# Patient Record
Sex: MALE | Race: WHITE | Employment: OTHER | URBAN - METROPOLITAN AREA
[De-identification: names, ages, dates, MRNs, and addresses within clinical notes are randomized per-mention and may not be internally consistent; named-entity substitution may affect disease eponyms.]

---

## 2017-01-17 ENCOUNTER — HOSPITAL ENCOUNTER (OUTPATIENT)
Dept: GENERAL RADIOLOGY | Age: 82
Discharge: HOME | End: 2017-01-17
Attending: PHYSICIAN ASSISTANT
Payer: MEDICARE

## 2017-01-17 DIAGNOSIS — K22.4 MOTILITY DISORDER, ESOPHAGEAL: ICD-10-CM

## 2017-01-17 DIAGNOSIS — Z79.02 ENCOUNTER FOR LONG-TERM (CURRENT) USE OF ANTIPLATELETS/ANTITHROMBOTICS: ICD-10-CM

## 2017-01-17 DIAGNOSIS — R63.30 FEEDING DIFFICULTIES: ICD-10-CM

## 2017-01-17 DIAGNOSIS — R13.10 DYSPHAGIA: ICD-10-CM

## 2017-01-17 PROCEDURE — 74241 XR UPPER GI SERIES W KUB: CPT

## 2017-02-01 ENCOUNTER — HOSPITAL ENCOUNTER (INPATIENT)
Age: 82
LOS: 3 days | Discharge: HOME | DRG: 069 | End: 2017-02-04
Attending: EMERGENCY MEDICINE | Admitting: HOSPITALIST
Payer: MEDICARE

## 2017-02-01 ENCOUNTER — APPOINTMENT (OUTPATIENT)
Dept: CT IMAGING | Age: 82
DRG: 069 | End: 2017-02-01
Attending: EMERGENCY MEDICINE
Payer: MEDICARE

## 2017-02-01 ENCOUNTER — APPOINTMENT (OUTPATIENT)
Dept: GENERAL RADIOLOGY | Age: 82
DRG: 069 | End: 2017-02-01
Attending: HOSPITALIST
Payer: MEDICARE

## 2017-02-01 DIAGNOSIS — I63.312 THROMBOTIC STROKE INVOLVING LEFT MIDDLE CEREBRAL ARTERY (HCC): ICD-10-CM

## 2017-02-01 DIAGNOSIS — I65.23 STENOSIS OF BOTH INTERNAL CAROTID ARTERIES: ICD-10-CM

## 2017-02-01 DIAGNOSIS — I69.351 HEMIPLEGIA AND HEMIPARESIS FOLLOWING CEREBRAL INFARCTION AFFECTING RIGHT DOMINANT SIDE (HCC): ICD-10-CM

## 2017-02-01 DIAGNOSIS — I63.9 CEREBROVASCULAR ACCIDENT (CVA), UNSPECIFIED MECHANISM (HCC): Primary | ICD-10-CM

## 2017-02-01 DIAGNOSIS — Z95.810 STATUS POST IMPLANTATION OF AUTOMATIC CARDIOVERTER/DEFIBRILLATOR (AICD): ICD-10-CM

## 2017-02-01 DIAGNOSIS — R53.1 WEAKNESS: ICD-10-CM

## 2017-02-01 LAB
ALBUMIN SERPL BCP-MCNC: 3.6 G/DL (ref 3.5–5)
ALBUMIN/GLOB SERPL: 1 {RATIO} (ref 1.1–2.2)
ALP SERPL-CCNC: 72 U/L (ref 45–117)
ALT SERPL-CCNC: 62 U/L (ref 12–78)
ANION GAP BLD CALC-SCNC: 6 MMOL/L (ref 5–15)
APPEARANCE UR: CLEAR
APTT PPP: 26 SEC (ref 22.1–32.5)
AST SERPL W P-5'-P-CCNC: 49 U/L (ref 15–37)
BACTERIA URNS QL MICRO: NEGATIVE /HPF
BASOPHILS # BLD AUTO: 0 K/UL (ref 0–0.1)
BASOPHILS # BLD: 0 % (ref 0–1)
BILIRUB SERPL-MCNC: 0.5 MG/DL (ref 0.2–1)
BILIRUB UR QL: NEGATIVE
BUN SERPL-MCNC: 32 MG/DL (ref 6–20)
BUN/CREAT SERPL: 28 (ref 12–20)
CALCIUM SERPL-MCNC: 9.3 MG/DL (ref 8.5–10.1)
CHLORIDE SERPL-SCNC: 104 MMOL/L (ref 97–108)
CO2 SERPL-SCNC: 29 MMOL/L (ref 21–32)
COLOR UR: ABNORMAL
CREAT SERPL-MCNC: 1.15 MG/DL (ref 0.7–1.3)
EOSINOPHIL # BLD: 0.1 K/UL (ref 0–0.4)
EOSINOPHIL NFR BLD: 0 % (ref 0–7)
EPITH CASTS URNS QL MICRO: ABNORMAL /LPF
ERYTHROCYTE [DISTWIDTH] IN BLOOD BY AUTOMATED COUNT: 14.7 % (ref 11.5–14.5)
GLOBULIN SER CALC-MCNC: 3.6 G/DL (ref 2–4)
GLUCOSE BLD STRIP.AUTO-MCNC: 110 MG/DL (ref 65–100)
GLUCOSE BLD STRIP.AUTO-MCNC: 138 MG/DL (ref 65–100)
GLUCOSE BLD STRIP.AUTO-MCNC: 249 MG/DL (ref 65–100)
GLUCOSE BLD STRIP.AUTO-MCNC: 264 MG/DL (ref 65–100)
GLUCOSE SERPL-MCNC: 140 MG/DL (ref 65–100)
GLUCOSE UR STRIP.AUTO-MCNC: NEGATIVE MG/DL
HCT VFR BLD AUTO: 47 % (ref 36.6–50.3)
HGB BLD-MCNC: 14.9 G/DL (ref 12.1–17)
HGB UR QL STRIP: NEGATIVE
HYALINE CASTS URNS QL MICRO: ABNORMAL /LPF (ref 0–5)
INR PPP: 1.1 (ref 0.9–1.1)
KETONES UR QL STRIP.AUTO: NEGATIVE MG/DL
LEUKOCYTE ESTERASE UR QL STRIP.AUTO: ABNORMAL
LYMPHOCYTES # BLD AUTO: 14 % (ref 12–49)
LYMPHOCYTES # BLD: 1.6 K/UL (ref 0.8–3.5)
MCH RBC QN AUTO: 30.2 PG (ref 26–34)
MCHC RBC AUTO-ENTMCNC: 31.7 G/DL (ref 30–36.5)
MCV RBC AUTO: 95.3 FL (ref 80–99)
MONOCYTES # BLD: 0.8 K/UL (ref 0–1)
MONOCYTES NFR BLD AUTO: 7 % (ref 5–13)
NEUTS SEG # BLD: 9 K/UL (ref 1.8–8)
NEUTS SEG NFR BLD AUTO: 79 % (ref 32–75)
NITRITE UR QL STRIP.AUTO: NEGATIVE
PH UR STRIP: 7 [PH] (ref 5–8)
PLATELET # BLD AUTO: 96 K/UL (ref 150–400)
POTASSIUM SERPL-SCNC: 4.7 MMOL/L (ref 3.5–5.1)
PROT SERPL-MCNC: 7.2 G/DL (ref 6.4–8.2)
PROT UR STRIP-MCNC: ABNORMAL MG/DL
PROTHROMBIN TIME: 10.7 SEC (ref 9–11.1)
RBC # BLD AUTO: 4.93 M/UL (ref 4.1–5.7)
RBC #/AREA URNS HPF: ABNORMAL /HPF (ref 0–5)
SERVICE CMNT-IMP: ABNORMAL
SODIUM SERPL-SCNC: 139 MMOL/L (ref 136–145)
SP GR UR REFRACTOMETRY: 1.02 (ref 1–1.03)
THERAPEUTIC RANGE,PTTT: NORMAL SECS (ref 58–77)
UA: UC IF INDICATED,UAUC: ABNORMAL
UROBILINOGEN UR QL STRIP.AUTO: 0.2 EU/DL (ref 0.2–1)
WBC # BLD AUTO: 11.4 K/UL (ref 4.1–11.1)
WBC URNS QL MICRO: ABNORMAL /HPF (ref 0–4)

## 2017-02-01 PROCEDURE — 74011250637 HC RX REV CODE- 250/637: Performed by: HOSPITALIST

## 2017-02-01 PROCEDURE — 99285 EMERGENCY DEPT VISIT HI MDM: CPT

## 2017-02-01 PROCEDURE — 85025 COMPLETE CBC W/AUTO DIFF WBC: CPT | Performed by: EMERGENCY MEDICINE

## 2017-02-01 PROCEDURE — 36415 COLL VENOUS BLD VENIPUNCTURE: CPT | Performed by: EMERGENCY MEDICINE

## 2017-02-01 PROCEDURE — 85610 PROTHROMBIN TIME: CPT | Performed by: EMERGENCY MEDICINE

## 2017-02-01 PROCEDURE — 82962 GLUCOSE BLOOD TEST: CPT

## 2017-02-01 PROCEDURE — 74011636637 HC RX REV CODE- 636/637: Performed by: HOSPITALIST

## 2017-02-01 PROCEDURE — 80053 COMPREHEN METABOLIC PANEL: CPT | Performed by: EMERGENCY MEDICINE

## 2017-02-01 PROCEDURE — 87086 URINE CULTURE/COLONY COUNT: CPT | Performed by: EMERGENCY MEDICINE

## 2017-02-01 PROCEDURE — 81001 URINALYSIS AUTO W/SCOPE: CPT | Performed by: EMERGENCY MEDICINE

## 2017-02-01 PROCEDURE — 70450 CT HEAD/BRAIN W/O DYE: CPT

## 2017-02-01 PROCEDURE — 65660000000 HC RM CCU STEPDOWN

## 2017-02-01 PROCEDURE — 74011250636 HC RX REV CODE- 250/636: Performed by: HOSPITALIST

## 2017-02-01 PROCEDURE — 85730 THROMBOPLASTIN TIME PARTIAL: CPT | Performed by: EMERGENCY MEDICINE

## 2017-02-01 RX ORDER — ASPIRIN 325 MG
325 TABLET ORAL DAILY
Status: DISCONTINUED | OUTPATIENT
Start: 2017-02-02 | End: 2017-02-03

## 2017-02-01 RX ORDER — SODIUM CHLORIDE 0.9 % (FLUSH) 0.9 %
5-10 SYRINGE (ML) INJECTION AS NEEDED
Status: DISCONTINUED | OUTPATIENT
Start: 2017-02-01 | End: 2017-02-04 | Stop reason: HOSPADM

## 2017-02-01 RX ORDER — FUROSEMIDE 20 MG/1
20 TABLET ORAL DAILY
Status: DISCONTINUED | OUTPATIENT
Start: 2017-02-02 | End: 2017-02-04 | Stop reason: HOSPADM

## 2017-02-01 RX ORDER — GABAPENTIN 250 MG/5ML
500 SOLUTION ORAL 2 TIMES DAILY
Status: DISCONTINUED | OUTPATIENT
Start: 2017-02-01 | End: 2017-02-04 | Stop reason: HOSPADM

## 2017-02-01 RX ORDER — FOLIC ACID 1 MG/1
1 TABLET ORAL DAILY
Status: DISCONTINUED | OUTPATIENT
Start: 2017-02-02 | End: 2017-02-04 | Stop reason: HOSPADM

## 2017-02-01 RX ORDER — ATORVASTATIN CALCIUM 40 MG/1
40 TABLET, FILM COATED ORAL DAILY
Status: DISCONTINUED | OUTPATIENT
Start: 2017-02-02 | End: 2017-02-04 | Stop reason: HOSPADM

## 2017-02-01 RX ORDER — IPRATROPIUM BROMIDE AND ALBUTEROL SULFATE 2.5; .5 MG/3ML; MG/3ML
3 SOLUTION RESPIRATORY (INHALATION)
Status: DISCONTINUED | OUTPATIENT
Start: 2017-02-01 | End: 2017-02-04 | Stop reason: HOSPADM

## 2017-02-01 RX ORDER — METOPROLOL TARTRATE 25 MG/1
12.5 TABLET, FILM COATED ORAL 2 TIMES DAILY
COMMUNITY
End: 2018-01-01 | Stop reason: ALTCHOICE

## 2017-02-01 RX ORDER — NITROGLYCERIN 0.4 MG/1
0.4 TABLET SUBLINGUAL
COMMUNITY
End: 2019-01-01

## 2017-02-01 RX ORDER — INSULIN LISPRO 100 [IU]/ML
INJECTION, SOLUTION INTRAVENOUS; SUBCUTANEOUS EVERY 6 HOURS
Status: DISCONTINUED | OUTPATIENT
Start: 2017-02-02 | End: 2017-02-04 | Stop reason: HOSPADM

## 2017-02-01 RX ORDER — CLOPIDOGREL BISULFATE 75 MG/1
75 TABLET ORAL DAILY
Status: DISCONTINUED | OUTPATIENT
Start: 2017-02-02 | End: 2017-02-03

## 2017-02-01 RX ORDER — METOPROLOL TARTRATE 25 MG/1
12.5 TABLET, FILM COATED ORAL 2 TIMES DAILY
Status: DISCONTINUED | OUTPATIENT
Start: 2017-02-01 | End: 2017-02-04 | Stop reason: HOSPADM

## 2017-02-01 RX ORDER — MIDODRINE HYDROCHLORIDE 5 MG/1
10 TABLET ORAL
Status: DISCONTINUED | OUTPATIENT
Start: 2017-02-02 | End: 2017-02-04 | Stop reason: HOSPADM

## 2017-02-01 RX ORDER — HYDROCODONE BITARTRATE AND ACETAMINOPHEN 5; 325 MG/1; MG/1
1 TABLET ORAL
Status: DISCONTINUED | OUTPATIENT
Start: 2017-02-01 | End: 2017-02-04 | Stop reason: HOSPADM

## 2017-02-01 RX ORDER — SODIUM CHLORIDE 0.9 % (FLUSH) 0.9 %
5-10 SYRINGE (ML) INJECTION EVERY 8 HOURS
Status: DISCONTINUED | OUTPATIENT
Start: 2017-02-01 | End: 2017-02-04 | Stop reason: HOSPADM

## 2017-02-01 RX ORDER — THERA TABS 400 MCG
1 TAB ORAL DAILY
Status: DISCONTINUED | OUTPATIENT
Start: 2017-02-02 | End: 2017-02-04 | Stop reason: HOSPADM

## 2017-02-01 RX ORDER — ENOXAPARIN SODIUM 100 MG/ML
40 INJECTION SUBCUTANEOUS EVERY 24 HOURS
Status: DISCONTINUED | OUTPATIENT
Start: 2017-02-01 | End: 2017-02-04 | Stop reason: HOSPADM

## 2017-02-01 RX ORDER — MAGNESIUM SULFATE 100 %
4 CRYSTALS MISCELLANEOUS AS NEEDED
Status: DISCONTINUED | OUTPATIENT
Start: 2017-02-01 | End: 2017-02-04 | Stop reason: HOSPADM

## 2017-02-01 RX ORDER — INSULIN GLARGINE 100 [IU]/ML
14 INJECTION, SOLUTION SUBCUTANEOUS DAILY
Status: DISCONTINUED | OUTPATIENT
Start: 2017-02-02 | End: 2017-02-04 | Stop reason: HOSPADM

## 2017-02-01 RX ORDER — ADHESIVE BANDAGE
30 BANDAGE TOPICAL DAILY PRN
COMMUNITY
End: 2018-01-01

## 2017-02-01 RX ORDER — DEXTROSE 50 % IN WATER (D50W) INTRAVENOUS SYRINGE
12.5-25 AS NEEDED
Status: DISCONTINUED | OUTPATIENT
Start: 2017-02-01 | End: 2017-02-04 | Stop reason: HOSPADM

## 2017-02-01 RX ORDER — ASPIRIN 325 MG
325 TABLET ORAL DAILY
Status: ON HOLD | COMMUNITY
End: 2018-01-01 | Stop reason: DRUGHIGH

## 2017-02-01 RX ADMIN — HYDROCODONE BITARTRATE AND ACETAMINOPHEN 1 TABLET: 5; 325 TABLET ORAL at 23:06

## 2017-02-01 RX ADMIN — Medication 10 ML: at 23:06

## 2017-02-01 RX ADMIN — GABAPENTIN 500 MG: 250 SOLUTION ORAL at 23:06

## 2017-02-01 RX ADMIN — METOPROLOL TARTRATE 12.5 MG: 25 TABLET ORAL at 23:06

## 2017-02-01 RX ADMIN — ENOXAPARIN SODIUM 40 MG: 40 INJECTION SUBCUTANEOUS at 23:06

## 2017-02-01 RX ADMIN — INSULIN LISPRO 3 UNITS: 100 INJECTION, SOLUTION INTRAVENOUS; SUBCUTANEOUS at 23:05

## 2017-02-01 NOTE — IP AVS SNAPSHOT
[address] [place] [facility]. 
[phone] Patient: Lenard Corey MRN: [id number] You are allergic to the following Allergen Reactions Demerol (Meperidine) Shortness of Breath Paxil (Paroxetine Hcl) Unknown (comments) Pt gets shaky and loses control of legs Amoxicillin Rash Pcn (Penicillins) Rash Recent Documentation Height Weight BMI Smoking Status 1.702 m 73 kg 25.21 kg/m2 Never Smoker Emergency Contacts Name Discharge Info Relation Home Work Mobile [facility] DISCHARGE CAREGIVER [3] Spouse [3] [phone] Steven Ghosh DISCHARGE CAREGIVER [3] Son [22] [phone] About your hospitalization You were admitted on:  February 1, 2017 You last received care in the:  [facility] 3 NEUROSCIENCE TELEMETRY You were discharged on:  February 4, 2017 Unit phone number:  [phone] Why you were hospitalized Your primary diagnosis was:  Not on File Your diagnoses also included:  Weakness, Stroke (Hcc), 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), Status Post Implantation Of Automatic Cardioverter/Defibrillator (Aicd), 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), 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), Stenosis Of Both Internal Carotid Arteries, 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 
[phone] Veronica Bridges MD In 1 week  [address] [facility] [address] [facility] 
[phone] [address] [place] [address] [address] [facility] [address] 
[phone] Your Appointments Friday February 10, 2017  9:30 AM EST Follow Up with Kayleigh Ruiz MD  
[facility] [address] [facility]) One [facility] [address] [id number] [phone] Current Discharge Medication List  
  
START taking these medications Dose & Instructions Dispensing Information Comments Morning Noon Evening Bedtime  
 aspirin-dipyridamole  mg per SR capsule Commonly known as:  AGGRENOX Your next dose is: Today, Tomorrow Other:  _________ Dose:  1 Cap Take 1 Cap by mouth two (2) times a day. Quantity:  60 Cap Refills:  0  
     
   
   
   
  
 levoFLOXacin 750 mg tablet Commonly known as:  Brett Roberts Your next dose is: Today, Tomorrow Other:  _________ Dose:  750 mg Take 1 Tab by mouth daily. Quantity:  7 Tab Refills:  0 CONTINUE these medications which have NOT CHANGED Dose & Instructions Dispensing Information Comments Morning Noon Evening Bedtime  
 albuterol-ipratropium 2.5 mg-0.5 mg/3 ml Nebu Commonly known as:  [place] Dove Your next dose is: Today, Tomorrow Other:  _________ Dose:  3 mL  
3 mL by Nebulization route every six (6) hours as needed. Quantity:  100 Nebule Refills:  1  
     
   
   
   
  
 aspirin 325 mg tablet Commonly known as:  ASPIRIN Your next dose is: Today, Tomorrow Other:  _________ Dose:  325 mg Take 325 mg by mouth daily. Refills:  0 atorvastatin 40 mg tablet Commonly known as:  LIPITOR Your next dose is: Today, Tomorrow Other:  _________ Dose:  40 mg Take 40 mg by mouth daily. Refills:  0  
     
   
   
   
  
 folic acid 1 mg tablet Commonly known as:  Google Your next dose is: Today, Tomorrow Other:  _________ Dose:  1 mg Take 1 Tab by mouth daily. Quantity:  30 Tab Refills:  1  
     
   
   
   
  
 furosemide 20 mg tablet Commonly known as:  LASIX Your next dose is: Today, Tomorrow Other:  _________ Dose:  20 mg Take 1 Tab by mouth daily. Indications: HYPERCALCEMIA Quantity:  30 Tab Refills:  2  
     
   
   
   
  
 gabapentin 250 mg/5 mL solution Commonly known as:  NEURONTIN Your next dose is: Today, Tomorrow Other:  _________ Dose:  500 mg Take 500 mg by mouth two (2) times a day. Refills:  0 HYDROcodone-acetaminophen 5-325 mg per tablet Commonly known as:  Lianettmartin Boswell Your next dose is: Today, Tomorrow Other:  _________ Dose:  1 Tab 1 Tab by Per G Tube route every four (4) hours as needed. Max Daily Amount: 6 Tabs. Quantity:  30 Tab Refills:  0  
     
   
   
   
  
 insulin glargine 100 unit/mL (3 mL) pen Commonly known as:  LANTUS SOLOSTAR Your next dose is: Today, Tomorrow Other:  _________ Dose:  10 Units 10 Units by SubCUTAneous route daily. Inject 14 units in AM  subcutenous Quantity:  15 mL Refills:  1 Insulin Needles (Disposable) 32 gauge x 5/32\" Ndle Commonly known as:  Elise Pen Needle Your next dose is: Today, Tomorrow Other:  _________  
   
   
 5 times a day Quantity:  150 Each Refills:  99  
     
   
   
   
  
 insulin syringe-needle U-100 1 mL 31 gauge x 15/64\" Syrg Your next dose is: Today, Tomorrow Other:  _________ Dose:  1 Syringe 1 Syringe by SubCUTAneous route four (4) times daily. Quantity:  200 Each Refills:  11  
     
   
   
   
  
 metoprolol tartrate 25 mg tablet Commonly known as:  LOPRESSOR Your next dose is: Today, Tomorrow Other:  _________ Dose:  12.5 mg Take 12.5 mg by mouth two (2) times a day. Refills:  0  
     
   
   
   
  
 midodrine 10 mg tablet Commonly known as:  Elyn Pata Your next dose is: Today, Tomorrow Other:  _________ Dose:  10 mg Take 10 mg by mouth three (3) times daily (with meals). Refills:  0  
     
   
   
   
  
 multivitamin tablet Commonly known as:  ONE A DAY Your next dose is: Today, Tomorrow Other:  _________ Dose:  1 Tab Take 1 Tab by mouth daily. Refills:  0 Nebulizer & Compressor machine Your next dose is: Today, Tomorrow Other:  _________ Dose:  1 Each  
1 Each by Does Not Apply route daily. Quantity:  1 Each Refills:  0  
     
   
   
   
  
 nitroglycerin 0.4 mg SL tablet Commonly known as:  NITROSTAT Your next dose is: Today, Tomorrow Other:  _________ Dose:  0.4 mg  
0.4 mg by SubLINGual route every five (5) minutes as needed for Chest Pain. Refills:  0  
     
   
   
   
  
 ondansetron hcl 8 mg tablet Commonly known as:  ZOFRAN (AS HYDROCHLORIDE) Your next dose is: Today, Tomorrow Other:  _________ Dose:  8 mg Take 1 Tab by mouth every eight (8) hours as needed for Nausea. Quantity:  20 Tab Refills:  0 TOBIAS MILK OF MAGNESIA 400 mg/5 mL suspension Generic drug:  magnesium hydroxide Your next dose is: Today, Tomorrow Other:  _________ Dose:  30 mL Take 30 mL by mouth daily as needed for Constipation. Refills:  0 PLAVIX 75 mg Tab Generic drug:  clopidogrel Your next dose is: Today, Tomorrow Other:  _________ Dose:  75 mg Take 75 mg by mouth daily. Refills:  0  
     
   
   
   
  
 vit B Cmplx 3-FA-Vit C-Biotin 1- mg-mg-mcg tablet Commonly known as:  NEPHRO GRAYSON RX Your next dose is: Today, Tomorrow Other:  _________ Dose:  1 Tab Take 1 Tab by mouth daily. Refills:  0 Where to Get Your Medications Information on where to get these meds will be given to you by the nurse or doctor. ! Ask your nurse or doctor about these medications  
  aspirin-dipyridamole  mg per SR capsule  
 levoFLOXacin 750 mg tablet Discharge Instructions DISCHARGE DIAGNOSIS: 
TIA MEDICATIONS: 
· It is important that you take the medication exactly as they are prescribed. · Keep your medication in the bottles provided by the pharmacist and keep a list of the medication names, dosages, and times to be taken in your wallet. · Do not take other medications without consulting your doctor. Pain Management: per above medications What to do at [facility] Recommended diet:  Cardiac Diet and Diabetic Diet Recommended activity: Activity as tolerated If you have questions regarding the hospital related prescriptions or hospital related issues please call [address] [place] [address] at . You can always direct your questions to your primary care doctor if you are unable to reach your hospital physician; your PCP works as an extension of your hospital doctor just like your hospital doctor is an extension of your PCP for your time at the hospital [facility]). If you experience any of the following symptoms then please call your primary care physician or return to the emergency room if you cannot get hold of your doctor: 
Fever, chills, nausea, vomiting, diarrhea, change in mentation, falling, bleeding, shortness of breath,  
 
Discharge Orders None Teads Announcement We are excited to announce that we are making your provider's discharge notes available to you in Teads. You will see these notes when they are completed and signed by the physician that discharged you from your recent hospital stay. If you have any questions or concerns about any information you see in Teads, please call the Health Information Department where you were seen or reach out to your Primary Care Provider for more information about your plan of care. Introducing [facility]! Wilma Gomez introduces Teads patient portal. Now you can access parts of your medical record, email your doctor's office, and request medication refills online. 1. In your internet browser, go to [email or web address] 2. Click on the First Time User? Click Here link in the Sign In box. You will see the New Member Sign Up page. 3. Enter your Teads Access Code exactly as it appears below. You will not need to use this code after youve completed the sign-up process. If you do not sign up before the expiration date, you must request a new code. · Teads Access Code: [email or web address] Expires: 2/8/2017  9:46 AM 
 
4. Enter the last four digits of your Social Security Number (xxxx) and Date of Birth (mm/dd/yyyy) as indicated and click Submit. You will be taken to the next sign-up page. 5. Create a Teads ID. This will be your Teads login ID and cannot be changed, so think of one that is secure and easy to remember. 6. Create a Teads password. You can change your password at any time. 7. Enter your Password Reset Question and Answer. This can be used at a later time if you forget your password. 8. Enter your e-mail address. You will receive e-mail notification when new information is available in [address] 9. Click Sign Up. You can now view and download portions of your medical record. 10. Click the Download Summary menu link to download a portable copy of your medical information. If you have questions, please visit the Frequently Asked Questions section of the FAD ? IOt website. Remember, MyChart is NOT to be used for urgent needs. For medical emergencies, dial 911. Now available from your iPhone and Android! General Information Please provide this summary of care documentation to your next provider. Patient Signature:  ____________________________________________________________ Date:  ____________________________________________________________  
  
Denice Gila Provider Signature:  ____________________________________________________________ Date:  ____________________________________________________________

## 2017-02-01 NOTE — ED PROVIDER NOTES
HPI Comments: Trini Dennis is a 80 y.o. male with hx significant for DM, CVA, TIA, and MI who presents ambulatory to ED [facility] ED with cc of gradually worsening RUE weakness since yesterday afternoon. Pt reports he has been unable to raise his RUE and now feels like \"dead weight\" to him. Per wife, pt went to his orthopedist (Dr. Lily Strong) today because she thought pt's shoulder may be causing his symptoms since he had a cortisone injection completed ~2 weeks ago. Wife notes Dr. Lily Strong completed x-rays of the shoulder/neck but then referred pt to the ED to r/o CVA. Pt reports he is R hand dominant. Per wife, pt had h/o TIA in 2008 and had no deficits. Per wife, pt then had h/o CVA with R upper/lower extremity deficits in 7/2016. He was admitted to the hospital for 1 week and d/c to [place] [address] home for 3 weeks where he regained his strength. Pt denies any recent injuries, falls, trauma, heavy lifting, or overhead work. Per wife, pt has seen neurology in the past for evaluation of his persistent dizziness and was told that he had 95% cerebral artery stenosis. Pt states neurology did not recommend surgical intervention given pt's medical history. Pt reports he takes Plavix, aspirin 325mg, and Midodrin daily. Pt denies other weakness elsewhere, gait problems, extremity numbness, facial droop, slurred speech, headache, blurred vision, diplopia, nausea, vomiting, neck pain, nausea, vomiting, fever, chills, CP, or SOB. PCP: Phyllis Mcmahon MD    Social Hx: -tobacco, -EtOH, -illicit drug usage    There are no other complaints, changes or physical findings at this time. The history is provided by the patient.         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 I suspect the latter. Achalasia  is not present.         GERD (gastroesophageal reflux disease)     Heart failure ([address] 10/2014      Cardiomyopathy:Pacemaker upgrade:Biv and AICD  Dr. Kwon Howe heart DrAlvaro last visit 5/11/2015    Hepatitis C Dx 1996     [facility] in past; as of 4/15/15 wife states pt currently not under any treatment    Hyperlipidemia     Hypertension     Myocardial infarct [facility]) 2013     Heart Cath: 40% LV EF, Stented distal LAD, patent Graft to circumflex    On tube feeding diet approx 2009     still has as of 9/28/15  (no po food/liquid/meds at all); Dr Willie Rachel Other ill-defined conditions(799.89) 1996     1 dose of chemotherapy/radiation for tongue cancer    Other ill-defined conditions(799.89)      BPH    Other ill-defined conditions(799.89)      orthostatic hypotension    Pneumonia ~ April -May 2010    Stroke [facility]) approx 2003     left side-left finger tips numb; no imbalance or memory loss; as of 2015 not seeing neuro MD    Suicidal thoughts        Past Surgical History:   Procedure Laterality Date    Hx orthopaedic       back surgery times two    Hx mohs procedure       bilateral    Hx cataract removal       bilateral    Pr egd insert guide wire dilator passage esophagus  9/13/2010          Pr egd transoral biopsy single/multiple  9/13/2010          Hx other surgical       Radical Left Neck    Hx other surgical       NASAL POLYPS REMOVAL    Hx other surgical  2010     TURP    Hx cholecystectomy      Pr abdomen surgery proc unlisted  1996     peg tube    Pr neurological procedure unlisted       cevical surgery    Pr change gastrostomy tube  2011          Pr change gastrostomy tube  2011          Pr stomach surgery procedure unlisted  2011          Vascular surgery procedure unlist       bilateral carotid stents    Hx pacemaker      Hx pacemaker  10/28/14      Defibrillator: [facility] # [id number], serial # [id number]; Dr. Myrtle Canales [phone]; Dr Fraire Session Hx urological       cystoscopy    Pr cabg, artery-vein, three      Hx heart catheterization       Stented distal LAD         Family History:   Problem Relation Age of Onset    Heart Disease Father       at age 52 from CAD    Colon Cancer Mother     Cancer Mother      colon ca    Heart Disease Brother        Social History     Social History    Marital status:      Spouse name: N/A    Number of children: N/A    Years of education: N/A     Occupational History    Retired       He was a stained      Social History Main Topics    Smoking status: Never Smoker    Smokeless tobacco: Never Used    Alcohol use No    Drug use: No    Sexual activity: Yes     Partners: Female     Other Topics Concern    Not on file     Social History Narrative         ALLERGIES: Demerol [meperidine]; Paxil [paroxetine hcl]; Amoxicillin; and Pcn [penicillins]    Review of Systems   Constitutional: Negative for chills, diaphoresis, fever and unexpected weight change. HENT: Negative for rhinorrhea and sore throat. Eyes: Negative for pain and visual disturbance. Respiratory: Negative for shortness of breath, wheezing and stridor. Cardiovascular: Negative for chest pain and leg swelling. Gastrointestinal: Negative for abdominal pain, blood in stool, diarrhea, nausea and vomiting. Genitourinary: Negative for difficulty urinating, dysuria and flank pain. Musculoskeletal: Negative for back pain, gait problem, neck pain and neck stiffness. Skin: Negative for rash. Neurological: Positive for weakness (RUE). Negative for seizures, syncope, facial asymmetry, speech difficulty, light-headedness, numbness and headaches. Psychiatric/Behavioral: Negative for confusion. Patient Vitals for the past 12 hrs:   Temp Pulse Resp BP SpO2   02/01/17 2131 - - - 166/74 95 %   02/01/17 2115 - - - 111/53 94 %   02/01/17 2100 - - - 141/71 95 %   02/01/17 2045 - - - 130/67 95 %   02/01/17 2030 - - - 128/62 95 %   02/01/17 2015 - - - 111/57 96 %   02/01/17 2000 - - - 103/58 96 %   02/01/17 1956 - - - 110/57 -   02/01/17 1930 - - - 171/90 95 %   02/01/17 1926 - - - 157/71 96 %   02/01/17 1513 - 60 16 113/79 98 %   02/01/17 1351 - 61 16 106/64 96 %   02/01/17 1247 97.7 °F (36.5 °C) 68 18 182/86 99 %         Physical Exam   Constitutional: He is oriented to person, place, and time. He appears well-developed and well-nourished. No distress. HENT:   Nose: Nose normal.   Mouth/Throat: Oropharynx is clear and moist. No oropharyngeal exudate. Eyes: Conjunctivae and EOM are normal. Pupils are equal, round, and reactive to light. No scleral icterus. Neck: Normal range of motion. Neck supple. No JVD present. No midline c-spine tenderness   Cardiovascular: Normal rate, regular rhythm and intact distal pulses. High pitched systolic murmur   Pulmonary/Chest: Effort normal and breath sounds normal. No stridor. No respiratory distress. He has no wheezes. He has no rales. Abdominal: Soft. Bowel sounds are normal. He exhibits no distension. There is no tenderness. There is no rebound and no guarding. Musculoskeletal: He exhibits no edema or tenderness. No tenderness or deformity of the R clavicle, shoulder, or scapula   Neurological: He is alert and oriented to person, place, and time. He displays no atrophy and no tremor. No cranial nerve deficit or sensory deficit. He exhibits normal muscle tone. He displays a negative Romberg sign.  Coordination and gait normal.   5/5 strength LUE, BL lower extremities  5/5 symmetric  strength  5/5 but diminished bicep/tricep strength in RUE compared to LUE  3/5 strength of deltoid in RUE  Sensations intact throughout BL upper/lower extremities   Skin: Skin is warm and dry. He is not diaphoretic. Psychiatric: He has a normal mood and affect. Nursing note and vitals reviewed. MDM  Number of Diagnoses or Management Options  Cerebrovascular accident (CVA), unspecified mechanism ([address]):      Amount and/or Complexity of Data Reviewed  Clinical lab tests: ordered and reviewed  Tests in the radiology section of CPT®: ordered and reviewed  Review and summarize past medical records: yes  Discuss the patient with other providers: yes ([facility], Hospitalist)    Patient Progress  Patient progress: stable    ED Course       Procedures    CONSULT NOTE:   4:26 PM  Vladimir Saint, MD spoke with Lynnette Ardon MD,   Specialty: [facility]  Discussed pt's hx, disposition, and available diagnostic and imaging results. Reviewed care plans. Consultant agrees with plans as outlined. Dr. Idalia Abrams will see/evaluate pt via telemonitor. Written by LUANNE Fisher, as dictated by Vladimir Saint, MD.    PROGRESS NOTE:  4:41 PM  Dr. Idalia Abrams has evaluated the pt. He believes pt may have had CVA yesterday and recommends hospitalist admission for further workup. Written by LUANNE Patel, as dictated by Vladimir Saint, MD.    CONSULT NOTE:   4:42 PM  Vladimir Saint, MD spoke with Isaac Sheppard MD,   Specialty: Hospitalist  Discussed pt's hx, disposition, and available diagnostic and imaging results. Reviewed care plans. Consultant will evaluate pt for admission.   Written by LUANNE Fisher, as dictated by Vladimir Saint, MD.      LABORATORY TESTS:  Recent Results (from the past 12 hour(s))   CBC WITH AUTOMATED DIFF    Collection Time: 02/01/17  1:35 PM   Result Value Ref Range    WBC 11.4 (H) 4.1 - 11.1 K/uL    RBC 4.93 4.10 - 5.70 M/uL    HGB 14.9 12.1 - 17.0 g/dL    HCT 47.0 36.6 - 50.3 %    MCV 95.3 80.0 - 99.0 FL    MCH 30.2 26.0 - 34.0 PG    MCHC 31.7 30.0 - 36.5 g/dL    RDW 14.7 (H) 11.5 - 14.5 %    PLATELET 96 (L) 015 - 400 K/uL    NEUTROPHILS 79 (H) 32 - 75 %    LYMPHOCYTES 14 12 - 49 %    MONOCYTES 7 5 - 13 %    EOSINOPHILS 0 0 - 7 %    BASOPHILS 0 0 - 1 %    ABS. NEUTROPHILS 9.0 (H) 1.8 - 8.0 K/UL    ABS. LYMPHOCYTES 1.6 0.8 - 3.5 K/UL    ABS. MONOCYTES 0.8 0.0 - 1.0 K/UL    ABS. EOSINOPHILS 0.1 0.0 - 0.4 K/UL    ABS. BASOPHILS 0.0 0.0 - 0.1 K/UL   METABOLIC PANEL, COMPREHENSIVE    Collection Time: 02/01/17  1:35 PM   Result Value Ref Range    Sodium 139 136 - 145 mmol/L    Potassium 4.7 3.5 - 5.1 mmol/L    Chloride 104 97 - 108 mmol/L    CO2 29 21 - 32 mmol/L    Anion gap 6 5 - 15 mmol/L    Glucose 140 (H) 65 - 100 mg/dL    BUN 32 (H) 6 - 20 MG/DL    Creatinine 1.15 0.70 - 1.30 MG/DL    BUN/Creatinine ratio 28 (H) 12 - 20      GFR est AA >60 >60 ml/min/1.73m2    GFR est non-AA >60 >60 ml/min/1.73m2    Calcium 9.3 8.5 - 10.1 MG/DL    Bilirubin, total 0.5 0.2 - 1.0 MG/DL    ALT (SGPT) 62 12 - 78 U/L    AST (SGOT) 49 (H) 15 - 37 U/L    Alk.  phosphatase 72 45 - 117 U/L    Protein, total 7.2 6.4 - 8.2 g/dL    Albumin 3.6 3.5 - 5.0 g/dL    Globulin 3.6 2.0 - 4.0 g/dL    A-G Ratio 1.0 (L) 1.1 - 2.2     PROTHROMBIN TIME + INR    Collection Time: 02/01/17  1:35 PM   Result Value Ref Range    INR 1.1 0.9 - 1.1      Prothrombin time 10.7 9.0 - 11.1 sec   PTT    Collection Time: 02/01/17  1:35 PM   Result Value Ref Range    aPTT 26.0 22.1 - 32.5 sec    aPTT, therapeutic range     58.0 - 77.0 SECS   GLUCOSE, POC    Collection Time: 02/01/17  2:05 PM   Result Value Ref Range    Glucose (POC) 138 (H) 65 - 100 mg/dL    Performed by Vince Goldberg     URINALYSIS W/ REFLEX CULTURE    Collection Time: 02/01/17  5:09 PM   Result Value Ref Range    Color YELLOW/STRAW      Appearance CLEAR CLEAR      Specific gravity 1.017 1.003 - 1.030      pH (UA) 7.0 5.0 - 8.0      Protein TRACE (A) NEG mg/dL    Glucose NEGATIVE  NEG mg/dL    Ketone NEGATIVE  NEG mg/dL    Bilirubin NEGATIVE  NEG      Blood NEGATIVE  NEG      Urobilinogen 0.2 0.2 - 1.0 EU/dL    Nitrites NEGATIVE  NEG      Leukocyte Esterase MODERATE (A) NEG      WBC 10-20 0 - 4 /hpf    RBC 0-5 0 - 5 /hpf    Epithelial cells FEW FEW /lpf    Bacteria NEGATIVE  NEG /hpf    UA:UC IF INDICATED URINE CULTURE ORDERED (A) CNI      Hyaline cast 0-2 0 - 5 /lpf   GLUCOSE, POC    Collection Time: 02/01/17  7:22 PM   Result Value Ref Range    Glucose (POC) 110 (H) 65 - 100 mg/dL    Performed by Willow Carranza        IMAGING RESULTS:  CT Results  (Last 48 hours)               02/01/17 1328  CT HEAD WO CONT Final result    Impression:  IMPRESSION:        Moderate to severe chronic microvascular ischemic change with remote   infarctions. There is no acute infarction identified. Narrative:  EXAM:  CT HEAD WO CONT           HISTORY: TIA, altered mental status, carotid artery stenosis, coronary disease,   diabetes, CHF   INDICATION:   TIA       COMPARISON: None. TECHNIQUE: Unenhanced CT of the head was performed using 5 mm images. Brain and   bone windows were generated. CT dose reduction was achieved through use of a   standardized protocol tailored for this examination and automatic exposure   control for dose modulation. FINDINGS:   There is sulcal and ventricular prominence. Confluent periventricular and   scattered hypodensities in the cerebral white matter. Chronic right parietal   infarction. Chronic left cerebellar infarct. Chronic infarction in the   periventricular white matter on the right as well. . . There is no intracranial   hemorrhage, extra-axial collection, mass, mass effect or midline shift. The   basilar cisterns are open. No acute infarct is identified. The bone windows   demonstrate no abnormalities.  The visualized portions of the paranasal sinuses   and mastoid air cells are clear.                 IMPRESSION:  1. Cerebrovascular accident (CVA), unspecified mechanism ([address]        PLAN:  1. Admit to hospital    ADMISSION NOTE:  4:42 PM  Pt is being admitted to the hospital by Dr. Itzel Gonzalez. All available results and reasons for admission have been discussed with the pt and/or available family. Pt and/or available family convey agreement and understanding of need for admission and admission diagnosis. ATTESTATION:  This note is prepared by Prosper Archer, acting as Scribe for Jyoti Solares MD.    Jyoti Solares MD: The scribe's documentation has been prepared under my direction and personally reviewed by me in its entirety. I confirm that the note above accurately reflects all work, treatment, procedures, and medical decision making performed by me.

## 2017-02-01 NOTE — ED NOTES
Patients wife has concern about the wait. Patient moved into triage 2. Dr Livier Regalado is assessing patient in triage 2.

## 2017-02-01 NOTE — ED NOTES
Pt resting in stretcher reporting has been having difficulties with mobility in his right arm since yesterday. Denies any current pain. Denies weakness in the rest of his body. Denies any recent injury or fall.

## 2017-02-01 NOTE — ED NOTES
Pt ambulated to restroom with steady gait x1 assist. Urine obtained and sent to lab for testing.  Awaiting to be seen by hospitalist.

## 2017-02-01 NOTE — IP AVS SNAPSHOT
Current Discharge Medication List  
  
Take these medications at their scheduled times Dose & Instructions Dispensing Information Comments Morning Noon Evening Bedtime  
 aspirin 325 mg tablet Commonly known as:  ASPIRIN Your next dose is: Today, Tomorrow Other:  ____________ Dose:  325 mg Take 325 mg by mouth daily. Refills:  0  
     
   
   
   
  
 aspirin-dipyridamole  mg per SR capsule Commonly known as:  AGGRENOX Your next dose is: Today, Tomorrow Other:  ____________ Dose:  1 Cap Take 1 Cap by mouth two (2) times a day. Quantity:  60 Cap Refills:  0  
     
   
   
   
  
 atorvastatin 40 mg tablet Commonly known as:  LIPITOR Your next dose is: Today, Tomorrow Other:  ____________ Dose:  40 mg Take 40 mg by mouth daily. Refills:  0  
     
   
   
   
  
 folic acid 1 mg tablet Commonly known as:  Sharren Goodson Your next dose is: Today, Tomorrow Other:  ____________ Dose:  1 mg Take 1 Tab by mouth daily. Quantity:  30 Tab Refills:  1  
     
   
   
   
  
 furosemide 20 mg tablet Commonly known as:  LASIX Your next dose is: Today, Tomorrow Other:  ____________ Dose:  20 mg Take 1 Tab by mouth daily. Indications: HYPERCALCEMIA Quantity:  30 Tab Refills:  2  
     
   
   
   
  
 gabapentin 250 mg/5 mL solution Commonly known as:  NEURONTIN Your next dose is: Today, Tomorrow Other:  ____________ Dose:  500 mg Take 500 mg by mouth two (2) times a day. Refills:  0  
     
   
   
   
  
 insulin glargine 100 unit/mL (3 mL) pen Commonly known as:  LANTUS SOLOSTAR Your next dose is: Today, Tomorrow Other:  ____________ Dose:  10 Units 10 Units by SubCUTAneous route daily. Inject 14 units in AM  subcutenous Quantity:  15 mL Refills:  1 insulin syringe-needle U-100 1 mL 31 gauge x 15/64\" Syrg Your next dose is: Today, Tomorrow Other:  ____________ Dose:  1 Syringe 1 Syringe by SubCUTAneous route four (4) times daily. Quantity:  200 Each Refills:  11  
     
   
   
   
  
 levoFLOXacin 750 mg tablet Commonly known as:  Rue Speedy Your next dose is: Today, Tomorrow Other:  ____________ Dose:  750 mg Take 1 Tab by mouth daily. Quantity:  7 Tab Refills:  0  
     
   
   
   
  
 metoprolol tartrate 25 mg tablet Commonly known as:  LOPRESSOR Your next dose is: Today, Tomorrow Other:  ____________ Dose:  12.5 mg Take 12.5 mg by mouth two (2) times a day. Refills:  0  
     
   
   
   
  
 midodrine 10 mg tablet Commonly known as:  Sandra Nailer Your next dose is: Today, Tomorrow Other:  ____________ Dose:  10 mg Take 10 mg by mouth three (3) times daily (with meals). Refills:  0  
     
   
   
   
  
 multivitamin tablet Commonly known as:  ONE A DAY Your next dose is: Today, Tomorrow Other:  ____________ Dose:  1 Tab Take 1 Tab by mouth daily. Refills:  0 Nebulizer & Compressor machine Your next dose is: Today, Tomorrow Other:  ____________ Dose:  1 Each  
1 Each by Does Not Apply route daily. Quantity:  1 Each Refills:  0 PLAVIX 75 mg Tab Generic drug:  clopidogrel Your next dose is: Today, Tomorrow Other:  ____________ Dose:  75 mg Take 75 mg by mouth daily. Refills:  0  
     
   
   
   
  
 vit B Cmplx 3-FA-Vit C-Biotin 1- mg-mg-mcg tablet Commonly known as:  NEPHRO GRAYSON RX Your next dose is: Today, Tomorrow Other:  ____________ Dose:  1 Tab Take 1 Tab by mouth daily. Refills:  0 Take these medications as needed Dose & Instructions Dispensing Information Comments Morning Noon Evening Bedtime  
 albuterol-ipratropium 2.5 mg-0.5 mg/3 ml Nebu Commonly known as:  Wyn Layer Your next dose is: Today, Tomorrow Other:  ____________ Dose:  3 mL  
3 mL by Nebulization route every six (6) hours as needed. Quantity:  100 Nebule Refills:  1 HYDROcodone-acetaminophen 5-325 mg per tablet Commonly known as:  Mateo Robledo Your next dose is: Today, Tomorrow Other:  ____________ Dose:  1 Tab 1 Tab by Per G Tube route every four (4) hours as needed. Max Daily Amount: 6 Tabs. Quantity:  30 Tab Refills:  0  
     
   
   
   
  
 nitroglycerin 0.4 mg SL tablet Commonly known as:  NITROSTAT Your next dose is: Today, Tomorrow Other:  ____________ Dose:  0.4 mg  
0.4 mg by SubLINGual route every five (5) minutes as needed for Chest Pain. Refills:  0  
     
   
   
   
  
 ondansetron hcl 8 mg tablet Commonly known as:  ZOFRAN (AS HYDROCHLORIDE) Your next dose is: Today, Tomorrow Other:  ____________ Dose:  8 mg Take 1 Tab by mouth every eight (8) hours as needed for Nausea. Quantity:  20 Tab Refills:  0 TOBIAS MILK OF MAGNESIA 400 mg/5 mL suspension Generic drug:  magnesium hydroxide Your next dose is: Today, Tomorrow Other:  ____________ Dose:  30 mL Take 30 mL by mouth daily as needed for Constipation. Refills:  0 Take these medications as directed Dose & Instructions Dispensing Information Comments Morning Noon Evening Bedtime Insulin Needles (Disposable) 32 gauge x 5/32\" Ndle Commonly known as:  Elise Pen Needle Your next dose is: Today, Tomorrow Other:  ____________  
   
   
 5 times a day Quantity:  150 Each Refills:  99 Where to Get Your Medications Information about where to get these medications is not yet available ! Ask your nurse or doctor about these medications  
  aspirin-dipyridamole  mg per SR capsule  
 levoFLOXacin 750 mg tablet

## 2017-02-02 ENCOUNTER — APPOINTMENT (OUTPATIENT)
Dept: CT IMAGING | Age: 82
DRG: 069 | End: 2017-02-02
Attending: PSYCHIATRY & NEUROLOGY
Payer: MEDICARE

## 2017-02-02 ENCOUNTER — APPOINTMENT (OUTPATIENT)
Dept: CT IMAGING | Age: 82
DRG: 069 | End: 2017-02-02
Attending: HOSPITALIST
Payer: MEDICARE

## 2017-02-02 ENCOUNTER — APPOINTMENT (OUTPATIENT)
Dept: GENERAL RADIOLOGY | Age: 82
DRG: 069 | End: 2017-02-02
Attending: HOSPITALIST
Payer: MEDICARE

## 2017-02-02 PROBLEM — I63.312 THROMBOTIC STROKE INVOLVING LEFT MIDDLE CEREBRAL ARTERY (HCC): Status: ACTIVE | Noted: 2017-02-02

## 2017-02-02 PROBLEM — I65.23 STENOSIS OF BOTH INTERNAL CAROTID ARTERIES: Status: ACTIVE | Noted: 2017-02-02

## 2017-02-02 PROBLEM — I69.351 HEMIPLEGIA AND HEMIPARESIS FOLLOWING CEREBRAL INFARCTION AFFECTING RIGHT DOMINANT SIDE (HCC): Status: ACTIVE | Noted: 2017-02-02

## 2017-02-02 LAB
ANION GAP BLD CALC-SCNC: 7 MMOL/L (ref 5–15)
BACTERIA SPEC CULT: NORMAL
BUN SERPL-MCNC: 35 MG/DL (ref 6–20)
BUN/CREAT SERPL: 31 (ref 12–20)
CALCIUM SERPL-MCNC: 8.9 MG/DL (ref 8.5–10.1)
CC UR VC: NORMAL
CHLORIDE SERPL-SCNC: 105 MMOL/L (ref 97–108)
CHOLEST SERPL-MCNC: 129 MG/DL
CO2 SERPL-SCNC: 27 MMOL/L (ref 21–32)
CREAT SERPL-MCNC: 1.12 MG/DL (ref 0.7–1.3)
ERYTHROCYTE [DISTWIDTH] IN BLOOD BY AUTOMATED COUNT: 14.8 % (ref 11.5–14.5)
EST. AVERAGE GLUCOSE BLD GHB EST-MCNC: 140 MG/DL
GLUCOSE BLD STRIP.AUTO-MCNC: 131 MG/DL (ref 65–100)
GLUCOSE BLD STRIP.AUTO-MCNC: 174 MG/DL (ref 65–100)
GLUCOSE BLD STRIP.AUTO-MCNC: 183 MG/DL (ref 65–100)
GLUCOSE BLD STRIP.AUTO-MCNC: 245 MG/DL (ref 65–100)
GLUCOSE BLD STRIP.AUTO-MCNC: 272 MG/DL (ref 65–100)
GLUCOSE SERPL-MCNC: 227 MG/DL (ref 65–100)
HBA1C MFR BLD: 6.5 % (ref 4.2–6.3)
HCT VFR BLD AUTO: 45.8 % (ref 36.6–50.3)
HDLC SERPL-MCNC: 36 MG/DL
HDLC SERPL: 3.6 {RATIO} (ref 0–5)
HGB BLD-MCNC: 14.9 G/DL (ref 12.1–17)
LDLC SERPL CALC-MCNC: 67.6 MG/DL (ref 0–100)
LIPID PROFILE,FLP: NORMAL
MCH RBC QN AUTO: 31 PG (ref 26–34)
MCHC RBC AUTO-ENTMCNC: 32.5 G/DL (ref 30–36.5)
MCV RBC AUTO: 95.4 FL (ref 80–99)
PLATELET # BLD AUTO: 85 K/UL (ref 150–400)
POTASSIUM SERPL-SCNC: 4.4 MMOL/L (ref 3.5–5.1)
RBC # BLD AUTO: 4.8 M/UL (ref 4.1–5.7)
SERVICE CMNT-IMP: ABNORMAL
SERVICE CMNT-IMP: NORMAL
SODIUM SERPL-SCNC: 139 MMOL/L (ref 136–145)
TRIGL SERPL-MCNC: 127 MG/DL (ref ?–150)
VLDLC SERPL CALC-MCNC: 25.4 MG/DL
WBC # BLD AUTO: 11.2 K/UL (ref 4.1–11.1)

## 2017-02-02 PROCEDURE — 36415 COLL VENOUS BLD VENIPUNCTURE: CPT | Performed by: HOSPITALIST

## 2017-02-02 PROCEDURE — 74011636320 HC RX REV CODE- 636/320: Performed by: INTERNAL MEDICINE

## 2017-02-02 PROCEDURE — 73030 X-RAY EXAM OF SHOULDER: CPT

## 2017-02-02 PROCEDURE — 70450 CT HEAD/BRAIN W/O DYE: CPT

## 2017-02-02 PROCEDURE — 93880 EXTRACRANIAL BILAT STUDY: CPT

## 2017-02-02 PROCEDURE — 80061 LIPID PANEL: CPT | Performed by: HOSPITALIST

## 2017-02-02 PROCEDURE — 74011250636 HC RX REV CODE- 250/636: Performed by: HOSPITALIST

## 2017-02-02 PROCEDURE — 97161 PT EVAL LOW COMPLEX 20 MIN: CPT

## 2017-02-02 PROCEDURE — 74011250636 HC RX REV CODE- 250/636: Performed by: INTERNAL MEDICINE

## 2017-02-02 PROCEDURE — 80048 BASIC METABOLIC PNL TOTAL CA: CPT | Performed by: HOSPITALIST

## 2017-02-02 PROCEDURE — 85027 COMPLETE CBC AUTOMATED: CPT | Performed by: HOSPITALIST

## 2017-02-02 PROCEDURE — 70496 CT ANGIOGRAPHY HEAD: CPT

## 2017-02-02 PROCEDURE — 83036 HEMOGLOBIN GLYCOSYLATED A1C: CPT | Performed by: HOSPITALIST

## 2017-02-02 PROCEDURE — 74011250637 HC RX REV CODE- 250/637: Performed by: EMERGENCY MEDICINE

## 2017-02-02 PROCEDURE — 97116 GAIT TRAINING THERAPY: CPT

## 2017-02-02 PROCEDURE — 65660000000 HC RM CCU STEPDOWN

## 2017-02-02 PROCEDURE — 74011250637 HC RX REV CODE- 250/637: Performed by: HOSPITALIST

## 2017-02-02 PROCEDURE — 74011636637 HC RX REV CODE- 636/637: Performed by: HOSPITALIST

## 2017-02-02 PROCEDURE — 93306 TTE W/DOPPLER COMPLETE: CPT

## 2017-02-02 PROCEDURE — 82962 GLUCOSE BLOOD TEST: CPT

## 2017-02-02 PROCEDURE — 74011250636 HC RX REV CODE- 250/636: Performed by: SPECIALIST

## 2017-02-02 RX ORDER — ADHESIVE BANDAGE
30 BANDAGE TOPICAL DAILY PRN
Status: DISCONTINUED | OUTPATIENT
Start: 2017-02-02 | End: 2017-02-04 | Stop reason: HOSPADM

## 2017-02-02 RX ORDER — LEVOFLOXACIN 5 MG/ML
750 INJECTION, SOLUTION INTRAVENOUS EVERY 24 HOURS
Status: DISCONTINUED | OUTPATIENT
Start: 2017-02-02 | End: 2017-02-02

## 2017-02-02 RX ORDER — SODIUM CHLORIDE 0.9 % (FLUSH) 0.9 %
10 SYRINGE (ML) INJECTION
Status: COMPLETED | OUTPATIENT
Start: 2017-02-02 | End: 2017-02-02

## 2017-02-02 RX ORDER — LEVOFLOXACIN 5 MG/ML
750 INJECTION, SOLUTION INTRAVENOUS
Status: DISCONTINUED | OUTPATIENT
Start: 2017-02-02 | End: 2017-02-03

## 2017-02-02 RX ORDER — SODIUM CHLORIDE 9 MG/ML
50 INJECTION, SOLUTION INTRAVENOUS
Status: COMPLETED | OUTPATIENT
Start: 2017-02-02 | End: 2017-02-02

## 2017-02-02 RX ADMIN — METOPROLOL TARTRATE 12.5 MG: 25 TABLET ORAL at 09:55

## 2017-02-02 RX ADMIN — GABAPENTIN 500 MG: 250 SOLUTION ORAL at 18:47

## 2017-02-02 RX ADMIN — MIDODRINE HYDROCHLORIDE 10 MG: 5 TABLET ORAL at 14:26

## 2017-02-02 RX ADMIN — ASPIRIN 325 MG ORAL TABLET 325 MG: 325 PILL ORAL at 09:55

## 2017-02-02 RX ADMIN — INSULIN LISPRO 2 UNITS: 100 INJECTION, SOLUTION INTRAVENOUS; SUBCUTANEOUS at 18:47

## 2017-02-02 RX ADMIN — HYDROCODONE BITARTRATE AND ACETAMINOPHEN 1 TABLET: 5; 325 TABLET ORAL at 03:24

## 2017-02-02 RX ADMIN — THERA TABS 1 TABLET: TAB at 09:56

## 2017-02-02 RX ADMIN — MIDODRINE HYDROCHLORIDE 10 MG: 5 TABLET ORAL at 09:55

## 2017-02-02 RX ADMIN — LEVOFLOXACIN 750 MG: 5 INJECTION, SOLUTION INTRAVENOUS at 09:55

## 2017-02-02 RX ADMIN — MAGNESIUM HYDROXIDE 30 ML: 400 SUSPENSION ORAL at 23:19

## 2017-02-02 RX ADMIN — METOPROLOL TARTRATE 12.5 MG: 25 TABLET ORAL at 18:48

## 2017-02-02 RX ADMIN — SODIUM CHLORIDE 50 ML/HR: 900 INJECTION, SOLUTION INTRAVENOUS at 12:39

## 2017-02-02 RX ADMIN — Medication 10 ML: at 03:23

## 2017-02-02 RX ADMIN — INSULIN LISPRO 3 UNITS: 100 INJECTION, SOLUTION INTRAVENOUS; SUBCUTANEOUS at 14:28

## 2017-02-02 RX ADMIN — Medication 10 ML: at 16:52

## 2017-02-02 RX ADMIN — MIDODRINE HYDROCHLORIDE 10 MG: 5 TABLET ORAL at 16:52

## 2017-02-02 RX ADMIN — GABAPENTIN 500 MG: 250 SOLUTION ORAL at 11:05

## 2017-02-02 RX ADMIN — HYDROCODONE BITARTRATE AND ACETAMINOPHEN 1 TABLET: 5; 325 TABLET ORAL at 16:52

## 2017-02-02 RX ADMIN — Medication 10 ML: at 23:19

## 2017-02-02 RX ADMIN — RENO CAPS 1 CAPSULE: 100; 1.5; 1.7; 20; 10; 1; 150; 5; 6 CAPSULE ORAL at 09:55

## 2017-02-02 RX ADMIN — ATORVASTATIN CALCIUM 40 MG: 40 TABLET, FILM COATED ORAL at 09:55

## 2017-02-02 RX ADMIN — ENOXAPARIN SODIUM 40 MG: 40 INJECTION SUBCUTANEOUS at 23:19

## 2017-02-02 RX ADMIN — IOPAMIDOL 100 ML: 755 INJECTION, SOLUTION INTRAVENOUS at 12:39

## 2017-02-02 RX ADMIN — CLOPIDOGREL BISULFATE 75 MG: 75 TABLET ORAL at 09:55

## 2017-02-02 RX ADMIN — INSULIN GLARGINE 14 UNITS: 100 INJECTION, SOLUTION SUBCUTANEOUS at 09:56

## 2017-02-02 RX ADMIN — FOLIC ACID 1 MG: 1 TABLET ORAL at 09:55

## 2017-02-02 RX ADMIN — FUROSEMIDE 20 MG: 20 TABLET ORAL at 09:55

## 2017-02-02 RX ADMIN — Medication 10 ML: at 12:39

## 2017-02-02 NOTE — PROGRESS NOTES
Problem: Mobility Impaired (Adult and Pediatric)  Goal: *Acute Goals and Plan of Care (Insert Text)  Physical Therapy Goals  Initiated 2/2/2017  1. Patient will move from supine to sit and sit to supine in bed with independence within 7 day(s). 2. Patient will transfer from bed to chair and chair to bed with modified independence using the least restrictive device within 7 day(s). 3. Patient will perform sit to stand with modified independence within 7 day(s). 4. Patient will ambulate with modified independence for 150 feet with the least restrictive device within 7 day(s). 5. Patient will ascend/descend 4 stairs with bilateral handrail(s) with supervision/set-up within 7 day(s). PHYSICAL THERAPY EVALUATION  Patient: Lenard Corey (9 y.o. male)  Date: 2/2/2017  Primary Diagnosis: Weakness  Stroke [facility])        Precautions:   Fall      ASSESSMENT :  Based on the objective data described below, the patient presents with reports of sudden onset of R shoulder weakness and inability to elevate R shoulder. Patient tolerated evaluation well. Patient able to perform supine to sit with min assist, sit<>stand with CGA-standby assist. Patient ambulated in the hallway without assistive device with CGA-standby assist. Patient reported \"I'm always dizzy\" and noted mild gait instability but no LOB. Patient encouraged to use RW for safety, but patient uninterested at this time. R and L  strength: good and equal; R shoulder flexion severely limited, but PROM full. Patient able to actively perform internal and external rotation, but with strength testing patient reported some pain with IR and ER. With passive flexion and overpressure (Neer's Test) patient reported pain, and during Sudhir-Izquierdo patient reported pain. Patient and spouse confirmed patient needs a shoulder replacement as it is \"bone on bone\", but his cardiac MD said he may not survive the surgery.  Patient denies any numbness or sensory changes in ALVIN. Patient will continue to benefit from PT to progress mobility and improve balance and gait during hospital stay. Patient may benefit from HHPT versus outpatient PT to address R shoulder impairments. Patient will benefit from skilled intervention to address the above impairments. Patients rehabilitation potential is considered to be Good  Factors which may influence rehabilitation potential include:   [X]         None noted  [ ]         Mental ability/status  [ ]         Medical condition  [ ]         Home/family situation and support systems  [ ]         Safety awareness  [ ]         Pain tolerance/management  [ ]         Other:        PLAN :  Recommendations and Planned Interventions:  [X]           Bed Mobility Training             [ ]    Neuromuscular Re-Education  [X]           Transfer Training                   [ ]    Orthotic/Prosthetic Training  [X]           Gait Training                         [ ]    Modalities  [X]           Therapeutic Exercises           [ ]    Edema Management/Control  [X]           Therapeutic Activities            [X]    Patient and Family Training/Education  [ ]           Other (comment):     Frequency/Duration: Patient will be followed by physical therapy  4 times a week to address goals. Discharge Recommendations: Home Health versus outpatient PT  Further Equipment Recommendations for Discharge: none       SUBJECTIVE:   Patient stated I've been through all of this before, but I would gladly go for a walk.       OBJECTIVE DATA SUMMARY:   HISTORY:    Past Medical History   Diagnosis Date    Antiplatelet or antithrombotic long-term use 12/4/2014    Anxiety disorder      Arrhythmia 2009       bradycardia    Arthritis      CAD (coronary artery disease)         s/p [facility]G [facility]; [facility]       tongue/throat cancer s/p surgery / radiation and 1 dose of chemo    Carotid artery stenosis         s/p bilateral stents    Chronic pain         left leg, lower back,     Depression      Diabetes ([facility]         Type II    Esophageal dysmotility         s/p dilitation    Esophageal motility disorder 7/8/2013       Frequent simultaneous or failed contractions, low amplitude contractions  suggests severe myopathy or diffuse spasm. I suspect the latter. Achalasia  is not present.         GERD (gastroesophageal reflux disease)      Heart failure ([facility] 10/2014        Cardiomyopathy:Pacemaker upgrade:Biv and AICD  Dr. Holly Holland heart DrAlvaro last visit 5/11/2015    Hepatitis C Dx 1996       treated [facility] in past; as of 4/15/15 wife states pt currently not under any treatment    Hyperlipidemia      Hypertension      Myocardial infarct [facility]) 2013       Heart Cath: 40% LV EF, Stented distal LAD, patent Graft to circumflex    On tube feeding diet approx 2009       still has as of 9/28/15  (no po food/liquid/meds at all); Dr Kellen Carcamo Other ill-defined conditions(799.89) 1996       1 dose of chemotherapy/radiation for tongue cancer    Other ill-defined conditions(799.89)         BPH    Other ill-defined conditions(799.89)         orthostatic hypotension    Pneumonia ~ April -May 2010    Stroke [facility]) approx 2003       left side-left finger tips numb; no imbalance or memory loss; as of 2015 not seeing neuro MD    Suicidal thoughts       Past Surgical History   Procedure Laterality Date    Hx orthopaedic           back surgery times two    Hx mohs procedure           bilateral    Hx cataract removal           bilateral    Pr egd insert guide wire dilator passage esophagus   9/13/2010            Pr egd transoral biopsy single/multiple   9/13/2010            Hx other surgical           Radical Left Neck    Hx other surgical           NASAL POLYPS REMOVAL    Hx other surgical   2010       TURP    Hx cholecystectomy        Pr abdomen surgery proc unlisted   1996       peg tube    Pr neurological procedure unlisted   1996       cevical surgery    Pr change gastrostomy tube   5/2/2011            Pr change gastrostomy tube   6/29/2011            Pr stomach surgery procedure unlisted   6/29/2011            Vascular surgery procedure unlist           bilateral carotid stents    Hx pacemaker   11/08    Hx pacemaker   10/28/14        Defibrillator: [facility] # [id number], serial # [id number]; Dr. Eric Salazar [phone]; Dr Jaylyn Dale Hx urological           cystoscopy    Pr cabg, artery-vein, three   2000    Hx heart catheterization   2013       Stented distal LAD     Prior Level of Function/Home Situation: patient lives with spouse in a 2 story home. Patient ambulates independently and has a rolling walker and will use as needed. Personal factors and/or comorbidities impacting plan of care:      Home Situation  Home Environment: Private residence  # Steps to Enter: 4  Rails to Enter: Yes  Hand Rails : Bilateral  One/Two Story Residence: Two story  Living Alone: No  Support Systems: Spouse/Significant Other/Partner  Patient Expects to be Discharged to[de-identified] Private residence  Current DME Used/Available at Home: Walker, rolling     EXAMINATION/PRESENTATION/DECISION MAKING:   Critical Behavior:  Neurologic State: Alert  Orientation Level: Oriented X4  Cognition: Follows commands     Skin:  Intact to exposed skin  Strength:    Strength:  Within functional limits (except R shoulder)      Tone & Sensation:       Sensation: Intact     Range Of Motion:  AROM: Within functional limits (except R shoulder flexion severely limited)  PROM: Within functional limits (pain with end range shoulder flexion with overpressure)        Functional Mobility:  Bed Mobility:     Supine to Sit: Minimum assistance        Transfers:  Sit to Stand: Contact guard assistance;Stand-by asssistance  Stand to Sit: Contact guard assistance;Stand-by asssistance        Bed to Chair: Contact guard assistance;Stand-by asssistance              Balance:   Sitting: Intact  Standing: Impaired  Standing - Static: Good  Standing - Dynamic : Fair  Ambulation/Gait Training:  Distance (ft): 80 Feet (ft)  Assistive Device: Gait belt  Ambulation - Level of Assistance: Stand-by asssistance;Contact guard assistance        Gait Abnormalities: Decreased step clearance;Trunk sway increased        Base of Support: Widened     Speed/Gertrudis: Pace decreased (<100 feet/min)  Step Length: Right shortened;Left shortened        Functional Measure:  Tinetti test:      Sitting Balance: 1  Arises: 1  Attempts to Rise: 2  Immediate Standing Balance: 1  Standing Balance: 1  Nudged: 1  Eyes Closed: 1  Turn 360 Degrees - Continuous/Discontinuous: 1  Turn 360 Degrees - Steady/Unsteady: 1  Sitting Down: 1  Balance Score: 11  Indication of Gait: 1  R Step Length/Height: 1  L Step Length/Height: 1  R Foot Clearance: 1  L Foot Clearance: 1  Step Symmetry: 1  Step Continuity: 1  Path: 1  Trunk: 2  Walking Time: 1  Gait Score: 11  Total Score: 22         Tinetti Test and G-code impairment scale:  Percentage of Impairment CH     0%    CI     1-19% CJ     20-39% CK     40-59% CL     60-79% CM     80-99% CN      100%   Tinetti  Score 0-28 28 23-27 17-22 12-16 6-11 1-5 0          Tinetti Tool Score Risk of Falls  <19 = High Fall Risk  19-24 = Moderate Fall Risk  25-28 = Low Fall Risk  Tinetti ME. Performance-Oriented Assessment of Mobility Problems in Elderly Patients. [address]ason 66; [id number]. (Scoring Description: PT Bulletin Feb. 10, 1993)     Older adults: George Costello et al, 2009; n = 1000 [facility] elderly evaluated with ABC, MARTIN, ADL, and IADL)  · Mean MARTIN score for males aged 69-68 years = 26.21(3.40)  · Mean MARTIN score for females age 69-68 years = 25.16(4.30)  · Mean MARTIN score for males over 80 years = 23.29(6.02)  · Mean MARTIN score for females over 80 years = 17.20(8.32)            G codes:   In compliance with CMSs Claims Based Outcome Reporting, the following G-code set was chosen for this patient based on their primary functional limitation being treated: The outcome measure chosen to determine the severity of the functional limitation was the Tinetti with a score of 22/28 which was correlated with the impairment scale. · Mobility - Walking and Moving Around:               - CURRENT STATUS:    CJ - 20%-39% impaired, limited or restricted               - GOAL STATUS:           CI - 1%-19% impaired, limited or restricted               - D/C STATUS:                       ---------------To be determined---------------      Physical Therapy Evaluation Charge Determination   History Examination Presentation Decision-Making   HIGH Complexity :3+ comorbidities / personal factors will impact the outcome/ POC  HIGH Complexity : 4+ Standardized tests and measures addressing body structure, function, activity limitation and / or participation in recreation  LOW Complexity : Stable, uncomplicated  Other outcome measures tinetti  LOW       Based on the above components, the patient evaluation is determined to be of the following complexity level: LOW      Pain:  Pain Scale 1: Visual  Pain Intensity 1: 0  Pain Location 1: Generalized     Pain Description 1: Aching  Pain Intervention(s) 1: Medication (see MAR)  Activity Tolerance:   Good  Please refer to the flowsheet for vital signs taken during this treatment. After treatment:   [X]         Patient left in no apparent distress sitting up in chair  [ ]         Patient left in no apparent distress in bed  [X]         Call bell left within reach  [X]         Nursing notified  [X]         Caregiver present  [X]         Bed alarm activated      COMMUNICATION/EDUCATION:   The patients plan of care was discussed with: Registered Nurse.  [X]         Fall prevention education was provided and the patient/caregiver indicated understanding. [X]         Patient/family have participated as able in goal setting and plan of care.   [X]         Patient/family agree to work toward stated goals and plan of care.  [ ]         Patient understands intent and goals of therapy, but is neutral about his/her participation. [ ]         Patient is unable to participate in goal setting and plan of care.      Thank you for this referral.  Ivelisse Dick, PT,DPT   Time Calculation: 17 mins

## 2017-02-02 NOTE — PROGRESS NOTES
Speech pathology  Orders received. Patient in with increased R extremity weakness. No reported changes in speech. Patient has PEG and does not take anything by mouth. ST evaluation not appropriate at this time. Will complete orders.  Thanks, Sloan Diaz M.S. CCC-SLP

## 2017-02-02 NOTE — ED NOTES
Bedside shift change report given to Swathi Downs RN (oncoming nurse) by Delta Gomez RN (offgoing nurse). Report included the following information ED Summary.

## 2017-02-02 NOTE — PROGRESS NOTES
Pt is an 81 y/o White man who was admitted to the hospital because he has weakness and cannot move his right arm. Pt lives with his wife in a one story home. Pt uses a feeding tube and has all DME at home for that. [facility] will [facility] him at discharge. Pt had a stroke previously and was sent to [facility] for rehab. He was there for 3 weeks and was not pleased with his care. He chose to discharge and finish therapy out patient at [address] [place] [address]. Pt has also used [facility] care when he had pneumonia. Pt would prefer not to return to [facility], but he would be happy to us [facility] again. MSW intern will continue to follow and assist with discharge planning needs. Care Management Interventions  PCP Verified by CM: Yes  Mode of Transport at Discharge:  Other (see comment) (Pt's wife will transport at discharge)  Transition of Care Consult (CM Consult): Discharge Planning  Physical Therapy Consult: Yes  Occupational Therapy Consult: Yes  Speech Therapy Consult: Yes  Current Support Network: Lives with Spouse (Pt lives with his wife in a one story house; pt has a walker, cane, wheelchair, adapted bathroom, and breathing machine; pt's preferred pharmacy is [facility] in [place] )  Confirm Follow Up Transport: Family (Pt's wife transports to appts. )  Plan discussed with Pt/Family/Caregiver: Yes    Cailin Romero MSW Intern    CM Estrella  [employer]  Ext [phone]

## 2017-02-02 NOTE — PROGRESS NOTES
TRANSFER - IN REPORT:    Verbal report received from Avery Avalos RN(name) on Cristina Knight  being received from ED(unit) for routine progression of care      Report consisted of patients Situation, Background, Assessment and   Recommendations(SBAR). Information from the following report(s) SBAR, Kardex, ED Summary, Intake/Output, MAR and Recent Results was reviewed with the receiving nurse. Opportunity for questions and clarification was provided. Assessment completed upon patients arrival to unit and care assumed.

## 2017-02-02 NOTE — ED NOTES
Patient given new gown after self tube feed.  Patient resting comfortably on stretcher no needs at this time

## 2017-02-02 NOTE — PROGRESS NOTES
Initial Nutrition Assessment:    INTERVENTIONS/RECOMMENDATIONS:   · Enteral/Parenteral Nutrition: Initiate enteral nutrition: Jevity 1.5 320mL bolus TID + 240 mL bolus daily for a total of 4 bolus feedings/day. 100 mL water flushes before and after each bolus (Provides 1800 kcal, 77 g protein, and 1712 mL water)    ASSESSMENT:   Patient medically noted for right upper extremity weakness. PMH for stroke, DM, dysphagia, PEG, CAD, and CABG. Patient typically uses Isosource 1.5 @ home. He takes 6 cans/day; 1 1/3 cans TID plus 2 cans daily for a total of 4 bolus feedings/day. Recommendations provided above with Isosource equivalent on [address]. Recommendations adequate to meet 100% of estimated kcal/protein needs. Monitor tolerance and provide further recommendations as needed. Diet Order: NPO  % Eaten:  No data found. Pertinent Medications: [x]Reviewed []Other: atorvastatin, nephrocap, plavix, folic acid, lasix, lantus, humalog, lopressor, MVI  Pertinent Labs: [x]Reviewed []Other: -[phone]  Food Allergies: [x]None []Other   Last BM: 1/31   [x]Active     []Hyperactive  []Hypoactive       [] Absent BS  Skin:    [x] Intact   [] Incision  [] Breakdown  [] Other:    Anthropometrics:   Height: 5' 7\" (170.2 cm) Weight: 73 kg (160 lb 15 oz)   IBW (%IBW):   ( ) UBW (%UBW):   (  %)   Last Weight Metrics:  Weight Loss Metrics 2/1/2017 12/17/2016 12/6/2016 12/6/2016 11/29/2016 11/10/2016 8/23/2016   Today's Wt 160 lb 15 oz 164 lb 7.4 oz 162 lb 160 lb 164 lb 3.2 oz 165 lb 4.8 oz 164 lb 7.4 oz   BMI 25.21 kg/m2 25.76 kg/m2 25.37 kg/m2 25.06 kg/m2 25.72 kg/m2 25.89 kg/m2 25.76 kg/m2       BMI: Body mass index is 25.21 kg/(m^2). This BMI is indicative of:   []Underweight    [x]Normal    []Overweight    [] Obesity   [] Extreme Obesity (BMI>40)     Estimated Nutrition Needs (Based on):   1812 Kcals/day (BMR (1394) x 1. 3AF) , 73 g (-88g (1.0-1.2 g/kg bw)) Protein  Carbohydrate:  At Least 130 g/day  Fluids: 1800 mL/day (1ml/kcal)    Pt expected to meet estimated nutrient needs: [x]Yes []No    NUTRITION DIAGNOSES:   Problem:  Swallowing difficulty      Etiology: related to dysphagia     Signs/Symptoms: as evidenced by PEG/TF       NUTRITION INTERVENTIONS:    Enteral/Parenteral Nutrition: Initiate enteral nutrition                GOAL:   Patient will tolerate bolus TF with residual <250 mL next 2-4 days     LEARNING NEEDS (Diet, Food/Nutrient-Drug Interaction):    [x] None Identified   [] Identified and Education Provided/Documented   [] Identified and Pt declined/was not appropriate     Cultureal, Protestant, OR Ethnic Dietary Needs:    [x] None Identified   [] Identified and Addressed     [x] Interdisciplinary Care Plan Reviewed/Documented    [x] Discharge Planning: Resume home TF regimen with Isosource 1.5        MONITORING /EVALUATION:   Food/Nutrient Intake Outcomes: Enteral/parenteral nutrition intake  Physical Signs/Symptoms Outcomes: Weight/weight change, Electrolyte and renal profile, Glucose profile    NUTRITION RISK:    [x] High              [] Moderate           []  Low  []  Minimal/Uncompromised    PT SEEN FOR:    [x]  MD Consult: []Calorie Count      []Diabetic Diet Education        []Diet Education     []Electrolyte Management     []General Nutrition Management and Supplements     [x]Management of Tube Feeding     []TPN Recommendations    [x]  RN Referral:  []MST score >=2     [x]Enteral/Parenteral Nutrition PTA     []Pregnant: Gestational DM or Multigestation     []Pressure Ulcer/Wound Care needs        []  Low BMI  []  DTR Referral       Mika [place]  Pager [phone]                 Weekend Pager [phone]

## 2017-02-02 NOTE — PROGRESS NOTES
* No surgery found *  Bedside and Verbal shift change report given to Abdoul Regalado RN (oncoming nurse) by Kandis Rivera RN (offgoing nurse). Report included the following information SBAR, Kardex, ED Summary, Intake/Output, MAR and Recent Results.     Zone Phone:   [phone]      Significant changes during shift:  admitted        Patient Information    Philly Edmond  80 y.o.  2/1/2017  3:16 PM by Josesito Saldana MD. 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 ([address] 01/08/2016    Percutaneous endoscopic gastrostomy status ([address] 12/04/2014    Antiplatelet or antithrombotic long-term use 12/04/2014    Heart failure ([address] 09/11/2014    Esophageal motility disorder 07/02/2013    CAD (coronary artery disease) 02/12/2013    Status post implantation of automatic cardioverter/defibrillator (AICD) 02/12/2013     Class: Present on Admission    Aortic stenosis, mild 02/12/2013    S/P PTCA (percutaneous transluminal coronary angioplasty) 01/18/2013    Non-cardiac chest pain 01/04/2013    Feeding difficulties 05/02/2011    Abnormal cardiovascular stress test 06/24/2010    S/P CABG x 3 06/24/2010    Atherosclerotic cerebrovascular disease 06/24/2010    Orthostatic hypotension 06/24/2010    Dysphagia 05/03/2010     Past Medical History   Diagnosis Date    Antiplatelet or antithrombotic long-term use 12/4/2014    Anxiety disorder     Arrhythmia 2009     bradycardia    Arthritis     CAD (coronary artery disease)      s/p CABG 2002; Dr Zainab Russell [facility]) 1996     tongue/throat cancer s/p surgery / radiation and 1 dose of chemo    Carotid artery stenosis      s/p bilateral stents    Chronic pain      left leg, lower back,     Depression     Diabetes (HCC)      Type II    Esophageal dysmotility      s/p dilitation    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 I suspect the latter. Achalasia  is not present.         GERD (gastroesophageal reflux disease)     Heart failure ([address] 10/2014      Cardiomyopathy:Pacemaker upgrade:Biv and AICD  Dr. Halle Thomas heart  last visit 5/11/2015    Hepatitis C Dx 1996     treated at [facility] in past; as of 4/15/15 wife states pt currently not under any treatment    Hyperlipidemia     Hypertension     Myocardial infarct [facility]) 2013     Heart Cath: 40% LV EF, Stented distal LAD, patent Graft to circumflex    On tube feeding diet approx 2009     still has as of 9/28/15  (no po food/liquid/meds at all); Dr Jolene Hopkins Other ill-defined conditions(799.89) 1996     1 dose of chemotherapy/radiation for tongue cancer    Other ill-defined conditions(799.89)      BPH    Other ill-defined conditions(799.89)      orthostatic hypotension    Pneumonia ~ April -May 2010    Stroke [facility]) approx 2003     left side-left finger tips numb; no imbalance or memory loss; as of 2015 not seeing neuro MD    Suicidal thoughts          Core Measures:    CVA: YES YES  CHF:YES YES  PNA:NO NO    Post Op Surgical (If Applicable):     Number times ambulated in hallway past shift:  0   Number of times OOB to chair past shift:   0  NG Tube: NO  Incentive Spirometer: NO  Drains: NO   Volume    Dressing Present:  NO  Flatus:  YES    Activity Status:    OOB to Chair YES  Ambulated this shift YES   Bed Rest NO    DVT prophylaxis:    DVT prophylaxis Med- YES  DVT prophylaxis SCD or CORTES- NO     Wounds: (If Applicable)    Wounds- NO    Location     Patient Safety:    Falls Score Total Score: 2  Safety Level_______  Bed Alarm On? YES  Sitter?  NO    Plan for upcoming shift: neuro consult and repeat CT, Shoulder XR, 2D echo        Discharge Plan: NO     Active Consults:  IP CONSULT TO TELE-NEUROLOGY  IP CONSULT TO NEUROLOGY

## 2017-02-02 NOTE — DIABETES MGMT
[facility]    Elevated Blood Glucose Note (POCT Glucose >180 mg/dL)    Recommendations/ Comments: Chart reviewed for elevated blood glucose. Dante Del Valle is a 80 y.o. male with a past medical history significant for DM per Dr. Sadia Hutson H&P dated 2/1/2017. Fasting glucose today: 227 mg/dL (per am lab). Required 6 units of correction in the last 24 hours. Noted Lantus 14 units ordered. First dose given this morning. Recent Glucose Results:   Lab Results   Component Value Date/Time     (H) 02/02/2017 02:55 AM    GLUCPOC 245 (H) 02/02/2017 02:17 PM    GLUCPOC 272 (H) 02/02/2017 12:13 PM    GLUCPOC 131 (H) 02/02/2017 06:08 AM        Hospital (inpatient) medications:  1. Correction Scale: Lispro (Humalog) Normal Sensitivity scale to cover for glucose > 139 mg/dL Every 6 hours     2. Lantus 14 units     Prior to admission medications: per past medical records  Lantus - unclear if pt takes 10 or 14 units per med rec    A1C:   Lab Results   Component Value Date/Time    Hemoglobin A1c 6.5 02/02/2017 02:55 AM    Hemoglobin A1c 6.4 11/30/2016 02:11 AM     Lab Results   Component Value Date/Time    Creatinine 1.12 02/02/2017 02:55 AM     Active Orders   There are no active orders of the following type(s): Diet. Thank you. Tamanna Oliveira. Joi Fierro, BSN, MPH  Diabetes [address] [place] [address]  [phone]    -For most hospitalized persons with hyperglycemia in the intensive care unit (ICU), a glucose range of 140 to 180 mg/dL is recommended, provided this target can be safely achieved. *  - For general medicine and surgery patients in non-ICU settings, a premeal glucose target <140 mg/dL and a random blood glucose <180 mg/dL are recommended. *    KEDAR Mccormick, Jacinto Miranda., Raegan Gooden., ... & Mayur Velazco ([id number]).  [facility] [place] [facility] AND AMERICAN COLLEGE OF ENDOCRINOLOGY-CLINICAL PRACTICE GUIDELINES FOR DEVELOPING A DIABETES MELLITUS COMPREHENSIVE CARE PLAN-2015-EXECUTIVE SUMMARY: Complete guidelines are available at https://www. aace. com/publications/guidelines. Endocrine Practice, 21(4), M0534224.

## 2017-02-02 NOTE — PROGRESS NOTES
Physical Therapy:  Order received and acknowledged. Patient is off the floor for testing. Will attempt later as available and appropriate.   Thank you  Flora Vo, PT, DPT

## 2017-02-02 NOTE — PROGRESS NOTES
Hospitalist Progress Note    NAME: Deena Taylor   :  1935   MRN:  [id number]       Interim Hospital Summary: 80 y.o. male whom presented on 2017 with      Assessment / Plan:  1. TIA:  Hemodynamically stable. Continue with ASA/Plavix, PT/OT, follow MRI, and monitor neuro status. 2. CAD, s/p CABG/AICD:  Continue with BB/ASA/statin. 3. DM:  HG A1C pending. Continue with basal/prandial Insulins, and monitor FSs.    4. HTN;  Fairly under control. Continue with BB.    5. Dyslipidemia:  Continue with statin. 6. UTI:  Continue with Levaquin, and follow urine culture. 7. H/o CVA:  Plan as above. Code status: Full code  Prophylaxis: SC Lovenox  Recommended Disposition: Home     Subjective:     Chief Complaint / Reason for Physician Visit  Patient seen and examined. Feels better today. .  Discussed with RN events overnight. Review of Systems:  Symptom Y/N Comments  Symptom Y/N Comments   Fever/Chills N   Chest Pain N    Poor Appetite    Edema     Cough    Abdominal Pain N    Sputum    Joint Pain     SOB/KRAMER N   Pruritis/Rash     Nausea/vomit N   Tolerating PT/OT Y    Diarrhea N   Tolerating Diet Y    Constipation    Other       Could NOT obtain due to:      Objective:     VITALS:   Last 24hrs VS reviewed since prior progress note.  Most recent are:  Patient Vitals for the past 24 hrs:   Temp Pulse Resp BP SpO2   17 0732 97.8 °F (36.6 °C) 61 20 155/73 98 %   17 0324 97.4 °F (36.3 °C) 63 18 110/61 96 %   17 2214 97.9 °F (36.6 °C) 77 20 121/78 98 %   17 - - - 166/74 95 %   17 - - - 111/53 94 %   17 - - - 141/71 95 %   17 - - - 130/67 95 %   17 - - - 128/62 95 %   17 - - - 111/57 96 %   17 - - - 103/58 96 %   17 - - - 110/57 -   17 - - - 171/90 95 %   17 192 - - - 157/71 96 %   17 1513 - 60 16 113/79 98 %   17 1351 - 61 16 106/64 96 %   17 1247 97.7 °F (36.5 °C) 68 18 182/86 99 %       Intake/Output Summary (Last 24 hours) at 02/02/17 1005  Last data filed at 02/02/17 0324   Gross per 24 hour   Intake               70 ml   Output                0 ml   Net               70 ml        PHYSICAL EXAM:  General: WD, WN. Alert, cooperative, no acute distress    EENT:  EOMI. Anicteric sclerae. MMM  Resp:  CTA bilaterally, no wheezing or rales. No accessory muscle use  CV:  Regular  rhythm,  No edema  GI:  Soft, Non distended, Non tender.  +Bowel sounds  Neurologic:  Alert and oriented X 3, normal speech,   Psych:   Good insight. Not anxious nor agitated  Skin:  No rashes. No jaundice    Reviewed most current lab test results and cultures  YES  Reviewed most current radiology test results   YES  Review and summation of old records today    NO  Reviewed patient's current orders and MAR    YES  PMH/SH reviewed - no change compared to H&P  ________________________________________________________________________  Care Plan discussed with:    Comments   Patient Y    Family      RN Y    Care Manager     Consultant                        Multidiciplinary team rounds were held today with , nursing, pharmacist and clinical coordinator. Patient's plan of care was discussed; medications were reviewed and discharge planning was addressed. ________________________________________________________________________  Total NON critical care TIME:  30  Minutes    Total CRITICAL CARE TIME Spent:   Minutes non procedure based      Comments   >50% of visit spent in counseling and coordination of care Y    ________________________________________________________________________  Arleth Mcknight MD     Procedures: see electronic medical records for all procedures/Xrays and details which were not copied into this note but were reviewed prior to creation of Plan. LABS:  I reviewed today's most current labs and imaging studies.   Pertinent labs include:  Recent Labs 02/02/17   0255  02/01/17   1335   WBC  11.2*  11.4*   HGB  14.9  14.9   HCT  45.8  47.0   PLT  85*  96*     Recent Labs      02/02/17   0255  02/01/17   1335   NA  139  139   K  4.4  4.7   CL  105  104   CO2  27  29   GLU  227*  140*   BUN  35*  32*   CREA  1.12  1.15   CA  8.9  9.3   ALB   --   3.6   TBILI   --   0.5   SGOT   --   49*   ALT   --   62   INR   --   1.1       Signed: Regino Potter MD

## 2017-02-02 NOTE — PROCEDURES
[facility]  *** FINAL REPORT ***    Name: Tj Dominique  MRN: [id number]    Inpatient  : 31 Mar 1935  HIS Order #: [id number]  [id number] [facility] Visit #: [id number]  Date: 2017    TYPE OF TEST: Cerebrovascular Duplex    REASON FOR TEST  Transient ischemic attacks    Right Carotid:-             Proximal               Mid                 Distal  cm/s  Systolic  Diastolic  Systolic  Diastolic  Systolic  Diastolic  CCA:    338.5      29.0                           111.0      31.0  Bulb:  ECA:     91.0  ICA:     59.0                 71.0                 87.0  ICA/CCA:  0.5    ICA Stenosis: <50%    Right Vertebral:-  Finding: Antegrade  Sys:       13.0  Diana:    Right Subclavian: Normal    Left Carotid:-            Proximal                Mid                 Distal  cm/s  Systolic  Diastolic  Systolic  Diastolic  Systolic  Diastolic  CCA:    686.1      24.0                           138.0      27.0  Bulb:  ECA:    170.0  ICA:     92.0                 70.0                 57.0  ICA/CCA:  0.7    ICA Stenosis: <50%    Left Vertebral:-  Finding: Antegrade  Sys:        9.0  Diana:    Left Subclavian: Normal    INTERPRETATION/FINDINGS  PROCEDURE:  Carotid Duplex Examination using B-mode, color and  spectral Doppler of the extracranial cerebrovascular arteries. 1. Bilateral <50% stenosis of the internal carotid arteries. 2. No significant stenosis in the external carotid arteries  bilaterally. 3. Antegrade flow in both vertebral arteries. 4. Normal flow in both subclavian arteries. Plaque Morphology:  1. Heterogeneous plaque in the bulb and right ICA. 2. Heterogeneous plaque in the bulb and left ICA. ADDITIONAL COMMENTS    Bilateral stents patent. I have personally reviewed the data relevant to the interpretation of  this  study. TECHNOLOGIST: Adelita Villalta RVT, RDMS  Signed: 2017 02:25 PM    PHYSICIAN: Sharyn Oliver MD  Signed: 2017 09:30 PM

## 2017-02-02 NOTE — PROGRESS NOTES
Spoke with ED nurse about nursing miscellaneous order stating OK for patient to resume home tube feed and how nursing supervisor India Tamez stated the Dr. Tammy Fuentes need to enter a specific order with feed name, rate, frequency, and flush amount. ED nurse stated she would try to talk to MD. ED nurse called back and stated that Dr. Tammy Fuentes stated that patient already had last tube feed for the night and that nutrition would address in the morning.

## 2017-02-02 NOTE — ED NOTES
TRANSFER - OUT REPORT:    Verbal report given to Tahira Burroughs (name) on Ashwin Arora  being transferred to [facility](unit) for routine progression of care       Report consisted of patients Situation, Background, Assessment and   Recommendations(SBAR). Information from the following report(s) SBAR and ED Summary was reviewed with the receiving nurse. Lines:       Opportunity for questions and clarification was provided.

## 2017-02-02 NOTE — CONSULTS
[address]   [address] [place] [address]       Name:  Zak Sorenson   MR#:  [id number]   :  1935   Account #:  [de-identified]    Date of Consultation:  2017   Date of Adm:  2017       CHIEF COMPLAINT: Right arm weakness. HISTORY OF PRESENT ILLNESS: We are requested to evaluate this   68-year-old right-handed  male for right arm weakness at   the request of Dr. Adan Green, the hospitalist, and was seen as a new the   patient for this new problem. The patient apparently a day or 2   evenings ago noticed that his right arm was weak. He could not get it   over his head as well as he used to. He had a previous stroke in July   of this year involved with a right hemiparesis. 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 He has had this ever since   2015. He already had bilateral carotid stents. No residual   stenosis. He has multiple areas of vertebral artery stenosis again. There are also multiple areas of stenosis in the basilar artery. There is,   however, no other intracranial stenosis past the vertebrobasilar   system. The patient states that there is only weakness and he does not   have much numbness. He does not have much neck pain. There is not   much pain going down. He has no visual disturbances. He does not   seem to have any new right leg symptoms or facial symptoms. He had   a previous stroke in  in addition to more of a mini stroke and that   one with the right hemiparesis in July from which he made a significant   improvement. He denied any chest pain, palpitations or cardiac   symptoms. He does have a pacemaker in place. PAST MEDICAL HISTORY: Pertinent for anxiety. He has had   bradycardia. He has had degenerative arthritis. He has had a CABG. He has had bilateral carotid stents. He has had chronic pain in his low   back and left leg. He a type 2 diabetic.  He has esophageal motility   disorder. He has achalasia. He has had tongue and colon cancer,   status post surgery, radiation and one dose of chemotherapy. He   cannot swallow. He has had chronic dizziness. He has had depression. He   has had heart failure in the past. He has had orthostatic hypotension. He has had benign prostatic hypertrophy. He was given 1 dose of   chemotherapy for his cancer. He has had hyperlipidemia. Hepatitis C.   MCV. He has had cardiomyopathy with a pacemaker, upgraded to a   biventricular assist device with AICD by Dr. Marilee Simon. PAST SURGICAL HISTORY: He had back surgery in 2000. He has   had bilateral Mohs procedures. He has had bilateral cataract surgery. He has had multiple esophageal procedures. He had a radical left   neck. He has had nasal polyps removed. He has had TURP. He has   had gallbladder surgery. Pacemaker insertion. He has had cervical   laminectomy for his back problem. He has had a gastroscopy. He has   had stomach surgery. He has had bilateral carotid stents. Cystoscopy. ALLERGIES   1. DEMEROL. 2. PAXIL. 3. AMOXICILLIN. 4. PENICILLIN. SOCIAL HISTORY: The patient does not smoke, does not drink. , lives with his wife. MEDICATIONS   1. Metoprolol at home 12.5 mg twice a day. 2. Aspirin 325 mg a day. 3. Plavix 75 mg a day. 4. Midodrine 10 mg 3 times a day. 5. Folvite 1 tablet a day. 6. Insulin Lantus 14 units in the morning. 7. Milk of magnesia suspension each day. 8. Tylenol prn    9. He takes a vitamin every day. 10. Insulin sliding scale p.r.n.   11. Neurontin 500 mg twice a day. 12. Nitroglycerin p.r.n. 13. DuoNeb inhalers. 14. Norco.   15. Zofran. 16. Nebulizers. FAMILY HISTORY: His children have a history of heart disease only   Siblings have history of heart disease. His mother had colon   cancer. Father had heart disease. Patient's CT of the head on this admission was negative.  He is being   rescheduled now by myself for a CTA of the head and neck again as   repeat to check his vertebrobasilar system. He will also get a carotid   Doppler. REVIEW OF SYSTEMS   NEUROLOGICAL: He has right arm weakness. No other new   symptoms at this time. HEENT: He has no headache, no new visual symptoms. No change in   swallowing or speech. NECK: He has no real significant neck pain. Just has chronic neck pain   from a previous cervical laminectomy and post-laminectomy syndrome. No bruits. CHEST: He has no cough or shortness of breath. CARDIAC: He has no chest pain or palpitations now. ABDOMEN: He has a PEG tube in place. No new abdominal   tenderness or pain. BOWEL AND BLADDER: Normal bowel and bladder control. MUSCULOSKELETAL: He has degenerative arthritis of his neck and   back and chronic pain mainly in the lower back. No radicular symptoms   now. No new swollen or tender joints. SKIN: No rashes. No neurocutaneous lesions seen. VASCULAR: He has no claudication symptoms or edema. LYMPHATIC: No lymphadenopathy or tender nodes. SYSTEMIC: No fever, no meningismus. NEUROPSYCHIATRIC: Fairly alert, cooperative but somewhat   depressed and anxious. NEUROLOGIC EXAMINATION   VITAL SIGNS: Shows that he has a temperature of 98 degrees. His   pulse was 61. Blood pressure 155/73. Respirations were 16. NEUROLOGIC: The patient is awake, alert, oriented x4, cooperative   and fluent. A little on the depressed and anxious side. HEENT: The patient's pupils do react to light quickly and equally. Fundi   are poorly seen. Extraocular motility is intact. Visual fields are full to   confrontation. He has mild right central facial type weakness noted of   the face. Sensory examination is all intact. The rest of his cranial   nerves 2-12 look intact. Hearing is slightly decreased only.    EXTREMITIES: His arm strength shows that he has a mild weakness   in the proximal right upper extremity that looks more vascular or central   in type. He has arm drift present. Slight decreased coordination on the   toe tapping, knee tapping. His finger-nose-finger examination shows   no significant dysmetria other than accountable for by the right   hemiparesis. His right leg is a little on the stiff side and he tends to limp   a little bit on the leg, but otherwise it was pretty good. He can walk with   a mild limp on the right side. Romberg is negative. Tandem he had   some difficulty doing that. Toe tapping is decreased in the right leg as   compared to left leg. Deep tendon reflexes are slightly increased on   the right side compared to the left side. No Babinski or clonus is   present. He is neat in appearance. Muscle tone normal.   HEENT: Shows no significant lesions. He has sore throat where   he had the tracheostomy placed. No carotid bruits noted. LUNGS: He has decreased lung sounds bilaterally, no rales. CARDIAC: Exam showed distant heart sounds with regular rhythm. A   defibrillator in place in the left anterior chest wall. ABDOMEN: Shows some slight abdominal tenderness but no other   acute problem. PEG tube was in place. BOWEL AND BLADDER: Normal bowel and bladder control. MUSCULOSKELETAL: Mild degenerative changes noted, but no acute   swollen joints. SKIN: No rashes. No neurocutaneous lesions. VASCULAR: He has decreased pulses in his feet. No significant   edema. LYMPHATICS: He has no lymphadenopathy. No tender nodes. SYSTEMIC: Afebrile, no meningismus. NEUROPSYCHIATRIC: He seems to have normal cognitive function. He seems to be a little bit anxious and depressed. IMPRESSION: The patient with recurring symptoms without clear   carotid disease. He most likely has had a small lacunar-type   infarct.  One would think this would probably be more cortical but we   will go ahead and check a CTA of the head and neck to evaluate the   posterior fossa for any brainstem lesion that he might have in view of   his major basilar artery and vertebrobasilar disease. Further treatment   and evaluation pending results of clinical course. He may be a   candidate for Coumadin. If he fails Coumadin, he might be a candidate   for Interventional Neuroradiology. Difficult case. He is only on aspirin   and Plavix. I do not think another antiplatelet agent is going to be a   help that much. We will follow closely with you. The patient complains   of chronic dizziness anyway and he may need help with that.          MD ANNA MARIE Costa / Pari Cervantes   D:  02/02/2017   11:50   T:  02/02/2017   14:06   Job #:  [id number]

## 2017-02-02 NOTE — PROGRESS NOTES
Stroke Education provided to patient and the following topics were discussed    1. Patients personal risk factors for stroke are hypertension, carotid stenosis, hyperlipidemia, diabetes mellitus and prior stroke    2. Warning signs of Stroke:        * Sudden numbness or weakness of the face, arm or leg, especially on one side of          The body            * Sudden confusion, trouble speaking or understanding        * Sudden trouble seeing in one or both eyes        * Sudden trouble walking, dizziness, loss of balance or coordination        * Sudden severe headache with no known cause      3. Importance of activation Emergency Medical Services ( 9-1-1 ) immediately if experience any warning signs of stroke. 4. Be sure and schedule a follow-up appointment with your primary care doctor or any specialists as instructed. 5. You must take medicine every day to treat your risk factors for stroke. Be sure to take your medicines exactly as your doctor tells you: no more, no less. Know what your medicines are for , what they do. Anti-thrombotics /anticoagulants can help prevent strokes. You are taking the following medicine(s)  asa, and lovenox     6. Smoking and second-hand smoke greatly increase your risk of stroke, cardiovascular disease and death. Smoking history never    7. Information provided was [facility] Stroke Education Binder    8. Documentation of teaching completed in Patient Education Activity and on Care Plan with teaching response noted?   yes

## 2017-02-02 NOTE — H&P
Hospitalist Admission Note    NAME: Matias Paulino   :  1935   MRN:  [id number]     Date/Time:  2017 9:02 PM    Patient PCP: Jessica Worrell MD  ________________________________________________________________________    My assessment of this patient's clinical condition and my plan of care is as follows. Assessment / Plan:  Right upper extremity weakness  In a patient with history of stroke  And dysphagia ( has PEG)    CT head: Moderate to severe chronic microvascular ischemic change with remote  Infarctions. Given AICD, cannot do mri  Neurology eval  Ordered ct head tomorrow, if CTA needed per neurology, would change CT to CTA  Recent hba1c 6.4  Check ldl  History of bilateral carotid stentin      History of CAD, prior CABG  AICD  Hypertension  Cont home meds        Diabetes hba1c 6.4  poc and achs      Code Status: full  Surrogate Decision Maker:    DVT Prophylaxis: lovenox  GI Prophylaxis: not indicated    Baseline: fair functional status        Subjective:   CHIEF COMPLAINT: right  Arm weakness  HISTORY OF PRESENT ILLNESS:     Matias Paulino is a 80 y.o. male who with history of stroke,and dysphagia  Baseline independent, and has PEG with feeds,  Does not take any po, CKD  Started with right upper extremity weakness, started yesterday  Went to orthopedist today, he was referred here  To ED. Denies any chest pain or abdominal pain  He takes asa plavix and midodrine  Does have history of  95% cerebral artery stenosis with persistent dizziness ( neurology did not recommend surgical intervention  Given pt medical history)    Denies any chest pain or abdominal pain            We were asked to admit for work up and evaluation of the above problems. 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 I suspect the latter. Achalasia  is not present.         GERD (gastroesophageal reflux disease)     Heart failure ([address] 10/2014      Cardiomyopathy:Pacemaker upgrade:Biv and AICD  Dr. Marilee Simon heart  last visit 5/11/2015    Hepatitis C Dx 1996     treated at [facility] in past; as of 4/15/15 wife states pt currently not under any treatment    Hyperlipidemia     Hypertension     Myocardial infarct [facility]) 2013     Heart Cath: 40% LV EF, Stented distal LAD, patent Graft to circumflex    On tube feeding diet approx 2009     still has as of 9/28/15  (no po food/liquid/meds at all); Dr Lakshmi Benitez Other ill-defined conditions(799.89) 1996     1 dose of chemotherapy/radiation for tongue cancer    Other ill-defined conditions(799.89)      BPH    Other ill-defined conditions(799.89)      orthostatic hypotension    Pneumonia ~ April -May 2010    Stroke [facility]) approx 2003     left side-left finger tips numb; no imbalance or memory loss; as of 2015 not seeing neuro MD    Suicidal thoughts         Past Surgical History   Procedure Laterality Date    Hx orthopaedic       back surgery times two    Hx mohs procedure       bilateral    Hx cataract removal       bilateral    Pr egd insert guide wire dilator passage esophagus  9/13/2010          Pr egd transoral biopsy single/multiple  9/13/2010          Hx other surgical       Radical Left Neck    Hx other surgical       NASAL POLYPS REMOVAL    Hx other surgical  2010     TURP    Hx cholecystectomy      Pr abdomen surgery proc unlisted       peg tube    Pr neurological procedure unlisted       cevical surgery    Pr change gastrostomy tube  2011          Pr change gastrostomy tube  2011          Pr stomach surgery procedure unlisted  2011          Vascular surgery procedure unlist       bilateral carotid stents    Hx pacemaker      Hx pacemaker  10/28/14      Defibrillator: [facility] # [id number], serial # [id number]; Dr. Hi Bridegroom [phone]; Dr Hailee Bravo Hx urological       cystoscopy    Pr cabg, artery-vein, three      Hx heart catheterization       Stented distal LAD       Social History   Substance Use Topics    Smoking status: Never Smoker    Smokeless tobacco: Never Used    Alcohol use No        Family History   Problem Relation Age of Onset    Heart Disease Father       at age 52 from CAD    Colon Cancer Mother     Cancer Mother      colon ca    Heart Disease Brother      Allergies   Allergen Reactions    Demerol [Meperidine] Shortness of Breath    Paxil [Paroxetine Hcl] Unknown (comments)     Pt gets shaky and loses control of legs    Amoxicillin Rash    Pcn [Penicillins] Rash        Prior to Admission medications    Medication Sig Start Date End Date Taking? Authorizing Provider   metoprolol tartrate (LOPRESSOR) 25 mg tablet Take 12.5 mg by mouth two (2) times a day. Yes Booker Eric MD   aspirin (ASPIRIN) 325 mg tablet Take 325 mg by mouth daily. Yes Booker Eric MD   vit B Cmplx 3-FA-Vit C-Biotin (NEPHRO GRAYSON RX) 1- mg-mg-mcg tablet Take 1 Tab by mouth daily. Yes Booker Eric MD   magnesium hydroxide (TOBIAS MILK OF MAGNESIA) 400 mg/5 mL suspension Take 30 mL by mouth daily as needed for Constipation. Yes Booker Eric MD   insulin glargine (LANTUS SOLOSTAR) 100 unit/mL (3 mL) pen 10 Units by SubCUTAneous route daily.  Inject 14 units in AM  subcutenous 16  Yes Chris Nicholas MD   folic acid (FOLVITE) 1 mg tablet Take 1 Tab by mouth daily. 12/5/16  Yes Kyra Cruz MD   midodrine (PROAMITINE) 10 mg tablet Take 10 mg by mouth three (3) times daily (with meals). 11/1/16  Yes Historical Provider   insulin syringe-needle U-100 1 mL 31 x 15/64\" syrg 1 Syringe by SubCUTAneous route four (4) times daily. 1/8/16  Yes Aurelio Cruz MD   Insulin Needles, Disposable, (ESPERANZA PEN NEEDLE) 32 gauge x 5/32\" ndle 5 times a day 1/8/16  Yes Aurelio Cruz MD   atorvastatin (LIPITOR) 40 mg tablet Take 40 mg by mouth daily. Yes Booker Eric MD   furosemide (LASIX) 20 mg tablet Take 1 Tab by mouth daily. Indications: HYPERCALCEMIA 10/7/15  Yes Ed Levin MD   gabapentin (NEURONTIN) 250 mg/5 mL solution Take 500 mg by mouth two (2) times a day. Yes Booker Eric MD   multivitamin (ONE A DAY) tablet Take 1 Tab by mouth daily. Yes Booker Eric MD   clopidogrel (PLAVIX) 75 mg tablet Take 75 mg by mouth daily. Yes Booker Eric MD   nitroglycerin (NITROSTAT) 0.4 mg SL tablet 0.4 mg by SubLINGual route every five (5) minutes as needed for Chest Pain. Booker Eric MD   albuterol-ipratropium (DUO-NEB) 2.5 mg-0.5 mg/3 ml nebu 3 mL by Nebulization route every six (6) hours as needed. 12/5/16   Kyra Cruz MD   HYDROcodone-acetaminophen (NORCO) 5-325 mg per tablet 1 Tab by Per G Tube route every four (4) hours as needed. Max Daily Amount: 6 Tabs. 12/5/16   Kyra Cruz MD   ondansetron hcl (ZOFRAN, AS HYDROCHLORIDE,) 8 mg tablet Take 1 Tab by mouth every eight (8) hours as needed for Nausea. 12/5/16   Kyra Cruz MD   Nebulizer & Compressor machine 1 Each by Does Not Apply route daily.  12/5/16   Kyra Cruz MD       REVIEW OF SYSTEMS:           Total of 12 systems reviewed as follows:       POSITIVE= underlined text  Negative = text not underlined  General:  fever, chills, sweats, generalized weakness, weight loss/gain,      loss of appetite   Eyes:    blurred vision, eye pain, loss of vision, double vision  ENT:    rhinorrhea, pharyngitis   Respiratory:   cough, sputum production, SOB, KRAMER, wheezing, pleuritic pain   Cardiology:   chest pain, palpitations, orthopnea, PND, edema, syncope   Gastrointestinal:  abdominal pain , N/V, diarrhea, dysphagia, constipation, bleeding   Genitourinary:  frequency, urgency, dysuria, hematuria, incontinence   Muskuloskeletal :  arthralgia, myalgia, back pain  Hematology:  easy bruising, nose or gum bleeding, lymphadenopathy   Dermatological: rash, ulceration, pruritis, color change / jaundice  Endocrine:   hot flashes or polydipsia   Neurological:  headache, dizziness, confusion, right upper extremity weakness,  paresthesia,     Speech difficulties, memory loss, gait difficulty  Psychological: Feelings of anxiety, depression, agitation    Objective:   VITALS:    Visit Vitals    /90    Pulse 60    Temp 97.7 °F (36.5 °C)    Resp 16    Ht 5' 7\" (1.702 m)    Wt 73 kg (160 lb 15 oz)    SpO2 95%    BMI 25.21 kg/m2       PHYSICAL EXAM:    General:    Alert, cooperative, no distress, appears stated age. HEENT: Atraumatic, anicteric sclerae, pink conjunctivae     No oral ulcers, mucosa moist, throat clear, dentition fair  Neck:  Supple, symmetrical,  thyroid: non tender  Lungs:   Clear to auscultation bilaterally. No Wheezing or Rhonchi. No rales. Chest wall:  No tenderness  No Accessory muscle use. Heart:   Regular  rhythm,  No  murmur   No edema  Abdomen:   Soft, non-tender. Not distended. Bowel sounds normal  Extremities: No cyanosis. No clubbing    Skin:     Not pale. Not Jaundiced  No rashes   Psych:  Good insight. Not depressed. Not anxious or agitated. Neurologic: EOMs intact. No facial asymmetry. slurred speech.  Right upper extremity 2/5 and other extremities 5/5  _______________________________________________________________________  Care Plan discussed with:    Comments   Patient y    Family      RN y    Care Manager                    Consultant: _______________________________________________________________________  Expected  Disposition:   Home with Family y   HH/PT/OT/RN    SNF/LTC    BHUPINDER    ________________________________________________________________________  TOTAL TIME:  72 Minutes    Critical Care Provided     Minutes non procedure based      Comments    y Reviewed previous records   >50% of visit spent in counseling and coordination of care y Discussion with patient and/or family and questions answered       ________________________________________________________________________  Signed: Maisha Velarde MD    Procedures: see electronic medical records for all procedures/Xrays and details which were not copied into this note but were reviewed prior to creation of Plan. LAB DATA REVIEWED:    Recent Results (from the past 24 hour(s))   CBC WITH AUTOMATED DIFF    Collection Time: 02/01/17  1:35 PM   Result Value Ref Range    WBC 11.4 (H) 4.1 - 11.1 K/uL    RBC 4.93 4.10 - 5.70 M/uL    HGB 14.9 12.1 - 17.0 g/dL    HCT 47.0 36.6 - 50.3 %    MCV 95.3 80.0 - 99.0 FL    MCH 30.2 26.0 - 34.0 PG    MCHC 31.7 30.0 - 36.5 g/dL    RDW 14.7 (H) 11.5 - 14.5 %    PLATELET 96 (L) 492 - 400 K/uL    NEUTROPHILS 79 (H) 32 - 75 %    LYMPHOCYTES 14 12 - 49 %    MONOCYTES 7 5 - 13 %    EOSINOPHILS 0 0 - 7 %    BASOPHILS 0 0 - 1 %    ABS. NEUTROPHILS 9.0 (H) 1.8 - 8.0 K/UL    ABS. LYMPHOCYTES 1.6 0.8 - 3.5 K/UL    ABS. MONOCYTES 0.8 0.0 - 1.0 K/UL    ABS. EOSINOPHILS 0.1 0.0 - 0.4 K/UL    ABS.  BASOPHILS 0.0 0.0 - 0.1 K/UL   METABOLIC PANEL, COMPREHENSIVE    Collection Time: 02/01/17  1:35 PM   Result Value Ref Range    Sodium 139 136 - 145 mmol/L    Potassium 4.7 3.5 - 5.1 mmol/L    Chloride 104 97 - 108 mmol/L    CO2 29 21 - 32 mmol/L    Anion gap 6 5 - 15 mmol/L    Glucose 140 (H) 65 - 100 mg/dL    BUN 32 (H) 6 - 20 MG/DL    Creatinine 1.15 0.70 - 1.30 MG/DL    BUN/Creatinine ratio 28 (H) 12 - 20      GFR est AA >60 >60 ml/min/1.73m2    GFR est non-AA >60 >60 ml/min/1.73m2    Calcium 9.3 8.5 - 10.1 MG/DL    Bilirubin, total 0.5 0.2 - 1.0 MG/DL    ALT (SGPT) 62 12 - 78 U/L    AST (SGOT) 49 (H) 15 - 37 U/L    Alk.  phosphatase 72 45 - 117 U/L    Protein, total 7.2 6.4 - 8.2 g/dL    Albumin 3.6 3.5 - 5.0 g/dL    Globulin 3.6 2.0 - 4.0 g/dL    A-G Ratio 1.0 (L) 1.1 - 2.2     PROTHROMBIN TIME + INR    Collection Time: 02/01/17  1:35 PM   Result Value Ref Range    INR 1.1 0.9 - 1.1      Prothrombin time 10.7 9.0 - 11.1 sec   PTT    Collection Time: 02/01/17  1:35 PM   Result Value Ref Range    aPTT 26.0 22.1 - 32.5 sec    aPTT, therapeutic range     58.0 - 77.0 SECS   GLUCOSE, POC    Collection Time: 02/01/17  2:05 PM   Result Value Ref Range    Glucose (POC) 138 (H) 65 - 100 mg/dL    Performed by Chilo Jones     URINALYSIS W/ REFLEX CULTURE    Collection Time: 02/01/17  5:09 PM   Result Value Ref Range    Color YELLOW/STRAW      Appearance CLEAR CLEAR      Specific gravity 1.017 1.003 - 1.030      pH (UA) 7.0 5.0 - 8.0      Protein TRACE (A) NEG mg/dL    Glucose NEGATIVE  NEG mg/dL    Ketone NEGATIVE  NEG mg/dL    Bilirubin NEGATIVE  NEG      Blood NEGATIVE  NEG      Urobilinogen 0.2 0.2 - 1.0 EU/dL    Nitrites NEGATIVE  NEG      Leukocyte Esterase MODERATE (A) NEG      WBC 10-20 0 - 4 /hpf    RBC 0-5 0 - 5 /hpf    Epithelial cells FEW FEW /lpf    Bacteria NEGATIVE  NEG /hpf    UA:UC IF INDICATED URINE CULTURE ORDERED (A) CNI      Hyaline cast 0-2 0 - 5 /lpf   GLUCOSE, POC    Collection Time: 02/01/17  7:22 PM   Result Value Ref Range    Glucose (POC) 110 (H) 65 - 100 mg/dL    Performed by Neal Rodriguez

## 2017-02-02 NOTE — PROGRESS NOTES
Occupational Therapy:  Order received and acknowledged. Patient is off the floor for testing. Will attempt later as available and appropriate.   Thank you  Jenni Lobo MS, OTR/L

## 2017-02-02 NOTE — ED NOTES
Bedside shift change report given to Stanton Hodges  (oncoming nurse) by Tommy Velez (offgoing nurse). Report included the following information SBAR and ED Summary. Rukhsana

## 2017-02-02 NOTE — PROGRESS NOTES
Occupational Therapy:  Checked in for patient evaluation at 13:05 and he is off the floor for testing. Will attempt later as available and appropriate.   Thank you  Poonam Hess MS, OTR/L

## 2017-02-02 NOTE — PROGRESS NOTES
* No surgery found *  Bedside shift change report given to [address] [place] [address],  [phone] (oncoming nurse) by Home Lou (offgoing nurse). Report included the following information SBAR, Kardex, Intake/Output, MAR and Recent Results.     Zone Phone:   [phone]      Significant changes during shift:    Pt completed multiple tests including CTA, reported pain in neck after study, relieved with meds  TF started per nutrition        Patient Information    Krystina Higgins  80 y.o.  2/1/2017  3:16 PM by Maisha Velarde MD. Krystina Higgins was admitted from Home    Problem List    Patient Active Problem List    Diagnosis Date Noted    Weakness 02/01/2017    Stroke ([facility] 02/01/2017    Aspiration pneumonia ([address] 11/28/2016    History of stroke 07/30/2016     Class: Present on Admission    Polyneuropathy associated with underlying disease ([address] 02/11/2016    Peripheral neuropathy ([address] 01/09/2016    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 ([address] 01/08/2016    Percutaneous endoscopic gastrostomy status ([address] 12/04/2014    Antiplatelet or antithrombotic long-term use 12/04/2014    Heart failure ([address] 09/11/2014    Esophageal motility disorder 07/02/2013    CAD (coronary artery disease) 02/12/2013    Status post implantation of automatic cardioverter/defibrillator (AICD) 02/12/2013     Class: Present on Admission    Aortic stenosis, mild 02/12/2013    S/P PTCA (percutaneous transluminal coronary angioplasty) 01/18/2013    Non-cardiac chest pain 01/04/2013    Feeding difficulties 05/02/2011    Abnormal cardiovascular stress test 06/24/2010    S/P CABG x 3 06/24/2010    Atherosclerotic cerebrovascular disease 06/24/2010    Orthostatic hypotension 06/24/2010    Dysphagia 05/03/2010     Past Medical History   Diagnosis Date    Antiplatelet or antithrombotic long-term use 12/4/2014    Anxiety disorder     Arrhythmia 2009     bradycardia    Arthritis     CAD (coronary artery disease)      s/p CABG 2002; Dr Meraz Bread [facility]) 1996     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 I suspect the latter. Achalasia  is not present.  GERD (gastroesophageal reflux disease)     Heart failure ([facility] 10/2014      Cardiomyopathy:Pacemaker upgrade:Biv and AICD  Dr. Deb Coronel heart DrAlvaro last visit 5/11/2015    Hepatitis C Dx 1996     treated at [facility] in past; as of 4/15/15 wife states pt currently not under any treatment    Hyperlipidemia     Hypertension     Myocardial infarct [facility]) 2013     Heart Cath: 40% LV EF, Stented distal LAD, patent Graft to circumflex    On tube feeding diet approx 2009     still has as of 9/28/15  (no po food/liquid/meds at all); Dr Arnaldo Brown Other ill-defined conditions(799.89) 1996     1 dose of chemotherapy/radiation for tongue cancer    Other ill-defined conditions(799.89)      BPH    Other ill-defined conditions(799.89)      orthostatic hypotension    Pneumonia ~ April -May 2010    Stroke [facility]) approx 2003     left side-left finger tips numb; no imbalance or memory loss; as of 2015 not seeing neuro MD    Suicidal thoughts          Core Measures:    CVA: YES YES  CHF:NO NO  PNA:NO NO    Post Op Surgical (If Applicable): Activity Status:    OOB to Chair NO  Ambulated this shift NO   Bed Rest YES    Supplemental O2: (If Applicable)    NC NO  NRB NO  Venti-mask NO  On - Liters/min      LINES AND DRAINS:    Central Line? NO     PICC LINE? NO     Urinary Catheter?  NO     DVT prophylaxis:    DVT prophylaxis Med- YES  DVT prophylaxis SCD or CORTES- NO     Wounds: (If Applicable)    Wounds- NO    Location -    Patient Safety:    Falls Score Total Score: 2  Safety Level_______  Bed Alarm On? YES  Sitter?  NO    Plan for upcoming shift: TF, ABX, possibly DC tomorrow        Discharge Plan: YES DC planner following    Active Consults:  IP CONSULT TO TELE-NEUROLOGY  IP CONSULT TO NEUROLOGY

## 2017-02-02 NOTE — CARDIO/PULMONARY
C/P REHAB:  Chart reviewed. Admitted with RUE weakness. History significant for CAD, heart failure, AICD, DM, HTN, hyperlipidemia, DM, GERD, tongue/throat cancer, depression and stroke. LVEF 50-55% on echo 8/1/16. Nonsmoker. Pt was seen for cardiac teaching on admission in December of 2016. Met with patient, his wife and his son. Gave/reviewed CHF teaching materials including CHF \"zones,\" avoiding triggers, and low sodium diet. Pt is tube fed by wife and meds are given through tube. He does take a diuretic. He does weigh daily. Wife states if he gains weight, she cuts back on tube feeding (which does contain sodium). She has been told by cardiologist to keep his weight between 160-165 lbs. Wife states she has been dealing with this for years. Pt and wife had no questions and indicate understanding.

## 2017-02-02 NOTE — PROGRESS NOTES
[facility]  Preliminary Report:  Carotid Duplex Scan    Right:  Mild plaque noted in the right carotid system. Right ICA velocities suggest less than 50% diameter reduction. Right vertebral artery flow is antegrade. Left:  Mild plaque noted in the left carotid system. Left ICA velocities suggest less than 50% diameter reduction. Left vertebral artery flow is antegrade. Patent right ICA stent  Patent left CCA and ICA stents    Final report to follow.

## 2017-02-03 ENCOUNTER — APPOINTMENT (OUTPATIENT)
Dept: CT IMAGING | Age: 82
DRG: 069 | End: 2017-02-03
Attending: PSYCHIATRY & NEUROLOGY
Payer: MEDICARE

## 2017-02-03 LAB
ANION GAP BLD CALC-SCNC: 8 MMOL/L (ref 5–15)
BASOPHILS # BLD AUTO: 0 K/UL (ref 0–0.1)
BASOPHILS # BLD: 0 % (ref 0–1)
BUN SERPL-MCNC: 27 MG/DL (ref 6–20)
BUN/CREAT SERPL: 29 (ref 12–20)
CALCIUM SERPL-MCNC: 8.2 MG/DL (ref 8.5–10.1)
CHLORIDE SERPL-SCNC: 102 MMOL/L (ref 97–108)
CO2 SERPL-SCNC: 29 MMOL/L (ref 21–32)
CREAT SERPL-MCNC: 0.94 MG/DL (ref 0.7–1.3)
EOSINOPHIL # BLD: 0 K/UL (ref 0–0.4)
EOSINOPHIL NFR BLD: 1 % (ref 0–7)
ERYTHROCYTE [DISTWIDTH] IN BLOOD BY AUTOMATED COUNT: 14.8 % (ref 11.5–14.5)
GLUCOSE BLD STRIP.AUTO-MCNC: 164 MG/DL (ref 65–100)
GLUCOSE BLD STRIP.AUTO-MCNC: 182 MG/DL (ref 65–100)
GLUCOSE BLD STRIP.AUTO-MCNC: 183 MG/DL (ref 65–100)
GLUCOSE BLD STRIP.AUTO-MCNC: 193 MG/DL (ref 65–100)
GLUCOSE SERPL-MCNC: 209 MG/DL (ref 65–100)
HCT VFR BLD AUTO: 40.4 % (ref 36.6–50.3)
HGB BLD-MCNC: 13.2 G/DL (ref 12.1–17)
LYMPHOCYTES # BLD AUTO: 17 % (ref 12–49)
LYMPHOCYTES # BLD: 1.4 K/UL (ref 0.8–3.5)
MAGNESIUM SERPL-MCNC: 2.5 MG/DL (ref 1.6–2.4)
MCH RBC QN AUTO: 30.3 PG (ref 26–34)
MCHC RBC AUTO-ENTMCNC: 32.7 G/DL (ref 30–36.5)
MCV RBC AUTO: 92.9 FL (ref 80–99)
MONOCYTES # BLD: 0.5 K/UL (ref 0–1)
MONOCYTES NFR BLD AUTO: 6 % (ref 5–13)
NEUTS SEG # BLD: 6 K/UL (ref 1.8–8)
NEUTS SEG NFR BLD AUTO: 76 % (ref 32–75)
PLATELET # BLD AUTO: 76 K/UL (ref 150–400)
POTASSIUM SERPL-SCNC: 4.2 MMOL/L (ref 3.5–5.1)
RBC # BLD AUTO: 4.35 M/UL (ref 4.1–5.7)
SERVICE CMNT-IMP: ABNORMAL
SODIUM SERPL-SCNC: 139 MMOL/L (ref 136–145)
TSH SERPL DL<=0.05 MIU/L-ACNC: 1.45 UIU/ML (ref 0.36–3.74)
WBC # BLD AUTO: 7.9 K/UL (ref 4.1–11.1)

## 2017-02-03 PROCEDURE — 74011250637 HC RX REV CODE- 250/637: Performed by: EMERGENCY MEDICINE

## 2017-02-03 PROCEDURE — 74011250637 HC RX REV CODE- 250/637: Performed by: HOSPITALIST

## 2017-02-03 PROCEDURE — 83735 ASSAY OF MAGNESIUM: CPT | Performed by: INTERNAL MEDICINE

## 2017-02-03 PROCEDURE — 84443 ASSAY THYROID STIM HORMONE: CPT | Performed by: INTERNAL MEDICINE

## 2017-02-03 PROCEDURE — 85025 COMPLETE CBC W/AUTO DIFF WBC: CPT | Performed by: INTERNAL MEDICINE

## 2017-02-03 PROCEDURE — 74011636637 HC RX REV CODE- 636/637: Performed by: HOSPITALIST

## 2017-02-03 PROCEDURE — 97165 OT EVAL LOW COMPLEX 30 MIN: CPT

## 2017-02-03 PROCEDURE — 74011250636 HC RX REV CODE- 250/636: Performed by: HOSPITALIST

## 2017-02-03 PROCEDURE — 72125 CT NECK SPINE W/O DYE: CPT

## 2017-02-03 PROCEDURE — 97116 GAIT TRAINING THERAPY: CPT

## 2017-02-03 PROCEDURE — 97112 NEUROMUSCULAR REEDUCATION: CPT

## 2017-02-03 PROCEDURE — 65660000000 HC RM CCU STEPDOWN

## 2017-02-03 PROCEDURE — 80048 BASIC METABOLIC PNL TOTAL CA: CPT | Performed by: INTERNAL MEDICINE

## 2017-02-03 PROCEDURE — 82962 GLUCOSE BLOOD TEST: CPT

## 2017-02-03 PROCEDURE — 74011250637 HC RX REV CODE- 250/637: Performed by: PSYCHIATRY & NEUROLOGY

## 2017-02-03 PROCEDURE — 36415 COLL VENOUS BLD VENIPUNCTURE: CPT | Performed by: INTERNAL MEDICINE

## 2017-02-03 RX ORDER — ASPIRIN AND DIPYRIDAMOLE 25; 200 MG/1; MG/1
1 CAPSULE, EXTENDED RELEASE ORAL
Status: DISCONTINUED | OUTPATIENT
Start: 2017-02-03 | End: 2017-02-04 | Stop reason: HOSPADM

## 2017-02-03 RX ORDER — LEVOFLOXACIN 750 MG/1
750 TABLET ORAL
Status: DISCONTINUED | OUTPATIENT
Start: 2017-02-04 | End: 2017-02-04 | Stop reason: HOSPADM

## 2017-02-03 RX ORDER — GUAIFENESIN 100 MG/5ML
81 LIQUID (ML) ORAL DAILY
Status: DISCONTINUED | OUTPATIENT
Start: 2017-02-04 | End: 2017-02-04 | Stop reason: HOSPADM

## 2017-02-03 RX ADMIN — MIDODRINE HYDROCHLORIDE 10 MG: 5 TABLET ORAL at 08:30

## 2017-02-03 RX ADMIN — THERA TABS 1 TABLET: TAB at 08:30

## 2017-02-03 RX ADMIN — Medication 10 ML: at 17:16

## 2017-02-03 RX ADMIN — MIDODRINE HYDROCHLORIDE 10 MG: 5 TABLET ORAL at 12:35

## 2017-02-03 RX ADMIN — MIDODRINE HYDROCHLORIDE 10 MG: 5 TABLET ORAL at 17:15

## 2017-02-03 RX ADMIN — FOLIC ACID 1 MG: 1 TABLET ORAL at 08:30

## 2017-02-03 RX ADMIN — GABAPENTIN 500 MG: 250 SOLUTION ORAL at 08:30

## 2017-02-03 RX ADMIN — ATORVASTATIN CALCIUM 40 MG: 40 TABLET, FILM COATED ORAL at 08:31

## 2017-02-03 RX ADMIN — METOPROLOL TARTRATE 12.5 MG: 25 TABLET ORAL at 08:30

## 2017-02-03 RX ADMIN — GABAPENTIN 500 MG: 250 SOLUTION ORAL at 17:15

## 2017-02-03 RX ADMIN — ASPIRIN 325 MG ORAL TABLET 325 MG: 325 PILL ORAL at 08:30

## 2017-02-03 RX ADMIN — INSULIN LISPRO 2 UNITS: 100 INJECTION, SOLUTION INTRAVENOUS; SUBCUTANEOUS at 06:44

## 2017-02-03 RX ADMIN — MAGNESIUM HYDROXIDE 30 ML: 400 SUSPENSION ORAL at 00:09

## 2017-02-03 RX ADMIN — CLOPIDOGREL BISULFATE 75 MG: 75 TABLET ORAL at 08:30

## 2017-02-03 RX ADMIN — ENOXAPARIN SODIUM 40 MG: 40 INJECTION SUBCUTANEOUS at 21:49

## 2017-02-03 RX ADMIN — INSULIN GLARGINE 14 UNITS: 100 INJECTION, SOLUTION SUBCUTANEOUS at 08:29

## 2017-02-03 RX ADMIN — Medication 10 ML: at 05:20

## 2017-02-03 RX ADMIN — RENO CAPS 1 CAPSULE: 100; 1.5; 1.7; 20; 10; 1; 150; 5; 6 CAPSULE ORAL at 08:30

## 2017-02-03 RX ADMIN — ASPIRIN AND DIPYRIDAMOLE 1 CAPSULE: 25; 200 CAPSULE, EXTENDED RELEASE ORAL at 17:15

## 2017-02-03 RX ADMIN — METOPROLOL TARTRATE 12.5 MG: 25 TABLET ORAL at 17:15

## 2017-02-03 RX ADMIN — Medication 10 ML: at 21:48

## 2017-02-03 RX ADMIN — FUROSEMIDE 20 MG: 20 TABLET ORAL at 08:30

## 2017-02-03 RX ADMIN — INSULIN LISPRO 2 UNITS: 100 INJECTION, SOLUTION INTRAVENOUS; SUBCUTANEOUS at 12:34

## 2017-02-03 RX ADMIN — INSULIN LISPRO 2 UNITS: 100 INJECTION, SOLUTION INTRAVENOUS; SUBCUTANEOUS at 17:39

## 2017-02-03 NOTE — PROGRESS NOTES
Hospitalist Progress Note    NAME: Aubrey Hooker   :  1935   MRN:  [id number]       Interim Hospital Summary: 80 y.o. male whom presented on 2017 with      Assessment / Plan:  1. TIA:  Hemodynamically stable. 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 Continue with ASA/Plavix, PT/OT, and monitor neuro status.     2. CAD, s/p CABG/AICD:  Continue with BB/ASA/statin.     3. DM:  HG A1C pending. Continue with basal/prandial Insulins, and monitor FSs.     4. HTN;  Fairly under control. Continue with BB.     5. Dyslipidemia:  Continue with statin.     6. UTI:  Continue with Levaquin, and follow urine culture.     7. H/o CVA:  Plan as above.                              Code status: Full code  Prophylaxis: SC Lovenox  Recommended Disposition: Home     Subjective:     Chief Complaint / Reason for Physician Visit  Patient seen and examined. Feels better today. Discussed with RN events overnight. Review of Systems:  Symptom Y/N Comments  Symptom Y/N Comments   Fever/Chills N   Chest Pain N    Poor Appetite    Edema     Cough    Abdominal Pain N    Sputum    Joint Pain     SOB/KRAMER N   Pruritis/Rash     Nausea/vomit N   Tolerating PT/OT Y    Diarrhea N   Tolerating Diet Y    Constipation    Other       Could NOT obtain due to:      Objective:     VITALS:   Last 24hrs VS reviewed since prior progress note.  Most recent are:  Patient Vitals for the past 24 hrs:   Temp Pulse Resp BP SpO2   17 1142 - (!) 59 - 101/58 -   17 1115 97.6 °F (36.4 °C) 62 18 98/63 95 %   17 0741 97.6 °F (36.4 °C) 62 18 103/55 99 %   17 0300 97.1 °F (36.2 °C) 60 18 144/72 100 %   17 0054 98.8 °F (37.1 °C) (!) 59 20 115/66 100 %   17 1930 97.8 °F (36.6 °C) 64 18 149/85 94 %   17 1515 97.5 °F (36.4 °C) 71 18 134/74 99 %       Intake/Output Summary (Last 24 hours) at 17 1436  Last data filed at 17 1234   Gross per 24 hour   Intake              700 ml   Output                0 ml   Net              700 ml        PHYSICAL EXAM:  General: WD, WN. Alert, cooperative, no acute distress    EENT:  EOMI. Anicteric sclerae. MMM  Resp:  CTA bilaterally, no wheezing or rales. No accessory muscle use  CV:  Regular  rhythm,  No edema  GI:  Soft, Non distended, Non tender.  +Bowel sounds  Neurologic:  Alert and oriented X 3, normal speech,   Psych:   Good insight. Not anxious nor agitated  Skin:  No rashes. No jaundice    Reviewed most current lab test results and cultures  YES  Reviewed most current radiology test results   YES  Review and summation of old records today    NO  Reviewed patient's current orders and MAR    YES  PMH/SH reviewed - no change compared to H&P  ________________________________________________________________________  Care Plan discussed with:    Comments   Patient Y    Family      RN Y    Care Manager     Consultant                        Multidiciplinary team rounds were held today with , nursing, pharmacist and clinical coordinator. Patient's plan of care was discussed; medications were reviewed and discharge planning was addressed. ________________________________________________________________________  Total NON critical care TIME:  30   Minutes    Total CRITICAL CARE TIME Spent:   Minutes non procedure based      Comments   >50% of visit spent in counseling and coordination of care Y    ________________________________________________________________________  Julio Barrera MD     Procedures: see electronic medical records for all procedures/Xrays and details which were not copied into this note but were reviewed prior to creation of Plan. LABS:  I reviewed today's most current labs and imaging studies.   Pertinent labs include:  Recent Labs      02/03/17   0527  02/02/17   0255  02/01/17   1335   WBC  7.9  11.2*  11.4*   HGB  13.2  14.9  14.9   HCT  40.4  45.8  47.0   PLT  76*  85*  96* Recent Labs      02/03/17   0527  02/02/17   0255  02/01/17   1335   NA  139  139  139   K  4.2  4.4  4.7   CL  102  105  104   CO2  29  27  29   GLU  209*  227*  140*   BUN  27*  35*  32*   CREA  0.94  1.12  1.15   CA  8.2*  8.9  9.3   MG  2.5*   --    --    ALB   --    --   3.6   TBILI   --    --   0.5   SGOT   --    --   49*   ALT   --    --   62   INR   --    --   1.1       Signed: Andres Garcia MD

## 2017-02-03 NOTE — PROGRESS NOTES
Pharmacy IV to PO Conversion Program    IV to PO conversion of Levofloxacin for this 80 y.o. male being treated for UTI? Recent Labs      17   0527  17   0255  17   1335   CREA  0.94  1.12  1.15   BUN  27*  35*  32*   WBC  7.9  11.2*  11.4*     Temp (24hrs), Av.7 °F (36.5 °C), Min:97.1 °F (36.2 °C), Max:98.8 °F (37.1 °C)      Criteria for IV to PO switch - Antibiotics  Received IV therapy for at least 48 hours and   YES   1. Functioning GI tract  a. Taking scheduled oral medications  b. Tolerating tube feeds at goal rate or a full liquid, soft, or regular diet  c. Patient has no documented malabsorption syndrome  d. Patient has not experienced emesis or severe diarrhea within the past 24 hours     YES   2. Clinically Stable  a. Afebrile (temperature < 100.4°F or 38°C) for at least 24 hours  b. White blood count is trending down towards normal range     YES            3.   Does not have an exclusion criteria (See list below) YES     Thanks,    Esau Naranjo

## 2017-02-03 NOTE — PROGRESS NOTES
Bedside shift change report given to Farheen Carrasco (oncoming nurse) by Josselin Leavitt (offgoing nurse).  Report included the following information SBAR, Kardex, Intake/Output, MAR and Recent Results.      Zone Phone: [phone]          Significant changes during shift:   Seen by vascular surgery, no new orders              Patient Information  Clare Howell [id number]  80 y.o.  2/1/2017 3:16 PM by Stevie Toro MD. Ori Medina was admitted from Home      Problem List                Patient Active Problem List      Diagnosis Date Noted    Weakness 02/01/2017    Stroke ([address] 02/01/2017    Aspiration pneumonia ([address] 11/28/2016    History of stroke 07/30/2016         Class: Present on Admission    Polyneuropathy associated with underlying disease ([address] 02/11/2016    Peripheral neuropathy ([address] 01/09/2016    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 ([address] 01/08/2016    Percutaneous endoscopic gastrostomy status ([address] 12/04/2014    Antiplatelet or antithrombotic long-term use 12/04/2014    Heart failure ([address] 09/11/2014    Esophageal motility disorder 07/02/2013    CAD (coronary artery disease) 02/12/2013    Status post implantation of automatic cardioverter/defibrillator (AICD) 02/12/2013         Class: Present on Admission    Aortic stenosis, mild 02/12/2013    S/P PTCA (percutaneous transluminal coronary angioplasty) 01/18/2013    Non-cardiac chest pain 01/04/2013    Feeding difficulties 05/02/2011    Abnormal cardiovascular stress test 06/24/2010    S/P CABG x 3 06/24/2010    Atherosclerotic cerebrovascular disease 06/24/2010    Orthostatic hypotension 06/24/2010    Dysphagia 05/03/2010                 Past Medical History   Diagnosis Date    Antiplatelet or antithrombotic long-term use 12/4/2014    Anxiety disorder       Arrhythmia 2009         bradycardia    Arthritis       CAD (coronary artery disease)            s/p CABG 2002; Dr Gurpreet Brady [facility]) [address] tongue/throat cancer s/p surgery / radiation and 1 dose of chemo    Carotid artery stenosis            s/p bilateral stents    Chronic pain            left leg, lower back,     Depression       Diabetes ([facility]            Type II    Esophageal dysmotility            s/p dilitation    Esophageal motility disorder 7/8/2013         Frequent simultaneous or failed contractions, low amplitude contractions suggests severe myopathy or diffuse spasm. I suspect the latter. Achalasia is not present.  GERD (gastroesophageal reflux disease)       Heart failure ([facility] 10/2014         Cardiomyopathy:Pacemaker upgrade:Biv and AICD Dr. Mayfield Minus heart DrAlvaro last visit 5/11/2015    Hepatitis C Dx 1996         treated at [facility] in past; as of 4/15/15 wife states pt currently not under any treatment    Hyperlipidemia       Hypertension       Myocardial infarct [facility]) 2013         Heart Cath: 40% LV EF, Stented distal LAD, patent Graft to circumflex    On tube feeding diet approx 2009         still has as of 9/28/15 (no po food/liquid/meds at all); Dr Candelaria Peters Other ill-defined conditions(799.89) 1996         1 dose of chemotherapy/radiation for tongue cancer    Other ill-defined conditions(799.89)            BPH    Other ill-defined conditions(799.89)            orthostatic hypotension    Pneumonia ~ April -May 2010    Stroke [facility]) approx 2003         left side-left finger tips numb; no imbalance or memory loss; as of 2015 not seeing neuro MD    Suicidal thoughts                  Core Measures:      CVA: YES YES  CHF:NO NO  PNA:NO NO      Post Op Surgical (If Applicable):       Activity Status:      OOB to Chair YES  Ambulated this shift In room and with PT   Bed Rest YES      Supplemental O2: (If Applicable)      NC NO  NRB NO  Venti-mask NO  On - Liters/min          LINES AND DRAINS:  [employer]? NO       PICC LINE? NO       Urinary Catheter?  NO       DVT prophylaxis:      DVT prophylaxis Med- YES  DVT prophylaxis SCD or CORTES- NO       Wounds: (If Applicable)      Wounds- NO      Location -      Patient Safety:      Falls Score Total Score: 2  Safety Level_______  Bed Alarm On? YES  Sitter?  NO      Plan for upcoming shift:Bolus tube feedings, ABX, possibly DC           Discharge Plan: YES DC planner following      Active Consults:  IP CONSULT TO TELE-NEUROLOGY  IP CONSULT TO NEUROLOGY

## 2017-02-03 NOTE — PROGRESS NOTES
Dr. Grisel Torres does not believe that pt will discharge today. MSW intern consulted with therapy and they stated that pt can choose between completing OT and PT with [facility] or outpatient. MSW intern spoke with pt and he stated that he prefers to have outpatient therapy. He will call [facility] and set up appts after discharge. Care Management Interventions  PCP Verified by CM: Yes  Mode of Transport at Discharge: Other (see comment) (Pt's wife will transport at discharge)  Transition of Care Consult (CM Consult):  Other (outpatient therapy at [facility] )  Physical Therapy Consult: Yes  Occupational Therapy Consult: Yes  Speech Therapy Consult: Yes  Current Support Network: Lives with Spouse (Pt lives with his wife in a one story house; pt has a walker, cane, wheelchair, adapted bathroom, and breathing machine; pt's preferred pharmacy is [facility] in [place] )  Confirm Follow Up Transport: Family (Pt's wife transports to appts. )  Plan discussed with Pt/Family/Caregiver: Yes  Discharge Location  Discharge Placement: Home with family assistance (pt will go to [facility] for outpatient therapy)    Cailin Hoang MSW Intern    [facility] MSW  [facility]  Ext [phone]

## 2017-02-03 NOTE — CONSULTS
Vascular Surgery Consult    Subjective:      Kassandra Hall is a 80 y.o. WHITE OR  male who was referred for bilateral carotid stenosis evaluation by Dr. Bridger Marroquin. PMH significant for prior bilat carotid stent by Dr. Comfort Mackey, CVA, CAD s/p CABG, tongue/throat CA, DM, CHF, AICD, Hep C and GERD. Pt was admitted for R arm weakness, not able to raise his R arm above shoulder level, beginning Wed. He first went to his orthopedist, Dr. Hendricks, to have shoulder evaluated, recent cortisone injections about 2 weeks ago, but xrays were normal and pt sent to ED to r/o new CVA. He denies any headache, vision changes, RLE weakness or left extremity weakness. Reports chronic history of dizziness off and on, r/t what he describes as \"disease in vessels in back of my neck\". Workup revealed no new evidence of stroke on CTA, unable to have MRI due to AICD. CTA revealed patents stents, about 20% stenosis bilaterally. Carotid duplex revealed bilaterally less than 50% stenosis. Past Medical History   Diagnosis Date    Antiplatelet or antithrombotic long-term use 12/4/2014    Anxiety disorder     Arrhythmia 2009     bradycardia    Arthritis     CAD (coronary artery disease)      s/p CABG 2002; Dr Regulo Porras [facility]) 1996     tongue/throat cancer s/p surgery / radiation and 1 dose of chemo    Carotid artery stenosis      s/p bilateral stents    Chronic pain      left leg, lower back,     Depression     Diabetes ([address]      Type II    Esophageal dysmotility      s/p dilitation    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 I suspect the latter. Achalasia  is not present.         GERD (gastroesophageal reflux disease)     Heart failure ([address] 10/2014      Cardiomyopathy:Pacemaker upgrade:Biv and AICD  Dr. Alessandra Oviedo heart  last visit 5/11/2015    Hepatitis C Dx 1996     treated at [facility] in past; as of 4/15/15 wife states pt currently not under any treatment    Hyperlipidemia     Hypertension     Myocardial infarct [facility]) 2013     Heart Cath: 40% LV EF, Stented distal LAD, patent Graft to circumflex    On tube feeding diet approx 2009     still has as of 9/28/15  (no po food/liquid/meds at all); Dr Monika Montes Other ill-defined conditions(799.89) 1996     1 dose of chemotherapy/radiation for tongue cancer    Other ill-defined conditions(799.89)      BPH    Other ill-defined conditions(799.89)      orthostatic hypotension    Pneumonia ~ April -May 2010    Stroke [facility]) approx 2003     left side-left finger tips numb; no imbalance or memory loss; as of 2015 not seeing neuro MD    Suicidal thoughts      Past Surgical History   Procedure Laterality Date    Hx orthopaedic       back surgery times two    Hx mohs procedure       bilateral    Hx cataract removal       bilateral    Pr egd insert guide wire dilator passage esophagus  9/13/2010          Pr egd transoral biopsy single/multiple  9/13/2010          Hx other surgical       Radical Left Neck    Hx other surgical       NASAL POLYPS REMOVAL    Hx other surgical  2010     TURP    Hx cholecystectomy      Pr abdomen surgery proc unlisted  1996     peg tube    Pr neurological procedure unlisted  1996     cevical surgery    Pr change gastrostomy tube  5/2/2011          Pr change gastrostomy tube  6/29/2011          Pr stomach surgery procedure unlisted  6/29/2011          Vascular surgery procedure unlist       bilateral carotid stents    Hx pacemaker  11/08    Hx pacemaker  10/28/14      Defibrillator: [facility] # [id number], serial # [id number]; Dr. Son Sebastian [phone]; Dr Silvia Zavala Hx urological       cystoscopy    Pr cabg, artery-vein, three  2000    Hx heart catheterization  2013     Stented distal LAD      Social History   Substance Use Topics    Smoking status: Never Smoker    Smokeless tobacco: Never Used    Alcohol use No      Family History   Problem Relation Age of Onset    Heart Disease Father       at age 52 from CAD    Colon Cancer Mother     Cancer Mother      colon ca    Heart Disease Brother      Prior to Admission medications    Medication Sig Start Date End Date Taking? Authorizing Provider   metoprolol tartrate (LOPRESSOR) 25 mg tablet Take 12.5 mg by mouth two (2) times a day. Yes Booker Eric MD   aspirin (ASPIRIN) 325 mg tablet Take 325 mg by mouth daily. Yes Booker Eric MD   vit B Cmplx 3-FA-Vit C-Biotin (NEPHRO GRAYSON RX) 1- mg-mg-mcg tablet Take 1 Tab by mouth daily. Yes Booker Eric MD   magnesium hydroxide ([employer] MILK OF MAGNESIA) 400 mg/5 mL suspension Take 30 mL by mouth daily as needed for Constipation. Yes Booker Eric MD   insulin glargine (LANTUS SOLOSTAR) 100 unit/mL (3 mL) pen 10 Units by SubCUTAneous route daily. Inject 14 units in AM  subcutenous 16  Yes Salina Chaudhari MD   folic acid (FOLVITE) 1 mg tablet Take 1 Tab by mouth daily. 16  Yes Salina Chaudhari MD   midodrine (PROAMITINE) 10 mg tablet Take 10 mg by mouth three (3) times daily (with meals). 16  Yes Historical Provider   insulin syringe-needle U-100 1 mL 31 x 15/64\" syrg 1 Syringe by SubCUTAneous route four (4) times daily. 16  Yes Kenneth Frederick MD   Insulin Needles, Disposable, (ESPERANZA PEN NEEDLE) 32 gauge x 5/32\" ndle 5 times a day 16  Yes Kenneth Frederick MD   atorvastatin (LIPITOR) 40 mg tablet Take 40 mg by mouth daily. Yes Booker Eric MD   furosemide (LASIX) 20 mg tablet Take 1 Tab by mouth daily. Indications: HYPERCALCEMIA 10/7/15  Yes James Jacob MD   gabapentin (NEURONTIN) 250 mg/5 mL solution Take 500 mg by mouth two (2) times a day. Yes Booker Eric MD   multivitamin (ONE A DAY) tablet Take 1 Tab by mouth daily. Yes Booker Eric MD   clopidogrel (PLAVIX) 75 mg tablet Take 75 mg by mouth daily.    Yes Booker Eric MD   nitroglycerin (NITROSTAT) 0.4 mg SL tablet 0.4 mg by SubLINGual route every five (5) minutes as needed for Chest Pain. Booker Eric MD   albuterol-ipratropium (DUO-NEB) 2.5 mg-0.5 mg/3 ml nebu 3 mL by Nebulization route every six (6) hours as needed. 12/5/16   Franco Milian MD   HYDROcodone-acetaminophen (NORCO) 5-325 mg per tablet 1 Tab by Per G Tube route every four (4) hours as needed. Max Daily Amount: 6 Tabs. 12/5/16   Franco Milian MD   ondansetron hcl (ZOFRAN, AS HYDROCHLORIDE,) 8 mg tablet Take 1 Tab by mouth every eight (8) hours as needed for Nausea. 12/5/16   Franco Milian MD   Nebulizer & Compressor machine 1 Each by Does Not Apply route daily.  12/5/16   Franco Milian MD       Allergies   Allergen Reactions    Demerol [Meperidine] Shortness of Breath    Paxil [Paroxetine Hcl] Unknown (comments)     Pt gets shaky and loses control of legs    Amoxicillin Rash    Pcn [Penicillins] Rash       Review of Systems:    [] Unable to obtain  ROS due to  []mental status change  []sedated   []intubated   [x]Total of 13 systems reviewed as follows:  Constitutional: negative fever, negative chills  Eyes:   negative for amauroses fugax  ENT:   negative sore throat,oral absecess  Endocrine Negative for thyroid replacement Rx; goiter  Respiratory:  negative chronic cough,sputum production  Cards:  negative for  palpitations, lower extremity edema, varicosities, Claudication  GI:   negative for dysphagia, bleeding, nausea, vomiting, diarrhea, and     abdominal pain  Genitourinary: negative for frequency, dysuria  Integument:  negative for rash and pruritus  Hematologic:  negative for easy bruising; bleeding dyscarsia  Musculoskel: negative for muscle weakness inhibiting ambulation  Neurological:  negative for syncope  Behavl/Psych: negative for feelings of anxiety, depression     Objective:     Patient Vitals for the past 8 hrs:   BP Temp Pulse Resp SpO2   02/03/17 1142 101/58 - (!) 59 - -   02/03/17 1115 98/63 97.6 °F (36.4 °C) 62 18 95 %   02/03/17 0741 103/55 97.6 °F (36.4 °C) 62 18 99 %       EXAM:  General:  Alert, cooperative, no distress, appears stated age. Eyes:  Sclera anicteric, PERRL   Neck: Supple, symmetrical, trachea midline, no adenopathy, no JVD. R carotid bruit    Lungs:   Clear to auscultation bilaterally. Heart:  Regular rate and rhythm, S1, S2 normal, 2/6 murmur, no click, rub or gallop. Abdomen:   Soft, non-tender. Bowel sounds normal. No masses,  No organomegaly. Extremities: No edema, palpable pulses bilat. Neurologic:  Normal strength and sensation throughout except not able to lift R arm above shoulder level      LABS: Recent Labs      02/03/17   1228   02/03/17   0527   02/01/17   1335   WBC   --    --   7.9   < >  11.4*   HGB   --    --   13.2   < >  14.9   HCT   --    --   40.4   < >  47.0   PLT   --    --   76*   < >  96*   NA   --    --   139   < >  139   K   --    --   4.2   < >  4.7   BUN   --    --   27*   < >  32*   CREA   --    --   0.94   < >  1.15   GLU   --    --   209*   < >  140*   GLUCPOC  183*   < >   --    < >   --    INR   --    --    --    --   1.1    < > = values in this interval not displayed.        Assessment:     Patient Active Problem List   Diagnosis Code    Dysphagia R13.10    Abnormal cardiovascular stress test R94.39    S/P CABG x 3 Z95.1    Atherosclerotic cerebrovascular disease I67.2    Orthostatic hypotension I95.1    Feeding difficulties R63.3    Non-cardiac chest pain R07.89    S/P PTCA (percutaneous transluminal coronary angioplasty) Z98.61    CAD (coronary artery disease) I25.10    Status post implantation of automatic cardioverter/defibrillator (AICD) Z95.810    Aortic stenosis, mild I35.0    Esophageal motility disorder K22.4    Heart failure (HCC) I50.9    Percutaneous endoscopic gastrostomy status ([facility] Z93.1    Antiplatelet or antithrombotic long-term use Z79.02    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 ([address] E11.42    Peripheral neuropathy ([facility]) G62.9    Polyneuropathy associated with underlying disease ([facility] G63    History of stroke Z86.73    Aspiration pneumonia ([facility] J69.0    Weakness R53.1    Stroke ([facility] I63.9    Thrombotic stroke involving left middle cerebral artery ([facility]) I63.312    Basilar artery stenosis with cerebral infarction within last 8 weeks ([facility]) I63.9    Stenosis of both internal carotid arteries I65.23    Hemiplegia and hemiparesis following cerebral infarction affecting right dominant side ([facility]) I69.351       Plan:   1. Bilat carotid stenosis: patent stents. Pt reports he sees Dr. Sony Walls for regular follow up. Offered to have him follow up with our office as an outpt but he declined, wants to stay with Sony Walls. Stenosis is not nearly enough to warrant surgery, will sign off. Will review with Dr. Jennie Morin.      Signed By: Tim Archer NP     February 3, 2017       anil

## 2017-02-03 NOTE — PROGRESS NOTES
Spiritual Care Partner Volunteer visited patient in Neuro on 2/3/17.   Documented by:  JIAN Hernandez, [facility], [address]     Paging Service  [phone])

## 2017-02-03 NOTE — PROGRESS NOTES
Occupational Therapy    Completed chart review and attempted OT evaluation. Another practitioner present with patient. Will follow up.       Thank you,    Brenda Holly OTR/L

## 2017-02-03 NOTE — PROGRESS NOTES
No surgery found *  Bedside shift change report given to Supa (oncoming nurse) by Ty Webb (offgoing nurse).  Report included the following information SBAR, Kardex, Intake/Output, MAR and Recent Results.     Zone Phone: [phone]        Significant changes during shift: No complaints of neck pain this shift.           Patient Information     Kassandra Hall  80 y.o.  2/1/2017 3:16 PM by Pravin Arango MD. Kassandra Hall was admitted from Home     Problem List           Patient Active Problem List     Diagnosis Date Noted    Weakness 02/01/2017    Stroke ([address] 02/01/2017    Aspiration pneumonia ([address] 11/28/2016    History of stroke 07/30/2016       Class: Present on Admission    Polyneuropathy associated with underlying disease ([address] 02/11/2016    Peripheral neuropathy ([address] 01/09/2016    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 ([address] 01/08/2016    Percutaneous endoscopic gastrostomy status ([address] 12/04/2014    Antiplatelet or antithrombotic long-term use 12/04/2014    Heart failure ([address] 09/11/2014    Esophageal motility disorder 07/02/2013    CAD (coronary artery disease) 02/12/2013    Status post implantation of automatic cardioverter/defibrillator (AICD) 02/12/2013       Class: Present on Admission    Aortic stenosis, mild 02/12/2013    S/P PTCA (percutaneous transluminal coronary angioplasty) 01/18/2013    Non-cardiac chest pain 01/04/2013    Feeding difficulties 05/02/2011    Abnormal cardiovascular stress test 06/24/2010    S/P CABG x 3 06/24/2010    Atherosclerotic cerebrovascular disease 06/24/2010    Orthostatic hypotension 06/24/2010    Dysphagia 05/03/2010            Past Medical History   Diagnosis Date    Antiplatelet or antithrombotic long-term use 12/4/2014    Anxiety disorder      Arrhythmia 2009       bradycardia    Arthritis      CAD (coronary artery disease)         s/p CABG 2002; Dr Regulo Porras [facility]) 1996       tongue/throat cancer s/p surgery / radiation and 1 dose of chemo    Carotid artery stenosis         s/p bilateral stents    Chronic pain         left leg, lower back,     Depression      Diabetes (HCC)         Type II    Esophageal dysmotility         s/p dilitation    Esophageal motility disorder 7/8/2013       Frequent simultaneous or failed contractions, low amplitude contractions suggests severe myopathy or diffuse spasm. I suspect the latter. Achalasia is not present.  GERD (gastroesophageal reflux disease)      Heart failure ([facility] 10/2014       Cardiomyopathy:Pacemaker upgrade:Biv and AICD Dr. Elmer Diallo heart Dr. last visit 5/11/2015    Hepatitis C Dx 1996       treated at [facility] in past; as of 4/15/15 wife states pt currently not under any treatment    Hyperlipidemia      Hypertension      Myocardial infarct ([facility] 2013       Heart Cath: 40% LV EF, Stented distal LAD, patent Graft to circumflex    On tube feeding diet approx 2009       still has as of 9/28/15 (no po food/liquid/meds at all); Dr Marilu Mai Other ill-defined conditions(799.89) 1996       1 dose of chemotherapy/radiation for tongue cancer    Other ill-defined conditions(799.89)         BPH    Other ill-defined conditions(799.89)         orthostatic hypotension    Pneumonia ~ April -May 2010    Stroke [facility]) approx 2003       left side-left finger tips numb; no imbalance or memory loss; as of 2015 not seeing neuro MD    Suicidal thoughts              Core Measures:     CVA: YES YES  CHF:NO NO  PNA:NO NO     Post Op Surgical (If Applicable):      Activity Status:     OOB to Chair NO  Ambulated this shift NO   Bed Rest YES     Supplemental O2: (If Applicable)     NC NO  NRB NO  Venti-mask NO  On - Liters/min        LINES AND DRAINS:     Central Line? NO      PICC LINE? NO      Urinary Catheter?  NO      DVT prophylaxis:     DVT prophylaxis Med- YES  DVT prophylaxis SCD or CORTES- NO      Wounds: (If Applicable)     Wounds- NO     Location -     Patient Safety:     Falls Score Total Score: 2  Safety Level_______  Bed Alarm On? YES  Sitter?  NO     Plan for upcoming shift:Bolus tube feedings, ABX, possibly DC         Discharge Plan: YES DC planner following     Active Consults:  IP CONSULT TO TELE-NEUROLOGY  IP CONSULT TO NEUROLOGY

## 2017-02-03 NOTE — PROGRESS NOTES
Problem: Mobility Impaired (Adult and Pediatric)  Goal: *Acute Goals and Plan of Care (Insert Text)  Physical Therapy Goals  Initiated 2/2/2017  1. Patient will move from supine to sit and sit to supine in bed with independence within 7 day(s). 2. Patient will transfer from bed to chair and chair to bed with modified independence using the least restrictive device within 7 day(s). 3. Patient will perform sit to stand with modified independence within 7 day(s). 4. Patient will ambulate with modified independence for 150 feet with the least restrictive device within 7 day(s). 5. Patient will ascend/descend 4 stairs with bilateral handrail(s) with supervision/set-up within 7 day(s). PHYSICAL THERAPY TREATMENT  Patient: Salome Costa (8 y.o. male)  Date: 2/3/2017  Diagnosis: Weakness  Stroke [facility]) <principal problem not specified>       Precautions: Fall  Chart, physical therapy assessment, plan of care and goals were reviewed. ASSESSMENT:  Pt progressing well with mobility. transfers with SBA and ambulated 150 ft without AD. Pt demonstrates an ataxic gait pattern but no LOB. Pt also performed 12 steps with 1 hand rail and a step over step gait pattern. Pt defers use of RW with gait. Recommend HHPT upon discharge. Progression toward goals:  [X]      Improving appropriately and progressing toward goals  [ ]      Improving slowly and progressing toward goals  [ ]      Not making progress toward goals and plan of care will be adjusted       PLAN:  Patient continues to benefit from skilled intervention to address the above impairments. Continue treatment per established plan of care. Discharge Recommendations:  Home Health  Further Equipment Recommendations for Discharge:  none       SUBJECTIVE:   Patient stated I feel pretty good.       OBJECTIVE DATA SUMMARY:   Critical Behavior:  Neurologic State: Alert  Orientation Level: Oriented X4   Cognition: Follows commands     Functional Mobility Training:  Bed Mobility:                         Transfers:  Sit to Stand: Stand-by asssistance  Stand to Sit: Stand-by asssistance                 Balance:  Sitting: Intact  Standing: Impaired  Standing - Static: Good  Standing - Dynamic : Fair  Ambulation/Gait Training:  Distance (ft): 150 Feet (ft)  Assistive Device: Gait belt  Ambulation - Level of Assistance: Stand-by asssistance  Gait Abnormalities: Decreased step clearance;Trunk sway increased  Base of Support: Widened     Speed/Gertrudis: Pace decreased (<100 feet/min)  Step Length: Left shortened;Right shortened              Stairs:  Number of Stairs Trained: 12  Stairs - Level of Assistance: Stand-by asssistance  Rail Use: Right      Neuro Re-Education:     Therapeutic Exercises:      Pain:                    Activity Tolerance:   No s/s of distress with activity     Please refer to the flowsheet for vital signs taken during this treatment.   After treatment:   [X] Patient left in no apparent distress sitting up in chair  [ ] Patient left in no apparent distress in bed  [X] Call bell left within reach  [X] Nursing notified  [ ] Caregiver present  [X] Bed alarm activated      COMMUNICATION/COLLABORATION:   The patients plan of care was discussed with: Registered Nurse     Destiney Malloy PTA   Time Calculation: 12 mins

## 2017-02-03 NOTE — PROGRESS NOTES
Problem: Self Care Deficits Care Plan (Adult)  Goal: *Acute Goals and Plan of Care (Insert Text)  Occupational Therapy Goals  Initiated 2/3/2017   1. Patient will perform upper body dressing with modified independence in sitting with nemo dressing techniques within 7 day(s). 2. Patient will perform lower body dressing with supervision/set-up within 7 day(s). 3. Patient will perform toilet transfers with supervision/set-up with appropriate AD PRN within 7 day(s). 4. Patient will perform all aspects of toileting with modified independence within 7 day(s). 5. Patient will participate in R UE AAROM/self ROM exercise/activities with supervision/set-up for 8 minutes within 7 day(s). 6. Patient will utilize energy conservation techniques during functional activities with minimal verbal cues within 7 day(s). 7. Patient will improve their Fugl Gu score by 5 points within 7 days. OCCUPATIONAL THERAPY EVALUATION  Patient: Aubrey Hooker (72 y.o. male)  Date: 2/3/2017  Primary Diagnosis: Weakness  Stroke [facility])        Precautions:  Fall      ASSESSMENT :  Based on the objective data described below, the patient presents with acute impaired R shoulder AROM. Per chart review patient with R rotator cuff disease, OA and impingement of humeral head on under surface of acromion process. Per patient report he has had difficulty with R shoulder ROM however not to this extent and reports impacted R shoulder ROM was sudden with no recollection of trauma. He went to ortho who sent him to hospital for neuro work up. CT negative for acute changes. He lives with his wife and was independent in all ADL tasks, occasional use of RW. Provided education on BE FAST for stroke symptoms, nemo BUE dressing techniques, and safety for toileting tasks without AD. Completed Fugl Gu UE assessment 47/66 with all impairments with shoulder mobility. Recommend OP therapy to f/u for R shoulder impairments.   Per chart review patient is not a candidate for surgery. Patient will benefit from skilled intervention to address the above impairments. Patients rehabilitation potential is considered to be Good  Factors which may influence rehabilitation potential include:   [X]                None noted  [ ]                Mental ability/status  [ ]                Medical condition  [ ]                Home/family situation and support systems  [ ]                Safety awareness  [ ]                Pain tolerance/management  [ ]                Other:        PLAN :  Recommendations and Planned Interventions:  [X]                  Self Care Training                  [X]           Therapeutic Activities  [X]                  Functional Mobility Training    [ ]           Cognitive Retraining  [X]                  Therapeutic Exercises           [X]           Endurance Activities  [X]                  Balance Training                   [X]           Neuromuscular Re-Education  [ ]                  Visual/Perceptual Training     [X]      Home Safety Training  [X]                  Patient Education                 [X]           Family Training/Education  [ ]                  Other (comment):     Frequency/Duration: Patient will be followed by occupational therapy 4 times a week to address goals. Discharge Recommendations: Outpatient  Further Equipment Recommendations for Discharge: none for OT, has all needed DME       SUBJECTIVE:   Patient stated I have done so much therapy, I never had this much trouble.       OBJECTIVE DATA SUMMARY:   HISTORY:   Past Medical History   Diagnosis Date    Antiplatelet or antithrombotic long-term use 12/4/2014    Anxiety disorder      Arrhythmia 2009       bradycardia    Arthritis      CAD (coronary artery disease)         s/p CABG 2002; Dr Zainab [facility]) 1996       tongue/throat cancer s/p surgery / radiation and 1 dose of chemo    Carotid artery stenosis         s/p bilateral stents    Chronic pain left leg, lower back,     Depression      Diabetes ([facility] [address]         Type II    Esophageal dysmotility         s/p dilitation    Esophageal motility disorder 7/8/2013       Frequent simultaneous or failed contractions, low amplitude contractions  suggests severe myopathy or diffuse spasm. I suspect the latter. Achalasia  is not present.         GERD (gastroesophageal reflux disease)      Heart failure ([facility] [address] 10/2014        Cardiomyopathy:Pacemaker upgrade:Biv and AICD  Dr. Kwon [place] heart  last visit 5/11/2015    Hepatitis C Dx 1996       treated at [facility] in past; as of 4/15/15 wife states pt currently not under any treatment    Hyperlipidemia      Hypertension      Myocardial infarct [facility]) 2013       Heart Cath: 40% LV EF, Stented distal LAD, patent Graft to circumflex    On tube feeding diet approx 2009       still has as of 9/28/15  (no po food/liquid/meds at all); Dr Willie Rachel Other ill-defined conditions(799.89) 1996       1 dose of chemotherapy/radiation for tongue cancer    Other ill-defined conditions(799.89)         BPH    Other ill-defined conditions(799.89)         orthostatic hypotension    Pneumonia ~ April -May 2010    Stroke [facility]) approx 2003       left side-left finger tips numb; no imbalance or memory loss; as of 2015 not seeing neuro MD    Suicidal thoughts       Past Surgical History   Procedure Laterality Date    Hx orthopaedic           back surgery times two    Hx mohs procedure           bilateral    Hx cataract removal           bilateral    Pr egd insert guide wire dilator passage esophagus   9/13/2010            Pr egd transoral biopsy single/multiple   9/13/2010            Hx other surgical           Radical Left Neck    Hx other surgical           NASAL POLYPS REMOVAL    Hx other surgical   2010       TURP    Hx cholecystectomy        Pr abdomen surgery proc unlisted   1996       peg tube    Pr neurological procedure unlisted   1996       cevical surgery    Pr change gastrostomy tube   5/2/2011            Pr change gastrostomy tube   6/29/2011            Pr stomach surgery procedure unlisted   6/29/2011            Vascular surgery procedure unlist           bilateral carotid stents    Hx pacemaker   11/08    Hx pacemaker   10/28/14        Defibrillator: [facility] # [id number], serial # [id number]; Dr. Beata Del Valle [phone]; Dr Armando Key Hx urological           cystoscopy    Pr cabg, artery-vein, three   2000    Hx heart catheterization   2013       Stented distal LAD        Prior Level of Function/Home Situation: Home with wife. Mod independent with ADL tasks. Home Situation  Home Environment: Private residence  # Steps to Enter: 4  Rails to Enter: Yes  Hand Rails : Bilateral  One/Two Story Residence: One story  Living Alone: No  Support Systems: Spouse/Significant Other/Partner  Patient Expects to be Discharged to[de-identified] Private residence  Current DME Used/Available at Home: Shower chair, Cinderella Reaper, straight, Wheelchair, Walker, rollator, Walker, rolling, Commode, bedside  Tub or Shower Type: Shower  [X]  Right hand dominant             [ ]  Left hand dominant     EXAMINATION OF PERFORMANCE DEFICITS:  Cognitive/Behavioral Status:  Neurologic State: Alert  Orientation Level: Oriented X4  Cognition: Follows commands     Skin: BUE normal     Edema: BUE none     Vision/Perceptual:    Tracking: Able to track stimulus in all quadrants w/o difficulty    Acuity: Within Defined Limits; Impaired far vision (wears glasses)    Corrective Lenses: Glasses     Range of Motion:    RUE: finger, hand, wrist, and elbow: WDL  LUE: WDL     Strength:  RUE: finger, hand, wrist, and elbow: WDL  LUE: WDL      Coordination:  Fine Motor Skills-Upper: Left Intact; Right Intact    Gross Motor Skills-Upper: Right Impaired;Left Intact     Tone & Sensation:  Tone: BUE normal  Sensation: BUE intact           Balance:  Sitting: Intact  Standing: Impaired  Standing - Static: Good  Standing - Dynamic : Fair     Functional Mobility and Transfers for ADLs:  Bed Mobility:  Rolling:  (Received up adn left up)     Transfers:  Sit to Stand: Stand-by asssistance  Stand to Sit: Stand-by asssistance  Bed to Chair: Stand-by asssistance  Toilet Transfer : Stand-by asssistance     ADL Assessment:  Feeding:  (PEG)  Oral Facial Hygiene/Grooming: Stand-by assistance  Bathing: Contact guard assistance  Upper Body Dressing: Additional time;Minimum assistance  Lower Body Dressing: Contact guard assistance; Additional time  Toileting: Contact guard assistance        ADL Intervention and task modifications:   Provided education on BE FAST for stroke symptoms. Patient and family with good understanding from patient's hx of stroke. Provided verbal cuing for nemo BUE dressing techniques and patient with good verbal understanding regarding sequencing from hx fo stroke. Training for safety for toileting tasks without AD at [place] [id number] level, no LOB. Good understanding of home set up.          Toileting  Toileting Assistance: Contact guard assistance        Functional Measure:   Fugl-Gu Assessment of Motor Recovery after Stroke:       Reflex Activity  Flexors/Biceps/Fingers: Can be elicited  Extensors/Triceps: Can be elicited  Reflex Subtotal: 4     Volitional Movement Within Synergies  Shoulder Retraction: None  Shoulder Elevation: None  Shoulder Abduction (90 degrees): None  Shoulder External Rotation: None  Elbow Flexion: Full  Forearm Supination: Full  Shoulder Adduction/Internal Rotation: None  Elbow Extension: Partial  Forearm Pronation: Full  Subtotal: 7     Volitional Movement Mixing Synergies  Hand to Lumbar Spine: None  Shoulder Flexion (0-90 degrees): None  Pronation-Supination: Full  Subtotal: 2     Volitional Movement With Little or No Synergy  Shoulder Abduction (0-90 degrees): None  Shoulder Flexion ( degrees): None  Pronation/Supination: Full  Subtotal : 2     Normal Reflex Activity  Biceps, Triceps, Finger Flexors: Full  Subtotal : 2     Upper Extremity Total   Upper Extremity Total: 17     Wrist  Stability at 15 Degree Dorsiflexion: Full  Repeated Dorsiflexion/ Volar Flexion: Full  Stability at 15 Degree Dorsiflexion: Full  Repeated Dorsiflexion/ Volar Flexion: Full  Circumduction: Full  Wrist Total: 10     Hand  Mass Flexion: Full  Mass Extension: Full  Grasp A: Full  Grasp B: Full  Grasp C: Full  Grasp D: Full  Grasp E: Full  Hand Total: 14     Coordination/Speed  Tremor: None  Dysmetria: None  Time: <1s  Coordination/Speed Total : 6     Total A-D  Total A-D (Motor Function): 47/66      Percentage of impairment CH  0% CI  1-19% CJ  20-39% CK  40-59% CL  60-79% CM  80-99% CN  100%   Fugl-Gu score: 0-66 66 53-65 39-52 26-38 13-25 1-12    0      This is a reliable/valid measure of arm function after a neurological event. It has established value to characterize functional status and for measuring spontaneous and therapy-induced recovery; tests proximal and distal motor functions. Fugl-Gu Assessment  UE scores recorded between five and 30 days post neurologic event can be used to predict UE recovery at six months post neurologic event. Severe = 0-21 points   Moderately Severe = 22-33 points   Moderate = 34-47 points   Mild = 48-66 points  Marty Leyden, D., GRACIELA Curry, & EMORY Dangelo (1992). Measurement of motor recovery after stroke: Outcome assessment and sample size requirements.  Stroke, 23, pp. 4530-8861.   ------------------------------------------------------------------------------------------------------------------------------------------------------------------  MCID:  Stroke:   Swati Costello et al, 2001; n = 171; mean age 79 (6) years; assessed within 16 (12) days of stroke, Acute Stroke)  FMA Motor Scores from Admission to Discharge   · 10 point increase in FMA Upper Extremity = 1.5 change in discharge FIM  · 10 point increase in FMA Lower Extremity = 1.9 change in discharge FIM  MDC:   Stroke:   Michael Duron et al, 2008, n = 14, mean age = 59.9 (14.6) years, assessed on average 14 (6.5) months post stroke, Chronic Stroke)   · FMA = 5.2 points for the Upper Extremity portion of the assessment      G codes: In compliance with CMSs Claims Based Outcome Reporting, the following G-code set was chosen for this patient based on their primary functional limitation being treated: The outcome measure chosen to determine the severity of the functional limitation was the [facility] with a score of 47/100 which was correlated with the impairment scale. · Self Care:               - CURRENT STATUS:    CJ - 20%-39% impaired, limited or restricted               - GOAL STATUS:           CI - 1%-19% impaired, limited or restricted               - D/C STATUS:                       ---------------To be determined---------------       Occupational Therapy Evaluation Charge Determination   History Examination Decision-Making   LOW Complexity : Brief history review  LOW Complexity : 1-3 performance deficits relating to physical, cognitive , or psychosocial skils that result in activity limitations and / or participation restrictions  MEDIUM Complexity : Patient may present with comorbidities that affect occupational performnce. Miniml to moderate modification of tasks or assistance (eg, physical or verbal ) with assesment(s) is necessary to enable patient to complete evaluation       Based on the above components, the patient evaluation is determined to be of the following complexity level: LOW      Pain:  Denies     Activity Tolerance:   Good for toileting. Reports dizziness at all times for at least a year.       After treatment:   [X]  Patient left in no apparent distress sitting up in chair  [ ]  Patient left in no apparent distress in bed  [X]  Call bell left within reach  [X]  Nursing notified  [X]  Caregiver present  [X]  Bed alarm activated      COMMUNICATION/EDUCATION:   The patients plan of care was discussed with: Physical Therapy Assistant, Registered Nurse and Patient. Patient was educated regarding His deficit(s) of R shoulder impairments as this relates to His work up for neuro. He demonstrated Good understanding as evidenced by motivation, caregiver support, and previous experience with rehab. [X]    Home safety education was provided and the patient/caregiver indicated understanding. [X]    Patient/family have participated as able in goal setting and plan of care. [X]    Patient/family agree to work toward stated goals and plan of care. [ ]    Patient understands intent and goals of therapy, but is neutral about his/her participation. [ ]    Patient is unable to participate in goal setting and plan of care. This patients plan of care is appropriate for delegation to [facility].      Thank you for this referral.  Beryle Cocks, OT  Time Calculation: 23 mins

## 2017-02-04 VITALS
TEMPERATURE: 98 F | BODY MASS INDEX: 25.26 KG/M2 | WEIGHT: 160.94 LBS | SYSTOLIC BLOOD PRESSURE: 130 MMHG | DIASTOLIC BLOOD PRESSURE: 70 MMHG | HEART RATE: 67 BPM | OXYGEN SATURATION: 96 % | HEIGHT: 67 IN | RESPIRATION RATE: 15 BRPM

## 2017-02-04 LAB
ANION GAP BLD CALC-SCNC: 7 MMOL/L (ref 5–15)
BASOPHILS # BLD AUTO: 0 K/UL (ref 0–0.1)
BASOPHILS # BLD: 0 % (ref 0–1)
BUN SERPL-MCNC: 32 MG/DL (ref 6–20)
BUN/CREAT SERPL: 30 (ref 12–20)
CALCIUM SERPL-MCNC: 8.4 MG/DL (ref 8.5–10.1)
CHLORIDE SERPL-SCNC: 100 MMOL/L (ref 97–108)
CO2 SERPL-SCNC: 28 MMOL/L (ref 21–32)
CREAT SERPL-MCNC: 1.08 MG/DL (ref 0.7–1.3)
EOSINOPHIL # BLD: 0 K/UL (ref 0–0.4)
EOSINOPHIL NFR BLD: 0 % (ref 0–7)
ERYTHROCYTE [DISTWIDTH] IN BLOOD BY AUTOMATED COUNT: 15 % (ref 11.5–14.5)
GLUCOSE BLD STRIP.AUTO-MCNC: 169 MG/DL (ref 65–100)
GLUCOSE BLD STRIP.AUTO-MCNC: 274 MG/DL (ref 65–100)
GLUCOSE SERPL-MCNC: 317 MG/DL (ref 65–100)
HCT VFR BLD AUTO: 40.1 % (ref 36.6–50.3)
HGB BLD-MCNC: 13.3 G/DL (ref 12.1–17)
LYMPHOCYTES # BLD AUTO: 12 % (ref 12–49)
LYMPHOCYTES # BLD: 1.4 K/UL (ref 0.8–3.5)
MCH RBC QN AUTO: 31.2 PG (ref 26–34)
MCHC RBC AUTO-ENTMCNC: 33.2 G/DL (ref 30–36.5)
MCV RBC AUTO: 94.1 FL (ref 80–99)
MONOCYTES # BLD: 0.6 K/UL (ref 0–1)
MONOCYTES NFR BLD AUTO: 6 % (ref 5–13)
NEUTS SEG # BLD: 9.5 K/UL (ref 1.8–8)
NEUTS SEG NFR BLD AUTO: 82 % (ref 32–75)
PLATELET # BLD AUTO: 73 K/UL (ref 150–400)
POTASSIUM SERPL-SCNC: 4.1 MMOL/L (ref 3.5–5.1)
RBC # BLD AUTO: 4.26 M/UL (ref 4.1–5.7)
SERVICE CMNT-IMP: ABNORMAL
SERVICE CMNT-IMP: ABNORMAL
SODIUM SERPL-SCNC: 135 MMOL/L (ref 136–145)
WBC # BLD AUTO: 11.6 K/UL (ref 4.1–11.1)

## 2017-02-04 PROCEDURE — 74011250637 HC RX REV CODE- 250/637: Performed by: INTERNAL MEDICINE

## 2017-02-04 PROCEDURE — 82962 GLUCOSE BLOOD TEST: CPT

## 2017-02-04 PROCEDURE — 85025 COMPLETE CBC W/AUTO DIFF WBC: CPT | Performed by: INTERNAL MEDICINE

## 2017-02-04 PROCEDURE — 36415 COLL VENOUS BLD VENIPUNCTURE: CPT | Performed by: INTERNAL MEDICINE

## 2017-02-04 PROCEDURE — 74011636637 HC RX REV CODE- 636/637: Performed by: HOSPITALIST

## 2017-02-04 PROCEDURE — 74011250637 HC RX REV CODE- 250/637: Performed by: HOSPITALIST

## 2017-02-04 PROCEDURE — 80048 BASIC METABOLIC PNL TOTAL CA: CPT | Performed by: INTERNAL MEDICINE

## 2017-02-04 PROCEDURE — 74011250637 HC RX REV CODE- 250/637: Performed by: PSYCHIATRY & NEUROLOGY

## 2017-02-04 PROCEDURE — 74011250637 HC RX REV CODE- 250/637: Performed by: EMERGENCY MEDICINE

## 2017-02-04 RX ORDER — LEVOFLOXACIN 750 MG/1
750 TABLET ORAL DAILY
Qty: 7 TAB | Refills: 0 | Status: SHIPPED | OUTPATIENT
Start: 2017-02-04 | End: 2017-02-16

## 2017-02-04 RX ORDER — ASPIRIN AND DIPYRIDAMOLE 25; 200 MG/1; MG/1
1 CAPSULE, EXTENDED RELEASE ORAL 2 TIMES DAILY
Qty: 60 CAP | Refills: 0 | Status: SHIPPED | OUTPATIENT
Start: 2017-02-04 | End: 2017-08-08 | Stop reason: ALTCHOICE

## 2017-02-04 RX ADMIN — INSULIN LISPRO 5 UNITS: 100 INJECTION, SOLUTION INTRAVENOUS; SUBCUTANEOUS at 00:30

## 2017-02-04 RX ADMIN — INSULIN LISPRO 2 UNITS: 100 INJECTION, SOLUTION INTRAVENOUS; SUBCUTANEOUS at 05:54

## 2017-02-04 RX ADMIN — ASPIRIN 81 MG 81 MG: 81 TABLET ORAL at 08:24

## 2017-02-04 RX ADMIN — MIDODRINE HYDROCHLORIDE 10 MG: 5 TABLET ORAL at 07:36

## 2017-02-04 RX ADMIN — ATORVASTATIN CALCIUM 40 MG: 40 TABLET, FILM COATED ORAL at 08:25

## 2017-02-04 RX ADMIN — HYDROCODONE BITARTRATE AND ACETAMINOPHEN 1 TABLET: 5; 325 TABLET ORAL at 11:34

## 2017-02-04 RX ADMIN — HYDROCODONE BITARTRATE AND ACETAMINOPHEN 1 TABLET: 5; 325 TABLET ORAL at 07:35

## 2017-02-04 RX ADMIN — MAGNESIUM HYDROXIDE 30 ML: 400 SUSPENSION ORAL at 00:15

## 2017-02-04 RX ADMIN — INSULIN GLARGINE 14 UNITS: 100 INJECTION, SOLUTION SUBCUTANEOUS at 08:24

## 2017-02-04 RX ADMIN — MIDODRINE HYDROCHLORIDE 10 MG: 5 TABLET ORAL at 11:34

## 2017-02-04 RX ADMIN — METOPROLOL TARTRATE 12.5 MG: 25 TABLET ORAL at 08:25

## 2017-02-04 RX ADMIN — GABAPENTIN 500 MG: 250 SOLUTION ORAL at 08:27

## 2017-02-04 RX ADMIN — FUROSEMIDE 20 MG: 20 TABLET ORAL at 08:24

## 2017-02-04 RX ADMIN — RENO CAPS 1 CAPSULE: 100; 1.5; 1.7; 20; 10; 1; 150; 5; 6 CAPSULE ORAL at 08:25

## 2017-02-04 RX ADMIN — FOLIC ACID 1 MG: 1 TABLET ORAL at 08:25

## 2017-02-04 RX ADMIN — LEVOFLOXACIN 750 MG: 750 TABLET, FILM COATED ORAL at 07:35

## 2017-02-04 RX ADMIN — THERA TABS 1 TABLET: TAB at 08:24

## 2017-02-04 NOTE — PROGRESS NOTES
Neurology Progress Note    Patient ID:  Sly Celis  [id number]  80 y.o.  1935      CHIEF COMPLAINT:    Subjective:      Patient has complaints of right arm weakness, but it is better today, still mildly weak. I was concerned it might be due to cervical disease which he has with 2 previous cervical laminectomies and markedly restricted neck movement, but his stenosis is only mild without clear cord compression, and without neck pain I feel he most likely has had a small brainstem stroke causing his arm weakness in view of his severe vertebrobasilar disease. The patient has no interest in Coumadin after a long discussion with him about therapeutic options and he refuses to take that because his mother  of bleeding to death on that medication. He agrees to switch to Aggrenox and aspirin, and we will start Aggrenox once a day for 5 days and then advance to twice a day after that. For the first 5 days he will continue a baby aspirin and then stop the baby aspirin when he goes to Aggrenox twice a day. Can follow-up with Dr. Karishma Chin his cardiologist about the stents, but he does not need anything done with his carotids his problem is his vertebrobasilar system. He will need outpatient therapy from home health or physical therapy as an outpatient. He can follow-up in our office in 2-4 weeks in our stroke clinic.     Current Facility-Administered Medications   Medication Dose Route Frequency    [START ON 2017] aspirin chewable tablet 81 mg  81 mg Oral DAILY    aspirin-dipyridamole (AGGRENOX)  mg per capsule 1 Cap  1 Cap Oral PCD    [START ON 2017] levoFLOXacin (LEVAQUIN) tablet 750 mg  750 mg Oral Q48H    magnesium hydroxide (MILK OF MAGNESIA) 400 mg/5 mL oral suspension 30 mL  30 mL Oral DAILY PRN    albuterol-ipratropium (DUO-NEB) 2.5 MG-0.5 MG/3 ML  3 mL Nebulization Q6H PRN    atorvastatin (LIPITOR) tablet 40 mg  40 mg Oral DAILY    folic acid (FOLVITE) tablet 1 mg  1 mg Oral DAILY    furosemide (LASIX) tablet 20 mg  20 mg Oral DAILY    gabapentin (NEURONTIN) solution 500 mg  500 mg Oral BID    HYDROcodone-acetaminophen (NORCO) 5-325 mg per tablet 1 Tab  1 Tab Per G Tube Q4H PRN    insulin glargine (LANTUS) injection 14 Units  14 Units SubCUTAneous DAILY    metoprolol tartrate (LOPRESSOR) tablet 12.5 mg  12.5 mg Oral BID    midodrine (PROAMITINE) tablet 10 mg  10 mg Oral TID WITH MEALS    therapeutic multivitamin (THERAGRAN) tablet 1 Tab  1 Tab Oral DAILY    B complex-vitaminC-folic acid (NEPHROCAP) cap  1 Cap Oral DAILY    sodium chloride (NS) flush 5-10 mL  5-10 mL IntraVENous Q8H    sodium chloride (NS) flush 5-10 mL  5-10 mL IntraVENous PRN    enoxaparin (LOVENOX) injection 40 mg  40 mg SubCUTAneous Q24H    insulin lispro (HUMALOG) injection   SubCUTAneous Q6H    glucose chewable tablet 16 g  4 Tab Oral PRN    dextrose (D50W) injection syrg 12.5-25 g  12.5-25 g IntraVENous PRN    glucagon (GLUCAGEN) injection 1 mg  1 mg IntraMUSCular PRN        Past Medical History   Diagnosis Date    Antiplatelet or antithrombotic long-term use 12/4/2014    Anxiety disorder     Arrhythmia 2009     bradycardia    Arthritis     CAD (coronary artery disease)      s/p CABG 2002; Dr Gurpreet Brady [facility]) 1996     tongue/throat cancer s/p surgery / radiation and 1 dose of chemo    Carotid artery stenosis      s/p bilateral stents    Chronic pain      left leg, lower back,     Depression     Diabetes (HCC)      Type II    Esophageal dysmotility      s/p dilitation    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 I suspect the latter. Achalasia  is not present.         GERD (gastroesophageal reflux disease)     Heart failure ([facility] [address] 10/2014      Cardiomyopathy:Pacemaker upgrade:Biv and AICD  Dr. Jere Wasserman heart  last visit 5/11/2015    Hepatitis C Dx 1996 treated at [facility] in past; as of 4/15/15 wife states pt currently not under any treatment    Hyperlipidemia     Hypertension     Myocardial infarct [facility]) 2013     Heart Cath: 40% LV EF, Stented distal LAD, patent Graft to circumflex    On tube feeding diet approx 2009     still has as of 9/28/15  (no po food/liquid/meds at all); Dr Bernadette Chen Other ill-defined conditions(799.89) 1996     1 dose of chemotherapy/radiation for tongue cancer    Other ill-defined conditions(799.89)      BPH    Other ill-defined conditions(799.89)      orthostatic hypotension    Pneumonia ~ April -May 2010    Stroke [facility]) approx 2003     left side-left finger tips numb; no imbalance or memory loss; as of 2015 not seeing neuro MD    Suicidal thoughts        Past Surgical History   Procedure Laterality Date    Hx orthopaedic       back surgery times two    Hx mohs procedure       bilateral    Hx cataract removal       bilateral    Pr egd insert guide wire dilator passage esophagus  9/13/2010          Pr egd transoral biopsy single/multiple  9/13/2010          Hx other surgical       Radical Left Neck    Hx other surgical       NASAL POLYPS REMOVAL    Hx other surgical  2010     TURP    Hx cholecystectomy      Pr abdomen surgery proc unlisted  1996     peg tube    Pr neurological procedure unlisted  1996     cevical surgery    Pr change gastrostomy tube  5/2/2011          Pr change gastrostomy tube  6/29/2011          Pr stomach surgery procedure unlisted  6/29/2011          Vascular surgery procedure unlist       bilateral carotid stents    Hx pacemaker  11/08    Hx pacemaker  10/28/14      Defibrillator: [facility] # [id number], serial # [id number]; Dr. Henry So [phone]; Dr Mag Oviedo Hx urological       cystoscopy    Pr cabg, artery-vein, three  2000    Hx heart catheterization  2013     Stented distal LAD       [unfilled]    Social History   Substance Use Topics    Smoking status: Never Smoker    Smokeless tobacco: Never Used    Alcohol use No       Current Facility-Administered Medications   Medication Dose Route Frequency Provider Last Rate Last Dose    [START ON 2/4/2017] aspirin chewable tablet 81 mg  81 mg Oral DAILY Rafael Carroll MD        aspirin-dipyridamole (AGGRENOX)  mg per capsule 1 Cap  1 Cap Oral PCD Rafael Carroll MD   1 Cap at 02/03/17 1715    [START ON 2/4/2017] levoFLOXacin (LEVAQUIN) tablet 750 mg  750 mg Oral Q48H Stevie Quiroz MD        magnesium hydroxide (MILK OF MAGNESIA) 400 mg/5 mL oral suspension 30 mL  30 mL Oral DAILY PRN Delta Costello MD   30 mL at 02/03/17 0009    albuterol-ipratropium (DUO-NEB) 2.5 MG-0.5 MG/3 ML  3 mL Nebulization Q6H PRN Kevyn Vincent MD        atorvastatin (LIPITOR) tablet 40 mg  40 mg Oral DAILY Kevyn Vincent MD   40 mg at 33/69/34 3116    folic acid (FOLVITE) tablet 1 mg  1 mg Oral DAILY Kevyn Vincent MD   1 mg at 02/03/17 0830    furosemide (LASIX) tablet 20 mg  20 mg Oral DAILY Kevyn Vincent MD   20 mg at 02/03/17 0830    gabapentin (NEURONTIN) solution 500 mg  500 mg Oral BID Kevyn Vincetn MD   500 mg at 02/03/17 1715    HYDROcodone-acetaminophen (NORCO) 5-325 mg per tablet 1 Tab  1 Tab Per G Tube Q4H PRN Kevyn Vincent MD   1 Tab at 02/02/17 1652    insulin glargine (LANTUS) injection 14 Units  14 Units SubCUTAneous DAILY Kevyn Vincent MD   14 Units at 02/03/17 0829    metoprolol tartrate (LOPRESSOR) tablet 12.5 mg  12.5 mg Oral BID Kevyn Vincent MD   12.5 mg at 02/03/17 1715    midodrine (PROAMITINE) tablet 10 mg  10 mg Oral TID WITH MEALS Kevyn Vincent MD   10 mg at 02/03/17 1715    therapeutic multivitamin (THERAGRAN) tablet 1 Tab  1 Tab Oral DAILY Kevyn Vincent MD   1 Tab at 02/03/17 0830    B complex-vitaminC-folic acid (NEPHROCAP) cap  1 Cap Oral DAILY Kevyn Vincent MD   1 Cap at 02/03/17 0830    sodium chloride (NS) flush 5-10 mL  5-10 mL IntraVENous Q8H Kevyn Vincent MD   10 mL at 02/03/17 1716    sodium chloride (NS) flush 5-10 mL  5-10 mL IntraVENous PRN Karthik Tapia MD        enoxaparin (LOVENOX) injection 40 mg  40 mg SubCUTAneous Q24H Karthik Tapia MD   40 mg at 02/02/17 2319    insulin lispro (HUMALOG) injection   SubCUTAneous Q6H Karthik Tapia MD   2 Units at 02/03/17 1739    glucose chewable tablet 16 g  4 Tab Oral PRN Karthik Tapia MD        dextrose (D50W) injection syrg 12.5-25 g  12.5-25 g IntraVENous PRN Karthik Tapia MD        glucagon (GLUCAGEN) injection 1 mg  1 mg IntraMUSCular PRN Karthik Tapia MD           Allergies   Allergen Reactions    Demerol [Meperidine] Shortness of Breath    Paxil [Paroxetine Hcl] Unknown (comments)     Pt gets shaky and loses control of legs    Amoxicillin Rash    Pcn [Penicillins] Rash       Review of Systems:    A comprehensive review of systems was negative except for: Constitutional: positive for fatigue and malaise  Ears, nose, mouth, throat, and face: positive for hearing loss  Cardiovascular: positive for palpitations, irregular heart beats  Musculoskeletal: positive for myalgias, arthralgias, stiff joints, neck pain and muscle weakness  Neurological: positive for coordination problems and weakness  Behvioral/Psych: positive for anxiety and depression    Objective:      Objective:     Patient Vitals for the past 24 hrs:   BP Temp Pulse Resp SpO2   02/03/17 1940 163/82 97.6 °F (36.4 °C) 65 18 94 %   02/03/17 1451 113/73 97.8 °F (36.6 °C) 69 18 96 %   02/03/17 1142 101/58 - (!) 59 - -   02/03/17 1115 98/63 97.6 °F (36.4 °C) 62 18 95 %   02/03/17 0741 103/55 97.6 °F (36.4 °C) 62 18 99 %   02/03/17 0300 144/72 97.1 °F (36.2 °C) 60 18 100 %   02/03/17 0054 115/66 98.8 °F (37.1 °C) (!) 59 20 100 %         Lab Review   Recent Results (from the past 24 hour(s))   GLUCOSE, POC    Collection Time: 02/02/17  9:24 PM   Result Value Ref Range    Glucose (POC) 174 (H) 65 - 100 mg/dL    Performed by WHITLEY ROMAN    GLUCOSE, POC    Collection Time: 02/02/17 11:58 PM   Result Value Ref Range    Glucose (POC) 182 (H) 65 - 100 mg/dL    Performed by Chad Aranda    CBC WITH AUTOMATED DIFF    Collection Time: 02/03/17  5:27 AM   Result Value Ref Range    WBC 7.9 4.1 - 11.1 K/uL    RBC 4.35 4.10 - 5.70 M/uL    HGB 13.2 12.1 - 17.0 g/dL    HCT 40.4 36.6 - 50.3 %    MCV 92.9 80.0 - 99.0 FL    MCH 30.3 26.0 - 34.0 PG    MCHC 32.7 30.0 - 36.5 g/dL    RDW 14.8 (H) 11.5 - 14.5 %    PLATELET 76 (L) 685 - 400 K/uL    NEUTROPHILS 76 (H) 32 - 75 %    LYMPHOCYTES 17 12 - 49 %    MONOCYTES 6 5 - 13 %    EOSINOPHILS 1 0 - 7 %    BASOPHILS 0 0 - 1 %    ABS. NEUTROPHILS 6.0 1.8 - 8.0 K/UL    ABS. LYMPHOCYTES 1.4 0.8 - 3.5 K/UL    ABS. MONOCYTES 0.5 0.0 - 1.0 K/UL    ABS. EOSINOPHILS 0.0 0.0 - 0.4 K/UL    ABS.  BASOPHILS 0.0 0.0 - 0.1 K/UL   METABOLIC PANEL, BASIC    Collection Time: 02/03/17  5:27 AM   Result Value Ref Range    Sodium 139 136 - 145 mmol/L    Potassium 4.2 3.5 - 5.1 mmol/L    Chloride 102 97 - 108 mmol/L    CO2 29 21 - 32 mmol/L    Anion gap 8 5 - 15 mmol/L    Glucose 209 (H) 65 - 100 mg/dL    BUN 27 (H) 6 - 20 MG/DL    Creatinine 0.94 0.70 - 1.30 MG/DL    BUN/Creatinine ratio 29 (H) 12 - 20      GFR est AA >60 >60 ml/min/1.73m2    GFR est non-AA >60 >60 ml/min/1.73m2    Calcium 8.2 (L) 8.5 - 10.1 MG/DL   MAGNESIUM    Collection Time: 02/03/17  5:27 AM   Result Value Ref Range    Magnesium 2.5 (H) 1.6 - 2.4 mg/dL   TSH, 3RD GENERATION    Collection Time: 02/03/17  5:27 AM   Result Value Ref Range    TSH 1.45 0.36 - 3.74 uIU/mL   GLUCOSE, POC    Collection Time: 02/03/17  5:52 AM   Result Value Ref Range    Glucose (POC) 193 (H) 65 - 100 mg/dL    Performed by Wing Cervantes    GLUCOSE, POC    Collection Time: 02/03/17 12:28 PM   Result Value Ref Range    Glucose (POC) 183 (H) 65 - 100 mg/dL    Performed by Christen Pacheco, POC    Collection Time: 02/03/17  5:10 PM   Result Value Ref Range    Glucose (POC) 164 (H) 65 - 100 mg/dL    Performed by Gareth Meckel (PCT)            Additional comments:I personally viewed and interpreted the patient's CTA of head and neck, and CT of the cervical spine        NEUROLOGICAL EXAM:    Appearance: The patient is well developed, well nourished, provides a coherent history and is in no acute distress. Mental Status: Oriented to time, place and person and the president, cognitive function and fund of knowledge is normal. Speech is fluent without aphasia or dysarthria. Mood and affect appropriate but depressed . Cranial Nerves:   Intact visual fields. Fundi are benign. RAY, EOM's full, no nystagmus, no ptosis. Facial sensation is normal. Corneal reflexes are not tested. Facial movement is asymmetric. Hearing is normal bilaterally. Palate is midline with normal sternocleidomastoid and trapezius muscles are normal. Tongue is midline. Neck without meningismus or bruits  Patient has marked limited range of motion of the cervical spine with pain in all directions  Temporal arteries are not tender or enlarged    Motor:  4/5 strength in upper and lower proximal and distal muscles, except for the right upper extremity which has strength about 3/5 associated with an arm drift and decreased rapid alternating movements in the right hand. Normal bulk and tone. No fasciculations. Reflexes:   Deep tendon reflexes 2+/4 on the right and 1+/4 on the left. No babinski or clonus present   Sensory:   Normal to touch, pinprick and temperature and vibration decreased in both feet. DSS is intact   Gait:  Normal gait though he has to move slowly due to his arthritis and his mild right hemiparesis . Tremor:   No tremor noted. Cerebellar:  No cerebellar signs present. Neurovascular:  Abnormal heart sounds and irregular rhythm, peripheral pulses decreased, and no carotid bruits. Assessment:        Assessment:       ICD-10-CM ICD-9-CM    1. Cerebrovascular accident (CVA), unspecified mechanism ([facility] I63.9 434.91    2.  Basilar artery stenosis with cerebral infarction within last 8 weeks ([facility] [address] I63.9 433.01    3. Hemiplegia and hemiparesis following cerebral infarction affecting right dominant side (HCC) I69.351 438.21    4. Stenosis of both internal carotid arteries I65.23 433.10      433.30    5. Thrombotic stroke involving left middle cerebral artery ([address] I63.312 434.01    6. 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 ([address] (2016)      Weakness (2017)      Stroke ([facility] [address] (2017)      Thrombotic stroke involving left middle cerebral artery ([facility] [address] (2017)      Basilar artery stenosis with cerebral infarction within last 8 weeks ([address] (2017)      Stenosis of both internal carotid arteries (2017)      Hemiplegia and hemiparesis 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 I was concerned it might be due to cervical disease which he has with 2 previous cervical laminectomies and markedly restricted neck movement, but his stenosis is only mild without clear cord compression, and without neck pain I feel he most likely has had a small brainstem stroke causing his arm weakness in view of his severe vertebrobasilar disease. The patient has no interest in Coumadin after a long discussion with him about therapeutic options and he refuses to take that because his mother  of bleeding to death on that medication. He agrees to switch to Aggrenox and aspirin, and we will start Aggrenox once a day for 5 days and then advance to twice a day after that. For the first 5 days he will continue a baby aspirin and then stop the baby aspirin when he goes to Aggrenox twice a day.   Can follow-up with Dr. Ten Neff his cardiologist about the stents, but he does not need anything done with his carotids his problem is his vertebrobasilar system. He will need outpatient therapy from home health or physical therapy as an outpatient. He can follow-up in our office in 2-4 weeks in our stroke clinic.       Signed:  Laureen Garland MD  2/3/2017  8:37 PM    Roger Mcardle., MD  [phone]

## 2017-02-04 NOTE — PROGRESS NOTES
Went over discharge paperwork and medication information with patient who verbalized understanding of instructions. Copy of discharge along with scripts x 2 given to patient. Educated patient on medications and gave medication information to include potential side effects on new scripts given. Removed patients peripheral IV per protocol. Cath tip in place. Patient has no complaints or questions. Patient awaiting ride home.

## 2017-02-04 NOTE — PROGRESS NOTES
Bedside shift change report given to Karishma Najera RN (oncoming nurse) by Isidro Carlin RN (offgoing nurse).  Report included the following information SBAR, Kardex, Intake/Output, MAR and Recent Results.      Zone Phone: [phone]          Significant changes during shift: none              Patient Information  Madelin Mehta [id number]  80 y.o.  2/1/2017 3:16 PM by Kanika Beckwith MD. 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 ([address] 01/08/2016    Percutaneous endoscopic gastrostomy status ([address] 12/04/2014    Antiplatelet or antithrombotic long-term use 12/04/2014    Heart failure ([address] 09/11/2014    Esophageal motility disorder 07/02/2013    CAD (coronary artery disease) 02/12/2013    Status post implantation of automatic cardioverter/defibrillator (AICD) 02/12/2013         Class: Present on Admission    Aortic stenosis, mild 02/12/2013    S/P PTCA (percutaneous transluminal coronary angioplasty) 01/18/2013    Non-cardiac chest pain 01/04/2013    Feeding difficulties 05/02/2011    Abnormal cardiovascular stress test 06/24/2010    S/P CABG x 3 06/24/2010    Atherosclerotic cerebrovascular disease 06/24/2010    Orthostatic hypotension 06/24/2010    Dysphagia 05/03/2010                     Past Medical History   Diagnosis Date    Antiplatelet or antithrombotic long-term use 12/4/2014    Anxiety disorder       Arrhythmia 2009         bradycardia    Arthritis       CAD (coronary artery disease)            s/p CABG 2002; Dr Luda Best [facility]) 1996         tongue/throat cancer s/p surgery / radiation and 1 dose of chemo    Carotid artery stenosis            s/p bilateral stents    Chronic pain            left leg, lower back,     Depression       Diabetes ([facility] [address]            Type II    Esophageal dysmotility            s/p dilitation    Esophageal motility disorder 7/8/2013         Frequent simultaneous or failed contractions, low amplitude contractions suggests severe myopathy or diffuse spasm. I suspect the latter. Achalasia is not present.      GERD (gastroesophageal reflux disease)       Heart failure ([facility] [address] 10/2014         Cardiomyopathy:Pacemaker upgrade:Biv and AICD Dr. Elmer Diallo heart DrAlvaro last visit 5/11/2015    Hepatitis C Dx 1996         treated at [facility] in past; as of 4/15/15 wife states pt currently not under any treatment    Hyperlipidemia       Hypertension       Myocardial infarct [facility]) 2013         Heart Cath: 40% LV EF, Stented distal LAD, patent Graft to circumflex    On tube feeding diet approx 2009         still has as of 9/28/15 (no po food/liquid/meds at all); Dr Marilu Mai Other ill-defined conditions(799.89) 1996         1 dose of chemotherapy/radiation for tongue cancer    Other ill-defined conditions(799.89)            BPH    Other ill-defined conditions(799.89)            orthostatic hypotension    Pneumonia ~ April -May 2010    Stroke [facility]) approx 2003         left side-left finger tips numb; no imbalance or memory loss; as of 2015 not seeing neuro MD    Suicidal thoughts                  Core Measures:      CVA: YES YES  CHF:NO NO  PNA:NO NO      Post Op Surgical (If Applicable):       Activity Status:      OOB to Chair YES  Ambulated this shift: YES  Bed Rest NO      Supplemental O2: (If Applicable)      n/a          LINES AND DRAINS:      Peripheral IV 20 gauge in the left arm, saline locked       DVT prophylaxis:      DVT prophylaxis Med- YES, lovenox  DVT prophylaxis SCD or CORTES- NO       Wounds: (If Applicable)      Wounds- NO      Location -      Patient Safety:      Falls Score Total Score: 2  Safety Level__III_____  Bed Alarm On? YES  Sitter?  NO      Plan for upcoming shift: continue bolus feeds, use of call bell           Discharge Plan: YES pngoing      Active Consults:  IP CONSULT TO TELE-NEUROLOGY  IP CONSULT TO NEUROLOGY

## 2017-02-04 NOTE — DISCHARGE SUMMARY
Hospitalist Discharge Summary     Patient ID:  Jodie Mora  [id number]  80 y.o.  1935    PCP on record: Michelle Wilde MD    Admit date: 2/1/2017  Discharge date and time: 2/4/2017      DISCHARGE DIAGNOSIS:  - TIA  - CAD, s/p CABG, AICD  - DM  - HTN  - Dyslipidemia  - UTI  - H/o CVA      CONSULTATIONS:  IP CONSULT TO TELE-NEUROLOGY  IP CONSULT TO NEUROLOGY    Excerpted HPI from H&P of Cary Garcia MD:  Jodie Mora is a 80 y.o. male who with history of stroke,and dysphagia  Baseline independent, and has PEG with feeds, Does not take any po, CKD  Started with right upper extremity weakness, started yesterday  Went to orthopedist today, he was referred here To ED. Denies any chest pain or abdominal pain  He takes asa plavix and midodrine  Does have history of 95% cerebral artery stenosis with persistent dizziness ( neurology did not recommend surgical intervention Given pt medical history)   Denies any chest pain or abdominal pain   We were asked to admit for work up and evaluation of the above problems. ______________________________________________________________________  DISCHARGE SUMMARY/HOSPITAL COURSE:  for full details see H&P, daily progress notes, labs, consult notes. Patient was admitted with TIA. CT of head revealed atherosclerosis with microvascular disease and old infarcts. No acute intracranial abnormality and no change. Patient was started on ASA, and PT/OT. Patient was evaluated by neurology service. Head CTA revealed Stented bilateral cervical internal carotid arteries without gross flow-limiting stenosis. 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 Nonvisualization of the remaining cervical vertebral arteries due at least inpart to overlapping venous opacification versus occlusive changes. No definite new large vessel intracranial vascular occlusive change. Incidental right maxillary sinusitis.  Echo consistent with Left ventricle: Size was at the upper limits of normal. Systolic function  was mildly reduced. Ejection fraction was estimated in the range of 45 % to 50 %. There was moderate hypokinesis of the basal-mid inferior wall(s). Left atrium: The atrium was mildly dilated. Mitral valve: There was mild to moderate annular calcification. There was mild regurgitation. Aortic valve: Leaflets exhibited moderate calcification and moderately reduced cuspal separation. There was moderate to severe stenosis. Valve mean gradient was 26 mmHg. Patient responded well to above management, and his symptoms improved. Hospital course was without any complications. Patient will d/c home with Aggrenox in stable condition today, and will follow up with neurology in one week.        _______________________________________________________________________  Patient seen and examined by me on discharge day. Pertinent Findings:  Gen:    Not in distress  Chest: Clear lungs  CVS:   Regular rhythm. No edema  Abd:  Soft, not distended, not tender  Neuro:  Alert, oriented  _______________________________________________________________________  DISCHARGE MEDICATIONS:   Current Discharge Medication List      START taking these medications    Details   aspirin-dipyridamole (AGGRENOX)  mg per SR capsule Take 1 Cap by mouth two (2) times a day. Qty: 60 Cap, Refills: 0      levoFLOXacin (LEVAQUIN) 750 mg tablet Take 1 Tab by mouth daily. Qty: 7 Tab, Refills: 0         CONTINUE these medications which have NOT CHANGED    Details   metoprolol tartrate (LOPRESSOR) 25 mg tablet Take 12.5 mg by mouth two (2) times a day. aspirin (ASPIRIN) 325 mg tablet Take 325 mg by mouth daily. vit B Cmplx 3-FA-Vit C-Biotin (NEPHRO GRAYSON RX) 1- mg-mg-mcg tablet Take 1 Tab by mouth daily. magnesium hydroxide (TOBIAS MILK OF MAGNESIA) 400 mg/5 mL suspension Take 30 mL by mouth daily as needed for Constipation.       insulin glargine (LANTUS SOLOSTAR) 100 unit/mL (3 mL) pen 10 Units by SubCUTAneous route daily. Inject 14 units in AM  subcutenous  Qty: 15 mL, Refills: 1    Associated Diagnoses: Type II diabetes mellitus, uncontrolled ([facility] 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 ([facility] Essential hypertension with goal blood pressure less than 140/90; Peripheral polyneuropathy      folic acid (FOLVITE) 1 mg tablet Take 1 Tab by mouth daily. Qty: 30 Tab, Refills: 1      midodrine (PROAMITINE) 10 mg tablet Take 10 mg by mouth three (3) times daily (with meals). insulin syringe-needle U-100 1 mL 31 x 15/64\" syrg 1 Syringe by SubCUTAneous route four (4) times daily. Qty: 200 Each, Refills: 11    Associated Diagnoses: Type II diabetes mellitus, uncontrolled ([facility])      Insulin Needles, Disposable, (ESPERANZA PEN NEEDLE) 32 gauge x 5/32\" ndle 5 times a day  Qty: 150 Each, Refills: 99    Associated Diagnoses: Type II diabetes mellitus, uncontrolled ([facility] 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 ([facility] Essential hypertension; Peripheral neuropathy ([facility])      atorvastatin (LIPITOR) 40 mg tablet Take 40 mg by mouth daily. furosemide (LASIX) 20 mg tablet Take 1 Tab by mouth daily. Indications: HYPERCALCEMIA  Qty: 30 Tab, Refills: 2      gabapentin (NEURONTIN) 250 mg/5 mL solution Take 500 mg by mouth two (2) times a day. multivitamin (ONE A DAY) tablet Take 1 Tab by mouth daily. clopidogrel (PLAVIX) 75 mg tablet Take 75 mg by mouth daily. nitroglycerin (NITROSTAT) 0.4 mg SL tablet 0.4 mg by SubLINGual route every five (5) minutes as needed for Chest Pain. albuterol-ipratropium (DUO-NEB) 2.5 mg-0.5 mg/3 ml nebu 3 mL by Nebulization route every six (6) hours as needed. Qty: 100 Nebule, Refills: 1      HYDROcodone-acetaminophen (NORCO) 5-325 mg per tablet 1 Tab by Per G Tube route every four (4) hours as needed. Max Daily Amount: 6 Tabs.   Qty: 30 Tab, Refills: 0      ondansetron hcl (ZOFRAN, AS HYDROCHLORIDE,) 8 mg tablet Take 1 Tab by mouth every eight (8) hours as needed for Nausea. Qty: 20 Tab, Refills: 0      Nebulizer & Compressor machine 1 Each by Does Not Apply route daily. Qty: 1 Each, Refills: 0             My Recommended Diet, Activity, Wound Care, and follow-up labs are listed in the patient's Discharge Insturctions which I have personally completed and reviewed.     _______________________________________________________________________  DISPOSITION:    Home with Family: Y   Home with HH/PT/OT/RN:    SNF/LTC:    BHUPINDER:    OTHER:        Condition at Discharge:  Stable  _______________________________________________________________________  Follow up with:   PCP : Michael Leigh MD  Follow-up Information     Follow up With Details Comments [address] MD   [facility] [address]  [phone]      Michael Leigh MD In 3 days  [address]  [phone]                Total time in minutes spent coordinating this discharge (includes going over instructions, follow-up, prescriptions, and preparing report for sign off to her PCP) :  30 minutes    Signed:  Maranda Moore MD

## 2017-02-04 NOTE — DISCHARGE INSTRUCTIONS
DISCHARGE DIAGNOSIS:  TIA    MEDICATIONS:  · It is important that you take the medication exactly as they are prescribed. · Keep your medication in the bottles provided by the pharmacist and keep a list of the medication names, dosages, and times to be taken in your wallet. · Do not take other medications without consulting your doctor. Pain Management: per above medications    What to do at Home    Recommended diet:  Cardiac Diet and Diabetic Diet    Recommended activity: Activity as tolerated    If you have questions regarding the hospital related prescriptions or hospital related issues please call [address] [place] [address] at . You can always direct your questions to your primary care doctor if you are unable to reach your hospital physician; your PCP works as an extension of your hospital doctor just like your hospital doctor is an extension of your PCP for your time at the hospital [facility]).     If you experience any of the following symptoms then please call your primary care physician or return to the emergency room if you cannot get hold of your doctor:  Fever, chills, nausea, vomiting, diarrhea, change in mentation, falling, bleeding, shortness of breath,

## 2017-02-04 NOTE — PROGRESS NOTES
CM received call from pt's RN Primo buitrago needing outpatient referral for [facility]. CM has completed paperwork and has paged Dr. Zia Pack for signature on order. Paperwork completed and provided to pt and family. Explained how to get in touch with [facility]. Pt had no further questions. Family at beside - will transport. No additional needs.        [facility] [address] [place] [address]

## 2017-02-10 ENCOUNTER — OFFICE VISIT (OUTPATIENT)
Dept: ENDOCRINOLOGY | Age: 82
End: 2017-02-10

## 2017-02-10 VITALS
DIASTOLIC BLOOD PRESSURE: 75 MMHG | WEIGHT: 162.7 LBS | HEART RATE: 69 BPM | BODY MASS INDEX: 25.54 KG/M2 | HEIGHT: 67 IN | SYSTOLIC BLOOD PRESSURE: 132 MMHG

## 2017-02-10 DIAGNOSIS — I10 ESSENTIAL HYPERTENSION WITH GOAL BLOOD PRESSURE LESS THAN 130/80: ICD-10-CM

## 2017-02-10 DIAGNOSIS — E78.5 HYPERLIPIDEMIA, UNSPECIFIED HYPERLIPIDEMIA TYPE: ICD-10-CM

## 2017-02-10 DIAGNOSIS — E11.49 TYPE 2 DIABETES MELLITUS WITH OTHER DIABETIC NEUROLOGICAL COMPLICATION (HCC): Primary | ICD-10-CM

## 2017-02-10 RX ORDER — INSULIN LISPRO 100 [IU]/ML
INJECTION, SOLUTION INTRAVENOUS; SUBCUTANEOUS
COMMUNITY
Start: 2016-12-23 | End: 2017-08-22 | Stop reason: ALTCHOICE

## 2017-02-10 NOTE — PATIENT INSTRUCTIONS
1.5 cans w/ Breakfast = 9 units humalog  1 can lunch  =  7 units humalog    (increase to 9 units temporarily till glucose <150)  1 can dinner =  7 units humalog     ( increase to 9 temp. .......  1.5 can bedtime = 7 units                   ( increase to 9 temp. ....... add 2 additional units to each dose for 3 days following cortisone injections q3 months    Correction Scale:  IF GLUCOSE IS:                 THEN TAKE:      0   Extra Unit  150-175   1   Extra Unit  175-200   2   Extra Units  200-225   3   Extra Units  225-250   4   Extra Units  250-275   5   Extra Units  275-300   6   Extra Units  300-325   7   Extra Units      Lantus 14 units in the AM    ----------------------------------------------------------------------------------------------------------------------    Below you will find a glucose log sheet which you can use to record your blood sugars. Without checking and recording what your home glucose levels are, it will be difficult to make any changes to your medication dose, even when significant changes may be needed. Please feel free to use the log below to record your home glucose levels. At the very least, I would like for you to login the entire 2-3 weeks just before your visit so we can make your visit much more productive and beneficial to you. GLUCOSE LOG SHEET:    Date Breakfast Lunch Dinner Bedtime Comments ? GLUCOSE LOG SHEET:    Date Breakfast Lunch Dinner Bedtime Comments ?                                                                                                                                                                                                                                                  GLUCOSE LOG SHEET:    Date Breakfast Lunch Dinner Bedtime Comments ?

## 2017-02-10 NOTE — PROGRESS NOTES
CHIEF COMPLAINT: f/u for type 2 diabetes    HISTORY OF PRESENT ILLNESS:   Douglas Garcia is a 80 y.o. male with a PMHx as noted below who presents for f/u evaluation of type 2 diabetes. Diabetes History:  History obtained from both patient and his wife who is present:Patient presents today to establish care for his diabetes. He was previously managed by endocrinologist Dr. Tommie Almaraz, however would like to be treated closer to home. He notably had a recent stroke for which he had been admitted. Currently he ambulates with a wheelchair. He is present with his wife today for evaluation. Patients co-morbidities include multiple CVA, HTN, Coronary disease, and CHF with an AICD. He is on tube feeds as noted below. Diabetes was diagnosed in early 2015. Family History of diabetes is negative. Last A1c prior to initial visit was 6.6% on July 31st.   Regimen at time of establishing care includes   - Januvia 50mg daily, crushed   - Lantus 14U qAM   - Humalog nursing home schedule: 6U 4x/day on iso-source 1.5. Also on 2:50>150 correction sliding scale. 1.5 cans breakfast, 1 can with lunch and dinner, 1.5 cans with dinner. Reportedly 44Gm carbs/can suggesting 1:7 carb ratio per can    INTERVAL HISTORY:  Labs reviewed and are up to date. Note that patient was recently hospitalized due to new weakness and diagnosed with TIA. Recent A1c in February 2017 was 6.5%, doing well.          Regimen:   Can TF's,   1.5 cans w/ Breakfast = 9 units humalog  1 can lunch  =  7 units humalog  1 can dinner =  7 units humalog  1.5 can bedtime = 7 units  add 2 additional units to each dose for 3 days following cortisone injections q3 months  Correction Scale to 1:25>150,   Lantus 14 units in the AM    Review of home glucose:  AM    Lunch  103-250  Dinner  103-160  Bedtime 180-200       No hypoglycemia, will scan log into the chart       Over the past few days after discharge from the hospital his blood sugars have ranged 180-200. 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 I suspect the latter. Achalasia  is not present.         GERD (gastroesophageal reflux disease)     Heart failure ([facility] 10/2014      Cardiomyopathy:Pacemaker upgrade:Biv and AICD  Dr. Mar Fees heart Dr. last visit 5/11/2015    Hepatitis C Dx 1996     treated at [facility] in past; as of 4/15/15 wife states pt currently not under any treatment    Hyperlipidemia     Hypertension     Myocardial infarct [facility]) 2013     Heart Cath: 40% LV EF, Stented distal LAD, patent Graft to circumflex    On tube feeding diet approx 2009     still has as of 9/28/15  (no po food/liquid/meds at all); Dr Garrison Montague Other ill-defined conditions(799.89) 1996     1 dose of chemotherapy/radiation for tongue cancer    Other ill-defined conditions(799.89)      BPH    Other ill-defined conditions(799.89)      orthostatic hypotension    Pneumonia ~ April -May 2010    Stroke [facility]) approx 2003     left side-left finger tips numb; no imbalance or memory loss; as of 2015 not seeing neuro MD    Suicidal thoughts      Past Surgical History   Procedure Laterality Date    Hx orthopaedic       back surgery times two    Hx mohs procedure       bilateral    Hx cataract removal bilateral    Pr egd insert guide wire dilator passage esophagus  9/13/2010          Pr egd transoral biopsy single/multiple  9/13/2010          Hx other surgical       Radical Left Neck    Hx other surgical       NASAL POLYPS REMOVAL    Hx other surgical  2010     TURP    Hx cholecystectomy      Pr abdomen surgery proc unlisted  1996     peg tube    Pr neurological procedure unlisted  1996     cevical surgery    Pr change gastrostomy tube  5/2/2011          Pr change gastrostomy tube  6/29/2011          Pr stomach surgery procedure unlisted  6/29/2011          Vascular surgery procedure unlist       bilateral carotid stents    Hx pacemaker  11/08    Hx pacemaker  10/28/14      Defibrillator: [facility] # [id number], serial # [id number]; Dr. Li Saeed [phone]; Dr Rachael Resendez Hx urological       cystoscopy    Pr cabg, artery-vein, three  2000    Hx heart catheterization  2013     Stented distal LAD       ALLERGIES:   Allergies   Allergen Reactions    Demerol [Meperidine] Shortness of Breath    Paxil [Paroxetine Hcl] Unknown (comments)     Pt gets shaky and loses control of legs    Amoxicillin Rash    Pcn [Penicillins] Rash       MEDICATIONS ON ADMISSION:     Current Outpatient Prescriptions:     HUMALOG KWIKPEN 100 unit/mL kwikpen, , Disp: , Rfl:     levoFLOXacin (LEVAQUIN) 750 mg tablet, Take 1 Tab by mouth daily. , Disp: 7 Tab, Rfl: 0    metoprolol tartrate (LOPRESSOR) 25 mg tablet, Take 12.5 mg by mouth two (2) times a day., Disp: , Rfl:     nitroglycerin (NITROSTAT) 0.4 mg SL tablet, 0.4 mg by SubLINGual route every five (5) minutes as needed for Chest Pain., Disp: , Rfl:     aspirin (ASPIRIN) 325 mg tablet, Take 325 mg by mouth daily. , Disp: , Rfl:     vit B Cmplx 3-FA-Vit C-Biotin (NEPHRO GRAYSON RX) 1- mg-mg-mcg tablet, Take 1 Tab by mouth daily. , Disp: , Rfl:     magnesium hydroxide (TOBIAS MILK OF MAGNESIA) 400 mg/5 mL suspension, Take 30 mL by mouth daily as needed for Constipation. , Disp: , Rfl:     insulin glargine (LANTUS SOLOSTAR) 100 unit/mL (3 mL) pen, 10 Units by SubCUTAneous route daily. Inject 14 units in AM  subcutenous, Disp: 15 mL, Rfl: 1    albuterol-ipratropium (DUO-NEB) 2.5 mg-0.5 mg/3 ml nebu, 3 mL by Nebulization route every six (6) hours as needed. , Disp: 100 Nebule, Rfl: 1    HYDROcodone-acetaminophen (NORCO) 5-325 mg per tablet, 1 Tab by Per G Tube route every four (4) hours as needed. Max Daily Amount: 6 Tabs., Disp: 30 Tab, Rfl: 0    ondansetron hcl (ZOFRAN, AS HYDROCHLORIDE,) 8 mg tablet, Take 1 Tab by mouth every eight (8) hours as needed for Nausea., Disp: 20 Tab, Rfl: 0    Nebulizer & Compressor machine, 1 Each by Does Not Apply route daily. , Disp: 1 Each, Rfl: 0    midodrine (PROAMITINE) 10 mg tablet, Take 10 mg by mouth three (3) times daily (with meals). , Disp: , Rfl:     insulin syringe-needle U-100 1 mL 31 x 15/64\" syrg, 1 Syringe by SubCUTAneous route four (4) times daily. , Disp: 200 Each, Rfl: 11    Insulin Needles, Disposable, (ESPERANZA PEN NEEDLE) 32 gauge x 5/32\" ndle, 5 times a day, Disp: 150 Each, Rfl: 99    atorvastatin (LIPITOR) 40 mg tablet, Take 40 mg by mouth daily. , Disp: , Rfl:     furosemide (LASIX) 20 mg tablet, Take 1 Tab by mouth daily. Indications: HYPERCALCEMIA, Disp: 30 Tab, Rfl: 2    gabapentin (NEURONTIN) 250 mg/5 mL solution, Take 500 mg by mouth two (2) times a day., Disp: , Rfl:     multivitamin (ONE A DAY) tablet, Take 1 Tab by mouth daily. , Disp: , Rfl:     clopidogrel (PLAVIX) 75 mg tablet, Take 75 mg by mouth daily. , Disp: , Rfl:     aspirin-dipyridamole (AGGRENOX)  mg per SR capsule, Take 1 Cap by mouth two (2) times a day., Disp: 60 Cap, Rfl: 0    folic acid (FOLVITE) 1 mg tablet, Take 1 Tab by mouth daily. , Disp: 30 Tab, Rfl: 1    SOCIAL HISTORY:   Social History     Social History    Marital status:      Spouse name: N/A    Number of children: N/A    Years of education: N/A Occupational History    Retired       He was a stained      Social History Main Topics    Smoking status: Never Smoker    Smokeless tobacco: Never Used    Alcohol use No    Drug use: No    Sexual activity: Yes     Partners: Female     Other Topics Concern    Not on file     Social History Narrative       FAMILY HISTORY:  Family History   Problem Relation Age of Onset    Heart Disease Father       at age 52 from CAD    Colon Cancer Mother     Cancer Mother      colon ca    Heart Disease Brother        REVIEW OF SYSTEMS: Complete ROS assessed and noted for that which is described above, all else are negative. Eyes: normal  ENT: normal  CVS: normal  Resp: normal  GI: normal  : normal  GYN: normal  Endocrine: normal  Integument: normal  Musculoskeletal: normal  Neuro: mild weakness  Psych: normal      PHYSICAL EXAMINATION:    VITAL SIGNS:  Visit Vitals    /75 (BP 1 Location: Left arm, BP Patient Position: Sitting)    Pulse 69    Ht 5' 7\" (1.702 m)    Wt 162 lb 11.2 oz (73.8 kg)    BMI 25.48 kg/m2       GENERAL: NCAT, Sitting comfortably, NAD  EYES: EOMI, non-icteric, no proptosis  Ear/Nose/Throat: NCAT, no inflammation, no masses  LYMPH NODES: No LAD  CARDIOVASCULAR: S1 S2, RRR, No murmur, 2+ radial pulses  RESPIRATORY: CTA b/l, no wheeze/rales  GASTROINTESTINAL: soft, ND  MUSCULOSKELETAL: Normal ROM, no atrophy, mild 1+ edema in lower extremities  SKIN: warm, no edema/rash/ or other skin changes  NEUROLOGIC: 4/5 power all extremities,  AAOx3  PSYCHIATRIC: Normal affect, Normal insight and judgement      REVIEW OF LABORATORY AND RADIOLOGY DATA:   Labs and documentation have been reviewed as described above. ASSESSMENT AND PLAN:   Fausto Servin is a 80 y.o. male with a PMHx as noted below who presents for f/u evaluation of uncontrolled type 2 diabetes. Type 2 diabetes, new hyperglycemia likely related to admission/infection.   Hyperlipidemia  Hypertension    Today we discussed his current TF regimen and how to adjust his insulin accordingly to account for any fluctuations. We also discussed again the use of the correction scale as needed for any highs. Discussed the role of diabetic control in the setting of recent TIA and history of stroke. He is not using his aggrenox because he takes most meds crushed, and this is a capsule which is time released. They restarted the aspirin and plavix until they speak with the neurologist next week. I advised that they call his clinic to discuss that rather than wait till next week. Plan: Type 2 Diabetes  Medications:  HumaloU breakfast, 7U with lunch/dinner/bedtime cans    add 2 additional units to each dose for 3 days following cortisone injections q3 months (and can do this now for his current elevations, + correction scale)  Correction Scale to 1:25>150,   Continue 14U Lantus qAM  Off Januvia  Provided glucose logs for continued data review    Labs: all are up to date    HTN: Stable today, no changes in treatment  HLD: stable on statin , lipitor 40mg daily    RTC: I would like to see them back in 3 months    >25 minutes spent together with patient today of which >50% of this time was spent in counseling and coordination of care. Mena Pierre.  [address] [facility]

## 2017-02-10 NOTE — MR AVS SNAPSHOT
Visit Information Date & Time Provider Department Dept. Phone Encounter #  
 2/10/2017  9:30 AM Gregorio Lewis, [address] [facility] [phone] [id number] Follow-up Instructions Return in about 3 months (around 5/10/2017). Your Appointments 2/16/2017 10:00 AM  
Follow Up with Efren Bennett NP Neurology [facility] at [facility] [address]) Appt Note: f/u stroke. Shilpa William [address], 
[address], [address] [address]  
[address], [address], [address] [address] Upcoming Health Maintenance Date Due ZOSTER VACCINE AGE 60> 3/31/1995 MEDICARE YEARLY EXAM 3/31/2000 EYE EXAM RETINAL OR DILATED Q1 2/10/2017 HEMOGLOBIN A1C Q6M 8/2/2017 FOOT EXAM Q1 8/11/2017 MICROALBUMIN Q1 8/11/2017 LIPID PANEL Q1 2/2/2018 GLAUCOMA SCREENING Q2Y 2/10/2018 DTaP/Tdap/Td series (2 - Td) 9/9/2025 Allergies as of 2/10/2017  Review Complete On: 2/10/2017 By: Gregorio Lewis MD  
  
 Severity Noted Reaction Type Reactions Demerol [Meperidine] High 04/12/2010   Systemic Shortness of Breath Paxil [Paroxetine Hcl] High 04/12/2010   Systemic Unknown (comments) Pt gets shaky and loses control of legs Amoxicillin  01/18/2013    Rash Pcn [Penicillins]  04/12/2010    Rash Current Immunizations  Reviewed on 2/11/2013 Name Date Influenza Vaccine 10/1/2016, 10/1/2014 Influenza Vaccine (Quad) PF 10/7/2015 11:20 AM  
 Influenza Vaccine Split 9/24/2012 Influenza Vaccine Whole 12/3/2009 Pneumococcal Polysaccharide (PPSV-23) 10/7/2015 11:22 AM  
 Pneumococcal Vaccine (Unspecified Type) 12/3/2007 TD Vaccine 12/1/2011  6:03 PM  
 Tdap 9/9/2015  7:38 AM  
  
 Not reviewed this visit You Were Diagnosed With   
  
 Codes Comments  Type 2 diabetes mellitus with other diabetic neurological complication (HCC)    -  Primary ICD-10-CM: E11.49 
 ICD-9-CM: 250.60 Hyperlipidemia, unspecified hyperlipidemia type     ICD-10-CM: E78.5 ICD-9-CM: 272.4 Essential hypertension with goal blood pressure less than 130/80     ICD-10-CM: I10 
ICD-9-CM: 401.9 Vitals BP Pulse Height(growth percentile) Weight(growth percentile) BMI Smoking Status 132/75 (BP 1 Location: Left arm, BP Patient Position: Sitting) 69 5' 7\" (1.702 m) 162 lb 11.2 oz (73.8 kg) 25.48 kg/m2 Never Smoker BMI and BSA Data Body Mass Index Body Surface Area  
 25.48 kg/m 2 1.87 m 2 Preferred Pharmacy Pharmacy Name Phone [facility] [address], [address] [place] [address] Avenue [phone] Your Updated Medication List  
  
   
This list is accurate as of: 2/10/17  9:53 AM.  Always use your most recent med list.  
  
  
  
  
 albuterol-ipratropium 2.5 mg-0.5 mg/3 ml Nebu Commonly known as:  DUO-NEB  
3 mL by Nebulization route every six (6) hours as needed. aspirin 325 mg tablet Commonly known as:  ASPIRIN Take 325 mg by mouth daily. aspirin-dipyridamole  mg per SR capsule Commonly known as:  AGGRENOX Take 1 Cap by mouth two (2) times a day. atorvastatin 40 mg tablet Commonly known as:  LIPITOR Take 40 mg by mouth daily. folic acid 1 mg tablet Commonly known as:  Google Take 1 Tab by mouth daily. furosemide 20 mg tablet Commonly known as:  LASIX Take 1 Tab by mouth daily. Indications: HYPERCALCEMIA  
  
 gabapentin 250 mg/5 mL solution Commonly known as:  NEURONTIN Take 500 mg by mouth two (2) times a day. HumaLOG KwikPen 100 unit/mL kwikpen Generic drug:  insulin lispro HYDROcodone-acetaminophen 5-325 mg per tablet Commonly known as:  Benetta Pock  
1 Tab by Per G Tube route every four (4) hours as needed. Max Daily Amount: 6 Tabs. insulin glargine 100 unit/mL (3 mL) pen Commonly known as:  LANTUS SOLOSTAR  
 10 Units by SubCUTAneous route daily. Inject 14 units in AM  subcutenous Insulin Needles (Disposable) 32 gauge x 5/32\" Ndle Commonly known as:  Elise Pen Needle 5 times a day  
  
 insulin syringe-needle U-100 1 mL 31 gauge x 15/64\" Syrg 1 Syringe by SubCUTAneous route four (4) times daily. levoFLOXacin 750 mg tablet Commonly known as:  Duaine Hurter Take 1 Tab by mouth daily. metoprolol tartrate 25 mg tablet Commonly known as:  LOPRESSOR Take 12.5 mg by mouth two (2) times a day. midodrine 10 mg tablet Commonly known as:  Noel Cobia Take 10 mg by mouth three (3) times daily (with meals). multivitamin tablet Commonly known as:  ONE A DAY Take 1 Tab by mouth daily. Nebulizer & Compressor machine 1 Each by Does Not Apply route daily. nitroglycerin 0.4 mg SL tablet Commonly known as:  NITROSTAT  
0.4 mg by SubLINGual route every five (5) minutes as needed for Chest Pain. ondansetron hcl 8 mg tablet Commonly known as:  ZOFRAN (AS HYDROCHLORIDE) Take 1 Tab by mouth every eight (8) hours as needed for Nausea. TOBIAS MILK OF MAGNESIA 400 mg/5 mL suspension Generic drug:  magnesium hydroxide Take 30 mL by mouth daily as needed for Constipation. PLAVIX 75 mg Tab Generic drug:  clopidogrel Take 75 mg by mouth daily. vit B Cmplx 3-FA-Vit C-Biotin 1- mg-mg-mcg tablet Commonly known as:  NEPHRO GRAYSON RX Take 1 Tab by mouth daily. Follow-up Instructions Return in about 3 months (around 5/10/2017). Patient Instructions 1.5 cans w/ Breakfast = 9 units humalog 1 can lunch  =  7 units humalog    (increase to 9 units temporarily till glucose <150) 1 can dinner =  7 units humalog     ( increase to 9 temp. ....... 
1.5 can bedtime = 7 units                   ( increase to 9 temp. ....... add 2 additional units to each dose for 3 days following cortisone injections q3 months Correction Scale: IF GLUCOSE IS:                 THEN TAKE: 
    0   Extra Unit 
150-175   1   Extra Unit 
175-200   2   Extra Units 200-225   3   Extra Units 225-250   4   Extra Units 250-275   5   Extra Units 275-300   6   Extra Units 300-325   7   Extra Units Lantus 14 units in the AM 
 
---------------------------------------------------------------------------------------------------------------------- Below you will find a glucose log sheet which you can use to record your blood sugars. Without checking and recording what your home glucose levels are, it will be difficult to make any changes to your medication dose, even when significant changes may be needed. Please feel free to use the log below to record your home glucose levels. At the very least, I would like for you to login the entire 2-3 weeks just before your visit so we can make your visit much more productive and beneficial to you. GLUCOSE LOG SHEET: 
 
Date Breakfast Lunch Dinner Bedtime Comments ? GLUCOSE LOG SHEET: 
 
Date Breakfast Lunch Dinner Bedtime Comments ? GLUCOSE LOG SHEET: 
 
Date Breakfast Lunch Dinner Bedtime Comments ? Introducing [facility]!    
 Dakota Dave introduces [employer] patient portal. Now you can access parts of your medical record, email your doctor's office, and request medication refills online. 1. In your internet browser, go to [email or web address] 2. Click on the First Time User? Click Here link in the Sign In box. You will see the New Member Sign Up page. 3. Enter your Trooval Access Code exactly as it appears below. You will not need to use this code after youve completed the sign-up process. If you do not sign up before the expiration date, you must request a new code. · Trooval Access Code: [email or web address] Expires: 5/11/2017  9:53 AM 
 
4. Enter the last four digits of your Social Security Number (xxxx) and Date of Birth (mm/dd/yyyy) as indicated and click Submit. You will be taken to the next sign-up page. 5. Create a Trooval ID. This will be your Trooval login ID and cannot be changed, so think of one that is secure and easy to remember. 6. Create a Trooval password. You can change your password at any time. 7. Enter your Password Reset Question and Answer. This can be used at a later time if you forget your password. 8. Enter your e-mail address. You will receive e-mail notification when new information is available in [address] 9. Click Sign Up. You can now view and download portions of your medical record. 10. Click the Download Summary menu link to download a portable copy of your medical information. If you have questions, please visit the Frequently Asked Questions section of the Trooval website. Remember, Trooval is NOT to be used for urgent needs. For medical emergencies, dial 911. Now available from your iPhone and Android! Please provide this summary of care documentation to your next provider. Your primary care clinician is listed as Arden Dumont. If you have any questions after today's visit, please call [phone].

## 2017-02-16 ENCOUNTER — OFFICE VISIT (OUTPATIENT)
Dept: NEUROLOGY | Age: 82
End: 2017-02-16

## 2017-02-16 VITALS
WEIGHT: 165 LBS | SYSTOLIC BLOOD PRESSURE: 105 MMHG | OXYGEN SATURATION: 96 % | BODY MASS INDEX: 25.9 KG/M2 | HEIGHT: 67 IN | DIASTOLIC BLOOD PRESSURE: 42 MMHG | HEART RATE: 67 BPM

## 2017-02-16 DIAGNOSIS — I67.2 ATHEROSCLEROTIC CEREBROVASCULAR DISEASE: Primary | ICD-10-CM

## 2017-02-16 DIAGNOSIS — I69.351 HEMIPLEGIA AND HEMIPARESIS FOLLOWING CEREBRAL INFARCTION AFFECTING RIGHT DOMINANT SIDE (HCC): ICD-10-CM

## 2017-02-16 DIAGNOSIS — I63.312 THROMBOTIC STROKE INVOLVING LEFT MIDDLE CEREBRAL ARTERY (HCC): ICD-10-CM

## 2017-02-16 DIAGNOSIS — E11.42 TYPE 2 DIABETES MELLITUS WITH DIABETIC NEUROPATHY AFFECTING BOTH SIDES OF BODY (HCC): ICD-10-CM

## 2017-02-16 NOTE — MR AVS SNAPSHOT
Visit Information Date & Time Provider Department Dept. Phone Encounter #  
 2/16/2017 10:00 AM Agustin Gary NP Neurology Clinic at [facility] [phone] [id number] Follow-up Instructions Return in about 6 months (around 8/16/2017). Your Appointments 5/10/2017  9:30 AM  
Follow Up with MD Pravin Cruzmond Diabetes and Endocrinology [address] [facility]) Appt Note: 3 month f/u Diabetes; 3 month f/u  
 [address] [facility] [address] [id number] [address] [facility] Upcoming Health Maintenance Date Due ZOSTER VACCINE AGE 60> 3/31/1995 MEDICARE YEARLY EXAM 3/31/2000 EYE EXAM RETINAL OR DILATED Q1 2/10/2017 HEMOGLOBIN A1C Q6M 8/2/2017 FOOT EXAM Q1 8/11/2017 MICROALBUMIN Q1 8/11/2017 LIPID PANEL Q1 2/2/2018 GLAUCOMA SCREENING Q2Y 2/10/2018 DTaP/Tdap/Td series (2 - Td) 9/9/2025 Allergies as of 2/16/2017  Review Complete On: 2/16/2017 By: Jess Marks Severity Noted Reaction Type Reactions Demerol [Meperidine] High 04/12/2010   Systemic Shortness of Breath Paxil [Paroxetine Hcl] High 04/12/2010   Systemic Unknown (comments) Pt gets shaky and loses control of legs Amoxicillin  01/18/2013    Rash Pcn [Penicillins]  04/12/2010    Rash Current Immunizations  Reviewed on 2/11/2013 Name Date Influenza Vaccine 10/1/2016, 10/1/2014 Influenza Vaccine (Quad) PF 10/7/2015 11:20 AM  
 Influenza Vaccine Split 9/24/2012 Influenza Vaccine Whole 12/3/2009 Pneumococcal Polysaccharide (PPSV-23) 10/7/2015 11:22 AM  
 Pneumococcal Vaccine (Unspecified Type) 12/3/2007 TD Vaccine 12/1/2011  6:03 PM  
 Tdap 9/9/2015  7:38 AM  
  
 Not reviewed this visit You Were Diagnosed With   
  
 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  
 105/42 67 5' 7\" (1.702 m) 165 lb (74.8 kg) 96% 25.84 kg/m2 Smoking Status Never Smoker Vitals History BMI and BSA Data Body Mass Index Body Surface Area  
 25.84 kg/m 2 1.88 m 2 Preferred Pharmacy Pharmacy Name Phone [facility] [address] [place] [address] [phone] Your Updated Medication List  
  
   
This list is accurate as of: 2/16/17 10:44 AM.  Always use your most recent med list.  
  
  
  
  
 albuterol-ipratropium 2.5 mg-0.5 mg/3 ml Nebu Commonly known as:  DUO-NEB  
3 mL by Nebulization route every six (6) hours as needed. aspirin 325 mg tablet Commonly known as:  ASPIRIN Take 325 mg by mouth daily. aspirin-dipyridamole  mg per SR capsule Commonly known as:  AGGRENOX Take 1 Cap by mouth two (2) times a day. atorvastatin 40 mg tablet Commonly known as:  LIPITOR Take 40 mg by mouth daily. folic acid 1 mg tablet Commonly known as:  Google Take 1 Tab by mouth daily. furosemide 20 mg tablet Commonly known as:  LASIX Take 1 Tab by mouth daily. Indications: HYPERCALCEMIA  
  
 gabapentin 250 mg/5 mL solution Commonly known as:  NEURONTIN Take 500 mg by mouth two (2) times a day. HumaLOG KwikPen 100 unit/mL kwikpen Generic drug:  insulin lispro HYDROcodone-acetaminophen 5-325 mg per tablet Commonly known as:  Lou Punt  
1 Tab by Per G Tube route every four (4) hours as needed. Max Daily Amount: 6 Tabs. insulin glargine 100 unit/mL (3 mL) pen Commonly known as:  LANTUS SOLOSTAR  
10 Units by SubCUTAneous route daily. Inject 14 units in AM  subcutenous Insulin Needles (Disposable) 32 gauge x 5/32\" Ndle Commonly known as:  Elise Pen Needle 5 times a day  
  
 insulin syringe-needle U-100 1 mL 31 gauge x 15/64\" Syrg 1 Syringe by SubCUTAneous route four (4) times daily. levoFLOXacin 750 mg tablet Commonly known as:  Ricardo Randolph Take 1 Tab by mouth daily. metoprolol tartrate 25 mg tablet Commonly known as:  LOPRESSOR Take 12.5 mg by mouth two (2) times a day. midodrine 10 mg tablet Commonly known as:  Greg Mussel Take 10 mg by mouth three (3) times daily (with meals). multivitamin tablet Commonly known as:  ONE A DAY Take 1 Tab by mouth daily. Nebulizer & Compressor machine 1 Each by Does Not Apply route daily. nitroglycerin 0.4 mg SL tablet Commonly known as:  NITROSTAT  
0.4 mg by SubLINGual route every five (5) minutes as needed for Chest Pain. ondansetron hcl 8 mg tablet Commonly known as:  ZOFRAN (AS HYDROCHLORIDE) Take 1 Tab by mouth every eight (8) hours as needed for Nausea. TOBIAS MILK OF MAGNESIA 400 mg/5 mL suspension Generic drug:  magnesium hydroxide Take 30 mL by mouth daily as needed for Constipation. PLAVIX 75 mg Tab Generic drug:  clopidogrel Take 75 mg by mouth daily. vit B Cmplx 3-FA-Vit C-Biotin 1- mg-mg-mcg tablet Commonly known as:  NEPHRO GRAYSON RX Take 1 Tab by mouth daily. Follow-up Instructions Return in about 6 months (around 8/16/2017). Patient Instructions A Healthy Lifestyle: Care Instructions Your Care Instructions A healthy lifestyle can help you feel good, stay at a healthy weight, and have plenty of energy for both work and play. A healthy lifestyle is something you can share with your whole family. A healthy lifestyle also can lower your risk for serious health problems, such as high blood pressure, heart disease, and diabetes. You can follow a few steps listed below to improve your health and the health of your family. Follow-up care is a key part of your treatment and safety. Be sure to make and go to all appointments, and call your doctor if you are having problems. Its also a good idea to know your test results and keep a list of the medicines you take. How can you care for yourself at home? · Do not eat too much sugar, fat, or fast foods. You can still have dessert and treats now and then. The goal is moderation. · Start small to improve your eating habits. Pay attention to portion sizes, drink less juice and soda pop, and eat more fruits and vegetables. ¨ Eat a healthy amount of food. A 3-ounce serving of meat, for example, is about the size of a deck of cards. Fill the rest of your plate with vegetables and whole grains. ¨ Limit the amount of soda and sports drinks you have every day. Drink more water when you are thirsty. ¨ Eat at least 5 servings of fruits and vegetables every day. It may seem like a lot, but it is not hard to reach this goal. A serving or helping is 1 piece of fruit, 1 cup of vegetables, or 2 cups of leafy, raw vegetables. Have an apple or some carrot sticks as an afternoon snack instead of a candy bar. Try to have fruits and/or vegetables at every meal. 
· Make exercise part of your daily routine. You may want to start with simple activities, such as walking, bicycling, or slow swimming. Try to be active 30 to 60 minutes every day. You do not need to do all 30 to 60 minutes all at once.  For example, you can exercise 3 times a day for 10 or 20 minutes. Moderate exercise is safe for most people, but it is always a good idea to talk to your doctor before starting an exercise program. 
· Keep moving. Tad Luis the lawn, work in the garden, or [employer]. Take the stairs instead of the elevator at work. · If you smoke, quit. People who smoke have an increased risk for heart attack, stroke, cancer, and other lung illnesses. Quitting is hard, but there are ways to boost your chance of quitting tobacco for good. ¨ Use nicotine gum, patches, or lozenges. ¨ Ask your doctor about stop-smoking programs and medicines. ¨ Keep trying. In addition to reducing your risk of diseases in the future, you will notice some benefits soon after you stop using tobacco. If you have shortness of breath or asthma symptoms, they will likely get better within a few weeks after you quit. · Limit how much alcohol you drink. Moderate amounts of alcohol (up to 2 drinks a day for men, 1 drink a day for women) are okay. But drinking too much can lead to liver problems, high blood pressure, and other health problems. Family health If you have a family, there are many things you can do together to improve your health. · Eat meals together as a family as often as possible. · Eat healthy foods. This includes fruits, vegetables, lean meats and dairy, and whole grains. · Include your family in your fitness plan. Most people think of activities such as jogging or tennis as the way to fitness, but there are many ways you and your family can be more active. Anything that makes you breathe hard and gets your heart pumping is exercise. Here are some tips: 
¨ Walk to do errands or to take your child to school or the bus. ¨ Go for a family bike ride after dinner instead of watching TV. Where can you learn more? Go to http://aniket-tomas.info/. Enter O239 in the search box to learn more about \"A Healthy Lifestyle: Care Instructions. \" Current as of: July 26, 2016 Content Version: 11.1 © 0937-1850 [employer]. Care instructions adapted under license by [employer] (which disclaims liability or warranty for this information). If you have questions about a medical condition or this instruction, always ask your healthcare professional. [address] any warranty or liability for your use of this information. Introducing [facility]! [facility] introduces EnStorage patient portal. Now you can access parts of your medical record, email your doctor's office, and request medication refills online. 1. In your internet browser, go to [email or web address] Edition 2. Click on the First Time User? Click Here link in the Sign In box. You will see the New Member Sign Up page. 3. Enter your EnStorage Access Code exactly as it appears below. You will not need to use this code after youve completed the sign-up process. If you do not sign up before the expiration date, you must request a new code. · EnStorage Access Code: [email or web address] Expires: 5/11/2017  9:53 AM 
 
4. Enter the last four digits of your Social Security Number (xxxx) and Date of Birth (mm/dd/yyyy) as indicated and click Submit. You will be taken to the next sign-up page. 5. Create a EnStorage ID. This will be your EnStorage login ID and cannot be changed, so think of one that is secure and easy to remember. 6. Create a EnStorage password. You can change your password at any time. 7. Enter your Password Reset Question and Answer. This can be used at a later time if you forget your password. 8. Enter your e-mail address. You will receive e-mail notification when new information is available in [address] 9. Click Sign Up. You can now view and download portions of your medical record. 10. Click the Download Summary menu link to download a portable copy of your medical information. If you have questions, please visit the Frequently Asked Questions section of the [employer] website. Remember, [employer] is NOT to be used for urgent needs. For medical emergencies, dial 911. Now available from your iPhone and Android! Please provide this summary of care documentation to your next provider. Your primary care clinician is listed as Travon Bates. If you have any questions after today's visit, please call [phone].

## 2017-02-16 NOTE — Clinical Note
Can you please call this patient's wife and let her know that Dr. Sherie Lozoya said that it is okay for him to keep taking Plavix and aspirin instead of Aggrenox?   Thank you, Nathaniel Bean

## 2017-02-16 NOTE — PATIENT INSTRUCTIONS

## 2017-02-16 NOTE — PROGRESS NOTES
Date:           2017    Name:  Good Navarro  :  1935  MRN:  [id number]     PCP:  Sol Archuleta., MD    Chief Complaint   Patient presents with   [facility] Follow Up    Extremity Weakness     right arm weakness, has gotten better since the hospital visit         HISTORY OF PRESENT ILLNESS:  Kassandra Hall is a 80 y.o., male who presents today for follow up for CVA that presented with right arm weakness. He was admitted for this on , weakness has not improved completely. Patient has a defibrillator implanted, so he could not get an MRI but if that was presumed to be related to CVA. He now has full ROM in his right arm but is still bothered by persistent weakness. He has more visits with PT scheduled, is frustrated that he is mostly working on coordination and not during his strength back up. He has diabetes, is on insulin. His wife is checking his BP every 4 hours and it has been a little on the high side, but he continues to work with his PCP for management. 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 Has been told in the past by his cardiologist that he is not cleared from their standpoint for any surgery because he is at high risk. His current cardiologist does not want him off his anti-coagulation for any reason, not even for a colonoscopy. Patient was discharged on Aggrenox, capsule was prescribed and the patient cannot crush that and put it in his peg tube. Right now he is taking plavix and aspirin, which is what he has been on since his bypass. Patient is dizzy all the time, this is not new and is related to cerebral artery disease. TTE showed aortic calcification and moderate to severe stenosis, cardiologist does not think that he is a candidate for surgery.     2.2. TTE  SUMMARY:  Left ventricle: Size was at the upper limits of normal. Systolic function  was mildly reduced. Ejection fraction was estimated in the range of 45 %  to 50 %. There was moderate hypokinesis of the basal-mid inferior wall(s). Left atrium: The atrium was mildly dilated. Mitral valve: There was mild to moderate annular calcification. There was  mild regurgitation. Aortic valve: Leaflets exhibited moderate calcification and moderately  reduced cuspal separation. There was moderate to severe stenosis. Valve  mean gradient was 26 mmHg.      2.2.2017 CTA head  Stented bilateral cervical internal carotid arteries without gross flow-limiting  stenosis.     Severe vertebrobasilar disease with distal vertebral arterial and basilar  arterial stenoses.     Nonvisualization of the remaining cervical vertebral arteries due at least in  part to overlapping venous opacification versus occlusive changes.     No definite new large vessel intracranial vascular occlusive change.     Incidental right maxillary sinusitis.      2.2.2017 carotid dopplers     1. Bilateral <50% stenosis of the internal carotid arteries. 2. No significant stenosis in the external carotid arteries  bilaterally. 3. Antegrade flow in both vertebral arteries. 4. Normal flow in both subclavian arteries. Plaque Morphology:  1. Heterogeneous plaque in the bulb and right ICA. 2. Heterogeneous plaque in the bulb and left ICA.    2/2/2017  6:53 AM - [facility]   Component Results   Component Value Flag Ref Range Units Status   LIPID PROFILE         Final   Cholesterol, total 129  <200 MG/DL Final   Triglyceride 127  <150 MG/DL Final   Comment:   Based on NCEP-ATP III:  Triglycerides <150 mg/dL  is considered normal, 150-199 mg/dL  borderline high,  200-499 mg/dL high and  greater than or equal to 500 mg/dL very high. HDL Cholesterol 36   MG/DL Final   Comment:   Based on NCEP ATP III, HDL Cholesterol <40 mg/dL is considered low and >60 mg/dL is elevated.    LDL, calculated 67.6  0 - 100 MG/DL Final   Comment:   Based on the NCEP-ATP: LDL-C concentrations are considered  optimal <100 mg/dL,   near optimal/above Normal 100-129 mg/dL   Borderline High: 130-159, High: 160-189 mg/dL   Very High: Greater than or equal to 190 mg/dL      VLDL, calculated 25.4   MG/DL Final   CHOL/HDL Ratio 3.6  0 - 5.0   Final     2/2/2017  7:02 AM - [facility]   Component Results   Component Value Flag Ref Range Units Status   Hemoglobin A1c 6.5 (H) 4.2 - 6.3 % Final   Est. average glucose 140   mg/dL Final          Current Outpatient Prescriptions   Medication Sig    HUMALOG KWIKPEN 100 unit/mL kwikpen     aspirin-dipyridamole (AGGRENOX)  mg per SR capsule Take 1 Cap by mouth two (2) times a day.  metoprolol tartrate (LOPRESSOR) 25 mg tablet Take 12.5 mg by mouth two (2) times a day.  nitroglycerin (NITROSTAT) 0.4 mg SL tablet 0.4 mg by SubLINGual route every five (5) minutes as needed for Chest Pain.  aspirin (ASPIRIN) 325 mg tablet Take 325 mg by mouth daily.  vit B Cmplx 3-FA-Vit C-Biotin (NEPHRO GRAYSON RX) 1- mg-mg-mcg tablet Take 1 Tab by mouth daily.  magnesium hydroxide (TOBIAS MILK OF MAGNESIA) 400 mg/5 mL suspension Take 30 mL by mouth daily as needed for Constipation.  insulin glargine (LANTUS SOLOSTAR) 100 unit/mL (3 mL) pen 10 Units by SubCUTAneous route daily. Inject 14 units in AM  subcutenous    albuterol-ipratropium (DUO-NEB) 2.5 mg-0.5 mg/3 ml nebu 3 mL by Nebulization route every six (6) hours as needed.  HYDROcodone-acetaminophen (NORCO) 5-325 mg per tablet 1 Tab by Per G Tube route every four (4) hours as needed. Max Daily Amount: 6 Tabs.  ondansetron hcl (ZOFRAN, AS HYDROCHLORIDE,) 8 mg tablet Take 1 Tab by mouth every eight (8) hours as needed for Nausea.  Nebulizer & Compressor machine 1 Each by Does Not Apply route daily.  midodrine (PROAMITINE) 10 mg tablet Take 10 mg by mouth three (3) times daily (with meals).     insulin syringe-needle U-100 1 mL 31 x 15/64\" syrg 1 Syringe by SubCUTAneous route four (4) times daily.  Insulin Needles, Disposable, (ESPERANZA PEN NEEDLE) 32 gauge x 5/32\" ndle 5 times a day    atorvastatin (LIPITOR) 40 mg tablet Take 40 mg by mouth daily.  furosemide (LASIX) 20 mg tablet Take 1 Tab by mouth daily. Indications: HYPERCALCEMIA    gabapentin (NEURONTIN) 250 mg/5 mL solution Take 500 mg by mouth two (2) times a day.  multivitamin (ONE A DAY) tablet Take 1 Tab by mouth daily.  clopidogrel (PLAVIX) 75 mg tablet Take 75 mg by mouth daily. No current facility-administered medications for this visit. 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 I suspect the latter. Achalasia  is not present.         GERD (gastroesophageal reflux disease)     Heart failure ([facility] [address] 10/2014      Cardiomyopathy:Pacemaker upgrade:Biv and AICD  Dr. Isabela Hoffmann heart  last visit 5/11/2015    Hepatitis C Dx 1996     treated at [facility] in past; as of 4/15/15 wife states pt currently not under any treatment    Hyperlipidemia     Hypertension     Myocardial infarct ([facility] [address] 2013     Heart Cath: 40% LV EF, Stented distal LAD, patent Graft to circumflex    On tube feeding diet approx 2009     still has as of 9/28/15  (no po food/liquid/meds at all); Dr Antony Seip Other ill-defined conditions(799.89) 1996     1 dose of chemotherapy/radiation for tongue cancer    Other ill-defined conditions(799.89)      BPH    Other ill-defined conditions(799.89)      orthostatic hypotension    Pneumonia ~ April -May 2010    Stroke [facility]) approx 2003     left side-left finger tips numb; no imbalance or memory loss; as of 2015 not seeing neuro MD    Suicidal thoughts      Past Surgical History   Procedure Laterality Date    Hx orthopaedic       back surgery times two    Hx mohs procedure       bilateral    Hx cataract removal       bilateral    Pr egd insert guide wire dilator passage esophagus  9/13/2010          Pr egd transoral biopsy single/multiple  9/13/2010          Hx other surgical       Radical Left Neck    Hx other surgical       NASAL POLYPS REMOVAL    Hx other surgical  2010     TURP    Hx cholecystectomy      Pr abdomen surgery proc unlisted  1996     peg tube    Pr neurological procedure unlisted  1996     cevical surgery    Pr change gastrostomy tube  5/2/2011          Pr change gastrostomy tube  6/29/2011          Pr stomach surgery procedure unlisted  6/29/2011          Vascular surgery procedure unlist       bilateral carotid stents    Hx pacemaker  11/08    Hx pacemaker  10/28/14      Defibrillator: [facility] # [id number], serial # [id number]; Dr. Constantine Bean [phone]; Dr Cesar Greene Hx urological       cystoscopy    Pr cabg, artery-vein, three  2000    Hx heart catheterization  2013     Stented distal LAD     Social History     Social History    Marital status:      Spouse name: N/A    Number of children: N/A    Years of education: N/A     Occupational History    Retired       He was a stained      Social History Main Topics    Smoking status: Never Smoker    Smokeless tobacco: Never Used    Alcohol use No    Drug use: No    Sexual activity: Yes     Partners: Female     Other Topics Concern    Not on file     Social History Narrative     Family History   Problem Relation Age of Onset    Heart Disease Father       at age 52 from CAD    Colon Cancer Mother     Cancer Mother      colon ca    Heart Disease Brother          PHYSICAL EXAMINATION:    Visit Vitals    /42    Pulse 67    Ht 5' 7\" (1.702 m)    Wt 165 lb (74.8 kg)    SpO2 96%    BMI 25.84 kg/m2     General:  Well defined, nourished, and groomed individual in no acute distress. Neck: Supple, nontender, no bruits, no pain with resistance to active range of motion. Heart: Regular rate and rhythm, +TARA with carotid radiation, rub, or gallop. Normal S1S2. Lungs:  Clear to auscultation bilaterally with equal chest expansion, no cough, no wheeze  Musculoskeletal:  Extremities revealed no edema and had full range of motion of joints. Psych:  Good mood and bright affect    NEUROLOGICAL EXAMINATION:     Mental Status:   Alert and oriented to person, place, and time with recent and remote memory intact. Attention span and concentration are normal. Speech is fluent with a full fund of knowledge. Cranial Nerves:    II, III, IV, VI:  Visual acuity grossly intact. Pupils are equal, round, and reactive to light. Extra-ocular movements are full and fluid. No ptosis or nystagmus. V-XII: Hearing is grossly intact. Facial features are symmetric, with normal sensation and strength. The palate rises symmetrically and the tongue protrudes midline. Sternocleidomastoids 5/5. Motor Examination: Normal tone, bulk, and strength, 5/5 muscle LUE, LLE, 4/5 RUE, RLE. Coordination:  Finger to nose testing was normal.   No resting or intention tremor  Gait and Station:  Steady while walking, slow to rise and has a mild right hemiplegic gait.   Normal arm swing.  No pronator drift. No muscle wasting or fasciculations noted. ASSESSMENT AND PLAN    ICD-10-CM ICD-9-CM    1. Atherosclerotic cerebrovascular disease I67.2 437.0    2. 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 Thrombotic stroke involving left middle cerebral artery ([facility] [address] I63.312 434.01    4. Basilar artery stenosis with cerebral infarction within last 8 weeks ([facility]) I63.9 433.01    5. Hemiplegia and hemiparesis following cerebral infarction affecting right dominant side [facility]) I69.351 80.21      49-year-old male seen in follow-up for stroke. He was admitted on the very second after developing a right sided weakness. This has improved, although he still has a right hemiplegic gait in his right arm is weaker than he would like it to be. Has full range of motion back. Could not get an MRI due to pacemaker, CTA showed significant stenosis. Patient has not been deemed to be a surgical candidate while inpatient, or in the past by his cardiologist.  He also has significant dizziness from that stenosis, this is not new and is not worsening. He does not fall. He was discharged on Aggrenox, this medication cannot be crushed of the patient cannot take it because he is a PEG tube. 1. LDL 67.6 at admission, within goal of <70 for secondary stroke prevention. On 40 mg atorvastatin, continue to monitor with PCP  2. Hgb A1C 6.5, discussed that this is within goal for her known diabetic and should be watched and treated as needed as DM is a stroke risk, encouraged exercise and dietary management at this time and follow up with PCP  3. Continue close monitoring of HTN with PCP, patient's wife checks his every 4 hours at home  4. Continue aspirin 81 mg  and Plavix 75 mg, this combination was approved by Dr. Pranav Jung.   Patient declined Coumadin while in the hospital.  5. Discussed BE-FAST s/s of stroke and the need to call 911 immediately at the onset of symptoms for rapid ER evaluation  6.   Follow with cardiology for management of stenosis, anticoagulation    25+ minutes, >50% discussing above/answering questions/formulating plan    RTC 6 months, can call sooner with concerns      Alejo Valadez NP

## 2017-03-06 ENCOUNTER — HOSPITAL ENCOUNTER (OUTPATIENT)
Dept: GENERAL RADIOLOGY | Age: 82
Discharge: HOME | End: 2017-03-06
Attending: SPECIALIST
Payer: MEDICARE

## 2017-03-06 DIAGNOSIS — R13.10 DYSPHAGIA: ICD-10-CM

## 2017-03-06 DIAGNOSIS — Z80.9 FAMILY HISTORY OF UNSPECIFIED MALIGNANT NEOPLASM: ICD-10-CM

## 2017-03-06 DIAGNOSIS — R63.4 ABNORMAL WEIGHT LOSS: ICD-10-CM

## 2017-03-06 DIAGNOSIS — K22.4 DYSKINESIA OF ESOPHAGUS: ICD-10-CM

## 2017-03-06 DIAGNOSIS — Z43.1 ATTENTION TO GASTROSTOMY (HCC): ICD-10-CM

## 2017-03-06 DIAGNOSIS — K59.00 CONSTIPATION: ICD-10-CM

## 2017-03-06 PROCEDURE — 74270 X-RAY XM COLON 1CNTRST STD: CPT

## 2017-04-25 ENCOUNTER — TELEPHONE (OUTPATIENT)
Dept: NEUROLOGY | Age: 82
End: 2017-04-25

## 2017-04-25 NOTE — LETTER
4/25/2017 10:23 PM 
 
Mr. Rosalie [address] [id number] To whom it may concern: Mr. Roger Juarez can stop his aspirin and Plavix for 5 days before his myelogram and resume the day after his myelogram as he is a very low risk for any medical or neurological complications. I can be of any further help please let me know Sincerely, Ted Bosch MD

## 2017-04-25 NOTE — TELEPHONE ENCOUNTER
Dr. Hendricks at [facility] wants to give the patient a myelogram with his ct scan on the third of May. However, the patient needs to be off asprin and plavix for five days prior to that. She says she spoke with his cardiologist and got clearance. Please call.

## 2017-04-26 ENCOUNTER — TELEPHONE (OUTPATIENT)
Dept: NEUROLOGY | Age: 82
End: 2017-04-26

## 2017-04-26 NOTE — TELEPHONE ENCOUNTER
----- Message from Julia Quezada sent at 4/26/2017 12:32 PM EDT -----  Regarding: Dr Cleaning/telephone  Pt's wife (p) [phone],[facility] gave permission to leave a message,would like to know if Dr Emily Strong gave the okay for him to go off his Plavix, for him to take his test. she will need to know by this Friday, she will have to stop it by Friday.

## 2017-04-26 NOTE — TELEPHONE ENCOUNTER
Called Aubrey at [phone] for ortho schedulling and received the fax number. Need doctor's signature and then will fax.     Patient's wife notified

## 2017-05-03 ENCOUNTER — HOSPITAL ENCOUNTER (OUTPATIENT)
Dept: GENERAL RADIOLOGY | Age: 82
Discharge: HOME | End: 2017-05-03
Attending: ORTHOPAEDIC SURGERY
Payer: MEDICARE

## 2017-05-03 ENCOUNTER — HOSPITAL ENCOUNTER (OUTPATIENT)
Dept: CT IMAGING | Age: 82
Discharge: HOME | End: 2017-05-03
Attending: ORTHOPAEDIC SURGERY
Payer: MEDICARE

## 2017-05-03 VITALS
RESPIRATION RATE: 20 BRPM | DIASTOLIC BLOOD PRESSURE: 84 MMHG | TEMPERATURE: 97.6 F | OXYGEN SATURATION: 99 % | HEART RATE: 65 BPM | SYSTOLIC BLOOD PRESSURE: 176 MMHG

## 2017-05-03 DIAGNOSIS — M54.2 CERVICAL PAIN: ICD-10-CM

## 2017-05-03 PROCEDURE — 72126 CT NECK SPINE W/DYE: CPT

## 2017-05-03 PROCEDURE — 74011636320 HC RX REV CODE- 636/320: Performed by: RADIOLOGY

## 2017-05-03 PROCEDURE — 62302 MYELOGRAPHY LUMBAR INJECTION: CPT

## 2017-05-03 RX ADMIN — IOHEXOL 10 ML: 300 INJECTION, SOLUTION INTRAVENOUS at 09:00

## 2017-05-03 NOTE — PROGRESS NOTES
Received pt. Post myelogram and CT scan via stretcher with HOB elevated approx 30 degrees. Pt awake, alert and oriented x 3. Pt denies pain at this time.

## 2017-05-03 NOTE — ROUTINE PROCESS
I have reviewed discharge instructions with the patient. The patient verbalized understanding. Copy of discharge instructions per AVS copied, reviewed with pt., signed and given to pt. Prior to discharge along with signature sheet to med. Rec. For scanning r/t penpad not working. Pt awake, alert and oriented x 3. Pt without c/o pain. bandaid still noted on lower-mid back. Pt dressed self and sat into w/c for discharge to home via private vehicle with his wife driving him home. Pt accomp. By nurse to car with pt. Pt has CD disk in hand upon discharge.

## 2017-05-03 NOTE — IP AVS SNAPSHOT
[address] [facility] 
[phone] Patient: Adelfo Adam. MRN: [id number] RRI:[id number] You are allergic to the following Allergen Reactions Demerol (Meperidine) Shortness of Breath Paxil (Paroxetine Hcl) Unknown (comments) Pt gets shaky and loses control of legs Amoxicillin Rash Pcn (Penicillins) Rash Recent Documentation Smoking Status Never Smoker Emergency Contacts Name Discharge Info Relation Home Work Mobile [facility] DISCHARGE CAREGIVER [3] Spouse [3] [phone] Steven Ghosh DISCHARGE CAREGIVER [3] Son [22] [phone] About your hospitalization You were admitted on:  May 3, 2017 You last received care in the:  [facility] RADIOLOGY You were discharged on:  May 3, 2017 Unit phone number:  [phone] Why you were hospitalized Your primary diagnosis was:  Not on File Providers Seen During Your Hospitalizations Provider Role Specialty Primary office phone Davin Colon MD Attending Provider Orthopedic Surgery [phone] Your Primary Care Physician (PCP) Primary Care Physician Office Phone Office Fax [facility] [phone] Follow-up Information Follow up With Details Comments Contact Info Rex Baugh MD  As needed [facility] [address] [facility] 
[phone] Davin Colon MD  As needed [address] 
[phone] Your Appointments Wednesday May 03, 2017 10:00 AM EDT  
CT SPINE CERV W CONT with [facility] CT 2 MRM RAD CT ([address]) [address] [facility]  
[phone] CONTRAST STUDY: 1.  The patient should not eat solid food four hours before the appointment but should be encouraged to drink clear liquids. 2. The patient will require IV access for contrast administration. 3. The patient should not take  Ibuprofen (Advil, Motrin, etc.) and Naproxen Sodium (Aleve, etc.)  on the day of the exam. Stopping non-steroidal anti-inflammatory agents (NSAIDs) like Ibuprofen decreases the risk of kidney damage from the x-ray contrast (dye). 4. Bring any non [facility] facility films/images pertaining to the area of interest with you on the day of appointment. 5. Bring current lab work if available(within last 90 days [facility]) ***If scheduled at [facility], iSTAT is not available, labs will need to be done before appointment*** 6. Check in at registration at least 30 minutes before appt time unless you were instructed to do otherwise. 7. If you have to drink oral contrast please pick it up any weekday prior to your appointment, if you cannot please check in 2 hrs  before appt time. Patient should report to outpatient registration ([facility]) 30 minutes prior to the appointment time unless instructed otherwise. Wednesday May 10, 2017  9:30 AM EDT Follow Up with Monse Amin MD  
[facility] Diabetes and Endocrinology [facility] [address] [id number] [phone] Current Discharge Medication List  
  
ASK your doctor about these medications Dose & Instructions Dispensing Information Comments Morning Noon Evening Bedtime  
 albuterol-ipratropium 2.5 mg-0.5 mg/3 ml Nebu Commonly known as:  Sydell Badder Your last dose was: Your next dose is:    
   
   
 Dose:  3 mL  
3 mL by Nebulization route every six (6) hours as needed. Quantity:  100 Nebule Refills:  1  
     
   
   
   
  
 aspirin 325 mg tablet Commonly known as:  ASPIRIN Your last dose was:     
   
Your next dose is:    
   
   
 Dose:  325 mg  
 Take 325 mg by mouth daily. Refills:  0  
     
   
   
   
  
 aspirin-dipyridamole  mg per SR capsule Commonly known as:  AGGRENOX Your last dose was: Your next dose is:    
   
   
 Dose:  1 Cap Take 1 Cap by mouth two (2) times a day. Quantity:  60 Cap Refills:  0  
     
   
   
   
  
 atorvastatin 40 mg tablet Commonly known as:  LIPITOR Your last dose was: Your next dose is:    
   
   
 Dose:  40 mg Take 40 mg by mouth daily. Refills:  0  
     
   
   
   
  
 furosemide 20 mg tablet Commonly known as:  LASIX Your last dose was: Your next dose is:    
   
   
 Dose:  20 mg Take 1 Tab by mouth daily. Indications: HYPERCALCEMIA Quantity:  30 Tab Refills:  2  
     
   
   
   
  
 gabapentin 250 mg/5 mL solution Commonly known as:  NEURONTIN Your last dose was: Your next dose is:    
   
   
 Dose:  500 mg Take 500 mg by mouth two (2) times a day. Refills:  0 HumaLOG KwikPen 100 unit/mL kwikpen Generic drug:  insulin lispro Your last dose was: Your next dose is:    
   
   
  Refills:  0 HYDROcodone-acetaminophen 5-325 mg per tablet Commonly known as:  Marlaine Tucker Your last dose was: Your next dose is:    
   
   
 Dose:  1 Tab 1 Tab by Per G Tube route every four (4) hours as needed. Max Daily Amount: 6 Tabs. Quantity:  30 Tab Refills:  0  
     
   
   
   
  
 insulin glargine 100 unit/mL (3 mL) pen Commonly known as:  LANTUS SOLOSTAR Your last dose was: Your next dose is:    
   
   
 Dose:  10 Units 10 Units by SubCUTAneous route daily. Inject 14 units in AM  subcutenous Quantity:  15 mL Refills:  1 Insulin Needles (Disposable) 32 gauge x 5/32\" Ndle Commonly known as:  Elise Pen Needle Your last dose was: Your next dose is:    
   
   
 5 times a day Quantity:  150 Each Refills:  99  
     
   
   
   
  
 insulin syringe-needle U-100 1 mL 31 gauge x 15/64\" Syrg Your last dose was: Your next dose is:    
   
   
 Dose:  1 Syringe 1 Syringe by SubCUTAneous route four (4) times daily. Quantity:  200 Each Refills:  11  
     
   
   
   
  
 metoprolol tartrate 25 mg tablet Commonly known as:  LOPRESSOR Your last dose was: Your next dose is:    
   
   
 Dose:  12.5 mg Take 12.5 mg by mouth two (2) times a day. Refills:  0  
     
   
   
   
  
 midodrine 10 mg tablet Commonly known as:  Merilee Spark Your last dose was: Your next dose is:    
   
   
 Dose:  10 mg Take 10 mg by mouth three (3) times daily (with meals). Refills:  0  
     
   
   
   
  
 multivitamin tablet Commonly known as:  ONE A DAY Your last dose was: Your next dose is:    
   
   
 Dose:  1 Tab Take 1 Tab by mouth daily. Refills:  0 Nebulizer & Compressor machine Your last dose was: Your next dose is:    
   
   
 Dose:  1 Each  
1 Each by Does Not Apply route daily. Quantity:  1 Each Refills:  0  
     
   
   
   
  
 nitroglycerin 0.4 mg SL tablet Commonly known as:  NITROSTAT Your last dose was: Your next dose is:    
   
   
 Dose:  0.4 mg  
0.4 mg by SubLINGual route every five (5) minutes as needed for Chest Pain. Refills:  0  
     
   
   
   
  
 ondansetron hcl 8 mg tablet Commonly known as:  ZOFRAN (AS HYDROCHLORIDE) Your last dose was: Your next dose is:    
   
   
 Dose:  8 mg Take 1 Tab by mouth every eight (8) hours as needed for Nausea. Quantity:  20 Tab Refills:  0 TOBIAS MILK OF MAGNESIA 400 mg/5 mL suspension Generic drug:  magnesium hydroxide Your last dose was: Your next dose is:    
   
   
 Dose:  30 mL Take 30 mL by mouth daily as needed for Constipation. Refills:  0 PLAVIX 75 mg Tab Generic drug:  clopidogrel Your last dose was: Your next dose is:    
   
   
 Dose:  75 mg Take 75 mg by mouth daily. Refills:  0  
     
   
   
   
  
 vit B Cmplx 3-FA-Vit C-Biotin 1- mg-mg-mcg tablet Commonly known as:  NEPHRO GRAYSON RX Your last dose was: Your next dose is:    
   
   
 Dose:  1 Tab Take 1 Tab by mouth daily. Refills:  0 Discharge Instructions [address] MYELOGRAM DISCHARGE INSTRUCTIONS General Information: Myelogram: A Myelogram involves a lumbar puncture, and instead of removing fluid, contrast will be injected into the sac surrounding the spinal column. It is done to visualize the spinal column, nerve roots, spinal canal, vertebral discs and disc space. It is usually done to diagnose back pain with unknown cause or in preparation for surgery. After the injection, a CT scan will be done, usually within two hours of the injection. Call If: 
   You should call your Physician and/or the Radiology Nurse if you develop a headache that is not relieved by Tylenol, and worsens when you stand and eases when you lie down, you need to call. You may have developed what is referred to as a spinal headache. Our physician's will probably advise you to be on strict bed rest for 24 hours, to drink lots of fluids and caffeine. If this does not help the head pain, call again the next day. You should call if you have bleeding other than a small spot on your bandage. You should call if you have any numbness, tingling, weakness, fever, chills, urinary retention, severe itching, rash, welts, swelling, or confusion. Follow-Up Instructions: See the doctor who ordered your procedure as he/she has instructed.  If you had a Lumbar Puncture or Myelogram, your results should be available to your ordering doctor in 3-5 business days. You can remove your dressing in 24 hours and shower regularly. Do not bathe or swim for 72 hours. To Reach Us:  [phone] Date: 5/3/2017 Discharging Nurse: MITCH Hayes [facility] Special Procedures/Radiology Department Radiologist: Dr. Flower Badillo Date:  May 3, 2017 Myelogram Discharge Instructions Restrict your activity for the next 48 hours. You may get up and sit in the chair or get up and go to the bathroom with slow movements. You must keep your head above your chest until 8 a.m. tomorrow. Rest as much as possible tonight and tomorrow. Resume your previous diet and follow the medication reconciliation form. Drink plenty of water. Avoid products with caffeine. You may take Tylenol, as directed on the label, for pain or discomfort. Avoid ibuprofen (Advil, Motrin) and aspirin as they may cause bleeding. Avoid lifting anything heavier than 5 pounds. Avoid excessive bending and lifting as this may increase the chance of having a headache. Be sure to follow up with your physician. Take your CD disk with you. If you have severe pain, or if you have any questions or concerns, please call [phone] and ask to speak to the nurse on-call. Discharge Orders None Introducing [facility] SERVICES! Mita Castano introduces [employer] patient portal. Now you can access parts of your medical record, email your doctor's office, and request medication refills online. 1. In your internet browser, go to [email or web address] 2. Click on the First Time User? Click Here link in the Sign In box. You will see the New Member Sign Up page. 3. Enter your [employer] Access Code exactly as it appears below. You will not need to use this code after youve completed the sign-up process.  If you do not sign up before the expiration date, you must request a new code. · [employer] Access Code: [email or web address] Expires: 5/11/2017 10:53 AM 
 
4. Enter the last four digits of your Social Security Number (xxxx) and Date of Birth (mm/dd/yyyy) as indicated and click Submit. You will be taken to the next sign-up page. 5. Create a [employer] ID. This will be your [employer] login ID and cannot be changed, so think of one that is secure and easy to remember. 6. Create a [employer] password. You can change your password at any time. 7. Enter your Password Reset Question and Answer. This can be used at a later time if you forget your password. 8. Enter your e-mail address. You will receive e-mail notification when new information is available in [address] 9. Click Sign Up. You can now view and download portions of your medical record. 10. Click the Download Summary menu link to download a portable copy of your medical information. If you have questions, please visit the Frequently Asked Questions section of the [employer] website. Remember, [employer] is NOT to be used for urgent needs. For medical emergencies, dial 911. Now available from your iPhone and Android! General Information Please provide this summary of care documentation to your next provider. Patient Signature:  ____________________________________________________________ Date:  ____________________________________________________________  
  
Arna Star Provider Signature:  ____________________________________________________________ Date:  ____________________________________________________________

## 2017-05-03 NOTE — DISCHARGE INSTRUCTIONS
[address] MYELOGRAM DISCHARGE INSTRUCTIONS  General Information:    Myelogram:     A Myelogram involves a lumbar puncture, and instead of removing fluid, contrast will be injected into the sac surrounding the spinal column. It is done to visualize the spinal column, nerve roots, spinal canal, vertebral discs and disc space. It is usually done to diagnose back pain with unknown cause or in preparation for surgery. After the injection, a CT scan will be done, usually within two hours of the injection. Call If:     You should call your Physician and/or the Radiology Nurse if you develop a headache that is not relieved by Tylenol, and worsens when you stand and eases when you lie down, you need to call. You may have developed what is referred to as a spinal headache. Our physician's will probably advise you to be on strict bed rest for 24 hours, to drink lots of fluids and caffeine. If this does not help the head pain, call again the next day. You should call if you have bleeding other than a small spot on your bandage. You should call if you have any numbness, tingling, weakness, fever, chills, urinary retention, severe itching, rash, welts, swelling, or confusion. Follow-Up Instructions: See the doctor who ordered your procedure as he/she has instructed. If you had a Lumbar Puncture or Myelogram, your results should be available to your ordering doctor in 3-5 business days. You can remove your dressing in 24 hours and shower regularly. Do not bathe or swim for 72 hours. To Reach Us:  [phone]    Date: 5/3/2017  Discharging Nurse: Arturo Chanel RN         [address] [facility] [address]  Special Procedures/Radiology Department    Radiologist: Dr. Estela Duncan    Date:  May 3, 2017      Myelogram Discharge Instructions    Restrict your activity for the next 48 hours. You may get up and sit in the chair or get up and go to the bathroom with slow movements.   You must keep your head above your chest until 8 a.m. tomorrow. Rest as much as possible tonight and tomorrow. Resume your previous diet and follow the medication reconciliation form. Drink plenty of water. Avoid products with caffeine. You may take Tylenol, as directed on the label, for pain or discomfort. Avoid ibuprofen (Advil, Motrin) and aspirin as they may cause bleeding. Avoid lifting anything heavier than 5 pounds. Avoid excessive bending and lifting as this may increase the chance of having a headache. Be sure to follow up with your physician. Take your CD disk with you. If you have severe pain, or if you have any questions or concerns, please call [phone] and ask to speak to the nurse on-call.

## 2017-05-10 ENCOUNTER — OFFICE VISIT (OUTPATIENT)
Dept: ENDOCRINOLOGY | Age: 82
End: 2017-05-10

## 2017-05-10 VITALS
SYSTOLIC BLOOD PRESSURE: 98 MMHG | BODY MASS INDEX: 24.8 KG/M2 | WEIGHT: 158 LBS | HEIGHT: 67 IN | HEART RATE: 79 BPM | DIASTOLIC BLOOD PRESSURE: 61 MMHG

## 2017-05-10 DIAGNOSIS — I10 ESSENTIAL HYPERTENSION WITH GOAL BLOOD PRESSURE LESS THAN 140/90: ICD-10-CM

## 2017-05-10 DIAGNOSIS — I10 ESSENTIAL HYPERTENSION WITH GOAL BLOOD PRESSURE LESS THAN 130/80: ICD-10-CM

## 2017-05-10 DIAGNOSIS — E11.42 TYPE 2 DIABETES MELLITUS WITH DIABETIC NEUROPATHY AFFECTING BOTH SIDES OF BODY (HCC): ICD-10-CM

## 2017-05-10 DIAGNOSIS — E78.5 HYPERLIPIDEMIA, UNSPECIFIED HYPERLIPIDEMIA TYPE: ICD-10-CM

## 2017-05-10 DIAGNOSIS — G62.9 PERIPHERAL POLYNEUROPATHY: ICD-10-CM

## 2017-05-10 DIAGNOSIS — E11.49 TYPE 2 DIABETES MELLITUS WITH OTHER DIABETIC NEUROLOGICAL COMPLICATION (HCC): Primary | ICD-10-CM

## 2017-05-10 RX ORDER — INSULIN GLARGINE 100 [IU]/ML
14 INJECTION, SOLUTION SUBCUTANEOUS DAILY
Qty: 5 PEN | Refills: 11 | Status: SHIPPED | OUTPATIENT
Start: 2017-05-10 | End: 2018-01-01 | Stop reason: SDUPTHER

## 2017-05-10 NOTE — PATIENT INSTRUCTIONS
Can TF's,     2 cans w/ Breakfast = 9 units humalog  1.5 can lunch  =  8 units humalog  1.5 can dinner =  7 units humalog    add 2 additional units to each dose for 3 days following cortisone injections q3 months    Correction Scale to 1:25>150,     Lantus 14 units in the AM    ----------------------------------------------------------------------------------------------------------------------    Below you will find a glucose log sheet which you can use to record your blood sugars. Without checking and recording what your home glucose levels are, it will be difficult to make any changes to your medication dose, even when significant changes may be needed. Please feel free to use the log below to record your home glucose levels. At the very least, I would like for you to login the entire 2-3 weeks just before your visit so we can make your visit much more productive and beneficial to you. GLUCOSE LOG SHEET:    Date Breakfast Lunch Dinner Bedtime Comments ? GLUCOSE LOG SHEET:    Date Breakfast Lunch Dinner Bedtime Comments ? GLUCOSE LOG SHEET:    Date Breakfast Lunch Dinner Bedtime Comments ?

## 2017-05-10 NOTE — MR AVS SNAPSHOT
Visit Information Date & Time Provider Department Dept. Phone Encounter #  
 5/10/2017  9:30 AM Selena Cortez, [address] [facility] End[facility]ology [phone] [id number] Follow-up Instructions Return in about 3 months (around 8/10/2017). Your Appointments 9/25/2017 11:40 AM  
Follow Up with Chani Ag MD  
[facility] Appt Note: f/u stroke, lsw,02/16/17,$0cp  
 [address] [facility], 
300 [facility], [address] [phone]  
[address] [facility], [address] [facility], [address] [address] Upcoming Health Maintenance Date Due ZOSTER VACCINE AGE 60> 3/31/1995 MEDICARE YEARLY EXAM 3/31/2000 EYE EXAM RETINAL OR DILATED Q1 2/10/2017 INFLUENZA AGE 9 TO ADULT 8/1/2017 HEMOGLOBIN A1C Q6M 8/2/2017 FOOT EXAM Q1 8/11/2017 MICROALBUMIN Q1 8/11/2017 LIPID PANEL Q1 2/2/2018 GLAUCOMA SCREENING Q2Y 2/10/2018 DTaP/Tdap/Td series (2 - Td) 9/9/2025 Allergies as of 5/10/2017  Review Complete On: 5/10/2017 By: Selena Cortez MD  
  
 Severity Noted Reaction Type Reactions Demerol [Meperidine] High 04/12/2010   Systemic Shortness of Breath Paxil [Paroxetine Hcl] High 04/12/2010   Systemic Unknown (comments) Pt gets shaky and loses control of legs Amoxicillin  01/18/2013    Rash Pcn [Penicillins]  04/12/2010    Rash Current Immunizations  Reviewed on 2/11/2013 Name Date Influenza Vaccine 10/1/2016, 10/1/2014 Influenza Vaccine (Quad) PF 10/7/2015 11:20 AM  
 Influenza Vaccine Split 9/24/2012 Influenza Vaccine Whole 12/3/2009 Pneumococcal Polysaccharide (PPSV-23) 10/7/2015 11:22 AM  
 Pneumococcal Vaccine (Unspecified Type) 12/3/2007 TD Vaccine 12/1/2011  6:03 PM  
 Tdap 9/9/2015  7:38 AM  
  
 Not reviewed this visit You Were Diagnosed With   
  
 Codes Comments Type 2 diabetes mellitus with other diabetic neurological complication (HCC)    -  Primary ICD-10-CM: E11.49 
ICD-9-CM: 250.60 Hyperlipidemia, unspecified hyperlipidemia type     ICD-10-CM: E78.5 ICD-9-CM: 272.4 Essential hypertension with goal blood pressure less than 130/80     ICD-10-CM: I10 
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 
ICD-9-CM: 250.60, 357.2 Essential hypertension with goal blood pressure less than 140/90     ICD-10-CM: I10 
ICD-9-CM: 401.9 Peripheral polyneuropathy     ICD-10-CM: G62.9 ICD-9-CM: 356.9 Vitals BP Pulse Height(growth percentile) Weight(growth percentile) BMI Smoking Status 98/61 (BP 1 Location: Left arm, BP Patient Position: Sitting) 79 5' 7\" (1.702 m) 158 lb (71.7 kg) 24.75 kg/m2 Never Smoker BMI and BSA Data Body Mass Index Body Surface Area 24.75 kg/m 2 1.84 m 2 Preferred Pharmacy Pharmacy Name Phone [facility] [address], [address] [place] [address] Avenue [phone] Your Updated Medication List  
  
   
This list is accurate as of: 5/10/17  9:59 AM.  Always use your most recent med list.  
  
  
  
  
 albuterol-ipratropium 2.5 mg-0.5 mg/3 ml Nebu Commonly known as:  DUO-NEB  
3 mL by Nebulization route every six (6) hours as needed. aspirin 325 mg tablet Commonly known as:  ASPIRIN Take 325 mg by mouth daily. aspirin-dipyridamole  mg per SR capsule Commonly known as:  AGGRENOX Take 1 Cap by mouth two (2) times a day. atorvastatin 40 mg tablet Commonly known as:  LIPITOR Take 40 mg by mouth daily. furosemide 20 mg tablet Commonly known as:  LASIX Take 1 Tab by mouth daily. Indications: HYPERCALCEMIA  
  
 gabapentin 250 mg/5 mL solution Commonly known as:  NEURONTIN Take 500 mg by mouth two (2) times a day. HumaLOG KwikPen 100 unit/mL kwikpen Generic drug:  insulin lispro HYDROcodone-acetaminophen 5-325 mg per tablet Commonly known as:  Lsy Wick  
1 Tab by Per G Tube route every four (4) hours as needed. Max Daily Amount: 6 Tabs. insulin glargine 100 unit/mL (3 mL) pen Commonly known as:  LANTUS SOLOSTAR  
14 Units by SubCUTAneous route daily. Insulin Needles (Disposable) 32 gauge x 5/32\" Ndle Commonly known as:  Elise Pen Needle 5 times a day  
  
 insulin syringe-needle U-100 1 mL 31 gauge x 15/64\" Syrg 1 Syringe by SubCUTAneous route four (4) times daily. metoprolol tartrate 25 mg tablet Commonly known as:  LOPRESSOR Take 12.5 mg by mouth two (2) times a day. midodrine 10 mg tablet Commonly known as:  Huel Newness Take 10 mg by mouth three (3) times daily (with meals). multivitamin tablet Commonly known as:  ONE A DAY Take 1 Tab by mouth daily. Nebulizer & Compressor machine 1 Each by Does Not Apply route daily. nitroglycerin 0.4 mg SL tablet Commonly known as:  NITROSTAT  
0.4 mg by SubLINGual route every five (5) minutes as needed for Chest Pain. ondansetron hcl 8 mg tablet Commonly known as:  ZOFRAN (AS HYDROCHLORIDE) Take 1 Tab by mouth every eight (8) hours as needed for Nausea. TOBIAS MILK OF MAGNESIA 400 mg/5 mL suspension Generic drug:  magnesium hydroxide Take 30 mL by mouth daily as needed for Constipation. PLAVIX 75 mg Tab Generic drug:  clopidogrel Take 75 mg by mouth daily. vit B Cmplx 3-FA-Vit C-Biotin 1- mg-mg-mcg tablet Commonly known as:  NEPHRO GRAYSON RX Take 1 Tab by mouth daily. Prescriptions Sent to Pharmacy Refills  
 insulin glargine (LANTUS SOLOSTAR) 100 unit/mL (3 mL) pen 11 Si Units by SubCUTAneous route daily. Class: Normal  
 Pharmacy: [address] [place] [address] Dr Barton, [address] [place] [address] Avenue Ph #: [phone] Route: SubCUTAneous Follow-up Instructions Return in about 3 months (around 8/10/2017). Patient Instructions Can TF's,  
 
2 cans w/ Breakfast = 9 units humalog 1.5 can lunch  =  8 units humalog 1.5 can dinner =  7 units humalog 
 
add 2 additional units to each dose for 3 days following cortisone injections q3 months Correction Scale to 1:25>150, Lantus 14 units in the AM 
 
---------------------------------------------------------------------------------------------------------------------- Below you will find a glucose log sheet which you can use to record your blood sugars. Without checking and recording what your home glucose levels are, it will be difficult to make any changes to your medication dose, even when significant changes may be needed. Please feel free to use the log below to record your home glucose levels. At the very least, I would like for you to login the entire 2-3 weeks just before your visit so we can make your visit much more productive and beneficial to you. GLUCOSE LOG SHEET: 
 
Date Breakfast Lunch Dinner Bedtime Comments ? GLUCOSE LOG SHEET: 
 
Date Breakfast Lunch Dinner Bedtime Comments ? GLUCOSE LOG SHEET: 
 
Date Breakfast Lunch Dinner Bedtime Comments ? Introducing \[facility] [place] [facility]\"" & [facility] SERVICES! Sheree Culp introduces [employer] patient portal. Now you can access parts of your medical record, email your doctor's office, and request medication refills online. 1. In your internet browser, go to [email or web address] 2. Click on the First Time User? Click Here link in the Sign In box. You will see the New Member Sign Up page. 3. Enter your [employer] Access Code exactly as it appears below. You will not need to use this code after youve completed the sign-up process. If you do not sign up before the expiration date, you must request a new code. · [employer] Access Code: [email or web address] Expires: 5/11/2017 10:53 AM 
 
4. Enter the last four digits of your Social Security Number (xxxx) and Date of Birth (mm/dd/yyyy) as indicated and click Submit. You will be taken to the next sign-up page. 5. Create a [employer] ID. This will be your [employer] login ID and cannot be changed, so think of one that is secure and easy to remember. 6. Create a [employer] password. You can change your password at any time. 7. Enter your Password Reset Question and Answer. This can be used at a later time if you forget your password. 8. Enter your e-mail address. You will receive e-mail notification when new information is available in [address] 9. Click Sign Up. You can now view and download portions of your medical record. 10. Click the Download Summary menu link to download a portable copy of your medical information. If you have questions, please visit the Frequently Asked Questions section of the [employer] website. Remember, [employer] is NOT to be used for urgent needs. For medical emergencies, dial 911. Now available from your iPhone and Android! Please provide this summary of care documentation to your next provider. Your primary care clinician is listed as Sina Armenta. If you have any questions after today's visit, please call [phone].

## 2017-05-10 NOTE — PROGRESS NOTES
CHIEF COMPLAINT: f/u for type 2 diabetes    HISTORY OF PRESENT ILLNESS:   Marisol Islas is a 80 y.o. male with a PMHx as noted below who presents for f/u evaluation of type 2 diabetes. Diabetes History:  History obtained from both patient and his wife who is present:Patient presents today to establish care for his diabetes. He was previously managed by endocrinologist Dr. Mckay Bustillo, however would like to be treated closer to home. He notably had a recent stroke for which he had been admitted. Currently he ambulates with a wheelchair. He is present with his wife today for evaluation. Patients co-morbidities include multiple CVA, HTN, Coronary disease, and CHF with an AICD. He is on tube feeds as noted below. Diabetes was diagnosed in early 2015. Family History of diabetes is negative. Last A1c prior to initial visit was 6.6% on July 31st.   Regimen at time of establishing care includes   - Januvia 50mg daily, crushed   - Lantus 14U qAM   - Humalog nursing home schedule: 6U 4x/day on iso-source 1.5. Also on 2:50>150 correction sliding scale. 1.5 cans breakfast, 1 can with lunch and dinner, 1.5 cans with dinner. Reportedly 44Gm carbs/can suggesting 1:7 carb ratio per can    INTERVAL HISTORY:  Had upper and lower endoscopy recently due to some GI issues. Switched to feeding 3 x/day instead of 5  A1c was 6.5        Regimen:   Can TF's,   2 cans w/ Breakfast = 9 units humalog  1.5 can lunch  =  7 units humalog  1.5 can dinner =  7 units humalog  add 2 additional units to each dose for 3 days following cortisone injections q3 months  Correction Scale to 1:25>150,   Lantus 14 units in the AM    Review of home glucose:   AM    Lunch  , occasional 200's  Dinner  140-180, occaisonal 200's       No hypoglycemia, will scan log into the chart           PAST MEDICAL/SURGICAL HISTORY:   Past Medical History:   Diagnosis Date    Antiplatelet or antithrombotic long-term use 12/4/2014    Anxiety disorder     Arrhythmia 2009    bradycardia    Arthritis     CAD (coronary artery disease)     s/p CABG 2002; Dr Christopher Lopez [facility]) 1996    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 I suspect the latter. Achalasia  is not present.         GERD (gastroesophageal reflux disease)     Heart failure ([facility] 10/2014     Cardiomyopathy:Pacemaker upgrade:Biv and AICD  Dr. Aura Gauthier heart DrAlvaro last visit 5/11/2015    Hepatitis C Dx 1996    treated at [facility] in past; as of 4/15/15 wife states pt currently not under any treatment    Hyperlipidemia     Hypertension     Myocardial infarct [facility]) 2013    Heart Cath: 40% LV EF, Stented distal LAD, patent Graft to circumflex    On tube feeding diet approx 2009    still has as of 9/28/15  (no po food/liquid/meds at all); Dr Leonardo Orozco Other ill-defined conditions 1996    1 dose of chemotherapy/radiation for tongue cancer    Other ill-defined conditions     BPH    Other ill-defined conditions     orthostatic hypotension    Pneumonia ~ April -May 2010    Stroke [facility]) approx 2003    left side-left finger tips numb; no imbalance or memory loss; as of 2015 not seeing neuro MD    Suicidal thoughts      Past Surgical History:   Procedure Laterality Date    ABDOMEN SURGERY [address]    peg tube    CABG, ARTERY-VEIN, THREE  2000    CHANGE GASTROSTOMY TUBE  5/2/2011         CHANGE GASTROSTOMY TUBE  6/29/2011         HX CATARACT REMOVAL      bilateral    HX CHOLECYSTECTOMY      HX HEART CATHETERIZATION  2013    Stented distal LAD    HX MOHS PROCEDURES      bilateral    HX ORTHOPAEDIC back surgery times two    HX OTHER SURGICAL      Radical Left Neck    HX OTHER SURGICAL      NASAL POLYPS REMOVAL    HX OTHER SURGICAL  2010    TURP    HX PACEMAKER  11/08    HX PACEMAKER  10/28/14     Defibrillator: [facility] # [id number], serial # [id number]; Dr. Miguel Angel Moreau [phone]; Dr Hemalatha Mejia      cystoscopy    NEUROLOGICAL PROCEDURE UNLISTED  1996    cevical surgery    [place] EGD INSERT GUIDE WIRE DILATOR PASSAGE ESOPHAGUS  9/13/2010         [place] EGD TRANSORAL BIOPSY SINGLE/MULTIPLE  9/13/2010         STOMACH SURGERY PROCEDURE UNLISTED  6/29/2011         VASCULAR SURGERY PROCEDURE UNLIST      bilateral carotid stents       ALLERGIES:   Allergies   Allergen Reactions    Demerol [Meperidine] Shortness of Breath    Paxil [Paroxetine Hcl] Unknown (comments)     Pt gets shaky and loses control of legs    Amoxicillin Rash    Pcn [Penicillins] Rash       MEDICATIONS ON ADMISSION:     Current Outpatient Prescriptions:     HUMALOG KWIKPEN 100 unit/mL kwikpen, , Disp: , Rfl:     metoprolol tartrate (LOPRESSOR) 25 mg tablet, Take 12.5 mg by mouth two (2) times a day., Disp: , Rfl:     nitroglycerin (NITROSTAT) 0.4 mg SL tablet, 0.4 mg by SubLINGual route every five (5) minutes as needed for Chest Pain., Disp: , Rfl:     aspirin (ASPIRIN) 325 mg tablet, Take 325 mg by mouth daily. , Disp: , Rfl:     vit B Cmplx 3-FA-Vit C-Biotin (NEPHRO GRAYSON RX) 1- mg-mg-mcg tablet, Take 1 Tab by mouth daily. , Disp: , Rfl:     magnesium hydroxide (TOBIAS MILK OF MAGNESIA) 400 mg/5 mL suspension, Take 30 mL by mouth daily as needed for Constipation. , Disp: , Rfl:     insulin glargine (LANTUS SOLOSTAR) 100 unit/mL (3 mL) pen, 10 Units by SubCUTAneous route daily. Inject 14 units in AM  subcutenous, Disp: 15 mL, Rfl: 1    albuterol-ipratropium (DUO-NEB) 2.5 mg-0.5 mg/3 ml nebu, 3 mL by Nebulization route every six (6) hours as needed. , Disp: 100 Nebule, Rfl: 1    HYDROcodone-acetaminophen (NORCO) 5-325 mg per tablet, 1 Tab by Per G Tube route every four (4) hours as needed. Max Daily Amount: 6 Tabs., Disp: 30 Tab, Rfl: 0    ondansetron hcl (ZOFRAN, AS HYDROCHLORIDE,) 8 mg tablet, Take 1 Tab by mouth every eight (8) hours as needed for Nausea., Disp: 20 Tab, Rfl: 0    Nebulizer & Compressor machine, 1 Each by Does Not Apply route daily. , Disp: 1 Each, Rfl: 0    midodrine (PROAMITINE) 10 mg tablet, Take 10 mg by mouth three (3) times daily (with meals). , Disp: , Rfl:     insulin syringe-needle U-100 1 mL 31 x 15/64\" syrg, 1 Syringe by SubCUTAneous route four (4) times daily. , Disp: 200 Each, Rfl: 11    Insulin Needles, Disposable, (ESPERANZA PEN NEEDLE) 32 gauge x 5/\" ndle, 5 times a day, Disp: 150 Each, Rfl: 99    atorvastatin (LIPITOR) 40 mg tablet, Take 40 mg by mouth daily. , Disp: , Rfl:     furosemide (LASIX) 20 mg tablet, Take 1 Tab by mouth daily. Indications: HYPERCALCEMIA, Disp: 30 Tab, Rfl: 2    gabapentin (NEURONTIN) 250 mg/5 mL solution, Take 500 mg by mouth two (2) times a day., Disp: , Rfl:     multivitamin (ONE A DAY) tablet, Take 1 Tab by mouth daily. , Disp: , Rfl:     clopidogrel (PLAVIX) 75 mg tablet, Take 75 mg by mouth daily. , Disp: , Rfl:     aspirin-dipyridamole (AGGRENOX)  mg per SR capsule, Take 1 Cap by mouth two (2) times a day., Disp: 60 Cap, Rfl: 0    SOCIAL HISTORY:   Social History     Social History    Marital status:      Spouse name: N/A    Number of children: N/A    Years of education: N/A     Occupational History    Retired       He was a [employer]      Social History Main Topics    Smoking status: Never Smoker    Smokeless tobacco: Never Used    Alcohol use No    Drug use: No    Sexual activity: Yes     Partners: Female     Other Topics Concern    Not on file     Social History Narrative       FAMILY HISTORY:  Family History   Problem Relation Age of Onset    Heart Disease Father       at age 52 from CAD    Colon Cancer Mother     Cancer Mother      colon ca    Heart Disease Brother        REVIEW OF SYSTEMS: Complete ROS assessed and noted for that which is described above, all else are negative. Eyes: normal  ENT: normal  CVS: normal  Resp: normal  GI: normal  : normal  GYN: normal  Endocrine: normal  Integument: normal  Musculoskeletal: normal  Neuro: mild weakness  Psych: normal      PHYSICAL EXAMINATION:    VITAL SIGNS:  Visit Vitals    BP 98/61 (BP 1 Location: Left arm, BP Patient Position: Sitting)    Pulse 79    Ht 5' 7\" (1.702 m)    Wt 158 lb (71.7 kg)    BMI 24.75 kg/m2       GENERAL: NCAT, Sitting comfortably, NAD  EYES: EOMI, non-icteric, no proptosis  Ear/Nose/Throat: NCAT, no inflammation, no masses  LYMPH NODES: No LAD  CARDIOVASCULAR: S1 S2, RRR, No murmur, 2+ radial pulses  RESPIRATORY: CTA b/l, no wheeze/rales  GASTROINTESTINAL: soft, ND  MUSCULOSKELETAL: Normal ROM, no atrophy, mild 1+ edema in lower extremities  SKIN: warm, no edema/rash/ or other skin changes  NEUROLOGIC: 4/5 power all extremities,  AAOx3  PSYCHIATRIC: Normal affect, Normal insight and judgement      REVIEW OF LABORATORY AND RADIOLOGY DATA:   Labs and documentation have been reviewed as described above. ASSESSMENT AND PLAN:   Donald Caputo is a 80 y.o. male with a PMHx as noted below who presents for f/u evaluation of uncontrolled type 2 diabetes. Type 2 diabetes, new hyperglycemia likely related to admission/infection. Hyperlipidemia  Hypertension    With the changes in the frequency in tube feeds and the subsequent changes in home pre-TF blood sugars we will modify the regimen just a bit to account for the hyperglycemia at the time of the 3 rd meal, by only increasing the second dose by 1 unit. He is very responsive to insulin, and they have been using the correction scale as needed.      Plan: Type 2 Diabetes  Medications:  Can TF's,   2 cans w/ Breakfast = 9 units humalog  1.5 can lunch  =  8 units humalog  1.5 can dinner =  7 units humalog  add 2 additional units to each dose for 3 days following cortisone injections q3 months  Correction Scale to 1:25>150,   Lantus 14 units in the AM  Off Gavinuvia  Provided glucose logs for continued data review    Labs: all are up to date, A1c on f/u visit    HTN: Stable today, no changes in treatment  HLD: stable on statin , lipitor 40mg daily    RTC: I would like to see them back in 3 months    >25 minutes spent together with patient today of which >50% of this time was spent in counseling and coordination of care. Osorio Gonzáles.  [address] [facility] End[facility]

## 2017-08-08 ENCOUNTER — OFFICE VISIT (OUTPATIENT)
Dept: ENDOCRINOLOGY | Age: 82
End: 2017-08-08

## 2017-08-08 VITALS
BODY MASS INDEX: 26.3 KG/M2 | HEART RATE: 71 BPM | WEIGHT: 167.6 LBS | HEIGHT: 67 IN | DIASTOLIC BLOOD PRESSURE: 60 MMHG | SYSTOLIC BLOOD PRESSURE: 99 MMHG

## 2017-08-08 DIAGNOSIS — I10 ESSENTIAL HYPERTENSION WITH GOAL BLOOD PRESSURE LESS THAN 130/80: ICD-10-CM

## 2017-08-08 DIAGNOSIS — E11.49 TYPE 2 DIABETES MELLITUS WITH OTHER DIABETIC NEUROLOGICAL COMPLICATION (HCC): Primary | ICD-10-CM

## 2017-08-08 DIAGNOSIS — E78.5 HYPERLIPIDEMIA, UNSPECIFIED HYPERLIPIDEMIA TYPE: ICD-10-CM

## 2017-08-08 LAB — HBA1C MFR BLD HPLC: 7.6 %

## 2017-08-08 RX ORDER — GUAIFENESIN 100 MG/5ML
200 SOLUTION ORAL
Status: ON HOLD | COMMUNITY
End: 2018-01-01

## 2017-08-08 NOTE — PATIENT INSTRUCTIONS
2 cans w/ Breakfast = 11 units humalog   1.5 can lunch  =  8 units humalog  1.5 can dinner =  7 units humalog  add 2 additional units to each dose for 3 days following cortisone injections q3 months    Correction Scale  1:25>150    Currently you are taking insulin with your meals. As we have discussed it is important to always check your glucose level just before taking your mealtime insulin dose. Sometimes before eating your meal, your glucose level is already much higher than the goal and so you may require a few units of extra insulin. This is in addition to the scheduled dose that you plan to take for the meal. Using the scale below, add the amount of extra insulin you need to the dose you already plan to take and inject together as one injection. As always continue to monitor your glucose afterward to assure recovery of your glucose levels. IF GLUCOSE IS:                 THEN TAKE:      0   Extra Unit  151-175   1   Extra Unit  176-200   2   Extra Units  201-225   3   Extra Units  226-250   4   Extra Units  251-275   5   Extra Units  276-300   6   Extra Units  301-325   7   Extra Units    ----------------------------------------------------------------------------------------------------------------------    Below you will find a glucose log sheet which you can use to record your blood sugars. Without checking and recording what your home glucose levels are, it will be difficult to make any changes to your medication dose, even when significant changes may be needed. Please feel free to use the log below to record your home glucose levels. At the very least, I would like for you to login the entire 2-3 weeks just before your visit so we can make your visit much more productive and beneficial to you. GLUCOSE LOG SHEET:    Date Breakfast Lunch Dinner Bedtime Comments ? GLUCOSE LOG SHEET:    Date Breakfast Lunch Dinner Bedtime Comments ? GLUCOSE LOG SHEET:    Date Breakfast Lunch Dinner Bedtime Comments ?

## 2017-08-08 NOTE — PROGRESS NOTES
CHIEF COMPLAINT: f/u for type 2 diabetes    HISTORY OF PRESENT ILLNESS:   Brandy Saenz is a 80 y.o. male with a PMHx as noted below who presents for f/u evaluation of type 2 diabetes. Diabetes History:  History obtained from both patient and his wife who is present:Patient presents today to establish care for his diabetes. He was previously managed by endocrinologist Dr. West Shahid, however would like to be treated closer to home. He notably had a recent stroke for which he had been admitted. Currently he ambulates with a wheelchair. He is present with his wife today for evaluation. Patients co-morbidities include multiple CVA, HTN, Coronary disease, and CHF with an AICD. He is on tube feeds as noted below. Diabetes was diagnosed in early 2015. Family History of diabetes is negative. Last A1c prior to initial visit was 6.6% on July 31st.   Regimen at time of establishing care includes   - Januvia 50mg daily, crushed   - Lantus 14U qAM   - Humalog nursing home schedule: 6U 4x/day on iso-source 1.5. Also on 2:50>150 correction sliding scale. 1.5 cans breakfast, 1 can with lunch and dinner, 1.5 cans with dinner. Reportedly 44Gm carbs/can suggesting 1:7 carb ratio per can    INTERVAL HISTORY:  Patient received a cortisone injection which resulted in hyperglycemia. Had some changes in TF volumes but this has come back to his baseline as noted below. 5 total cans, 2 with breakfast, 1.5 with lunch and dinner.          Regimen:   Can TF's,   2 cans w/ Breakfast = 9 units humalog  1.5 can lunch  =  8 units humalog  1.5 can dinner =  7 units humalog  add 2 additional units to each dose for 3 days following cortisone injections q3 months  Correction Scale to 1:25>150,   Lantus 14 units in the AM  Off Januvia      Review of home glucose:  AM    140-150's over past week  Lunch    165-230  Dinner  128-180 over past week                PAST MEDICAL/SURGICAL HISTORY:   Past Medical History:   Diagnosis Date    Antiplatelet or antithrombotic long-term use 12/4/2014    Anxiety disorder     Arrhythmia 2009    bradycardia    Arthritis     CAD (coronary artery disease)     s/p CABG 2002; Dr Gagan Fraga [facility]) 1996    tongue/throat cancer s/p surgery / radiation and 1 dose of chemo    Carotid artery stenosis     s/p bilateral stents    Chronic pain     left leg, lower back,     Depression     Diabetes ([facility] [address]     Type II    Esophageal dysmotility     s/p dilitation    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 I suspect the latter. Achalasia  is not present.         GERD (gastroesophageal reflux disease)     Heart failure ([facility] [address] 10/2014     Cardiomyopathy:Pacemaker upgrade:Biv and AICD  Dr. Mansi Santos heart Dr. last visit 5/11/2015    Hepatitis C Dx 1996    treated at [facility] in past; as of 4/15/15 wife states pt currently not under any treatment    Hyperlipidemia     Hypertension     Myocardial infarct [facility]) 2013    Heart Cath: 40% LV EF, Stented distal LAD, patent Graft to circumflex    On tube feeding diet approx 2009    still has as of 9/28/15  (no po food/liquid/meds at all); Dr Angela Prieot Other ill-defined conditions 1996    1 dose of chemotherapy/radiation for tongue cancer    Other ill-defined conditions     BPH    Other ill-defined conditions     orthostatic hypotension    Pneumonia ~ April -May 2010    Stroke [facility]) approx 2003    left side-left finger tips numb; no imbalance or memory loss; as of 2015 not seeing neuro MD    Suicidal thoughts      Past Surgical History:   Procedure Laterality Date    ABDOMEN SURGERY [address]    peg tube    CABG, ARTERY-VEIN, THREE  2000    CHANGE GASTROSTOMY TUBE  5/2/2011         CHANGE GASTROSTOMY TUBE  6/29/2011         HX CATARACT REMOVAL      bilateral    HX CHOLECYSTECTOMY      HX HEART CATHETERIZATION  2013    Stented distal LAD    HX MOHS PROCEDURES      bilateral    HX ORTHOPAEDIC      back surgery times two    HX OTHER SURGICAL      Radical Left Neck    HX OTHER SURGICAL      NASAL POLYPS REMOVAL    HX OTHER SURGICAL  2010    TURP    HX PACEMAKER  11/08    HX PACEMAKER  10/28/14     Defibrillator: [facility] # [id number], serial # [id number]; Dr. Prabhakar Bound [id number]; Dr Alexander Cho      cystoscopy    NEUROLOGICAL PROCEDURE UNLISTED  1996    cevical surgery    [place] EGD INSERT GUIDE WIRE DILATOR PASSAGE ESOPHAGUS  9/13/2010         [place] EGD TRANSORAL BIOPSY SINGLE/MULTIPLE  9/13/2010         STOMACH SURGERY PROCEDURE UNLISTED  6/29/2011         VASCULAR SURGERY PROCEDURE UNLIST      bilateral carotid stents       ALLERGIES:   Allergies   Allergen Reactions    Demerol [Meperidine] Shortness of Breath    Paxil [Paroxetine Hcl] Unknown (comments)     Pt gets shaky and loses control of legs    Amoxicillin Rash    Pcn [Penicillins] Rash       MEDICATIONS ON ADMISSION:     Current Outpatient Prescriptions:     guaiFENesin (ROBITUSSIN) 100 mg/5 mL liquid, Take 200 mg by mouth three (3) times daily as needed for Cough. , Disp: , Rfl:     insulin glargine (LANTUS SOLOSTAR) 100 unit/mL (3 mL) pen, 14 Units by SubCUTAneous route daily. , Disp: 5 Pen, Rfl: 11    HUMALOG KWIKPEN 100 unit/mL kwikpen, , Disp: , Rfl:     metoprolol tartrate (LOPRESSOR) 25 mg tablet, Take 12.5 mg by mouth two (2) times a day., Disp: , Rfl:     nitroglycerin (NITROSTAT) 0.4 mg SL tablet, 0.4 mg by SubLINGual route every five (5) minutes as needed for Chest Pain., Disp: , Rfl:     aspirin (ASPIRIN) 325 mg tablet, Take 325 mg by mouth daily. , Disp: , Rfl:     vit B Cmplx 3-FA-Vit C-Biotin (NEPHRO GRAYSON RX) 1- mg-mg-mcg tablet, Take 1 Tab by mouth daily. , Disp: , Rfl:     magnesium hydroxide (TOBIAS MILK OF MAGNESIA) 400 mg/5 mL suspension, Take 30 mL by mouth daily as needed for Constipation. , Disp: , Rfl:    albuterol-ipratropium (DUO-NEB) 2.5 mg-0.5 mg/3 ml nebu, 3 mL by Nebulization route every six (6) hours as needed. , Disp: 100 Nebule, Rfl: 1    HYDROcodone-acetaminophen (NORCO) 5-325 mg per tablet, 1 Tab by Per G Tube route every four (4) hours as needed. Max Daily Amount: 6 Tabs., Disp: 30 Tab, Rfl: 0    ondansetron hcl (ZOFRAN, AS HYDROCHLORIDE,) 8 mg tablet, Take 1 Tab by mouth every eight (8) hours as needed for Nausea., Disp: 20 Tab, Rfl: 0    Nebulizer & Compressor machine, 1 Each by Does Not Apply route daily. , Disp: 1 Each, Rfl: 0    midodrine (PROAMITINE) 10 mg tablet, Take 10 mg by mouth three (3) times daily (with meals). , Disp: , Rfl:     insulin syringe-needle U-100 1 mL 31 x 15/64\" syrg, 1 Syringe by SubCUTAneous route four (4) times daily. , Disp: 200 Each, Rfl: 11    Insulin Needles, Disposable, (ESPERANZA PEN NEEDLE) 32 gauge x 5/32\" ndle, 5 times a day, Disp: 150 Each, Rfl: 99    atorvastatin (LIPITOR) 40 mg tablet, Take 40 mg by mouth daily. , Disp: , Rfl:     furosemide (LASIX) 20 mg tablet, Take 1 Tab by mouth daily. Indications: HYPERCALCEMIA, Disp: 30 Tab, Rfl: 2    gabapentin (NEURONTIN) 250 mg/5 mL solution, Take 500 mg by mouth two (2) times a day., Disp: , Rfl:     multivitamin (ONE A DAY) tablet, Take 1 Tab by mouth daily. , Disp: , Rfl:     clopidogrel (PLAVIX) 75 mg tablet, Take 75 mg by mouth daily. , Disp: , Rfl:     aspirin-dipyridamole (AGGRENOX)  mg per SR capsule, Take 1 Cap by mouth two (2) times a day., Disp: 60 Cap, Rfl: 0    SOCIAL HISTORY:   Social History     Social History    Marital status:      Spouse name: N/A    Number of children: N/A    Years of education: N/A     Occupational History    Retired       He was a stained      Social History Main Topics    Smoking status: Never Smoker    Smokeless tobacco: Never Used    Alcohol use No    Drug use: No    Sexual activity: Yes     Partners: Female     Other Topics Concern    Not on file     Social History Narrative       FAMILY HISTORY:  Family History   Problem Relation Age of Onset    Heart Disease Father       at age 52 from CAD    Colon Cancer Mother     Cancer Mother      colon ca    Heart Disease Brother        REVIEW OF SYSTEMS: Complete ROS assessed and noted for that which is described above, all else are negative. Eyes: normal  ENT: normal  CVS: normal  Resp: normal  GI: normal  : normal  GYN: normal  Endocrine: normal  Integument: normal  Musculoskeletal: normal  Neuro: mild weakness  Psych: normal      PHYSICAL EXAMINATION:    VITAL SIGNS:  Visit Vitals    BP 99/60 (BP 1 Location: Left arm, BP Patient Position: Sitting)    Pulse 71    Ht 5' 7\" (1.702 m)    Wt 167 lb 9.6 oz (76 kg)    BMI 26.25 kg/m2       GENERAL: NCAT, Sitting comfortably, NAD  EYES: EOMI, non-icteric, no proptosis  Ear/Nose/Throat: NCAT, no inflammation, no masses  LYMPH NODES: No LAD  CARDIOVASCULAR: S1 S2, RRR, No murmur, 2+ radial pulses  RESPIRATORY: CTA b/l, no wheeze/rales  GASTROINTESTINAL: soft, ND  MUSCULOSKELETAL: Normal ROM, no atrophy, mild 1+ edema in lower extremities  SKIN: warm, no edema/rash/ or other skin changes  NEUROLOGIC: 4/5 power all extremities,  AAOx3  PSYCHIATRIC: Normal affect, Normal insight and judgement      REVIEW OF LABORATORY AND RADIOLOGY DATA:   Labs and documentation have been reviewed as described above. ASSESSMENT AND PLAN:   Cuong Solis is a 80 y.o. male with a PMHx as noted below who presents for f/u evaluation of uncontrolled type 2 diabetes. Type 2 diabetes, new hyperglycemia likely related to admission/infection. Hyperlipidemia  Hypertension    Hyperglycemia following breakfast, related to steroid injections  Will modify prandial doses however with likelihood that he will need to reduce dose back down in the next week.      Plan: Type 2 Diabetes  Medications:  Can TF's,   2 cans w/ Breakfast = 11 units humalog  1.5 can lunch  =  8 units humalog  1.5 can dinner =  7 units humalog  add 2 additional units to each dose for 3 days following cortisone injections q3 months  Correction Scale to 1:25>150,   Lantus 14 units in the AM  Off Januvia  Provided glucose logs for continued data review    Labs: all are up to date, A1c on f/u visit    HTN: Stable today, metoprolol 12.5mg BID  HLD: stable on statin , lipitor 40mg daily    RTC: I would like to see them back in 3 months    >25 minutes spent together with patient today of which >50% of this time was spent in counseling and coordination of care. Edward Drummond.  [facility] [facility]

## 2017-08-08 NOTE — MR AVS SNAPSHOT
Visit Information Date & Time Provider Department Dept. Phone Encounter #  
 8/8/2017  9:30 AM Jerrod Rose, [address] [facility] [phone] [id number] Follow-up Instructions Return in about 3 months (around 11/8/2017). Your Appointments 9/25/2017 11:40 AM  
Follow Up with Ruth Ann Garcia MD  
[facility] Appt Note: f/u stroke, lsw,02/16/17,$0cp  
 [address] [facility], 
[address] [place] [address], [address] [address]  
[address], [address] [facility], [address] [address] Upcoming Health Maintenance Date Due ZOSTER VACCINE AGE 60> 1/31/1995 MEDICARE YEARLY EXAM 3/31/2000 EYE EXAM RETINAL OR DILATED Q1 2/10/2017 INFLUENZA AGE 9 TO ADULT 8/1/2017 HEMOGLOBIN A1C Q6M 8/2/2017 FOOT EXAM Q1 8/11/2017 MICROALBUMIN Q1 8/11/2017 LIPID PANEL Q1 2/2/2018 GLAUCOMA SCREENING Q2Y 2/10/2018 DTaP/Tdap/Td series (2 - Td) 9/9/2025 Allergies as of 8/8/2017  Review Complete On: 8/8/2017 By: Jerrod Rose MD  
  
 Severity Noted Reaction Type Reactions Demerol [Meperidine] High 04/12/2010   Systemic Shortness of Breath Paxil [Paroxetine Hcl] High 04/12/2010   Systemic Unknown (comments) Pt gets shaky and loses control of legs Amoxicillin  01/18/2013    Rash Pcn [Penicillins]  04/12/2010    Rash Current Immunizations  Reviewed on 2/11/2013 Name Date Influenza Vaccine 10/1/2016, 10/1/2014 Influenza Vaccine (Quad) PF 10/7/2015 11:20 AM  
 Influenza Vaccine Split 9/24/2012 Influenza Vaccine Whole 12/3/2009 Pneumococcal Polysaccharide (PPSV-23) 10/7/2015 11:22 AM  
 TD Vaccine 12/1/2011  6:03 PM  
 Tdap 9/9/2015  7:38 AM  
 ZZZ-RETIRED (DO NOT USE) Pneumococcal Vaccine (Unspecified Type) 12/3/2007 Not reviewed this visit You Were Diagnosed With   
  
 Codes Comments Type 2 diabetes mellitus with other diabetic neurological complication (HCC)    -  Primary ICD-10-CM: E11.49 
ICD-9-CM: 250.60 Hyperlipidemia, unspecified hyperlipidemia type     ICD-10-CM: E78.5 ICD-9-CM: 272.4 Essential hypertension with goal blood pressure less than 130/80     ICD-10-CM: I10 
ICD-9-CM: 401.9 Vitals BP Pulse Height(growth percentile) Weight(growth percentile) BMI Smoking Status 99/60 (BP 1 Location: Left arm, BP Patient Position: Sitting) 71 5' 7\" (1.702 m) 167 lb 9.6 oz (76 kg) 26.25 kg/m2 Never Smoker BMI and BSA Data Body Mass Index Body Surface Area  
 26.25 kg/m 2 1.9 m 2 Preferred Pharmacy Pharmacy Name Phone [facility] [address], [address] [place] [address] Avenue [phone] Your Updated Medication List  
  
   
This list is accurate as of: 8/8/17  9:55 AM.  Always use your most recent med list.  
  
  
  
  
 albuterol-ipratropium 2.5 mg-0.5 mg/3 ml Nebu Commonly known as:  DUO-NEB  
3 mL by Nebulization route every six (6) hours as needed. aspirin 325 mg tablet Commonly known as:  ASPIRIN Take 325 mg by mouth daily. atorvastatin 40 mg tablet Commonly known as:  LIPITOR Take 40 mg by mouth daily. furosemide 20 mg tablet Commonly known as:  LASIX Take 1 Tab by mouth daily. Indications: HYPERCALCEMIA  
  
 gabapentin 250 mg/5 mL solution Commonly known as:  NEURONTIN Take 500 mg by mouth two (2) times a day. guaiFENesin 100 mg/5 mL liquid Commonly known as:  ROBITUSSIN Take 200 mg by mouth three (3) times daily as needed for Cough. HumaLOG KwikPen 100 unit/mL kwikpen Generic drug:  insulin lispro HYDROcodone-acetaminophen 5-325 mg per tablet Commonly known as:  [place] Collar  
1 Tab by Per G Tube route every four (4) hours as needed. Max Daily Amount: 6 Tabs. insulin glargine 100 unit/mL (3 mL) Inpn Commonly known as:  Naomi Hope  
 14 Units by SubCUTAneous route daily. Insulin Needles (Disposable) 32 gauge x 5/32\" Ndle Commonly known as:  Elise Pen Needle 5 times a day  
  
 insulin syringe-needle U-100 1 mL 31 gauge x 15/64\" Syrg 1 Syringe by SubCUTAneous route four (4) times daily. metoprolol tartrate 25 mg tablet Commonly known as:  LOPRESSOR Take 12.5 mg by mouth two (2) times a day. midodrine 10 mg tablet Commonly known as:  Meghann Grand Take 10 mg by mouth three (3) times daily (with meals). multivitamin tablet Commonly known as:  ONE A DAY Take 1 Tab by mouth daily. Nebulizer & Compressor machine 1 Each by Does Not Apply route daily. nitroglycerin 0.4 mg SL tablet Commonly known as:  NITROSTAT  
0.4 mg by SubLINGual route every five (5) minutes as needed for Chest Pain. ondansetron hcl 8 mg tablet Commonly known as:  ZOFRAN (AS HYDROCHLORIDE) Take 1 Tab by mouth every eight (8) hours as needed for Nausea. TOBIAS MILK OF MAGNESIA 400 mg/5 mL suspension Generic drug:  magnesium hydroxide Take 30 mL by mouth daily as needed for Constipation. PLAVIX 75 mg Tab Generic drug:  clopidogrel Take 75 mg by mouth daily. vit B Cmplx 3-FA-Vit C-Biotin 1- mg-mg-mcg tablet Commonly known as:  NEPHRO GRAYSON RX Take 1 Tab by mouth daily. We Performed the Following AMB POC HEMOGLOBIN A1C [60767 CPT(R)] MICROALBUMIN, UR, RAND W/ MICROALBUMIN/CREA RATIO [id number] CPT(R)] Follow-up Instructions Return in about 3 months (around 11/8/2017). Patient Instructions 2 cans w/ Breakfast = 11 units humalog 1.5 can lunch  =  8 units humalog 1.5 can dinner =  7 units humalog 
add 2 additional units to each dose for 3 days following cortisone injections q3 months Correction Scale  1:25>150 Currently you are taking insulin with your meals.  As we have discussed it is important to always check your glucose level just before taking your mealtime insulin dose. Sometimes before eating your meal, your glucose level is already much higher than the goal and so you may require a few units of extra insulin. This is in addition to the scheduled dose that you plan to take for the meal. Using the scale below, add the amount of extra insulin you need to the dose you already plan to take and inject together as one injection. As always continue to monitor your glucose afterward to assure recovery of your glucose levels. IF GLUCOSE IS:                 THEN TAKE: 
    0   Extra Unit 151-175   1   Extra Unit 
176-200   2   Extra Units 069-241   3   Extra Units 226-250   4   Extra Units 251-275   5   Extra Units 276-300   6   Extra Units 074-666   7   Extra Units 
 
---------------------------------------------------------------------------------------------------------------------- Below you will find a glucose log sheet which you can use to record your blood sugars. Without checking and recording what your home glucose levels are, it will be difficult to make any changes to your medication dose, even when significant changes may be needed. Please feel free to use the log below to record your home glucose levels. At the very least, I would like for you to login the entire 2-3 weeks just before your visit so we can make your visit much more productive and beneficial to you. GLUCOSE LOG SHEET: 
 
Date Breakfast Lunch Dinner Bedtime Comments ? GLUCOSE LOG SHEET: 
 
Date Breakfast Lunch Dinner Bedtime Comments ? GLUCOSE LOG SHEET: 
 
Date Breakfast Lunch Dinner Bedtime Comments ? Introducing [facility]! Rony Clarissa introduces [employer] patient portal. Now you can access parts of your medical record, email your doctor's office, and request medication refills online. 1. In your internet browser, go to [email or web address] 2. Click on the First Time User? Click Here link in the Sign In box. You will see the New Member Sign Up page. 3. Enter your [employer] Access Code exactly as it appears below. You will not need to use this code after youve completed the sign-up process. If you do not sign up before the expiration date, you must request a new code. · [employer] Access Code: [email or web address] 
Expires: 11/6/2017  9:54 AM 
 
4. Enter the last four digits of your Social Security Number (xxxx) and Date of Birth (mm/dd/yyyy) as indicated and click Submit. You will be taken to the next sign-up page. 5. Create a [employer] ID. This will be your [employer] login ID and cannot be changed, so think of one that is secure and easy to remember. 6. Create a [employer] password. You can change your password at any time. 7. Enter your Password Reset Question and Answer. This can be used at a later time if you forget your password. 8. Enter your e-mail address. You will receive e-mail notification when new information is available in [address] 9. Click Sign Up. You can now view and download portions of your medical record. 10. Click the Download Summary menu link to download a portable copy of your medical information. If you have questions, please visit the Frequently Asked Questions section of the [employer] website. Remember, [employer] is NOT to be used for urgent needs. For medical emergencies, dial 911. Now available from your iPhone and Android! Please provide this summary of care documentation to your next provider. Your primary care clinician is listed as Meghann De Guzman. If you have any questions after today's visit, please call [phone].

## 2017-08-09 LAB
ALBUMIN/CREAT UR: 62.2 MG/G CREAT (ref 0–30)
CREAT UR-MCNC: 136.3 MG/DL
MICROALBUMIN UR-MCNC: 84.8 UG/ML

## 2017-08-22 DIAGNOSIS — I10 ESSENTIAL HYPERTENSION WITH GOAL BLOOD PRESSURE LESS THAN 140/90: ICD-10-CM

## 2017-08-22 RX ORDER — INSULIN LISPRO 100 [IU]/ML
INJECTION, SOLUTION INTRAVENOUS; SUBCUTANEOUS
Qty: 5 PEN | Refills: 7 | Status: SHIPPED | OUTPATIENT
Start: 2017-08-22 | End: 2018-01-01 | Stop reason: SDUPTHER

## 2017-11-09 ENCOUNTER — OFFICE VISIT (OUTPATIENT)
Dept: ENDOCRINOLOGY | Age: 82
End: 2017-11-09

## 2017-11-09 VITALS
DIASTOLIC BLOOD PRESSURE: 59 MMHG | SYSTOLIC BLOOD PRESSURE: 104 MMHG | HEART RATE: 71 BPM | BODY MASS INDEX: 26.7 KG/M2 | HEIGHT: 67 IN | WEIGHT: 170.1 LBS

## 2017-11-09 DIAGNOSIS — E78.5 HYPERLIPIDEMIA, UNSPECIFIED HYPERLIPIDEMIA TYPE: ICD-10-CM

## 2017-11-09 DIAGNOSIS — I10 ESSENTIAL HYPERTENSION WITH GOAL BLOOD PRESSURE LESS THAN 130/80: ICD-10-CM

## 2017-11-09 LAB — HBA1C MFR BLD HPLC: 6.5 %

## 2017-11-09 RX ORDER — LIDOCAINE HYDROCHLORIDE 20 MG/ML
SOLUTION OROPHARYNGEAL
COMMUNITY
Start: 2017-09-19 | End: 2018-01-01 | Stop reason: CLARIF

## 2017-11-09 RX ORDER — TRAMADOL HYDROCHLORIDE 50 MG/1
TABLET ORAL
COMMUNITY
Start: 2017-08-22 | End: 2018-01-01 | Stop reason: ALTCHOICE

## 2017-11-09 NOTE — PATIENT INSTRUCTIONS
Can TF's,   2 cans w/ Breakfast = 11 units humalog  1.5 can lunch  =  8 units humalog  1.5 can dinner =  7 units humalog  add 2 additional units to each dose for 3 days following cortisone injections q3 months  Correction Scale to 1:25>150,   Lantus 14 units in the AM  Off Januvia    ----------------------------------------------------------------------------------------------------------------------    Below you will find a glucose log sheet which you can use to record your blood sugars. Without checking and recording what your home glucose levels are, it will be difficult to make any changes to your medication dose, even when significant changes may be needed. Please feel free to use the log below to record your home glucose levels. At the very least, I would like for you to login the entire 2-3 weeks just before your visit so we can make your visit much more productive and beneficial to you. GLUCOSE LOG SHEET:    Date Breakfast Lunch Dinner Bedtime Comments ? GLUCOSE LOG SHEET:    Date Breakfast Lunch Dinner Bedtime Comments ? GLUCOSE LOG SHEET:    Date Breakfast Lunch Dinner Bedtime Comments ? ----------------------------------------------------------------------------------------------------------------------    Below you will find a glucose log sheet which you can use to record your blood sugars. Without checking and recording what your home glucose levels are, it will be difficult to make any changes to your medication dose, even when significant changes may be needed. Please feel free to use the log below to record your home glucose levels. At the very least, I would like for you to login the entire 2-3 weeks just before your visit so we can make your visit much more productive and beneficial to you. GLUCOSE LOG SHEET:    Date Breakfast Lunch Dinner Bedtime Comments ? GLUCOSE LOG SHEET:    Date Breakfast Lunch Dinner Bedtime Comments ? GLUCOSE LOG SHEET:    Date Breakfast Lunch Dinner Bedtime Comments ?

## 2017-11-09 NOTE — MR AVS SNAPSHOT
Visit Information Date & Time Provider Department Dept. Phone Encounter #  
 11/9/2017  9:30 AM Erica Clayton, [address] [facility] [phone] Follow-up Instructions Return in about 4 months (around 3/9/2018). Upcoming Health Maintenance Date Due ZOSTER VACCINE AGE 60> 1/31/1995 MEDICARE YEARLY EXAM 3/31/2000 EYE EXAM RETINAL OR DILATED Q1 2/10/2017 Influenza Age 5 to Adult 8/1/2017 LIPID PANEL Q1 2/2/2018 HEMOGLOBIN A1C Q6M 2/8/2018 GLAUCOMA SCREENING Q2Y 2/10/2018 MICROALBUMIN Q1 8/8/2018 FOOT EXAM Q1 11/9/2018 DTaP/Tdap/Td series (2 - Td) 9/9/2025 Allergies as of 11/9/2017  Review Complete On: 11/9/2017 By: Julio C Rich Severity Noted Reaction Type Reactions Demerol [Meperidine] High 04/12/2010   Systemic Shortness of Breath Paxil [Paroxetine Hcl] High 04/12/2010   Systemic Unknown (comments) Pt gets shaky and loses control of legs Amoxicillin  01/18/2013    Rash Pcn [Penicillins]  04/12/2010    Rash Current Immunizations  Reviewed on 2/11/2013 Name Date Influenza Vaccine 10/1/2016, 10/1/2014 Influenza Vaccine (Quad) PF 10/7/2015 11:20 AM  
 Influenza Vaccine Split 9/24/2012 Influenza Vaccine Whole 12/3/2009 Pneumococcal Polysaccharide (PPSV-23) 10/7/2015 11:22 AM  
 TD Vaccine 12/1/2011  6:03 PM  
 Tdap 9/9/2015  7:38 AM  
 ZZZ-RETIRED (DO NOT USE) Pneumococcal Vaccine (Unspecified Type) 12/3/2007 Not reviewed this visit You Were Diagnosed With   
  
 Codes Comments Uncontrolled type 2 diabetes mellitus with diabetic neuropathy, with long-term current use of insulin ([facility]    -  Primary ICD-10-CM: E11.40, Z79.4, E11.65 ICD-9-CM: 250.62, 357.2, V58.67 Hyperlipidemia, unspecified hyperlipidemia type     ICD-10-CM: E78.5 ICD-9-CM: 272.4 Essential hypertension with goal blood pressure less than 130/80     ICD-10-CM: I10 
ICD-9-CM: 401.9 Vitals BP Pulse Height(growth percentile) Weight(growth percentile) BMI Smoking Status 104/59 (BP 1 Location: Left arm, BP Patient Position: Sitting) 71 5' 7\" (1.702 m) 170 lb 1.6 oz (77.2 kg) 26.64 kg/m2 Never Smoker BMI and BSA Data Body Mass Index Body Surface Area  
 26.64 kg/m 2 1.91 m 2 Preferred Pharmacy Pharmacy Name Phone [facility] [address], [address] [place] [address] Avenue [phone] Your Updated Medication List  
  
   
This list is accurate as of: 11/9/17  9:48 AM.  Always use your most recent med list.  
  
  
  
  
 albuterol-ipratropium 2.5 mg-0.5 mg/3 ml Nebu Commonly known as:  DUO-NEB  
3 mL by Nebulization route every six (6) hours as needed. aspirin 325 mg tablet Commonly known as:  ASPIRIN Take 325 mg by mouth daily. atorvastatin 40 mg tablet Commonly known as:  LIPITOR Take 40 mg by mouth daily. furosemide 20 mg tablet Commonly known as:  LASIX Take 1 Tab by mouth daily. Indications: HYPERCALCEMIA  
  
 gabapentin 250 mg/5 mL solution Commonly known as:  NEURONTIN Take 500 mg by mouth three (3) times daily. guaiFENesin 100 mg/5 mL liquid Commonly known as:  ROBITUSSIN Take 200 mg by mouth three (3) times daily as needed for Cough. HumaLOG KwikPen 100 unit/mL kwikpen Generic drug:  insulin lispro INJECT 9 UNITS WITH BREAKFAST, 7 UNITS WITH LUNCH, 7 UNITS WITH DINNER AND 7 UNITS AT BEDTIME TUBE FEEDS HYDROcodone-acetaminophen 5-325 mg per tablet Commonly known as:  Barnet Cuff  
1 Tab by Per G Tube route every four (4) hours as needed. Max Daily Amount: 6 Tabs. insulin glargine 100 unit/mL (3 mL) Inpn Commonly known as:  LANTUS,BASAGLAR  
14 Units by SubCUTAneous route daily. Insulin Needles (Disposable) 32 gauge x 5/32\" Ndle Commonly known as:  Elise Pen Needle 5 times a day  
  
 insulin syringe-needle U-100 1 mL 31 gauge x 15/64\" Syrg 1 Syringe by SubCUTAneous route four (4) times daily. LIDOCAINE VISCOUS 2 % solution Generic drug:  lidocaine  
  
 metoprolol tartrate 25 mg tablet Commonly known as:  LOPRESSOR Take 12.5 mg by mouth two (2) times a day. midodrine 10 mg tablet Commonly known as:  Nakia Clos Take 10 mg by mouth three (3) times daily (with meals). multivitamin tablet Commonly known as:  ONE A DAY Take 1 Tab by mouth daily. Nebulizer & Compressor machine 1 Each by Does Not Apply route daily. nitroglycerin 0.4 mg SL tablet Commonly known as:  NITROSTAT  
0.4 mg by SubLINGual route every five (5) minutes as needed for Chest Pain. ondansetron hcl 8 mg tablet Commonly known as:  ZOFRAN (AS HYDROCHLORIDE) Take 1 Tab by mouth every eight (8) hours as needed for Nausea. TOBIAS MILK OF MAGNESIA 400 mg/5 mL suspension Generic drug:  magnesium hydroxide Take 30 mL by mouth daily as needed for Constipation. PLAVIX 75 mg Tab Generic drug:  clopidogrel Take 75 mg by mouth daily. traMADol 50 mg tablet Commonly known as:  ULTRAM  
  
 vit B Cmplx 3-FA-Vit C-Biotin 1- mg-mg-mcg tablet Commonly known as:  NEPHRO GRAYSON RX Take 1 Tab by mouth daily. We Performed the Following AMB POC HEMOGLOBIN A1C [89602 CPT(R)] Follow-up Instructions Return in about 4 months (around 3/9/2018). Patient Instructions Can TF's,  
2 cans w/ Breakfast = 11 units humalog 1.5 can lunch  =  8 units humalog 1.5 can dinner =  7 units humalog 
add 2 additional units to each dose for 3 days following cortisone injections q3 months Correction Scale to 1:25>150, Lantus 14 units in the AM 
Off Januvia 
 
---------------------------------------------------------------------------------------------------------------------- Below you will find a glucose log sheet which you can use to record your blood sugars. Without checking and recording what your home glucose levels are, it will be difficult to make any changes to your medication dose, even when significant changes may be needed. Please feel free to use the log below to record your home glucose levels. At the very least, I would like for you to login the entire 2-3 weeks just before your visit so we can make your visit much more productive and beneficial to you. GLUCOSE LOG SHEET: 
 
Date Breakfast Lunch Dinner Bedtime Comments ? GLUCOSE LOG SHEET: 
 
Date Breakfast Lunch Dinner Bedtime Comments ? GLUCOSE LOG SHEET: 
 
Date Breakfast Lunch Dinner Bedtime Comments ?  
       
       
       
       
       
       
       
       
       
       
       
       
       
       
       
       
       
       
       
       
       
       
       
       
       
       
       
       
       
 
---------------------------------------------------------------------------------------------------------------------- Below you will find a glucose log sheet which you can use to record your blood sugars. Without checking and recording what your home glucose levels are, it will be difficult to make any changes to your medication dose, even when significant changes may be needed. Please feel free to use the log below to record your home glucose levels.  At the very least, I would like for you to login the entire 2-3 weeks just before your visit so we can make your visit much more productive and beneficial to you. GLUCOSE LOG SHEET: 
 
Date Breakfast Lunch Dinner Bedtime Comments ? GLUCOSE LOG SHEET: 
 
Date Breakfast Lunch Dinner Bedtime Comments ? GLUCOSE LOG SHEET: 
 
Date Breakfast Lunch Dinner Bedtime Comments ? Introducing [facility]! Ned Mendoza introduces [employer] patient portal. Now you can access parts of your medical record, email your doctor's office, and request medication refills online. 1. In your internet browser, go to [email or web address] 2. Click on the First Time User? Click Here link in the Sign In box. You will see the New Member Sign Up page. 3. Enter your [employer] Access Code exactly as it appears below. You will not need to use this code after youve completed the sign-up process. If you do not sign up before the expiration date, you must request a new code. · [employer] Access Code: [email or web address] Expires: 2/7/2018  9:25 AM 
 
4. Enter the last four digits of your Social Security Number (xxxx) and Date of Birth (mm/dd/yyyy) as indicated and click Submit. You will be taken to the next sign-up page. 5. Create a [employer] ID.  This will be your [employer] login ID and cannot be changed, so think of one that is secure and easy to remember. 6. Create a [employer] password. You can change your password at any time. 7. Enter your Password Reset Question and Answer. This can be used at a later time if you forget your password. 8. Enter your e-mail address. You will receive e-mail notification when new information is available in [address] 9. Click Sign Up. You can now view and download portions of your medical record. 10. Click the Download Summary menu link to download a portable copy of your medical information. If you have questions, please visit the Frequently Asked Questions section of the [employer] website. Remember, [employer] is NOT to be used for urgent needs. For medical emergencies, dial 911. Now available from your iPhone and Android! Please provide this summary of care documentation to your next provider. Your primary care clinician is listed as Meagan Wagner. If you have any questions after today's visit, please call [phone].

## 2017-11-09 NOTE — PROGRESS NOTES
CHIEF COMPLAINT: f/u for type 2 diabetes    HISTORY OF PRESENT ILLNESS:   Aryan Begum is a 80 y.o. male with a PMHx as noted below who presents for f/u evaluation of type 2 diabetes. Diabetes History:  History obtained from both patient and his wife who is present:Patient presents today to establish care for his diabetes. He was previously managed by endocrinologist Dr. Melissa De Los Santos, however would like to be treated closer to home. He notably had a recent stroke for which he had been admitted. Currently he ambulates with a wheelchair. He is present with his wife today for evaluation. Patients co-morbidities include multiple CVA, HTN, Coronary disease, and CHF with an AICD. He is on tube feeds as noted below. Diabetes was diagnosed in early 2015. Family History of diabetes is negative. Last A1c prior to initial visit was 6.6% on July 31st.   Regimen at time of establishing care includes   - Januvia 50mg daily, crushed   - Lantus 14U qAM   - Humalog nursing home schedule: 6U 4x/day on iso-source 1.5. Also on 2:50>150 correction sliding scale. 1.5 cans breakfast, 1 can with lunch and dinner, 1.5 cans with dinner. Reportedly 44Gm carbs/can suggesting 1:7 carb ratio per can    INTERVAL HISTORY:  Patient received a cortisone injection which resulted in hyperglycemia. Had some changes in TF volumes but this has come back to his baseline as noted below. 5 total cans, 2 with breakfast, 1.5 with lunch and dinner.          Regimen:   Can TF's,   2 cans w/ Breakfast = 11 units humalog  1.5 can lunch  =  8 units humalog  1.5 can dinner =  7 units humalog  add 2 additional units to each dose for 3 days following cortisone injections q3 months  Correction Scale to 1:25>150,   Lantus 14 units in the AM  Off Januvia    Review of home glucose:  AM     mostly, no lows  Lunch    120-150 mostly, no lows  Dinner  120-150 mostly, no lows      On a few days he had sugars in the 150-200 range following cortisone injection, improved in 2-3 days    Review of most recent diabetes-related labs:  Lab Results   Component Value Date    HBA1C 6.5 (H) 02/02/2017    HBA1C 6.4 (H) 11/30/2016    HBA1C 6.7 (H) 11/10/2016    QID4QXDB 7.6 08/08/2017    WLO6QFJC 5.5 05/10/2016    GZL6OXVA 6.9 02/09/2016    CHOL 129 02/02/2017    LDLC 67.6 02/02/2017    GFRAA >60 02/04/2017    GFRNA >60 02/04/2017    MCACR 62.2 (H) 08/08/2017    TSH 1.45 02/03/2017     091126 = IA-2 pancreatic islet cell autoantibody  GADLT = JOVANY-65 autoantibody   MCACR = Urine Microalbumin                    PAST MEDICAL/SURGICAL HISTORY:   Past Medical History:   Diagnosis Date    Antiplatelet or antithrombotic long-term use 12/4/2014    Anxiety disorder     Arrhythmia 2009    bradycardia    Arthritis     CAD (coronary artery disease)     s/p CABG 2002; Dr Janee Snowden [facility]) 1996    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 I suspect the latter. Achalasia  is not present.         GERD (gastroesophageal reflux disease)     Heart failure ([address] 10/2014     Cardiomyopathy:Pacemaker upgrade:Biv and AICD  Dr. Kwon Rockledge heart  last visit 5/11/2015    Hepatitis C Dx 1996    treated at [facility] in past; as of 4/15/15 wife states pt currently not under any treatment    Hyperlipidemia     Hypertension     Myocardial infarct 2013    Heart Cath: 40% LV EF, Stented distal LAD, patent Graft to circumflex    On tube feeding diet approx 2009    still has as of 9/28/15  (no po food/liquid/meds at all); Dr Willie Rachel Other ill-defined conditions(799.89) 1996    1 dose of chemotherapy/radiation for tongue cancer    Other ill-defined conditions(799.89)     BPH    Other ill-defined conditions(799.89)     orthostatic hypotension    Pneumonia ~ April -May 2010    Stroke [facility]) approx 2003    left side-left finger tips numb; no imbalance or memory loss; as of 2015 not seeing neuro MD    Suicidal thoughts      Past Surgical History:   Procedure Laterality Date    ABDOMEN SURGERY [address]    peg tube    CABG, ARTERY-VEIN, THREE  2000    CHANGE GASTROSTOMY TUBE  5/2/2011         CHANGE GASTROSTOMY TUBE  6/29/2011         HX CATARACT REMOVAL      bilateral    HX CHOLECYSTECTOMY      HX HEART CATHETERIZATION  2013    Stented distal LAD    HX MOHS PROCEDURES      bilateral    HX ORTHOPAEDIC      back surgery times two    HX OTHER SURGICAL      Radical Left Neck    HX OTHER SURGICAL      NASAL POLYPS REMOVAL    HX OTHER SURGICAL  2010    TURP    HX PACEMAKER  11/08    HX PACEMAKER  10/28/14     Defibrillator: [facility] # [id number], serial # [id number]; Dr. Li Saeed [phone]; Dr Rachael Resendez HX UROLOGICAL      cystoscopy    NEUROLOGICAL PROCEDURE UNLISTED  1996    cevical surgery    [place] E[place] INSERT GUIDE [address]  9/13/2010         [place] E[place] TRANSORAL BIOPSY SINGLE/MULTIPLE  9/13/2010         STOMACH SURGERY PROCEDURE UNLISTED  6/29/2011         VASCULAR SURGERY PROCEDURE UNLIST      bilateral carotid stents       ALLERGIES:   Allergies   Allergen Reactions    Demerol [Meperidine] Shortness of Breath    Paxil [Paroxetine Hcl] Unknown (comments)     Pt gets shaky and loses control of legs    Amoxicillin Rash    Pcn [Penicillins] Rash       MEDICATIONS ON ADMISSION:     Current Outpatient Prescriptions:     lidocaine (LIDOCAINE VISCOUS) 2 % solution, , Disp: , Rfl:     traMADol (ULTRAM) 50 mg tablet, , Disp: , Rfl:     HUMALOG KWIKPEN 100 unit/mL kwikpen, INJECT 9 UNITS WITH BREAKFAST, 7 UNITS WITH LUNCH, 7 UNITS WITH DINNER AND 7 UNITS AT BEDTIME TUBE FEEDS, Disp: 5 Pen, Rfl: 7    guaiFENesin (ROBITUSSIN) 100 mg/5 mL liquid, Take 200 mg by mouth three (3) times daily as needed for Cough. , Disp: , Rfl:     insulin glargine (LANTUS SOLOSTAR) 100 unit/mL (3 mL) pen, 14 Units by SubCUTAneous route daily. , Disp: 5 Pen, Rfl: 11    metoprolol tartrate (LOPRESSOR) 25 mg tablet, Take 12.5 mg by mouth two (2) times a day., Disp: , Rfl:     aspirin (ASPIRIN) 325 mg tablet, Take 325 mg by mouth daily. , Disp: , Rfl:     vit B Cmplx 3-FA-Vit C-Biotin (NEPHRO GRAYSON RX) 1- mg-mg-mcg tablet, Take 1 Tab by mouth daily. , Disp: , Rfl:     albuterol-ipratropium (DUO-NEB) 2.5 mg-0.5 mg/3 ml nebu, 3 mL by Nebulization route every six (6) hours as needed. , Disp: 100 Nebule, Rfl: 1    HYDROcodone-acetaminophen (NORCO) 5-325 mg per tablet, 1 Tab by Per G Tube route every four (4) hours as needed. Max Daily Amount: 6 Tabs., Disp: 30 Tab, Rfl: 0    ondansetron hcl (ZOFRAN, AS HYDROCHLORIDE,) 8 mg tablet, Take 1 Tab by mouth every eight (8) hours as needed for Nausea., Disp: 20 Tab, Rfl: 0    Nebulizer & Compressor machine, 1 Each by Does Not Apply route daily. , Disp: 1 Each, Rfl: 0    midodrine (PROAMITINE) 10 mg tablet, Take 10 mg by mouth three (3) times daily (with meals). , Disp: , Rfl:     insulin syringe-needle U-100 1 mL 31 x 15/64\" syrg, 1 Syringe by SubCUTAneous route four (4) times daily. , Disp: 200 Each, Rfl: 11    Insulin Needles, Disposable, (ESPERANZA PEN NEEDLE) 32 gauge x 5/32\" ndle, 5 times a day, Disp: 150 Each, Rfl: 99    atorvastatin (LIPITOR) 40 mg tablet, Take 40 mg by mouth daily. , Disp: , Rfl:     furosemide (LASIX) 20 mg tablet, Take 1 Tab by mouth daily. Indications: HYPERCALCEMIA, Disp: 30 Tab, Rfl: 2    gabapentin (NEURONTIN) 250 mg/5 mL solution, Take 500 mg by mouth three (3) times daily. , Disp: , Rfl:     multivitamin (ONE A DAY) tablet, Take 1 Tab by mouth daily. , Disp: , Rfl:     clopidogrel (PLAVIX) 75 mg tablet, Take 75 mg by mouth daily. , Disp: , Rfl:     nitroglycerin (NITROSTAT) 0.4 mg SL tablet, 0.4 mg by SubLINGual route every five (5) minutes as needed for Chest Pain., Disp: , Rfl:     magnesium hydroxide (TOBIAS MILK OF MAGNESIA) 400 mg/5 mL suspension, Take 30 mL by mouth daily as needed for Constipation. , Disp: , Rfl:     SOCIAL HISTORY:   Social History     Social History    Marital status:      Spouse name: N/A    Number of children: N/A    Years of education: N/A     Occupational History    Retired       He was a stained      Social History Main Topics    Smoking status: Never Smoker    Smokeless tobacco: Never Used    Alcohol use No    Drug use: No    Sexual activity: Yes     Partners: Female     Other Topics Concern    Not on file     Social History Narrative       FAMILY HISTORY:  Family History   Problem Relation Age of Onset    Heart Disease Father       at age 52 from CAD    Colon Cancer Mother     Cancer Mother      colon ca    Heart Disease Brother        REVIEW OF SYSTEMS: Complete ROS assessed and noted for that which is described above, all else are negative.   Eyes: normal  ENT: normal  CVS: normal  Resp: normal  GI: normal  : normal  GYN: normal  Endocrine: normal  Integument: normal  Musculoskeletal: normal  Neuro: mild weakness  Psych: normal      PHYSICAL EXAMINATION:    VITAL SIGNS:  Visit Vitals    /59 (BP 1 Location: Left arm, BP Patient Position: Sitting)    Pulse 71    Ht 5' 7\" (1.702 m)    Wt 170 lb 1.6 oz (77.2 kg)    BMI 26.64 kg/m2       GENERAL: NCAT, Sitting comfortably, NAD  EYES: EOMI, non-icteric, no proptosis  Ear/Nose/Throat: NCAT, no inflammation, no masses  LYMPH NODES: No LAD  CARDIOVASCULAR: S1 S2, RRR, No murmur, 2+ radial pulses  RESPIRATORY: CTA b/l, no wheeze/rales  GASTROINTESTINAL: soft, ND  MUSCULOSKELETAL: Normal ROM, no atrophy, mild 1+ edema in lower extremities  SKIN: warm, no edema/rash/ or other skin changes  NEUROLOGIC: 4/5 power all extremities,  AAOx3  PSYCHIATRIC: Normal affect, Normal insight and judgement      REVIEW OF LABORATORY AND RADIOLOGY DATA:   Labs and documentation have been reviewed as described above. ASSESSMENT AND PLAN:   Marilu Kraft is a 80 y.o. male with a PMHx as noted below who presents for f/u evaluation of uncontrolled type 2 diabetes. Type 2 diabetes, new hyperglycemia likely related to admission/infection. Hyperlipidemia  Hypertension    A1c remains just under 7% and he is doing well. Offered foot exam today but they insisted that it will be completed by their podiatrist tomorrow. Patient is feeling well. Regimen is working well for him. It seems that intermittent cortisone injections will be a long term issue, but fortunately only affects his readings 2-3 days at the most. He continues on the same TF plan and no plans for change at this time. Keeping his weight stable though picked up 5 pounds over the year, currently 170 lb's. Plan: Type 2 Diabetes  Medications:  Can TF's,   2 cans w/ Breakfast = 11 units humalog  1.5 can lunch  =  8 units humalog  1.5 can dinner =  7 units humalog  add 2 additional units to each dose for 3 days following cortisone injections q3 months  Correction Scale to 1:25>150,   Lantus 14 units in the AM  Off Januvia  Provided glucose logs for continued data review    Labs: new DM labs in 2018    HTN: Stable today, metoprolol 12.5mg BID  HLD: stable on statin , lipitor 40mg daily    RTC: I would like to see them back in 4 months    >25 minutes spent together with patient today of which >50% of this time was spent in counseling and coordination of care. Carlo Jacinto.  [address] [facility]

## 2017-11-27 ENCOUNTER — TELEPHONE (OUTPATIENT)
Dept: ENDOCRINOLOGY | Age: 82
End: 2017-11-27

## 2017-11-27 NOTE — TELEPHONE ENCOUNTER
I returned Cathy's call and she was just trying to see how much Humalog Logan Ku would use in a month as she is calling around to different insurance company's to see how much a month supply will cost next year.    Matthew Tejada

## 2017-11-27 NOTE — TELEPHONE ENCOUNTER
Patient's wife, Shell Tran, called to ask about the maximum amount of insulin she is allowed to order for her . She can be reached at:   [phone].

## 2018-01-01 ENCOUNTER — APPOINTMENT (OUTPATIENT)
Dept: GENERAL RADIOLOGY | Age: 83
DRG: 871 | End: 2018-01-01
Attending: EMERGENCY MEDICINE
Payer: MEDICARE

## 2018-01-01 ENCOUNTER — OFFICE VISIT (OUTPATIENT)
Dept: NEUROLOGY | Age: 83
End: 2018-01-01

## 2018-01-01 ENCOUNTER — TELEPHONE (OUTPATIENT)
Dept: NEUROLOGY | Age: 83
End: 2018-01-01

## 2018-01-01 ENCOUNTER — HOME CARE VISIT (OUTPATIENT)
Dept: SCHEDULING | Facility: HOME HEALTH | Age: 83
End: 2018-01-01
Payer: MEDICARE

## 2018-01-01 ENCOUNTER — PATIENT OUTREACH (OUTPATIENT)
Dept: CASE MANAGEMENT | Age: 83
End: 2018-01-01

## 2018-01-01 ENCOUNTER — TELEPHONE (OUTPATIENT)
Dept: ENDOCRINOLOGY | Age: 83
End: 2018-01-01

## 2018-01-01 ENCOUNTER — APPOINTMENT (OUTPATIENT)
Dept: CT IMAGING | Age: 83
DRG: 291 | End: 2018-01-01
Attending: EMERGENCY MEDICINE
Payer: MEDICARE

## 2018-01-01 ENCOUNTER — APPOINTMENT (OUTPATIENT)
Dept: GENERAL RADIOLOGY | Age: 83
DRG: 871 | End: 2018-01-01
Attending: FAMILY MEDICINE
Payer: MEDICARE

## 2018-01-01 ENCOUNTER — HOSPITAL ENCOUNTER (OUTPATIENT)
Dept: NEUROLOGY | Age: 83
Discharge: HOME | End: 2018-08-07
Attending: PSYCHIATRY & NEUROLOGY
Payer: MEDICARE

## 2018-01-01 ENCOUNTER — PATIENT OUTREACH (OUTPATIENT)
Dept: CARDIOLOGY CLINIC | Age: 83
End: 2018-01-01

## 2018-01-01 ENCOUNTER — HOSPITAL ENCOUNTER (INPATIENT)
Age: 83
LOS: 4 days | Discharge: NURSING, REHAB OR LONG-TERM CARE | DRG: 291 | End: 2018-05-11
Attending: EMERGENCY MEDICINE | Admitting: INTERNAL MEDICINE
Payer: MEDICARE

## 2018-01-01 ENCOUNTER — HOME HEALTH ADMISSION (OUTPATIENT)
Dept: HOME HEALTH SERVICES | Facility: HOME HEALTH | Age: 83
End: 2018-01-01
Payer: MEDICARE

## 2018-01-01 ENCOUNTER — HOME HEALTH ADMISSION (OUTPATIENT)
Dept: HOME HEALTH SERVICES | Facility: HOME HEALTH | Age: 83
End: 2018-01-01

## 2018-01-01 ENCOUNTER — HOSPITAL ENCOUNTER (OUTPATIENT)
Age: 83
Setting detail: OUTPATIENT SURGERY
Discharge: HOME | End: 2018-08-17
Attending: SPECIALIST | Admitting: SPECIALIST
Payer: MEDICARE

## 2018-01-01 ENCOUNTER — APPOINTMENT (OUTPATIENT)
Dept: GENERAL RADIOLOGY | Age: 83
End: 2018-01-01
Attending: EMERGENCY MEDICINE
Payer: MEDICARE

## 2018-01-01 ENCOUNTER — HOME CARE VISIT (OUTPATIENT)
Dept: HOME HEALTH SERVICES | Facility: HOME HEALTH | Age: 83
End: 2018-01-01
Payer: MEDICARE

## 2018-01-01 ENCOUNTER — APPOINTMENT (OUTPATIENT)
Dept: GENERAL RADIOLOGY | Age: 83
DRG: 291 | End: 2018-01-01
Attending: EMERGENCY MEDICINE
Payer: MEDICARE

## 2018-01-01 ENCOUNTER — OFFICE VISIT (OUTPATIENT)
Dept: CARDIOLOGY CLINIC | Age: 83
End: 2018-01-01

## 2018-01-01 ENCOUNTER — APPOINTMENT (OUTPATIENT)
Dept: GENERAL RADIOLOGY | Age: 83
DRG: 683 | End: 2018-01-01
Attending: EMERGENCY MEDICINE
Payer: MEDICARE

## 2018-01-01 ENCOUNTER — APPOINTMENT (OUTPATIENT)
Dept: CT IMAGING | Age: 83
DRG: 083 | End: 2018-01-01
Attending: FAMILY MEDICINE
Payer: MEDICARE

## 2018-01-01 ENCOUNTER — HOSPITAL ENCOUNTER (INPATIENT)
Age: 83
LOS: 5 days | Discharge: HOME HEALTH | DRG: 091 | End: 2018-07-03
Attending: EMERGENCY MEDICINE | Admitting: INTERNAL MEDICINE
Payer: MEDICARE

## 2018-01-01 ENCOUNTER — HOSPITAL ENCOUNTER (INPATIENT)
Age: 83
LOS: 9 days | Discharge: HOME | DRG: 292 | End: 2018-11-20
Attending: EMERGENCY MEDICINE | Admitting: INTERNAL MEDICINE
Payer: MEDICARE

## 2018-01-01 ENCOUNTER — HOSPITAL ENCOUNTER (INPATIENT)
Age: 83
LOS: 2 days | Discharge: HOME HEALTH | DRG: 083 | End: 2018-12-06
Attending: EMERGENCY MEDICINE | Admitting: FAMILY MEDICINE
Payer: MEDICARE

## 2018-01-01 ENCOUNTER — HOSPITAL ENCOUNTER (EMERGENCY)
Age: 83
Discharge: HOME | End: 2018-06-23
Attending: EMERGENCY MEDICINE | Admitting: EMERGENCY MEDICINE
Payer: MEDICARE

## 2018-01-01 ENCOUNTER — APPOINTMENT (OUTPATIENT)
Dept: CT IMAGING | Age: 83
DRG: 091 | End: 2018-01-01
Attending: EMERGENCY MEDICINE
Payer: MEDICARE

## 2018-01-01 ENCOUNTER — HOSPITAL ENCOUNTER (EMERGENCY)
Age: 83
Discharge: HOME | End: 2018-08-25
Attending: EMERGENCY MEDICINE
Payer: MEDICARE

## 2018-01-01 ENCOUNTER — HOSPITAL ENCOUNTER (OUTPATIENT)
Dept: GENERAL RADIOLOGY | Age: 83
Discharge: HOME | DRG: 871 | End: 2018-07-11
Attending: PHYSICIAN ASSISTANT
Payer: MEDICARE

## 2018-01-01 ENCOUNTER — OFFICE VISIT (OUTPATIENT)
Dept: ENDOCRINOLOGY | Age: 83
End: 2018-01-01

## 2018-01-01 ENCOUNTER — APPOINTMENT (OUTPATIENT)
Dept: GENERAL RADIOLOGY | Age: 83
DRG: 292 | End: 2018-01-01
Attending: INTERNAL MEDICINE
Payer: MEDICARE

## 2018-01-01 ENCOUNTER — APPOINTMENT (OUTPATIENT)
Dept: GENERAL RADIOLOGY | Age: 83
DRG: 683 | End: 2018-01-01
Attending: PHYSICIAN ASSISTANT
Payer: MEDICARE

## 2018-01-01 ENCOUNTER — HOSPITAL ENCOUNTER (OUTPATIENT)
Dept: DIABETES SERVICES | Age: 83
Discharge: HOME | End: 2018-02-12
Payer: MEDICARE

## 2018-01-01 ENCOUNTER — APPOINTMENT (OUTPATIENT)
Dept: GENERAL RADIOLOGY | Age: 83
DRG: 083 | End: 2018-01-01
Attending: FAMILY MEDICINE
Payer: MEDICARE

## 2018-01-01 ENCOUNTER — HOSPITAL ENCOUNTER (INPATIENT)
Age: 83
LOS: 6 days | Discharge: HOME HEALTH | DRG: 871 | End: 2018-07-20
Attending: EMERGENCY MEDICINE | Admitting: HOSPITALIST
Payer: MEDICARE

## 2018-01-01 ENCOUNTER — HOSPITAL ENCOUNTER (INPATIENT)
Age: 83
LOS: 4 days | Discharge: HOME HEALTH | DRG: 683 | End: 2018-07-27
Attending: EMERGENCY MEDICINE | Admitting: HOSPITALIST
Payer: MEDICARE

## 2018-01-01 ENCOUNTER — APPOINTMENT (OUTPATIENT)
Dept: CT IMAGING | Age: 83
End: 2018-01-01
Attending: EMERGENCY MEDICINE
Payer: MEDICARE

## 2018-01-01 ENCOUNTER — HOSPITAL ENCOUNTER (EMERGENCY)
Age: 83
Discharge: HOME | End: 2018-12-09
Attending: EMERGENCY MEDICINE
Payer: MEDICARE

## 2018-01-01 ENCOUNTER — HOSPITAL ENCOUNTER (OUTPATIENT)
Dept: CT IMAGING | Age: 83
Discharge: HOME | End: 2018-12-18
Attending: NEUROLOGICAL SURGERY
Payer: MEDICARE

## 2018-01-01 ENCOUNTER — APPOINTMENT (OUTPATIENT)
Dept: GENERAL RADIOLOGY | Age: 83
DRG: 683 | End: 2018-01-01
Attending: SPECIALIST
Payer: MEDICARE

## 2018-01-01 ENCOUNTER — HOSPITAL ENCOUNTER (EMERGENCY)
Age: 83
Discharge: HOME | End: 2018-10-19
Attending: EMERGENCY MEDICINE
Payer: MEDICARE

## 2018-01-01 ENCOUNTER — APPOINTMENT (OUTPATIENT)
Dept: CT IMAGING | Age: 83
DRG: 292 | End: 2018-01-01
Attending: PSYCHIATRY & NEUROLOGY
Payer: MEDICARE

## 2018-01-01 ENCOUNTER — APPOINTMENT (OUTPATIENT)
Dept: CT IMAGING | Age: 83
DRG: 871 | End: 2018-01-01
Attending: EMERGENCY MEDICINE
Payer: MEDICARE

## 2018-01-01 ENCOUNTER — APPOINTMENT (OUTPATIENT)
Dept: ULTRASOUND IMAGING | Age: 83
DRG: 683 | End: 2018-01-01
Attending: INTERNAL MEDICINE
Payer: MEDICARE

## 2018-01-01 ENCOUNTER — DOCUMENTATION ONLY (OUTPATIENT)
Dept: CASE MANAGEMENT | Age: 83
End: 2018-01-01

## 2018-01-01 ENCOUNTER — APPOINTMENT (OUTPATIENT)
Dept: GENERAL RADIOLOGY | Age: 83
DRG: 292 | End: 2018-01-01
Attending: EMERGENCY MEDICINE
Payer: MEDICARE

## 2018-01-01 ENCOUNTER — HOSPITAL ENCOUNTER (EMERGENCY)
Age: 83
Discharge: HOME | End: 2018-05-16
Attending: EMERGENCY MEDICINE
Payer: MEDICARE

## 2018-01-01 ENCOUNTER — HOSPITAL ENCOUNTER (OUTPATIENT)
Dept: CT IMAGING | Age: 83
Discharge: HOME | End: 2018-07-10
Attending: PAIN MEDICINE
Payer: MEDICARE

## 2018-01-01 ENCOUNTER — APPOINTMENT (OUTPATIENT)
Dept: GENERAL RADIOLOGY | Age: 83
DRG: 083 | End: 2018-01-01
Attending: EMERGENCY MEDICINE
Payer: MEDICARE

## 2018-01-01 ENCOUNTER — APPOINTMENT (OUTPATIENT)
Dept: GENERAL RADIOLOGY | Age: 83
End: 2018-01-01
Attending: SPECIALIST
Payer: MEDICARE

## 2018-01-01 ENCOUNTER — APPOINTMENT (OUTPATIENT)
Dept: ULTRASOUND IMAGING | Age: 83
End: 2018-01-01
Attending: EMERGENCY MEDICINE
Payer: MEDICARE

## 2018-01-01 ENCOUNTER — ANESTHESIA EVENT (OUTPATIENT)
Dept: SURGERY | Age: 83
End: 2018-01-01
Payer: MEDICARE

## 2018-01-01 ENCOUNTER — APPOINTMENT (OUTPATIENT)
Dept: GENERAL RADIOLOGY | Age: 83
DRG: 683 | End: 2018-01-01
Attending: INTERNAL MEDICINE
Payer: MEDICARE

## 2018-01-01 ENCOUNTER — HOSPITAL ENCOUNTER (EMERGENCY)
Age: 83
Discharge: HOME | End: 2018-02-15
Attending: EMERGENCY MEDICINE
Payer: MEDICARE

## 2018-01-01 ENCOUNTER — ANESTHESIA (OUTPATIENT)
Dept: SURGERY | Age: 83
End: 2018-01-01
Payer: MEDICARE

## 2018-01-01 ENCOUNTER — OFFICE VISIT (OUTPATIENT)
Dept: NEUROSURGERY | Age: 83
End: 2018-01-01

## 2018-01-01 ENCOUNTER — APPOINTMENT (OUTPATIENT)
Dept: GENERAL RADIOLOGY | Age: 83
DRG: 292 | End: 2018-01-01
Attending: HOSPITALIST
Payer: MEDICARE

## 2018-01-01 ENCOUNTER — HOSPITAL ENCOUNTER (EMERGENCY)
Age: 83
Discharge: HOME | End: 2018-08-11
Attending: EMERGENCY MEDICINE
Payer: MEDICARE

## 2018-01-01 ENCOUNTER — APPOINTMENT (OUTPATIENT)
Dept: CT IMAGING | Age: 83
DRG: 292 | End: 2018-01-01
Attending: EMERGENCY MEDICINE
Payer: MEDICARE

## 2018-01-01 ENCOUNTER — APPOINTMENT (OUTPATIENT)
Dept: CT IMAGING | Age: 83
DRG: 083 | End: 2018-01-01
Attending: EMERGENCY MEDICINE
Payer: MEDICARE

## 2018-01-01 VITALS
HEART RATE: 84 BPM | OXYGEN SATURATION: 95 % | TEMPERATURE: 98.6 F | DIASTOLIC BLOOD PRESSURE: 70 MMHG | SYSTOLIC BLOOD PRESSURE: 134 MMHG | RESPIRATION RATE: 16 BRPM

## 2018-01-01 VITALS
TEMPERATURE: 98.8 F | SYSTOLIC BLOOD PRESSURE: 126 MMHG | OXYGEN SATURATION: 95 % | HEART RATE: 72 BPM | RESPIRATION RATE: 16 BRPM | DIASTOLIC BLOOD PRESSURE: 72 MMHG

## 2018-01-01 VITALS
HEART RATE: 82 BPM | BODY MASS INDEX: 25.37 KG/M2 | DIASTOLIC BLOOD PRESSURE: 64 MMHG | OXYGEN SATURATION: 94 % | SYSTOLIC BLOOD PRESSURE: 117 MMHG | RESPIRATION RATE: 21 BRPM | HEIGHT: 66 IN | TEMPERATURE: 97.7 F | WEIGHT: 157.85 LBS

## 2018-01-01 VITALS
DIASTOLIC BLOOD PRESSURE: 56 MMHG | SYSTOLIC BLOOD PRESSURE: 120 MMHG | HEART RATE: 74 BPM | BODY MASS INDEX: 26.84 KG/M2 | WEIGHT: 171 LBS | OXYGEN SATURATION: 96 % | HEIGHT: 67 IN

## 2018-01-01 VITALS
HEART RATE: 103 BPM | TEMPERATURE: 97.9 F | BODY MASS INDEX: 26.68 KG/M2 | RESPIRATION RATE: 33 BRPM | WEIGHT: 170 LBS | OXYGEN SATURATION: 91 % | SYSTOLIC BLOOD PRESSURE: 181 MMHG | HEIGHT: 67 IN | DIASTOLIC BLOOD PRESSURE: 90 MMHG

## 2018-01-01 VITALS
TEMPERATURE: 97.6 F | RESPIRATION RATE: 18 BRPM | SYSTOLIC BLOOD PRESSURE: 136 MMHG | WEIGHT: 159.4 LBS | OXYGEN SATURATION: 90 % | BODY MASS INDEX: 25.73 KG/M2 | DIASTOLIC BLOOD PRESSURE: 76 MMHG | HEART RATE: 92 BPM

## 2018-01-01 VITALS
OXYGEN SATURATION: 96 % | DIASTOLIC BLOOD PRESSURE: 77 MMHG | RESPIRATION RATE: 20 BRPM | TEMPERATURE: 97.4 F | BODY MASS INDEX: 26.68 KG/M2 | HEIGHT: 67 IN | WEIGHT: 169.97 LBS | SYSTOLIC BLOOD PRESSURE: 103 MMHG | HEART RATE: 77 BPM

## 2018-01-01 VITALS
WEIGHT: 157 LBS | HEART RATE: 50 BPM | TEMPERATURE: 98.2 F | SYSTOLIC BLOOD PRESSURE: 110 MMHG | BODY MASS INDEX: 25.34 KG/M2 | OXYGEN SATURATION: 94 % | DIASTOLIC BLOOD PRESSURE: 78 MMHG | RESPIRATION RATE: 20 BRPM

## 2018-01-01 VITALS
OXYGEN SATURATION: 100 % | SYSTOLIC BLOOD PRESSURE: 114 MMHG | WEIGHT: 171.08 LBS | BODY MASS INDEX: 26.85 KG/M2 | HEART RATE: 75 BPM | HEIGHT: 67 IN | DIASTOLIC BLOOD PRESSURE: 60 MMHG | TEMPERATURE: 97.5 F | RESPIRATION RATE: 16 BRPM

## 2018-01-01 VITALS
WEIGHT: 164.46 LBS | BODY MASS INDEX: 25.81 KG/M2 | OXYGEN SATURATION: 100 % | RESPIRATION RATE: 18 BRPM | DIASTOLIC BLOOD PRESSURE: 52 MMHG | SYSTOLIC BLOOD PRESSURE: 118 MMHG | HEART RATE: 77 BPM | HEIGHT: 67 IN | TEMPERATURE: 98 F

## 2018-01-01 VITALS
TEMPERATURE: 97.6 F | BODY MASS INDEX: 26.37 KG/M2 | WEIGHT: 168 LBS | HEART RATE: 75 BPM | HEIGHT: 67 IN | DIASTOLIC BLOOD PRESSURE: 62 MMHG | SYSTOLIC BLOOD PRESSURE: 120 MMHG | OXYGEN SATURATION: 98 % | RESPIRATION RATE: 16 BRPM

## 2018-01-01 VITALS
OXYGEN SATURATION: 95 % | SYSTOLIC BLOOD PRESSURE: 136 MMHG | RESPIRATION RATE: 20 BRPM | HEART RATE: 84 BPM | TEMPERATURE: 98.6 F | DIASTOLIC BLOOD PRESSURE: 70 MMHG

## 2018-01-01 VITALS
OXYGEN SATURATION: 97 % | OXYGEN SATURATION: 97 % | TEMPERATURE: 96.5 F | SYSTOLIC BLOOD PRESSURE: 118 MMHG | HEART RATE: 66 BPM | WEIGHT: 168 LBS | RESPIRATION RATE: 18 BRPM | SYSTOLIC BLOOD PRESSURE: 112 MMHG | TEMPERATURE: 98 F | DIASTOLIC BLOOD PRESSURE: 66 MMHG | BODY MASS INDEX: 26.31 KG/M2 | RESPIRATION RATE: 18 BRPM | HEART RATE: 77 BPM | DIASTOLIC BLOOD PRESSURE: 68 MMHG

## 2018-01-01 VITALS
SYSTOLIC BLOOD PRESSURE: 130 MMHG | BODY MASS INDEX: 25.57 KG/M2 | RESPIRATION RATE: 18 BRPM | HEART RATE: 78 BPM | WEIGHT: 158.4 LBS | DIASTOLIC BLOOD PRESSURE: 74 MMHG | TEMPERATURE: 97.8 F | OXYGEN SATURATION: 98 %

## 2018-01-01 VITALS
TEMPERATURE: 97.9 F | OXYGEN SATURATION: 97 % | SYSTOLIC BLOOD PRESSURE: 130 MMHG | HEART RATE: 73 BPM | DIASTOLIC BLOOD PRESSURE: 65 MMHG

## 2018-01-01 VITALS
SYSTOLIC BLOOD PRESSURE: 113 MMHG | DIASTOLIC BLOOD PRESSURE: 55 MMHG | RESPIRATION RATE: 16 BRPM | HEART RATE: 89 BPM | OXYGEN SATURATION: 95 % | HEIGHT: 67 IN | WEIGHT: 168 LBS | TEMPERATURE: 98.3 F | BODY MASS INDEX: 26.37 KG/M2

## 2018-01-01 VITALS
BODY MASS INDEX: 27.61 KG/M2 | HEART RATE: 60 BPM | RESPIRATION RATE: 16 BRPM | SYSTOLIC BLOOD PRESSURE: 129 MMHG | WEIGHT: 175.93 LBS | TEMPERATURE: 97.7 F | HEIGHT: 67 IN | OXYGEN SATURATION: 95 % | DIASTOLIC BLOOD PRESSURE: 49 MMHG

## 2018-01-01 VITALS
HEIGHT: 67 IN | BODY MASS INDEX: 26.84 KG/M2 | HEART RATE: 73 BPM | SYSTOLIC BLOOD PRESSURE: 126 MMHG | WEIGHT: 171 LBS | RESPIRATION RATE: 18 BRPM | OXYGEN SATURATION: 95 % | TEMPERATURE: 98.1 F | DIASTOLIC BLOOD PRESSURE: 53 MMHG

## 2018-01-01 VITALS
TEMPERATURE: 98.1 F | DIASTOLIC BLOOD PRESSURE: 60 MMHG | HEART RATE: 73 BPM | RESPIRATION RATE: 18 BRPM | OXYGEN SATURATION: 94 % | SYSTOLIC BLOOD PRESSURE: 122 MMHG

## 2018-01-01 VITALS
RESPIRATION RATE: 18 BRPM | SYSTOLIC BLOOD PRESSURE: 128 MMHG | DIASTOLIC BLOOD PRESSURE: 68 MMHG | HEART RATE: 75 BPM | TEMPERATURE: 98.2 F | OXYGEN SATURATION: 95 %

## 2018-01-01 VITALS
RESPIRATION RATE: 18 BRPM | SYSTOLIC BLOOD PRESSURE: 128 MMHG | OXYGEN SATURATION: 94 % | TEMPERATURE: 97.8 F | DIASTOLIC BLOOD PRESSURE: 70 MMHG | HEART RATE: 83 BPM

## 2018-01-01 VITALS
TEMPERATURE: 98.8 F | RESPIRATION RATE: 16 BRPM | HEART RATE: 75 BPM | TEMPERATURE: 96.9 F | DIASTOLIC BLOOD PRESSURE: 60 MMHG | OXYGEN SATURATION: 96 % | DIASTOLIC BLOOD PRESSURE: 70 MMHG | OXYGEN SATURATION: 95 % | HEART RATE: 69 BPM | RESPIRATION RATE: 20 BRPM | SYSTOLIC BLOOD PRESSURE: 110 MMHG | SYSTOLIC BLOOD PRESSURE: 118 MMHG

## 2018-01-01 VITALS
DIASTOLIC BLOOD PRESSURE: 68 MMHG | OXYGEN SATURATION: 98 % | TEMPERATURE: 97.8 F | SYSTOLIC BLOOD PRESSURE: 130 MMHG | RESPIRATION RATE: 18 BRPM | HEART RATE: 81 BPM

## 2018-01-01 VITALS
SYSTOLIC BLOOD PRESSURE: 140 MMHG | RESPIRATION RATE: 18 BRPM | TEMPERATURE: 97.4 F | HEART RATE: 84 BPM | DIASTOLIC BLOOD PRESSURE: 60 MMHG

## 2018-01-01 VITALS
SYSTOLIC BLOOD PRESSURE: 112 MMHG | RESPIRATION RATE: 20 BRPM | TEMPERATURE: 98.2 F | BODY MASS INDEX: 26.12 KG/M2 | DIASTOLIC BLOOD PRESSURE: 78 MMHG | OXYGEN SATURATION: 93 % | HEART RATE: 72 BPM | WEIGHT: 166.8 LBS

## 2018-01-01 VITALS
RESPIRATION RATE: 17 BRPM | HEIGHT: 66 IN | DIASTOLIC BLOOD PRESSURE: 80 MMHG | OXYGEN SATURATION: 96 % | OXYGEN SATURATION: 98 % | HEIGHT: 67 IN | HEART RATE: 83 BPM | DIASTOLIC BLOOD PRESSURE: 52 MMHG | BODY MASS INDEX: 26.68 KG/M2 | WEIGHT: 160 LBS | WEIGHT: 170 LBS | SYSTOLIC BLOOD PRESSURE: 110 MMHG | HEART RATE: 84 BPM | TEMPERATURE: 97.4 F | SYSTOLIC BLOOD PRESSURE: 124 MMHG | RESPIRATION RATE: 16 BRPM | BODY MASS INDEX: 25.71 KG/M2

## 2018-01-01 VITALS
DIASTOLIC BLOOD PRESSURE: 70 MMHG | OXYGEN SATURATION: 98 % | RESPIRATION RATE: 18 BRPM | TEMPERATURE: 98 F | SYSTOLIC BLOOD PRESSURE: 122 MMHG | HEART RATE: 70 BPM

## 2018-01-01 VITALS
DIASTOLIC BLOOD PRESSURE: 52 MMHG | WEIGHT: 157 LBS | OXYGEN SATURATION: 97 % | BODY MASS INDEX: 25.34 KG/M2 | SYSTOLIC BLOOD PRESSURE: 98 MMHG | HEART RATE: 78 BPM | RESPIRATION RATE: 18 BRPM

## 2018-01-01 VITALS
WEIGHT: 163 LBS | OXYGEN SATURATION: 96 % | DIASTOLIC BLOOD PRESSURE: 56 MMHG | SYSTOLIC BLOOD PRESSURE: 110 MMHG | BODY MASS INDEX: 25.53 KG/M2 | RESPIRATION RATE: 18 BRPM | TEMPERATURE: 98.8 F | HEART RATE: 81 BPM

## 2018-01-01 VITALS
DIASTOLIC BLOOD PRESSURE: 60 MMHG | TEMPERATURE: 98.4 F | HEIGHT: 67 IN | RESPIRATION RATE: 20 BRPM | OXYGEN SATURATION: 86 % | HEART RATE: 76 BPM | WEIGHT: 171 LBS | SYSTOLIC BLOOD PRESSURE: 126 MMHG | BODY MASS INDEX: 26.84 KG/M2

## 2018-01-01 VITALS
OXYGEN SATURATION: 95 % | BODY MASS INDEX: 26.75 KG/M2 | TEMPERATURE: 98.5 F | RESPIRATION RATE: 25 BRPM | DIASTOLIC BLOOD PRESSURE: 69 MMHG | SYSTOLIC BLOOD PRESSURE: 121 MMHG | HEIGHT: 67 IN | HEART RATE: 86 BPM | WEIGHT: 170.42 LBS

## 2018-01-01 VITALS
OXYGEN SATURATION: 95 % | HEART RATE: 92 BPM | DIASTOLIC BLOOD PRESSURE: 60 MMHG | SYSTOLIC BLOOD PRESSURE: 126 MMHG | WEIGHT: 174 LBS | BODY MASS INDEX: 27.31 KG/M2 | HEIGHT: 67 IN

## 2018-01-01 VITALS
OXYGEN SATURATION: 91 % | RESPIRATION RATE: 20 BRPM | HEART RATE: 81 BPM | TEMPERATURE: 97.4 F | HEIGHT: 67 IN | WEIGHT: 166.89 LBS | BODY MASS INDEX: 26.19 KG/M2 | SYSTOLIC BLOOD PRESSURE: 100 MMHG | DIASTOLIC BLOOD PRESSURE: 57 MMHG

## 2018-01-01 VITALS
HEART RATE: 68 BPM | RESPIRATION RATE: 16 BRPM | OXYGEN SATURATION: 94 % | TEMPERATURE: 98.6 F | DIASTOLIC BLOOD PRESSURE: 70 MMHG | SYSTOLIC BLOOD PRESSURE: 122 MMHG

## 2018-01-01 VITALS
WEIGHT: 163.4 LBS | BODY MASS INDEX: 26.26 KG/M2 | HEIGHT: 66 IN | HEART RATE: 59 BPM | SYSTOLIC BLOOD PRESSURE: 136 MMHG | DIASTOLIC BLOOD PRESSURE: 59 MMHG

## 2018-01-01 VITALS
HEIGHT: 67 IN | OXYGEN SATURATION: 97 % | HEART RATE: 70 BPM | RESPIRATION RATE: 20 BRPM | BODY MASS INDEX: 24.33 KG/M2 | SYSTOLIC BLOOD PRESSURE: 106 MMHG | TEMPERATURE: 98.2 F | WEIGHT: 155 LBS | DIASTOLIC BLOOD PRESSURE: 74 MMHG

## 2018-01-01 VITALS
RESPIRATION RATE: 22 BRPM | TEMPERATURE: 97.8 F | SYSTOLIC BLOOD PRESSURE: 118 MMHG | HEART RATE: 78 BPM | OXYGEN SATURATION: 98 % | DIASTOLIC BLOOD PRESSURE: 60 MMHG | WEIGHT: 163.4 LBS | HEIGHT: 67 IN | BODY MASS INDEX: 25.65 KG/M2

## 2018-01-01 VITALS
HEART RATE: 73 BPM | OXYGEN SATURATION: 97 % | SYSTOLIC BLOOD PRESSURE: 132 MMHG | RESPIRATION RATE: 17 BRPM | DIASTOLIC BLOOD PRESSURE: 78 MMHG | TEMPERATURE: 98 F

## 2018-01-01 VITALS
RESPIRATION RATE: 20 BRPM | DIASTOLIC BLOOD PRESSURE: 80 MMHG | SYSTOLIC BLOOD PRESSURE: 125 MMHG | HEART RATE: 79 BPM | WEIGHT: 163.36 LBS | TEMPERATURE: 98.3 F | BODY MASS INDEX: 25.64 KG/M2 | HEIGHT: 67 IN | OXYGEN SATURATION: 91 %

## 2018-01-01 VITALS
HEART RATE: 81 BPM | DIASTOLIC BLOOD PRESSURE: 56 MMHG | OXYGEN SATURATION: 99 % | TEMPERATURE: 98 F | SYSTOLIC BLOOD PRESSURE: 110 MMHG | RESPIRATION RATE: 20 BRPM

## 2018-01-01 VITALS
SYSTOLIC BLOOD PRESSURE: 122 MMHG | RESPIRATION RATE: 18 BRPM | WEIGHT: 158 LBS | BODY MASS INDEX: 25.5 KG/M2 | DIASTOLIC BLOOD PRESSURE: 76 MMHG | TEMPERATURE: 98.4 F | OXYGEN SATURATION: 95 % | HEART RATE: 71 BPM

## 2018-01-01 VITALS
HEART RATE: 73 BPM | TEMPERATURE: 99.5 F | SYSTOLIC BLOOD PRESSURE: 115 MMHG | RESPIRATION RATE: 18 BRPM | DIASTOLIC BLOOD PRESSURE: 72 MMHG | OXYGEN SATURATION: 95 %

## 2018-01-01 VITALS
SYSTOLIC BLOOD PRESSURE: 122 MMHG | HEART RATE: 81 BPM | RESPIRATION RATE: 18 BRPM | TEMPERATURE: 98.2 F | OXYGEN SATURATION: 97 % | DIASTOLIC BLOOD PRESSURE: 62 MMHG

## 2018-01-01 VITALS
HEART RATE: 79 BPM | SYSTOLIC BLOOD PRESSURE: 112 MMHG | DIASTOLIC BLOOD PRESSURE: 60 MMHG | OXYGEN SATURATION: 94 % | RESPIRATION RATE: 16 BRPM | TEMPERATURE: 98.5 F

## 2018-01-01 VITALS
OXYGEN SATURATION: 98 % | DIASTOLIC BLOOD PRESSURE: 80 MMHG | RESPIRATION RATE: 16 BRPM | BODY MASS INDEX: 25.65 KG/M2 | TEMPERATURE: 98 F | SYSTOLIC BLOOD PRESSURE: 126 MMHG | HEART RATE: 77 BPM | WEIGHT: 163.8 LBS

## 2018-01-01 VITALS
HEIGHT: 67 IN | HEART RATE: 79 BPM | BODY MASS INDEX: 25.02 KG/M2 | TEMPERATURE: 97.5 F | WEIGHT: 159.39 LBS | OXYGEN SATURATION: 96 % | SYSTOLIC BLOOD PRESSURE: 128 MMHG | DIASTOLIC BLOOD PRESSURE: 58 MMHG | RESPIRATION RATE: 18 BRPM

## 2018-01-01 VITALS
WEIGHT: 184 LBS | DIASTOLIC BLOOD PRESSURE: 52 MMHG | HEART RATE: 80 BPM | BODY MASS INDEX: 28.88 KG/M2 | RESPIRATION RATE: 16 BRPM | SYSTOLIC BLOOD PRESSURE: 110 MMHG | HEIGHT: 67 IN | OXYGEN SATURATION: 90 % | TEMPERATURE: 98 F

## 2018-01-01 VITALS
OXYGEN SATURATION: 96 % | BODY MASS INDEX: 26.63 KG/M2 | TEMPERATURE: 97.1 F | RESPIRATION RATE: 17 BRPM | HEART RATE: 76 BPM | DIASTOLIC BLOOD PRESSURE: 72 MMHG | SYSTOLIC BLOOD PRESSURE: 122 MMHG | WEIGHT: 170 LBS

## 2018-01-01 VITALS
HEART RATE: 78 BPM | OXYGEN SATURATION: 98 % | TEMPERATURE: 98 F | DIASTOLIC BLOOD PRESSURE: 6 MMHG | SYSTOLIC BLOOD PRESSURE: 110 MMHG

## 2018-01-01 VITALS
BODY MASS INDEX: 26.12 KG/M2 | WEIGHT: 166.4 LBS | HEIGHT: 67 IN | HEART RATE: 76 BPM | DIASTOLIC BLOOD PRESSURE: 70 MMHG | SYSTOLIC BLOOD PRESSURE: 134 MMHG

## 2018-01-01 VITALS
HEIGHT: 67 IN | WEIGHT: 170.1 LBS | DIASTOLIC BLOOD PRESSURE: 62 MMHG | HEART RATE: 67 BPM | SYSTOLIC BLOOD PRESSURE: 97 MMHG | BODY MASS INDEX: 26.7 KG/M2

## 2018-01-01 VITALS
DIASTOLIC BLOOD PRESSURE: 70 MMHG | HEART RATE: 72 BPM | RESPIRATION RATE: 16 BRPM | OXYGEN SATURATION: 97 % | SYSTOLIC BLOOD PRESSURE: 116 MMHG | TEMPERATURE: 98.4 F

## 2018-01-01 VITALS
TEMPERATURE: 98.1 F | DIASTOLIC BLOOD PRESSURE: 50 MMHG | HEART RATE: 71 BPM | RESPIRATION RATE: 20 BRPM | OXYGEN SATURATION: 98 % | SYSTOLIC BLOOD PRESSURE: 120 MMHG

## 2018-01-01 VITALS
BODY MASS INDEX: 25.36 KG/M2 | RESPIRATION RATE: 20 BRPM | DIASTOLIC BLOOD PRESSURE: 58 MMHG | HEIGHT: 66 IN | OXYGEN SATURATION: 99 % | TEMPERATURE: 97.9 F | SYSTOLIC BLOOD PRESSURE: 110 MMHG | HEART RATE: 85 BPM | WEIGHT: 157.8 LBS

## 2018-01-01 VITALS
HEIGHT: 67 IN | SYSTOLIC BLOOD PRESSURE: 121 MMHG | DIASTOLIC BLOOD PRESSURE: 70 MMHG | BODY MASS INDEX: 27.03 KG/M2 | WEIGHT: 172.2 LBS | HEART RATE: 69 BPM

## 2018-01-01 VITALS
SYSTOLIC BLOOD PRESSURE: 128 MMHG | DIASTOLIC BLOOD PRESSURE: 76 MMHG | TEMPERATURE: 98 F | HEART RATE: 91 BPM | BODY MASS INDEX: 25.44 KG/M2 | WEIGHT: 157.6 LBS | OXYGEN SATURATION: 93 %

## 2018-01-01 DIAGNOSIS — I63.9 CEREBROVASCULAR ACCIDENT (CVA), UNSPECIFIED MECHANISM (HCC): ICD-10-CM

## 2018-01-01 DIAGNOSIS — Z95.1 S/P CABG X 3: ICD-10-CM

## 2018-01-01 DIAGNOSIS — S06.5XAA SDH (SUBDURAL HEMATOMA): Primary | ICD-10-CM

## 2018-01-01 DIAGNOSIS — Z71.89 COUNSELING REGARDING GOALS OF CARE: ICD-10-CM

## 2018-01-01 DIAGNOSIS — E53.8 B12 DEFICIENCY: ICD-10-CM

## 2018-01-01 DIAGNOSIS — E11.42 DIABETIC PERIPHERAL NEUROPATHY ASSOCIATED WITH TYPE 2 DIABETES MELLITUS (HCC): ICD-10-CM

## 2018-01-01 DIAGNOSIS — R53.1 WEAKNESS: ICD-10-CM

## 2018-01-01 DIAGNOSIS — I35.0 AORTIC STENOSIS, MILD: ICD-10-CM

## 2018-01-01 DIAGNOSIS — R10.84 ABDOMINAL PAIN, GENERALIZED: Primary | ICD-10-CM

## 2018-01-01 DIAGNOSIS — I69.351 HEMIPLEGIA AND HEMIPARESIS FOLLOWING CEREBRAL INFARCTION AFFECTING RIGHT DOMINANT SIDE (HCC): ICD-10-CM

## 2018-01-01 DIAGNOSIS — I50.9 HEART FAILURE, UNSPECIFIED HF CHRONICITY, UNSPECIFIED HEART FAILURE TYPE (HCC): ICD-10-CM

## 2018-01-01 DIAGNOSIS — N28.9 ACUTE RENAL INSUFFICIENCY: Primary | ICD-10-CM

## 2018-01-01 DIAGNOSIS — K22.4 MOTILITY DISORDER, ESOPHAGEAL: ICD-10-CM

## 2018-01-01 DIAGNOSIS — G62.9 PERIPHERAL POLYNEUROPATHY: ICD-10-CM

## 2018-01-01 DIAGNOSIS — E78.5 HYPERLIPIDEMIA, UNSPECIFIED HYPERLIPIDEMIA TYPE: ICD-10-CM

## 2018-01-01 DIAGNOSIS — R63.30 FEEDING DIFFICULTIES: ICD-10-CM

## 2018-01-01 DIAGNOSIS — G25.0 BENIGN ESSENTIAL TREMOR SYNDROME: ICD-10-CM

## 2018-01-01 DIAGNOSIS — I63.312 THROMBOTIC STROKE INVOLVING LEFT MIDDLE CEREBRAL ARTERY (HCC): ICD-10-CM

## 2018-01-01 DIAGNOSIS — M79.10 MYALGIA: ICD-10-CM

## 2018-01-01 DIAGNOSIS — G25.2 COARSE TREMORS: Primary | ICD-10-CM

## 2018-01-01 DIAGNOSIS — R53.81 DEBILITY: ICD-10-CM

## 2018-01-01 DIAGNOSIS — R41.3 DISTURBANCE OF MEMORY: ICD-10-CM

## 2018-01-01 DIAGNOSIS — Z80.9 FAMILY HISTORY OF MALIGNANT NEOPLASM, UNSPECIFIED: ICD-10-CM

## 2018-01-01 DIAGNOSIS — R09.89 CHEST CONGESTION: ICD-10-CM

## 2018-01-01 DIAGNOSIS — I65.23 STENOSIS OF BOTH INTERNAL CAROTID ARTERIES: ICD-10-CM

## 2018-01-01 DIAGNOSIS — R42 DIZZINESS: ICD-10-CM

## 2018-01-01 DIAGNOSIS — R25.1 TREMORS OF NERVOUS SYSTEM: ICD-10-CM

## 2018-01-01 DIAGNOSIS — D64.9 ANEMIA, UNSPECIFIED TYPE: ICD-10-CM

## 2018-01-01 DIAGNOSIS — G45.0 VERTEBROBASILAR OCCLUSIVE DISEASE: ICD-10-CM

## 2018-01-01 DIAGNOSIS — E11.42 TYPE 2 DIABETES MELLITUS WITH DIABETIC NEUROPATHY AFFECTING BOTH SIDES OF BODY (HCC): ICD-10-CM

## 2018-01-01 DIAGNOSIS — I95.1 ORTHOSTATIC HYPOTENSION: ICD-10-CM

## 2018-01-01 DIAGNOSIS — G20 PARKINSON'S DISEASE (TREMOR, STIFFNESS, SLOW MOTION, UNSTABLE POSTURE) (HCC): ICD-10-CM

## 2018-01-01 DIAGNOSIS — R10.84 GENERALIZED ABDOMINAL PAIN: ICD-10-CM

## 2018-01-01 DIAGNOSIS — I63.312 THROMBOTIC STROKE INVOLVING LEFT MIDDLE CEREBRAL ARTERY (HCC): Primary | ICD-10-CM

## 2018-01-01 DIAGNOSIS — N17.9 AKI (ACUTE KIDNEY INJURY) (HCC): ICD-10-CM

## 2018-01-01 DIAGNOSIS — Z95.810 STATUS POST IMPLANTATION OF AUTOMATIC CARDIOVERTER/DEFIBRILLATOR (AICD): ICD-10-CM

## 2018-01-01 DIAGNOSIS — Z98.61 S/P PTCA (PERCUTANEOUS TRANSLUMINAL CORONARY ANGIOPLASTY): ICD-10-CM

## 2018-01-01 DIAGNOSIS — R56.9 CONVULSIONS, UNSPECIFIED CONVULSION TYPE (HCC): ICD-10-CM

## 2018-01-01 DIAGNOSIS — L03.311 CELLULITIS OF ABDOMINAL WALL: Primary | ICD-10-CM

## 2018-01-01 DIAGNOSIS — R06.02 SHORTNESS OF BREATH ON EXERTION: ICD-10-CM

## 2018-01-01 DIAGNOSIS — M43.16 SPONDYLOLISTHESIS OF LUMBAR REGION: ICD-10-CM

## 2018-01-01 DIAGNOSIS — M25.531 WRIST PAIN, ACUTE, RIGHT: ICD-10-CM

## 2018-01-01 DIAGNOSIS — E55.9 VITAMIN D DEFICIENCY: ICD-10-CM

## 2018-01-01 DIAGNOSIS — R53.81 PHYSICAL DECONDITIONING: ICD-10-CM

## 2018-01-01 DIAGNOSIS — E03.4 HYPOTHYROIDISM DUE TO ACQUIRED ATROPHY OF THYROID: ICD-10-CM

## 2018-01-01 DIAGNOSIS — K22.4 ESOPHAGEAL MOTILITY DISORDER: ICD-10-CM

## 2018-01-01 DIAGNOSIS — M25.512 PAIN IN JOINT OF LEFT SHOULDER: Primary | ICD-10-CM

## 2018-01-01 DIAGNOSIS — K59.00 CONSTIPATION: ICD-10-CM

## 2018-01-01 DIAGNOSIS — E11.9 TYPE 2 DIABETES MELLITUS WITHOUT COMPLICATION, WITH LONG-TERM CURRENT USE OF INSULIN (HCC): Primary | ICD-10-CM

## 2018-01-01 DIAGNOSIS — M17.11 ARTHRITIS OF RIGHT KNEE: Primary | ICD-10-CM

## 2018-01-01 DIAGNOSIS — J96.01 ACUTE RESPIRATORY FAILURE WITH HYPOXIA AND HYPERCAPNIA (HCC): Primary | ICD-10-CM

## 2018-01-01 DIAGNOSIS — I10 ESSENTIAL HYPERTENSION WITH GOAL BLOOD PRESSURE LESS THAN 140/90: ICD-10-CM

## 2018-01-01 DIAGNOSIS — R53.1 WEAKNESS: Primary | ICD-10-CM

## 2018-01-01 DIAGNOSIS — H61.23 BILATERAL IMPACTED CERUMEN: ICD-10-CM

## 2018-01-01 DIAGNOSIS — R13.10 DYSPHAGIA, UNSPECIFIED TYPE: ICD-10-CM

## 2018-01-01 DIAGNOSIS — J96.02 ACUTE RESPIRATORY FAILURE WITH HYPOXIA AND HYPERCAPNIA (HCC): Primary | ICD-10-CM

## 2018-01-01 DIAGNOSIS — R42 DIZZINESS: Primary | ICD-10-CM

## 2018-01-01 DIAGNOSIS — J81.0 ACUTE PULMONARY EDEMA (HCC): ICD-10-CM

## 2018-01-01 DIAGNOSIS — M25.512 CHRONIC PAIN OF BOTH SHOULDERS: ICD-10-CM

## 2018-01-01 DIAGNOSIS — R07.9 CHEST PAIN, UNSPECIFIED TYPE: Primary | ICD-10-CM

## 2018-01-01 DIAGNOSIS — H91.13 PRESBYCUSIS OF BOTH EARS: ICD-10-CM

## 2018-01-01 DIAGNOSIS — E11.21 TYPE 2 DIABETES WITH NEPHROPATHY (HCC): ICD-10-CM

## 2018-01-01 DIAGNOSIS — M25.511 CHRONIC PAIN OF BOTH SHOULDERS: ICD-10-CM

## 2018-01-01 DIAGNOSIS — Z93.1 PEG (PERCUTANEOUS ENDOSCOPIC GASTROSTOMY) STATUS (HCC): ICD-10-CM

## 2018-01-01 DIAGNOSIS — R68.89 COMPLAINT ASSOCIATED WITH GASTRIC TUBE (HCC): Primary | ICD-10-CM

## 2018-01-01 DIAGNOSIS — I71.20 THORACIC AORTIC ANEURYSM WITHOUT RUPTURE: ICD-10-CM

## 2018-01-01 DIAGNOSIS — I10 ESSENTIAL HYPERTENSION WITH GOAL BLOOD PRESSURE LESS THAN 130/80: ICD-10-CM

## 2018-01-01 DIAGNOSIS — S06.5XAA SDH (SUBDURAL HEMATOMA): ICD-10-CM

## 2018-01-01 DIAGNOSIS — Z86.73 HISTORY OF STROKE: ICD-10-CM

## 2018-01-01 DIAGNOSIS — E11.42 TYPE 2 DIABETES MELLITUS WITH DIABETIC NEUROPATHY AFFECTING BOTH SIDES OF BODY (HCC): Primary | ICD-10-CM

## 2018-01-01 DIAGNOSIS — G89.4 CHRONIC PAIN SYNDROME: ICD-10-CM

## 2018-01-01 DIAGNOSIS — I35.0 AORTIC STENOSIS, SEVERE: ICD-10-CM

## 2018-01-01 DIAGNOSIS — M48.061 SPINAL STENOSIS, LUMBAR REGION, WITHOUT NEUROGENIC CLAUDICATION: ICD-10-CM

## 2018-01-01 DIAGNOSIS — Z79.4 TYPE 2 DIABETES MELLITUS WITHOUT COMPLICATION, WITH LONG-TERM CURRENT USE OF INSULIN (HCC): Primary | ICD-10-CM

## 2018-01-01 DIAGNOSIS — R63.4 ABNORMAL WEIGHT LOSS: ICD-10-CM

## 2018-01-01 DIAGNOSIS — G89.29 CHRONIC PAIN OF BOTH SHOULDERS: ICD-10-CM

## 2018-01-01 DIAGNOSIS — J69.0 ASPIRATION PNEUMONIA OF BOTH LOWER LOBES DUE TO VOMIT (HCC): Primary | ICD-10-CM

## 2018-01-01 DIAGNOSIS — S06.5X5A: ICD-10-CM

## 2018-01-01 DIAGNOSIS — R13.19 OTHER DYSPHAGIA: ICD-10-CM

## 2018-01-01 DIAGNOSIS — G44.209 TENSION HEADACHE: ICD-10-CM

## 2018-01-01 DIAGNOSIS — I50.23 ACUTE ON CHRONIC SYSTOLIC CONGESTIVE HEART FAILURE (HCC): Primary | ICD-10-CM

## 2018-01-01 DIAGNOSIS — R10.13 EPIGASTRIC PAIN: ICD-10-CM

## 2018-01-01 DIAGNOSIS — R55 SYNCOPE AND COLLAPSE: ICD-10-CM

## 2018-01-01 DIAGNOSIS — Z43.1 ATTENTION TO GASTROSTOMY (HCC): ICD-10-CM

## 2018-01-01 DIAGNOSIS — M47.817 LUMBOSACRAL SPONDYLOSIS WITHOUT MYELOPATHY: ICD-10-CM

## 2018-01-01 DIAGNOSIS — R77.8 ELEVATED TROPONIN: ICD-10-CM

## 2018-01-01 DIAGNOSIS — M47.816 LUMBAR SPONDYLOSIS: ICD-10-CM

## 2018-01-01 DIAGNOSIS — Z79.02 LONG TERM (CURRENT) USE OF ANTITHROMBOTICS/ANTIPLATELETS: ICD-10-CM

## 2018-01-01 DIAGNOSIS — R41.82 ALTERED MENTAL STATUS, UNSPECIFIED ALTERED MENTAL STATUS TYPE: ICD-10-CM

## 2018-01-01 DIAGNOSIS — K59.00 CONSTIPATION, UNSPECIFIED CONSTIPATION TYPE: ICD-10-CM

## 2018-01-01 DIAGNOSIS — R05.9 COUGH: Primary | ICD-10-CM

## 2018-01-01 DIAGNOSIS — J96.01 ACUTE RESPIRATORY FAILURE WITH HYPOXIA (HCC): ICD-10-CM

## 2018-01-01 DIAGNOSIS — I35.0 NONRHEUMATIC AORTIC VALVE STENOSIS: Primary | ICD-10-CM

## 2018-01-01 DIAGNOSIS — Z85.810 HX OF TONGUE CANCER: ICD-10-CM

## 2018-01-01 DIAGNOSIS — I50.9 ACUTE ON CHRONIC CONGESTIVE HEART FAILURE, UNSPECIFIED HEART FAILURE TYPE (HCC): Primary | ICD-10-CM

## 2018-01-01 DIAGNOSIS — R13.12 DYSPHAGIA, OROPHARYNGEAL PHASE: ICD-10-CM

## 2018-01-01 DIAGNOSIS — M25.461 EFFUSION OF RIGHT KNEE: ICD-10-CM

## 2018-01-01 DIAGNOSIS — Z93.1 COMPLAINT ASSOCIATED WITH GASTRIC TUBE (HCC): Primary | ICD-10-CM

## 2018-01-01 DIAGNOSIS — M54.16 RADICULOPATHY OF LUMBAR REGION: ICD-10-CM

## 2018-01-01 DIAGNOSIS — R07.89 ATYPICAL CHEST PAIN: ICD-10-CM

## 2018-01-01 DIAGNOSIS — R26.9 NEUROLOGIC GAIT DYSFUNCTION: ICD-10-CM

## 2018-01-01 DIAGNOSIS — K94.23 MECHANICAL COMPLICATION OF GASTROSTOMY (HCC): ICD-10-CM

## 2018-01-01 LAB
25(OH)D2 SERPL-MCNC: <1 NG/ML
25(OH)D3 SERPL-MCNC: 34.7 NG/ML (ref 30–100)
25(OH)D3 SERPL-MCNC: 35 NG/ML
25(OH)D3+25(OH)D2 SERPL-MCNC: 35 NG/ML
ALBUMIN SERPL-MCNC: 2.7 G/DL (ref 3.5–5)
ALBUMIN SERPL-MCNC: 2.8 G/DL (ref 3.5–5)
ALBUMIN SERPL-MCNC: 2.9 G/DL (ref 3.5–5)
ALBUMIN SERPL-MCNC: 3 G/DL (ref 3.5–5)
ALBUMIN SERPL-MCNC: 3.1 G/DL (ref 3.5–5)
ALBUMIN SERPL-MCNC: 3.1 G/DL (ref 3.5–5)
ALBUMIN SERPL-MCNC: 3.2 G/DL (ref 3.5–5)
ALBUMIN SERPL-MCNC: 3.3 G/DL (ref 3.5–5)
ALBUMIN SERPL-MCNC: 3.3 G/DL (ref 3.5–5)
ALBUMIN SERPL-MCNC: 3.5 G/DL (ref 3.5–4.7)
ALBUMIN SERPL-MCNC: 3.8 G/DL (ref 3.5–4.7)
ALBUMIN/CREAT UR: 150.6 MG/G CREAT (ref 0–30)
ALBUMIN/CREAT UR: 40.9 MG/G CREAT (ref 0–30)
ALBUMIN/GLOB SERPL: 0.7 {RATIO} (ref 1.1–2.2)
ALBUMIN/GLOB SERPL: 0.8 {RATIO} (ref 1.1–2.2)
ALBUMIN/GLOB SERPL: 1.1 {RATIO} (ref 1.2–2.2)
ALBUMIN/GLOB SERPL: 1.1 {RATIO} (ref 1.2–2.2)
ALP SERPL-CCNC: 106 U/L (ref 45–117)
ALP SERPL-CCNC: 68 U/L (ref 45–117)
ALP SERPL-CCNC: 68 U/L (ref 45–117)
ALP SERPL-CCNC: 69 U/L (ref 45–117)
ALP SERPL-CCNC: 70 U/L (ref 45–117)
ALP SERPL-CCNC: 71 U/L (ref 45–117)
ALP SERPL-CCNC: 72 IU/L (ref 39–117)
ALP SERPL-CCNC: 74 IU/L (ref 39–117)
ALP SERPL-CCNC: 75 U/L (ref 45–117)
ALP SERPL-CCNC: 77 U/L (ref 45–117)
ALP SERPL-CCNC: 83 U/L (ref 45–117)
ALP SERPL-CCNC: 83 U/L (ref 45–117)
ALP SERPL-CCNC: 91 U/L (ref 45–117)
ALT SERPL-CCNC: 31 IU/L (ref 0–44)
ALT SERPL-CCNC: 34 U/L (ref 12–78)
ALT SERPL-CCNC: 40 U/L (ref 12–78)
ALT SERPL-CCNC: 42 U/L (ref 12–78)
ALT SERPL-CCNC: 44 IU/L (ref 0–44)
ALT SERPL-CCNC: 44 U/L (ref 12–78)
ALT SERPL-CCNC: 44 U/L (ref 12–78)
ALT SERPL-CCNC: 46 U/L (ref 12–78)
ALT SERPL-CCNC: 48 U/L (ref 12–78)
ALT SERPL-CCNC: 51 U/L (ref 12–78)
ALT SERPL-CCNC: 52 U/L (ref 12–78)
AMORPH CRY URNS QL MICRO: ABNORMAL
ANION GAP SERPL CALC-SCNC: 2 MMOL/L (ref 5–15)
ANION GAP SERPL CALC-SCNC: 3 MMOL/L (ref 5–15)
ANION GAP SERPL CALC-SCNC: 4 MMOL/L (ref 5–15)
ANION GAP SERPL CALC-SCNC: 5 MMOL/L (ref 5–15)
ANION GAP SERPL CALC-SCNC: 6 MMOL/L (ref 5–15)
ANION GAP SERPL CALC-SCNC: 7 MMOL/L (ref 5–15)
ANION GAP SERPL CALC-SCNC: 8 MMOL/L (ref 5–15)
APPEARANCE UR: CLEAR
APTT PPP: 22.3 SEC (ref 22.1–32)
APTT PPP: 23 SEC (ref 22.1–32)
ARTERIAL PATENCY WRIST A: YES
AST SERPL-CCNC: 38 IU/L (ref 0–40)
AST SERPL-CCNC: 40 U/L (ref 15–37)
AST SERPL-CCNC: 46 IU/L (ref 0–40)
AST SERPL-CCNC: 48 U/L (ref 15–37)
AST SERPL-CCNC: 48 U/L (ref 15–37)
AST SERPL-CCNC: 50 U/L (ref 15–37)
AST SERPL-CCNC: 50 U/L (ref 15–37)
AST SERPL-CCNC: 51 U/L (ref 15–37)
AST SERPL-CCNC: 52 U/L (ref 15–37)
AST SERPL-CCNC: 57 U/L (ref 15–37)
AST SERPL-CCNC: 59 U/L (ref 15–37)
AST SERPL-CCNC: 61 U/L (ref 15–37)
AST SERPL-CCNC: 72 U/L (ref 15–37)
ATRIAL RATE: 100 BPM
ATRIAL RATE: 118 BPM
ATRIAL RATE: 66 BPM
ATRIAL RATE: 80 BPM
ATRIAL RATE: 83 BPM
ATRIAL RATE: 90 BPM
ATRIAL RATE: 92 BPM
ATRIAL RATE: 96 BPM
ATRIAL RATE: 99 BPM
BACTERIA SPEC CULT: ABNORMAL
BACTERIA SPEC CULT: ABNORMAL
BACTERIA SPEC CULT: NORMAL
BACTERIA SPEC CULT: NORMAL
BACTERIA URNS QL MICRO: NEGATIVE /HPF
BACTERIA URNS QL MICRO: NEGATIVE /HPF
BASE EXCESS BLDA CALC-SCNC: 8.6 MMOL/L
BASOPHILS # BLD: 0 K/UL (ref 0–0.1)
BASOPHILS # BLD: 0.1 K/UL (ref 0–0.1)
BASOPHILS NFR BLD: 0 % (ref 0–1)
BASOPHILS NFR BLD: 1 % (ref 0–1)
BDY SITE: ABNORMAL
BILIRUB SERPL-MCNC: 0.4 MG/DL (ref 0.2–1)
BILIRUB SERPL-MCNC: 0.5 MG/DL (ref 0.2–1)
BILIRUB SERPL-MCNC: 0.5 MG/DL (ref 0–1.2)
BILIRUB SERPL-MCNC: 0.6 MG/DL (ref 0.2–1)
BILIRUB SERPL-MCNC: 0.6 MG/DL (ref 0–1.2)
BILIRUB SERPL-MCNC: 0.7 MG/DL (ref 0.2–1)
BILIRUB SERPL-MCNC: 0.9 MG/DL (ref 0.2–1)
BILIRUB UR QL: NEGATIVE
BNP SERPL-MCNC: 1691 PG/ML (ref 0–450)
BNP SERPL-MCNC: 4446 PG/ML (ref 0–450)
BNP SERPL-MCNC: 5098 PG/ML (ref 0–450)
BNP SERPL-MCNC: 6433 PG/ML (ref 0–450)
BNP SERPL-MCNC: 7648 PG/ML (ref 0–450)
BNP SERPL-MCNC: 8018 PG/ML (ref 0–450)
BUN SERPL-MCNC: 12 MG/DL (ref 6–20)
BUN SERPL-MCNC: 16 MG/DL (ref 6–20)
BUN SERPL-MCNC: 17 MG/DL (ref 6–20)
BUN SERPL-MCNC: 19 MG/DL (ref 6–20)
BUN SERPL-MCNC: 21 MG/DL (ref 6–20)
BUN SERPL-MCNC: 21 MG/DL (ref 6–20)
BUN SERPL-MCNC: 22 MG/DL (ref 6–20)
BUN SERPL-MCNC: 26 MG/DL (ref 6–20)
BUN SERPL-MCNC: 27 MG/DL (ref 6–20)
BUN SERPL-MCNC: 28 MG/DL (ref 6–20)
BUN SERPL-MCNC: 28 MG/DL (ref 6–20)
BUN SERPL-MCNC: 28 MG/DL (ref 8–27)
BUN SERPL-MCNC: 29 MG/DL (ref 6–20)
BUN SERPL-MCNC: 30 MG/DL (ref 6–20)
BUN SERPL-MCNC: 31 MG/DL (ref 6–20)
BUN SERPL-MCNC: 31 MG/DL (ref 6–20)
BUN SERPL-MCNC: 34 MG/DL (ref 6–20)
BUN SERPL-MCNC: 35 MG/DL (ref 6–20)
BUN SERPL-MCNC: 36 MG/DL (ref 6–20)
BUN SERPL-MCNC: 37 MG/DL (ref 6–20)
BUN SERPL-MCNC: 37 MG/DL (ref 8–27)
BUN SERPL-MCNC: 38 MG/DL (ref 6–20)
BUN SERPL-MCNC: 39 MG/DL (ref 6–20)
BUN SERPL-MCNC: 41 MG/DL (ref 6–20)
BUN SERPL-MCNC: 42 MG/DL (ref 6–20)
BUN SERPL-MCNC: 43 MG/DL (ref 6–20)
BUN SERPL-MCNC: 46 MG/DL (ref 6–20)
BUN SERPL-MCNC: 47 MG/DL (ref 6–20)
BUN SERPL-MCNC: 48 MG/DL (ref 6–20)
BUN/CREAT SERPL: 13 (ref 12–20)
BUN/CREAT SERPL: 17 (ref 12–20)
BUN/CREAT SERPL: 17 (ref 12–20)
BUN/CREAT SERPL: 18 (ref 12–20)
BUN/CREAT SERPL: 19 (ref 12–20)
BUN/CREAT SERPL: 19 (ref 12–20)
BUN/CREAT SERPL: 20 (ref 12–20)
BUN/CREAT SERPL: 21 (ref 12–20)
BUN/CREAT SERPL: 22 (ref 12–20)
BUN/CREAT SERPL: 23 (ref 10–24)
BUN/CREAT SERPL: 23 (ref 12–20)
BUN/CREAT SERPL: 24 (ref 12–20)
BUN/CREAT SERPL: 25 (ref 12–20)
BUN/CREAT SERPL: 27 (ref 12–20)
BUN/CREAT SERPL: 28 (ref 12–20)
BUN/CREAT SERPL: 28 (ref 12–20)
BUN/CREAT SERPL: 29 (ref 10–24)
BUN/CREAT SERPL: 29 (ref 12–20)
BUN/CREAT SERPL: 30 (ref 12–20)
BUN/CREAT SERPL: 30 (ref 12–20)
BUN/CREAT SERPL: 32 (ref 12–20)
BUN/CREAT SERPL: 32 (ref 12–20)
BUN/CREAT SERPL: 35 (ref 12–20)
C DIFF TOX GENS STL QL NAA+PROBE: NEGATIVE
CALCIUM SERPL-MCNC: 7.5 MG/DL (ref 8.5–10.1)
CALCIUM SERPL-MCNC: 8.4 MG/DL (ref 8.5–10.1)
CALCIUM SERPL-MCNC: 8.5 MG/DL (ref 8.5–10.1)
CALCIUM SERPL-MCNC: 8.5 MG/DL (ref 8.5–10.1)
CALCIUM SERPL-MCNC: 8.6 MG/DL (ref 8.5–10.1)
CALCIUM SERPL-MCNC: 8.6 MG/DL (ref 8.5–10.1)
CALCIUM SERPL-MCNC: 8.7 MG/DL (ref 8.5–10.1)
CALCIUM SERPL-MCNC: 8.8 MG/DL (ref 8.5–10.1)
CALCIUM SERPL-MCNC: 8.9 MG/DL (ref 8.5–10.1)
CALCIUM SERPL-MCNC: 9 MG/DL (ref 8.5–10.1)
CALCIUM SERPL-MCNC: 9 MG/DL (ref 8.6–10.2)
CALCIUM SERPL-MCNC: 9.1 MG/DL (ref 8.5–10.1)
CALCIUM SERPL-MCNC: 9.2 MG/DL (ref 8.5–10.1)
CALCIUM SERPL-MCNC: 9.3 MG/DL (ref 8.5–10.1)
CALCIUM SERPL-MCNC: 9.4 MG/DL (ref 8.5–10.1)
CALCIUM SERPL-MCNC: 9.4 MG/DL (ref 8.6–10.2)
CALCIUM SERPL-MCNC: 9.5 MG/DL (ref 8.5–10.1)
CALCULATED P AXIS, ECG09: 22 DEGREES
CALCULATED P AXIS, ECG09: 23 DEGREES
CALCULATED P AXIS, ECG09: 29 DEGREES
CALCULATED P AXIS, ECG09: 32 DEGREES
CALCULATED P AXIS, ECG09: 36 DEGREES
CALCULATED P AXIS, ECG09: 36 DEGREES
CALCULATED P AXIS, ECG09: 38 DEGREES
CALCULATED P AXIS, ECG09: 40 DEGREES
CALCULATED P AXIS, ECG09: 46 DEGREES
CALCULATED P AXIS, ECG09: 47 DEGREES
CALCULATED P AXIS, ECG09: 83 DEGREES
CALCULATED R AXIS, ECG10: -165 DEGREES
CALCULATED R AXIS, ECG10: 151 DEGREES
CALCULATED R AXIS, ECG10: 168 DEGREES
CALCULATED R AXIS, ECG10: 169 DEGREES
CALCULATED R AXIS, ECG10: 169 DEGREES
CALCULATED R AXIS, ECG10: 170 DEGREES
CALCULATED R AXIS, ECG10: 170 DEGREES
CALCULATED R AXIS, ECG10: 171 DEGREES
CALCULATED R AXIS, ECG10: 172 DEGREES
CALCULATED R AXIS, ECG10: 174 DEGREES
CALCULATED R AXIS, ECG10: 175 DEGREES
CALCULATED T AXIS, ECG11: -19 DEGREES
CALCULATED T AXIS, ECG11: -3 DEGREES
CALCULATED T AXIS, ECG11: -4 DEGREES
CALCULATED T AXIS, ECG11: 1 DEGREES
CALCULATED T AXIS, ECG11: 1 DEGREES
CALCULATED T AXIS, ECG11: 15 DEGREES
CALCULATED T AXIS, ECG11: 28 DEGREES
CALCULATED T AXIS, ECG11: 37 DEGREES
CALCULATED T AXIS, ECG11: 5 DEGREES
CALCULATED T AXIS, ECG11: 7 DEGREES
CHLORIDE SERPL-SCNC: 100 MMOL/L (ref 97–108)
CHLORIDE SERPL-SCNC: 100 MMOL/L (ref 97–108)
CHLORIDE SERPL-SCNC: 101 MMOL/L (ref 96–106)
CHLORIDE SERPL-SCNC: 101 MMOL/L (ref 97–108)
CHLORIDE SERPL-SCNC: 101 MMOL/L (ref 97–108)
CHLORIDE SERPL-SCNC: 102 MMOL/L (ref 97–108)
CHLORIDE SERPL-SCNC: 103 MMOL/L (ref 97–108)
CHLORIDE SERPL-SCNC: 104 MMOL/L (ref 97–108)
CHLORIDE SERPL-SCNC: 105 MMOL/L (ref 97–108)
CHLORIDE SERPL-SCNC: 106 MMOL/L (ref 97–108)
CHLORIDE SERPL-SCNC: 107 MMOL/L (ref 97–108)
CHLORIDE SERPL-SCNC: 108 MMOL/L (ref 97–108)
CHLORIDE SERPL-SCNC: 108 MMOL/L (ref 97–108)
CHLORIDE SERPL-SCNC: 109 MMOL/L (ref 97–108)
CHLORIDE SERPL-SCNC: 110 MMOL/L (ref 97–108)
CHLORIDE SERPL-SCNC: 111 MMOL/L (ref 97–108)
CHLORIDE SERPL-SCNC: 94 MMOL/L (ref 97–108)
CHLORIDE SERPL-SCNC: 94 MMOL/L (ref 97–108)
CHLORIDE SERPL-SCNC: 95 MMOL/L (ref 96–106)
CHLORIDE SERPL-SCNC: 95 MMOL/L (ref 97–108)
CHLORIDE SERPL-SCNC: 95 MMOL/L (ref 97–108)
CHLORIDE SERPL-SCNC: 96 MMOL/L (ref 97–108)
CHLORIDE SERPL-SCNC: 97 MMOL/L (ref 97–108)
CHLORIDE SERPL-SCNC: 98 MMOL/L (ref 97–108)
CHLORIDE SERPL-SCNC: 99 MMOL/L (ref 97–108)
CHOLEST SERPL-MCNC: 123 MG/DL (ref 100–199)
CHOLEST SERPL-MCNC: 128 MG/DL (ref 100–199)
CK MB CFR SERPL CALC: 2.1 % (ref 0–2.5)
CK MB CFR SERPL CALC: 2.9 % (ref 0–2.5)
CK MB SERPL-MCNC: 2.4 NG/ML (ref 5–25)
CK MB SERPL-MCNC: 4.4 NG/ML (ref 5–25)
CK SERPL-CCNC: 110 U/L (ref 39–308)
CK SERPL-CCNC: 117 U/L (ref 39–308)
CK SERPL-CCNC: 178 U/L (ref 39–308)
CK SERPL-CCNC: 213 U/L (ref 39–308)
CK SERPL-CCNC: 219 U/L (ref 39–308)
CK SERPL-CCNC: 222 U/L (ref 39–308)
CK SERPL-CCNC: 227 U/L (ref 39–308)
CK SERPL-CCNC: 82 U/L (ref 39–308)
CO2 SERPL-SCNC: 27 MMOL/L (ref 21–32)
CO2 SERPL-SCNC: 28 MMOL/L (ref 21–32)
CO2 SERPL-SCNC: 29 MMOL/L (ref 21–32)
CO2 SERPL-SCNC: 30 MMOL/L (ref 20–29)
CO2 SERPL-SCNC: 30 MMOL/L (ref 21–32)
CO2 SERPL-SCNC: 31 MMOL/L (ref 18–29)
CO2 SERPL-SCNC: 31 MMOL/L (ref 21–32)
CO2 SERPL-SCNC: 33 MMOL/L (ref 21–32)
CO2 SERPL-SCNC: 34 MMOL/L (ref 21–32)
CO2 SERPL-SCNC: 35 MMOL/L (ref 21–32)
CO2 SERPL-SCNC: 36 MMOL/L (ref 21–32)
CO2 SERPL-SCNC: 37 MMOL/L (ref 21–32)
CO2 SERPL-SCNC: 37 MMOL/L (ref 21–32)
CO2 SERPL-SCNC: 38 MMOL/L (ref 21–32)
CO2 SERPL-SCNC: 38 MMOL/L (ref 21–32)
COLOR UR: ABNORMAL
COLOR UR: ABNORMAL
COLOR UR: NORMAL
COMMENT, HOLDF: NORMAL
COMMENT, HOLDF: NORMAL
CREAT SERPL-MCNC: 0.93 MG/DL (ref 0.7–1.3)
CREAT SERPL-MCNC: 0.94 MG/DL (ref 0.7–1.3)
CREAT SERPL-MCNC: 1 MG/DL (ref 0.7–1.3)
CREAT SERPL-MCNC: 1.03 MG/DL (ref 0.7–1.3)
CREAT SERPL-MCNC: 1.04 MG/DL (ref 0.7–1.3)
CREAT SERPL-MCNC: 1.04 MG/DL (ref 0.7–1.3)
CREAT SERPL-MCNC: 1.07 MG/DL (ref 0.7–1.3)
CREAT SERPL-MCNC: 1.12 MG/DL (ref 0.7–1.3)
CREAT SERPL-MCNC: 1.14 MG/DL (ref 0.7–1.3)
CREAT SERPL-MCNC: 1.16 MG/DL (ref 0.7–1.3)
CREAT SERPL-MCNC: 1.18 MG/DL (ref 0.7–1.3)
CREAT SERPL-MCNC: 1.18 MG/DL (ref 0.7–1.3)
CREAT SERPL-MCNC: 1.19 MG/DL (ref 0.7–1.3)
CREAT SERPL-MCNC: 1.21 MG/DL (ref 0.7–1.3)
CREAT SERPL-MCNC: 1.22 MG/DL (ref 0.7–1.3)
CREAT SERPL-MCNC: 1.23 MG/DL (ref 0.76–1.27)
CREAT SERPL-MCNC: 1.24 MG/DL (ref 0.7–1.3)
CREAT SERPL-MCNC: 1.26 MG/DL (ref 0.76–1.27)
CREAT SERPL-MCNC: 1.26 MG/DL (ref 0.7–1.3)
CREAT SERPL-MCNC: 1.26 MG/DL (ref 0.7–1.3)
CREAT SERPL-MCNC: 1.27 MG/DL (ref 0.7–1.3)
CREAT SERPL-MCNC: 1.28 MG/DL (ref 0.7–1.3)
CREAT SERPL-MCNC: 1.28 MG/DL (ref 0.7–1.3)
CREAT SERPL-MCNC: 1.29 MG/DL (ref 0.7–1.3)
CREAT SERPL-MCNC: 1.3 MG/DL (ref 0.7–1.3)
CREAT SERPL-MCNC: 1.32 MG/DL (ref 0.7–1.3)
CREAT SERPL-MCNC: 1.39 MG/DL (ref 0.7–1.3)
CREAT SERPL-MCNC: 1.39 MG/DL (ref 0.7–1.3)
CREAT SERPL-MCNC: 1.4 MG/DL (ref 0.7–1.3)
CREAT SERPL-MCNC: 1.41 MG/DL (ref 0.7–1.3)
CREAT SERPL-MCNC: 1.41 MG/DL (ref 0.7–1.3)
CREAT SERPL-MCNC: 1.45 MG/DL (ref 0.7–1.3)
CREAT SERPL-MCNC: 1.47 MG/DL (ref 0.7–1.3)
CREAT SERPL-MCNC: 1.53 MG/DL (ref 0.7–1.3)
CREAT SERPL-MCNC: 1.57 MG/DL (ref 0.7–1.3)
CREAT SERPL-MCNC: 1.61 MG/DL (ref 0.7–1.3)
CREAT SERPL-MCNC: 1.63 MG/DL (ref 0.7–1.3)
CREAT SERPL-MCNC: 1.68 MG/DL (ref 0.7–1.3)
CREAT SERPL-MCNC: 1.82 MG/DL (ref 0.7–1.3)
CREAT UR-MCNC: 71.7 MG/DL
CREAT UR-MCNC: 89.3 MG/DL
DIAGNOSIS, 93000: NORMAL
DIFFERENTIAL METHOD BLD: ABNORMAL
EOSINOPHIL # BLD: 0 K/UL (ref 0–0.4)
EOSINOPHIL # BLD: 0.1 K/UL (ref 0–0.4)
EOSINOPHIL NFR BLD: 0 % (ref 0–7)
EOSINOPHIL NFR BLD: 1 % (ref 0–7)
EOSINOPHIL NFR BLD: 2 % (ref 0–7)
EOSINOPHIL NFR BLD: 3 % (ref 0–7)
EPITH CASTS URNS QL MICRO: ABNORMAL /LPF
EPITH CASTS URNS QL MICRO: ABNORMAL /LPF
ERYTHROCYTE [DISTWIDTH] IN BLOOD BY AUTOMATED COUNT: 13.7 % (ref 11.5–14.5)
ERYTHROCYTE [DISTWIDTH] IN BLOOD BY AUTOMATED COUNT: 13.8 % (ref 11.5–14.5)
ERYTHROCYTE [DISTWIDTH] IN BLOOD BY AUTOMATED COUNT: 14.2 % (ref 11.5–14.5)
ERYTHROCYTE [DISTWIDTH] IN BLOOD BY AUTOMATED COUNT: 14.5 % (ref 11.5–14.5)
ERYTHROCYTE [DISTWIDTH] IN BLOOD BY AUTOMATED COUNT: 14.6 % (ref 11.5–14.5)
ERYTHROCYTE [DISTWIDTH] IN BLOOD BY AUTOMATED COUNT: 14.8 % (ref 11.5–14.5)
ERYTHROCYTE [DISTWIDTH] IN BLOOD BY AUTOMATED COUNT: 14.9 % (ref 11.5–14.5)
ERYTHROCYTE [DISTWIDTH] IN BLOOD BY AUTOMATED COUNT: 15.1 % (ref 11.5–14.5)
ERYTHROCYTE [DISTWIDTH] IN BLOOD BY AUTOMATED COUNT: 15.2 % (ref 11.5–14.5)
ERYTHROCYTE [DISTWIDTH] IN BLOOD BY AUTOMATED COUNT: 15.4 % (ref 11.5–14.5)
ERYTHROCYTE [DISTWIDTH] IN BLOOD BY AUTOMATED COUNT: 15.6 % (ref 11.5–14.5)
ERYTHROCYTE [DISTWIDTH] IN BLOOD BY AUTOMATED COUNT: 15.8 % (ref 11.5–14.5)
ERYTHROCYTE [DISTWIDTH] IN BLOOD BY AUTOMATED COUNT: 15.8 % (ref 11.5–14.5)
ERYTHROCYTE [DISTWIDTH] IN BLOOD BY AUTOMATED COUNT: 15.9 % (ref 11.5–14.5)
ERYTHROCYTE [DISTWIDTH] IN BLOOD BY AUTOMATED COUNT: 16 % (ref 11.5–14.5)
ERYTHROCYTE [DISTWIDTH] IN BLOOD BY AUTOMATED COUNT: 16.1 % (ref 11.5–14.5)
ERYTHROCYTE [DISTWIDTH] IN BLOOD BY AUTOMATED COUNT: 16.1 % (ref 11.5–14.5)
ERYTHROCYTE [DISTWIDTH] IN BLOOD BY AUTOMATED COUNT: 16.2 % (ref 11.5–14.5)
ERYTHROCYTE [DISTWIDTH] IN BLOOD BY AUTOMATED COUNT: 16.3 % (ref 11.5–14.5)
ERYTHROCYTE [DISTWIDTH] IN BLOOD BY AUTOMATED COUNT: 16.3 % (ref 11.5–14.5)
ERYTHROCYTE [DISTWIDTH] IN BLOOD BY AUTOMATED COUNT: 16.4 % (ref 11.5–14.5)
EST. AVERAGE GLUCOSE BLD GHB EST-MCNC: 160 MG/DL
EST. AVERAGE GLUCOSE BLD GHB EST-MCNC: 163 MG/DL
EST. AVERAGE GLUCOSE BLD GHB EST-MCNC: 200 MG/DL
EST. AVERAGE GLUCOSE BLD GHB EST-MCNC: 200 MG/DL
FOLATE SERPL-MCNC: >20 NG/ML
GABAPENTIN SERPLBLD-MCNC: 24.2 UG/ML (ref 4–16)
GAS FLOW.O2 O2 DELIVERY SYS: 2 L/MIN
GFR SERPLBLD CREATININE-BSD FMLA CKD-EPI: 54 ML/MIN/1.73
GFR SERPLBLD CREATININE-BSD FMLA CKD-EPI: 62 ML/MIN/1.73
GLOBULIN SER CALC-MCNC: 3.1 G/DL (ref 1.5–4.5)
GLOBULIN SER CALC-MCNC: 3.4 G/DL (ref 1.5–4.5)
GLOBULIN SER CALC-MCNC: 3.6 G/DL (ref 2–4)
GLOBULIN SER CALC-MCNC: 3.7 G/DL (ref 2–4)
GLOBULIN SER CALC-MCNC: 4 G/DL (ref 2–4)
GLOBULIN SER CALC-MCNC: 4.2 G/DL (ref 2–4)
GLOBULIN SER CALC-MCNC: 4.3 G/DL (ref 2–4)
GLOBULIN SER CALC-MCNC: 4.4 G/DL (ref 2–4)
GLOBULIN SER CALC-MCNC: 4.6 G/DL (ref 2–4)
GLOBULIN SER CALC-MCNC: 4.6 G/DL (ref 2–4)
GLOBULIN SER CALC-MCNC: 4.7 G/DL (ref 2–4)
GLUCOSE BLD STRIP.AUTO-MCNC: 102 MG/DL (ref 65–100)
GLUCOSE BLD STRIP.AUTO-MCNC: 105 MG/DL (ref 65–100)
GLUCOSE BLD STRIP.AUTO-MCNC: 109 MG/DL (ref 65–100)
GLUCOSE BLD STRIP.AUTO-MCNC: 112 MG/DL (ref 65–100)
GLUCOSE BLD STRIP.AUTO-MCNC: 114 MG/DL (ref 65–100)
GLUCOSE BLD STRIP.AUTO-MCNC: 114 MG/DL (ref 65–100)
GLUCOSE BLD STRIP.AUTO-MCNC: 117 MG/DL (ref 65–100)
GLUCOSE BLD STRIP.AUTO-MCNC: 118 MG/DL (ref 65–100)
GLUCOSE BLD STRIP.AUTO-MCNC: 120 MG/DL (ref 65–100)
GLUCOSE BLD STRIP.AUTO-MCNC: 120 MG/DL (ref 65–100)
GLUCOSE BLD STRIP.AUTO-MCNC: 121 MG/DL (ref 65–100)
GLUCOSE BLD STRIP.AUTO-MCNC: 122 MG/DL (ref 65–100)
GLUCOSE BLD STRIP.AUTO-MCNC: 123 MG/DL (ref 65–100)
GLUCOSE BLD STRIP.AUTO-MCNC: 125 MG/DL (ref 65–100)
GLUCOSE BLD STRIP.AUTO-MCNC: 126 MG/DL (ref 65–100)
GLUCOSE BLD STRIP.AUTO-MCNC: 128 MG/DL (ref 65–100)
GLUCOSE BLD STRIP.AUTO-MCNC: 131 MG/DL (ref 65–100)
GLUCOSE BLD STRIP.AUTO-MCNC: 132 MG/DL (ref 65–100)
GLUCOSE BLD STRIP.AUTO-MCNC: 132 MG/DL (ref 65–100)
GLUCOSE BLD STRIP.AUTO-MCNC: 133 MG/DL (ref 65–100)
GLUCOSE BLD STRIP.AUTO-MCNC: 133 MG/DL (ref 65–100)
GLUCOSE BLD STRIP.AUTO-MCNC: 134 MG/DL (ref 65–100)
GLUCOSE BLD STRIP.AUTO-MCNC: 135 MG/DL (ref 65–100)
GLUCOSE BLD STRIP.AUTO-MCNC: 137 MG/DL (ref 65–100)
GLUCOSE BLD STRIP.AUTO-MCNC: 138 MG/DL (ref 65–100)
GLUCOSE BLD STRIP.AUTO-MCNC: 138 MG/DL (ref 65–100)
GLUCOSE BLD STRIP.AUTO-MCNC: 143 MG/DL (ref 65–100)
GLUCOSE BLD STRIP.AUTO-MCNC: 149 MG/DL (ref 65–100)
GLUCOSE BLD STRIP.AUTO-MCNC: 149 MG/DL (ref 65–100)
GLUCOSE BLD STRIP.AUTO-MCNC: 150 MG/DL (ref 65–100)
GLUCOSE BLD STRIP.AUTO-MCNC: 150 MG/DL (ref 65–100)
GLUCOSE BLD STRIP.AUTO-MCNC: 151 MG/DL (ref 65–100)
GLUCOSE BLD STRIP.AUTO-MCNC: 152 MG/DL (ref 65–100)
GLUCOSE BLD STRIP.AUTO-MCNC: 152 MG/DL (ref 65–100)
GLUCOSE BLD STRIP.AUTO-MCNC: 153 MG/DL (ref 65–100)
GLUCOSE BLD STRIP.AUTO-MCNC: 155 MG/DL (ref 65–100)
GLUCOSE BLD STRIP.AUTO-MCNC: 156 MG/DL (ref 65–100)
GLUCOSE BLD STRIP.AUTO-MCNC: 156 MG/DL (ref 65–100)
GLUCOSE BLD STRIP.AUTO-MCNC: 157 MG/DL (ref 65–100)
GLUCOSE BLD STRIP.AUTO-MCNC: 157 MG/DL (ref 65–100)
GLUCOSE BLD STRIP.AUTO-MCNC: 158 MG/DL (ref 65–100)
GLUCOSE BLD STRIP.AUTO-MCNC: 159 MG/DL (ref 65–100)
GLUCOSE BLD STRIP.AUTO-MCNC: 160 MG/DL (ref 65–100)
GLUCOSE BLD STRIP.AUTO-MCNC: 161 MG/DL (ref 65–100)
GLUCOSE BLD STRIP.AUTO-MCNC: 161 MG/DL (ref 65–100)
GLUCOSE BLD STRIP.AUTO-MCNC: 164 MG/DL (ref 65–100)
GLUCOSE BLD STRIP.AUTO-MCNC: 165 MG/DL (ref 65–100)
GLUCOSE BLD STRIP.AUTO-MCNC: 167 MG/DL (ref 65–100)
GLUCOSE BLD STRIP.AUTO-MCNC: 169 MG/DL (ref 65–100)
GLUCOSE BLD STRIP.AUTO-MCNC: 170 MG/DL (ref 65–100)
GLUCOSE BLD STRIP.AUTO-MCNC: 173 MG/DL (ref 65–100)
GLUCOSE BLD STRIP.AUTO-MCNC: 174 MG/DL (ref 65–100)
GLUCOSE BLD STRIP.AUTO-MCNC: 177 MG/DL (ref 65–100)
GLUCOSE BLD STRIP.AUTO-MCNC: 178 MG/DL (ref 65–100)
GLUCOSE BLD STRIP.AUTO-MCNC: 179 MG/DL (ref 65–100)
GLUCOSE BLD STRIP.AUTO-MCNC: 180 MG/DL (ref 65–100)
GLUCOSE BLD STRIP.AUTO-MCNC: 182 MG/DL (ref 65–100)
GLUCOSE BLD STRIP.AUTO-MCNC: 183 MG/DL (ref 65–100)
GLUCOSE BLD STRIP.AUTO-MCNC: 184 MG/DL (ref 65–100)
GLUCOSE BLD STRIP.AUTO-MCNC: 187 MG/DL (ref 65–100)
GLUCOSE BLD STRIP.AUTO-MCNC: 188 MG/DL (ref 65–100)
GLUCOSE BLD STRIP.AUTO-MCNC: 189 MG/DL (ref 65–100)
GLUCOSE BLD STRIP.AUTO-MCNC: 190 MG/DL (ref 65–100)
GLUCOSE BLD STRIP.AUTO-MCNC: 191 MG/DL (ref 65–100)
GLUCOSE BLD STRIP.AUTO-MCNC: 194 MG/DL (ref 65–100)
GLUCOSE BLD STRIP.AUTO-MCNC: 196 MG/DL (ref 65–100)
GLUCOSE BLD STRIP.AUTO-MCNC: 196 MG/DL (ref 65–100)
GLUCOSE BLD STRIP.AUTO-MCNC: 198 MG/DL (ref 65–100)
GLUCOSE BLD STRIP.AUTO-MCNC: 198 MG/DL (ref 65–100)
GLUCOSE BLD STRIP.AUTO-MCNC: 201 MG/DL (ref 65–100)
GLUCOSE BLD STRIP.AUTO-MCNC: 202 MG/DL (ref 65–100)
GLUCOSE BLD STRIP.AUTO-MCNC: 204 MG/DL (ref 65–100)
GLUCOSE BLD STRIP.AUTO-MCNC: 205 MG/DL (ref 65–100)
GLUCOSE BLD STRIP.AUTO-MCNC: 208 MG/DL (ref 65–100)
GLUCOSE BLD STRIP.AUTO-MCNC: 208 MG/DL (ref 65–100)
GLUCOSE BLD STRIP.AUTO-MCNC: 209 MG/DL (ref 65–100)
GLUCOSE BLD STRIP.AUTO-MCNC: 209 MG/DL (ref 65–100)
GLUCOSE BLD STRIP.AUTO-MCNC: 211 MG/DL (ref 65–100)
GLUCOSE BLD STRIP.AUTO-MCNC: 213 MG/DL (ref 65–100)
GLUCOSE BLD STRIP.AUTO-MCNC: 214 MG/DL (ref 65–100)
GLUCOSE BLD STRIP.AUTO-MCNC: 214 MG/DL (ref 65–100)
GLUCOSE BLD STRIP.AUTO-MCNC: 217 MG/DL (ref 65–100)
GLUCOSE BLD STRIP.AUTO-MCNC: 218 MG/DL (ref 65–100)
GLUCOSE BLD STRIP.AUTO-MCNC: 219 MG/DL (ref 65–100)
GLUCOSE BLD STRIP.AUTO-MCNC: 221 MG/DL (ref 65–100)
GLUCOSE BLD STRIP.AUTO-MCNC: 221 MG/DL (ref 65–100)
GLUCOSE BLD STRIP.AUTO-MCNC: 228 MG/DL (ref 65–100)
GLUCOSE BLD STRIP.AUTO-MCNC: 229 MG/DL (ref 65–100)
GLUCOSE BLD STRIP.AUTO-MCNC: 229 MG/DL (ref 65–100)
GLUCOSE BLD STRIP.AUTO-MCNC: 231 MG/DL (ref 65–100)
GLUCOSE BLD STRIP.AUTO-MCNC: 231 MG/DL (ref 65–100)
GLUCOSE BLD STRIP.AUTO-MCNC: 232 MG/DL (ref 65–100)
GLUCOSE BLD STRIP.AUTO-MCNC: 233 MG/DL (ref 65–100)
GLUCOSE BLD STRIP.AUTO-MCNC: 233 MG/DL (ref 65–100)
GLUCOSE BLD STRIP.AUTO-MCNC: 234 MG/DL (ref 65–100)
GLUCOSE BLD STRIP.AUTO-MCNC: 234 MG/DL (ref 65–100)
GLUCOSE BLD STRIP.AUTO-MCNC: 235 MG/DL (ref 65–100)
GLUCOSE BLD STRIP.AUTO-MCNC: 238 MG/DL (ref 65–100)
GLUCOSE BLD STRIP.AUTO-MCNC: 241 MG/DL (ref 65–100)
GLUCOSE BLD STRIP.AUTO-MCNC: 241 MG/DL (ref 65–100)
GLUCOSE BLD STRIP.AUTO-MCNC: 243 MG/DL (ref 65–100)
GLUCOSE BLD STRIP.AUTO-MCNC: 244 MG/DL (ref 65–100)
GLUCOSE BLD STRIP.AUTO-MCNC: 246 MG/DL (ref 65–100)
GLUCOSE BLD STRIP.AUTO-MCNC: 248 MG/DL (ref 65–100)
GLUCOSE BLD STRIP.AUTO-MCNC: 249 MG/DL (ref 65–100)
GLUCOSE BLD STRIP.AUTO-MCNC: 254 MG/DL (ref 65–100)
GLUCOSE BLD STRIP.AUTO-MCNC: 255 MG/DL (ref 65–100)
GLUCOSE BLD STRIP.AUTO-MCNC: 257 MG/DL (ref 65–100)
GLUCOSE BLD STRIP.AUTO-MCNC: 257 MG/DL (ref 65–100)
GLUCOSE BLD STRIP.AUTO-MCNC: 258 MG/DL (ref 65–100)
GLUCOSE BLD STRIP.AUTO-MCNC: 259 MG/DL (ref 65–100)
GLUCOSE BLD STRIP.AUTO-MCNC: 260 MG/DL (ref 65–100)
GLUCOSE BLD STRIP.AUTO-MCNC: 262 MG/DL (ref 65–100)
GLUCOSE BLD STRIP.AUTO-MCNC: 265 MG/DL (ref 65–100)
GLUCOSE BLD STRIP.AUTO-MCNC: 270 MG/DL (ref 65–100)
GLUCOSE BLD STRIP.AUTO-MCNC: 271 MG/DL (ref 65–100)
GLUCOSE BLD STRIP.AUTO-MCNC: 274 MG/DL (ref 65–100)
GLUCOSE BLD STRIP.AUTO-MCNC: 274 MG/DL (ref 65–100)
GLUCOSE BLD STRIP.AUTO-MCNC: 279 MG/DL (ref 65–100)
GLUCOSE BLD STRIP.AUTO-MCNC: 282 MG/DL (ref 65–100)
GLUCOSE BLD STRIP.AUTO-MCNC: 286 MG/DL (ref 65–100)
GLUCOSE BLD STRIP.AUTO-MCNC: 286 MG/DL (ref 65–100)
GLUCOSE BLD STRIP.AUTO-MCNC: 287 MG/DL (ref 65–100)
GLUCOSE BLD STRIP.AUTO-MCNC: 301 MG/DL (ref 65–100)
GLUCOSE BLD STRIP.AUTO-MCNC: 304 MG/DL (ref 65–100)
GLUCOSE BLD STRIP.AUTO-MCNC: 314 MG/DL (ref 65–100)
GLUCOSE BLD STRIP.AUTO-MCNC: 340 MG/DL (ref 65–100)
GLUCOSE BLD STRIP.AUTO-MCNC: 370 MG/DL (ref 65–100)
GLUCOSE BLD STRIP.AUTO-MCNC: 382 MG/DL (ref 65–100)
GLUCOSE BLD STRIP.AUTO-MCNC: 391 MG/DL (ref 65–100)
GLUCOSE BLD STRIP.AUTO-MCNC: 396 MG/DL (ref 65–100)
GLUCOSE BLD STRIP.AUTO-MCNC: 93 MG/DL (ref 65–100)
GLUCOSE BLD STRIP.AUTO-MCNC: 98 MG/DL (ref 65–100)
GLUCOSE BLD STRIP.AUTO-MCNC: 99 MG/DL (ref 65–100)
GLUCOSE SERPL-MCNC: 114 MG/DL (ref 65–100)
GLUCOSE SERPL-MCNC: 122 MG/DL (ref 65–100)
GLUCOSE SERPL-MCNC: 125 MG/DL (ref 65–100)
GLUCOSE SERPL-MCNC: 128 MG/DL (ref 65–100)
GLUCOSE SERPL-MCNC: 133 MG/DL (ref 65–100)
GLUCOSE SERPL-MCNC: 136 MG/DL (ref 65–100)
GLUCOSE SERPL-MCNC: 137 MG/DL (ref 65–100)
GLUCOSE SERPL-MCNC: 140 MG/DL (ref 65–100)
GLUCOSE SERPL-MCNC: 142 MG/DL (ref 65–100)
GLUCOSE SERPL-MCNC: 143 MG/DL (ref 65–100)
GLUCOSE SERPL-MCNC: 149 MG/DL (ref 65–100)
GLUCOSE SERPL-MCNC: 153 MG/DL (ref 65–100)
GLUCOSE SERPL-MCNC: 156 MG/DL (ref 65–100)
GLUCOSE SERPL-MCNC: 159 MG/DL (ref 65–100)
GLUCOSE SERPL-MCNC: 160 MG/DL (ref 65–100)
GLUCOSE SERPL-MCNC: 169 MG/DL (ref 65–100)
GLUCOSE SERPL-MCNC: 173 MG/DL (ref 65–100)
GLUCOSE SERPL-MCNC: 175 MG/DL (ref 65–100)
GLUCOSE SERPL-MCNC: 177 MG/DL (ref 65–100)
GLUCOSE SERPL-MCNC: 178 MG/DL (ref 65–100)
GLUCOSE SERPL-MCNC: 182 MG/DL (ref 65–100)
GLUCOSE SERPL-MCNC: 182 MG/DL (ref 65–100)
GLUCOSE SERPL-MCNC: 184 MG/DL (ref 65–100)
GLUCOSE SERPL-MCNC: 187 MG/DL (ref 65–100)
GLUCOSE SERPL-MCNC: 195 MG/DL (ref 65–100)
GLUCOSE SERPL-MCNC: 199 MG/DL (ref 65–99)
GLUCOSE SERPL-MCNC: 202 MG/DL (ref 65–100)
GLUCOSE SERPL-MCNC: 209 MG/DL (ref 65–100)
GLUCOSE SERPL-MCNC: 209 MG/DL (ref 65–100)
GLUCOSE SERPL-MCNC: 220 MG/DL (ref 65–100)
GLUCOSE SERPL-MCNC: 224 MG/DL (ref 65–100)
GLUCOSE SERPL-MCNC: 232 MG/DL (ref 65–100)
GLUCOSE SERPL-MCNC: 234 MG/DL (ref 65–100)
GLUCOSE SERPL-MCNC: 239 MG/DL (ref 65–100)
GLUCOSE SERPL-MCNC: 260 MG/DL (ref 65–100)
GLUCOSE SERPL-MCNC: 270 MG/DL (ref 65–99)
GLUCOSE SERPL-MCNC: 293 MG/DL (ref 65–100)
GLUCOSE SERPL-MCNC: 320 MG/DL (ref 65–100)
GLUCOSE SERPL-MCNC: 321 MG/DL (ref 65–100)
GLUCOSE UR STRIP.AUTO-MCNC: >1000 MG/DL
GLUCOSE UR STRIP.AUTO-MCNC: NEGATIVE MG/DL
GLUCOSE UR STRIP.AUTO-MCNC: NEGATIVE MG/DL
HBA1C MFR BLD HPLC: 7 %
HBA1C MFR BLD HPLC: 7.6 %
HBA1C MFR BLD: 7.2 % (ref 4.2–6.3)
HBA1C MFR BLD: 7.3 % (ref 4.8–5.6)
HBA1C MFR BLD: 8.6 % (ref 4.2–6.3)
HBA1C MFR BLD: 8.6 % (ref 4.8–5.6)
HCO3 BLDA-SCNC: 35 MMOL/L (ref 22–26)
HCT VFR BLD AUTO: 31.5 % (ref 36.6–50.3)
HCT VFR BLD AUTO: 32.8 % (ref 36.6–50.3)
HCT VFR BLD AUTO: 32.9 % (ref 36.6–50.3)
HCT VFR BLD AUTO: 33.3 % (ref 36.6–50.3)
HCT VFR BLD AUTO: 33.7 % (ref 36.6–50.3)
HCT VFR BLD AUTO: 33.9 % (ref 36.6–50.3)
HCT VFR BLD AUTO: 34 % (ref 36.6–50.3)
HCT VFR BLD AUTO: 34.4 % (ref 36.6–50.3)
HCT VFR BLD AUTO: 34.7 % (ref 36.6–50.3)
HCT VFR BLD AUTO: 34.9 % (ref 36.6–50.3)
HCT VFR BLD AUTO: 35 % (ref 36.6–50.3)
HCT VFR BLD AUTO: 35.3 % (ref 36.6–50.3)
HCT VFR BLD AUTO: 35.7 % (ref 36.6–50.3)
HCT VFR BLD AUTO: 35.7 % (ref 36.6–50.3)
HCT VFR BLD AUTO: 35.8 % (ref 36.6–50.3)
HCT VFR BLD AUTO: 36 % (ref 36.6–50.3)
HCT VFR BLD AUTO: 36.2 % (ref 36.6–50.3)
HCT VFR BLD AUTO: 36.2 % (ref 36.6–50.3)
HCT VFR BLD AUTO: 36.8 % (ref 36.6–50.3)
HCT VFR BLD AUTO: 37 % (ref 36.6–50.3)
HCT VFR BLD AUTO: 37.5 % (ref 36.6–50.3)
HCT VFR BLD AUTO: 37.7 % (ref 36.6–50.3)
HCT VFR BLD AUTO: 37.7 % (ref 36.6–50.3)
HCT VFR BLD AUTO: 37.9 % (ref 36.6–50.3)
HCT VFR BLD AUTO: 38.4 % (ref 36.6–50.3)
HCT VFR BLD AUTO: 39 % (ref 36.6–50.3)
HCT VFR BLD AUTO: 39.4 % (ref 36.6–50.3)
HCT VFR BLD AUTO: 39.8 % (ref 36.6–50.3)
HCT VFR BLD AUTO: 40.2 % (ref 36.6–50.3)
HCT VFR BLD AUTO: 40.5 % (ref 36.6–50.3)
HDLC SERPL-MCNC: 32 MG/DL
HDLC SERPL-MCNC: 35 MG/DL
HGB BLD-MCNC: 10 G/DL (ref 12.1–17)
HGB BLD-MCNC: 10 G/DL (ref 12.1–17)
HGB BLD-MCNC: 10.3 G/DL (ref 12.1–17)
HGB BLD-MCNC: 10.3 G/DL (ref 12.1–17)
HGB BLD-MCNC: 10.5 G/DL (ref 12.1–17)
HGB BLD-MCNC: 10.6 G/DL (ref 12.1–17)
HGB BLD-MCNC: 10.6 G/DL (ref 12.1–17)
HGB BLD-MCNC: 10.7 G/DL (ref 12.1–17)
HGB BLD-MCNC: 10.9 G/DL (ref 12.1–17)
HGB BLD-MCNC: 10.9 G/DL (ref 12.1–17)
HGB BLD-MCNC: 11.2 G/DL (ref 12.1–17)
HGB BLD-MCNC: 11.3 G/DL (ref 12.1–17)
HGB BLD-MCNC: 11.4 G/DL (ref 12.1–17)
HGB BLD-MCNC: 11.6 G/DL (ref 12.1–17)
HGB BLD-MCNC: 11.7 G/DL (ref 12.1–17)
HGB BLD-MCNC: 12 G/DL (ref 12.1–17)
HGB BLD-MCNC: 12.1 G/DL (ref 12.1–17)
HGB BLD-MCNC: 12.1 G/DL (ref 12.1–17)
HGB BLD-MCNC: 12.2 G/DL (ref 12.1–17)
HGB BLD-MCNC: 12.3 G/DL (ref 12.1–17)
HGB BLD-MCNC: 12.3 G/DL (ref 12.1–17)
HGB BLD-MCNC: 12.4 G/DL (ref 12.1–17)
HGB BLD-MCNC: 12.9 G/DL (ref 12.1–17)
HGB BLD-MCNC: 13.2 G/DL (ref 12.1–17)
HGB UR QL STRIP: NEGATIVE
HYALINE CASTS URNS QL MICRO: ABNORMAL /LPF (ref 0–5)
IMM GRANULOCYTES # BLD: 0 K/UL (ref 0–0.04)
IMM GRANULOCYTES # BLD: 0.1 K/UL (ref 0–0.04)
IMM GRANULOCYTES # BLD: 0.1 K/UL (ref 0–0.04)
IMM GRANULOCYTES NFR BLD AUTO: 0 % (ref 0–0.5)
IMM GRANULOCYTES NFR BLD AUTO: 1 % (ref 0–0.5)
INR PPP: 1 (ref 0.9–1.1)
INR PPP: 1.1 (ref 0.9–1.1)
INR PPP: 1.1 (ref 0.9–1.1)
INTERPRETATION, 910389: NORMAL
INTERPRETATION, 910389: NORMAL
INTERPRETATION: NORMAL
INTERPRETATION: NORMAL
KETONES UR QL STRIP.AUTO: ABNORMAL MG/DL
KETONES UR QL STRIP.AUTO: NEGATIVE MG/DL
KETONES UR QL STRIP.AUTO: NEGATIVE MG/DL
LACTATE SERPL-SCNC: 1.4 MMOL/L (ref 0.4–2)
LACTATE SERPL-SCNC: 1.7 MMOL/L (ref 0.4–2)
LACTATE SERPL-SCNC: 2.1 MMOL/L (ref 0.4–2)
LDLC SERPL CALC-MCNC: 62 MG/DL (ref 0–99)
LDLC SERPL CALC-MCNC: 72 MG/DL (ref 0–99)
LEUKOCYTE ESTERASE UR QL STRIP.AUTO: ABNORMAL
LEUKOCYTE ESTERASE UR QL STRIP.AUTO: NEGATIVE
LEUKOCYTE ESTERASE UR QL STRIP.AUTO: NEGATIVE
LIPASE SERPL-CCNC: 232 U/L (ref 73–393)
LIPASE SERPL-CCNC: 58 U/L (ref 73–393)
LIPASE SERPL-CCNC: 89 U/L (ref 73–393)
LIPASE SERPL-CCNC: 92 U/L (ref 73–393)
LYMPHOCYTES # BLD: 0.1 K/UL (ref 0.8–3.5)
LYMPHOCYTES # BLD: 0.3 K/UL (ref 0.8–3.5)
LYMPHOCYTES # BLD: 0.5 K/UL (ref 0.8–3.5)
LYMPHOCYTES # BLD: 0.6 K/UL (ref 0.8–3.5)
LYMPHOCYTES # BLD: 0.7 K/UL (ref 0.8–3.5)
LYMPHOCYTES # BLD: 0.8 K/UL (ref 0.8–3.5)
LYMPHOCYTES # BLD: 0.8 K/UL (ref 0.8–3.5)
LYMPHOCYTES # BLD: 0.9 K/UL (ref 0.8–3.5)
LYMPHOCYTES # BLD: 1 K/UL (ref 0.8–3.5)
LYMPHOCYTES # BLD: 1.1 K/UL (ref 0.8–3.5)
LYMPHOCYTES # BLD: 1.2 K/UL (ref 0.8–3.5)
LYMPHOCYTES # BLD: 1.2 K/UL (ref 0.8–3.5)
LYMPHOCYTES # BLD: 1.3 K/UL (ref 0.8–3.5)
LYMPHOCYTES # BLD: 1.3 K/UL (ref 0.8–3.5)
LYMPHOCYTES NFR BLD: 1 % (ref 12–49)
LYMPHOCYTES NFR BLD: 11 % (ref 12–49)
LYMPHOCYTES NFR BLD: 12 % (ref 12–49)
LYMPHOCYTES NFR BLD: 13 % (ref 12–49)
LYMPHOCYTES NFR BLD: 14 % (ref 12–49)
LYMPHOCYTES NFR BLD: 15 % (ref 12–49)
LYMPHOCYTES NFR BLD: 18 % (ref 12–49)
LYMPHOCYTES NFR BLD: 18 % (ref 12–49)
LYMPHOCYTES NFR BLD: 20 % (ref 12–49)
LYMPHOCYTES NFR BLD: 20 % (ref 12–49)
LYMPHOCYTES NFR BLD: 22 % (ref 12–49)
LYMPHOCYTES NFR BLD: 23 % (ref 12–49)
LYMPHOCYTES NFR BLD: 24 % (ref 12–49)
LYMPHOCYTES NFR BLD: 24 % (ref 12–49)
LYMPHOCYTES NFR BLD: 25 % (ref 12–49)
LYMPHOCYTES NFR BLD: 3 % (ref 12–49)
LYMPHOCYTES NFR BLD: 6 % (ref 12–49)
LYMPHOCYTES NFR BLD: 7 % (ref 12–49)
LYMPHOCYTES NFR BLD: 8 % (ref 12–49)
Lab: NORMAL
Lab: NORMAL
MAGNESIUM SERPL-MCNC: 2.3 MG/DL (ref 1.6–2.4)
MAGNESIUM SERPL-MCNC: 2.5 MG/DL (ref 1.6–2.4)
MAGNESIUM SERPL-MCNC: 2.5 MG/DL (ref 1.6–2.4)
MAGNESIUM SERPL-MCNC: 2.6 MG/DL (ref 1.6–2.4)
MAGNESIUM SERPL-MCNC: 2.7 MG/DL (ref 1.6–2.4)
MCH RBC QN AUTO: 28.9 PG (ref 26–34)
MCH RBC QN AUTO: 29 PG (ref 26–34)
MCH RBC QN AUTO: 29 PG (ref 26–34)
MCH RBC QN AUTO: 29.2 PG (ref 26–34)
MCH RBC QN AUTO: 29.2 PG (ref 26–34)
MCH RBC QN AUTO: 29.3 PG (ref 26–34)
MCH RBC QN AUTO: 29.4 PG (ref 26–34)
MCH RBC QN AUTO: 29.6 PG (ref 26–34)
MCH RBC QN AUTO: 29.6 PG (ref 26–34)
MCH RBC QN AUTO: 29.7 PG (ref 26–34)
MCH RBC QN AUTO: 29.7 PG (ref 26–34)
MCH RBC QN AUTO: 29.9 PG (ref 26–34)
MCH RBC QN AUTO: 30 PG (ref 26–34)
MCH RBC QN AUTO: 30.1 PG (ref 26–34)
MCH RBC QN AUTO: 30.2 PG (ref 26–34)
MCH RBC QN AUTO: 30.3 PG (ref 26–34)
MCH RBC QN AUTO: 30.3 PG (ref 26–34)
MCH RBC QN AUTO: 30.4 PG (ref 26–34)
MCH RBC QN AUTO: 30.4 PG (ref 26–34)
MCH RBC QN AUTO: 30.5 PG (ref 26–34)
MCH RBC QN AUTO: 30.5 PG (ref 26–34)
MCH RBC QN AUTO: 30.6 PG (ref 26–34)
MCH RBC QN AUTO: 30.7 PG (ref 26–34)
MCH RBC QN AUTO: 30.8 PG (ref 26–34)
MCH RBC QN AUTO: 30.8 PG (ref 26–34)
MCH RBC QN AUTO: 31.4 PG (ref 26–34)
MCHC RBC AUTO-ENTMCNC: 30 G/DL (ref 30–36.5)
MCHC RBC AUTO-ENTMCNC: 30.4 G/DL (ref 30–36.5)
MCHC RBC AUTO-ENTMCNC: 30.7 G/DL (ref 30–36.5)
MCHC RBC AUTO-ENTMCNC: 30.7 G/DL (ref 30–36.5)
MCHC RBC AUTO-ENTMCNC: 30.8 G/DL (ref 30–36.5)
MCHC RBC AUTO-ENTMCNC: 30.9 G/DL (ref 30–36.5)
MCHC RBC AUTO-ENTMCNC: 31 G/DL (ref 30–36.5)
MCHC RBC AUTO-ENTMCNC: 31.1 G/DL (ref 30–36.5)
MCHC RBC AUTO-ENTMCNC: 31.2 G/DL (ref 30–36.5)
MCHC RBC AUTO-ENTMCNC: 31.4 G/DL (ref 30–36.5)
MCHC RBC AUTO-ENTMCNC: 31.4 G/DL (ref 30–36.5)
MCHC RBC AUTO-ENTMCNC: 31.5 G/DL (ref 30–36.5)
MCHC RBC AUTO-ENTMCNC: 31.7 G/DL (ref 30–36.5)
MCHC RBC AUTO-ENTMCNC: 31.9 G/DL (ref 30–36.5)
MCHC RBC AUTO-ENTMCNC: 32 G/DL (ref 30–36.5)
MCHC RBC AUTO-ENTMCNC: 32.1 G/DL (ref 30–36.5)
MCHC RBC AUTO-ENTMCNC: 32.2 G/DL (ref 30–36.5)
MCHC RBC AUTO-ENTMCNC: 32.4 G/DL (ref 30–36.5)
MCHC RBC AUTO-ENTMCNC: 32.6 G/DL (ref 30–36.5)
MCHC RBC AUTO-ENTMCNC: 32.6 G/DL (ref 30–36.5)
MCHC RBC AUTO-ENTMCNC: 32.7 G/DL (ref 30–36.5)
MCHC RBC AUTO-ENTMCNC: 33.1 G/DL (ref 30–36.5)
MCV RBC AUTO: 91.9 FL (ref 80–99)
MCV RBC AUTO: 92.1 FL (ref 80–99)
MCV RBC AUTO: 92.1 FL (ref 80–99)
MCV RBC AUTO: 92.7 FL (ref 80–99)
MCV RBC AUTO: 93.5 FL (ref 80–99)
MCV RBC AUTO: 94 FL (ref 80–99)
MCV RBC AUTO: 94.2 FL (ref 80–99)
MCV RBC AUTO: 94.3 FL (ref 80–99)
MCV RBC AUTO: 94.4 FL (ref 80–99)
MCV RBC AUTO: 94.5 FL (ref 80–99)
MCV RBC AUTO: 95.1 FL (ref 80–99)
MCV RBC AUTO: 95.3 FL (ref 80–99)
MCV RBC AUTO: 95.6 FL (ref 80–99)
MCV RBC AUTO: 95.7 FL (ref 80–99)
MCV RBC AUTO: 95.8 FL (ref 80–99)
MCV RBC AUTO: 95.8 FL (ref 80–99)
MCV RBC AUTO: 96.1 FL (ref 80–99)
MCV RBC AUTO: 96.2 FL (ref 80–99)
MCV RBC AUTO: 96.3 FL (ref 80–99)
MCV RBC AUTO: 96.3 FL (ref 80–99)
MCV RBC AUTO: 96.4 FL (ref 80–99)
MCV RBC AUTO: 96.7 FL (ref 80–99)
MCV RBC AUTO: 97 FL (ref 80–99)
MCV RBC AUTO: 97 FL (ref 80–99)
MCV RBC AUTO: 97.1 FL (ref 80–99)
MCV RBC AUTO: 97.2 FL (ref 80–99)
MCV RBC AUTO: 97.9 FL (ref 80–99)
MCV RBC AUTO: 98.2 FL (ref 80–99)
MICROALBUMIN UR-MCNC: 108 UG/ML
MICROALBUMIN UR-MCNC: 36.5 UG/ML
MONOCYTES # BLD: 0.3 K/UL (ref 0–1)
MONOCYTES # BLD: 0.4 K/UL (ref 0–1)
MONOCYTES # BLD: 0.5 K/UL (ref 0–1)
MONOCYTES # BLD: 0.6 K/UL (ref 0–1)
MONOCYTES # BLD: 0.6 K/UL (ref 0–1)
MONOCYTES # BLD: 0.7 K/UL (ref 0–1)
MONOCYTES # BLD: 0.9 K/UL (ref 0–1)
MONOCYTES NFR BLD: 10 % (ref 5–13)
MONOCYTES NFR BLD: 10 % (ref 5–13)
MONOCYTES NFR BLD: 11 % (ref 5–13)
MONOCYTES NFR BLD: 3 % (ref 5–13)
MONOCYTES NFR BLD: 5 % (ref 5–13)
MONOCYTES NFR BLD: 6 % (ref 5–13)
MONOCYTES NFR BLD: 7 % (ref 5–13)
MONOCYTES NFR BLD: 8 % (ref 5–13)
MONOCYTES NFR BLD: 9 % (ref 5–13)
MUCOUS THREADS URNS QL MICRO: ABNORMAL /LPF
NEUTS BAND NFR BLD MANUAL: 3 %
NEUTS SEG # BLD: 11.5 K/UL (ref 1.8–8)
NEUTS SEG # BLD: 2.5 K/UL (ref 1.8–8)
NEUTS SEG # BLD: 2.6 K/UL (ref 1.8–8)
NEUTS SEG # BLD: 2.9 K/UL (ref 1.8–8)
NEUTS SEG # BLD: 3.1 K/UL (ref 1.8–8)
NEUTS SEG # BLD: 3.2 K/UL (ref 1.8–8)
NEUTS SEG # BLD: 3.2 K/UL (ref 1.8–8)
NEUTS SEG # BLD: 3.5 K/UL (ref 1.8–8)
NEUTS SEG # BLD: 3.7 K/UL (ref 1.8–8)
NEUTS SEG # BLD: 3.8 K/UL (ref 1.8–8)
NEUTS SEG # BLD: 4 K/UL (ref 1.8–8)
NEUTS SEG # BLD: 4.1 K/UL (ref 1.8–8)
NEUTS SEG # BLD: 4.1 K/UL (ref 1.8–8)
NEUTS SEG # BLD: 4.4 K/UL (ref 1.8–8)
NEUTS SEG # BLD: 4.4 K/UL (ref 1.8–8)
NEUTS SEG # BLD: 4.7 K/UL (ref 1.8–8)
NEUTS SEG # BLD: 4.9 K/UL (ref 1.8–8)
NEUTS SEG # BLD: 5.1 K/UL (ref 1.8–8)
NEUTS SEG # BLD: 5.4 K/UL (ref 1.8–8)
NEUTS SEG # BLD: 5.6 K/UL (ref 1.8–8)
NEUTS SEG # BLD: 5.8 K/UL (ref 1.8–8)
NEUTS SEG # BLD: 6.3 K/UL (ref 1.8–8)
NEUTS SEG # BLD: 7.6 K/UL (ref 1.8–8)
NEUTS SEG # BLD: 7.9 K/UL (ref 1.8–8)
NEUTS SEG # BLD: 8.1 K/UL (ref 1.8–8)
NEUTS SEG # BLD: 9.2 K/UL (ref 1.8–8)
NEUTS SEG # BLD: 9.5 K/UL (ref 1.8–8)
NEUTS SEG NFR BLD: 61 % (ref 32–75)
NEUTS SEG NFR BLD: 64 % (ref 32–75)
NEUTS SEG NFR BLD: 65 % (ref 32–75)
NEUTS SEG NFR BLD: 67 % (ref 32–75)
NEUTS SEG NFR BLD: 67 % (ref 32–75)
NEUTS SEG NFR BLD: 71 % (ref 32–75)
NEUTS SEG NFR BLD: 72 % (ref 32–75)
NEUTS SEG NFR BLD: 73 % (ref 32–75)
NEUTS SEG NFR BLD: 74 % (ref 32–75)
NEUTS SEG NFR BLD: 76 % (ref 32–75)
NEUTS SEG NFR BLD: 76 % (ref 32–75)
NEUTS SEG NFR BLD: 77 % (ref 32–75)
NEUTS SEG NFR BLD: 77 % (ref 32–75)
NEUTS SEG NFR BLD: 78 % (ref 32–75)
NEUTS SEG NFR BLD: 79 % (ref 32–75)
NEUTS SEG NFR BLD: 79 % (ref 32–75)
NEUTS SEG NFR BLD: 82 % (ref 32–75)
NEUTS SEG NFR BLD: 83 % (ref 32–75)
NEUTS SEG NFR BLD: 85 % (ref 32–75)
NEUTS SEG NFR BLD: 87 % (ref 32–75)
NEUTS SEG NFR BLD: 87 % (ref 32–75)
NEUTS SEG NFR BLD: 88 % (ref 32–75)
NEUTS SEG NFR BLD: 89 % (ref 32–75)
NEUTS SEG NFR BLD: 92 % (ref 32–75)
NEUTS SEG NFR BLD: 94 % (ref 32–75)
NITRITE UR QL STRIP.AUTO: NEGATIVE
NRBC # BLD: 0 K/UL (ref 0–0.01)
NRBC BLD-RTO: 0 PER 100 WBC
P-R INTERVAL, ECG05: 128 MS
P-R INTERVAL, ECG05: 130 MS
P-R INTERVAL, ECG05: 134 MS
P-R INTERVAL, ECG05: 138 MS
P-R INTERVAL, ECG05: 138 MS
P-R INTERVAL, ECG05: 140 MS
P-R INTERVAL, ECG05: 142 MS
P-R INTERVAL, ECG05: 150 MS
P-R INTERVAL, ECG05: 240 MS
PCO2 BLDA: 53 MMHG (ref 35–45)
PDF IMAGE, 910387: NORMAL
PDF IMAGE, 910387: NORMAL
PH BLDA: 7.43 [PH] (ref 7.35–7.45)
PH UR STRIP: 5 [PH] (ref 5–8)
PH UR STRIP: 5.5 [PH] (ref 5–8)
PH UR STRIP: 7.5 [PH] (ref 5–8)
PHOSPHATE SERPL-MCNC: 2.5 MG/DL (ref 2.6–4.7)
PHOSPHATE SERPL-MCNC: 3 MG/DL (ref 2.6–4.7)
PLATELET # BLD AUTO: 110 K/UL (ref 150–400)
PLATELET # BLD AUTO: 56 K/UL (ref 150–400)
PLATELET # BLD AUTO: 58 K/UL (ref 150–400)
PLATELET # BLD AUTO: 62 K/UL (ref 150–400)
PLATELET # BLD AUTO: 63 K/UL (ref 150–400)
PLATELET # BLD AUTO: 64 K/UL (ref 150–400)
PLATELET # BLD AUTO: 68 K/UL (ref 150–400)
PLATELET # BLD AUTO: 68 K/UL (ref 150–400)
PLATELET # BLD AUTO: 69 K/UL (ref 150–400)
PLATELET # BLD AUTO: 71 K/UL (ref 150–400)
PLATELET # BLD AUTO: 73 K/UL (ref 150–400)
PLATELET # BLD AUTO: 75 K/UL (ref 150–400)
PLATELET # BLD AUTO: 77 K/UL (ref 150–400)
PLATELET # BLD AUTO: 79 K/UL (ref 150–400)
PLATELET # BLD AUTO: 81 K/UL (ref 150–400)
PLATELET # BLD AUTO: 81 K/UL (ref 150–400)
PLATELET # BLD AUTO: 82 K/UL (ref 150–400)
PLATELET # BLD AUTO: 83 K/UL (ref 150–400)
PLATELET # BLD AUTO: 84 K/UL (ref 150–400)
PLATELET # BLD AUTO: 85 K/UL (ref 150–400)
PLATELET # BLD AUTO: 86 K/UL (ref 150–400)
PLATELET # BLD AUTO: 86 K/UL (ref 150–400)
PLATELET # BLD AUTO: 87 K/UL (ref 150–400)
PLATELET # BLD AUTO: 90 K/UL (ref 150–400)
PLATELET # BLD AUTO: 91 K/UL (ref 150–400)
PLATELET # BLD AUTO: 95 K/UL (ref 150–400)
PMV BLD AUTO: 10.3 FL (ref 8.9–12.9)
PMV BLD AUTO: 10.4 FL (ref 8.9–12.9)
PMV BLD AUTO: 10.6 FL (ref 8.9–12.9)
PMV BLD AUTO: 10.6 FL (ref 8.9–12.9)
PMV BLD AUTO: 10.7 FL (ref 8.9–12.9)
PMV BLD AUTO: 10.8 FL (ref 8.9–12.9)
PMV BLD AUTO: 10.8 FL (ref 8.9–12.9)
PMV BLD AUTO: 11 FL (ref 8.9–12.9)
PMV BLD AUTO: 11 FL (ref 8.9–12.9)
PMV BLD AUTO: 11.1 FL (ref 8.9–12.9)
PMV BLD AUTO: 11.2 FL (ref 8.9–12.9)
PMV BLD AUTO: 11.3 FL (ref 8.9–12.9)
PMV BLD AUTO: 11.5 FL (ref 8.9–12.9)
PMV BLD AUTO: 11.6 FL (ref 8.9–12.9)
PMV BLD AUTO: 11.7 FL (ref 8.9–12.9)
PMV BLD AUTO: 11.7 FL (ref 8.9–12.9)
PMV BLD AUTO: 11.9 FL (ref 8.9–12.9)
PMV BLD AUTO: 11.9 FL (ref 8.9–12.9)
PMV BLD AUTO: 12.7 FL (ref 8.9–12.9)
PO2 BLDA: 84 MMHG (ref 80–100)
POTASSIUM SERPL-SCNC: 3.5 MMOL/L (ref 3.5–5.1)
POTASSIUM SERPL-SCNC: 3.7 MMOL/L (ref 3.5–5.1)
POTASSIUM SERPL-SCNC: 3.8 MMOL/L (ref 3.5–5.1)
POTASSIUM SERPL-SCNC: 3.9 MMOL/L (ref 3.5–5.1)
POTASSIUM SERPL-SCNC: 4 MMOL/L (ref 3.5–5.1)
POTASSIUM SERPL-SCNC: 4.1 MMOL/L (ref 3.5–5.1)
POTASSIUM SERPL-SCNC: 4.2 MMOL/L (ref 3.5–5.1)
POTASSIUM SERPL-SCNC: 4.2 MMOL/L (ref 3.5–5.2)
POTASSIUM SERPL-SCNC: 4.3 MMOL/L (ref 3.5–5.1)
POTASSIUM SERPL-SCNC: 4.4 MMOL/L (ref 3.5–5.1)
POTASSIUM SERPL-SCNC: 4.4 MMOL/L (ref 3.5–5.2)
POTASSIUM SERPL-SCNC: 4.5 MMOL/L (ref 3.5–5.1)
POTASSIUM SERPL-SCNC: 4.6 MMOL/L (ref 3.5–5.1)
POTASSIUM SERPL-SCNC: 4.7 MMOL/L (ref 3.5–5.1)
POTASSIUM SERPL-SCNC: 4.7 MMOL/L (ref 3.5–5.1)
POTASSIUM SERPL-SCNC: 4.8 MMOL/L (ref 3.5–5.1)
POTASSIUM SERPL-SCNC: 4.8 MMOL/L (ref 3.5–5.1)
PROT SERPL-MCNC: 6.4 G/DL (ref 6.4–8.2)
PROT SERPL-MCNC: 6.6 G/DL (ref 6–8.5)
PROT SERPL-MCNC: 6.7 G/DL (ref 6.4–8.2)
PROT SERPL-MCNC: 6.8 G/DL (ref 6.4–8.2)
PROT SERPL-MCNC: 7.1 G/DL (ref 6.4–8.2)
PROT SERPL-MCNC: 7.2 G/DL (ref 6.4–8.2)
PROT SERPL-MCNC: 7.2 G/DL (ref 6.4–8.2)
PROT SERPL-MCNC: 7.2 G/DL (ref 6–8.5)
PROT SERPL-MCNC: 7.4 G/DL (ref 6.4–8.2)
PROT SERPL-MCNC: 7.5 G/DL (ref 6.4–8.2)
PROT SERPL-MCNC: 7.7 G/DL (ref 6.4–8.2)
PROT SERPL-MCNC: 7.9 G/DL (ref 6.4–8.2)
PROT SERPL-MCNC: 8 G/DL (ref 6.4–8.2)
PROT UR STRIP-MCNC: 100 MG/DL
PROT UR STRIP-MCNC: ABNORMAL MG/DL
PROT UR STRIP-MCNC: NEGATIVE MG/DL
PROTHROMBIN TIME: 10.1 SEC (ref 9–11.1)
PROTHROMBIN TIME: 10.8 SEC (ref 9–11.1)
PROTHROMBIN TIME: 10.9 SEC (ref 9–11.1)
Q-T INTERVAL, ECG07: 374 MS
Q-T INTERVAL, ECG07: 380 MS
Q-T INTERVAL, ECG07: 430 MS
Q-T INTERVAL, ECG07: 442 MS
Q-T INTERVAL, ECG07: 452 MS
Q-T INTERVAL, ECG07: 454 MS
Q-T INTERVAL, ECG07: 458 MS
Q-T INTERVAL, ECG07: 462 MS
Q-T INTERVAL, ECG07: 466 MS
Q-T INTERVAL, ECG07: 478 MS
Q-T INTERVAL, ECG07: 488 MS
QRS DURATION, ECG06: 152 MS
QRS DURATION, ECG06: 166 MS
QRS DURATION, ECG06: 168 MS
QRS DURATION, ECG06: 172 MS
QRS DURATION, ECG06: 172 MS
QRS DURATION, ECG06: 176 MS
QRS DURATION, ECG06: 176 MS
QRS DURATION, ECG06: 178 MS
QRS DURATION, ECG06: 180 MS
QRS DURATION, ECG06: 184 MS
QRS DURATION, ECG06: 184 MS
QTC CALCULATION (BEZET), ECG08: 479 MS
QTC CALCULATION (BEZET), ECG08: 480 MS
QTC CALCULATION (BEZET), ECG08: 488 MS
QTC CALCULATION (BEZET), ECG08: 511 MS
QTC CALCULATION (BEZET), ECG08: 526 MS
QTC CALCULATION (BEZET), ECG08: 532 MS
QTC CALCULATION (BEZET), ECG08: 532 MS
QTC CALCULATION (BEZET), ECG08: 558 MS
QTC CALCULATION (BEZET), ECG08: 561 MS
QTC CALCULATION (BEZET), ECG08: 570 MS
QTC CALCULATION (BEZET), ECG08: 573 MS
RBC # BLD AUTO: 3.42 M/UL (ref 4.1–5.7)
RBC # BLD AUTO: 3.46 M/UL (ref 4.1–5.7)
RBC # BLD AUTO: 3.47 M/UL (ref 4.1–5.7)
RBC # BLD AUTO: 3.53 M/UL (ref 4.1–5.7)
RBC # BLD AUTO: 3.53 M/UL (ref 4.1–5.7)
RBC # BLD AUTO: 3.54 M/UL (ref 4.1–5.7)
RBC # BLD AUTO: 3.6 M/UL (ref 4.1–5.7)
RBC # BLD AUTO: 3.61 M/UL (ref 4.1–5.7)
RBC # BLD AUTO: 3.62 M/UL (ref 4.1–5.7)
RBC # BLD AUTO: 3.63 M/UL (ref 4.1–5.7)
RBC # BLD AUTO: 3.65 M/UL (ref 4.1–5.7)
RBC # BLD AUTO: 3.66 M/UL (ref 4.1–5.7)
RBC # BLD AUTO: 3.68 M/UL (ref 4.1–5.7)
RBC # BLD AUTO: 3.78 M/UL (ref 4.1–5.7)
RBC # BLD AUTO: 3.78 M/UL (ref 4.1–5.7)
RBC # BLD AUTO: 3.81 M/UL (ref 4.1–5.7)
RBC # BLD AUTO: 3.82 M/UL (ref 4.1–5.7)
RBC # BLD AUTO: 3.85 M/UL (ref 4.1–5.7)
RBC # BLD AUTO: 3.86 M/UL (ref 4.1–5.7)
RBC # BLD AUTO: 3.88 M/UL (ref 4.1–5.7)
RBC # BLD AUTO: 3.89 M/UL (ref 4.1–5.7)
RBC # BLD AUTO: 3.94 M/UL (ref 4.1–5.7)
RBC # BLD AUTO: 3.94 M/UL (ref 4.1–5.7)
RBC # BLD AUTO: 3.96 M/UL (ref 4.1–5.7)
RBC # BLD AUTO: 3.97 M/UL (ref 4.1–5.7)
RBC # BLD AUTO: 4.03 M/UL (ref 4.1–5.7)
RBC # BLD AUTO: 4.09 M/UL (ref 4.1–5.7)
RBC # BLD AUTO: 4.1 M/UL (ref 4.1–5.7)
RBC # BLD AUTO: 4.2 M/UL (ref 4.1–5.7)
RBC # BLD AUTO: 4.29 M/UL (ref 4.1–5.7)
RBC #/AREA URNS HPF: ABNORMAL /HPF (ref 0–5)
RBC #/AREA URNS HPF: ABNORMAL /HPF (ref 0–5)
RBC MORPH BLD: ABNORMAL
SAMPLES BEING HELD,HOLD: NORMAL
SAMPLES BEING HELD,HOLD: NORMAL
SAO2 % BLD: 96 % (ref 92–97)
SAO2% DEVICE SAO2% SENSOR NAME: ABNORMAL
SERVICE CMNT-IMP: ABNORMAL
SERVICE CMNT-IMP: NORMAL
SODIUM SERPL-SCNC: 134 MMOL/L (ref 136–145)
SODIUM SERPL-SCNC: 134 MMOL/L (ref 136–145)
SODIUM SERPL-SCNC: 135 MMOL/L (ref 136–145)
SODIUM SERPL-SCNC: 136 MMOL/L (ref 136–145)
SODIUM SERPL-SCNC: 137 MMOL/L (ref 134–144)
SODIUM SERPL-SCNC: 137 MMOL/L (ref 136–145)
SODIUM SERPL-SCNC: 138 MMOL/L (ref 136–145)
SODIUM SERPL-SCNC: 139 MMOL/L (ref 136–145)
SODIUM SERPL-SCNC: 140 MMOL/L (ref 136–145)
SODIUM SERPL-SCNC: 140 MMOL/L (ref 136–145)
SODIUM SERPL-SCNC: 142 MMOL/L (ref 136–145)
SODIUM SERPL-SCNC: 142 MMOL/L (ref 136–145)
SODIUM SERPL-SCNC: 143 MMOL/L (ref 134–144)
SODIUM SERPL-SCNC: 143 MMOL/L (ref 136–145)
SODIUM SERPL-SCNC: 144 MMOL/L (ref 136–145)
SODIUM SERPL-SCNC: 145 MMOL/L (ref 136–145)
SODIUM SERPL-SCNC: 145 MMOL/L (ref 136–145)
SODIUM SERPL-SCNC: 148 MMOL/L (ref 136–145)
SP GR UR REFRACTOMETRY: 1.02 (ref 1–1.03)
SP GR UR REFRACTOMETRY: 1.02 (ref 1–1.03)
SP GR UR REFRACTOMETRY: 1.03 (ref 1–1.03)
SPECIMEN SITE: ABNORMAL
T3 SERPL-MCNC: 119 NG/DL (ref 71–180)
T4 FREE SERPL-MCNC: 0.9 NG/DL (ref 0.8–1.5)
THERAPEUTIC RANGE,PTTT: NORMAL SECS (ref 58–77)
THERAPEUTIC RANGE,PTTT: NORMAL SECS (ref 58–77)
TRIGL SERPL-MCNC: 119 MG/DL (ref 0–149)
TRIGL SERPL-MCNC: 132 MG/DL (ref 0–149)
TROPONIN I BLD-MCNC: <0.04 NG/ML (ref 0–0.08)
TROPONIN I SERPL-MCNC: 0.09 NG/ML
TROPONIN I SERPL-MCNC: 0.1 NG/ML
TROPONIN I SERPL-MCNC: 0.11 NG/ML
TROPONIN I SERPL-MCNC: 0.18 NG/ML
TROPONIN I SERPL-MCNC: 0.41 NG/ML
TROPONIN I SERPL-MCNC: 0.57 NG/ML
TROPONIN I SERPL-MCNC: 0.62 NG/ML
TROPONIN I SERPL-MCNC: <0.04 NG/ML
TROPONIN I SERPL-MCNC: <0.05 NG/ML
TSH SERPL DL<=0.005 MIU/L-ACNC: 2.2 UIU/ML (ref 0.45–4.5)
TSH SERPL DL<=0.05 MIU/L-ACNC: 1.81 UIU/ML (ref 0.36–3.74)
TSH SERPL DL<=0.05 MIU/L-ACNC: 2.74 UIU/ML (ref 0.36–3.74)
TSH SERPL DL<=0.05 MIU/L-ACNC: 3.19 UIU/ML (ref 0.36–3.74)
UA: UC IF INDICATED,UAUC: ABNORMAL
UROBILINOGEN UR QL STRIP.AUTO: 0.2 EU/DL (ref 0.2–1)
UROBILINOGEN UR QL STRIP.AUTO: 1 EU/DL (ref 0.2–1)
UROBILINOGEN UR QL STRIP.AUTO: 1 EU/DL (ref 0.2–1)
VENTRICULAR RATE, ECG03: 100 BPM
VENTRICULAR RATE, ECG03: 118 BPM
VENTRICULAR RATE, ECG03: 66 BPM
VENTRICULAR RATE, ECG03: 80 BPM
VENTRICULAR RATE, ECG03: 83 BPM
VENTRICULAR RATE, ECG03: 90 BPM
VENTRICULAR RATE, ECG03: 92 BPM
VENTRICULAR RATE, ECG03: 96 BPM
VENTRICULAR RATE, ECG03: 99 BPM
VIT B12 SERPL-MCNC: 394 PG/ML (ref 193–986)
VIT B12 SERPL-MCNC: 491 PG/ML (ref 193–986)
VIT B12 SERPL-MCNC: 510 PG/ML (ref 232–1245)
VLDLC SERPL CALC-MCNC: 24 MG/DL (ref 5–40)
VLDLC SERPL CALC-MCNC: 26 MG/DL (ref 5–40)
WBC # BLD AUTO: 10.7 K/UL (ref 4.1–11.1)
WBC # BLD AUTO: 13.6 K/UL (ref 4.1–11.1)
WBC # BLD AUTO: 3.9 K/UL (ref 4.1–11.1)
WBC # BLD AUTO: 3.9 K/UL (ref 4.1–11.1)
WBC # BLD AUTO: 4.4 K/UL (ref 4.1–11.1)
WBC # BLD AUTO: 4.4 K/UL (ref 4.1–11.1)
WBC # BLD AUTO: 4.6 K/UL (ref 4.1–11.1)
WBC # BLD AUTO: 4.8 K/UL (ref 4.1–11.1)
WBC # BLD AUTO: 4.8 K/UL (ref 4.1–11.1)
WBC # BLD AUTO: 5.1 K/UL (ref 4.1–11.1)
WBC # BLD AUTO: 5.1 K/UL (ref 4.1–11.1)
WBC # BLD AUTO: 5.2 K/UL (ref 4.1–11.1)
WBC # BLD AUTO: 5.2 K/UL (ref 4.1–11.1)
WBC # BLD AUTO: 5.3 K/UL (ref 4.1–11.1)
WBC # BLD AUTO: 5.6 K/UL (ref 4.1–11.1)
WBC # BLD AUTO: 5.7 K/UL (ref 4.1–11.1)
WBC # BLD AUTO: 5.8 K/UL (ref 4.1–11.1)
WBC # BLD AUTO: 5.8 K/UL (ref 4.1–11.1)
WBC # BLD AUTO: 5.9 K/UL (ref 4.1–11.1)
WBC # BLD AUTO: 6.1 K/UL (ref 4.1–11.1)
WBC # BLD AUTO: 6.2 K/UL (ref 4.1–11.1)
WBC # BLD AUTO: 6.3 K/UL (ref 4.1–11.1)
WBC # BLD AUTO: 6.5 K/UL (ref 4.1–11.1)
WBC # BLD AUTO: 7.3 K/UL (ref 4.1–11.1)
WBC # BLD AUTO: 7.3 K/UL (ref 4.1–11.1)
WBC # BLD AUTO: 7.4 K/UL (ref 4.1–11.1)
WBC # BLD AUTO: 8.5 K/UL (ref 4.1–11.1)
WBC # BLD AUTO: 8.6 K/UL (ref 4.1–11.1)
WBC # BLD AUTO: 9.3 K/UL (ref 4.1–11.1)
WBC # BLD AUTO: 9.7 K/UL (ref 4.1–11.1)
WBC URNS QL MICRO: ABNORMAL /HPF (ref 0–4)
WBC URNS QL MICRO: ABNORMAL /HPF (ref 0–4)

## 2018-01-01 PROCEDURE — 80048 BASIC METABOLIC PNL TOTAL CA: CPT | Performed by: INTERNAL MEDICINE

## 2018-01-01 PROCEDURE — 3331090002 HH PPS REVENUE DEBIT

## 2018-01-01 PROCEDURE — 72125 CT NECK SPINE W/O DYE: CPT

## 2018-01-01 PROCEDURE — 3331090001 HH PPS REVENUE CREDIT

## 2018-01-01 PROCEDURE — 74011250637 HC RX REV CODE- 250/637: Performed by: HOSPITALIST

## 2018-01-01 PROCEDURE — 84439 ASSAY OF FREE THYROXINE: CPT | Performed by: EMERGENCY MEDICINE

## 2018-01-01 PROCEDURE — 74011000250 HC RX REV CODE- 250: Performed by: INTERNAL MEDICINE

## 2018-01-01 PROCEDURE — 97535 SELF CARE MNGMENT TRAINING: CPT | Performed by: OCCUPATIONAL THERAPIST

## 2018-01-01 PROCEDURE — 97165 OT EVAL LOW COMPLEX 30 MIN: CPT

## 2018-01-01 PROCEDURE — 36415 COLL VENOUS BLD VENIPUNCTURE: CPT | Performed by: INTERNAL MEDICINE

## 2018-01-01 PROCEDURE — 74011250637 HC RX REV CODE- 250/637: Performed by: EMERGENCY MEDICINE

## 2018-01-01 PROCEDURE — 74011636637 HC RX REV CODE- 636/637: Performed by: HOSPITALIST

## 2018-01-01 PROCEDURE — 65660000000 HC RM CCU STEPDOWN

## 2018-01-01 PROCEDURE — 83735 ASSAY OF MAGNESIUM: CPT | Performed by: INTERNAL MEDICINE

## 2018-01-01 PROCEDURE — 99218 HC RM OBSERVATION: CPT

## 2018-01-01 PROCEDURE — 74011250637 HC RX REV CODE- 250/637: Performed by: INTERNAL MEDICINE

## 2018-01-01 PROCEDURE — 97116 GAIT TRAINING THERAPY: CPT

## 2018-01-01 PROCEDURE — 36415 COLL VENOUS BLD VENIPUNCTURE: CPT | Performed by: EMERGENCY MEDICINE

## 2018-01-01 PROCEDURE — 74011250636 HC RX REV CODE- 250/636: Performed by: INTERNAL MEDICINE

## 2018-01-01 PROCEDURE — 70496 CT ANGIOGRAPHY HEAD: CPT

## 2018-01-01 PROCEDURE — 70450 CT HEAD/BRAIN W/O DYE: CPT

## 2018-01-01 PROCEDURE — G0157 HHC PT ASSISTANT EA 15: HCPCS

## 2018-01-01 PROCEDURE — 36415 COLL VENOUS BLD VENIPUNCTURE: CPT

## 2018-01-01 PROCEDURE — 77030020255 HC SOL INJ LR 1000ML BG: Performed by: SPECIALIST

## 2018-01-01 PROCEDURE — 74011250637 HC RX REV CODE- 250/637: Performed by: PSYCHIATRY & NEUROLOGY

## 2018-01-01 PROCEDURE — 85025 COMPLETE CBC W/AUTO DIFF WBC: CPT | Performed by: INTERNAL MEDICINE

## 2018-01-01 PROCEDURE — 87040 BLOOD CULTURE FOR BACTERIA: CPT | Performed by: INTERNAL MEDICINE

## 2018-01-01 PROCEDURE — 74011636637 HC RX REV CODE- 636/637: Performed by: INTERNAL MEDICINE

## 2018-01-01 PROCEDURE — 82550 ASSAY OF CK (CPK): CPT | Performed by: EMERGENCY MEDICINE

## 2018-01-01 PROCEDURE — 96374 THER/PROPH/DIAG INJ IV PUSH: CPT

## 2018-01-01 PROCEDURE — 85025 COMPLETE CBC W/AUTO DIFF WBC: CPT | Performed by: EMERGENCY MEDICINE

## 2018-01-01 PROCEDURE — 99284 EMERGENCY DEPT VISIT MOD MDM: CPT

## 2018-01-01 PROCEDURE — 82962 GLUCOSE BLOOD TEST: CPT

## 2018-01-01 PROCEDURE — G0151 HHCP-SERV OF PT,EA 15 MIN: HCPCS

## 2018-01-01 PROCEDURE — 77010033678 HC OXYGEN DAILY

## 2018-01-01 PROCEDURE — 84484 ASSAY OF TROPONIN QUANT: CPT | Performed by: EMERGENCY MEDICINE

## 2018-01-01 PROCEDURE — 97165 OT EVAL LOW COMPLEX 30 MIN: CPT | Performed by: OCCUPATIONAL THERAPIST

## 2018-01-01 PROCEDURE — 83880 ASSAY OF NATRIURETIC PEPTIDE: CPT

## 2018-01-01 PROCEDURE — 74011250636 HC RX REV CODE- 250/636: Performed by: HOSPITALIST

## 2018-01-01 PROCEDURE — 96361 HYDRATE IV INFUSION ADD-ON: CPT

## 2018-01-01 PROCEDURE — 93971 EXTREMITY STUDY: CPT

## 2018-01-01 PROCEDURE — 74011000250 HC RX REV CODE- 250: Performed by: HOSPITALIST

## 2018-01-01 PROCEDURE — G0300 HHS/HOSPICE OF LPN EA 15 MIN: HCPCS

## 2018-01-01 PROCEDURE — 74011000250 HC RX REV CODE- 250: Performed by: EMERGENCY MEDICINE

## 2018-01-01 PROCEDURE — 96375 TX/PRO/DX INJ NEW DRUG ADDON: CPT

## 2018-01-01 PROCEDURE — G8987 SELF CARE CURRENT STATUS: HCPCS | Performed by: OCCUPATIONAL THERAPIST

## 2018-01-01 PROCEDURE — 74011250637 HC RX REV CODE- 250/637: Performed by: PHYSICIAN ASSISTANT

## 2018-01-01 PROCEDURE — 83036 HEMOGLOBIN GLYCOSYLATED A1C: CPT | Performed by: HOSPITALIST

## 2018-01-01 PROCEDURE — 85027 COMPLETE CBC AUTOMATED: CPT | Performed by: INTERNAL MEDICINE

## 2018-01-01 PROCEDURE — 96360 HYDRATION IV INFUSION INIT: CPT

## 2018-01-01 PROCEDURE — 74011250636 HC RX REV CODE- 250/636: Performed by: EMERGENCY MEDICINE

## 2018-01-01 PROCEDURE — G8989 SELF CARE D/C STATUS: HCPCS | Performed by: OCCUPATIONAL THERAPIST

## 2018-01-01 PROCEDURE — G8988 SELF CARE GOAL STATUS: HCPCS | Performed by: OCCUPATIONAL THERAPIST

## 2018-01-01 PROCEDURE — 80053 COMPREHEN METABOLIC PANEL: CPT | Performed by: EMERGENCY MEDICINE

## 2018-01-01 PROCEDURE — 87040 BLOOD CULTURE FOR BACTERIA: CPT | Performed by: EMERGENCY MEDICINE

## 2018-01-01 PROCEDURE — 85025 COMPLETE CBC W/AUTO DIFF WBC: CPT

## 2018-01-01 PROCEDURE — 80048 BASIC METABOLIC PNL TOTAL CA: CPT

## 2018-01-01 PROCEDURE — 99282 EMERGENCY DEPT VISIT SF MDM: CPT

## 2018-01-01 PROCEDURE — 80048 BASIC METABOLIC PNL TOTAL CA: CPT | Performed by: HOSPITALIST

## 2018-01-01 PROCEDURE — 76450000000

## 2018-01-01 PROCEDURE — 93005 ELECTROCARDIOGRAM TRACING: CPT

## 2018-01-01 PROCEDURE — 74011250637 HC RX REV CODE- 250/637: Performed by: NURSE PRACTITIONER

## 2018-01-01 PROCEDURE — 84484 ASSAY OF TROPONIN QUANT: CPT | Performed by: HOSPITALIST

## 2018-01-01 PROCEDURE — 73562 X-RAY EXAM OF KNEE 3: CPT

## 2018-01-01 PROCEDURE — 97110 THERAPEUTIC EXERCISES: CPT

## 2018-01-01 PROCEDURE — 97161 PT EVAL LOW COMPLEX 20 MIN: CPT

## 2018-01-01 PROCEDURE — G0299 HHS/HOSPICE OF RN EA 15 MIN: HCPCS

## 2018-01-01 PROCEDURE — 83735 ASSAY OF MAGNESIUM: CPT

## 2018-01-01 PROCEDURE — 77030021352 HC CBL LD SYS DISP COVD -B: Performed by: SPECIALIST

## 2018-01-01 PROCEDURE — 74011250636 HC RX REV CODE- 250/636

## 2018-01-01 PROCEDURE — 81003 URINALYSIS AUTO W/O SCOPE: CPT

## 2018-01-01 PROCEDURE — 99285 EMERGENCY DEPT VISIT HI MDM: CPT

## 2018-01-01 PROCEDURE — 93306 TTE W/DOPPLER COMPLETE: CPT

## 2018-01-01 PROCEDURE — 77030033269 HC SLV COMPR SCD KNE2 CARD -B

## 2018-01-01 PROCEDURE — 94760 N-INVAS EAR/PLS OXIMETRY 1: CPT

## 2018-01-01 PROCEDURE — 94640 AIRWAY INHALATION TREATMENT: CPT

## 2018-01-01 PROCEDURE — 74011250636 HC RX REV CODE- 250/636: Performed by: PSYCHIATRY & NEUROLOGY

## 2018-01-01 PROCEDURE — 76060000061 HC AMB SURG ANES 0.5 TO 1 HR: Performed by: SPECIALIST

## 2018-01-01 PROCEDURE — 36415 COLL VENOUS BLD VENIPUNCTURE: CPT | Performed by: HOSPITALIST

## 2018-01-01 PROCEDURE — 74011000258 HC RX REV CODE- 258: Performed by: EMERGENCY MEDICINE

## 2018-01-01 PROCEDURE — G8978 MOBILITY CURRENT STATUS: HCPCS

## 2018-01-01 PROCEDURE — 74018 RADEX ABDOMEN 1 VIEW: CPT

## 2018-01-01 PROCEDURE — 74011636320 HC RX REV CODE- 636/320: Performed by: EMERGENCY MEDICINE

## 2018-01-01 PROCEDURE — 74176 CT ABD & PELVIS W/O CONTRAST: CPT

## 2018-01-01 PROCEDURE — 84443 ASSAY THYROID STIM HORMONE: CPT | Performed by: EMERGENCY MEDICINE

## 2018-01-01 PROCEDURE — G0158 HHC OT ASSISTANT EA 15: HCPCS

## 2018-01-01 PROCEDURE — 74011000258 HC RX REV CODE- 258: Performed by: INTERNAL MEDICINE

## 2018-01-01 PROCEDURE — 82607 VITAMIN B-12: CPT

## 2018-01-01 PROCEDURE — G8987 SELF CARE CURRENT STATUS: HCPCS

## 2018-01-01 PROCEDURE — 84480 ASSAY TRIIODOTHYRONINE (T3): CPT | Performed by: EMERGENCY MEDICINE

## 2018-01-01 PROCEDURE — 84443 ASSAY THYROID STIM HORMONE: CPT

## 2018-01-01 PROCEDURE — 71045 X-RAY EXAM CHEST 1 VIEW: CPT

## 2018-01-01 PROCEDURE — 83880 ASSAY OF NATRIURETIC PEPTIDE: CPT | Performed by: EMERGENCY MEDICINE

## 2018-01-01 PROCEDURE — 74177 CT ABD & PELVIS W/CONTRAST: CPT

## 2018-01-01 PROCEDURE — 71046 X-RAY EXAM CHEST 2 VIEWS: CPT

## 2018-01-01 PROCEDURE — 83605 ASSAY OF LACTIC ACID: CPT | Performed by: EMERGENCY MEDICINE

## 2018-01-01 PROCEDURE — 74011636320 HC RX REV CODE- 636/320: Performed by: INTERNAL MEDICINE

## 2018-01-01 PROCEDURE — 85730 THROMBOPLASTIN TIME PARTIAL: CPT | Performed by: EMERGENCY MEDICINE

## 2018-01-01 PROCEDURE — 74011000258 HC RX REV CODE- 258: Performed by: HOSPITALIST

## 2018-01-01 PROCEDURE — 83690 ASSAY OF LIPASE: CPT | Performed by: EMERGENCY MEDICINE

## 2018-01-01 PROCEDURE — 74011636637 HC RX REV CODE- 636/637: Performed by: FAMILY MEDICINE

## 2018-01-01 PROCEDURE — 97530 THERAPEUTIC ACTIVITIES: CPT

## 2018-01-01 PROCEDURE — 96376 TX/PRO/DX INJ SAME DRUG ADON: CPT

## 2018-01-01 PROCEDURE — 74011250636 HC RX REV CODE- 250/636: Performed by: FAMILY MEDICINE

## 2018-01-01 PROCEDURE — 77030019988 HC FCPS ENDOSC DISP BSC -B: Performed by: SPECIALIST

## 2018-01-01 PROCEDURE — 74011250637 HC RX REV CODE- 250/637: Performed by: FAMILY MEDICINE

## 2018-01-01 PROCEDURE — 83605 ASSAY OF LACTIC ACID: CPT | Performed by: HOSPITALIST

## 2018-01-01 PROCEDURE — 81001 URINALYSIS AUTO W/SCOPE: CPT | Performed by: EMERGENCY MEDICINE

## 2018-01-01 PROCEDURE — 84100 ASSAY OF PHOSPHORUS: CPT | Performed by: HOSPITALIST

## 2018-01-01 PROCEDURE — 77030018798 HC PMP KT ENTRL FED COVD -A

## 2018-01-01 PROCEDURE — 51798 US URINE CAPACITY MEASURE: CPT

## 2018-01-01 PROCEDURE — 85027 COMPLETE CBC AUTOMATED: CPT | Performed by: EMERGENCY MEDICINE

## 2018-01-01 PROCEDURE — 77030029684 HC NEB SM VOL KT MONA -A

## 2018-01-01 PROCEDURE — 88342 IMHCHEM/IMCYTCHM 1ST ANTB: CPT | Performed by: SPECIALIST

## 2018-01-01 PROCEDURE — 83735 ASSAY OF MAGNESIUM: CPT | Performed by: HOSPITALIST

## 2018-01-01 PROCEDURE — 77030018712 HC DEV BLLN INFL BSC -B: Performed by: SPECIALIST

## 2018-01-01 PROCEDURE — 73030 X-RAY EXAM OF SHOULDER: CPT

## 2018-01-01 PROCEDURE — 77030011256 HC DRSG MEPILEX <16IN NO BORD MOLN -A

## 2018-01-01 PROCEDURE — 97162 PT EVAL MOD COMPLEX 30 MIN: CPT

## 2018-01-01 PROCEDURE — 80053 COMPREHEN METABOLIC PANEL: CPT

## 2018-01-01 PROCEDURE — 94761 N-INVAS EAR/PLS OXIMETRY MLT: CPT

## 2018-01-01 PROCEDURE — 85730 THROMBOPLASTIN TIME PARTIAL: CPT

## 2018-01-01 PROCEDURE — 74220 X-RAY XM ESOPHAGUS 1CNTRST: CPT

## 2018-01-01 PROCEDURE — G0152 HHCP-SERV OF OT,EA 15 MIN: HCPCS

## 2018-01-01 PROCEDURE — 84484 ASSAY OF TROPONIN QUANT: CPT

## 2018-01-01 PROCEDURE — 95816 EEG AWAKE AND DROWSY: CPT

## 2018-01-01 PROCEDURE — G8979 MOBILITY GOAL STATUS: HCPCS

## 2018-01-01 PROCEDURE — 82550 ASSAY OF CK (CPK): CPT

## 2018-01-01 PROCEDURE — 3331090003 HH PPS REVENUE ADJ

## 2018-01-01 PROCEDURE — 83690 ASSAY OF LIPASE: CPT

## 2018-01-01 PROCEDURE — 87186 SC STD MICRODIL/AGAR DIL: CPT | Performed by: EMERGENCY MEDICINE

## 2018-01-01 PROCEDURE — 95816 EEG AWAKE AND DROWSY: CPT | Performed by: FAMILY MEDICINE

## 2018-01-01 PROCEDURE — 74011250636 HC RX REV CODE- 250/636: Performed by: ANESTHESIOLOGY

## 2018-01-01 PROCEDURE — 87493 C DIFF AMPLIFIED PROBE: CPT | Performed by: HOSPITALIST

## 2018-01-01 PROCEDURE — 97535 SELF CARE MNGMENT TRAINING: CPT

## 2018-01-01 PROCEDURE — 85027 COMPLETE CBC AUTOMATED: CPT | Performed by: HOSPITALIST

## 2018-01-01 PROCEDURE — 82306 VITAMIN D 25 HYDROXY: CPT | Performed by: INTERNAL MEDICINE

## 2018-01-01 PROCEDURE — 85610 PROTHROMBIN TIME: CPT | Performed by: EMERGENCY MEDICINE

## 2018-01-01 PROCEDURE — 93880 EXTRACRANIAL BILAT STUDY: CPT

## 2018-01-01 PROCEDURE — V2632 POST CHMBR INTRAOCULAR LENS: HCPCS | Performed by: SPECIALIST

## 2018-01-01 PROCEDURE — 84100 ASSAY OF PHOSPHORUS: CPT

## 2018-01-01 PROCEDURE — 73110 X-RAY EXAM OF WRIST: CPT

## 2018-01-01 PROCEDURE — 94762 N-INVAS EAR/PLS OXIMTRY CONT: CPT

## 2018-01-01 PROCEDURE — 82803 BLOOD GASES ANY COMBINATION: CPT

## 2018-01-01 PROCEDURE — 83735 ASSAY OF MAGNESIUM: CPT | Performed by: EMERGENCY MEDICINE

## 2018-01-01 PROCEDURE — 99283 EMERGENCY DEPT VISIT LOW MDM: CPT

## 2018-01-01 PROCEDURE — 73521 X-RAY EXAM HIPS BI 2 VIEWS: CPT

## 2018-01-01 PROCEDURE — 83036 HEMOGLOBIN GLYCOSYLATED A1C: CPT

## 2018-01-01 PROCEDURE — 72131 CT LUMBAR SPINE W/O DYE: CPT

## 2018-01-01 PROCEDURE — 74022 RADEX COMPL AQT ABD SERIES: CPT

## 2018-01-01 PROCEDURE — 73590 X-RAY EXAM OF LOWER LEG: CPT

## 2018-01-01 PROCEDURE — 71250 CT THORAX DX C-: CPT

## 2018-01-01 PROCEDURE — 73630 X-RAY EXAM OF FOOT: CPT

## 2018-01-01 PROCEDURE — 76030000000 HC AMB SURG OR TIME 0.5 TO 1: Performed by: SPECIALIST

## 2018-01-01 PROCEDURE — 74011000250 HC RX REV CODE- 250: Performed by: NURSE PRACTITIONER

## 2018-01-01 PROCEDURE — 96365 THER/PROPH/DIAG IV INF INIT: CPT

## 2018-01-01 PROCEDURE — 85025 COMPLETE CBC W/AUTO DIFF WBC: CPT | Performed by: HOSPITALIST

## 2018-01-01 PROCEDURE — 74011250637 HC RX REV CODE- 250/637: Performed by: SPECIALIST

## 2018-01-01 PROCEDURE — 95816 EEG AWAKE AND DROWSY: CPT | Performed by: PSYCHIATRY & NEUROLOGY

## 2018-01-01 PROCEDURE — 74011000250 HC RX REV CODE- 250: Performed by: PHYSICAL MEDICINE & REHABILITATION

## 2018-01-01 PROCEDURE — 87147 CULTURE TYPE IMMUNOLOGIC: CPT | Performed by: EMERGENCY MEDICINE

## 2018-01-01 PROCEDURE — 76000 FLUOROSCOPY <1 HR PHYS/QHP: CPT

## 2018-01-01 PROCEDURE — 87181 SC STD AGAR DILUTION PER AGT: CPT | Performed by: EMERGENCY MEDICINE

## 2018-01-01 PROCEDURE — 74011250636 HC RX REV CODE- 250/636: Performed by: NURSE PRACTITIONER

## 2018-01-01 PROCEDURE — G0162 HHC RN E&M PLAN SVS, 15 MIN: HCPCS

## 2018-01-01 PROCEDURE — 85610 PROTHROMBIN TIME: CPT

## 2018-01-01 PROCEDURE — 77030027138 HC INCENT SPIROMETER -A

## 2018-01-01 PROCEDURE — 77030015919

## 2018-01-01 PROCEDURE — G8980 MOBILITY D/C STATUS: HCPCS

## 2018-01-01 PROCEDURE — 65610000006 HC RM INTENSIVE CARE

## 2018-01-01 PROCEDURE — 76210000040 HC AMBSU PH I REC FIRST 0.5 HR: Performed by: SPECIALIST

## 2018-01-01 PROCEDURE — 36600 WITHDRAWAL OF ARTERIAL BLOOD: CPT

## 2018-01-01 PROCEDURE — G8988 SELF CARE GOAL STATUS: HCPCS

## 2018-01-01 PROCEDURE — 400013 HH SOC

## 2018-01-01 PROCEDURE — 74011636637 HC RX REV CODE- 636/637

## 2018-01-01 PROCEDURE — 82607 VITAMIN B-12: CPT | Performed by: INTERNAL MEDICINE

## 2018-01-01 PROCEDURE — G0108 DIAB MANAGE TRN  PER INDIV: HCPCS

## 2018-01-01 PROCEDURE — 88305 TISSUE EXAM BY PATHOLOGIST: CPT | Performed by: SPECIALIST

## 2018-01-01 PROCEDURE — C1726 CATH, BAL DIL, NON-VASCULAR: HCPCS | Performed by: SPECIALIST

## 2018-01-01 PROCEDURE — 70490 CT SOFT TISSUE NECK W/O DYE: CPT

## 2018-01-01 PROCEDURE — 71275 CT ANGIOGRAPHY CHEST: CPT

## 2018-01-01 PROCEDURE — 77030011943

## 2018-01-01 PROCEDURE — 76210000046 HC AMBSU PH II REC FIRST 0.5 HR: Performed by: SPECIALIST

## 2018-01-01 PROCEDURE — 80048 BASIC METABOLIC PNL TOTAL CA: CPT | Performed by: EMERGENCY MEDICINE

## 2018-01-01 RX ORDER — INSULIN GLARGINE 100 [IU]/ML
INJECTION, SOLUTION SUBCUTANEOUS
Qty: 15 PEN | Refills: 5 | Status: ON HOLD | OUTPATIENT
Start: 2018-01-01 | End: 2019-01-01 | Stop reason: SDUPTHER

## 2018-01-01 RX ORDER — CLOPIDOGREL BISULFATE 75 MG/1
75 TABLET ORAL DAILY
Qty: 1 TAB | Refills: 0 | Status: SHIPPED | OUTPATIENT
Start: 2018-01-01 | End: 2018-01-01

## 2018-01-01 RX ORDER — SODIUM CHLORIDE 0.9 % (FLUSH) 0.9 %
5-10 SYRINGE (ML) INJECTION EVERY 8 HOURS
Status: DISCONTINUED | OUTPATIENT
Start: 2018-01-01 | End: 2018-01-01 | Stop reason: HOSPADM

## 2018-01-01 RX ORDER — MIDODRINE HYDROCHLORIDE 5 MG/1
10 TABLET ORAL
Status: DISCONTINUED | OUTPATIENT
Start: 2018-01-01 | End: 2018-01-01 | Stop reason: HOSPADM

## 2018-01-01 RX ORDER — INSULIN LISPRO 100 [IU]/ML
INJECTION, SOLUTION INTRAVENOUS; SUBCUTANEOUS
Status: DISCONTINUED | OUTPATIENT
Start: 2018-01-01 | End: 2018-01-01 | Stop reason: HOSPADM

## 2018-01-01 RX ORDER — INSULIN LISPRO 100 [IU]/ML
10 INJECTION, SOLUTION INTRAVENOUS; SUBCUTANEOUS
Status: DISCONTINUED | OUTPATIENT
Start: 2018-01-01 | End: 2018-01-01

## 2018-01-01 RX ORDER — SODIUM CHLORIDE 9 MG/ML
25 INJECTION, SOLUTION INTRAVENOUS CONTINUOUS
Status: DISCONTINUED | OUTPATIENT
Start: 2018-01-01 | End: 2018-01-01

## 2018-01-01 RX ORDER — MAGNESIUM SULFATE 100 %
4 CRYSTALS MISCELLANEOUS AS NEEDED
Status: DISCONTINUED | OUTPATIENT
Start: 2018-01-01 | End: 2018-01-01 | Stop reason: HOSPADM

## 2018-01-01 RX ORDER — TAMSULOSIN HYDROCHLORIDE 0.4 MG/1
0.4 CAPSULE ORAL DAILY
Status: DISCONTINUED | OUTPATIENT
Start: 2018-01-01 | End: 2018-01-01

## 2018-01-01 RX ORDER — HYDROCODONE BITARTRATE AND ACETAMINOPHEN 5; 325 MG/1; MG/1
1 TABLET ORAL
Status: DISCONTINUED | OUTPATIENT
Start: 2018-01-01 | End: 2018-01-01

## 2018-01-01 RX ORDER — LIDOCAINE HYDROCHLORIDE 10 MG/ML
0.1 INJECTION, SOLUTION EPIDURAL; INFILTRATION; INTRACAUDAL; PERINEURAL AS NEEDED
Status: DISCONTINUED | OUTPATIENT
Start: 2018-01-01 | End: 2018-01-01 | Stop reason: HOSPADM

## 2018-01-01 RX ORDER — SODIUM CHLORIDE 9 MG/ML
50 INJECTION, SOLUTION INTRAVENOUS
Status: COMPLETED | OUTPATIENT
Start: 2018-01-01 | End: 2018-01-01

## 2018-01-01 RX ORDER — FLUTICASONE PROPIONATE 50 MCG
1 SPRAY, SUSPENSION (ML) NASAL 2 TIMES DAILY
Status: DISCONTINUED | OUTPATIENT
Start: 2018-01-01 | End: 2018-01-01 | Stop reason: HOSPADM

## 2018-01-01 RX ORDER — FUROSEMIDE 10 MG/ML
40 INJECTION INTRAMUSCULAR; INTRAVENOUS ONCE
Status: COMPLETED | OUTPATIENT
Start: 2018-01-01 | End: 2018-01-01

## 2018-01-01 RX ORDER — METOCLOPRAMIDE 10 MG/1
10 TABLET ORAL
COMMUNITY
Start: 2018-01-01 | End: 2018-01-01

## 2018-01-01 RX ORDER — GUAIFENESIN 100 MG/5ML
SOLUTION ORAL
Status: ON HOLD | COMMUNITY
End: 2018-01-01

## 2018-01-01 RX ORDER — LANOLIN ALCOHOL/MO/W.PET/CERES
1000 CREAM (GRAM) TOPICAL DAILY
COMMUNITY
End: 2018-01-01

## 2018-01-01 RX ORDER — FAMOTIDINE 20 MG/1
20 TABLET, FILM COATED ORAL 2 TIMES DAILY
Status: DISCONTINUED | OUTPATIENT
Start: 2018-01-01 | End: 2018-01-01

## 2018-01-01 RX ORDER — ATROPINE SULFATE 0.1 MG/ML
0.5 INJECTION INTRAVENOUS
Status: DISCONTINUED | OUTPATIENT
Start: 2018-01-01 | End: 2018-01-01 | Stop reason: HOSPADM

## 2018-01-01 RX ORDER — METHYLPREDNISOLONE 4 MG/1
TABLET ORAL
Qty: 1 DOSE PACK | Refills: 0 | Status: SHIPPED | OUTPATIENT
Start: 2018-01-01 | End: 2018-01-01

## 2018-01-01 RX ORDER — FUROSEMIDE 40 MG/1
40 TABLET ORAL
Status: DISCONTINUED | OUTPATIENT
Start: 2018-01-01 | End: 2018-01-01 | Stop reason: HOSPADM

## 2018-01-01 RX ORDER — INSULIN LISPRO 100 [IU]/ML
8 INJECTION, SOLUTION INTRAVENOUS; SUBCUTANEOUS
Status: DISCONTINUED | OUTPATIENT
Start: 2018-01-01 | End: 2018-01-01 | Stop reason: HOSPADM

## 2018-01-01 RX ORDER — IPRATROPIUM BROMIDE AND ALBUTEROL SULFATE 2.5; .5 MG/3ML; MG/3ML
3 SOLUTION RESPIRATORY (INHALATION)
Status: DISCONTINUED | OUTPATIENT
Start: 2018-01-01 | End: 2018-01-01 | Stop reason: HOSPADM

## 2018-01-01 RX ORDER — TAMSULOSIN HYDROCHLORIDE 0.4 MG/1
0.4 CAPSULE ORAL AS NEEDED
Status: DISCONTINUED | OUTPATIENT
Start: 2018-01-01 | End: 2018-01-01

## 2018-01-01 RX ORDER — FUROSEMIDE 10 MG/ML
40 INJECTION INTRAMUSCULAR; INTRAVENOUS
Status: COMPLETED | OUTPATIENT
Start: 2018-01-01 | End: 2018-01-01

## 2018-01-01 RX ORDER — INSULIN GLARGINE 100 [IU]/ML
14 INJECTION, SOLUTION SUBCUTANEOUS DAILY
Status: DISCONTINUED | OUTPATIENT
Start: 2018-01-01 | End: 2018-01-01 | Stop reason: HOSPADM

## 2018-01-01 RX ORDER — SODIUM CHLORIDE 0.9 % (FLUSH) 0.9 %
5-10 SYRINGE (ML) INJECTION AS NEEDED
Status: DISCONTINUED | OUTPATIENT
Start: 2018-01-01 | End: 2018-01-01 | Stop reason: HOSPADM

## 2018-01-01 RX ORDER — INSULIN LISPRO 100 [IU]/ML
14 INJECTION, SOLUTION INTRAVENOUS; SUBCUTANEOUS
Status: DISCONTINUED | OUTPATIENT
Start: 2018-01-01 | End: 2018-01-01

## 2018-01-01 RX ORDER — DEXTROSE 50 % IN WATER (D50W) INTRAVENOUS SYRINGE
12.5-25 AS NEEDED
Status: DISCONTINUED | OUTPATIENT
Start: 2018-01-01 | End: 2018-01-01 | Stop reason: HOSPADM

## 2018-01-01 RX ORDER — FUROSEMIDE 40 MG/1
40 TABLET ORAL DAILY
Status: DISCONTINUED | OUTPATIENT
Start: 2018-01-01 | End: 2018-01-01 | Stop reason: HOSPADM

## 2018-01-01 RX ORDER — FLUTICASONE PROPIONATE 50 MCG
2 SPRAY, SUSPENSION (ML) NASAL DAILY
Status: DISCONTINUED | OUTPATIENT
Start: 2018-01-01 | End: 2018-01-01 | Stop reason: HOSPADM

## 2018-01-01 RX ORDER — FAMOTIDINE 20 MG/1
20 TABLET, FILM COATED ORAL EVERY 24 HOURS
Status: DISCONTINUED | OUTPATIENT
Start: 2018-01-01 | End: 2018-01-01 | Stop reason: HOSPADM

## 2018-01-01 RX ORDER — PRAMIPEXOLE DIHYDROCHLORIDE 0.25 MG/1
0.25 TABLET ORAL 3 TIMES DAILY
Status: DISCONTINUED | OUTPATIENT
Start: 2018-01-01 | End: 2018-01-01 | Stop reason: HOSPADM

## 2018-01-01 RX ORDER — INSULIN LISPRO 100 [IU]/ML
INJECTION, SOLUTION INTRAVENOUS; SUBCUTANEOUS
Qty: 15 PEN | Refills: 3 | Status: SHIPPED
Start: 2018-01-01 | End: 2018-01-01

## 2018-01-01 RX ORDER — LIDOCAINE 50 MG/G
PATCH TOPICAL
Qty: 1 EACH | Refills: 0 | Status: SHIPPED | OUTPATIENT
Start: 2018-01-01 | End: 2019-01-01

## 2018-01-01 RX ORDER — FUROSEMIDE 20 MG/1
20 TABLET ORAL DAILY
COMMUNITY
End: 2018-01-01

## 2018-01-01 RX ORDER — GUAIFENESIN 100 MG/5ML
300 SOLUTION ORAL 3 TIMES DAILY
Status: DISCONTINUED | OUTPATIENT
Start: 2018-01-01 | End: 2018-01-01 | Stop reason: HOSPADM

## 2018-01-01 RX ORDER — FENTANYL CITRATE 50 UG/ML
25 INJECTION, SOLUTION INTRAMUSCULAR; INTRAVENOUS
Status: DISCONTINUED | OUTPATIENT
Start: 2018-01-01 | End: 2018-01-01 | Stop reason: HOSPADM

## 2018-01-01 RX ORDER — INSULIN LISPRO 100 [IU]/ML
INJECTION, SOLUTION INTRAVENOUS; SUBCUTANEOUS
Status: DISCONTINUED | OUTPATIENT
Start: 2018-01-01 | End: 2018-01-01

## 2018-01-01 RX ORDER — GUAIFENESIN 100 MG/5ML
81 LIQUID (ML) ORAL DAILY
Qty: 30 TAB | Refills: 6 | Status: SHIPPED
Start: 2018-01-01 | End: 2018-01-01

## 2018-01-01 RX ORDER — FUROSEMIDE 40 MG/1
40 TABLET ORAL
Qty: 40 TAB | Refills: 6 | Status: SHIPPED | OUTPATIENT
Start: 2018-01-01 | End: 2018-01-01

## 2018-01-01 RX ORDER — ATORVASTATIN CALCIUM 40 MG/1
40 TABLET, FILM COATED ORAL DAILY
Status: DISCONTINUED | OUTPATIENT
Start: 2018-01-01 | End: 2018-01-01 | Stop reason: HOSPADM

## 2018-01-01 RX ORDER — ALBUTEROL SULFATE 0.83 MG/ML
2.5 SOLUTION RESPIRATORY (INHALATION)
Status: DISCONTINUED | OUTPATIENT
Start: 2018-01-01 | End: 2018-01-01

## 2018-01-01 RX ORDER — FUROSEMIDE 10 MG/ML
20 INJECTION INTRAMUSCULAR; INTRAVENOUS ONCE
Status: COMPLETED | OUTPATIENT
Start: 2018-01-01 | End: 2018-01-01

## 2018-01-01 RX ORDER — LANOLIN ALCOHOL/MO/W.PET/CERES
CREAM (GRAM) TOPICAL
Status: DISPENSED
Start: 2018-01-01 | End: 2018-01-01

## 2018-01-01 RX ORDER — ALBUTEROL SULFATE 0.83 MG/ML
1.25 SOLUTION RESPIRATORY (INHALATION)
Status: DISCONTINUED | OUTPATIENT
Start: 2018-01-01 | End: 2018-01-01 | Stop reason: HOSPADM

## 2018-01-01 RX ORDER — DIPHENHYDRAMINE HYDROCHLORIDE 50 MG/ML
12.5 INJECTION, SOLUTION INTRAMUSCULAR; INTRAVENOUS AS NEEDED
Status: DISCONTINUED | OUTPATIENT
Start: 2018-01-01 | End: 2018-01-01 | Stop reason: HOSPADM

## 2018-01-01 RX ORDER — INSULIN GLARGINE 100 [IU]/ML
14 INJECTION, SOLUTION SUBCUTANEOUS DAILY
Status: DISCONTINUED | OUTPATIENT
Start: 2018-01-01 | End: 2018-01-01

## 2018-01-01 RX ORDER — NITROGLYCERIN 0.4 MG/1
0.4 TABLET SUBLINGUAL
Status: DISCONTINUED | OUTPATIENT
Start: 2018-01-01 | End: 2018-01-01 | Stop reason: HOSPADM

## 2018-01-01 RX ORDER — FUROSEMIDE 20 MG/1
20 TABLET ORAL DAILY
Status: DISCONTINUED | OUTPATIENT
Start: 2018-01-01 | End: 2018-01-01

## 2018-01-01 RX ORDER — FINASTERIDE 5 MG/1
5 TABLET, FILM COATED ORAL DAILY
Status: DISCONTINUED | OUTPATIENT
Start: 2018-01-01 | End: 2018-01-01

## 2018-01-01 RX ORDER — PRIMIDONE 50 MG/1
25 TABLET ORAL
Status: DISCONTINUED | OUTPATIENT
Start: 2018-01-01 | End: 2018-01-01 | Stop reason: HOSPADM

## 2018-01-01 RX ORDER — METOCLOPRAMIDE HYDROCHLORIDE 5 MG/ML
10 INJECTION INTRAMUSCULAR; INTRAVENOUS
Status: DISCONTINUED | OUTPATIENT
Start: 2018-01-01 | End: 2018-01-01 | Stop reason: HOSPADM

## 2018-01-01 RX ORDER — METRONIDAZOLE 500 MG/100ML
500 INJECTION, SOLUTION INTRAVENOUS EVERY 12 HOURS
Status: COMPLETED | OUTPATIENT
Start: 2018-01-01 | End: 2018-01-01

## 2018-01-01 RX ORDER — PRIMIDONE 50 MG/1
100 TABLET ORAL
Status: COMPLETED | OUTPATIENT
Start: 2018-01-01 | End: 2018-01-01

## 2018-01-01 RX ORDER — INSULIN PUMP SYRINGE, 3 ML
EACH MISCELLANEOUS
Qty: 1 KIT | Refills: 0 | Status: SHIPPED | OUTPATIENT
Start: 2018-01-01 | End: 2018-01-01 | Stop reason: SDUPTHER

## 2018-01-01 RX ORDER — SODIUM CHLORIDE, SODIUM LACTATE, POTASSIUM CHLORIDE, CALCIUM CHLORIDE 600; 310; 30; 20 MG/100ML; MG/100ML; MG/100ML; MG/100ML
25 INJECTION, SOLUTION INTRAVENOUS CONTINUOUS
Status: DISCONTINUED | OUTPATIENT
Start: 2018-01-01 | End: 2018-01-01 | Stop reason: HOSPADM

## 2018-01-01 RX ORDER — DOXYCYCLINE HYCLATE 100 MG
100 TABLET ORAL 2 TIMES DAILY
Qty: 14 TAB | Refills: 0 | Status: SHIPPED | OUTPATIENT
Start: 2018-01-01 | End: 2018-01-01

## 2018-01-01 RX ORDER — FUROSEMIDE 10 MG/ML
40 INJECTION INTRAMUSCULAR; INTRAVENOUS EVERY 12 HOURS
Status: DISCONTINUED | OUTPATIENT
Start: 2018-01-01 | End: 2018-01-01

## 2018-01-01 RX ORDER — INSULIN LISPRO 100 [IU]/ML
INJECTION, SOLUTION INTRAVENOUS; SUBCUTANEOUS EVERY 6 HOURS
Status: DISCONTINUED | OUTPATIENT
Start: 2018-01-01 | End: 2018-01-01 | Stop reason: HOSPADM

## 2018-01-01 RX ORDER — GUAIFENESIN 600 MG/1
600 TABLET, EXTENDED RELEASE ORAL EVERY 12 HOURS
Status: DISCONTINUED | OUTPATIENT
Start: 2018-01-01 | End: 2018-01-01

## 2018-01-01 RX ORDER — FUROSEMIDE 20 MG/1
40 TABLET ORAL DAILY
Qty: 30 TAB | Refills: 2 | Status: SHIPPED | OUTPATIENT
Start: 2018-01-01 | End: 2018-01-01

## 2018-01-01 RX ORDER — FUROSEMIDE 10 MG/ML
20 INJECTION INTRAMUSCULAR; INTRAVENOUS EVERY 12 HOURS
Status: DISCONTINUED | OUTPATIENT
Start: 2018-01-01 | End: 2018-01-01

## 2018-01-01 RX ORDER — GABAPENTIN 250 MG/5ML
750 SOLUTION ORAL 3 TIMES DAILY
Status: DISCONTINUED | OUTPATIENT
Start: 2018-01-01 | End: 2018-01-01 | Stop reason: HOSPADM

## 2018-01-01 RX ORDER — FINASTERIDE 5 MG/1
5 TABLET, FILM COATED ORAL
Status: DISCONTINUED | OUTPATIENT
Start: 2018-01-01 | End: 2018-01-01 | Stop reason: HOSPADM

## 2018-01-01 RX ORDER — MORPHINE SULFATE 4 MG/ML
4 INJECTION INTRAVENOUS
Status: DISCONTINUED | OUTPATIENT
Start: 2018-01-01 | End: 2018-01-01 | Stop reason: HOSPADM

## 2018-01-01 RX ORDER — FUROSEMIDE 20 MG/1
20 TABLET ORAL
Status: DISCONTINUED | OUTPATIENT
Start: 2018-01-01 | End: 2018-01-01 | Stop reason: HOSPADM

## 2018-01-01 RX ORDER — IPRATROPIUM BROMIDE AND ALBUTEROL SULFATE 2.5; .5 MG/3ML; MG/3ML
3 SOLUTION RESPIRATORY (INHALATION)
Status: DISCONTINUED | OUTPATIENT
Start: 2018-01-01 | End: 2018-01-01

## 2018-01-01 RX ORDER — LEVOFLOXACIN 5 MG/ML
750 INJECTION, SOLUTION INTRAVENOUS
Status: DISCONTINUED | OUTPATIENT
Start: 2018-01-01 | End: 2018-01-01

## 2018-01-01 RX ORDER — GABAPENTIN 250 MG/5ML
500 SOLUTION ORAL 2 TIMES DAILY
Status: DISCONTINUED | OUTPATIENT
Start: 2018-01-01 | End: 2018-01-01

## 2018-01-01 RX ORDER — INSULIN ASPART 100 [IU]/ML
8 INJECTION, SOLUTION INTRAVENOUS; SUBCUTANEOUS
Status: DISCONTINUED | OUTPATIENT
Start: 2018-01-01 | End: 2018-01-01

## 2018-01-01 RX ORDER — CEFDINIR 250 MG/5ML
300 POWDER, FOR SUSPENSION ORAL 2 TIMES DAILY
Qty: 84 ML | Refills: 0 | Status: SHIPPED | OUTPATIENT
Start: 2018-01-01 | End: 2018-01-01

## 2018-01-01 RX ORDER — TAMSULOSIN HYDROCHLORIDE 0.4 MG/1
0.4 CAPSULE ORAL DAILY
Status: DISCONTINUED | OUTPATIENT
Start: 2018-01-01 | End: 2018-01-01 | Stop reason: HOSPADM

## 2018-01-01 RX ORDER — ACETAMINOPHEN 500 MG
1000 TABLET ORAL
Status: DISCONTINUED | OUTPATIENT
Start: 2018-01-01 | End: 2018-01-01 | Stop reason: HOSPADM

## 2018-01-01 RX ORDER — MECLIZINE HCL 12.5 MG 12.5 MG/1
25 TABLET ORAL 3 TIMES DAILY
Status: DISCONTINUED | OUTPATIENT
Start: 2018-01-01 | End: 2018-01-01

## 2018-01-01 RX ORDER — HEPARIN SODIUM 5000 [USP'U]/ML
5000 INJECTION, SOLUTION INTRAVENOUS; SUBCUTANEOUS EVERY 8 HOURS
Status: DISCONTINUED | OUTPATIENT
Start: 2018-01-01 | End: 2018-01-01 | Stop reason: HOSPADM

## 2018-01-01 RX ORDER — POTASSIUM CHLORIDE 7.45 MG/ML
10 INJECTION INTRAVENOUS ONCE
Status: DISPENSED | OUTPATIENT
Start: 2018-01-01 | End: 2018-01-01

## 2018-01-01 RX ORDER — LANOLIN ALCOHOL/MO/W.PET/CERES
1000 CREAM (GRAM) TOPICAL DAILY
Status: DISCONTINUED | OUTPATIENT
Start: 2018-01-01 | End: 2018-01-01 | Stop reason: HOSPADM

## 2018-01-01 RX ORDER — AMANTADINE HYDROCHLORIDE 50 MG/5ML
50 SOLUTION ORAL DAILY
Qty: 35 ML | Refills: 11 | Status: SHIPPED | OUTPATIENT
Start: 2018-01-01 | End: 2018-01-01

## 2018-01-01 RX ORDER — ONDANSETRON 2 MG/ML
INJECTION INTRAMUSCULAR; INTRAVENOUS
Status: DISCONTINUED
Start: 2018-01-01 | End: 2018-01-01 | Stop reason: HOSPADM

## 2018-01-01 RX ORDER — LIDOCAINE HYDROCHLORIDE 20 MG/ML
INJECTION, SOLUTION EPIDURAL; INFILTRATION; INTRACAUDAL; PERINEURAL AS NEEDED
Status: DISCONTINUED | OUTPATIENT
Start: 2018-01-01 | End: 2018-01-01 | Stop reason: HOSPADM

## 2018-01-01 RX ORDER — HYDROCODONE BITARTRATE AND ACETAMINOPHEN 10; 325 MG/1; MG/1
1 TABLET ORAL
Status: DISCONTINUED | OUTPATIENT
Start: 2018-01-01 | End: 2018-01-01 | Stop reason: HOSPADM

## 2018-01-01 RX ORDER — INSULIN LISPRO 100 [IU]/ML
INJECTION, SOLUTION INTRAVENOUS; SUBCUTANEOUS EVERY 6 HOURS
Status: DISCONTINUED | OUTPATIENT
Start: 2018-01-01 | End: 2018-01-01

## 2018-01-01 RX ORDER — NALOXONE HYDROCHLORIDE 0.4 MG/ML
0.4 INJECTION, SOLUTION INTRAMUSCULAR; INTRAVENOUS; SUBCUTANEOUS AS NEEDED
Status: DISCONTINUED | OUTPATIENT
Start: 2018-01-01 | End: 2018-01-01 | Stop reason: HOSPADM

## 2018-01-01 RX ORDER — ONDANSETRON 2 MG/ML
2 INJECTION INTRAMUSCULAR; INTRAVENOUS
Status: DISCONTINUED | OUTPATIENT
Start: 2018-01-01 | End: 2018-01-01 | Stop reason: HOSPADM

## 2018-01-01 RX ORDER — GUAIFENESIN 100 MG/5ML
100 SOLUTION ORAL
Status: DISCONTINUED | OUTPATIENT
Start: 2018-01-01 | End: 2018-01-01 | Stop reason: HOSPADM

## 2018-01-01 RX ORDER — GUAIFENESIN 100 MG/5ML
100 SOLUTION ORAL 3 TIMES DAILY
Status: DISCONTINUED | OUTPATIENT
Start: 2018-01-01 | End: 2018-01-01 | Stop reason: HOSPADM

## 2018-01-01 RX ORDER — FENTANYL CITRATE 50 UG/ML
25 INJECTION, SOLUTION INTRAMUSCULAR; INTRAVENOUS
Status: COMPLETED | OUTPATIENT
Start: 2018-01-01 | End: 2018-01-01

## 2018-01-01 RX ORDER — CLOPIDOGREL BISULFATE 75 MG/1
75 TABLET ORAL DAILY
Status: DISCONTINUED | OUTPATIENT
Start: 2018-01-01 | End: 2018-01-01 | Stop reason: HOSPADM

## 2018-01-01 RX ORDER — HYDROCODONE BITARTRATE AND ACETAMINOPHEN 5; 325 MG/1; MG/1
1 TABLET ORAL
Status: DISCONTINUED | OUTPATIENT
Start: 2018-01-01 | End: 2018-01-01 | Stop reason: HOSPADM

## 2018-01-01 RX ORDER — MORPHINE SULFATE 2 MG/ML
INJECTION, SOLUTION INTRAMUSCULAR; INTRAVENOUS
Status: COMPLETED
Start: 2018-01-01 | End: 2018-01-01

## 2018-01-01 RX ORDER — PRAMIPEXOLE DIHYDROCHLORIDE 0.25 MG/1
0.25 TABLET ORAL 3 TIMES DAILY
COMMUNITY
Start: 2018-01-01 | End: 2018-01-01 | Stop reason: SDUPTHER

## 2018-01-01 RX ORDER — DEXTROSE 50 % IN WATER (D50W) INTRAVENOUS SYRINGE
25-50 AS NEEDED
Status: DISCONTINUED | OUTPATIENT
Start: 2018-01-01 | End: 2018-01-01 | Stop reason: HOSPADM

## 2018-01-01 RX ORDER — PRAMIPEXOLE DIHYDROCHLORIDE 0.25 MG/1
0.25 TABLET ORAL 3 TIMES DAILY
Qty: 100 TAB | Refills: 11 | Status: SHIPPED | OUTPATIENT
Start: 2018-01-01 | End: 2019-01-01

## 2018-01-01 RX ORDER — METRONIDAZOLE 500 MG/100ML
500 INJECTION, SOLUTION INTRAVENOUS EVERY 12 HOURS
Status: DISCONTINUED | OUTPATIENT
Start: 2018-01-01 | End: 2018-01-01 | Stop reason: HOSPADM

## 2018-01-01 RX ORDER — GABAPENTIN 250 MG/5ML
750 SOLUTION ORAL EVERY 8 HOURS
Status: DISCONTINUED | OUTPATIENT
Start: 2018-01-01 | End: 2018-01-01 | Stop reason: HOSPADM

## 2018-01-01 RX ORDER — MIDODRINE HYDROCHLORIDE 5 MG/1
30 TABLET ORAL 3 TIMES DAILY
Status: DISCONTINUED | OUTPATIENT
Start: 2018-01-01 | End: 2018-01-01 | Stop reason: HOSPADM

## 2018-01-01 RX ORDER — POLYETHYLENE GLYCOL 3350 17 G/17G
17 POWDER, FOR SOLUTION ORAL 2 TIMES DAILY
Status: DISCONTINUED | OUTPATIENT
Start: 2018-01-01 | End: 2018-01-01 | Stop reason: HOSPADM

## 2018-01-01 RX ORDER — EPINEPHRINE 0.1 MG/ML
1 INJECTION INTRACARDIAC; INTRAVENOUS
Status: DISCONTINUED | OUTPATIENT
Start: 2018-01-01 | End: 2018-01-01 | Stop reason: HOSPADM

## 2018-01-01 RX ORDER — ENOXAPARIN SODIUM 100 MG/ML
40 INJECTION SUBCUTANEOUS EVERY 24 HOURS
Status: DISCONTINUED | OUTPATIENT
Start: 2018-01-01 | End: 2018-01-01

## 2018-01-01 RX ORDER — BACITRACIN 500 UNIT/G
1 PACKET (EA) TOPICAL
Status: COMPLETED | OUTPATIENT
Start: 2018-01-01 | End: 2018-01-01

## 2018-01-01 RX ORDER — KETOROLAC TROMETHAMINE 30 MG/ML
15 INJECTION, SOLUTION INTRAMUSCULAR; INTRAVENOUS
Status: COMPLETED | OUTPATIENT
Start: 2018-01-01 | End: 2018-01-01

## 2018-01-01 RX ORDER — HYDROCODONE BITARTRATE AND ACETAMINOPHEN 7.5; 325 MG/15ML; MG/15ML
5-10 SOLUTION ORAL
Status: DISCONTINUED | OUTPATIENT
Start: 2018-01-01 | End: 2018-01-01 | Stop reason: HOSPADM

## 2018-01-01 RX ORDER — METOCLOPRAMIDE 10 MG/1
10 TABLET ORAL
COMMUNITY
End: 2018-01-01 | Stop reason: SINTOL

## 2018-01-01 RX ORDER — FACIAL-BODY WIPES
10 EACH TOPICAL DAILY PRN
Status: DISCONTINUED | OUTPATIENT
Start: 2018-01-01 | End: 2018-01-01 | Stop reason: HOSPADM

## 2018-01-01 RX ORDER — FINASTERIDE 5 MG/1
5 TABLET, FILM COATED ORAL DAILY
Status: DISCONTINUED | OUTPATIENT
Start: 2018-01-01 | End: 2018-01-01 | Stop reason: HOSPADM

## 2018-01-01 RX ORDER — LANOLIN ALCOHOL/MO/W.PET/CERES
3 CREAM (GRAM) TOPICAL
Status: DISCONTINUED | OUTPATIENT
Start: 2018-01-01 | End: 2018-01-01 | Stop reason: HOSPADM

## 2018-01-01 RX ORDER — AMANTADINE HYDROCHLORIDE 100 MG/1
50 CAPSULE, GELATIN COATED ORAL DAILY
Status: ON HOLD | COMMUNITY
End: 2018-01-01

## 2018-01-01 RX ORDER — PROPOFOL 10 MG/ML
INJECTION, EMULSION INTRAVENOUS AS NEEDED
Status: DISCONTINUED | OUTPATIENT
Start: 2018-01-01 | End: 2018-01-01 | Stop reason: HOSPADM

## 2018-01-01 RX ORDER — INSULIN LISPRO 100 [IU]/ML
8 INJECTION, SOLUTION INTRAVENOUS; SUBCUTANEOUS
Status: DISCONTINUED | OUTPATIENT
Start: 2018-01-01 | End: 2018-01-01

## 2018-01-01 RX ORDER — GUAIFENESIN 100 MG/5ML
81 LIQUID (ML) ORAL DAILY
Status: DISCONTINUED | OUTPATIENT
Start: 2018-01-01 | End: 2018-01-01 | Stop reason: HOSPADM

## 2018-01-01 RX ORDER — ONDANSETRON 2 MG/ML
4 INJECTION INTRAMUSCULAR; INTRAVENOUS
Status: DISCONTINUED | OUTPATIENT
Start: 2018-01-01 | End: 2018-01-01 | Stop reason: HOSPADM

## 2018-01-01 RX ORDER — LIDOCAINE 50 MG/G
1 PATCH TOPICAL EVERY 24 HOURS
Status: DISCONTINUED | OUTPATIENT
Start: 2018-01-01 | End: 2018-01-01 | Stop reason: HOSPADM

## 2018-01-01 RX ORDER — LANCETS
EACH MISCELLANEOUS
Qty: 400 EACH | Refills: 5 | Status: ON HOLD | OUTPATIENT
Start: 2018-01-01 | End: 2018-01-01

## 2018-01-01 RX ORDER — MORPHINE SULFATE 4 MG/ML
4 INJECTION INTRAVENOUS
Status: COMPLETED | OUTPATIENT
Start: 2018-01-01 | End: 2018-01-01

## 2018-01-01 RX ORDER — PRIMIDONE 50 MG/1
25 TABLET ORAL
Qty: 90 TAB | Refills: 3 | Status: SHIPPED | OUTPATIENT
Start: 2018-01-01 | End: 2018-01-01

## 2018-01-01 RX ORDER — GUAIFENESIN 100 MG/5ML
400 SOLUTION ORAL
Status: DISCONTINUED | OUTPATIENT
Start: 2018-01-01 | End: 2018-01-01

## 2018-01-01 RX ORDER — SODIUM CHLORIDE 0.9 % (FLUSH) 0.9 %
10 SYRINGE (ML) INJECTION
Status: DISCONTINUED | OUTPATIENT
Start: 2018-01-01 | End: 2018-01-01

## 2018-01-01 RX ORDER — SODIUM CHLORIDE 0.9 % (FLUSH) 0.9 %
10 SYRINGE (ML) INJECTION
Status: COMPLETED | OUTPATIENT
Start: 2018-01-01 | End: 2018-01-01

## 2018-01-01 RX ORDER — CHOLECALCIFEROL (VITAMIN D3) 50 MCG
1 CAPSULE ORAL DAILY
COMMUNITY
End: 2019-01-01 | Stop reason: DRUGHIGH

## 2018-01-01 RX ORDER — FUROSEMIDE 40 MG/1
40 TABLET ORAL DAILY
Qty: 40 TAB | Refills: 6 | Status: ON HOLD
Start: 2018-01-01 | End: 2018-01-01 | Stop reason: SDUPTHER

## 2018-01-01 RX ORDER — DOCUSATE SODIUM 100 MG/1
100 CAPSULE, LIQUID FILLED ORAL
Status: DISCONTINUED | OUTPATIENT
Start: 2018-01-01 | End: 2018-01-01 | Stop reason: HOSPADM

## 2018-01-01 RX ORDER — GUAIFENESIN 100 MG/5ML
100 SOLUTION ORAL
Status: DISCONTINUED | OUTPATIENT
Start: 2018-01-01 | End: 2018-01-01

## 2018-01-01 RX ORDER — ACETAMINOPHEN 325 MG/1
650 TABLET ORAL
Status: DISCONTINUED | OUTPATIENT
Start: 2018-01-01 | End: 2018-01-01

## 2018-01-01 RX ORDER — SODIUM CHLORIDE 9 MG/ML
125 INJECTION, SOLUTION INTRAVENOUS CONTINUOUS
Status: DISCONTINUED | OUTPATIENT
Start: 2018-01-01 | End: 2018-01-01 | Stop reason: HOSPADM

## 2018-01-01 RX ORDER — ASPIRIN 325 MG
325 TABLET ORAL DAILY
Status: DISCONTINUED | OUTPATIENT
Start: 2018-01-01 | End: 2018-01-01 | Stop reason: HOSPADM

## 2018-01-01 RX ORDER — FACIAL-BODY WIPES
10 EACH TOPICAL
Status: COMPLETED | OUTPATIENT
Start: 2018-01-01 | End: 2018-01-01

## 2018-01-01 RX ORDER — PRAMIPEXOLE DIHYDROCHLORIDE 0.25 MG/1
0.25 TABLET ORAL 3 TIMES DAILY
COMMUNITY
End: 2018-01-01

## 2018-01-01 RX ORDER — FLUTICASONE PROPIONATE 50 MCG
1 SPRAY, SUSPENSION (ML) NASAL 2 TIMES DAILY
COMMUNITY
Start: 2018-01-01 | End: 2018-01-01

## 2018-01-01 RX ORDER — ONDANSETRON 2 MG/ML
8 INJECTION INTRAMUSCULAR; INTRAVENOUS ONCE
Status: ACTIVE | OUTPATIENT
Start: 2018-01-01 | End: 2018-01-01

## 2018-01-01 RX ORDER — NAPROXEN 250 MG/1
250 TABLET ORAL
Qty: 14 TAB | Refills: 0 | Status: SHIPPED | OUTPATIENT
Start: 2018-01-01 | End: 2018-01-01

## 2018-01-01 RX ORDER — METRONIDAZOLE 500 MG/100ML
500 INJECTION, SOLUTION INTRAVENOUS EVERY 8 HOURS
Status: DISCONTINUED | OUTPATIENT
Start: 2018-01-01 | End: 2018-01-01

## 2018-01-01 RX ORDER — FLUMAZENIL 0.1 MG/ML
0.2 INJECTION INTRAVENOUS
Status: DISCONTINUED | OUTPATIENT
Start: 2018-01-01 | End: 2018-01-01 | Stop reason: HOSPADM

## 2018-01-01 RX ORDER — HYDROCODONE BITARTRATE AND ACETAMINOPHEN 7.5; 325 MG/15ML; MG/15ML
5 SOLUTION ORAL
Status: DISCONTINUED | OUTPATIENT
Start: 2018-01-01 | End: 2018-01-01 | Stop reason: HOSPADM

## 2018-01-01 RX ORDER — INSULIN GLARGINE 100 [IU]/ML
14 INJECTION, SOLUTION SUBCUTANEOUS
Status: DISCONTINUED | OUTPATIENT
Start: 2018-01-01 | End: 2018-01-01 | Stop reason: HOSPADM

## 2018-01-01 RX ORDER — HYDROCODONE BITARTRATE AND ACETAMINOPHEN 5; 325 MG/1; MG/1
1 TABLET ORAL
Status: COMPLETED | OUTPATIENT
Start: 2018-01-01 | End: 2018-01-01

## 2018-01-01 RX ORDER — FAMOTIDINE 20 MG/1
20 TABLET, FILM COATED ORAL DAILY
Status: DISCONTINUED | OUTPATIENT
Start: 2018-01-01 | End: 2018-01-01 | Stop reason: HOSPADM

## 2018-01-01 RX ORDER — SYRINGE AND NEEDLE,INSULIN,1ML 31GX15/64"
SYRINGE, EMPTY DISPOSABLE MISCELLANEOUS
Qty: 100 PEN NEEDLE | Refills: 6 | Status: ON HOLD | OUTPATIENT
Start: 2018-01-01 | End: 2018-01-01

## 2018-01-01 RX ORDER — SODIUM CHLORIDE 9 MG/ML
50 INJECTION, SOLUTION INTRAVENOUS
Status: DISCONTINUED | OUTPATIENT
Start: 2018-01-01 | End: 2018-01-01

## 2018-01-01 RX ORDER — INSULIN LISPRO 100 [IU]/ML
7 INJECTION, SOLUTION INTRAVENOUS; SUBCUTANEOUS
Status: DISCONTINUED | OUTPATIENT
Start: 2018-01-01 | End: 2018-01-01

## 2018-01-01 RX ORDER — ALFUZOSIN HYDROCHLORIDE 10 MG/1
10 TABLET, EXTENDED RELEASE ORAL DAILY
Status: ON HOLD | COMMUNITY
Start: 2018-01-01 | End: 2018-01-01 | Stop reason: DRUGHIGH

## 2018-01-01 RX ORDER — METOCLOPRAMIDE 10 MG/1
10 TABLET ORAL
Status: DISCONTINUED | OUTPATIENT
Start: 2018-01-01 | End: 2018-01-01

## 2018-01-01 RX ORDER — FACIAL-BODY WIPES
10 EACH TOPICAL ONCE
Status: COMPLETED | OUTPATIENT
Start: 2018-01-01 | End: 2018-01-01

## 2018-01-01 RX ORDER — METRONIDAZOLE 500 MG/1
500 TABLET ORAL 3 TIMES DAILY
Qty: 21 TAB | Refills: 0 | Status: SHIPPED | OUTPATIENT
Start: 2018-01-01 | End: 2018-01-01

## 2018-01-01 RX ORDER — POLYETHYLENE GLYCOL 3350 17 G/17G
17 POWDER, FOR SOLUTION ORAL DAILY
Status: DISCONTINUED | OUTPATIENT
Start: 2018-01-01 | End: 2018-01-01 | Stop reason: HOSPADM

## 2018-01-01 RX ORDER — METOCLOPRAMIDE 10 MG/1
10 TABLET ORAL
Qty: 20 TAB | Refills: 0 | Status: SHIPPED | OUTPATIENT
Start: 2018-01-01 | End: 2018-01-01

## 2018-01-01 RX ORDER — DEXTROMETHORPHAN/PSEUDOEPHED 2.5-7.5/.8
1.2 DROPS ORAL
Status: DISCONTINUED | OUTPATIENT
Start: 2018-01-01 | End: 2018-01-01 | Stop reason: HOSPADM

## 2018-01-01 RX ORDER — OXYCODONE AND ACETAMINOPHEN 5; 325 MG/1; MG/1
1 TABLET ORAL
Status: COMPLETED | OUTPATIENT
Start: 2018-01-01 | End: 2018-01-01

## 2018-01-01 RX ORDER — ENOXAPARIN SODIUM 100 MG/ML
30 INJECTION SUBCUTANEOUS EVERY 24 HOURS
Status: DISCONTINUED | OUTPATIENT
Start: 2018-01-01 | End: 2018-01-01 | Stop reason: HOSPADM

## 2018-01-01 RX ORDER — SIMETHICONE 80 MG
160 TABLET,CHEWABLE ORAL
Status: DISCONTINUED | OUTPATIENT
Start: 2018-01-01 | End: 2018-01-01

## 2018-01-01 RX ORDER — INSULIN LISPRO 100 [IU]/ML
INJECTION, SOLUTION INTRAVENOUS; SUBCUTANEOUS
Qty: 15 PEN | Refills: 3 | Status: ON HOLD | OUTPATIENT
Start: 2018-01-01 | End: 2018-01-01

## 2018-01-01 RX ORDER — MIDODRINE HYDROCHLORIDE 10 MG/1
10 TABLET ORAL 3 TIMES DAILY
Status: ON HOLD | COMMUNITY
End: 2018-01-01

## 2018-01-01 RX ORDER — GABAPENTIN 250 MG/5ML
1000 SOLUTION ORAL EVERY 8 HOURS
Status: DISCONTINUED | OUTPATIENT
Start: 2018-01-01 | End: 2018-01-01

## 2018-01-01 RX ORDER — FAMOTIDINE 20 MG/1
20 TABLET, FILM COATED ORAL 2 TIMES DAILY
Status: DISCONTINUED | OUTPATIENT
Start: 2018-01-01 | End: 2018-01-01 | Stop reason: HOSPADM

## 2018-01-01 RX ORDER — ALBUTEROL SULFATE 90 UG/1
AEROSOL, METERED RESPIRATORY (INHALATION) 2 TIMES DAILY
COMMUNITY
End: 2018-01-01

## 2018-01-01 RX ORDER — ASPIRIN 81 MG/1
81 TABLET ORAL DAILY
COMMUNITY
End: 2019-01-01

## 2018-01-01 RX ORDER — FUROSEMIDE 40 MG/1
40 TABLET ORAL DAILY
Qty: 40 TAB | Refills: 6 | Status: SHIPPED | OUTPATIENT
Start: 2018-01-01 | End: 2018-01-01

## 2018-01-01 RX ORDER — FUROSEMIDE 10 MG/ML
40 INJECTION INTRAMUSCULAR; INTRAVENOUS DAILY
Status: DISCONTINUED | OUTPATIENT
Start: 2018-01-01 | End: 2018-01-01

## 2018-01-01 RX ORDER — FUROSEMIDE 20 MG/1
20 TABLET ORAL
Status: DISCONTINUED | OUTPATIENT
Start: 2018-01-01 | End: 2018-01-01

## 2018-01-01 RX ORDER — OXYCODONE HCL 20 MG/ML
10 CONCENTRATE, ORAL ORAL
Status: COMPLETED | OUTPATIENT
Start: 2018-01-01 | End: 2018-01-01

## 2018-01-01 RX ORDER — DOCUSATE SODIUM 100 MG/1
100 CAPSULE, LIQUID FILLED ORAL 2 TIMES DAILY
Status: DISCONTINUED | OUTPATIENT
Start: 2018-01-01 | End: 2018-01-01 | Stop reason: HOSPADM

## 2018-01-01 RX ORDER — METHYLPREDNISOLONE 4 MG/1
TABLET ORAL
COMMUNITY
End: 2018-01-01

## 2018-01-01 RX ORDER — MAGNESIUM SULFATE 100 %
CRYSTALS MISCELLANEOUS
Status: DISPENSED
Start: 2018-01-01 | End: 2018-01-01

## 2018-01-01 RX ORDER — ONDANSETRON 2 MG/ML
4 INJECTION INTRAMUSCULAR; INTRAVENOUS
Status: COMPLETED | OUTPATIENT
Start: 2018-01-01 | End: 2018-01-01

## 2018-01-01 RX ORDER — GUAIFENESIN 100 MG/5ML
50 SOLUTION ORAL
Status: DISCONTINUED | OUTPATIENT
Start: 2018-01-01 | End: 2018-01-01 | Stop reason: HOSPADM

## 2018-01-01 RX ORDER — FUROSEMIDE 20 MG/1
20 TABLET ORAL DAILY
Status: DISCONTINUED | OUTPATIENT
Start: 2018-01-01 | End: 2018-01-01 | Stop reason: HOSPADM

## 2018-01-01 RX ORDER — INSULIN GLARGINE 100 [IU]/ML
16 INJECTION, SOLUTION SUBCUTANEOUS DAILY
Status: DISCONTINUED | OUTPATIENT
Start: 2018-01-01 | End: 2018-01-01 | Stop reason: HOSPADM

## 2018-01-01 RX ORDER — METRONIDAZOLE 500 MG/100ML
500 INJECTION, SOLUTION INTRAVENOUS EVERY 6 HOURS
Status: DISCONTINUED | OUTPATIENT
Start: 2018-01-01 | End: 2018-01-01

## 2018-01-01 RX ORDER — INSULIN LISPRO 100 [IU]/ML
INJECTION, SOLUTION INTRAVENOUS; SUBCUTANEOUS
Qty: 5 PEN | Refills: 7 | Status: SHIPPED | OUTPATIENT
Start: 2018-01-01 | End: 2018-01-01 | Stop reason: SDUPTHER

## 2018-01-01 RX ORDER — FUROSEMIDE 40 MG/1
40 TABLET ORAL DAILY
Status: ON HOLD | COMMUNITY
End: 2018-01-01

## 2018-01-01 RX ORDER — FAMOTIDINE 20 MG/1
20 TABLET, FILM COATED ORAL 3 TIMES DAILY
COMMUNITY
End: 2019-01-01

## 2018-01-01 RX ORDER — PRIMIDONE 50 MG/1
TABLET ORAL
Qty: 90 TAB | Refills: 3 | Status: SHIPPED | OUTPATIENT
Start: 2018-01-01 | End: 2018-01-01

## 2018-01-01 RX ORDER — FUROSEMIDE 20 MG/1
20 TABLET ORAL DAILY
Status: ON HOLD | COMMUNITY
Start: 2017-08-03 | End: 2018-01-01

## 2018-01-01 RX ORDER — GABAPENTIN 250 MG/5ML
750 SOLUTION ORAL 3 TIMES DAILY
Status: DISCONTINUED | OUTPATIENT
Start: 2018-01-01 | End: 2018-01-01

## 2018-01-01 RX ORDER — ALBUTEROL SULFATE 0.83 MG/ML
2.5 SOLUTION RESPIRATORY (INHALATION) 2 TIMES DAILY
COMMUNITY
End: 2019-01-01

## 2018-01-01 RX ORDER — NYSTATIN 100000 [USP'U]/ML
500000 SUSPENSION ORAL 4 TIMES DAILY
Status: DISCONTINUED | OUTPATIENT
Start: 2018-01-01 | End: 2018-01-01 | Stop reason: HOSPADM

## 2018-01-01 RX ORDER — BACITRACIN 500 UNIT/G
PACKET (EA) TOPICAL
Status: DISPENSED
Start: 2018-01-01 | End: 2018-01-01

## 2018-01-01 RX ORDER — MORPHINE SULFATE 2 MG/ML
INJECTION, SOLUTION INTRAMUSCULAR; INTRAVENOUS
Status: DISCONTINUED
Start: 2018-01-01 | End: 2018-01-01 | Stop reason: HOSPADM

## 2018-01-01 RX ORDER — TAMSULOSIN HYDROCHLORIDE 0.4 MG/1
0.4 CAPSULE ORAL AS NEEDED
COMMUNITY
Start: 2018-01-01 | End: 2018-01-01

## 2018-01-01 RX ORDER — INSULIN GLARGINE 100 [IU]/ML
7 INJECTION, SOLUTION SUBCUTANEOUS ONCE
Status: COMPLETED | OUTPATIENT
Start: 2018-01-01 | End: 2018-01-01

## 2018-01-01 RX ORDER — MECLIZINE HCL 12.5 MG 12.5 MG/1
25 TABLET ORAL
Status: COMPLETED | OUTPATIENT
Start: 2018-01-01 | End: 2018-01-01

## 2018-01-01 RX ORDER — INSULIN PUMP SYRINGE, 3 ML
EACH MISCELLANEOUS
Qty: 1 KIT | Refills: 0 | Status: ON HOLD | OUTPATIENT
Start: 2018-01-01 | End: 2018-01-01

## 2018-01-01 RX ORDER — FUROSEMIDE 10 MG/ML
20 INJECTION INTRAMUSCULAR; INTRAVENOUS ONCE
Status: DISCONTINUED | OUTPATIENT
Start: 2018-01-01 | End: 2018-01-01

## 2018-01-01 RX ORDER — ENOXAPARIN SODIUM 100 MG/ML
40 INJECTION SUBCUTANEOUS EVERY 24 HOURS
Status: CANCELLED | OUTPATIENT
Start: 2018-01-01

## 2018-01-01 RX ORDER — FINASTERIDE 5 MG/1
5 TABLET, FILM COATED ORAL DAILY
Status: ON HOLD | COMMUNITY
End: 2018-01-01

## 2018-01-01 RX ORDER — ACETAMINOPHEN 325 MG/1
650 TABLET ORAL
Status: DISCONTINUED | OUTPATIENT
Start: 2018-01-01 | End: 2018-01-01 | Stop reason: HOSPADM

## 2018-01-01 RX ORDER — FAMOTIDINE 20 MG/1
20 TABLET, FILM COATED ORAL
Status: DISCONTINUED | OUTPATIENT
Start: 2018-01-01 | End: 2018-01-01 | Stop reason: HOSPADM

## 2018-01-01 RX ORDER — FAMOTIDINE 20 MG/1
20 TABLET, FILM COATED ORAL DAILY
Status: DISCONTINUED | OUTPATIENT
Start: 2018-01-01 | End: 2018-01-01

## 2018-01-01 RX ORDER — HYDROCODONE BITARTRATE AND ACETAMINOPHEN 10; 325 MG/1; MG/1
1 TABLET ORAL
Status: ON HOLD | COMMUNITY
End: 2018-01-01 | Stop reason: DRUGHIGH

## 2018-01-01 RX ORDER — PRAMIPEXOLE DIHYDROCHLORIDE 0.25 MG/1
0.25 TABLET ORAL 3 TIMES DAILY
Qty: 90 TAB | Refills: 3 | Status: SHIPPED | OUTPATIENT
Start: 2018-01-01 | End: 2018-01-01 | Stop reason: SDUPTHER

## 2018-01-01 RX ORDER — ASPIRIN 325 MG
325 TABLET ORAL DAILY
Status: DISCONTINUED | OUTPATIENT
Start: 2018-01-01 | End: 2018-01-01

## 2018-01-01 RX ORDER — SIMETHICONE 80 MG
160 TABLET,CHEWABLE ORAL 4 TIMES DAILY
Status: DISCONTINUED | OUTPATIENT
Start: 2018-01-01 | End: 2018-01-01 | Stop reason: HOSPADM

## 2018-01-01 RX ORDER — SODIUM CHLORIDE 9 MG/ML
50 INJECTION, SOLUTION INTRAVENOUS CONTINUOUS
Status: DISCONTINUED | OUTPATIENT
Start: 2018-01-01 | End: 2018-01-01

## 2018-01-01 RX ORDER — ZINC SULFATE 50(220)MG
220 CAPSULE ORAL DAILY
Status: DISCONTINUED | OUTPATIENT
Start: 2018-01-01 | End: 2018-01-01 | Stop reason: HOSPADM

## 2018-01-01 RX ORDER — PRIMIDONE 50 MG/1
100 TABLET ORAL
Status: DISCONTINUED | OUTPATIENT
Start: 2018-01-01 | End: 2018-01-01

## 2018-01-01 RX ORDER — ALBUTEROL SULFATE 0.83 MG/ML
2.5 SOLUTION RESPIRATORY (INHALATION)
Status: DISCONTINUED | OUTPATIENT
Start: 2018-01-01 | End: 2018-01-01 | Stop reason: HOSPADM

## 2018-01-01 RX ORDER — OXYCODONE AND ACETAMINOPHEN 5; 325 MG/1; MG/1
1 TABLET ORAL
Qty: 10 TAB | Refills: 0 | Status: SHIPPED | OUTPATIENT
Start: 2018-01-01 | End: 2018-01-01

## 2018-01-01 RX ORDER — PRAMIPEXOLE DIHYDROCHLORIDE 0.25 MG/1
0.25 TABLET ORAL 3 TIMES DAILY
Qty: 90 TAB | Refills: 3 | Status: ON HOLD | OUTPATIENT
Start: 2018-01-01 | End: 2018-01-01

## 2018-01-01 RX ORDER — BACLOFEN 10 MG/1
10 TABLET ORAL
Status: COMPLETED | OUTPATIENT
Start: 2018-01-01 | End: 2018-01-01

## 2018-01-01 RX ORDER — LIDOCAINE 4 G/100G
PATCH TOPICAL
Qty: 1 PACKAGE | Refills: 0 | Status: ON HOLD | OUTPATIENT
Start: 2018-01-01 | End: 2018-01-01

## 2018-01-01 RX ORDER — GABAPENTIN 250 MG/5ML
250 SOLUTION ORAL 2 TIMES DAILY
Status: DISCONTINUED | OUTPATIENT
Start: 2018-01-01 | End: 2018-01-01 | Stop reason: HOSPADM

## 2018-01-01 RX ORDER — BACITRACIN ZINC 500 UNIT/G
OINTMENT (GRAM) TOPICAL 3 TIMES DAILY
Status: DISCONTINUED | OUTPATIENT
Start: 2018-01-01 | End: 2018-01-01 | Stop reason: HOSPADM

## 2018-01-01 RX ORDER — MORPHINE SULFATE 2 MG/ML
4 INJECTION, SOLUTION INTRAMUSCULAR; INTRAVENOUS
Status: COMPLETED | OUTPATIENT
Start: 2018-01-01 | End: 2018-01-01

## 2018-01-01 RX ORDER — GUAIFENESIN 100 MG/5ML
300 SOLUTION ORAL 3 TIMES DAILY
Status: DISCONTINUED | OUTPATIENT
Start: 2018-01-01 | End: 2018-01-01

## 2018-01-01 RX ORDER — POTASSIUM CHLORIDE 750 MG/1
10 TABLET, FILM COATED, EXTENDED RELEASE ORAL
Status: COMPLETED | OUTPATIENT
Start: 2018-01-01 | End: 2018-01-01

## 2018-01-01 RX ORDER — NALOXONE HYDROCHLORIDE 0.4 MG/ML
0.4 INJECTION, SOLUTION INTRAMUSCULAR; INTRAVENOUS; SUBCUTANEOUS
Status: DISCONTINUED | OUTPATIENT
Start: 2018-01-01 | End: 2018-01-01 | Stop reason: HOSPADM

## 2018-01-01 RX ORDER — FUROSEMIDE 10 MG/ML
20 INJECTION INTRAMUSCULAR; INTRAVENOUS
Status: COMPLETED | OUTPATIENT
Start: 2018-01-01 | End: 2018-01-01

## 2018-01-01 RX ORDER — ACETAMINOPHEN 500 MG
1000 TABLET ORAL
COMMUNITY
End: 2018-01-01

## 2018-01-01 RX ORDER — MECLIZINE HCL 12.5 MG 12.5 MG/1
12.5 TABLET ORAL
Status: DISCONTINUED | OUTPATIENT
Start: 2018-01-01 | End: 2018-01-01

## 2018-01-01 RX ORDER — FLUTICASONE PROPIONATE 50 MCG
1 SPRAY, SUSPENSION (ML) NASAL
COMMUNITY
End: 2019-01-01

## 2018-01-01 RX ORDER — MORPHINE SULFATE 2 MG/ML
2 INJECTION, SOLUTION INTRAMUSCULAR; INTRAVENOUS
Status: COMPLETED | OUTPATIENT
Start: 2018-01-01 | End: 2018-01-01

## 2018-01-01 RX ORDER — FUROSEMIDE 40 MG/1
TABLET ORAL
COMMUNITY
Start: 2018-01-01 | End: 2018-01-01

## 2018-01-01 RX ORDER — KETOROLAC TROMETHAMINE 30 MG/ML
30 INJECTION, SOLUTION INTRAMUSCULAR; INTRAVENOUS
Status: COMPLETED | OUTPATIENT
Start: 2018-01-01 | End: 2018-01-01

## 2018-01-01 RX ORDER — MIDAZOLAM HYDROCHLORIDE 1 MG/ML
.25-5 INJECTION, SOLUTION INTRAMUSCULAR; INTRAVENOUS
Status: DISCONTINUED | OUTPATIENT
Start: 2018-01-01 | End: 2018-01-01 | Stop reason: HOSPADM

## 2018-01-01 RX ADMIN — ACETAMINOPHEN 500 MG: 650 SOLUTION ORAL at 05:12

## 2018-01-01 RX ADMIN — ASPIRIN 81 MG 81 MG: 81 TABLET ORAL at 09:15

## 2018-01-01 RX ADMIN — HEPARIN SODIUM 5000 UNITS: 5000 INJECTION INTRAVENOUS; SUBCUTANEOUS at 14:46

## 2018-01-01 RX ADMIN — CEFTRIAXONE SODIUM 2 G: 2 INJECTION, POWDER, FOR SOLUTION INTRAMUSCULAR; INTRAVENOUS at 12:52

## 2018-01-01 RX ADMIN — ONDANSETRON 4 MG: 2 INJECTION, SOLUTION INTRAMUSCULAR; INTRAVENOUS at 18:22

## 2018-01-01 RX ADMIN — HYDROCODONE BITARTRATE AND ACETAMINOPHEN 5 MG: 325; 7.5 SYRUP ORAL at 20:51

## 2018-01-01 RX ADMIN — FLUTICASONE PROPIONATE 2 SPRAY: 50 SPRAY, METERED NASAL at 08:15

## 2018-01-01 RX ADMIN — POLYETHYLENE GLYCOL 3350 17 G: 17 POWDER, FOR SOLUTION ORAL at 18:42

## 2018-01-01 RX ADMIN — FLUTICASONE PROPIONATE 1 SPRAY: 50 SPRAY, METERED NASAL at 17:13

## 2018-01-01 RX ADMIN — FAMOTIDINE 20 MG: 20 TABLET ORAL at 09:55

## 2018-01-01 RX ADMIN — INSULIN GLARGINE 14 UNITS: 100 INJECTION, SOLUTION SUBCUTANEOUS at 08:44

## 2018-01-01 RX ADMIN — GABAPENTIN 750 MG: 250 SOLUTION ORAL at 23:09

## 2018-01-01 RX ADMIN — GUAIFENESIN 300 MG: 200 SOLUTION ORAL at 13:38

## 2018-01-01 RX ADMIN — GUAIFENESIN 100 MG: 200 SOLUTION ORAL at 17:09

## 2018-01-01 RX ADMIN — HYDROCODONE BITARTRATE AND ACETAMINOPHEN 1 TABLET: 5; 325 TABLET ORAL at 06:45

## 2018-01-01 RX ADMIN — HYDROCODONE BITARTRATE AND ACETAMINOPHEN 5 MG: 7.5; 325 SOLUTION ORAL at 00:57

## 2018-01-01 RX ADMIN — BISACODYL 10 MG: 10 SUPPOSITORY RECTAL at 10:27

## 2018-01-01 RX ADMIN — INSULIN GLARGINE 14 UNITS: 100 INJECTION, SOLUTION SUBCUTANEOUS at 21:16

## 2018-01-01 RX ADMIN — FAMOTIDINE 20 MG: 20 TABLET ORAL at 08:34

## 2018-01-01 RX ADMIN — HUMAN INSULIN 12 UNITS: 100 INJECTION, SOLUTION SUBCUTANEOUS at 13:01

## 2018-01-01 RX ADMIN — INSULIN LISPRO 5 UNITS: 100 INJECTION, SOLUTION INTRAVENOUS; SUBCUTANEOUS at 11:51

## 2018-01-01 RX ADMIN — METRONIDAZOLE 500 MG: 500 INJECTION, SOLUTION INTRAVENOUS at 00:51

## 2018-01-01 RX ADMIN — MIDODRINE HYDROCHLORIDE 10 MG: 5 TABLET ORAL at 10:34

## 2018-01-01 RX ADMIN — HEPARIN SODIUM 5000 UNITS: 5000 INJECTION INTRAVENOUS; SUBCUTANEOUS at 21:25

## 2018-01-01 RX ADMIN — Medication 10 ML: at 21:04

## 2018-01-01 RX ADMIN — INSULIN LISPRO 3 UNITS: 100 INJECTION, SOLUTION INTRAVENOUS; SUBCUTANEOUS at 15:22

## 2018-01-01 RX ADMIN — ONDANSETRON 4 MG: 2 INJECTION, SOLUTION INTRAMUSCULAR; INTRAVENOUS at 05:14

## 2018-01-01 RX ADMIN — PRIMIDONE 25 MG: 50 TABLET ORAL at 22:37

## 2018-01-01 RX ADMIN — INSULIN LISPRO 12 UNITS: 100 INJECTION, SOLUTION INTRAVENOUS; SUBCUTANEOUS at 16:23

## 2018-01-01 RX ADMIN — FINASTERIDE 5 MG: 5 TABLET, FILM COATED ORAL at 08:55

## 2018-01-01 RX ADMIN — HUMAN INSULIN 5 UNITS: 100 INJECTION, SOLUTION SUBCUTANEOUS at 22:44

## 2018-01-01 RX ADMIN — TAMSULOSIN HYDROCHLORIDE 0.4 MG: 0.4 CAPSULE ORAL at 08:53

## 2018-01-01 RX ADMIN — MECLIZINE 25 MG: 12.5 TABLET ORAL at 18:52

## 2018-01-01 RX ADMIN — SIMETHICONE 160 MG: 80 TABLET, CHEWABLE ORAL at 17:08

## 2018-01-01 RX ADMIN — NYSTATIN 500000 UNITS: 100000 SUSPENSION ORAL at 17:43

## 2018-01-01 RX ADMIN — PRAMIPEXOLE DIHYDROCHLORIDE 0.25 MG: 0.25 TABLET ORAL at 09:55

## 2018-01-01 RX ADMIN — HUMAN INSULIN 14 UNITS: 100 INJECTION, SOLUTION SUBCUTANEOUS at 09:21

## 2018-01-01 RX ADMIN — INSULIN HUMAN 5 UNITS: 100 INJECTION, SOLUTION PARENTERAL at 21:28

## 2018-01-01 RX ADMIN — GUAIFENESIN 100 MG: 200 SOLUTION ORAL at 21:45

## 2018-01-01 RX ADMIN — HEPARIN SODIUM 5000 UNITS: 5000 INJECTION INTRAVENOUS; SUBCUTANEOUS at 21:53

## 2018-01-01 RX ADMIN — PRAMIPEXOLE DIHYDROCHLORIDE 0.25 MG: 0.25 TABLET ORAL at 09:22

## 2018-01-01 RX ADMIN — Medication 10 ML: at 05:14

## 2018-01-01 RX ADMIN — FLUTICASONE PROPIONATE 2 SPRAY: 50 SPRAY, METERED NASAL at 09:24

## 2018-01-01 RX ADMIN — ATORVASTATIN CALCIUM 40 MG: 40 TABLET, FILM COATED ORAL at 09:19

## 2018-01-01 RX ADMIN — IOPAMIDOL 100 ML: 755 INJECTION, SOLUTION INTRAVENOUS at 10:47

## 2018-01-01 RX ADMIN — IPRATROPIUM BROMIDE AND ALBUTEROL SULFATE 3 ML: .5; 3 SOLUTION RESPIRATORY (INHALATION) at 01:42

## 2018-01-01 RX ADMIN — ALBUTEROL SULFATE 2.5 MG: 2.5 SOLUTION RESPIRATORY (INHALATION) at 07:40

## 2018-01-01 RX ADMIN — CYANOCOBALAMIN TAB 500 MCG 1000 MCG: 500 TAB at 09:00

## 2018-01-01 RX ADMIN — HEPARIN SODIUM 5000 UNITS: 5000 INJECTION INTRAVENOUS; SUBCUTANEOUS at 21:31

## 2018-01-01 RX ADMIN — NYSTATIN 500000 UNITS: 100000 SUSPENSION ORAL at 14:32

## 2018-01-01 RX ADMIN — FAMOTIDINE 20 MG: 20 TABLET ORAL at 17:10

## 2018-01-01 RX ADMIN — FAMOTIDINE 20 MG: 20 TABLET ORAL at 08:40

## 2018-01-01 RX ADMIN — INSULIN LISPRO 5 UNITS: 100 INJECTION, SOLUTION INTRAVENOUS; SUBCUTANEOUS at 11:44

## 2018-01-01 RX ADMIN — MIDODRINE HYDROCHLORIDE 30 MG: 5 TABLET ORAL at 17:03

## 2018-01-01 RX ADMIN — MIDODRINE HYDROCHLORIDE 10 MG: 5 TABLET ORAL at 14:31

## 2018-01-01 RX ADMIN — GABAPENTIN 750 MG: 250 SOLUTION ORAL at 18:00

## 2018-01-01 RX ADMIN — CEFTRIAXONE SODIUM 2 G: 2 INJECTION, POWDER, FOR SOLUTION INTRAMUSCULAR; INTRAVENOUS at 12:49

## 2018-01-01 RX ADMIN — NYSTATIN 500000 UNITS: 100000 SUSPENSION ORAL at 22:41

## 2018-01-01 RX ADMIN — INSULIN LISPRO 4 UNITS: 100 INJECTION, SOLUTION INTRAVENOUS; SUBCUTANEOUS at 02:02

## 2018-01-01 RX ADMIN — FAMOTIDINE 20 MG: 20 TABLET ORAL at 21:06

## 2018-01-01 RX ADMIN — PRAMIPEXOLE DIHYDROCHLORIDE 0.25 MG: 0.25 TABLET ORAL at 16:05

## 2018-01-01 RX ADMIN — IPRATROPIUM BROMIDE AND ALBUTEROL SULFATE 3 ML: .5; 3 SOLUTION RESPIRATORY (INHALATION) at 13:15

## 2018-01-01 RX ADMIN — ASPIRIN 325 MG: 325 TABLET ORAL at 08:05

## 2018-01-01 RX ADMIN — Medication 10 ML: at 05:30

## 2018-01-01 RX ADMIN — GABAPENTIN 750 MG: 250 SOLUTION ORAL at 23:18

## 2018-01-01 RX ADMIN — SIMETHICONE 160 MG: 80 TABLET, CHEWABLE ORAL at 17:52

## 2018-01-01 RX ADMIN — HYDROCODONE BITARTRATE AND ACETAMINOPHEN 5 MG: 7.5; 325 SOLUTION ORAL at 22:06

## 2018-01-01 RX ADMIN — HUMAN INSULIN 5 UNITS: 100 INJECTION, SOLUTION SUBCUTANEOUS at 21:03

## 2018-01-01 RX ADMIN — NYSTATIN 500000 UNITS: 100000 SUSPENSION ORAL at 12:42

## 2018-01-01 RX ADMIN — CEFTRIAXONE SODIUM 2 G: 2 INJECTION, POWDER, FOR SOLUTION INTRAMUSCULAR; INTRAVENOUS at 11:49

## 2018-01-01 RX ADMIN — BACITRACIN ZINC: 500 OINTMENT TOPICAL at 00:38

## 2018-01-01 RX ADMIN — HEPARIN SODIUM 5000 UNITS: 5000 INJECTION, SOLUTION INTRAVENOUS; SUBCUTANEOUS at 21:28

## 2018-01-01 RX ADMIN — FAMOTIDINE 20 MG: 20 TABLET ORAL at 08:54

## 2018-01-01 RX ADMIN — GUAIFENESIN 100 MG: 200 SOLUTION ORAL at 22:37

## 2018-01-01 RX ADMIN — BACITRACIN ZINC: 500 OINTMENT TOPICAL at 17:52

## 2018-01-01 RX ADMIN — INSULIN GLARGINE 14 UNITS: 100 INJECTION, SOLUTION SUBCUTANEOUS at 08:33

## 2018-01-01 RX ADMIN — FUROSEMIDE 40 MG: 10 INJECTION, SOLUTION INTRAMUSCULAR; INTRAVENOUS at 09:03

## 2018-01-01 RX ADMIN — FUROSEMIDE 40 MG: 10 INJECTION, SOLUTION INTRAMUSCULAR; INTRAVENOUS at 21:56

## 2018-01-01 RX ADMIN — GABAPENTIN 750 MG: 250 SOLUTION ORAL at 22:23

## 2018-01-01 RX ADMIN — GABAPENTIN 250 MG: 250 SUSPENSION ORAL at 21:32

## 2018-01-01 RX ADMIN — GABAPENTIN 1000 MG: 250 SOLUTION ORAL at 23:09

## 2018-01-01 RX ADMIN — INSULIN LISPRO 2 UNITS: 100 INJECTION, SOLUTION INTRAVENOUS; SUBCUTANEOUS at 18:32

## 2018-01-01 RX ADMIN — CEFTRIAXONE 1 G: 1 INJECTION, POWDER, FOR SOLUTION INTRAMUSCULAR; INTRAVENOUS at 13:40

## 2018-01-01 RX ADMIN — INSULIN HUMAN 14 UNITS: 100 INJECTION, SOLUTION PARENTERAL at 09:14

## 2018-01-01 RX ADMIN — Medication 10 ML: at 14:00

## 2018-01-01 RX ADMIN — PHENYLEPHRINE HYDROCHLORIDE 30 MCG/MIN: 10 INJECTION INTRAVENOUS at 13:57

## 2018-01-01 RX ADMIN — ASPIRIN 81 MG 81 MG: 81 TABLET ORAL at 08:55

## 2018-01-01 RX ADMIN — FAMOTIDINE 20 MG: 20 TABLET ORAL at 08:07

## 2018-01-01 RX ADMIN — HUMAN INSULIN 12 UNITS: 100 INJECTION, SOLUTION SUBCUTANEOUS at 13:59

## 2018-01-01 RX ADMIN — FAMOTIDINE 20 MG: 20 TABLET ORAL at 09:38

## 2018-01-01 RX ADMIN — PRIMIDONE 100 MG: 50 TABLET ORAL at 07:58

## 2018-01-01 RX ADMIN — SODIUM CHLORIDE 500 ML: 900 INJECTION, SOLUTION INTRAVENOUS at 19:44

## 2018-01-01 RX ADMIN — ASPIRIN 81 MG 81 MG: 81 TABLET ORAL at 08:53

## 2018-01-01 RX ADMIN — GABAPENTIN 1000 MG: 250 SOLUTION ORAL at 09:37

## 2018-01-01 RX ADMIN — HEPARIN SODIUM 5000 UNITS: 5000 INJECTION INTRAVENOUS; SUBCUTANEOUS at 21:44

## 2018-01-01 RX ADMIN — FUROSEMIDE 40 MG: 10 INJECTION, SOLUTION INTRAMUSCULAR; INTRAVENOUS at 16:10

## 2018-01-01 RX ADMIN — Medication 10 ML: at 15:09

## 2018-01-01 RX ADMIN — CLOPIDOGREL BISULFATE 75 MG: 75 TABLET ORAL at 09:38

## 2018-01-01 RX ADMIN — Medication 10 ML: at 12:34

## 2018-01-01 RX ADMIN — MIDODRINE HYDROCHLORIDE 10 MG: 5 TABLET ORAL at 17:13

## 2018-01-01 RX ADMIN — INSULIN LISPRO 2 UNITS: 100 INJECTION, SOLUTION INTRAVENOUS; SUBCUTANEOUS at 07:31

## 2018-01-01 RX ADMIN — Medication 1 CAPSULE: at 08:11

## 2018-01-01 RX ADMIN — MIDODRINE HYDROCHLORIDE 10 MG: 5 TABLET ORAL at 17:31

## 2018-01-01 RX ADMIN — PRAMIPEXOLE DIHYDROCHLORIDE 0.25 MG: 0.25 TABLET ORAL at 08:29

## 2018-01-01 RX ADMIN — INSULIN LISPRO 8 UNITS: 100 INJECTION, SOLUTION INTRAVENOUS; SUBCUTANEOUS at 09:37

## 2018-01-01 RX ADMIN — HYDROCODONE BITARTRATE AND ACETAMINOPHEN 1 TABLET: 10; 325 TABLET ORAL at 21:02

## 2018-01-01 RX ADMIN — ATORVASTATIN CALCIUM 40 MG: 40 TABLET, FILM COATED ORAL at 09:07

## 2018-01-01 RX ADMIN — PRAMIPEXOLE DIHYDROCHLORIDE 0.25 MG: 0.25 TABLET ORAL at 09:17

## 2018-01-01 RX ADMIN — Medication 10 ML: at 06:12

## 2018-01-01 RX ADMIN — MIDODRINE HYDROCHLORIDE 10 MG: 5 TABLET ORAL at 17:07

## 2018-01-01 RX ADMIN — ATORVASTATIN CALCIUM 40 MG: 40 TABLET, FILM COATED ORAL at 08:34

## 2018-01-01 RX ADMIN — GABAPENTIN 750 MG: 250 SOLUTION ORAL at 17:13

## 2018-01-01 RX ADMIN — Medication 10 ML: at 15:22

## 2018-01-01 RX ADMIN — BACITRACIN ZINC: 500 OINTMENT TOPICAL at 09:16

## 2018-01-01 RX ADMIN — FAMOTIDINE 20 MG: 20 TABLET ORAL at 08:19

## 2018-01-01 RX ADMIN — HYDROCODONE BITARTRATE AND ACETAMINOPHEN 5 MG: 7.5; 325 SOLUTION ORAL at 12:04

## 2018-01-01 RX ADMIN — Medication 1 CAPSULE: at 10:42

## 2018-01-01 RX ADMIN — INSULIN LISPRO 2 UNITS: 100 INJECTION, SOLUTION INTRAVENOUS; SUBCUTANEOUS at 16:30

## 2018-01-01 RX ADMIN — FLUTICASONE PROPIONATE 2 SPRAY: 50 SPRAY, METERED NASAL at 11:47

## 2018-01-01 RX ADMIN — FINASTERIDE 5 MG: 5 TABLET, FILM COATED ORAL at 21:12

## 2018-01-01 RX ADMIN — FLUTICASONE PROPIONATE 2 SPRAY: 50 SPRAY, METERED NASAL at 10:44

## 2018-01-01 RX ADMIN — PIPERACILLIN SODIUM,TAZOBACTAM SODIUM 3.38 G: 3; .375 INJECTION, POWDER, FOR SOLUTION INTRAVENOUS at 04:35

## 2018-01-01 RX ADMIN — NYSTATIN 5 ML: 100000 SUSPENSION ORAL at 06:02

## 2018-01-01 RX ADMIN — FUROSEMIDE 20 MG: 20 TABLET ORAL at 09:16

## 2018-01-01 RX ADMIN — SODIUM CHLORIDE 1000 ML: 900 INJECTION, SOLUTION INTRAVENOUS at 05:13

## 2018-01-01 RX ADMIN — FLUTICASONE PROPIONATE 1 SPRAY: 50 SPRAY, METERED NASAL at 09:37

## 2018-01-01 RX ADMIN — CLOPIDOGREL BISULFATE 75 MG: 75 TABLET ORAL at 09:47

## 2018-01-01 RX ADMIN — GABAPENTIN 750 MG: 250 SOLUTION ORAL at 15:26

## 2018-01-01 RX ADMIN — LEVOFLOXACIN 750 MG: 5 INJECTION, SOLUTION INTRAVENOUS at 10:38

## 2018-01-01 RX ADMIN — NYSTATIN 5 ML: 100000 SUSPENSION ORAL at 06:00

## 2018-01-01 RX ADMIN — HYDROCODONE BITARTRATE AND ACETAMINOPHEN 1 TABLET: 10; 325 TABLET ORAL at 06:10

## 2018-01-01 RX ADMIN — INSULIN LISPRO 2 UNITS: 100 INJECTION, SOLUTION INTRAVENOUS; SUBCUTANEOUS at 12:04

## 2018-01-01 RX ADMIN — MIDODRINE HYDROCHLORIDE 10 MG: 5 TABLET ORAL at 12:19

## 2018-01-01 RX ADMIN — HYDROCODONE BITARTRATE AND ACETAMINOPHEN 1 TABLET: 10; 325 TABLET ORAL at 15:22

## 2018-01-01 RX ADMIN — HYDROCODONE BITARTRATE AND ACETAMINOPHEN 1 TABLET: 5; 325 TABLET ORAL at 02:25

## 2018-01-01 RX ADMIN — Medication 10 ML: at 00:30

## 2018-01-01 RX ADMIN — LIDOCAINE HYDROCHLORIDE 5 ML: 20 SOLUTION ORAL; TOPICAL at 11:07

## 2018-01-01 RX ADMIN — HYDROCODONE BITARTRATE AND ACETAMINOPHEN 1 TABLET: 10; 325 TABLET ORAL at 11:21

## 2018-01-01 RX ADMIN — INSULIN GLARGINE 14 UNITS: 100 INJECTION, SOLUTION SUBCUTANEOUS at 10:41

## 2018-01-01 RX ADMIN — CLOPIDOGREL BISULFATE 75 MG: 75 TABLET ORAL at 10:34

## 2018-01-01 RX ADMIN — MIDODRINE HYDROCHLORIDE 10 MG: 5 TABLET ORAL at 13:50

## 2018-01-01 RX ADMIN — GABAPENTIN 750 MG: 250 SOLUTION ORAL at 21:20

## 2018-01-01 RX ADMIN — FINASTERIDE 5 MG: 5 TABLET, FILM COATED ORAL at 21:28

## 2018-01-01 RX ADMIN — SIMETHICONE 160 MG: 80 TABLET, CHEWABLE ORAL at 21:29

## 2018-01-01 RX ADMIN — INSULIN LISPRO 8 UNITS: 100 INJECTION, SOLUTION INTRAVENOUS; SUBCUTANEOUS at 15:22

## 2018-01-01 RX ADMIN — FAMOTIDINE 20 MG: 20 TABLET ORAL at 09:07

## 2018-01-01 RX ADMIN — INSULIN LISPRO 8 UNITS: 100 INJECTION, SOLUTION INTRAVENOUS; SUBCUTANEOUS at 09:44

## 2018-01-01 RX ADMIN — TAMSULOSIN HYDROCHLORIDE 0.4 MG: 0.4 CAPSULE ORAL at 09:07

## 2018-01-01 RX ADMIN — TAMSULOSIN HYDROCHLORIDE 0.4 MG: 0.4 CAPSULE ORAL at 09:46

## 2018-01-01 RX ADMIN — ONDANSETRON 4 MG: 2 INJECTION, SOLUTION INTRAMUSCULAR; INTRAVENOUS at 12:31

## 2018-01-01 RX ADMIN — ATORVASTATIN CALCIUM 40 MG: 40 TABLET, FILM COATED ORAL at 09:31

## 2018-01-01 RX ADMIN — INSULIN GLARGINE 14 UNITS: 100 INJECTION, SOLUTION SUBCUTANEOUS at 09:45

## 2018-01-01 RX ADMIN — ASPIRIN 325 MG ORAL TABLET 325 MG: 325 PILL ORAL at 09:36

## 2018-01-01 RX ADMIN — FAMOTIDINE 20 MG: 20 TABLET ORAL at 08:20

## 2018-01-01 RX ADMIN — MIDODRINE HYDROCHLORIDE 10 MG: 5 TABLET ORAL at 08:20

## 2018-01-01 RX ADMIN — LIDOCAINE HYDROCHLORIDE 5 ML: 20 SOLUTION ORAL; TOPICAL at 08:46

## 2018-01-01 RX ADMIN — CLOPIDOGREL BISULFATE 75 MG: 75 TABLET ORAL at 10:42

## 2018-01-01 RX ADMIN — CLOPIDOGREL BISULFATE 75 MG: 75 TABLET ORAL at 08:40

## 2018-01-01 RX ADMIN — INSULIN LISPRO 2 UNITS: 100 INJECTION, SOLUTION INTRAVENOUS; SUBCUTANEOUS at 12:50

## 2018-01-01 RX ADMIN — ACETAMINOPHEN 650 MG: 325 SOLUTION ORAL at 21:08

## 2018-01-01 RX ADMIN — PRAMIPEXOLE DIHYDROCHLORIDE 0.25 MG: 0.25 TABLET ORAL at 21:31

## 2018-01-01 RX ADMIN — GABAPENTIN 1000 MG: 250 SOLUTION ORAL at 14:35

## 2018-01-01 RX ADMIN — GUAIFENESIN 300 MG: 200 SOLUTION ORAL at 09:55

## 2018-01-01 RX ADMIN — Medication 10 ML: at 23:09

## 2018-01-01 RX ADMIN — GUAIFENESIN 300 MG: 200 SOLUTION ORAL at 09:20

## 2018-01-01 RX ADMIN — ONDANSETRON 4 MG: 2 INJECTION, SOLUTION INTRAMUSCULAR; INTRAVENOUS at 14:00

## 2018-01-01 RX ADMIN — ATORVASTATIN CALCIUM 40 MG: 40 TABLET, FILM COATED ORAL at 08:30

## 2018-01-01 RX ADMIN — MIDODRINE HYDROCHLORIDE 10 MG: 5 TABLET ORAL at 12:05

## 2018-01-01 RX ADMIN — METRONIDAZOLE 500 MG: 500 INJECTION, SOLUTION INTRAVENOUS at 08:54

## 2018-01-01 RX ADMIN — INSULIN GLARGINE 14 UNITS: 100 INJECTION, SOLUTION SUBCUTANEOUS at 09:10

## 2018-01-01 RX ADMIN — HYDROCODONE BITARTRATE AND ACETAMINOPHEN 1 TABLET: 10; 325 TABLET ORAL at 04:39

## 2018-01-01 RX ADMIN — FINASTERIDE 5 MG: 5 TABLET, FILM COATED ORAL at 21:49

## 2018-01-01 RX ADMIN — PRAMIPEXOLE DIHYDROCHLORIDE 0.25 MG: 0.25 TABLET ORAL at 08:11

## 2018-01-01 RX ADMIN — HYDROCODONE BITARTRATE AND ACETAMINOPHEN 5 MG: 7.5; 325 SOLUTION ORAL at 18:03

## 2018-01-01 RX ADMIN — GABAPENTIN 750 MG: 250 SOLUTION ORAL at 09:29

## 2018-01-01 RX ADMIN — ENOXAPARIN SODIUM 30 MG: 30 INJECTION, SOLUTION INTRAVENOUS; SUBCUTANEOUS at 21:29

## 2018-01-01 RX ADMIN — MIDODRINE HYDROCHLORIDE 10 MG: 5 TABLET ORAL at 13:38

## 2018-01-01 RX ADMIN — POLYETHYLENE GLYCOL 3350 17 G: 17 POWDER, FOR SOLUTION ORAL at 09:23

## 2018-01-01 RX ADMIN — GUAIFENESIN 300 MG: 200 SOLUTION ORAL at 17:59

## 2018-01-01 RX ADMIN — GABAPENTIN 1000 MG: 250 SOLUTION ORAL at 13:36

## 2018-01-01 RX ADMIN — TAMSULOSIN HYDROCHLORIDE 0.4 MG: 0.4 CAPSULE ORAL at 08:35

## 2018-01-01 RX ADMIN — HEPARIN SODIUM 5000 UNITS: 5000 INJECTION, SOLUTION INTRAVENOUS; SUBCUTANEOUS at 15:22

## 2018-01-01 RX ADMIN — INSULIN HUMAN 5 UNITS: 100 INJECTION, SOLUTION PARENTERAL at 21:33

## 2018-01-01 RX ADMIN — GUAIFENESIN 300 MG: 200 SOLUTION ORAL at 09:13

## 2018-01-01 RX ADMIN — SIMETHICONE 160 MG: 80 TABLET, CHEWABLE ORAL at 22:06

## 2018-01-01 RX ADMIN — SODIUM CHLORIDE 50 ML/HR: 900 INJECTION, SOLUTION INTRAVENOUS at 05:07

## 2018-01-01 RX ADMIN — INSULIN LISPRO 2 UNITS: 100 INJECTION, SOLUTION INTRAVENOUS; SUBCUTANEOUS at 14:36

## 2018-01-01 RX ADMIN — ATORVASTATIN CALCIUM 40 MG: 40 TABLET, FILM COATED ORAL at 09:51

## 2018-01-01 RX ADMIN — FUROSEMIDE 20 MG: 10 INJECTION, SOLUTION INTRAMUSCULAR; INTRAVENOUS at 18:53

## 2018-01-01 RX ADMIN — INSULIN LISPRO 3 UNITS: 100 INJECTION, SOLUTION INTRAVENOUS; SUBCUTANEOUS at 22:37

## 2018-01-01 RX ADMIN — GABAPENTIN 750 MG: 250 SOLUTION ORAL at 10:27

## 2018-01-01 RX ADMIN — LIDOCAINE HYDROCHLORIDE 5 ML: 20 SOLUTION ORAL; TOPICAL at 10:36

## 2018-01-01 RX ADMIN — FENTANYL CITRATE 25 MCG: 50 INJECTION, SOLUTION INTRAMUSCULAR; INTRAVENOUS at 03:02

## 2018-01-01 RX ADMIN — GABAPENTIN 750 MG: 250 SOLUTION ORAL at 08:57

## 2018-01-01 RX ADMIN — PRAMIPEXOLE DIHYDROCHLORIDE 0.25 MG: 0.25 TABLET ORAL at 17:21

## 2018-01-01 RX ADMIN — HEPARIN SODIUM 5000 UNITS: 5000 INJECTION, SOLUTION INTRAVENOUS; SUBCUTANEOUS at 14:36

## 2018-01-01 RX ADMIN — HEPARIN SODIUM 5000 UNITS: 5000 INJECTION INTRAVENOUS; SUBCUTANEOUS at 13:00

## 2018-01-01 RX ADMIN — INSULIN LISPRO 2 UNITS: 100 INJECTION, SOLUTION INTRAVENOUS; SUBCUTANEOUS at 20:50

## 2018-01-01 RX ADMIN — INSULIN LISPRO 3 UNITS: 100 INJECTION, SOLUTION INTRAVENOUS; SUBCUTANEOUS at 23:57

## 2018-01-01 RX ADMIN — MIDODRINE HYDROCHLORIDE 10 MG: 5 TABLET ORAL at 14:50

## 2018-01-01 RX ADMIN — GABAPENTIN 750 MG: 250 SOLUTION ORAL at 00:57

## 2018-01-01 RX ADMIN — PRAMIPEXOLE DIHYDROCHLORIDE 0.25 MG: 0.25 TABLET ORAL at 21:08

## 2018-01-01 RX ADMIN — Medication 10 ML: at 21:17

## 2018-01-01 RX ADMIN — Medication 10 ML: at 13:04

## 2018-01-01 RX ADMIN — FINASTERIDE 5 MG: 5 TABLET, FILM COATED ORAL at 21:02

## 2018-01-01 RX ADMIN — FAMOTIDINE 20 MG: 20 TABLET ORAL at 09:44

## 2018-01-01 RX ADMIN — INSULIN LISPRO 3 UNITS: 100 INJECTION, SOLUTION INTRAVENOUS; SUBCUTANEOUS at 21:58

## 2018-01-01 RX ADMIN — MIDODRINE HYDROCHLORIDE 10 MG: 5 TABLET ORAL at 08:11

## 2018-01-01 RX ADMIN — HEPARIN SODIUM 5000 UNITS: 5000 INJECTION INTRAVENOUS; SUBCUTANEOUS at 06:34

## 2018-01-01 RX ADMIN — ASPIRIN 81 MG 81 MG: 81 TABLET ORAL at 08:34

## 2018-01-01 RX ADMIN — HUMAN INSULIN 5 UNITS: 100 INJECTION, SOLUTION SUBCUTANEOUS at 22:20

## 2018-01-01 RX ADMIN — OXYCODONE HYDROCHLORIDE 10 MG: 100 SOLUTION ORAL at 11:58

## 2018-01-01 RX ADMIN — FLUTICASONE PROPIONATE 1 SPRAY: 50 SPRAY, METERED NASAL at 08:21

## 2018-01-01 RX ADMIN — PIPERACILLIN SODIUM,TAZOBACTAM SODIUM 3.38 G: 3; .375 INJECTION, POWDER, FOR SOLUTION INTRAVENOUS at 06:45

## 2018-01-01 RX ADMIN — INSULIN LISPRO 2 UNITS: 100 INJECTION, SOLUTION INTRAVENOUS; SUBCUTANEOUS at 12:00

## 2018-01-01 RX ADMIN — HYDROCODONE BITARTRATE AND ACETAMINOPHEN 1 TABLET: 5; 325 TABLET ORAL at 08:20

## 2018-01-01 RX ADMIN — GABAPENTIN 750 MG: 250 SOLUTION ORAL at 22:38

## 2018-01-01 RX ADMIN — TAMSULOSIN HYDROCHLORIDE 0.4 MG: 0.4 CAPSULE ORAL at 08:06

## 2018-01-01 RX ADMIN — Medication 220 MG: at 10:18

## 2018-01-01 RX ADMIN — Medication 10 ML: at 17:01

## 2018-01-01 RX ADMIN — Medication 10 ML: at 06:00

## 2018-01-01 RX ADMIN — Medication 5 ML: at 23:46

## 2018-01-01 RX ADMIN — MIDODRINE HYDROCHLORIDE 30 MG: 5 TABLET ORAL at 22:23

## 2018-01-01 RX ADMIN — HEPARIN SODIUM 5000 UNITS: 5000 INJECTION INTRAVENOUS; SUBCUTANEOUS at 13:41

## 2018-01-01 RX ADMIN — NYSTATIN 500000 UNITS: 100000 SUSPENSION ORAL at 08:25

## 2018-01-01 RX ADMIN — ATORVASTATIN CALCIUM 40 MG: 40 TABLET, FILM COATED ORAL at 10:42

## 2018-01-01 RX ADMIN — ATORVASTATIN CALCIUM 40 MG: 40 TABLET, FILM COATED ORAL at 08:05

## 2018-01-01 RX ADMIN — MELATONIN TAB 3 MG 3 MG: 3 TAB at 23:08

## 2018-01-01 RX ADMIN — MIDODRINE HYDROCHLORIDE 10 MG: 5 TABLET ORAL at 11:47

## 2018-01-01 RX ADMIN — INSULIN GLARGINE 14 UNITS: 100 INJECTION, SOLUTION SUBCUTANEOUS at 09:16

## 2018-01-01 RX ADMIN — INSULIN LISPRO 2 UNITS: 100 INJECTION, SOLUTION INTRAVENOUS; SUBCUTANEOUS at 09:22

## 2018-01-01 RX ADMIN — GUAIFENESIN 300 MG: 200 SOLUTION ORAL at 21:28

## 2018-01-01 RX ADMIN — HYDROCODONE BITARTRATE AND ACETAMINOPHEN 7.5 MG: 325; 7.5 SYRUP ORAL at 03:00

## 2018-01-01 RX ADMIN — Medication 10 ML: at 12:51

## 2018-01-01 RX ADMIN — Medication 220 MG: at 10:51

## 2018-01-01 RX ADMIN — HYDROCODONE BITARTRATE AND ACETAMINOPHEN 1 TABLET: 10; 325 TABLET ORAL at 22:03

## 2018-01-01 RX ADMIN — GABAPENTIN 250 MG: 250 SUSPENSION ORAL at 21:08

## 2018-01-01 RX ADMIN — CEFTRIAXONE SODIUM 2 G: 2 INJECTION, POWDER, FOR SOLUTION INTRAMUSCULAR; INTRAVENOUS at 12:05

## 2018-01-01 RX ADMIN — NYSTATIN 5 ML: 100000 SUSPENSION ORAL at 12:49

## 2018-01-01 RX ADMIN — FUROSEMIDE 40 MG: 10 INJECTION, SOLUTION INTRAMUSCULAR; INTRAVENOUS at 16:51

## 2018-01-01 RX ADMIN — FAMOTIDINE 20 MG: 20 TABLET, FILM COATED ORAL at 10:34

## 2018-01-01 RX ADMIN — FUROSEMIDE 40 MG: 40 TABLET ORAL at 09:11

## 2018-01-01 RX ADMIN — CLOPIDOGREL BISULFATE 75 MG: 75 TABLET ORAL at 08:34

## 2018-01-01 RX ADMIN — Medication 1 CAPSULE: at 08:35

## 2018-01-01 RX ADMIN — INSULIN LISPRO 3 UNITS: 100 INJECTION, SOLUTION INTRAVENOUS; SUBCUTANEOUS at 05:58

## 2018-01-01 RX ADMIN — PRAMIPEXOLE DIHYDROCHLORIDE 0.25 MG: 0.25 TABLET ORAL at 22:00

## 2018-01-01 RX ADMIN — Medication 10 ML: at 07:32

## 2018-01-01 RX ADMIN — ATORVASTATIN CALCIUM 40 MG: 40 TABLET, FILM COATED ORAL at 08:54

## 2018-01-01 RX ADMIN — INSULIN LISPRO 2 UNITS: 100 INJECTION, SOLUTION INTRAVENOUS; SUBCUTANEOUS at 18:00

## 2018-01-01 RX ADMIN — IPRATROPIUM BROMIDE AND ALBUTEROL SULFATE 3 ML: .5; 3 SOLUTION RESPIRATORY (INHALATION) at 08:08

## 2018-01-01 RX ADMIN — INSULIN LISPRO 2 UNITS: 100 INJECTION, SOLUTION INTRAVENOUS; SUBCUTANEOUS at 12:42

## 2018-01-01 RX ADMIN — CLOPIDOGREL BISULFATE 75 MG: 75 TABLET ORAL at 08:54

## 2018-01-01 RX ADMIN — MIDODRINE HYDROCHLORIDE 10 MG: 5 TABLET ORAL at 22:06

## 2018-01-01 RX ADMIN — HYDROCODONE BITARTRATE AND ACETAMINOPHEN 1 TABLET: 10; 325 TABLET ORAL at 02:40

## 2018-01-01 RX ADMIN — CLOPIDOGREL BISULFATE 75 MG: 75 TABLET ORAL at 09:41

## 2018-01-01 RX ADMIN — FAMOTIDINE 20 MG: 20 TABLET, FILM COATED ORAL at 09:34

## 2018-01-01 RX ADMIN — GABAPENTIN 750 MG: 250 SOLUTION ORAL at 17:40

## 2018-01-01 RX ADMIN — FUROSEMIDE 40 MG: 40 TABLET ORAL at 08:30

## 2018-01-01 RX ADMIN — FUROSEMIDE 20 MG: 10 INJECTION, SOLUTION INTRAMUSCULAR; INTRAVENOUS at 05:40

## 2018-01-01 RX ADMIN — FLUTICASONE PROPIONATE 2 SPRAY: 50 SPRAY, METERED NASAL at 08:58

## 2018-01-01 RX ADMIN — ATORVASTATIN CALCIUM 40 MG: 40 TABLET, FILM COATED ORAL at 09:35

## 2018-01-01 RX ADMIN — METRONIDAZOLE 500 MG: 500 INJECTION, SOLUTION INTRAVENOUS at 21:00

## 2018-01-01 RX ADMIN — HYDROCODONE BITARTRATE AND ACETAMINOPHEN 5 MG: 7.5; 325 SOLUTION ORAL at 21:30

## 2018-01-01 RX ADMIN — HYDROCODONE BITARTRATE AND ACETAMINOPHEN 1 TABLET: 10; 325 TABLET ORAL at 16:51

## 2018-01-01 RX ADMIN — CLOPIDOGREL BISULFATE 75 MG: 75 TABLET ORAL at 09:07

## 2018-01-01 RX ADMIN — ALBUTEROL SULFATE 2.5 MG: 2.5 SOLUTION RESPIRATORY (INHALATION) at 20:42

## 2018-01-01 RX ADMIN — GABAPENTIN 750 MG: 250 SOLUTION ORAL at 17:30

## 2018-01-01 RX ADMIN — INSULIN GLARGINE 14 UNITS: 100 INJECTION, SOLUTION SUBCUTANEOUS at 09:34

## 2018-01-01 RX ADMIN — FAMOTIDINE 20 MG: 20 TABLET ORAL at 08:11

## 2018-01-01 RX ADMIN — FUROSEMIDE 20 MG: 20 TABLET ORAL at 09:54

## 2018-01-01 RX ADMIN — METRONIDAZOLE 500 MG: 500 INJECTION, SOLUTION INTRAVENOUS at 09:29

## 2018-01-01 RX ADMIN — LIDOCAINE HYDROCHLORIDE 5 ML: 20 SOLUTION ORAL; TOPICAL at 18:43

## 2018-01-01 RX ADMIN — HYDROCODONE BITARTRATE AND ACETAMINOPHEN 1 TABLET: 10; 325 TABLET ORAL at 20:33

## 2018-01-01 RX ADMIN — PRIMIDONE 25 MG: 50 TABLET ORAL at 21:36

## 2018-01-01 RX ADMIN — CYANOCOBALAMIN TAB 500 MCG 1000 MCG: 500 TAB at 08:20

## 2018-01-01 RX ADMIN — ALBUTEROL SULFATE 2.5 MG: 2.5 SOLUTION RESPIRATORY (INHALATION) at 08:15

## 2018-01-01 RX ADMIN — CLOPIDOGREL BISULFATE 75 MG: 75 TABLET ORAL at 08:53

## 2018-01-01 RX ADMIN — PRAMIPEXOLE DIHYDROCHLORIDE 0.25 MG: 0.25 TABLET ORAL at 17:13

## 2018-01-01 RX ADMIN — PRAMIPEXOLE DIHYDROCHLORIDE 0.25 MG: 0.25 TABLET ORAL at 21:47

## 2018-01-01 RX ADMIN — MIDODRINE HYDROCHLORIDE 10 MG: 5 TABLET ORAL at 12:28

## 2018-01-01 RX ADMIN — ASPIRIN 325 MG: 325 TABLET ORAL at 08:20

## 2018-01-01 RX ADMIN — PRIMIDONE 25 MG: 50 TABLET ORAL at 21:50

## 2018-01-01 RX ADMIN — SODIUM CHLORIDE 166 ML: 900 INJECTION, SOLUTION INTRAVENOUS at 06:46

## 2018-01-01 RX ADMIN — FUROSEMIDE 20 MG: 20 TABLET ORAL at 17:08

## 2018-01-01 RX ADMIN — MORPHINE SULFATE 4 MG: 2 INJECTION, SOLUTION INTRAMUSCULAR; INTRAVENOUS at 09:33

## 2018-01-01 RX ADMIN — GABAPENTIN 750 MG: 250 SOLUTION ORAL at 17:09

## 2018-01-01 RX ADMIN — POLYETHYLENE GLYCOL 3350 17 G: 17 POWDER, FOR SOLUTION ORAL at 09:35

## 2018-01-01 RX ADMIN — FUROSEMIDE 40 MG: 10 INJECTION, SOLUTION INTRAMUSCULAR; INTRAVENOUS at 09:17

## 2018-01-01 RX ADMIN — FAMOTIDINE 20 MG: 20 TABLET ORAL at 09:45

## 2018-01-01 RX ADMIN — Medication 10 ML: at 21:00

## 2018-01-01 RX ADMIN — INSULIN LISPRO 2 UNITS: 100 INJECTION, SOLUTION INTRAVENOUS; SUBCUTANEOUS at 08:31

## 2018-01-01 RX ADMIN — FAMOTIDINE 20 MG: 20 TABLET ORAL at 08:53

## 2018-01-01 RX ADMIN — GABAPENTIN 750 MG: 250 SOLUTION ORAL at 10:31

## 2018-01-01 RX ADMIN — FAMOTIDINE 20 MG: 20 TABLET, FILM COATED ORAL at 09:41

## 2018-01-01 RX ADMIN — MECLIZINE 25 MG: 12.5 TABLET ORAL at 11:58

## 2018-01-01 RX ADMIN — ALBUTEROL SULFATE 2.5 MG: 2.5 SOLUTION RESPIRATORY (INHALATION) at 20:15

## 2018-01-01 RX ADMIN — DOCUSATE SODIUM 100 MG: 100 CAPSULE, LIQUID FILLED ORAL at 17:21

## 2018-01-01 RX ADMIN — NYSTATIN 5 ML: 100000 SUSPENSION ORAL at 06:37

## 2018-01-01 RX ADMIN — HEPARIN SODIUM 5000 UNITS: 5000 INJECTION INTRAVENOUS; SUBCUTANEOUS at 14:16

## 2018-01-01 RX ADMIN — TAMSULOSIN HYDROCHLORIDE 0.4 MG: 0.4 CAPSULE ORAL at 08:30

## 2018-01-01 RX ADMIN — Medication 10 ML: at 14:32

## 2018-01-01 RX ADMIN — GUAIFENESIN 300 MG: 200 SOLUTION ORAL at 10:27

## 2018-01-01 RX ADMIN — GABAPENTIN 250 MG: 250 SUSPENSION ORAL at 21:23

## 2018-01-01 RX ADMIN — Medication 10 ML: at 06:38

## 2018-01-01 RX ADMIN — CLOPIDOGREL BISULFATE 75 MG: 75 TABLET ORAL at 09:16

## 2018-01-01 RX ADMIN — GABAPENTIN 750 MG: 250 SOLUTION ORAL at 08:32

## 2018-01-01 RX ADMIN — CEFTRIAXONE SODIUM 2 G: 2 INJECTION, POWDER, FOR SOLUTION INTRAMUSCULAR; INTRAVENOUS at 13:00

## 2018-01-01 RX ADMIN — INSULIN LISPRO 8 UNITS: 100 INJECTION, SOLUTION INTRAVENOUS; SUBCUTANEOUS at 20:42

## 2018-01-01 RX ADMIN — FUROSEMIDE 40 MG: 40 TABLET ORAL at 09:20

## 2018-01-01 RX ADMIN — CYANOCOBALAMIN TAB 500 MCG 1000 MCG: 500 TAB at 09:44

## 2018-01-01 RX ADMIN — INSULIN LISPRO 2 UNITS: 100 INJECTION, SOLUTION INTRAVENOUS; SUBCUTANEOUS at 12:32

## 2018-01-01 RX ADMIN — MIDODRINE HYDROCHLORIDE 10 MG: 5 TABLET ORAL at 08:34

## 2018-01-01 RX ADMIN — GABAPENTIN 750 MG: 250 SOLUTION ORAL at 22:49

## 2018-01-01 RX ADMIN — GABAPENTIN 250 MG: 250 SUSPENSION ORAL at 21:00

## 2018-01-01 RX ADMIN — MIDODRINE HYDROCHLORIDE 30 MG: 5 TABLET ORAL at 23:15

## 2018-01-01 RX ADMIN — INSULIN GLARGINE 14 UNITS: 100 INJECTION, SOLUTION SUBCUTANEOUS at 22:21

## 2018-01-01 RX ADMIN — NYSTATIN 5 ML: 100000 SUSPENSION ORAL at 12:52

## 2018-01-01 RX ADMIN — INSULIN LISPRO 2 UNITS: 100 INJECTION, SOLUTION INTRAVENOUS; SUBCUTANEOUS at 07:23

## 2018-01-01 RX ADMIN — MIDODRINE HYDROCHLORIDE 10 MG: 5 TABLET ORAL at 17:21

## 2018-01-01 RX ADMIN — LIDOCAINE HYDROCHLORIDE 5 ML: 20 SOLUTION ORAL; TOPICAL at 12:15

## 2018-01-01 RX ADMIN — TAMSULOSIN HYDROCHLORIDE 0.4 MG: 0.4 CAPSULE ORAL at 08:11

## 2018-01-01 RX ADMIN — GABAPENTIN 1000 MG: 250 SOLUTION ORAL at 21:06

## 2018-01-01 RX ADMIN — GABAPENTIN 1000 MG: 250 SOLUTION ORAL at 22:42

## 2018-01-01 RX ADMIN — NYSTATIN 5 ML: 100000 SUSPENSION ORAL at 23:15

## 2018-01-01 RX ADMIN — INSULIN GLARGINE 14 UNITS: 100 INJECTION, SOLUTION SUBCUTANEOUS at 10:35

## 2018-01-01 RX ADMIN — ASPIRIN 325 MG: 325 TABLET ORAL at 09:44

## 2018-01-01 RX ADMIN — METOCLOPRAMIDE 10 MG: 5 INJECTION, SOLUTION INTRAMUSCULAR; INTRAVENOUS at 17:21

## 2018-01-01 RX ADMIN — Medication 1 CAPSULE: at 09:21

## 2018-01-01 RX ADMIN — INSULIN LISPRO 3 UNITS: 100 INJECTION, SOLUTION INTRAVENOUS; SUBCUTANEOUS at 21:16

## 2018-01-01 RX ADMIN — POLYETHYLENE GLYCOL 3350 17 G: 17 POWDER, FOR SOLUTION ORAL at 09:00

## 2018-01-01 RX ADMIN — IPRATROPIUM BROMIDE AND ALBUTEROL SULFATE 3 ML: .5; 3 SOLUTION RESPIRATORY (INHALATION) at 07:50

## 2018-01-01 RX ADMIN — LIDOCAINE HYDROCHLORIDE 5 ML: 20 SOLUTION ORAL; TOPICAL at 18:54

## 2018-01-01 RX ADMIN — HYDROCODONE BITARTRATE AND ACETAMINOPHEN 1 TABLET: 10; 325 TABLET ORAL at 15:50

## 2018-01-01 RX ADMIN — Medication 10 ML: at 06:01

## 2018-01-01 RX ADMIN — MIDODRINE HYDROCHLORIDE 10 MG: 5 TABLET ORAL at 12:42

## 2018-01-01 RX ADMIN — PRAMIPEXOLE DIHYDROCHLORIDE 0.25 MG: 0.25 TABLET ORAL at 08:54

## 2018-01-01 RX ADMIN — INSULIN LISPRO 3 UNITS: 100 INJECTION, SOLUTION INTRAVENOUS; SUBCUTANEOUS at 13:03

## 2018-01-01 RX ADMIN — MIDODRINE HYDROCHLORIDE 10 MG: 5 TABLET ORAL at 21:07

## 2018-01-01 RX ADMIN — MIDODRINE HYDROCHLORIDE 10 MG: 5 TABLET ORAL at 18:24

## 2018-01-01 RX ADMIN — INSULIN GLARGINE 14 UNITS: 100 INJECTION, SOLUTION SUBCUTANEOUS at 09:56

## 2018-01-01 RX ADMIN — MIDODRINE HYDROCHLORIDE 30 MG: 5 TABLET ORAL at 17:21

## 2018-01-01 RX ADMIN — ALBUTEROL SULFATE 2.5 MG: 2.5 SOLUTION RESPIRATORY (INHALATION) at 22:15

## 2018-01-01 RX ADMIN — GABAPENTIN 1000 MG: 250 SOLUTION ORAL at 17:31

## 2018-01-01 RX ADMIN — Medication 10 ML: at 21:33

## 2018-01-01 RX ADMIN — METRONIDAZOLE 500 MG: 500 INJECTION, SOLUTION INTRAVENOUS at 10:01

## 2018-01-01 RX ADMIN — INSULIN GLARGINE 14 UNITS: 100 INJECTION, SOLUTION SUBCUTANEOUS at 23:35

## 2018-01-01 RX ADMIN — ATORVASTATIN CALCIUM 40 MG: 40 TABLET, FILM COATED ORAL at 09:27

## 2018-01-01 RX ADMIN — ASPIRIN 325 MG: 325 TABLET ORAL at 10:34

## 2018-01-01 RX ADMIN — HEPARIN SODIUM 5000 UNITS: 5000 INJECTION INTRAVENOUS; SUBCUTANEOUS at 06:17

## 2018-01-01 RX ADMIN — GABAPENTIN 750 MG: 250 SOLUTION ORAL at 16:25

## 2018-01-01 RX ADMIN — ACETAMINOPHEN 500 MG: 650 SOLUTION ORAL at 23:27

## 2018-01-01 RX ADMIN — NYSTATIN 5 ML: 100000 SUSPENSION ORAL at 17:55

## 2018-01-01 RX ADMIN — Medication 10 ML: at 21:29

## 2018-01-01 RX ADMIN — PROPOFOL 20 MG: 10 INJECTION, EMULSION INTRAVENOUS at 08:18

## 2018-01-01 RX ADMIN — INSULIN LISPRO 2 UNITS: 100 INJECTION, SOLUTION INTRAVENOUS; SUBCUTANEOUS at 12:57

## 2018-01-01 RX ADMIN — METRONIDAZOLE 500 MG: 500 INJECTION, SOLUTION INTRAVENOUS at 00:16

## 2018-01-01 RX ADMIN — MIDODRINE HYDROCHLORIDE 10 MG: 5 TABLET ORAL at 09:23

## 2018-01-01 RX ADMIN — METRONIDAZOLE 500 MG: 500 INJECTION, SOLUTION INTRAVENOUS at 08:04

## 2018-01-01 RX ADMIN — HYDROCODONE BITARTRATE AND ACETAMINOPHEN 1 TABLET: 10; 325 TABLET ORAL at 04:41

## 2018-01-01 RX ADMIN — HYDROCODONE BITARTRATE AND ACETAMINOPHEN 1 TABLET: 10; 325 TABLET ORAL at 22:20

## 2018-01-01 RX ADMIN — INSULIN LISPRO 3 UNITS: 100 INJECTION, SOLUTION INTRAVENOUS; SUBCUTANEOUS at 09:45

## 2018-01-01 RX ADMIN — Medication 10 ML: at 21:07

## 2018-01-01 RX ADMIN — FINASTERIDE 5 MG: 5 TABLET, FILM COATED ORAL at 22:00

## 2018-01-01 RX ADMIN — LIDOCAINE HYDROCHLORIDE 5 ML: 20 SOLUTION ORAL; TOPICAL at 12:07

## 2018-01-01 RX ADMIN — MIDODRINE HYDROCHLORIDE 10 MG: 5 TABLET ORAL at 16:25

## 2018-01-01 RX ADMIN — MIDODRINE HYDROCHLORIDE 10 MG: 5 TABLET ORAL at 17:42

## 2018-01-01 RX ADMIN — PRAMIPEXOLE DIHYDROCHLORIDE 0.25 MG: 0.25 TABLET ORAL at 21:06

## 2018-01-01 RX ADMIN — HEPARIN SODIUM 5000 UNITS: 5000 INJECTION INTRAVENOUS; SUBCUTANEOUS at 22:20

## 2018-01-01 RX ADMIN — NYSTATIN 5 ML: 100000 SUSPENSION ORAL at 23:51

## 2018-01-01 RX ADMIN — ONDANSETRON 4 MG: 2 INJECTION INTRAMUSCULAR; INTRAVENOUS at 11:58

## 2018-01-01 RX ADMIN — ASPIRIN 325 MG ORAL TABLET 325 MG: 325 PILL ORAL at 08:54

## 2018-01-01 RX ADMIN — PRAMIPEXOLE DIHYDROCHLORIDE 0.25 MG: 0.25 TABLET ORAL at 09:49

## 2018-01-01 RX ADMIN — GUAIFENESIN 100 MG: 200 SOLUTION ORAL at 08:12

## 2018-01-01 RX ADMIN — MIDODRINE HYDROCHLORIDE 10 MG: 5 TABLET ORAL at 13:59

## 2018-01-01 RX ADMIN — FAMOTIDINE 20 MG: 20 TABLET ORAL at 08:30

## 2018-01-01 RX ADMIN — FINASTERIDE 5 MG: 5 TABLET, FILM COATED ORAL at 21:27

## 2018-01-01 RX ADMIN — CLOPIDOGREL BISULFATE 75 MG: 75 TABLET ORAL at 09:22

## 2018-01-01 RX ADMIN — INSULIN LISPRO 10 UNITS: 100 INJECTION, SOLUTION INTRAVENOUS; SUBCUTANEOUS at 17:28

## 2018-01-01 RX ADMIN — ASPIRIN 325 MG ORAL TABLET 325 MG: 325 PILL ORAL at 09:00

## 2018-01-01 RX ADMIN — FLUTICASONE PROPIONATE 1 SPRAY: 50 SPRAY, METERED NASAL at 17:54

## 2018-01-01 RX ADMIN — ATORVASTATIN CALCIUM 40 MG: 40 TABLET, FILM COATED ORAL at 08:11

## 2018-01-01 RX ADMIN — GABAPENTIN 750 MG: 250 SOLUTION ORAL at 09:50

## 2018-01-01 RX ADMIN — Medication 10 ML: at 15:45

## 2018-01-01 RX ADMIN — PRAMIPEXOLE DIHYDROCHLORIDE 0.25 MG: 0.25 TABLET ORAL at 23:08

## 2018-01-01 RX ADMIN — HEPARIN SODIUM 5000 UNITS: 5000 INJECTION INTRAVENOUS; SUBCUTANEOUS at 06:32

## 2018-01-01 RX ADMIN — MIDODRINE HYDROCHLORIDE 30 MG: 5 TABLET ORAL at 22:54

## 2018-01-01 RX ADMIN — HUMAN INSULIN 5 UNITS: 100 INJECTION, SOLUTION SUBCUTANEOUS at 21:04

## 2018-01-01 RX ADMIN — PRAMIPEXOLE DIHYDROCHLORIDE 0.25 MG: 0.25 TABLET ORAL at 16:51

## 2018-01-01 RX ADMIN — ASPIRIN 81 MG 81 MG: 81 TABLET ORAL at 09:54

## 2018-01-01 RX ADMIN — IPRATROPIUM BROMIDE AND ALBUTEROL SULFATE 3 ML: .5; 3 SOLUTION RESPIRATORY (INHALATION) at 13:27

## 2018-01-01 RX ADMIN — HYDROCODONE BITARTRATE AND ACETAMINOPHEN 1 TABLET: 10; 325 TABLET ORAL at 02:43

## 2018-01-01 RX ADMIN — GABAPENTIN 250 MG: 250 SUSPENSION ORAL at 08:00

## 2018-01-01 RX ADMIN — Medication 220 MG: at 09:30

## 2018-01-01 RX ADMIN — MIDODRINE HYDROCHLORIDE 10 MG: 5 TABLET ORAL at 18:13

## 2018-01-01 RX ADMIN — GABAPENTIN 750 MG: 250 SOLUTION ORAL at 22:44

## 2018-01-01 RX ADMIN — GUAIFENESIN 100 MG: 200 SOLUTION ORAL at 08:56

## 2018-01-01 RX ADMIN — ASPIRIN 81 MG 81 MG: 81 TABLET ORAL at 08:33

## 2018-01-01 RX ADMIN — Medication 1 CAPSULE: at 08:53

## 2018-01-01 RX ADMIN — HYDROCODONE BITARTRATE AND ACETAMINOPHEN 10 MG: 325; 7.5 SYRUP ORAL at 22:58

## 2018-01-01 RX ADMIN — Medication 10 ML: at 13:53

## 2018-01-01 RX ADMIN — Medication 10 ML: at 18:00

## 2018-01-01 RX ADMIN — Medication 10 ML: at 05:40

## 2018-01-01 RX ADMIN — Medication 10 ML: at 21:03

## 2018-01-01 RX ADMIN — GABAPENTIN 750 MG: 250 SOLUTION ORAL at 10:36

## 2018-01-01 RX ADMIN — GUAIFENESIN 100 MG: 200 SOLUTION ORAL at 21:53

## 2018-01-01 RX ADMIN — NYSTATIN 500000 UNITS: 100000 SUSPENSION ORAL at 14:27

## 2018-01-01 RX ADMIN — PRAMIPEXOLE DIHYDROCHLORIDE 0.25 MG: 0.25 TABLET ORAL at 09:36

## 2018-01-01 RX ADMIN — Medication 220 MG: at 09:51

## 2018-01-01 RX ADMIN — Medication 220 MG: at 08:51

## 2018-01-01 RX ADMIN — GABAPENTIN 750 MG: 250 SOLUTION ORAL at 22:08

## 2018-01-01 RX ADMIN — FLUTICASONE PROPIONATE 1 SPRAY: 50 SPRAY, METERED NASAL at 17:34

## 2018-01-01 RX ADMIN — LIDOCAINE HYDROCHLORIDE 5 ML: 20 SOLUTION ORAL; TOPICAL at 11:49

## 2018-01-01 RX ADMIN — Medication 1 SPRAY: at 08:45

## 2018-01-01 RX ADMIN — GABAPENTIN 750 MG: 250 SOLUTION ORAL at 10:26

## 2018-01-01 RX ADMIN — HUMAN INSULIN 12 UNITS: 100 INJECTION, SOLUTION SUBCUTANEOUS at 15:06

## 2018-01-01 RX ADMIN — FINASTERIDE 5 MG: 5 TABLET, FILM COATED ORAL at 21:00

## 2018-01-01 RX ADMIN — ENOXAPARIN SODIUM 40 MG: 40 INJECTION SUBCUTANEOUS at 23:12

## 2018-01-01 RX ADMIN — MIDODRINE HYDROCHLORIDE 30 MG: 5 TABLET ORAL at 12:22

## 2018-01-01 RX ADMIN — NYSTATIN 500000 UNITS: 100000 SUSPENSION ORAL at 17:08

## 2018-01-01 RX ADMIN — FLUTICASONE PROPIONATE 1 SPRAY: 50 SPRAY, METERED NASAL at 08:31

## 2018-01-01 RX ADMIN — ACETAMINOPHEN 650 MG: 325 TABLET ORAL at 09:31

## 2018-01-01 RX ADMIN — POLYETHYLENE GLYCOL 3350 17 G: 17 POWDER, FOR SOLUTION ORAL at 08:12

## 2018-01-01 RX ADMIN — PROPOFOL 20 MG: 10 INJECTION, EMULSION INTRAVENOUS at 08:32

## 2018-01-01 RX ADMIN — POLYETHYLENE GLYCOL 3350 17 G: 17 POWDER, FOR SOLUTION ORAL at 09:10

## 2018-01-01 RX ADMIN — Medication 10 ML: at 21:57

## 2018-01-01 RX ADMIN — FUROSEMIDE 40 MG: 10 INJECTION, SOLUTION INTRAMUSCULAR; INTRAVENOUS at 08:56

## 2018-01-01 RX ADMIN — INSULIN LISPRO 2 UNITS: 100 INJECTION, SOLUTION INTRAVENOUS; SUBCUTANEOUS at 12:16

## 2018-01-01 RX ADMIN — FINASTERIDE 5 MG: 5 TABLET, FILM COATED ORAL at 22:07

## 2018-01-01 RX ADMIN — GABAPENTIN 750 MG: 250 SOLUTION ORAL at 09:13

## 2018-01-01 RX ADMIN — METRONIDAZOLE 500 MG: 500 INJECTION, SOLUTION INTRAVENOUS at 12:00

## 2018-01-01 RX ADMIN — CLOPIDOGREL BISULFATE 75 MG: 75 TABLET ORAL at 09:21

## 2018-01-01 RX ADMIN — FUROSEMIDE 40 MG: 10 INJECTION, SOLUTION INTRAMUSCULAR; INTRAVENOUS at 21:49

## 2018-01-01 RX ADMIN — ASPIRIN 325 MG ORAL TABLET 325 MG: 325 PILL ORAL at 10:28

## 2018-01-01 RX ADMIN — ENOXAPARIN SODIUM 30 MG: 30 INJECTION, SOLUTION INTRAVENOUS; SUBCUTANEOUS at 21:11

## 2018-01-01 RX ADMIN — INSULIN LISPRO 5 UNITS: 100 INJECTION, SOLUTION INTRAVENOUS; SUBCUTANEOUS at 17:40

## 2018-01-01 RX ADMIN — GUAIFENESIN 300 MG: 200 SOLUTION ORAL at 23:14

## 2018-01-01 RX ADMIN — METRONIDAZOLE 500 MG: 500 INJECTION, SOLUTION INTRAVENOUS at 14:43

## 2018-01-01 RX ADMIN — GUAIFENESIN 100 MG: 200 SOLUTION ORAL at 21:23

## 2018-01-01 RX ADMIN — LIDOCAINE HYDROCHLORIDE 5 ML: 20 SOLUTION ORAL; TOPICAL at 08:14

## 2018-01-01 RX ADMIN — MIDODRINE HYDROCHLORIDE 30 MG: 5 TABLET ORAL at 21:12

## 2018-01-01 RX ADMIN — GABAPENTIN 750 MG: 250 SOLUTION ORAL at 23:45

## 2018-01-01 RX ADMIN — GUAIFENESIN 100 MG: 200 SOLUTION ORAL at 16:05

## 2018-01-01 RX ADMIN — MECLIZINE 25 MG: 12.5 TABLET ORAL at 09:22

## 2018-01-01 RX ADMIN — NYSTATIN 500000 UNITS: 100000 SUSPENSION ORAL at 09:55

## 2018-01-01 RX ADMIN — Medication 10 ML: at 05:07

## 2018-01-01 RX ADMIN — GABAPENTIN 750 MG: 250 SOLUTION ORAL at 16:12

## 2018-01-01 RX ADMIN — INSULIN LISPRO 2 UNITS: 100 INJECTION, SOLUTION INTRAVENOUS; SUBCUTANEOUS at 06:00

## 2018-01-01 RX ADMIN — NYSTATIN 500000 UNITS: 100000 SUSPENSION ORAL at 12:06

## 2018-01-01 RX ADMIN — Medication 1 CAPSULE: at 09:51

## 2018-01-01 RX ADMIN — GABAPENTIN 750 MG: 250 SOLUTION ORAL at 08:24

## 2018-01-01 RX ADMIN — PRAMIPEXOLE DIHYDROCHLORIDE 0.25 MG: 0.25 TABLET ORAL at 21:23

## 2018-01-01 RX ADMIN — FAMOTIDINE 20 MG: 20 TABLET ORAL at 09:27

## 2018-01-01 RX ADMIN — INSULIN LISPRO 4 UNITS: 100 INJECTION, SOLUTION INTRAVENOUS; SUBCUTANEOUS at 22:21

## 2018-01-01 RX ADMIN — ATORVASTATIN CALCIUM 40 MG: 40 TABLET, FILM COATED ORAL at 08:20

## 2018-01-01 RX ADMIN — INSULIN LISPRO 8 UNITS: 100 INJECTION, SOLUTION INTRAVENOUS; SUBCUTANEOUS at 22:42

## 2018-01-01 RX ADMIN — ASPIRIN 81 MG 81 MG: 81 TABLET ORAL at 09:21

## 2018-01-01 RX ADMIN — MIDODRINE HYDROCHLORIDE 10 MG: 5 TABLET ORAL at 10:42

## 2018-01-01 RX ADMIN — METRONIDAZOLE 500 MG: 500 INJECTION, SOLUTION INTRAVENOUS at 14:31

## 2018-01-01 RX ADMIN — MIDODRINE HYDROCHLORIDE 10 MG: 5 TABLET ORAL at 08:07

## 2018-01-01 RX ADMIN — MELATONIN TAB 3 MG 3 MG: 3 TAB at 21:54

## 2018-01-01 RX ADMIN — FAMOTIDINE 20 MG: 20 TABLET ORAL at 09:22

## 2018-01-01 RX ADMIN — GABAPENTIN 750 MG: 250 SOLUTION ORAL at 06:00

## 2018-01-01 RX ADMIN — ONDANSETRON 2 MG: 2 INJECTION INTRAMUSCULAR; INTRAVENOUS at 01:53

## 2018-01-01 RX ADMIN — INSULIN LISPRO 3 UNITS: 100 INJECTION, SOLUTION INTRAVENOUS; SUBCUTANEOUS at 17:23

## 2018-01-01 RX ADMIN — GABAPENTIN 1000 MG: 250 SOLUTION ORAL at 06:09

## 2018-01-01 RX ADMIN — Medication 10 ML: at 04:33

## 2018-01-01 RX ADMIN — Medication 10 ML: at 13:55

## 2018-01-01 RX ADMIN — GABAPENTIN 750 MG: 250 SOLUTION ORAL at 09:19

## 2018-01-01 RX ADMIN — FUROSEMIDE 40 MG: 40 TABLET ORAL at 10:28

## 2018-01-01 RX ADMIN — GUAIFENESIN 100 MG: 200 SOLUTION ORAL at 08:38

## 2018-01-01 RX ADMIN — GABAPENTIN 750 MG: 250 SOLUTION ORAL at 22:00

## 2018-01-01 RX ADMIN — FLUTICASONE PROPIONATE 2 SPRAY: 50 SPRAY, METERED NASAL at 08:47

## 2018-01-01 RX ADMIN — FLUTICASONE PROPIONATE 2 SPRAY: 50 SPRAY, METERED NASAL at 08:50

## 2018-01-01 RX ADMIN — GABAPENTIN 250 MG: 250 SUSPENSION ORAL at 08:50

## 2018-01-01 RX ADMIN — PRAMIPEXOLE DIHYDROCHLORIDE 0.25 MG: 0.25 TABLET ORAL at 18:00

## 2018-01-01 RX ADMIN — NYSTATIN 5 ML: 100000 SUSPENSION ORAL at 11:52

## 2018-01-01 RX ADMIN — GUAIFENESIN 300 MG: 200 SOLUTION ORAL at 09:03

## 2018-01-01 RX ADMIN — INSULIN LISPRO 2 UNITS: 100 INJECTION, SOLUTION INTRAVENOUS; SUBCUTANEOUS at 12:55

## 2018-01-01 RX ADMIN — NYSTATIN 5 ML: 100000 SUSPENSION ORAL at 21:19

## 2018-01-01 RX ADMIN — HYDROCODONE BITARTRATE AND ACETAMINOPHEN 5 MG: 7.5; 325 SOLUTION ORAL at 03:09

## 2018-01-01 RX ADMIN — NYSTATIN 5 ML: 100000 SUSPENSION ORAL at 15:23

## 2018-01-01 RX ADMIN — FAMOTIDINE 20 MG: 20 TABLET ORAL at 09:21

## 2018-01-01 RX ADMIN — HUMAN INSULIN 5 UNITS: 100 INJECTION, SOLUTION SUBCUTANEOUS at 21:30

## 2018-01-01 RX ADMIN — HUMAN INSULIN 5 UNITS: 100 INJECTION, SOLUTION SUBCUTANEOUS at 20:05

## 2018-01-01 RX ADMIN — INSULIN HUMAN 14 UNITS: 100 INJECTION, SOLUTION PARENTERAL at 08:30

## 2018-01-01 RX ADMIN — GABAPENTIN 750 MG: 250 SOLUTION ORAL at 21:12

## 2018-01-01 RX ADMIN — ASPIRIN 81 MG 81 MG: 81 TABLET ORAL at 08:25

## 2018-01-01 RX ADMIN — TAMSULOSIN HYDROCHLORIDE 0.4 MG: 0.4 CAPSULE ORAL at 08:05

## 2018-01-01 RX ADMIN — IPRATROPIUM BROMIDE AND ALBUTEROL SULFATE 3 ML: .5; 3 SOLUTION RESPIRATORY (INHALATION) at 15:01

## 2018-01-01 RX ADMIN — CLOPIDOGREL BISULFATE 75 MG: 75 TABLET ORAL at 09:35

## 2018-01-01 RX ADMIN — GABAPENTIN 750 MG: 250 SOLUTION ORAL at 15:22

## 2018-01-01 RX ADMIN — HUMAN INSULIN 5 UNITS: 100 INJECTION, SOLUTION SUBCUTANEOUS at 19:52

## 2018-01-01 RX ADMIN — Medication 10 ML: at 22:38

## 2018-01-01 RX ADMIN — PRAMIPEXOLE DIHYDROCHLORIDE 0.25 MG: 0.25 TABLET ORAL at 16:11

## 2018-01-01 RX ADMIN — INSULIN LISPRO 2 UNITS: 100 INJECTION, SOLUTION INTRAVENOUS; SUBCUTANEOUS at 20:30

## 2018-01-01 RX ADMIN — ALBUTEROL SULFATE 2.5 MG: 2.5 SOLUTION RESPIRATORY (INHALATION) at 15:01

## 2018-01-01 RX ADMIN — PRAMIPEXOLE DIHYDROCHLORIDE 0.25 MG: 0.25 TABLET ORAL at 21:10

## 2018-01-01 RX ADMIN — CLOPIDOGREL BISULFATE 75 MG: 75 TABLET ORAL at 09:54

## 2018-01-01 RX ADMIN — MIDODRINE HYDROCHLORIDE 10 MG: 5 TABLET ORAL at 12:58

## 2018-01-01 RX ADMIN — MIDODRINE HYDROCHLORIDE 10 MG: 5 TABLET ORAL at 18:52

## 2018-01-01 RX ADMIN — MIDODRINE HYDROCHLORIDE 30 MG: 5 TABLET ORAL at 21:00

## 2018-01-01 RX ADMIN — DOCUSATE SODIUM 100 MG: 100 CAPSULE, LIQUID FILLED ORAL at 18:00

## 2018-01-01 RX ADMIN — Medication 10 ML: at 06:17

## 2018-01-01 RX ADMIN — Medication 10 ML: at 05:41

## 2018-01-01 RX ADMIN — ATORVASTATIN CALCIUM 40 MG: 40 TABLET, FILM COATED ORAL at 09:54

## 2018-01-01 RX ADMIN — NYSTATIN 500000 UNITS: 100000 SUSPENSION ORAL at 17:53

## 2018-01-01 RX ADMIN — PRAMIPEXOLE DIHYDROCHLORIDE 0.25 MG: 0.25 TABLET ORAL at 22:07

## 2018-01-01 RX ADMIN — GABAPENTIN 500 MG: 250 SOLUTION ORAL at 09:23

## 2018-01-01 RX ADMIN — HYDROCODONE BITARTRATE AND ACETAMINOPHEN 1 TABLET: 10; 325 TABLET ORAL at 20:16

## 2018-01-01 RX ADMIN — Medication 10 ML: at 22:20

## 2018-01-01 RX ADMIN — HEPARIN SODIUM 5000 UNITS: 5000 INJECTION, SOLUTION INTRAVENOUS; SUBCUTANEOUS at 06:02

## 2018-01-01 RX ADMIN — PRAMIPEXOLE DIHYDROCHLORIDE 0.25 MG: 0.25 TABLET ORAL at 10:42

## 2018-01-01 RX ADMIN — Medication 10 ML: at 23:13

## 2018-01-01 RX ADMIN — HUMAN INSULIN 5 UNITS: 100 INJECTION, SOLUTION SUBCUTANEOUS at 20:16

## 2018-01-01 RX ADMIN — PRAMIPEXOLE DIHYDROCHLORIDE 0.25 MG: 0.25 TABLET ORAL at 09:33

## 2018-01-01 RX ADMIN — FINASTERIDE 5 MG: 5 TABLET, FILM COATED ORAL at 15:34

## 2018-01-01 RX ADMIN — CLOPIDOGREL BISULFATE 75 MG: 75 TABLET ORAL at 09:23

## 2018-01-01 RX ADMIN — INSULIN GLARGINE 14 UNITS: 100 INJECTION, SOLUTION SUBCUTANEOUS at 22:43

## 2018-01-01 RX ADMIN — INSULIN LISPRO 2 UNITS: 100 INJECTION, SOLUTION INTRAVENOUS; SUBCUTANEOUS at 00:15

## 2018-01-01 RX ADMIN — Medication 10 ML: at 21:49

## 2018-01-01 RX ADMIN — FUROSEMIDE 40 MG: 10 INJECTION, SOLUTION INTRAMUSCULAR; INTRAVENOUS at 09:23

## 2018-01-01 RX ADMIN — INSULIN LISPRO 8 UNITS: 100 INJECTION, SOLUTION INTRAVENOUS; SUBCUTANEOUS at 20:50

## 2018-01-01 RX ADMIN — HEPARIN SODIUM 5000 UNITS: 5000 INJECTION INTRAVENOUS; SUBCUTANEOUS at 15:34

## 2018-01-01 RX ADMIN — FINASTERIDE 5 MG: 5 TABLET, FILM COATED ORAL at 21:11

## 2018-01-01 RX ADMIN — GUAIFENESIN 100 MG: 200 SOLUTION ORAL at 18:41

## 2018-01-01 RX ADMIN — MIDODRINE HYDROCHLORIDE 30 MG: 5 TABLET ORAL at 17:00

## 2018-01-01 RX ADMIN — FINASTERIDE 5 MG: 5 TABLET, FILM COATED ORAL at 08:53

## 2018-01-01 RX ADMIN — Medication 10 ML: at 23:33

## 2018-01-01 RX ADMIN — MINERAL OIL, PETROLATUM, PHENYLEPHRINE HYDROCHLORIDE: 140; 749; 2.5 OINTMENT RECTAL; TOPICAL at 11:00

## 2018-01-01 RX ADMIN — FUROSEMIDE 20 MG: 20 TABLET ORAL at 16:25

## 2018-01-01 RX ADMIN — HEPARIN SODIUM 5000 UNITS: 5000 INJECTION, SOLUTION INTRAVENOUS; SUBCUTANEOUS at 15:46

## 2018-01-01 RX ADMIN — Medication 10 ML: at 05:16

## 2018-01-01 RX ADMIN — FUROSEMIDE 20 MG: 20 TABLET ORAL at 08:34

## 2018-01-01 RX ADMIN — FUROSEMIDE 20 MG: 20 TABLET ORAL at 09:21

## 2018-01-01 RX ADMIN — INSULIN LISPRO 8 UNITS: 100 INJECTION, SOLUTION INTRAVENOUS; SUBCUTANEOUS at 08:05

## 2018-01-01 RX ADMIN — GABAPENTIN 750 MG: 250 SOLUTION ORAL at 23:46

## 2018-01-01 RX ADMIN — SIMETHICONE 160 MG: 80 TABLET, CHEWABLE ORAL at 14:27

## 2018-01-01 RX ADMIN — INSULIN LISPRO 5 UNITS: 100 INJECTION, SOLUTION INTRAVENOUS; SUBCUTANEOUS at 14:04

## 2018-01-01 RX ADMIN — TAMSULOSIN HYDROCHLORIDE 0.4 MG: 0.4 CAPSULE ORAL at 08:20

## 2018-01-01 RX ADMIN — HEPARIN SODIUM 5000 UNITS: 5000 INJECTION, SOLUTION INTRAVENOUS; SUBCUTANEOUS at 06:04

## 2018-01-01 RX ADMIN — Medication 10 ML: at 15:00

## 2018-01-01 RX ADMIN — CEFTRIAXONE SODIUM 2 G: 2 INJECTION, POWDER, FOR SOLUTION INTRAMUSCULAR; INTRAVENOUS at 12:58

## 2018-01-01 RX ADMIN — Medication 1 CAPSULE: at 09:22

## 2018-01-01 RX ADMIN — FINASTERIDE 5 MG: 5 TABLET, FILM COATED ORAL at 08:34

## 2018-01-01 RX ADMIN — FAMOTIDINE 20 MG: 20 TABLET ORAL at 17:52

## 2018-01-01 RX ADMIN — PRAMIPEXOLE DIHYDROCHLORIDE 0.25 MG: 0.25 TABLET ORAL at 08:56

## 2018-01-01 RX ADMIN — FINASTERIDE 5 MG: 5 TABLET, FILM COATED ORAL at 23:12

## 2018-01-01 RX ADMIN — INSULIN GLARGINE 14 UNITS: 100 INJECTION, SOLUTION SUBCUTANEOUS at 20:41

## 2018-01-01 RX ADMIN — KETOROLAC TROMETHAMINE 30 MG: 30 INJECTION, SOLUTION INTRAMUSCULAR at 09:15

## 2018-01-01 RX ADMIN — METRONIDAZOLE 500 MG: 500 INJECTION, SOLUTION INTRAVENOUS at 00:18

## 2018-01-01 RX ADMIN — MELATONIN TAB 3 MG 3 MG: 3 TAB at 21:32

## 2018-01-01 RX ADMIN — FUROSEMIDE 40 MG: 10 INJECTION, SOLUTION INTRAMUSCULAR; INTRAVENOUS at 09:34

## 2018-01-01 RX ADMIN — FLUTICASONE PROPIONATE 2 SPRAY: 50 SPRAY, METERED NASAL at 08:36

## 2018-01-01 RX ADMIN — FUROSEMIDE 40 MG: 40 TABLET ORAL at 09:44

## 2018-01-01 RX ADMIN — PROPOFOL 20 MG: 10 INJECTION, EMULSION INTRAVENOUS at 08:23

## 2018-01-01 RX ADMIN — Medication 10 ML: at 06:18

## 2018-01-01 RX ADMIN — DIATRIZOATE MEGLUMINE AND DIATRIZOATE SODIUM 30 ML: 660; 100 LIQUID ORAL; RECTAL at 10:14

## 2018-01-01 RX ADMIN — HEPARIN SODIUM 5000 UNITS: 5000 INJECTION INTRAVENOUS; SUBCUTANEOUS at 13:54

## 2018-01-01 RX ADMIN — MIDODRINE HYDROCHLORIDE 10 MG: 5 TABLET ORAL at 16:05

## 2018-01-01 RX ADMIN — BACITRACIN ZINC: 500 OINTMENT TOPICAL at 08:28

## 2018-01-01 RX ADMIN — INSULIN HUMAN 12 UNITS: 100 INJECTION, SOLUTION PARENTERAL at 14:34

## 2018-01-01 RX ADMIN — ASPIRIN 81 MG 81 MG: 81 TABLET ORAL at 09:22

## 2018-01-01 RX ADMIN — ONDANSETRON 4 MG: 2 INJECTION, SOLUTION INTRAMUSCULAR; INTRAVENOUS at 00:36

## 2018-01-01 RX ADMIN — PRAMIPEXOLE DIHYDROCHLORIDE 0.25 MG: 0.25 TABLET ORAL at 09:21

## 2018-01-01 RX ADMIN — GUAIFENESIN 300 MG: 200 SOLUTION ORAL at 22:54

## 2018-01-01 RX ADMIN — PIPERACILLIN SODIUM,TAZOBACTAM SODIUM 3.38 G: 3; .375 INJECTION, POWDER, FOR SOLUTION INTRAVENOUS at 20:43

## 2018-01-01 RX ADMIN — ALBUTEROL SULFATE 2.5 MG: 2.5 SOLUTION RESPIRATORY (INHALATION) at 08:05

## 2018-01-01 RX ADMIN — MIDODRINE HYDROCHLORIDE 30 MG: 5 TABLET ORAL at 08:54

## 2018-01-01 RX ADMIN — Medication 10 ML: at 06:30

## 2018-01-01 RX ADMIN — GUAIFENESIN 100 MG: 200 SOLUTION ORAL at 22:20

## 2018-01-01 RX ADMIN — ATORVASTATIN CALCIUM 40 MG: 40 TABLET, FILM COATED ORAL at 09:34

## 2018-01-01 RX ADMIN — FLUTICASONE PROPIONATE 1 SPRAY: 50 SPRAY, METERED NASAL at 18:33

## 2018-01-01 RX ADMIN — INSULIN LISPRO 3 UNITS: 100 INJECTION, SOLUTION INTRAVENOUS; SUBCUTANEOUS at 18:03

## 2018-01-01 RX ADMIN — MIDODRINE HYDROCHLORIDE 10 MG: 5 TABLET ORAL at 17:37

## 2018-01-01 RX ADMIN — Medication 10 ML: at 15:34

## 2018-01-01 RX ADMIN — ATORVASTATIN CALCIUM 40 MG: 40 TABLET, FILM COATED ORAL at 09:11

## 2018-01-01 RX ADMIN — NYSTATIN 5 ML: 100000 SUSPENSION ORAL at 00:00

## 2018-01-01 RX ADMIN — DOCUSATE SODIUM 100 MG: 100 CAPSULE, LIQUID FILLED ORAL at 10:28

## 2018-01-01 RX ADMIN — PRAMIPEXOLE DIHYDROCHLORIDE 0.25 MG: 0.25 TABLET ORAL at 17:08

## 2018-01-01 RX ADMIN — FUROSEMIDE 20 MG: 10 INJECTION, SOLUTION INTRAMUSCULAR; INTRAVENOUS at 12:30

## 2018-01-01 RX ADMIN — SODIUM CHLORIDE 25 ML/HR: 900 INJECTION, SOLUTION INTRAVENOUS at 22:54

## 2018-01-01 RX ADMIN — TAMSULOSIN HYDROCHLORIDE 0.4 MG: 0.4 CAPSULE ORAL at 10:42

## 2018-01-01 RX ADMIN — BACITRACIN ZINC: 500 OINTMENT TOPICAL at 21:37

## 2018-01-01 RX ADMIN — HUMAN INSULIN 14 UNITS: 100 INJECTION, SOLUTION SUBCUTANEOUS at 08:32

## 2018-01-01 RX ADMIN — LIDOCAINE HYDROCHLORIDE 5 ML: 20 SOLUTION ORAL; TOPICAL at 16:14

## 2018-01-01 RX ADMIN — TAMSULOSIN HYDROCHLORIDE 0.4 MG: 0.4 CAPSULE ORAL at 09:38

## 2018-01-01 RX ADMIN — ASPIRIN 325 MG: 325 TABLET ORAL at 09:11

## 2018-01-01 RX ADMIN — FUROSEMIDE 40 MG: 10 INJECTION, SOLUTION INTRAMUSCULAR; INTRAVENOUS at 10:43

## 2018-01-01 RX ADMIN — INSULIN LISPRO 8 UNITS: 100 INJECTION, SOLUTION INTRAVENOUS; SUBCUTANEOUS at 14:04

## 2018-01-01 RX ADMIN — ATORVASTATIN CALCIUM 40 MG: 40 TABLET, FILM COATED ORAL at 09:16

## 2018-01-01 RX ADMIN — LIDOCAINE HYDROCHLORIDE 5 ML: 20 SOLUTION ORAL; TOPICAL at 09:25

## 2018-01-01 RX ADMIN — Medication 10 ML: at 05:55

## 2018-01-01 RX ADMIN — NYSTATIN 5 ML: 100000 SUSPENSION ORAL at 17:41

## 2018-01-01 RX ADMIN — SIMETHICONE 160 MG: 80 TABLET, CHEWABLE ORAL at 08:34

## 2018-01-01 RX ADMIN — ATORVASTATIN CALCIUM 40 MG: 40 TABLET, FILM COATED ORAL at 08:35

## 2018-01-01 RX ADMIN — MIDODRINE HYDROCHLORIDE 10 MG: 5 TABLET ORAL at 12:17

## 2018-01-01 RX ADMIN — ASPIRIN 325 MG: 325 TABLET ORAL at 09:07

## 2018-01-01 RX ADMIN — PRIMIDONE 25 MG: 50 TABLET ORAL at 21:56

## 2018-01-01 RX ADMIN — GUAIFENESIN 100 MG: 200 SOLUTION ORAL at 09:22

## 2018-01-01 RX ADMIN — PRAMIPEXOLE DIHYDROCHLORIDE 0.25 MG: 0.25 TABLET ORAL at 16:59

## 2018-01-01 RX ADMIN — GABAPENTIN 750 MG: 250 SOLUTION ORAL at 16:33

## 2018-01-01 RX ADMIN — HEPARIN SODIUM 5000 UNITS: 5000 INJECTION, SOLUTION INTRAVENOUS; SUBCUTANEOUS at 05:54

## 2018-01-01 RX ADMIN — MIDODRINE HYDROCHLORIDE 10 MG: 5 TABLET ORAL at 09:17

## 2018-01-01 RX ADMIN — FAMOTIDINE 20 MG: 20 TABLET, FILM COATED ORAL at 09:47

## 2018-01-01 RX ADMIN — CYANOCOBALAMIN TAB 500 MCG 1000 MCG: 500 TAB at 09:11

## 2018-01-01 RX ADMIN — INSULIN HUMAN 12 UNITS: 100 INJECTION, SOLUTION PARENTERAL at 09:56

## 2018-01-01 RX ADMIN — INSULIN LISPRO 5 UNITS: 100 INJECTION, SOLUTION INTRAVENOUS; SUBCUTANEOUS at 12:28

## 2018-01-01 RX ADMIN — ATORVASTATIN CALCIUM 40 MG: 40 TABLET, FILM COATED ORAL at 09:22

## 2018-01-01 RX ADMIN — HYDROCODONE BITARTRATE AND ACETAMINOPHEN 1 TABLET: 10; 325 TABLET ORAL at 21:04

## 2018-01-01 RX ADMIN — LIDOCAINE HYDROCHLORIDE 5 ML: 20 SOLUTION ORAL; TOPICAL at 08:58

## 2018-01-01 RX ADMIN — HEPARIN SODIUM 5000 UNITS: 5000 INJECTION, SOLUTION INTRAVENOUS; SUBCUTANEOUS at 21:49

## 2018-01-01 RX ADMIN — LIDOCAINE HYDROCHLORIDE 5 ML: 20 SOLUTION ORAL; TOPICAL at 09:30

## 2018-01-01 RX ADMIN — LIDOCAINE HYDROCHLORIDE 40 ML: 20 SOLUTION ORAL; TOPICAL at 14:14

## 2018-01-01 RX ADMIN — FINASTERIDE 5 MG: 5 TABLET, FILM COATED ORAL at 22:23

## 2018-01-01 RX ADMIN — GABAPENTIN 750 MG: 250 SOLUTION ORAL at 08:07

## 2018-01-01 RX ADMIN — GUAIFENESIN 100 MG: 200 SOLUTION ORAL at 16:51

## 2018-01-01 RX ADMIN — PRAMIPEXOLE DIHYDROCHLORIDE 0.25 MG: 0.25 TABLET ORAL at 10:34

## 2018-01-01 RX ADMIN — MIDODRINE HYDROCHLORIDE 10 MG: 5 TABLET ORAL at 13:36

## 2018-01-01 RX ADMIN — MIDODRINE HYDROCHLORIDE 10 MG: 5 TABLET ORAL at 09:44

## 2018-01-01 RX ADMIN — NYSTATIN 5 ML: 100000 SUSPENSION ORAL at 17:02

## 2018-01-01 RX ADMIN — GABAPENTIN 750 MG: 250 SOLUTION ORAL at 09:11

## 2018-01-01 RX ADMIN — ATORVASTATIN CALCIUM 40 MG: 40 TABLET, FILM COATED ORAL at 09:41

## 2018-01-01 RX ADMIN — SODIUM CHLORIDE 1000 ML: 900 INJECTION, SOLUTION INTRAVENOUS at 22:30

## 2018-01-01 RX ADMIN — INSULIN HUMAN 5 UNITS: 100 INJECTION, SOLUTION PARENTERAL at 22:39

## 2018-01-01 RX ADMIN — Medication 10 ML: at 21:28

## 2018-01-01 RX ADMIN — ALBUTEROL SULFATE 2.5 MG: 2.5 SOLUTION RESPIRATORY (INHALATION) at 21:27

## 2018-01-01 RX ADMIN — MIDODRINE HYDROCHLORIDE 30 MG: 5 TABLET ORAL at 10:28

## 2018-01-01 RX ADMIN — IPRATROPIUM BROMIDE AND ALBUTEROL SULFATE 3 ML: .5; 3 SOLUTION RESPIRATORY (INHALATION) at 22:22

## 2018-01-01 RX ADMIN — GUAIFENESIN 100 MG: 200 SOLUTION ORAL at 04:42

## 2018-01-01 RX ADMIN — PRAMIPEXOLE DIHYDROCHLORIDE 0.25 MG: 0.25 TABLET ORAL at 08:40

## 2018-01-01 RX ADMIN — HEPARIN SODIUM 5000 UNITS: 5000 INJECTION, SOLUTION INTRAVENOUS; SUBCUTANEOUS at 14:05

## 2018-01-01 RX ADMIN — CLOPIDOGREL BISULFATE 75 MG: 75 TABLET ORAL at 08:55

## 2018-01-01 RX ADMIN — HUMAN INSULIN 12 UNITS: 100 INJECTION, SOLUTION SUBCUTANEOUS at 14:00

## 2018-01-01 RX ADMIN — MIDODRINE HYDROCHLORIDE 10 MG: 5 TABLET ORAL at 11:21

## 2018-01-01 RX ADMIN — NYSTATIN 5 ML: 100000 SUSPENSION ORAL at 12:59

## 2018-01-01 RX ADMIN — FUROSEMIDE 20 MG: 20 TABLET ORAL at 18:52

## 2018-01-01 RX ADMIN — MIDODRINE HYDROCHLORIDE 10 MG: 5 TABLET ORAL at 13:13

## 2018-01-01 RX ADMIN — BACITRACIN 1 PACKET: 500 OINTMENT TOPICAL at 14:41

## 2018-01-01 RX ADMIN — HYDROCODONE BITARTRATE AND ACETAMINOPHEN 1 TABLET: 10; 325 TABLET ORAL at 15:12

## 2018-01-01 RX ADMIN — INSULIN LISPRO 3 UNITS: 100 INJECTION, SOLUTION INTRAVENOUS; SUBCUTANEOUS at 00:44

## 2018-01-01 RX ADMIN — Medication 220 MG: at 08:56

## 2018-01-01 RX ADMIN — GABAPENTIN 750 MG: 250 SOLUTION ORAL at 16:31

## 2018-01-01 RX ADMIN — SODIUM CHLORIDE 50 ML/HR: 900 INJECTION, SOLUTION INTRAVENOUS at 10:47

## 2018-01-01 RX ADMIN — GABAPENTIN 750 MG: 250 SOLUTION ORAL at 00:38

## 2018-01-01 RX ADMIN — Medication 10 ML: at 14:16

## 2018-01-01 RX ADMIN — HYDROCODONE BITARTRATE AND ACETAMINOPHEN 5 MG: 7.5; 325 SOLUTION ORAL at 07:42

## 2018-01-01 RX ADMIN — MIDODRINE HYDROCHLORIDE 10 MG: 5 TABLET ORAL at 08:39

## 2018-01-01 RX ADMIN — SODIUM CHLORIDE 500 ML: 900 INJECTION, SOLUTION INTRAVENOUS at 11:48

## 2018-01-01 RX ADMIN — INSULIN GLARGINE 14 UNITS: 100 INJECTION, SOLUTION SUBCUTANEOUS at 08:34

## 2018-01-01 RX ADMIN — INSULIN LISPRO 3 UNITS: 100 INJECTION, SOLUTION INTRAVENOUS; SUBCUTANEOUS at 06:00

## 2018-01-01 RX ADMIN — INSULIN LISPRO 5 UNITS: 100 INJECTION, SOLUTION INTRAVENOUS; SUBCUTANEOUS at 18:16

## 2018-01-01 RX ADMIN — SODIUM CHLORIDE 50 ML/HR: 900 INJECTION, SOLUTION INTRAVENOUS at 05:16

## 2018-01-01 RX ADMIN — ASPIRIN 325 MG: 325 TABLET ORAL at 09:38

## 2018-01-01 RX ADMIN — SODIUM CHLORIDE 500 ML: 900 INJECTION, SOLUTION INTRAVENOUS at 12:42

## 2018-01-01 RX ADMIN — CLOPIDOGREL BISULFATE 75 MG: 75 TABLET ORAL at 09:34

## 2018-01-01 RX ADMIN — FAMOTIDINE 20 MG: 20 TABLET ORAL at 17:13

## 2018-01-01 RX ADMIN — INSULIN LISPRO 8 UNITS: 100 INJECTION, SOLUTION INTRAVENOUS; SUBCUTANEOUS at 18:32

## 2018-01-01 RX ADMIN — PRAMIPEXOLE DIHYDROCHLORIDE 0.25 MG: 0.25 TABLET ORAL at 21:54

## 2018-01-01 RX ADMIN — HUMAN INSULIN 14 UNITS: 100 INJECTION, SOLUTION SUBCUTANEOUS at 09:50

## 2018-01-01 RX ADMIN — Medication 10 ML: at 21:06

## 2018-01-01 RX ADMIN — HYDROCODONE BITARTRATE AND ACETAMINOPHEN 7.5 MG: 325; 7.5 SYRUP ORAL at 21:21

## 2018-01-01 RX ADMIN — MIDODRINE HYDROCHLORIDE 10 MG: 5 TABLET ORAL at 09:38

## 2018-01-01 RX ADMIN — CYANOCOBALAMIN TAB 500 MCG 1000 MCG: 500 TAB at 09:34

## 2018-01-01 RX ADMIN — CLOPIDOGREL BISULFATE 75 MG: 75 TABLET ORAL at 09:17

## 2018-01-01 RX ADMIN — IOPAMIDOL 100 ML: 755 INJECTION, SOLUTION INTRAVENOUS at 05:16

## 2018-01-01 RX ADMIN — HYDROCODONE BITARTRATE AND ACETAMINOPHEN 1 TABLET: 5; 325 TABLET ORAL at 21:37

## 2018-01-01 RX ADMIN — SODIUM CHLORIDE, SODIUM LACTATE, POTASSIUM CHLORIDE, AND CALCIUM CHLORIDE 25 ML/HR: 600; 310; 30; 20 INJECTION, SOLUTION INTRAVENOUS at 07:49

## 2018-01-01 RX ADMIN — LIDOCAINE HYDROCHLORIDE 5 ML: 20 SOLUTION ORAL; TOPICAL at 12:16

## 2018-01-01 RX ADMIN — IPRATROPIUM BROMIDE AND ALBUTEROL SULFATE 3 ML: .5; 3 SOLUTION RESPIRATORY (INHALATION) at 19:42

## 2018-01-01 RX ADMIN — Medication 1 CAPSULE: at 08:56

## 2018-01-01 RX ADMIN — MIDODRINE HYDROCHLORIDE 10 MG: 5 TABLET ORAL at 08:53

## 2018-01-01 RX ADMIN — GUAIFENESIN 100 MG: 200 SOLUTION ORAL at 21:04

## 2018-01-01 RX ADMIN — Medication 10 ML: at 23:16

## 2018-01-01 RX ADMIN — PRAMIPEXOLE DIHYDROCHLORIDE 0.25 MG: 0.25 TABLET ORAL at 09:19

## 2018-01-01 RX ADMIN — HEPARIN SODIUM 5000 UNITS: 5000 INJECTION INTRAVENOUS; SUBCUTANEOUS at 13:59

## 2018-01-01 RX ADMIN — FAMOTIDINE 20 MG: 20 TABLET ORAL at 09:20

## 2018-01-01 RX ADMIN — HYDROCODONE BITARTRATE AND ACETAMINOPHEN 1 TABLET: 10; 325 TABLET ORAL at 14:15

## 2018-01-01 RX ADMIN — SODIUM CHLORIDE 1000 ML: 900 INJECTION, SOLUTION INTRAVENOUS at 03:38

## 2018-01-01 RX ADMIN — ALBUTEROL SULFATE 2.5 MG: 2.5 SOLUTION RESPIRATORY (INHALATION) at 19:18

## 2018-01-01 RX ADMIN — CLOPIDOGREL BISULFATE 75 MG: 75 TABLET ORAL at 09:11

## 2018-01-01 RX ADMIN — INSULIN LISPRO 7 UNITS: 100 INJECTION, SOLUTION INTRAVENOUS; SUBCUTANEOUS at 18:29

## 2018-01-01 RX ADMIN — ATORVASTATIN CALCIUM 40 MG: 40 TABLET, FILM COATED ORAL at 08:56

## 2018-01-01 RX ADMIN — INSULIN LISPRO 3 UNITS: 100 INJECTION, SOLUTION INTRAVENOUS; SUBCUTANEOUS at 12:19

## 2018-01-01 RX ADMIN — HEPARIN SODIUM 5000 UNITS: 5000 INJECTION INTRAVENOUS; SUBCUTANEOUS at 22:45

## 2018-01-01 RX ADMIN — GABAPENTIN 750 MG: 250 SOLUTION ORAL at 17:59

## 2018-01-01 RX ADMIN — INSULIN GLARGINE 14 UNITS: 100 INJECTION, SOLUTION SUBCUTANEOUS at 08:31

## 2018-01-01 RX ADMIN — IOPAMIDOL 100 ML: 755 INJECTION, SOLUTION INTRAVENOUS at 05:07

## 2018-01-01 RX ADMIN — HEPARIN SODIUM 5000 UNITS: 5000 INJECTION, SOLUTION INTRAVENOUS; SUBCUTANEOUS at 21:16

## 2018-01-01 RX ADMIN — METRONIDAZOLE 500 MG: 500 INJECTION, SOLUTION INTRAVENOUS at 14:27

## 2018-01-01 RX ADMIN — ASPIRIN 81 MG 81 MG: 81 TABLET ORAL at 10:42

## 2018-01-01 RX ADMIN — HYDROCODONE BITARTRATE AND ACETAMINOPHEN 5 MG: 7.5; 325 SOLUTION ORAL at 20:41

## 2018-01-01 RX ADMIN — INSULIN GLARGINE 14 UNITS: 100 INJECTION, SOLUTION SUBCUTANEOUS at 09:21

## 2018-01-01 RX ADMIN — LIDOCAINE HYDROCHLORIDE 5 ML: 20 SOLUTION ORAL; TOPICAL at 16:19

## 2018-01-01 RX ADMIN — MIDODRINE HYDROCHLORIDE 10 MG: 5 TABLET ORAL at 08:30

## 2018-01-01 RX ADMIN — NYSTATIN 500000 UNITS: 100000 SUSPENSION ORAL at 08:33

## 2018-01-01 RX ADMIN — ATORVASTATIN CALCIUM 40 MG: 40 TABLET, FILM COATED ORAL at 09:47

## 2018-01-01 RX ADMIN — MIDODRINE HYDROCHLORIDE 30 MG: 5 TABLET ORAL at 15:07

## 2018-01-01 RX ADMIN — Medication 10 ML: at 07:24

## 2018-01-01 RX ADMIN — GUAIFENESIN 100 MG: 200 SOLUTION ORAL at 17:28

## 2018-01-01 RX ADMIN — INSULIN LISPRO 2 UNITS: 100 INJECTION, SOLUTION INTRAVENOUS; SUBCUTANEOUS at 12:18

## 2018-01-01 RX ADMIN — INSULIN LISPRO 2 UNITS: 100 INJECTION, SOLUTION INTRAVENOUS; SUBCUTANEOUS at 09:10

## 2018-01-01 RX ADMIN — HYDROCODONE BITARTRATE AND ACETAMINOPHEN 1 TABLET: 10; 325 TABLET ORAL at 21:08

## 2018-01-01 RX ADMIN — HUMAN INSULIN 14 UNITS: 100 INJECTION, SOLUTION SUBCUTANEOUS at 08:13

## 2018-01-01 RX ADMIN — Medication 10 ML: at 21:56

## 2018-01-01 RX ADMIN — ASPIRIN 325 MG: 325 TABLET ORAL at 09:34

## 2018-01-01 RX ADMIN — FUROSEMIDE 40 MG: 10 INJECTION, SOLUTION INTRAMUSCULAR; INTRAVENOUS at 09:49

## 2018-01-01 RX ADMIN — PRAMIPEXOLE DIHYDROCHLORIDE 0.25 MG: 0.25 TABLET ORAL at 23:12

## 2018-01-01 RX ADMIN — IPRATROPIUM BROMIDE AND ALBUTEROL SULFATE 3 ML: .5; 3 SOLUTION RESPIRATORY (INHALATION) at 08:35

## 2018-01-01 RX ADMIN — GABAPENTIN 1000 MG: 250 SOLUTION ORAL at 23:08

## 2018-01-01 RX ADMIN — FAMOTIDINE 20 MG: 20 TABLET, FILM COATED ORAL at 09:11

## 2018-01-01 RX ADMIN — MIDODRINE HYDROCHLORIDE 10 MG: 5 TABLET ORAL at 21:30

## 2018-01-01 RX ADMIN — PRAMIPEXOLE DIHYDROCHLORIDE 0.25 MG: 0.25 TABLET ORAL at 22:05

## 2018-01-01 RX ADMIN — MIDODRINE HYDROCHLORIDE 10 MG: 5 TABLET ORAL at 17:28

## 2018-01-01 RX ADMIN — DOCUSATE SODIUM 100 MG: 100 CAPSULE, LIQUID FILLED ORAL at 10:27

## 2018-01-01 RX ADMIN — HUMAN INSULIN 12 UNITS: 100 INJECTION, SOLUTION SUBCUTANEOUS at 13:20

## 2018-01-01 RX ADMIN — PRAMIPEXOLE DIHYDROCHLORIDE 0.25 MG: 0.25 TABLET ORAL at 08:35

## 2018-01-01 RX ADMIN — ONDANSETRON 4 MG: 2 INJECTION, SOLUTION INTRAMUSCULAR; INTRAVENOUS at 18:26

## 2018-01-01 RX ADMIN — FINASTERIDE 5 MG: 5 TABLET, FILM COATED ORAL at 21:46

## 2018-01-01 RX ADMIN — INSULIN LISPRO 2 UNITS: 100 INJECTION, SOLUTION INTRAVENOUS; SUBCUTANEOUS at 23:57

## 2018-01-01 RX ADMIN — CEFTRIAXONE 1 G: 1 INJECTION, POWDER, FOR SOLUTION INTRAMUSCULAR; INTRAVENOUS at 15:56

## 2018-01-01 RX ADMIN — HYDROCODONE BITARTRATE AND ACETAMINOPHEN 1 TABLET: 10; 325 TABLET ORAL at 04:08

## 2018-01-01 RX ADMIN — FUROSEMIDE 20 MG: 10 INJECTION, SOLUTION INTRAMUSCULAR; INTRAVENOUS at 09:30

## 2018-01-01 RX ADMIN — FUROSEMIDE 40 MG: 40 TABLET ORAL at 08:05

## 2018-01-01 RX ADMIN — GABAPENTIN 1000 MG: 250 SOLUTION ORAL at 06:02

## 2018-01-01 RX ADMIN — LIDOCAINE HYDROCHLORIDE 5 ML: 20 SOLUTION ORAL; TOPICAL at 10:51

## 2018-01-01 RX ADMIN — BACLOFEN 10 MG: 10 TABLET ORAL at 06:17

## 2018-01-01 RX ADMIN — Medication 5 ML: at 06:11

## 2018-01-01 RX ADMIN — GUAIFENESIN 300 MG: 200 SOLUTION ORAL at 21:30

## 2018-01-01 RX ADMIN — Medication 10 ML: at 21:31

## 2018-01-01 RX ADMIN — ONDANSETRON 4 MG: 2 INJECTION, SOLUTION INTRAMUSCULAR; INTRAVENOUS at 02:39

## 2018-01-01 RX ADMIN — NYSTATIN 5 ML: 100000 SUSPENSION ORAL at 05:42

## 2018-01-01 RX ADMIN — FINASTERIDE 5 MG: 5 TABLET, FILM COATED ORAL at 21:08

## 2018-01-01 RX ADMIN — ATORVASTATIN CALCIUM 40 MG: 40 TABLET, FILM COATED ORAL at 09:44

## 2018-01-01 RX ADMIN — BACITRACIN ZINC: 500 OINTMENT TOPICAL at 17:12

## 2018-01-01 RX ADMIN — Medication 10 ML: at 21:13

## 2018-01-01 RX ADMIN — NYSTATIN 5 ML: 100000 SUSPENSION ORAL at 18:00

## 2018-01-01 RX ADMIN — HEPARIN SODIUM 5000 UNITS: 5000 INJECTION INTRAVENOUS; SUBCUTANEOUS at 23:08

## 2018-01-01 RX ADMIN — ATORVASTATIN CALCIUM 40 MG: 40 TABLET, FILM COATED ORAL at 08:07

## 2018-01-01 RX ADMIN — Medication 10 ML: at 05:48

## 2018-01-01 RX ADMIN — MIDODRINE HYDROCHLORIDE 10 MG: 5 TABLET ORAL at 16:33

## 2018-01-01 RX ADMIN — GABAPENTIN 750 MG: 250 SOLUTION ORAL at 23:13

## 2018-01-01 RX ADMIN — CYANOCOBALAMIN TAB 500 MCG 1000 MCG: 500 TAB at 09:17

## 2018-01-01 RX ADMIN — METRONIDAZOLE 500 MG: 500 INJECTION, SOLUTION INTRAVENOUS at 16:34

## 2018-01-01 RX ADMIN — PRAMIPEXOLE DIHYDROCHLORIDE 0.25 MG: 0.25 TABLET ORAL at 15:12

## 2018-01-01 RX ADMIN — SODIUM CHLORIDE 1000 ML: 900 INJECTION, SOLUTION INTRAVENOUS at 04:47

## 2018-01-01 RX ADMIN — ACETAMINOPHEN 650 MG: 325 TABLET ORAL at 09:59

## 2018-01-01 RX ADMIN — HUMAN INSULIN 12 UNITS: 100 INJECTION, SOLUTION SUBCUTANEOUS at 14:47

## 2018-01-01 RX ADMIN — Medication 220 MG: at 08:23

## 2018-01-01 RX ADMIN — ACETAMINOPHEN 650 MG: 325 TABLET ORAL at 17:31

## 2018-01-01 RX ADMIN — PRAMIPEXOLE DIHYDROCHLORIDE 0.25 MG: 0.25 TABLET ORAL at 08:34

## 2018-01-01 RX ADMIN — NYSTATIN 5 ML: 100000 SUSPENSION ORAL at 15:07

## 2018-01-01 RX ADMIN — MIDODRINE HYDROCHLORIDE 10 MG: 5 TABLET ORAL at 16:51

## 2018-01-01 RX ADMIN — Medication 10 ML: at 14:27

## 2018-01-01 RX ADMIN — SODIUM CHLORIDE 50 ML/HR: 900 INJECTION, SOLUTION INTRAVENOUS at 22:53

## 2018-01-01 RX ADMIN — LIDOCAINE HYDROCHLORIDE 5 ML: 20 SOLUTION ORAL; TOPICAL at 17:04

## 2018-01-01 RX ADMIN — Medication 10 ML: at 15:08

## 2018-01-01 RX ADMIN — GABAPENTIN 250 MG: 250 SUSPENSION ORAL at 10:18

## 2018-01-01 RX ADMIN — Medication 10 ML: at 06:04

## 2018-01-01 RX ADMIN — GABAPENTIN 250 MG: 250 SUSPENSION ORAL at 08:56

## 2018-01-01 RX ADMIN — GUAIFENESIN 100 MG: 200 SOLUTION ORAL at 08:54

## 2018-01-01 RX ADMIN — PRAMIPEXOLE DIHYDROCHLORIDE 0.25 MG: 0.25 TABLET ORAL at 23:14

## 2018-01-01 RX ADMIN — MIDODRINE HYDROCHLORIDE 30 MG: 5 TABLET ORAL at 09:30

## 2018-01-01 RX ADMIN — LIDOCAINE HYDROCHLORIDE 5 ML: 20 SOLUTION ORAL; TOPICAL at 09:24

## 2018-01-01 RX ADMIN — MIDODRINE HYDROCHLORIDE 10 MG: 5 TABLET ORAL at 17:54

## 2018-01-01 RX ADMIN — FINASTERIDE 5 MG: 5 TABLET, FILM COATED ORAL at 22:22

## 2018-01-01 RX ADMIN — Medication 1 CAPSULE: at 09:19

## 2018-01-01 RX ADMIN — ENOXAPARIN SODIUM 30 MG: 30 INJECTION, SOLUTION INTRAVENOUS; SUBCUTANEOUS at 22:06

## 2018-01-01 RX ADMIN — HEPARIN SODIUM 5000 UNITS: 5000 INJECTION, SOLUTION INTRAVENOUS; SUBCUTANEOUS at 21:03

## 2018-01-01 RX ADMIN — ATORVASTATIN CALCIUM 40 MG: 40 TABLET, FILM COATED ORAL at 09:38

## 2018-01-01 RX ADMIN — FINASTERIDE 5 MG: 5 TABLET, FILM COATED ORAL at 09:22

## 2018-01-01 RX ADMIN — INSULIN LISPRO 2 UNITS: 100 INJECTION, SOLUTION INTRAVENOUS; SUBCUTANEOUS at 23:08

## 2018-01-01 RX ADMIN — MIDODRINE HYDROCHLORIDE 10 MG: 5 TABLET ORAL at 11:48

## 2018-01-01 RX ADMIN — Medication 10 ML: at 06:34

## 2018-01-01 RX ADMIN — IOPAMIDOL 80 ML: 755 INJECTION, SOLUTION INTRAVENOUS at 12:34

## 2018-01-01 RX ADMIN — METRONIDAZOLE 500 MG: 500 INJECTION, SOLUTION INTRAVENOUS at 12:41

## 2018-01-01 RX ADMIN — FUROSEMIDE 20 MG: 20 TABLET ORAL at 09:36

## 2018-01-01 RX ADMIN — INSULIN GLARGINE 14 UNITS: 100 INJECTION, SOLUTION SUBCUTANEOUS at 09:14

## 2018-01-01 RX ADMIN — PRAMIPEXOLE DIHYDROCHLORIDE 0.25 MG: 0.25 TABLET ORAL at 18:53

## 2018-01-01 RX ADMIN — FAMOTIDINE 20 MG: 20 TABLET ORAL at 10:42

## 2018-01-01 RX ADMIN — FUROSEMIDE 40 MG: 40 TABLET ORAL at 09:38

## 2018-01-01 RX ADMIN — FINASTERIDE 5 MG: 5 TABLET, FILM COATED ORAL at 08:11

## 2018-01-01 RX ADMIN — PRAMIPEXOLE DIHYDROCHLORIDE 0.25 MG: 0.25 TABLET ORAL at 16:14

## 2018-01-01 RX ADMIN — INSULIN LISPRO 3 UNITS: 100 INJECTION, SOLUTION INTRAVENOUS; SUBCUTANEOUS at 21:49

## 2018-01-01 RX ADMIN — TAMSULOSIN HYDROCHLORIDE 0.4 MG: 0.4 CAPSULE ORAL at 10:45

## 2018-01-01 RX ADMIN — CEFTRIAXONE 1 G: 1 INJECTION, POWDER, FOR SOLUTION INTRAMUSCULAR; INTRAVENOUS at 14:01

## 2018-01-01 RX ADMIN — NYSTATIN 500000 UNITS: 100000 SUSPENSION ORAL at 12:53

## 2018-01-01 RX ADMIN — HYDROCODONE BITARTRATE AND ACETAMINOPHEN 5 MG: 7.5; 325 SOLUTION ORAL at 22:49

## 2018-01-01 RX ADMIN — FINASTERIDE 5 MG: 5 TABLET, FILM COATED ORAL at 22:05

## 2018-01-01 RX ADMIN — MELATONIN TAB 3 MG 3 MG: 3 TAB at 21:44

## 2018-01-01 RX ADMIN — GUAIFENESIN 100 MG: 200 SOLUTION ORAL at 20:34

## 2018-01-01 RX ADMIN — ONDANSETRON 4 MG: 2 INJECTION, SOLUTION INTRAMUSCULAR; INTRAVENOUS at 20:28

## 2018-01-01 RX ADMIN — ATORVASTATIN CALCIUM 40 MG: 40 TABLET, FILM COATED ORAL at 08:27

## 2018-01-01 RX ADMIN — MIDODRINE HYDROCHLORIDE 10 MG: 5 TABLET ORAL at 09:34

## 2018-01-01 RX ADMIN — MIDODRINE HYDROCHLORIDE 10 MG: 5 TABLET ORAL at 18:41

## 2018-01-01 RX ADMIN — MIDODRINE HYDROCHLORIDE 10 MG: 5 TABLET ORAL at 13:20

## 2018-01-01 RX ADMIN — PRAMIPEXOLE DIHYDROCHLORIDE 0.25 MG: 0.25 TABLET ORAL at 16:25

## 2018-01-01 RX ADMIN — NYSTATIN 500000 UNITS: 100000 SUSPENSION ORAL at 21:10

## 2018-01-01 RX ADMIN — NYSTATIN 5 ML: 100000 SUSPENSION ORAL at 17:22

## 2018-01-01 RX ADMIN — KETOROLAC TROMETHAMINE 15 MG: 30 INJECTION, SOLUTION INTRAMUSCULAR at 18:09

## 2018-01-01 RX ADMIN — FINASTERIDE 5 MG: 5 TABLET, FILM COATED ORAL at 22:54

## 2018-01-01 RX ADMIN — HUMAN INSULIN 14 UNITS: 100 INJECTION, SOLUTION SUBCUTANEOUS at 08:00

## 2018-01-01 RX ADMIN — FINASTERIDE 5 MG: 5 TABLET, FILM COATED ORAL at 08:39

## 2018-01-01 RX ADMIN — ENOXAPARIN SODIUM 30 MG: 30 INJECTION, SOLUTION INTRAVENOUS; SUBCUTANEOUS at 21:27

## 2018-01-01 RX ADMIN — HYDROCODONE BITARTRATE AND ACETAMINOPHEN 1 TABLET: 5; 325 TABLET ORAL at 22:56

## 2018-01-01 RX ADMIN — PROPOFOL 40 MG: 10 INJECTION, EMULSION INTRAVENOUS at 08:14

## 2018-01-01 RX ADMIN — MELATONIN TAB 3 MG 3 MG: 3 TAB at 21:04

## 2018-01-01 RX ADMIN — INSULIN LISPRO 5 UNITS: 100 INJECTION, SOLUTION INTRAVENOUS; SUBCUTANEOUS at 12:42

## 2018-01-01 RX ADMIN — PRAMIPEXOLE DIHYDROCHLORIDE 0.25 MG: 0.25 TABLET ORAL at 08:06

## 2018-01-01 RX ADMIN — INSULIN HUMAN 12 UNITS: 100 INJECTION, SOLUTION PARENTERAL at 14:31

## 2018-01-01 RX ADMIN — CYANOCOBALAMIN TAB 500 MCG 1000 MCG: 500 TAB at 09:37

## 2018-01-01 RX ADMIN — INSULIN GLARGINE 14 UNITS: 100 INJECTION, SOLUTION SUBCUTANEOUS at 08:35

## 2018-01-01 RX ADMIN — MIDODRINE HYDROCHLORIDE 10 MG: 5 TABLET ORAL at 11:55

## 2018-01-01 RX ADMIN — MIDODRINE HYDROCHLORIDE 10 MG: 5 TABLET ORAL at 14:27

## 2018-01-01 RX ADMIN — HEPARIN SODIUM 5000 UNITS: 5000 INJECTION INTRAVENOUS; SUBCUTANEOUS at 21:04

## 2018-01-01 RX ADMIN — GABAPENTIN 750 MG: 250 SOLUTION ORAL at 09:56

## 2018-01-01 RX ADMIN — ASPIRIN 81 MG 81 MG: 81 TABLET ORAL at 08:11

## 2018-01-01 RX ADMIN — PRAMIPEXOLE DIHYDROCHLORIDE 0.25 MG: 0.25 TABLET ORAL at 21:00

## 2018-01-01 RX ADMIN — GUAIFENESIN 100 MG: 200 SOLUTION ORAL at 10:43

## 2018-01-01 RX ADMIN — PRAMIPEXOLE DIHYDROCHLORIDE 0.25 MG: 0.25 TABLET ORAL at 15:07

## 2018-01-01 RX ADMIN — MIDODRINE HYDROCHLORIDE 10 MG: 5 TABLET ORAL at 17:34

## 2018-01-01 RX ADMIN — GUAIFENESIN 100 MG: 200 SOLUTION ORAL at 17:21

## 2018-01-01 RX ADMIN — MIDODRINE HYDROCHLORIDE 10 MG: 5 TABLET ORAL at 16:09

## 2018-01-01 RX ADMIN — ASPIRIN 325 MG: 325 TABLET ORAL at 09:47

## 2018-01-01 RX ADMIN — INSULIN LISPRO 5 UNITS: 100 INJECTION, SOLUTION INTRAVENOUS; SUBCUTANEOUS at 00:23

## 2018-01-01 RX ADMIN — INSULIN GLARGINE 14 UNITS: 100 INJECTION, SOLUTION SUBCUTANEOUS at 09:23

## 2018-01-01 RX ADMIN — GABAPENTIN 250 MG: 250 SUSPENSION ORAL at 09:49

## 2018-01-01 RX ADMIN — FUROSEMIDE 20 MG: 20 TABLET ORAL at 10:34

## 2018-01-01 RX ADMIN — GABAPENTIN 750 MG: 250 SOLUTION ORAL at 17:01

## 2018-01-01 RX ADMIN — ATORVASTATIN CALCIUM 40 MG: 40 TABLET, FILM COATED ORAL at 08:53

## 2018-01-01 RX ADMIN — GUAIFENESIN 300 MG: 200 SOLUTION ORAL at 09:47

## 2018-01-01 RX ADMIN — HEPARIN SODIUM 5000 UNITS: 5000 INJECTION INTRAVENOUS; SUBCUTANEOUS at 13:19

## 2018-01-01 RX ADMIN — INSULIN LISPRO 8 UNITS: 100 INJECTION, SOLUTION INTRAVENOUS; SUBCUTANEOUS at 08:21

## 2018-01-01 RX ADMIN — HYDROCODONE BITARTRATE AND ACETAMINOPHEN 1 TABLET: 10; 325 TABLET ORAL at 00:24

## 2018-01-01 RX ADMIN — MIDODRINE HYDROCHLORIDE 10 MG: 5 TABLET ORAL at 11:45

## 2018-01-01 RX ADMIN — HYDROCODONE BITARTRATE AND ACETAMINOPHEN 5 MG: 325; 7.5 SYRUP ORAL at 12:59

## 2018-01-01 RX ADMIN — GUAIFENESIN 300 MG: 200 SOLUTION ORAL at 16:24

## 2018-01-01 RX ADMIN — HUMAN INSULIN 14 UNITS: 100 INJECTION, SOLUTION SUBCUTANEOUS at 09:23

## 2018-01-01 RX ADMIN — INSULIN GLARGINE 14 UNITS: 100 INJECTION, SOLUTION SUBCUTANEOUS at 09:18

## 2018-01-01 RX ADMIN — ASPIRIN 325 MG ORAL TABLET 325 MG: 325 PILL ORAL at 09:19

## 2018-01-01 RX ADMIN — SIMETHICONE 160 MG: 80 TABLET, CHEWABLE ORAL at 09:17

## 2018-01-01 RX ADMIN — TAMSULOSIN HYDROCHLORIDE 0.4 MG: 0.4 CAPSULE ORAL at 09:21

## 2018-01-01 RX ADMIN — MIDODRINE HYDROCHLORIDE 10 MG: 5 TABLET ORAL at 16:31

## 2018-01-01 RX ADMIN — HYDROCODONE BITARTRATE AND ACETAMINOPHEN 1 TABLET: 10; 325 TABLET ORAL at 12:31

## 2018-01-01 RX ADMIN — FUROSEMIDE 20 MG: 10 INJECTION, SOLUTION INTRAMUSCULAR; INTRAVENOUS at 13:05

## 2018-01-01 RX ADMIN — FUROSEMIDE 40 MG: 10 INJECTION, SOLUTION INTRAMUSCULAR; INTRAVENOUS at 15:02

## 2018-01-01 RX ADMIN — TAMSULOSIN HYDROCHLORIDE 0.4 MG: 0.4 CAPSULE ORAL at 09:54

## 2018-01-01 RX ADMIN — ATORVASTATIN CALCIUM 40 MG: 40 TABLET, FILM COATED ORAL at 10:34

## 2018-01-01 RX ADMIN — PRAMIPEXOLE DIHYDROCHLORIDE 0.25 MG: 0.25 TABLET ORAL at 17:37

## 2018-01-01 RX ADMIN — NYSTATIN 500000 UNITS: 100000 SUSPENSION ORAL at 21:49

## 2018-01-01 RX ADMIN — INSULIN LISPRO 4 UNITS: 100 INJECTION, SOLUTION INTRAVENOUS; SUBCUTANEOUS at 13:41

## 2018-01-01 RX ADMIN — MIDODRINE HYDROCHLORIDE 10 MG: 5 TABLET ORAL at 09:21

## 2018-01-01 RX ADMIN — PRAMIPEXOLE DIHYDROCHLORIDE 0.25 MG: 0.25 TABLET ORAL at 22:54

## 2018-01-01 RX ADMIN — HUMAN INSULIN 14 UNITS: 100 INJECTION, SOLUTION SUBCUTANEOUS at 10:41

## 2018-01-01 RX ADMIN — MORPHINE SULFATE 2 MG: 2 INJECTION, SOLUTION INTRAMUSCULAR; INTRAVENOUS at 09:15

## 2018-01-01 RX ADMIN — CLOPIDOGREL BISULFATE 75 MG: 75 TABLET ORAL at 10:28

## 2018-01-01 RX ADMIN — GABAPENTIN 250 MG: 250 SUSPENSION ORAL at 10:51

## 2018-01-01 RX ADMIN — ATORVASTATIN CALCIUM 40 MG: 40 TABLET, FILM COATED ORAL at 09:21

## 2018-01-01 RX ADMIN — INSULIN LISPRO 3 UNITS: 100 INJECTION, SOLUTION INTRAVENOUS; SUBCUTANEOUS at 22:43

## 2018-01-01 RX ADMIN — CLOPIDOGREL BISULFATE 75 MG: 75 TABLET ORAL at 08:35

## 2018-01-01 RX ADMIN — NYSTATIN 5 ML: 100000 SUSPENSION ORAL at 06:04

## 2018-01-01 RX ADMIN — NYSTATIN 5 ML: 100000 SUSPENSION ORAL at 05:59

## 2018-01-01 RX ADMIN — GUAIFENESIN 100 MG: 200 SOLUTION ORAL at 23:08

## 2018-01-01 RX ADMIN — MIDODRINE HYDROCHLORIDE 10 MG: 5 TABLET ORAL at 08:29

## 2018-01-01 RX ADMIN — FLUTICASONE PROPIONATE 1 SPRAY: 50 SPRAY, METERED NASAL at 18:25

## 2018-01-01 RX ADMIN — INSULIN LISPRO 2 UNITS: 100 INJECTION, SOLUTION INTRAVENOUS; SUBCUTANEOUS at 23:34

## 2018-01-01 RX ADMIN — FINASTERIDE 5 MG: 5 TABLET, FILM COATED ORAL at 09:23

## 2018-01-01 RX ADMIN — TAMSULOSIN HYDROCHLORIDE 0.4 MG: 0.4 CAPSULE ORAL at 09:22

## 2018-01-01 RX ADMIN — FUROSEMIDE 40 MG: 10 INJECTION, SOLUTION INTRAMUSCULAR; INTRAVENOUS at 14:47

## 2018-01-01 RX ADMIN — LIDOCAINE HYDROCHLORIDE 5 ML: 20 SOLUTION ORAL; TOPICAL at 17:21

## 2018-01-01 RX ADMIN — POLYETHYLENE GLYCOL 3350 17 G: 17 POWDER, FOR SOLUTION ORAL at 17:37

## 2018-01-01 RX ADMIN — IPRATROPIUM BROMIDE AND ALBUTEROL SULFATE 3 ML: .5; 3 SOLUTION RESPIRATORY (INHALATION) at 19:35

## 2018-01-01 RX ADMIN — ALBUTEROL SULFATE 2.5 MG: 2.5 SOLUTION RESPIRATORY (INHALATION) at 20:19

## 2018-01-01 RX ADMIN — LIDOCAINE HYDROCHLORIDE 80 MG: 20 INJECTION, SOLUTION EPIDURAL; INFILTRATION; INTRACAUDAL; PERINEURAL at 08:14

## 2018-01-01 RX ADMIN — MIDODRINE HYDROCHLORIDE 10 MG: 5 TABLET ORAL at 09:07

## 2018-01-01 RX ADMIN — ASPIRIN 325 MG ORAL TABLET 325 MG: 325 PILL ORAL at 09:30

## 2018-01-01 RX ADMIN — HEPARIN SODIUM 5000 UNITS: 5000 INJECTION INTRAVENOUS; SUBCUTANEOUS at 05:00

## 2018-01-01 RX ADMIN — GABAPENTIN 750 MG: 250 SOLUTION ORAL at 17:56

## 2018-01-01 RX ADMIN — PRAMIPEXOLE DIHYDROCHLORIDE 0.25 MG: 0.25 TABLET ORAL at 22:22

## 2018-01-01 RX ADMIN — METRONIDAZOLE 500 MG: 500 INJECTION, SOLUTION INTRAVENOUS at 21:14

## 2018-01-01 RX ADMIN — ONDANSETRON 4 MG: 2 INJECTION, SOLUTION INTRAMUSCULAR; INTRAVENOUS at 06:56

## 2018-01-01 RX ADMIN — INSULIN LISPRO 3 UNITS: 100 INJECTION, SOLUTION INTRAVENOUS; SUBCUTANEOUS at 00:52

## 2018-01-01 RX ADMIN — MIDODRINE HYDROCHLORIDE 10 MG: 5 TABLET ORAL at 08:55

## 2018-01-01 RX ADMIN — INSULIN LISPRO 8 UNITS: 100 INJECTION, SOLUTION INTRAVENOUS; SUBCUTANEOUS at 21:18

## 2018-01-01 RX ADMIN — GABAPENTIN 1000 MG: 250 SOLUTION ORAL at 14:04

## 2018-01-01 RX ADMIN — HUMAN INSULIN 5 UNITS: 100 INJECTION, SOLUTION SUBCUTANEOUS at 21:54

## 2018-01-01 RX ADMIN — METRONIDAZOLE 500 MG: 500 INJECTION, SOLUTION INTRAVENOUS at 01:59

## 2018-01-01 RX ADMIN — HEPARIN SODIUM 5000 UNITS: 5000 INJECTION INTRAVENOUS; SUBCUTANEOUS at 21:05

## 2018-01-01 RX ADMIN — PRAMIPEXOLE DIHYDROCHLORIDE 0.25 MG: 0.25 TABLET ORAL at 09:07

## 2018-01-01 RX ADMIN — ATORVASTATIN CALCIUM 40 MG: 40 TABLET, FILM COATED ORAL at 08:19

## 2018-01-01 RX ADMIN — INSULIN LISPRO 5 UNITS: 100 INJECTION, SOLUTION INTRAVENOUS; SUBCUTANEOUS at 13:20

## 2018-01-01 RX ADMIN — MIDODRINE HYDROCHLORIDE 10 MG: 5 TABLET ORAL at 09:22

## 2018-01-01 RX ADMIN — FINASTERIDE 5 MG: 5 TABLET, FILM COATED ORAL at 10:42

## 2018-01-01 RX ADMIN — HYDROCODONE BITARTRATE AND ACETAMINOPHEN 7.5 MG: 325; 7.5 SYRUP ORAL at 05:59

## 2018-01-01 RX ADMIN — INSULIN GLARGINE 14 UNITS: 100 INJECTION, SOLUTION SUBCUTANEOUS at 09:07

## 2018-01-01 RX ADMIN — GUAIFENESIN 100 MG: 200 SOLUTION ORAL at 16:15

## 2018-01-01 RX ADMIN — FUROSEMIDE 20 MG: 20 TABLET ORAL at 12:59

## 2018-01-01 RX ADMIN — INSULIN GLARGINE 14 UNITS: 100 INJECTION, SOLUTION SUBCUTANEOUS at 08:57

## 2018-01-01 RX ADMIN — Medication 10 ML: at 22:07

## 2018-01-01 RX ADMIN — POLYETHYLENE GLYCOL 3350 17 G: 17 POWDER, FOR SOLUTION ORAL at 09:48

## 2018-01-01 RX ADMIN — Medication 10 ML: at 21:38

## 2018-01-01 RX ADMIN — INSULIN LISPRO 2 UNITS: 100 INJECTION, SOLUTION INTRAVENOUS; SUBCUTANEOUS at 17:42

## 2018-01-01 RX ADMIN — MORPHINE SULFATE 4 MG: 2 INJECTION, SOLUTION INTRAMUSCULAR; INTRAVENOUS at 10:53

## 2018-01-01 RX ADMIN — ASPIRIN 81 MG 81 MG: 81 TABLET ORAL at 09:23

## 2018-01-01 RX ADMIN — HYDROCODONE BITARTRATE AND ACETAMINOPHEN 5 MG: 325; 7.5 SYRUP ORAL at 00:41

## 2018-01-01 RX ADMIN — NYSTATIN 5 ML: 100000 SUSPENSION ORAL at 06:32

## 2018-01-01 RX ADMIN — INSULIN LISPRO 3 UNITS: 100 INJECTION, SOLUTION INTRAVENOUS; SUBCUTANEOUS at 15:23

## 2018-01-01 RX ADMIN — FAMOTIDINE 20 MG: 10 INJECTION, SOLUTION INTRAVENOUS at 05:36

## 2018-01-01 RX ADMIN — Medication 10 ML: at 05:21

## 2018-01-01 RX ADMIN — ALBUTEROL SULFATE 1.25 MG: 2.5 SOLUTION RESPIRATORY (INHALATION) at 04:56

## 2018-01-01 RX ADMIN — SODIUM CHLORIDE 50 ML/HR: 900 INJECTION, SOLUTION INTRAVENOUS at 12:34

## 2018-01-01 RX ADMIN — GUAIFENESIN 100 MG: 200 SOLUTION ORAL at 09:51

## 2018-01-01 RX ADMIN — MORPHINE SULFATE 4 MG: 4 INJECTION INTRAVENOUS at 17:09

## 2018-01-01 RX ADMIN — Medication 10 ML: at 13:08

## 2018-01-01 RX ADMIN — MELATONIN TAB 3 MG 3 MG: 3 TAB at 22:20

## 2018-01-01 RX ADMIN — FLUTICASONE PROPIONATE 1 SPRAY: 50 SPRAY, METERED NASAL at 08:06

## 2018-01-01 RX ADMIN — MIDODRINE HYDROCHLORIDE 30 MG: 5 TABLET ORAL at 09:41

## 2018-01-01 RX ADMIN — METRONIDAZOLE 500 MG: 500 INJECTION, SOLUTION INTRAVENOUS at 02:06

## 2018-01-01 RX ADMIN — LIDOCAINE HYDROCHLORIDE 5 ML: 20 SOLUTION ORAL; TOPICAL at 12:35

## 2018-01-01 RX ADMIN — Medication 10 ML: at 15:06

## 2018-01-01 RX ADMIN — INSULIN LISPRO 3 UNITS: 100 INJECTION, SOLUTION INTRAVENOUS; SUBCUTANEOUS at 12:52

## 2018-01-01 RX ADMIN — GUAIFENESIN 300 MG: 200 SOLUTION ORAL at 21:00

## 2018-01-01 RX ADMIN — GABAPENTIN 1000 MG: 250 SOLUTION ORAL at 06:00

## 2018-01-01 RX ADMIN — Medication 10 ML: at 10:21

## 2018-01-01 RX ADMIN — ALBUTEROL SULFATE 2.5 MG: 2.5 SOLUTION RESPIRATORY (INHALATION) at 09:12

## 2018-01-01 RX ADMIN — Medication 10 ML: at 14:36

## 2018-01-01 RX ADMIN — INSULIN HUMAN 5 UNITS: 100 INJECTION, SOLUTION PARENTERAL at 21:41

## 2018-01-01 RX ADMIN — MIDODRINE HYDROCHLORIDE 30 MG: 5 TABLET ORAL at 09:19

## 2018-01-01 RX ADMIN — Medication 10 ML: at 13:02

## 2018-01-01 RX ADMIN — PRAMIPEXOLE DIHYDROCHLORIDE 0.25 MG: 0.25 TABLET ORAL at 21:04

## 2018-01-01 RX ADMIN — OXYCODONE HYDROCHLORIDE AND ACETAMINOPHEN 1 TABLET: 5; 325 TABLET ORAL at 06:55

## 2018-01-01 RX ADMIN — FUROSEMIDE 40 MG: 10 INJECTION, SOLUTION INTRAMUSCULAR; INTRAVENOUS at 08:11

## 2018-01-01 RX ADMIN — FAMOTIDINE 20 MG: 20 TABLET ORAL at 18:13

## 2018-01-01 RX ADMIN — FENTANYL CITRATE 25 MCG: 50 INJECTION, SOLUTION INTRAMUSCULAR; INTRAVENOUS at 05:52

## 2018-01-01 RX ADMIN — CYANOCOBALAMIN TAB 500 MCG 1000 MCG: 500 TAB at 09:47

## 2018-01-01 RX ADMIN — MIDODRINE HYDROCHLORIDE 10 MG: 5 TABLET ORAL at 12:30

## 2018-01-01 RX ADMIN — MELATONIN TAB 3 MG 3 MG: 3 TAB at 01:18

## 2018-01-01 RX ADMIN — PROPOFOL 20 MG: 10 INJECTION, EMULSION INTRAVENOUS at 08:36

## 2018-01-01 RX ADMIN — HYDROCODONE BITARTRATE AND ACETAMINOPHEN 10 MG: 325; 7.5 SYRUP ORAL at 13:57

## 2018-01-01 RX ADMIN — METRONIDAZOLE 500 MG: 500 INJECTION, SOLUTION INTRAVENOUS at 23:16

## 2018-01-01 RX ADMIN — Medication 10 ML: at 13:42

## 2018-01-01 RX ADMIN — GUAIFENESIN 300 MG: 200 SOLUTION ORAL at 14:25

## 2018-01-01 RX ADMIN — MECLIZINE 25 MG: 12.5 TABLET ORAL at 22:45

## 2018-01-01 RX ADMIN — HEPARIN SODIUM 5000 UNITS: 5000 INJECTION INTRAVENOUS; SUBCUTANEOUS at 05:40

## 2018-01-01 RX ADMIN — HYDROCODONE BITARTRATE AND ACETAMINOPHEN 1 TABLET: 10; 325 TABLET ORAL at 03:29

## 2018-01-01 RX ADMIN — Medication 10 ML: at 05:37

## 2018-01-01 RX ADMIN — TAMSULOSIN HYDROCHLORIDE 0.4 MG: 0.4 CAPSULE ORAL at 08:55

## 2018-01-01 RX ADMIN — NYSTATIN 5 ML: 100000 SUSPENSION ORAL at 00:53

## 2018-01-01 RX ADMIN — METRONIDAZOLE 500 MG: 500 INJECTION, SOLUTION INTRAVENOUS at 09:37

## 2018-01-01 RX ADMIN — GABAPENTIN 250 MG: 250 SUSPENSION ORAL at 21:44

## 2018-01-01 RX ADMIN — INSULIN LISPRO 2 UNITS: 100 INJECTION, SOLUTION INTRAVENOUS; SUBCUTANEOUS at 17:58

## 2018-01-01 RX ADMIN — NYSTATIN 500000 UNITS: 100000 SUSPENSION ORAL at 21:30

## 2018-01-01 RX ADMIN — PRAMIPEXOLE DIHYDROCHLORIDE 0.25 MG: 0.25 TABLET ORAL at 17:52

## 2018-01-01 RX ADMIN — CEFTRIAXONE 1 G: 1 INJECTION, POWDER, FOR SOLUTION INTRAMUSCULAR; INTRAVENOUS at 16:03

## 2018-01-01 RX ADMIN — INSULIN LISPRO 2 UNITS: 100 INJECTION, SOLUTION INTRAVENOUS; SUBCUTANEOUS at 09:35

## 2018-01-01 RX ADMIN — LIDOCAINE HYDROCHLORIDE 5 ML: 20 SOLUTION ORAL; TOPICAL at 17:38

## 2018-01-01 RX ADMIN — FAMOTIDINE 20 MG: 20 TABLET ORAL at 09:16

## 2018-01-01 RX ADMIN — MIDODRINE HYDROCHLORIDE 10 MG: 5 TABLET ORAL at 17:59

## 2018-01-01 RX ADMIN — PROPOFOL 20 MG: 10 INJECTION, EMULSION INTRAVENOUS at 08:28

## 2018-01-01 RX ADMIN — FUROSEMIDE 40 MG: 10 INJECTION, SOLUTION INTRAMUSCULAR; INTRAVENOUS at 21:33

## 2018-01-01 RX ADMIN — ENOXAPARIN SODIUM 30 MG: 30 INJECTION, SOLUTION INTRAVENOUS; SUBCUTANEOUS at 20:43

## 2018-01-01 RX ADMIN — PRAMIPEXOLE DIHYDROCHLORIDE 0.25 MG: 0.25 TABLET ORAL at 21:44

## 2018-01-01 RX ADMIN — MIDODRINE HYDROCHLORIDE 30 MG: 5 TABLET ORAL at 09:35

## 2018-01-01 RX ADMIN — CLOPIDOGREL BISULFATE 75 MG: 75 TABLET ORAL at 09:19

## 2018-01-01 RX ADMIN — MIDODRINE HYDROCHLORIDE 10 MG: 5 TABLET ORAL at 16:44

## 2018-01-01 RX ADMIN — PRAMIPEXOLE DIHYDROCHLORIDE 0.25 MG: 0.25 TABLET ORAL at 22:23

## 2018-01-01 RX ADMIN — PRAMIPEXOLE DIHYDROCHLORIDE 0.25 MG: 0.25 TABLET ORAL at 22:20

## 2018-01-01 RX ADMIN — CLOPIDOGREL BISULFATE 75 MG: 75 TABLET ORAL at 08:25

## 2018-01-01 RX ADMIN — GUAIFENESIN 300 MG: 200 SOLUTION ORAL at 17:21

## 2018-01-01 RX ADMIN — LIDOCAINE HYDROCHLORIDE 5 ML: 20 SOLUTION ORAL; TOPICAL at 17:23

## 2018-01-01 RX ADMIN — CLOPIDOGREL BISULFATE 75 MG: 75 TABLET ORAL at 08:05

## 2018-01-01 RX ADMIN — FAMOTIDINE 20 MG: 20 TABLET ORAL at 08:55

## 2018-01-01 RX ADMIN — MIDODRINE HYDROCHLORIDE 10 MG: 5 TABLET ORAL at 09:47

## 2018-01-01 RX ADMIN — GABAPENTIN 750 MG: 250 SOLUTION ORAL at 18:13

## 2018-01-01 RX ADMIN — HUMAN INSULIN 14 UNITS: 100 INJECTION, SOLUTION SUBCUTANEOUS at 08:57

## 2018-01-01 RX ADMIN — HEPARIN SODIUM 5000 UNITS: 5000 INJECTION INTRAVENOUS; SUBCUTANEOUS at 05:48

## 2018-01-01 RX ADMIN — NYSTATIN 500000 UNITS: 100000 SUSPENSION ORAL at 09:14

## 2018-01-01 RX ADMIN — Medication 10 ML: at 14:05

## 2018-01-01 RX ADMIN — Medication 10 ML: at 21:46

## 2018-01-01 RX ADMIN — PRAMIPEXOLE DIHYDROCHLORIDE 0.25 MG: 0.25 TABLET ORAL at 09:23

## 2018-01-01 RX ADMIN — FUROSEMIDE 40 MG: 40 TABLET ORAL at 09:47

## 2018-01-01 RX ADMIN — NYSTATIN 500000 UNITS: 100000 SUSPENSION ORAL at 17:12

## 2018-01-01 RX ADMIN — MIDODRINE HYDROCHLORIDE 10 MG: 5 TABLET ORAL at 08:19

## 2018-01-01 RX ADMIN — ONDANSETRON 4 MG: 2 INJECTION, SOLUTION INTRAMUSCULAR; INTRAVENOUS at 04:35

## 2018-01-01 RX ADMIN — GABAPENTIN 250 MG: 250 SUSPENSION ORAL at 08:33

## 2018-01-01 RX ADMIN — FAMOTIDINE 20 MG: 20 TABLET ORAL at 07:49

## 2018-01-01 RX ADMIN — Medication 10 ML: at 21:50

## 2018-01-01 RX ADMIN — HEPARIN SODIUM 5000 UNITS: 5000 INJECTION INTRAVENOUS; SUBCUTANEOUS at 06:28

## 2018-01-01 RX ADMIN — HEPARIN SODIUM 5000 UNITS: 5000 INJECTION, SOLUTION INTRAVENOUS; SUBCUTANEOUS at 06:09

## 2018-01-01 RX ADMIN — Medication 220 MG: at 08:33

## 2018-01-01 RX ADMIN — GUAIFENESIN 300 MG: 200 SOLUTION ORAL at 14:30

## 2018-01-01 RX ADMIN — PRAMIPEXOLE DIHYDROCHLORIDE 0.25 MG: 0.25 TABLET ORAL at 21:12

## 2018-01-01 RX ADMIN — GUAIFENESIN 300 MG: 200 SOLUTION ORAL at 22:07

## 2018-01-01 RX ADMIN — Medication 10 ML: at 23:27

## 2018-01-01 RX ADMIN — Medication 10 ML: at 05:00

## 2018-01-01 RX ADMIN — FUROSEMIDE 40 MG: 10 INJECTION, SOLUTION INTRAMUSCULAR; INTRAVENOUS at 16:17

## 2018-01-01 RX ADMIN — FUROSEMIDE 20 MG: 20 TABLET ORAL at 12:51

## 2018-01-01 RX ADMIN — FLUTICASONE PROPIONATE 1 SPRAY: 50 SPRAY, METERED NASAL at 09:43

## 2018-01-01 RX ADMIN — BISACODYL 10 MG: 10 SUPPOSITORY RECTAL at 10:44

## 2018-01-01 RX ADMIN — Medication 10 ML: at 05:45

## 2018-01-01 RX ADMIN — CLOPIDOGREL BISULFATE 75 MG: 75 TABLET ORAL at 08:11

## 2018-01-01 RX ADMIN — ATORVASTATIN CALCIUM 40 MG: 40 TABLET, FILM COATED ORAL at 08:40

## 2018-01-01 RX ADMIN — CLOPIDOGREL BISULFATE 75 MG: 75 TABLET ORAL at 08:30

## 2018-01-01 RX ADMIN — INSULIN LISPRO 3 UNITS: 100 INJECTION, SOLUTION INTRAVENOUS; SUBCUTANEOUS at 13:41

## 2018-01-01 RX ADMIN — SODIUM CHLORIDE 50 ML/HR: 900 INJECTION, SOLUTION INTRAVENOUS at 21:00

## 2018-01-01 RX ADMIN — ALBUTEROL SULFATE 2.5 MG: 2.5 SOLUTION RESPIRATORY (INHALATION) at 21:02

## 2018-01-01 RX ADMIN — MIDODRINE HYDROCHLORIDE 10 MG: 5 TABLET ORAL at 09:55

## 2018-01-01 RX ADMIN — CLOPIDOGREL BISULFATE 75 MG: 75 TABLET ORAL at 08:20

## 2018-01-01 RX ADMIN — GABAPENTIN 250 MG: 250 SUSPENSION ORAL at 23:09

## 2018-01-01 RX ADMIN — ASPIRIN 81 MG 81 MG: 81 TABLET ORAL at 08:40

## 2018-01-01 RX ADMIN — GUAIFENESIN 300 MG: 200 SOLUTION ORAL at 08:30

## 2018-01-01 RX ADMIN — LIDOCAINE HYDROCHLORIDE 5 ML: 20 SOLUTION ORAL; TOPICAL at 08:50

## 2018-01-01 RX ADMIN — FUROSEMIDE 40 MG: 40 TABLET ORAL at 08:20

## 2018-01-01 RX ADMIN — MIDODRINE HYDROCHLORIDE 10 MG: 5 TABLET ORAL at 09:11

## 2018-01-01 RX ADMIN — INSULIN LISPRO 8 UNITS: 100 INJECTION, SOLUTION INTRAVENOUS; SUBCUTANEOUS at 08:31

## 2018-01-01 RX ADMIN — SODIUM CHLORIDE 500 ML: 900 INJECTION, SOLUTION INTRAVENOUS at 05:59

## 2018-01-01 RX ADMIN — INSULIN LISPRO 2 UNITS: 100 INJECTION, SOLUTION INTRAVENOUS; SUBCUTANEOUS at 11:48

## 2018-01-01 RX ADMIN — PRAMIPEXOLE DIHYDROCHLORIDE 0.25 MG: 0.25 TABLET ORAL at 10:28

## 2018-01-01 RX ADMIN — ASPIRIN 325 MG: 325 TABLET ORAL at 08:30

## 2018-01-01 RX ADMIN — MIDODRINE HYDROCHLORIDE 30 MG: 5 TABLET ORAL at 16:25

## 2018-01-01 RX ADMIN — FLUTICASONE PROPIONATE 2 SPRAY: 50 SPRAY, METERED NASAL at 09:00

## 2018-01-01 RX ADMIN — PRAMIPEXOLE DIHYDROCHLORIDE 0.25 MG: 0.25 TABLET ORAL at 08:53

## 2018-01-01 RX ADMIN — HUMAN INSULIN 12 UNITS: 100 INJECTION, SOLUTION SUBCUTANEOUS at 15:34

## 2018-01-01 RX ADMIN — PRAMIPEXOLE DIHYDROCHLORIDE 0.25 MG: 0.25 TABLET ORAL at 18:41

## 2018-01-01 RX ADMIN — INSULIN LISPRO 2 UNITS: 100 INJECTION, SOLUTION INTRAVENOUS; SUBCUTANEOUS at 13:24

## 2018-01-01 RX ADMIN — INSULIN GLARGINE 7 UNITS: 100 INJECTION, SOLUTION SUBCUTANEOUS at 11:44

## 2018-01-01 RX ADMIN — PRAMIPEXOLE DIHYDROCHLORIDE 0.25 MG: 0.25 TABLET ORAL at 08:23

## 2018-01-01 RX ADMIN — INSULIN HUMAN 12 UNITS: 100 INJECTION, SOLUTION PARENTERAL at 13:49

## 2018-01-01 RX ADMIN — SODIUM CHLORIDE 1000 ML: 900 INJECTION, SOLUTION INTRAVENOUS at 05:55

## 2018-01-01 RX ADMIN — ACETAMINOPHEN 650 MG: 325 TABLET ORAL at 20:42

## 2018-01-01 RX ADMIN — Medication 10 ML: at 22:00

## 2018-01-01 RX ADMIN — NYSTATIN 500000 UNITS: 100000 SUSPENSION ORAL at 09:07

## 2018-01-01 RX ADMIN — GUAIFENESIN 300 MG: 200 SOLUTION ORAL at 16:44

## 2018-01-01 RX ADMIN — PRAMIPEXOLE DIHYDROCHLORIDE 0.25 MG: 0.25 TABLET ORAL at 22:45

## 2018-01-01 RX ADMIN — FINASTERIDE 5 MG: 5 TABLET, FILM COATED ORAL at 21:16

## 2018-01-01 RX ADMIN — HYDROCODONE BITARTRATE AND ACETAMINOPHEN 1 TABLET: 10; 325 TABLET ORAL at 11:55

## 2018-01-01 RX ADMIN — INSULIN LISPRO 3 UNITS: 100 INJECTION, SOLUTION INTRAVENOUS; SUBCUTANEOUS at 20:42

## 2018-01-01 RX ADMIN — INSULIN GLARGINE 16 UNITS: 100 INJECTION, SOLUTION SUBCUTANEOUS at 08:12

## 2018-01-01 RX ADMIN — INSULIN HUMAN 14 UNITS: 100 INJECTION, SOLUTION PARENTERAL at 08:35

## 2018-01-01 RX ADMIN — INSULIN LISPRO 3 UNITS: 100 INJECTION, SOLUTION INTRAVENOUS; SUBCUTANEOUS at 06:37

## 2018-01-01 RX ADMIN — Medication 10 ML: at 13:36

## 2018-01-01 RX ADMIN — METRONIDAZOLE 500 MG: 500 INJECTION, SOLUTION INTRAVENOUS at 00:35

## 2018-01-01 RX ADMIN — MIDODRINE HYDROCHLORIDE 10 MG: 5 TABLET ORAL at 11:51

## 2018-01-01 RX ADMIN — GABAPENTIN 250 MG: 250 SUSPENSION ORAL at 08:13

## 2018-01-01 RX ADMIN — FUROSEMIDE 20 MG: 20 TABLET ORAL at 12:50

## 2018-01-01 RX ADMIN — CYANOCOBALAMIN TAB 500 MCG 1000 MCG: 500 TAB at 08:30

## 2018-01-01 RX ADMIN — FAMOTIDINE 20 MG: 20 TABLET ORAL at 09:36

## 2018-01-01 RX ADMIN — NYSTATIN 500000 UNITS: 100000 SUSPENSION ORAL at 17:42

## 2018-01-01 RX ADMIN — PRAMIPEXOLE DIHYDROCHLORIDE 0.25 MG: 0.25 TABLET ORAL at 17:03

## 2018-01-01 RX ADMIN — Medication 10 ML: at 10:47

## 2018-01-01 RX ADMIN — HUMAN INSULIN 12 UNITS: 100 INJECTION, SOLUTION SUBCUTANEOUS at 13:42

## 2018-01-01 RX ADMIN — FAMOTIDINE 20 MG: 20 TABLET ORAL at 09:17

## 2018-01-01 RX ADMIN — Medication 10 ML: at 22:45

## 2018-01-01 RX ADMIN — INSULIN LISPRO 2 UNITS: 100 INJECTION, SOLUTION INTRAVENOUS; SUBCUTANEOUS at 08:05

## 2018-01-01 RX ADMIN — MIDODRINE HYDROCHLORIDE 30 MG: 5 TABLET ORAL at 17:59

## 2018-01-01 RX ADMIN — FUROSEMIDE 40 MG: 40 TABLET ORAL at 09:50

## 2018-01-01 RX ADMIN — CLOPIDOGREL BISULFATE 75 MG: 75 TABLET ORAL at 09:43

## 2018-01-01 RX ADMIN — Medication 220 MG: at 09:26

## 2018-01-01 RX ADMIN — SIMETHICONE 160 MG: 80 TABLET, CHEWABLE ORAL at 11:46

## 2018-01-01 RX ADMIN — MIDODRINE HYDROCHLORIDE 10 MG: 5 TABLET ORAL at 16:14

## 2018-01-01 RX ADMIN — ASPIRIN 325 MG: 325 TABLET ORAL at 09:16

## 2018-01-01 RX ADMIN — FINASTERIDE 5 MG: 5 TABLET, FILM COATED ORAL at 23:14

## 2018-01-01 RX ADMIN — GUAIFENESIN 100 MG: 200 SOLUTION ORAL at 09:11

## 2018-01-01 RX ADMIN — POTASSIUM CHLORIDE 10 MEQ: 750 TABLET, FILM COATED, EXTENDED RELEASE ORAL at 10:18

## 2018-01-01 RX ADMIN — CEFTRIAXONE 1 G: 1 INJECTION, POWDER, FOR SOLUTION INTRAMUSCULAR; INTRAVENOUS at 14:42

## 2018-01-01 RX ADMIN — INSULIN LISPRO 8 UNITS: 100 INJECTION, SOLUTION INTRAVENOUS; SUBCUTANEOUS at 14:36

## 2018-01-01 RX ADMIN — DOCUSATE SODIUM 100 MG: 100 CAPSULE, LIQUID FILLED ORAL at 09:19

## 2018-01-01 RX ADMIN — GABAPENTIN 750 MG: 250 SOLUTION ORAL at 12:03

## 2018-01-01 RX ADMIN — ASPIRIN 325 MG ORAL TABLET 325 MG: 325 PILL ORAL at 09:48

## 2018-01-01 RX ADMIN — GUAIFENESIN 100 MG: 200 SOLUTION ORAL at 21:31

## 2018-01-01 RX ADMIN — GUAIFENESIN 300 MG: 200 SOLUTION ORAL at 20:40

## 2018-01-01 RX ADMIN — Medication 10 ML: at 14:53

## 2018-01-01 RX ADMIN — TAMSULOSIN HYDROCHLORIDE 0.4 MG: 0.4 CAPSULE ORAL at 08:40

## 2018-01-01 RX ADMIN — MIDODRINE HYDROCHLORIDE 30 MG: 5 TABLET ORAL at 21:10

## 2018-01-01 RX ADMIN — LIDOCAINE HYDROCHLORIDE 5 ML: 20 SOLUTION ORAL; TOPICAL at 08:33

## 2018-01-01 RX ADMIN — INSULIN HUMAN 10 UNITS: 100 INJECTION, SUSPENSION SUBCUTANEOUS at 16:25

## 2018-01-01 RX ADMIN — PRAMIPEXOLE DIHYDROCHLORIDE 0.25 MG: 0.25 TABLET ORAL at 16:44

## 2018-01-01 RX ADMIN — GABAPENTIN 750 MG: 250 SOLUTION ORAL at 08:33

## 2018-01-01 RX ADMIN — ALBUTEROL SULFATE 2.5 MG: 2.5 SOLUTION RESPIRATORY (INHALATION) at 08:23

## 2018-01-01 RX ADMIN — GUAIFENESIN 400 MG: 200 SOLUTION ORAL at 12:42

## 2018-01-01 RX ADMIN — INSULIN HUMAN 10 UNITS: 100 INJECTION, SOLUTION PARENTERAL at 05:57

## 2018-01-01 RX ADMIN — GUAIFENESIN 100 MG: 200 SOLUTION ORAL at 17:38

## 2018-01-01 RX ADMIN — METRONIDAZOLE 500 MG: 500 INJECTION, SOLUTION INTRAVENOUS at 09:26

## 2018-01-01 RX ADMIN — MIDODRINE HYDROCHLORIDE 10 MG: 5 TABLET ORAL at 08:05

## 2018-01-01 RX ADMIN — HEPARIN SODIUM 5000 UNITS: 5000 INJECTION, SOLUTION INTRAVENOUS; SUBCUTANEOUS at 22:22

## 2018-01-01 RX ADMIN — PRAMIPEXOLE DIHYDROCHLORIDE 0.25 MG: 0.25 TABLET ORAL at 16:31

## 2018-01-01 RX ADMIN — CLOPIDOGREL BISULFATE 75 MG: 75 TABLET ORAL at 09:31

## 2018-01-01 RX ADMIN — FAMOTIDINE 20 MG: 20 TABLET ORAL at 08:05

## 2018-01-01 RX ADMIN — METRONIDAZOLE 500 MG: 500 INJECTION, SOLUTION INTRAVENOUS at 12:06

## 2018-01-14 ENCOUNTER — HOSPITAL ENCOUNTER (EMERGENCY)
Age: 83
Discharge: HOME | End: 2018-01-14
Attending: EMERGENCY MEDICINE
Payer: MEDICARE

## 2018-01-14 VITALS
OXYGEN SATURATION: 95 % | WEIGHT: 170 LBS | HEIGHT: 67 IN | SYSTOLIC BLOOD PRESSURE: 110 MMHG | BODY MASS INDEX: 26.68 KG/M2 | DIASTOLIC BLOOD PRESSURE: 71 MMHG | HEART RATE: 70 BPM | RESPIRATION RATE: 16 BRPM | TEMPERATURE: 98.2 F

## 2018-01-14 DIAGNOSIS — D69.6 THROMBOCYTOPENIA (HCC): ICD-10-CM

## 2018-01-14 DIAGNOSIS — R21 RASH: Primary | ICD-10-CM

## 2018-01-14 LAB
ALBUMIN SERPL-MCNC: 2.4 G/DL (ref 3.5–5)
ALBUMIN/GLOB SERPL: 0.6 {RATIO} (ref 1.1–2.2)
ALP SERPL-CCNC: 71 U/L (ref 45–117)
ALT SERPL-CCNC: 40 U/L (ref 12–78)
ANION GAP SERPL CALC-SCNC: 6 MMOL/L (ref 5–15)
APTT PPP: 30.1 SEC (ref 22.1–32.5)
AST SERPL-CCNC: 44 U/L (ref 15–37)
BASOPHILS # BLD: 0 K/UL (ref 0–0.1)
BASOPHILS NFR BLD: 0 % (ref 0–1)
BILIRUB SERPL-MCNC: 0.6 MG/DL (ref 0.2–1)
BUN SERPL-MCNC: 30 MG/DL (ref 6–20)
BUN/CREAT SERPL: 20 (ref 12–20)
CALCIUM SERPL-MCNC: 8.1 MG/DL (ref 8.5–10.1)
CHLORIDE SERPL-SCNC: 99 MMOL/L (ref 97–108)
CO2 SERPL-SCNC: 32 MMOL/L (ref 21–32)
CREAT SERPL-MCNC: 1.48 MG/DL (ref 0.7–1.3)
EOSINOPHIL # BLD: 0.2 K/UL (ref 0–0.4)
EOSINOPHIL NFR BLD: 2 % (ref 0–7)
ERYTHROCYTE [DISTWIDTH] IN BLOOD BY AUTOMATED COUNT: 13.7 % (ref 11.5–14.5)
GLOBULIN SER CALC-MCNC: 3.7 G/DL (ref 2–4)
GLUCOSE SERPL-MCNC: 212 MG/DL (ref 65–100)
HCT VFR BLD AUTO: 35.3 % (ref 36.6–50.3)
HGB BLD-MCNC: 11.6 G/DL (ref 12.1–17)
INR PPP: 1.2 (ref 0.9–1.1)
LYMPHOCYTES # BLD: 0.7 K/UL (ref 0.8–3.5)
LYMPHOCYTES NFR BLD: 9 % (ref 12–49)
MCH RBC QN AUTO: 30.9 PG (ref 26–34)
MCHC RBC AUTO-ENTMCNC: 32.9 G/DL (ref 30–36.5)
MCV RBC AUTO: 93.9 FL (ref 80–99)
MONOCYTES # BLD: 0.6 K/UL (ref 0–1)
MONOCYTES NFR BLD: 7 % (ref 5–13)
NEUTS SEG # BLD: 6.7 K/UL (ref 1.8–8)
NEUTS SEG NFR BLD: 82 % (ref 32–75)
PLATELET # BLD AUTO: 69 K/UL (ref 150–400)
POTASSIUM SERPL-SCNC: 4.4 MMOL/L (ref 3.5–5.1)
PROT SERPL-MCNC: 6.1 G/DL (ref 6.4–8.2)
PROTHROMBIN TIME: 12 SEC (ref 9–11.1)
RBC # BLD AUTO: 3.76 M/UL (ref 4.1–5.7)
RBC MORPH BLD: ABNORMAL
SODIUM SERPL-SCNC: 137 MMOL/L (ref 136–145)
THERAPEUTIC RANGE,PTTT: NORMAL SECS (ref 58–77)
WBC # BLD AUTO: 8.2 K/UL (ref 4.1–11.1)

## 2018-01-14 PROCEDURE — 74011250636 HC RX REV CODE- 250/636: Performed by: EMERGENCY MEDICINE

## 2018-01-14 PROCEDURE — 74011000250 HC RX REV CODE- 250: Performed by: EMERGENCY MEDICINE

## 2018-01-14 PROCEDURE — 85025 COMPLETE CBC W/AUTO DIFF WBC: CPT | Performed by: EMERGENCY MEDICINE

## 2018-01-14 PROCEDURE — 96375 TX/PRO/DX INJ NEW DRUG ADDON: CPT

## 2018-01-14 PROCEDURE — 85610 PROTHROMBIN TIME: CPT | Performed by: EMERGENCY MEDICINE

## 2018-01-14 PROCEDURE — 96374 THER/PROPH/DIAG INJ IV PUSH: CPT

## 2018-01-14 PROCEDURE — 36415 COLL VENOUS BLD VENIPUNCTURE: CPT | Performed by: EMERGENCY MEDICINE

## 2018-01-14 PROCEDURE — 80053 COMPREHEN METABOLIC PANEL: CPT | Performed by: EMERGENCY MEDICINE

## 2018-01-14 PROCEDURE — 99284 EMERGENCY DEPT VISIT MOD MDM: CPT

## 2018-01-14 PROCEDURE — 85730 THROMBOPLASTIN TIME PARTIAL: CPT | Performed by: EMERGENCY MEDICINE

## 2018-01-14 RX ORDER — DIPHENHYDRAMINE HCL 25 MG
25 CAPSULE ORAL
Qty: 20 CAP | Refills: 0 | Status: SHIPPED | OUTPATIENT
Start: 2018-01-14 | End: 2018-01-24

## 2018-01-14 RX ORDER — FAMOTIDINE 20 MG/1
20 TABLET, FILM COATED ORAL 2 TIMES DAILY
Qty: 20 TAB | Refills: 0 | Status: ON HOLD | OUTPATIENT
Start: 2018-01-14 | End: 2018-01-01

## 2018-01-14 RX ORDER — LEVOFLOXACIN 750 MG/1
750 TABLET ORAL EVERY OTHER DAY
Qty: 5 TAB | Refills: 0 | Status: SHIPPED | OUTPATIENT
Start: 2018-01-14 | End: 2018-01-01 | Stop reason: ALTCHOICE

## 2018-01-14 RX ORDER — DIPHENHYDRAMINE HYDROCHLORIDE 50 MG/ML
25 INJECTION, SOLUTION INTRAMUSCULAR; INTRAVENOUS
Status: COMPLETED | OUTPATIENT
Start: 2018-01-14 | End: 2018-01-14

## 2018-01-14 RX ORDER — METHYLPREDNISOLONE 4 MG/1
TABLET ORAL
Qty: 1 DOSE PACK | Refills: 0 | Status: SHIPPED | OUTPATIENT
Start: 2018-01-14 | End: 2018-01-01 | Stop reason: ALTCHOICE

## 2018-01-14 RX ORDER — CLINDAMYCIN HYDROCHLORIDE 150 MG/1
CAPSULE ORAL EVERY 6 HOURS
COMMUNITY
End: 2018-01-14

## 2018-01-14 RX ORDER — FAMOTIDINE 10 MG/ML
20 INJECTION INTRAVENOUS
Status: COMPLETED | OUTPATIENT
Start: 2018-01-14 | End: 2018-01-14

## 2018-01-14 RX ADMIN — FAMOTIDINE 20 MG: 10 INJECTION, SOLUTION INTRAVENOUS at 12:17

## 2018-01-14 RX ADMIN — METHYLPREDNISOLONE SODIUM SUCCINATE 125 MG: 125 INJECTION, POWDER, FOR SOLUTION INTRAMUSCULAR; INTRAVENOUS at 12:16

## 2018-01-14 RX ADMIN — DIPHENHYDRAMINE HYDROCHLORIDE 25 MG: 50 INJECTION, SOLUTION INTRAMUSCULAR; INTRAVENOUS at 12:18

## 2018-01-14 NOTE — ED NOTES
Assumed care of pt, pt arrived by EMS from [facility], resting on stretcher in position of comfort, call bell within reach, side rails up x2, pt reports he was recently discharged from [facility] for pneumonia, started on clindamycin, has taken 3 doses, began with red itching rash this morning, per EMS pt was hypotensive

## 2018-01-14 NOTE — DISCHARGE INSTRUCTIONS
Rash: Care Instructions  Your Care Instructions  A rash is any irritation or inflammation of the skin. Rashes have many possible causes, including allergy, infection, illness, heat, and emotional stress. Follow-up care is a key part of your treatment and safety. Be sure to make and go to all appointments, and call your doctor if you are having problems. It's also a good idea to know your test results and keep a list of the medicines you take. How can you care for yourself at home? · Wash the area with water only. Soap can make dryness and itching worse. Pat dry. · Put cold, wet cloths on the rash to reduce itching. · Keep cool, and stay out of the sun. · Leave the rash open to the air as much of the time as possible. · Sometimes petroleum jelly (Vaseline) can help relieve the discomfort caused by a rash. A moisturizing lotion, such as Cetaphil, also may help. Calamine lotion may help for rashes caused by contact with something (such as a plant or soap) that irritated the skin. Use it 3 or 4 times a day. · If your doctor prescribed a cream, use it as directed. If your doctor prescribed medicine, take it exactly as directed. · If your rash itches so badly that it interferes with your normal activities, take an over-the-counter antihistamine, such as diphenhydramine (Benadryl) or loratadine (Claritin). Read and follow all instructions on the label. When should you call for help? Call your doctor now or seek immediate medical care if:  ? · You have signs of infection, such as:  ¨ Increased pain, swelling, warmth, or redness. ¨ Red streaks leading from the area. ¨ Pus draining from the area. ¨ A fever. ? · You have joint pain along with the rash. ? Watch closely for changes in your health, and be sure to contact your doctor if:  ? · Your rash is changing or getting worse. For example, call if you have pain along with the rash, the rash is spreading, or you have new blisters.    ? · You do not get better after 1 week. Where can you learn more? Go to http://aniket-tomas.info/. Enter T693 in the search box to learn more about \"Rash: Care Instructions. \"  Current as of: October 13, 2016  Content Version: 11.4  © 2918-1369 [employer]. Care instructions adapted under license by [employer] (which disclaims liability or warranty for this information). If you have questions about a medical condition or this instruction, always ask your healthcare professional. [address] any warranty or liability for your use of this information.

## 2018-01-14 NOTE — ED PROVIDER NOTES
EMERGENCY DEPARTMENT HISTORY AND PHYSICAL EXAM      Date: 1/14/2018  Patient Name: Arti Mcwilliams    History of Presenting Illness     Chief Complaint   Patient presents with    Rash     pt arrives by EMS with reports of rash, recently treated for pneumonia and treated with clindamycin, has taken 3 doses, rash started early this morning, EMS reports pt was hypotensive    Hypotension       History Provided By: Patient    HPI: Arti Mcwilliams, 80 y.o. male with PMHx significant for HTN, HLD, DM, CAD s/p CABG and stent(s) placed, and CHF s/p AICD placed, presents via EMS to the ED with cc of a progressively worsening, diffuse, pruritic rash that started on his back and spread to his hips, abdomen, chest, legs BL, and arms BL that started this morning. He notes that he was recently discharged from [facility] for aspiration PNA, and he was prescribed Clindamycin. He states that since his discharge, he has taken 3 doses. He denies a history of taking Clindamycin prior to his recent hospital discharge. He reports that he has had normal bowel movements. He specifically denies fevers, light-headedness, dizziness, chest pain, SOB, cough, nausea, vomiting, urinary symptoms, or other complaints at this time. There are no other complaints, changes, or physical findings at this time. PCP: Vanessa Whitehead MD    Current Outpatient Prescriptions   Medication Sig Dispense Refill    diphenhydrAMINE (BENADRYL) 25 mg capsule Take 1 Cap by mouth every six (6) hours as needed for up to 10 days. 20 Cap 0    methylPREDNISolone (MEDROL, RADHA,) 4 mg tablet Take as directed 1 Dose Pack 0    famotidine (PEPCID) 20 mg tablet Take 1 Tab by mouth two (2) times a day. 20 Tab 0    levoFLOXacin (LEVAQUIN) 750 mg tablet Take 1 Tab by mouth every other day.  5 Tab 0    lidocaine (LIDOCAINE VISCOUS) 2 % solution       traMADol (ULTRAM) 50 mg tablet       HUMALOG KWIKPEN 100 unit/mL kwikpen INJECT 9 UNITS WITH BREAKFAST, 7 UNITS WITH LUNCH, 7 UNITS WITH DINNER AND 7 UNITS AT BEDTIME TUBE FEEDS 5 Pen 7    guaiFENesin (ROBITUSSIN) 100 mg/5 mL liquid Take 200 mg by mouth three (3) times daily as needed for Cough.  insulin glargine (LANTUS SOLOSTAR) 100 unit/mL (3 mL) pen 14 Units by SubCUTAneous route daily. 5 Pen 11    metoprolol tartrate (LOPRESSOR) 25 mg tablet Take 12.5 mg by mouth two (2) times a day.  nitroglycerin (NITROSTAT) 0.4 mg SL tablet 0.4 mg by SubLINGual route every five (5) minutes as needed for Chest Pain.  aspirin (ASPIRIN) 325 mg tablet Take 325 mg by mouth daily.  vit B Cmplx 3-FA-Vit C-Biotin (NEPHRO GRAYSON RX) 1- mg-mg-mcg tablet Take 1 Tab by mouth daily.  magnesium hydroxide (TBOIAS MILK OF MAGNESIA) 400 mg/5 mL suspension Take 30 mL by mouth daily as needed for Constipation.  albuterol-ipratropium (DUO-NEB) 2.5 mg-0.5 mg/3 ml nebu 3 mL by Nebulization route every six (6) hours as needed. 100 Nebule 1    HYDROcodone-acetaminophen (NORCO) 5-325 mg per tablet 1 Tab by Per G Tube route every four (4) hours as needed. Max Daily Amount: 6 Tabs. 30 Tab 0    ondansetron hcl (ZOFRAN, AS HYDROCHLORIDE,) 8 mg tablet Take 1 Tab by mouth every eight (8) hours as needed for Nausea. 20 Tab 0    Nebulizer & Compressor machine 1 Each by Does Not Apply route daily. 1 Each 0    midodrine (PROAMITINE) 10 mg tablet Take 10 mg by mouth three (3) times daily (with meals).  insulin syringe-needle U-100 1 mL 31 x 15/64\" syrg 1 Syringe by SubCUTAneous route four (4) times daily. 200 Each 11    Insulin Needles, Disposable, (ESPERANZA PEN NEEDLE) 32 gauge x 5/32\" ndle 5 times a day 150 Each 99    atorvastatin (LIPITOR) 40 mg tablet Take 40 mg by mouth daily.  furosemide (LASIX) 20 mg tablet Take 1 Tab by mouth daily. Indications: HYPERCALCEMIA 30 Tab 2    gabapentin (NEURONTIN) 250 mg/5 mL solution Take 500 mg by mouth three (3) times daily.       multivitamin (ONE A DAY) tablet Take 1 Tab by mouth daily.  clopidogrel (PLAVIX) 75 mg tablet Take 75 mg by mouth daily. 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 I suspect the latter. Achalasia  is not present.         GERD (gastroesophageal reflux disease)     Heart failure ([facility] [address] 10/2014     Cardiomyopathy:Pacemaker upgrade:Biv and AICD  Dr. Harjit Spears heart  last visit 5/11/2015    Hepatitis C Dx 1996    treated at [facility] in past; as of 4/15/15 wife states pt currently not under any treatment    Hyperlipidemia     Hypertension     Myocardial infarct 2013    Heart Cath: 40% LV EF, Stented distal LAD, patent Graft to circumflex    On tube feeding diet approx 2009    still has as of 9/28/15  (no po food/liquid/meds at all); Dr Nubia Del Cid Other ill-defined conditions(799.89) 1996    1 dose of chemotherapy/radiation for tongue cancer    Other ill-defined conditions(799.89)     BPH    Other ill-defined conditions(799.89)     orthostatic hypotension    Pneumonia ~ April -May 2010    Stroke [facility]) approx 2003    left side-left finger tips numb; no imbalance or memory loss; as of 2015 not seeing neuro MD    Suicidal thoughts        Past Surgical History:  Past Surgical History:   Procedure Laterality Date    ABDOMEN SURGERY [address]    peg tube    CABG, ARTERY-VEIN, THREE      CHANGE GASTROSTOMY TUBE  2011         CHANGE GASTROSTOMY TUBE  2011         HX CATARACT REMOVAL      bilateral    HX CHOLECYSTECTOMY      HX HEART CATHETERIZATION  2013    Stented distal LAD    HX MOHS PROCEDURES      bilateral    HX ORTHOPAEDIC      back surgery times two    HX OTHER SURGICAL      Radical Left Neck    HX OTHER SURGICAL      NASAL POLYPS REMOVAL    HX OTHER SURGICAL  2010    TURP    HX PACEMAKER      HX PACEMAKER  10/28/14     Defibrillator: [facility] # [id number], serial # [id number]; Dr. Jyothi Finch [phone]; Dr Kaity Valenzuela      cystoscopy    NEUROLOGICAL PROCEDURE UNLISTED      cevical surgery    DC EGD INSERT GUIDE WIRE DILATOR PASSAGE ESOPHAGUS  2010         DC EGD TRANSORAL BIOPSY SINGLE/MULTIPLE  2010         STOMACH SURGERY PROCEDURE UNLISTED  2011         VASCULAR SURGERY PROCEDURE UNLIST      bilateral carotid stents       Family History:  Family History   Problem Relation Age of Onset    Heart Disease Father       at age 52 from CAD    Colon Cancer Mother     Cancer Mother      colon ca    Heart Disease Brother        Social History:  Social History   Substance Use Topics    Smoking status: Never Smoker    Smokeless tobacco: Never Used    Alcohol use No       Allergies: Allergies   Allergen Reactions    Demerol [Meperidine] Shortness of Breath    Paxil [Paroxetine Hcl] Unknown (comments)     Pt gets shaky and loses control of legs    Amoxicillin Rash    Pcn [Penicillins] Rash       Review of Systems   Review of Systems   Constitutional: Negative for chills, fatigue and fever. HENT: Negative for congestion and rhinorrhea. Eyes: Negative for visual disturbance. Respiratory: Negative for cough, shortness of breath and wheezing. Cardiovascular: Negative for chest pain and palpitations.    Gastrointestinal: Negative for abdominal distention, abdominal pain, constipation, diarrhea, nausea and vomiting. Endocrine: Negative. Genitourinary: Negative for difficulty urinating and dysuria. Musculoskeletal: Negative. Skin: Positive for rash. Neurological: Negative for dizziness, weakness and light-headedness. Psychiatric/Behavioral: Negative for suicidal ideas. Physical Exam   Physical Exam   Constitutional: He is oriented to person, place, and time. He appears well-developed and well-nourished. No distress. HENT:   Head: Normocephalic and atraumatic. Mouth/Throat: Oropharynx is clear and moist.   Mouth/throat: No angioedema, no tongue swelling. Eyes: Conjunctivae and EOM are normal.   Neck: Neck supple. No JVD present. No tracheal deviation present. Cardiovascular: Normal rate, regular rhythm and intact distal pulses. Exam reveals no gallop and no friction rub. No murmur heard. Pulmonary/Chest: Effort normal and breath sounds normal. No stridor. No respiratory distress. He has no wheezes. Pt is speaking in full sentences, no stridor. Abdominal: Soft. Bowel sounds are normal. He exhibits no distension and no mass. There is no tenderness. There is no guarding. Feed tube placed. Musculoskeletal: Normal range of motion. He exhibits no edema or tenderness. No deformity   Neurological: He is alert and oriented to person, place, and time. He has normal strength. No focal deficits   Skin: Skin is warm, dry and intact. Rash noted. Diffuse petechiae rash to all four extremities, trunk, and back; most concentrated to left lower back, buttocks, and around inguinal region BL. Psychiatric: He has a normal mood and affect. His behavior is normal. Judgment and thought content normal.   Nursing note and vitals reviewed.       Diagnostic Study Results   Labs -     Recent Results (from the past 12 hour(s))   METABOLIC PANEL, COMPREHENSIVE    Collection Time: 01/14/18 12:14 PM   Result Value Ref Range    Sodium 137 136 - 145 mmol/L    Potassium 4.4 3.5 - 5.1 mmol/L    Chloride 99 97 - 108 mmol/L    CO2 32 21 - 32 mmol/L    Anion gap 6 5 - 15 mmol/L    Glucose 212 (H) 65 - 100 mg/dL    BUN 30 (H) 6 - 20 MG/DL    Creatinine 1.48 (H) 0.70 - 1.30 MG/DL    BUN/Creatinine ratio 20 12 - 20      GFR est AA 55 (L) >60 ml/min/1.73m2    GFR est non-AA 46 (L) >60 ml/min/1.73m2    Calcium 8.1 (L) 8.5 - 10.1 MG/DL    Bilirubin, total 0.6 0.2 - 1.0 MG/DL    ALT (SGPT) 40 12 - 78 U/L    AST (SGOT) 44 (H) 15 - 37 U/L    Alk. phosphatase 71 45 - 117 U/L    Protein, total 6.1 (L) 6.4 - 8.2 g/dL    Albumin 2.4 (L) 3.5 - 5.0 g/dL    Globulin 3.7 2.0 - 4.0 g/dL    A-G Ratio 0.6 (L) 1.1 - 2.2     CBC WITH AUTOMATED DIFF    Collection Time: 01/14/18 12:14 PM   Result Value Ref Range    WBC 8.2 4.1 - 11.1 K/uL    RBC 3.76 (L) 4.10 - 5.70 M/uL    HGB 11.6 (L) 12.1 - 17.0 g/dL    HCT 35.3 (L) 36.6 - 50.3 %    MCV 93.9 80.0 - 99.0 FL    MCH 30.9 26.0 - 34.0 PG    MCHC 32.9 30.0 - 36.5 g/dL    RDW 13.7 11.5 - 14.5 %    PLATELET 69 (L) 100 - 400 K/uL    NEUTROPHILS 82 (H) 32 - 75 %    LYMPHOCYTES 9 (L) 12 - 49 %    MONOCYTES 7 5 - 13 %    EOSINOPHILS 2 0 - 7 %    BASOPHILS 0 0 - 1 %    ABS. NEUTROPHILS 6.7 1.8 - 8.0 K/UL    ABS. LYMPHOCYTES 0.7 (L) 0.8 - 3.5 K/UL    ABS. MONOCYTES 0.6 0.0 - 1.0 K/UL    ABS. EOSINOPHILS 0.2 0.0 - 0.4 K/UL    ABS. BASOPHILS 0.0 0.0 - 0.1 K/UL    RBC COMMENTS NORMOCYTIC, NORMOCHROMIC     PTT    Collection Time: 01/14/18 12:14 PM   Result Value Ref Range    aPTT 30.1 22.1 - 32.5 sec    aPTT, therapeutic range     58.0 - 77.0 SECS   PROTHROMBIN TIME + INR    Collection Time: 01/14/18 12:14 PM   Result Value Ref Range    INR 1.2 (H) 0.9 - 1.1      Prothrombin time 12.0 (H) 9.0 - 11.1 sec       Medical Decision Making   I am the first provider for this patient. I reviewed the vital signs, available nursing notes, past medical history, past surgical history, family history and social history. Vital Signs-Reviewed the patient's vital signs.   Patient Vitals for the past 12 hrs:   Temp Pulse Resp BP SpO2   01/14/18 1413 98.2 °F (36.8 °C) - - 110/71 95 %   01/14/18 1400 - - - 110/71 94 %   01/14/18 1345 - - - 108/61 95 %   01/14/18 1300 - - - 113/69 97 %   01/14/18 1223 - - - 107/68 96 %   01/14/18 1200 - - - 125/68 98 %   01/14/18 1147 97.9 °F (36.6 °C) 70 16 105/58 99 %       Pulse Oximetry Analysis - 97% on RA    Cardiac Monitor:   Rate: 75 bpm  Rhythm: Normal Sinus Rhythm     Records Reviewed: Nursing Notes, Old Medical Records, Ambulance Run Sheet and Previous Laboratory Studies    Provider Notes (Medical Decision Making):   Pt presenting with a generalized petechial rash, worse in his low back, buttocks and inguinal area. Pt with no other complaints. Low suspicion for infection given presentation, lack of fever, and non-toxic appearance. WIll check labs to eval for thrombocytopenia, coagulopathy. ED Course:   Initial assessment performed. The patients presenting problems have been discussed, and they are in agreement with the care plan formulated and outlined with them. I have encouraged them to ask questions as they arise throughout their visit. Progress Note:  1:10 PM  The patient was re-evaluated after initial ED treatment, and he has no real changes to his rash yet. He reports that he has a known history of thrombocytopenia. He also notes a history of lung cancer, but he denies any current treatments. Written by LUANNE Sidhu, as dictated by Paulette Pereira DO. Progress Note:  2:36 PM  Prior to re-evaluation, the patient was sleeping comfortably in ED bed. The patient was re-evaluated and he has no improvement or worsening of his rash after ED treatment. The patient expresses that he would like to be discharged home, and he has no further complaints at this time. Reviewed the patient's lab work with him and his wife, and they convey their understanding of these results. Informed the patient and his wife of customary return precautions.  Consulted Pharmacy, and given the patient's allergies, will start the patient on Levaquin 750 mg every 48 hours for recent diagnosis of aspiration PNA. Will discharge. Written by Marci Negrete, ED Scribe, as dictated by Melia Connell DO. Critical Care Time: 0 minutes    Discharge Note:  2:38 PM  The pt is ready for discharge. The pt's signs, symptoms, diagnosis, and discharge instructions have been discussed and pt has conveyed their understanding. The pt is to follow up as recommended or return to ER should their symptoms worsen. Plan has been discussed and pt is in agreement. PLAN:  1. Current Discharge Medication List      START taking these medications    Details   diphenhydrAMINE (BENADRYL) 25 mg capsule Take 1 Cap by mouth every six (6) hours as needed for up to 10 days. Qty: 20 Cap, Refills: 0      methylPREDNISolone (MEDROL, RADHA,) 4 mg tablet Take as directed  Qty: 1 Dose Pack, Refills: 0      famotidine (PEPCID) 20 mg tablet Take 1 Tab by mouth two (2) times a day. Qty: 20 Tab, Refills: 0      levoFLOXacin (LEVAQUIN) 750 mg tablet Take 1 Tab by mouth every other day. Qty: 5 Tab, Refills: 0         STOP taking these medications       clindamycin (CLEOCIN) 150 mg capsule Comments:   Reason for Stoppin.   Follow-up Information     Follow up With Details Comments Contact Info    Ximena Spicer MD Schedule an appointment as soon as possible for a visit in 2 days  [id number]  [address] [address] [phone]      [facility] EMERGENCY DEPT  As needed, If symptoms worsen [address] [place] [address]  [phone]        Return to ED if worse     Diagnosis     Clinical Impression:   1. Rash    2. Thrombocytopenia ([facility] [address]        Attestations: This note is prepared by Marci Negrete, acting as a Scribe for Melia Connell DO.     Melia Connell DO: The scribe's documentation has been prepared under my direction and personally reviewed by me in its entirety. I confirm that the notes above accurately reflects all work, treatment, procedures, and medical decision making performed by me. This note will not be viewable in [address]

## 2018-01-15 ENCOUNTER — TELEPHONE (OUTPATIENT)
Dept: ENDOCRINOLOGY | Age: 83
End: 2018-01-15

## 2018-01-15 NOTE — TELEPHONE ENCOUNTER
I received a call from Ms. SHANESUDELMY who is concerned because she had to take . Yareli Dent to the ER yesterday and they gave him some Prednisone in the ER. He then started taking a Methylprednisolone pack this morning for the next 6 days. His blood sugars have been running very high. It was 400 last night and she gave him 14 units of humalog. She has been increasing the insulin by 2 units like when he gets his shots in his shoulder. She is wondering what else she can do for these high blood sugars while he is on the prednisone.   [place]

## 2018-01-15 NOTE — TELEPHONE ENCOUNTER
He can also increase the lantus from 14 to 18 units in the mornings. Maybe even 20 units the first 2 days, assuming he is taking the same dose of the steroid all 6 days. If the steroid is a taper, he may need to gradually reduced the dose back down. In addition he can use this alternate humalog correction scale while on steroids, then later go back to his original scale. Correction Scale  1:15>150    IF GLUCOSE IS:                 THEN TAKE:      0   Extra Unit  151-165   1   Extra Unit  166-180   2   Extra Units  181-195   3   Extra Units  196-210   4   Extra Units  211-225   5   Extra Units  226-240   6   Extra Units  241-255   7   Extra Units  256-270   8   Extra Units  271-285   9   Extra Units  286-300   10 Extra Units    Michele SHINE.  [address] [facility] Endocrinology  [address] [place] [address]

## 2018-01-15 NOTE — TELEPHONE ENCOUNTER
I called Cathy back and relayed the message from Dr. Dylan Osman. She understood this information and wrote this down as well. She will give us a call back with any further questions.   Perri Bumpers

## 2018-02-07 NOTE — TELEPHONE ENCOUNTER
Patients wife Binu Flood is requesting a call back. She stated that it is regarding patients blood sugars. She stated that they are continuing to increase. She can be reached at [phone].

## 2018-02-07 NOTE — TELEPHONE ENCOUNTER
2/7/2018, 11:32 AM    Attempted to call Igor Motley, reached voice mail. I left a message to return my call.       Sherin San

## 2018-02-07 NOTE — TELEPHONE ENCOUNTER
Ms. Chelsie Correia returned my call. She is concerned because she can't get Kevin's blood sugars down even after being off the prednisone since 1/20/2018. She gave me readings from 1/21/2018 which I gave to Dr. Trevin Neff. She is also wanting a referral to the [facility]abetic treatment center for nutrition for Alek Landaverde. I said I would pass this information on to Dr. Trevin Neff and get back to her.   Rossi Carvajal

## 2018-02-07 NOTE — TELEPHONE ENCOUNTER
Reviewed blood sugar log, will scan it in. Called and spoke to patients wife. Has been using mostly lantus 14 units  Humalog mostly 11 / 8 / 7  Sugars high at dinner, 150-180 ave at bedtime  By morning 130-155 mostly. Recommended:   Lantus stay at 14 units  Humalog 14 / 11 / 7  Continue 1:15>150 correction scale    Referring to [facility] for dietary recs per patients wife's request.     Indu Palacio.  [address] Endocrinology  [facility]

## 2018-02-14 NOTE — DIABETES MGMT
DTC Progress Note    Recommendations/ Comments:   1) encouraged continue with current tube feeding product and volume. 2) continue with insulin per . 3) add activity am and see if lunch BG improve - if not, contact  for an increase in breakfast Humalog dose. 4) also f/u with him if the increase in Lantus does not bring BG consistently <150 mg/dl. Provided pt and wife with my contact information to call with questions or concerns. Chart reviewed on [facility] and visited with patient and his wife, Debi Oneil today. Both shared that they are seeking insight into whether his tube feeding formula needs to be changed (i.e. Is it meeting his kcal needs/energy needs) or is he needing additional supplements added to his current feeding along w/ helping with his management of his higher BG values. Patient is a 80 y.o. male with known DM on Lantus 14 units daily - this is an increase made with  on 2/7/18 along with Humalog 11 units with breakfast, 8 units with lunch and 7 units with dinner. Pt is seen individually due to review and assessment of meal planning with his tube feeding and his insulin for his DM management. Pt and wife shared the following information:  1) pt is feeling more fatigued and tired and they question if it is related to his tube feeding. 2) should he be changed to a DM based formula with his lunchtime BG values ranging 205-344 mg/dl over the past 2 weeks. 3) are BG going to return to previous range now that his steroid course has been completed - should this have been resolved. Lengthy discussions were held about tube feeding (currently using Isosource 1.5  = currently providing 1875 kcal/24 hours, total protein of 85 g and 19 g dietary fiber and 201 g carbohydrate from non-fiber source) as he is taking it via PEG tube and bolusing 500ml plus free H20 with breakfast, 375 ml plus free water for lunch and dinner.   Per his wife, he is tolerating well and has very low residuals on this plan. Total volume per his current feeding is 1250 ml. I calculated differences between his Isosource and Glucerna 1.5 kcal- he would be getting less CHO, similar protein but need more product due to the package size. Our biggest concerns are: 1)  the differences in the fiber source which pt has had previous diarrhea like stools with the Glucerna in the past.  2) volume with his CHF. We discussed changes in activity which may be contributing to his higher BG ranges. Pt shared that he used to pedal stationary bike as tolerated with his CHF and stopped being as active with walking and PT also about 2 months ago. Pt does agree that he could do his bike (as determined at this point to be safest activity) daily. Encouraged him to continue to walk but discouraged resistance training at this point. Shared with pt that his lack of energy and fatigue can be related to his higher BG in am, his CHF and his overall decrease in physical strength. A1c:   Lab Results   Component Value Date/Time    Hemoglobin A1c 6.5 (H) 02/02/2017 02:55 AM    Hemoglobin A1c 6.4 (H) 11/30/2016 02:11 AM     Lab Results   Component Value Date/Time    Creatinine 1.48 (H) 01/14/2018 12:14 PM     Will continue to follow as needed.     Thank you  Teri Patel [facility]  [phone]

## 2018-02-15 NOTE — ED NOTES
Patient's wife states patient's been having bad chest congestion for about 2 months. Patient's wife states he was seen at primary care and given antibiotics for bronchitis. Patient's wife states the congestion has not gotten better and patient has been seen at several offices and ER's for the same thing and patient has been on several antibiotics with no relief. Wife states he's been really bad within the past couple of days. Patient also coughs up some greenish/yellow sputum. Patient complaining of mild chest tightness and shortness of breath.

## 2018-02-15 NOTE — ED TRIAGE NOTES
Chief Complaint: chest congestion   Patient states chest congestion for since before [place]. Has been on multiples courses of antibiotics. Seen by Dr. Pako Turner 5 days ago placed on Levofloxacin and Famotidine for acid reflux. Not getting better. Productive cough greenish yellow mucous  Onset before [place]  Pt arrived via private car.

## 2018-02-15 NOTE — ED NOTES
Dr. Violet Collazo reviewed discharge instructions with the patient. The patient verbalized understanding. All questions and concerns were addressed. The patient is discharged via wheelchair in the care of family members with instructions and prescriptions in hand. Pt is alert and oriented x 4. Respirations are clear and unlabored.

## 2018-02-15 NOTE — DISCHARGE INSTRUCTIONS

## 2018-02-15 NOTE — ED NOTES
Bedside and Verbal shift change report given to Shoshana Johnson RN (oncoming nurse) by Yajaira Prieto RN (offgoing nurse). Report included the following information SBAR, ED Summary, MAR and Recent Results.

## 2018-02-15 NOTE — ED PROVIDER NOTES
EMERGENCY DEPARTMENT HISTORY AND PHYSICAL EXAM      Date: 2/15/2018  Patient Name: Tyra Adler    History of Presenting Illness     Chief Complaint   Patient presents with    Chest Congestion       History Provided By: Patient and Patient's Wife    HPI: Tyra Adler, 80 y.o. male with PMHx significant for remote hx of tongue CA, PEG tube, DM, CHF, GERD presents via wheelchair to the ED with c/o chronic chest congestion that has been present since December. Associated symptoms include excessive mucous production, chest tightness and mild intermittent SOB. Wife reports she brought him to the ED because his breathing sounded \"rattly\" for the past couple of hours. However, she states his breathing sounds better upon arrival. His wife notes that he has been on several courses of abx since his symptoms began. She also states he was [facility] on 1/9-1/13/2018 for possible aspiration, at which time he received IV abx treatment. His wife reports his most recent prescription of abx was 5 days ago because his PCP heard \"rattling in his chest.\" Pt adds he was prescribed oral steroids for rash recently, but has not been prescribed steroids for his respiratory symptoms. His wife states she has been giving him nebulizer treatments ~3 hours. Per wife, pt has an appointment with Pulmonology on 3/7/2018. His wife notes pt has difficulty swallowing and has PEG tube placed due to complications from tongue CA. Per wife, pt was evaluated by Dr. Ulisses Khanna on 2/12/2018 and had a normal EKG. He denies any exacerbation in symptoms with lying down. Pt denies any decreased appetite, leg swelling, or leg pain. PCP: Jermaine Bond MD    There are no other complaints, changes, or physical findings at this time. Current Outpatient Prescriptions   Medication Sig Dispense Refill    HYDROcodone-acetaminophen (NORCO)  mg tablet Take 1 Tab by mouth.       famotidine (PEPCID) 20 mg tablet Take 1 Tab by mouth two (2) times a day. 20 Tab 0    levoFLOXacin (LEVAQUIN) 750 mg tablet Take 1 Tab by mouth every other day. 5 Tab 0    HUMALOG KWIKPEN 100 unit/mL kwikpen INJECT 9 UNITS WITH BREAKFAST, 7 UNITS WITH LUNCH, 7 UNITS WITH DINNER AND 7 UNITS AT BEDTIME TUBE FEEDS 5 Pen 7    guaiFENesin (ROBITUSSIN) 100 mg/5 mL liquid Take 200 mg by mouth three (3) times daily as needed for Cough.  insulin glargine (LANTUS SOLOSTAR) 100 unit/mL (3 mL) pen 14 Units by SubCUTAneous route daily. 5 Pen 11    aspirin (ASPIRIN) 325 mg tablet Take 325 mg by mouth daily.  vit B Cmplx 3-FA-Vit C-Biotin (NEPHRO GRAYSON RX) 1- mg-mg-mcg tablet Take 1 Tab by mouth daily.  albuterol-ipratropium (DUO-NEB) 2.5 mg-0.5 mg/3 ml nebu 3 mL by Nebulization route every six (6) hours as needed. 100 Nebule 1    ondansetron hcl (ZOFRAN, AS HYDROCHLORIDE,) 8 mg tablet Take 1 Tab by mouth every eight (8) hours as needed for Nausea. 20 Tab 0    Nebulizer & Compressor machine 1 Each by Does Not Apply route daily. 1 Each 0    midodrine (PROAMITINE) 10 mg tablet Take 10 mg by mouth three (3) times daily (with meals).  insulin syringe-needle U-100 1 mL 31 x 15/64\" syrg 1 Syringe by SubCUTAneous route four (4) times daily. 200 Each 11    Insulin Needles, Disposable, (ESPERANZA PEN NEEDLE) 32 gauge x 5/32\" ndle 5 times a day 150 Each 99    atorvastatin (LIPITOR) 40 mg tablet Take 40 mg by mouth daily.  furosemide (LASIX) 20 mg tablet Take 1 Tab by mouth daily. Indications: HYPERCALCEMIA 30 Tab 2    gabapentin (NEURONTIN) 250 mg/5 mL solution Take 500 mg by mouth three (3) times daily.  multivitamin (ONE A DAY) tablet Take 1 Tab by mouth daily.  clopidogrel (PLAVIX) 75 mg tablet Take 75 mg by mouth daily.       methylPREDNISolone (MEDROL, RADHA,) 4 mg tablet Take as directed 1 Dose Pack 0    lidocaine (LIDOCAINE VISCOUS) 2 % solution       traMADol (ULTRAM) 50 mg tablet       metoprolol tartrate (LOPRESSOR) 25 mg tablet Take 12.5 mg by mouth two (2) times a day.  nitroglycerin (NITROSTAT) 0.4 mg SL tablet 0.4 mg by SubLINGual route every five (5) minutes as needed for Chest Pain.  magnesium hydroxide (TOBIAS MILK OF MAGNESIA) 400 mg/5 mL suspension Take 30 mL by mouth daily as needed for Constipation. Past History     Past Medical History:  Past Medical History:   Diagnosis Date    Antiplatelet or antithrombotic long-term use 12/4/2014    Anxiety disorder     Arrhythmia 2009    bradycardia    Arthritis     CAD (coronary artery disease)     s/p CABG 2002; Dr Jose Palomares [facility]) 1996    tongue/throat cancer s/p surgery / radiation and 1 dose of chemo    Carotid artery stenosis     s/p bilateral stents    Chronic pain     left leg, lower back,     Depression     Diabetes ([facility] [address]     Type II    Esophageal dysmotility     s/p dilitation    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 I suspect the latter. Achalasia  is not present.         GERD (gastroesophageal reflux disease)     Heart failure ([facility] [address] 10/2014     Cardiomyopathy:Pacemaker upgrade:Biv and AICD  Dr. Gulalpa Friday heart DrAlvaro last visit 5/11/2015    Hepatitis C Dx 1996    treated at [facility] in past; as of 4/15/15 wife states pt currently not under any treatment    Hyperlipidemia     Hypertension     Myocardial infarct 2013    Heart Cath: 40% LV EF, Stented distal LAD, patent Graft to circumflex    On tube feeding diet approx 2009    still has as of 9/28/15  (no po food/liquid/meds at all); Dr Bailey Hart Other ill-defined conditions(799.89) 1996    1 dose of chemotherapy/radiation for tongue cancer    Other ill-defined conditions(799.89)     BPH    Other ill-defined conditions(799.89)     orthostatic hypotension    Pneumonia ~ April -May 2010    Stroke [facility]) approx 2003    left side-left finger tips numb; no imbalance or memory loss; as of  not seeing neuro MD    Suicidal thoughts        Past Surgical History:  Past Surgical History:   Procedure Laterality Date    ABDOMEN SURGERY PROC UNLISTED      peg tube    CABG, ARTERY-VEIN, THREE      HX CATARACT REMOVAL      bilateral    HX CHOLECYSTECTOMY      HX HEART CATHETERIZATION      Stented distal LAD    HX MOHS PROCEDURES      bilateral    HX ORTHOPAEDIC      back surgery times two    HX OTHER SURGICAL      Radical Left Neck    HX OTHER SURGICAL      NASAL POLYPS REMOVAL    HX OTHER SURGICAL      TURP    HX PACEMAKER      HX PACEMAKER  10/28/14     Defibrillator: [facility] # [id number], serial # [id number]; Dr. Darryn Walls [phone]; Dr Lydia Amin      cystoscopy    NEUROLOGICAL PROCEDURE UNLISTED      cevical surgery    NM CHANGE GASTROSTOMY TUBE  2011         NM CHANGE GASTROSTOMY TUBE  2011         NM EGD INSERT GUIDE WIRE DILATOR PASSAGE ESOPHAGUS  2010         NM EGD TRANSORAL BIOPSY SINGLE/MULTIPLE  2010         STOMACH SURGERY PROCEDURE UNLISTED  2011         VASCULAR SURGERY PROCEDURE UNLIST      bilateral carotid stents       Family History:  Family History   Problem Relation Age of Onset    Heart Disease Father       at age 52 from CAD    Colon Cancer Mother     Cancer Mother      colon ca    Heart Disease Brother        Social History:  Social History   Substance Use Topics    Smoking status: Never Smoker    Smokeless tobacco: Never Used    Alcohol use No       Allergies: Allergies   Allergen Reactions    Demerol [Meperidine] Shortness of Breath    Paxil [Paroxetine Hcl] Unknown (comments)     Pt gets shaky and loses control of legs    Amoxicillin Rash    Cleocin [Clindamycin Hcl] Rash    Pcn [Penicillins] Rash         Review of Systems   Review of Systems   Constitutional: Negative for appetite change, chills and fever. HENT: Positive for dental problem.  Negative for congestion, rhinorrhea, sneezing and sore throat. Eyes: Negative. Negative for redness and visual disturbance. Respiratory: Positive for chest tightness and shortness of breath. Negative for cough and wheezing.         +chest congestion   Cardiovascular: Negative. Negative for chest pain and leg swelling. Gastrointestinal: Negative. Negative for abdominal pain, diarrhea, nausea and vomiting. Genitourinary: Negative. Negative for difficulty urinating, discharge and frequency. Musculoskeletal: Negative. Negative for arthralgias, back pain, myalgias and neck stiffness. Skin: Negative. Negative for color change and rash. Neurological: Negative. Negative for dizziness, syncope, weakness, numbness and headaches. Hematological: Negative for adenopathy. Psychiatric/Behavioral: Negative. All other systems reviewed and are negative. Physical Exam   Physical Exam   Constitutional: He is oriented to person, place, and time. Comfortable appearing. Nontoxic. NAD. Stable vital signs. HENT:   Head: Atraumatic. Edentulous   Eyes: EOM are normal.   Neck:   Incision over c-spine, well healed and remote. Cardiovascular: Normal rate, regular rhythm and intact distal pulses. Exam reveals no gallop and no friction rub. Holosystolic ejection murmur best heard at L sternal border, 3/6. No lower extremity edema   Pulmonary/Chest: Effort normal and breath sounds normal. No respiratory distress. He has no wheezes. He has no rales. He exhibits no tenderness. No hypoxia. No increased work of breathing. Speaking in full sentences. No adventitious findings. L chest wall pacemaker. Vertical stenostomy scar, well healed and remote. Abdominal: Soft. Bowel sounds are normal. He exhibits no distension and no mass. There is no tenderness. There is no rebound and no guarding. PEG tube   Musculoskeletal: Normal range of motion. He exhibits no edema or tenderness.    Neurological: He is alert and oriented to person, place, and time. Psychiatric: He has a normal mood and affect. Nursing note and vitals reviewed. Diagnostic Study Results     Labs -  Recent Results (from the past 12 hour(s))   EKG, 12 LEAD, INITIAL    Collection Time: 02/15/18  5:22 AM   Result Value Ref Range    Ventricular Rate 66 BPM    Atrial Rate 66 BPM    P-R Interval 142 ms    QRS Duration 176 ms    Q-T Interval 466 ms    QTC Calculation (Bezet) 488 ms    Calculated P Axis 23 degrees    Calculated R Axis 170 degrees    Calculated T Axis 37 degrees    Diagnosis        Suspect unspecified pacemaker failure  Atrial-sensed ventricular-paced rhythm  When compared with ECG of 03-DEC-2016 08:48,  Electronic ventricular pacemaker has replaced Sinus rhythm     CBC WITH AUTOMATED DIFF    Collection Time: 02/15/18  5:47 AM   Result Value Ref Range    WBC 5.3 4.1 - 11.1 K/uL    RBC 3.88 (L) 4.10 - 5.70 M/uL    HGB 12.2 12.1 - 17.0 g/dL    HCT 37.7 36.6 - 50.3 %    MCV 97.2 80.0 - 99.0 FL    MCH 31.4 26.0 - 34.0 PG    MCHC 32.4 30.0 - 36.5 g/dL    RDW 13.8 11.5 - 14.5 %    PLATELET 91 (L) 805 - 400 K/uL    MPV 10.7 8.9 - 12.9 FL    NRBC 0.0 0  WBC    ABSOLUTE NRBC 0.00 0.00 - 0.01 K/uL    NEUTROPHILS 61 32 - 75 %    LYMPHOCYTES 25 12 - 49 %    MONOCYTES 11 5 - 13 %    EOSINOPHILS 3 0 - 7 %    BASOPHILS 0 0 - 1 %    IMMATURE GRANULOCYTES 0 0.0 - 0.5 %    ABS. NEUTROPHILS 3.2 1.8 - 8.0 K/UL    ABS. LYMPHOCYTES 1.3 0.8 - 3.5 K/UL    ABS. MONOCYTES 0.6 0.0 - 1.0 K/UL    ABS. EOSINOPHILS 0.1 0.0 - 0.4 K/UL    ABS. BASOPHILS 0.0 0.0 - 0.1 K/UL    ABS. IMM.  GRANS. 0.0 0.00 - 0.04 K/UL    DF AUTOMATED     METABOLIC PANEL, COMPREHENSIVE    Collection Time: 02/15/18  5:47 AM   Result Value Ref Range    Sodium 139 136 - 145 mmol/L    Potassium 4.2 3.5 - 5.1 mmol/L    Chloride 101 97 - 108 mmol/L    CO2 34 (H) 21 - 32 mmol/L    Anion gap 4 (L) 5 - 15 mmol/L    Glucose 128 (H) 65 - 100 mg/dL    BUN 30 (H) 6 - 20 MG/DL    Creatinine 1.40 (H) 0.70 - 1.30 MG/DL    BUN/Creatinine ratio 21 (H) 12 - 20      GFR est AA 59 (L) >60 ml/min/1.73m2    GFR est non-AA 49 (L) >60 ml/min/1.73m2    Calcium 8.8 8.5 - 10.1 MG/DL    Bilirubin, total 0.5 0.2 - 1.0 MG/DL    ALT (SGPT) 48 12 - 78 U/L    AST (SGOT) 50 (H) 15 - 37 U/L    Alk. phosphatase 69 45 - 117 U/L    Protein, total 6.4 6.4 - 8.2 g/dL    Albumin 2.8 (L) 3.5 - 5.0 g/dL    Globulin 3.6 2.0 - 4.0 g/dL    A-G Ratio 0.8 (L) 1.1 - 2.2     TROPONIN I    Collection Time: 02/15/18  5:47 AM   Result Value Ref Range    Troponin-I, Qt. <0.04 <0.05 ng/mL   CK W/ REFLX CKMB    Collection Time: 02/15/18  5:47 AM   Result Value Ref Range     39 - 308 U/L   NT-PRO BNP    Collection Time: 02/15/18  5:47 AM   Result Value Ref Range    NT pro-BNP 1691 (H) 0 - 450 PG/ML   GLUCOSE, POC    Collection Time: 02/15/18  8:53 AM   Result Value Ref Range    Glucose (POC) 133 (H) 65 - 100 mg/dL    Performed by Maico Chong          Radiologic Studies -   CT Results  (Last 48 hours)               02/15/18 0844  CT NECK SOFT TISSUE WO CONT Final result    Impression:  IMPRESSION:  Posttreatment changes including postradiation changes and left   radical neck dissection. No acute findings or mass lesions demonstrated. No   cervical lymphadenopathy. Narrative:  CT NECK       CLINICAL INFORMATION: History of pharyngeal cancer, now with persistent cough   and congestion   COMPARISON:  CT angiogram of 2/2/2017        TECHNIQUE: Axial neck CT was performed  without IV contrast. Coronal  and   sagittal and sagittal reformatted images were generated. CT dose reduction was   achieved through the use of a standardized protocol tailored for this   examination and automatic exposure control for dose modulation. FINDINGS:   Status post left radical neck dissection. ORBITS, PARANASAL SINUSES, AND SKULL BASE: Previous bilateral cataract surgery. No abnormalities in the visualized portion of the brain. Normal appearance of   the skull base. NASOPHARYNX:  Normal.   SUPRAHYOID NECK:  Loss of lymphoid tissue in the oropharynx consistent with   prior radiation. No significant abnormalities in the oral cavity. INFRAHYOID NECK:  Prominence of the hypopharyngeal airway with atrophy of   adjacent soft tissue structures, consistent with prior radiation. Normal   appearance of the larynx. THYROID:  Normal.   SALIVARY GLANDS: Left submandibular gland probably surgically absent. Right   submandibular gland not well seen, probably atrophic. Parotid glands within   normal limits. LYMPH NODES: Normal. No lymphadenopathy. VASCULAR STRUCTURES:  Dense bilateral carotid calcification. Venous structures   not well assessed because of lack of contrast.   BONES: Degenerative and postsurgical changes. No destructive lesions. OTHER FINDINGS:  None. 02/15/18 0844  CT CHEST WO CONT Final result    Impression:  IMPRESSION:   Bilateral lower lobe atelectasis otherwise no acute abnormality. Narrative:  INDICATION: Shortness of breath, history of pharyngeal cancer, persistent cough       COMPARISON: None       CONTRAST: None. TECHNIQUE:  5 mm axial images were obtained through the chest. Coronal and   sagittal reconstructions were generated. CT dose reduction was achieved through   use of a standardized protocol tailored for this examination and automatic   exposure control for dose modulation. The absence of intravenous contrast reduces the sensitivity for evaluation of   the mediastinum and upper abdominal organs. FINDINGS:       THYROID: No nodule. MEDIASTINUM: No mass or lymphadenopathy. YOLANDA: No mass or lymphadenopathy. THORACIC AORTA: The aorta is tortuous and atherosclerotic. MAIN PULMONARY ARTERY: Normal in caliber. TRACHEA/BRONCHI: Patent. ESOPHAGUS: No wall thickening or dilatation. HEART: Left atrial and left ventricular dilatation is noted. The patient is   status post CABG procedure.    PLEURA: No effusion or pneumothorax. LUNGS: Bilateral lower lobe atelectasis, left greater than right. INCIDENTALLY IMAGED UPPER ABDOMEN: The gallbladder is surgically absent. BONES: Degenerative changes are seen in the thoracic spine. CXR Results  (Last 48 hours)               02/15/18 0609  XR CHEST PA LAT Final result    Impression:  IMPRESSION: No acute findings. Narrative:  History: Chest congestion. 2 views of the chest demonstrate left subclavian leads in place. Patient is   status post median sternotomy and CABG. Heart size is unremarkable. The lungs   are clear. There are no. There are degenerative changes of the spine. Medical Decision Making   I am the first provider for this patient. I reviewed the vital signs, available nursing notes, past medical history, past surgical history, family history and social history. Vital Signs-Reviewed the patient's vital signs. Patient Vitals for the past 12 hrs:   Temp Pulse Resp BP SpO2   02/15/18 0945 - 60 - 129/49 95 %   02/15/18 0900 - 61 - 138/56 98 %   02/15/18 0800 - 60 - 120/53 95 %   02/15/18 0730 - 60 19 112/54 97 %   02/15/18 0717 - 60 13 - 99 %   02/15/18 0715 - - - 130/57 93 %   02/15/18 0645 - 60 14 144/57 94 %   02/15/18 0600 - 60 15 121/54 96 %   02/15/18 0519 97.7 °F (36.5 °C) 63 14 115/51 94 %       Pulse Oximetry Analysis - 99% on RA    Cardiac Monitor:   Rate: 66 bpm  Rhythm: Atrial sensed ventricular paced     EKG interpretation: (Preliminary) 6:30 AM  Rhythm: atrial sensed ventricular paced; and regular . Rate (approx.): 66; Axis: normal; AK interval: normal; QRS interval: normal ; ST/T wave: normal.    Records Reviewed: Nursing Notes, Old Medical Records, Previous Radiology Studies and Previous Laboratory Studies    Provider Notes (Medical Decision Making):   Chronic cough, excessive mucous production, PNA, bronchitis s/p multiple rounds of abx therapy for the latter.  Pt does not appear to be acutely ill at this time. Question possibly inability to clear secretion secondary to remote oropharynx CA or return of CA. ED Course:   Initial assessment performed. The patients presenting problems have been discussed, and they are in agreement with the care plan formulated and outlined with them. I have encouraged them to ask questions as they arise throughout their visit.    7:29 AM  Patient's presentation, labs/imaging and plan of care was reviewed with Rayshawn Morgan DO as part of sign out. They will follow up on CT results as part of the plan discussed with the patient. Rayshawn Morgan DO's assistance in completion of this plan is greatly appreciated but it should be noted that I will be the provider of record for this patient. Jose Mitchell MD    Progress Note:  9:47 AM  Reviewed results of pt's CT. Will discharge per Jose Mitchell MD's plan. Discussed results of labs and imaging with pt who endorses understanding and agrees with plan. Disposition:  Discharge Note:  9:50 AM  The patient has been re-evaluated and is ready for discharge. Reviewed available results with patient. Counseled patient on diagnosis and care plan. Patient has expressed understanding, and all questions have been answered. Patient agrees with plan and agrees to follow up as recommended, or return to the ED if their symptoms worsen. Discharge instructions have been provided and explained to the patient, along with reasons to return to the ED.=    PLAN:  1. Current Discharge Medication List        2.    Follow-up Information     Follow up With Details Comments Contact Info    Hilton Boswell MD In 3 days  [address]  Marti Freire MD On 2/15/2018 try to move up your appointment from early March One [address] [place] [address] [facility] [address] [phone]      [facility] EMERGENCY DEPT  If symptoms worsen [address] [facility] [address]  [phone]        Return to ED if worse     Diagnosis     Clinical Impression:   1. Cough    2. Chest congestion        Attestations: This note is prepared by Danny Edgar, acting as Scribe for Jose Larios MD.    The scribe's documentation has been prepared under my direction and personally reviewed by me in its entirety. I confirm that the note above accurately reflects all work, treatment, procedures, and medical decision making performed by me. Jose Larios MD    This note is completed by Evelin Burns, acting as Scribe for Salome Tapia DO following time of sign out. Salome Tapia, DO: The scribe's documentation has been prepared under my direction and personally reviewed by me in its entirety. I confirm that the note above accurately reflects all work, treatment, procedures, and medical decision making performed by me.

## 2018-02-16 NOTE — TELEPHONE ENCOUNTER
I called and spoke with [facility] and relayed the message from Dr. Jenni Rucker. She wrote this down and understood the information. She will do as instructed.   Yohan Velazco

## 2018-02-16 NOTE — TELEPHONE ENCOUNTER
Received patients home numbers. Received cortisone injection today again. -180 mostly  Lunch 180-240 mostly  Dinner 140-200 mostly    Regimen per prior telephone discussion/plan:  Recommended:   Lantus stay at 14 units  Humalog 14 / 11 / 7  Continue 1:15>150 correction scale    Recommendation:   Continue 14 units of lantus  Humalog 17 / 13 / 8  Continue 1:15 >150 correction scale he is currently using    Clem Ornelas T.  [facility]  [address] [place] [address]

## 2018-02-17 NOTE — TELEPHONE ENCOUNTER
Pt's wife paged me at 12:20pm that her 's sugar came down to 398 at 2 am and 227 at 8am.  She gave him 24 units of lantus and 23 units of humalog and delivered him his normal amount of tube feeds for breakfast.  His sugar was up to 487 by the time of her call and her  was feeling more lethargic and having some chest tightness. I told her that it's likely he is getting dehydrated from the high sugars from the steroid injection yesterday and he will benefit from another dose of 10 units of lantus now. I also advised her to give him additional boluses of water through his PEG tube as tolerated to help with dehydration. I advised her to give him 34 units of lantus tomorrow morning as this should be the last day that his sugars remain elevated from the steroid shot. I told her that if his sugar remain over 400 then he may benefit from going to the ER to get IV fluids to treat the dehydration that is occurring from the hyperglycemia. She voiced understanding of this plan.

## 2018-02-17 NOTE — TELEPHONE ENCOUNTER
Received a page from pt's wife that despite trying to follow the higher humalog scale per Dr. Camilo Nobles' recommendation earlier today, her 's blood sugar went from 390 at 9:45pm (gave him 21 units of humalog) to [phone] currently. Pt did received cortisone injections in his shoulders today. Dr. Camilo Nobles did not recommend any change in his lantus dose and I told her to give a now dose of 10 units of lantus and 10 units of humalog and starting tomorrow morning to give 24 units of lantus on Sat and Sun morning and then on Monday morning go back to 14 units in the morning of the lantus. She should continue giving the higher doses of humalog per Dr. Camilo Nobles' recommendation over the weekend and give Dr. Camilo Nobles an update on Monday. I told her I'm on call over the weekend should she need anything else. She voiced understanding of this plan.

## 2018-02-21 NOTE — TELEPHONE ENCOUNTER
Alexx Arndt,    I received patients glucose log from home. Mr. Jacqueline Domínguez sugars have been trending down and I am aware they went back to the regimen provided on 2/16/18: To confirm:   Lantus 14 units  Humalog 14 breakfast, 11 lunch, 7 dinner  Same correction scale    If sugars are steadily coming down without hypoglycemia, ok to continue, if sugars are rising or not coming down, we can get an update on yesterday and todays blood sugars to make further recommendation. Historically he had been sensitive to changes in insulin and so we are careful not to over-treat as his steroid induced hyperglycemia winds down. Thanks,     Nicole Rubio.  [address] Endocrinology  [address] [place] [address]

## 2018-02-21 NOTE — TELEPHONE ENCOUNTER
I called Mr. HODGES and spoke with his wife, Tiny Henderson. She said that he is using  Lantus 14 units  Humalog-- 17 units with breakfast, 13 with lunch and 8 with Dinner plus the sliding scale as needed. She said that his sugar was 115 this morning at 8am and 151 at 2:30pm. She will keep using the numbers given to her on 2/16/2018 and let us know if things change.   Johnny Bearden

## 2018-03-12 NOTE — MR AVS SNAPSHOT
[address] [address] [phone] Patient: Kenroy Leger MRN: [id number] MEF:[id number] Visit Information Date & Time Provider Department Dept. Phone Encounter #  
 3/12/2018 11:50 AM Manohar Lucero, [address] [place] [address] Diabetes and Endocrinology [phone] Follow-up Instructions Return in about 3 months (around 6/12/2018). Upcoming Health Maintenance Date Due ZOSTER VACCINE AGE 60> 1/31/1995 Bone Densitometry (Dexa) Screening 3/31/2000 MEDICARE YEARLY EXAM 3/31/2000 Influenza Age 5 to Adult 8/1/2017 LIPID PANEL Q1 2/2/2018 HEMOGLOBIN A1C Q6M 5/9/2018 MICROALBUMIN Q1 8/8/2018 FOOT EXAM Q1 11/9/2018 EYE EXAM RETINAL OR DILATED Q1 11/15/2018 GLAUCOMA SCREENING Q2Y 11/15/2019 DTaP/Tdap/Td series (2 - Td) 9/9/2025 Allergies as of 3/12/2018  Review Complete On: 3/12/2018 By: Manohar Lucero MD  
  
 Severity Noted Reaction Type Reactions Demerol [Meperidine] High 04/12/2010   Systemic Shortness of Breath Paxil [Paroxetine Hcl] High 04/12/2010   Systemic Unknown (comments) Pt gets shaky and loses control of legs Amoxicillin  01/18/2013    Rash Cleocin [Clindamycin Hcl]  02/15/2018    Rash Pcn [Penicillins]  04/12/2010    Rash Current Immunizations  Reviewed on 2/11/2013 Name Date Influenza Vaccine 10/1/2016, 10/1/2014 Influenza Vaccine (Quad) PF 10/7/2015 11:20 AM  
 Influenza Vaccine Split 9/24/2012 Influenza Vaccine Whole 12/3/2009 Pneumococcal Polysaccharide (PPSV-23) 10/7/2015 11:22 AM  
 TD Vaccine 12/1/2011  6:03 PM  
 Tdap 9/9/2015  7:38 AM  
 ZZZ-RETIRED (DO NOT USE) Pneumococcal Vaccine (Unspecified Type) 12/3/2007 Not reviewed this visit You Were Diagnosed With   
  
 Codes Comments  Type 2 diabetes mellitus without complication, with long-term current use of insulin ([facility]    -  Primary ICD-10-CM: E11.9, Z79.4 ICD-9-CM: 250.00, V58.67 Hyperlipidemia, unspecified hyperlipidemia type     ICD-10-CM: E78.5 ICD-9-CM: 272.4 Essential hypertension with goal blood pressure less than 130/80     ICD-10-CM: I10 
ICD-9-CM: 401.9 Vitals BP Pulse Height(growth percentile) Weight(growth percentile) BMI Smoking Status 97/62 (BP 1 Location: Left arm, BP Patient Position: Sitting) 67 5' 7\" (1.702 m) 170 lb 1.6 oz (77.2 kg) 26.64 kg/m2 Never Smoker BMI and BSA Data Body Mass Index Body Surface Area  
 26.64 kg/m 2 1.91 m 2 Preferred Pharmacy Pharmacy Name Phone [facility] [address], [address] [place] [address] Avenue [phone] Your Updated Medication List  
  
   
This list is accurate as of 3/12/18 12:17 PM.  Always use your most recent med list.  
  
  
  
  
 albuterol-ipratropium 2.5 mg-0.5 mg/3 ml Nebu Commonly known as:  DUO-NEB  
3 mL by Nebulization route every six (6) hours as needed. aspirin 325 mg tablet Commonly known as:  ASPIRIN Take 325 mg by mouth daily. atorvastatin 40 mg tablet Commonly known as:  LIPITOR Take 40 mg by mouth daily. famotidine 20 mg tablet Commonly known as:  PEPCID Take 1 Tab by mouth two (2) times a day. fluticasone 50 mcg/actuation nasal spray Commonly known as:  FLONASE  
  
 furosemide 20 mg tablet Commonly known as:  LASIX Take 1 Tab by mouth daily. Indications: HYPERCALCEMIA  
  
 gabapentin 250 mg/5 mL solution Commonly known as:  NEURONTIN Take 500 mg by mouth three (3) times daily. guaiFENesin 100 mg/5 mL liquid Commonly known as:  ROBITUSSIN Take 200 mg by mouth three (3) times daily as needed for Cough. HumaLOG KwikPen Insulin 100 unit/mL kwikpen Generic drug:  insulin lispro INJECT 9 UNITS WITH BREAKFAST, 7 UNITS WITH LUNCH, 7 UNITS WITH DINNER AND 7 UNITS AT BEDTIME TUBE FEEDS  
  
 HYDROcodone-acetaminophen  mg tablet Commonly known as:  Lenora Au Take 1 Tab by mouth. insulin glargine 100 unit/mL (3 mL) Inpn Commonly known as:  LALO GROSSMAN  
14 Units by SubCUTAneous route daily. Insulin Needles (Disposable) 32 gauge x 5/32\" Ndle Commonly known as:  Elise Pen Needle 5 times a day  
  
 insulin syringe-needle U-100 1 mL 31 gauge x 15/64\" Syrg 1 Syringe by SubCUTAneous route four (4) times daily. LIDOCAINE VISCOUS 2 % solution Generic drug:  lidocaine  
  
 midodrine 10 mg tablet Commonly known as:  Breeraul Meet Take 10 mg by mouth three (3) times daily (with meals). multivitamin tablet Commonly known as:  ONE A DAY Take 1 Tab by mouth daily. Nebulizer & Compressor machine 1 Each by Does Not Apply route daily. nitroglycerin 0.4 mg SL tablet Commonly known as:  NITROSTAT  
0.4 mg by SubLINGual route every five (5) minutes as needed for Chest Pain. ondansetron hcl 8 mg tablet Commonly known as:  ZOFRAN (AS HYDROCHLORIDE) Take 1 Tab by mouth every eight (8) hours as needed for Nausea. TOBIAS MILK OF MAGNESIA 400 mg/5 mL suspension Generic drug:  magnesium hydroxide Take 30 mL by mouth daily as needed for Constipation. PLAVIX 75 mg Tab Generic drug:  clopidogrel Take 75 mg by mouth daily. vit B Cmplx 3-FA-Vit C-Biotin 1- mg-mg-mcg tablet Commonly known as:  NEPHRO GRAYSON RX Take 1 Tab by mouth daily. VITAMIN B-6 PO Take  by mouth. We Performed the Following AMB POC HEMOGLOBIN A1C [51292 CPT(R)] HEMOGLOBIN A1C WITH EAG [18523 CPT(R)] LIPID PANEL [20612 CPT(R)] METABOLIC PANEL, COMPREHENSIVE [97466 CPT(R)] MICROALBUMIN, UR, RAND W/ MICROALB/CREAT RATIO [id number] CPT(R)] Follow-up Instructions Return in about 3 months (around 6/12/2018). Patient Instructions Lantus 14 units daily Breakfast: 13 units Lunch:       10 units Dinner:      7 units IF GLUCOSE IS:                 THEN TAKE: 
    0   Extra Unit 151-175   1   Extra Unit 
176-200   2   Extra Units 783-642   3   Extra Units 226-250   4   Extra Units 251-275   5   Extra Units 276-300   6   Extra Units 897-577   7   Extra Units 
 
---------------------------------------------------------------------------------------------------------------------- Below you will find a glucose log sheet which you can use to record your blood sugars. Without checking and recording what your home glucose levels are, it will be difficult to make any changes to your medication dose, even when significant changes may be needed. Please feel free to use the log below to record your home glucose levels. At the very least, I would like for you to login the entire 2-3 weeks just before your visit so we can make your visit much more productive and beneficial to you. GLUCOSE LOG SHEET: 
 
Date Breakfast Lunch Dinner Bedtime Comments ? GLUCOSE LOG SHEET: 
 
Date Breakfast Lunch Dinner Bedtime Comments ? GLUCOSE LOG SHEET: 
 
Date Breakfast Lunch Dinner Bedtime Comments ?  
       
       
       
       
       
       
       
       
       
       
       
       
       
       
       
       
       
       
       
       
       
       
       
       
       
       
       
       
       
 
--------------------------------------------------------------------------- ------------------------------------------- Below you will find a glucose log sheet which you can use to record your blood sugars. Without checking and recording what your home glucose levels are, it will be difficult to make any changes to your medication dose, even when significant changes may be needed. Please feel free to use the log below to record your home glucose levels. At the very least, I would like for you to login the entire 2-3 weeks just before your visit so we can make your visit much more productive and beneficial to you. GLUCOSE LOG SHEET: 
 
Date Breakfast Lunch Dinner Bedtime Comments ? GLUCOSE LOG SHEET: 
 
Date Breakfast Lunch Dinner Bedtime Comments ? GLUCOSE LOG SHEET: 
 
Date Breakfast Lunch Dinner Bedtime Comments ? Introducing [facility] & [facility] SERVICES! Darwin Carrington introduces [employer] patient portal. Now you can access parts of your medical record, email your doctor's office, and request medication refills online. 1. In your internet browser, go to [email or web address] 2. Click on the First Time User? Click Here link in the Sign In box. You will see the New Member Sign Up page. 3. Enter your [employer] Access Code exactly as it appears below.  You will not need to use this code after youve completed the sign-up process. If you do not sign up before the expiration date, you must request a new code. · [employer] Access Code: [email or web address] Expires: 5/14/2018  6:56 AM 
 
4. Enter the last four digits of your Social Security Number (xxxx) and Date of Birth (mm/dd/yyyy) as indicated and click Submit. You will be taken to the next sign-up page. 5. Create a [employer] ID. This will be your [employer] login ID and cannot be changed, so think of one that is secure and easy to remember. 6. Create a [employer] password. You can change your password at any time. 7. Enter your Password Reset Question and Answer. This can be used at a later time if you forget your password. 8. Enter your e-mail address. You will receive e-mail notification when new information is available in [address]. 9. Click Sign Up. You can now view and download portions of your medical record. 10. Click the Download Summary menu link to download a portable copy of your medical information. If you have questions, please visit the Frequently Asked Questions section of the [employer] website. Remember, [employer] is NOT to be used for urgent needs. For medical emergencies, dial 911. Now available from your iPhone and Android! Please provide this summary of care documentation to your next provider. Your primary care clinician is listed as Mak Grijalva. If you have any questions after today's visit, please call [phone].

## 2018-03-12 NOTE — PATIENT INSTRUCTIONS
Lantus 14 units daily    Breakfast: 13 units  Lunch:       10 units  Dinner:      7 units    IF GLUCOSE IS:                 THEN TAKE:      0   Extra Unit  151-175   1   Extra Unit  176-200   2   Extra Units  201-225   3   Extra Units  226-250   4   Extra Units  251-275   5   Extra Units  276-300   6   Extra Units  301-325   7   Extra Units    ----------------------------------------------------------------------------------------------------------------------    Below you will find a glucose log sheet which you can use to record your blood sugars. Without checking and recording what your home glucose levels are, it will be difficult to make any changes to your medication dose, even when significant changes may be needed. Please feel free to use the log below to record your home glucose levels. At the very least, I would like for you to login the entire 2-3 weeks just before your visit so we can make your visit much more productive and beneficial to you. GLUCOSE LOG SHEET:    Date Breakfast Lunch Dinner Bedtime Comments ? GLUCOSE LOG SHEET:    Date Breakfast Lunch Dinner Bedtime Comments ? GLUCOSE LOG SHEET:    Date Breakfast Lunch Dinner Bedtime Comments ? ----------------------------------------------------------------------------------------------------------------------    Below you will find a glucose log sheet which you can use to record your blood sugars. Without checking and recording what your home glucose levels are, it will be difficult to make any changes to your medication dose, even when significant changes may be needed. Please feel free to use the log below to record your home glucose levels. At the very least, I would like for you to login the entire 2-3 weeks just before your visit so we can make your visit much more productive and beneficial to you. GLUCOSE LOG SHEET:    Date Breakfast Lunch Dinner Bedtime Comments ? GLUCOSE LOG SHEET:    Date Breakfast Lunch Dinner Bedtime Comments ? GLUCOSE LOG SHEET:    Date Breakfast Lunch Dinner Bedtime Comments ?

## 2018-03-12 NOTE — PROGRESS NOTES
CHIEF COMPLAINT: f/u for type 2 diabetes    HISTORY OF PRESENT ILLNESS:   Leland Hayward is a 80 y.o. male with a PMHx as noted below who presents for f/u evaluation of type 2 diabetes. Diabetes History:  History obtained from both patient and his wife who is present:Patient presents today to establish care for his diabetes. He was previously managed by endocrinologist Dr. Cristy Solo, however would like to be treated closer to home. He notably had a recent stroke for which he had been admitted. Currently he ambulates with a wheelchair. He is present with his wife today for evaluation. Patients co-morbidities include multiple CVA, HTN, Coronary disease, and CHF with an AICD. He is on tube feeds as noted below. Diabetes was diagnosed in early 2015. Family History of diabetes is negative. Last A1c prior to initial visit was 6.6% on July 31st.   Regimen at time of establishing care includes   - Januvia 50mg daily, crushed   - Lantus 14U qAM   - Humalog nursing home schedule: 6U 4x/day on iso-source 1.5. Also on 2:50>150 correction sliding scale. 1.5 cans breakfast, 1 can with lunch and dinner, 1.5 cans with dinner. Reportedly 44Gm carbs/can suggesting 1:7 carb ratio per can    INTERVAL HISTORY:  Patient continues to receive cortisone injections, Last in Feb 16th.    His diabetes response has become more exaggerated than previously,  We had been in touch by phone to adjust his regimen several times since last visit,  She has since cut back down,  A1c today is 7.6%        Regimen:   Can TF's,   2 cans w/ Breakfast = 11 units humalog  1.5 can lunch  =  8 units humalog  1.5 can dinner =  7 units humalog  Correction Scale to 1:25>150,   Lantus 14 units in the AM    Review of home glucose:  AM    111-130  Lunch   170-220 mostly   Dinner  160-200 mostly, occasional 120-130       Review of most recent diabetes-related labs:  Lab Results   Component Value Date    HBA1C 6.5 (H) 02/02/2017    HBA1C 6.4 (H) 11/30/2016    HBA1C 6.7 (H) 11/10/2016    KKX4CCPL 6.5 11/09/2017    VEU4IGEF 7.6 08/08/2017    SWG3JEAS 5.5 05/10/2016    CHOL 129 02/02/2017    LDLC 67.6 02/02/2017    GFRAA 59 (L) 02/15/2018    GFRNA 49 (L) 02/15/2018    MCACR 62.2 (H) 08/08/2017    TSH 1.45 02/03/2017     097187 = IA-2 pancreatic islet cell autoantibody  GADLT = JOVANY-65 autoantibody   MCACR = Urine Microalbumin                    PAST MEDICAL/SURGICAL HISTORY:   Past Medical History:   Diagnosis Date    Antiplatelet or antithrombotic long-term use 12/4/2014    Anxiety disorder     Arrhythmia 2009    bradycardia    Arthritis     CAD (coronary artery disease)     s/p CABG 2002; Dr Kenyon Curry 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 I suspect the latter. Achalasia  is not present.         GERD (gastroesophageal reflux disease)     Heart failure ([address] 10/2014     Cardiomyopathy:Pacemaker upgrade:Biv and AICD  Dr. Mickle Simmonds heart Dr. last visit 5/11/2015    Hepatitis C Dx 1996    [facility] at [facility] in past; as of 4/15/15 wife states pt currently not under any treatment    Hyperlipidemia     Hypertension     Myocardial infarct 2013    Heart Cath: 40% LV EF, Stented distal LAD, patent Graft to circumflex    On tube feeding diet approx 2009    still has as of 9/28/15  (no po food/liquid/meds at all); Dr Viral Landaverde Other ill-defined conditions(799.89) 1996    1 dose of chemotherapy/radiation for tongue cancer    Other ill-defined conditions(799.89)     BPH    Other ill-defined conditions(799.89)     orthostatic hypotension    Pneumonia ~ April -May 2010    Stroke [facility]) approx 2003    left side-left finger tips numb; no imbalance or memory loss; as of 2015 not seeing neuro MD    Suicidal thoughts      Past Surgical History:   Procedure Laterality Date    ABDOMEN SURGERY PROC UNLISTED  1996    peg tube    CABG, ARTERY-VEIN, THREE  2000    HX CATARACT REMOVAL      bilateral    HX CHOLECYSTECTOMY      HX HEART CATHETERIZATION  2013    Stented distal LAD    HX MOHS PROCEDURES      bilateral    HX ORTHOPAEDIC      back surgery times two    HX OTHER SURGICAL      Radical Left Neck    HX OTHER SURGICAL      NASAL POLYPS REMOVAL    HX OTHER SURGICAL  2010    TURP    HX PACEMAKER  11/08    HX PACEMAKER  10/28/14     Defibrillator: [facility] # [id number], serial # [id number]; Dr. Calhoun [facility] [phone]; Dr Alethea [facility]      cystoscopy   [address]    cevical surgery    CT CHANGE GASTROSTOMY TUBE  5/2/2011         CT CHANGE GASTROSTOMY TUBE  6/29/2011         CT EGD INSERT GUIDE WIRE DILATOR PASSAGE ESOPHAGUS  9/13/2010         CT EGD TRANSORAL BIOPSY SINGLE/MULTIPLE  9/13/2010         STOMACH SURGERY PROCEDURE UNLISTED  6/29/2011         VASCULAR SURGERY PROCEDURE UNLIST      bilateral carotid stents       ALLERGIES:   Allergies   Allergen Reactions    Demerol [Meperidine] Shortness of Breath    Paxil [Paroxetine Hcl] Unknown (comments)     Pt gets shaky and loses control of legs    Amoxicillin Rash    Cleocin [Clindamycin Hcl] Rash    Pcn [Penicillins] Rash       MEDICATIONS ON ADMISSION:     Current Outpatient Prescriptions:     fluticasone (FLONASE) 50 mcg/actuation nasal spray, , Disp: , Rfl:     PYRIDOXINE HCL, VITAMIN B6, (VITAMIN B-6 PO), Take  by mouth., Disp: , Rfl:     famotidine (PEPCID) 20 mg tablet, Take 1 Tab by mouth two (2) times a day., Disp: 20 Tab, Rfl: 0    HUMALOG KWIKPEN 100 unit/mL kwikpen, INJECT 9 UNITS WITH BREAKFAST, 7 UNITS WITH LUNCH, 7 UNITS WITH DINNER AND 7 UNITS AT BEDTIME TUBE FEEDS (Patient taking differently: INJECT 11 UNITS WITH BREAKFAST, 8 UNITS WITH LUNCH, 7 UNITS WITH DINNER AND 7 UNITS AT BEDTIME TUBE FEEDS), Disp: 5 Pen, Rfl: 7    insulin glargine (LANTUS SOLOSTAR) 100 unit/mL (3 mL) pen, 14 Units by SubCUTAneous route daily. , Disp: 5 Pen, Rfl: 11    aspirin (ASPIRIN) 325 mg tablet, Take 325 mg by mouth daily. , Disp: , Rfl:     albuterol-ipratropium (DUO-NEB) 2.5 mg-0.5 mg/3 ml nebu, 3 mL by Nebulization route every six (6) hours as needed. , Disp: 100 Nebule, Rfl: 1    Nebulizer & Compressor machine, 1 Each by Does Not Apply route daily. , Disp: 1 Each, Rfl: 0    midodrine (PROAMITINE) 10 mg tablet, Take 10 mg by mouth three (3) times daily (with meals). , Disp: , Rfl:     insulin syringe-needle U-100 1 mL 31 x 15/64\" syrg, 1 Syringe by SubCUTAneous route four (4) times daily. , Disp: 200 Each, Rfl: 11    Insulin Needles, Disposable, (ESPERANZA PEN NEEDLE) 32 gauge x 5/32\" ndle, 5 times a day, Disp: 150 Each, Rfl: 99    atorvastatin (LIPITOR) 40 mg tablet, Take 40 mg by mouth daily. , Disp: , Rfl:     furosemide (LASIX) 20 mg tablet, Take 1 Tab by mouth daily. Indications: HYPERCALCEMIA, Disp: 30 Tab, Rfl: 2    gabapentin (NEURONTIN) 250 mg/5 mL solution, Take 500 mg by mouth three (3) times daily. , Disp: , Rfl:     multivitamin (ONE A DAY) tablet, Take 1 Tab by mouth daily. , Disp: , Rfl:     clopidogrel (PLAVIX) 75 mg tablet, Take 75 mg by mouth daily. , Disp: , Rfl:     HYDROcodone-acetaminophen (NORCO)  mg tablet, Take 1 Tab by mouth., Disp: , Rfl:     lidocaine (LIDOCAINE VISCOUS) 2 % solution, , Disp: , Rfl:     guaiFENesin (ROBITUSSIN) 100 mg/5 mL liquid, Take 200 mg by mouth three (3) times daily as needed for Cough. , Disp: , Rfl:     nitroglycerin (NITROSTAT) 0.4 mg SL tablet, 0.4 mg by SubLINGual route every five (5) minutes as needed for Chest Pain., Disp: , Rfl:     vit B Cmplx 3-FA-Vit C-Biotin (NEPHRO GRAYSON RX) 1- mg-mg-mcg tablet, Take 1 Tab by mouth daily. , Disp: , Rfl:     magnesium hydroxide (TOBIAS MILK OF MAGNESIA) 400 mg/5 mL suspension, Take 30 mL by mouth daily as needed for Constipation. , Disp: , Rfl:     ondansetron hcl (ZOFRAN, AS HYDROCHLORIDE,) 8 mg tablet, Take 1 Tab by mouth every eight (8) hours as needed for Nausea., Disp: 20 Tab, Rfl: 0    SOCIAL HISTORY:   Social History     Social History    Marital status:      Spouse name: N/A    Number of children: N/A    Years of education: N/A     Occupational History    Retired       He was a stained      Social History Main Topics    Smoking status: Never Smoker    Smokeless tobacco: Never Used    Alcohol use No    Drug use: No    Sexual activity: Yes     Partners: Female     Other Topics Concern    Not on file     Social History Narrative       FAMILY HISTORY:  Family History   Problem Relation Age of Onset    Heart Disease Father       at age 52 from CAD    Colon Cancer Mother     Cancer Mother      colon ca    Heart Disease Brother        REVIEW OF SYSTEMS: Complete ROS assessed and noted for that which is described above, all else are negative.   Eyes: normal  ENT: normal  CVS: normal  Resp: normal  GI: normal  : normal  GYN: normal  Endocrine: normal  Integument: normal  Musculoskeletal: normal  Neuro: mild weakness  Psych: normal      PHYSICAL EXAMINATION:    VITAL SIGNS:  Visit Vitals    BP 97/62 (BP 1 Location: Left arm, BP Patient Position: Sitting)    Pulse 67    Ht 5' 7\" (1.702 m)    Wt 170 lb 1.6 oz (77.2 kg)    BMI 26.64 kg/m2       GENERAL: NCAT, Sitting comfortably, NAD  EYES: EOMI, non-icteric, no proptosis  Ear/Nose/Throat: NCAT, no inflammation, no masses  LYMPH NODES: No LAD  CARDIOVASCULAR: S1 S2, RRR, No murmur, 2+ radial pulses  RESPIRATORY: CTA b/l, no wheeze/rales  GASTROINTESTINAL: soft, ND  MUSCULOSKELETAL: Normal ROM, no atrophy, mild 1+ edema in lower extremities  SKIN: warm, no edema/rash/ or other skin changes  NEUROLOGIC: 4/5 power all extremities,  AAOx3  PSYCHIATRIC: Normal affect, Normal insight and judgement      REVIEW OF LABORATORY AND RADIOLOGY DATA:   Labs and documentation have been reviewed as described above. ASSESSMENT AND PLAN:   Juan Alberto Franklin is a 80 y.o. male with a PMHx as noted below who presents for f/u evaluation of uncontrolled type 2 diabetes. Type 2 diabetes, uncontrolled  Hyperlipidemia  Hypertension    Post prandial hyperglycemia, slowly improving with time following cortisone injection. Lantus dose is fine as noted by AM blood sugars, while daytime numbers suggest need for more humalog with his TF's, we discussed plan as below and the possibility of reducing back to the old schedule with time if sugars continue to trend down. Plan: Type 2 Diabetes  Medications:  Can TF's,   2 cans w/ Breakfast = 13 units humalog  1.5 can lunch  =  10 units humalog  1.5 can dinner =  7 units humalog  Correction Scale to 1:25>150,   Lantus 14 units in the AM    Labs: new DM prelabs ordered for before next visit    HTN: Stable today, metoprolol 12.5mg BID  HLD: stable on statin , lipitor 40mg daily    RTC: I would like to see them back in 3 months    Neema Tellez.  [address] [facility]

## 2018-03-27 NOTE — TELEPHONE ENCOUNTER
----- Message from Nydia Hernandez sent at 3/27/2018  1:36 PM EDT -----  Regarding: Dr. Pat Bae  Mrs. Sean Avila, pt's wife, requesting to speak with the nurse in regards to a prescription for  \"Lancets\", test strips and new metor. Mrs. Sean Avila stated, she is running low on strips.  Best contact number [phone]

## 2018-03-28 NOTE — TELEPHONE ENCOUNTER
Ms. Jen Perks to ask us to order a new meter, strips and lancets. She said to put that he test 4 times per day so she can get 400 strips at a time.   Elian Majano

## 2018-03-28 NOTE — TELEPHONE ENCOUNTER
I returned Ms. Ghosh's call and told her I would do this. This was sent to Dr. Es Riggs to sign.   Laurina Angelucci

## 2018-03-28 NOTE — TELEPHONE ENCOUNTER
Completed,   Thanks,    Chalino Macias.  [address] [facility] Endocrinology  [address] [place] [address]

## 2018-05-02 NOTE — TELEPHONE ENCOUNTER
Received call from patient's wife, she stated that Dr. Pepe Smith saw her  about a year ago for a stroke, and she says he has now developed \"bad tremors\" She said that she took him to see his PCP and they recommended she make an appt as soon as possible with Dr. Pepe Smith.  I told the patient's wife that I would send a message to the nurse to see if we can get him in soon to see Dr. Pepe Smith.      [phone]

## 2018-05-07 PROBLEM — E11.21 TYPE 2 DIABETES WITH NEPHROPATHY (HCC): Status: ACTIVE | Noted: 2018-01-01

## 2018-05-07 PROBLEM — G45.0 VERTEBROBASILAR OCCLUSIVE DISEASE: Status: ACTIVE | Noted: 2018-01-01

## 2018-05-07 PROBLEM — M25.531 WRIST PAIN, ACUTE, RIGHT: Status: ACTIVE | Noted: 2018-01-01

## 2018-05-07 PROBLEM — G25.0 BENIGN ESSENTIAL TREMOR SYNDROME: Status: ACTIVE | Noted: 2018-01-01

## 2018-05-07 PROBLEM — J96.90 RESPIRATORY FAILURE (HCC): Status: ACTIVE | Noted: 2018-01-01

## 2018-05-07 PROBLEM — E11.42 DIABETIC PERIPHERAL NEUROPATHY ASSOCIATED WITH TYPE 2 DIABETES MELLITUS (HCC): Status: ACTIVE | Noted: 2018-01-01

## 2018-05-07 NOTE — PATIENT INSTRUCTIONS
Office Policies       Phone calls/patient messages:   Please allow up to 24 hours for someone in the office to contact you about your call or message. Be mindful your provider may be out of the office or your message may require further review. We encourage you to use [employer] for your messages as this is a faster, more efficient way to communicate with our office         Medication Refills:   Prescription medications require 48 business hours to process. We encourage you to use [employer] for your refills. For controlled medications: Please allow 72 business hours to process. Certain medications may require you to  a written prescription at our office. NO narcotic/controlled medications will be prescribed after 4pm Monday through Friday or on weekends     Form/Paperwork Completion:   Please note there is a $25 fee for all paperwork completed by our providers. We ask that you allow 7-14 business days. Pre-payment is due prior to picking up/faxing the completed form. You may also download your forms to [employer] to have your doctor print off. A Healthy Lifestyle: Care Instructions  Your Care Instructions    A healthy lifestyle can help you feel good, stay at a healthy weight, and have plenty of energy for both work and play. A healthy lifestyle is something you can share with your whole family. A healthy lifestyle also can lower your risk for serious health problems, such as high blood pressure, heart disease, and diabetes. You can follow a few steps listed below to improve your health and the health of your family. Follow-up care is a key part of your treatment and safety. Be sure to make and go to all appointments, and call your doctor if you are having problems. It's also a good idea to know your test results and keep a list of the medicines you take. How can you care for yourself at home? · Do not eat too much sugar, fat, or fast foods. You can still have dessert and treats now and then.  The goal is moderation. · Start small to improve your eating habits. Pay attention to portion sizes, drink less juice and soda pop, and eat more fruits and vegetables. ¨ Eat a healthy amount of food. A 3-ounce serving of meat, for example, is about the size of a deck of cards. Fill the rest of your plate with vegetables and whole grains. ¨ Limit the amount of soda and sports drinks you have every day. Drink more water when you are thirsty. ¨ Eat at least 5 servings of fruits and vegetables every day. It may seem like a lot, but it is not hard to reach this goal. A serving or helping is 1 piece of fruit, 1 cup of vegetables, or 2 cups of leafy, raw vegetables. Have an apple or some carrot sticks as an afternoon snack instead of a candy bar. Try to have fruits and/or vegetables at every meal.  · Make exercise part of your daily routine. You may want to start with simple activities, such as walking, bicycling, or slow swimming. Try to be active 30 to 60 minutes every day. You do not need to do all 30 to 60 minutes all at once. For example, you can exercise 3 times a day for 10 or 20 minutes. Moderate exercise is safe for most people, but it is always a good idea to talk to your doctor before starting an exercise program.  · Keep moving. Clent Cramp the lawn, work in the garden, or [employer]. Take the stairs instead of the elevator at work. · If you smoke, quit. People who smoke have an increased risk for heart attack, stroke, cancer, and other lung illnesses. Quitting is hard, but there are ways to boost your chance of quitting tobacco for good. ¨ Use nicotine gum, patches, or lozenges. ¨ Ask your doctor about stop-smoking programs and medicines. ¨ Keep trying. In addition to reducing your risk of diseases in the future, you will notice some benefits soon after you stop using tobacco. If you have shortness of breath or asthma symptoms, they will likely get better within a few weeks after you quit.   · Limit how much alcohol you drink. Moderate amounts of alcohol (up to 2 drinks a day for men, 1 drink a day for women) are okay. But drinking too much can lead to liver problems, high blood pressure, and other health problems. Family health  If you have a family, there are many things you can do together to improve your health. · Eat meals together as a family as often as possible. · Eat healthy foods. This includes fruits, vegetables, lean meats and dairy, and whole grains. · Include your family in your fitness plan. Most people think of activities such as jogging or tennis as the way to fitness, but there are many ways you and your family can be more active. Anything that makes you breathe hard and gets your heart pumping is exercise. Here are some tips:  ¨ Walk to do errands or to take your child to school or the bus. ¨ Go for a family bike ride after dinner instead of watching TV. Where can you learn more? Go to http://aniket-tomas.info/. Enter R815 in the search box to learn more about \"A Healthy Lifestyle: Care Instructions. \"  Current as of: May 12, 2017  Content Version: 11.4  © 2323-9135 Healthwise, [employer]. Care instructions adapted under license by [employer] (which disclaims liability or warranty for this information). If you have questions about a medical condition or this instruction, always ask your healthcare professional. [address] any warranty or liability for your use of this information.

## 2018-05-07 NOTE — LETTER
5/7/2018 11:26 PM 
 
Patient:  Silvestre Serrato YOB: 1935 Date of Visit: 5/7/2018 Dear No Recipients: Thank you for referring Mr. Silvestre Serrato to me for evaluation/treatment. Below are the relevant portions of my assessment and plan of care. Consult REFERRED BY: 
Guero March MD 
 
CHIEF COMPLAINT: Increasing tremors in his hands and legs Subjective:  
 
Silvestre Serrato is a 80 y.o. right-handed  male seen on an urgent work in basis at the request of Dr. Sha Roberts for evaluation of increasing tremors in his hands mainly but sometimes his legs, that seem to occur at any time without precipitating factors, to the point that he has difficulty writing or doing things with these tremors increasingly severe. They seem by history to be more related to action and static tremors and not a rest tremor, but the history is very vague from both the patient and his wife. He has very little tremor on exam today was difficult to be sure what his problem really is. He has had no new weakness or sensory loss, but still has a mild right-sided weakness from his previous stroke one year ago. He saw our nurse practitioner in February 2017 after his hospitalization and he seemed to be relatively stable but had to be put back on Plavix because he could not break the Aggrenox to put down his feeding tube since it cannot be crushed. He does have a pacemaker and cannot get an MRI. His CTA of the head and neck did not show any significant carotid disease, but he did have significant disease of both vertebral and basilar artery suggesting some vertebrobasilar insufficiency. He does have some chronic dizziness that seems to be more orthostatic, but we will recheck his carotid Doppler study just to check to make sure that that has not related to any type of carotid stenosis or progressive vertebrobasilar disease.   He also has severe aortic stenosis and cardiology does not feel he is a candidate for surgery. 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 He is somewhat weak and debilitated in general.  We will also check his thyroid functions, just to make sure is not hyperthyroid as a cause of his increasing tremors. He has no family history of similar tremors, so there is nothing to suggest familial tremors. His examination does not suggest cogwheeling, rigidity, bradykinesia, or micrographia. He most likely has benign essential tremor but is very difficult to be sure by his history says he and his wife give a somewhat wandering and imprecise history. He has had no unusual headache, fever, trauma, meningismus, or new focal weakness or sensory loss or gait problems. No other new medical problems either. He has not been started on any new medications or neuroleptics that might cause tremors. Past Medical History:  
Diagnosis Date  Antiplatelet or antithrombotic long-term use 12/4/2014  Anxiety disorder  Arrhythmia 2009  
 bradycardia  Arthritis  CAD (coronary artery disease) s/p CABG 2002; Dr Maikol Ng  Cancer ([facility] [address] 1996  
 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   
 left leg, lower back,   
 Depression  Diabetes ([facility] [address] Type II  
 Esophageal dysmotility s/p dilitation  Esophageal motility disorder 7/8/2013 Frequent simultaneous or failed contractions, low amplitude contractions  suggests severe myopathy or diffuse spasm. I suspect the latter. Achalasia  is not present.  GERD (gastroesophageal reflux disease)  Heart failure ([facility] [address] 10/2014 Cardiomyopathy:Pacemaker upgrade:Biv and AICD  Dr. Lula Cervantes heart  last visit 5/11/2015  Hepatitis C Dx 1996  
 treated at [facility] in past; as of 4/15/15 wife states pt currently not under any treatment  Hyperlipidemia  Hypertension  Myocardial infarct [facility])  Heart Cath: 40% LV EF, Stented distal LAD, patent Graft to circumflex  On tube feeding diet approx   
 still has as of 9/28/15  (no po food/liquid/meds at all); Dr Byron Muniz  Other ill-defined conditions(799.89) 1996  
 1 dose of chemotherapy/radiation for tongue cancer  Other ill-defined conditions(799.89) BPH  Other ill-defined conditions(799.89) orthostatic hypotension  Pneumonia ~ April -May 2010  Stroke [facility]) approx   
 left side-left finger tips numb; no imbalance or memory loss; as of  not seeing neuro MD  
 Suicidal thoughts Past Surgical History:  
Procedure Laterality Date  ABDOMEN SURGERY PROC UNLISTED    
 peg tube  CABG, ARTERY-VEIN, THREE    HX CATARACT REMOVAL    
 bilateral  
 HX CHOLECYSTECTOMY [address] CATHETERIZATION   Stented distal LAD  HX MOHS PROCEDURES    
 bilateral  
 HX ORTHOPAEDIC    
 back surgery times two  HX OTHER SURGICAL Radical Left Neck  HX OTHER SURGICAL    
 NASAL POLYPS REMOVAL  
 HX OTHER SURGICAL   TURP  
 HX PACEMAKER    HX PACEMAKER  10/28/14 Defibrillator: [facility] # [id number], serial # [id number]; Dr. Tim Adrian [phone]; Dr Marycruz West  HX UROLOGICAL    
 cystoscopy [address]  
 cevical surgery  OH CHANGE GASTROSTOMY TUBE  2011  OH CHANGE GASTROSTOMY TUBE  2011  OH EGD INSERT GUIDE WIRE DILATOR PASSAGE ESOPHAGUS  2010  OH EGD TRANSORAL BIOPSY SINGLE/MULTIPLE  2010  
    
 STOMACH SURGERY PROCEDURE UNLISTED  2011  VASCULAR SURGERY PROCEDURE UNLIST    
 bilateral carotid stents Family History Problem Relation Age of Onset  Heart Disease Father   
   at age 52 from CAD  Colon Cancer Mother  Cancer Mother   
  colon ca  
 Heart Disease Brother Social History Substance Use Topics  Smoking status: Never Smoker  Smokeless tobacco: Never Used  Alcohol use No  
   
No current facility-administered medications for this visit. No current outpatient prescriptions on file. Facility-Administered Medications Ordered in Other Visits:  
  sodium chloride (NS) flush 5-10 mL, 5-10 mL, IntraVENous, Q8H, Shakila Cloud MD, 10 mL at 05/07/18 2138   sodium chloride (NS) flush 5-10 mL, 5-10 mL, IntraVENous, PRN, Shakila Cloud MD 
  furosemide (LASIX) injection 40 mg, 40 mg, IntraVENous, Q12H, Sallie GROVER MD, 40 mg at 05/07/18 2133   albuterol-ipratropium (DUO-NEB) 2.5 MG-0.5 MG/3 ML, 3 mL, Nebulization, Q6H PRN, Clarissa Mcclain MD 
  [START ON 5/8/2018] aspirin (ASPIRIN) tablet 325 mg, 325 mg, Oral, DAILY, Clarissa Mcclain MD 
  [START ON 5/8/2018] atorvastatin (LIPITOR) tablet 40 mg, 40 mg, Oral, DAILY, Clarissa Mcclain MD 
  [START ON 5/8/2018] clopidogrel (PLAVIX) tablet 75 mg, 75 mg, Oral, DAILY, Clarissa Mcclain MD 
  [START ON 5/8/2018] cyanocobalamin (VITAMIN B12) tablet 1,000 mcg, 1,000 mcg, Oral, DAILY, Clarissa Mcclain MD 
  famotidine (PEPCID) tablet 20 mg, 20 mg, Oral, BID, Sallie GROVER MD, 20 mg at 05/07/18 1813 
  gabapentin (NEURONTIN) 250 mg/5 mL solution 750 mg, 750 mg, Oral, TID, Sallie GROVER MD, 750 mg at 05/07/18 1813 
  [START ON 5/8/2018] insulin glargine (LANTUS) injection 14 Units, 14 Units, SubCUTAneous, DAILY, Clarissa Mcclain MD 
  midodrine (PROAMITINE) tablet 10 mg, 10 mg, Oral, TID WITH MEALS, Sallie GROVER MD, 10 mg at 05/07/18 1813 
  insulin lispro (HUMALOG) injection, , SubCUTAneous, AC&HS, Slalie GROVER MD, Stopped at 05/07/18 1630 
  glucose chewable tablet 16 g, 4 Tab, Oral, PRN, Clarissa Mcclain MD 
  dextrose (D50W) injection syrg 12.5-25 g, 12.5-25 g, IntraVENous, PRN, Clarissa Mcclain MD 
  glucagon (GLUCAGEN) injection 1 mg, 1 mg, IntraMUSCular, PRN, Clarissa Mcclain MD 
  primidone (MYSOLINE) tablet 25 mg, 25 mg, Oral, QHS, Sallie GROVER MD, 25 mg at 05/07/18 0482   HYDROcodone-acetaminophen (NORCO) 5-325 mg per tablet 1 Tab, 1 Tab, Oral, Q6H PRN, Ricardo Barrios MD, 1 Tab at 05/07/18 2137   polyethylene glycol (MIRALAX) packet 17 g, 17 g, Oral, DAILY, Ricardo Barrios MD, Stopped at 05/07/18 2137 Allergies Allergen Reactions  Demerol [Meperidine] Shortness of Breath  Paxil [Paroxetine Hcl] Unknown (comments) Pt gets shaky and loses control of legs  Amoxicillin Rash  Cleocin [Clindamycin Hcl] Rash  Pcn [Penicillins] Rash Review of Systems: A comprehensive review of systems was negative except for: Constitutional: positive for fatigue and malaise Respiratory: positive for pleurisy/chest pain, dyspnea on exertion or chronic bronchitis Gastrointestinal: positive for dysphagia, dyspepsia, reflux symptoms, nausea and abdominal pain Musculoskeletal: positive for myalgias, arthralgias, stiff joints, neck pain and back pain Neurological: positive for coordination problems, tremor and weakness Behvioral/Psych: positive for anxiety and depression Vitals:  
 05/07/18 1044 BP: 110/52 Pulse: 80 Resp: 16 Temp: 98 °F (36.7 °C) SpO2: 90% Weight: 184 lb (83.5 kg) Height: 5' 7\" (1.702 m) Objective: I 
 
 
 
NEUROLOGICAL EXAM: 
  
Appearance: The patient is well developed, well nourished, provides a coherent history and is in no acute distress. Mental Status: Oriented to time, place and person and the president, cognitive function and fund of knowledge is normal. Speech is fluent without aphasia or dysarthria. Mood and affect appropriate but depressed . Cranial Nerves:   Intact visual fields. Fundi are benign. RAY, EOM's full, no nystagmus, no ptosis. Facial sensation is normal. Corneal reflexes are not tested. Facial movement is asymmetric. Hearing is normal bilaterally. Palate is midline with normal sternocleidomastoid and trapezius muscles are normal. Tongue is midline. Neck without meningismus or bruits Patient has marked limited range of motion of the cervical spine with pain in all directions Temporal arteries are not tender or enlarged Motor:  4/5 strength in upper and lower proximal and distal muscles, except for the right upper extremity which has strength about 3/5 associated with an arm drift and decreased rapid alternating movements in the right hand. Normal bulk and tone. No fasciculations. Reflexes:   Deep tendon reflexes 2+/4 on the right and 1+/4 on the left. No babinski or clonus present Sensory:   Abnormal to touch, pinprick and temperature and vibration decreased in both feet. DSS is intact Gait:  Normal gait though he has to move slowly due to his arthritis and his mild right hemiparesis . Tremor:   N Minimal intention bilateral tremor noted, but no resting tremor noted and no cogwheeling or rigidity. Cerebellar:   Mildly abnormal Romberg and tandem cerebellar signs present. Neurovascular:  Abnormal heart sounds and irregular rhythm, peripheral pulses decreased, and no carotid bruits.  
  
 
 
 
Assessment: ICD-10-CM ICD-9-CM 1. Thrombotic stroke involving left middle cerebral artery ([facility]) I63.312 434.01 cyanocobalamin 1,000 mcg tablet T3 TOTAL T4 (THYROXINE) TSH 3RD GENERATION  
   DUPLEX CAROTID BILATERAL AMB NEURO  
   XR WRIST RT AP/LAT/OBL MIN 3V  
   primidone (MYSOLINE) 50 mg tablet 2. 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  
   DUPLEX CAROTID BILATERAL AMB NEURO  
   XR WRIST RT AP/LAT/OBL MIN 3V  
   primidone (MYSOLINE) 50 mg tablet 3. 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  
  357.2 T3 TOTAL T4 (THYROXINE) TSH 3RD GENERATION  
   DUPLEX CAROTID BILATERAL AMB NEURO  
   XR WRIST RT AP/LAT/OBL MIN 3V  
   primidone (MYSOLINE) 50 mg tablet 4. Weakness R53.1 780.79 cyanocobalamin 1,000 mcg tablet T3 TOTAL T4 (THYROXINE) TSH 3RD GENERATION  
   DUPLEX CAROTID BILATERAL AMB NEURO  
   XR WRIST RT AP/LAT/OBL MIN 3V  
   primidone (MYSOLINE) 50 mg tablet 5. Diabetic peripheral neuropathy associated with type 2 diabetes mellitus (HCC) E11.42 250.60 cyanocobalamin 1,000 mcg tablet  
  357.2 T3 TOTAL T4 (THYROXINE) TSH 3RD GENERATION  
   DUPLEX CAROTID BILATERAL AMB NEURO  
   XR WRIST RT AP/LAT/OBL MIN 3V  
   primidone (MYSOLINE) 50 mg tablet 6. Benign essential tremor syndrome G25.0 333.1 cyanocobalamin 1,000 mcg tablet T3 TOTAL T4 (THYROXINE) TSH 3RD GENERATION  
   DUPLEX CAROTID BILATERAL AMB NEURO  
   XR WRIST RT AP/LAT/OBL MIN 3V  
   primidone (MYSOLINE) 50 mg tablet 7. Stenosis of both internal carotid arteries I65.23 433.10 cyanocobalamin 1,000 mcg tablet 433.30 T3 TOTAL T4 (THYROXINE) TSH 3RD GENERATION  
   DUPLEX CAROTID BILATERAL AMB NEURO  
   XR WRIST RT AP/LAT/OBL MIN 3V  
   primidone (MYSOLINE) 50 mg tablet 8. Hemiplegia and hemiparesis following cerebral infarction affecting right dominant side ([facility]) I69.351 438.21 cyanocobalamin 1,000 mcg tablet T3 TOTAL T4 (THYROXINE) TSH 3RD GENERATION  
   DUPLEX CAROTID BILATERAL AMB NEURO  
   XR WRIST RT AP/LAT/OBL MIN 3V  
   primidone (MYSOLINE) 50 mg tablet 9. Wrist pain, acute, right M25.531 719.43 cyanocobalamin 1,000 mcg tablet T3 TOTAL T4 (THYROXINE) TSH 3RD GENERATION  
   DUPLEX CAROTID BILATERAL AMB NEURO  
   XR WRIST RT AP/LAT/OBL MIN 3V  
   primidone (MYSOLINE) 50 mg tablet Active Problems: * No active hospital problems. * 
 
 
Plan:  
 
Patient with progressive tremors, but on exam today there really are to apparent, but by his history suggest benign essential tremor We will check thyroid test, and start him on Mysoline is advised of the side effect as well as dizziness, drowsiness, ataxia, sedation, or any side effect to call us immediately and he is also advised to watch for any GI side effects. We will repeat his carotid Doppler study in view of his progressive dizziness, just to make sure there is no change in his vertebrobasilar insufficiency or vertebral and basilar artery stenosis We spent 40 minutes with the patient and his wife going over his history, going over his exam, and discussing various medications and treatments, we will try the medication as above and keep everything else the same. If all else fails we may need to really do a CTA of his head and neck just to make sure there is no significant progression of disease there. We offered him Coumadin in the past for his vertebrobasilar disease but in view of his mother's history of bleeding complications on that medication he wants no part of it. We will see him again in 3-6 months time or earlier as needed, he will call me if any problem, he will check my chart for results of his test or give us a call then. Signed By: Jeffrey Torres MD   
 May 7, 2018 CC: Lisa Tracey MD 
FAX: [phone] This note will not be viewable in [address] If you have questions, please do not hesitate to call me. I look forward to following  [id number] along with you. Sincerely, Jeffrey Torres MD

## 2018-05-07 NOTE — H&P
Hospitalist Admission Note    NAME: Natalee Young   :  1935   MRN:  [id number]     Date/Time:  2018 3:38 PM    Patient PCP: Ethan Blanchard MD  ______________________________________________________________________  Given the patient's current clinical presentation, I have a high level of concern for decompensation if discharged from the emergency department. Complex decision making was performed, which includes reviewing the patient's available past medical records, laboratory results, and x-ray films. My assessment of this patient's clinical condition and my plan of care is as follows. Assessment / Plan:  Acute hypoxic respiratory failure  Acute on chronic systolic heart failure  CAD s/p AICD  HTN  -pt has new b/l LE edema, KRAMER. He is not on home O2 PTA  -probnp 5098. 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 Will use SSI while here, adjust prn    Hx of CVA  -cont' ASA, plavix, statin        Code Status:  Full, discussed with pt and his wife  Surrogate Decision Maker: pt's wife  DVT Prophylaxis: lovenox  GI Prophylaxis: not indicated      Subjective:   CHIEF COMPLAINT: sob    HISTORY OF PRESENT ILLNESS:     Natalee Young is a 80 y.o.  male with PMHx significant for CVA, hx of tonsillar ca and dysphagia s/p G tube, HTN, chronic systolic heart failure, CAD s/p AICD, present to ED c/o progressive worsening of SOB, started 3 days prior to admission. Associated symptoms including worsening of b/l LE, increased abd girth, wt gain, and KRAMER.   Pt reports taking lasix 20mg daily and had been compliance with medications. Pt denies association to fever, chills, cp, palpitations, abd pain, n/v/d. In the ER, vitals; T 98, P71, /50, SpO2 88% on RA. Pertinent labs: probnp 5098,  Cr 1.39  CXR neg   We were asked to admit for work up and evaluation of the above problems. 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 I suspect the latter. Achalasia  is not present.         GERD (gastroesophageal reflux disease)     Heart failure ([facility] [address] 10/2014     Cardiomyopathy:Pacemaker upgrade:Biv and AICD  Dr. Mona Sheriff heart Dr. last visit 5/11/2015    Hepatitis C Dx 1996    treated at [facility] in past; as of 4/15/15 wife states pt currently not under any treatment    Hyperlipidemia     Hypertension     Myocardial infarct [facility]) 2013    Heart Cath: 40% LV EF, Stented distal LAD, patent Graft to circumflex    On tube feeding diet approx 2009    still has as of 9/28/15  (no po food/liquid/meds at all); Dr Anthony Guajardo Other ill-defined conditions(799.89) 1996    1 dose of chemotherapy/radiation for tongue cancer    Other ill-defined conditions(799.89)     BPH    Other ill-defined conditions(799.89)     orthostatic hypotension    Pneumonia ~ April -May 2010    Stroke [facility]) approx 2003    left side-left finger tips numb; no imbalance or memory loss; as of 2015 not seeing neuro MD    Suicidal thoughts         Past Surgical History:   Procedure Laterality Date    ABDOMEN SURGERY PROC UNLISTED      peg tube    CABG, ARTERY-VEIN, THREE      HX CATARACT REMOVAL      bilateral    HX CHOLECYSTECTOMY      HX HEART CATHETERIZATION      Stented distal LAD    HX MOHS PROCEDURES      bilateral    HX ORTHOPAEDIC      back surgery times two    HX OTHER SURGICAL      Radical Left Neck    HX OTHER SURGICAL      NASAL POLYPS REMOVAL    HX OTHER SURGICAL      TURP    HX PACEMAKER      HX PACEMAKER  10/28/14     Defibrillator: [facility] # [id number], serial # [id number]; Dr. Tory Velasquez [phone]; Dr Matthew Troy HX UROLOGICAL      cystoscopy    NEUROLOGICAL PROCEDURE UNLISTED      cevical surgery    GA CHANGE GASTROSTOMY TUBE  2011         GA CHANGE GASTROSTOMY TUBE  2011         GA EGD INSERT GUIDE WIRE DILATOR PASSAGE ESOPHAGUS  2010         GA EGD TRANSORAL BIOPSY SINGLE/MULTIPLE  2010         STOMACH SURGERY PROCEDURE UNLISTED  2011         VASCULAR SURGERY PROCEDURE UNLIST      bilateral carotid stents       Social History   Substance Use Topics    Smoking status: Never Smoker    Smokeless tobacco: Never Used    Alcohol use No        Family History   Problem Relation Age of Onset    Heart Disease Father       at age 52 from CAD    Colon Cancer Mother     Cancer Mother      colon ca    Heart Disease Brother      Allergies   Allergen Reactions    Demerol [Meperidine] Shortness of Breath    Paxil [Paroxetine Hcl] Unknown (comments)     Pt gets shaky and loses control of legs    Amoxicillin Rash    Cleocin [Clindamycin Hcl] Rash    Pcn [Penicillins] Rash        Prior to Admission medications    Medication Sig Start Date End Date Taking? Authorizing Provider   cyanocobalamin 1,000 mcg tablet Take 1,000 mcg by mouth daily.     Historical Provider   primidone (MYSOLINE) 50 mg tablet Take 0.5 Tabs by mouth nightly. 5/7/18   Kika Sanon MD   Lancets misc Use preferred brand; Check glucose 4 times daily, Diagnosis E11.65 3/28/18   Shelley Cheng MD   glucose blood VI test strips (PHARMACIST CHOICE) strip Use preferred brand; Check glucose 4 times daily, Diagnosis E11.65 3/28/18   Shelley Cheng MD   Blood-Glucose Meter monitoring kit 1 preferred brand glucometer for checking home glucose 4 times per day. DX Code: E11.65 3/28/18   Shelley Cheng MD   fluticasone Leellen Briseno) 50 mcg/actuation nasal spray  2/19/18   Historical Provider   PYRIDOXINE HCL, VITAMIN B6, (VITAMIN B-6 PO) Take  by mouth. Historical Provider   insulin lispro (HUMALOG KWIKPEN INSULIN) 100 unit/mL kwikpen 13 units with breakfast, 10 units with lunch, 7 units with dinner + correction insulin, up to 40 units/day 3/12/18   Shelley Cheng MD   HYDROcodone-acetaminophen [facility])  mg tablet Take 1 Tab by mouth. Booker Eric MD   famotidine (PEPCID) 20 mg tablet Take 1 Tab by mouth two (2) times a day. 1/14/18   Gilbert Buckner DO   guaiFENesin (ROBITUSSIN) 100 mg/5 mL liquid Take 200 mg by mouth three (3) times daily as needed for Cough. Historical Provider   insulin glargine (LANTUS SOLOSTAR) 100 unit/mL (3 mL) pen 14 Units by SubCUTAneous route daily. 5/10/17   Shelley Cheng MD   nitroglycerin (NITROSTAT) 0.4 mg SL tablet 0.4 mg by SubLINGual route every five (5) minutes as needed for Chest Pain. Booker Eric MD   aspirin (ASPIRIN) 325 mg tablet Take 325 mg by mouth daily. Booker Eric MD   vit B Cmplx 3-FA-Vit C-Biotin (NEPHRO GRAYSON RX) 1- mg-mg-mcg tablet Take 1 Tab by mouth daily. Booker Eric MD   magnesium hydroxide (TOBIAS MILK OF MAGNESIA) 400 mg/5 mL suspension Take 30 mL by mouth daily as needed for Constipation. Booker Eric MD   albuterol-ipratropium (DUO-NEB) 2.5 mg-0.5 mg/3 ml nebu 3 mL by Nebulization route every six (6) hours as needed.  12/5/16   Joe Villaseñor MD   ondansetron hcl (ZOFRAN, AS HYDROCHLORIDE,) 8 mg tablet Take 1 Tab by mouth every eight (8) hours as needed for Nausea. 12/5/16   Angela Gibson MD   Nebulizer & Compressor machine 1 Each by Does Not Apply route daily. 12/5/16   Angela Gibson MD   midodrine (PROAMITINE) 10 mg tablet Take 10 mg by mouth three (3) times daily (with meals). 11/1/16   Historical Provider   insulin syringe-needle U-100 1 mL 31 x 15/64\" syrg 1 Syringe by SubCUTAneous route four (4) times daily. 1/8/16   Michael Siddiqui MD   Insulin Needles, Disposable, (ESPERANZA PEN NEEDLE) 32 gauge x 5/32\" ndle 5 times a day 1/8/16   Michael Siddiqui MD   atorvastatin (LIPITOR) 40 mg tablet Take 40 mg by mouth daily. Booker Eric MD   furosemide (LASIX) 20 mg tablet Take 1 Tab by mouth daily. Indications: HYPERCALCEMIA 10/7/15   Gee Best MD   gabapentin (NEURONTIN) 250 mg/5 mL solution Take 750 mg by mouth three (3) times daily. Booker Eric MD   multivitamin (ONE A DAY) tablet Take 1 Tab by mouth daily. Booker Eric MD   clopidogrel (PLAVIX) 75 mg tablet Take 75 mg by mouth daily. Booker Eric MD       REVIEW OF SYSTEMS:     I am not able to complete the review of systems because:    The patient is intubated and sedated    The patient has altered mental status due to his acute medical problems    The patient has baseline aphasia from prior stroke(s)    The patient has baseline dementia and is not reliable historian    The patient is in acute medical distress and unable to provide information           Total of 12 systems reviewed as follows:       POSITIVE= BOLD text  Negative = text not BOLD  General:  fever, chills, sweats, generalized weakness, weight loss/gain,      loss of appetite   Eyes:    blurred vision, eye pain, loss of vision, double vision  ENT:    rhinorrhea, pharyngitis   Respiratory:   cough, sputum production, SOB, KRAMER, wheezing, pleuritic pain   Cardiology:   chest pain, palpitations, orthopnea, PND, b/l LE and UE edema, syncope Gastrointestinal:  abdominal pain , N/V, diarrhea, dysphagia, constipation, bleeding   Genitourinary:  frequency, urgency, dysuria, hematuria, incontinence   Muskuloskeletal :  arthralgia, myalgia, back pain  Hematology:  easy bruising, nose or gum bleeding, lymphadenopathy   Dermatological: rash, ulceration, pruritis, color change / jaundice  Endocrine:   hot flashes or polydipsia   Neurological:  headache, dizziness, confusion, focal weakness, paresthesia,     Speech difficulties, memory loss, gait difficulty  Psychological: Feelings of anxiety, depression, agitation    Objective:   VITALS:    Visit Vitals    /83    Pulse 63    Temp 98.2 °F (36.8 °C)    Resp 14    Ht 5' 7\" (1.702 m)    Wt 83.1 kg (183 lb 3.2 oz)    SpO2 98%    BMI 28.69 kg/m2       PHYSICAL EXAM:    General:    Alert, cooperative, no distress, appears stated age. HEENT: Atraumatic, anicteric sclerae, pink conjunctivae     No oral ulcers, mucosa moist, throat clear  Neck:  Supple, symmetrical,  thyroid: non tender  Lungs:   CTA b/l. No wheezing or Rhonchi. No rales. Chest wall:  No tenderness. No accessory muscle use. Heart:   Regular  rhythm,  No  Murmur. B/l LE edema  Abdomen:   Soft, NT. ND  BS+  Extremities: No cyanosis. No clubbing,      Skin turgor normal, Radial dial pulse 2+  Skin:     Not pale. Not Jaundiced  No rashes   Psych:  Not anxious or agitated. Neurologic:  No aphasia or slurred speech. Symmetrical strength, Sensation grossly intact.  AAOx4.     _______________________________________________________________________  Care Plan discussed with:    Comments   Patient x    Family  x wife   RN x    Care Manager                    Consultant:  x ED physician   _______________________________________________________________________  Expected  Disposition:   Home with Family    HH/PT/OT/RN x   SNF/LTC    BHUPINDER    ________________________________________________________________________  TOTAL TIME:  72 Minutes    Critical Care Provided     Minutes non procedure based      Comments    x Reviewed previous records   >50% of visit spent in counseling and coordination of care x Discussion with patient and/or family and questions answered       ________________________________________________________________________  Signed: Vanessa Freeman MD    Procedures: see electronic medical records for all procedures/Xrays and details which were not copied into this note but were reviewed prior to creation of Plan. LAB DATA REVIEWED:    Recent Results (from the past 24 hour(s))   EKG, 12 LEAD, INITIAL    Collection Time: 05/07/18 12:15 PM   Result Value Ref Range    Ventricular Rate 66 BPM    Atrial Rate 66 BPM    P-R Interval 150 ms    QRS Duration 172 ms    Q-T Interval 488 ms    QTC Calculation (Bezet) 511 ms    Calculated P Axis 46 degrees    Calculated R Axis 168 degrees    Calculated T Axis 5 degrees    Diagnosis        Suspect unspecified pacemaker failure  Atrial-sensed ventricular-paced rhythm  When compared with ECG of 15-FEB-2018 05:22,  No significant change was found  Confirmed by Valencia Epstein ([id number]) on 5/7/2018 1:42:46 PM     CBC WITH AUTOMATED DIFF    Collection Time: 05/07/18  1:06 PM   Result Value Ref Range    WBC 4.8 4.1 - 11.1 K/uL    RBC 4.09 (L) 4.10 - 5.70 M/uL    HGB 12.3 12.1 - 17.0 g/dL    HCT 39.4 36.6 - 50.3 %    MCV 96.3 80.0 - 99.0 FL    MCH 30.1 26.0 - 34.0 PG    MCHC 31.2 30.0 - 36.5 g/dL    RDW 14.5 11.5 - 14.5 %    PLATELET 86 (L) 375 - 400 K/uL    MPV 11.3 8.9 - 12.9 FL    NRBC 0.0 0  WBC    ABSOLUTE NRBC 0.00 0.00 - 0.01 K/uL    NEUTROPHILS 64 32 - 75 %    LYMPHOCYTES 25 12 - 49 %    MONOCYTES 8 5 - 13 %    EOSINOPHILS 3 0 - 7 %    BASOPHILS 0 0 - 1 %    IMMATURE GRANULOCYTES 0 0.0 - 0.5 %    ABS. NEUTROPHILS 3.1 1.8 - 8.0 K/UL    ABS. LYMPHOCYTES 1.2 0.8 - 3.5 K/UL    ABS. MONOCYTES 0.4 0.0 - 1.0 K/UL    ABS. EOSINOPHILS 0.1 0.0 - 0.4 K/UL    ABS. BASOPHILS 0.0 0.0 - 0.1 K/UL    ABS. IMM. Hipolito Gallop. 0.0 0.00 - 0.04 K/UL    DF AUTOMATED     METABOLIC PANEL, COMPREHENSIVE    Collection Time: 05/07/18  1:06 PM   Result Value Ref Range    Sodium 136 136 - 145 mmol/L    Potassium 4.7 3.5 - 5.1 mmol/L    Chloride 97 97 - 108 mmol/L    CO2 33 (H) 21 - 32 mmol/L    Anion gap 6 5 - 15 mmol/L    Glucose 178 (H) 65 - 100 mg/dL    BUN 26 (H) 6 - 20 MG/DL    Creatinine 1.39 (H) 0.70 - 1.30 MG/DL    BUN/Creatinine ratio 19 12 - 20      GFR est AA 59 (L) >60 ml/min/1.73m2    GFR est non-AA 49 (L) >60 ml/min/1.73m2    Calcium 9.3 8.5 - 10.1 MG/DL    Bilirubin, total 0.5 0.2 - 1.0 MG/DL    ALT (SGPT) 51 12 - 78 U/L    AST (SGOT) 72 (H) 15 - 37 U/L    Alk.  phosphatase 70 45 - 117 U/L    Protein, total 7.7 6.4 - 8.2 g/dL    Albumin 3.1 (L) 3.5 - 5.0 g/dL    Globulin 4.6 (H) 2.0 - 4.0 g/dL    A-G Ratio 0.7 (L) 1.1 - 2.2     TROPONIN I    Collection Time: 05/07/18  1:06 PM   Result Value Ref Range    Troponin-I, Qt. <0.04 <0.05 ng/mL   CK W/ CKMB & INDEX    Collection Time: 05/07/18  1:06 PM   Result Value Ref Range     39 - 308 U/L    CK - MB 4.4 (H) <3.6 NG/ML    CK-MB Index 2.1 0 - 2.5     NT-PRO BNP    Collection Time: 05/07/18  1:06 PM   Result Value Ref Range    NT pro-BNP 5098 (H) 0 - 450 PG/ML   MAGNESIUM    Collection Time: 05/07/18  1:06 PM   Result Value Ref Range    Magnesium 2.7 (H) 1.6 - 2.4 mg/dL   PTT    Collection Time: 05/07/18  2:12 PM   Result Value Ref Range    aPTT 22.3 22.1 - 32.0 sec    aPTT, therapeutic range     58.0 - 77.0 SECS   PROTHROMBIN TIME + INR    Collection Time: 05/07/18  2:12 PM   Result Value Ref Range    INR 1.0 0.9 - 1.1      Prothrombin time 10.1 9.0 - 11.1 sec   TSH 3RD GENERATION    Collection Time: 05/07/18  2:30 PM   Result Value Ref Range    TSH 2.74 0.36 - 3.74 uIU/mL

## 2018-05-07 NOTE — ED TRIAGE NOTES
Assumed care of this patient. He is alert and oriented x4. Patient on monitor x3. Patient reports increased shortness of breath and edema. Hx of CHF.

## 2018-05-07 NOTE — CONSULTS
Consult    NAME: Verna Cronin   :  1935   MRN:  [id number]     Date/Time:  2018 2:56 PM    Patient PCP: Zoe Rodriguez., MD  ________________________________________________________________________     Assessment:     1. Acute systolic heart failure  2. Coronary artery disease s/p remote CABG.  w/ EF 40%, PCI to distal LAD via LIMA, severe disease in large diagonal (not amenable to PCI), patent SVG-OM, SVG-PDA, SVG-PLB. [place]  w/ EF 28%, IWMI, and with diagonal ischemia. 3. Lexiscan stress MPI  w/ EF 29% and anterior ischemia w/ IWMI  4. Ischemic cardiomyopathy  5. BiV ICD   6. Chronic kidney disease; Stg 3  7. 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 Diabetes  9. 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 Orthostatic hypotension  11. Hyperlipidemia  12. Head/neck CA w/ feeding tube  13. Chronic thrombocytopenia (Dr. oZe Hamilton)        Plan:   BNP 5600  TnI neg  sCr 1.5 --> 1.3    Echo today w/ EF 45-50%, mod-sev AoS, apical/inferior HK, mild MR/TR    1. Diuresis per primary; lasix 40mg IV BID noted  2. Continue ASA 325mg  3. Continue plavix 75mg  4. Continue midodrine 10mg TID  5. Continue atorvastatin 40mg  6. He was scheduled to have an echo in my office in 4 weeks to evaluate the severity of his AoS    Thank you for this consult and allowing me to take part in this patients care. Please call with questions. [x]        High complexity decision making was performed        Subjective:   CHIEF COMPLAINT: SOB    HISTORY OF PRESENT ILLNESS:     Isabela Toth is a 80 y.o.  male who \"presents ambulatory to the ED with cc of constant, moderate, progressive SOB x 1 week with associated swelling/fluid retention of the bilateral upper and lower extremities. Per wife, pt has gained ~6 lbs over the past week.  She states that they saw the pt's neurologist this morning who was concerned for CHF exacerbation and recommended the pt call his cardiologist, Dr. Enoch Marquez. Wife states that she called the office and they said they couldn't get the pt in until this evening at which time the wife decided to bring the pt to the ED. She notes he is currently on Lasix 20 mg daily. Pt denies any fevers, CP, N/V/D. \"      We were asked to consult for work up and evaluation of the above problems. 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 I suspect the latter. Achalasia  is not present.         GERD (gastroesophageal reflux disease)     Heart failure ([address] 10/2014     Cardiomyopathy:Pacemaker upgrade:Biv and AICD  Dr. Maldonado Wynn heart DrAlvaro last visit 5/11/2015    Hepatitis C Dx 1996    treated at [facility] in past; as of 4/15/15 wife states pt currently not under any treatment    Hyperlipidemia     Hypertension     Myocardial infarct [facility]) 2013    Heart Cath: 40% LV EF, Stented distal LAD, patent Graft to circumflex    On tube feeding diet approx 2009    still has as of 9/28/15  (no po food/liquid/meds at all); Dr Nasra Osborne Other ill-defined conditions(799.89) 1996    1 dose of chemotherapy/radiation for tongue cancer    Other ill-defined conditions(799.89)     BPH    Other ill-defined conditions(799.89)     orthostatic hypotension    Pneumonia ~ April -May 2010    Stroke [facility]) approx 2003 left side-left finger tips numb; no imbalance or memory loss; as of  not seeing neuro MD    Suicidal thoughts       Past Surgical History:   Procedure Laterality Date    ABDOMEN SURGERY PROC UNLISTED      peg tube    CABG, ARTERY-VEIN, THREE      HX CATARACT REMOVAL      bilateral    HX CHOLECYSTECTOMY      HX HEART CATHETERIZATION  2013    Stented distal LAD    HX MOHS PROCEDURES      bilateral    HX ORTHOPAEDIC      back surgery times two    HX OTHER SURGICAL      Radical Left Neck    HX OTHER SURGICAL      NASAL POLYPS REMOVAL    HX OTHER SURGICAL      TURP    HX PACEMAKER      HX PACEMAKER  10/28/14     Defibrillator: [facility] # [id number], serial # [id number]; Dr. Duane Kirkland [phone]; Dr Lesley Frausto HX UROLOGICAL      cystoscopy    NEUROLOGICAL PROCEDURE UNLISTED      cevical surgery    HI CHANGE GASTROSTOMY TUBE  2011         HI CHANGE GASTROSTOMY TUBE  2011         HI EGD INSERT GUIDE WIRE DILATOR PASSAGE ESOPHAGUS  2010         HI EGD TRANSORAL BIOPSY SINGLE/MULTIPLE  2010         STOMACH SURGERY PROCEDURE UNLISTED  2011         VASCULAR SURGERY PROCEDURE UNLIST      bilateral carotid stents     Allergies   Allergen Reactions    Demerol [Meperidine] Shortness of Breath    Paxil [Paroxetine Hcl] Unknown (comments)     Pt gets shaky and loses control of legs    Amoxicillin Rash    Cleocin [Clindamycin Hcl] Rash    Pcn [Penicillins] Rash      Meds:  See below  Social History   Substance Use Topics    Smoking status: Never Smoker    Smokeless tobacco: Never Used    Alcohol use No      Family History   Problem Relation Age of Onset    Heart Disease Father       at age 52 from CAD    Colon Cancer Mother     Cancer Mother      colon ca    Heart Disease Brother        REVIEW OF SYSTEMS:     []         Unable to obtain  ROS due to ---   [x]         Total of 12 systems reviewed as follows:    Constitutional: negative fever, negative chills, negative weight loss  Eyes:   negative visual changes  ENT:   negative sore throat, tongue or lip swelling  Respiratory:  negative cough, negative dyspnea  Cards:  negative for chest pain, palpitations, lower extremity edema  GI:   negative for nausea, vomiting, diarrhea, and abdominal pain  Genitourinary: negative for frequency, dysuria  Integument:  negative for rash   Hematologic:  negative for easy bruising and gum/nose bleeding  Musculoskel: negative for myalgias,  back pain  Neurological:  negative for headaches, dizziness, vertigo, weakness  Behavl/Psych: negative for feelings of anxiety, depression     Pertinent Positives include :    Objective:      Physical Exam:    Last 24hrs VS reviewed since prior progress note. Most recent are:    Visit Vitals    /83    Pulse 63    Temp 98.2 °F (36.8 °C)    Resp 14    Ht 5' 7\" (1.702 m)    Wt 83.1 kg (183 lb 3.2 oz)    SpO2 98%    BMI 28.69 kg/m2     No intake or output data in the 24 hours ending 05/07/18 1456     General Appearance: Well developed, well nourished, alert & oriented x 3,    no acute distress. Ears/Nose/Mouth/Throat: Pupils equal and round, Hearing grossly normal.  Neck: Supple. JVP within normal limits. Carotids good upstrokes, with no bruit. Chest: Lungs w/ diffuse rhonchi  Cardiovascular: Regular rate and rhythm, S1S2 normal, harsh late peaking TARA  Abdomen: Soft, non-tender, bowel sounds are active. No organomegaly. Extremities: 1+ edema bilaterally. Femoral pulses +2, Distal Pulses +1. Skin: Warm and dry. Neuro: CN II-XII grossly intact, Strength and sensation grossly intact. []         Post-cath site without hematoma, bruit, tenderness, or thrill. Distal pulses intact. Data:      Telemetry:  paced    EKG: paced  []  No new EKG for review. Prior to Admission medications    Medication Sig Start Date End Date Taking?  Authorizing Provider   cyanocobalamin 1,000 mcg tablet Take 1,000 mcg by mouth daily.    Historical Provider   primidone (MYSOLINE) 50 mg tablet Take 0.5 Tabs by mouth nightly. 5/7/18   Vamsi Kenney MD   Lancets misc Use preferred brand; Check glucose 4 times daily, Diagnosis E11.65 3/28/18   Gretchen Tavares MD   glucose blood VI test strips (PHARMACIST CHOICE) strip Use preferred brand; Check glucose 4 times daily, Diagnosis E11.65 3/28/18   Gretchen Tavares MD   Blood-Glucose Meter monitoring kit 1 preferred brand glucometer for checking home glucose 4 times per day. DX Code: E11.65 3/28/18   Gretchen Tavares MD   fluticasone [facility]) 50 mcg/actuation nasal spray  2/19/18   Historical Provider   PYRIDOXINE HCL, VITAMIN B6, (VITAMIN B-6 PO) Take  by mouth. Historical Provider   insulin lispro (HUMALOG KWIKPEN INSULIN) 100 unit/mL kwikpen 13 units with breakfast, 10 units with lunch, 7 units with dinner + correction insulin, up to 40 units/day 3/12/18   Gretchen Tavares MD   HYDROcodone-acetaminophen [facility])  mg tablet Take 1 Tab by mouth. Booker Eric MD   famotidine (PEPCID) 20 mg tablet Take 1 Tab by mouth two (2) times a day. 1/14/18   Wilman Begum DO   guaiFENesin (ROBITUSSIN) 100 mg/5 mL liquid Take 200 mg by mouth three (3) times daily as needed for Cough. Historical Provider   insulin glargine (LANTUS SOLOSTAR) 100 unit/mL (3 mL) pen 14 Units by SubCUTAneous route daily. 5/10/17   Gretchen Tavares MD   nitroglycerin (NITROSTAT) 0.4 mg SL tablet 0.4 mg by SubLINGual route every five (5) minutes as needed for Chest Pain. Booker Eric MD   aspirin (ASPIRIN) 325 mg tablet Take 325 mg by mouth daily. Booker Eric MD   vit B Cmplx 3-FA-Vit C-Biotin (NEPHRO GRAYSON RX) 1- mg-mg-mcg tablet Take 1 Tab by mouth daily. Booker Eric MD   magnesium hydroxide (TOBIAS MILK OF MAGNESIA) 400 mg/5 mL suspension Take 30 mL by mouth daily as needed for Constipation.     Booker Eric MD   albuterol-ipratropium (DUO-NEB) 2.5 mg-0.5 mg/3 ml nebu 3 mL by Nebulization route every six (6) hours as needed. 12/5/16   Ismael Ann MD   ondansetron hcl (ZOFRAN, AS HYDROCHLORIDE,) 8 mg tablet Take 1 Tab by mouth every eight (8) hours as needed for Nausea. 12/5/16   Ismael Ann MD   Nebulizer & Compressor machine 1 Each by Does Not Apply route daily. 12/5/16   Ismael Ann MD   midodrine (PROAMITINE) 10 mg tablet Take 10 mg by mouth three (3) times daily (with meals). 11/1/16   Historical Provider   insulin syringe-needle U-100 1 mL 31 x 15/64\" syrg 1 Syringe by SubCUTAneous route four (4) times daily. 1/8/16   Maritza Grider MD   Insulin Needles, Disposable, (ESPERANZA PEN NEEDLE) 32 gauge x 5/32\" ndle 5 times a day 1/8/16   Maritza Grider MD   atorvastatin (LIPITOR) 40 mg tablet Take 40 mg by mouth daily. Booker Eric MD   furosemide (LASIX) 20 mg tablet Take 1 Tab by mouth daily. Indications: HYPERCALCEMIA 10/7/15   Claudell Ras, MD   gabapentin (NEURONTIN) 250 mg/5 mL solution Take 750 mg by mouth three (3) times daily. Booker Eric MD   multivitamin (ONE A DAY) tablet Take 1 Tab by mouth daily. Booker Eric MD   clopidogrel (PLAVIX) 75 mg tablet Take 75 mg by mouth daily.     MD Mary Fuentes III, DO

## 2018-05-07 NOTE — PROGRESS NOTES
Pharmacy Medication Reconciliation     The patient was interviewed regarding current PTA medication list, use and drug allergies;  patient and patient's wife were present in room and obtained permission from patient to discuss drug regimen with visitor(s) present. The patient was questioned regarding use of any other inhalers, topical products, over the counter medications, herbal medications, vitamin products or ophthalmic/nasal/otic medication use. Allergy Update: Demerol, paroxetine, amoxicillin, clindamycin, penicillin    Recommendations/Findings: The following amendments were made to the patient's active medication list on file at [facility]:   1) Additions:   +primidone 25mg po qhs--Note: this medication was just prescribed on day of admission, patient hasn't started it at home yet. 2) Deletions: None    3) Changes: None      -Clarified PTA med list with patient's wife interview, Rx query, and medication list.  PTA medication list was corrected to the following:     Prior to Admission Medications   Prescriptions Last Dose Informant Patient Reported? Taking? Blood-Glucose Meter monitoring kit  Significant Other No No   Si preferred brand glucometer for checking home glucose 4 times per day. DX Code: E11.65   HYDROcodone-acetaminophen (NORCO)  mg tablet 2018 at Unknown time Significant Other Yes Yes   Sig: Take 1 Tab by mouth daily as needed. Insulin Needles, Disposable, (ESPERANZA PEN NEEDLE) 32 gauge x \" ndle  Significant Other No No   Si times a day   Lancets misc  Significant Other No No   Sig: Use preferred brand; Check glucose 4 times daily, Diagnosis E11.65   Nebulizer & Compressor machine  Significant Other No No   Si Each by Does Not Apply route daily. PYRIDOXINE HCL, VITAMIN B6, (VITAMIN B-6 PO) 2018 at am Significant Other Yes Yes   Sig: Take  by mouth.    acetaminophen (TYLENOL) 500 mg tablet 2018 at Unknown time Significant Other Yes Yes   Sig: Take 1,000 mg by mouth every six (6) hours as needed for Pain. albuterol-ipratropium (DUO-NEB) 2.5 mg-0.5 mg/3 ml nebu  at . .. Significant Other No Yes   Sig: 3 mL by Nebulization route every six (6) hours as needed. aspirin (ASPIRIN) 325 mg tablet 2018 at am Significant Other Yes Yes   Sig: Take 325 mg by mouth daily. atorvastatin (LIPITOR) 40 mg tablet 2018 at am Significant Other Yes Yes   Sig: Take 40 mg by mouth daily. clopidogrel (PLAVIX) 75 mg tablet 2018 at 0900 Significant Other Yes Yes   Sig: Take 75 mg by mouth daily. cyanocobalamin 1,000 mcg tablet 2018 at am Significant Other Yes Yes   Sig: Take 1,000 mcg by mouth daily. famotidine (PEPCID) 20 mg tablet 2018 at am Significant Other No Yes   Sig: Take 1 Tab by mouth two (2) times a day. fluticasone (FLONASE) 50 mcg/actuation nasal spray 2018 at am Significant Other Yes Yes   Si [place] by Both Nostrils route two (2) times a day. furosemide (LASIX) 20 mg tablet 2018 at am Significant Other No Yes   Sig: Take 1 Tab by mouth daily. Indications: HYPERCALCEMIA   Patient taking differently: 20 mg by PEG Tube route daily. Indications: hypercalcemia   gabapentin (NEURONTIN) 250 mg/5 mL solution 2018 at am Significant Other Yes Yes   Si mg by PEG Tube route three (3) times daily. glucose blood VI test strips (PHARMACIST CHOICE) strip  Significant Other No No   Sig: Use preferred brand; Check glucose 4 times daily, Diagnosis E11.65   guaiFENesin (ROBITUSSIN) 100 mg/5 mL liquid 2018 at Unknown time Significant Other Yes Yes   Sig: Take 200 mg by mouth three (3) times daily as needed for Cough. insulin glargine (LANTUS SOLOSTAR) 100 unit/mL (3 mL) pen 2018 at am Significant Other No Yes   Si Units by SubCUTAneous route daily. Patient taking differently: 14 Units by SubCUTAneous route daily.  Takes in AM   insulin lispro (HUMALOG KWIKPEN INSULIN) 100 unit/mL kwikpen  Significant Other No Yes   Si units with breakfast, 10 units with lunch, 7 units with dinner + correction insulin, up to 40 units/day   insulin syringe-needle U-100 1 mL 31 x 15/64\" syrg  Significant Other No No   Si Syringe by SubCUTAneous route four (4) times daily. magnesium hydroxide (TOBIAS MILK OF MAGNESIA) 400 mg/5 mL suspension  Significant Other Yes Yes   Sig: Take 30 mL by mouth daily as needed for Constipation. midodrine (PROAMITINE) 10 mg tablet 2018 at am Significant Other Yes Yes   Sig: Take 10 mg by mouth three (3) times daily (with meals). multivitamin (ONE A DAY) tablet 2018 at am Significant Other Yes Yes   Sig: Take 1 Tab by mouth daily. nitroglycerin (NITROSTAT) 0.4 mg SL tablet  Significant Other Yes Yes   Si.4 mg by SubLINGual route every five (5) minutes as needed for Chest Pain. ondansetron hcl (ZOFRAN, AS HYDROCHLORIDE,) 8 mg tablet  at . .. Significant Other No Yes   Sig: Take 1 Tab by mouth every eight (8) hours as needed for Nausea. primidone (MYSOLINE) 50 mg tablet  Significant Other No Yes   Sig: Take 0.5 Tabs by mouth nightly. vit B Cmplx 3-FA-Vit C-Biotin (NEPHRO GRAYSON RX) 1- mg-mg-mcg tablet  Significant Other Yes Yes   Sig: Take 1 Tab by mouth daily.       Facility-Administered Medications: None          Thank you,  Kelin DriverrRUBIN

## 2018-05-07 NOTE — LETTER
5/7/2018 11:25 PM 
 
Patient:  Wyatt Bowen YOB: 1935 Date of Visit: 5/7/2018 Dear No Recipients: Thank you for referring Mr. Wyatt Bowen to me for evaluation/treatment. Below are the relevant portions of my assessment and plan of care. Consult REFERRED BY: 
Mandy Kelly MD 
 
CHIEF COMPLAINT: Increasing tremors in his hands and legs Subjective:  
 
Wyatt Bowen is a 80 y.o. right-handed  male seen on an urgent work in basis at the request of Dr. Alicia Quispe for evaluation of increasing tremors in his hands mainly but sometimes his legs, that seem to occur at any time without precipitating factors, to the point that he has difficulty writing or doing things with these tremors increasingly severe. They seem by history to be more related to action and static tremors and not a rest tremor, but the history is very vague from both the patient and his wife. He has very little tremor on exam today was difficult to be sure what his problem really is. He has had no new weakness or sensory loss, but still has a mild right-sided weakness from his previous stroke one year ago. He saw our nurse practitioner in February 2017 after his hospitalization and he seemed to be relatively stable but had to be put back on Plavix because he could not break the Aggrenox to put down his feeding tube since it cannot be crushed. He does have a pacemaker and cannot get an MRI. His CTA of the head and neck did not show any significant carotid disease, but he did have significant disease of both vertebral and basilar artery suggesting some vertebrobasilar insufficiency. He does have some chronic dizziness that seems to be more orthostatic, but we will recheck his carotid Doppler study just to check to make sure that that has not related to any type of carotid stenosis or progressive vertebrobasilar disease.   He also has severe aortic stenosis and cardiology does not feel he is a candidate for surgery. 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 He is somewhat weak and debilitated in general.  We will also check his thyroid functions, just to make sure is not hyperthyroid as a cause of his increasing tremors. He has no family history of similar tremors, so there is nothing to suggest familial tremors. His examination does not suggest cogwheeling, rigidity, bradykinesia, or micrographia. He most likely has benign essential tremor but is very difficult to be sure by his history says he and his wife give a somewhat wandering and imprecise history. He has had no unusual headache, fever, trauma, meningismus, or new focal weakness or sensory loss or gait problems. No other new medical problems either. He has not been started on any new medications or neuroleptics that might cause tremors. Past Medical History:  
Diagnosis Date  Antiplatelet or antithrombotic long-term use 12/4/2014  Anxiety disorder  Arrhythmia 2009  
 bradycardia  Arthritis  CAD (coronary artery disease) s/p CABG 2002; Dr Alin Huynh  Cancer ([facility] [address] 1996  
 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   
 left leg, lower back,   
 Depression  Diabetes ([facility] [address] Type II  
 Esophageal dysmotility s/p dilitation  Esophageal motility disorder 7/8/2013 Frequent simultaneous or failed contractions, low amplitude contractions  suggests severe myopathy or diffuse spasm. I suspect the latter. Achalasia  is not present.  GERD (gastroesophageal reflux disease)  Heart failure ([facility] [address] 10/2014 Cardiomyopathy:Pacemaker upgrade:Biv and AICD  Dr. Mares [facility] heart  last visit 5/11/2015  Hepatitis C Dx 1996  
 treated at [facility] in past; as of 4/15/15 wife states pt currently not under any treatment  Hyperlipidemia  Hypertension  Myocardial infarct [facility])  Heart Cath: 40% LV EF, Stented distal LAD, patent Graft to circumflex  On tube feeding diet approx   
 still has as of 9/28/15  (no po food/liquid/meds at all); Dr Karine Dalton  Other ill-defined conditions(799.89) 1996  
 1 dose of chemotherapy/radiation for tongue cancer  Other ill-defined conditions(799.89) BPH  Other ill-defined conditions(799.89) orthostatic hypotension  Pneumonia ~ April -May 2010  Stroke [facility]) approx   
 left side-left finger tips numb; no imbalance or memory loss; as of  not seeing neuro MD  
 Suicidal thoughts Past Surgical History:  
Procedure Laterality Date  ABDOMEN SURGERY PROC UNLISTED    
 peg tube  CABG, ARTERY-VEIN, THREE    HX CATARACT REMOVAL    
 bilateral  
 HX CHOLECYSTECTOMY [address] CATHETERIZATION   Stented distal LAD  HX MOHS PROCEDURES    
 bilateral  
 HX ORTHOPAEDIC    
 back surgery times two  HX OTHER SURGICAL Radical Left Neck  HX OTHER SURGICAL    
 NASAL POLYPS REMOVAL  
 HX OTHER SURGICAL   TURP  
 HX PACEMAKER    HX PACEMAKER  10/28/14 Defibrillator: [facility] # [id number], serial # [id number]; Dr. Elliott Garcia [phone]; Dr Bean Like  HX UROLOGICAL    
 cystoscopy [address]  
 cevical surgery  NM CHANGE GASTROSTOMY TUBE  2011  NM CHANGE GASTROSTOMY TUBE  2011  NM EGD INSERT GUIDE WIRE DILATOR PASSAGE ESOPHAGUS  2010  NM EGD TRANSORAL BIOPSY SINGLE/MULTIPLE  2010  
    
 STOMACH SURGERY PROCEDURE UNLISTED  2011  VASCULAR SURGERY PROCEDURE UNLIST    
 bilateral carotid stents Family History Problem Relation Age of Onset  Heart Disease Father   
   at age 52 from CAD  Colon Cancer Mother  Cancer Mother   
  colon ca  
 Heart Disease Brother Social History Substance Use Topics  Smoking status: Never Smoker  Smokeless tobacco: Never Used  Alcohol use No  
   
No current facility-administered medications for this visit. No current outpatient prescriptions on file. Facility-Administered Medications Ordered in Other Visits:  
  sodium chloride (NS) flush 5-10 mL, 5-10 mL, IntraVENous, Q8H, Patito Yeboah MD, 10 mL at 05/07/18 2138   sodium chloride (NS) flush 5-10 mL, 5-10 mL, IntraVENous, PRN, Patito Yeboah MD 
  furosemide (LASIX) injection 40 mg, 40 mg, IntraVENous, Q12H, Marsha GROVER MD, 40 mg at 05/07/18 2133   albuterol-ipratropium (DUO-NEB) 2.5 MG-0.5 MG/3 ML, 3 mL, Nebulization, Q6H PRN, Ilya Tejeda MD 
  [START ON 5/8/2018] aspirin (ASPIRIN) tablet 325 mg, 325 mg, Oral, DAILY, Ilya Tejeda MD 
  [START ON 5/8/2018] atorvastatin (LIPITOR) tablet 40 mg, 40 mg, Oral, DAILY, Ilya Tejeda MD 
  [START ON 5/8/2018] clopidogrel (PLAVIX) tablet 75 mg, 75 mg, Oral, DAILY, Ilya Tejeda MD 
  [START ON 5/8/2018] cyanocobalamin (VITAMIN B12) tablet 1,000 mcg, 1,000 mcg, Oral, DAILY, Ilya Tejeda MD 
  famotidine (PEPCID) tablet 20 mg, 20 mg, Oral, BID, Marsha GROVER MD, 20 mg at 05/07/18 1813 
  gabapentin (NEURONTIN) 250 mg/5 mL solution 750 mg, 750 mg, Oral, TID, Marsha GROVER MD, 750 mg at 05/07/18 1813 
  [START ON 5/8/2018] insulin glargine (LANTUS) injection 14 Units, 14 Units, SubCUTAneous, DAILY, Ilya Tejeda MD 
  midodrine (PROAMITINE) tablet 10 mg, 10 mg, Oral, TID WITH MEALS, Marsha GROVER MD, 10 mg at 05/07/18 1813 
  insulin lispro (HUMALOG) injection, , SubCUTAneous, AC&HS, Marsha GROVER MD, Stopped at 05/07/18 1630 
  glucose chewable tablet 16 g, 4 Tab, Oral, PRN, Ilya Tejeda MD 
  dextrose (D50W) injection syrg 12.5-25 g, 12.5-25 g, IntraVENous, PRN, Ilya Tejeda MD 
  glucagon (GLUCAGEN) injection 1 mg, 1 mg, IntraMUSCular, PRN, Ilya Tejeda MD 
  primidone (MYSOLINE) tablet 25 mg, 25 mg, Oral, QHS, Marsha Ji GROVER MD, 25 mg at 05/07/18 1371   HYDROcodone-acetaminophen (NORCO) 5-325 mg per tablet 1 Tab, 1 Tab, Oral, Q6H PRN, Prasanna Sarmiento MD, 1 Tab at 05/07/18 2137   polyethylene glycol (MIRALAX) packet 17 g, 17 g, Oral, DAILY, Prasanna Sarmiento MD, Stopped at 05/07/18 2137 Allergies Allergen Reactions  Demerol [Meperidine] Shortness of Breath  Paxil [Paroxetine Hcl] Unknown (comments) Pt gets shaky and loses control of legs  Amoxicillin Rash  Cleocin [Clindamycin Hcl] Rash  Pcn [Penicillins] Rash Review of Systems: A comprehensive review of systems was negative except for: Constitutional: positive for fatigue and malaise Respiratory: positive for pleurisy/chest pain, dyspnea on exertion or chronic bronchitis Gastrointestinal: positive for dysphagia, dyspepsia, reflux symptoms, nausea and abdominal pain Musculoskeletal: positive for {:[id number]} Neurological: positive for coordination problems, tremor and weakness Behvioral/Psych: positive for anxiety and depression Vitals:  
 05/07/18 1044 BP: 110/52 Pulse: 80 Resp: 16 Temp: 98 °F (36.7 °C) SpO2: 90% Weight: 184 lb (83.5 kg) Height: 5' 7\" (1.702 m) Objective: I 
 
 
 
NEUROLOGICAL EXAM: 
  
Appearance: The patient is well developed, well nourished, provides a coherent history and is in no acute distress. Mental Status: Oriented to time, place and person and the president, cognitive function and fund of knowledge is normal. Speech is fluent without aphasia or dysarthria. Mood and affect appropriate but depressed . Cranial Nerves:   Intact visual fields. Fundi are benign. RAY, EOM's full, no nystagmus, no ptosis. Facial sensation is normal. Corneal reflexes are not tested. Facial movement is asymmetric. Hearing is normal bilaterally. Palate is midline with normal sternocleidomastoid and trapezius muscles are normal. Tongue is midline. Neck without meningismus or bruits Patient has marked limited range of motion of the cervical spine with pain in all directions Temporal arteries are not tender or enlarged Motor:  4/5 strength in upper and lower proximal and distal muscles, except for the right upper extremity which has strength about 3/5 associated with an arm drift and decreased rapid alternating movements in the right hand. Normal bulk and tone. No fasciculations. Reflexes:   Deep tendon reflexes 2+/4 on the right and 1+/4 on the left. No babinski or clonus present Sensory:   Abnormal to touch, pinprick and temperature and vibration decreased in both feet. DSS is intact Gait:  Normal gait though he has to move slowly due to his arthritis and his mild right hemiparesis . Tremor:   N Minimal intention bilateral tremor noted, but no resting tremor noted and no cogwheeling or rigidity. Cerebellar:   Mildly abnormal Romberg and tandem cerebellar signs present. Neurovascular:  Abnormal heart sounds and irregular rhythm, peripheral pulses decreased, and no carotid bruits.  
  
 
 
 
Assessment: ICD-10-CM ICD-9-CM 1. Thrombotic stroke involving left middle cerebral artery ([facility]) I63.312 434.01 cyanocobalamin 1,000 mcg tablet T3 TOTAL T4 (THYROXINE) TSH 3RD GENERATION  
   DUPLEX CAROTID BILATERAL AMB NEURO  
   XR WRIST RT AP/LAT/OBL MIN 3V  
   primidone (MYSOLINE) 50 mg tablet 2. 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  
   DUPLEX CAROTID BILATERAL AMB NEURO  
   XR WRIST RT AP/LAT/OBL MIN 3V  
   primidone (MYSOLINE) 50 mg tablet 3. 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  
  357.2 T3 TOTAL T4 (THYROXINE) TSH 3RD GENERATION  
   DUPLEX CAROTID BILATERAL AMB NEURO  
   XR WRIST RT AP/LAT/OBL MIN 3V  
   primidone (MYSOLINE) 50 mg tablet 4. Weakness R53.1 780.79 cyanocobalamin 1,000 mcg tablet T3 TOTAL T4 (THYROXINE) TSH 3RD GENERATION  
   DUPLEX CAROTID BILATERAL AMB NEURO  
   XR WRIST RT AP/LAT/OBL MIN 3V  
   primidone (MYSOLINE) 50 mg tablet 5. Diabetic peripheral neuropathy associated with type 2 diabetes mellitus ([facility]) E11.42 250.60 cyanocobalamin 1,000 mcg tablet  
  357.2 T3 TOTAL T4 (THYROXINE) TSH 3RD GENERATION  
   DUPLEX CAROTID BILATERAL AMB NEURO  
   XR WRIST RT AP/LAT/OBL MIN 3V  
   primidone (MYSOLINE) 50 mg tablet 6. Benign essential tremor syndrome G25.0 333.1 cyanocobalamin 1,000 mcg tablet T3 TOTAL T4 (THYROXINE) TSH 3RD GENERATION  
   DUPLEX CAROTID BILATERAL AMB NEURO  
   XR WRIST RT AP/LAT/OBL MIN 3V  
   primidone (MYSOLINE) 50 mg tablet 7. Stenosis of both internal carotid arteries I65.23 433.10 cyanocobalamin 1,000 mcg tablet 433.30 T3 TOTAL T4 (THYROXINE) TSH 3RD GENERATION  
   DUPLEX CAROTID BILATERAL AMB NEURO  
   XR WRIST RT AP/LAT/OBL MIN 3V  
   primidone (MYSOLINE) 50 mg tablet 8. Hemiplegia and hemiparesis following cerebral infarction affecting right dominant side ([facility]) I69.351 438.21 cyanocobalamin 1,000 mcg tablet T3 TOTAL T4 (THYROXINE) TSH 3RD GENERATION  
   DUPLEX CAROTID BILATERAL AMB NEURO  
   XR WRIST RT AP/LAT/OBL MIN 3V  
   primidone (MYSOLINE) 50 mg tablet 9. Wrist pain, acute, right M25.531 719.43 cyanocobalamin 1,000 mcg tablet T3 TOTAL T4 (THYROXINE) TSH 3RD GENERATION  
   DUPLEX CAROTID BILATERAL AMB NEURO  
   XR WRIST RT AP/LAT/OBL MIN 3V  
   primidone (MYSOLINE) 50 mg tablet Active Problems: * No active hospital problems. * 
 
 
Plan:  
 
Patient with progressive tremors, but on exam today there really are to apparent, but by his history suggest benign essential tremor We will check thyroid test, and start him on Mysoline is advised of the side effect as well as dizziness, drowsiness, ataxia, sedation, or any side effect to call us immediately and he is also advised to watch for any GI side effects. We will repeat his carotid Doppler study in view of his progressive dizziness, just to make sure there is no change in his vertebrobasilar insufficiency or vertebral and basilar artery stenosis We spent 40 minutes with the patient and his wife going over his history, going over his exam, and discussing various medications and treatments, we will try the medication as above and keep everything else the same. If all else fails we may need to really do a CTA of his head and neck just to make sure there is no significant progression of disease there. We offered him Coumadin in the past for his vertebrobasilar disease but in view of his mother's history of bleeding complications on that medication he wants no part of it. We will see him again in 3-6 months time or earlier as needed, he will call me if any problem, he will check my chart for results of his test or give us a call then. Signed By: Klaudia Frias MD   
 May 7, 2018 CC: Dixie Conway MD 
FAX: [phone] This note will not be viewable in [address] If you have questions, please do not hesitate to call me. I look forward to following Mr. DIAS along with you. Sincerely, Klaudia Frias MD

## 2018-05-07 NOTE — ED NOTES
Patient's family brought his tube feeding to ED for administration.   Patient is taking [employer]so[employer] 1.5Cal.

## 2018-05-07 NOTE — IP AVS SNAPSHOT
[address] 
[phone] Patient: Katerina Martin MRN: [id number] RPO:[id number] About your hospitalization You were admitted on: May 7, 2018 You last received care in the:  [facility] 3 NEUROSCIENCE TELEMETRY You were discharged on:  May 11, 2018 Why you were hospitalized Your primary diagnosis was:  Not on File Your diagnoses also included:  Respiratory Failure (Hcc) Follow-up Information Follow up With Details Comments Contact Info [id number] [facility]   [address] 
[phone] Soraya Washington MD Go on 6/11/2018 Please follow up on June 11, 2018 at 9:15 from your hospital visit increase in lasix for pul edema, pt had EF 45% and moderate aortic stenosis [address] 
[phone] Javier Louis III, DO Go on 6/13/2018 Please follow up on June 13, 2018 at 10:30 arriving 15 minutes early to register for your appointment [address] 
[phone] Your Scheduled Appointments Monday June 11, 2018 11:50 AM EDT Follow Up with Jerrod Rose MD  
[facility] [address] [place] [address]) [facility] [address] [address] [phone] Discharge Orders None A check nemesio indicates which time of day the medication should be taken. My Medications CHANGE how you take these medications Instructions Each Dose to Equal  
 Morning Noon Evening Bedtime  
 furosemide 20 mg tablet Commonly known as:  LASIX What changed:  how much to take Your last dose was: Your next dose is: Take 2 Tabs by mouth daily. Indications: hypercalcemia 40 mg  
    
   
   
   
  
 insulin glargine 100 unit/mL (3 mL) Inpn Commonly known as:  Ofilia Bosworth  
 What changed:  additional instructions Your last dose was: Your next dose is:    
   
   
 14 Units by SubCUTAneous route daily. 14 Units CONTINUE taking these medications Instructions Each Dose to Equal  
 Morning Noon Evening Bedtime  
 acetaminophen 500 mg tablet Commonly known as:  TYLENOL Your last dose was: Your next dose is: Take 1,000 mg by mouth every six (6) hours as needed for Pain. 1000 mg  
    
   
   
   
  
 albuterol-ipratropium 2.5 mg-0.5 mg/3 ml Nebu Commonly known as:  Americo Alvarez Your last dose was: Your next dose is:    
   
   
 3 mL by Nebulization route every six (6) hours as needed. 3 mL  
    
   
   
   
  
 aspirin 325 mg tablet Commonly known as:  ASPIRIN Your last dose was: Your next dose is: Take 325 mg by mouth daily. 325 mg  
    
   
   
   
  
 atorvastatin 40 mg tablet Commonly known as:  LIPITOR Your last dose was: Your next dose is: Take 40 mg by mouth daily. 40 mg Blood-Glucose Meter monitoring kit Your last dose was: Your next dose is:    
   
   
 1 preferred brand glucometer for checking home glucose 4 times per day. DX Code: E11.65  
     
   
   
   
  
 cyanocobalamin 1,000 mcg tablet Your last dose was: Your next dose is: Take 1,000 mcg by mouth daily. 1000 mcg  
    
   
   
   
  
 famotidine 20 mg tablet Commonly known as:  PEPCID Your last dose was: Your next dose is: Take 1 Tab by mouth two (2) times a day. 20 mg  
    
   
   
   
  
 fluticasone 50 mcg/actuation nasal spray Commonly known as:  Kp Stone Your last dose was: Your next dose is:    
   
   
 1 Spray by Both Nostrils route two (2) times a day. 1 Spray  
    
   
   
   
  
 gabapentin 250 mg/5 mL solution Commonly known as:  NEURONTIN Your last dose was: Your next dose is:    
   
   
 750 mg by PEG Tube route three (3) times daily. 750 mg  
    
   
   
   
  
 glucose blood VI test strips strip Commonly known as:  PHARMACIST CHOICE Your last dose was: Your next dose is:    
   
   
 Use preferred brand; Check glucose 4 times daily, Diagnosis E11.65  
     
   
   
   
  
 guaiFENesin 100 mg/5 mL liquid Commonly known as:  ROBITUSSIN Your last dose was: Your next dose is: Take 200 mg by mouth three (3) times daily as needed for Cough. 200 mg HYDROcodone-acetaminophen  mg tablet Commonly known as:  [place] No Your last dose was: Your next dose is: Take 1 Tab by mouth daily as needed. 1 Tab  
    
   
   
   
  
 insulin lispro 100 unit/mL kwikpen Commonly known as:  HumaLOG KwikPen Insulin Your last dose was: Your next dose is:    
   
   
 13 units with breakfast, 10 units with lunch, 7 units with dinner + correction insulin, up to 40 units/day Insulin Needles (Disposable) 32 gauge x 5/32\" Ndle Commonly known as:  Elise Pen Needle Your last dose was: Your next dose is:    
   
   
 5 times a day  
     
   
   
   
  
 insulin syringe-needle U-100 1 mL 31 gauge x 15/64\" Syrg Your last dose was: Your next dose is:    
   
   
 1 Syringe by SubCUTAneous route four (4) times daily. 1 Syringe Lancets Misc Your last dose was: Your next dose is:    
   
   
 Use preferred brand; Check glucose 4 times daily, Diagnosis E11.65  
     
   
   
   
  
 midodrine 10 mg tablet Commonly known as:  Kaykay Ku Your last dose was: Your next dose is: Take 10 mg by mouth three (3) times daily (with meals). 10 mg  
    
   
   
   
  
 multivitamin tablet Commonly known as:  ONE A DAY Your last dose was: Your next dose is: Take 1 Tab by mouth daily. 1 Tab Nebulizer & Compressor machine Your last dose was: Your next dose is:    
   
   
 1 Each by Does Not Apply route daily. 1 Each  
    
   
   
   
  
 nitroglycerin 0.4 mg SL tablet Commonly known as:  NITROSTAT Your last dose was: Your next dose is: 0.4 mg by SubLINGual route every five (5) minutes as needed for Chest Pain. 0.4 mg  
    
   
   
   
  
 ondansetron hcl 8 mg tablet Commonly known as:  Ethelle Sites Your last dose was: Your next dose is: Take 1 Tab by mouth every eight (8) hours as needed for Nausea. 8 mg TOBIAS MILK OF MAGNESIA 400 mg/5 mL suspension Generic drug:  magnesium hydroxide Your last dose was: Your next dose is: Take 30 mL by mouth daily as needed for Constipation. 30 mL PLAVIX 75 mg Tab Generic drug:  clopidogrel Your last dose was: Your next dose is: Take 75 mg by mouth daily. 75 mg  
    
   
   
   
  
 primidone 50 mg tablet Commonly known as: MYSOLINE Your last dose was: Your next dose is: Take 0.5 Tabs by mouth nightly. 25 mg  
    
   
   
   
  
 vit B Cmplx 3-FA-Vit C-Biotin 1- mg-mg-mcg tablet Commonly known as:  NEPHRO GRAYSON RX Your last dose was: Your next dose is: Take 1 Tab by mouth daily. 1 Tab VITAMIN B-6 PO Your last dose was: Your next dose is: Take  by mouth. Where to Get Your Medications Information on where to get these meds will be given to you by the nurse or doctor. ! Ask your nurse or doctor about these medications  
  furosemide 20 mg tablet Opioid Education Prescription Opioids: What You Need to Know: 
 
 
NAME: Amna Jane :  1935 MRN:  [id number] Date/Time:  2018 9:32 AM 
 
ADMIT DATE: 2018 DISCHARGE DATE: 2018 Attending Physician: Serge Galeano DO 
 
DISCHARGE DIAGNOSIS and hospital course: 
 
Acute hypoxic respiratory failure secondary to acute on chronic systolic heart failure 
pt needed supplemental oxygen while lasix was given IV. Pt's prior lasix was 20mg daily, but did well on 40mg lasix over the weekend.   
 
Moderate to severe aortic stenosis Follow up with Dr. Chase Núñez in 2-3 weeks, with echo in 4 weeks.  
  
CAD s/p AICD 
asa, plavix, statin 
   
HTN 
lasix 
  
CKD 3 
-cr stable at baseline 
-monitor  
   
Hx of tonsillar ca and dysphagia s/p G tube 
-nutritional consulted. Cont' TF 
   
DM with neuropathy 
-cont lantus 14 units daily, gabapentin -he uses humalog 14U at breakfast, 10U at lunch, and 7U at dinner.   
-will use SSI while here, adjust prn 
   
Hx of CVA 
-cont' ASA, plavix, statin  
   
Code status: full Wife has requested SNF. discharge to rehab Surrogate Decision Maker: pt's wife Medications: Per above medication reconciliation. Recommended diet: tube feeding through G tube: Access type PEG/G tube    
  Formula options High fiber (Jevity 1.5 lo)   
  Delivery Method Bolus   
  Starting Volume 375   
  Frequency Q 6 hours   
  Total number of feeds 3   
  Free Water Yes   
  Volume (mL) 150   
  Frequency Q 4 hours   
   
 
 
Recommended activity: as tolerated Wound care: routine g tube care Glucose management:  Accucheck ACHS with sliding scale per SNF protocol Code status: Full Outside physician follow up: Follow-up Information Follow up With Details Comments Contact Info Viviana Olesya III, DO Schedule an appointment as soon as possible for a visit in 3 weeks  [address] [facility] 
[phone] [address] [address]   [address] [place] [address] 
[phone] Giovanna Caldera MD   [address] [facility] 
[phone] Giovanna Caldera MD In 1 week increase in lasix for pul edema, pt had EF 45% and moderate aortic stenosis [address] [facility] 
[phone] Skilled nursing facility/ SNF MD responsible for above on discharge. Information obtained by : 
I understand that if any problems occur once I am at home I am to contact my physician. I understand and acknowledge receipt of the instructions indicated above. Physician's or R.N.'s Signature                                                                  Date/Time Patient or Repres Sookboxhart Announcement We are excited to announce that we are making your provider's discharge notes available to you in [facility]. You will see these notes when they are completed and signed by the physician that discharged you from your recent hospital stay. If you have any questions or concerns about any information you see in [employer], please call the Health Information Department where you were seen or reach out to your Primary Care Provider for more information about your plan of care. Introducing [facility] SERVICES!    
 Nigel Blake introduces [employer] patient portal. Now you can access parts of your medical record, email your doctor's office, and request medication refills online. 1. In your internet browser, go to [email or web address] 2. Click on the First Time User? Click Here link in the Sign In box. You will see the New Member Sign Up page. 3. Enter your [employer] Access Code exactly as it appears below. You will not need to use this code after youve completed the sign-up process. If you do not sign up before the expiration date, you must request a new code. · [employer] Access Code: [email or web address] Expires: 5/14/2018  6:56 AM 
 
4. Enter the last four digits of your Social Security Number (xxxx) and Date of Birth (mm/dd/yyyy) as indicated and click Submit. You will be taken to the next sign-up page. 5. Create a [employer] ID. This will be your [employer] login ID and cannot be changed, so think of one that is secure and easy to remember. 6. Create a [employer] password. You can change your password at any time. 7. Enter your Password Reset Question and Answer. This can be used at a later time if you forget your password. 8. Enter your e-mail address. You will receive e-mail notification when new information is available in [address] 9. Click Sign Up. You can now view and download portions of your medical record. 10. Click the Download Summary menu link to download a portable copy of your medical information. If you have questions, please visit the Frequently Asked Questions section of the [employer] website. Remember, [employer] is NOT to be used for urgent needs. For medical emergencies, dial 911. Now available from your iPhone and Android! Introducing Adebayo Thacker As a [facility] patient, I wanted to make you aware of our electronic visit tool called Adebayo Thacker. [facility] 24/7 allows you to connect within minutes with a medical provider 24 hours a day, seven days a week via a mobile device or tablet or logging into a secure website from your computer. You can access [employer] from anywhere in the [place]. A virtual visit might be right for you when you have a simple condition and feel like you just dont want to get out of bed, or cant get away from work for an appointment, when your regular [employer] provider is not available (evenings, weekends or holidays), or when youre out of town and need minor care. Electronic visits cost only $49 and if the [employer] 24/7 provider determines a prescription is needed to treat your condition, one can be electronically transmitted to a nearby pharmacy*. Please take a moment to enroll today if you have not already done so. The enrollment process is free and takes just a few minutes. To enroll, please download the [employer] 24/7 latanya to your tablet or phone, or visit [email or web address] to enroll on your computer. And, as an [address] [place] [address] patient with a [employer] account, the results of your visits will be scanned into your electronic medical record and your primary care provider will be able to view the scanned results. We urge you to continue to see your regular [employer] provider for your ongoing medical care. And while your primary care provider may not be the one available when you seek a [employer] virtual visit, the peace of mind you get from getting a real diagnosis real time can be priceless. For more information on [employer], view our Frequently Asked Questions (FAQs) at [email or web address]. Sincerely, 
 
Jazmin Izquierdo MD 
Chief Medical Officer [employer] *:  certain medications cannot be prescribed via [employer] Providers Seen During Your Hospitalization Provider Specialty Primary office phone Marjorie Billingsley MD Emergency Medicine [phone] Ulisses Santana MD Internal Medicine [phone] Jesus Fitzgerald MD Hospitalist [phone] Calixto Shook, [place] Internal Medicine [phone] Your Primary Care Physician (PCP) Primary Care Physician Office Phone Office Fax Anabell Chacon [phone] You are allergic to the following Allergen Reactions Demerol (Meperidine) Shortness of Breath Paxil (Paroxetine Hcl) Unknown (comments) Pt gets shaky and loses control of legs Amoxicillin Rash Cleocin (Clindamycin Hcl) Rash Pcn (Penicillins) Rash Recent Documentation Height Weight BMI Smoking Status 1.702 m 75.7 kg 26.14 kg/m2 Never Smoker Emergency Contacts Name Discharge Info Relation Home Work Mobile [facility] DISCHARGE CAREGIVER [3] Spouse [3] [phone] EmersondelfinoSteven DISCHARGE CAREGIVER [3] Son [22] [phone] Patient Belongings The following personal items are in your possession at time of discharge: 
     Visual Aid: None      Home Medications: None   Jewelry: None  Clothing: At bedside, Pants, Shirt Discharge Instructions Attachments/References HEART FAILURE ZONES: GENERAL INFO (ENGLISH) HEART FAILURE: AVOIDING TRIGGERS (ENGLISH) Patient Handouts Learning About Heart Failure Zones What are heart failure zones? Heart failure zones give you an easy way to see changes in your heart failure symptoms. They also tell you when you need to get help. Check every day to see which zone you are in. Green zone. You are doing well. This is where you want to be. · Your weight is stable. This means it is not going up or down. · You breathe easily. · You are sleeping well. You are able to lie flat without shortness of breath. · You can do your usual activities. Yellow zone. Be careful. Your symptoms are changing. Call your doctor. · You have new or increased shortness of breath. · You are dizzy or lightheaded, or you feel like you may faint. · You have sudden weight gain, such as more than 2 to 3 pounds in a day or 5 pounds in a week. (Your doctor may suggest a different range of weight gain.) · You have increased swelling in your legs, ankles, or feet. · You are so tired or weak that you cannot do your usual activities. · You are not sleeping well. Shortness of breath wakes you up at night. You need extra pillows. Your doctor's name: ____________________________________________________________ Your doctor's contact information: _________________________________________________ Red zone. This is an emergency. Call 911. You have symptoms of sudden heart failure, such as: 
· You have severe trouble breathing. · You cough up pink, foamy mucus. · You have a new irregular or fast heartbeat. You have symptoms of a heart attack. These may include: · Chest pain or pressure, or a strange feeling in the chest. 
· Sweating. · Shortness of breath. · Nausea or vomiting. · Pain, pressure, or a strange feeling in the back, neck, jaw, or upper belly or in one or both shoulders or arms. · Lightheadedness or sudden weakness. · A fast or irregular heartbeat. If you have symptoms of a heart attack: After you call 911, the  may tell you to chew 1 adult-strength or 2 to 4 low-dose aspirin. Wait for an ambulance. Do not try to drive yourself. Follow-up care is a key part of your treatment and safety. Be sure to make and go to all appointments, and call your doctor if you are having problems. It's also a good idea to know your test results and keep a list of the medicines you take. Where can you learn more? Go to http://aniket-tomas.info/. Enter T174 in the search box to learn more about \"Learning About Heart Failure Zones. \" Current as of: September 21, 2016 Content Version: 11.4 © 0070-8031 Healthwise, Incorporated.  Care instructions adapted under license by [employer] (which disclaims liability or warranty for this information). If you have questions about a medical condition or this instruction, always ask your healthcare professional. [address] any warranty or liability for your use of this information. Avoiding Triggers With Heart Failure: Care Instructions Your Care Instructions Triggers are anything that make your heart failure flare up. A flare-up is also called \"sudden heart failure\" or \"acute heart failure. \" When you have a flare-up, fluid builds up in your lungs, and you have problems breathing. You might need to go to the hospital. By watching for changes in your condition and avoiding triggers, you can prevent heart failure flare-ups. Follow-up care is a key part of your treatment and safety. Be sure to make and go to all appointments, and call your doctor if you are having problems. It's also a good idea to know your test results and keep a list of the medicines you take. How can you care for yourself at home? Watch for changes in your weight and condition · Weigh yourself without clothing at the same time each day. Record your weight. Call your doctor if you have sudden weight gain, such as more than 2 to 3 pounds in a day or 5 pounds in a week. (Your doctor may suggest a different range of weight gain.) A sudden weight gain may mean that your heart failure is getting worse. · Keep a daily record of your symptoms. Write down any changes in how you feel, such as new shortness of breath, cough, or problems eating. Also record if your ankles are more swollen than usual and if you feel more tired than usual. Note anything that you ate or did that could have triggered these changes. Limit sodium Sodium causes your body to hold on to extra water. This may cause your heart failure symptoms to get worse. People get most of their sodium from processed foods. Fast food and restaurant meals also tend to be very high in sodium. · Your doctor may suggest that you limit sodium to 2,000 milligrams (mg) a day or less. That is less than 1 teaspoon of salt a day, including all the salt you eat in cooking or in packaged foods. · Read food labels on cans and food packages. They tell you how much sodium you get in one serving. Check the serving size. If you eat more than one serving, you are getting more sodium. · Be aware that sodium can come in forms other than salt, including monosodium glutamate (MSG), sodium citrate, and sodium bicarbonate (baking soda). MSG is often added to Asian food. You can sometimes ask for food without MSG or salt. · Slowly reducing salt will help you adjust to the taste. Take the salt shaker off the table. · Flavor your food with garlic, lemon juice, onion, vinegar, herbs, and spices instead of salt. Do not use soy sauce, steak sauce, onion salt, garlic salt, mustard, or ketchup on your food, unless it is labeled \"low-sodium\" or \"low-salt. \" 
· Make your own salad dressings, sauces, and ketchup without adding salt. · Use fresh or frozen ingredients, instead of canned ones, whenever you can. Choose low-sodium canned goods. · Eat less processed food and food from restaurants, including fast food. Exercise as directed Moderate, regular exercise is very good for your heart. It improves your blood flow and helps control your weight. But too much exercise can stress your heart and cause a heart failure flare-up. · Check with your doctor before you start an exercise program. 
· Walking is an easy way to get exercise. Start out slowly. Gradually increase the length and pace of your walk. Swimming, riding a bike, and using a treadmill are also good forms of exercise. · When you exercise, watch for signs that your heart is working too hard. You are pushing yourself too hard if you cannot talk while you are exercising.  If you become short of breath or dizzy or have chest pain, stop, sit down, and rest. 
 · Do not exercise when you do not feel well. Take medicines correctly · Take your medicines exactly as prescribed. Call your doctor if you think you are having a problem with your medicine. · Make a list of all the medicines you take. Include those prescribed to you by other doctors and any over-the-counter medicines, vitamins, or supplements you take. Take this list with you when you go to any doctor. · Take your medicines at the same time every day. It may help you to post a list of all the medicines you take every day and what time of day you take them. · Make taking your medicine as simple as you can. Plan times to take your medicines when you are doing other things, such as eating a meal or getting ready for bed. This will make it easier to remember to take your medicines. · Get organized. Use helpful tools, such as daily or weekly pill containers. When should you call for help? Call 911 if you have symptoms of sudden heart failure such as: 
? · You have severe trouble breathing. ? · You cough up pink, foamy mucus. ? · You have a new irregular or rapid heartbeat. ?Call your doctor now or seek immediate medical care if: 
? · You have new or increased shortness of breath. ? · You are dizzy or lightheaded, or you feel like you may faint. ? · You have sudden weight gain, such as more than 2 to 3 pounds in a day or 5 pounds in a week. (Your doctor may suggest a different range of weight gain.) ? · You have increased swelling in your legs, ankles, or feet. ? · You are suddenly so tired or weak that you cannot do your usual activities. ? Watch closely for changes in your health, and be sure to contact your doctor if you develop new symptoms. Where can you learn more? Go to http://aniket-tomas.info/. Enter Y627 in the search box to learn more about \"Avoiding Triggers With Heart Failure: Care Instructions. \" Current as of: September 21, 2016 Content Version: 11.4 © 8755-0205 Healthwise, Incorporated. Care instructions adapted under license by [employer] (which disclaims liability or warranty for this information). If you have questions about a medical condition or this instruction, always ask your healthcare professional. [address] any warranty or liability for your use of this information. Please provide this summary of care documentation to your next provider. Signatures-by signing, you are acknowledging that this After Visit Summary has been reviewed with you and you have received a copy. Patient Signature:  ____________________________________________________________ Date:  ____________________________________________________________  
  
Yordan Anthony Provider Signature:  ____________________________________________________________ Date:  ____________________________________________________________

## 2018-05-07 NOTE — ED PROVIDER NOTES
EMERGENCY DEPARTMENT HISTORY AND PHYSICAL EXAM      Date: 5/7/2018  Patient Name: Millie Martinez    History of Presenting Illness     Chief Complaint   Patient presents with    Ankle swelling     6lb weight gain over past week with SOB; hx of CHF       History Provided By: Patient    HPI: Millie Martinez, 80 y.o. male with PMHx significant for carotid artery stenosis, HTN, HLD, Hep C, CAD, MI, DM, CHF, CVA, presents ambulatory to the ED with cc of constant, moderate, progressive SOB x 1 week with associated swelling/fluid retention of the bilateral upper and lower extremities. Per wife, pt has gained ~6 lbs over the past week. She states that they saw the pt's neurologist this morning who was concerned for CHF exacerbation and recommended the pt call his cardiologist, Dr. Stacy Jenkins. Wife states that she called the office and they said they couldn't get the pt in until this evening at which time the wife decided to bring the pt to the ED. She notes he is currently on Lasix 20 mg daily. Pt denies any fevers, CP, N/V/D. Chief Complaint: SOB  Duration: 1 week  Timing:  Constant and Progressive  Location: Cardiovascular system   Quality: N/A  Severity: Moderate  Modifying Factors: No improvement with Lasix 20 mg daily  Associated Symptoms: swelling/fluid retention    There are no other complaints, changes, or physical findings at this time.     PCP: Irena Banks MD    Current Facility-Administered Medications   Medication Dose Route Frequency Provider Last Rate Last Dose    sodium chloride (NS) flush 5-10 mL  5-10 mL IntraVENous Q8H Lela Michelle MD   10 mL at 05/07/18 1506    sodium chloride (NS) flush 5-10 mL  5-10 mL IntraVENous PRN Lela Michelle MD        furosemide (LASIX) injection 40 mg  40 mg IntraVENous Q12H Neetu Little MD        albuterol-ipratropium (DUO-NEB) 2.5 MG-0.5 MG/3 ML  3 mL Nebulization Q6H PRN Neetu Little MD        [START ON 5/8/2018] aspirin (ASPIRIN) tablet 325 mg  325 mg Oral DAILY James Carrasco MD        [START ON 5/8/2018] atorvastatin (LIPITOR) tablet 40 mg  40 mg Oral DAILY James Carrasco MD        [START ON 5/8/2018] clopidogrel (PLAVIX) tablet 75 mg  75 mg Oral DAILY James Carrsaco MD        [START ON 5/8/2018] cyanocobalamin (VITAMIN B12) tablet 1,000 mcg  1,000 mcg Oral DAILY James Carrasco MD        famotidine (PEPCID) tablet 20 mg  20 mg Oral BID James Carrasco MD        gabapentin (NEURONTIN) 250 mg/5 mL solution 750 mg  750 mg Oral TID James Carrasco MD        [START ON 5/8/2018] insulin glargine (LANTUS) injection 14 Units  14 Units SubCUTAneous DAILY James Carrasco MD        midodrine (PROAMITINE) tablet 10 mg  10 mg Oral TID WITH MEALS James Carrasco MD         Current Outpatient Prescriptions   Medication Sig Dispense Refill    cyanocobalamin 1,000 mcg tablet Take 1,000 mcg by mouth daily.  primidone (MYSOLINE) 50 mg tablet Take 0.5 Tabs by mouth nightly. 90 Tab 3    Lancets misc Use preferred brand; Check glucose 4 times daily, Diagnosis E11.65 400 Each 5    glucose blood VI test strips (PHARMACIST CHOICE) strip Use preferred brand; Check glucose 4 times daily, Diagnosis E11.65 400 Strip 5    Blood-Glucose Meter monitoring kit 1 preferred brand glucometer for checking home glucose 4 times per day. DX Code: E11.65 1 Kit 0    fluticasone (FLONASE) 50 mcg/actuation nasal spray       PYRIDOXINE HCL, VITAMIN B6, (VITAMIN B-6 PO) Take  by mouth.  insulin lispro (HUMALOG KWIKPEN INSULIN) 100 unit/mL kwikpen 13 units with breakfast, 10 units with lunch, 7 units with dinner + correction insulin, up to 40 units/day 15 Pen 3    HYDROcodone-acetaminophen (NORCO)  mg tablet Take 1 Tab by mouth.  famotidine (PEPCID) 20 mg tablet Take 1 Tab by mouth two (2) times a day. 20 Tab 0    guaiFENesin (ROBITUSSIN) 100 mg/5 mL liquid Take 200 mg by mouth three (3) times daily as needed for Cough.       insulin glargine (LANTUS SOLOSTAR) 100 unit/mL (3 mL) pen 14 Units by SubCUTAneous route daily. 5 Pen 11    nitroglycerin (NITROSTAT) 0.4 mg SL tablet 0.4 mg by SubLINGual route every five (5) minutes as needed for Chest Pain.  aspirin (ASPIRIN) 325 mg tablet Take 325 mg by mouth daily.  vit B Cmplx 3-FA-Vit C-Biotin (NEPHRO GRAYSON RX) 1- mg-mg-mcg tablet Take 1 Tab by mouth daily.  magnesium hydroxide (TOBIAS MILK OF MAGNESIA) 400 mg/5 mL suspension Take 30 mL by mouth daily as needed for Constipation.  albuterol-ipratropium (DUO-NEB) 2.5 mg-0.5 mg/3 ml nebu 3 mL by Nebulization route every six (6) hours as needed. 100 Nebule 1    ondansetron hcl (ZOFRAN, AS HYDROCHLORIDE,) 8 mg tablet Take 1 Tab by mouth every eight (8) hours as needed for Nausea. 20 Tab 0    Nebulizer & Compressor machine 1 Each by Does Not Apply route daily. 1 Each 0    midodrine (PROAMITINE) 10 mg tablet Take 10 mg by mouth three (3) times daily (with meals).  insulin syringe-needle U-100 1 mL 31 x 15/64\" syrg 1 Syringe by SubCUTAneous route four (4) times daily. 200 Each 11    Insulin Needles, Disposable, (ESPERANZA PEN NEEDLE) 32 gauge x 5/32\" ndle 5 times a day 150 Each 99    atorvastatin (LIPITOR) 40 mg tablet Take 40 mg by mouth daily.  furosemide (LASIX) 20 mg tablet Take 1 Tab by mouth daily. Indications: HYPERCALCEMIA 30 Tab 2    gabapentin (NEURONTIN) 250 mg/5 mL solution Take 750 mg by mouth three (3) times daily.  multivitamin (ONE A DAY) tablet Take 1 Tab by mouth daily.  clopidogrel (PLAVIX) 75 mg tablet Take 75 mg by mouth daily.          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 I suspect the latter. Achalasia  is not present.  GERD (gastroesophageal reflux disease)     Heart failure ([facility] [address] 10/2014     Cardiomyopathy:Pacemaker upgrade:Biv and AICD  Dr. Mares [facility] heart Dr. last visit 5/11/2015    Hepatitis C Dx 1996    [facility] at [facility] in past; as of 4/15/15 wife states pt currently not under any treatment    Hyperlipidemia     Hypertension     Myocardial infarct [facility]) 2013    Heart Cath: 40% LV EF, Stented distal LAD, patent Graft to circumflex    On tube feeding diet approx 2009    still has as of 9/28/15  (no po food/liquid/meds at all); Dr Glen Marks Other ill-defined conditions(799.89) 1996    1 dose of chemotherapy/radiation for tongue cancer    Other ill-defined conditions(799.89)     BPH    Other ill-defined conditions(799.89)     orthostatic hypotension    Pneumonia ~ April -May 2010    Stroke [facility]) approx 2003    left side-left finger tips numb; no imbalance or memory loss; as of 2015 not seeing neuro MD    Suicidal thoughts        Past Surgical History:  Past Surgical History:   Procedure Laterality Date    ABDOMEN SURGERY [address]    peg tube    CABG, ARTERY-VEIN, THREE  2000    HX CATARACT REMOVAL      bilateral    HX CHOLECYSTECTOMY      HX HEART CATHETERIZATION  2013    Stented distal LAD    HX MOHS PROCEDURES      bilateral    HX ORTHOPAEDIC      back surgery times two    HX OTHER SURGICAL      Radical Left Neck    HX OTHER SURGICAL      NASAL POLYPS REMOVAL    HX OTHER SURGICAL  2010    TURP    HX PACEMAKER  11/08    HX PACEMAKER  10/28/14     Defibrillator: [facility] # [id number], serial # [id number]; Dr. Burciaga Bodily [phone];   [place]    HX UROLOGICAL      cystoscopy    NEUROLOGICAL PROCEDURE UNLISTED      cevical surgery    [place] CHANGE GASTROSTOMY TUBE  2011         [place] CHANGE GASTROSTOMY TUBE  2011         [place] EGD INSERT GUIDE WIRE DILATOR PASSAGE ESOPHAGUS  2010         [place] EGD TRANSORAL BIOPSY SINGLE/MULTIPLE  2010         STOMACH SURGERY PROCEDURE UNLISTED  2011         VASCULAR SURGERY PROCEDURE UNLIST      bilateral carotid stents       Family History:  Family History   Problem Relation Age of Onset    Heart Disease Father       at age 52 from CAD    Colon Cancer Mother     Cancer Mother      colon ca    Heart Disease Brother        Social History:  Social History   Substance Use Topics    Smoking status: Never Smoker    Smokeless tobacco: Never Used    Alcohol use No       Allergies: Allergies   Allergen Reactions    Demerol [Meperidine] Shortness of Breath    Paxil [Paroxetine Hcl] Unknown (comments)     Pt gets shaky and loses control of legs    Amoxicillin Rash    Cleocin [Clindamycin Hcl] Rash    Pcn [Penicillins] Rash         Review of Systems   Review of Systems   Constitutional: Positive for unexpected weight change. Negative for chills and fever. HENT: Negative. Negative for congestion, rhinorrhea and sinus pressure. Eyes: Negative. Negative for discharge and redness. Respiratory: Positive for shortness of breath. Negative for chest tightness. Cardiovascular: Positive for leg swelling (and arm swelling). Negative for chest pain. Gastrointestinal: Negative. Negative for abdominal pain and blood in stool. Endocrine: Negative. Genitourinary: Negative. Negative for flank pain and hematuria. Musculoskeletal: Negative. Negative for back pain. Skin: Negative. Negative for rash. Neurological: Negative. Negative for dizziness, seizures, weakness, numbness and headaches. Hematological: Negative.     All other systems reviewed and are negative. Physical Exam   Physical Exam   Constitutional: He is oriented to person, place, and time. He appears well-developed and well-nourished. No distress. HENT:   Head: Normocephalic and atraumatic. Nose: Nose normal.   Mouth/Throat: No oropharyngeal exudate. Eyes: Conjunctivae and EOM are normal. Pupils are equal, round, and reactive to light. No scleral icterus. Neck: Normal range of motion. Neck supple. No JVD present. No thyromegaly present. Cardiovascular: Normal rate, regular rhythm, normal heart sounds, intact distal pulses and normal pulses. PMI is not displaced. Exam reveals no gallop and no friction rub. No murmur heard. Pulmonary/Chest: Effort normal. No stridor. No respiratory distress. He has no decreased breath sounds. He has wheezes. He has no rhonchi. He has rales. He exhibits no tenderness. Abdominal: Soft. Normal aorta and bowel sounds are normal. He exhibits distension. He exhibits no abdominal bruit, no pulsatile midline mass and no mass. There is no hepatosplenomegaly. There is no tenderness. There is no rebound, no guarding and no CVA tenderness. No hernia. PEG tube in place site no redness no hernia   Musculoskeletal:   Trace edema hands and feet   Neurological: He is alert and oriented to person, place, and time. He has normal reflexes. He displays no atrophy and no tremor. No cranial nerve deficit or sensory deficit. He exhibits normal muscle tone. He displays no seizure activity. Coordination normal. GCS eye subscore is 4. GCS verbal subscore is 5. GCS motor subscore is 6. Reflex Scores:       Patellar reflexes are 2+ on the right side and 2+ on the left side. Skin: Skin is warm. No rash noted. He is not diaphoretic. No erythema. Nursing note and vitals reviewed.         Diagnostic Study Results     Labs -  Recent Results (from the past 12 hour(s))   EKG, 12 LEAD, INITIAL    Collection Time: 05/07/18 12:15 PM   Result Value Ref Range    Ventricular Rate 66 BPM    Atrial Rate 66 BPM    P-R Interval 150 ms    QRS Duration 172 ms    Q-T Interval 488 ms    QTC Calculation (Bezet) 511 ms    Calculated P Axis 46 degrees    Calculated R Axis 168 degrees    Calculated T Axis 5 degrees    Diagnosis        Suspect unspecified pacemaker failure  Atrial-sensed ventricular-paced rhythm  When compared with ECG of 15-FEB-2018 05:22,  No significant change was found  Confirmed by Sammie Cuellar ([id number]) on 5/7/2018 1:42:46 PM     CBC WITH AUTOMATED DIFF    Collection Time: 05/07/18  1:06 PM   Result Value Ref Range    WBC 4.8 4.1 - 11.1 K/uL    RBC 4.09 (L) 4.10 - 5.70 M/uL    HGB 12.3 12.1 - 17.0 g/dL    HCT 39.4 36.6 - 50.3 %    MCV 96.3 80.0 - 99.0 FL    MCH 30.1 26.0 - 34.0 PG    MCHC 31.2 30.0 - 36.5 g/dL    RDW 14.5 11.5 - 14.5 %    PLATELET 86 (L) 996 - 400 K/uL    MPV 11.3 8.9 - 12.9 FL    NRBC 0.0 0  WBC    ABSOLUTE NRBC 0.00 0.00 - 0.01 K/uL    NEUTROPHILS 64 32 - 75 %    LYMPHOCYTES 25 12 - 49 %    MONOCYTES 8 5 - 13 %    EOSINOPHILS 3 0 - 7 %    BASOPHILS 0 0 - 1 %    IMMATURE GRANULOCYTES 0 0.0 - 0.5 %    ABS. NEUTROPHILS 3.1 1.8 - 8.0 K/UL    ABS. LYMPHOCYTES 1.2 0.8 - 3.5 K/UL    ABS. MONOCYTES 0.4 0.0 - 1.0 K/UL    ABS. EOSINOPHILS 0.1 0.0 - 0.4 K/UL    ABS. BASOPHILS 0.0 0.0 - 0.1 K/UL    ABS. IMM. GRANS. 0.0 0.00 - 0.04 K/UL    DF AUTOMATED     METABOLIC PANEL, COMPREHENSIVE    Collection Time: 05/07/18  1:06 PM   Result Value Ref Range    Sodium 136 136 - 145 mmol/L    Potassium 4.7 3.5 - 5.1 mmol/L    Chloride 97 97 - 108 mmol/L    CO2 33 (H) 21 - 32 mmol/L    Anion gap 6 5 - 15 mmol/L    Glucose 178 (H) 65 - 100 mg/dL    BUN 26 (H) 6 - 20 MG/DL    Creatinine 1.39 (H) 0.70 - 1.30 MG/DL    BUN/Creatinine ratio 19 12 - 20      GFR est AA 59 (L) >60 ml/min/1.73m2    GFR est non-AA 49 (L) >60 ml/min/1.73m2    Calcium 9.3 8.5 - 10.1 MG/DL    Bilirubin, total 0.5 0.2 - 1.0 MG/DL    ALT (SGPT) 51 12 - 78 U/L    AST (SGOT) 72 (H) 15 - 37 U/L    Alk.  phosphatase 70 45 - 117 U/L    Protein, total 7.7 6.4 - 8.2 g/dL    Albumin 3.1 (L) 3.5 - 5.0 g/dL    Globulin 4.6 (H) 2.0 - 4.0 g/dL    A-G Ratio 0.7 (L) 1.1 - 2.2     TROPONIN I    Collection Time: 05/07/18  1:06 PM   Result Value Ref Range    Troponin-I, Qt. <0.04 <0.05 ng/mL   CK W/ CKMB & INDEX    Collection Time: 05/07/18  1:06 PM   Result Value Ref Range     39 - 308 U/L    CK - MB 4.4 (H) <3.6 NG/ML    CK-MB Index 2.1 0 - 2.5     NT-PRO BNP    Collection Time: 05/07/18  1:06 PM   Result Value Ref Range    NT pro-BNP 5098 (H) 0 - 450 PG/ML   MAGNESIUM    Collection Time: 05/07/18  1:06 PM   Result Value Ref Range    Magnesium 2.7 (H) 1.6 - 2.4 mg/dL       Radiologic Studies -   XR WRIST RT AP/LAT/OBL MIN 3V   Final Result   EXAM:  XR WRIST RT AP/LAT/OBL MIN 3V  INDICATION: Right wrist pain. COMPARISON: None.     FINDINGS: Three views of the right wrist demonstrate no fracture or other acute  osseous or articular abnormality. There are degenerative changes at the first  carpometacarpal joint and there is an old fracture deformity of the proximal  fifth metacarpal. The soft tissues are within normal limits. There are vascular  calcifications.     IMPRESSION  IMPRESSION: No fracture or acute abnormality. CXR Results  (Last 48 hours)               05/07/18 1304  XR CHEST PORT Final result    Impression:  IMPRESSION: No Acute Disease. Narrative:  EXAM: Portable CXR.  1302 hours. COMPARISON: 2/15/2018. INDICATION: chf       The lungs are clear. Heart is top normal in size. There is prior CABG. ICD   remains. There is no overt pulmonary edema. There is no evident pneumothorax,   adenopathy or pleural effusion. No significant change. CT Results  (Last 48 hours)               05/07/18 1454  CT ABD PELV WO CONT Final result    Impression:  IMPRESSION:    1. No acute abdominal or pelvic abnormality. 2. Cardiac pacemaker. 3. Interstitial disease at the lung bases.    4. Status post cholecystectomy. 5. Nonobstructing right renal calculus. 6. Atherosclerotic abdominal aorta without aneurysm. 7. Gastrostomy tube. 8. Status post lumbar laminectomy and fusion. Narrative:  EXAM: CT ABDOMEN AND PELVIS WITHOUT CONTRAST   INDICATION: Abdominal distention. COMPARISON: None. CONTRAST: None. TECHNIQUE:    Unenhanced multislice helical CT was performed from the diaphragm to the   symphysis pubis without intravenous contrast administration. Oral contrast was   not administered. Contiguous 5 mm axial images were reconstructed and lung and   soft tissue windows were generated. Coronal and sagittal reformations were   generated. CT dose reduction was achieved through use of a standardized protocol   tailored for this examination and automatic exposure control for dose   modulation. FINDINGS:   LOWER CHEST: There is a cardiac pacemaker in place. There is interstitial   disease in the lower lobes with some atelectasis. .   The absence of intravenous contrast material reduces the sensitivity for   evaluation of the solid parenchymal organs of the abdomen. ABDOMEN:   Liver: The liver is normal in size and contour with no focal abnormality. Gallbladder and bile ducts: There are clips in the gallbladder fossa and the   gallbladder is absent. Spleen: No abnormality. There is a small accessory spleen. Pancreas: No abnormality. Adrenal glands: No abnormality. Kidneys: No focal parenchymal abnormality. There is a small nonobstructing right   renal calculus. There is no ureteral calculus or obstruction. PELVIS:   Reproductive organs: The prostate gland and seminal vesicles are symmetrical.     Bladder: No abnormality. RETROPERITONEUM: The aorta is atherosclerotic and tapers without aneurysm. There   is no retroperitoneal adenopathy or mass. There is no pelvic mass or adenopathy. BOWEL AND MESENTERY: There is a gastrostomy tube in the stomach.  The small bowel   is normal. The appendix is not identified. There is stool throughout the colon. PERITONEUM: There is no ascites or free intraperitoneal air. BONES AND SOFT TISSUES: There is laminectomy and fusion of the lumbar spine from   L3 to L5 with rods, pedicle screws and bone graft and a compression fracture of   L2. Medical Decision Making   I am the first provider for this patient. I reviewed the vital signs, available nursing notes, past medical history, past surgical history, family history and social history. Vital Signs-Reviewed the patient's vital signs. Patient Vitals for the past 12 hrs:   Temp Pulse Resp BP SpO2   05/07/18 1412 - 63 14 - 98 %   05/07/18 1408 - 62 18 146/83 (!) 88 %   05/07/18 1407 - - - - (!) 86 %   05/07/18 1230 - 68 17 126/65 92 %   05/07/18 1208 98.2 °F (36.8 °C) 71 20 122/50 95 %       Pulse Oximetry Analysis - 90% on RA    Cardiac Monitor:   Rate: 71 bpm  Rhythm: atrial sensed ventricular paced     EKG interpretation: (Preliminary) 1220  Rhythm: Atrial sensed ventricular paced rhythm; and regular . Rate (approx.): 66; ST/T wave: normal;  Written by Shawanda Oliveira ED Scribe, as dictated by Iva Reyes MD.    Records Reviewed: Nursing Notes and Old Medical Records    Provider Notes (Medical Decision Making):   DDx: CHF, DVT, gout, renal insufficiency, electrolyte abnormality, coronary syndrome, PNA    Impression/Plan: chronically ill patient to the ER with diffuse swelling, SOB according to wife. Referred to the ER by neurology and cardiology. Will check labs, x-ray, make final disposition pending those results. ED Course:   Initial assessment performed. The patients presenting problems have been discussed, and they are in agreement with the care plan formulated and outlined with them. I have encouraged them to ask questions as they arise throughout their visit. 2:25 PM  Per RN, pt's saturations are dropping to 87% on RA.  Pt placed on supplemental oxygen, 2L NC. Will admit to hospitalist.     CONSULT NOTE:   2:34 PM  Simon Riojas MD spoke with Deya Bernardo MD,   Specialty: Hospitalist  Discussed pt's hx, disposition, and available diagnostic and imaging results. Reviewed care plans. Consultant will evaluate pt for admission. CONSULT NOTE:  2:53 PM  Simon Riojas MD spoke with Carlos Manuel Jose DO,  Specialty: Cardiology  Discussed pt's hx, disposition, and available diagnostic and imaging results. Reviewed care plans. Consultant will see and evalauate the pt as needed. Disposition:  Admit Note:  2:34 PM  Patient is being admitted to the hospital by Dr. Deya Bernardo MD.  The results of their tests and reasons for their admission have been discussed with them and/or available family. They convey agreement and understanding for the need to be admitted and for their admission diagnosis. Consultation has been made with the inpatient physician specialist for hospitalization. PLAN:  1. Admit to Hospitalist    Diagnosis     Clinical Impression:   1. Acute on chronic congestive heart failure, unspecified heart failure type ([address]    2. 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 Vertebrobasilar occlusive disease    4. 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 Weakness    6. Diabetic peripheral neuropathy associated with type 2 diabetes mellitus ([address]    7. Benign essential tremor syndrome    8. Stenosis of both internal carotid arteries    9. Hemiplegia and hemiparesis following cerebral infarction affecting right dominant side ([address]    10. Wrist pain, acute, right    11. Constipation, unspecified constipation type        Attestations: This note is prepared by Peter Contreras, acting as Scribe for Simon Riojas MD.    Simon Riojas MD: The scribe's documentation has been prepared under my direction and personally reviewed by me in its entirety.  I confirm that the note above accurately reflects all work, treatment, procedures, and medical decision making performed by me.

## 2018-05-07 NOTE — ED NOTES
TRANSFER - OUT REPORT:    Verbal report given to MITCH Burgess (name) on Natalee Young  being transferred to PCU Rm 2274 (unit) for routine progression of care       Report consisted of patients Situation, Background, Assessment and   Recommendations(SBAR). Information from the following report(s) SBAR, Kardex, ED Summary, MAR, Accordion and Recent Results was reviewed with the receiving nurse. Lines:       Opportunity for questions and clarification was provided.       Patient transported with:   Monitor  O2 @ 2 liters  Registered Nurse

## 2018-05-07 NOTE — IP AVS SNAPSHOT
[address] [place] [address] 
[phone] Patient: Mychal Mancini MRN: [id number] VVO:[id number]/60/[id number] A check nemesio indicates which time of day the medication should be taken. My Medications CHANGE how you take these medications Instructions Each Dose to Equal  
 Morning Noon Evening Bedtime  
 furosemide 20 mg tablet Commonly known as:  LASIX What changed:  how much to take Your last dose was: Your next dose is: Take 2 Tabs by mouth daily. Indications: hypercalcemia 40 mg  
    
   
   
   
  
 insulin glargine 100 unit/mL (3 mL) Inpn Commonly known as:  Ayden Keny What changed:  additional instructions Your last dose was: Your next dose is:    
   
   
 14 Units by SubCUTAneous route daily. 14 Units CONTINUE taking these medications Instructions Each Dose to Equal  
 Morning Noon Evening Bedtime  
 acetaminophen 500 mg tablet Commonly known as:  TYLENOL Your last dose was: Your next dose is: Take 1,000 mg by mouth every six (6) hours as needed for Pain. 1000 mg  
    
   
   
   
  
 albuterol-ipratropium 2.5 mg-0.5 mg/3 ml Nebu Commonly known as:  Fouzia Joselo Your last dose was: Your next dose is:    
   
   
 3 mL by Nebulization route every six (6) hours as needed. 3 mL  
    
   
   
   
  
 aspirin 325 mg tablet Commonly known as:  ASPIRIN Your last dose was: Your next dose is: Take 325 mg by mouth daily. 325 mg  
    
   
   
   
  
 atorvastatin 40 mg tablet Commonly known as:  LIPITOR Your last dose was: Your next dose is: Take 40 mg by mouth daily. 40 mg Blood-Glucose Meter monitoring kit Your last dose was: Your next dose is: 1 preferred brand glucometer for checking home glucose 4 times per day. DX Code: E11.65  
     
   
   
   
  
 cyanocobalamin 1,000 mcg tablet Your last dose was: Your next dose is: Take 1,000 mcg by mouth daily. 1000 mcg  
    
   
   
   
  
 famotidine 20 mg tablet Commonly known as:  PEPCID Your last dose was: Your next dose is: Take 1 Tab by mouth two (2) times a day. 20 mg  
    
   
   
   
  
 fluticasone 50 mcg/actuation nasal spray Commonly known as:  Cyndra Puna Your last dose was: Your next dose is:    
   
   
 1 Spray by Both Nostrils route two (2) times a day. 1 Spray  
    
   
   
   
  
 gabapentin 250 mg/5 mL solution Commonly known as:  NEURONTIN Your last dose was: Your next dose is:    
   
   
 750 mg by PEG Tube route three (3) times daily. 750 mg  
    
   
   
   
  
 glucose blood VI test strips strip Commonly known as:  PHARMACIST CHOICE Your last dose was: Your next dose is:    
   
   
 Use preferred brand; Check glucose 4 times daily, Diagnosis E11.65  
     
   
   
   
  
 guaiFENesin 100 mg/5 mL liquid Commonly known as:  ROBITUSSIN Your last dose was: Your next dose is: Take 200 mg by mouth three (3) times daily as needed for Cough. 200 mg HYDROcodone-acetaminophen  mg tablet Commonly known as:  Franny Dimes Your last dose was: Your next dose is: Take 1 Tab by mouth daily as needed. 1 Tab  
    
   
   
   
  
 insulin lispro 100 unit/mL kwikpen Commonly known as:  HumaLOG KwikPen Insulin Your last dose was: Your next dose is:    
   
   
 13 units with breakfast, 10 units with lunch, 7 units with dinner + correction insulin, up to 40 units/day Insulin Needles (Disposable) 32 gauge x 5/32\" Ndle Commonly known as:  Elise Pen Needle Your last dose was: Your next dose is:    
   
   
 5 times a day  
     
   
   
   
  
 insulin syringe-needle U-100 1 mL 31 gauge x 15/64\" Syrg Your last dose was: Your next dose is:    
   
   
 1 Syringe by SubCUTAneous route four (4) times daily. 1 Syringe Lancets Misc Your last dose was: Your next dose is:    
   
   
 Use preferred brand; Check glucose 4 times daily, Diagnosis E11.65  
     
   
   
   
  
 midodrine 10 mg tablet Commonly known as:  Cat Metz Your last dose was: Your next dose is: Take 10 mg by mouth three (3) times daily (with meals). 10 mg  
    
   
   
   
  
 multivitamin tablet Commonly known as:  ONE A DAY Your last dose was: Your next dose is: Take 1 Tab by mouth daily. 1 Tab Nebulizer & Compressor machine Your last dose was: Your next dose is:    
   
   
 1 Each by Does Not Apply route daily. 1 Each  
    
   
   
   
  
 nitroglycerin 0.4 mg SL tablet Commonly known as:  NITROSTAT Your last dose was: Your next dose is: 0.4 mg by SubLINGual route every five (5) minutes as needed for Chest Pain. 0.4 mg  
    
   
   
   
  
 ondansetron hcl 8 mg tablet Commonly known as:  Lynna Dakins Your last dose was: Your next dose is: Take 1 Tab by mouth every eight (8) hours as needed for Nausea. 8 mg TOBIAS MILK OF MAGNESIA 400 mg/5 mL suspension Generic drug:  magnesium hydroxide Your last dose was: Your next dose is: Take 30 mL by mouth daily as needed for Constipation. 30 mL PLAVIX 75 mg Tab Generic drug:  clopidogrel Your last dose was: Your next dose is: Take 75 mg by mouth daily. 75 mg  
    
   
   
   
  
 primidone 50 mg tablet Commonly known as: MYSOLINE Your last dose was: Your next dose is: Take 0.5 Tabs by mouth nightly. 25 mg  
    
   
   
   
  
 vit B Cmplx 3-FA-Vit C-Biotin 1- mg-mg-mcg tablet Commonly known as:  NEPHRO GRAYSON RX Your last dose was: Your next dose is: Take 1 Tab by mouth daily. 1 Tab VITAMIN B-6 PO Your last dose was: Your next dose is: Take  by mouth. Where to Get Your Medications Information on where to get these meds will be given to you by the nurse or doctor. ! Ask your nurse or doctor about these medications  
  furosemide 20 mg tablet

## 2018-05-07 NOTE — MR AVS SNAPSHOT
[address], 
[address], [address] [facility] 
[phone] Patient: Manjit Sin MRN: [id number] Visit Information Date & Time Provider Department Dept. Phone Encounter #  
 2018 10:40 AM Alejandro Mcintosh MD [facility] [phone] [id number] Follow-up Instructions Return in about 6 months (around 2018). Your Appointments 2018 11:50 AM  
Follow Up with Isidro Neves MD  
[facility] [address]) Appt Note: f/u        dm                   [address] months [address] [place] [id number] [address] [place] [address] Road Upcoming Health Maintenance Date Due ZOSTER VACCINE AGE 60> 1995 LIPID PANEL Q1 2018 MEDICARE YEARLY EXAM 3/14/2018 Influenza Age 5 to Adult 2018 MICROALBUMIN Q1 2018 HEMOGLOBIN A1C Q6M 2018 FOOT EXAM Q1 2018 EYE EXAM RETINAL OR DILATED Q1 11/15/2018 GLAUCOMA SCREENING Q2Y 11/15/2019 DTaP/Tdap/Td series (2 - Td) 2025 Allergies as of 2018  Review Complete On: 2018 By: Alejandro Mcintosh MD  
  
 Severity Noted Reaction Type Reactions Demerol [Meperidine] High 2010   Systemic Shortness of Breath Paxil [Paroxetine Hcl] High 2010   Systemic Unknown (comments) Pt gets shaky and loses control of legs Amoxicillin  2013    Rash Cleocin [Clindamycin Hcl]  02/15/2018    Rash Pcn [Penicillins]  2010    Rash Current Immunizations  Reviewed on 2013 Name Date Influenza Vaccine 10/1/2016, 10/1/2014 Influenza Vaccine (Quad) PF 10/7/2015 11:20 AM  
 Influenza Vaccine Split 2012 Influenza Vaccine Whole 12/3/2009  Pneumococcal Polysaccharide (PPSV-23) 10/7/2015 11:22 AM  
 TD Vaccine 12/1/2011  6:03 PM  
 Tdap 9/9/2015  7:38 AM  
 ZZZ-RETIRED (DO NOT USE) Pneumococcal Vaccine (Unspecified Type) 12/3/2007 Not reviewed this visit You Were Diagnosed With   
  
 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 
ICD-9-CM: 250.60, 357.2 Weakness     ICD-10-CM: R53.1 ICD-9-CM: 780.79 Diabetic peripheral neuropathy associated with type 2 diabetes mellitus (HCC)     ICD-10-CM: E11.42 
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  
 110/52 80 98 °F (36.7 °C) 5' 7\" (1.702 m) 184 lb (83.5 kg) 90% BMI Smoking Status 28.82 kg/m2 Never Smoker Vitals History BMI and BSA Data Body Mass Index Body Surface Area  
 28.82 kg/m 2 1.99 m 2 Preferred Pharmacy Pharmacy Name Phone [facility] [address], [address] [place] [address] Avenue [phone] Your Updated Medication List  
  
   
This list is accurate as of 5/7/18 11:17 AM.  Always use your most recent med list.  
  
  
  
  
 albuterol-ipratropium 2.5 mg-0.5 mg/3 ml Nebu Commonly known as:  DUO-NEB  
3 mL by Nebulization route every six (6) hours as needed. aspirin 325 mg tablet Commonly known as:  ASPIRIN Take 325 mg by mouth daily. atorvastatin 40 mg tablet Commonly known as:  LIPITOR Take 40 mg by mouth daily. Blood-Glucose Meter monitoring kit 1 preferred brand glucometer for checking home glucose 4 times per day. DX Code: E11.65  
  
 cyanocobalamin 1,000 mcg tablet Take 1,000 mcg by mouth daily. famotidine 20 mg tablet Commonly known as:  PEPCID Take 1 Tab by mouth two (2) times a day. fluticasone 50 mcg/actuation nasal spray Commonly known as:  FLONASE  
  
 furosemide 20 mg tablet Commonly known as:  LASIX Take 1 Tab by mouth daily. Indications: HYPERCALCEMIA  
  
 gabapentin 250 mg/5 mL solution Commonly known as:  NEURONTIN Take 750 mg by mouth three (3) times daily. glucose blood VI test strips strip Commonly known as:  PHARMACIST CHOICE Use preferred brand; Check glucose 4 times daily, Diagnosis E11.65  
  
 guaiFENesin 100 mg/5 mL liquid Commonly known as:  ROBITUSSIN Take 200 mg by mouth three (3) times daily as needed for Cough. HYDROcodone-acetaminophen  mg tablet Commonly known as:  Ed Collar Take 1 Tab by mouth. insulin glargine 100 unit/mL (3 mL) Inpn Commonly known as:  LANTUS,BASAGLAR  
14 Units by SubCUTAneous route daily. insulin lispro 100 unit/mL kwikpen Commonly known as:  HumaLOG KwikPen Insulin 13 units with breakfast, 10 units with lunch, 7 units with dinner + correction insulin, up to 40 units/day Insulin Needles (Disposable) 32 gauge x 5/32\" Ndle Commonly known as:  Elise Pen Needle 5 times a day  
  
 insulin syringe-needle U-100 1 mL 31 gauge x 15/64\" Syrg 1 Syringe by SubCUTAneous route four (4) times daily. Lancets Misc Use preferred brand; Check glucose 4 times daily, Diagnosis E11.65  
  
 midodrine 10 mg tablet Commonly known as:  Jovanna Stall Take 10 mg by mouth three (3) times daily (with meals). multivitamin tablet Commonly known as:  ONE A DAY Take 1 Tab by mouth daily. Nebulizer & Compressor machine 1 Each by Does Not Apply route daily. nitroglycerin 0.4 mg SL tablet Commonly known as:  NITROSTAT  
0.4 mg by SubLINGual route every five (5) minutes as needed for Chest Pain. ondansetron hcl 8 mg tablet Commonly known as:  Kirby Marshal Take 1 Tab by mouth every eight (8) hours as needed for Nausea. TOBIAS MILK OF MAGNESIA 400 mg/5 mL suspension Generic drug:  magnesium hydroxide Take 30 mL by mouth daily as needed for Constipation. PLAVIX 75 mg Tab Generic drug:  clopidogrel Take 75 mg by mouth daily. primidone 50 mg tablet Commonly known as: MYSOLINE Take 0.5 Tabs by mouth nightly. vit B Cmplx 3-FA-Vit C-Biotin 1- mg-mg-mcg tablet Commonly known as:  NEPHRO GRAYSON RX Take 1 Tab by mouth daily. VITAMIN B-6 PO Take  by mouth. Prescriptions Sent to Pharmacy Refills  
 primidone (MYSOLINE) 50 mg tablet 3 Sig: Take 0.5 Tabs by mouth nightly. Class: Normal  
 Pharmacy: [facility] [address] [place] [address], [address] [place] [address] Ph #: [phone] Route: Oral  
  
We Performed the Following   
 T3 TOTAL [id number] CPT(R)] T4 (THYROXINE) [id number] CPT(R)] TSH 3RD GENERATION [23692 CPT(R)] Follow-up Instructions Return in about 6 months (around 11/7/2018). To-Do List   
 05/07/2018 Imaging:  XR WRIST RT AP/LAT/OBL MIN 3V   
  
 05/10/2018 Imaging:  DUPLEX CAROTID BILATERAL AMB NEURO Patient Instructions Office Policies Phone calls/patient messages: Please allow up to 24 hours for someone in the office to contact you about your call or message. Be mindful your provider may be out of the office or your message may require further review. We encourage you to use [employer] for your messages as this is a faster, more efficient way to communicate with our office Medication Refills: Prescription medications require 48 business hours to process. We encourage you to use [employer] for your refills. For controlled medications: Please allow 72 business hours to process. Certain medications may require you to  a written prescription at our office. NO narcotic/controlled medications will be prescribed after 4pm Monday through Friday or on weekends Form/Paperwork Completion: 
 Please note there is a $25 fee for all paperwork completed by our providers. We ask that you allow 7-14 business days. Pre-payment is due prior to picking up/faxing the completed form. You may also download your forms to [employer] to have your doctor print off. A Healthy Lifestyle: Care Instructions Your Care Instructions A healthy lifestyle can help you feel good, stay at a healthy weight, and have plenty of energy for both work and play. A healthy lifestyle is something you can share with your whole family. A healthy lifestyle also can lower your risk for serious health problems, such as high blood pressure, heart disease, and diabetes. You can follow a few steps listed below to improve your health and the health of your family. Follow-up care is a key part of your treatment and safety. Be sure to make and go to all appointments, and call your doctor if you are having problems. It's also a good idea to know your test results and keep a list of the medicines you take. How can you care for yourself at home? · Do not eat too much sugar, fat, or fast foods. You can still have dessert and treats now and then. The goal is moderation. · Start small to improve your eating habits. Pay attention to portion sizes, drink less juice and soda pop, and eat more fruits and vegetables. ¨ Eat a healthy amount of food. A 3-ounce serving of meat, for example, is about the size of a deck of cards. Fill the rest of your plate with vegetables and whole grains. ¨ Limit the amount of soda and sports drinks you have every day. Drink more water when you are thirsty. ¨ Eat at least 5 servings of fruits and vegetables every day. It may seem like a lot, but it is not hard to reach this goal. A serving or helping is 1 piece of fruit, 1 cup of vegetables, or 2 cups of leafy, raw vegetables. Have an apple or some carrot sticks as an afternoon snack instead of a candy bar. Try to have fruits and/or vegetables at every meal. 
· Make exercise part of your daily routine. You may want to start with simple activities, such as walking, bicycling, or slow swimming. Try to be active 30 to 60 minutes every day. You do not need to do all 30 to 60 minutes all at once. For example, you can exercise 3 times a day for 10 or 20 minutes. Moderate exercise is safe for most people, but it is always a good idea to talk to your doctor before starting an exercise program. 
· Keep moving. Oumou Rivas the lawn, work in the garden, or [employer]. Take the stairs instead of the elevator at work. · If you smoke, quit. People who smoke have an increased risk for heart attack, stroke, cancer, and other lung illnesses. Quitting is hard, but there are ways to boost your chance of quitting tobacco for good. ¨ Use nicotine gum, patches, or lozenges. ¨ Ask your doctor about stop-smoking programs and medicines. ¨ Keep trying. In addition to reducing your risk of diseases in the future, you will notice some benefits soon after you stop using tobacco. If you have shortness of breath or asthma symptoms, they will likely get better within a few weeks after you quit. · Limit how much alcohol you drink. Moderate amounts of alcohol (up to 2 drinks a day for men, 1 drink a day for women) are okay. But drinking too much can lead to liver problems, high blood pressure, and other health problems. Family health If you have a family, there are many things you can do together to improve your health. · Eat meals together as a family as often as possible. · Eat healthy foods. This includes fruits, vegetables, lean meats and dairy, and whole grains. · Include your family in your fitness plan. Most people think of activities such as jogging or tennis as the way to fitness, but there are many ways you and your family can be more active. Anything that makes you breathe hard and gets your heart pumping is exercise. Here are some tips: 
¨ Walk to do errands or to take your child to school or the bus. ¨ Go for a family bike ride after dinner instead of watching TV. Where can you learn more? Go to http://aniketGood Peopletomas.info/. Enter S785 in the search box to learn more about \"A Healthy Lifestyle: Care Instructions. \" Current as of: May 12, 2017 Content Version: 11.4 © 6766-0551 [employer]. Care instructions adapted under license by [employer] (which disclaims liability or warranty for this information). If you have questions about a medical condition or this instruction, always ask your healthcare professional. [address] any warranty or liability for your use of this information. Introducing [facility]! Loree Dwyer introduces [employer] patient portal. Now you can access parts of your medical record, email your doctor's office, and request medication refills online. 1. In your internet browser, go to [email or web address] 2. Click on the First Time User? Click Here link in the Sign In box. You will see the New Member Sign Up page. 3. Enter your [employer] Access Code exactly as it appears below. You will not need to use this code after youve completed the sign-up process. If you do not sign up before the expiration date, you must request a new code. · [employer] Access Code: [email or web address] Expires: 5/14/2018  6:56 AM 
 
4.  Enter the last four digits of your Social Security Number (xxxx) and Date of Birth (mm/dd/yyyy) as indicated and click Submit. You will be taken to the next sign-up page. 5. Create a [employer] ID. This will be your [employer] login ID and cannot be changed, so think of one that is secure and easy to remember. 6. Create a [employer] password. You can change your password at any time. 7. Enter your Password Reset Question and Answer. This can be used at a later time if you forget your password. 8. Enter your e-mail address. You will receive e-mail notification when new information is available in [address] 9. Click Sign Up. You can now view and download portions of your medical record. 10. Click the Download Summary menu link to download a portable copy of your medical information. If you have questions, please visit the Frequently Asked Questions section of the [employer] website. Remember, [employer] is NOT to be used for urgent needs. For medical emergencies, dial 911. Now available from your iPhone and Android! Please provide this summary of care documentation to your next provider. Your primary care clinician is listed as Stevan Moran. If you have any questions after today's visit, please call [phone].

## 2018-05-08 NOTE — TELEPHONE ENCOUNTER
Patients wife called states that pt is in the hospital and she would like to know if  can put in an order for him to have the doppler done while he is in the hospital

## 2018-05-08 NOTE — PROGRESS NOTES
Problem: Falls - Risk of  Goal: *Absence of Falls  Document Olayinka Fall Risk and appropriate interventions in the flowsheet. Outcome: Progressing Towards Goal  Fall Risk Interventions:  Mobility Interventions: Assess mobility with egress test, Communicate number of staff needed for ambulation/transfer, Patient to call before getting OOB, PT Consult for mobility concerns, PT Consult for assist device competence, Strengthening exercises (ROM-active/passive), Utilize walker, cane, or other assitive device, Utilize gait belt for transfers/ambulation         Medication Interventions: Assess postural VS orthostatic hypotension, Evaluate medications/consider consulting pharmacy, Patient to call before getting OOB, Teach patient to arise slowly, Utilize gait belt for transfers/ambulation    Elimination Interventions: Bed/chair exit alarm, Call light in reach, Patient to call for help with toileting needs, Toileting schedule/hourly rounds, Urinal in reach             Problem: Pressure Injury - Risk of  Goal: *Prevention of pressure injury  Document Mark Scale and appropriate interventions in the flowsheet. Outcome: Progressing Towards Goal  Pressure Injury Interventions:  Sensory Interventions: Assess changes in LOC, Check visual cues for pain, Discuss PT/OT consult with provider, Monitor skin under medical devices, Turn and reposition approx. every two hours (pillows and wedges if needed)         Activity Interventions: Assess need for specialty bed, Increase time out of bed, Pressure redistribution bed/mattress(bed type), PT/OT evaluation    Mobility Interventions: Assess need for specialty bed, HOB 30 degrees or less, PT/OT evaluation, Turn and reposition approx.  every two hours(pillow and wedges)    Nutrition Interventions: Document food/fluid/supplement intake, Offer support with meals,snacks and hydration, Discuss nutritional consult with provider (TUBE FEEDINGS)    Friction and Shear Interventions: Apply protective barrier, creams and emollients, Feet elevated on foot rest, HOB 30 degrees or less, Lift sheet, Lift team/patient mobility team, Minimize layers, Transferring/repositioning devices

## 2018-05-08 NOTE — PROGRESS NOTES
Initial Nutrition Assessment:    INTERVENTIONS/RECOMMENDATIONS:   · Enteral/Parenteral Nutrition: Initiate enteral nutrition: Resume home TF regimen with [address] [place] [address] Road equivalent to Isosource 1.5   · Jevity 1.5 500 mL bolus for breakfast (8am)  · Jevity 1.5 375 mL bolus for lunch and dinner (2pm, 8pm)  · Recommend 150 mL water flushes q 4 hours   · TF + Water flushes will provide 1875 kcal, 80g protein, 1850 mL water    ASSESSMENT:   Patient medically noted for respiratory failure, CHF, and HTN. PMH for CKD, DM, tonsillar cancer, and CVA. Patient resting with eyes closed at time of visit; home TF regimen and weight history provided by wife. Recommendations above mimic home regimen of Isocource 1.5 with 3 bolus feedings/day. He uses 5 cans/day (250 mL cans). TF recommendations above meet 97% of estimated kcal/protein needs. Isosource 1.5 not available on [address] [place] [address] Road so Jevity 1.5 will be substituted; okay with wife and patient. No recent weight loss; wife reports some weight gain r/t fluid. Will monitor weights, labs, and TF tolerance to make adjustments and further recommendations as needed. Diet Order: NPO  % Eaten:  No data found.     Pertinent Medications: [x]Reviewed []Other: Atorvastatin, Plavix, Vitamin B12, Famotidine, Lasix, Lantus, Humalog, Miralax   Pertinent Labs: [x]Reviewed []Other: [phone]  Food Allergies: [x]None []Other   Last BM:    [x]Active     []Hyperactive  []Hypoactive       [] Absent BS  Skin:    [x] Intact   [] Incision  [] Breakdown  [] Other:    Anthropometrics:   Height: 5' 7\" (170.2 cm) Weight: 83.1 kg (183 lb 3.2 oz)   IBW (%IBW):   ( ) UBW (%UBW):   (  %)   Last Weight Metrics:  Weight Loss Metrics 5/7/2018 5/7/2018 3/12/2018 2/15/2018 1/14/2018 11/9/2017 8/8/2017   Today's Wt 183 lb 3.2 oz 184 lb 170 lb 1.6 oz 175 lb 14.8 oz 170 lb 170 lb 1.6 oz 167 lb 9.6 oz   BMI 28.69 kg/m2 28.82 kg/m2 26.64 kg/m2 27.55 kg/m2 26.63 kg/m2 26.64 kg/m2 26.25 kg/m2   Some encounter information is confidential and restricted. Go to Review Flowsheets activity to see all data. BMI: Body mass index is 28.69 kg/(m^2). This BMI is indicative of:   []Underweight    []Normal    [x]Overweight    [] Obesity   [] Extreme Obesity (BMI>40)     Estimated Nutrition Needs (Based on):   1929 Kcals/day (BMR (1484) x 1. 3AF) , 83 g (1.0 g/kg bw) Protein  Carbohydrate:  At Least 130 g/day  Fluids: 1900 mL/day (1ml/kcal)    Pt expected to meet estimated nutrient needs: [x]Yes []No    NUTRITION DIAGNOSES:   Problem:  Swallowing difficulty      Etiology: related to hx throat cancer     Signs/Symptoms: as evidenced by PEG dependent       NUTRITION INTERVENTIONS:    Enteral/Parenteral Nutrition: Initiate enteral nutrition                GOAL:   TF regimen initiated and tolerated at goal with residual <250 mL next 2-4 days    LEARNING NEEDS (Diet, Food/Nutrient-Drug Interaction):    [x] None Identified   [] Identified and Education Provided/Documented   [] Identified and Pt declined/was not appropriate     Cultureal, Protestant, OR Ethnic Dietary Needs:    [x] None Identified   [] Identified and Addressed     [x] Interdisciplinary Care Plan Reviewed/Documented    [x] Discharge Planning: Resume home TF regimen with Isosource 1.5        MONITORING /EVALUATION:   Food/Nutrient Intake Outcomes: Enteral/parenteral nutrition intake  Physical Signs/Symptoms Outcomes: Weight/weight change, Glucose profile, GI profile, Electrolyte and renal profile    NUTRITION RISK:    [x] High              [] Moderate           []  Low  []  Minimal/Uncompromised    PT SEEN FOR:    [x]  MD Consult: []Calorie Count      []Diabetic Diet Education        []Diet Education     []Electrolyte Management     []General Nutrition Management and Supplements     [x]Management of Tube Feeding     []TPN Recommendations    [x]  RN Referral:  []MST score >=2     [x]Enteral/Parenteral Nutrition PTA     []Pregnant: Gestational DM or Multigestation     []Pressure Ulcer/Wound Care needs        []  Low BMI  []  ABBEY Rodgers  Pager [phone]                 Weekend Pager [phone]

## 2018-05-08 NOTE — ROUTINE PROCESS
TRANSFER - IN REPORT:    Verbal report received from Saulo No (name) on Katerina Martin  being received from PCU (unit) for routine progression of care      Report consisted of patients Situation, Background, Assessment and   Recommendations(SBAR). Information from the following report(s) SBAR, Kardex, Intake/Output and MAR was reviewed with the receiving nurse. Opportunity for questions and clarification was provided. Assessment completed upon patients arrival to unit and care assumed.

## 2018-05-08 NOTE — ED NOTES
While transporting pt upstairs, pt sat up and began coughing. He indicated that he is not supposed to lie down after eating and needed to sit. Upon arriving to room, pt updated on plan to move to hospital bed. While nursing staff was preparing for transfer, pt began to scoot himself off the end of the bed. RN staff requested pt stop moving and cited concerned for safety. Pt began speaking with an affect of intense anger and began shaking closed fist at nursing staff.

## 2018-05-08 NOTE — PROGRESS NOTES
Reason for Admission:   Respiratory Failure, [facility]               RRAT Score:     38             Resources/supports as identified by patient/family:   family                Top Challenges facing patient (as identified by patient/family and CM): Finances/Medication cost?                    Transportation?  family              Support system or lack thereof? Living arrangements? adequate           Self-care/ADLs/Cognition? Independent prior to admission as reported by patient. Safely can navigate states prior to admission. Drives occasionally. Current Advanced Directive/Advance Care Plan:  Not on file                          Plan for utilizing home health:                          Likelihood of readmission:  High    CM met with patient for initial assessment. Pt name and  confirmed as pt identifiers. Patient is alert and oriented. Sitting up in bed on 2LNC. Demographics confirmed. Patient lives with wife in a single story home. 6 steps entry. ADL's/IADL's - patient reports being independent prior to admission. Can navigate steps independently and occasionally drives. DME - walker, w/c, bsc, grab bars, NO HOME O2                 Transition of Care Plan:         PTOT pending. Wean O2 prior to admission. Patient states he has been to [facility] in the past and will not consider returning there. Care Management Interventions  PCP Verified by CM:  Yes  Transition of Care Consult (CM Consult): Discharge Planning  Physical Therapy Consult: Yes  Occupational Therapy Consult: Yes  Current Support Network: Lives with Spouse (1 story home with 6 steps entry)  Confirm Follow Up Transport: Family  Plan discussed with Pt/Family/Caregiver: Yes  Discharge Location  Discharge Placement:  (tbd)    Jenni Sheikh RN CM  Ext [phone]

## 2018-05-08 NOTE — PROGRESS NOTES
Hospitalist Progress Note    NAME: Trisha Mondragon   :  1935   MRN:  [id number]       Assessment / Plan:  Acute hypoxic respiratory failure  Acute on chronic systolic heart failure  CAD s/p AICD  HTN  -pt has new b/l LE edema, KRAMER. He is not on home O2 PTA  -probnp 5098. 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 Cont' TF     DM with neuropathy  -resume lantus 14 units daily, gabapentin  -he uses humalog 14U at breakfast, 10U at lunch, and 7U at dinner.    -will use SSI while here, adjust prn     Hx of CVA  -cont' ASA, plavix, statin         Code Status:  Full, discussed with pt and his wife  Surrogate Decision Maker: pt's wife  DVT Prophylaxis: lovenox  GI Prophylaxis: not indicated     Subjective:     Chief Complaint / Reason for Physician Visit  Pt seen at bedside. Feels better. B/l LE edema improved. No complaints. Discussed with RN events overnight. Review of Systems:  Symptom Y/N Comments  Symptom Y/N Comments   Fever/Chills n   Chest Pain n    Poor Appetite    Edema y mild   Cough    Abdominal Pain     Sputum    Joint Pain     SOB/KRAMER n   Pruritis/Rash     Nausea/vomit    Tolerating PT/OT     Diarrhea    Tolerating Diet     Constipation    Other       Could NOT obtain due to:      Objective:     VITALS:   Last 24hrs VS reviewed since prior progress note.  Most recent are:  Patient Vitals for the past 24 hrs:   Temp Pulse Resp BP SpO2   18 1315 - - - - 94 %   18 1159 97.8 °F (36.6 °C) 70 21 117/68 94 %   18 0715 97.7 °F (36.5 °C) 62 19 107/66 97 %   18 0252 97.6 °F (36.4 °C) 66 15 136/83 100 %   18 2254 98 °F (36.7 °C) 83 22 124/81 98 %   18 2133 - 81 - (!) 127/94 -   05/07/18 2005 98.1 °F (36.7 °C) 93 20 162/82 100 %   05/07/18 1932 - 84 23 (!) 172/98 93 %   05/07/18 1900 - 72 16 (!) 170/93 100 %   05/07/18 1830 - 85 22 115/85 (!) 85 %   05/07/18 1730 - 64 16 123/89 99 %   05/07/18 1634 - - - 142/75 -       Intake/Output Summary (Last 24 hours) at 05/08/18 1454  Last data filed at 05/08/18 1222   Gross per 24 hour   Intake                0 ml   Output             2700 ml   Net            -2700 ml        PHYSICAL EXAM:  General: WD, WN. Alert, cooperative, no acute distress    EENT:  EOMI. Anicteric sclerae. MMM  Resp:  CTA bilaterally, no wheezing or rales. No accessory muscle use  CV:  Regular  rhythm,  N+1 b/l LE edema  GI:  Soft, Non distended, Non tender.  +Bowel sounds  Neurologic:  Alert and oriented X 3, normal speech   Psych:   Good insight. Not anxious nor agitated  Skin:  No rashes. No jaundice    Reviewed most current lab test results and cultures  YES  Reviewed most current radiology test results   YES  Review and summation of old records today    NO  Reviewed patient's current orders and MAR    YES  PMH/SH reviewed - no change compared to H&P  ________________________________________________________________________  Care Plan discussed with:    Comments   Patient x    Family  x wife   RN x    Care Manager x    Consultant                        Multidiciplinary team rounds were held today with , nursing, pharmacist and clinical coordinator. Patient's plan of care was discussed; medications were reviewed and discharge planning was addressed.      ________________________________________________________________________  Total NON critical care TIME:  35   Minutes    Total CRITICAL CARE TIME Spent:   Minutes non procedure based      Comments   >50% of visit spent in counseling and coordination of care     ________________________________________________________________________  Ilya Tejeda MD     Procedures: see electronic medical records for all procedures/Xrays and details which were not copied into this note but were reviewed prior to creation of Plan. LABS:  I reviewed today's most current labs and imaging studies.   Pertinent labs include:  Recent Labs      05/08/18   0253  05/07/18   1306   WBC  5.6  4.8   HGB  11.6*  12.3   HCT  36.0*  39.4   PLT  81*  86*     Recent Labs      05/08/18 0253 05/07/18   1412  05/07/18   1306   NA  137   --   136   K  4.1   --   4.7   CL  97   --   97   CO2  35*   --   33*   GLU  209*   --   178*   BUN  28*   --   26*   CREA  1.30   --   1.39*   CA  8.8   --   9.3   MG   --    --   2.7*   ALB   --    --   3.1*   TBILI   --    --   0.5   SGOT   --    --   72*   ALT   --    --   51   INR   --   1.0   --        Signed: Whit Neri MD

## 2018-05-08 NOTE — PROGRESS NOTES
PCU SHIFT NURSING NOTE      TRANSFER - IN REPORT:    Verbal report received from Helen Millard RN(name) on Ines Olivera  being received from ED(unit) for routine progression of care      Report consisted of patients Situation, Background, Assessment and   Recommendations(SBAR). Information from the following report(s) SBAR, Kardex, Intake/Output and Recent Results was reviewed with the receiving nurse. Opportunity for questions and clarification was provided. Assessment completed upon patients arrival to unit and care assumed. Primary Nurse Yaritza Cheney and Bernadette Elias RN performed a dual skin assessment on this patient No impairment noted: Blanchable redness to sacrum     Mark score is 18        Shift Summary:   2000: Pt arrived to room 2274. Pt attempted to scoot off the stretcher stating he was unable to breathe. At the time pt was sitting up straight on stretcher with HOB elevated up. 3 RNs at bedside attempting to explain and calm pt down. Pt upset , swinging fist at nurses. Charge nurse called into room and notified of incident. Pt calling wife. Night nurse explained situation to pts wife. All questions answered. 2215: Family at bedside. No complaints at this time   Bedside shift change report given to XUAN/ Dara Singh (oncoming nurse) by Radha Melara RN (offgoing nurse). Report included the following information SBAR, Kardex, Intake/Output, Recent Results and Cardiac Rhythm PACED .      Admission Date 5/7/2018   Admission Diagnosis Respiratory failure ([facility] [address]   Consults IP CONSULT TO CARDIOLOGY  IP CONSULT TO CARDIOLOGY  IP CONSULT TO HOSPITALIST        Consults   []PT   []OT   []Speech   []Case Management      [] Palliative      Cardiac Monitoring Order   []Yes   []No     IV drips   []Yes    Drip:                            Dose:  Drip:                            Dose:  Drip:                            Dose:   []No     GI Prophylaxis   []Yes   []No         DVT Prophylaxis   SCDs:             Edward Hernandez stockings:         [] Medication   []Contraindicated   []None      Activity Level Activity Level: Up with Assistance         Purposeful Rounding every 1-2 hour? []Yes   Vázquez Score      Bed Alarm (If score 3 or >)   []Yes   [] Refused (See signed refusal form in chart)   Mark Score      Mark Score (if score 14 or less)   []PMT consult   []Wound Care consult      []Specialty bed   [] Nutrition consult          Needs prior to discharge:   Home O2 required:    []Yes   []No    If yes, how much O2 required? Other:    Last Bowel Movement:        Influenza Vaccine Received Flu Vaccine for Current Season (usually Sept-March): Not Flu Season        Pneumonia Vaccine           Diet Active Orders   There are no active orders of the following type(s): Diet. LDAs                                 Urinary Catheter      Intake & Output   Date 05/06/18 1900 - 05/07/18 0659(Not Admitted) 05/07/18 0700 - 05/08/18 0659   Shift 6662-5678 24 Hour Total 5176-0611 5730-6559 24 Hour Total   I  N  T  A  K  E   Shift Total  (mL/kg)        O  U  T  P  U  T   Urine   650  (0.7) 250 900      Urine Voided   650 250 900    Shift Total  (mL/kg)   650  (7.8) 250  (3) 900  (10.8)   NET   -650 -250 -900   Weight (kg)   83.1 83.1 83.1         Readmission Risk Assessment Tool Score High Risk            38       Total Score        3 Has Seen PCP in Last 6 Months (Yes=3, No=0)    2 . Living with Significant Other. Assisted Living. LTAC. SNF. or   Rehab    5 Pt.  Coverage (Medicare=5 , Medicaid, or Self-Pay=4)    28 Charlson Comorbidity Score (Age + Comorbid Conditions)        Criteria that do not apply:    Patient Length of Stay (>5 days = 3)    IP Visits Last 12 Months (1-3=4, 4=9, >4=11)       Expected Length of Stay - - -   Actual Length of Stay 0

## 2018-05-08 NOTE — PROGRESS NOTES
PCU SHIFT NURSING NOTE      TRANSFER - IN REPORT:    Verbal report received from Helen Millard RN(name) on Mustapha Rowell  being received from ED(unit) for routine progression of care      Report consisted of patients Situation, Background, Assessment and   Recommendations(SBAR). Information from the following report(s) SBAR, Kardex, Intake/Output and Recent Results was reviewed with the receiving nurse. Opportunity for questions and clarification was provided. Assessment completed upon patients arrival to unit and care assumed. Shift Summary:   2000: Pt arrived to room 2274. Pt attempted to scoot off the stretcher stating he was unable to breathe. At the time pt was sitting up straight in the stretcher with HOB elevated up. 3 RNs at bedside attempting to explain and calm pt down. Pt upset , swinging fist at nurses. Charge nurse called into room and notified of incident. Pt calling wife. Night nurse explained situation to pts wife. All questions answered. Admission Date 5/7/2018   Admission Diagnosis Respiratory failure ([facility] [address]   Consults IP CONSULT TO CARDIOLOGY  IP CONSULT TO CARDIOLOGY  IP CONSULT TO HOSPITALIST        Consults   []PT   []OT   []Speech   []Case Management      [] Palliative      Cardiac Monitoring Order   []Yes   []No     IV drips   []Yes    Drip:                            Dose:  Drip:                            Dose:  Drip:                            Dose:   []No     GI Prophylaxis   []Yes   []No         DVT Prophylaxis   SCDs:             Osorio stockings:         [] Medication   []Contraindicated   []None      Activity Level           Purposeful Rounding every 1-2 hour?    []Yes   Vázquez Score      Bed Alarm (If score 3 or >)   []Yes   [] Refused (See signed refusal form in chart)   Mark Score      Mark Score (if score 14 or less)   []PMT consult   []Wound Care consult      []Specialty bed   [] Nutrition consult          Needs prior to discharge:   Home O2 required: []Yes   []No    If yes, how much O2 required? Other:    Last Bowel Movement:        Influenza Vaccine          Pneumonia Vaccine           Diet Active Orders   There are no active orders of the following type(s): Diet. LDAs                                 Urinary Catheter      Intake & Output   Date 05/06/18 1900 - 05/07/18 0659(Not Admitted) 05/07/18 0700 - 05/08/18 0659   Shift 0417-2840 24 Hour Total 1803-0830 0269-8907 24 Hour Total   I  N  T  A  K  E   Shift Total  (mL/kg)        O  U  T  P  U  T   Urine   650  (0.7) 250 900      Urine Voided   650 250 900    Shift Total  (mL/kg)   650  (7.8) 250  (3) 900  (10.8)   NET   -650 -250 -900   Weight (kg)   83.1 83.1 83.1         Readmission Risk Assessment Tool Score High Risk            38       Total Score        3 Has Seen PCP in Last 6 Months (Yes=3, No=0)    2 . Living with Significant Other. Assisted Living. LTAC. SNF. or   Rehab    5 Pt.  Coverage (Medicare=5 , Medicaid, or Self-Pay=4)    28 Charlson Comorbidity Score (Age + Comorbid Conditions)        Criteria that do not apply:    Patient Length of Stay (>5 days = 3)    IP Visits Last 12 Months (1-3=4, 4=9, >4=11)       Expected Length of Stay - - -   Actual Length of Stay 0

## 2018-05-08 NOTE — PROGRESS NOTES
1800 - ATTEMPTED TO CALL REPORT FOR [address] TO [address] [facility]. NURSE UNAVAILABLE AT THIS TIME. RN TO Ashley Fung FOR REPORT.     1812 - Verbal shift change report given to [facility], RN (oncoming nurse) by Stephania Florentino RN (offgoing nurse). Report included the following information SBAR, Kardex, Procedure Summary, Intake/Output, MAR, Accordion, Recent Results, Med Rec Status, Cardiac Rhythm PACED and Alarm Parameters . TIME ALLOWED FOR QUESTIONS AND CLARIFICATION. AWAITING TRANSPORT FOR TX TO [id number] AT THIS TIME. PT TO TALK TO WIFE ABOUT TX.

## 2018-05-08 NOTE — PROGRESS NOTES
Bedside shift change report given to [facility], RN (oncoming nurse) by Prince Brittany RN (offgoing nurse). Report included the following information SBAR, Kardex, Procedure Summary, Intake/Output, MAR, Accordion, Recent Results, Med Rec Status, Cardiac Rhythm PACED and Alarm Parameters .  AWAITING TRANSPORT FOR TX PER MD ORDERS

## 2018-05-08 NOTE — CARDIO/PULMONARY
Cardiac Rehab:     Chart reviewed. Pt is on the CHF bundle list.     Pt visited, Isabela Corey in bed, wife and granddaughter at the bedside. Heart Failure education folder, with Low Sodium brochure, given to [facility]. Educated using teach back method. Discussed diagnosis definition and assessed patient understanding. Reviewed importance of daily weight monitoring and Low Sodium diet (3618-7227 mg. daily). Pt is tube fed, no oral food or drinks. Encouraged activity and rest periods within symptom limitations and as ordered by physician. Reviewed lasix, purpose of medication, potential side effects, compliance, and what to do if dose is missed. Discussed importance of reporting signs and symptoms of exacerbation, and when to report them to the doctor, to prevent re-hospitalization. [facility] was encouraged to keep all appointments with doctor. Discussed the Cardiac Rehab Program, benefits, format, and encouraged enrollment, when eligible. Patient is interested and we will follow up, by phone, once eligible. Tariq Christie RN    Wife at the bedside well versed in daily weights and s&s of fluid overload. Denies questions at this time.

## 2018-05-09 NOTE — PROGRESS NOTES
Progress Note      5/9/2018 7:50 AM  NAME: Francia Barroso   MRN:  [id number]   Admit Diagnosis: Respiratory failure ([facility] [address]      Problem List:      1. Acute on chronic systolic heart failure  2. Coronary artery disease s/p remote CABG. Cath '13 w/ EF 40%, PCI to distal LAD via LIMA, severe disease in large diagonal (not amenable to PCI), patent SVG-OM, SVG-PDA, SVG-PLB. [place] 11/13 w/ EF 28%, IWMI, and with diagonal ischemia. 3. Lexiscan stress MPI 9/14 w/ EF 29% and anterior ischemia w/ IWMI  4. Ischemic cardiomyopathy  5. BiV ICD   6. Chronic kidney disease; Stg 3  7. Moderate to severe AoS (Echo @ [facility] 1/18 w/ EF 35-40%, trivial AI, diffuse HK, 38mmHg gradient and 3.6m/s velocity across AoV)  8. Diabetes  9. 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 Orthostatic hypotension  11. Hyperlipidemia  12. Head/neck CA w/ feeding tube  13. Chronic thrombocytopenia (Dr. Kenyon Montanez)     Assessment/Plan:   BNP 5600  TnI neg  sCr 1.5 --> 1.3     Echo today w/ EF 45-50%, mod-sev AoS, apical/inferior HK, mild MR/TR    Brief wave of nausea/sweats this am; resolved.     14. Diuresis per primary; lasix 40mg IV BID noted. Consider oral tomorrow. 15. Continue ASA 325mg  16. Continue plavix 75mg  17. Continue midodrine 10mg TID  18. Continue atorvastatin 40mg  19. He was scheduled to have an echo in my office in 4 weeks to evaluate the severity of his AoS  20. No new recommendations today         []       High complexity decision making was performed in this patient at high risk for decompensation with multiple organ involvement. Subjective:     Francia Barroso denies chest pain, dyspnea. Dyspnea is improving. Discussed with RN events overnight.      Review of Systems:    Symptom Y/N Comments  Symptom Y/N Comments   Fever/Chills N   Chest Pain N    Poor Appetite N   Edema N    Cough N   Abdominal Pain N    Sputum N   Joint Pain N    SOB/KRAMER N   Pruritis/Rash N    Nausea/vomit N   Tolerating PT/OT Y    Diarrhea N   Tolerating Diet Y Constipation N   Other       Could NOT obtain due to:      Objective:      Physical Exam:    Last 24hrs VS reviewed since prior progress note. Most recent are:    Visit Vitals    /76 (BP 1 Location: Left arm, BP Patient Position: At rest)    Pulse 73    Temp 97.9 °F (36.6 °C)    Resp 18    Ht 5' 7\" (1.702 m)    Wt 75.6 kg (166 lb 10.7 oz)    SpO2 91%    BMI 26.1 kg/m2       Intake/Output Summary (Last 24 hours) at 05/09/18 0750  Last data filed at 05/08/18 1757   Gross per 24 hour   Intake              405 ml   Output              750 ml   Net             -345 ml        General Appearance: Well developed, well nourished, alert & oriented x 3,    no acute distress. Ears/Nose/Mouth/Throat: Hearing grossly normal.  Neck: Supple. Chest: Lungs clear to auscultation bilaterally. Cardiovascular: Regular rate and rhythm, S1S2 normal, harsh late peaking TARA  Abdomen: Soft, non-tender, bowel sounds are active. Extremities: Trivial edema bilaterally. Skin: Warm and dry. []         Post-cath site without hematoma, bruit, tenderness, or thrill. Distal pulses intact.     PMH/SH reviewed - no change compared to H&P    Data Review    Telemetry: paced    EKG:   [x]  No new EKG for review    Lab Data Personally Reviewed:    Recent Labs      05/08/18   0253  05/07/18   1306   WBC  5.6  4.8   HGB  11.6*  12.3   HCT  36.0*  39.4   PLT  81*  86*     Recent Labs      05/07/18   1412   INR  1.0   PTP  10.1   APTT  22.3      Recent Labs      05/09/18   0350  05/08/18   0253  05/07/18   1306   NA  136  137  136   K  4.2  4.1  4.7   CL  95*  97  97   CO2  36*  35*  33*   BUN  26*  28*  26*   CREA  1.29  1.30  1.39*   GLU  320*  209*  178*   CA  8.7  8.8  9.3   MG  2.5*   --   2.7*     Recent Labs      05/07/18   1306   CPK  213   CKNDX  2.1   TROIQ  <0.04     Lab Results   Component Value Date/Time    Cholesterol, total 129 02/02/2017 02:55 AM    HDL Cholesterol 36 02/02/2017 02:55 AM    LDL, calculated 67.6 02/02/2017 02:55 AM    Triglyceride 127 02/02/2017 02:55 AM    CHOL/HDL Ratio 3.6 02/02/2017 02:55 AM       Recent Labs      05/07/18   1306   SGOT  72*   AP  70   TP  7.7   ALB  3.1*   GLOB  4.6*     No results for input(s): PH, PCO2, PO2 in the last 72 hours.     Medications Personally Reviewed:    Current Facility-Administered Medications   Medication Dose Route Frequency    albuterol-ipratropium (DUO-NEB) 2.5 MG-0.5 MG/3 ML  3 mL Nebulization Q6H RT    famotidine (PEPCID) tablet 20 mg  20 mg Oral ACB    sodium chloride (NS) flush 5-10 mL  5-10 mL IntraVENous Q8H    sodium chloride (NS) flush 5-10 mL  5-10 mL IntraVENous PRN    furosemide (LASIX) injection 40 mg  40 mg IntraVENous Q12H    albuterol-ipratropium (DUO-NEB) 2.5 MG-0.5 MG/3 ML  3 mL Nebulization Q6H PRN    aspirin (ASPIRIN) tablet 325 mg  325 mg Oral DAILY    atorvastatin (LIPITOR) tablet 40 mg  40 mg Oral DAILY    clopidogrel (PLAVIX) tablet 75 mg  75 mg Oral DAILY    cyanocobalamin (VITAMIN B12) tablet 1,000 mcg  1,000 mcg Oral DAILY    gabapentin (NEURONTIN) 250 mg/5 mL solution 750 mg  750 mg Oral TID    insulin glargine (LANTUS) injection 14 Units  14 Units SubCUTAneous DAILY    midodrine (PROAMITINE) tablet 10 mg  10 mg Oral TID WITH MEALS    insulin lispro (HUMALOG) injection   SubCUTAneous AC&HS    glucose chewable tablet 16 g  4 Tab Oral PRN    dextrose (D50W) injection syrg 12.5-25 g  12.5-25 g IntraVENous PRN    glucagon (GLUCAGEN) injection 1 mg  1 mg IntraMUSCular PRN    primidone (MYSOLINE) tablet 25 mg  25 mg Oral QHS    HYDROcodone-acetaminophen (NORCO) 5-325 mg per tablet 1 Tab  1 Tab Oral Q6H PRN    polyethylene glycol (MIRALAX) packet 17 g  17 g Oral DAILY         Kirsten Mcmillan III, DO

## 2018-05-09 NOTE — PROGRESS NOTES
Occupational Therapy EVALUATION/discharge  Patient: Cuong Solis (36 y.o. male)  Date: 5/9/2018  Primary Diagnosis: Respiratory failure ([address]        Precautions:   Fall    ASSESSMENT:   Based on the objective data described below, the patient presents with mildly decreased balance, decreased activity tolerance and decreased safety awareness, which presently and at baseline, impacts his independence with showers and ambulation. Patient's wife present to clarify patient's baseline. Per patient's wife the patient's balance tends to fluctuate, sometimes ambulating without an AD and able to perform ADLs at an independent to supervision/set up level on \"good days\". When patient is having a \"bad day\", he is supervision to min A for ADLs and is CGA to ambulate with a rollator. Patient's wife reports patient has been at the above level of function for more than a year now. The patient has had a slight decline in his endurance, but is also minimally active, stating that he takes frequent walks during the day, while his wife reports that he is not walking as much as he claims. At this point further skilled acute occupational therapy is not indicated at this time as patient is at his baseline. The patient would benefit from HHPT, but has been minimally compliant per his wife's report. Discharge Recommendations: Home Health PT, but patient's wife reports patient is minimally compliant. Further Equipment Recommendations for Discharge: recommended new shower seat and grab bar for shower.            OBJECTIVE DATA SUMMARY:   HISTORY:   Past Medical History:   Diagnosis Date    Antiplatelet or antithrombotic long-term use 12/4/2014    Anxiety disorder     Arrhythmia 2009    bradycardia    Arthritis     CAD (coronary artery disease)     s/p CABG 2002; Dr Jane [facility]) 1996    tongue/throat cancer s/p surgery / radiation and 1 dose of chemo    Carotid artery stenosis     s/p bilateral stents    Chronic pain left leg, lower back,     Depression     Diabetes ([facility] [address]     Type II    Esophageal dysmotility     s/p dilitation    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 I suspect the latter. Achalasia  is not present.         GERD (gastroesophageal reflux disease)     Heart failure ([facility] [address] 10/2014     Cardiomyopathy:Pacemaker upgrade:Biv and AICD  Dr. Monica Mills heart  last visit 5/11/2015    Hepatitis C Dx 1996    treated at [facility] in past; as of 4/15/15 wife states pt currently not under any treatment    Hyperlipidemia     Hypertension     Myocardial infarct [facility]) 2013    Heart Cath: 40% LV EF, Stented distal LAD, patent Graft to circumflex    On tube feeding diet approx 2009    still has as of 9/28/15  (no po food/liquid/meds at all); Dr Kaden Morales Other ill-defined conditions(799.89) 1996    1 dose of chemotherapy/radiation for tongue cancer    Other ill-defined conditions(799.89)     BPH    Other ill-defined conditions(799.89)     orthostatic hypotension    Pneumonia ~ April -May 2010    Stroke [facility]) approx 2003    left side-left finger tips numb; no imbalance or memory loss; as of 2015 not seeing neuro MD    Suicidal thoughts      Past Surgical History:   Procedure Laterality Date    ABDOMEN SURGERY [address]    peg tube    CABG, ARTERY-VEIN, THREE  2000    HX CATARACT REMOVAL      bilateral    HX CHOLECYSTECTOMY      HX HEART CATHETERIZATION  2013    Stented distal LAD    HX MOHS PROCEDURES      bilateral    HX ORTHOPAEDIC      back surgery times two    HX OTHER SURGICAL      Radical Left Neck    HX OTHER SURGICAL      NASAL POLYPS REMOVAL    HX OTHER SURGICAL  2010    TURP    HX PACEMAKER  11/08    HX PACEMAKER  10/28/14     Defibrillator: [facility] # [id number], serial # [id number]; Dr. Fred Easley [phone]; Dr Pita Ho      cystoscopy    NEUROLOGICAL PROCEDURE UNLISTED  1996    cevical surgery    ME CHANGE GASTROSTOMY TUBE  5/2/2011         ME CHANGE GASTROSTOMY TUBE  6/29/2011         ME EGD INSERT GUIDE WIRE DILATOR PASSAGE ESOPHAGUS  9/13/2010         ME EGD TRANSORAL BIOPSY SINGLE/MULTIPLE  9/13/2010         STOMACH SURGERY PROCEDURE UNLISTED  6/29/2011         VASCULAR SURGERY PROCEDURE UNLIST      bilateral carotid stents       Prior Level of Function/Environment/Context: See assessment  Occupations in which the patient is/was successful, what are the barriers preventing that success:   Performance Patterns (routines, roles, habits, and rituals):   Personal Interests and/or values:   Expanded or extensive additional review of patient history:     Home Situation  Home Environment: Private residence  # Steps to Enter: 6  Rails to Enter: Yes  Hand Rails : Bilateral (too wide)  Wheelchair Ramp: No  One/Two Story Residence: One story  Living Alone: No  Support Systems: Spouse/Significant Other/Partner  Patient Expects to be Discharged to[de-identified] Private residence  Current DME Used/Available at Home: [address], Justin Grant, [address], [employer], [address] [facility] [facility] [employer] chair  Tub or Shower Type: Shower  [x]  Right hand dominant   []  Left hand dominant    EXAMINATION OF PERFORMANCE DEFICITS:  Cognitive/Behavioral Status:  Neurologic State: Alert  Orientation Level: Oriented to person;Oriented to place  Cognition: Follows commands        Safety/Judgement: Decreased awareness of need for safety      Hearing: Auditory  Auditory Impairment: None    Vision/Perceptual:      Acuity: Within Defined Limits         Range of Motion:  AROM: Within functional limits  PROM: Generally decreased, functional                      Strength:  Strength: Within functional limits                Coordination:  Coordination: Within functional limits  Fine Motor Skills-Upper: Left Intact; Right Intact (but reports occasional tremors at baseline. )    Gross Motor Skills-Upper: Left Intact; Right Intact (but reports occasional tremors at baseline. )    Tone & Sensation:  Tone: Normal  Sensation: Impaired (Neuropathy in feet)                      Balance:  Sitting: Intact  Standing: Impaired  Standing - Static: Good  Standing - Dynamic : Good    Functional Mobility and Transfers for ADLs:  Bed Mobility:  Rolling: Modified independent  Supine to Sit: Modified independent  Sit to Supine: Modified independent  Scooting: Independent    Transfers:  Sit to Stand: Independent  Stand to Sit: Independent  Bed to Chair: Supervision  Toilet Transfer : Independent    ADL Assessment:  Feeding:  (Has feeding tube)    Oral Facial Hygiene/Grooming: Independent (standing at sink)    Bathing: Supervision;Setup (requiring up to min A at baseline)    Upper Body Dressing: Independent    Lower Body Dressing: Independent    Toileting: Independent                Recommended use of shower seat for energy conservation. Functional Measure:  Barthel Index:    Bathin  Bladder: 10  Bowels: 10  Groomin  Dressing: 10  Feedin  Mobility: 10  Stairs: 0  Toilet Use: 10  Transfer (Bed to Chair and Back): 15  Total: 75       Barthel and G-code impairment scale:  Percentage of impairment CH  0% CI  1-19% CJ  20-39% CK  40-59% CL  60-79% CM  80-99% CN  100%   Barthel Score 0-100 100 99-80 79-60 59-40 20-39 1-19   0   Barthel Score 0-20 20 17-19 13-16 9-12 5-8 1-4 0      The Barthel ADL Index: Guidelines  1. The index should be used as a record of what a patient does, not as a record of what a patient could do. 2. The main aim is to establish degree of independence from any help, physical or verbal, however minor and for whatever reason. 3. The need for supervision renders the patient not independent. 4. A patient's performance should be established using the best available evidence. Asking the patient, friends/relatives and nurses are the usual sources, but direct observation and common sense are also important.  However direct testing is not needed. 5. Usually the patient's performance over the preceding 24-48 hours is important, but occasionally longer periods will be relevant. 6. Middle categories imply that the patient supplies over 50 per cent of the effort. 7. Use of aids to be independent is allowed. Ameena Romeo., Barthel, D.W. (1931). Functional evaluation: the Barthel Index. [address] [facility] (14)2. NOEL Saleh, Tacos Tilley., Lio Monae., Ck, [address] [place] [address] (1999). Measuring the change indisability after inpatient rehabilitation; comparison of the responsiveness of the Barthel Index and Functional [place] Measure. Journal of Neurology, Neurosurgery, and Psychiatry, 66(4), 787-454. KVNG Ruiz, QIANA Gifford, & Antonio Trinidad MJUANITA. (2004.) Assessment of post-stroke quality of life in cost-effectiveness studies: The usefulness of the Barthel Index and the EuroQoL-5D. Quality of Life Research, 13, 000-74         G codes: In compliance with CMSs Claims Based Outcome Reporting, the following G-code set was chosen for this patient based on their primary functional limitation being treated: The outcome measure chosen to determine the severity of the functional limitation was the Barthel index with a score of 75/100 which was correlated with the impairment scale. ?  Self Care:     - CURRENT STATUS: CJ - 20%-39% impaired, limited or restricted    - GOAL STATUS: CJ - 20%-39% impaired, limited or restricted    - D/C STATUS:  CJ - 20%-39% impaired, limited or restricted       Pain:  Pain Scale 1: Numeric (0 - 10)  Pain Intensity 1: 5  Pain Location 1: Shoulder;Back  Pain Orientation 1: Left;Right          After treatment:   [x]  Patient left in no apparent distress sitting up in chair  []  Patient left in no apparent distress in bed  [x]  Call bell left within reach  [x]  Nursing notified  []  Caregiver present  []  Bed alarm activated     COMMUNICATION/EDUCATION: Communication/Collaboration:  [x]      Home safety education was provided and the patient/caregiver indicated understanding. [x]      Patient/family have participated as able and agree with findings and recommendations. []      Patient is unable to participate in plan of care at this time.   Findings and recommendations were discussed with: Physical Therapist    ISAAC Doherty/L  Time Calculation: 28 mins

## 2018-05-09 NOTE — PROGRESS NOTES
CM met with pt and pt's wife and discussed pt's discharge plan of home health or SNF. PT was recommending SNF at discharge and CM noted consult for home health. Pt had home health before with [facility]  and they would like them again at discharge if [facility] is indicated. FOC form completed by verbal consent from pt's wife. CM will send referral to [facility] via cc. Pt's wife was interested in pt going to a SNF too. Pt's wife had some questions about their programs. Provided pt's wife with a list of SNF's and encouraged her to call the facilities to ask about their programs. Pt had gone to [facility] last year after a stroke and didn't have a good stay. CM will continue to follow pt for discharge planning needs.      Vinicius Alberto, [id number]

## 2018-05-09 NOTE — PROGRESS NOTES
BS-390s. Taken again immediately and it was 382. Called MD. MD gave verbal order for 12 units of Humalog insulin to be given to patient.

## 2018-05-09 NOTE — DIABETES MGMT
DTC Progress Note    Recommendations/ Comments: Pt with hyperglycemia BG in 200s today. Pt with TF. If appropriate, please consider:  1. Adding meal time with boluses - 3 units timed with bolus feeds    Current hospital DM medication: Lantus 14 units, lispro correction - normal sensitivity     Chart reviewed on Ada Black. Patient is a 80 y.o. male with known DM on lispro 13 units with breakfast, 10 units with lunch, 7 units with dinner + correction insulin, up to 40 units/day at home. A1c:   Lab Results   Component Value Date/Time    Hemoglobin A1c 6.5 (H) 02/02/2017 02:55 AM    Hemoglobin A1c 6.4 (H) 11/30/2016 02:11 AM       Recent Glucose Results:   Lab Results   Component Value Date/Time     (H) 05/09/2018 03:50 AM    GLUCPOC 282 (H) 05/09/2018 11:28 AM    GLUCPOC 249 (H) 05/09/2018 10:51 AM    GLUCPOC 182 (H) 05/09/2018 08:49 AM        Lab Results   Component Value Date/Time    Creatinine 1.29 05/09/2018 03:50 AM     Estimated Creatinine Clearance: 40.6 mL/min (based on Cr of 1.29). Active Orders   Diet    DIET NPO With Tube Feedings        PO intake: No data found. Will continue to follow as needed.     Thank you    Office: [phone]

## 2018-05-09 NOTE — PROGRESS NOTES
* No surgery found *  * No surgery found *  Bedside shift change report given to Kalyn Casper RN (oncoming nurse) by Arielle Rios RN (offgoing nurse). Report included the following information SBAR, Kardex, Intake/Output, MAR, Accordion and Recent Results.     Zone Phone:   [phone]      Significant changes during shift:  Up with PT/OT        Patient Information    Ike Joseph  80 y.o.  5/7/2018 12:09 PM by Priscilla Wynn MD. 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    Wrist pain, acute, right 05/07/2018    Respiratory failure ([address] 05/07/2018    Thrombotic stroke involving left middle cerebral artery ([address] 02/02/2017    Stenosis of both internal carotid arteries 02/02/2017    Hemiplegia and hemiparesis following cerebral infarction affecting right dominant side ([address] 02/02/2017    Weakness 02/01/2017    Stroke ([address] 02/01/2017    Aspiration pneumonia ([address] 11/28/2016    History of stroke 07/30/2016     Class: Present on Admission    Polyneuropathy associated with underlying disease ([address] 02/11/2016    Peripheral neuropathy ([address] 01/09/2016    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 ([address] 01/08/2016    Percutaneous endoscopic gastrostomy status ([address] 12/04/2014    Antiplatelet or antithrombotic long-term use 12/04/2014    Heart failure ([address] 09/11/2014    Esophageal motility disorder 07/02/2013    CAD (coronary artery disease) 02/12/2013    Status post implantation of automatic cardioverter/defibrillator (AICD) 02/12/2013     Class: Present on Admission    Aortic stenosis, mild 02/12/2013    S/P PTCA (percutaneous transluminal coronary angioplasty) 01/18/2013  Non-cardiac chest pain 01/04/2013    Feeding difficulties 05/02/2011    Abnormal cardiovascular stress test 06/24/2010    S/P CABG x 3 06/24/2010    Atherosclerotic cerebrovascular disease 06/24/2010    Orthostatic hypotension 06/24/2010    Dysphagia 05/03/2010     Past Medical History:   Diagnosis Date    Antiplatelet or antithrombotic long-term use 12/4/2014    Anxiety disorder     Arrhythmia 2009    bradycardia    Arthritis     CAD (coronary artery disease)     s/p CABG 2002; Dr Reyna Haddad [facility]) 1996    tongue/throat cancer s/p surgery / radiation and 1 dose of chemo    Carotid artery stenosis     s/p bilateral stents    Chronic pain     left leg, lower back,     Depression     Diabetes ([address]     Type II    Esophageal dysmotility     s/p dilitation    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 I suspect the latter. Achalasia  is not present.         GERD (gastroesophageal reflux disease)     Heart failure ([address] 10/2014     Cardiomyopathy:Pacemaker upgrade:Biv and AICD  Dr. Karan Orozco heart Dr. last visit 5/11/2015    Hepatitis C Dx 1996    treated at [facility] in past; as of 4/15/15 wife states pt currently not under any treatment    Hyperlipidemia     Hypertension     Myocardial infarct [facility]) 2013    Heart Cath: 40% LV EF, Stented distal LAD, patent Graft to circumflex    On tube feeding diet approx 2009    still has as of 9/28/15  (no po food/liquid/meds at all); Dr Sergio Hutson Other ill-defined conditions(799.89) 1996    1 dose of chemotherapy/radiation for tongue cancer    Other ill-defined conditions(799.89)     BPH    Other ill-defined conditions(799.89)     orthostatic hypotension    Pneumonia ~ April -May 2010    Stroke [facility]) approx 2003    left side-left finger tips numb; no imbalance or memory loss; as of 2015 not seeing neuro MD    Suicidal thoughts Core Measures:    CVA: No No  CHF:No No  PNA:No No    Post Op Surgical (If Applicable):     Number times ambulated in hallway past shift:  1  Number of times OOB to chair past shift:   2  NG Tube: No  Incentive Spirometer: No  Drains: Yes   PEG  Dressing Present:  No  Flatus:  No    Activity Status:    OOB to Chair Yes  Ambulated this shift Yes   Bed Rest No        DVT prophylaxis:    DVT prophylaxis Med- No  DVT prophylaxis SCD or CORTES- Yes     Wounds: (If Applicable)    Wounds- No        Patient Safety:    Falls Score Total Score: 3  Safety Level_______  Bed Alarm On? Yes  Sitter?  No    Plan for upcoming shift: Monitor, Tube Feeding      Discharge Plan: TBD, potentially tomorrow    Active Consults:  IP CONSULT TO CARDIOLOGY  IP CONSULT TO CARDIOLOGY  IP CONSULT TO HOSPITALIST  IP CONSULT TO PALLIATIVE CARE - PROVIDER

## 2018-05-09 NOTE — CARDIO/PULMONARY
Cardiac Rehab:      Chart reviewed. Pt is on the CHF bundle list.     Adm with Acute on Chronic Systolic HF. HX includes Cabg,Cardiac stenting,ICD,Diabetes,CHD stage 3,CVA     Pt visited, laying in bed, wife and granddaughter at the bedside. Heart Failure education folder, with Low Sodium brochure, given to [facility]     LVEF 45-50%. Nonsmoker.       DIET NPO With Tube Feedings     Met with pt sitting up in chair-reminded to elevate legs if sitting for long periods of time. This was a follow-up visit to answer questions and reinforce prior teaching re: CHF, S&Ss, medication management,  daily weights, when to call the doctor and balancing rest/activity. Reminded to have caregiver check sodium content in tube feedings to make sure it is within allowable sodium intake. States he does have a scale at home and lives with wife. All questions answered. Understanding verbalized.

## 2018-05-09 NOTE — ROUTINE PROCESS
* No surgery found *  * No surgery found *  Bedside and Verbal shift change report given to Padma/ Jackie Norris RN  (oncoming nurse) by Gaby Garcia RN (offgoing nurse). Report included the following information SBAR, Kardex, MAR and Recent Results.     Zone Phone:   [phone]      Significant changes during shift:  NONE        Patient Information    Rosalinda Martin [id number]  80 y.o.  5/7/2018 12:09 PM by Clarissa Mcclain MD. Mustapha Rowell was admitted from Home    Problem List    Patient Active Problem List    Diagnosis Date Noted    Type 2 diabetes with nephropathy ([address] 05/07/2018    Diabetic peripheral neuropathy associated with type 2 diabetes mellitus ([address] 05/07/2018    Benign essential tremor syndrome 05/07/2018    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    Wrist pain, acute, right 05/07/2018    Respiratory failure ([address] 05/07/2018    Thrombotic stroke involving left middle cerebral artery ([address] 02/02/2017    Stenosis of both internal carotid arteries 02/02/2017    Hemiplegia and hemiparesis following cerebral infarction affecting right dominant side ([address] 02/02/2017    Weakness 02/01/2017    Stroke ([address] 02/01/2017    Aspiration pneumonia ([address] 11/28/2016    History of stroke 07/30/2016     Class: Present on Admission    Polyneuropathy associated with underlying disease ([address] 02/11/2016    Peripheral neuropathy ([address] 01/09/2016    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 ([address] 01/08/2016    Percutaneous endoscopic gastrostomy status ([address] 12/04/2014    Antiplatelet or antithrombotic long-term use 12/04/2014    Heart failure ([address] 09/11/2014    Esophageal motility disorder 07/02/2013    CAD (coronary artery disease) 02/12/2013    Status post implantation of automatic cardioverter/defibrillator (AICD) 02/12/2013     Class: Present on Admission    Aortic stenosis, mild 02/12/2013    S/P PTCA (percutaneous transluminal coronary angioplasty) 01/18/2013    Non-cardiac chest pain 01/04/2013    Feeding difficulties 05/02/2011    Abnormal cardiovascular stress test 06/24/2010    S/P CABG x 3 06/24/2010    Atherosclerotic cerebrovascular disease 06/24/2010    Orthostatic hypotension 06/24/2010    Dysphagia 05/03/2010     Past Medical History:   Diagnosis Date    Antiplatelet or antithrombotic long-term use 12/4/2014    Anxiety disorder     Arrhythmia 2009    bradycardia    Arthritis     CAD (coronary artery disease)     s/p CABG 2002; Dr Chandrika Guillen [facility]) 1996    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 I suspect the latter. Achalasia  is not present.         GERD (gastroesophageal reflux disease)     Heart failure ([address] 10/2014     Cardiomyopathy:Pacemaker upgrade:Biv and AICD  Dr. Cesar Stokes heart Dr. last visit 5/11/2015    Hepatitis C Dx 1996    treated at [facility] in past; as of 4/15/15 wife states pt currently not under any treatment    Hyperlipidemia     Hypertension     Myocardial infarct [facility]) 2013    Heart Cath: 40% LV EF, Stented distal LAD, patent Graft to circumflex    On tube feeding diet approx 2009    still has as of 9/28/15  (no po food/liquid/meds at all); Dr Jennifer Cox Other ill-defined conditions(799.89) 1996    1 dose of chemotherapy/radiation for tongue cancer    Other ill-defined conditions(799.89)     BPH    Other ill-defined conditions(799.89)     orthostatic hypotension    Pneumonia ~ April -May 2010    Stroke [facility]) approx 2003    left side-left finger tips numb; no imbalance or memory loss; as of 2015 not seeing neuro MD    Suicidal thoughts          Core Measures:    CVA: No No  CHF:Yes Yes  PNA:No No        Activity Status:    OOB to Chair No  Ambulated this shift Yes   Bed Rest No    Supplemental O2: (If Applicable)    NC Yes  NRB No  Venti-mask No  On 3 Liters/min          DVT prophylaxis:    DVT prophylaxis Med- No  DVT prophylaxis SCD or CORTES- No     Wounds: (If Applicable)    Wounds- No        Patient Safety:    Falls Score Total Score: 3  Safety Level_______  Bed Alarm On? Yes  Sitter?  No    Plan for upcoming shift: Daily weight strict intake and output        Discharge Plan: Yes     Active Consults:  IP CONSULT TO CARDIOLOGY  IP CONSULT TO CARDIOLOGY  IP CONSULT TO HOSPITALIST

## 2018-05-09 NOTE — PROGRESS NOTES
Problem: Mobility Impaired (Adult and Pediatric)  Goal: *Acute Goals and Plan of Care (Insert Text)  Physical Therapy Goals  Initiated 5/9/2018  1. Patient will move from supine to sit and sit to supine , scoot up and down and roll side to side in bed with independence within 7 day(s). 2.  Patient will transfer from bed to chair and chair to bed with independence using the least restrictive device within 7 day(s). 3.  Patient will perform sit to stand with independence within 7 day(s). 4.  Patient will ambulate with supervision/set-up for 200 feet with the least restrictive device within 7 day(s). 5.  Patient will ascend/descend 6 stairs with 1 handrail(s) with supervision/set-up within 7 day(s). physical Therapy EVALUATION  Patient: Kenroy Laws (55 y.o. male)  Date: 5/9/2018  Primary Diagnosis: Respiratory failure ([facility]        Precautions:  Fall    ASSESSMENT :  Based on the objective data described below, the patient presents with impaired functional mobility secondary to decreased ROM, generalized weakness, impaired standing balance, impaired gait mechanics, limited endurance, increased pain, and decreased activity tolerance following admission for respiratory failure. Pt received sitting in bedside chair and agreeable to PT evaluation. Pt cleared by nursing for mobility. Pt reporting constant dizziness and chronic pain at rest prior to mobility with VSS as noted in doc flowsheets (8480-5087). He transferred sit<>stand x 2 from bedside chair, needing min A for initial stand and CGA for second trial. Pt with fair static standing balance while standing x 1 minute, noting anterior-posterior sway. Provided pt with RW for gait training as pt reported being very unsteady. He ambulated 65 ft with min/CGA x 1, demonstrating slow gait speed, increased trunk flexion, and shuffled gait throughout. He needed one brief standing rest break during ambulation due to increased fatigue.  He was assisted into bed with SBA following therapy session and left with all needs met and RN present. Recommend pt discharge to SNF rehab to improve functional mobility and independence as pt with decline from his baseline level of independence and is a fall risk. PT will continue to follow to address mobility impairments as noted above. Will attempt 6MWT during next therapy session as appropriate. Patient will benefit from skilled intervention to address the above impairments. Patients rehabilitation potential is considered to be Good  Factors which may influence rehabilitation potential include:   []         None noted  []         Mental ability/status  [x]         Medical condition  []         Home/family situation and support systems  []         Safety awareness  []         Pain tolerance/management  []         Other:      PLAN :  Recommendations and Planned Interventions:  [x]           Bed Mobility Training             []    Neuromuscular Re-Education  [x]           Transfer Training                   []    Orthotic/Prosthetic Training  [x]           Gait Training                         []    Modalities  [x]           Therapeutic Exercises           []    Edema Management/Control  [x]           Therapeutic Activities            [x]    Patient and Family Training/Education  []           Other (comment):    Frequency/Duration: Patient will be followed by physical therapy  5 times a week to address goals. Discharge Recommendations: Chele Frazier  Further Equipment Recommendations for Discharge: TBD by rehab     SUBJECTIVE:   Patient stated I've dizzy for a while.     OBJECTIVE DATA SUMMARY:   HISTORY:    Past Medical History:   Diagnosis Date    Antiplatelet or antithrombotic long-term use 12/4/2014    Anxiety disorder     Arrhythmia 2009    bradycardia    Arthritis     CAD (coronary artery disease)     s/p CABG 2002; Dr Vini Rowland [facility]) 1996    tongue/throat cancer s/p surgery / radiation and 1 dose of chemo  Carotid artery stenosis     s/p bilateral stents    Chronic pain     left leg, lower back,     Depression     Diabetes (HCC)     Type II    Esophageal dysmotility     s/p dilitation    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 I suspect the latter. Achalasia  is not present.         GERD (gastroesophageal reflux disease)     Heart failure ([address] 10/2014     Cardiomyopathy:Pacemaker upgrade:Biv and AICD  Dr. Miller Kelly heart Dr. last visit 5/11/2015    Hepatitis C Dx 1996    treated at [facility] in past; as of 4/15/15 wife states pt currently not under any treatment    Hyperlipidemia     Hypertension     Myocardial infarct [facility]) 2013    Heart Cath: 40% LV EF, Stented distal LAD, patent Graft to circumflex    On tube feeding diet approx 2009    still has as of 9/28/15  (no po food/liquid/meds at all); Dr Jacqui Red Other ill-defined conditions(799.89) 1996    1 dose of chemotherapy/radiation for tongue cancer    Other ill-defined conditions(799.89)     BPH    Other ill-defined conditions(799.89)     orthostatic hypotension    Pneumonia ~ April -May 2010    Stroke [facility]) approx 2003    left side-left finger tips numb; no imbalance or memory loss; as of 2015 not seeing neuro MD    Suicidal thoughts      Past Surgical History:   Procedure Laterality Date    ABDOMEN SURGERY [address]    peg tube    CABG, ARTERY-VEIN, THREE  2000    HX CATARACT REMOVAL      bilateral    HX CHOLECYSTECTOMY      HX HEART CATHETERIZATION  2013    Stented distal LAD    HX MOHS PROCEDURES      bilateral    HX ORTHOPAEDIC      back surgery times two    HX OTHER SURGICAL      Radical Left Neck    HX OTHER SURGICAL      NASAL POLYPS REMOVAL    HX OTHER SURGICAL  2010    TURP    HX PACEMAKER  11/08    HX PACEMAKER  10/28/14     Defibrillator: [facility] # [id number], serial # [id number]; Dr. Zoie Keys [phone]; Dr Antonio Jade      cystoscopy   [address]    cevical surgery    ND CHANGE GASTROSTOMY TUBE  5/2/2011         ND CHANGE GASTROSTOMY TUBE  6/29/2011         ND EGD INSERT GUIDE WIRE DILATOR PASSAGE ESOPHAGUS  9/13/2010         ND EGD TRANSORAL BIOPSY SINGLE/MULTIPLE  9/13/2010         STOMACH SURGERY PROCEDURE UNLISTED  6/29/2011         VASCULAR SURGERY PROCEDURE UNLIST      bilateral carotid stents     Prior Level of Function/Home Situation: Pt is independent for mobility and ADLs at baseline. Lives with wife who assists patient as needed. Pt is alone at times. Hasn't been driving lately. Denies fall history. Personal factors and/or comorbidities impacting plan of care: h/o CVA, CAD, HF, chronic pain    Home Situation  Home Environment: Private residence  # Steps to Enter: 6  Rails to Enter: Yes  Hand Rails : Bilateral (too wide)  Wheelchair Ramp: No  One/Two Story Residence: One story  Living Alone: No  Support Systems: Spouse/Significant Other/Partner  Patient Expects to be Discharged to[de-identified] Private residence  Current DME Used/Available at Home: Gissell An, rolling, Gissell An, rollator, [address] chair  Tub or Shower Type: Shower    EXAMINATION/PRESENTATION/DECISION MAKING:   Critical Behavior:  Neurologic State: Alert     Cognition: Appropriate decision making, Appropriate for age attention/concentration, Appropriate safety awareness     Hearing:   Auditory  Auditory Impairment: None  Skin:  Intact  Edema: None  Range Of Motion:  AROM: Generally decreased, functional           PROM: Generally decreased, functional           Strength:    Strength: Generally decreased, functional                    Tone & Sensation:   Tone: Normal              Sensation: Intact               Coordination:  Coordination: Within functional limits  Vision:      Functional Mobility:  Bed Mobility:        Sit to Supine: Stand-by assistance  Scooting: Stand-by assistance  Transfers:  Sit to Stand: Minimum assistance;Contact guard assistance;Assist x1;Additional time  Stand to Sit: Contact guard assistance; Additional time                       Balance:   Sitting: Intact  Standing: Impaired; With support  Standing - Static: Good;Constant support  Standing - Dynamic : Fair (with RW support)  Ambulation/Gait Training:  Distance (ft): 65 Feet (ft)  Assistive Device: Gait belt;Walker, rolling  Ambulation - Level of Assistance: Contact guard assistance;Minimal assistance;Assist x1;Additional time; Adaptive equipment     Gait Description (WDL): Exceptions to WDL  Gait Abnormalities: Shuffling gait; Decreased step clearance        Base of Support: Narrowed     Speed/Gertrudis: Slow;Shuffled  Step Length: Left shortened;Right shortened      Functional Measure:  Barthel Index:    Bathin  Bladder: 10  Bowels: 10  Groomin  Dressin  Feedin  Mobility: 5  Stairs: 0  Toilet Use: 5  Transfer (Bed to Chair and Back): 10  Total: 50       Barthel and G-code impairment scale:  Percentage of impairment CH  0% CI  1-19% CJ  20-39% CK  40-59% CL  60-79% CM  80-99% CN  100%   Barthel Score 0-100 100 99-80 79-60 59-40 20-39 1-19   0   Barthel Score 0-20 20 17-19 13-16 9-12 5-8 1-4 0      The Barthel ADL Index: Guidelines  1. The index should be used as a record of what a patient does, not as a record of what a patient could do. 2. The main aim is to establish degree of independence from any help, physical or verbal, however minor and for whatever reason. 3. The need for supervision renders the patient not independent. 4. A patient's performance should be established using the best available evidence. Asking the patient, friends/relatives and nurses are the usual sources, but direct observation and common sense are also important. However direct testing is not needed.   5. Usually the patient's performance over the preceding 24-48 hours is important, but occasionally longer periods will be relevant. 6. Middle categories imply that the patient supplies over 50 per cent of the effort. 7. Use of aids to be independent is allowed. Ameena Romeo., Barthel, DANIEL. (7240). Functional evaluation: the Barthel Index. [facility] (14)2. NOEL Saleh, Tacos Tilley., Lio Monae., Finn Virgen, [facility] (1999). Measuring the change indisability after inpatient rehabilitation; comparison of the responsiveness of the Barthel Index and Functional [place] Measure. Journal of Neurology, Neurosurgery, and Psychiatry, 66(4), 674-946. Yandel Alexis, N.J.A, QIANA Gifford, & Antonio Trinidad MJUANITA. (2004.) Assessment of post-stroke quality of life in cost-effectiveness studies: The usefulness of the Barthel Index and the EuroQoL-5D. Quality of Life Research, 13, 420-46       G codes: In compliance with CMSs Claims Based Outcome Reporting, the following G-code set was chosen for this patient based on their primary functional limitation being treated: The outcome measure chosen to determine the severity of the functional limitation was the Barthel Index with a score of 50/100 which was correlated with the impairment scale.     ? Mobility - Walking and Moving Around:     - CURRENT STATUS: CK - 40%-59% impaired, limited or restricted    - GOAL STATUS: CI - 1%-19% impaired, limited or restricted    - D/C STATUS:  ---------------To be determined---------------      Physical Therapy Evaluation Charge Determination   History Examination Presentation Decision-Making   HIGH Complexity :3+ comorbidities / personal factors will impact the outcome/ POC  HIGH Complexity : 4+ Standardized tests and measures addressing body structure, function, activity limitation and / or participation in recreation  MEDIUM Complexity : Evolving with changing characteristics  Other outcome measures Barthel Index  MEDIUM      Based on the above components, the patient evaluation is determined to be of the following complexity level: MEDIUM    Pain:  Pain Scale 1: Numeric (0 - 10)  Pain Intensity 1: 5  Pain Location 1: Shoulder;Back  Pain Orientation 1: Left;Right        Activity Tolerance:   Fair/poor - c/o constant dizziness; VSS on RA; 5/10 pain in MALINDA shoulders and back; limited endurance and increased fatigue with activity  Please refer to the flowsheet for vital signs taken during this treatment. After treatment:   []         Patient left in no apparent distress sitting up in chair  [x]         Patient left in no apparent distress in bed  [x]         Call bell left within reach  [x]         Nursing notified  [x]         Caregiver present  []         Bed alarm activated    COMMUNICATION/EDUCATION:   The patients plan of care was discussed with: Physical Therapist, Registered Nurse and . [x]         Fall prevention education was provided and the patient/caregiver indicated understanding. [x]         Patient/family have participated as able in goal setting and plan of care. [x]         Patient/family agree to work toward stated goals and plan of care. []         Patient understands intent and goals of therapy, but is neutral about his/her participation. []         Patient is unable to participate in goal setting and plan of care.     Thank you for this referral.  Octavio Srinivasan, PT, DPT   Time Calculation: 27 mins

## 2018-05-09 NOTE — PROGRESS NOTES
Hospitalist Progress Note    NAME: Felisa Monday   :  1935   MRN:  [id number]       Assessment / Plan:  Acute hypoxic respiratory failure  Acute on chronic systolic heart failure  CAD s/p AICD  HTN  -pt has new b/l LE edema, KRAMER. He is not on home O2 PTA  -probnp 5098. 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 Cont' TF     DM with neuropathy  -cont lantus 14 units daily, gabapentin  -he uses humalog 14U at breakfast, 10U at lunch, and 7U at dinner.    -will use SSI while here, adjust prn     Hx of CVA  -cont' ASA, plavix, statin         Code Status:  Full, discussed with pt and his wife  Surrogate Decision Maker: pt's wife  DVT Prophylaxis: lovenox  GI Prophylaxis: not indicated     Subjective:     Chief Complaint / Reason for Physician Visit  Pt seen at bedside. Nauseated but no vomiting this am.  No cp/sob/abd pain. No overnight events. Review of Systems:  Symptom Y/N Comments  Symptom Y/N Comments   Fever/Chills n   Chest Pain n    Poor Appetite    Edema     Cough    Abdominal Pain     Sputum    Joint Pain     SOB/KRAMER n   Pruritis/Rash     Nausea/vomit    Tolerating PT/OT     Diarrhea    Tolerating Diet     Constipation    Other       Could NOT obtain due to:      Objective:     VITALS:   Last 24hrs VS reviewed since prior progress note.  Most recent are:  Patient Vitals for the past 24 hrs:   Temp Pulse Resp BP SpO2   18 1115 - 79 - 121/84 94 %   18 1105 - 85 - 99/64 -   18 1100 97.9 °F (36.6 °C) 77 18 106/60 92 %   18 0750 - - - - 91 %   18 0727 97.9 °F (36.6 °C) 73 18 138/76 91 %   18 0345 97.8 °F (36.6 °C) 78 18 131/73 94 %   18 2325 98.2 °F (36.8 °C) 77 18 129/67 93 % 05/08/18 2225 - - - - 96 %   05/08/18 2005 98.2 °F (36.8 °C) 75 18 (!) 159/92 92 %   05/08/18 1609 97.5 °F (36.4 °C) 76 17 (!) 157/97 98 %   05/08/18 1315 - - - - 94 %       Intake/Output Summary (Last 24 hours) at 05/09/18 1250  Last data filed at 05/09/18 0957   Gross per 24 hour   Intake             1205 ml   Output              450 ml   Net              755 ml        PHYSICAL EXAM:  General: WD, WN. Alert, cooperative, no acute distress    EENT:  EOMI. Anicteric sclerae. MMM  Resp:             Decreased breath sounds, basilar rales. No accessory muscle use  CV:  Regular  rhythm,  mild lower leg swelling. GI:  Soft, Non distended, Non tender.  +Bowel sounds  Neurologic:  Alert and oriented X 3, normal speech   Psych:   Good insight. Not anxious nor agitated  Skin:  No rashes. No jaundice    Reviewed most current lab test results and cultures  YES  Reviewed most current radiology test results   YES  Review and summation of old records today    NO  Reviewed patient's current orders and MAR    YES  PMH/ reviewed - no change compared to H&P  ________________________________________________________________________  Care Plan discussed with:    Comments   Patient x    Family      RN x    Care Manager x    Consultant                        Multidiciplinary team rounds were held today with , nursing, pharmacist and clinical coordinator. Patient's plan of care was discussed; medications were reviewed and discharge planning was addressed.      ________________________________________________________________________  Total NON critical care TIME:  30  Minutes    Total CRITICAL CARE TIME Spent:   Minutes non procedure based      Comments   >50% of visit spent in counseling and coordination of care     ________________________________________________________________________  Ailyn [facility] MD     Procedures: see electronic medical records for all procedures/Xrays and details which were not copied into this note but were reviewed prior to creation of Plan. LABS:  I reviewed today's most current labs and imaging studies.   Pertinent labs include:  Recent Labs      05/08/18   0253  05/07/18   1306   WBC  5.6  4.8   HGB  11.6*  12.3   HCT  36.0*  39.4   PLT  81*  86*     Recent Labs      05/09/18   0350  05/08/18   0253  05/07/18   1412  05/07/18   1306   NA  136  137   --   136   K  4.2  4.1   --   4.7   CL  95*  97   --   97   CO2  36*  35*   --   33*   GLU  320*  209*   --   178*   BUN  26*  28*   --   26*   CREA  1.29  1.30   --   1.39*   CA  8.7  8.8   --   9.3   MG  2.5*   --    --   2.7*   ALB   --    --    --   3.1*   TBILI   --    --    --   0.5   SGOT   --    --    --   72*   ALT   --    --    --   51   INR   --    --   1.0   --        Signed: Venu Ly MD

## 2018-05-10 NOTE — PROGRESS NOTES
ADULT PROTOCOL: JET AEROSOL ASSESSMENT    Patient  Inga Garcia     80 y.o.   male     5/10/2018  0142 AM    Breath Sounds Pre Procedure: Right Breath Sounds: Diminished                               Left Breath Sounds: Diminished    Breath Sounds Post Procedure: Right Breath Sounds: Diminished                                 Left Breath Sounds: Diminished    Breathing pattern: Pre procedure Breathing Pattern: Regular          Post procedure Breathing Pattern: Regular    Heart Rate: Pre procedure Pulse: 77           Post procedure Pulse: 80    Resp Rate: Pre procedure Respirations: 18           Post procedure Respirations: 18    Peak Flow: Pre bronchodilator   n/a          Post bronchodilator   n/    Incentive Spirometry:   n/a      n/a    Cough: Pre procedure Cough: Non-productive               Post procedure Cough: Non-productive    Sputum: Pre procedure  n/a                 Post procedure  n/a    Oxygen: O2 Device: Room air   FiO2 (%) room air     Changed: NO    SpO2: Pre procedure SpO2: 94 %   without oxygen              Post procedure SpO2: 94 %  without oxygen    Nebulizer Therapy: Current medications Aerosolized Medications: DuoNeb      Changed: NO        Problem List:   Patient Active Problem List   Diagnosis Code    Dysphagia R13.10    Abnormal cardiovascular stress test R94.39    S/P CABG x 3 Z95.1    Atherosclerotic cerebrovascular disease I67.2    Orthostatic hypotension I95.1    Feeding difficulties R63.3    Non-cardiac chest pain R07.89    S/P PTCA (percutaneous transluminal coronary angioplasty) Z98.61    CAD (coronary artery disease) I25.10    Status post implantation of automatic cardioverter/defibrillator (AICD) Z95.810    Aortic stenosis, mild I35.0    Esophageal motility disorder K22.4    Heart failure (HCC) I50.9    Percutaneous endoscopic gastrostomy status (HCC) Z93.1    Antiplatelet or antithrombotic long-term use Z79.02    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 ([address] E11.42    Peripheral neuropathy ([address] G62.9    Polyneuropathy associated with underlying disease ([address] G63    History of stroke Z86.73    Aspiration pneumonia ([address] J69.0    Weakness R53.1    Stroke ([address] I63.9    Thrombotic stroke involving left middle cerebral artery (HCC) S69.271    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

## 2018-05-10 NOTE — PROGRESS NOTES
Problem: Mobility Impaired (Adult and Pediatric)  Goal: *Acute Goals and Plan of Care (Insert Text)  Physical Therapy Goals  Initiated 5/9/2018  1. Patient will move from supine to sit and sit to supine , scoot up and down and roll side to side in bed with independence within 7 day(s). 2.  Patient will transfer from bed to chair and chair to bed with independence using the least restrictive device within 7 day(s). 3.  Patient will perform sit to stand with independence within 7 day(s). 4.  Patient will ambulate with supervision/set-up for 200 feet with the least restrictive device within 7 day(s). 5.  Patient will ascend/descend 6 stairs with 1 handrail(s) with supervision/set-up within 7 day(s). physical Therapy TREATMENT  Patient: Mychal Mancini (3 y.o. male)  Date: 5/10/2018  Diagnosis: Respiratory failure ([facility] <principal problem not specified>       Precautions: Fall  Chart, physical therapy assessment, plan of care and goals were reviewed. ASSESSMENT:  Pt received sitting in chair and agreeable to PT intervention. Pt cleared by nursing for mobility. Pt with good progress towards therapy goals this date. He needed CGA for sit<>stand transfers for safety as pt with poor hand placement with use of RW during transfers. He participated in 6MWT as noted below and was able to ambulate 495 ft total with RW support and CGA. Needed additional support during directional changes as pt with accelerated gait during such. He demonstrates slow gait speed, however was able to tolerate gait training x 6 minutes well. VSS at rest on RA and post-activity on RA and no KRAMER or SOB noted with activity. Pt was left sitting in bedside chair with all needs met and RN aware following therapy session. Recommend patient discharge home with HHPT, use of RW, and 24/7 supervision pending progress in acute PT.     Progression toward goals:  [x]    Improving appropriately and progressing toward goals  []    Improving slowly and progressing toward goals  []    Not making progress toward goals and plan of care will be adjusted     PLAN:  Patient continues to benefit from skilled intervention to address the above impairments. Continue treatment per established plan of care. Discharge Recommendations:  Home Health and 24/7 supervision  Further Equipment Recommendations for Discharge:  None - pt owns     SUBJECTIVE:   Patient stated You want me to keep going?     OBJECTIVE DATA SUMMARY:   Critical Behavior:  Neurologic State: Alert  Orientation Level: Oriented X4  Cognition: Follows commands  Safety/Judgement: Decreased awareness of need for safety  Functional Mobility Training:  Bed Mobility:           Scooting: Independent        Transfers:  Sit to Stand: Contact guard assistance  Stand to Sit: Contact guard assistance                             Balance:  Sitting: Intact  Standing: Impaired; With support  Standing - Static: Good;Constant support  Standing - Dynamic : Good (with RW support)  Ambulation/Gait Training:  Distance (ft): 495 Feet (ft)  Assistive Device: Gait belt;Walker, rolling  Ambulation - Level of Assistance: Contact guard assistance;Assist x1;Additional time; Adaptive equipment (definitely need for CGA during directional changes)        Gait Abnormalities: Decreased step clearance;Shuffling gait        Base of Support: Narrowed     Speed/Gertrudis: Pace decreased (<100 feet/min); Shuffled  Step Length: Left shortened;Right shortened  6 MWT results: (Specify if any supplemental oxygen is used, the type, pre, during and post sats.)  Ambulated with RW and without supplemental O2  Distance Walked in Feet (ft): 492 ft. Pre Heart Rate: 92    Pre O2 Saturation: 92        Post Heart Rate: 105    Post O2 Saturation: 93    Assistive device used: Assistive Device: Gait belt;Walker, rolling        Normative data:   Men 39-80 years old = 1889 feet;  Women 3680 years bum=1042 feet  Modified 10 point Sheri RPE scale utilized:  0 = no breathlessness at all ---> 10 = maximum exertion  Please refer to the flowsheet for any additional vital signs taken during this treatment. Pain:  Pain Scale 1: Numeric (0 - 10)  Pain Intensity 1: 0              Activity Tolerance:   Improving - increased gait distance; VSS on RA with activity; no pain complaints  Please refer to the flowsheet for vital signs taken during this treatment.   After treatment:   [x]    Patient left in no apparent distress sitting up in chair  []    Patient left in no apparent distress in bed  [x]    Call bell left within reach  [x]    Nursing notified  []    Caregiver present  []    Bed alarm activated    COMMUNICATION/COLLABORATION:   The patients plan of care was discussed with: Physical Therapist and Registered Nurse    Rob Young, PT, DPT   Time Calculation: 21 mins

## 2018-05-10 NOTE — PROGRESS NOTES
Palliative Medicine Psychosocial Assessment  [facility]: [phone] ([phone])  [facility]: [phone] ([phone])        Reason for Assessment:    []Depression  []Anxiety  []Caregiver [place]  []Maladaptive Coping with Serious Illness   [x]Other: CHF Bundle patient referral    Sources of Information:    [x]Patient  [x]Family  []Staff  []Medical Record    Medical/Physical Functioning: Active Problems:    Respiratory failure ([address] (5/7/2018)        Advance Care Planning:  Advance Care Planning 5/7/2018   Patient's Healthcare Decision Maker is: Legal Next of Kin   Primary Decision Maker Name -   Primary Decision Maker Phone Number -   Primary Decision Maker Relationship to Patient -   Confirm Advance Directive Yes, not on file   Does the patient have other document types -       Mental Status:    [x]Alert  []Lethargic  []Unresponsive  Oriented to:  []Person  []Place  []Time  []Situation      Barriers to Learning:    []Language  []Developmental  []Cognitive  []Altered Mental Status  []Visual/Hearing Impairment  []Unable to Read/Write  []Motivational   [x]No Barriers Identified  []Other:    Relationship Status:  []Single  [x]  []Significant Other/Life Partner  []  []  []    Second marriage,  X 62 years    Living Circumstances:  []Lives Alone  [x]Family/Significant Other in Household  []Roommates  []Children in the Home  []Paid Caregivers  []Assisted Living Facility/Group Home  []Skilled [address]  []Homeless  []Incarcerated  []Environmental/Care Concerns  []Transportation Issues  [] Issues  []Domestic Violence     [x]Other: Wife shared today that she \"cannot care for him like this\" and is hoping that rehab will help patient to be somewhat stronger and independent. Support System:    [x]Strong  []Fair  []Limited   However, wife is elderly too, unable to care for patient if he is not independent to some degree. She noted she needs to go for hip surgery at some point. Financial/Legal Concerns:    []Uninsured  []Limited Income/Resources  []Non-Citizen  []Utility Issues  []Food Insecurity [x]No Concerns Identified  []Financial POA:    []Other:    Oriental orthodox/Spiritual/Existential:  []Strong Sense of Spirituality  [x]Involved in [employer]  []Request  Visit  []Expressing [place]  []No Concerns Identified    Coping with Illness:         Patient: Family/Caregiver:   Understanding and Acceptance of Illness/Prognosis  [x] [x]   Strong Sense of Resilience [] [x]   Self Reflection [x] []   Engaged Support System [] [x]   Does not Readily Discuss Illness [] []   Denial of Terminal Status [] []   Anger [] []   Depression [] []   Anxiety/Fear [x] []   Bargaining [x] []   Recent Diagnosis/Prognosis [] []   Difficulties with Body Image [] []   Loss of Identity [] []   Excessive Substance Use [] []   Mental Health History [] []   Enmeshed Relationships [] []   History of Loss [] []   Anticipatory Grief [] []   Concern for Complicated Grief [] []   Suicidal Ideation or Plan [] []   Unable to assess [] []                    Narrative: Patient seen individually in the morning and then together with his wife when she came in. Patient is alert, aware, decisional.     Per H&P, Ralf Farrell is a 80 y.o.  male with PMHx significant for CVA, hx of tonsillar ca and dysphagia s/p G tube, HTN, chronic systolic heart failure, CAD s/p AICD, present to ED c/o progressive worsening of SOB, started 3 days prior to admission. Patient has been in the hospital \"a few times\" (thrice, per chart check) per wife. The last time he was here was in February 2018. Patient notes \"my heart is weak\". \"I would like to be 18 again\", when asked what patient wished for. Patient reflected on his life as an 24 yo and noted, \"we were all young and foolish\".  Patient shared history of his life, his 62 year marriage to current wife, [place], and his concern for her if something happens to him. He is realistic and appears to know that his health is very weak. He is aware that his wife is looking into NH placement for him for rehab and then he would be going home. He became tearful at one point. LCSW validated his feelings and gave patient permission to be sad if that was how he felt. Patient has two sons who live \"within a mile or two of us\" and he noted that they will be available for his wife, if he is not present. According to [facility], both she and patient have completed AMDs and have noted that patient has voiced that he would want to be resuscitated. Asked wife to bring in AMD so it can be scanned in the chart. Went over protocol for CPR. Both patient and wife unaware of all the steps involved in CPR, especially ventilation. Patient initially voiced that he would \"not want to be on a breathing machine\", but then noted that he would want to \"be on it for a period of time\" and if he was not improving, then he would \"want the switch to be turned off\". Explained to family that it is not an automatic \"switch being turned off\" and that likely wife would have to make a decision at that time. Also explained the continuum of treatment, Full Restorative Care to comfort care. LCSW introduced Palliative Medicine, role of support and symptom management and to explore goals of care. Goals/Plan: Full Code and Full Restorative treatment.

## 2018-05-10 NOTE — PROGRESS NOTES
Hospitalist Progress Note    NAME: Mustapha Rowell   :  1935   MRN:  [id number]       Assessment / Plan:  Acute hypoxic respiratory failure - due to CHF  -O2 as needed    Acute on chronic systolic heart failure  -patient developed new bilateral LE edema w/ KRAMER  -Pro-BNP 5098, CXR was neg for acute process. -cont lasix  -strict I/O  -cardiology consulted  -Echo:  Left ventricle: Systolic function was mildly reduced. Ejection fraction  was estimated in the range of 45 % to 50 %. There was hypokinesis of the  apical inferior wall(s). There was hypokinesis of the apical wall(s). There was hypokinesis of the apical anterior wall(s). CAD s/p AICD  -cont' asa, plavix, statin      HTN  -cont meds    CKD 3  -cr stable at baseline  -monitor      Hx of tonsillar ca and dysphagia s/p G tube  -nutritional consulted. Cont' TF     DM with neuropathy  -cont lantus 14 units daily, gabapentin  -he uses humalog 14U at breakfast, 10U at lunch, and 7U at dinner.    -will use SSI while here, adjust prn     Hx of CVA  -cont' ASA, plavix, statin      Plan:  As above. Cont PT. Wife has requested SNF. Case management involved. Dc once SNF authorized.          Code Status:  Full, discussed with pt and his wife  Surrogate Decision Maker: pt's wife  DVT Prophylaxis: lovenox  GI Prophylaxis: not indicated     Subjective:     Chief Complaint / Reason for Physician Visit  Pt seen at bedside. Nauseated but no vomiting this am.  No cp/sob/abd pain. No overnight events. Review of Systems:  Symptom Y/N Comments  Symptom Y/N Comments   Fever/Chills n   Chest Pain n    Poor Appetite    Edema     Cough    Abdominal Pain     Sputum    Joint Pain     SOB/KRAMER n   Pruritis/Rash     Nausea/vomit    Tolerating PT/OT     Diarrhea    Tolerating Diet     Constipation    Other       Could NOT obtain due to:      Objective:     VITALS:   Last 24hrs VS reviewed since prior progress note.  Most recent are:  Patient Vitals for the past 24 hrs:   Temp Pulse Resp BP SpO2   05/10/18 1327 - - - - 94 %   05/10/18 1149 98.1 °F (36.7 °C) 84 20 115/81 95 %   05/10/18 0808 - - - - 93 %   05/10/18 0723 98.4 °F (36.9 °C) 80 18 104/72 92 %   05/10/18 0430 98.6 °F (37 °C) (!) 110 18 142/59 94 %   05/10/18 0142 - - - - 94 %   05/09/18 2300 98.7 °F (37.1 °C) 89 18 164/73 98 %   05/09/18 1936 - - - - 92 %   05/09/18 1923 98.7 °F (37.1 °C) 73 18 153/68 96 %       Intake/Output Summary (Last 24 hours) at 05/10/18 1525  Last data filed at 05/10/18 0945   Gross per 24 hour   Intake             1490 ml   Output                0 ml   Net             1490 ml        PHYSICAL EXAM:  General: WD, WN. Alert, cooperative, no acute distress    EENT:  EOMI. Anicteric sclerae. MMM  Resp:             Decreased breath sounds, less crackles. No accessory muscle use  CV:  Regular  rhythm,  mild lower leg swelling. GI:  Soft, Non distended, Non tender.  +Bowel sounds  Neurologic:  Alert and oriented X 3, normal speech   Psych:   Good insight. Not anxious nor agitated  Skin:  No rashes. No jaundice    Reviewed most current lab test results and cultures  YES  Reviewed most current radiology test results   YES  Review and summation of old records today    NO  Reviewed patient's current orders and MAR    YES  PMH/SH reviewed - no change compared to H&P  ________________________________________________________________________  Care Plan discussed with:    Comments   Patient x    Family  x wife   RN x    Care Manager x    Consultant                        Multidiciplinary team rounds were held today with , nursing, pharmacist and clinical coordinator. Patient's plan of care was discussed; medications were reviewed and discharge planning was addressed.      ________________________________________________________________________  Total NON critical care TIME:  25 Minutes    Total CRITICAL CARE TIME Spent:   Minutes non procedure based      Comments   >50% of visit spent in counseling and coordination of care     ________________________________________________________________________  Chucky Briseno MD     Procedures: see electronic medical records for all procedures/Xrays and details which were not copied into this note but were reviewed prior to creation of Plan. LABS:  I reviewed today's most current labs and imaging studies.   Pertinent labs include:  Recent Labs      05/08/18   0253   WBC  5.6   HGB  11.6*   HCT  36.0*   PLT  81*     Recent Labs      05/10/18   0418  05/09/18   0350  05/08/18   0253   NA  134*  136  137   K  4.0  4.2  4.1   CL  94*  95*  97   CO2  36*  36*  35*   GLU  293*  320*  209*   BUN  30*  26*  28*   CREA  1.39*  1.29  1.30   CA  8.6  8.7  8.8   MG   --   2.5*   --        Signed: Chucky Briseno MD

## 2018-05-10 NOTE — PROGRESS NOTES
Spiritual Care Assessment/Progress Note  [facility]      NAME: Silvestre Serrato      MRN: [id number]  AGE: 80 y.o. SEX: male  Mandaen Affiliation: Episcopalian   Language: English     5/10/2018     Total Time (in minutes): 10     Spiritual Assessment begun in [facility] through conversation with:         []Patient        [] Family    [] Friend(s)        Reason for Consult: Palliative Care, Initial/Spiritual Assessment     Spiritual beliefs: (Please include comment if needed)     [] Identifies with a jordyn tradition:     [] Supported by a jordyn community:      [] Claims no spiritual orientation:      [] Seeking spiritual identity:           [] Adheres to an individual form of spirituality:      [x] Not able to assess:                     Identified resources for coping:      [] Prayer                               [] Music                  [] Guided Imagery     [] Family/friends                 [] Pet visits     [] Devotional reading                         [x] Unknown     [] Other:                                            Interventions offered during this visit: (See comments for more details)    Patient Interventions: Initial visit           Plan of Care:     [] Support spiritual and/or cultural needs    [] Support AMD and/or advance care planning process      [] Support grieving process   [] Coordinate Rites and/or Rituals    [] Coordination with community clergy   [] No spiritual needs identified at this time   [] Detailed Plan of Care below (See Comments)  [] Make referral to Music Therapy  [] Make referral to Pet Therapy     [] Make referral to Addiction services  [] Make referral to [facility]  [] Make referral to Spiritual Care Partner  [] No future visits requested        [x] Follow up visits as needed     Comments:  Attempted initial visit for new palliative consult and to respond to page from staff requesting pastoral support. Patient was receiving care.   Will follow up later, as able.   Chandler Galloway, MPS, [address] [facility], [address] [place] [address] Avenue    [address] Paging Service  [phone])

## 2018-05-10 NOTE — ROUTINE PROCESS
* No surgery found *  * No surgery found *  Bedside and Verbal shift change report given to Padma/ Beatrice George RN  (oncoming nurse) by Garrison Rabago RN (offgoing nurse). Report included the following information SBAR, Kardex, MAR and Recent Results.     Zone Phone:   [phone]      Significant changes during shift:  NONE        Patient Information    Hazeline Lennox [id number]  80 y.o.  5/7/2018 12:09 PM by Hector Duenas MD. Mychal Mancini was admitted from Home    Problem List    Patient Active Problem List    Diagnosis Date Noted    Type 2 diabetes with nephropathy ([address] 05/07/2018    Diabetic peripheral neuropathy associated with type 2 diabetes mellitus ([address] 05/07/2018    Benign essential tremor syndrome 05/07/2018    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    Wrist pain, acute, right 05/07/2018    Respiratory failure ([address] 05/07/2018    Thrombotic stroke involving left middle cerebral artery ([address] 02/02/2017    Stenosis of both internal carotid arteries 02/02/2017    Hemiplegia and hemiparesis following cerebral infarction affecting right dominant side ([address] 02/02/2017    Weakness 02/01/2017    Stroke ([address] 02/01/2017    Aspiration pneumonia ([address] 11/28/2016    History of stroke 07/30/2016     Class: Present on Admission    Polyneuropathy associated with underlying disease ([address] 02/11/2016    Peripheral neuropathy ([address] 01/09/2016    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 ([address] 01/08/2016    Percutaneous endoscopic gastrostomy status ([address] 12/04/2014    Antiplatelet or antithrombotic long-term use 12/04/2014    Heart failure ([address] 09/11/2014    Esophageal motility disorder 07/02/2013    CAD (coronary artery disease) 02/12/2013    Status post implantation of automatic cardioverter/defibrillator (AICD) 02/12/2013     Class: Present on Admission    Aortic stenosis, mild 02/12/2013    S/P PTCA (percutaneous transluminal coronary angioplasty) 01/18/2013    Non-cardiac chest pain 01/04/2013    Feeding difficulties 05/02/2011    Abnormal cardiovascular stress test 06/24/2010    S/P CABG x 3 06/24/2010    Atherosclerotic cerebrovascular disease 06/24/2010    Orthostatic hypotension 06/24/2010    Dysphagia 05/03/2010     Past Medical History:   Diagnosis Date    Antiplatelet or antithrombotic long-term use 12/4/2014    Anxiety disorder     Arrhythmia 2009    bradycardia    Arthritis     CAD (coronary artery disease)     s/p CABG 2002; Dr Brandt Licea [facility]) 1996    tongue/throat cancer s/p surgery / radiation and 1 dose of chemo    Carotid artery stenosis     s/p bilateral stents    Chronic pain     left leg, lower back,     Depression     Diabetes ([facility]     Type II    Esophageal dysmotility     s/p dilitation    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 I suspect the latter. Achalasia  is not present.         GERD (gastroesophageal reflux disease)     Heart failure ([facility] 10/2014     Cardiomyopathy:Pacemaker upgrade:Biv and AICD  Dr. Asia Nino heart  last visit 5/11/2015    Hepatitis C Dx 1996    treated at [facility] in past; as of 4/15/15 wife states pt currently not under any treatment    Hyperlipidemia     Hypertension     Myocardial infarct [facility]) 2013    Heart Cath: 40% LV EF, Stented distal LAD, patent Graft to circumflex    On tube feeding diet approx 2009    still has as of 9/28/15  (no po food/liquid/meds at all); Dr Gopal Victoria Other ill-defined conditions(799.89) 1996    1 dose of chemotherapy/radiation for tongue cancer    Other ill-defined conditions(799.89)     BPH    Other ill-defined conditions(799.89)     orthostatic hypotension    Pneumonia ~ April -May 2010    Stroke [facility]) approx 2003    left side-left finger tips numb; no imbalance or memory loss; as of 2015 not seeing neuro MD    Suicidal thoughts          Core Measures:    CVA: No No  CHF:Yes Yes  PNA:No No        Activity Status:    OOB to Chair No  Ambulated this shift Yes   Bed Rest No    Supplemental O2: (If Applicable)    NC Yes  NRB No  Venti-mask No  On 3 Liters/min          DVT prophylaxis:    DVT prophylaxis Med- No  DVT prophylaxis SCD or CORTES- No     Wounds: (If Applicable)    Wounds- No        Patient Safety:    Falls Score Total Score: 3  Safety Level_______  Bed Alarm On? Yes  Sitter?  No    Plan for upcoming shift: Daily weight strict intake and output        Discharge Plan: Yes     Active Consults:  IP CONSULT TO CARDIOLOGY  IP CONSULT TO CARDIOLOGY  IP CONSULT TO HOSPITALIST  IP CONSULT TO PALLIATIVE CARE - PROVIDER

## 2018-05-10 NOTE — PROGRESS NOTES
CM received a call from Nasir Garcia [phone] with [facility] and they are following Pt during hospitalization. If Pt needs HH they can accept referral when needed. CM spoke to Pt spouse in person and she indicated that after reviewing the choice list that she would like to have referral sent to [place]. CM sent referral via [employer]. Wife is going by there this afternoon to tour it. FOC signed and dated on bedside chart. 4:09 PM   [facility] has accepted and THANIA clicked Accepted and Selected. CM will look to MD and RN for medically stability to see when to transition to [facility]. Wife would like for [facility] to follow after rehab. CM will continue to monitor discharge plan.       Monica Mccain, [address]   Ext [phone]

## 2018-05-10 NOTE — DIABETES MGMT
DTC Progress Note    Recommendations/ Comments: Pt with hyperglycemia BG in 200s-300s in the past 24 hours. Pt on TF. Please consider:  1. Adding meal time with boluses - 3 units timed with bolus feeds    Current hospital DM medication: Lantus 14 units, lispro correction - normal sensitivity     Chart reviewed on Ike Joseph. Patient is a 80 y.o. male with known DM on lispro 13 units with breakfast, 10 units with lunch, 7 units with dinner + correction insulin, up to 40 units/day at home. A1c:   Lab Results   Component Value Date/Time    Hemoglobin A1c 6.5 (H) 02/02/2017 02:55 AM    Hemoglobin A1c 6.4 (H) 11/30/2016 02:11 AM       Recent Glucose Results:   Lab Results   Component Value Date/Time     (H) 05/10/2018 04:18 AM    GLUCPOC 257 (H) 05/10/2018 11:21 AM    GLUCPOC 238 (H) 05/10/2018 06:27 AM    GLUCPOC 201 (H) 05/09/2018 09:10 PM        Lab Results   Component Value Date/Time    Creatinine 1.39 (H) 05/10/2018 04:18 AM     Estimated Creatinine Clearance: 37.6 mL/min (based on Cr of 1.39). Active Orders   Diet    DIET NPO With Tube Feedings        PO intake: No data found. Will continue to follow as needed.     Thank you  Jorge Yin, [address], [address]  Office:  [phone]

## 2018-05-10 NOTE — PROGRESS NOTES
Spiritual Care Assessment/Progress Note  [facility]      NAME: Natalee Young      MRN: [id number]  AGE: 80 y.o. SEX: male  Nondenominational Affiliation: Samaritan   Language: English     5/10/2018     Total Time (in minutes): 35     Spiritual Assessment begun in [facility] NEUROSCIENCE TELEMETRY through conversation with:         [x]Patient        [] Family    [] Friend(s)        Reason for Consult: Palliative Care, Initial/Spiritual Assessment     Spiritual beliefs: (Please include comment if needed)     [x] Identifies with a jordyn tradition: Samaritan     [] Supported by a jordyn community:      [] Claims no spiritual orientation:      [] Seeking spiritual identity:           [] Adheres to an individual form of spirituality:      [] Not able to assess:                     Identified resources for coping:      [x] Prayer                               [] Music                  [] Guided Imagery     [x] Family/friends                 [] Pet visits     [] Devotional reading                         [] Unknown     [] Other:                                              Interventions offered during this visit: (See comments for more details)    Patient Interventions: Affirmation of jordyn, Affirmation of emotions/emotional suffering, Catharsis/review of pertinent events in supportive environment, Coping skills reviewed/reinforced, Iconic (affirming the presence of God/Higher Power), Initial/Spiritual assessment, patient floor, Normalization of emotional/spiritual concerns, Prayer (actual), Life review/legacy     Family/Friend(s):  Affirmation of jordyn, Affirmation of emotions/emotional suffering, Catharsis/review of pertinent events in supportive environment, Iconic (affirming the presence of God/Higher Power), Normalization of emotional/spiritual concerns, Prayer (actual), Nondenominational beliefs/image of God discussed     Plan of Care:     [x] Support spiritual and/or cultural needs    [] Support AMD and/or advance care planning process      [] Support grieving process   [] Coordinate Rites and/or Rituals    [] Coordination with community clergy   [] No spiritual needs identified at this time   [] Detailed Plan of Care below (See Comments)  [] Make referral to Music Therapy  [] Make referral to Pet Therapy     [] Make referral to Addiction services  [] Make referral to [facility]  [] Make referral to Spiritual Care Partner  [] No future visits requested        [x] Follow up visits as needed     Comments:  Visit on Neuro with new palliative care consult for spiritual assessment. Patient's wife was present. She shared that they identify as [facility].  Provided supportive listening as patient engaged in life review; sharing medical history and a little about current concerns. He and his wife have been  62 years. They have 2 sons and he has 2 children from a previous marriage. When asked about any spiritual/emotional concerns he might have, patient began to cry quietly and speak in nearly a whisper. Moved nearer to hear him say he has lived a life with no regrets-and he would do it all over again. He appreciates the blessing his life has been. 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 She added their home is filled with his work. Patient had to use restroom and during that time wife shared that with her 's health history, she does have days that aren't as good as most and that is when she seems to miss her mother most.  Her mother has been gone 14 years, but she shared they did many things together and spoke multiple times a day. At the same time, she shared, it is during the difficult times she tends to dream about her mother and the dream is always the same-the two are together laughing.   She shared she believes this is God speaking to her and telling her that her mother is okay. My assessment is that Mrs. Abigail Valencia may be experiencing some anticipatory grief as patient's medical challenges continue and/or multiply. Wife could benefit from emotional support. When patient was back in bed, offered prayer at his request.  Advised them of  availability.   Phyllis Hoffman, MPS, [address] [facility], [address] [place] [address] Road Paging Service  [phone])

## 2018-05-10 NOTE — PROGRESS NOTES
Progress Note      5/10/2018 7:50 AM  NAME: Natalee Young   MRN:  [id number]   Admit Diagnosis: Respiratory failure ([facility]      Problem List:      1. Acute on chronic systolic heart failure  2. Coronary artery disease s/p remote CABG. Cath '13 w/ EF 40%, PCI to distal LAD via LIMA, severe disease in large diagonal (not amenable to PCI), patent SVG-OM, SVG-PDA, SVG-PLB. [place] 11/13 w/ EF 28%, IWMI, and with diagonal ischemia. 3. Lexiscan stress MPI 9/14 w/ EF 29% and anterior ischemia w/ IWMI  4. Ischemic cardiomyopathy  5. BiV ICD   6. Chronic kidney disease; Stg 3  7. Moderate to severe AoS (Echo @ [facility] 1/18 w/ EF 35-40%, trivial AI, diffuse HK, 38mmHg gradient and 3.6m/s velocity across AoV)  8. Diabetes  9. 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 Orthostatic hypotension  11. Hyperlipidemia  12. Head/neck CA w/ feeding tube  13. Chronic thrombocytopenia (Dr. Patsy Kim)     Assessment/Plan:   BNP 5600  TnI neg  sCr 1.5 --> 1.3     Echo w/ EF 45-50%, mod-sev AoS, apical/inferior HK, mild MR/TR     14. Stop IV lasix; start 40mg oral today  15. Continue ASA 325mg  16. Continue plavix 75mg  17. Continue midodrine 10mg TID  18. Continue atorvastatin 40mg  19. He was scheduled to have an echo in my office in 4 weeks to evaluate the severity of his AoS  20. He can follow w/ me in the office in 2-3 weeks  21. Dr. Julisa Olivarez will be available over the wknd if needed         []       High complexity decision making was performed in this patient at high risk for decompensation with multiple organ involvement. Subjective:     Natalee Young denies chest pain, dyspnea. Dyspnea is improved. Discussed with RN events overnight.      Review of Systems:    Symptom Y/N Comments  Symptom Y/N Comments   Fever/Chills N   Chest Pain N    Poor Appetite N   Edema N    Cough N   Abdominal Pain N    Sputum N   Joint Pain N    SOB/KRAMER N   Pruritis/Rash N    Nausea/vomit N   Tolerating PT/OT Y    Diarrhea N   Tolerating Diet Y    Constipation N Other       Could NOT obtain due to:      Objective:      Physical Exam:    Last 24hrs VS reviewed since prior progress note. Most recent are:    Visit Vitals    /72 (BP 1 Location: Right arm, BP Patient Position: Sitting)    Pulse 80    Temp 98.4 °F (36.9 °C)    Resp 18    Ht 5' 7\" (1.702 m)    Wt 75.7 kg (166 lb 14.2 oz)    SpO2 93%    BMI 26.14 kg/m2       Intake/Output Summary (Last 24 hours) at 05/10/18 0820  Last data filed at 05/09/18 2028   Gross per 24 hour   Intake             2165 ml   Output                0 ml   Net             2165 ml        General Appearance: Well developed, well nourished, alert & oriented x 3,    no acute distress. Ears/Nose/Mouth/Throat: Hearing grossly normal.  Neck: Supple. Chest: Lungs clear to auscultation bilaterally. Cardiovascular: Regular rate and rhythm, S1S2 normal, harsh late peaking TARA  Abdomen: Soft, non-tender, bowel sounds are active. Extremities: No edema bilaterally. Skin: Warm and dry. []         Post-cath site without hematoma, bruit, tenderness, or thrill. Distal pulses intact.     PMH/SH reviewed - no change compared to H&P    Data Review    Telemetry: paced    EKG:   [x]  No new EKG for review    Lab Data Personally Reviewed:    Recent Labs      05/08/18   0253  05/07/18   1306   WBC  5.6  4.8   HGB  11.6*  12.3   HCT  36.0*  39.4   PLT  81*  86*     Recent Labs      05/07/18   1412   INR  1.0   PTP  10.1   APTT  22.3      Recent Labs      05/10/18   0418  05/09/18   0350  05/08/18   0253  05/07/18   1306   NA  134*  136  137  136   K  4.0  4.2  4.1  4.7   CL  94*  95*  97  97   CO2  36*  36*  35*  33*   BUN  30*  26*  28*  26*   CREA  1.39*  1.29  1.30  1.39*   GLU  293*  320*  209*  178*   CA  8.6  8.7  8.8  9.3   MG   --   2.5*   --   2.7*     Recent Labs      05/07/18   1306   CPK  213   CKNDX  2.1   TROIQ  <0.04     Lab Results   Component Value Date/Time    Cholesterol, total 129 02/02/2017 02:55 AM    HDL Cholesterol 36 02/02/2017 02:55 AM    LDL, calculated 67.6 02/02/2017 02:55 AM    Triglyceride 127 02/02/2017 02:55 AM    CHOL/HDL Ratio 3.6 02/02/2017 02:55 AM       Recent Labs      05/07/18   1306   SGOT  72*   AP  70   TP  7.7   ALB  3.1*   GLOB  4.6*     No results for input(s): PH, PCO2, PO2 in the last 72 hours.     Medications Personally Reviewed:    Current Facility-Administered Medications   Medication Dose Route Frequency    furosemide (LASIX) tablet 40 mg  40 mg Oral DAILY    albuterol-ipratropium (DUO-NEB) 2.5 MG-0.5 MG/3 ML  3 mL Nebulization Q6H RT    famotidine (PEPCID) tablet 20 mg  20 mg Oral ACB    sodium chloride (NS) flush 5-10 mL  5-10 mL IntraVENous Q8H    sodium chloride (NS) flush 5-10 mL  5-10 mL IntraVENous PRN    albuterol-ipratropium (DUO-NEB) 2.5 MG-0.5 MG/3 ML  3 mL Nebulization Q6H PRN    aspirin (ASPIRIN) tablet 325 mg  325 mg Oral DAILY    atorvastatin (LIPITOR) tablet 40 mg  40 mg Oral DAILY    clopidogrel (PLAVIX) tablet 75 mg  75 mg Oral DAILY    cyanocobalamin (VITAMIN B12) tablet 1,000 mcg  1,000 mcg Oral DAILY    gabapentin (NEURONTIN) 250 mg/5 mL solution 750 mg  750 mg Oral TID    insulin glargine (LANTUS) injection 14 Units  14 Units SubCUTAneous DAILY    midodrine (PROAMITINE) tablet 10 mg  10 mg Oral TID WITH MEALS    insulin lispro (HUMALOG) injection   SubCUTAneous AC&HS    glucose chewable tablet 16 g  4 Tab Oral PRN    dextrose (D50W) injection syrg 12.5-25 g  12.5-25 g IntraVENous PRN    glucagon (GLUCAGEN) injection 1 mg  1 mg IntraMUSCular PRN    primidone (MYSOLINE) tablet 25 mg  25 mg Oral QHS    HYDROcodone-acetaminophen (NORCO) 5-325 mg per tablet 1 Tab  1 Tab Oral Q6H PRN    polyethylene glycol (MIRALAX) packet 17 g  17 g Oral DAILY         Sofie Delude III, DO

## 2018-05-10 NOTE — CARDIO/PULMONARY
Cardiac Rehab:      Chart reviewed. Pt is on the CHF bundle list.   He is one tube feeds at home, no oral food/drinks. Pt visited, no family at the bedside. This was a follow-up visit to answer questions and reinforce prior teaching re: CHF, S&Ss, medication management, Low NA diet, daily weights and when to call the doctor. Pt denies questions at this time.

## 2018-05-10 NOTE — PROGRESS NOTES
Bedside shift change report given to Sherley Rowland RN (oncoming nurse) by Jessica Ndiaye RN (offgoing nurse).  Report included the following information SBAR, Kardex, Intake/Output, MAR, Accordion and Recent Results.     Zone Phone:   [phone]        Significant changes during shift:  None           Patient Information     Manjit Sin  80 y.o.  5/7/2018 12:09 PM by Pao Tovar MD. Manjit Sin was admitted from Home     Problem List           Patient Active Problem List     Diagnosis Date Noted    Type 2 diabetes with nephropathy ([address] 05/07/2018    Diabetic peripheral neuropathy associated with type 2 diabetes mellitus ([address] 05/07/2018    Benign essential tremor syndrome 05/07/2018    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    Wrist pain, acute, right 05/07/2018    Respiratory failure ([address] 05/07/2018    Thrombotic stroke involving left middle cerebral artery ([address] 02/02/2017    Stenosis of both internal carotid arteries 02/02/2017    Hemiplegia and hemiparesis following cerebral infarction affecting right dominant side ([address] 02/02/2017    Weakness 02/01/2017    Stroke ([address] 02/01/2017    Aspiration pneumonia ([address] 11/28/2016    History of stroke 07/30/2016       Class: Present on Admission    Polyneuropathy associated with underlying disease ([address] 02/11/2016    Peripheral neuropathy ([address] 01/09/2016    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 ([address] 01/08/2016    Percutaneous endoscopic gastrostomy status ([address] 12/04/2014    Antiplatelet or antithrombotic long-term use 12/04/2014    Heart failure ([address] 09/11/2014    Esophageal motility disorder 07/02/2013    CAD (coronary artery disease) 02/12/2013    Status post implantation of automatic cardioverter/defibrillator (AICD) 02/12/2013       Class: Present on Admission    Aortic stenosis, mild 02/12/2013    S/P PTCA (percutaneous transluminal coronary angioplasty) 01/18/2013    Non-cardiac chest pain 01/04/2013  Feeding difficulties 05/02/2011    Abnormal cardiovascular stress test 06/24/2010    S/P CABG x 3 06/24/2010    Atherosclerotic cerebrovascular disease 06/24/2010    Orthostatic hypotension 06/24/2010    Dysphagia 05/03/2010           Past Medical History:   Diagnosis Date    Antiplatelet or antithrombotic long-term use 12/4/2014    Anxiety disorder      Arrhythmia 2009     bradycardia    Arthritis      CAD (coronary artery disease)       s/p CABG 2002; Dr Alessia Russell [facility]) 1996     tongue/throat cancer s/p surgery / radiation and 1 dose of chemo    Carotid artery stenosis       s/p bilateral stents    Chronic pain       left leg, lower back,     Depression      Diabetes ([facility]       Type II    Esophageal dysmotility       s/p dilitation    Esophageal motility disorder 7/8/2013     Frequent simultaneous or failed contractions, low amplitude contractions  suggests severe myopathy or diffuse spasm. I suspect the latter. Achalasia  is not present.         GERD (gastroesophageal reflux disease)      Heart failure ([address] 10/2014      Cardiomyopathy:Pacemaker upgrade:Biv and AICD  Dr. Jose J Licea heart  last visit 5/11/2015    Hepatitis C Dx 1996     treated at [facility] in past; as of 4/15/15 wife states pt currently not under any treatment    Hyperlipidemia      Hypertension      Myocardial infarct ([facility] 2013     Heart Cath: 40% LV EF, Stented distal LAD, patent Graft to circumflex    On tube feeding diet approx 2009     still has as of 9/28/15  (no po food/liquid/meds at all); Dr Luanne Wright Other ill-defined conditions(799.89) 1996     1 dose of chemotherapy/radiation for tongue cancer    Other ill-defined conditions(799.89)       BPH    Other ill-defined conditions(799.89)       orthostatic hypotension    Pneumonia ~ April -May 2010    Stroke [facility]) approx 2003     left side-left finger tips numb; no imbalance or memory loss; as of 2015 not seeing neuro MD    Suicidal thoughts            Core Measures:     CVA: No No  CHF:No No  PNA:No No     Post Op Surgical (If Applicable):      Number times ambulated in hallway past shift:  1  Number of times OOB to chair past shift:   2  NG Tube: No  Incentive Spirometer: No  Drains: Yes   PEG  Dressing Present:  No  Flatus:  No     Activity Status:     OOB to Chair Yes  Ambulated this shift Yes   Bed Rest No           DVT prophylaxis:     DVT prophylaxis Med- No  DVT prophylaxis SCD or CORTES- Yes      Wounds: (If Applicable)     Wounds- No           Patient Safety:     Falls Score Total Score: 3  Safety Level_______  Bed Alarm On? Yes  Sitter?  No     Plan for upcoming shift: Monitor, D/C tomorrow        Discharge Plan: [place] tomorrow?     Active Consults:  IP CONSULT TO CARDIOLOGY  IP CONSULT TO CARDIOLOGY  IP CONSULT TO HOSPITALIST  IP CONSULT TO PALLIATIVE CARE - PROVIDER

## 2018-05-11 PROBLEM — J96.90 RESPIRATORY FAILURE (HCC): Status: RESOLVED | Noted: 2018-01-01 | Resolved: 2018-01-01

## 2018-05-11 NOTE — DISCHARGE SUMMARY
Hospitalist Discharge Summary     Patient ID:  Katerina Martin  [id number]  80 y.o.  1935    PCP on record: Soraya Washington MD    Admit date: 5/7/2018  Discharge date and time: 5/11/2018      DISCHARGE DIAGNOSIS and hospital course:    Acute hypoxic respiratory failure secondary to acute on chronic systolic heart failure  pt needed supplemental oxygen while lasix was given IV. Pt's prior lasix was 20mg daily, but did well on 40mg lasix over the weekend.      Moderate to severe aortic stenosis  Follow up with Dr. Ana Paula Urbina in 2-3 weeks, with echo in 4 weeks.      CAD s/p AICD  asa, plavix, statin      HTN  lasix     CKD 3  -cr stable at baseline  -monitor       Hx of tonsillar ca and dysphagia s/p G tube  -nutritional consulted. Cont' TF      DM with neuropathy  -cont lantus 14 units daily, gabapentin  -he uses humalog 14U at breakfast, 10U at lunch, and 7U at dinner.    -will use SSI while here, adjust prn      Hx of CVA  -cont' ASA, plavix, statin       Code status: full  Wife has requested SNF. discharge to rehab  Surrogate Decision Maker: pt's wife    HPI excerpt from h and P:  Pt presented on 5/7/18 with 3 days prior progressive shortness of breath. Pt also noticed increasing abdominal girth and heavy legs. Pt has g tube and taking medications through them. Pt denied fever, chest pain. Medications: Per above medication reconciliation. Recommended diet: tube feeding through G tube:   Access type PEG/G tube       Formula options High fiber (Jevity 1.5 lo)      Delivery Method Bolus      Starting Volume 375      Frequency Q 6 hours      Total number of feeds 3      Free Water Yes      Volume (mL) 150      Frequency Q 4 hours            Recommended activity: as tolerated    Wound care: routine g tube care    Glucose management:  Accucheck ACHS with sliding scale per SNF protocol    Code status: Full        Outside physician follow up:     Follow-up Information     Follow up With Details Comments Contact Info    Emily Ley III, DO Schedule an appointment as soon as possible for a visit in 3 weeks  [address]  [address] [facility]  [phone] [address] [address] [facility]   [address] [facility] MD Addie Maldonado [address]  [address] [address] [phone]      Anjum Chaudhary MD In 1 week increase in lasix for pul edema, pt had EF 45% and moderate aortic stenosis [address] [facility]  [phone]                    CONSULTATIONS:  IP CONSULT TO CARDIOLOGY  IP CONSULT TO CARDIOLOGY  IP CONSULT TO HOSPITALIST  IP CONSULT TO PALLIATIVE CARE - PROVIDER      _______________________________________________________________________  Patient seen and examined by me on discharge day. Pertinent Findings:  Gen:    Not in distress, sitting up in chair, expecting to be discharged. Chest: Coarse breath sounds at bases, good air movement. CVS:   Regular rhythm. edema  Abd:  Soft, not distended, not tender  Neuro:  Alert, answers questions, follows commands. _______________________________________________________________________  DISCHARGE MEDICATIONS:   Current Discharge Medication List      CONTINUE these medications which have CHANGED    Details   furosemide (LASIX) 20 mg tablet Take 2 Tabs by mouth daily. Indications: hypercalcemia  Qty: 30 Tab, Refills: 2         CONTINUE these medications which have NOT CHANGED    Details   cyanocobalamin 1,000 mcg tablet Take 1,000 mcg by mouth daily. 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 fluticasone (FLONASE) 50 mcg/actuation nasal spray 1 Emily by Both Nostrils route two (2) times a day. PYRIDOXINE HCL, VITAMIN B6, (VITAMIN B-6 PO) Take  by mouth. insulin lispro (HUMALOG KWIKPEN INSULIN) 100 unit/mL kwikpen 13 units with breakfast, 10 units with lunch, 7 units with dinner + correction insulin, up to 40 units/day  Qty: 15 Pen, Refills: 3    Associated Diagnoses: Essential hypertension with goal blood pressure less than 140/90      HYDROcodone-acetaminophen (NORCO)  mg tablet Take 1 Tab by mouth daily as needed. famotidine (PEPCID) 20 mg tablet Take 1 Tab by mouth two (2) times a day. Qty: 20 Tab, Refills: 0      guaiFENesin (ROBITUSSIN) 100 mg/5 mL liquid Take 200 mg by mouth three (3) times daily as needed for Cough. insulin glargine (LANTUS SOLOSTAR) 100 unit/mL (3 mL) pen 14 Units by SubCUTAneous route daily. 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 aspirin (ASPIRIN) 325 mg tablet Take 325 mg by mouth daily. vit B Cmplx 3-FA-Vit C-Biotin (NEPHRO GRAYSON RX) 1- mg-mg-mcg tablet Take 1 Tab by mouth daily. magnesium hydroxide (TOBIAS MILK OF MAGNESIA) 400 mg/5 mL suspension Take 30 mL by mouth daily as needed for Constipation. albuterol-ipratropium (DUO-NEB) 2.5 mg-0.5 mg/3 ml nebu 3 mL by Nebulization route every six (6) hours as needed. Qty: 100 Nebule, Refills: 1      ondansetron hcl (ZOFRAN, AS HYDROCHLORIDE,) 8 mg tablet Take 1 Tab by mouth every eight (8) hours as needed for Nausea. Qty: 20 Tab, Refills: 0      midodrine (PROAMITINE) 10 mg tablet Take 10 mg by mouth three (3) times daily (with meals). atorvastatin (LIPITOR) 40 mg tablet Take 40 mg by mouth daily. gabapentin (NEURONTIN) 250 mg/5 mL solution 750 mg by PEG Tube route three (3) times daily. multivitamin (ONE A DAY) tablet Take 1 Tab by mouth daily. clopidogrel (PLAVIX) 75 mg tablet Take 75 mg by mouth daily. Lancets misc Use preferred brand; Check glucose 4 times daily, Diagnosis E11.65  Qty: 400 Each, Refills: 5      glucose blood VI test strips (PHARMACIST CHOICE) strip Use preferred brand; Check glucose 4 times daily, Diagnosis E11.65  Qty: 400 Strip, Refills: 5      Blood-Glucose Meter monitoring kit 1 preferred brand glucometer for checking home glucose 4 times per day. DX Code: E11.65  Qty: 1 Kit, Refills: 0      Nebulizer & Compressor machine 1 Each by Does Not Apply route daily. Qty: 1 Each, Refills: 0      insulin syringe-needle U-100 1 mL 31 x 15/64\" syrg 1 Syringe by SubCUTAneous route four (4) times daily.   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    ______________________________________________________________________    Code Status: Full Code  ______________________________________________________________________      Follow up with:   PCP : Livan Arias MD  Follow-up Information     Follow up With Details Comments Contact Linh Joy III,  Schedule an appointment as soon as possible for a visit in 3 weeks  [address]  [address] [facility]  [phone] [address] [address] [facility]   [address] [facility] [address] MD Vivek Anderson [address]  [phone]      Livan Arias MD In 1 week increase in lasix for pul edema, pt had EF 45% and moderate aortic stenosis [address] [facility]  [phone]      Murray Joy III, DO In 2 weeks aortic stenosis, chronic systolic heart failure [address]  [address]  [facility]. [phone]                Total time in minutes spent coordinating this discharge (includes going over instructions, follow-up, prescriptions, and preparing report for sign off to her PCP) :  33 minutes    Signed:  Erick Olsen DO

## 2018-05-11 NOTE — PROGRESS NOTES
CM was notified that Pt is ready for discharge today to [facility]. They can accept him now to Room 416A. RN will need to call report to [phone]  RN will need to fax scripts and AVS to [phone]. CM called wife and she is on her way to hospital now. Based on the chart it appears that Pt may be able to transition over in car. CM will talk to wife and discuss. CM will issue Second IM Letter to Pt. Signed copy will be on bedside chart. No further CM needs. Care Management Interventions  PCP Verified by CM: Yes  Palliative Care Criteria Met (RRAT>21 & CHF Dx)?: No  Mode of Transport at Discharge:  Other (see comment) (Wife will transport)  Transition of Care Consult (CM Consult): Discharge Planning  Physical Therapy Consult: Yes  Occupational Therapy Consult: Yes  Current Support Network: Lives with Spouse  Confirm Follow Up Transport: Family  Plan discussed with Pt/Family/Caregiver: Yes  Freedom of Choice Offered: Yes  Discharge Location  Discharge Placement: Skilled nursing [place], [place]   Ext [phone]

## 2018-05-11 NOTE — DISCHARGE INSTRUCTIONS
HOSPITALIST DISCHARGE INSTRUCTIONS    NAME: Jeanne Juarez   :  1935   MRN:  [id number]     Date/Time:  2018 9:32 AM    ADMIT DATE: 2018   DISCHARGE DATE: 2018     Attending Physician: Gloria Miller DO    DISCHARGE DIAGNOSIS and hospital course:    Acute hypoxic respiratory failure secondary to acute on chronic systolic heart failure  pt needed supplemental oxygen while lasix was given IV. Pt's prior lasix was 20mg daily, but did well on 40mg lasix over the weekend.      Moderate to severe aortic stenosis  Follow up with Dr. Lynnette Hung in 2-3 weeks, with echo in 4 weeks.      CAD s/p AICD  asa, plavix, statin      HTN  lasix     CKD 3  -cr stable at baseline  -monitor       Hx of tonsillar ca and dysphagia s/p G tube  -nutritional consulted. Cont' TF      DM with neuropathy  -cont lantus 14 units daily, gabapentin  -he uses humalog 14U at breakfast, 10U at lunch, and 7U at dinner.    -will use SSI while here, adjust prn      Hx of CVA  -cont' ASA, plavix, statin       Code status: full  Wife has requested SNF. discharge to rehab     Surrogate Decision Maker: pt's wife    Medications: Per above medication reconciliation. Recommended diet: tube feeding through G tube:   Access type PEG/G tube       Formula options High fiber (Jevity 1.5 lo)      Delivery Method Bolus      Starting Volume 375      Frequency Q 6 hours      Total number of feeds 3      Free Water Yes      Volume (mL) 150      Frequency Q 4 hours            Recommended activity: as tolerated    Wound care: routine g tube care    Glucose management:  Accucheck ACHS with sliding scale per SNF protocol    Code status: Full        Outside physician follow up:     Follow-up Information     Follow up With Details Comments [address] III, DO Schedule an appointment as soon as possible for a visit in 3 weeks  [address]  [address]  [phone]      [address] [place] [address] [facility])   [address] [facility] [facility] [address] [facility] Dread Borden MD   [address] [facility]  [phone]      Carlie Rios MD In 1 week increase in lasix for pul edema, pt had EF 45% and moderate aortic stenosis [address] [facility]  [phone]                 Skilled nursing facility/ SNF MD responsible for above on discharge. Information obtained by :  I understand that if any problems occur once I am at home I am to contact my physician. I understand and acknowledge receipt of the instructions indicated above.                                                                                                                                            Physician's or R.N.'s Signature                                                                  Date/Time                                                                                                                                              Patient or Repres

## 2018-05-11 NOTE — PROGRESS NOTES
ADULT PROTOCOL: JET AEROSOL  REASSESSMENT    Patient  Kiki Hernandez     80 y.o.   male     5/11/2018  12:55 AM    Breath Sounds Pre Procedure: Right Breath Sounds: Diminished                               Left Breath Sounds: Diminished    Breath Sounds Post Procedure: Right Breath Sounds: Diminished                                 Left Breath Sounds: Diminished    Breathing pattern: Pre procedure Breathing Pattern: Regular          Post procedure Breathing Pattern: Regular    Heart Rate: Pre procedure Pulse: 81           Post procedure Pulse: 82    Resp Rate: Pre procedure Respirations: 16           Post procedure Respirations: 16    Peak Flow: Pre bronchodilator   n/a          Post bronchodilator   n/a    Incentive Spirometry:    n/a      n/a    Cough: Pre procedure Cough: Non-productive               Post procedure Cough: Non-productive    Sputum: Pre procedure  n/a                 Post procedure  n/a    Oxygen: O2 Device: Room air   FiO2 (%) room air     Changed: NO    SpO2: Pre procedure SpO2: 93 %   without oxygen              Post procedure SpO2: 93 %  without oxygen    Nebulizer Therapy: Current medications Aerosolized Medications: DuoNeb      Changed: YES    Problem List:   Patient Active Problem List   Diagnosis Code    Dysphagia R13.10    Abnormal cardiovascular stress test R94.39    S/P CABG x 3 Z95.1    Atherosclerotic cerebrovascular disease I67.2    Orthostatic hypotension I95.1    Feeding difficulties R63.3    Non-cardiac chest pain R07.89    S/P PTCA (percutaneous transluminal coronary angioplasty) Z98.61    CAD (coronary artery disease) I25.10    Status post implantation of automatic cardioverter/defibrillator (AICD) Z95.810    Aortic stenosis, mild I35.0    Esophageal motility disorder K22.4    Heart failure (HCC) I50.9    Percutaneous endoscopic gastrostomy status (HCC) Z93.1    Antiplatelet or antithrombotic long-term use Z79.02    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 G63    History of stroke Z86.73    Aspiration pneumonia ([facility] J69.0    Weakness R53.1    Stroke ([facility] I63.9    Thrombotic stroke involving left middle cerebral artery ([facility]) X30.376    Stenosis of both internal carotid arteries I65.23    Hemiplegia and hemiparesis following cerebral infarction affecting right dominant side ([facility]) I69.351    Type 2 diabetes with nephropathy (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 J96.90       Respiratory Therapist: Gabriella Serrato, RT

## 2018-05-16 NOTE — ED NOTES
Patient was fed 240cc Jevity 1.5 with 30cc flush. Patient tolerated well.   Patient assisted x1 out of bed to chair to use the urinal.

## 2018-05-16 NOTE — ED NOTES
Assisted pt back to bed. Pt was on side chair using urinal and needed assistance due to being tangled in cords. Pt back on stretcher in POC with call bell in hand. Remains on monitor x 3.

## 2018-05-16 NOTE — ED NOTES
Pt sitting up in bed resting with eyes closed at this time. Will continue to monitor pt for changes.

## 2018-05-16 NOTE — ED PROVIDER NOTES
EMERGENCY DEPARTMENT HISTORY AND PHYSICAL EXAM           Date: 5/16/2018  Patient Name: Kenroy Laws    History of Presenting Illness     Chief Complaint   Patient presents with    Chest Pain    Abdominal Pain       History Provided By: Patient and Patient's Wife    HPI: Kenroy Laws is a 80 y.o. male, pmhx significant for carotid arterystenosis/HLD/HepC/CAD/MI/DM/CHF/CVA/ GERD who presents via EMS to the ED from [facility] c/o gradually worsening constant pressure-like CP and aching abdominal pain that began ~2130. Pt notes that he has not experienced similar sxs in the past. He also c/o associated nausea and diarrhea. Pt's wife reports that the pt was recently admitted to the hospital for CHF and fluid in his lungs, and per chart review this was from 05/07/2018- 05/11/2018. Following discharge pt was transferred to [facility] for rehab. His wife further notes that the pt has had a persistent cough, which she states is due to inability to clear secretions per his PEG tube. She also states that the pt has a hx of BL shoulder pain per which he gets regular steroid injections, the next scheduled for this morning. Pt specifically denies any recent fevers, chills, vomiting, SOB, urinary sxs, changes in BM, or headache. PCP: Olivia Tracy MD   Cardiology: Zehra Mccarty DO    Allergies: Demerol, Paxil, Amoxicillin, Penicillin, Cleocin  PMHx: Significant for carotid artery stenosis, HLD, HTN, Hep C, CAD, MI, DM, CHF, CVA, GERD, depression, anxiety, arthritis, PNA, oral tongue CA  PSHx: Significant for peg tube, bilateral carotid stents, pacemaker, radical left neck, CABG  Social Hx: -tobacco , - EtOH , - Illicit Drugs     There are no other complaints, changes, or physical findings at this time.      Current Facility-Administered Medications   Medication Dose Route Frequency Provider Last Rate Last Dose    ondansetron (ZOFRAN) injection 4 mg  4 mg IntraVENous NOW Khurram Soto MD Current Outpatient Prescriptions   Medication Sig Dispense Refill    oxyCODONE-acetaminophen (PERCOCET) 5-325 mg per tablet Take 1 Tab by mouth every eight (8) hours as needed for Pain. Max Daily Amount: 3 Tabs. Indications: Pain 10 Tab 0    furosemide (LASIX) 20 mg tablet Take 2 Tabs by mouth daily. Indications: hypercalcemia 30 Tab 2    cyanocobalamin 1,000 mcg tablet Take 1,000 mcg by mouth daily.  primidone (MYSOLINE) 50 mg tablet Take 0.5 Tabs by mouth nightly. 90 Tab 3    acetaminophen (TYLENOL) 500 mg tablet Take 1,000 mg by mouth every six (6) hours as needed for Pain.  Lancets misc Use preferred brand; Check glucose 4 times daily, Diagnosis E11.65 400 Each 5    glucose blood VI test strips (PHARMACIST CHOICE) strip Use preferred brand; Check glucose 4 times daily, Diagnosis E11.65 400 Strip 5    Blood-Glucose Meter monitoring kit 1 preferred brand glucometer for checking home glucose 4 times per day. DX Code: E11.65 1 Kit 0    fluticasone (FLONASE) 50 mcg/actuation nasal spray 1 [place] by Both Nostrils route two (2) times a day.  PYRIDOXINE HCL, VITAMIN B6, (VITAMIN B-6 PO) Take  by mouth.  insulin lispro (HUMALOG KWIKPEN INSULIN) 100 unit/mL kwikpen 13 units with breakfast, 10 units with lunch, 7 units with dinner + correction insulin, up to 40 units/day 15 Pen 3    HYDROcodone-acetaminophen (NORCO)  mg tablet Take 1 Tab by mouth daily as needed.  famotidine (PEPCID) 20 mg tablet Take 1 Tab by mouth two (2) times a day. 20 Tab 0    guaiFENesin (ROBITUSSIN) 100 mg/5 mL liquid Take 200 mg by mouth three (3) times daily as needed for Cough.  insulin glargine (LANTUS SOLOSTAR) 100 unit/mL (3 mL) pen 14 Units by SubCUTAneous route daily. (Patient taking differently: 14 Units by SubCUTAneous route daily. Takes in AM) 5 Pen 11    nitroglycerin (NITROSTAT) 0.4 mg SL tablet 0.4 mg by SubLINGual route every five (5) minutes as needed for Chest Pain.       aspirin (ASPIRIN) 325 mg tablet Take 325 mg by mouth daily.  vit B Cmplx 3-FA-Vit C-Biotin (NEPHRO GRAYSON RX) 1- mg-mg-mcg tablet Take 1 Tab by mouth daily.  magnesium hydroxide (TOBIAS MILK OF MAGNESIA) 400 mg/5 mL suspension Take 30 mL by mouth daily as needed for Constipation.  albuterol-ipratropium (DUO-NEB) 2.5 mg-0.5 mg/3 ml nebu 3 mL by Nebulization route every six (6) hours as needed. 100 Nebule 1    ondansetron hcl (ZOFRAN, AS HYDROCHLORIDE,) 8 mg tablet Take 1 Tab by mouth every eight (8) hours as needed for Nausea. 20 Tab 0    Nebulizer & Compressor machine 1 Each by Does Not Apply route daily. 1 Each 0    midodrine (PROAMITINE) 10 mg tablet Take 10 mg by mouth three (3) times daily (with meals).  insulin syringe-needle U-100 1 mL 31 x 15/64\" syrg 1 Syringe by SubCUTAneous route four (4) times daily. 200 Each 11    Insulin Needles, Disposable, (ESPERANZA PEN NEEDLE) 32 gauge x 5/32\" ndle 5 times a day 150 Each 99    atorvastatin (LIPITOR) 40 mg tablet Take 40 mg by mouth daily.  gabapentin (NEURONTIN) 250 mg/5 mL solution 750 mg by PEG Tube route three (3) times daily.  multivitamin (ONE A DAY) tablet Take 1 Tab by mouth daily.  clopidogrel (PLAVIX) 75 mg tablet Take 75 mg by mouth daily.          Past History     Past Medical History:  Past Medical History:   Diagnosis Date    Antiplatelet or antithrombotic long-term use 12/4/2014    Anxiety disorder     Arrhythmia 2009    bradycardia    Arthritis     CAD (coronary artery disease)     s/p CABG 2002; Dr Sarah Solorio [facility]) 1996    tongue/throat cancer s/p surgery / radiation and 1 dose of chemo    Carotid artery stenosis     s/p bilateral stents    Chronic pain     left leg, lower back,     Depression     Diabetes ([address]     Type II    Esophageal dysmotility     s/p dilitation    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 I suspect the latter. Achalasia  is not present.         GERD (gastroesophageal reflux disease)     Heart failure ([facility] [address] 10/2014     Cardiomyopathy:Pacemaker upgrade:Biv and AICD  Dr. Isis Olmedo heart Dr. last visit 5/11/2015    Hepatitis C Dx 1996    treated at [facility] in past; as of 4/15/15 wife states pt currently not under any treatment    Hyperlipidemia     Hypertension     Myocardial infarct [facility]) 2013    Heart Cath: 40% LV EF, Stented distal LAD, patent Graft to circumflex    On tube feeding diet approx 2009    still has as of 9/28/15  (no po food/liquid/meds at all); Dr Zamzam Choudhury Other ill-defined conditions(799.89) 1996    1 dose of chemotherapy/radiation for tongue cancer    Other ill-defined conditions(799.89)     BPH    Other ill-defined conditions(799.89)     orthostatic hypotension    Pneumonia ~ April -May 2010    Stroke [facility]) approx 2003    left side-left finger tips numb; no imbalance or memory loss; as of 2015 not seeing neuro MD    Suicidal thoughts        Past Surgical History:  Past Surgical History:   Procedure Laterality Date    ABDOMEN SURGERY [address]    peg tube    CABG, ARTERY-VEIN, THREE  2000    HX CATARACT REMOVAL      bilateral    HX CHOLECYSTECTOMY      HX HEART CATHETERIZATION  2013    Stented distal LAD    HX MOHS PROCEDURES      bilateral    HX ORTHOPAEDIC      back surgery times two    HX OTHER SURGICAL      Radical Left Neck    HX OTHER SURGICAL      NASAL POLYPS REMOVAL    HX OTHER SURGICAL  2010    TURP    HX PACEMAKER  11/08    HX PACEMAKER  10/28/14     Defibrillator: [facility] # [id number], serial # [id number]; Dr. Zoie Keys [phone]; Dr Antonio Jade      cystoscopy    [facility]    cevical surgery    RI CHANGE GASTROSTOMY TUBE  5/2/2011         RI CHANGE GASTROSTOMY TUBE  6/29/2011         RI EGD INSERT GUIDE WIRE DILATOR PASSAGE ESOPHAGUS  9/13/2010         CA EGD TRANSORAL BIOPSY SINGLE/MULTIPLE  2010         STOMACH SURGERY PROCEDURE UNLISTED  2011         VASCULAR SURGERY PROCEDURE UNLIST      bilateral carotid stents       Family History:  Family History   Problem Relation Age of Onset    Heart Disease Father       at age 52 from CAD    Colon Cancer Mother     Cancer Mother      colon ca    Heart Disease Brother        Social History:  Social History   Substance Use Topics    Smoking status: Never Smoker    Smokeless tobacco: Never Used    Alcohol use No       Allergies: Allergies   Allergen Reactions    Demerol [Meperidine] Shortness of Breath    Paxil [Paroxetine Hcl] Unknown (comments)     Pt gets shaky and loses control of legs    Amoxicillin Rash    Cleocin [Clindamycin Hcl] Rash    Pcn [Penicillins] Rash       Review of Systems   Review of Systems   Constitutional: Negative for chills and fever. HENT: Negative. Eyes: Positive for pain. Respiratory: Positive for cough. Negative for chest tightness and shortness of breath. Cardiovascular: Positive for chest pain (pressure). Negative for leg swelling. Gastrointestinal: Positive for abdominal pain (aching), diarrhea and nausea. Negative for vomiting. Endocrine: Negative. Genitourinary: Negative for difficulty urinating and dysuria. Musculoskeletal: Negative for myalgias. Skin: Negative. Neurological: Negative. Psychiatric/Behavioral: Negative. All other systems reviewed and are negative. Physical Exam   Physical Exam   Constitutional: He is oriented to person, place, and time. He appears well-developed and well-nourished. No distress. HENT:   Head: Normocephalic and atraumatic. Nose: Nose normal.   Mouth/Throat: No oropharyngeal exudate. Pt with well healed surgical scarring to anterior neck/chin from previous resections of tongue cancer   Eyes: Conjunctivae and EOM are normal. Pupils are equal, round, and reactive to light.    Neck: Normal range of motion. Neck supple. No JVD present. Cardiovascular: Normal rate, regular rhythm, normal heart sounds and intact distal pulses. Exam reveals no friction rub. No murmur heard. Pulmonary/Chest: Effort normal and breath sounds normal. No stridor. No respiratory distress. He has no wheezes. He has no rales. Pt able to speak in full, unlabored sentences. Abdominal: Soft. Bowel sounds are normal. He exhibits no distension. There is generalized tenderness. There is no rebound. Musculoskeletal: Normal range of motion. He exhibits no tenderness. Neurological: He is alert and oriented to person, place, and time. No cranial nerve deficit. Skin: Skin is warm and dry. No rash noted. He is not diaphoretic. Psychiatric: He has a normal mood and affect. His speech is normal and behavior is normal. Judgment and thought content normal. Cognition and memory are normal.   Nursing note and vitals reviewed. Diagnostic Study Results     Labs -     Recent Results (from the past 12 hour(s))   EKG, 12 LEAD, INITIAL    Collection Time: 05/16/18  2:35 AM   Result Value Ref Range    Ventricular Rate 83 BPM    Atrial Rate 83 BPM    P-R Interval 138 ms    QRS Duration 176 ms    Q-T Interval 478 ms    QTC Calculation (Bezet) 561 ms    Calculated P Axis 36 degrees    Calculated R Axis 169 degrees    Calculated T Axis 1 degrees    Diagnosis       Sinus rhythm with premature atrial complexes  Ventricular pre-excitation, WPW pattern type A  Abnormal ECG  When compared with ECG of 07-MAY-2018 12:15,  Sinus rhythm has replaced Electronic ventricular pacemaker     CBC WITH AUTOMATED DIFF    Collection Time: 05/16/18  2:39 AM   Result Value Ref Range    WBC 8.6 4.1 - 11.1 K/uL    RBC 4.29 4. 10 - 5.70 M/uL    HGB 13.2 12.1 - 17.0 g/dL    HCT 40.5 36.6 - 50.3 %    MCV 94.4 80.0 - 99.0 FL    MCH 30.8 26.0 - 34.0 PG    MCHC 32.6 30.0 - 36.5 g/dL    RDW 13.7 11.5 - 14.5 %    PLATELET 895 (L) 702 - 400 K/uL    MPV 10.4 8.9 - 12.9 FL    NRBC 0.0 0  WBC    ABSOLUTE NRBC 0.00 0.00 - 0.01 K/uL    NEUTROPHILS 89 (H) 32 - 75 %    LYMPHOCYTES 8 (L) 12 - 49 %    MONOCYTES 3 (L) 5 - 13 %    EOSINOPHILS 0 0 - 7 %    BASOPHILS 0 0 - 1 %    IMMATURE GRANULOCYTES 0 0.0 - 0.5 %    ABS. NEUTROPHILS 7.6 1.8 - 8.0 K/UL    ABS. LYMPHOCYTES 0.7 (L) 0.8 - 3.5 K/UL    ABS. MONOCYTES 0.3 0.0 - 1.0 K/UL    ABS. EOSINOPHILS 0.0 0.0 - 0.4 K/UL    ABS. BASOPHILS 0.0 0.0 - 0.1 K/UL    ABS. IMM. GRANS. 0.0 0.00 - 0.04 K/UL    DF AUTOMATED      RBC COMMENTS NORMOCYTIC, NORMOCHROMIC     METABOLIC PANEL, COMPREHENSIVE    Collection Time: 05/16/18  2:39 AM   Result Value Ref Range    Sodium 134 (L) 136 - 145 mmol/L    Potassium 4.7 3.5 - 5.1 mmol/L    Chloride 97 97 - 108 mmol/L    CO2 35 (H) 21 - 32 mmol/L    Anion gap 2 (L) 5 - 15 mmol/L    Glucose 321 (H) 65 - 100 mg/dL    BUN 28 (H) 6 - 20 MG/DL    Creatinine 1.41 (H) 0.70 - 1.30 MG/DL    BUN/Creatinine ratio 20 12 - 20      GFR est AA 58 (L) >60 ml/min/1.73m2    GFR est non-AA 48 (L) >60 ml/min/1.73m2    Calcium 9.1 8.5 - 10.1 MG/DL    Bilirubin, total 0.7 0.2 - 1.0 MG/DL    ALT (SGPT) 44 12 - 78 U/L    AST (SGOT) 48 (H) 15 - 37 U/L    Alk.  phosphatase 77 45 - 117 U/L    Protein, total 7.9 6.4 - 8.2 g/dL    Albumin 3.3 (L) 3.5 - 5.0 g/dL    Globulin 4.6 (H) 2.0 - 4.0 g/dL    A-G Ratio 0.7 (L) 1.1 - 2.2     TROPONIN I    Collection Time: 05/16/18  2:39 AM   Result Value Ref Range    Troponin-I, Qt. <0.04 <0.05 ng/mL   CK W/ REFLX CKMB    Collection Time: 05/16/18  2:39 AM   Result Value Ref Range     39 - 308 U/L   PROTHROMBIN TIME + INR    Collection Time: 05/16/18  2:39 AM   Result Value Ref Range    INR 1.1 0.9 - 1.1      Prothrombin time 10.8 9.0 - 11.1 sec   LIPASE    Collection Time: 05/16/18  2:39 AM   Result Value Ref Range    Lipase 232 73 - 393 U/L   NT-PRO BNP    Collection Time: 05/16/18  2:39 AM   Result Value Ref Range    NT pro-BNP 4446 (H) 0 - 450 PG/ML   URINALYSIS W/ REFLEX CULTURE Collection Time: 05/16/18  3:24 AM   Result Value Ref Range    Color YELLOW/STRAW      Appearance CLEAR CLEAR      Specific gravity 1.029 1.003 - 1.030      pH (UA) 7.5 5.0 - 8.0      Protein 100 (A) NEG mg/dL    Glucose >1000 (A) NEG mg/dL    Ketone TRACE (A) NEG mg/dL    Bilirubin NEGATIVE  NEG      Blood NEGATIVE  NEG      Urobilinogen 1.0 0.2 - 1.0 EU/dL    Nitrites NEGATIVE  NEG      Leukocyte Esterase NEGATIVE  NEG      WBC 0-4 0 - 4 /hpf    RBC 5-10 0 - 5 /hpf    Epithelial cells FEW FEW /lpf    Bacteria NEGATIVE  NEG /hpf    UA:UC IF INDICATED CULTURE NOT INDICATED BY UA RESULT CNI     TROPONIN I    Collection Time: 05/16/18  8:00 AM   Result Value Ref Range    Troponin-I, Qt. <0.04 <0.05 ng/mL   GLUCOSE, POC    Collection Time: 05/16/18  8:05 AM   Result Value Ref Range    Glucose (POC) 287 (H) 65 - 100 mg/dL    Performed by Joel Deng \"Vinnie\"    EKG, 12 LEAD, SUBSEQUENT    Collection Time: 05/16/18  8:35 AM   Result Value Ref Range    Ventricular Rate 100 BPM    Atrial Rate 100 BPM    P-R Interval 140 ms    QRS Duration 180 ms    Q-T Interval 442 ms    QTC Calculation (Bezet) 570 ms    Calculated P Axis 29 degrees    Calculated R Axis 175 degrees    Diagnosis       Normal sinus rhythm  Possible Left atrial enlargement  Nonspecific intraventricular block  Right ventricular hypertrophy  Inferior infarct , age undetermined  Anterolateral infarct , age undetermined  Abnormal ECG  When compared with ECG of 16-MAY-2018 02:35,  MANUAL COMPARISON REQUIRED, DATA IS UNCONFIRMED         Radiologic Studies -     CXR Results  (Last 48 hours)               05/16/18 0317  XR CHEST PORT Final result    Impression:  IMPRESSION: No acute findings. Narrative:  EXAM:  XR CHEST PORT       INDICATION:  Chest pain. COMPARISON:  Chest x-ray 5/7/2018. FINDINGS: A portable AP radiograph of the chest was obtained at 03:08 hours. The   patient is on a cardiac monitor.   Pacemaker generator body projects over the left chest wall with intact appearing leads traversing in expected course. The   lungs are clear with no pneumothorax or pleural effusion. There are median   sternotomy wires and surgical clips compatible with prior CABG. The cardiac and   mediastinal contours and pulmonary vascularity are normal. Atherosclerotic   calcifications affect the aortic arch and the thoracic aorta is tortuous. The   chest wall structures and visualized upper abdomen show no acute findings with   incidental note of degenerative spine and shoulder changes as well as diffuse   osteopenia the left shoulder humeral head suture anchors. Medical Decision Making   I am the first provider for this patient. I reviewed the vital signs, available nursing notes, past medical history, past surgical history, family history and social history. Vital Signs-Reviewed the patient's vital signs.   Patient Vitals for the past 12 hrs:   Temp Pulse Resp BP SpO2   05/16/18 0830 - (!) 103 (!) 33 181/90 91 %   05/16/18 0755 - 99 27 (!) 181/99 90 %   05/16/18 0730 - 96 23 (!) 188/94 91 %   05/16/18 0715 - 97 25 (!) 186/91 91 %   05/16/18 0700 - 96 27 (!) 185/94 92 %   05/16/18 0658 - 93 20 170/81 95 %   05/16/18 0557 - 98 - - -   05/16/18 0445 - 92 23 174/66 96 %   05/16/18 0430 - 89 22 (!) 185/97 94 %   05/16/18 0421 - 79 16 - 95 %   05/16/18 0400 - 85 24 166/87 96 %   05/16/18 0345 - 88 23 198/90 95 %   05/16/18 0315 - 82 19 166/88 96 %   05/16/18 0300 - 84 (!) 31 (!) 190/99 96 %   05/16/18 0249 - 84 27 - 98 %   05/16/18 0245 - - - - 97 %   05/16/18 0245 - 83 19 182/86 97 %   05/16/18 0239 - 85 24 177/80 97 %   05/16/18 0232 97.9 °F (36.6 °C) 87 23 - 97 %       Pulse Oximetry Analysis - 95% on RA    Cardiac Monitor:   Rate: 84 bpm  Rhythm: Normal Sinus Rhythm      Records Reviewed: Nursing Notes, Old Medical Records, Ambulance Run Sheet, Previous Radiology Studies and Previous Laboratory Studies    Provider Notes (Medical Decision Making): DDX:  Acs, dehydration, uti, chf, pancreatitis    Plan:  Ekg, labs, ce's, cxr, analgesic    Impression:  Atypical chest pain, abdominal pain    ED Course:   Initial assessment performed. The patients presenting problems have been discussed, and they are in agreement with the care plan formulated and outlined with them. I have encouraged them to ask questions as they arise throughout their visit. I reviewed our electronic medical record system for any past medical records that were available that may contribute to the patients current condition, the nursing notes and and vital signs from today's visit    Nursing notes will be reviewed as they become available in realtime while the pt has been in the ED. Jackie Cole MD    EKG interpretation 0867: v paced rhythm, L Axis, rate 83; , , QTc 561; no acute ischemia; Jackie Cole MD    I personally reviewed pt's imaging. Official read by radiology noted above. Jackie Cole MD    PROGRESS NOTE:  4:20 AM  Pt feels much better. Resolution of previously noted sxs. Pt's wife expresses concern about \"abdominal xray\" conducted at rehab center that was reported to her as \"constipation\" but also notes he has been having frequent loose/liquid stools for the last 2 days 2/2 use of miralax while in hospital. She does note that he had \"2 large, formed bms\" yesterday evening. Noted that I would obtain imaging of His abdomen to ensure no evidence of obstruction. Written by Corrie Quintanilla, ED Scribe, as dictated by Jackie Cole MD    PROGRESS NOTE  5:58 AM   Re-evaluated pt, discussed lab and imaging findings with pt and/or family, with no remarkable findings. Pt's wife notes that pt's pain has return and that he states he is feels he is \"not ready to go back to rehab. \" Pt expresses he does not want to go back because they \"don't get your medicine on time, they don't feed you on time. \"  Noted I was sorry he was not happy with his current rehab situation but noted there was no indication for his admission at this time. Will provide analgesic and re-eval.    PROGRESS NOTE  6:36 AM   Pt's wife comes out of room and notes she thinks the IVF is running in \"too fast\" because the pt is now \"shaking his arms around. \"  Pt with recent office visit to Dr. Edilma Nuñez (neurology) for reported \"tremors\"   That were not visualized during their visit and described as \"essential tremor. \"  Pt has not had any type of tremor movement while in the ED up until this time. Upon evaluating pt in his room, IVF flowing without issue, pt continues to move his bilateral arms around on the bed. Pt denies pain at this time. Will have nursing decrease rate of infusion for IVF. Yuliet Rashid MD    Progress note:  7:07 AM  Per nursing, pt and family note they are \"refusing to be discharged. \"   Wife notes concern for elevated BP (most recent reading- 170/81) and report they are calling to speak with the cardiologist, that she personally called. Pt noted to be pain free at this time. Yuliet Rashid MD    PROGRESS NOTE  8:00 AM   Spoke with Dr. Nelson Tolbert after pt's wife call his answering service. Discussed specific lab results and cxr findings, discussed elevated BP's  Ranging in the 877'A systolically. Dr. Nelson Tolbert notes he will call and speak with the pt's wife. I personally spoke with the wife and pt after they had spoke with Dr. Love Dick. Discussed that I will repeat his troponin and EKG. Rechecked pt's temp- 98.6, repeat accucheck is 287.      8:10 AM  Patient's presentation, labs/imaging and plan of care was reviewed with Dr. Stephanie Vegas as part of sign out. They will f/u on repeat trop as part of the plan discussed with the patient. Dr. Gemini Lund assistance in completion of this plan is greatly appreciated but it should be noted that I will be the provider of record for this patient.     Yuliet Rashid MD    EKG interpretation: (0422)  Rhythm: normal sinus rhythm and nonspecific intraventricular block; and regular . Rate (approx.): 100; Axis: normal; NE interval: 140; QRS interval: 180; QTc: 570; ST/T wave: nonspecific ST/T wave changes; no NILAY; Other findings: similar to prior EKG. Written by LUANNE Alvarado, as dictated by Korin Coy. Fritz Anaya MD.      Diagnosis     Clinical Impression:   1. Abdominal pain, generalized    2. Atypical chest pain    3. Chronic pain of both shoulders        PLAN:  1. Current Discharge Medication List      START taking these medications    Details   oxyCODONE-acetaminophen (PERCOCET) 5-325 mg per tablet Take 1 Tab by mouth every eight (8) hours as needed for Pain. Max Daily Amount: 3 Tabs. Indications: Pain  Qty: 10 Tab, Refills: 0    Associated Diagnoses: Chronic pain of both shoulders           2. Follow-up Information     Follow up With Details Comments Contact Info    Alex Cox MD Schedule an appointment as soon as possible for a visit in 2 days  [place] [address]  [address] [phone]      Jose Juan Batista MD Schedule an appointment as soon as possible for a visit  [address]  [address]  [phone]          Return to ED if worse     Disposition:    11:31 AM  The patient's results have been reviewed with family and/or caregiver. They verbally convey their understanding and agreement of the patient's signs, symptoms, diagnosis, treatment and prognosis and additionally agree to follow up as recommended in the discharge instructions or to return to the Emergency Room should the patient's condition change prior to their follow-up appointment. The family and/or caregiver verbally agrees with the care-plan and all of their questions have been answered.  The discharge instructions have also been provided to the them with educational information regarding the patient's diagnosis as well a list of reasons why the patient would want to return to the ER prior to their follow-up appointment should their condition change. Walter John MD        Attestations: This note is prepared by Sam Brown, acting as Scribe for MD Walter Watson Mc, Mc, MD : The scribe's documentation has been prepared under my direction and personally reviewed by me in its entirety. I confirm that the note above accurately reflects all work, treatment, procedures, and medical decision making performed by me. This note will not be viewable in [address]

## 2018-05-16 NOTE — ED TRIAGE NOTES
Patient arrives in the ED via EMS with complaints of chest pain and abdominal pain that has been going on since 9 PM yesterday. Patient is coming from [facility]. Patient has a history of CHF, HTN and has a cardiac defibrillator. Patient received 3 nitro en route to the ED. Nitro was given at [address], [id number], [place] [address].  Patient's Oxygen Saturation did drop to 87, so patient was placed on 3 L NC.

## 2018-05-16 NOTE — ED NOTES
Pt and family refusing to leave at this time. Pt and pt's wife does not feel comfortable going back to [employer]. MD updated. Pt and pt's wife reassured.

## 2018-05-16 NOTE — DISCHARGE INSTRUCTIONS
Abdominal Pain: Care Instructions  Your Care Instructions    Abdominal pain has many possible causes. Some aren't serious and get better on their own in a few days. Others need more testing and treatment. If your pain continues or gets worse, you need to be rechecked and may need more tests to find out what is wrong. You may need surgery to correct the problem. Don't ignore new symptoms, such as fever, nausea and vomiting, urination problems, pain that gets worse, and dizziness. These may be signs of a more serious problem. Your doctor may have recommended a follow-up visit in the next 8 to 12 hours. If you are not getting better, you may need more tests or treatment. The doctor has checked you carefully, but problems can develop later. If you notice any problems or new symptoms, get medical treatment right away. Follow-up care is a key part of your treatment and safety. Be sure to make and go to all appointments, and call your doctor if you are having problems. It's also a good idea to know your test results and keep a list of the medicines you take. How can you care for yourself at home? · Rest until you feel better. · To prevent dehydration, drink plenty of fluids, enough so that your urine is light yellow or clear like water. Choose water and other caffeine-free clear liquids until you feel better. If you have kidney, heart, or liver disease and have to limit fluids, talk with your doctor before you increase the amount of fluids you drink. · If your stomach is upset, eat mild foods, such as rice, dry toast or crackers, bananas, and applesauce. Try eating several small meals instead of two or three large ones. · Wait until 48 hours after all symptoms have gone away before you have spicy foods, alcohol, and drinks that contain caffeine. · Do not eat foods that are high in fat. · Avoid anti-inflammatory medicines such as aspirin, ibuprofen (Advil, Motrin), and naproxen (Aleve).  These can cause stomach upset. Talk to your doctor if you take daily aspirin for another health problem. When should you call for help? Call 911 anytime you think you may need emergency care. For example, call if:  ? · You passed out (lost consciousness). ? · You pass maroon or very bloody stools. ? · You vomit blood or what looks like coffee grounds. ? · You have new, severe belly pain. ?Call your doctor now or seek immediate medical care if:  ? · Your pain gets worse, especially if it becomes focused in one area of your belly. ? · You have a new or higher fever. ? · Your stools are black and look like tar, or they have streaks of blood. ? · You have unexpected vaginal bleeding. ? · You have symptoms of a urinary tract infection. These may include:  ¨ Pain when you urinate. ¨ Urinating more often than usual.  ¨ Blood in your urine. ? · You are dizzy or lightheaded, or you feel like you may faint. ? Watch closely for changes in your health, and be sure to contact your doctor if:  ? · You are not getting better after 1 day (24 hours). Where can you learn more? Go to http://aniket-tomas.info/. Enter C119 in the search box to learn more about \"Abdominal Pain: Care Instructions. \"  Current as of: March 20, 2017  Content Version: 11.4  © 4082-5814 [employer]. Care instructions adapted under license by [employer] (which disclaims liability or warranty for this information). If you have questions about a medical condition or this instruction, always ask your healthcare professional. [place] [address] any warranty or liability for your use of this information. Chest Pain: Care Instructions  Your Care Instructions    There are many things that can cause chest pain. Some are not serious and will get better on their own in a few days. But some kinds of chest pain need more testing and treatment.  Your doctor may have recommended a follow-up visit in the next 8 to 12 hours. If you are not getting better, you may need more tests or treatment. Even though your doctor has released you, you still need to watch for any problems. The doctor carefully checked you, but sometimes problems can develop later. If you have new symptoms or if your symptoms do not get better, get medical care right away. If you have worse or different chest pain or pressure that lasts more than 5 minutes or you passed out (lost consciousness), call 911 or seek other emergency help right away. A medical visit is only one step in your treatment. Even if you feel better, you still need to do what your doctor recommends, such as going to all suggested follow-up appointments and taking medicines exactly as directed. This will help you recover and help prevent future problems. How can you care for yourself at home? · Rest until you feel better. · Take your medicine exactly as prescribed. Call your doctor if you think you are having a problem with your medicine. · Do not drive after taking a prescription pain medicine. When should you call for help? Call 911 if:  ? · You passed out (lost consciousness). ? · You have severe difficulty breathing. ? · You have symptoms of a heart attack. These may include:  ¨ Chest pain or pressure, or a strange feeling in your chest.  ¨ Sweating. ¨ Shortness of breath. ¨ Nausea or vomiting. ¨ Pain, pressure, or a strange feeling in your back, neck, jaw, or upper belly or in one or both shoulders or arms. ¨ Lightheadedness or sudden weakness. ¨ A fast or irregular heartbeat. After you call 911, the  may tell you to chew 1 adult-strength or 2 to 4 low-dose aspirin. Wait for an ambulance. Do not try to drive yourself. ?Call your doctor today if:  ? · You have any trouble breathing. ? · Your chest pain gets worse. ? · You are dizzy or lightheaded, or you feel like you may faint. ? · You are not getting better as expected.    ? · You are having new or different chest pain. Where can you learn more? Go to http://aniket-tomas.info/. Enter A120 in the search box to learn more about \"Chest Pain: Care Instructions. \"  Current as of: March 20, 2017  Content Version: 11.4  © 6797-8752 [employer]. Care instructions adapted under license by [employer] (which disclaims liability or warranty for this information). If you have questions about a medical condition or this instruction, always ask your healthcare professional. [place] [address] any warranty or liability for your use of this information. Joint Pain: Care Instructions  Your Care Instructions    Many people have small aches and pains from overuse or injury to muscles and joints. Joint injuries often happen during sports or recreation, work tasks, or projects around the home. An overuse injury can happen when you put too much stress on a joint or when you do an activity that stresses the joint over and over, such as using the computer or rowing a boat. You can take action at home to help your muscles and joints get better. You should feel better in 1 to 2 weeks, but it can take 3 months or more to heal completely. Follow-up care is a key part of your treatment and safety. Be sure to make and go to all appointments, and call your doctor if you are having problems. It's also a good idea to know your test results and keep a list of the medicines you take. How can you care for yourself at home? · Do not put weight on the injured joint for at least a day or two. · For the first day or two after an injury, do not take hot showers or baths, and do not use hot packs. The heat could make swelling worse. · Put ice or a cold pack on the sore joint for 10 to 20 minutes at a time. Try to do this every 1 to 2 hours for the next 3 days (when you are awake) or until the swelling goes down.  Put a thin cloth between the ice and your skin.  · Wrap the injury in an elastic bandage. Do not wrap it too tightly because this can cause more swelling. · Prop up the sore joint on a pillow when you ice it or anytime you sit or lie down during the next 3 days. Try to keep it above the level of your heart. This will help reduce swelling. · Take an over-the-counter pain medicine, such as acetaminophen (Tylenol), ibuprofen (Advil, Motrin), or naproxen (Aleve). Read and follow all instructions on the label. · After 1 or 2 days of rest, begin moving the joint gently. While the joint is still healing, you can begin to exercise using activities that do not strain or hurt the painful joint. When should you call for help? Call your doctor now or seek immediate medical care if:  ? · You have signs of infection, such as:  ¨ Increased pain, swelling, warmth, and redness. ¨ Red streaks leading from the joint. ¨ A fever. ? Watch closely for changes in your health, and be sure to contact your doctor if:  ? · Your movement or symptoms are not getting better after 1 to 2 weeks of home treatment. Where can you learn more? Go to http://aniket-tomas.info/. Enter P205 in the search box to learn more about \"Joint Pain: Care Instructions. \"  Current as of: March 21, 2017  Content Version: 11.4  © 7371-0735 [employer]. Care instructions adapted under license by [employer] (which disclaims liability or warranty for this information). If you have questions about a medical condition or this instruction, always ask your healthcare professional. [place] [address] any warranty or liability for your use of this information. Chronic Pain: Care Instructions  Your Care Instructions    Chronic pain is pain that lasts a long time (months or even years) and may or may not have a clear cause.  It is different from acute pain, which usually does have a clear cause-like an injury or illness-and gets better over time. Chronic pain:  · Lasts over time but may vary from day to day. · Does not go away despite efforts to end it. · May disrupt your sleep and lead to fatigue. · May cause depression or anxiety. · May make your muscles tense, causing more pain. · Can disrupt your work, hobbies, home life, and relationships with friends and family. Chronic pain is a very real condition. It is not just in your head. Treatment can help and usually includes several methods used together, such as medicines, physical therapy, exercise, and other treatments. Learning how to relax and changing negative thought patterns can also help you cope. Chronic pain is complex. Taking an active role in your treatment will help you better manage your pain. Tell your doctor if you have trouble dealing with your pain. You may have to try several things before you find what works best for you. Follow-up care is a key part of your treatment and safety. Be sure to make and go to all appointments, and call your doctor if you are having problems. It's also a good idea to know your test results and keep a list of the medicines you take. How can you care for yourself at home? · Pace yourself. Break up large jobs into smaller tasks. Save harder tasks for days when you have less pain, or go back and forth between hard tasks and easier ones. Take rest breaks. · Relax, and reduce stress. Relaxation techniques such as deep breathing or meditation can help. · Keep moving. Gentle, daily exercise can help reduce pain over the long run. Try low- or no-impact exercises such as walking, swimming, and stationary biking. Do stretches to stay flexible. · Try heat, cold packs, and massage. · Get enough sleep. Chronic pain can make you tired and drain your energy. Talk with your doctor if you have trouble sleeping because of pain. · Think positive. Your thoughts can affect your pain level.  Do things that you enjoy to distract yourself when you have pain instead of focusing on the pain. See a movie, read a book, listen to music, or spend time with a friend. · If you think you are depressed, talk to your doctor about treatment. · Keep a daily pain diary. Record how your moods, thoughts, sleep patterns, activities, and medicine affect your pain. You may find that your pain is worse during or after certain activities or when you are feeling a certain emotion. Having a record of your pain can help you and your doctor find the best ways to treat your pain. · Take pain medicines exactly as directed. ¨ If the doctor gave you a prescription medicine for pain, take it as prescribed. ¨ If you are not taking a prescription pain medicine, ask your doctor if you can take an over-the-counter medicine. Reducing constipation caused by pain medicine  · Include fruits, vegetables, beans, and whole grains in your diet each day. These foods are high in fiber. · Drink plenty of fluids, enough so that your urine is light yellow or clear like water. If you have kidney, heart, or liver disease and have to limit fluids, talk with your doctor before you increase the amount of fluids you drink. · If your doctor recommends it, get more exercise. Walking is a good choice. Bit by bit, increase the amount you walk every day. Try for at least 30 minutes on most days of the week. · Schedule time each day for a bowel movement. A daily routine may help. Take your time and do not strain when having a bowel movement. When should you call for help? Call your doctor now or seek immediate medical care if:  ? · Your pain gets worse or is out of control. ? · You feel down or blue, or you do not enjoy things like you once did. You may be depressed, which is common in people with chronic pain. Depression can be treated. ? · You have vomiting or cramps for more than 2 hours. ? Watch closely for changes in your health, and be sure to contact your doctor if:  ? · You cannot sleep because of pain. ? · You are very worried or anxious about your pain. ? · You have trouble taking your pain medicine. ? · You have any concerns about your pain medicine. ? · You have trouble with bowel movements, such as:  ¨ No bowel movement in 3 days. ¨ Blood in the anal area, in your stool, or on the toilet paper. ¨ Diarrhea for more than 24 hours. Where can you learn more? Go to http://aniket-tomas.info/. Enter N004 in the search box to learn more about \"Chronic Pain: Care Instructions. \"  Current as of: October 14, 2016  Content Version: 11.4  © 8508-0510 [employer]. Care instructions adapted under license by [employer] (which disclaims liability or warranty for this information). If you have questions about a medical condition or this instruction, always ask your healthcare professional. [address] any warranty or liability for your use of this information.

## 2018-05-16 NOTE — ED NOTES
Assisted pt t void, repositioned for comfort. Consult to [facility] at [address] is speaking with wife about concerns they have encountered at facility.

## 2018-05-18 NOTE — PROGRESS NOTES
Community Care Team Documentation for Patient in [facility]  Initial Follow Up       Patient was admitted to [facility] from 5/7/18 to 5/11/18. Patient was discharged to [facility], on 5/11/18 (date). Community Care Team followed up to [address] [place] [address] during this transition. Hospital Discharge diagnosis:  Acute hypoxic respiratory failure 2nd to acute on chronic systolic heart failure. RRAT score: 38    Advance Medical Directive on file in EMR? no    Total Hospitalizations/ED visits last 6 months? IP - 1; ED - 3    PCP : Esthela Blood MD    SNF Attending:  Shemar Barr MD  Per Anthony Obrien and team at [facility], Reviewed [place] Back questions to assess patient's arrival at [facility]. HF Bundle patient - 90 day end date 8/9/18. Patient left the SNF against medical advice on 5/16 and also had an ED visit on 5/16 at [address] SNF SW provided patient follow up appointment information. Family indicated patient has all medications at home. Family refused home health when offered. CCT notified HF bundle team.    Community Care Team will sign off at this time, as patient is no longer in SNF. Medications were not reconciled and general patient assessment was not completed during this skilled nursing facility outreach.      Hieu Busch, MSN, RN, ACNS-BC, [facility]  Nurse Navigator, [employer] [phone]

## 2018-05-23 NOTE — PROGRESS NOTES
Hospital Discharge Follow-Up      Date/Time:  2018 11:03 AM    Patient was admitted to [facility] on 18 and discharged on 18 for acute respiratory failure. The physician discharge summary was available at the time of outreach. Patient was contacted within 2 business days of discharge. Top Challenges reviewed with the provider   Pt's wife needs hip surgery and she is primary care provider. Method of communication with provider :face to face, chart routing, staff message, phone, none    Inpatient RRAT score: 38    Nurse Navigator (NN) contacted the caregiver by telephone to perform post hospital discharge assessment. Verified name and  with caregiver as identifiers. Provided introduction to self, and explanation of the Nurse Navigator role. Reviewed discharge instructions and red flags with caregiver who verbalized understanding. Caregiver given an opportunity to ask questions and does not have any further questions or concerns at this time. The caregiver agrees to contact the PCP office for questions related to their healthcare. NN provided contact information for future reference. Summary of patient's top problems:  1. CHF  2. PEG/DM      Home Health orders at discharge: discharged to [address] [place] [address] Ave: non3    Barriers to care? pt's wife needs a \"back up\" plan in case she were to have a medical emergency    Advance Care Planning:   Does patient have an Advance Directive:  not on file       Medication:     New Medications at Discharge: none  Changed Medications at Discharge: lasix changed  Discontinued Medications at Discharge: none    No medication reconciliation was performed . There were no barriers to obtaining medications identified at this time.     Referral to Pharm D needed: no     Current Outpatient Prescriptions   Medication Sig    LANTUS SOLOSTAR U-100 INSULIN 100 unit/mL (3 mL) inpn INJECT 14 UNITS SUBCUTANEOUSLY ONCE DAILY    oxyCODONE-acetaminophen (PERCOCET) 5-325 mg per tablet Take 1 Tab by mouth every eight (8) hours as needed for Pain. Max Daily Amount: 3 Tabs. Indications: Pain    furosemide (LASIX) 20 mg tablet Take 2 Tabs by mouth daily. Indications: hypercalcemia    cyanocobalamin 1,000 mcg tablet Take 1,000 mcg by mouth daily.  primidone (MYSOLINE) 50 mg tablet Take 0.5 Tabs by mouth nightly.  acetaminophen (TYLENOL) 500 mg tablet Take 1,000 mg by mouth every six (6) hours as needed for Pain.  Lancets misc Use preferred brand; Check glucose 4 times daily, Diagnosis E11.65    glucose blood VI test strips (PHARMACIST CHOICE) strip Use preferred brand; Check glucose 4 times daily, Diagnosis E11.65    Blood-Glucose Meter monitoring kit 1 preferred brand glucometer for checking home glucose 4 times per day. DX Code: E11.65    fluticasone (FLONASE) 50 mcg/actuation nasal spray 1 [place] by Both Nostrils route two (2) times a day.  PYRIDOXINE HCL, VITAMIN B6, (VITAMIN B-6 PO) Take  by mouth.  insulin lispro (HUMALOG KWIKPEN INSULIN) 100 unit/mL kwikpen 13 units with breakfast, 10 units with lunch, 7 units with dinner + correction insulin, up to 40 units/day    HYDROcodone-acetaminophen (NORCO)  mg tablet Take 1 Tab by mouth daily as needed.  famotidine (PEPCID) 20 mg tablet Take 1 Tab by mouth two (2) times a day.  guaiFENesin (ROBITUSSIN) 100 mg/5 mL liquid Take 200 mg by mouth three (3) times daily as needed for Cough.  nitroglycerin (NITROSTAT) 0.4 mg SL tablet 0.4 mg by SubLINGual route every five (5) minutes as needed for Chest Pain.  aspirin (ASPIRIN) 325 mg tablet Take 325 mg by mouth daily.  vit B Cmplx 3-FA-Vit C-Biotin (NEPHRO GRAYSON RX) 1- mg-mg-mcg tablet Take 1 Tab by mouth daily.  magnesium hydroxide (TOBIAS MILK OF MAGNESIA) 400 mg/5 mL suspension Take 30 mL by mouth daily as needed for Constipation.     albuterol-ipratropium (DUO-NEB) 2.5 mg-0.5 mg/3 ml nebu 3 mL by Nebulization route every six (6) hours as needed.  ondansetron hcl (ZOFRAN, AS HYDROCHLORIDE,) 8 mg tablet Take 1 Tab by mouth every eight (8) hours as needed for Nausea.  Nebulizer & Compressor machine 1 Each by Does Not Apply route daily.  midodrine (PROAMITINE) 10 mg tablet Take 10 mg by mouth three (3) times daily (with meals).  insulin syringe-needle U-100 1 mL 31 x 15/64\" syrg 1 Syringe by SubCUTAneous route four (4) times daily.  Insulin Needles, Disposable, (ESPERANZA PEN NEEDLE) 32 gauge x 5/32\" ndle 5 times a day    atorvastatin (LIPITOR) 40 mg tablet Take 40 mg by mouth daily.  gabapentin (NEURONTIN) 250 mg/5 mL solution 750 mg by PEG Tube route three (3) times daily.  multivitamin (ONE A DAY) tablet Take 1 Tab by mouth daily.  clopidogrel (PLAVIX) 75 mg tablet Take 75 mg by mouth daily. No current facility-administered medications for this visit. There are no discontinued medications. PCP/Specialist follow up: Future Appointments  Date Time Provider Helen Tinajero   5/24/2018 2:00 PM [facility] [address]   6/11/2018 11:50 AM Yousif Ag MD RDE NILAY [address]   1/7/2019 10:40 AM Deanna Camacho MD [address]          Goals      Understand CHF action plan. 5/23/18  Spoke with pt's wife who is managing pt very closely. We discussed the importance of daily wts. 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 We discussed the possibility for supportive care for the pt as well as pt's wife as she needs  I provided the telephone number to the pt's wife for [facility] ([phone]). Pt's wife needs to have hip replacement in June. We discussed coming up with a care plan for the pt and we also discussed how to assist family with care should the pt's wife need assistance as well.   Pt's wife reports that pt has seen Dr. Karin Perez on 5/21/18 and pt's wife reports that pt will go to [facility] for outpt rehab. Pt's wife reports that she did call [facility] this weekend as pt had steady wt gain. Pt's wife reports that she increased lasix and called and spoke with Dr. Parisi  on 5/21/18. Pt's wife stated appreciation for call. I provided my direct office telephone number to her. Pt's wife states that I may call next week to discuss care plan for when pt's wife needs to have surgery. Pt's wife reports that she and pt have family support (2 sons and grandchildren).   AFP

## 2018-05-24 NOTE — TELEPHONE ENCOUNTER
I called the patient, told them musculoskeletal pain like that would be a rare side effect of the medicine, and it should not cause kidney pain either, they will try a quarter tablet and see how he tolerates that first.

## 2018-05-24 NOTE — TELEPHONE ENCOUNTER
Patient was in for a doppler test today and his wife discussed the following with me    Gave 1/2 pill of primidone 5/21/18 at night  Patient complained of pain in back and lower side severely most of the next day on 5/22/18. She did not give him anymore medication and the pain went away Wednesday 5/23/18 . Read about this being a rare side effect afterwards. Wanted to know if there is something else they can try?   Please advise

## 2018-05-25 NOTE — PROCEDURES
This study consisted of pulsed wave Doppler examination, Color-flow imaging, and Duplex imaging of both the right and left carotid systems, and both vertebral arteries.        Imaging of right carotid systems showed mild mixed plaquing at the bifurcations and proximal and distal internal and external carotid arteries bilaterally, with stenosis in the range of 16-49% only and with no flow abnormalities identified.    The left carotid system could not be imaged secondary to the patient's previous surgery and stenting of his vessels. If clinically indicated, other imaging modalities like CTA or MRA may be of further diagnostic value in this patient. Clinical correlation recommended      Neither vertebral arteries could not be imaged, most likely reflecting technical difficulty, but cannot completely rule out obstructive or occlusive cerebrovascular disease, or congenital variant. If clinically indicated other imaging modalities like CTA or MRA may be of further diagnostic value in this patient.   Clinical correlation recommended

## 2018-06-04 NOTE — PROGRESS NOTES
Called pt to follow up on HF Bundle. LVM stating my name, NN, date and time of call, and direct office telephone number.

## 2018-06-11 NOTE — PATIENT INSTRUCTIONS
2 cans w/ Breakfast = 13 units humalog      1.5 can lunch  =  10 units humalog  1.5 can dinner =  7 units humalog  *Correction Scale to 1:25>150,    Lantus 14 units in the AM    First 4 days after steroid injection:   2 cans w/ Breakfast = 17 units humalog      1.5 can lunch  =  14 units humalog  1.5 can dinner =  11 units humalog  *Correction Scale to 1:25>150,   Lantus 17 units in the AM    --------------------------  Correction Scale  1:25>150  IF GLUCOSE IS:                 THEN TAKE:      0   Extra Unit  151-175   1   Extra Unit  176-200   2   Extra Units  201-225   3   Extra Units  226-250   4   Extra Units  251-275   5   Extra Units  276-300   6   Extra Units  301-325   7   Extra Units      Please note our new policy, you must arrive to the clinic 15 minutes before your appointment time to allow enough time for proper check-in, adequate time to spend with your doctor, and also to respect the appointment time of the next patient. Not arriving 15 minutes in advance may result in having your appointment rescheduled for the next available day/time.  ----------------------------------------------------------------------------------------------------------------------    Below you will find a glucose log sheet which you can use to record your blood sugars. Without checking and recording what your home glucose levels are, it will be difficult to make any changes to your medication dose, even when significant changes may be needed. Please feel free to use the log below to record your home glucose levels. At the very least, I would like for you to login the entire 2-3 weeks just before your visit so we can make your visit much more productive and beneficial to you. GLUCOSE LOG SHEET:    Date Breakfast Lunch Dinner Bedtime Comments ? GLUCOSE LOG SHEET:    Date Breakfast Lunch Dinner Bedtime Comments ? GLUCOSE LOG SHEET:    Date Breakfast Lunch Dinner Bedtime Comments ? GLUCOSE LOG SHEET:    Date Breakfast Lunch Dinner Bedtime Comments ? GLUCOSE LOG SHEET:    Date Breakfast Lunch Dinner Bedtime Comments ? GLUCOSE LOG SHEET:    Date Breakfast Lunch Dinner Bedtime Comments ?

## 2018-06-11 NOTE — PROGRESS NOTES
CHIEF COMPLAINT: f/u for type 2 diabetes    HISTORY OF PRESENT ILLNESS:   Katerina Martin is a 80 y.o. male with a PMHx as noted below who presents for f/u evaluation of type 2 diabetes. Diabetes History:  History obtained from both patient and his wife who is present:Patient presents today to establish care for his diabetes. He was previously managed by endocrinologist Dr. Pat Morel, however would like to be treated closer to home. He notably had a recent stroke for which he had been admitted. Currently he ambulates with a wheelchair. He is present with his wife today for evaluation. Patients co-morbidities include multiple CVA, HTN, Coronary disease, and CHF with an AICD. He is on tube feeds as noted below. Diabetes was diagnosed in early 2015. Family History of diabetes is negative. Last A1c prior to initial visit was 6.6% on July 31st.   Regimen at time of establishing care includes   - Januvia 50mg daily, crushed   - Lantus 14U qAM   - Humalog nursing home schedule: 6U 4x/day on iso-source 1.5. Also on 2:50>150 correction sliding scale. 1.5 cans breakfast, 1 can with lunch and dinner, 1.5 cans with dinner.     Reportedly 44Gm carbs/can suggesting 1:7 carb ratio per can    INTERVAL HISTORY:  Last cortisone injection was on 5/23, obvious changes in glycemic control,  Patient notably had a recent fall, not sugar related, no hypoglycemia,        Regimen:   Can TF's,   2 cans w/ Breakfast = 13 units humalog  1.5 can lunch  =  10 units humalog  1.5 can dinner =  7 units humalog  Correction Scale to 1:25>150,   Lantus 14 units in the AM    Review of home glucose:  AM    120-150 mostly,  180-200 for 3-4 days after cortisone injections however  Lunch    mostly, 180-350 for 3-4 days after cortisone injections however  Dinner   mostly, 180-270 for 3-4 days after cortisone injections however       Review of most recent diabetes-related labs:  Lab Results   Component Value Date    HBA1C 8.6 (H) 06/04/2018    HBA1C 6.5 (H) 02/02/2017    HBA1C 6.4 (H) 11/30/2016    PSQ1RMVJ 7.6 03/12/2018    SXN2CKBM 6.5 11/09/2017    RLY9VFQO 7.6 08/08/2017    CHOL 123 06/04/2018    LDLC 62 06/04/2018    GFRAA 62 06/04/2018    GFRNA 54 (L) 06/04/2018    MCACR 40.9 (H) 06/04/2018    TSH 2.74 05/07/2018     382136 = IA-2 pancreatic islet cell autoantibody  GADLT = JOVANY-65 autoantibody   MCACR = Urine Microalbumin           PAST MEDICAL/SURGICAL HISTORY:   Past Medical History:   Diagnosis Date    Antiplatelet or antithrombotic long-term use 12/4/2014    Anxiety disorder     Arrhythmia 2009    bradycardia    Arthritis     CAD (coronary artery disease)     s/p CABG 2002; Dr Dmitry Moore [facility]) 1996    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 I suspect the latter. Achalasia  is not present.         GERD (gastroesophageal reflux disease)     Heart failure ([address] 10/2014     Cardiomyopathy:Pacemaker upgrade:Biv and AICD  Dr. Maldonado Wynn heart DrAlvaro last visit 5/11/2015    Hepatitis C Dx 1996    treated at [facility] in past; as of 4/15/15 wife states pt currently not under any treatment    Hyperlipidemia     Hypertension     Myocardial infarct [facility]) 2013    Heart Cath: 40% LV EF, Stented distal LAD, patent Graft to circumflex    On tube feeding diet approx 2009    still has as of 9/28/15  (no po food/liquid/meds at all); Dr Nasra Osborne Other ill-defined conditions(799.89) 1996    1 dose of chemotherapy/radiation for tongue cancer    Other ill-defined conditions(799.89)     BPH    Other ill-defined conditions(799.89)     orthostatic hypotension    Pneumonia ~ April -May 2010    Stroke [facility]) approx 2003    left side-left finger tips numb; no imbalance or memory loss; as of 2015 not seeing neuro MD    Suicidal thoughts      Past Surgical History:   Procedure Laterality Date    ABDOMEN SURGERY PROC UNLISTED  1996    peg tube    CABG, ARTERY-VEIN, THREE  2000    HX CATARACT REMOVAL      bilateral    HX CHOLECYSTECTOMY      HX HEART CATHETERIZATION  2013    Stented distal LAD    HX MOHS PROCEDURES      bilateral    HX ORTHOPAEDIC      back surgery times two    HX OTHER SURGICAL      Radical Left Neck    HX OTHER SURGICAL      NASAL POLYPS REMOVAL    HX OTHER SURGICAL  2010    TURP    HX PACEMAKER  11/08    HX PACEMAKER  10/28/14     Defibrillator: [facility] # [id number], serial # [id number]; Dr. Jacqueline Hart [phone]; Dr Bonnee Meckel HX UROLOGICAL      cystoscopy   [address]    cevical surgery    GA CHANGE GASTROSTOMY TUBE  5/2/2011         GA CHANGE GASTROSTOMY TUBE  6/29/2011         GA EGD INSERT GUIDE WIRE DILATOR PASSAGE ESOPHAGUS  9/13/2010         GA EGD TRANSORAL BIOPSY SINGLE/MULTIPLE  9/13/2010         STOMACH SURGERY PROCEDURE UNLISTED  6/29/2011         VASCULAR SURGERY PROCEDURE UNLIST      bilateral carotid stents       ALLERGIES:   Allergies   Allergen Reactions    Demerol [Meperidine] Shortness of Breath    Paxil [Paroxetine Hcl] Unknown (comments)     Pt gets shaky and loses control of legs    Amoxicillin Rash    Cleocin [Clindamycin Hcl] Rash    Pcn [Penicillins] Rash       MEDICATIONS ON ADMISSION:     Current Outpatient Prescriptions:     furosemide (LASIX) 40 mg tablet, , Disp: , Rfl:     alfuzosin SR (UROXATRAL) 10 mg SR tablet, , Disp: , Rfl:     tamsulosin (FLOMAX) 0.4 mg capsule, , Disp: , Rfl:     LANTUS SOLOSTAR U-100 INSULIN 100 unit/mL (3 mL) inpn, INJECT 14 UNITS SUBCUTANEOUSLY ONCE DAILY, Disp: 15 Pen, Rfl: 5    furosemide (LASIX) 20 mg tablet, Take 2 Tabs by mouth daily. Indications: hypercalcemia, Disp: 30 Tab, Rfl: 2    Lancets misc, Use preferred brand; Check glucose 4 times daily, Diagnosis E11.65, Disp: 400 Each, Rfl: 5    glucose blood VI test strips (PHARMACIST CHOICE) strip, Use preferred brand; Check glucose 4 times daily, Diagnosis E11.65, Disp: 400 Strip, Rfl: 5    Blood-Glucose Meter monitoring kit, 1 preferred brand glucometer for checking home glucose 4 times per day. DX Code: E11.65, Disp: 1 Kit, Rfl: 0    fluticasone (FLONASE) 50 mcg/actuation nasal spray, 1 [place] by Both Nostrils route two (2) times a day., Disp: , Rfl:     PYRIDOXINE HCL, VITAMIN B6, (VITAMIN B-6 PO), Take  by mouth., Disp: , Rfl:     insulin lispro (HUMALOG KWIKPEN INSULIN) 100 unit/mL kwikpen, 13 units with breakfast, 10 units with lunch, 7 units with dinner + correction insulin, up to 40 units/day, Disp: 15 Pen, Rfl: 3    HYDROcodone-acetaminophen (NORCO)  mg tablet, Take 1 Tab by mouth daily as needed. , Disp: , Rfl:     famotidine (PEPCID) 20 mg tablet, Take 1 Tab by mouth two (2) times a day., Disp: 20 Tab, Rfl: 0    guaiFENesin (ROBITUSSIN) 100 mg/5 mL liquid, Take 200 mg by mouth three (3) times daily as needed for Cough. , Disp: , Rfl:     nitroglycerin (NITROSTAT) 0.4 mg SL tablet, 0.4 mg by SubLINGual route every five (5) minutes as needed for Chest Pain., Disp: , Rfl:     aspirin (ASPIRIN) 325 mg tablet, Take 325 mg by mouth daily. , Disp: , Rfl:     vit B Cmplx 3-FA-Vit C-Biotin (NEPHRO GRAYSON RX) 1- mg-mg-mcg tablet, Take 1 Tab by mouth daily. , Disp: , Rfl:     magnesium hydroxide (TOBIAS MILK OF MAGNESIA) 400 mg/5 mL suspension, Take 30 mL by mouth daily as needed for Constipation. , Disp: , Rfl:     albuterol-ipratropium (DUO-NEB) 2.5 mg-0.5 mg/3 ml nebu, 3 mL by Nebulization route every six (6) hours as needed. , Disp: 100 Nebule, Rfl: 1    ondansetron hcl (ZOFRAN, AS HYDROCHLORIDE,) 8 mg tablet, Take 1 Tab by mouth every eight (8) hours as needed for Nausea., Disp: 20 Tab, Rfl: 0    Nebulizer & Compressor machine, 1 Each by Does Not Apply route daily. , Disp: 1 Each, Rfl: 0    midodrine (PROAMITINE) 10 mg tablet, Take 10 mg by mouth three (3) times daily (with meals). , Disp: , Rfl:     insulin syringe-needle U-100 1 mL 31 x 15/\" syrg, 1 Syringe by SubCUTAneous route four (4) times daily. , Disp: 200 Each, Rfl: 11    Insulin Needles, Disposable, (ESPERANZA PEN NEEDLE) 32 gauge x 5/\" ndle, 5 times a day, Disp: 150 Each, Rfl: 99    atorvastatin (LIPITOR) 40 mg tablet, Take 40 mg by mouth daily. , Disp: , Rfl:     gabapentin (NEURONTIN) 250 mg/5 mL solution, 750 mg by PEG Tube route three (3) times daily. , Disp: , Rfl:     multivitamin (ONE A DAY) tablet, Take 1 Tab by mouth daily. , Disp: , Rfl:     clopidogrel (PLAVIX) 75 mg tablet, Take 75 mg by mouth daily. , Disp: , Rfl:     oxyCODONE-acetaminophen (PERCOCET) 5-325 mg per tablet, Take 1 Tab by mouth every eight (8) hours as needed for Pain. Max Daily Amount: 3 Tabs. Indications: Pain, Disp: 10 Tab, Rfl: 0    cyanocobalamin 1,000 mcg tablet, Take 1,000 mcg by mouth daily. , Disp: , Rfl:     primidone (MYSOLINE) 50 mg tablet, Take 0.5 Tabs by mouth nightly., Disp: 90 Tab, Rfl: 3    acetaminophen (TYLENOL) 500 mg tablet, Take 1,000 mg by mouth every six (6) hours as needed for Pain., Disp: , Rfl:     SOCIAL HISTORY:   Social History     Social History    Marital status:      Spouse name: N/A    Number of children: N/A    Years of education: N/A     Occupational History    Retired       He was a stained      Social History Main Topics    Smoking status: Never Smoker    Smokeless tobacco: Never Used    Alcohol use No    Drug use: No    Sexual activity: Yes     Partners: Female     Other Topics Concern    Not on file     Social History Narrative       FAMILY HISTORY:  Family History   Problem Relation Age of Onset    Heart Disease Father       at age 52 from CAD    Colon Cancer Mother     Cancer Mother      colon ca    Heart Disease Brother        REVIEW OF SYSTEMS: Complete ROS assessed and noted for that which is described above, all else are negative. Eyes: normal  ENT: normal  CVS: normal  Resp: normal  GI: normal  : normal  GYN: normal  Endocrine: normal  Integument: normal  Musculoskeletal: normal  Neuro: mild weakness  Psych: normal      PHYSICAL EXAMINATION:    VITAL SIGNS:  Visit Vitals    /70 (BP 1 Location: Left arm, BP Patient Position: Sitting)    Pulse 69    Ht 5' 7\" (1.702 m)    Wt 172 lb 3.2 oz (78.1 kg)    BMI 26.97 kg/m2       GENERAL: NCAT, Sitting comfortably, NAD  EYES: EOMI, non-icteric, no proptosis  Ear/Nose/Throat: NCAT, no inflammation, no masses  LYMPH NODES: No LAD  CARDIOVASCULAR: S1 S2, RRR, No murmur, 2+ radial pulses  RESPIRATORY: CTA b/l, no wheeze/rales  GASTROINTESTINAL: soft, ND  MUSCULOSKELETAL: Normal ROM, no atrophy, mild 1+ edema in lower extremities  SKIN: warm, no edema/rash/ or other skin changes  NEUROLOGIC: 4/5 power all extremities,  AAOx3  PSYCHIATRIC: Normal affect, Normal insight and judgement      REVIEW OF LABORATORY AND RADIOLOGY DATA:   Labs and documentation have been reviewed as described above. ASSESSMENT AND PLAN:   Kiki Hernandez is a 80 y.o. male with a PMHx as noted below who presents for f/u evaluation of uncontrolled type 2 diabetes. Type 2 diabetes, uncontrolled  Hyperlipidemia  Hypertension    Sugars on non-steroid days are just fine for him, however those 4 days or so after cortisone injections are were some modification is warranted since it is enough to affect his A1c. We will provide a modified regimen for those days as below.      Plan: Type 2 Diabetes  Medications:  Can TF's,   2 cans w/ Breakfast = 13 units humalog      1.5 can lunch  =  10 units humalog  1.5 can dinner =  7 units humalog  Correction Scale to 1:25>150,   Lantus 14 units in the AM    First 4 days after steroid injection: 2 cans w/ Breakfast = 17 units humalog      1.5 can lunch  =  14 units humalog  1.5 can dinner =  11 units humalog  Correction Scale to 1:25>150,   Lantus 17 units in the AM    HTN: Stable today, metoprolol 12.5mg BID  HLD: stable on statin , lipitor 40mg daily    RTC: I would like to see them back in 3 months    Son Joel.  [id number] [facility]

## 2018-06-11 NOTE — MR AVS SNAPSHOT
[address] [facility] [address] [facility] [address] [address] [phone] Patient: Leah Landau MRN: [id number] PHB:[id number] Visit Information Date & Time Provider Department Dept. Phone Encounter #  
 6/11/2018 11:50 AM Terrie Page, [facility] Endocr[facility]ology [phone] Follow-up Instructions Return in about 3 months (around 9/11/2018). Your Appointments 1/7/2019 10:40 AM  
Follow Up with Cline Nyhan, MD  
[facility] [address]) Appt Note: f/u stroke tremor, jrb 5/7/18  
 [facility], 
[address] [facility], [address] [address]  
[address] [facility] [place], [address] [place] [address], [address] [facility] [address] [address] Upcoming Health Maintenance Date Due ZOSTER VACCINE AGE 60> 1/31/1995 MEDICARE YEARLY EXAM 3/14/2018 Influenza Age 5 to Adult 8/1/2018 FOOT EXAM Q1 11/9/2018 EYE EXAM RETINAL OR DILATED Q1 11/15/2018 HEMOGLOBIN A1C Q6M 12/4/2018 MICROALBUMIN Q1 6/4/2019 LIPID PANEL Q1 6/4/2019 GLAUCOMA SCREENING Q2Y 11/15/2019 DTaP/Tdap/Td series (2 - Td) 9/9/2025 Allergies as of 6/11/2018  Review Complete On: 6/11/2018 By: Terrie Page MD  
  
 Severity Noted Reaction Type Reactions Demerol [Meperidine] High 04/12/2010   Systemic Shortness of Breath Paxil [Paroxetine Hcl] High 04/12/2010   Systemic Unknown (comments) Pt gets shaky and loses control of legs Amoxicillin  01/18/2013    Rash Cleocin [Clindamycin Hcl]  02/15/2018    Rash Pcn [Penicillins]  04/12/2010    Rash Current Immunizations  Reviewed on 2/11/2013 Name Date Influenza Vaccine 10/1/2016, 10/1/2014 Influenza Vaccine (Quad) PF 10/7/2015 11:20 AM  
 Influenza Vaccine Split 9/24/2012 Influenza Vaccine Whole 12/3/2009  Pneumococcal Polysaccharide (PPSV-23) 10/7/2015 11:22 AM  
 TD Vaccine 12/1/2011  6:03 PM  
 Tdap 9/9/2015  7:38 AM  
 ZZZ-RETIRED (DO NOT USE) Pneumococcal Vaccine (Unspecified Type) 12/3/2007 Not reviewed this visit You Were Diagnosed With   
  
 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 
ICD-9-CM: 250.60, 357.2 Essential hypertension with goal blood pressure less than 140/90     ICD-10-CM: I10 
ICD-9-CM: 401.9 Hyperlipidemia, unspecified hyperlipidemia type     ICD-10-CM: E78.5 ICD-9-CM: 272.4 Vitals BP Pulse Height(growth percentile) Weight(growth percentile) BMI Smoking Status 121/70 (BP 1 Location: Left arm, BP Patient Position: Sitting) 69 5' 7\" (1.702 m) 172 lb 3.2 oz (78.1 kg) 26.97 kg/m2 Never Smoker BMI and BSA Data Body Mass Index Body Surface Area  
 26.97 kg/m 2 1.92 m 2 Preferred Pharmacy Pharmacy Name Phone [facility] [address], [address] [place] [address] Avenue [phone] Your Updated Medication List  
  
   
This list is accurate as of 6/11/18 12:23 PM.  Always use your most recent med list.  
  
  
  
  
 acetaminophen 500 mg tablet Commonly known as:  TYLENOL Take 1,000 mg by mouth every six (6) hours as needed for Pain. albuterol-ipratropium 2.5 mg-0.5 mg/3 ml Nebu Commonly known as:  DUO-NEB  
3 mL by Nebulization route every six (6) hours as needed. alfuzosin SR 10 mg SR tablet Commonly known as:  UROXATRAL  
  
 aspirin 325 mg tablet Commonly known as:  ASPIRIN Take 325 mg by mouth daily. atorvastatin 40 mg tablet Commonly known as:  LIPITOR Take 40 mg by mouth daily. Blood-Glucose Meter monitoring kit 1 preferred brand glucometer for checking home glucose 4 times per day. DX Code: E11.65  
  
 cyanocobalamin 1,000 mcg tablet Take 1,000 mcg by mouth daily. famotidine 20 mg tablet Commonly known as:  PEPCID  
 Take 1 Tab by mouth two (2) times a day. fluticasone 50 mcg/actuation nasal spray Commonly known as:  FLONASE  
1 Crooks by Both Nostrils route two (2) times a day. * furosemide 20 mg tablet Commonly known as:  LASIX Take 2 Tabs by mouth daily. Indications: hypercalcemia * furosemide 40 mg tablet Commonly known as:  LASIX  
  
 gabapentin 250 mg/5 mL solution Commonly known as:  NEURONTIN  
750 mg by PEG Tube route three (3) times daily. glucose blood VI test strips strip Commonly known as:  PHARMACIST CHOICE Use preferred brand; Check glucose 4 times daily, Diagnosis E11.65  
  
 guaiFENesin 100 mg/5 mL liquid Commonly known as:  ROBITUSSIN Take 200 mg by mouth three (3) times daily as needed for Cough. HYDROcodone-acetaminophen  mg tablet Commonly known as:  Jack Santizo Take 1 Tab by mouth daily as needed. insulin lispro 100 unit/mL kwikpen Commonly known as:  HumaLOG KwikPen Insulin 13 units with breakfast, 10 units with lunch, 7 units with dinner + correction insulin, up to 40 units/day Insulin Needles (Disposable) 32 gauge x 5/32\" Ndle Commonly known as:  Elise Pen Needle 5 times a day  
  
 insulin syringe-needle U-100 1 mL 31 gauge x 15/64\" Syrg 1 Syringe by SubCUTAneous route four (4) times daily. Lancets Misc Use preferred brand; Check glucose 4 times daily, Diagnosis E11.65  
  
 LANTUS SOLOSTAR U-100 INSULIN 100 unit/mL (3 mL) Inpn Generic drug:  insulin glargine INJECT 14 UNITS SUBCUTANEOUSLY ONCE DAILY  
  
 midodrine 10 mg tablet Commonly known as:  Shara Rashid Take 10 mg by mouth three (3) times daily (with meals). multivitamin tablet Commonly known as:  ONE A DAY Take 1 Tab by mouth daily. Nebulizer & Compressor machine 1 Each by Does Not Apply route daily. nitroglycerin 0.4 mg SL tablet Commonly known as:  NITROSTAT  
0.4 mg by SubLINGual route every five (5) minutes as needed for Chest Pain.  
  
 ondansetron hcl 8 mg tablet Commonly known as:  Romeo Maravilla Take 1 Tab by mouth every eight (8) hours as needed for Nausea. oxyCODONE-acetaminophen 5-325 mg per tablet Commonly known as:  PERCOCET Take 1 Tab by mouth every eight (8) hours as needed for Pain. Max Daily Amount: 3 Tabs. Indications: Pain TOBIAS MILK OF MAGNESIA 400 mg/5 mL suspension Generic drug:  magnesium hydroxide Take 30 mL by mouth daily as needed for Constipation. PLAVIX 75 mg Tab Generic drug:  clopidogrel Take 75 mg by mouth daily. primidone 50 mg tablet Commonly known as: MYSOLINE Take 0.5 Tabs by mouth nightly. tamsulosin 0.4 mg capsule Commonly known as:  FLOMAX  
  
 vit B Cmplx 3-FA-Vit C-Biotin 1- mg-mg-mcg tablet Commonly known as:  NEPHRO GRAYSON RX Take 1 Tab by mouth daily. VITAMIN B-6 PO Take  by mouth. * Notice: This list has 2 medication(s) that are the same as other medications prescribed for you. Read the directions carefully, and ask your doctor or other care provider to review them with you. Follow-up Instructions Return in about 3 months (around 9/11/2018). Patient Instructions 2 cans w/ Breakfast = 13 units humalog 1.5 can lunch  =  10 units humalog 1.5 can dinner =  7 units humalog *Correction Scale to 1:25>150, Lantus 14 units in the AM 
 
First 4 days after steroid injection:  
2 cans w/ Breakfast = 17 units humalog 1.5 can lunch  =  14 units humalog 1.5 can dinner =  11 units humalog *Correction Scale to 1:25>150, Lantus 17 units in the AM 
 
-------------------------- Correction Scale  1:25>150 IF GLUCOSE IS:                 THEN TAKE: 
    0   Extra Unit 151-175   1   Extra Unit 
176-200   2   Extra Units 180-974   3   Extra Units 226-250   4   Extra Units 251-275   5   Extra Units 276-300   6   Extra Units 478-336   7   Extra Units Please note our new policy, you must arrive to the clinic 15 minutes before your appointment time to allow enough time for proper check-in, adequate time to spend with your doctor, and also to respect the appointment time of the next patient. Not arriving 15 minutes in advance may result in having your appointment rescheduled for the next available day/time. 
---------------------------------------------------------------------------------------------------------------------- Below you will find a glucose log sheet which you can use to record your blood sugars. Without checking and recording what your home glucose levels are, it will be difficult to make any changes to your medication dose, even when significant changes may be needed. Please feel free to use the log below to record your home glucose levels. At the very least, I would like for you to login the entire 2-3 weeks just before your visit so we can make your visit much more productive and beneficial to you. GLUCOSE LOG SHEET: 
 
Date Breakfast Lunch Dinner Bedtime Comments ? GLUCOSE LOG SHEET: 
 
Date Breakfast Lunch Dinner Bedtime Comments ? GLUCOSE LOG SHEET: 
 
Date Breakfast Lunch Dinner Bedtime Comments ? GLUCOSE LOG SHEET: 
 
 Date Breakfast Lunch Dinner Bedtime Comments ? GLUCOSE LOG SHEET: 
 
Date Breakfast Lunch Dinner Bedtime Comments ? GLUCOSE LOG SHEET: 
 
Date Breakfast Lunch Dinner Bedtime Comments ? Introducing \[facility]\"" & HEALTH SERVICES! [facility] introduces [employer] patient portal. Now you can access parts of your medical record, email your doctor's office, and request medication refills online. 1. In your internet browser, go to [email or web address] 2. Click on the First Time User? Click Here link in the Sign In box. You will see the New Member Sign Up page. 3. Enter your [employer] Access Code exactly as it appears below. You will not need to use this code after youve completed the sign-up process. If you do not sign up before the expiration date, you must request a new code. · [employer] Access Code: [email or web address] Expires: 8/12/2018  2:10 PM 
 
4. Enter the last four digits of your Social Security Number (xxxx) and Date of Birth (mm/dd/yyyy) as indicated and click Submit. You will be taken to the next sign-up page. 5. Create a [employer] ID. This will be your [employer] login ID and cannot be changed, so think of one that is secure and easy to remember. 6. Create a [employer] password. You can change your password at any time. 7. Enter your Password Reset Question and Answer. This can be used at a later time if you forget your password. 8. Enter your e-mail address. You will receive e-mail notification when new information is available in [address] 9. Click Sign Up. You can now view and download portions of your medical record. 10. Click the Download Summary menu link to download a portable copy of your medical information. If you have questions, please visit the Frequently Asked Questions section of the [employer] website. Remember, [employer] is NOT to be used for urgent needs. For medical emergencies, dial 911. Now available from your iPhone and Android! Please provide this summary of care documentation to your next provider. Your primary care clinician is listed as Ezequiel Zhang. If you have any questions after today's visit, please call [phone].

## 2018-06-23 NOTE — DISCHARGE INSTRUCTIONS
Home Tube Feeding: Care Instructions  Your Care Instructions  Tube feeding is a way of providing nutrition and fluids through a tube into the stomach or intestines. The tube may be inserted through the skin and into the stomach during surgery, or it may go through the mouth or nose, down the throat, and then into the stomach. Tube feeding can nourish people who have a short illness that makes swallowing difficult or people who have a severe illness, and it may prolong life. Follow-up care is a key part of your treatment and safety. Be sure to make and go to all appointments, and call your doctor if you are having problems. It's also a good idea to know your test results and keep a list of the medicines you take. How can you care for yourself at home? · Follow your doctor's instructions for use and care of the feeding tube. Your doctor will:  Dread Ndiaye you what tube feeding formula and fluids to put through the tube. ¨ Show you how to care for the skin around the tube. Be sure to follow instructions on keeping the area clean. ¨ Teach you how to watch for infection or blockage of the tube. ¨ Tell you what activities you can do. · Keep the formula in the refrigerator after opening it. Do not let the formula in the hanging bag sit out at room temperature for more than 8 hours. For the caregiver  · Wash your hands before handling the tube and formula. Wash the top of the can of formula before you open it. · Tube feedings that go into the stomach: The person you are caring for needs to be sitting up or have his or her head up during the feeding and for 30 minutes afterward. These feedings can be given in about 30 minutes, five or six times throughout a day. · Tube feedings that go into the intestine: The person you are caring for will have a pump that slowly pushes the formula into the intestine over several hours. This is often done at night.   · If nausea, diarrhea, or stomach cramps happen during feeding, slow the rate that the formula comes through the tube. Then gradually increase the amount as the person can tolerate it. · Flush the tube with plain water after each feeding to keep it clean. Do not put anything other than formula or water through the tube unless your doctor has told you to. · Take care of yourself. ¨ Do not try to do everything yourself. Ask other family members to help, and find out what other types of help may be available. ¨ Eat well and get enough rest. Make sure you do not ignore your own health while you are caring for your loved one. ¨ Schedule time for yourself. Get out of the house to do things you enjoy, run errands, or go shopping. When should you call for help? Call your doctor now or seek immediate medical care if:  ? · You have signs of infection, such as:  ¨ Increased pain, swelling, warmth, or redness around the tube. ¨ Red streaks leading from the area where the tube is inserted. ¨ Pus draining from the tube area. ¨ A fever. ? · The tube comes out or becomes blocked. ? · You have nausea, vomiting, or diarrhea. ? Watch closely for changes in your health, and be sure to contact your doctor if:  ? · You have any problems with your feeding. Where can you learn more? Go to http://aniket-tomas.info/. Enter F048 in the search box to learn more about \"Home Tube Feeding: Care Instructions. \"  Current as of: May 12, 2017  Content Version: 11.4  © 5282-9359 [employer]. Care instructions adapted under license by [employer] (which disclaims liability or warranty for this information). If you have questions about a medical condition or this instruction, always ask your healthcare professional. [place] [address] any warranty or liability for your use of this information.

## 2018-06-23 NOTE — ED PROVIDER NOTES
EMERGENCY DEPARTMENT HISTORY AND PHYSICAL EXAM      Date: 6/23/2018  Patient Name: Katerina Martin    History of Presenting Illness     Chief Complaint   Patient presents with    Feeding Tube Problem     pts peg tube fell out PTA. pt used peg tube for nutrition       History Provided By: Patient    HPI: Katerina Martin, 80 y.o. 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 Pt reports his peg tube, which is a 22 french, fell out ~1 hour ago, but has recently had a leaky balloon. He treated it by putting the tube back in the hole. While in the ED, pt has no complaints of pain. Given the pt is asymptomatic, there is no applicable quality, duration, severity, timing, context, associated sxs, additional context, or modifying factors. He specifically denies any fevers, chills, nausea, vomiting, chest pain, shortness of breath, headache, rash, diarrhea, sweating or weight loss. There are no other complaints, changes, or physical findings at this time. PCP: Soraya Saldana., MD    Current Outpatient Prescriptions   Medication Sig Dispense Refill    finasteride (PROSCAR) 5 mg tablet Take 5 mg by mouth nightly.  alfuzosin SR (UROXATRAL) 10 mg SR tablet       tamsulosin (FLOMAX) 0.4 mg capsule Take 0.4 mg by mouth as needed.  LANTUS SOLOSTAR U-100 INSULIN 100 unit/mL (3 mL) inpn INJECT 14 UNITS SUBCUTANEOUSLY ONCE DAILY 15 Pen 5    furosemide (LASIX) 20 mg tablet Take 2 Tabs by mouth daily. Indications: hypercalcemia (Patient taking differently: Take 40 mg by mouth daily. 40 mg in the morning and 20 mg evening  Indications: hypercalcemia) 30 Tab 2    cyanocobalamin 1,000 mcg tablet Take 1,000 mcg by mouth daily.  primidone (MYSOLINE) 50 mg tablet Take 0.5 Tabs by mouth nightly.  90 Tab 3    acetaminophen (TYLENOL) 500 mg tablet Take 1,000 mg by mouth every six (6) hours as needed for Pain.  Lancets misc Use preferred brand; Check glucose 4 times daily, Diagnosis E11.65 400 Each 5    glucose blood VI test strips (PHARMACIST CHOICE) strip Use preferred brand; Check glucose 4 times daily, Diagnosis E11.65 400 Strip 5    Blood-Glucose Meter monitoring kit 1 preferred brand glucometer for checking home glucose 4 times per day. DX Code: E11.65 1 Kit 0    PYRIDOXINE HCL, VITAMIN B6, (VITAMIN B-6 PO) Take  by mouth.  insulin lispro (HUMALOG KWIKPEN INSULIN) 100 unit/mL kwikpen 13 units with breakfast, 10 units with lunch, 7 units with dinner + correction insulin, up to 40 units/day 15 Pen 3    HYDROcodone-acetaminophen (NORCO)  mg tablet Take 1 Tab by mouth daily as needed.  famotidine (PEPCID) 20 mg tablet Take 1 Tab by mouth two (2) times a day. 20 Tab 0    guaiFENesin (ROBITUSSIN) 100 mg/5 mL liquid Take 200 mg by mouth three (3) times daily as needed for Cough.  nitroglycerin (NITROSTAT) 0.4 mg SL tablet 0.4 mg by SubLINGual route every five (5) minutes as needed for Chest Pain.  aspirin (ASPIRIN) 325 mg tablet Take 325 mg by mouth daily.  vit B Cmplx 3-FA-Vit C-Biotin (NEPHRO GRAYSON RX) 1- mg-mg-mcg tablet Take 1 Tab by mouth daily.  albuterol-ipratropium (DUO-NEB) 2.5 mg-0.5 mg/3 ml nebu 3 mL by Nebulization route every six (6) hours as needed. 100 Nebule 1    ondansetron hcl (ZOFRAN, AS HYDROCHLORIDE,) 8 mg tablet Take 1 Tab by mouth every eight (8) hours as needed for Nausea. 20 Tab 0    Nebulizer & Compressor machine 1 Each by Does Not Apply route daily. 1 Each 0    midodrine (PROAMITINE) 10 mg tablet Take 10 mg by mouth three (3) times daily (with meals).  insulin syringe-needle U-100 1 mL 31 x 15/64\" syrg 1 Syringe by SubCUTAneous route four (4) times daily.  200 Each 11    Insulin Needles, Disposable, (ESPERANZA PEN NEEDLE) 32 gauge x 5/32\" ndle 5 times a day 150 Each 99    atorvastatin (LIPITOR) 40 mg tablet Take 40 mg by mouth daily.  gabapentin (NEURONTIN) 250 mg/5 mL solution 750 mg by PEG Tube route three (3) times daily.  multivitamin (ONE A DAY) tablet Take 1 Tab by mouth daily.  clopidogrel (PLAVIX) 75 mg tablet Take 75 mg by mouth daily.  fluticasone (FLONASE) 50 mcg/actuation nasal spray 1 [place] by Both Nostrils route two (2) times a day. 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 I suspect the latter. Achalasia  is not present.         GERD (gastroesophageal reflux disease)     Heart failure ([facility] [address] 10/2014     Cardiomyopathy:Pacemaker upgrade:Biv and AICD  Dr. Mares [facility] heart DrAlvaro last visit 5/11/2015    Hepatitis C Dx 1996    treated at [facility] in past; as of 4/15/15 wife states pt currently not under any treatment    Hyperlipidemia     Hypertension     Myocardial infarct [facility]) 2013    Heart Cath: 40% LV EF, Stented distal LAD, patent Graft to circumflex    On tube feeding diet approx 2009    still has as of 9/28/15  (no po food/liquid/meds at all); Dr Glen Marks Other ill-defined conditions(799.89) 1996    1 dose of chemotherapy/radiation for tongue cancer    Other ill-defined conditions(799.89)     BPH    Other ill-defined conditions(799.89)     orthostatic hypotension    Pneumonia ~ April -May 2010    Stroke [facility]) approx     left side-left finger tips numb; no imbalance or memory loss; as of  not seeing neuro MD    Suicidal thoughts        Past Surgical History:  Past Surgical History:   Procedure Laterality Date    ABDOMEN SURGERY PROC UNLISTED      peg tube    CABG, ARTERY-VEIN, THREE      HX CATARACT REMOVAL      bilateral    HX CHOLECYSTECTOMY      HX HEART CATHETERIZATION  2013    Stented distal LAD    HX MOHS PROCEDURES      bilateral    HX ORTHOPAEDIC      back surgery times two    HX OTHER SURGICAL      Radical Left Neck    HX OTHER SURGICAL      NASAL POLYPS REMOVAL    HX OTHER SURGICAL      TURP    HX PACEMAKER      HX PACEMAKER  10/28/14     Defibrillator: [facility] # [id number], serial # [id number]; Dr. Norman [facility] [phone]; Dr Staton Kingfisher      cystoscopy    NEUROLOGICAL PROCEDURE UNLISTED      cevical surgery    MD CHANGE GASTROSTOMY TUBE  2011         MD CHANGE GASTROSTOMY TUBE  2011         MD EGD INSERT GUIDE WIRE DILATOR PASSAGE ESOPHAGUS  2010         MD EGD TRANSORAL BIOPSY SINGLE/MULTIPLE  2010         STOMACH SURGERY PROCEDURE UNLISTED  2011         VASCULAR SURGERY PROCEDURE UNLIST      bilateral carotid stents       Family History:  Family History   Problem Relation Age of Onset    Heart Disease Father       at age 52 from CAD    Colon Cancer Mother     Cancer Mother      colon ca    Heart Disease Brother        Social History:  Social History   Substance Use Topics    Smoking status: Never Smoker    Smokeless tobacco: Never Used    Alcohol use No       Allergies: Allergies   Allergen Reactions    Demerol [Meperidine] Shortness of Breath    Paxil [Paroxetine Hcl] Unknown (comments)     Pt gets shaky and loses control of legs    Amoxicillin Rash    Cleocin [Clindamycin Hcl] Rash    Pcn [Penicillins] Rash         Review of Systems   Review of Systems   Constitutional: Negative.   Negative for activity change, appetite change, chills, diaphoresis, fatigue, fever and unexpected weight change. HENT: Negative. Negative for congestion, hearing loss, rhinorrhea, sneezing and voice change. Eyes: Negative. Negative for pain and visual disturbance. Respiratory: Negative. Negative for apnea, cough, choking, chest tightness and shortness of breath. Cardiovascular: Negative. Negative for chest pain and palpitations. Gastrointestinal: Negative. Negative for abdominal distention, abdominal pain, blood in stool, diarrhea, nausea and vomiting. Genitourinary: Negative. Negative for difficulty urinating, flank pain, frequency and urgency. No discharge   Musculoskeletal: Negative. Negative for arthralgias, back pain, myalgias and neck stiffness. Skin: Negative. Negative for color change and rash. Neurological: Negative. Negative for dizziness, seizures, syncope, speech difficulty, weakness, numbness and headaches. Hematological: Negative for adenopathy. Psychiatric/Behavioral: Negative. Negative for agitation, behavioral problems, dysphoric mood and suicidal ideas. The patient is not nervous/anxious. Physical Exam   Physical Exam   Constitutional: He is oriented to person, place, and time. He appears well-developed and well-nourished. No distress. HENT:   Head: Normocephalic and atraumatic. Mouth/Throat: Oropharynx is clear and moist. No oropharyngeal exudate. Eyes: Conjunctivae and EOM are normal. Pupils are equal, round, and reactive to light. Right eye exhibits no discharge. Left eye exhibits no discharge. Neck: Normal range of motion. Neck supple. Cardiovascular: Normal rate, regular rhythm and intact distal pulses. Exam reveals no gallop and no friction rub. No murmur heard. Pulmonary/Chest: Effort normal and breath sounds normal. No respiratory distress. He has no wheezes. He has no rales. He exhibits no tenderness. Abdominal: Soft.  Bowel sounds are normal. He exhibits no distension and no mass. There is no tenderness. There is no rebound and no guarding. Tube site to the mid epigastrium is C/D/I   Musculoskeletal: Normal range of motion. He exhibits no edema. Lymphadenopathy:     He has no cervical adenopathy. Neurological: He is alert and oriented to person, place, and time. No cranial nerve deficit. Coordination normal.   Skin: Skin is warm and dry. No rash noted. No erythema. Psychiatric: He has a normal mood and affect. Nursing note and vitals reviewed. Diagnostic Study Results     Radiologic Studies -   XR ABD (KUB)   Final Result   EXAM:  XR ABD (KUB). INDICATION: Tube placement. COMPARISON: 12/20/2016.     FINDINGS:   A portable supine radiograph of the abdomen was obtained at 1012 hours and shows  a tube over the upper abdomen with contrast in the stomach. There are clips in  the right upper quadrant. No soft tissue masses or pathologic calcifications are  identified. There is dextroconvex scoliosis and degenerative change of the  spine and there is laminectomy and fusion with rods and pedicle screws. There  are sternal sutures with a pacemaker in the chest.     IMPRESSION  IMPRESSION: Contrast material in the stomach. Medical Decision Making   I am the first provider for this patient. I reviewed the vital signs, available nursing notes, past medical history, past surgical history, family history and social history. Vital Signs-Reviewed the patient's vital signs. Patient Vitals for the past 12 hrs:   Temp Pulse Resp BP SpO2   06/23/18 0902 98 °F (36.7 °C) 66 18 112/66 97 %       Pulse Oximetry Analysis - 97% on RA    Cardiac Monitor:   Rate: 66 bpm  Rhythm: Normal Sinus Rhythm      Records Reviewed: Nursing Notes and Old Medical Records    Provider Notes (Medical Decision Making):   DDx: Peg tube problem. Will replace pt's peg tube. ED Course:   Initial assessment performed.  The patients presenting problems have been discussed, and they are in agreement with the care plan formulated and outlined with them. I have encouraged them to ask questions as they arise throughout their visit. PROGRESS NOTE:  9:55 AM  Replaced pt's peg tube using a 22 french. Pt tolerated well and there were no complications. Procedure took 1-15 minutes. Will confirm placement with imaging. Written by Mike Siu, ED scribe, as dictated by Angie Zarco. Tim Giraldo MD    Critical Care Time: 0    Disposition:    10:30 AM  Jessika Boswell  results have been reviewed with him. He has been counseled regarding his diagnosis. He verbally conveys understanding and agreement of the signs, symptoms, diagnosis, treatment and prognosis and additionally agrees to follow up as recommended with Dr. Guero March MD in 24 - 48 hours. He also agrees with the care-plan and conveys that all of his questions have been answered. I have also put together some discharge instructions for him that include: 1) educational information regarding their diagnosis, 2) how to care for their diagnosis at home, as well a 3) list of reasons why they would want to return to the ED prior to their follow-up appointment, should their condition change. PLAN:  1. Discharge home  2. Follow-up Information     Follow up With Details Comments Contact Info    Guero March MD  As needed, If symptoms worsen [facility] [id number] [phone]          Return to ED if worse     Diagnosis     Clinical Impression:   1. Complaint associated with gastric tube [facility])        Attestations: This note is prepared by Mike Siu, acting as Scribe for Angie Zarco. Tim Giraldo, [address] Tim Giraldo MD: The scribe's documentation has been prepared under my direction and personally reviewed by me in its entirety.  I confirm that the note above accurately reflects all work, treatment, procedures, and medical decision making performed by me        This note will not be viewable in [address]

## 2018-06-28 PROBLEM — R25.1 TREMORS OF NERVOUS SYSTEM: Status: ACTIVE | Noted: 2018-01-01

## 2018-06-28 PROBLEM — R53.81 PHYSICAL DECONDITIONING: Status: ACTIVE | Noted: 2018-01-01

## 2018-06-28 NOTE — CONSULTS
NEUROLOGY CONSULT NOTE    Patient ID:  Douglas Garcia  [id number]  80 y.o.  1935    Date of Consultation:  June 28, 2018    Referring Physician: Dr. Carmel Hilton    Reason for Consultation: tremors    History of Present Illness:     Patient Active Problem List    Diagnosis Date Noted    Physical deconditioning 06/28/2018    Tremors of nervous system 06/28/2018    Type 2 diabetes with nephropathy ([address] 05/07/2018    Diabetic peripheral neuropathy associated with type 2 diabetes mellitus ([address] 05/07/2018    Benign essential tremor syndrome 05/07/2018    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    Wrist pain, acute, right 05/07/2018    Thrombotic stroke involving left middle cerebral artery ([address] 02/02/2017    Stenosis of both internal carotid arteries 02/02/2017    Hemiplegia and hemiparesis following cerebral infarction affecting right dominant side ([address] 02/02/2017    Weakness 02/01/2017    Stroke ([address] 02/01/2017    Aspiration pneumonia ([address] 11/28/2016    History of stroke 07/30/2016     Class: Present on Admission    Polyneuropathy associated with underlying disease ([address] 02/11/2016    Peripheral neuropathy ([address] 01/09/2016    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 ([address] 01/08/2016    Percutaneous endoscopic gastrostomy status ([address] 12/04/2014    Antiplatelet or antithrombotic long-term use 12/04/2014    Heart failure ([address] 09/11/2014    Esophageal motility disorder 07/02/2013    CAD (coronary artery disease) 02/12/2013    Status post implantation of automatic cardioverter/defibrillator (AICD) 02/12/2013     Class: Present on Admission    Aortic stenosis, mild 02/12/2013    S/P PTCA (percutaneous transluminal coronary angioplasty) 01/18/2013    Non-cardiac chest pain 01/04/2013    Feeding difficulties 05/02/2011    Abnormal cardiovascular stress test 06/24/2010    S/P CABG x 3 06/24/2010    Atherosclerotic cerebrovascular disease 06/24/2010    Orthostatic hypotension 06/24/2010    Dysphagia 05/03/2010     Past Medical History:   Diagnosis Date    Antiplatelet or antithrombotic long-term use 12/4/2014    Anxiety disorder     Arrhythmia 2009    bradycardia    Arthritis     CAD (coronary artery disease)     s/p CABG 2002; Dr Jacobsen [facility]) 1996    tongue/throat cancer s/p surgery / radiation and 1 dose of chemo    Carotid artery stenosis     s/p bilateral stents    Chronic pain     left leg, lower back,     Depression     Diabetes [facility]     Type II    Esophageal dysmotility     s/p dilitation    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 I suspect the latter. Achalasia  is not present.         GERD (gastroesophageal reflux disease)     Heart failure ([facility] 10/2014     Cardiomyopathy:Pacemaker upgrade:Biv and AICD  Dr. Juanito Mccarthy heart DrAlvaro last visit 5/11/2015    Hepatitis C Dx 1996    treated at [facility] in past; as of 4/15/15 wife states pt currently not under any treatment    Hyperlipidemia     Hypertension     Myocardial infarct [facility]) 2013    Heart Cath: 40% LV EF, Stented distal LAD, patent Graft to circumflex    On tube feeding diet approx 2009    still has as of 9/28/15  (no po food/liquid/meds at all); Dr Sonam Chapa Other ill-defined conditions(799.89) 1996    1 dose of chemotherapy/radiation for tongue cancer    Other ill-defined conditions(799.89)     BPH    Other ill-defined conditions(799.89)     orthostatic hypotension    Pneumonia ~ April -May 2010    Stroke [facility]) approx 2003    left side-left finger tips numb; no imbalance or memory loss; as of 2015 not seeing neuro MD    Suicidal thoughts       Past Surgical History:   Procedure Laterality Date    ABDOMEN SURGERY [address]    peg tube    CABG, ARTERY-VEIN, THREE  2000    HX CATARACT REMOVAL      bilateral    HX CHOLECYSTECTOMY      HX HEART CATHETERIZATION  2013    Stented distal LAD    HX MOHS PROCEDURES      bilateral    HX ORTHOPAEDIC      back surgery times two    HX OTHER SURGICAL      Radical Left Neck    HX OTHER SURGICAL      NASAL POLYPS REMOVAL    HX OTHER SURGICAL  2010    TURP    HX PACEMAKER  11/08    HX PACEMAKER  10/28/14     Defibrillator: [facility] # [id number], serial # [id number]; Dr. Beata Del Valle [phone]; Dr Guillermo Garcia      cystoscopy    NEUROLOGICAL PROCEDURE UNLISTED  1996    cevical surgery    AR CHANGE GASTROSTOMY TUBE  5/2/2011         AR CHANGE GASTROSTOMY TUBE  6/29/2011         AR EGD INSERT GUIDE WIRE DILATOR PASSAGE ESOPHAGUS  9/13/2010         AR EGD TRANSORAL BIOPSY SINGLE/MULTIPLE  9/13/2010         STOMACH SURGERY PROCEDURE UNLISTED  6/29/2011         VASCULAR SURGERY PROCEDURE UNLIST      bilateral carotid stents      Prior to Admission medications    Medication Sig Start Date End Date Taking? Authorizing Provider   primidone (MYSOLINE) 50 mg tablet Take 2 tabs at night via PEG, take 1 tablet in the morning via peg tube 6/28/18  Yes Rafael Cordoba DO   finasteride (PROSCAR) 5 mg tablet Take 5 mg by mouth nightly. Yes Booker Eric MD   alfuzosin SR (UROXATRAL) 10 mg SR tablet  5/24/18  Yes Historical Provider   furosemide (LASIX) 20 mg tablet Take 2 Tabs by mouth daily. Indications: hypercalcemia  Patient taking differently: Take 40 mg by mouth daily. 40 mg in the morning and 20 mg evening  Indications: hypercalcemia 5/11/18  Yes Ashlyn Mar DO   cyanocobalamin 1,000 mcg tablet Take 1,000 mcg by mouth daily. Yes Historical Provider   acetaminophen (TYLENOL) 500 mg tablet Take 1,000 mg by mouth every six (6) hours as needed for Pain. Yes Historical Provider   Lancets misc Use preferred brand;  Check glucose 4 times daily, Diagnosis E11.65 3/28/18  Yes Aimee Durbin MD   glucose blood VI test strips (PHARMACIST CHOICE) strip Use preferred brand; Check glucose 4 times daily, Diagnosis E11.65 3/28/18  Yes Jazmine Vaughan MD   Blood-Glucose Meter monitoring kit 1 preferred brand glucometer for checking home glucose 4 times per day. DX Code: E11.65 3/28/18  Yes Jazmine Vaughan MD   fluticasone [facility]) 50 mcg/actuation nasal spray 1 [place] by Both Nostrils route two (2) times a day. 2/19/18  Yes Historical Provider   PYRIDOXINE HCL, VITAMIN B6, (VITAMIN B-6 PO) Take  by mouth. Yes Historical Provider   HYDROcodone-acetaminophen (NORCO)  mg tablet Take 1 Tab by mouth daily as needed. Yes Booker Eric MD   famotidine (PEPCID) 20 mg tablet Take 1 Tab by mouth two (2) times a day. 1/14/18  Yes Ariel Bella DO   guaiFENesin (ROBITUSSIN) 100 mg/5 mL liquid Take 200 mg by mouth three (3) times daily as needed for Cough. Yes Historical Provider   nitroglycerin (NITROSTAT) 0.4 mg SL tablet 0.4 mg by SubLINGual route every five (5) minutes as needed for Chest Pain. Yes Booker Eric MD   aspirin (ASPIRIN) 325 mg tablet Take 325 mg by mouth daily. Yes Booker Eric MD   vit B Cmplx 3-FA-Vit C-Biotin (NEPHRO GRAYSON RX) 1- mg-mg-mcg tablet Take 1 Tab by mouth daily. Yes Booker Eric MD   albuterol-ipratropium (DUO-NEB) 2.5 mg-0.5 mg/3 ml nebu 3 mL by Nebulization route every six (6) hours as needed. 12/5/16  Yes Gail Euceda MD   ondansetron hcl (ZOFRAN, AS HYDROCHLORIDE,) 8 mg tablet Take 1 Tab by mouth every eight (8) hours as needed for Nausea. 12/5/16  Yes Gail Euceda MD   Nebulizer & Compressor machine 1 Each by Does Not Apply route daily. 12/5/16  Yes Gail Euceda MD   midodrine (PROAMITINE) 10 mg tablet Take 10 mg by mouth three (3) times daily (with meals). 11/1/16  Yes Historical Provider   insulin syringe-needle U-100 1 mL 31 x 15/64\" syrg 1 Syringe by SubCUTAneous route four (4) times daily.  1/8/16  Yes Igor Carrion MD   Insulin Needles, Disposable, (ESPERANZA PEN NEEDLE) 32 gauge x 5/32\" ndle 5 times a day 1/8/16 Yes Pearline Severs, MD   atorvastatin (LIPITOR) 40 mg tablet Take 40 mg by mouth daily. Yes Booker Eric MD   gabapentin (NEURONTIN) 250 mg/5 mL solution 750 mg by PEG Tube route three (3) times daily. Yes Booker Eric MD   multivitamin (ONE A DAY) tablet Take 1 Tab by mouth daily. Yes Booker Eric MD   clopidogrel (PLAVIX) 75 mg tablet Take 75 mg by mouth daily. Yes Booker Eric MD   tamsulosin (FLOMAX) 0.4 mg capsule Take 0.4 mg by mouth as needed. 18   Historical Provider   LANTUS SOLOSTAR U-100 INSULIN 100 unit/mL (3 mL) inpn INJECT 14 UNITS SUBCUTANEOUSLY ONCE DAILY 18   Evan Franklin MD   insulin lispro ([facility] INSUL[facility]) 100 unit/mL kwikpen 13 units with breakfast, 10 units with lunch, 7 units with dinner + correction insulin, up to 40 units/day  Patient taking differently: by SubCUTAneous route. 13 units with breakfast, 10 units with lunch, 7 units with dinner + correction insulin, up to 40 units/day  Indications: 10 lunch, 7 dinner 3/12/18   Evan Franklin MD     Allergies   Allergen Reactions    Demerol [Meperidine] Shortness of Breath    Paxil [Paroxetine Hcl] Unknown (comments)     Pt gets shaky and loses control of legs    Amoxicillin Rash    Cleocin [Clindamycin Hcl] Rash    Pcn [Penicillins] Rash      Social History   Substance Use Topics    Smoking status: Never Smoker    Smokeless tobacco: Never Used    Alcohol use No      Family History   Problem Relation Age of Onset    Heart Disease Father       at age 52 from CAD    Colon Cancer Mother     Cancer Mother      colon ca    Heart Disease Brother         Subjective:      Ori Medina is a 80 y.o. WM with history of HTN, CAD, DM, stroke, MI, and radical neck dissection secondary to CA , PEG tube placement, hypotension on chronic Midodrine who was admitted from the ER for worsening tremors and generalized weakness.  Patient was seen by Dr. Neema Faustin (neurology) recently for his intermittent tremors and was started on Primidone. Patient was taking up to 100 mg at bedtime without any benefit. Tremors seem to be worse. Per patient is can violent jerking with spillage if holding on to something. This has been ongoing for the past 6 months. Unpredictable occurrence. Mainly when changing position or sitting and standing. Rarely supine and resting. Patient unable to walk with his walker (baseline). In the ER, BP was 110/84. Labs revealed low RBC, hgb and platelets. Magnesium was slightly elevated. Elevated creatinine, low GFR and elevated AST. Head CT revealed no acute process. Patchy areas of white matter disease and chronic left cerebellar infarction. Currently, patient has not obvious tremors at all. Outside reports reviewed: office notes, ER records, radiology reports, lab reports. Review of Systems:    Pertinent items are noted in HPI. Objective:     Patient Vitals for the past 8 hrs:   BP Temp Pulse Resp SpO2   06/28/18 1523 164/65 97.7 °F (36.5 °C) 90 20 95 %   06/28/18 1445 168/89 - 79 - 94 %   06/28/18 1316 131/41 - 65 - 92 %   06/28/18 1300 114/59 - 63 - 93 %   06/28/18 1230 156/56 - 67 - 92 %   06/28/18 1148 137/58 - 67 - 93 %   06/28/18 1108 - - 70 - 92 %   06/28/18 0945 - - 79 - 97 %   06/28/18 0901 130/65 - 67 - 94 %   06/28/18 0815 137/62 - 63 - -           NEUROLOGICAL EXAM:      Appearance: The patient is well developed, well nourished, provides a coherent history and is in no acute distress. Mental Status: Oriented to time, place and person and the president, cognitive function and fund of knowledge is normal. Speech is fluent without aphasia or dysarthria. Mood and affect appropriate but depressed . Cranial Nerves:   Intact visual fields. RAY, EOM's full, no nystagmus, no ptosis. Facial sensation is normal. Facial movement is asymmetric. Hearing is normal bilaterally. Palate is midline with normal sternocleidomastoid and trapezius muscles are normal. Tongue is midline.   Neck without meningismus or bruits   Motor:  4+/5 strength in upper and lower proximal and distal muscles. Normal tone. No pronator drift. Reflexes:   Deep tendon reflexes were symmetric. Toes downgoing. Sensory:   Abnormal to cold and pinprick and vibration decreased in both feet. Gait:  Not tested       Tremor:   Subtle intentional bilateral UE tremor but no resting tremor noted and no cogwheeling or rigidity. Cerebellar:  Intact FTN/[place]/HTS. Neurovascular:  Abnormal heart sounds and irregular rhythm, peripheral pulses decreased, and no carotid bruits.       Imaging  CT Head: reviewed    Labs Reviewed      Assessment:     Active Problems:    Physical deconditioning (6/28/2018)      Tremors of nervous system (6/28/2018)      Plan:   1. Neurological examination does not reveal any new deficits. Very mild if ever of intentional UE tremors but no postural. Was not able to see the jerking and coarse tremors that he complains about. The intermittent nature and ballistic character of his complaints are not classic for essential tremors and are more concerning for myoclonic jerking. No evidence on exam of PD.     2. Discontinue Primidone. EEG ordered to assess for seizures. Trial of increase dose of Gabapentin 1000 mg TID was ordered. If unable to tolerate or no benefit, then I will do trial of Levetiracetam.     3. Cardiology consult was ordered to assess need for Midodrine or dose adjusted (BID instead of TID or lower dose). Midodrine is known to cause tremors and myoclonic jerking. 4. Baseline evaluation with PT and OT. Thank you for the consult.

## 2018-06-28 NOTE — PROGRESS NOTES
* No surgery found *  * No surgery found *  Bedside shift change report given to Kelly Walker (oncoming nurse) by Aurora Blas (offgoing nurse). Report included the following information SBAR, Kardex, Intake/Output, MAR and Recent Results.     Zone Phone:   [phone]      Significant changes during shift:  None, new admit         Patient Information    Salome Costa  80 y.o.  6/28/2018  4:12 AM by Shea Moody MD. Salome Costa was admitted from Home    Problem List    Patient Active Problem List    Diagnosis Date Noted    Physical deconditioning 06/28/2018    Tremors of nervous system 06/28/2018    Type 2 diabetes with nephropathy ([address] 05/07/2018    Diabetic peripheral neuropathy associated with type 2 diabetes mellitus ([address] 05/07/2018    Benign essential tremor syndrome 05/07/2018    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    Wrist pain, acute, right 05/07/2018    Thrombotic stroke involving left middle cerebral artery ([address] 02/02/2017    Stenosis of both internal carotid arteries 02/02/2017    Hemiplegia and hemiparesis following cerebral infarction affecting right dominant side ([address] 02/02/2017    Weakness 02/01/2017    Stroke ([address] 02/01/2017    Aspiration pneumonia ([address] 11/28/2016    History of stroke 07/30/2016     Class: Present on Admission    Polyneuropathy associated with underlying disease ([address] 02/11/2016    Peripheral neuropathy ([address] 01/09/2016    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 ([address] 01/08/2016    Percutaneous endoscopic gastrostomy status ([address] 12/04/2014    Antiplatelet or antithrombotic long-term use 12/04/2014    Heart failure ([address] 09/11/2014    Esophageal motility disorder 07/02/2013    CAD (coronary artery disease) 02/12/2013    Status post implantation of automatic cardioverter/defibrillator (AICD) 02/12/2013     Class: Present on Admission    Aortic stenosis, mild 02/12/2013    S/P PTCA (percutaneous transluminal coronary angioplasty) 01/18/2013    Non-cardiac chest pain 01/04/2013    Feeding difficulties 05/02/2011    Abnormal cardiovascular stress test 06/24/2010    S/P CABG x 3 06/24/2010    Atherosclerotic cerebrovascular disease 06/24/2010    Orthostatic hypotension 06/24/2010    Dysphagia 05/03/2010     Past Medical History:   Diagnosis Date    Antiplatelet or antithrombotic long-term use 12/4/2014    Anxiety disorder     Arrhythmia 2009    bradycardia    Arthritis     CAD (coronary artery disease)     s/p CABG 2002; Dr Beverlyn Kehr [facility]) 1996    tongue/throat cancer s/p surgery / radiation and 1 dose of chemo    Carotid artery stenosis     s/p bilateral stents    Chronic pain     left leg, lower back,     Depression     [facility]     Type II    Esophageal dysmotility     s/p dilitation    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 I suspect the latter. Achalasia  is not present.         GERD (gastroesophageal reflux disease)     Heart failure ([facility] 10/2014     Cardiomyopathy:Pacemaker upgrade:Biv and AICD  Dr. Sharla Marti heart  last visit 5/11/2015    Hepatitis C Dx 1996    treated at [facility] in past; as of 4/15/15 wife states pt currently not under any treatment    Hyperlipidemia     Hypertension     Myocardial infarct [facility]) 2013    Heart Cath: 40% LV EF, Stented distal LAD, patent Graft to circumflex    On tube feeding diet approx 2009    still has as of 9/28/15  (no po food/liquid/meds at all); Dr Meghann Jasso Other ill-defined conditions(799.89) 1996    1 dose of chemotherapy/radiation for tongue cancer    Other ill-defined conditions(799.89)     BPH    Other ill-defined conditions(799.89)     orthostatic hypotension    Pneumonia ~ April -May 2010    Stroke [facility]) approx 2003    left side-left finger tips numb; no imbalance or memory loss; as of 2015 not seeing neuro MD Esau Zhang Suicidal thoughts          Core Measures:    CVA: No Refused  CHF:No No  PNA:No No      Activity Status:    OOB to Chair No  Ambulated this shift No   Bed Rest No    Supplemental O2: (If Applicable)    NC No  NRB No  Venti-mask No    DVT prophylaxis:    DVT prophylaxis Med- Yes  DVT prophylaxis SCD or CORTES- No     Wounds: (If Applicable)    Wounds- No    Location-none    Patient Safety:    Falls Score Total Score: 4  Safety Level_______  Bed Alarm On? Yes  Sitter?  No    Plan for upcoming shift: continue to monitor         Discharge Plan: No     Active Consults:  IP CONSULT TO NEUROLOGY  IP CONSULT TO CARDIOLOGY

## 2018-06-28 NOTE — ED TRIAGE NOTES
Arrived by EMS, c/o tremors x4 weeks. Increased medications recently due to increased tremors. Glucose 185. C/o throat cancer: peg tube, \"last meal at dinner. \" Bp 152/54, h/o DM, CVA.

## 2018-06-28 NOTE — H&P
Hospitalist Admission Note    NAME: Rachel Cooney   :  1935   MRN:  [id number]     Date/Time:  2018 2:04 PM    Patient PCP: Kin Beaulieu MD  ________________________________________________________________________    My assessment of this patient's clinical condition and my plan of care is as follows.     Assessment / Plan:  Essential tremors with recent worsening  Patient's medication for tremor primidone is being adjusted by Dr. Julianne Pelayo and wife reports that since the dose was increased he started experiencing worsening of tremors  We will consult Dr. 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  Consult dietary for management of tube feeds    Systolic congestive heart failure compensated  Most recent ejection fraction is 40%  Resume home dose of Lasix    Diabetic neuropathy  On gabapentin                      Code Status: Full   Surrogate Decision Maker: Wife Marisel    DVT Prophylaxis: heparin  GI Prophylaxis: not indicated    Baseline: From home with recent functional decline since he was started on medication from Tremors        Subjective:   CHIEF COMPLAINT: Tremors    HISTORY OF PRESENT ILLNESS:     This is a 66-year-old male with past medical history of coronary disease, hypertension, diabetes mellitus, oral cancer status post neck dissection with PEG tube placement is coming to the hospital with chief complaints of tremors that have been worsening since the last 1 week.  Patient reports that he started having tremors about 6 months ago and was started on primidone and since then Dr. Paola Dinero who is his primary neurologist has been adjusting his medications.  The last few weeks, his medication dose was increased and since then he noticed that his tremors have gotten worse and he also became unsteady on his feet and his wife has been following the advice of Dr. Paola Dinero regarding his medication adjustment. Jerald Reza does have his PEG tube in place secondary to cancer and had the tube replaced a few days ago after it fell off. Jerald Reza does not report any focal weakness does have chronic tingling.  He does not report any slurred speech or vision deficits.  He does have chronic cough and chronic gurgling in his throat secondary to neck dissection. He does not report any other symptoms at this time.  On arrival to the ER, he had extensive workup and was made to walk with physical therapy and he became very unsteady and wife reported that she is not able to take care of himself at home in case management could not place this patient at rehabilitation facility so your physician has asked me to admit the patient for evaluation of tremor and placement in a rehabilitation facility    We were asked to admit for work up and evaluation of the above problems. 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  I suspect the latter. Achalasia  is not present.         GERD (gastroesophageal reflux disease)     Heart failure ([facility] [address] 10/2014     Cardiomyopathy:Pacemaker upgrade:Biv and AICD  Dr. Salvador Sotelo heart  last visit 5/11/2015    Hepatitis C Dx 1996    treated at [facility] in past; as of 4/15/15 wife states pt currently not under any treatment    Hyperlipidemia     Hypertension     Myocardial infarct [facility]) 2013    Heart Cath: 40% LV EF, Stented distal LAD, patent Graft to circumflex    On tube feeding diet approx 2009    still has as of 9/28/15  (no po food/liquid/meds at all); Dr Warren Cox Other ill-defined conditions(799.89) 1996    1 dose of chemotherapy/radiation for tongue cancer    Other ill-defined conditions(799.89)     BPH    Other ill-defined conditions(799.89)     orthostatic hypotension    Pneumonia ~ April -May 2010    Stroke [facility]) approx 2003    left side-left finger tips numb; no imbalance or memory loss; as of 2015 not seeing neuro MD    Suicidal thoughts         Past Surgical History:   Procedure Laterality Date    ABDOMEN SURGERY [address]    peg tube    CABG, ARTERY-VEIN, THREE  2000    HX CATARACT REMOVAL      bilateral    HX CHOLECYSTECTOMY      HX HEART CATHETERIZATION  2013    Stented distal LAD    HX MOHS PROCEDURES      bilateral    HX ORTHOPAEDIC      back surgery times two    HX OTHER SURGICAL      Radical Left Neck    HX OTHER SURGICAL      NASAL POLYPS REMOVAL    HX OTHER SURGICAL  2010    TURP    HX PACEMAKER  11/08    HX PACEMAKER  10/28/14     Defibrillator: [facility] # [id number], serial # [id number]; Dr. Joan Hickman [phone]; Dr Arias Fraction      cystoscopy   [address] [place] [address]    cevical surgery    ID CHANGE GASTROSTOMY TUBE  5/2/2011         ID CHANGE GASTROSTOMY TUBE  6/29/2011         ID EGD INSERT GUIDE WIRE DILATOR PASSAGE ESOPHAGUS  9/13/2010         ID EGD TRANSORAL BIOPSY SINGLE/MULTIPLE  9/13/2010         STOMACH SURGERY PROCEDURE UNLISTED  2011         VASCULAR SURGERY PROCEDURE UNLIST      bilateral carotid stents       Social History   Substance Use Topics    Smoking status: Never Smoker    Smokeless tobacco: Never Used    Alcohol use No        Family History   Problem Relation Age of Onset    Heart Disease Father       at age 52 from CAD    Colon Cancer Mother     Cancer Mother      colon ca    Heart Disease Brother      Allergies   Allergen Reactions    Demerol [Meperidine] Shortness of Breath    Paxil [Paroxetine Hcl] Unknown (comments)     Pt gets shaky and loses control of legs    Amoxicillin Rash    Cleocin [Clindamycin Hcl] Rash    Pcn [Penicillins] Rash        Prior to Admission medications    Medication Sig Start Date End Date Taking? Authorizing Provider   primidone (MYSOLINE) 50 mg tablet Take 2 tabs at night via PEG, take 1 tablet in the morning via peg tube 18  Yes Marco Yo,    finasteride (PROSCAR) 5 mg tablet Take 5 mg by mouth nightly. Yes Booker Eric MD   alfuzosin SR (UROXATRAL) 10 mg SR tablet  18  Yes Historical Provider   furosemide (LASIX) 20 mg tablet Take 2 Tabs by mouth daily. Indications: hypercalcemia  Patient taking differently: Take 40 mg by mouth daily. 40 mg in the morning and 20 mg evening  Indications: hypercalcemia 18  Yes Hedy Romero, DO   cyanocobalamin 1,000 mcg tablet Take 1,000 mcg by mouth daily. Yes Historical Provider   acetaminophen (TYLENOL) 500 mg tablet Take 1,000 mg by mouth every six (6) hours as needed for Pain. Yes Historical Provider   Lancets misc Use preferred brand; Check glucose 4 times daily, Diagnosis E11.65 3/28/18  Yes Cristina Huang MD   glucose blood VI test strips (PHARMACIST CHOICE) strip Use preferred brand; Check glucose 4 times daily, Diagnosis E11.65 3/28/18  Yes Cristina Huang MD   Blood-Glucose Meter monitoring kit 1 preferred brand glucometer for checking home glucose 4 times per day. DX Code: E11.65 3/28/18  Yes Lalita Rios MD   fluticasone [facility]) 50 mcg/actuation nasal spray 1 [place] by Both Nostrils route two (2) times a day. 2/19/18  Yes Historical Provider   PYRIDOXINE HCL, VITAMIN B6, (VITAMIN B-6 PO) Take  by mouth. Yes Historical Provider   HYDROcodone-acetaminophen (NORCO)  mg tablet Take 1 Tab by mouth daily as needed. Yes Booker Eric MD   famotidine (PEPCID) 20 mg tablet Take 1 Tab by mouth two (2) times a day. 1/14/18  Yes Walker Anaya DO   guaiFENesin (ROBITUSSIN) 100 mg/5 mL liquid Take 200 mg by mouth three (3) times daily as needed for Cough. Yes Historical Provider   nitroglycerin (NITROSTAT) 0.4 mg SL tablet 0.4 mg by SubLINGual route every five (5) minutes as needed for Chest Pain. Yes Booker Eric MD   aspirin (ASPIRIN) 325 mg tablet Take 325 mg by mouth daily. Yes Booker Eric MD   vit B Cmplx 3-FA-Vit C-Biotin (NEPHRO GRAYSON RX) 1- mg-mg-mcg tablet Take 1 Tab by mouth daily. Yes Booker Eric MD   albuterol-ipratropium (DUO-NEB) 2.5 mg-0.5 mg/3 ml nebu 3 mL by Nebulization route every six (6) hours as needed. 12/5/16  Yes Alina Garrison MD   ondansetron hcl (ZOFRAN, AS HYDROCHLORIDE,) 8 mg tablet Take 1 Tab by mouth every eight (8) hours as needed for Nausea. 12/5/16  Yes Alina Garrison MD   Nebulizer & Compressor machine 1 Each by Does Not Apply route daily. 12/5/16  Yes Alina Garrison MD   midodrine (PROAMITINE) 10 mg tablet Take 10 mg by mouth three (3) times daily (with meals). 11/1/16  Yes Historical Provider   insulin syringe-needle U-100 1 mL 31 x 15/64\" syrg 1 Syringe by SubCUTAneous route four (4) times daily. 1/8/16  Yes Sushant Baldwin MD   Insulin Needles, Disposable, (ESPERANZA PEN NEEDLE) 32 gauge x 5/32\" ndle 5 times a day 1/8/16  Yes Sushant Baldwin MD   atorvastatin (LIPITOR) 40 mg tablet Take 40 mg by mouth daily.    Yes Booker Eric MD   gabapentin (NEURONTIN) 250 mg/5 mL solution 750 mg by PEG Tube route three (3) times daily. Yes Booker Eric MD   multivitamin (ONE A DAY) tablet Take 1 Tab by mouth daily. Yes Booker Eric MD   clopidogrel (PLAVIX) 75 mg tablet Take 75 mg by mouth daily. Yes Booker Eric MD   tamsulosin (FLOMAX) 0.4 mg capsule Take 0.4 mg by mouth as needed. 5/21/18   Historical Provider   LANTUS SOLOSTAR U-100 INSULIN 100 unit/mL (3 mL) inpn INJECT 14 UNITS SUBCUTANEOUSLY ONCE DAILY 5/21/18   Nahomi Hwang MD   insulin lispro ([facility] INSULIN) 100 unit/mL kwikpen 13 units with breakfast, 10 units with lunch, 7 units with dinner + correction insulin, up to 40 units/day  Patient taking differently: by SubCUTAneous route. 13 units with breakfast, 10 units with lunch, 7 units with dinner + correction insulin, up to 40 units/day  Indications: 10 lunch, 7 dinner 3/12/18   Nahomi Hwang MD       REVIEW OF SYSTEMS:     I am not able to complete the review of systems because:    The patient is intubated and sedated    The patient has altered mental status due to his acute medical problems    The patient has baseline aphasia from prior stroke(s)    The patient has baseline dementia and is not reliable historian    The patient is in acute medical distress and unable to provide information           Total of 12 systems reviewed as follows:       POSITIVE= underlined text  Negative = text not underlined  General:  fever, chills, sweats, generalized weakness, weight loss/gain,      loss of appetite   Eyes:    blurred vision, eye pain, loss of vision, double vision  ENT:    rhinorrhea, pharyngitis   Respiratory:   cough, sputum production, SOB, KRAMER, wheezing, pleuritic pain   Cardiology:   chest pain, palpitations, orthopnea, PND, edema, syncope   Gastrointestinal:  abdominal pain , N/V, diarrhea, dysphagia, constipation, bleeding   Genitourinary:  frequency, urgency, dysuria, hematuria, incontinence   Muskuloskeletal :  arthralgia, myalgia, back pain  Hematology:  easy bruising, nose or gum bleeding, lymphadenopathy   Dermatological: rash, ulceration, pruritis, color change / jaundice  Endocrine:   hot flashes or polydipsia   Neurological:  headache, dizziness, confusion, focal weakness, paresthesia,     Speech difficulties, memory loss, gait difficulty, tremors  Psychological: Feelings of anxiety, depression, agitation    Objective:   VITALS:    Visit Vitals    /41    Pulse 65    Temp 98.3 °F (36.8 °C)    Resp (!) 7    Ht 5' 7\" (1.702 m)    Wt 70.3 kg (155 lb)    SpO2 92%    BMI 24.28 kg/m2       PHYSICAL EXAM:    General:    Alert, cooperative, no distress, appears stated age. HEENT: Atraumatic, anicteric sclerae, pink conjunctivae     No oral ulcers, mucosa moist  Neck:  Supple, symmetrical,  thyroid: non tender  Lungs:   Clear to auscultation bilaterally. No Wheezing or Rhonchi. No rales, gurgling sound in throat  Chest wall:  No tenderness  No Accessory muscle use. Heart:   Regular  rhythm,  No  murmur   No edema  Abdomen:   Soft, non-tender. Not distended. Bowel sounds normal, Peg tube +   Extremities: No cyanosis. No clubbing,      Skin turgor normal, Capillary refill normal, Radial dial pulse 2+  Skin:     Not pale. Not Jaundiced  No rashes   Psych:  Good insight. Not depressed. Not anxious or agitated. Neurologic: EOMs intact. No facial asymmetry. No aphasia or slurred speech. Symmetrical strength, Sensation grossly intact. Alert and oriented X 4.   Tremors +     _______________________________________________________________________  Care Plan discussed with:    Comments   Patient y    Family  y wife   RN y    Care Manager                    Consultant:      _______________________________________________________________________  Expected  Disposition:   Home with Family y   HH/PT/OT/RN    SNF/LTC    [facility]UPINDER    ________________________________________________________________________  TOTAL TIME:  54  Minutes    Critical Care Provided     Minutes non procedure based      Comments y Reviewed previous records   >50% of visit spent in counseling and coordination of care y Discussion with patient and/or family and questions answered       ________________________________________________________________________  Signed: Indu Banks MD    Procedures: see electronic medical records for all procedures/Xrays and details which were not copied into this note but were reviewed prior to creation of Plan.     LAB DATA REVIEWED:    BMP:   Lab Results   Component Value Date/Time     06/28/2018 05:38 AM    K 4.2 06/28/2018 05:38 AM    CL 97 06/28/2018 05:38 AM    CO2 35 (H) 06/28/2018 05:38 AM    AGAP 6 06/28/2018 05:38 AM     (H) 06/28/2018 05:38 AM    BUN 29 (H) 06/28/2018 05:38 AM    CREA 1.32 (H) 06/28/2018 05:38 AM    GFRAA >60 06/28/2018 05:38 AM    GFRNA 52 (L) 06/28/2018 05:38 AM

## 2018-06-28 NOTE — PROGRESS NOTES
Paged dietitian left voicemail will retry later. Wife stated she is going to give her  the feedings he would normally get at this time.

## 2018-06-28 NOTE — IP AVS SNAPSHOT
[address] [place] [address] 
[phone] Patient: Mario Card MRN: [id number] GCX:[id number] About your hospitalization You were admitted on:  June 28, 2018 You last received care in the:  [facility] 3 NEUROSCIENCE TELEMETRY You were discharged on:  July 3, 2018 Why you were hospitalized Your primary diagnosis was:  Not on File Your diagnoses also included:  Physical Deconditioning, Tremors Of Nervous System Follow-up Information Follow up With Details Comments Contact Info Maryse Kearney MD Go on 7/9/2018 Please follow up on July 9, 2018 at 10:40 arriving at 10:30 to register with photo ID, insurance card and current list of medication  [address] [facility] [address] [facility] 
[phone] Davonte Hoffman MD Go on 7/10/2018 Please follow up on July 10, 2018 at [address] [place] [address] 
[phone] [address] [place] [address]  Nursing and PT services [address] 
1st Floor [place] [address] 
[phone] Your Scheduled Appointments Monday July 09, 2018 10:40 AM EDT HOSPITAL FOLLOW-UP with Maryse Kearney MD  
Neurology Clinic at [address], [address] [facility]  
[phone] Discharge Orders None A check enmesio indicates which time of day the medication should be taken. My Medications START taking these medications Instructions Each Dose to Equal  
 Morning Noon Evening Bedtime  
 pramipexole 0.25 mg tablet Commonly known as:  MIRAPEX Your last dose was: Your next dose is: Take 1 Tab by mouth three (3) times daily. 0.25 mg CHANGE how you take these medications Instructions Each Dose to Equal  
 Morning Noon Evening Bedtime furosemide 20 mg tablet Commonly known as:  LASIX What changed:  additional instructions Your last dose was: Your next dose is: Take 2 Tabs by mouth daily. Indications: hypercalcemia 40 mg  
    
   
   
   
  
 insulin lispro 100 unit/mL kwikpen Commonly known as:  HumaLOG KwikPen Insulin What changed:   
- how to take this 
- additional instructions Your last dose was: Your next dose is:    
   
   
 13 units with breakfast, 10 units with lunch, 7 units with dinner + correction insulin, up to 40 units/day CONTINUE taking these medications Instructions Each Dose to Equal  
 Morning Noon Evening Bedtime  
 acetaminophen 500 mg tablet Commonly known as:  TYLENOL Your last dose was: Your next dose is: Take 1,000 mg by mouth every six (6) hours as needed for Pain. 1000 mg  
    
   
   
   
  
 albuterol-ipratropium 2.5 mg-0.5 mg/3 ml Nebu Commonly known as:  Marie Flatten Your last dose was: Your next dose is:    
   
   
 3 mL by Nebulization route every six (6) hours as needed. 3 mL  
    
   
   
   
  
 alfuzosin SR 10 mg SR tablet Commonly known as:  Poly Postin Your last dose was: Your next dose is:    
   
   
      
   
   
   
  
 aspirin 325 mg tablet Commonly known as:  ASPIRIN Your last dose was: Your next dose is: Take 325 mg by mouth daily. 325 mg  
    
   
   
   
  
 atorvastatin 40 mg tablet Commonly known as:  LIPITOR Your last dose was: Your next dose is: Take 40 mg by mouth daily. 40 mg Blood-Glucose Meter monitoring kit Your last dose was: Your next dose is:    
   
   
 1 preferred brand glucometer for checking home glucose 4 times per day. DX Code: E11.65  
     
   
   
   
  
 cyanocobalamin 1,000 mcg tablet Your last dose was: Your next dose is: Take 1,000 mcg by mouth daily. 1000 mcg  
    
   
   
   
  
 famotidine 20 mg tablet Commonly known as:  PEPCID Your last dose was: Your next dose is: Take 1 Tab by mouth two (2) times a day. 20 mg  
    
   
   
   
  
 finasteride 5 mg tablet Commonly known as:  PROSCAR Your last dose was: Your next dose is: Take 5 mg by mouth nightly. 5 mg  
    
   
   
   
  
 fluticasone 50 mcg/actuation nasal spray Commonly known as:  Jos Means Your last dose was: Your next dose is:    
   
   
 1 Spray by Both Nostrils route two (2) times a day. 1 Spray  
    
   
   
   
  
 gabapentin 250 mg/5 mL solution Commonly known as:  NEURONTIN Your last dose was: Your next dose is:    
   
   
 750 mg by PEG Tube route three (3) times daily. 750 mg  
    
   
   
   
  
 glucose blood VI test strips strip Commonly known as:  PHARMACIST CHOICE Your last dose was: Your next dose is:    
   
   
 Use preferred brand; Check glucose 4 times daily, Diagnosis E11.65  
     
   
   
   
  
 guaiFENesin 100 mg/5 mL liquid Commonly known as:  ROBITUSSIN Your last dose was: Your next dose is: Take 200 mg by mouth three (3) times daily as needed for Cough. 200 mg HYDROcodone-acetaminophen  mg tablet Commonly known as:  Juanetta Rasp Your last dose was: Your next dose is: Take 1 Tab by mouth daily as needed. 1 Tab Insulin Needles (Disposable) 32 gauge x 5/32\" Ndle Commonly known as:  Elise Pen Needle Your last dose was: Your next dose is:    
   
   
 5 times a day  
     
   
   
   
  
 insulin syringe-needle U-100 1 mL 31 gauge x 15/64\" Syrg Your last dose was: Your next dose is:    
   
   
 1 Syringe by SubCUTAneous route four (4) times daily. 1 Syringe Lancets Misc Your last dose was: Your next dose is:    
   
   
 Use preferred brand; Check glucose 4 times daily, Diagnosis E11.65  
     
   
   
   
  
 LANTUS SOLOSTAR U-100 INSULIN 100 unit/mL (3 mL) Inpn Generic drug:  insulin glargine Your last dose was: Your next dose is: INJECT 14 UNITS SUBCUTANEOUSLY ONCE DAILY  
     
   
   
   
  
 midodrine 10 mg tablet Commonly known as:  Margarette Pin Your last dose was: Your next dose is: Take 10 mg by mouth three (3) times daily (with meals). 10 mg  
    
   
   
   
  
 multivitamin tablet Commonly known as:  ONE A DAY Your last dose was: Your next dose is: Take 1 Tab by mouth daily. 1 Tab Nebulizer & Compressor machine Your last dose was: Your next dose is:    
   
   
 1 Each by Does Not Apply route daily. 1 Each  
    
   
   
   
  
 nitroglycerin 0.4 mg SL tablet Commonly known as:  NITROSTAT Your last dose was: Your next dose is: 0.4 mg by SubLINGual route every five (5) minutes as needed for Chest Pain. 0.4 mg  
    
   
   
   
  
 ondansetron hcl 8 mg tablet Commonly known as:  Huff [place] Your last dose was: Your next dose is: Take 1 Tab by mouth every eight (8) hours as needed for Nausea. 8 mg PLAVIX 75 mg Tab Generic drug:  clopidogrel Your last dose was: Your next dose is: Take 75 mg by mouth daily. 75 mg  
    
   
   
   
  
 tamsulosin 0.4 mg capsule Commonly known as:  FLOMAX Your last dose was: Your next dose is: Take 0.4 mg by mouth as needed. 0.4 mg  
    
   
   
   
  
 vit B Cmplx 3-FA-Vit C-Biotin 1- mg-mg-mcg tablet Commonly known as:  NEPHRO GRAYSON RX Your last dose was: Your next dose is: Take 1 Tab by mouth daily. 1 Tab VITAMIN B-6 PO Your last dose was: Your next dose is: Take  by mouth. STOP taking these medications   
 primidone 50 mg tablet Commonly known as: MYSOLINE Where to Get Your Medications These medications were sent to [facility], [address]  [address] [facility], [address] [place] [address] Phone:  [phone] pramipexole 0.25 mg tablet Opioid Education Prescription Opioids: What You Need to Know: 
 
Prescription opioids can be used to help relieve moderate-to-severe pain and are often prescribed following a surgery or injury, or for certain health conditions. These medications can be an important part of treatment but also come with serious risks. Opioids are strong pain medicines. Examples include hydrocodone, oxycodone, fentanyl, and morphine. Heroin is an example of an illegal opioid. It is important to work with your health care provider to make sure you are getting the safest, most effective care. WHAT ARE THE RISKS AND SIDE EFFECTS OF OPIOID USE? Prescription opioids carry serious risks of addiction and overdose, especially with prolonged use. An opioid overdose, often marked by slow breathing, can cause sudden death. The use of prescription opioids can have a number of side effects as well, even when taken as directed. · Tolerance-meaning you might need to take more of a medication for the same pain relief · Physical dependence-meaning you have symptoms of withdrawal when the medication is stopped. Withdrawal symptoms can include nausea, sweating, chills, diarrhea, stomach cramps, and muscle aches. Withdrawal can last up to several weeks, depending on which drug you took and how long you took it. · Increased sensitivity to pain · Constipation · Nausea, vomiting, and dry mouth · Sleepiness and dizziness · Confusion · Depression · Low levels of testosterone that can result in lower sex drive, energy, and strength · Itching and sweating RISKS ARE GREATER WITH:      
· History of drug misuse, substance use disorder, or overdose · Mental health conditions (such as depression or anxiety) · Sleep apnea · Older age (72 years or older) · Pregnancy Avoid alcohol while taking prescription opioids. Also, unless specifically advised by your health care provider, medications to avoid include: · Benzodiazepines (such as Xanax or Valium) · Muscle relaxants (such as Soma or Flexeril) · Hypnotics (such as Ambien or Lunesta) · Other prescription opioids KNOW YOUR OPTIONS Talk to your health care provider about ways to manage your pain that don't involve prescription opioids. Some of these options may actually work better and have fewer risks and side effects. Options may include: 
· Pain relievers such as acetaminophen, ibuprofen, and naproxen · Some medications that are also used for depression or seizures · Physical therapy and exercise · Counseling to help patients learn how to cope better with triggers of pain and stress. · Application of heat or cold compress · Massage therapy · Relaxation techniques Be Informed Make sure you know the name of your medication, how much and how often to take it, and its potential risks & side effects. IF YOU ARE PRESCRIBED OPIOIDS FOR PAIN: 
· Never take opioids in greater amounts or more often than prescribed. Remember the goal is not to be pain-free but to manage your pain at a tolerable level. · Follow up with your primary care provider to: · Work together to create a plan on how to manage your pain. · Talk about ways to help manage your pain that don't involve prescription opioids. · Talk about any and all concerns and side effects. · Help prevent misuse and abuse. · Never sell or share prescription opioids · Help prevent misuse and abuse. · Store prescription opioids in a secure place and out of reach of others (this may include visitors, children, friends, and family). · Safely dispose of unused/unwanted prescription opioids: Find your community drug take-back program or your pharmacy mail-back program, or flush them down the toilet, following guidance from the Food and Drug Administration (www.fda.gov/Drugs/ResourcesForYou). · Visit www.cdc.gov/drugoverdose to learn about the risks of opioid abuse and overdose. · If you believe you may be struggling with addiction, tell your health care provider and ask for guidance or call [employer] at 7-521-601-CZFP. Discharge Instructions None [employer] Announcement We are excited to announce that we are making your provider's discharge notes available to you in [employer]. You will see these notes when they are completed and signed by the physician that discharged you from your recent hospital stay. If you have any questions or concerns about any information you see in [employer], please call the Health Information Department where you were seen or reach out to your Primary Care Provider for more information about your plan of care. Introducing [facility]! [employer] introduces [employer] patient portal. Now you can access parts of your medical record, email your doctor's office, and request medication refills online. 1. In your internet browser, go to [email or web address]://Pets are family too. [employer]/Pets are family too 2. Click on the First Time User? Click Here link in the Sign In box. You will see the New Member Sign Up page. 3. Enter your [employer] Access Code exactly as it appears below. You will not need to use this code after youve completed the sign-up process. If you do not sign up before the expiration date, you must request a new code. · [employer] Access Code: [email or web address] Expires: 8/12/2018  2:10 PM 
 
4. Enter the last four digits of your Social Security Number (xxxx) and Date of Birth (mm/dd/yyyy) as indicated and click Submit. You will be taken to the next sign-up page. 5. Create a Boomtown!t ID. This will be your [employer] login ID and cannot be changed, so think of one that is secure and easy to remember. 6. Create a [employer] password. You can change your password at any time. 7. Enter your Password Reset Question and Answer. This can be used at a later time if you forget your password. 8. Enter your e-mail address. You will receive e-mail notification when new information is available in [address] 9. Click Sign Up. You can now view and download portions of your medical record. 10. Click the Download Summary menu link to download a portable copy of your medical information. If you have questions, please visit the Frequently Asked Questions section of the [employer] website. Remember, [employer] is NOT to be used for urgent needs. For medical emergencies, dial 911. Now available from your iPhone and Android! Introducing Adebayo Thacker As a [employer] patient, I wanted to make you aware of our electronic visit tool called Adebayo Thacker. [employer] 24/7 allows you to connect within minutes with a medical provider 24 hours a day, seven days a week via a mobile device or tablet or logging into a secure website from your computer. You can access Adebayo Wesestevanfin from anywhere in the [place]. A virtual visit might be right for you when you have a simple condition and feel like you just dont want to get out of bed, or cant get away from work for an appointment, when your regular [employer] provider is not available (evenings, weekends or holidays), or when youre out of town and need minor care.   Electronic visits cost only $49 and if the Adebayo Keadr provider determines a prescription is needed to treat your condition, one can be electronically transmitted to a nearby pharmacy*. Please take a moment to enroll today if you have not already done so. The enrollment process is free and takes just a few minutes. To enroll, please download the [employer] 24/7 latanya to your tablet or phone, or visit [email or web address] to enroll on your computer. And, as an [address] [place] [address] patient with a [employer] account, the results of your visits will be scanned into your electronic medical record and your primary care provider will be able to view the scanned results. We urge you to continue to see your regular [employer] provider for your ongoing medical care. And while your primary care provider may not be the one available when you seek a [employer] virtual visit, the peace of mind you get from getting a real diagnosis real time can be priceless. For more information on [employer], view our Frequently Asked Questions (FAQs) at [email or web address]. Sincerely, 
 
Vamsi Cassidy MD 
Chief Medical Officer [employer] *:  certain medications cannot be prescribed via [employer] Providers Seen During Your Hospitalization Provider Specialty Primary office phone Carolina PangDO Emergency Medicine [phone] Cherrie Parra MD Internal Medicine [phone] Martín Barboza DO Internal Medicine [phone] Your Primary Care Physician (PCP) Primary Care Physician Office Phone Office Fax Srinivasye Seat [phone] You are allergic to the following Allergen Reactions Demerol (Meperidine) Shortness of Breath Paxil (Paroxetine Hcl) Unknown (comments) Pt gets shaky and loses control of legs Amoxicillin Rash Cleocin (Clindamycin Hcl) Rash Pcn (Penicillins) Rash Recent Documentation Height Weight BMI Smoking Status 1.702 m 70.3 kg 24.28 kg/m2 Never Smoker Emergency Contacts Name Discharge Info Relation Home Work Mobile [facility] DISCHARGE CAREGIVER [3] Spouse [3] [phone] Steven Ghosh DISCHARGE CAREGIVER [3] Son [22] [phone] Patient Belongings The following personal items are in your possession at time of discharge: 
  Dental Appliances: None  Visual Aid: Glasses      Home Medications: None   Jewelry: Watch, Bracelet  Clothing: At bedside    Other Valuables: None Discharge Instructions Attachments/References TREMOR: BENIGN ESSENTIAL (ENGLISH) Patient Handouts Benign Essential Tremor: Care Instructions Your Care Instructions Benign essential tremor is a medical term for shaking that you can't control. Your hand or fingers may shake when you lift a cup or point at something. Or your voice may shake when you speak. This type of tremor is not harmful. It is not caused by a stroke or Parkinson's disease. Some things can affect how much you shake. For example, drinking or eating something with caffeine may make tremors worse for a while. Some medicines also can increase tremors. These include antidepressants and too much thyroid replacement. Talk to your doctor if you think one of your medicines makes your tremors worse. If you are self-conscious about your tremors, there are some things you can do to reduce them or make them less noticeable. This includes taking medicine. Follow-up care is a key part of your treatment and safety. Be sure to make and go to all appointments, and call your doctor if you are having problems. It's also a good idea to know your test results and keep a list of the medicines you take. How can you care for yourself at home? · Take your medicines exactly as prescribed. Call your doctor if you think you are having a problem with your medicine.  Some medicines that help control tremors have to be taken every day, even if you are not having tremors. You will get more details on the specific medicines your doctor prescribes. · Get plenty of rest. 
· Eat a balanced, healthy diet. · Try to reduce stress. Regular exercise and massages may help. · Limit alcohol. Heavy drinking can make your tremors worse. · Avoid drinks or foods with caffeine if they make your tremors worse. These include tea, cola, coffee, and chocolate. · Wear a heavy bracelet or watch. This adds a little weight to your hand. The extra weight may reduce tremors. · Drink from cups or glasses that are only half full. You may also want to try drinking with a straw. When should you call for help? Watch closely for changes in your health, and be sure to contact your doctor if: 
? · You notice your tremors are getting worse. ? · You can't do your everyday activities because of your tremors. ? · You are sad and embarrassed about your shaking. Where can you learn more? Go to http://aniket-tomas.info/. Enter B746 in the search box to learn more about \"Benign Essential Tremor: Care Instructions. \" Current as of: October 14, 2016 Content Version: 11.4 © 4771-6181 [employer]. Care instructions adapted under license by [employer] (which disclaims liability or warranty for this information). If you have questions about a medical condition or this instruction, always ask your healthcare professional. [address] any warranty or liability for your use of this information. Please provide this summary of care documentation to your next provider. Signatures-by signing, you are acknowledging that this After Visit Summary has been reviewed with you and you have received a copy. Patient Signature:  ____________________________________________________________ Date:  ____________________________________________________________  
  
Colleen Urbina  Provider Signature: ____________________________________________________________ Date:  ____________________________________________________________

## 2018-06-28 NOTE — ED NOTES
TRANSFER - OUT REPORT:    Verbal report given to Claude Dimmer, RN (name) on Kristyn Sotelo  [facility] for routine progression of care       Report consisted of patients Situation, Background, Assessment and   Recommendations(SBAR). Information from the following report(s) SBAR, Kardex, ED Summary, Intake/Output, MAR, Recent Results and Med Rec Status was reviewed with the receiving nurse. Lines:   Peripheral IV 06/28/18 Right Forearm (Active)   Site Assessment Clean, dry, & intact 6/28/2018  5:40 AM   Phlebitis Assessment 0 6/28/2018  5:40 AM   Infiltration Assessment 0 6/28/2018  5:40 AM   Dressing Status Clean, dry, & intact 6/28/2018  5:40 AM   Dressing Type Transparent;Tape 6/28/2018  5:40 AM   Hub Color/Line Status Blue;Patent; Flushed 6/28/2018  5:40 AM   Action Taken Catheter retaped 6/28/2018  5:40 AM        Opportunity for questions and clarification was provided.       Patient transported with:   Immunetics

## 2018-06-28 NOTE — ED PROVIDER NOTES
EMERGENCY DEPARTMENT HISTORY AND PHYSICAL EXAM        Date: 6/28/2018  Patient Name: Sly Celis    History of Presenting Illness     Chief Complaint   Patient presents with    Tremors     Per EMS, tremors x4 weeks, increased tonight, did not have \"tremor medications tonight. \"        History Provided By: Patient and Patient's Wife    HPI: Sly Celis, 80 y.o. male with PMHx significant for HTN, CAD, DM, stroke, MI, and radical neck dissection secondary to CA , presents via EMS to the ED with cc of progressively worsened tremors in all extremities x yesterday. Wife at bedside states they first started the tremors in all of his extremities about x6 months ago. The pt was placed on 50 mg Primidone for the chronic tremors about one week ago; noting he was instructed to take x1 tablet a day for a week, but if his sxs are not improved to take x2 tablets. Wife states she followed the instructions, but the pt's tremors continued to worsen. Of note, the pt is ambulatory with walker and is fed by tube at baseline. She notes yesterday he was unable to stand even with his walker or hold anything in his hands. She did not give the pt the medication last night because the sxs are not improved by the medication. Pt specifically denies any fever, chills, cough, congestion, shortness of breath, chest pain, abdominal pain, nausea, vomiting, diarrhea, dysuria, or urinary frequency. PMHx: Significant for HTN, CAD, DM, stroke, MI, and radical neck dissection secondary to CA   PSHx: Significant for Orthopedic, EGD, Pacemaker placement, Cardiac cath  Social Hx: -tobacco, -EtOH, -Illicit Drugs     Neurology: Marilu Ku  PCP: Karthik Van MD    There are no other complaints, changes, or physical findings at this time.     Current Facility-Administered Medications   Medication Dose Route Frequency Provider Last Rate Last Dose    acetaminophen (TYLENOL) tablet 1,000 mg  1,000 mg Oral Q6H PRN Suhas Corrales MD       [facility] albuterol-ipratropium (DUO-NEB) 2.5 MG-0.5 MG/3 ML  3 mL Nebulization Q6H PRN Lydia Latif MD        [START ON 6/29/2018] aspirin (ASPIRIN) tablet 325 mg  325 mg Oral DAILY Lydia Latif MD        [START ON 6/29/2018] atorvastatin (LIPITOR) tablet 40 mg  40 mg Oral DAILY Lydia Latif MD        [START ON 6/29/2018] clopidogrel (PLAVIX) tablet 75 mg  75 mg Oral DAILY Lydia Latif MD        [START ON 6/29/2018] cyanocobalamin (VITAMIN B12) tablet 1,000 mcg  1,000 mcg Oral DAILY Lydia Latif MD        finasteride (PROSCAR) tablet 5 mg  5 mg Oral QHS Lydia Latif MD        fluticasone (FLONASE) 50 mcg/actuation nasal spray 1 Spray  1 Spray Both Nostrils BID Lydia Latif MD        [START ON 6/29/2018] furosemide (LASIX) tablet 40 mg  40 mg Oral DAILY Lydia Latif MD        insulin glargine (LANTUS) injection 14 Units  14 Units SubCUTAneous QHS Lydia Latif MD        midodrine (PROAMITINE) tablet 10 mg  10 mg Oral TID WITH MEALS Lydia Latif MD   10 mg at 06/28/18 1731    nitroglycerin (NITROSTAT) tablet 0.4 mg  0.4 mg SubLINGual Q5MIN PRN Lydia Latif MD        [START ON 6/29/2018] tamsulosin (FLOMAX) capsule 0.4 mg  0.4 mg Oral DAILY Lydia Latif MD        sodium chloride (NS) flush 5-10 mL  5-10 mL IntraVENous Q8H Lydia Latif MD   10 mL at 06/28/18 1545    sodium chloride (NS) flush 5-10 mL  5-10 mL IntraVENous PRN Lydia Latif MD        acetaminophen (TYLENOL) tablet 650 mg  650 mg Oral Q6H PRN Lydia Latif MD   650 mg at 06/28/18 1731    ondansetron (ZOFRAN) injection 4 mg  4 mg IntraVENous Q6H PRN Lydia Latif MD        docusate sodium (COLACE) capsule 100 mg  100 mg Oral DAILY PRN Lydia Latif MD        heparin (porcine) injection 5,000 Units  5,000 Units SubCUTAneous Isabel Soja, MD   5,000 Units at 06/28/18 1546    insulin lispro (HUMALOG) injection   SubCUTAneous AC&HS Lydia Latif MD        glucose chewable tablet 16 g  4 Tab Oral PRN Lydia Latif MD  dextrose (D50W) injection syrg 12.5-25 g  12.5-25 g IntraVENous PRN Sara Snowden MD        glucagon (GLUCAGEN) injection 1 mg  1 mg IntraMUSCular PRN Sara Snowden MD        insulin lispro (HUMALOG) injection 8 Units  8 Units SubCUTAneous Juan Toney MD        gabapentin (NEURONTIN) 250 mg/5 mL solution 1,000 mg  1,000 mg Per G Tube Q8H Mao Ramirez MD   1,000 mg at 06/28/18 1731    [START ON 6/29/2018] famotidine (PEPCID) tablet 20 mg  20 mg Oral DAILY Melissa Mendez MD           Past History     Past Medical History:  Past Medical History:   Diagnosis Date    Antiplatelet or antithrombotic long-term use 12/4/2014    Anxiety disorder     Arrhythmia 2009    bradycardia    Arthritis     CAD (coronary artery disease)     [facility]    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     Type II    Esophageal dysmotility     s/p dilitation    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 I suspect the latter. Achalasia  is not present.         GERD (gastroesophageal reflux disease)     Heart failure ([address] 10/2014     Cardiomyopathy:Pacemaker upgrade:Biv and AICD  Dr. Jenni Mcmillan heart DrAlvaro last visit 5/11/2015    Hepatitis C Dx 1996    treated at [facility] in past; as of 4/15/15 wife states pt currently not under any treatment    Hyperlipidemia     Hypertension     Myocardial infarct [facility]) 2013    Heart Cath: 40% LV EF, Stented distal LAD, patent Graft to circumflex    On tube feeding diet approx 2009    still has as of 9/28/15  (no po food/liquid/meds at all); Dr Truman Rogers Other ill-defined conditions(799.89) 1996    1 dose of chemotherapy/radiation for tongue cancer    Other ill-defined conditions(799.89)     BPH    Other ill-defined conditions(799.89)     orthostatic hypotension    Pneumonia ~ April -May 2010    Stroke [facility]) approx     left side-left finger tips numb; no imbalance or memory loss; as of  not seeing neuro MD    Suicidal thoughts        Past Surgical History:  Past Surgical History:   Procedure Laterality Date    ABDOMEN SURGERY PROC UNLISTED      peg tube    CABG, ARTERY-VEIN, THREE      HX CATARACT REMOVAL      bilateral    HX CHOLECYSTECTOMY      HX HEART CATHETERIZATION      Stented distal LAD    HX MOHS PROCEDURES      bilateral    HX ORTHOPAEDIC      back surgery times two    HX OTHER SURGICAL      Radical Left Neck    HX OTHER SURGICAL      NASAL POLYPS REMOVAL    HX OTHER SURGICAL      TURP    HX PACEMAKER      HX PACEMAKER  10/28/14     Defibrillator: [facility] # [id number], serial # [id number]; Dr. Margarita Villarreal [phone]; Dr Nacho Borjas      cystoscopy    NEUROLOGICAL PROCEDURE UNLISTED      cevical surgery    [place] CHANGE GASTROSTOMY TUBE  2011         [place] CHANGE GASTROSTOMY TUBE  2011         [place] EGD INSERT GUIDE WIRE DILATOR PASSAGE ESOPHAGUS  2010         [place] EGD TRANSORAL BIOPSY SINGLE/MULTIPLE  2010         STOMACH SURGERY PROCEDURE UNLISTED  2011         VASCULAR SURGERY PROCEDURE UNLIST      bilateral carotid stents       Family History:  Family History   Problem Relation Age of Onset    Heart Disease Father       at age 52 from CAD    Colon Cancer Mother     Cancer Mother      colon ca    Heart Disease Brother        Social History:  Social History   Substance Use Topics    Smoking status: Never Smoker    Smokeless tobacco: Never Used    Alcohol use No       Allergies:   Allergies   Allergen Reactions    Demerol [Meperidine] Shortness of Breath    Paxil [Paroxetine Hcl] Unknown (comments)     Pt gets shaky and loses control of legs    Amoxicillin Rash    Cleocin [Clindamycin Hcl] Rash    Pcn [Penicillins] Rash         Review of Systems   Review of Systems   Constitutional: Negative for chills and fever. HENT: Negative for congestion and sore throat. Eyes: Negative for visual disturbance. Respiratory: Negative for cough and shortness of breath. Cardiovascular: Negative for chest pain and leg swelling. Gastrointestinal: Negative for abdominal pain, blood in stool, diarrhea and nausea. Endocrine: Negative for polyuria. Genitourinary: Negative for dysuria and testicular pain. Musculoskeletal: Negative for arthralgias, joint swelling and myalgias. Skin: Negative for rash. Allergic/Immunologic: Negative for immunocompromised state. Neurological: Positive for tremors. Negative for weakness and headaches. Hematological: Does not bruise/bleed easily. Psychiatric/Behavioral: Negative for confusion. Physical Exam   Physical Exam   Constitutional: He is oriented to person, place, and time. He appears well-developed and well-nourished. Tired appearing; peg tube in place   HENT:   Head: Normocephalic and atraumatic. Moist mucous membranes   Eyes: Conjunctivae are normal. Pupils are equal, round, and reactive to light. Right eye exhibits no discharge. Left eye exhibits no discharge. Neck: Normal range of motion. Neck supple. No tracheal deviation present. Cardiovascular: Normal rate, regular rhythm and normal heart sounds. No murmur heard. Pulmonary/Chest: Effort normal and breath sounds normal. No respiratory distress. He has no wheezes. He has no rales. Abdominal: Soft. Bowel sounds are normal. There is no tenderness. There is no rebound and no guarding. Musculoskeletal: Normal range of motion. He exhibits no edema, tenderness or deformity. Neurological: He is alert and oriented to person, place, and time. He displays tremor. Skin: Skin is warm and dry. No rash noted. No erythema.    Psychiatric: His behavior is normal. Nursing note and vitals reviewed. Diagnostic Study Results     Labs -     Recent Results (from the past 12 hour(s))   GLUCOSE, POC    Collection Time: 06/28/18  8:05 AM   Result Value Ref Range    Glucose (POC) 134 (H) 65 - 100 mg/dL    Performed by Saturnino Schmitz    GLUCOSE, POC    Collection Time: 06/28/18  2:11 PM   Result Value Ref Range    Glucose (POC) 314 (H) 65 - 100 mg/dL    Performed by Tex Zapata    GLUCOSE, POC    Collection Time: 06/28/18  3:50 PM   Result Value Ref Range    Glucose (POC) 121 (H) 65 - 100 mg/dL    Performed by Huyen Shin        Radiologic Studies -     CT Results  (Last 48 hours)               06/28/18 0601  CT HEAD WO CONT Final result    Impression:  IMPRESSION:       No acute process with areas of chronic infarction and white matter disease   similar to the prior study. Narrative:  INDICATION:   Confusion/delirium, altered LOC, unexplained       EXAM:  HEAD CT WITHOUT CONTRAST       COMPARISON:  February 2, 2017       TECHNIQUE:  Routine noncontrast axial head CT was performed. Sagittal and   coronal reconstructions were generated. CT dose reduction was achieved through use of a standardized protocol tailored   for this examination and automatic exposure control for dose modulation. FINDINGS:       Ventricles:  Midline, no hydrocephalus. Intracranial Hemorrhage:  None. Brain Parenchyma/Brainstem:  Patchy areas of supratentorial white matter   hypodensity as well as chronic bilateral parietal infarctions similar to prior   study. Chronic left cerebellar infarction. Basal Cisterns:  Normal.    Paranasal Sinuses:  Visualized sinuses are clear. Additional Comments:  N/A. Medical Decision Making   I am the first provider for this patient. I reviewed the vital signs, available nursing notes, past medical history, past surgical history, family history and social history.     Vital Signs-Reviewed the patient's vital signs. Patient Vitals for the past 12 hrs:   Temp Pulse Resp BP SpO2   06/28/18 1523 97.7 °F (36.5 °C) 90 20 164/65 95 %   06/28/18 1445 - 79 - 168/89 94 %   06/28/18 1316 - 65 - 131/41 92 %   06/28/18 1300 - 63 - 114/59 93 %   06/28/18 1230 - 67 - 156/56 92 %   06/28/18 1148 - 67 - 137/58 93 %   06/28/18 1108 - 70 - - 92 %   06/28/18 0945 - 79 - - 97 %   06/28/18 0901 - 67 - 130/65 94 %   06/28/18 0815 - 63 - 137/62 -   06/28/18 0715 - 69 - 175/67 -   06/28/18 0645 - 69 - 127/57 -   06/28/18 0630 - 70 - 139/60 -   06/28/18 0615 - 66 - 119/54 -   06/28/18 0604 - - - 134/55 -       Pulse Oximetry Analysis - 92% on RA    Cardiac Monitor:   Rate: 74 bpm  Rhythm: Normal Sinus Rhythm      Records Reviewed: Nursing Notes, Old Medical Records, Previous electrocardiograms, Ambulance Run Sheet, Previous Radiology Studies and Previous Laboratory Studies    Provider Notes (Medical Decision Making): Worsening chronic tremors with the inability to walk. Will check electrolytes, basic labs, discuss the pt with neurology and possible PT consult. ED Course:   Initial assessment performed. The patients presenting problems have been discussed, and they are in agreement with the care plan formulated and outlined with them. I have encouraged them to ask questions as they arise throughout their visit. Consult Note:  7:09 AM  Insider PagesDO spoke with Dr. Héctor Talley,  Specialty: Neurology   Discussed pt's hx, disposition, and available diagnostic and imaging results. Reviewed care plans. Consultant agrees with plans as outlined. Recommend increasing the pt's mediation dosage. PROGRESS NOTE:  7:29 AM  Pt reevaluated. Pt has had no improvement in sxs. Discussed surrent plan of care including increasing his dosage, per Neurology.  Will await for PT eval.  Written by Jose Rivera ED Scribe, as dictated by Tech Data CorporationDO     SIGN OUT:  8:08 AM  Patient's presentation, labs/imaging and plan of care was reviewed with Eze Cr Mable Ferreira MD  as part of sign out. They will await PT eval as part of the plan discussed with the patient. Lesli Lowe MD 's assistance in completion of this plan is greatly appreciated but it should be noted that I will be the provider of record for this patient. Estefany Snider, DO    10:45 AM  Pt unable to ambulate without assistance x 2. Per PT recommendation it is unsafe for discharge home. Written by Jolanta Obrien ED scribe, as dictated by Alisia Fong. Girish Bourne MD    CONSULT NOTE:   12:57 PM  Alisia Fong. Girish Bourne MD spoke with Sandrita Scott MD   Specialty: Hospitalist  Discussed pt's hx, disposition, and available diagnostic and imaging results. Reviewed care plans. Consultant will evaluate pt for admission. Written by LUANNE Bridgesibe, as dictated by Alisia Fong. Girish Bourne MD.    Critical Care Time:   none    Disposition:    ADMIT NOTE:  12:58 PM  The patient is being admitted to the hospital by Sandrita Scott MD.  The results of their tests and reasons for their admission have been discussed with the patient and/or available family. They convey agreement and understanding for the need to be admitted and for their admission diagnosis. PLAN:  1. Admit to hospitalist     Diagnosis     Clinical Impression:   1. Coarse tremors    2. 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 Vertebrobasilar occlusive disease    4. 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 Weakness    6. Diabetic peripheral neuropathy associated with type 2 diabetes mellitus ([address]    7. Benign essential tremor syndrome    8. Stenosis of both internal carotid arteries    9. Hemiplegia and hemiparesis following cerebral infarction affecting right dominant side ([address]    10. Wrist pain, acute, right    11. Neurologic gait dysfunction        Attestations:     This note is prepared by Elena Patel, acting as Scribe for Estefany Snider, DO      The scribe's documentation has been prepared under my direction and personally reviewed by me in its entirety. I confirm that the note above accurately reflects all work, treatment, procedures, and medical decision making performed by me. Nimo Coker, DO        This note will not be viewable in [address]

## 2018-06-28 NOTE — PROGRESS NOTES
Nutrition:  Received page for TF management. Chart reviewed. Pt recently discharged. Pt is on Isosource at home but was on Jevity 1.5 last admission and tolerated this well. Spoke at length with pt's wife about his home regimen. She would like to keep him on his home regimen of 500mL bolus at 8am, 375mL bolus at 2pm and again at 8pm.  He did miss his lunch time feeding. She would not like to skip this but change his dinner bolus to 500mL at 6pm for tonight only so he is only short '1 can' for the day. I went over this with the RN. Current TF diet order I entered is to start tomorrow morning at 8am and follows his home regimen. Full nutrition assessment to follow tomorrow.       Lorenzo, [address]   Pager [phone]

## 2018-06-28 NOTE — PROGRESS NOTES
physical Therapy Emergency Department EVALUATION with CMS G codes    Physical Therapy Goals  Initiated 6/28/2018  1. Patient will move from supine to sit and sit to supine , scoot up and down and roll side to side in bed with independence within 7 day(s). 2.  Patient will transfer from bed to chair and chair to bed with independence using the least restrictive device within 7 day(s). 3.  Patient will perform sit to stand with supervision/set-up within 7 day(s). 4.  Patient will ambulate with supervision/set-up for 150 feet with the least restrictive device within 7 day(s). 5.  Patient will ascend/descend 7 stairs with right handrail(s) with minimal assistance/contact guard assist within 7 day(s). Patient: Vianey Domínguez (64 y.o. male)  Date: 6/28/2018  Primary Diagnosis: There are no admission diagnoses documented for this encounter. Precautions: fall, PEG     ASSESSMENT :  Based on the objective data described below, the patient presents with moderate to severe decline in function and gait with greatly increased tremors/jerking movements of LEs. Asked by Isaac Salazar and Yovanny Lyons for eval.  Pt lives with his wife in a one story home with 7 steps to enter. At his recent baseline, pt has jerking tremors mainly of the UEs and only slightly in the LEs. Due to the tremors, pt requires min A from his wife for the fine motor and finer UE control tasks like buttons, hair and teeth brushing, etc but has been able until 2 days ago to amb with rollator independently in the home and take standing showers with S. Fatigue is usually a factor with pt needing rest periods during ADLs. Wife and pt state that 2 days ago, pt began having greatly increased tremors/jerking movements with lack of control of LEs and became so bad this am that wife was unable to safely transfer him and he was unable to attempt amb due to the jerking movements of LEs.  Wife notes recent medication change which has also been increased by recommendation of neuro in ED (appears to be primidone?). Also, pt has PEG tube due to CA surgery. At this time, pt requires min A to complete bed mobility. With any voluntary movement of LEs, pt exhibits jerking movement inconsistent and unpredictable in amplitude and frequency unable to be voluntarily controled by pt. Pt appears to have good strength overall with min decrease at L hip and L . Sit to stand requires min A of 2. He stands with support on RW taking significant weight on UEs he states because when he allows increased weight on legs, they increase the jerking movements. He amb with walker with wide-based gait with incongruous steps and holding knees in full extension, sliding feet on floor for shortened step length. Pt exhibits varying degrees of the jerking flexing movement of LEs and fatigues very quickly. He requires min to mod A of 1 + min to CGA of 1 for safety due to the unpredictability of the movements. Pt tolerated 10' only around end of bed to other side. Pt returned to bed with min A to supine. At this time, pt is exhibiting a significant decrease in funciton, LE motor control, and safety. Note that on on 5/10/18, pt was noted by PT during his hospitalization to be able to walk 495' with CGA with RW. He would be unsafe to attempt amb at home with wife and is a very high fall risk. They have a w/c but at this time, with pt's decreased motor control of LEs, even transfers with wife would be unsafe. Pt would benefit PT in the acute setting with likely need for rehab post hospital stay unless significant progress toward baseline is made. Rounded with Dr. Nahomi Marino (has taken over for Dr. Manjula Hodges) and care manager. Admission for this patient is recommended with continued acute therapy. PLAN :  Frequency/Duration: Pending admission, the patient will be followed by physical therapy 5 times a week to address goals.       If admitted, Recommendations and Planned Interventions:  [x]           Bed Mobility Training             [x]    Neuromuscular Re-Education  [x]           Transfer Training                   []    Orthotic/Prosthetic Training  [x]           Gait Training                         []    Modalities  [x]           Therapeutic Exercises           []    Edema Management/Control  [x]           Therapeutic Activities            [x]    Patient and Family Training/Education  []           Other (comment):    Discharge Recommendations:     []   Home with family  []   Skilled nursing facility  [x]   Admission to hospital with rehab likely needed  []   Inpatient rehab referral  []   Outpatient physical therapy referral  []   Other:    Further Equipment Recommendations for Discharge:   []   Rolling walker with 5\" wheels  []   Crutches   []   Sharran Rump   []   Wheelchair   [x]   Other: has rollator, may need RW    COMMUNICATION/EDUCATION:   Communication/Collaboration:  [x]   Fall prevention education was provided and the patient/caregiver indicated understanding. [x]   Patient/family have participated as able and agree with findings and recommendations. []   Patient is unable to participate in plan of care at this time. Findings and recommendations were discussed with: MD physician and        SUBJECTIVE:   Patient stated Jyoti Calvo can usually do this on my own.     OBJECTIVE DATA SUMMARY:     Past Medical History:   Diagnosis Date    Antiplatelet or antithrombotic long-term use 12/4/2014    Anxiety disorder     Arrhythmia 2009    bradycardia    Arthritis     CAD (coronary artery disease)     s/p CABG 2002; Dr Gail Travis 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 I suspect the latter. Achalasia  is not present.         GERD (gastroesophageal reflux disease)     Heart failure ([facility] [address] 10/2014     Cardiomyopathy:Pacemaker upgrade:Biv and AICD  Dr. Mita Baez heart Dr. last visit 5/11/2015    Hepatitis C Dx 1996    [facility] at [facility] in past; as of 4/15/15 wife states pt currently not under any treatment    Hyperlipidemia     Hypertension     Myocardial infarct [facility]) 2013    Heart Cath: 40% LV EF, Stented distal LAD, patent Graft to circumflex    On tube feeding diet approx 2009    still has as of 9/28/15  (no po food/liquid/meds at all); Dr Cara Jenkins Other ill-defined conditions(799.89) 1996    1 dose of chemotherapy/radiation for tongue cancer    Other ill-defined conditions(799.89)     BPH    Other ill-defined conditions(799.89)     orthostatic hypotension    Pneumonia ~ April -May 2010    Stroke [facility]) approx 2003    left side-left finger tips numb; no imbalance or memory loss; as of 2015 not seeing neuro MD    Suicidal thoughts      Past Surgical History:   Procedure Laterality Date    ABDOMEN SURGERY [address]    peg tube    CABG, ARTERY-VEIN, THREE  2000    HX CATARACT REMOVAL      bilateral    HX CHOLECYSTECTOMY      HX HEART CATHETERIZATION  2013    Stented distal LAD    HX MOHS PROCEDURES      bilateral    HX ORTHOPAEDIC      back surgery times two    HX OTHER SURGICAL      Radical Left Neck    HX OTHER SURGICAL      NASAL POLYPS REMOVAL    HX OTHER SURGICAL  2010    TURP    HX PACEMAKER  11/08    HX PACEMAKER  10/28/14     Defibrillator: [facility] # [id number], serial # [id number]; Dr. Nevin Mayes [phone]; Dr Kassy West      cystoscopy   [address] [place] [address]    cevical surgery    MI CHANGE GASTROSTOMY TUBE  5/2/2011         MI CHANGE GASTROSTOMY TUBE  6/29/2011         MI EGD INSERT GUIDE WIRE DILATOR PASSAGE ESOPHAGUS  9/13/2010  MD EGD TRANSORAL BIOPSY SINGLE/MULTIPLE  9/13/2010         STOMACH SURGERY PROCEDURE UNLISTED  6/29/2011         VASCULAR SURGERY PROCEDURE UNLIST      bilateral carotid stents     Prior Level of Function/Home Situation: Pt lives with his wife in a one story home with 7 steps to enter. At his recent baseline, pt has jerking tremors mainly of the UEs and only slightly in the LEs. Due to the tremors, pt requires min A from his wife for the fine motor and finer UE control tasks like buttons, hair and teeth brushing, etc but has been able until 2 days ago to amb with rollator independently in the home and take standing showers with S. Fatigue is usually a factor with pt needing rest periods during ADLs. Home Situation  Home Environment: Private residence  # Steps to Enter: 7  Rails to Enter: Yes  Hand Rails : Right  One/Two Story Residence: One story  Living Alone: No  Support Systems: Spouse/Significant Other/Partner  Patient Expects to be Discharged to[de-identified] Private residence  Current DME Used/Available at Home: Shower chair, Walker, rollator, Wheelchair, Other (comment), Commode, bedside (transport chair as well)  Critical Behavior:  Neurologic State: Alert  Orientation Level: Oriented X4  Cognition: Follows commands       Strength:    Strength:  Within functional limits (min decr prox LLE and L )                      Tone & Sensation:                  Sensation: Intact                 Range Of Motion:  AROM: Within functional limits           PROM: Within functional limits             Coordination:  Coordination: Grossly decreased, non-functional (jerking mvts all extr L>R; unpredictable in freq & amplitude)    Functional Mobility:  Bed Mobility:     Supine to Sit: Minimum assistance  Sit to Supine: Minimum assistance     Transfers:  Sit to Stand: Minimum assistance;Assist x2  Stand to Sit: Minimum assistance;Assist x2                       Balance:   Sitting: Impaired  Sitting - Static: Fair (occasional)  Sitting - Dynamic: Fair (occasional)  Standing: Impaired  Standing - Static: Fair;Poor (fair to poor depending upon amplitude of jerking)  Standing - Dynamic : Poor  Ambulation/Gait Training:  Distance (ft): 10 Feet (ft)  Assistive Device: Walker, rolling;Gait belt  Ambulation - Level of Assistance: Moderate assistance;Minimal assistance;Assist x2 (min-mod A of 1 +CGA to min A of 2nd due to unpredictability)        Gait Abnormalities: Decreased step clearance;Trunk sway increased; Other (jerking as noted;holds knees in full ext, slides for advance)        Base of Support: Widened  Stance:  (intermittent min to severe jerking flexion BLE L>R)  Speed/Gertrudis: Slow  Step Length: Right shortened;Left shortened  Swing Pattern: Right asymmetrical;Left asymmetrical                    Special Tests:  Barthel Index:    Bathin  Bladder: 10  Bowels: 10  Groomin  Dressin  Feedin  Mobility: 0  Stairs: 0  Toilet Use: 5  Transfer (Bed to Chair and Back): 5  Total: 35       Barthel and G-code impairment scale:  Percentage of impairment CH  0% CI  1-19% CJ  20-39% CK  40-59% CL  60-79% CM  80-99% CN  100%   Barthel Score 0-100 100 99-80 79-60 59-40 20-39 1-19   0   Barthel Score 0-20 20 17-19 13-16 9-12 5-8 1-4 0      The Barthel ADL Index: Guidelines  1. The index should be used as a record of what a patient does, not as a record of what a patient could do. 2. The main aim is to establish degree of independence from any help, physical or verbal, however minor and for whatever reason. 3. The need for supervision renders the patient not independent. 4. A patient's performance should be established using the best available evidence. Asking the patient, friends/relatives and nurses are the usual sources, but direct observation and common sense are also important. However direct testing is not needed.   5. Usually the patient's performance over the preceding 24-48 hours is important, but occasionally longer periods will be relevant. 6. Middle categories imply that the patient supplies over 50 per cent of the effort. 7. Use of aids to be independent is allowed. Jose Yonug., Barthel, D.W. (0817). Functional evaluation: the Barthel Index. [address] [facility] [employer] (14)2. Anish Creston lawson NOEL Cee, Devyn Garcia.[facility]., Kevin Brown, [address] [place] [address] (1999). Measuring the change indisability after inpatient rehabilitation; comparison of the responsiveness of the Barthel Index and Functional [place] Measure. Journal of Neurology, Neurosurgery, and Psychiatry, 66(4), 485-664. Froy Jimenez, ABRIL.J.A, QIANA Gifford, & Meghana Anaya M.A. (2004.) Assessment of post-stroke quality of life in cost-effectiveness studies: The usefulness of the Barthel Index and the EuroQoL-5D. Quality of Life Research, 13, 427-98        In compliance with CMSs Claims Based Outcome Reporting, the following G-code set was chosen for this patient based on their primary functional limitation being treated: The outcome measure chosen to determine the severity of the functional limitation was the barthel with a score of 35/100 which was correlated with the impairment scale. ? Mobility - Walking and Moving Around:     - CURRENT STATUS: CL - 60%-79% impaired, limited or restricted    - GOAL STATUS: CI - 1%-19% impaired, limited or restricted    - D/C STATUS:  ---------------To be determined---------------     Pain:  Pain Scale 1: Numeric (0 - 10)  Pain Intensity 1: 0     Activity Tolerance:   Limited by fatigue especially in attempting to control LE movements    Please refer to the flowsheet for vital signs taken during this treatment.   After treatment:   []   Patient left in no apparent distress sitting up in chair  [x]   Patient left in no apparent distress in bed  [x]   Call bell left within reach  [x]   Nursing notified  [x]   Caregiver present  []   Bed alarm activated        Thank you for this referral.  Evy Stone, PT   Time Calculation: 24 mins

## 2018-06-28 NOTE — ED NOTES
Bedside and Verbal shift change report given to Yessenia Khan RN  (oncoming nurse) by Jerson Grove RN  (offgoing nurse). Report included the following information SBAR, Kardex, ED Summary, Intake/Output, MAR, Recent Results and Med Rec Status.

## 2018-06-28 NOTE — ACP (ADVANCE CARE PLANNING)
Advance Care Planning Note    Name: Marybeth Snell  YOB: 1935  MRN: [id number]  Admission Date: 6/28/2018  4:12 AM    Date of discussion: 6/28/2018    Active Diagnoses:    Hospital Problems  Date Reviewed: 6/11/2018          Codes Class Noted POA    Physical deconditioning ICD-10-CM: R53.81  ICD-9-CM: 799.3  6/28/2018 Unknown        Tremors of nervous system ICD-10-CM: R25.1  ICD-9-CM: 781.0  6/28/2018 Unknown          Head and neck cancer/ s.p PEG tube placement  CAD     These active diagnoses are of sufficient risk that focused discussion on advance care planning is indicated in order to allow the patient to thoughtfully consider personal goals of care, and if situations arise that prevent the ability to personally give input, to ensure appropriate representation of their personal desires for different levels and aggressiveness of care. Discussion:     Persons present and participating in discussion: Humphrey Mathur MD, Wife Marisel    Discussion: Code status addressed due to above mentioned medical problems and wants to be a full code but does not want prolonged ventilator. Time Spent:     Total time spent face-to-face in education and discussion: 16  minutes.      Silvano Baldwin MD  6/28/2018  2:19 PM

## 2018-06-29 NOTE — PROGRESS NOTES
Pt is a 80 y.o  male admitted with Physical deconditioning, Tremors of Nervous System. Pt was alert, oriented resting in the chair with his wife at the bedside. Demographic information verified and all is correct. Pt lives with his wife in a 1 story home with 6 steps to the entrance. Prior to admission, pt was needing some assistance with his ADL's and IADL's. Pt drives occasionally and pt's wife transports pt. Pt had home health from [address] [place] [address] in the past. Pt discharged from [facility] in May and didn't have a good stay. Pt was recently going to [address] [address] rehab. Pt and pt's wife stated they want [facility] inpt at discharge. FOC form completed and signed by pt's wife. Referral sent via [employer]. CM will continue to follow pt for discharge planning needs. Reason for Admission:   Physical deconditioning, Tremors of Nervous System               RRAT Score:     40             Resources/supports as identified by patient/family:   Wife supportive                Top Challenges facing patient (as identified by patient/family and CM): Finances/Medication cost?      No issues              Transportation? Pt's wife drives              Support system or lack thereof? Good support system                     Living arrangements? Lives with wife           Self-care/ADLs/Cognition? Needing some assistance with ADL's & IADL's        Current Advanced Directive/Advance Care Plan:  Full Code                          Plan for utilizing home health:    No                       Likelihood of readmission:  moderate                 Transition of Care Plan:     inpt rehab if accepted or SNF    Care Management Interventions  PCP Verified by CM: Yes (Dr. Dolly Torres )  Mode of Transport at Discharge:  Other (see comment) (pt's wife can transport pt home by car)  Transition of Care Consult (CM Consult): Discharge Planning  Discharge Durable Medical Equipment: No  Physical Therapy Consult: Yes  Occupational Therapy Consult: Yes  Speech Therapy Consult: No  Current Support Network: Lives with Spouse, Own Home (lives with wife in a 1 story home with 6 steps to the entrance)  Confirm Follow Up Transport: Family  Plan discussed with Pt/Family/Caregiver: Yes  Freedom of Choice Offered: Yes  Discharge Location  Discharge Placement: Rehab hospital/unit acute    Rojas Mauricio, [address]

## 2018-06-29 NOTE — PROGRESS NOTES
Occupational Therapy EVALUATION/discharge  Patient: Aryan Begum (60 y.o. male)  Date: 6/29/2018  Primary Diagnosis: Physical deconditioning  Tremors of nervous system        Precautions: Falls       ASSESSMENT:   Based on the objective data described below, the patient presents at his independent to mod I baseline for ADLs and related functional mobility. No tremors or impairment in coordination/motor control noted in trunk or extremities during this evaluation. Patient does have baseline standing balance deficits, but balance was good overall for ambulation with a RW and for standing ADLs. He further reported feeling much better today and stated he was back to his baseline for ADLs and functional mobility. Patient did express that his wife was concerned about needing his help and the patient having to be more independent when his wife has a scheduled hip surgery. At this point further skilled acute occupational therapy is not indicated at this time. No OT needs indicated after discharge as well. If patient does need to be more independent with IADLs it would be more helpful for him to have [address] [place] [address] services to provide this training. Otherwise return to Outpatient PT after discharge is recommended. Discharge Recommendations: Resume Out Patient PT.  If patient needs to be more independent with IADLs HHOT would be beneficial.  Further Equipment Recommendations for Discharge: none          OBJECTIVE DATA SUMMARY:   HISTORY:   Past Medical History:   Diagnosis Date    Antiplatelet or antithrombotic long-term use 12/4/2014    Anxiety disorder     Arrhythmia 2009    bradycardia    Arthritis     CAD (coronary artery disease)     s/p CABG 2002; Dr Desiree Aquino [facility]) 1996    tongue/throat cancer s/p surgery / radiation and 1 dose of chemo    Carotid artery stenosis     s/p bilateral stents    Chronic pain     left leg, lower back,     Depression     Diabetes (HCC)     Type II    Esophageal dysmotility     s/p dilitation    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 I suspect the latter. Achalasia  is not present.         GERD (gastroesophageal reflux disease)     Heart failure ([address] 10/2014     Cardiomyopathy:Pacemaker upgrade:Biv and AICD  Dr. Willian Fothergill heart  last visit 5/11/2015    Hepatitis C Dx 1996    treated at [facility] in past; as of 4/15/15 wife states pt currently not under any treatment    Hyperlipidemia     Hypertension     Myocardial infarct [facility]) 2013    Heart Cath: 40% LV EF, Stented distal LAD, patent Graft to circumflex    On tube feeding diet approx 2009    still has as of 9/28/15  (no po food/liquid/meds at all); Dr Michelle Grady Other ill-defined conditions(799.89) 1996    1 dose of chemotherapy/radiation for tongue cancer    Other ill-defined conditions(799.89)     BPH    Other ill-defined conditions(799.89)     orthostatic hypotension    Pneumonia ~ April -May 2010    Stroke [facility]) approx 2003    left side-left finger tips numb; no imbalance or memory loss; as of 2015 not seeing neuro MD    Suicidal thoughts      Past Surgical History:   Procedure Laterality Date    ABDOMEN SURGERY [address]    peg tube    CABG, ARTERY-VEIN, THREE  2000    HX CATARACT REMOVAL      bilateral    HX CHOLECYSTECTOMY      HX HEART CATHETERIZATION  2013    Stented distal LAD    HX MOHS PROCEDURES      bilateral    HX ORTHOPAEDIC      back surgery times two    HX OTHER SURGICAL      Radical Left Neck    HX OTHER SURGICAL      NASAL POLYPS REMOVAL    HX OTHER SURGICAL  2010    TURP    HX PACEMAKER  11/08    HX PACEMAKER  10/28/14     Defibrillator: [facility] # [id number], serial # [id number]; Dr. Zen Sanchez [phone]; Dr Ozzy Novak      cystoscopy   [facility]    cevical surgery    NM CHANGE GASTROSTOMY TUBE  5/2/2011  NV CHANGE GASTROSTOMY TUBE  6/29/2011         NV EGD INSERT GUIDE WIRE DILATOR PASSAGE ESOPHAGUS  9/13/2010         NV EGD TRANSORAL BIOPSY SINGLE/MULTIPLE  9/13/2010         STOMACH SURGERY PROCEDURE UNLISTED  6/29/2011         VASCULAR SURGERY PROCEDURE UNLIST      bilateral carotid stents       Prior Level of Function/Environment/Context: Patient reports being independent to mod I for ADLs and ambulation with a rollator. Sits on shower chair for showers. Wife performs IADLs  Occupations in which the patient is/was successful, what are the barriers preventing that success:   Performance Patterns (routines, roles, habits, and rituals):   Personal Interests and/or values:   Expanded or extensive additional review of patient history:     Home Situation  Home Environment: Private residence  # Steps to Enter: 7  Rails to Enter: Yes  Hand Rails : Right  One/Two Story Residence: One story  Living Alone: No  Support Systems: Family member(s)  Patient Expects to be Discharged to[de-identified] Private residence  Current DME Used/Available at Home: Shower chair, Walker, Wheelchair    Hand dominance: Right    EXAMINATION OF PERFORMANCE DEFICITS:  Cognitive/Behavioral Status:  Neurologic State: Alert  Orientation Level: Oriented X4  Cognition: Appropriate decision making; Appropriate for age attention/concentration; Appropriate safety awareness; Follows commands  Perception: Appears intact  Perseveration: No perseveration noted  Safety/Judgement: Awareness of environment    Hearing: Auditory  Auditory Impairment: Hard of hearing, right side    Vision/Perceptual:    Acuity: Within Defined Limits    Corrective Lenses: Glasses    Range of Motion:  AROM: Generally decreased, functional                   Strength  Strength: Within functional limits             Coordination:  Fine Motor Skills-Upper: Left Intact; Right Intact    Gross Motor Skills-Upper: Left Intact; Right Intact  No tremors or myoclonus noted in extremities or trunk. Tone & Sensation:  Sensation: Impaired (paresthesias in L finger tips)               Balance:  Sitting: Intact  Sitting - Static: Good (unsupported)  Sitting - Dynamic: Fair (occasional)  Standing: Impaired  Standing - Static: Good  Standing - Dynamic : Good (for standing ADLs and ambulation with RW on level surface)    Functional Mobility and Transfers for ADLs:  Bed Mobility:  Supine to Sit: Minimum assistance;Assist x1  Scooting: Independent    Transfers:  Sit to Stand: Independent  Stand to Sit: Independent  Bed to Chair: Modified independent (ambulating with a RW)  Toilet Transfer : Modified independent    ADL Assessment:  Feeding: Independent    Oral Facial Hygiene/Grooming: Independent    Bathing: Modified independent    Upper Body Dressing: Modified independent    Lower Body Dressing: Modified independent    Toileting: Independent                Functional Measure:  Barthel Index:    Bathin  Bladder: 10  Bowels: 10  Groomin  Dressing: 10  Feeding: 10  Mobility: 10  Stairs: 0  Toilet Use: 10  Transfer (Bed to Chair and Back): 15  Total: 85       Barthel and G-code impairment scale:  Percentage of impairment CH  0% CI  1-19% CJ  20-39% CK  40-59% CL  60-79% CM  80-99% CN  100%   Barthel Score 0-100 100 99-80 79-60 59-40 20-39 1-19   0   Barthel Score 0-20 20 17-19 13-16 9-12 5-8 1-4 0      The Barthel ADL Index: Guidelines  1. The index should be used as a record of what a patient does, not as a record of what a patient could do. 2. The main aim is to establish degree of independence from any help, physical or verbal, however minor and for whatever reason. 3. The need for supervision renders the patient not independent. 4. A patient's performance should be established using the best available evidence. Asking the patient, friends/relatives and nurses are the usual sources, but direct observation and common sense are also important. However direct testing is not needed.   5. Usually the patient's performance over the preceding 24-48 hours is important, but occasionally longer periods will be relevant. 6. Middle categories imply that the patient supplies over 50 per cent of the effort. 7. Use of aids to be independent is allowed. Debbie De La Cruz., Barthel, D.W. (3582). Functional evaluation: the Barthel Index. [address] [facility] (14)2. Jose Antoine lawson NOEL Cee, Phoebe Munoz., Brittanie Bhardwaj., Ck, [address] (1999). Measuring the change indisability after inpatient rehabilitation; comparison of the responsiveness of the Barthel Index and Functional Campbell Measure. Journal of Neurology, Neurosurgery, and Psychiatry, 66(4), 809-554. Wing Burleson, N.J.A, QIANA Gifford, & Marilia Mcgrath MJUANITA. (2004.) Assessment of post-stroke quality of life in cost-effectiveness studies: The usefulness of the Barthel Index and the EuroQoL-5D. Quality of Life Research, 13, 727-90       G codes: In compliance with CMSs Claims Based Outcome Reporting, the following G-code set was chosen for this patient based on their primary functional limitation being treated: The outcome measure chosen to determine the severity of the functional limitation was the Barthel Index with a score of 85/100 which was correlated with the impairment scale. ? Self Care:     - CURRENT STATUS: CI - 1%-19% impaired, limited or restricted    - GOAL STATUS: CI - 1%-19% impaired, limited or restricted    - D/C STATUS:  CI - 1%-19% impaired, limited or restricted       Pain:  Pain Scale 1: Numeric (0 - 10)  Pain Intensity 1: 3  Pain Location 1: Back        Pain Intervention(s) 1: Medication (see MAR)  Activity Tolerance:   Good overall for functional mobility and all ADLs performed  Please refer to the flowsheet for vital signs taken during this treatment.     After treatment:   [x]  Patient left in no apparent distress sitting up in chair  []  Patient left in no apparent distress in bed  [x]  Call bell left within reach  [x] Nursing notified  []  Caregiver present  [x]  Bed alarm activated    COMMUNICATION/EDUCATION:   Communication/Collaboration:  [x]      Home safety education was provided and the patient/caregiver indicated understanding. [x]      Patient/family have participated as able and agree with findings and recommendations. []      Patient is unable to participate in plan of care at this time.   Findings and recommendations were discussed with: Physical Therapist and     Gerardo [employer], OTR/L  Time Calculation: 23 mins

## 2018-06-29 NOTE — PROGRESS NOTES
Problem: Mobility Impaired (Adult and Pediatric)  Goal: *Acute Goals and Plan of Care (Insert Text)  Physical Therapy Goals  Initiated 6/28/2018  1. Patient will move from supine to sit and sit to supine , scoot up and down and roll side to side in bed with independence within 7 day(s). 2.  Patient will transfer from bed to chair and chair to bed with independence using the least restrictive device within 7 day(s). 3.  Patient will perform sit to stand with supervision/set-up within 7 day(s). 4.  Patient will ambulate with supervision/set-up for 150 feet with the least restrictive device within 7 day(s). 5.  Patient will ascend/descend 7 stairs with right handrail(s) with minimal assistance/contact guard assist within 7 day(s). physical Therapy TREATMENT  Patient: Carmel Hancock (73 y.o. male)  Date: 6/29/2018  Diagnosis: Physical deconditioning  Tremors of nervous system <principal problem not specified>       Precautions:    Chart, physical therapy assessment, plan of care and goals were reviewed. ASSESSMENT:  Pt received lying supine in bed, agreeable to therapy session. Per pt and his wife, pt is feeling much better today and notice LE tremors have decreased. Pt requires Min Ax1 (handheld assist to pull to sit) for supine>>sit transfer, however scoots to EOB w/supervision only. Pt demonstrates good static sitting balance seated EOB. Pt is able to sit<>stand transfer CGAx2 and ambulate CGAx2 w/RW for 2 ambulation trials of 100ft. w/seated rest break bt. Pt gait is slow and shuffled with decreased step length/step clearance, however no LE tremors or LOB displayed. Pt returned to bedside chair at end of session, resting comfortably with chair alarm activated, all needs met, wife present, and RN notified. Pt has good potential to benefit from additional therapy services upon DC.  Pt was able to ambulate ~500ft. w/rollator support this past May and is currently functioning below baseline fx mobility level. Pt and wife are open to suggestion of inpatient rehab vs SNF, however request that facility not be [facility]. Pt could potentially be DC'd to [facility] w/additional support pending progress, however wife is having back surgery and hip replacement next week and will not be able to provide usual level of support. PTA, pt was receiving OP therapy at [facility]. Recommend continued gait training w/RW as well as continued transfer training at next therapy session. Progression toward goals:  [x]    Improving appropriately and progressing toward goals   []    Improving slowly and progressing toward goals  []    Not making progress toward goals and plan of care will be adjusted     PLAN:  Patient continues to benefit from skilled intervention to address the above impairments. Continue treatment per established plan of care. Discharge Recommendations:  Inpatient Rehab  Further Equipment Recommendations for Discharge:  TBD by facility      SUBJECTIVE:   Patient stated \"I feel much more like my old self today.     OBJECTIVE DATA SUMMARY:   Critical Behavior:  Neurologic State: Alert, Eyes open spontaneously  Orientation Level: Oriented X4  Cognition: Appropriate decision making, Appropriate for age attention/concentration, Appropriate safety awareness     Functional Mobility Training:  Bed Mobility:  Supine to Sit: Minimum assistance;Assist x1  Scooting: Supervision     Transfers:  Sit to Stand: Contact guard assistance;Assist x2  Stand to Sit: Contact guard assistance;Assist x2  Bed to Chair: Contact guard assistance;Assist x2      Balance:  Sitting: Intact; With support  Sitting - Static: Good (unsupported)  Sitting - Dynamic: Fair (occasional)  Standing: Impaired; With support  Standing - Static: Fair  Standing - Dynamic : Fair    Ambulation/Gait Training:  Distance (ft): 200 Feet (ft) (100ft.x2, seated rest break bt)  Assistive Device: Walker, rolling;Gait belt  Ambulation - Level of Assistance: Contact guard assistance;Assist x1     Gait Abnormalities: Decreased step clearance;Shuffling gait  Base of Support: Widened  Speed/Gertrudis: Pace decreased (<100 feet/min); Shuffled  Step Length: Left shortened;Right shortened      Pain:  Pain Scale 1: Numeric (0 - 10)  Pain Intensity 1: 3  Pain Location 1: Back  Pain Intervention(s) 1: Medication (see MAR)    Activity Tolerance:   VSS before and after activity  Please refer to the flowsheet for vital signs taken during this treatment. After treatment:   [x]    Patient left in no apparent distress sitting up in chair  []    Patient left in no apparent distress in bed  [x]    Call bell left within reach  [x]    Nursing notified  [x]    Caregiver present  [x]    Bed alarm activated    COMMUNICATION/COLLABORATION:   The patients plan of care was discussed with: Registered Nurse and     JENNIFER Birmingham   Time Calculation: 28 mins  Regarding student involvement in patient care:  A student participated in this treatment session. Per CMS Medicare statements and APTA guidelines I certify that the following was true:  1. I was present and directly observed the entire session. 2. I made all skilled judgments and clinical decisions regarding care. 3. I am the practitioner responsible for assessment, treatment, and documentation.

## 2018-06-29 NOTE — ROUTINE PROCESS
Bedside shift change report given to Freddy Wagner RN (oncoming nurse) by Mitchell Jurado RN (offgoing nurse). Report included the following information SBAR, Kardex, Intake/Output, MAR and Recent Results.

## 2018-06-29 NOTE — PROGRESS NOTES
Neurology Progress Note    Patient ID:  Ori Medina  [id number]  80 y.o.  1935    Subjective:      Patient is doing better. Not overtly tremulousness and no episode of severe jerking or tremor. Observe walking with therapist on the floor with a walker.      Current Facility-Administered Medications   Medication Dose Route Frequency    HYDROcodone-acetaminophen (NORCO)  mg tablet 1 Tab  1 Tab Oral Q8H PRN    acetaminophen (TYLENOL) tablet 1,000 mg  1,000 mg Oral Q6H PRN    albuterol-ipratropium (DUO-NEB) 2.5 MG-0.5 MG/3 ML  3 mL Nebulization Q6H PRN    aspirin (ASPIRIN) tablet 325 mg  325 mg Oral DAILY    atorvastatin (LIPITOR) tablet 40 mg  40 mg Oral DAILY    clopidogrel (PLAVIX) tablet 75 mg  75 mg Oral DAILY    cyanocobalamin (VITAMIN B12) tablet 1,000 mcg  1,000 mcg Oral DAILY    finasteride (PROSCAR) tablet 5 mg  5 mg Oral QHS    fluticasone (FLONASE) 50 mcg/actuation nasal spray 1 Spray  1 Spray Both Nostrils BID    furosemide (LASIX) tablet 40 mg  40 mg Oral DAILY    insulin glargine (LANTUS) injection 14 Units  14 Units SubCUTAneous QHS    midodrine (PROAMITINE) tablet 10 mg  10 mg Oral TID WITH MEALS    nitroglycerin (NITROSTAT) tablet 0.4 mg  0.4 mg SubLINGual Q5MIN PRN    tamsulosin (FLOMAX) capsule 0.4 mg  0.4 mg Oral DAILY    sodium chloride (NS) flush 5-10 mL  5-10 mL IntraVENous Q8H    sodium chloride (NS) flush 5-10 mL  5-10 mL IntraVENous PRN    ondansetron (ZOFRAN) injection 4 mg  4 mg IntraVENous Q6H PRN    docusate sodium (COLACE) capsule 100 mg  100 mg Oral DAILY PRN    heparin (porcine) injection 5,000 Units  5,000 Units SubCUTAneous Q8H    insulin lispro (HUMALOG) injection   SubCUTAneous AC&HS    glucose chewable tablet 16 g  4 Tab Oral PRN    dextrose (D50W) injection syrg 12.5-25 g  12.5-25 g IntraVENous PRN    glucagon (GLUCAGEN) injection 1 mg  1 mg IntraMUSCular PRN    insulin lispro (HUMALOG) injection 8 Units  8 Units SubCUTAneous TIDAC    gabapentin (NEURONTIN) 250 mg/5 mL solution 1,000 mg  1,000 mg Per G Tube Q8H    famotidine (PEPCID) tablet 20 mg  20 mg Oral DAILY        Review of Systems:    Pertinent items are noted in HPI.     Objective:     Patient Vitals for the past 8 hrs:   BP Temp Pulse Resp SpO2   06/29/18 1118 159/62 98.3 °F (36.8 °C) 67 20 94 %   06/29/18 0719 126/76 98 °F (36.7 °C) 61 20 92 %       06/29 0701 - 06/29 1900  In: 150   Out: 500 [Urine:500]  06/27 1901 - 06/29 0700  In: -   Out: 350 [Urine:350]    Lab Review   Recent Results (from the past 24 hour(s))   GLUCOSE, POC    Collection Time: 06/28/18  2:11 PM   Result Value Ref Range    Glucose (POC) 314 (H) 65 - 100 mg/dL    Performed by Ronold Buerger    GLUCOSE, POC    Collection Time: 06/28/18  3:50 PM   Result Value Ref Range    Glucose (POC) 121 (H) 65 - 100 mg/dL    Performed by Kenia Fraga    GLUCOSE, POC    Collection Time: 06/28/18  6:26 PM   Result Value Ref Range    Glucose (POC) 191 (H) 65 - 100 mg/dL    Performed by Mine Sanchez    GLUCOSE, POC    Collection Time: 06/28/18  9:21 PM   Result Value Ref Range    Glucose (POC) 304 (H) 65 - 100 mg/dL    Performed by Marcos Eubanks (PCT)    METABOLIC PANEL, BASIC    Collection Time: 06/29/18  3:32 AM   Result Value Ref Range    Sodium 138 136 - 145 mmol/L    Potassium 4.2 3.5 - 5.1 mmol/L    Chloride 98 97 - 108 mmol/L    CO2 35 (H) 21 - 32 mmol/L    Anion gap 5 5 - 15 mmol/L    Glucose 133 (H) 65 - 100 mg/dL    BUN 21 (H) 6 - 20 MG/DL    Creatinine 1.04 0.70 - 1.30 MG/DL    BUN/Creatinine ratio 20 12 - 20      GFR est AA >60 >60 ml/min/1.73m2    GFR est non-AA >60 >60 ml/min/1.73m2    Calcium 8.5 8.5 - 10.1 MG/DL   CBC WITH AUTOMATED DIFF    Collection Time: 06/29/18  3:32 AM   Result Value Ref Range    WBC 3.9 (L) 4.1 - 11.1 K/uL    RBC 3.81 (L) 4.10 - 5.70 M/uL    HGB 11.7 (L) 12.1 - 17.0 g/dL    HCT 35.8 (L) 36.6 - 50.3 %    MCV 94.0 80.0 - 99.0 FL    MCH 30.7 26.0 - 34.0 PG    MCHC 32.7 30.0 - 36.5 g/dL    RDW 14. 8 (H) 11.5 - 14.5 %    PLATELET 75 (L) 876 - 400 K/uL    MPV 11.2 8.9 - 12.9 FL    NRBC 0.0 0  WBC    ABSOLUTE NRBC 0.00 0.00 - 0.01 K/uL    NEUTROPHILS 64 32 - 75 %    LYMPHOCYTES 25 12 - 49 %    MONOCYTES 8 5 - 13 %    EOSINOPHILS 2 0 - 7 %    BASOPHILS 0 0 - 1 %    IMMATURE GRANULOCYTES 1 (H) 0.0 - 0.5 %    ABS. NEUTROPHILS 2.5 1.8 - 8.0 K/UL    ABS. LYMPHOCYTES 1.0 0.8 - 3.5 K/UL    ABS. MONOCYTES 0.3 0.0 - 1.0 K/UL    ABS. EOSINOPHILS 0.1 0.0 - 0.4 K/UL    ABS. BASOPHILS 0.0 0.0 - 0.1 K/UL    ABS. IMM. GRANS. 0.0 0.00 - 0.04 K/UL    DF AUTOMATED     VITAMIN D, 25 HYDROXY    Collection Time: 06/29/18  3:32 AM   Result Value Ref Range    Vitamin D 25-Hydroxy 34.7 30 - 100 ng/mL   VITAMIN B12    Collection Time: 06/29/18  3:32 AM   Result Value Ref Range    Vitamin B12 491 193 - 986 pg/mL   GLUCOSE, POC    Collection Time: 06/29/18  6:49 AM   Result Value Ref Range    Glucose (POC) 128 (H) 65 - 100 mg/dL    Performed by Jimmie Vallejo (PCT)    GLUCOSE, POC    Collection Time: 06/29/18 11:20 AM   Result Value Ref Range    Glucose (POC) 209 (H) 65 - 100 mg/dL    Performed by Zuleima Blanco (PCT)      NEUROLOGICAL EXAM:      Appearance: The patient is well developed, well nourished, provides a coherent history and is in no acute distress. Mental Status: Oriented to time, place and person and the president, cognitive function and fund of knowledge is normal. Speech is fluent without aphasia or dysarthria. Mood and affect appropriate but depressed . Cranial Nerves:   II - XII were intact. Motor:  4+/5 strength in upper and lower proximal and distal muscles. Normal tone. No pronator drift. Reflexes:   Deep tendon reflexes were symmetric. Toes downgoing. Sensory:   Abnormal to cold and pinprick and vibration decreased in both feet. Gait:  Need assist to stand and walk with a walker      Tremor:   Subtle intentional bilateral UE tremor but no resting tremor noted and no cogwheeling or rigidity. Cerebellar:  Intact FTN/[place]/HTS. Assessment:     Active Problems:    Physical deconditioning (6/28/2018)      Tremors of nervous system (6/28/2018)        Plan:   1. Neurological examination does not reveal any new deficits. Very mild if ever of intentional UE tremors but no postural. Was not able to see the jerking and coarse tremors that he complains about. The intermittent nature and ballistic character of his complaints are not classic for essential tremors and are more concerning for myoclonic jerking. No evidence on exam of PD.      2. Discontinue Primidone. EEG revealed mild slowing which could be age related changes vs mild encephalopathy. Continue increase dose of Gabapentin 1000 mg TID.      3. Cardiology consult was ordered to assess need for Midodrine or dose adjusted (BID instead of TID or lower dose). Midodrine is known to cause tremors and myoclonic jerking.      4. Signing off.  Follow up with Dr. Domingo Souza (primary neurologist) after discharge.          Signed:  Caro Nichole MD  6/29/2018  12:16 PM

## 2018-06-29 NOTE — PROCEDURES
Flaquito Melendrez, [address]      Electroencephalogram         Procedure Date: 06/29/2018    Procedure ID: MR     Patient Name: Jose Lozano     YOB: 1935    Medical Record No: [id number]    INDICATION: tremors    Medications:    Current Facility-Administered Medications:     HYDROcodone-acetaminophen (NORCO)  mg tablet 1 Tab, 1 Tab, Oral, Q8H PRN, Basia GROVER MD, 1 Tab at 06/29/18 1231    acetaminophen (TYLENOL) tablet 1,000 mg, 1,000 mg, Oral, Q6H PRN, Gino Mccann MD    albuterol-ipratropium (DUO-NEB) 2.5 MG-0.5 MG/3 ML, 3 mL, Nebulization, Q6H PRN, Gino Mccann MD    aspirin (ASPIRIN) tablet 325 mg, 325 mg, Oral, DAILY, Gino Mccann MD, 325 mg at 06/29/18 0820    atorvastatin (LIPITOR) tablet 40 mg, 40 mg, Oral, DAILY, Gino Mccann MD, 40 mg at 06/29/18 0820    clopidogrel (PLAVIX) tablet 75 mg, 75 mg, Oral, DAILY, Gino Mccann MD, 75 mg at 06/29/18 0820    cyanocobalamin (VITAMIN B12) tablet 1,000 mcg, 1,000 mcg, Oral, DAILY, Gino Mccann MD, 1,000 mcg at 06/29/18 0820    finasteride (PROSCAR) tablet 5 mg, 5 mg, Oral, QHS, Gino Mccann MD, 5 mg at 06/28/18 2222    fluticasone (FLONASE) 50 mcg/actuation nasal spray 1 Spray, 1 Spray, Both Nostrils, BID, Gino Mccann MD, 1 Spray at 06/29/18 8760    furosemide (LASIX) tablet 40 mg, 40 mg, Oral, DAILY, Gino Mccann MD, 40 mg at 06/29/18 0820    insulin glargine (LANTUS) injection 14 Units, 14 Units, SubCUTAneous, QHS, Gino Mccann MD, 14 Units at 06/28/18 2221    midodrine (PROAMITINE) tablet 10 mg, 10 mg, Oral, TID WITH MEALS, Gino Mccann MD, 10 mg at 06/29/18 1230    nitroglycerin (NITROSTAT) tablet 0.4 mg, 0.4 mg, SubLINGual, Q5MIN PRN, Gino Mccann MD    tamsulosin (FLOMAX) capsule 0.4 mg, 0.4 mg, Oral, DAILY, Gino Mccann MD, 0.4 mg at 06/29/18 0820    sodium chloride (NS) flush 5-10 mL, 5-10 mL, IntraVENous, Q8H, Eulalio Brown, MD, 10 mL at 06/29/18 1405    sodium chloride (NS) flush 5-10 mL, 5-10 mL, IntraVENous, PRN, August Purcell MD    ondansetron (ZOFRAN) injection 4 mg, 4 mg, IntraVENous, Q6H PRN, August Purcell MD    docusate sodium (COLACE) capsule 100 mg, 100 mg, Oral, DAILY PRN, August Purcell MD    heparin (porcine) injection 5,000 Units, 5,000 Units, SubCUTAneous, Q8H, August Purcell MD, 5,000 Units at 06/29/18 1405    insulin lispro (HUMALOG) injection, , SubCUTAneous, AC&HS, August Purcell MD, 5 Units at 06/29/18 1404    glucose chewable tablet 16 g, 4 Tab, Oral, PRN, August Purcell MD    dextrose (D50W) injection syrg 12.5-25 g, 12.5-25 g, IntraVENous, PRN, August Purcell MD    glucagon (GLUCAGEN) injection 1 mg, 1 mg, IntraMUSCular, PRN, August Purcell MD    insulin lispro (HUMALOG) injection 8 Units, 8 Units, SubCUTAneous, TIDAC, August Purcell MD, 8 Units at 06/29/18 1404    gabapentin (NEURONTIN) 250 mg/5 mL solution 1,000 mg, 1,000 mg, Per G Tube, Q8H, Luz Maria Banegas MD, 1,000 mg at 06/29/18 1404    famotidine (PEPCID) tablet 20 mg, 20 mg, Oral, DAILY, Huma GROVER MD, 20 mg at 06/29/18 0820    DESCRIPTION OF PROCEDURE: Electrodes were applied in accordance with the international 10-20 system of electrode placement. EEG was reviewed in both bipolar and referential montages    DESCRIPTION OF FINDINGS:   Patient is only able to mount 7 Hz posterior frequency and occasionally 8 Hz when awake. When drowsy, more slower theta frequency is seen. Delta waves seen during sleep. Intermittent photic stimulation was done and did not induce any posterior driving responses. No seizures, epileptogenic discharges or focal asymmetry was seen. IMPRESSION:  This study done during awake and sleep state is mildly abnormal. There is mild generalized slowing either seen as age related changes or mild encephalopathy. No seizures, epileptogenic discharges or focal asymmetry seen.

## 2018-06-29 NOTE — PROGRESS NOTES
Initial Nutrition Assessment:    INTERVENTIONS/RECOMMENDATIONS:   · Continue home TF regimen with Is[employer] equivalent of Jevity 1.5: 500 mL bolus 8 am + 375 mL bolus 2 pm and 8pm + 150 mL water flushes q 4 hours    ASSESSMENT:   Patient medically noted for worsening tremors with concerns for myoclonic jerking. PMH for dysphagia, DM, stroke, HTN, and CHF. Orders have been placed to mimic patient's home TF regimen. He reports he received his morning feeding and tolerated well. No concerns from patient or wife at this time. TF is adequate to meet 106% of estimated kcal needs and 100% of estimated protein needs. Will continue to monitor. Diet Order: NPO (Jevity 1.5 500 mL bolus 8am + 275 mL bolus 2pm and 8pm, 150 mL flushes Q4; provides 1875 kcal, 80g protein, 1850 mL water)  % Eaten:  No data found. Pertinent Medications: [x]Reviewed []Other: Atorvastatin, Plavix, Vitamin B12, famotidine, Lasix, Lantus, Humalog, Midodrine   Pertinent Labs: [x]Reviewed []Other: [phone]  Food Allergies: [x]None []Other   Last BM: 6/28   [x]Active     []Hyperactive  []Hypoactive       [] Absent BS  Skin:    [x] Intact   [] Incision  [] Breakdown: [] Edema []Other:    Anthropometrics:   Height: 5' 7\" (170.2 cm) Weight: 70.3 kg (155 lb)   IBW (%IBW):   ( ) UBW (%UBW):   (  %)   Last Weight Metrics:  Weight Loss Metrics 6/28/2018 6/23/2018 6/11/2018 5/16/2018 5/9/2018 5/7/2018 5/7/2018   Today's Wt 155 lb 168 lb 172 lb 3.2 oz 170 lb 166 lb 14.2 oz - 184 lb   BMI 24.28 kg/m2 26.31 kg/m2 26.97 kg/m2 26.63 kg/m2 - 26.14 kg/m2 -   Some encounter information is confidential and restricted. Go to Review Flowsheets activity to see all data. BMI: Body mass index is 24.28 kg/(m^2). This BMI is indicative of:   []Underweight    [x]Normal    []Overweight    [] Obesity   [] Extreme Obesity (BMI>40)     Estimated Nutrition Needs (Based on):   1760 Kcals/day (BMR (1354) x 1. 3AF) , 70 g (-84g (1.0-1.2 g/kg bw)) Protein  Carbohydrate:  At Least 130 g/day  Fluids: 1750 mL/day (1ml/kcal)    Pt expected to meet estimated nutrient needs: [x]Yes []No    NUTRITION DIAGNOSES:   Problem:  No nutritional diagnosis at this time      Etiology: related to       Signs/Symptoms: as evidenced by        NUTRITION INTERVENTIONS:    Enteral/Parenteral Nutrition: Initiate enteral nutrition                GOAL:   Bolus regimen tolerated with residual <250 mL next 3-5 days    LEARNING NEEDS (Diet, Food/Nutrient-Drug Interaction):    [x] None Identified   [] Identified and Education Provided/Documented   [] Identified and Pt declined/was not appropriate     Cultural, Synagogue, OR Ethnic Dietary Needs:    [x] None Identified   [] Identified and Addressed     [x] Interdisciplinary Care Plan Reviewed/Documented    [x] Discharge Planning: Continue home TF regimen        MONITORING /EVALUATION:   Food/Nutrient Intake Outcomes: Enteral/parenteral nutrition intake  Physical Signs/Symptoms Outcomes: GI profile, Weight/weight change, Electrolyte and renal profile, Glucose profile    NUTRITION RISK:    [] High              [x] Moderate           []  Low  []  Minimal/Uncompromised    PT SEEN FOR:    [x]  MD Consult: []Calorie Count      []Diabetic Diet Education        []Diet Education     []Electrolyte Management     []General Nutrition Management and Supplements     [x]Management of Tube Feeding     []TPN Recommendations    []  RN Referral:  []MST score >=2     []Enteral/Parenteral Nutrition PTA     []Pregnant: Gestational DM or Multigestation     []Pressure Ulcer/Wound Care needs        []  Low BMI  []  ABBEY Snyder [facility]  Pager [phone]                 Weekend Pager [phone]

## 2018-06-29 NOTE — PROGRESS NOTES
Hospitalist Progress Note    NAME: Randy Mayberry   :  3/7/1947   MRN:  [id number]       Assessment / Plan:  Essential tremors with recent worsening  -primidone on hold  -EEG pending  -gabapentin increased to 1000mg TID  -appreciate neuro's rec    Generalized deconditioning likely multifactorial could be spectrum of symptoms from essential tremors  -PT/OT consulted, rec inpt rehab. He needs 3 nights stay per CM    Diabetes mellitus  -resume home insulin, SSI    History of coronary artery disease  Systolic congestive heart failure compensated  HLD  -recent ejection fraction is 40%  -cont' lasix, statin, aspirin and Plavix  -cardiology consulted in regards to his midodrine    History of tongue and throat cancer status post PEG tube placement  -nutrition consulted, appreciate recs    Diabetic neuropathy  -con't gabapentin      Code Status: Full   Surrogate Decision Maker: Wife Zeenat Camarena  DVT Prophylaxis: heparin  GI Prophylaxis: not indicated   Baseline: From home with recent functional decline since he was started on medication from [facility]     Subjective:     Chief Complaint / Reason for Physician Visit  Pt seen at bedside. Has back and shoulder pain, requesting to incr dosing of his current pain meds. He has no other complaints. Discussed with RN events overnight. Review of Systems:  Symptom Y/N Comments  Symptom Y/N Comments   Fever/Chills n   Chest Pain     Poor Appetite    Edema     Cough    Abdominal Pain     Sputum    Joint Pain y    SOB/KRAMER n   Pruritis/Rash     Nausea/vomit    Tolerating PT/OT     Diarrhea    Tolerating Diet     Constipation    Other       Could NOT obtain due to:      Objective:     VITALS:   Last 24hrs VS reviewed since prior progress note.  Most recent are:  Patient Vitals for the past 24 hrs:   Temp Pulse Resp BP SpO2   18 1123 97.8 °F (36.6 °C) 73 18 138/81 98 %   18 0811 98 °F (36.7 °C) 79 16 128/67 95 %   18 - - - 154/77 -   18 1746 - - - 148/64 96 %   06/27/18 1700 - - - 141/70 95 %     No intake or output data in the 24 hours ending 06/28/18 8288     PHYSICAL EXAM:  General: WD, WN. Alert, cooperative, no acute distress    EENT:  EOMI. Anicteric sclerae. MMM  Resp:  CTA bilaterally, no wheezing or rales. No accessory muscle use  CV:  Regular  rhythm,  No edema  GI:  Soft, Non distended, Non tender.  +Bowel sounds  Neurologic:  Alert and oriented X 3, normal speech,   Psych:   Good insight. Not anxious nor agitated  Skin:  No rashes. No jaundice    Reviewed most current lab test results and cultures  YES  Reviewed most current radiology test results   YES  Review and summation of old records today    NO  Reviewed patient's current orders and MAR    YES  PMH/SH reviewed - no change compared to H&P  ________________________________________________________________________  Care Plan discussed with:    Comments   Patient x    Family  x wife   RN x    Care Manager x    Consultant  x                      Multidiciplinary team rounds were held today with , nursing, pharmacist and clinical coordinator. Patient's plan of care was discussed; medications were reviewed and discharge planning was addressed. ________________________________________________________________________  Total NON critical care TIME:  35   Minutes    Total CRITICAL CARE TIME Spent:   Minutes non procedure based      Comments   >50% of visit spent in counseling and coordination of care     ________________________________________________________________________  Jennifer Lyle MD     Procedures: see electronic medical records for all procedures/Xrays and details which were not copied into this note but were reviewed prior to creation of Plan. LABS:  I reviewed today's most current labs and imaging studies.   Pertinent labs include:  Recent Labs      06/28/18   0535  06/27/18   1152   WBC  4.0*  3.4*   HGB  10.3*  12.9   HCT  28.9*  36.2*   PLT  35*  70*     Recent Labs 06/28/18   0535  06/27/18   1725  06/27/18   1152   NA  135*   --   133*   K  3.8   --   4.8   CL  103   --   96*   CO2  20*   --   26   GLU  303*   --   511*   BUN  35*   --   41*   CREA  1.42*   --   1.99*   CA  7.0*   --   8.2*   ALB  2.3*   --   3.1*   TBILI  0.5   --   0.6   SGOT  142*   --   158*   ALT  161*   --   231*   INR   --   1.0   --        Signed: George Menjivar MD

## 2018-06-30 NOTE — ROUTINE PROCESS
Bedside shift change report given to Edith Dennis, [facility] nurse) by Aurelio Rich RN (offgoing nurse). Report included the following information SBAR, Kardex, Intake/Output, MAR and Recent Results.

## 2018-06-30 NOTE — PROGRESS NOTES
Per chart review, [facility] referral sent yesterday by CM. CM notified via [employer] messaging by [facility] that \"I will follow-up first of week; await OT eval, cardiology consult, & EEG results. \"    Michael Monroe MSW Super[employer]ise[employer] in Social Work, [address]  [phone]

## 2018-06-30 NOTE — PROGRESS NOTES
Hospitalist Progress Note    NAME: Estelle Norman   :  3/7/1947   MRN:  [id number]       Assessment / Plan:  Essential tremors with recent worsening  -primidone on hold  -EEG showed mild generalized slowing either seen as age related changes or mild encephalopathy. No seizures, epileptogenic discharges or focal asymmetry seen.   -gabapentin increased to 1000mg TID  -appreciate neuro's rec    Generalized deconditioning likely multifactorial could be spectrum of symptoms from essential tremors  -PT/OT consulted, rec inpt rehab. He needs 3 nights stay per CM    Diabetes mellitus  -cont' home insulin, SSI    History of coronary artery disease  Systolic congestive heart failure compensated  HLD  -recent ejection fraction is 40%  -cont' lasix, statin, aspirin and Plavix  -cardiology consulted in regards to his midodrine    History of tongue and throat cancer status post PEG tube placement  -nutrition consulted, appreciate recs  -klack solution- swish and spit    Diabetic neuropathy  -con't gabapentin      Code Status: Full   Surrogate Decision Maker: Wife Regino Smith  DVT Prophylaxis: heparin  GI Prophylaxis: not indicated   Baseline: From home with recent functional decline since he was started on medication from [facility]     Subjective:     Chief Complaint / Reason for Physician Visit  Pt seen at bedside. States tremor worst this morning. No other complaints Discussed with RN events overnight. Review of Systems:  Symptom Y/N Comments  Symptom Y/N Comments   Fever/Chills n   Chest Pain     Poor Appetite    Edema     Cough    Abdominal Pain n    Sputum    Joint Pain     SOB/KRAMER n   Pruritis/Rash     Nausea/vomit    Tolerating PT/OT     Diarrhea    Tolerating Diet     Constipation    Other       Could NOT obtain due to:      Objective:     VITALS:   Last 24hrs VS reviewed since prior progress note.  Most recent are:  Patient Vitals for the past 24 hrs:   Temp Pulse Resp BP SpO2   18 1123 97.8 °F (36.6 °C) 73 18 138/81 98 %   06/28/18 0811 98 °F (36.7 °C) 79 16 128/67 95 %   06/27/18 1947 - - - 154/77 -   06/27/18 1746 - - - 148/64 96 %   06/27/18 1700 - - - 141/70 95 %     No intake or output data in the 24 hours ending 06/28/18 1618     PHYSICAL EXAM:  General: WD, WN. Alert, cooperative, no acute distress    EENT:  EOMI. Anicteric sclerae. MMM  Resp:  CTA bilaterally, no wheezing or rales. No accessory muscle use  CV:  Regular  rhythm,  No edema  GI:  Soft, ND, NT  +Bowel sounds, PEG in place. Neurologic:  Alert and oriented X 3, normal speech  Psych:   Good insight. Not anxious nor agitated  Skin:  No rashes. No jaundice    Reviewed most current lab test results and cultures  YES  Reviewed most current radiology test results   YES  Review and summation of old records today    NO  Reviewed patient's current orders and MAR    YES  PMH/ reviewed - no change compared to H&P  ________________________________________________________________________  Care Plan discussed with:    Comments   Patient x    Family  x wife   RN x    Care Manager     Consultant                        Multidiciplinary team rounds were held today with , nursing, pharmacist and clinical coordinator. Patient's plan of care was discussed; medications were reviewed and discharge planning was addressed. ________________________________________________________________________  Total NON critical care TIME:  35   Minutes    Total CRITICAL CARE TIME Spent:   Minutes non procedure based      Comments   >50% of visit spent in counseling and coordination of care     ________________________________________________________________________  Jacey Kiran MD     Procedures: see electronic medical records for all procedures/Xrays and details which were not copied into this note but were reviewed prior to creation of Plan. LABS:  I reviewed today's most current labs and imaging studies.   Pertinent labs include:  Recent Labs      06/28/18   0535 06/27/18   1152   WBC  4.0*  3.4*   HGB  10.3*  12.9   HCT  28.9*  36.2*   PLT  35*  70*     Recent Labs      06/28/18   0535  06/27/18   1725  06/27/18   1152   NA  135*   --   133*   K  3.8   --   4.8   CL  103   --   96*   CO2  20*   --   26   GLU  303*   --   511*   BUN  35*   --   41*   CREA  1.42*   --   1.99*   CA  7.0*   --   8.2*   ALB  2.3*   --   3.1*   TBILI  0.5   --   0.6   SGOT  142*   --   158*   ALT  161*   --   231*   INR   --   1.0   --        Signed: Jennifer Lyle MD

## 2018-06-30 NOTE — CONSULTS
Consult/Admission    NAME: Tarah Aguilar   :  1935   MRN:  [id number]     Date/Time:  2018 8:33 PM    Patient PCP: Jeromy Caruso MD  ________________________________________________________________________     Assessment:                        Admitted with exacerbation of tremors. Evaluation in progress. 1. Chronic systolic heart failure  2. Coronary artery disease s/p remote CABG.  w/ EF 40%, PCI to distal LAD via LIMA, severe disease in large diagonal (not amenable to PCI), patent SVG-OM, SVG-PDA, SVG-PLB. [place]  w/ EF 28%, IWMI, and with diagonal ischemia. 3. Lexiscan stress MPI  w/ EF 29% and anterior ischemia w/ IWMI  4. Ischemic cardiomyopathy  5. BiV ICD   6. Chronic kidney disease; Stg 3  7. 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 8. Diabetes  9. 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 Orthostatic hypotension  11. Hyperlipidemia  12. Head/neck CA w/ feeding tube  13. Chronic thrombocytopenia (Dr. Cindi Amaro)          Plan:     He appears to be stable from cardiac standpoint. Does not seem to have any cardiac decompensation. No findings of acute heart failure. Would continue on cardiac meds. No further cardiac intervention needed at this time. []           High complexity decision making was performed        Subjective:   CHIEF COMPLAINT:    Tremors. HISTORY OF PRESENT ILLNESS:     Mita Estes is a 80 y.o.  male who is  Admitted with exacerbation of tremors. W/u in process. No evidence of seizures. Has not had any increase in SOB , no chest pain , really no new cardiac sxs. His functional capacity is very limtied      We were asked to see for  evaluation of the above problems.      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 I suspect the latter. Achalasia  is not present.         GERD (gastroesophageal reflux disease)     Heart failure ([facility] 10/2014     Cardiomyopathy:Pacemaker upgrade:Biv and AICD  Dr. Amanda Kramer heart DrAlvaro last visit 5/11/2015    Hepatitis C Dx 1996    treated at [facility] in past; as of 4/15/15 wife states pt currently not under any treatment    Hyperlipidemia     Hypertension     Myocardial infarct [facility]) 2013    Heart Cath: 40% LV EF, Stented distal LAD, patent Graft to circumflex    On tube feeding diet approx 2009    still has as of 9/28/15  (no po food/liquid/meds at all); Dr Archie Silva Other ill-defined conditions(799.89) 1996    1 dose of chemotherapy/radiation for tongue cancer    Other ill-defined conditions(799.89)     BPH    Other ill-defined conditions(799.89)     orthostatic hypotension    Pneumonia ~ April -May 2010    Stroke [facility]) approx 2003    left side-left finger tips numb; no imbalance or memory loss; as of 2015 not seeing neuro MD    Suicidal thoughts       Past Surgical History:   Procedure Laterality Date    ABDOMEN SURGERY [address]    peg tube    CABG, ARTERY-VEIN, THREE  2000    HX CATARACT REMOVAL      bilateral    HX CHOLECYSTECTOMY      HX HEART CATHETERIZATION  2013    Stented distal LAD    HX MOHS PROCEDURES      bilateral    HX ORTHOPAEDIC      back surgery times two    HX OTHER SURGICAL      Radical Left Neck    HX OTHER SURGICAL      NASAL POLYPS REMOVAL    HX OTHER SURGICAL  2010    TURP    HX PACEMAKER  11/08    HX PACEMAKER  10/28/14     Defibrillator: [facility] # [id number], serial # [id number]; Dr. Jhaveri  [phone]; Dr Shawanda Jones      cystoscopy   [address] [place] [address] [facility]    cevical surgery    WA CHANGE GASTROSTOMY TUBE  2011         WA CHANGE GASTROSTOMY TUBE  2011         WA EGD INSERT GUIDE WIRE DILATOR PASSAGE ESOPHAGUS  2010         WA EGD TRANSORAL BIOPSY SINGLE/MULTIPLE  2010         STOMACH SURGERY PROCEDURE UNLISTED  2011         VASCULAR SURGERY PROCEDURE UNLIST      bilateral carotid stents     Allergies   Allergen Reactions    Demerol [Meperidine] Shortness of Breath    Paxil [Paroxetine Hcl] Unknown (comments)     Pt gets shaky and loses control of legs    Amoxicillin Rash    Cleocin [Clindamycin Hcl] Rash    Pcn [Penicillins] Rash      Meds:  See below  Social History   Substance Use Topics    Smoking status: Never Smoker    Smokeless tobacco: Never Used    Alcohol use No      Family History   Problem Relation Age of Onset    Heart Disease Father       at age 52 from CAD    Colon Cancer Mother     Cancer Mother      colon ca    Heart Disease Brother        REVIEW OF SYSTEMS:     []            Unable to obtain  ROS due to ---   [x]            Total of 12 systems reviewed as follows:    Constitutional: negative fever, negative chills, negative weight loss  Eyes:   negative visual changes  ENT:   negative sore throat, tongue or lip swelling  Respiratory:  negative cough, negative dyspnea  Cards:  negative for chest pain, palpitations, lower extremity edema  GI:   negative for nausea, vomiting, diarrhea, and abdominal pain  Genitourinary: negative for frequency, dysuria  Integument:  negative for rash   Hematologic:  negative for easy bruising and gum/nose bleeding  Musculoskel: negative for myalgias,  back pain  Neurological:  negative for headaches, dizziness, vertigo, weakness  Behavl/Psych: negative for feelings of anxiety, depression     Pertinent Positives include :    Objective:      Physical Exam:    Last 24hrs VS reviewed since prior progress note. Most recent are:    Visit Vitals    BP 95/59 (BP 1 Location: Left arm, BP Patient Position: Sitting)    Pulse 64    Temp 97.6 °F (36.4 °C)    Resp 20    Ht 5' 7\" (1.702 m)    Wt 70.3 kg (155 lb)    SpO2 97%    BMI 24.28 kg/m2       Intake/Output Summary (Last 24 hours) at 06/29/18 2033  Last data filed at 06/29/18 0931   Gross per 24 hour   Intake              150 ml   Output              850 ml   Net             -700 ml        General Appearance: Well developed, well nourished, alert & oriented x 3,    no acute distress. Ears/Nose/Mouth/Throat: Pupils equal and round, Hearing grossly normal.  Neck: Supple. JVP within normal limits. Carotids good upstrokes, with no bruit. Chest: Lungs clear to auscultation bilaterally. Cardiovascular: Regular rate and rhythm, S1S2 normal, no murmur, rubs, gallops. Abdomen: Soft, non-tender, bowel sounds are active. No organomegaly. Extremities: No edema bilaterally. Femoral pulses +2, Distal Pulses +1. Skin: Warm and dry. Neuro: CN II-XII grossly intact, Strength and sensation grossly intact. Data:      Prior to Admission medications    Medication Sig Start Date End Date Taking? Authorizing Provider   primidone (MYSOLINE) 50 mg tablet Take 2 tabs at night via PEG, take 1 tablet in the morning via peg tube 6/28/18  Yes Kaya Maurice,    finasteride (PROSCAR) 5 mg tablet Take 5 mg by mouth nightly. Yes Booker Eric MD   alfuzosin SR (UROXATRAL) 10 mg SR tablet  5/24/18  Yes Historical Provider   furosemide (LASIX) 20 mg tablet Take 2 Tabs by mouth daily. Indications: hypercalcemia  Patient taking differently: Take 40 mg by mouth daily. 40 mg in the morning and 20 mg evening  Indications: hypercalcemia 5/11/18  Yes Barbi Wayne,    cyanocobalamin 1,000 mcg tablet Take 1,000 mcg by mouth daily.    Yes Historical Provider acetaminophen (TYLENOL) 500 mg tablet Take 1,000 mg by mouth every six (6) hours as needed for Pain. Yes Historical Provider   Lancets misc Use preferred brand; Check glucose 4 times daily, Diagnosis E11.65 3/28/18  Yes Mychal Hermosillo MD   glucose blood VI test strips (PHARMACIST CHOICE) strip Use preferred brand; Check glucose 4 times daily, Diagnosis E11.65 3/28/18  Yes Mychal Hermosillo MD   Blood-Glucose Meter monitoring kit 1 preferred brand glucometer for checking home glucose 4 times per day. DX Code: E11.65 3/28/18  Yes Mychal Hermosillo MD   fluticasone [facility]) 50 mcg/actuation nasal spray 1 Notrees by Both Nostrils route two (2) times a day. 2/19/18  Yes Historical Provider   PYRIDOXINE HCL, VITAMIN B6, (VITAMIN B-6 PO) Take  by mouth. Yes Historical Provider   HYDROcodone-acetaminophen (NORCO)  mg tablet Take 1 Tab by mouth daily as needed. Yes Booker Eric MD   famotidine (PEPCID) 20 mg tablet Take 1 Tab by mouth two (2) times a day. 1/14/18  Yes Joe Smith DO   guaiFENesin (ROBITUSSIN) 100 mg/5 mL liquid Take 200 mg by mouth three (3) times daily as needed for Cough. Yes Historical Provider   nitroglycerin (NITROSTAT) 0.4 mg SL tablet 0.4 mg by SubLINGual route every five (5) minutes as needed for Chest Pain. Yes Booker Eric MD   aspirin (ASPIRIN) 325 mg tablet Take 325 mg by mouth daily. Yes Booker Eric MD   vit B Cmplx 3-FA-Vit C-Biotin (NEPHRO GRAYSON RX) 1- mg-mg-mcg tablet Take 1 Tab by mouth daily. Yes Booker Eric MD   albuterol-ipratropium (DUO-NEB) 2.5 mg-0.5 mg/3 ml nebu 3 mL by Nebulization route every six (6) hours as needed. 12/5/16  Yes Abdoulaye Saldivar MD   ondansetron hcl (ZOFRAN, AS HYDROCHLORIDE,) 8 mg tablet Take 1 Tab by mouth every eight (8) hours as needed for Nausea. 12/5/16  Yes Abdoulaye Saldivar MD   Nebulizer & Compressor machine 1 Each by Does Not Apply route daily.  12/5/16  Yes Abdoulaye Saldivar MD   midodrine (PROAMITINE) 10 mg tablet Take 10 mg by mouth three (3) times daily (with meals). 11/1/16  Yes Historical Provider   insulin syringe-needle U-100 1 mL 31 x 15/64\" syrg 1 Syringe by SubCUTAneous route four (4) times daily. 1/8/16  Yes Gavino Brown MD   Insulin Needles, Disposable, (ESPERANZA PEN NEEDLE) 32 gauge x 5/32\" ndle 5 times a day 1/8/16  Yes Gavino Brown MD   atorvastatin (LIPITOR) 40 mg tablet Take 40 mg by mouth daily. Yes Booker Eric MD   gabapentin (NEURONTIN) 250 mg/5 mL solution 750 mg by PEG Tube route three (3) times daily. Yes Booker Eric MD   multivitamin (ONE A DAY) tablet Take 1 Tab by mouth daily. Yes Booker Eric MD   clopidogrel (PLAVIX) 75 mg tablet Take 75 mg by mouth daily. Yes Booker Eric MD   tamsulosin (FLOMAX) 0.4 mg capsule Take 0.4 mg by mouth as needed. 5/21/18   Historical Provider   LANTUS SOLOSTAR U-100 INSULIN 100 unit/mL (3 mL) inpn INJECT 14 UNITS SUBCUTANEOUSLY ONCE DAILY 5/21/18   Gordo Ibarra MD   insulin lispro ([facility]) 100 unit/mL kwikpen 13 units with breakfast, 10 units with lunch, 7 units with dinner + correction insulin, up to 40 units/day  Patient taking differently: by SubCUTAneous route.  13 units with breakfast, 10 units with lunch, 7 units with dinner + correction insulin, up to 40 units/day  Indications: 10 lunch, 7 dinner 3/12/18   Gordo Ibarra MD       Recent Results (from the past 24 hour(s))   GLUCOSE, POC    Collection Time: 06/28/18  9:21 PM   Result Value Ref Range    Glucose (POC) 304 (H) 65 - 100 mg/dL    Performed by Mehrdad Corey (PCT)    METABOLIC PANEL, BASIC    Collection Time: 06/29/18  3:32 AM   Result Value Ref Range    Sodium 138 136 - 145 mmol/L    Potassium 4.2 3.5 - 5.1 mmol/L    Chloride 98 97 - 108 mmol/L    CO2 35 (H) 21 - 32 mmol/L    Anion gap 5 5 - 15 mmol/L    Glucose 133 (H) 65 - 100 mg/dL    BUN 21 (H) 6 - 20 MG/DL    Creatinine 1.04 0.70 - 1.30 MG/DL    BUN/Creatinine ratio 20 12 - 20      GFR est AA >60 >60 ml/min/1.73m2    GFR est non-AA >60 >60 ml/min/1.73m2    Calcium 8.5 8.5 - 10.1 MG/DL   CBC WITH AUTOMATED DIFF    Collection Time: 06/29/18  3:32 AM   Result Value Ref Range    WBC 3.9 (L) 4.1 - 11.1 K/uL    RBC 3.81 (L) 4.10 - 5.70 M/uL    HGB 11.7 (L) 12.1 - 17.0 g/dL    HCT 35.8 (L) 36.6 - 50.3 %    MCV 94.0 80.0 - 99.0 FL    MCH 30.7 26.0 - 34.0 PG    MCHC 32.7 30.0 - 36.5 g/dL    RDW 14.8 (H) 11.5 - 14.5 %    PLATELET 75 (L) 331 - 400 K/uL    MPV 11.2 8.9 - 12.9 FL    NRBC 0.0 0  WBC    ABSOLUTE NRBC 0.00 0.00 - 0.01 K/uL    NEUTROPHILS 64 32 - 75 %    LYMPHOCYTES 25 12 - 49 %    MONOCYTES 8 5 - 13 %    EOSINOPHILS 2 0 - 7 %    BASOPHILS 0 0 - 1 %    IMMATURE GRANULOCYTES 1 (H) 0.0 - 0.5 %    ABS. NEUTROPHILS 2.5 1.8 - 8.0 K/UL    ABS. LYMPHOCYTES 1.0 0.8 - 3.5 K/UL    ABS. MONOCYTES 0.3 0.0 - 1.0 K/UL    ABS. EOSINOPHILS 0.1 0.0 - 0.4 K/UL    ABS. BASOPHILS 0.0 0.0 - 0.1 K/UL    ABS. IMM.  GRANS. 0.0 0.00 - 0.04 K/UL    DF AUTOMATED     VITAMIN D, 25 HYDROXY    Collection Time: 06/29/18  3:32 AM   Result Value Ref Range    Vitamin D 25-Hydroxy 34.7 30 - 100 ng/mL   VITAMIN B12    Collection Time: 06/29/18  3:32 AM   Result Value Ref Range    Vitamin B12 491 193 - 986 pg/mL   GLUCOSE, POC    Collection Time: 06/29/18  6:49 AM   Result Value Ref Range    Glucose (POC) 128 (H) 65 - 100 mg/dL    Performed by Shawn Norman (PCT)    GLUCOSE, POC    Collection Time: 06/29/18 11:20 AM   Result Value Ref Range    Glucose (POC) 209 (H) 65 - 100 mg/dL    Performed by Connie Vieira (PCT)    GLUCOSE, POC    Collection Time: 06/29/18  1:56 PM   Result Value Ref Range    Glucose (POC) 279 (H) 65 - 100 mg/dL    Performed by Dominik Higgins

## 2018-06-30 NOTE — PROGRESS NOTES
Notified Dr. Sera Dunn of pt's insulin schedule not coinciding with his tube feed boluses. Orders to schedule insulin around tube feed schedule. Notified pharmacy.

## 2018-06-30 NOTE — PROGRESS NOTES
* No surgery found *  Bedside shift change report given to Cindy (oncoming nurse) by Anayeli (offgoing nurse).  Report included the following information SBAR, Kardex, Intake/Output, MAR and Recent Results.      Zone Phone:   [phone]          Significant changes during shift:  None,tolerating food well              Patient Information  Dee Mobley [id number]  89 y.o.  6/28/2018  4:12 AM by Mak Nixon MD. 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    Wrist pain, acute, right 05/07/2018    Thrombotic stroke involving left middle cerebral artery ([address] 02/02/2017    Stenosis of both internal carotid arteries 02/02/2017    Hemiplegia and hemiparesis following cerebral infarction affecting right dominant side ([address] 02/02/2017    Weakness 02/01/2017    Stroke ([address] 02/01/2017    Aspiration pneumonia ([address] 11/28/2016    History of stroke 07/30/2016         Class: Present on Admission    Polyneuropathy associated with underlying disease ([address] 02/11/2016    Peripheral neuropathy ([address] 01/09/2016    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 ([address] 01/08/2016    Percutaneous endoscopic gastrostomy status ([address] 12/04/2014    Antiplatelet or antithrombotic long-term use 12/04/2014    Heart failure ([address] 09/11/2014    Esophageal motility disorder 07/02/2013    CAD (coronary artery disease) 02/12/2013    Status post implantation of automatic cardioverter/defibrillator (AICD) 02/12/2013         Class: Present on Admission    Aortic stenosis, mild 02/12/2013    S/P PTCA (percutaneous transluminal coronary angioplasty) 01/18/2013    Non-cardiac chest pain 01/04/2013    Feeding difficulties 05/02/2011    Abnormal cardiovascular stress test 06/24/2010    S/P CABG x 3 06/24/2010    Atherosclerotic cerebrovascular disease 06/24/2010    Orthostatic hypotension 06/24/2010    Dysphagia 05/03/2010               Past Medical History:   Diagnosis Date    Antiplatelet or antithrombotic long-term use 12/4/2014    Anxiety disorder       Arrhythmia 2009      bradycardia    Arthritis       CAD (coronary artery disease)         s/p CABG 2002; Dr Flanagan Jena [facility]) 1996      tongue/throat cancer s/p surgery / radiation and 1 dose of chemo    Carotid artery stenosis         s/p bilateral stents    Chronic pain         left leg, lower back,     Depression       Diabetes ([address]         Type II    Esophageal dysmotility         s/p dilitation    Esophageal motility disorder 7/8/2013      Frequent simultaneous or failed contractions, low amplitude contractions  suggests severe myopathy or diffuse spasm. I suspect the latter.  Achalasia  is not present.        GERD (gastroesophageal reflux disease)       Heart failure ([facility] 10/2014       Cardiomyopathy:Pacemaker upgrade:Biv and AICD  Dr. Salvador Epps heart  last visit 5/11/2015    Hepatitis C Dx 1996      treated at [facility] past; as of 4/15/15 wife states pt currently not under any treatment    Hyperlipidemia       Hypertension       Myocardial infarct [facility]) 2013      Heart Cath: 40% LV EF, Stented distal LAD, patent Graft to circumflex    On tube feeding diet approx 2009      still has as of 9/28/15  (no po food/liquid/meds at all); Dr Warren Cox Other ill-defined conditions(799.89) 1996      1 dose of chemotherapy/radiation for tongue cancer    Other ill-defined conditions(799.89)         BPH    Other ill-defined conditions(799.89)         orthostatic hypotension    Pneumonia ~ April -May 2010    Stroke [facility]) approx 2003      left side-left finger tips numb; no imbalance or memory loss; as of 2015 not seeing neuro MD    Suicidal thoughts                  Core Measures:      CVA: No Refused  CHF:No No  PNA:No No          Activity Status:      OOB to Chair No  Ambulated this shift No   Bed Rest No      Supplemental V6: (NL Applicable)      NC No  NRB No  Venti-mask No      DVT prophylaxis:      DVT prophylaxis Med- Yes  DVT prophylaxis SCD or CORTES- No       Wounds: (If Applicable)      Wounds- No      Location-none      Patient Safety:      Falls Score Total Score: 4  Safety Level_______  Bed Alarm On? Yes  Sitter?  No      Plan for upcoming shift: continue to monitor               Discharge Plan: No       Active Consults:  IP CONSULT TO NEUROLOGY  IP CONSULT TO CARDIOLOGY

## 2018-07-01 NOTE — PROGRESS NOTES
No surgery found *  Bedside shift change report given to POLI [address]is [address]) by Anayeli (offgoing nurse).  Report included the following information SBAR, Kardex, Intake/Output, MAR and Recent Results.      Zone Phone:   [phone]          Significant changes during shift:  None, pt had a 210 ml residual before 2pm feed              Patient Information  Lisa Lee [id number]  67 y.o.  6/28/2018  4:12 AM by Good Echavarria MD. 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    Wrist pain, acute, right 05/07/2018    Thrombotic stroke involving left middle cerebral artery ([address] 02/02/2017    Stenosis of both internal carotid arteries 02/02/2017    Hemiplegia and hemiparesis following cerebral infarction affecting right dominant side ([address] 02/02/2017    Weakness 02/01/2017    Stroke ([address] 02/01/2017    Aspiration pneumonia ([address] 11/28/2016    History of stroke 07/30/2016         Class: Present on Admission    Polyneuropathy associated with underlying disease ([address] 02/11/2016    Peripheral neuropathy ([address] 01/09/2016    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 ([address] 01/08/2016    Percutaneous endoscopic gastrostomy status ([address] 12/04/2014    Antiplatelet or antithrombotic long-term use 12/04/2014    Heart failure ([address] 09/11/2014    Esophageal motility disorder 07/02/2013    CAD (coronary artery disease) 02/12/2013    Status post implantation of automatic cardioverter/defibrillator (AICD) 02/12/2013         Class: Present on Admission    Aortic stenosis, mild 02/12/2013    S/P PTCA (percutaneous transluminal coronary angioplasty) 01/18/2013    Non-cardiac chest pain 01/04/2013    Feeding difficulties 05/02/2011    Abnormal cardiovascular stress test 06/24/2010    S/P CABG x 3 06/24/2010    Atherosclerotic cerebrovascular disease 06/24/2010    Orthostatic hypotension 06/24/2010    Dysphagia 05/03/2010                  Past Medical History:   Diagnosis Date    Antiplatelet or antithrombotic long-term use 12/4/2014    Anxiety disorder       Arrhythmia 2009      bradycardia    Arthritis       CAD (coronary artery disease)         s/p CABG 2002; Dr Alise Felipe [facility]) 1996      tongue/throat cancer s/p surgery / radiation and 1 dose of chemo    Carotid artery stenosis         s/p bilateral stents    Chronic pain         left leg, lower back,     Depression       Diabetes ([facility]         Type II    Esophageal dysmotility         s/p dilitation    Esophageal motility disorder 7/8/2013      Frequent simultaneous or failed contractions, low amplitude contractions  suggests severe myopathy or diffuse spasm. I suspect the latter.  Achalasia  is not present.        GERD (gastroesophageal reflux disease)       Heart failure ([address] 10/2014       Cardiomyopathy:Pacemaker upgrade:Biv and AICD  Dr. Joanne White heart Dr. last visit 5/11/2015    Hepatitis C Dx 1996      treated at [facility] in past; as of 4/15/15 wife states pt currently not under any treatment    Hyperlipidemia       Hypertension       Myocardial infarct [facility]) 2013      Heart Cath: 40% LV EF, Stented distal LAD, patent Graft to circumflex    On tube feeding diet approx 2009      still has as of 9/28/15  (no po food/liquid/meds at all); Dr Ruby Chilel Other ill-defined conditions(799.89) 1996      1 dose of chemotherapy/radiation for tongue cancer    Other ill-defined conditions(799.89)         BPH    Other ill-defined conditions(799.89)         orthostatic hypotension    Pneumonia ~ April -May 2010    Stroke ([facility] approx 2003      left side-left finger tips numb; no imbalance or memory loss; as of 2015 not seeing neuro MD    Suicidal thoughts                  Core Measures:      CVA: No Refused  CHF:No No  PNA:No No          Activity Status:      OOB to Chair No  Ambulated this shift No   Bed Rest No      Supplemental D3: (WZ Applicable)      NC No  NRB No  Venti-mask No      DVT prophylaxis:      DVT prophylaxis Med- Yes  DVT prophylaxis SCD or CORTES- No       Wounds: (If Applicable)      Wounds- No      Location-none      Patient Safety:      Falls Score Total Score: 4  Safety Level_______  Bed Alarm On? Yes  Sitter?  No      Plan for upcoming shift: continue to monitor               Discharge Plan: No       Active Consults:  IP CONSULT TO NEUROLOGY  IP CONSULT TO CARDIOLOGY

## 2018-07-01 NOTE — PROGRESS NOTES
Hospitalist Progress Note    NAME: Lonnie Moore   :  3/7/1947   MRN:  [id number]       Assessment / Plan:  Essential tremors with recent worsening  -primidone on hold  -EEG showed mild generalized slowing either seen as age related changes or mild encephalopathy. No seizures, epileptogenic discharges or focal asymmetry seen.   -gabapentin increased to 1000mg TID  -appreciate neuro's rec    Generalized deconditioning likely multifactorial could be spectrum of symptoms from essential tremors  -PT/OT consulted, rec inpt rehab but needs 3 night stay    Diabetes mellitus  -cont' home insulin, SSI    History of coronary artery disease  Systolic congestive heart failure compensated  HLD  -recent ejection fraction is 40%  -cont' lasix, statin, aspirin and Plavix  -cardiology consulted in regards to his midodrine    History of tongue and throat cancer status post PEG tube placement  -nutrition consulted, appreciate recs  -klack solution- swish and spit    Diabetic neuropathy  -con't gabapentin      Code Status: Full   Surrogate Decision Maker: Wife Silva Oneil  DVT Prophylaxis: heparin  GI Prophylaxis: not indicated   Baseline: From home with recent functional decline since he was started on medication from Tremors     Subjective:     Chief Complaint / Reason for Physician Visit  Pt seen at bedside. No acute event overnight. Discussed with RN events overnight. Review of Systems:  Symptom Y/N Comments  Symptom Y/N Comments   Fever/Chills n   Chest Pain     Poor Appetite    Edema     Cough    Abdominal Pain n    Sputum    Joint Pain     SOB/KRAMER n   Pruritis/Rash     Nausea/vomit    Tolerating PT/OT     Diarrhea    Tolerating Diet     Constipation    Other       Could NOT obtain due to:      Objective:     VITALS:   Last 24hrs VS reviewed since prior progress note.  Most recent are:  Patient Vitals for the past 24 hrs:   Temp Pulse Resp BP SpO2   18 1123 97.8 °F (36.6 °C) 73 18 138/81 98 %   18 0811 98 °F (36.7 °C) 79 16 128/67 95 %   06/27/18 1947 - - - 154/77 -   06/27/18 1746 - - - 148/64 96 %   06/27/18 1700 - - - 141/70 95 %     No intake or output data in the 24 hours ending 06/28/18 1618     PHYSICAL EXAM:  General: WD, WN. Alert, cooperative, no acute distress    EENT:  EOMI. Anicteric sclerae. MMM  Resp:  CTA bilaterally, no wheezing or rales. No accessory muscle use  CV:  Regular  rhythm,  No edema  GI:  Soft, ND, NT  +Bowel sounds, PEG in place. Neurologic:  Alert and oriented X 3, normal speech  Psych:   Good insight. Not anxious nor agitated  Skin:  No rashes. No jaundice    Reviewed most current lab test results and cultures  YES  Reviewed most current radiology test results   YES  Review and summation of old records today    NO  Reviewed patient's current orders and MAR    YES  PMH/ reviewed - no change compared to H&P  ________________________________________________________________________  Care Plan discussed with:    Comments   Patient x    Family      RN x    Care Manager     Consultant                        Multidiciplinary team rounds were held today with , nursing, pharmacist and clinical coordinator. Patient's plan of care was discussed; medications were reviewed and discharge planning was addressed. ________________________________________________________________________  Total NON critical care TIME:  35   Minutes    Total CRITICAL CARE TIME Spent:   Minutes non procedure based      Comments   >50% of visit spent in counseling and coordination of care     ________________________________________________________________________  Madelin Kenney MD     Procedures: see electronic medical records for all procedures/Xrays and details which were not copied into this note but were reviewed prior to creation of Plan. LABS:  I reviewed today's most current labs and imaging studies.   Pertinent labs include:  Recent Labs      06/28/18   0535  06/27/18   1152   WBC  4.0*  3.4* HGB  10.3*  12.9   HCT  28.9*  36.2*   PLT  35*  70*     Recent Labs      06/28/18   0535  06/27/18   1725  06/27/18   1152   NA  135*   --   133*   K  3.8   --   4.8   CL  103   --   96*   CO2  20*   --   26   GLU  303*   --   511*   BUN  35*   --   41*   CREA  1.42*   --   1.99*   CA  7.0*   --   8.2*   ALB  2.3*   --   3.1*   TBILI  0.5   --   0.6   SGOT  142*   --   158*   ALT  161*   --   231*   INR   --   1.0   --        Signed: Shruthi Hess MD

## 2018-07-01 NOTE — PROGRESS NOTES
Pt has 210 mL gastric residual. Pt's abdomen semi-soft. Pt requesting to hold off on 2pm tube feeds.   Notified Dr. Sherie Golden.  Orders to delay 2pm tube feed and recheck gastric residual. Orders to hold this feed if pt continues to have a high residual.

## 2018-07-01 NOTE — PROGRESS NOTES
Problem: Falls - Risk of  Goal: *Absence of Falls  Document Olayinka Fall Risk and appropriate interventions in the flowsheet. Outcome: Progressing Towards Goal  Fall Risk Interventions:  Mobility Interventions: Patient to call before getting OOB, Communicate number of staff needed for ambulation/transfer         Medication Interventions: Patient to call before getting OOB, Bed/chair exit alarm    Elimination Interventions: Call light in reach    History of Falls Interventions: Bed/chair exit alarm        Problem: Pressure Injury - Risk of  Goal: *Prevention of pressure injury  Document Mark Scale and appropriate interventions in the flowsheet.    Outcome: Progressing Towards Goal  Pressure Injury Interventions:  Sensory Interventions: Assess changes in LOC    Moisture Interventions: Absorbent underpads    Activity Interventions: Increase time out of bed    Mobility Interventions: HOB 30 degrees or less    Nutrition Interventions: Document food/fluid/supplement intake    Friction and Shear Interventions: Apply protective barrier, creams and emollients

## 2018-07-01 NOTE — PROGRESS NOTES
Bedside shift change report given Geovanny  (oncoming nurse) by sotero (offgoing nurse).  Report included the following information SBAR, Kardex, Intake/Output, MAR and Recent Results.      Zone Phone:   [phone]          Significant changes during shift:  None,tolerating food well              Patient Information  Antonieta Deleon [id number]  57 y.o.  6/28/2018  4:12 AM by Kelli Bronson MD. 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    Wrist pain, acute, right 05/07/2018    Thrombotic stroke involving left middle cerebral artery ([address] 02/02/2017    Stenosis of both internal carotid arteries 02/02/2017    Hemiplegia and hemiparesis following cerebral infarction affecting right dominant side ([address] 02/02/2017    Weakness 02/01/2017    Stroke ([address] 02/01/2017    Aspiration pneumonia ([address] 11/28/2016    History of stroke 07/30/2016         Class: Present on Admission    Polyneuropathy associated with underlying disease ([address] 02/11/2016    Peripheral neuropathy ([address] 01/09/2016    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 ([address] 01/08/2016    Percutaneous endoscopic gastrostomy status ([address] 12/04/2014    Antiplatelet or antithrombotic long-term use 12/04/2014    Heart failure ([address] 09/11/2014    Esophageal motility disorder 07/02/2013    CAD (coronary artery disease) 02/12/2013    Status post implantation of automatic cardioverter/defibrillator (AICD) 02/12/2013         Class: Present on Admission    Aortic stenosis, mild 02/12/2013    S/P PTCA (percutaneous transluminal coronary angioplasty) 01/18/2013    Non-cardiac chest pain 01/04/2013    Feeding difficulties 05/02/2011    Abnormal cardiovascular stress test 06/24/2010    S/P CABG x 3 06/24/2010    Atherosclerotic cerebrovascular disease 06/24/2010    Orthostatic hypotension 06/24/2010    Dysphagia 05/03/2010                  Past Medical History:   Diagnosis Date    Antiplatelet or antithrombotic long-term use 12/4/2014    Anxiety disorder       Arrhythmia 2009      bradycardia    Arthritis       CAD (coronary artery disease)         s/p CABG 2002; Dr Porter Prime [facility]) 1996      tongue/throat cancer s/p surgery / radiation and 1 dose of chemo    Carotid artery stenosis         s/p bilateral stents    Chronic pain         left leg, lower back,     Depression       Diabetes ([facility]         Type II    Esophageal dysmotility         s/p dilitation    Esophageal motility disorder 7/8/2013      Frequent simultaneous or failed contractions, low amplitude contractions  suggests severe myopathy or diffuse spasm. I suspect the latter.  Achalasia  is not present.        GERD (gastroesophageal reflux disease)       Heart failure ([facility] 10/2014       Cardiomyopathy:Pacemaker upgrade:Biv and AICD  Dr. Tad Rausch heart DrAlvaro last visit 5/11/2015    Hepatitis C Dx 1996      treated at [facility] in past; as of 4/15/15 wife states pt currently not under any treatment    Hyperlipidemia       Hypertension       Myocardial infarct [facility]) 2013      Heart Cath: 40% LV EF, Stented distal LAD, patent Graft to circumflex    On tube feeding diet approx 2009      still has as of 9/28/15  (no po food/liquid/meds at all); Dr Landon Puente Other ill-defined conditions(799.89) 1996      1 dose of chemotherapy/radiation for tongue cancer    Other ill-defined conditions(799.89)         BPH    Other ill-defined conditions(799.89)         orthostatic hypotension    Pneumonia ~ April -May 2010    Stroke [facility]) approx 2003      left side-left finger tips numb; no imbalance or memory loss; as of 2015 not seeing neuro MD    Suicidal thoughts                  Core Measures:      CVA: No Refused  CHF:No No  PNA:No No          Activity Status:      OOB to Chair No  Ambulated this shift No   Bed Rest No      Supplemental B9: (VM Applicable)      NC No  NRB No  Venti-mask No      DVT prophylaxis:      DVT prophylaxis Med- Yes  DVT prophylaxis SCD or CORTES- No       Wounds: (If Applicable)      Wounds- No      Location-none      Patient Safety:      Falls Score Total Score: 4  Safety Level_______  Bed Alarm On? Yes  Sitter?  No      Plan for upcoming shift: continue to monitor               Discharge Plan: No       Active Consults:  IP CONSULT TO NEUROLOGY  IP CONSULT TO CARDIOLOGY

## 2018-07-02 NOTE — PROGRESS NOTES
CM met with pt and pt's wife and discussed [facility] decision to not accept pt. CM discussed SNF's and provided them with a list. Pt and pt's wife want to discuss it since pt didn't have a good stay at [address] [place] [address] in the past. CM will continue to follow pt for discharge planning needs. 4:30pm - CM met with pt to f/u on decision for a SNF. Pt stated he wants to go home with home health. Pt wanted this CM to speak with his wife about a [address] [place]. Pt called pt's wife and discussed home health and she was in agreement. Pt's wife chose [facility]. FOC form completed and referral sent via [facility].      Tessie Marie, [phone]

## 2018-07-02 NOTE — DIABETES MGMT
DTC Progress Note    Recommendations/ Comments: Pt with hyperglycemia BG > 200 mg/dL. If appropriate, Please consider:  1. Timing meal-time insulin with with bolus feeds, consider increasing lispro to 10 units with breakfast and lunch. Current hospital DM medication: Lantus 14 units, lispro correction - normal sensitivity , lispro with meals 8 units    Chart reviewed on Noel Machado. Patient is a 80 y.o. male with known DM on lispro 13 units with breakfast, 10 units with lunch, 7 units with dinner + correction insulin, up to 40 units/day at home. A1c:   Lab Results   Component Value Date/Time    Hemoglobin A1c 8.6 (H) 06/04/2018 09:16 AM    Hemoglobin A1c 6.5 (H) 02/02/2017 02:55 AM       Recent Glucose Results:   Lab Results   Component Value Date/Time     (H) 07/02/2018 03:36 AM    GLUCPOC 254 (H) 07/02/2018 04:15 PM    GLUCPOC 217 (H) 07/02/2018 11:09 AM    GLUCPOC 178 (H) 07/02/2018 06:53 AM        Lab Results   Component Value Date/Time    Creatinine 1.21 07/02/2018 03:36 AM     Estimated Creatinine Clearance: 43.2 mL/min (based on Cr of 1.21). Active Orders   There are no active orders of the following type(s): Diet. PO intake: No data found. Will continue to follow as needed.     Thank you  Arlene Mukherjee, [address]  Office:  [phone]

## 2018-07-02 NOTE — PROGRESS NOTES
Problem: Mobility Impaired (Adult and Pediatric)  Goal: *Acute Goals and Plan of Care (Insert Text)  Physical Therapy Goals  Initiated 6/28/2018  1. Patient will move from supine to sit and sit to supine , scoot up and down and roll side to side in bed with independence within 7 day(s). 2.  Patient will transfer from bed to chair and chair to bed with independence using the least restrictive device within 7 day(s). 3.  Patient will perform sit to stand with supervision/set-up within 7 day(s). 4.  Patient will ambulate with supervision/set-up for 150 feet with the least restrictive device within 7 day(s). 5.  Patient will ascend/descend 7 stairs with right handrail(s) with minimal assistance/contact guard assist within 7 day(s). physical Therapy TREATMENT  Patient: Matias Paulino (18 y.o. male)  Date: 7/2/2018  Diagnosis: Physical deconditioning  Tremors of nervous system <principal problem not specified>       Precautions:    Chart, physical therapy assessment, plan of care and goals were reviewed. ASSESSMENT:  Pt received lying supine in bed, agreeable to treatment session. Pt states LE jerking spells seemed worse when ambulating to bathroom w/nursing over weekend, however noticeably better today. Pt transfers supine>>sit w/supervision and is able to scoot to EOB independently. Pt is able to sit<>stand CGAx1 and ambulate ~250ft. (100ft, 150ft, seated rest break between) using RW support and CGAx1. Pt ambulates w/wide CRISTIANO, decreased step clearance, and decreased pace, however no LE jerking or LOB noted this session. Pt requires Min. VC to decrease WB through UE when using RW. Session concludes w/pt resting comfortably in bedside chair w/all needs met, chair alarm activated, and RN notified. Overall pt continues to benefit from skilled physical therapy services, demonstrating improved activity tolerance/endurance and decreased level of assistance needed for functional mobility this date.  Pt has good potential to benefit from further therapy upon DC (was able to ambulate ~500ft. w/RW this past May and is still functioning below baseline fx mobility level). Recommend SNF vs. HH P.T. Pt could potentially be DC'd to [facility] w/additional support pending progress. However, wife is having back surgery and hip replacement next week and will not be able to provide usual level of support at home. Recommend continued gait training w/progression of distance and further close monitoring of presence or absence of LE jerking movements at next session. Progression toward goals:  [x]    Improving appropriately and progressing toward goals   []    Improving slowly and progressing toward goals  []    Not making progress toward goals and plan of care will be adjusted     PLAN:  Patient continues to benefit from skilled intervention to address the above impairments. Continue treatment per established plan of care. Discharge Recommendations: SNF vs HH PT w/increased level of support  Further Equipment Recommendations for Discharge:  TBD by facility     SUBJECTIVE:   Patient stated My legs were jerking when I walked to the bathroom yesterday.     OBJECTIVE DATA SUMMARY:   Critical Behavior:  Neurologic State: Alert  Orientation Level: Oriented X4  Cognition: Follows commands  Safety/Judgement: Awareness of environment  Functional Mobility Training:  Bed Mobility:  Supine to Sit: Supervision  Scooting: Independent     Transfers:  Sit to Stand: Contact guard assistance;Assist x1  Stand to Sit: Contact guard assistance;Assist x1  Bed to Chair: Contact guard assistance;Assist x1         Balance:  Sitting: Intact  Standing: Impaired; With support  Standing - Static: Fair  Standing - Dynamic : Fair    Ambulation/Gait Training:  Distance (ft): 250 Feet (ft) (100ft, 150ft. w/seated rest break between)  Assistive Device: Walker, rolling;Gait belt  Ambulation - Level of Assistance: Contact guard assistance;Assist x1     Gait Abnormalities: Decreased step clearance  Base of Support: Widened  Speed/Gertrudis: Pace decreased (<100 feet/min)  Step Length: Left shortened;Right shortened    Pain:  Pain Scale 1: Numeric (0 - 10)  Pain Intensity 1: 0  Pain Location 1: Back     Pain Description 1: Aching  Pain Intervention(s) 1: Medication (see MAR)    Activity Tolerance:   Pt tolerated treatment session well. VSS before and after activity. Pt able to ambulate ~100ft. and ~150 ft. w/1-2min. seated rest break between    Please refer to the flowsheet for vital signs taken during this treatment. After treatment:   [x]    Patient left in no apparent distress sitting up in chair  []    Patient left in no apparent distress in bed  [x]    Call bell left within reach  [x]    Nursing notified  [x]    Caregiver present  [x]    Bed alarm activated     COMMUNICATION/COLLABORATION:   The patients plan of care was discussed with: Registered Nurse    JENNIFER Dixon   Time Calculation: 19 mins  Regarding student involvement in patient care:  A student participated in this treatment session. Per CMS Medicare statements and APTA guidelines I certify that the following was true:  1. I was present and directly observed the entire session. 2. I made all skilled judgments and clinical decisions regarding care. 3. I am the practitioner responsible for assessment, treatment, and documentation.

## 2018-07-02 NOTE — CARDIO/PULMONARY
C/p rehab note- chart reviewed. Pt is on CHF bundle list.    Adm with exacerbation of tremors. LVEF 45-50%. Nonsmoker. HX includes Chronic systolic HF,CAD,CABG,BIV-ICD,Chronic kidney disease stage 3,Moderate to severe aortic stenosis,diabetes,CVA,Hyperlipidemia,Head and neck cancer with feeding tube,AICD 2014. Per cardiology notes \"He appears to be stable from cardiac standpoint. Does not seem to have any cardiac decompensation. No findings of acute heart failure.       Would continue on cardiac meds.        No further cardiac intervention needed at this time. \"    DIET TUBE FEEDING Jevity 1.5: 500mL bolus at 8am, 375mL bolus at 2pm, 375mL bolus at 8pm + 150mL H2O flush q 4h      Teaching deferred due to condition,tube feeds and information per cardiology notes.

## 2018-07-02 NOTE — PROGRESS NOTES
Bedside shift change report given to [address] nurse) by Madyson Parker (offgoing nurse).  Report included the following information SBAR, Kardex, Intake/Output, MAR and Recent Results.      Zone Phone:   [phone]          Significant changes during shift:  None,tolerating food well              Patient Information  Leola Huggins [id number]  28 y.o.  6/28/2018  4:12 AM by Terrell Patel MD. 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    Wrist pain, acute, right 05/07/2018    Thrombotic stroke involving left middle cerebral artery ([address] 02/02/2017    Stenosis of both internal carotid arteries 02/02/2017    Hemiplegia and hemiparesis following cerebral infarction affecting right dominant side ([address] 02/02/2017    Weakness 02/01/2017    Stroke ([address] 02/01/2017    Aspiration pneumonia ([address] 11/28/2016    History of stroke 07/30/2016         Class: Present on Admission    Polyneuropathy associated with underlying disease ([address] 02/11/2016    Peripheral neuropathy ([address] 01/09/2016    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 ([address] 01/08/2016    Percutaneous endoscopic gastrostomy status ([address] 12/04/2014    Antiplatelet or antithrombotic long-term use 12/04/2014    Heart failure ([address] 09/11/2014    Esophageal motility disorder 07/02/2013    CAD (coronary artery disease) 02/12/2013    Status post implantation of automatic cardioverter/defibrillator (AICD) 02/12/2013         Class: Present on Admission    Aortic stenosis, mild 02/12/2013    S/P PTCA (percutaneous transluminal coronary angioplasty) 01/18/2013    Non-cardiac chest pain 01/04/2013    Feeding difficulties 05/02/2011    Abnormal cardiovascular stress test 06/24/2010    S/P CABG x 3 06/24/2010    Atherosclerotic cerebrovascular disease 06/24/2010    Orthostatic hypotension 06/24/2010    Dysphagia 05/03/2010                  Past Medical History:   Diagnosis Date    Antiplatelet or antithrombotic long-term use 12/4/2014    Anxiety disorder       Arrhythmia 2009      bradycardia    Arthritis       CAD (coronary artery disease)         s/p CABG 2002; Dr Janee Snowden [facility]) 1996      tongue/throat cancer s/p surgery / radiation and 1 dose of chemo    Carotid artery stenosis         s/p bilateral stents    Chronic pain         left leg, lower back,     Depression       Diabetes ([facility]         Type II    Esophageal dysmotility         s/p dilitation    Esophageal motility disorder 7/8/2013      Frequent simultaneous or failed contractions, low amplitude contractions  suggests severe myopathy or diffuse spasm. I suspect the latter.  Achalasia  is not present.        GERD (gastroesophageal reflux disease)       Heart failure ([facility] 10/2014       Cardiomyopathy:Pacemaker upgrade:Biv and AICD  Dr. Kwon Shelby heart DrAlvaro last visit 5/11/2015    Hepatitis C Dx 1996      treated at [facility] in past; as of 4/15/15 wife states pt currently not under any treatment    Hyperlipidemia       Hypertension       Myocardial infarct [facility]) 2013      Heart Cath: 40% LV EF, Stented distal LAD, patent Graft to circumflex    On tube feeding diet approx 2009      still has as of 9/28/15  (no po food/liquid/meds at all); Dr Willie Rachel Other ill-defined conditions(799.89) 1996      1 dose of chemotherapy/radiation for tongue cancer    Other ill-defined conditions(799.89)         BPH    Other ill-defined conditions(799.89)         orthostatic hypotension    Pneumonia ~ April -May 2010    Stroke [facility]) approx 2003      left side-left finger tips numb; no imbalance or memory loss; as of 2015 not seeing neuro MD    Suicidal thoughts                  Core Measures:      CVA: No Refused  CHF:No No  PNA:No No          Activity Status:      OOB to Chair No  Ambulated this shift No   Bed Rest No      Supplemental S7: (AS Applicable)      NC No  NRB No  Venti-mask No      DVT prophylaxis:      DVT prophylaxis Med- Yes  DVT prophylaxis SCD or CORTES- No       Wounds: (If Applicable)      Wounds- No      Location-none      Patient Safety:      Falls Score Total Score: 4  Safety Level_______  Bed Alarm On? Yes  Sitter?  No      Plan for upcoming shift: continue to monitor               Discharge Plan: No       Active Consults:  IP CONSULT TO NEUROLOGY  IP CONSULT TO CARDIOLOGY

## 2018-07-02 NOTE — PROGRESS NOTES
Bedside shift change report given Geovanny  (oncoming nurse) by sotero (offgoing nurse).  Report included the following information SBAR, Kardex, Intake/Output, MAR and Recent Results.      Zone Phone:   [phone]          Significant changes during shift:  None,tolerating food well              Patient Information  Inna Fishman [id number]  38 y.o.  6/28/2018  4:12 AM by Ly Granados MD. Latasha Funes admitted from [facility]  Problem List  [address] [facility]   Patient Active Problem List      Diagnosis Date Noted    Physical deconditioning 06/28/2018    Tremors of nervous system 06/28/2018    Type 2 diabetes with nephropathy ([address] 05/07/2018    Diabetic peripheral neuropathy associated with type 2 diabetes mellitus ([address] 05/07/2018    Benign essential tremor syndrome 05/07/2018    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    Wrist pain, acute, right 05/07/2018    Thrombotic stroke involving left middle cerebral artery ([address] 02/02/2017    Stenosis of both internal carotid arteries 02/02/2017    Hemiplegia and hemiparesis following cerebral infarction affecting right dominant side ([address] 02/02/2017    Weakness 02/01/2017    Stroke ([address] 02/01/2017    Aspiration pneumonia ([address] 11/28/2016    History of stroke 07/30/2016         Class: Present on Admission    Polyneuropathy associated with underlying disease ([address] 02/11/2016    Peripheral neuropathy ([address] 01/09/2016    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 ([address] 01/08/2016    Percutaneous endoscopic gastrostomy status ([address] 12/04/2014    Antiplatelet or antithrombotic long-term use 12/04/2014    Heart failure ([address] 09/11/2014    Esophageal motility disorder 07/02/2013    CAD (coronary artery disease) 02/12/2013    Status post implantation of automatic cardioverter/defibrillator (AICD) 02/12/2013         Class: Present on Admission    Aortic stenosis, mild 02/12/2013    S/P PTCA (percutaneous transluminal coronary angioplasty) 01/18/2013    Non-cardiac chest pain 01/04/2013    Feeding difficulties 05/02/2011    Abnormal cardiovascular stress test 06/24/2010    S/P CABG x 3 06/24/2010    Atherosclerotic cerebrovascular disease 06/24/2010    Orthostatic hypotension 06/24/2010    Dysphagia 05/03/2010                  Past Medical History:   Diagnosis Date    Antiplatelet or antithrombotic long-term use 12/4/2014    Anxiety disorder       Arrhythmia 2009      bradycardia    Arthritis       CAD (coronary artery disease)         s/p CABG 2002; Dr Flanagan [facility]) 1996      tongue/throat cancer s/p surgery / radiation and 1 dose of chemo    Carotid artery stenosis         s/p bilateral stents    Chronic pain         left leg, lower back,     Depression       Diabetes ([facility]         Type II    Esophageal dysmotility         s/p dilitation    Esophageal motility disorder 7/8/2013      Frequent simultaneous or failed contractions, low amplitude contractions  suggests severe myopathy or diffuse spasm. I suspect the latter.  Achalasia  is not present.        GERD (gastroesophageal reflux disease)       Heart failure ([facility] 10/2014       Cardiomyopathy:Pacemaker upgrade:Biv and AICD  Dr. Salvador Sotelo heart  last visit 5/11/2015    Hepatitis C Dx 1996      treated at [facility] in past; as of 4/15/15 wife states pt currently not under any treatment    Hyperlipidemia       Hypertension       Myocardial infarct [facility]) 2013      Heart Cath: 40% LV EF, Stented distal LAD, patent Graft to circumflex    On tube feeding diet approx 2009      still has as of 9/28/15  (no po food/liquid/meds at all); Dr Warren Cox Other ill-defined conditions(799.89) 1996      1 dose of chemotherapy/radiation for tongue cancer    Other ill-defined conditions(799.89)         BPH    Other ill-defined conditions(799.89)         orthostatic hypotension    Pneumonia ~ April -May 2010    Stroke [facility]) approx 2003      left side-left finger tips numb; no imbalance or memory loss; as of 2015 not seeing neuro MD    Suicidal thoughts                  Core Measures:      CVA: No Refused  CHF:No No  PNA:No No          Activity Status:      OOB to Chair No  Ambulated this shift No   Bed Rest No      Supplemental W9: (KO Applicable)      NC No  NRB No  Venti-mask No      DVT prophylaxis:      DVT prophylaxis Med- Yes  DVT prophylaxis SCD or CORTES- No       Wounds: (If Applicable)      Wounds- No      Location-none      Patient Safety:      Falls Score Total Score: 4  Safety Level_______  Bed Alarm On? Yes  Sitter?  No      Plan for upcoming shift: continue to monitor               Discharge Plan: No       Active Consults:  IP CONSULT TO NEUROLOGY  IP CONSULT TO CARDIOLOGY

## 2018-07-02 NOTE — CONSULTS
[address]         NEUROLOGY CONSULT    NAME Vianey Domínguez AGE 80 y.o. MRN [id number]  1935     REQUESTING PHYSICIAN: Fabiola Arias MD      CHIEF COMPLAINT:  tremors     This is a 80 y.o. male with a medial history of LMCA stroke. Followed outpatient by Dr. Tony Herrera. He was started on primidone for tremor. Tremors are described by wife as uncontrollable jerking of the legs and less of the arms that may cause him to fall at times. These are not isolated to times of sleep or drowsiness. He is on neurontin which was recently increased and seems to have worsened these jerking movements. He does not believe He was somnolent at the time of these events. ASSESSMENT AND PLAN     1. Tremors:  Very little on my exam. Will hold back on treatment. May be a side effect to the Neurontin or PLMW. Decreased the dose of the neurontin to his original dosage. If it continues to be a problem. May try a dopamine agonist. Also recommend outpatient sleep study. 2. Diabetic Neuropathy  Continue Neurontin. 3.Diabetes  Continue insulin    4. History of stroke  Continue plavix      ALLERGIES:  Demerol [meperidine]; Paxil [paroxetine hcl];  Amoxicillin; Cleocin [clindamycin hcl]; and Pcn [penicillins]     Current Facility-Administered Medications   Medication Dose Route Frequency    gabapentin (NEURONTIN) 250 mg/5 mL solution 750 mg  750 mg Per G Tube Q8H    guaiFENesin (ROBITUSSIN) 100 mg/5 mL oral liquid 50 mg  50 mg Oral Q6H PRN    klack's mouthwash oral suspension (COMPOUNDED)  5 mL Oral Q6H    insulin lispro (HUMALOG) injection   SubCUTAneous AC&HS    insulin lispro (HUMALOG) injection 8 Units  8 Units SubCUTAneous TIDAC    HYDROcodone-acetaminophen (NORCO)  mg tablet 1 Tab  1 Tab Oral Q8H PRN    acetaminophen (TYLENOL) tablet 1,000 mg  1,000 mg Oral Q6H PRN    albuterol-ipratropium (DUO-NEB) 2.5 MG-0.5 MG/3 ML  3 mL Nebulization Q6H PRN    aspirin (ASPIRIN) tablet 325 mg  325 mg Oral DAILY    atorvastatin (LIPITOR) tablet 40 mg  40 mg Oral DAILY    clopidogrel (PLAVIX) tablet 75 mg  75 mg Oral DAILY    cyanocobalamin (VITAMIN B12) tablet 1,000 mcg  1,000 mcg Oral DAILY    finasteride (PROSCAR) tablet 5 mg  5 mg Oral QHS    fluticasone (FLONASE) 50 mcg/actuation nasal spray 1 Spray  1 Spray Both Nostrils BID    furosemide (LASIX) tablet 40 mg  40 mg Oral DAILY    insulin glargine (LANTUS) injection 14 Units  14 Units SubCUTAneous QHS    midodrine (PROAMITINE) tablet 10 mg  10 mg Oral TID WITH MEALS    nitroglycerin (NITROSTAT) tablet 0.4 mg  0.4 mg SubLINGual Q5MIN PRN    tamsulosin (FLOMAX) capsule 0.4 mg  0.4 mg Oral DAILY    sodium chloride (NS) flush 5-10 mL  5-10 mL IntraVENous Q8H    sodium chloride (NS) flush 5-10 mL  5-10 mL IntraVENous PRN    ondansetron (ZOFRAN) injection 4 mg  4 mg IntraVENous Q6H PRN    docusate sodium (COLACE) capsule 100 mg  100 mg Oral DAILY PRN    heparin (porcine) injection 5,000 Units  5,000 Units SubCUTAneous Q8H    glucose chewable tablet 16 g  4 Tab Oral PRN    dextrose (D50W) injection syrg 12.5-25 g  12.5-25 g IntraVENous PRN    glucagon (GLUCAGEN) injection 1 mg  1 mg IntraMUSCular PRN    famotidine (PEPCID) tablet 20 mg  20 mg Oral DAILY       Past Medical History:   Diagnosis Date    Antiplatelet or antithrombotic long-term use 12/4/2014    Anxiety disorder     Arrhythmia 2009    bradycardia    Arthritis     CAD (coronary artery disease)     s/p CABG 2002; Dr Meraz Bread [facility]) 1996    tongue/throat cancer s/p surgery / radiation and 1 dose of chemo    Carotid artery stenosis     s/p bilateral stents    Chronic pain     left leg, lower back,     Depression     Diabetes (HCC)     Type II    Esophageal dysmotility     s/p dilitation    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 I suspect the latter.  Achalasia  is not present.  GERD (gastroesophageal reflux disease)     Heart failure ([facility] 10/2014     Cardiomyopathy:Pacemaker upgrade:Biv and AICD  Dr. [facility] heart Dr. last visit 2015    Hepatitis C Dx 1996    treated at [facility] in past; as of 4/15/15 wife states pt currently not under any treatment    Hyperlipidemia     Hypertension     Myocardial infarct [facility])     Heart Cath: 40% LV EF, Stented distal LAD, patent Graft to circumflex    On tube feeding diet approx     still has as of 9/28/15  (no po food/liquid/meds at all); Dr Garrison Montague Other ill-defined conditions(799.89)     1 dose of chemotherapy/radiation for tongue cancer    Other ill-defined conditions(799.89)     BPH    Other ill-defined conditions(799.89)     orthostatic hypotension    Pneumonia ~ April -May 2010    Stroke [facility]) approx     left side-left finger tips numb; no imbalance or memory loss; as of  not seeing neuro MD    Suicidal thoughts        Social History   Substance Use Topics    Smoking status: Never Smoker    Smokeless tobacco: Never Used    Alcohol use No       Family History   Problem Relation Age of Onset    Heart Disease Father       at age 52 from CAD    Colon Cancer Mother     Cancer Mother      colon ca    Heart Disease Brother      Review of Systems   Constitutional: Negative for chills and fever. HENT: Negative for ear pain. Eyes: Negative for pain and discharge. Respiratory: Positive for cough and sputum production. Negative for hemoptysis. Cardiovascular: Negative for chest pain and claudication. Gastrointestinal: Negative for constipation and diarrhea. Genitourinary: Negative for flank pain and hematuria. Musculoskeletal: Positive for back pain and myalgias. Skin: Negative for itching and rash. Neurological: Positive for tremors and weakness. Negative for headaches. Endo/Heme/Allergies: Negative for environmental allergies. Does not bruise/bleed easily. Psychiatric/Behavioral: Positive for memory loss. Negative for depression and hallucinations. The patient is nervous/anxious. Visit Vitals    /55 (BP 1 Location: Right arm, BP Patient Position: Sitting)    Pulse 79    Temp 98.2 °F (36.8 °C)    Resp 18    Ht 5' 7\" (1.702 m)    Wt 155 lb (70.3 kg)    SpO2 95%    BMI 24.28 kg/m2      General: Well developed, well nourished. Patient in no apparent distress   Head: Normocephalic, atraumatic, anicteric sclera   Neck Normal ROM, No thyromegally   Lungs:  Clear to auscultation bilaterally, No wheezes or rubs   Cardiac: Regular rate and rhythm with no murmurs. Abd: Bowel sounds were audible. +Peg   Ext: No pedal edema   Skin: Supple no rash     NeurologicExam:  Mental Status: Alert and oriented to person place and time   Speech: Fluent hypophonia . Cranial Nerves:  II - XII Intact   Motor:  Generally weak more so on the right upper extremity. .    Reflexes:   Deep tendon reflexes 2+/4 and symmetric. Sensory:   Symmetric distal reduction in sensory perception affecting all modalities. Gait:  deferred   Tremor:   No tremor noted. Neurovascular: No carotid bruits. No JVD       REVIEWED IMAGING:      CT:    Results from Hospital Encounter encounter on 06/28/18   CT HEAD WO CONT   Narrative INDICATION:   Confusion/delirium, altered LOC, unexplained    EXAM:  HEAD CT WITHOUT CONTRAST    COMPARISON:  February 2, 2017    TECHNIQUE:  Routine noncontrast axial head CT was performed. Sagittal and  coronal reconstructions were generated. CT dose reduction was achieved through use of a standardized protocol tailored  for this examination and automatic exposure control for dose modulation. FINDINGS:    Ventricles:  Midline, no hydrocephalus. Intracranial Hemorrhage:  None. Brain Parenchyma/Brainstem:  Patchy areas of supratentorial white matter  hypodensity as well as chronic bilateral parietal infarctions similar to prior  study.  Chronic left cerebellar infarction. Basal Cisterns:  Normal.   Paranasal Sinuses:  Visualized sinuses are clear. Additional Comments:  N/A. Impression IMPRESSION:    No acute process with areas of chronic infarction and white matter disease  similar to the prior study.               REVIEWED LABS:  Lab Results   Component Value Date/Time    WBC 4.8 07/02/2018 03:36 AM    HCT 37.7 07/02/2018 03:36 AM    HGB 12.3 07/02/2018 03:36 AM    PLATELET 86 (L) 28/19/9622 03:36 AM     Lab Results   Component Value Date/Time    Sodium 136 07/02/2018 03:36 AM    Potassium 4.2 07/02/2018 03:36 AM    Chloride 94 (L) 07/02/2018 03:36 AM    CO2 34 (H) 07/02/2018 03:36 AM    Glucose 224 (H) 07/02/2018 03:36 AM    BUN 26 (H) 07/02/2018 03:36 AM    Creatinine 1.21 07/02/2018 03:36 AM    Calcium 8.8 07/02/2018 03:36 AM     Lab Results   Component Value Date/Time    Vitamin B12 491 06/29/2018 03:32 AM     Lab Results   Component Value Date/Time    LDL, calculated 62 06/04/2018 09:16 AM     Lab Results   Component Value Date/Time    Hemoglobin A1c 8.6 (H) 06/04/2018 09:16 AM    Hemoglobin A1c (POC) 7.6 03/12/2018 11:35 AM

## 2018-07-02 NOTE — PROGRESS NOTES
Problem: Falls - Risk of  Goal: *Absence of Falls  Document [place] Fall Risk and appropriate interventions in the flowsheet. Outcome: Progressing Towards Goal  Fall Risk Interventions:  Mobility Interventions: Patient to call before getting OOB         Medication Interventions: Patient to call before getting OOB    Elimination Interventions: Call light in reach    History of Falls Interventions: Consult care management for discharge planning        Problem: Pressure Injury - Risk of  Goal: *Prevention of pressure injury  Document Mark Scale and appropriate interventions in the flowsheet.    Outcome: Progressing Towards Goal  Pressure Injury Interventions:  Sensory Interventions: Assess changes in LOC    Moisture Interventions: Check for incontinence Q2 hours and as needed, Apply protective barrier, creams and emollients    Activity Interventions: Pressure redistribution bed/mattress(bed type)    Mobility Interventions: PT/OT evaluation    Nutrition Interventions: Document food/fluid/supplement intake    Friction and Shear Interventions: HOB 30 degrees or less

## 2018-07-03 NOTE — PROGRESS NOTES
[facility] accepted pt for home health. CM met with pt and pt's wife and provided the 2nd  Medicare letter and they understood and pt's wife signed it. F/u appts made and documented on the discharge paperwork. Care Management Interventions  PCP Verified by CM: Yes (Dr. Rodgers Found )  Mode of Transport at Discharge:  Other (see comment) (family will transport pt home by car)  Hospital Transport Time of Discharge: 1000  Transition of Care Consult (CM Consult): [address]: Yes  Discharge Durable Medical Equipment: No  Physical Therapy Consult: Yes  Occupational Therapy Consult: Yes  Speech Therapy Consult: No  Current Support Network: Lives with Spouse, Own Home (lives with wife in a 1 story home with 6 steps to the entrance)  Confirm Follow Up Transport: Family  Plan discussed with Pt/Family/Caregiver: Yes  Freedom of Choice Offered: Yes  Discharge Location  Discharge Placement: Home with home health    Lobito Jenkins, [id number] Discharged

## 2018-07-03 NOTE — PROGRESS NOTES
Chief Complaint: tremor  Had eventful night. Jerking is back this morning. He is being discharged. Discussed discontinuing primidone and a trial of mirapex. Discussed with he and his wife. Assesment and Plan  1. Tremors:  More jerking then tremor today. Affecting all four limbs. May be a side effect to the Neurontin not PLMW as upper extremities also involved. Small dose of mirapex as a trial if not efecive may try klonopin. If it continues to be a problem. May try a dopamine agonist. Also recommend outpatient sleep study.      2. Diabetic Neuropathy  Continue Neurontin.      3. Diabetes  Continue insulin     4. History of stroke  Continue plavix    5. Abnormal gait  Fall precautions/    Allergies  Demerol [meperidine]; Paxil [paroxetine hcl];  Amoxicillin; Cleocin [clindamycin hcl]; and Pcn [penicillins]     Medications  Current Facility-Administered Medications   Medication Dose Route Frequency    gabapentin (NEURONTIN) 250 mg/5 mL solution 750 mg  750 mg Per G Tube Q8H    guaiFENesin (ROBITUSSIN) 100 mg/5 mL oral liquid 50 mg  50 mg Oral Q6H PRN    klack's mouthwash oral suspension (COMPOUNDED)  5 mL Oral Q6H    insulin lispro (HUMALOG) injection   SubCUTAneous AC&HS    insulin lispro (HUMALOG) injection 8 Units  8 Units SubCUTAneous TIDAC    HYDROcodone-acetaminophen (NORCO)  mg tablet 1 Tab  1 Tab Oral Q8H PRN    acetaminophen (TYLENOL) tablet 1,000 mg  1,000 mg Oral Q6H PRN    albuterol-ipratropium (DUO-NEB) 2.5 MG-0.5 MG/3 ML  3 mL Nebulization Q6H PRN    aspirin (ASPIRIN) tablet 325 mg  325 mg Oral DAILY    atorvastatin (LIPITOR) tablet 40 mg  40 mg Oral DAILY    clopidogrel (PLAVIX) tablet 75 mg  75 mg Oral DAILY    cyanocobalamin (VITAMIN B12) tablet 1,000 mcg  1,000 mcg Oral DAILY    finasteride (PROSCAR) tablet 5 mg  5 mg Oral QHS    fluticasone (FLONASE) 50 mcg/actuation nasal spray 1 Spray  1 Spray Both Nostrils BID    furosemide (LASIX) tablet 40 mg  40 mg Oral DAILY    insulin glargine (LANTUS) injection 14 Units  14 Units SubCUTAneous QHS    midodrine (PROAMITINE) tablet 10 mg  10 mg Oral TID WITH MEALS    nitroglycerin (NITROSTAT) tablet 0.4 mg  0.4 mg SubLINGual Q5MIN PRN    tamsulosin (FLOMAX) capsule 0.4 mg  0.4 mg Oral DAILY    sodium chloride (NS) flush 5-10 mL  5-10 mL IntraVENous Q8H    sodium chloride (NS) flush 5-10 mL  5-10 mL IntraVENous PRN    ondansetron (ZOFRAN) injection 4 mg  4 mg IntraVENous Q6H PRN    docusate sodium (COLACE) capsule 100 mg  100 mg Oral DAILY PRN    heparin (porcine) injection 5,000 Units  5,000 Units SubCUTAneous Q8H    glucose chewable tablet 16 g  4 Tab Oral PRN    dextrose (D50W) injection syrg 12.5-25 g  12.5-25 g IntraVENous PRN    glucagon (GLUCAGEN) injection 1 mg  1 mg IntraMUSCular PRN    famotidine (PEPCID) tablet 20 mg  20 mg Oral DAILY     Current Outpatient Prescriptions   Medication Sig    pramipexole (MIRAPEX) 0.25 mg tablet Take 1 Tab by mouth three (3) times daily.  finasteride (PROSCAR) 5 mg tablet Take 5 mg by mouth nightly.  alfuzosin SR (UROXATRAL) 10 mg SR tablet     furosemide (LASIX) 20 mg tablet Take 2 Tabs by mouth daily. Indications: hypercalcemia (Patient taking differently: Take 40 mg by mouth daily. 40 mg in the morning and 20 mg evening  Indications: hypercalcemia)    cyanocobalamin 1,000 mcg tablet Take 1,000 mcg by mouth daily.  acetaminophen (TYLENOL) 500 mg tablet Take 1,000 mg by mouth every six (6) hours as needed for Pain.  Lancets misc Use preferred brand; Check glucose 4 times daily, Diagnosis E11.65    glucose blood VI test strips (PHARMACIST CHOICE) strip Use preferred brand; Check glucose 4 times daily, Diagnosis E11.65    Blood-Glucose Meter monitoring kit 1 preferred brand glucometer for checking home glucose 4 times per day. DX Code: E11.65    fluticasone (FLONASE) 50 mcg/actuation nasal spray 1 [place] by Both Nostrils route two (2) times a day.     PYRIDOXINE HCL, VITAMIN B6, (VITAMIN B-6 PO) Take  by mouth.  HYDROcodone-acetaminophen (NORCO)  mg tablet Take 1 Tab by mouth daily as needed.  famotidine (PEPCID) 20 mg tablet Take 1 Tab by mouth two (2) times a day.  guaiFENesin (ROBITUSSIN) 100 mg/5 mL liquid Take 200 mg by mouth three (3) times daily as needed for Cough.  nitroglycerin (NITROSTAT) 0.4 mg SL tablet 0.4 mg by SubLINGual route every five (5) minutes as needed for Chest Pain.  aspirin (ASPIRIN) 325 mg tablet Take 325 mg by mouth daily.  vit B Cmplx 3-FA-Vit C-Biotin (NEPHRO GRAYSON RX) 1- mg-mg-mcg tablet Take 1 Tab by mouth daily.  albuterol-ipratropium (DUO-NEB) 2.5 mg-0.5 mg/3 ml nebu 3 mL by Nebulization route every six (6) hours as needed.  ondansetron hcl (ZOFRAN, AS HYDROCHLORIDE,) 8 mg tablet Take 1 Tab by mouth every eight (8) hours as needed for Nausea.  Nebulizer & Compressor machine 1 Each by Does Not Apply route daily.  midodrine (PROAMITINE) 10 mg tablet Take 10 mg by mouth three (3) times daily (with meals).  insulin syringe-needle U-100 1 mL 31 x 15/64\" syrg 1 Syringe by SubCUTAneous route four (4) times daily.  Insulin Needles, Disposable, (ESPERANZA PEN NEEDLE) 32 gauge x 5/32\" ndle 5 times a day    atorvastatin (LIPITOR) 40 mg tablet Take 40 mg by mouth daily.  gabapentin (NEURONTIN) 250 mg/5 mL solution 750 mg by PEG Tube route three (3) times daily.  multivitamin (ONE A DAY) tablet Take 1 Tab by mouth daily.  clopidogrel (PLAVIX) 75 mg tablet Take 75 mg by mouth daily.  tamsulosin (FLOMAX) 0.4 mg capsule Take 0.4 mg by mouth as needed.  LANTUS SOLOSTAR U-100 INSULIN 100 unit/mL (3 mL) inpn INJECT 14 UNITS SUBCUTANEOUSLY ONCE DAILY    insulin lispro (HUMALOG KWIKPEN INSULIN) 100 unit/mL kwikpen 13 units with breakfast, 10 units with lunch, 7 units with dinner + correction insulin, up to 40 units/day (Patient taking differently: by SubCUTAneous route.  13 units with breakfast, 10 units with lunch, 7 units with dinner + correction insulin, up to 40 units/day  Indications: 10 lunch, 7 dinner)        Medical History  Past Medical History:   Diagnosis Date    Antiplatelet or antithrombotic long-term use 12/4/2014    Anxiety disorder     Arrhythmia 2009    bradycardia    Arthritis     CAD (coronary artery disease)     s/p CABG 2002; Dr Maame Smith [facility]) 1996    tongue/throat cancer s/p surgery / radiation and 1 dose of chemo    Carotid artery stenosis     s/p bilateral stents    Chronic pain     left leg, lower back,     Depression     Diabetes ([facility]     Type II    Esophageal dysmotility     s/p dilitation    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 I suspect the latter. Achalasia  is not present.  GERD (gastroesophageal reflux disease)     Heart failure ([facility] 10/2014     Cardiomyopathy:Pacemaker upgrade:Biv and AICD  Dr. Eagle Rivera heart DrAlvaro last visit 5/11/2015    Hepatitis C Dx 1996    treated at [facility] in past; as of 4/15/15 wife states pt currently not under any treatment    Hyperlipidemia     Hypertension     Myocardial infarct [facility]) 2013    Heart Cath: 40% LV EF, Stented distal LAD, patent Graft to circumflex    On tube feeding diet approx 2009    still has as of 9/28/15  (no po food/liquid/meds at all); Dr Bernadette Tripp Other ill-defined conditions(799.89) 1996    1 dose of chemotherapy/radiation for tongue cancer    Other ill-defined conditions(799.89)     BPH    Other ill-defined conditions(799.89)     orthostatic hypotension    Pneumonia ~ April -May 2010    Stroke [facility]) approx 2003    left side-left finger tips numb; no imbalance or memory loss; as of 2015 not seeing neuro MD    Suicidal thoughts        Review of Systems   Constitutional: Negative for chills and fever. HENT: Negative for ear pain. Eyes: Negative for pain and discharge. Respiratory: Positive for cough and sputum production. Negative for hemoptysis. Cardiovascular: Negative for chest pain and claudication. Gastrointestinal: Negative for constipation and diarrhea. Genitourinary: Negative for flank pain and hematuria. Musculoskeletal: Positive for back pain and myalgias. Skin: Negative for itching and rash. Neurological: Positive for tremors and weakness. Negative for headaches. Endo/Heme/Allergies: Negative for environmental allergies. Does not bruise/bleed easily. Psychiatric/Behavioral: Positive for memory loss. Negative for depression and hallucinations. The patient is nervous/anxious. Exam:    Visit Vitals    /74 (BP 1 Location: Right arm, BP Patient Position: At rest)    Pulse 70    Temp 98.2 °F (36.8 °C)    Resp 20    Ht 5' 7\" (1.702 m)    Wt 155 lb (70.3 kg)    SpO2 97%    BMI 24.28 kg/m2      General: Well developed, well nourished. Patient in no apparent distress   Head: Normocephalic, atraumatic, anicteric sclera   Neck Normal ROM, No thyromegally   Lungs:  Clear to auscultation bilaterally, No wheezes or rubs   Cardiac: Regular rate and rhythm with no murmurs. Abd: Bowel sounds were audible. +Peg   Ext: No pedal edema   Skin: Supple no rash      NeurologicExam:  Mental Status: Alert and oriented to person place and time   Speech: Fluent hypophonia . Cranial Nerves:  II - XII Intact   Motor:  Generally weak more so on the right upper extremity. .    Reflexes:   Deep tendon reflexes 2+/4 and symmetric. Sensory:   Symmetric distal reduction in sensory perception affecting all modalities. Gait:  deferred   Tremor:   Myoclonic jerking of all four ext. Neurovascular: No carotid bruits.  No JVD             Imaging  CT Results (most recent):    Results from Hospital Encounter encounter on 06/28/18   CT HEAD WO CONT   Narrative INDICATION:   Confusion/delirium, altered LOC, unexplained    EXAM:  HEAD CT WITHOUT CONTRAST    COMPARISON:  February 2, 2017    TECHNIQUE:  Routine noncontrast axial head CT was performed. Sagittal and  coronal reconstructions were generated. CT dose reduction was achieved through use of a standardized protocol tailored  for this examination and automatic exposure control for dose modulation. FINDINGS:    Ventricles:  Midline, no hydrocephalus. Intracranial Hemorrhage:  None. Brain Parenchyma/Brainstem:  Patchy areas of supratentorial white matter  hypodensity as well as chronic bilateral parietal infarctions similar to prior  study. Chronic left cerebellar infarction. Basal Cisterns:  Normal.   Paranasal Sinuses:  Visualized sinuses are clear. Additional Comments:  N/A. Impression IMPRESSION:    No acute process with areas of chronic infarction and white matter disease  similar to the prior study.                 Lab Review  Lab Results   Component Value Date/Time    WBC 4.8 07/02/2018 03:36 AM    HCT 37.7 07/02/2018 03:36 AM    HGB 12.3 07/02/2018 03:36 AM    PLATELET 86 (L) 09/31/4371 03:36 AM       Lab Results   Component Value Date/Time    Sodium 136 07/02/2018 03:36 AM    Potassium 4.2 07/02/2018 03:36 AM    Chloride 94 (L) 07/02/2018 03:36 AM    CO2 34 (H) 07/02/2018 03:36 AM    Glucose 224 (H) 07/02/2018 03:36 AM    BUN 26 (H) 07/02/2018 03:36 AM    Creatinine 1.21 07/02/2018 03:36 AM    Calcium 8.8 07/02/2018 03:36 AM       Lab Results   Component Value Date/Time    Hemoglobin A1c 8.6 (H) 06/04/2018 09:16 AM    Hemoglobin A1c (POC) 7.6 03/12/2018 11:35 AM        Lab Results   Component Value Date/Time    Vitamin B12 491 06/29/2018 03:32 AM         Lab Results   Component Value Date/Time    Cholesterol, total 123 06/04/2018 09:16 AM    HDL Cholesterol 35 (L) 06/04/2018 09:16 AM    LDL, calculated 62 06/04/2018 09:16 AM    VLDL, calculated 26 06/04/2018 09:16 AM    Triglyceride 132 06/04/2018 09:16 AM    CHOL/HDL Ratio 3.6 02/02/2017 02:55 AM

## 2018-07-03 NOTE — PROGRESS NOTES
Problem: Falls - Risk of  Goal: *Absence of Falls  Document [place] Fall Risk and appropriate interventions in the flowsheet. Outcome: Progressing Towards Goal  Fall Risk Interventions:  Mobility Interventions: Patient to call before getting OOB         Medication Interventions: Patient to call before getting OOB, Evaluate medications/consider consulting pharmacy    Elimination Interventions: Call light in reach    History of Falls Interventions: Bed/chair exit alarm        Problem: Pressure Injury - Risk of  Goal: *Prevention of pressure injury  Document Mark Scale and appropriate interventions in the flowsheet.    Pressure Injury Interventions:  Sensory Interventions: Assess changes in LOC    Moisture Interventions: Absorbent underpads    Activity Interventions: Increase time out of bed    Mobility Interventions: Float heels, PT/OT evaluation    Nutrition Interventions: Document food/fluid/supplement intake    Friction and Shear Interventions: HOB 30 degrees or less

## 2018-07-03 NOTE — PROGRESS NOTES
DC information  reviewed with wife and patient. No new meds , per Dr. Lidia Das, at this time. F/U  appointments made- pt is being discharged to home with wife.

## 2018-07-03 NOTE — DISCHARGE SUMMARY
Hospitalist Discharge Summary     Patient ID:  Matias Paulino  [id number]  80 y.o.  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  Patient reports that he started having tremors about 6 months ago and was started on primidone and since then Dr. Jose Whittaker who is his primary neurologist has been adjusting his medications.  The last few weeks, his medication dose was increased and since then he noticed that his tremors have gotten worse and he also became unsteady on his feet and his wife has been following the advice of Dr. Jose Whittaker regarding his medication adjustment. Sophia Paz does have his PEG tube in place secondary to cancer and had the tube replaced a few days ago after it fell off. Sophia Paz does not report any focal weakness does have chronic tingling.  He does not report any slurred speech or vision deficits.  He does have chronic cough and chronic gurgling in his throat secondary to neck dissection.  He does not report any other symptoms at this time.  On arrival to the ER, he had extensive workup and was made to walk with physical therapy and he became very unsteady and wife reported that she is not able to take care of himself at home in case management could not place this patient at rehabilitation facility so your physician has asked me to admit the patient for evaluation of tremor and placement in a rehabilitation facility    ______________________________________________________________________  DISCHARGE SUMMARY/HOSPITAL COURSE:  for full details see H&P, daily progress notes, labs, consult notes. Essential tremors with recent worsening  -primidone on hold  -EEG showed mild generalized slowing either seen as age related changes or mild encephalopathy. No seizures, epileptogenic discharges or focal asymmetry seen.   -gabapentin dose adjusted per neurology  -appreciate neuro's rec    Generalized deconditioning likely multifactorial could be spectrum of symptoms from essential tremors  -PT/OT consulted  -no approved for IP admission, Pt/family decline SNF  -To be DC home with HH    Diabetes mellitus  -cont' home insulin, SSI    History of coronary artery disease  Systolic congestive heart failure compensated  HLD  -recent ejection fraction is 40%  -cont' lasix, statin, aspirin and Plavix  -cardiology consulted in regards to his midodrine    History of tongue and throat cancer status post PEG tube placement  -nutrition consulted, appreciate recs  -klack solution- swish and spit    Diabetic neuropathy  -con't gabapentin     Code Status: Full   Surrogate Decision Maker: Wife Easton zAar  DVT Prophylaxis: heparin  GI Prophylaxis: not indicated    _______________________________________________________________________  Patient seen and examined by me on discharge day. Pertinent Findings:  Gen:    Not in distress  Chest: Clear lungs  CVS:   Regular rhythm.   No edema  Abd:  Soft, not distended, not tender  Neuro:  Alert, oriented x 3  _______________________________________________________________________  DISCHARGE MEDICATIONS:   Current Discharge Medication List      CONTINUE these medications which have CHANGED    Details   primidone (MYSOLINE) 50 mg tablet Take 2 tabs at night via PEG, take 1 tablet in the morning via peg tube  Qty: 90 Tab, Refills: 3    Associated Diagnoses: Thrombotic stroke involving left middle cerebral artery ([address] Vertebrobasilar occlusive disease; 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 ([address] Weakness; Diabetic peripheral neuropathy associated with type 2 diabetes mellitus ([address] Benign essential tremor syndrome; Stenosis of both internal carotid arteries; Hemiplegia and hemiparesis following cerebral infarction affecting right dominant side ([address] Wrist pain, acute, right         CONTINUE these medications which have NOT CHANGED    Details   finasteride (PROSCAR) 5 mg tablet Take 5 mg by mouth nightly. alfuzosin SR (UROXATRAL) 10 mg SR tablet       furosemide (LASIX) 20 mg tablet Take 2 Tabs by mouth daily. Indications: hypercalcemia  Qty: 30 Tab, Refills: 2      cyanocobalamin 1,000 mcg tablet Take 1,000 mcg by mouth daily. Associated Diagnoses: Thrombotic stroke involving left middle cerebral artery ([address] Vertebrobasilar occlusive disease; 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 ([address] Weakness; Diabetic peripheral neuropathy associated with type 2 diabetes mellitus ([address] Benign essential tremor syndrome; Stenosis of both internal carotid arteries; Hemiplegia and hemiparesis following cerebral infarction affecting right dominant side ([address] Wrist pain, acute, right      acetaminophen (TYLENOL) 500 mg tablet Take 1,000 mg by mouth every six (6) hours as needed for Pain. Lancets misc Use preferred brand; Check glucose 4 times daily, Diagnosis E11.65  Qty: 400 Each, Refills: 5      glucose blood VI test strips (PHARMACIST CHOICE) strip Use preferred brand; Check glucose 4 times daily, Diagnosis E11.65  Qty: 400 Strip, Refills: 5      Blood-Glucose Meter monitoring kit 1 preferred brand glucometer for checking home glucose 4 times per day.  DX Code: E11.65  Qty: 1 Kit, Refills: 0      fluticasone (FLONASE) 50 mcg/actuation nasal spray 1 [place] by Both Nostrils route two (2) times a day. PYRIDOXINE HCL, VITAMIN B6, (VITAMIN B-6 PO) Take  by mouth. HYDROcodone-acetaminophen (NORCO)  mg tablet Take 1 Tab by mouth daily as needed. famotidine (PEPCID) 20 mg tablet Take 1 Tab by mouth two (2) times a day. Qty: 20 Tab, Refills: 0      guaiFENesin (ROBITUSSIN) 100 mg/5 mL liquid Take 200 mg by mouth three (3) times daily as needed for Cough. nitroglycerin (NITROSTAT) 0.4 mg SL tablet 0.4 mg by SubLINGual route every five (5) minutes as needed for Chest Pain. aspirin (ASPIRIN) 325 mg tablet Take 325 mg by mouth daily. vit B Cmplx 3-FA-Vit C-Biotin (NEPHRO GRAYSON RX) 1- mg-mg-mcg tablet Take 1 Tab by mouth daily. albuterol-ipratropium (DUO-NEB) 2.5 mg-0.5 mg/3 ml nebu 3 mL by Nebulization route every six (6) hours as needed. Qty: 100 Nebule, Refills: 1      ondansetron hcl (ZOFRAN, AS HYDROCHLORIDE,) 8 mg tablet Take 1 Tab by mouth every eight (8) hours as needed for Nausea. Qty: 20 Tab, Refills: 0      Nebulizer & Compressor machine 1 Each by Does Not Apply route daily. Qty: 1 Each, Refills: 0      midodrine (PROAMITINE) 10 mg tablet Take 10 mg by mouth three (3) times daily (with meals). insulin syringe-needle U-100 1 mL 31 x 15/64\" syrg 1 Syringe by SubCUTAneous route four (4) times daily. Qty: 200 Each, Refills: 11    Associated Diagnoses: Type II diabetes mellitus, uncontrolled (HCC)      Insulin Needles, Disposable, (ESPERANZA PEN NEEDLE) 32 gauge x 5/32\" ndle 5 times a day  Qty: 150 Each, Refills: 99    Associated Diagnoses: Type II diabetes mellitus, uncontrolled ([address] 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 ([address] Essential hypertension; Peripheral neuropathy      atorvastatin (LIPITOR) 40 mg tablet Take 40 mg by mouth daily. gabapentin (NEURONTIN) 250 mg/5 mL solution 750 mg by PEG Tube route three (3) times daily. multivitamin (ONE A DAY) tablet Take 1 Tab by mouth daily. clopidogrel (PLAVIX) 75 mg tablet Take 75 mg by mouth daily. tamsulosin (FLOMAX) 0.4 mg capsule Take 0.4 mg by mouth as needed. LANTUS SOLOSTAR U-100 INSULIN 100 unit/mL (3 mL) inpn INJECT 14 UNITS SUBCUTANEOUSLY ONCE DAILY  Qty: 15 Pen, Refills: 5    Associated Diagnoses: 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 ([address] Essential hypertension with goal blood pressure less than 140/90; Peripheral polyneuropathy      insulin lispro (HUMALOG KWIKPEN INSULIN) 100 unit/mL kwikpen 13 units with breakfast, 10 units with lunch, 7 units with dinner + correction insulin, up to 40 units/day  Qty: 15 Pen, Refills: 3    Associated Diagnoses: Essential hypertension with goal blood pressure less than 140/90             My Recommended Diet, Activity, Wound Care, and follow-up labs are listed in the patient's Discharge Insturctions which I have personally completed and reviewed.     ______________________________________________________________________    Risk of deterioration: Moderate    Condition at Discharge:  Stable  ______________________________________________________________________    Disposition  Home with family and home health services  ______________________________________________________________________    Care Plan discussed with:   Patient, Family, RN, Care Manager, Consultant    ______________________________________________________________________    Code Status: Full Code  ______________________________________________________________________      Follow up with:   PCP : Zaria Chen MD  Follow-up Information     Follow up With Details Comments Contact Info    Chelly Shook MD Schedule an appointment as soon as possible for a visit  [facility]      Zaria Chen MD In 1 week  [address] [place] [id number]  [phone]                Total time in minutes spent coordinating this discharge (includes going over instructions, follow-up, prescriptions, and preparing report for sign off to her PCP) :  35 minutes    Signed:  Freddie Troncoso DO

## 2018-07-03 NOTE — PROGRESS NOTES
Nutrition Assessment:    INTERVENTIONS/RECOMMENDATIONS:   Continue current TF regimen     ASSESSMENT:   Chart reviewed; continues on regimen that mimics patient's home TF bolus schedule. Noted patient requested to hold one feeding d/t residual of 210 mL. Otherwise has been tolerating feedings well. Noted for likely discharge. Resume home TF regimen with isosource at discharge. DTC making recommendations for elevated BG. Diet Order: NPO (Jevity 1.5 bolus 500 mL 8 am, 375 mL bolus 2pm, 8pm + 150 mL Q4, 1875kcal, 80g protein, 1850 mL water)  % Eaten:  No data found. Pertinent Medications: [x] Reviewed []Other: Atorvastatin, Plavix, Vitamin B12, Famotidine, Lasix, Lantus, Humalog, Midodrine   Pertinent Labs: [x]Reviewed  []Other: [phone]   Food Allergies: [x]None []Other:     Last BM: 7/2   [x]Active     []Hyperactive  []Hypoactive       [] Absent  BS  Skin:    [x] Intact   [] Incision  [] Breakdown   []Edema   []Other:    Anthropometrics: Height: 5' 7\" (170.2 cm) Weight: 70.3 kg (155 lb)    IBW (%IBW):   ( ) UBW (%UBW):   (  %)    BMI: Body mass index is 24.28 kg/(m^2). This BMI is indicative of:  []Underweight   [x]Normal   []Overweight   [] Obesity   [] Extreme Obesity (BMI>40)  Last Weight Metrics:  Weight Loss Metrics 6/28/2018 6/23/2018 6/11/2018 5/16/2018 5/9/2018 5/7/2018 5/7/2018   Today's Wt 155 lb 168 lb 172 lb 3.2 oz 170 lb 166 lb 14.2 oz - 184 lb   BMI 24.28 kg/m2 26.31 kg/m2 26.97 kg/m2 26.63 kg/m2 - 26.14 kg/m2 -   Some encounter information is confidential and restricted. Go to Review Flowsheets activity to see all data. Estimated Nutrition Needs (Based on): 1760 Kcals/day (BMR (1354) x 1. 3AF) , 70 g (-84g (1.0-1.2 g/kg bw)) Protein  Carbohydrate:  At Least 130 g/day  Fluids: 1800 mL/day     Pt expected to meet estimated nutrient needs: [x]Yes []No    NUTRITION DIAGNOSES:   Problem:  Altered nutrition-related lab values      Etiology: related to DM     Signs/Symptoms: as evidenced by  [phone]      NUTRITION INTERVENTIONS:    Enteral/Parenteral Nutrition:  (continue current TF regimen)                GOAL:   Patient will tolerate bolus regimen with residual <250 mL next 3-5 days    NUTRITION MONITORING AND EVALUATION   Food/Nutrient Intake Outcomes: Enteral/parenteral nutrition intake  Physical Signs/Symptoms Outcomes: Weight/weight change, Electrolyte and renal profile, Glucose profile, GI profile    Previous Goal Met:   [x] Met              [] Progressing Towards Goal              [] Not Progressing Towards Goal   Previous Recommendations:   [x] Implemented          [] Not Implemented          [] Not Applicable    LEARNING NEEDS (Diet, Food/Nutrient-Drug Interaction):    [x] None Identified   [] Identified and Education Provided/Documented   [] Identified and Pt declined/was not appropriate     Cultural, Hinduism, OR Ethnic Dietary Needs:    [x] None Identified   [] Identified and Addressed     [x] Interdisciplinary Care Plan Reviewed/Documented    [x] Discharge Planning: Resume home TF regimen    [] Participated in Interdisciplinary Rounds    NUTRITION RISK:    [] High              [x] Moderate           []  Low  []  Minimal/Uncompromised      [facility]  Pager [phone]              Weekend Pager [phone]

## 2018-07-03 NOTE — PROGRESS NOTES
Bedside shift change report given to [facility]  (oncoming nurse) by sotero (offgoing nurse).  Report included the following information SBAR, Kardex, Intake/Output, MAR and Recent Results.      Zone Phone:   [phone]          Significant changes during shift:  None,tolerating food well              Patient Information  Jose A FLORES  68 y.o.  6/28/2018  4:12 AM by Katja Bray MD. Chantel Gil admitted from [facility]  Problem List  [address] 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    Wrist pain, acute, right 05/07/2018    Thrombotic stroke involving left middle cerebral artery ([address] 02/02/2017    Stenosis of both internal carotid arteries 02/02/2017    Hemiplegia and hemiparesis following cerebral infarction affecting right dominant side ([address] 02/02/2017    Weakness 02/01/2017    Stroke ([address] 02/01/2017    Aspiration pneumonia ([address] 11/28/2016    History of stroke 07/30/2016         Class: Present on Admission    Polyneuropathy associated with underlying disease ([address] 02/11/2016    Peripheral neuropathy ([address] 01/09/2016    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 ([address] 01/08/2016    Percutaneous endoscopic gastrostomy status ([address] 12/04/2014    Antiplatelet or antithrombotic long-term use 12/04/2014    Heart failure ([address] 09/11/2014    Esophageal motility disorder 07/02/2013    CAD (coronary artery disease) 02/12/2013    Status post implantation of automatic cardioverter/defibrillator (AICD) 02/12/2013         Class: Present on Admission    Aortic stenosis, mild 02/12/2013    S/P PTCA (percutaneous transluminal coronary angioplasty) 01/18/2013    Non-cardiac chest pain 01/04/2013    Feeding difficulties 05/02/2011    Abnormal cardiovascular stress test 06/24/2010    S/P CABG x 3 06/24/2010    Atherosclerotic cerebrovascular disease 06/24/2010    Orthostatic hypotension 06/24/2010    Dysphagia 05/03/2010                  Past Medical History:   Diagnosis Date    Antiplatelet or antithrombotic long-term use 12/4/2014    Anxiety disorder       Arrhythmia 2009      bradycardia    Arthritis       CAD (coronary artery disease)         s/p CABG 2002; Dr Collette Duverney [facility]) 1996      tongue/throat cancer s/p surgery / radiation and 1 dose of chemo    Carotid artery stenosis         s/p bilateral stents    Chronic pain         left leg, lower back,     Depression       Diabetes ([address]         Type II    Esophageal dysmotility         s/p dilitation    Esophageal motility disorder 7/8/2013      Frequent simultaneous or failed contractions, low amplitude contractions  suggests severe myopathy or diffuse spasm. I suspect the latter.  Achalasia  is not present.        GERD (gastroesophageal reflux disease)       Heart failure ([facility] 10/2014       Cardiomyopathy:Pacemaker upgrade:Biv and AICD  Dr. Hassan [place] heart Dr. last visit 5/11/2015    Hepatitis C Dx 1996      treated at [facility] in past; as of 4/15/15 wife states pt currently not under any treatment    Hyperlipidemia       Hypertension       Myocardial infarct [facility]) 2013      Heart Cath: 40% LV EF, Stented distal LAD, patent Graft to circumflex    On tube feeding diet approx 2009      still has as of 9/28/15  (no po food/liquid/meds at all); Dr Sj Cervantes Other ill-defined conditions(799.89) 1996      1 dose of chemotherapy/radiation for tongue cancer    Other ill-defined conditions(799.89)         BPH    Other ill-defined conditions(799.89)         orthostatic hypotension    Pneumonia ~ April -May 2010    Stroke [facility]) approx 2003      left side-left finger tips numb; no imbalance or memory loss; as of 2015 not seeing neuro MD    Suicidal thoughts                  Core Measures:      CVA: No Refused  CHF:No No  PNA:No No          Activity Status:      OOB to Chair No  Ambulated this shift No   Bed Rest No      Supplemental O8: (AH Applicable)      NC No  NRB No  Venti-mask No      DVT prophylaxis:      DVT prophylaxis Med- Yes  DVT prophylaxis SCD or CORTES- No       Wounds: (If Applicable)      Wounds- No      Location-none      Patient Safety:      Falls Score Total Score: 4  Safety Level_______  Bed Alarm On? Yes  Sitter?  No      Plan for upcoming shift: continue to monitor               Discharge Plan:yes home with       Active Consults:  IP CONSULT TO NEUROLOGY  IP CONSULT TO CARDIOLOGY

## 2018-07-09 PROBLEM — G20 PARKINSON'S DISEASE (TREMOR, STIFFNESS, SLOW MOTION, UNSTABLE POSTURE) (HCC): Status: ACTIVE | Noted: 2018-01-01

## 2018-07-09 NOTE — MR AVS SNAPSHOT
[address], 
[id number], [address] [facility] 
[phone] Patient: Marybeth Snell MRN: [id number] VTD:7/33/3731 Visit Information Date & Time Provider Department Dept. Phone Encounter #  
 7/9/2018 10:00 AM Cindi Gallo MD [facility] at [facility] [phone] [id number] Follow-up Instructions Return in about 6 months (around 1/9/2019). Your Appointments 9/12/2018 12:10 PM  
Follow Up with MD [place] [facility]) Appt Note: 3 month f/u Diabetes [facility] [phone] [id number] [address] [place] [address] Road  
  
    
 1/7/2019 10:40 AM  
Follow Up with Cindi Gallo MD  
Neurology Clinic at [facility]) Appt Note: f/u stroke tremor, jrb 5/7/18  
 [address] [place] [address], 
[address] [place] [address], [address] [phone]  
[address] [facility], [address] [place] [address], [address] [phone] Upcoming Health Maintenance Date Due ZOSTER VACCINE AGE 60> 1/31/1995 MEDICARE YEARLY EXAM 3/14/2018 Influenza Age 5 to Adult 8/1/2018 FOOT EXAM Q1 11/9/2018 EYE EXAM RETINAL OR DILATED Q1 11/15/2018 HEMOGLOBIN A1C Q6M 12/4/2018 MICROALBUMIN Q1 6/4/2019 LIPID PANEL Q1 6/4/2019 GLAUCOMA SCREENING Q2Y 11/15/2019 DTaP/Tdap/Td series (2 - Td) 9/9/2025 Allergies as of 7/9/2018  Review Complete On: 7/9/2018 By: Cindi Gallo MD  
  
 Severity Noted Reaction Type Reactions Demerol [Meperidine] High 04/12/2010   Systemic Shortness of Breath Paxil [Paroxetine Hcl] High 04/12/2010   Systemic Unknown (comments) Pt gets shaky and loses control of legs Amoxicillin  01/18/2013    Rash Cleocin [Clindamycin Hcl]  02/15/2018    Rash Pcn [Penicillins]  04/12/2010    Rash Current Immunizations  Reviewed on 2/11/2013 Name Date Influenza Vaccine 10/1/2016, 10/1/2014 Influenza Vaccine (Quad) PF 10/7/2015 11:20 AM  
 Influenza Vaccine Split 9/24/2012 Influenza Vaccine Whole 12/3/2009 Pneumococcal Polysaccharide (PPSV-23) 10/7/2015 11:22 AM  
 TD Vaccine 12/1/2011  6:03 PM  
 Tdap 9/9/2015  7:38 AM  
 ZZZ-RETIRED (DO NOT USE) Pneumococcal Vaccine (Unspecified Type) 12/3/2007 Not reviewed this visit You Were Diagnosed With   
  
 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 
ICD-9-CM: V12.54 Tremors of nervous system     ICD-10-CM: R25.1 ICD-9-CM: 535. 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 
ICD-9-CM: 250.60, 357.2 Parkinson's disease (tremor, stiffness, slow motion, unstable posture) ([facility]     ICD-10-CM: G20 
ICD-9-CM: 332.0 Vitals BP Pulse Height(growth percentile) Weight(growth percentile) SpO2 BMI  
 126/60 92 5' 7\" (1.702 m) 174 lb (78.9 kg) 95% 27.25 kg/m2 Smoking Status Never Smoker BMI and BSA Data Body Mass Index Body Surface Area 27.25 kg/m 2 1.93 m 2 Preferred Pharmacy Pharmacy Name Phone [facility] [address] [place] [address] Dr [place], [address] [phone] Your Updated Medication List  
  
   
This list is accurate as of 7/9/18 10:30 AM.  Always use your most recent med list.  
  
  
  
  
 acetaminophen 500 mg tablet Commonly known as:  TYLENOL Take 1,000 mg by mouth every six (6) hours as needed for Pain. albuterol-ipratropium 2.5 mg-0.5 mg/3 ml Nebu Commonly known as:  DUO-NEB  
3 mL by Nebulization route every six (6) hours as needed. alfuzosin SR 10 mg SR tablet Commonly known as:  UROXATRAL  
  
 amantadine 50 mg/5 mL solution Commonly known as:  Thai Lob Take 5 mL by mouth daily. aspirin 325 mg tablet Commonly known as:  ASPIRIN Take 325 mg by mouth daily. atorvastatin 40 mg tablet Commonly known as:  LIPITOR Take 40 mg by mouth daily. Blood-Glucose Meter monitoring kit 1 preferred brand glucometer for checking home glucose 4 times per day. DX Code: E11.65  
  
 cyanocobalamin 1,000 mcg tablet Take 1,000 mcg by mouth daily. famotidine 20 mg tablet Commonly known as:  PEPCID Take 1 Tab by mouth two (2) times a day. finasteride 5 mg tablet Commonly known as:  PROSCAR Take 5 mg by mouth nightly. fluticasone 50 mcg/actuation nasal spray Commonly known as:  FLONASE  
1 [place] by Both Nostrils route two (2) times a day. furosemide 20 mg tablet Commonly known as:  LASIX Take 2 Tabs by mouth daily. Indications: hypercalcemia  
  
 gabapentin 250 mg/5 mL solution Commonly known as:  NEURONTIN  
750 mg by PEG Tube route three (3) times daily. glucose blood VI test strips strip Commonly known as:  PHARMACIST CHOICE Use preferred brand; Check glucose 4 times daily, Diagnosis E11.65  
  
 guaiFENesin 100 mg/5 mL liquid Commonly known as:  ROBITUSSIN  
 Take 200 mg by mouth three (3) times daily as needed for Cough. HYDROcodone-acetaminophen  mg tablet Commonly known as:  Farida Arts Take 1 Tab by mouth daily as needed. insulin lispro 100 unit/mL kwikpen Commonly known as:  HumaLOG KwikPen Insulin 13 units with breakfast, 10 units with lunch, 7 units with dinner + correction insulin, up to 40 units/day Insulin Needles (Disposable) 32 gauge x 5/32\" Ndle Commonly known as:  Elise Pen Needle 5 times a day  
  
 insulin syringe-needle U-100 1 mL 31 gauge x 15/64\" Syrg 1 Syringe by SubCUTAneous route four (4) times daily. Lancets Misc Use preferred brand; Check glucose 4 times daily, Diagnosis E11.65  
  
 LANTUS SOLOSTAR U-100 INSULIN 100 unit/mL (3 mL) Inpn Generic drug:  insulin glargine INJECT 14 UNITS SUBCUTANEOUSLY ONCE DAILY  
  
 methylPREDNISolone 4 mg tablet Commonly known as:  Cyndy Bon Take  by mouth.  
  
 midodrine 10 mg tablet Commonly known as:  Maddie Quale Take 10 mg by mouth two (2) times a day. multivitamin tablet Commonly known as:  ONE A DAY Take 1 Tab by mouth daily. Nebulizer & Compressor machine 1 Each by Does Not Apply route daily. nitroglycerin 0.4 mg SL tablet Commonly known as:  NITROSTAT  
0.4 mg by SubLINGual route every five (5) minutes as needed for Chest Pain. ondansetron hcl 8 mg tablet Commonly known as:  Kellen Dice Take 1 Tab by mouth every eight (8) hours as needed for Nausea. PLAVIX 75 mg Tab Generic drug:  clopidogrel Take 75 mg by mouth daily. pramipexole 0.25 mg tablet Commonly known as:  MIRAPEX Take 1 Tab by mouth three (3) times daily. tamsulosin 0.4 mg capsule Commonly known as:  FLOMAX Take 0.4 mg by mouth as needed. vit B Cmplx 3-FA-Vit C-Biotin 1- mg-mg-mcg tablet Commonly known as:  NEPHRO GRAYSON RX Take 1 Tab by mouth daily. VITAMIN B-6 PO Take  by mouth. Prescriptions Sent to Pharmacy Refills  
 amantadine (SYMMETREL) 50 mg/5 mL solution 11 Sig: Take 5 mL by mouth daily. Class: Normal  
 Pharmacy: [address] Ph #: [phone] Route: Oral  
 pramipexole (MIRAPEX) 0.25 mg tablet 3 Sig: Take 1 Tab by mouth three (3) times daily. Class: Normal  
 Pharmacy: [address] Ph #: [phone] Route: Oral  
  
We Performed the Following GABAPENTIN [id number] CPT(R)] Follow-up Instructions Return in about 6 months (around 2019). To-Do List   
 07/10/2018 To Be Determined Appointment with Linda Kerns RN at [place] [address]  
  
 07/10/2018 4:00 PM  
  Appointment with [facility] CT 1 at [facility] RAD CT ([phone]) NON-CONTRAST STUDY: 1. Bring any non [facility] facility films/images pertaining to the area of interest with you on the day of appointment. 2. Check in at registration at least 30 minutes before appt time unless you were instructed to do otherwise. 3. If you have to drink oral contrast please pick it up any weekday prior to your appointment, if you cannot please check in 2 hrs  before appt time. 2018 8:30 AM  
  Appointment with Jose Jean MD; [facility] FLUORO 1 at [facility] IR ([phone]) **Exams including a Small Bowel series may take up to 4 hours. Patient needs to register 30 minutes prior to exam.  1.  Adults:  Nothing to eat or drink after midnight. 2.  [place] to 6 months:  Nothing to eat or drink for 3 hours prior to the study. 3.  6 months to 1 year:  Nothing to eat or drink for 4 hours prior to the study. 4.  1 year to 4 years:  Nothing to eat or drink after midnight except for routine medications, if early morning study.  Otherwise, nothing to eat or drink 4 hours prior to study.  5.  5 years to 12 years:  Nothing to eat or drink after midnight except for routine medications, if early morning study.  Otherwise, nothing to eat or drink 6 hours prior to study.  6.  13 years to 25 years:  Nothing to eat or drink after midnight except for routing medications, if early morning study.  Otherwise, nothing to eat or drink 8 hours prior to study.  7.  You must bring a LIST or BAG of all current medication you are taking with you to your appointment. 07/13/2018 To Be Determined Appointment with Kelvin Bragg RN at [place] [address]  
  
 07/18/2018 To Be Determined Appointment with Kelvin Bragg RN at [place] [address]  
  
 07/25/2018 To Be Determined Appointment with Kelvin Bragg RN at [place] [address] Patient Instructions A Healthy Lifestyle: Care Instructions Your Care Instructions A healthy lifestyle can help you feel good, stay at a healthy weight, and have plenty of energy for both work and play. A healthy lifestyle is something you can share with your whole family. A healthy lifestyle also can lower your risk for serious health problems, such as high blood pressure, heart disease, and diabetes. You can follow a few steps listed below to improve your health and the health of your family. Follow-up care is a key part of your treatment and safety. Be sure to make and go to all appointments, and call your doctor if you are having problems. It's also a good idea to know your test results and keep a list of the medicines you take. How can you care for yourself at home? · Do not eat too much sugar, fat, or fast foods. You can still have dessert and treats now and then. The goal is moderation. · Start small to improve your eating habits. Pay attention to portion sizes, drink less juice and soda pop, and eat more fruits and vegetables. ¨ Eat a healthy amount of food.  A 3-ounce serving of meat, for example, is about the size of a deck of cards. Fill the rest of your plate with vegetables and whole grains. ¨ Limit the amount of soda and sports drinks you have every day. Drink more water when you are thirsty. ¨ Eat at least 5 servings of fruits and vegetables every day. It may seem like a lot, but it is not hard to reach this goal. A serving or helping is 1 piece of fruit, 1 cup of vegetables, or 2 cups of leafy, raw vegetables. Have an apple or some carrot sticks as an afternoon snack instead of a candy bar. Try to have fruits and/or vegetables at every meal. 
· Make exercise part of your daily routine. You may want to start with simple activities, such as walking, bicycling, or slow swimming. Try to be active 30 to 60 minutes every day. You do not need to do all 30 to 60 minutes all at once. For example, you can exercise 3 times a day for 10 or 20 minutes. Moderate exercise is safe for most people, but it is always a good idea to talk to your doctor before starting an exercise program. 
· Keep moving. Jeff Barrera the lawn, work in the garden, or Shop pirate. Take the stairs instead of the elevator at work. · If you smoke, quit. People who smoke have an increased risk for heart attack, stroke, cancer, and other lung illnesses. Quitting is hard, but there are ways to boost your chance of quitting tobacco for good. ¨ Use nicotine gum, patches, or lozenges. ¨ Ask your doctor about stop-smoking programs and medicines. ¨ Keep trying. In addition to reducing your risk of diseases in the future, you will notice some benefits soon after you stop using tobacco. If you have shortness of breath or asthma symptoms, they will likely get better within a few weeks after you quit. · Limit how much alcohol you drink. Moderate amounts of alcohol (up to 2 drinks a day for men, 1 drink a day for women) are okay. But drinking too much can lead to liver problems, high blood pressure, and other health problems. Family health If you have a family, there are many things you can do together to improve your health. · Eat meals together as a family as often as possible. · Eat healthy foods. This includes fruits, vegetables, lean meats and dairy, and whole grains. · Include your family in your fitness plan. Most people think of activities such as jogging or tennis as the way to fitness, but there are many ways you and your family can be more active. Anything that makes you breathe hard and gets your heart pumping is exercise. Here are some tips: 
¨ Walk to do errands or to take your child to school or the bus. ¨ Go for a family bike ride after dinner instead of watching TV. Where can you learn more? Go to http://aniket-tomas.info/. Enter A781 in the search box to learn more about \"A Healthy Lifestyle: Care Instructions. \" Current as of: May 12, 2017 Content Version: 11.4 © 7465-8392 [employer]. Care instructions adapted under license by [employer] (which disclaims liability or warranty for this information). If you have questions about a medical condition or this instruction, always ask your healthcare professional. [place] [address] any warranty or liability for your use of this information. Benign Essential Tremor: Care Instructions Your Care Instructions Benign essential tremor is a medical term for shaking that you can't control. Your hand or fingers may shake when you lift a cup or point at something. Or your voice may shake when you speak. This type of tremor is not harmful. It is not caused by a stroke or Parkinson's disease. Some things can affect how much you shake. For example, drinking or eating something with caffeine may make tremors worse for a while. Some medicines also can increase tremors. These include antidepressants and too much thyroid replacement. Talk to your doctor if you think one of your medicines makes your tremors worse. If you are self-conscious about your tremors, there are some things you can do to reduce them or make them less noticeable. This includes taking medicine. Follow-up care is a key part of your treatment and safety. Be sure to make and go to all appointments, and call your doctor if you are having problems. It's also a good idea to know your test results and keep a list of the medicines you take. How can you care for yourself at home? · Take your medicines exactly as prescribed. Call your doctor if you think you are having a problem with your medicine. Some medicines that help control tremors have to be taken every day, even if you are not having tremors. You will get more details on the specific medicines your doctor prescribes. · Get plenty of rest. 
· Eat a balanced, healthy diet. · Try to reduce stress. Regular exercise and massages may help. · Limit alcohol. Heavy drinking can make your tremors worse. · Avoid drinks or foods with caffeine if they make your tremors worse. These include tea, cola, coffee, and chocolate. · Wear a heavy bracelet or watch. This adds a little weight to your hand. The extra weight may reduce tremors. · Drink from cups or glasses that are only half full. You may also want to try drinking with a straw. When should you call for help? Watch closely for changes in your health, and be sure to contact your doctor if: 
? · You notice your tremors are getting worse. ? · You can't do your everyday activities because of your tremors. ? · You are sad and embarrassed about your shaking. Where can you learn more? Go to http://aniket-tomas.info/. Enter B746 in the search box to learn more about \"Benign Essential Tremor: Care Instructions. \" Current as of: October 14, 2016 Content Version: 11.4 © 9494-7382 [employer].  Care instructions adapted under license by [employer] (which disclaims liability or warranty for this information). If you have questions about a medical condition or this instruction, always ask your healthcare professional. [address] any warranty or liability for your use of this information. Introducing [facility] SERVICES! [employer] introduces Disrupt6 patient portal. Now you can access parts of your medical record, email your doctor's office, and request medication refills online. 1. In your internet browser, go to [email or web address] 2. Click on the First Time User? Click Here link in the Sign In box. You will see the New Member Sign Up page. 3. Enter your Disrupt6 Access Code exactly as it appears below. You will not need to use this code after youve completed the sign-up process. If you do not sign up before the expiration date, you must request a new code. · Disrupt6 Access Code: [email or web address] Expires: 8/12/2018  2:10 PM 
 
4. Enter the last four digits of your Social Security Number (xxxx) and Date of Birth (mm/dd/yyyy) as indicated and click Submit. You will be taken to the next sign-up page. 5. Create a Disrupt6 ID. This will be your Disrupt6 login ID and cannot be changed, so think of one that is secure and easy to remember. 6. Create a Disrupt6 password. You can change your password at any time. 7. Enter your Password Reset Question and Answer. This can be used at a later time if you forget your password. 8. Enter your e-mail address. You will receive e-mail notification when new information is available in [address]. 9. Click Sign Up. You can now view and download portions of your medical record. 10. Click the Download Summary menu link to download a portable copy of your medical information. If you have questions, please visit the Frequently Asked Questions section of the Disrupt6 website. Remember, Disrupt6 is NOT to be used for urgent needs. For medical emergencies, dial 911. Now available from your iPhone and Android! Please provide this summary of care documentation to your next provider. Your primary care clinician is listed as Antione Emerson. If you have any questions after today's visit, please call [phone].

## 2018-07-09 NOTE — LETTER
7/9/2018 8:17 PM 
 
Patient:  Juliet Norris YOB: 1935 Date of Visit: 7/9/2018 Dear No Recipients: Thank you for referring Mr. Juliet Norris to me for evaluation/treatment. Below are the relevant portions of my assessment and plan of care. Consult REFERRED BY: 
Italo Hernandez MD 
 
CHIEF COMPLAINT: Increasing tremors in his hands and legs Subjective:  
 
Juliet Norris is a 80 y.o. right-handed  male seen on an urgent work in basis at the request of Dr. Rimma Cooley for evaluation of new problem of generalized weakness, marked tremors to the point that he could not walk, drowsiness and lethargy, for which he was hospitalized at [facility] and just recently discharged when he was evaluated for these problems. His EEG just showed mild generalized slowing, and his CAT scan was negative. It was thought he might be having side effects of his medications of Mysoline which he was taking 100 mg at night, and that was discontinued. He felt that it did not help his tremor actually made his tremors worse. He was placed on Mirapex 0.25 mg 3 times a day and states that it seems to help a little bit with his tremors. He still has them and says it tend to get worse at times, and can be more disabling. He may have a mild resting tremor in his left hand, most of his tremors are more the essential type. He wants something new for his tremors, we will start him on amantadine 50 mg a day he is to watch for drowsiness and sedation. They seem by history to be more related to action and static tremors and not a rest tremor, but the history is very vague from both the patient and his wife. He has very little tremor on exam today was difficult to be sure what his problem really is. He has had no new weakness or sensory loss, but still has a mild right-sided weakness from his previous stroke one year ago.   He is on Plavix because he could not take the Aggrenox down his tube. He does have a pacemaker and cannot get an MRI. His CTA of the head and neck did not show any significant carotid disease, but he did have significant disease of both vertebral and basilar artery suggesting some vertebrobasilar insufficiency. He also has severe aortic stenosis and cardiology does not feel he is a candidate for surgery. 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 He is somewhat weak and debilitated in general.  His thyroid studies in the hospital were normal..  He has no family history of similar tremors, so there is nothing to suggest familial tremors. His examination does not suggest cogwheeling, rigidity, bradykinesia, or micrographia. He most likely has benign essential tremor but is very difficult to be sure by his history says he and his wife give a somewhat wandering and imprecise history. He has had no unusual headache, fever, trauma, meningismus, or new focal weakness or sensory loss or gait problems. No other new medical problems either. He has not been started on any new medications or neuroleptics that might cause tremors. Past Medical History:  
Diagnosis Date  Antiplatelet or antithrombotic long-term use 12/4/2014  Anxiety disorder  Arrhythmia 2009  
 bradycardia  Arthritis  CAD (coronary artery disease) s/p CABG 2002; Dr So Zimmer  Cancer ([facility] 1996  
 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   
 left leg, lower back,   
 Depression  Diabetes ([facility] Type II  
 Esophageal dysmotility s/p dilitation  Esophageal motility disorder 7/8/2013 Frequent simultaneous or failed contractions, low amplitude contractions  suggests severe myopathy or diffuse spasm. I suspect the latter. Achalasia  is not present.  GERD (gastroesophageal reflux disease)  Heart failure ([facility] 10/2014 Cardiomyopathy:Pacemaker upgrade:Biv and AICD  Dr. Sharla Marti heart  last visit 5/11/2015  Hepatitis C Dx 1996  
 treated at [facility] in past; as of 4/15/15 wife states pt currently not under any treatment  Hyperlipidemia  Hypertension  Myocardial infarct [facility]) 2013 Heart Cath: 40% LV EF, Stented distal LAD, patent Graft to circumflex  On tube feeding diet approx 2009  
 still has as of 9/28/15  (no po food/liquid/meds at all); Dr Karlo Williamson  Other ill-defined conditions(799.89) 1996  
 1 dose of chemotherapy/radiation for tongue cancer  Other ill-defined conditions(799.89) BPH  Other ill-defined conditions(799.89) orthostatic hypotension  Pneumonia ~ April -May 2010  Stroke [facility]) approx 2003  
 left side-left finger tips numb; no imbalance or memory loss; as of 2015 not seeing neuro MD  
 Suicidal thoughts Past Surgical History:  
Procedure Laterality Date  ABDOMEN SURGERY PROC UNLISTED  1996  
 peg tube  CABG, ARTERY-VEIN, THREE  2000  HX CATARACT REMOVAL    
 bilateral  
 HX CHOLECYSTECTOMY [address] CATHETERIZATION  2013 Stented distal LAD  HX MOHS PROCEDURES    
 bilateral  
 HX ORTHOPAEDIC    
 back surgery times two  HX OTHER SURGICAL Radical Left Neck  HX OTHER SURGICAL    
 NASAL POLYPS REMOVAL  
 HX OTHER SURGICAL  2010 TURP  
 HX PACEMAKER  11/08  HX PACEMAKER  10/28/14 Defibrillator: [facility] # [id number], serial # [id number]; Dr. Cy Bosch [phone]; Dr Osvaldo Dover  HX UROLOGICAL    
 cystoscopy [address] [place] [address]  
 cevical surgery  MI CHANGE GASTROSTOMY TUBE  5/2/2011  MI CHANGE GASTROSTOMY TUBE  6/29/2011  MI EGD INSERT GUIDE WIRE DILATOR PASSAGE ESOPHAGUS  9/13/2010  MI EGD TRANSORAL BIOPSY SINGLE/MULTIPLE  9/13/2010  
    
 STOMACH SURGERY PROCEDURE UNLISTED  6/29/2011  VASCULAR SURGERY PROCEDURE UNLIST    
 bilateral carotid stents Family History Problem Relation Age of Onset  Heart Disease Father   
   at age 52 from CAD  Colon Cancer Mother  Cancer Mother   
  colon ca  
 Heart Disease Brother Social History Substance Use Topics  Smoking status: Never Smoker  Smokeless tobacco: Never Used  Alcohol use No  
   
 
Current Outpatient Prescriptions:  
  methylPREDNISolone (MEDROL DOSEPACK) 4 mg tablet, Take  by mouth., Disp: , Rfl:  
  amantadine (SYMMETREL) 50 mg/5 mL solution, Take 5 mL by mouth daily. , Disp: 35 mL, Rfl: 11 
  pramipexole (MIRAPEX) 0.25 mg tablet, Take 1 Tab by mouth three (3) times daily. , Disp: 90 Tab, Rfl: 3 
  finasteride (PROSCAR) 5 mg tablet, Take 5 mg by mouth nightly., Disp: , Rfl:  
  alfuzosin SR (UROXATRAL) 10 mg SR tablet, , Disp: , Rfl:  
  LANTUS SOLOSTAR U-100 INSULIN 100 unit/mL (3 mL) inpn, INJECT 14 UNITS SUBCUTANEOUSLY ONCE DAILY, Disp: 15 Pen, Rfl: 5 
  furosemide (LASIX) 20 mg tablet, Take 2 Tabs by mouth daily. Indications: hypercalcemia (Patient taking differently: Take 40 mg by mouth daily. 40 mg in the morning and 20 mg evening  Indications: hypercalcemia), Disp: 30 Tab, Rfl: 2   cyanocobalamin 1,000 mcg tablet, Take 1,000 mcg by mouth daily. , Disp: , Rfl:  
  Lancets misc, Use preferred brand; Check glucose 4 times daily, Diagnosis E11.65, Disp: 400 Each, Rfl: 5 
  glucose blood VI test strips (PHARMACIST CHOICE) strip, Use preferred brand; Check glucose 4 times daily, Diagnosis E11.65, Disp: 400 Strip, Rfl: 5   Blood-Glucose Meter monitoring kit, 1 preferred brand glucometer for checking home glucose 4 times per day.  DX Code: E11.65, Disp: 1 Kit, Rfl: 0 
  fluticasone (FLONASE) 50 mcg/actuation nasal spray, 1 [place] by Both Nostrils route two (2) times a day., Disp: , Rfl:  
  PYRIDOXINE HCL, VITAMIN B6, (VITAMIN B-6 PO), Take  by mouth., Disp: , Rfl:  
  insulin lispro (HUMALOG KWIKPEN INSULIN) 100 unit/mL kwikpen, 13 units with breakfast, 10 units with lunch, 7 units with dinner + correction insulin, up to 40 units/day (Patient taking differently: by SubCUTAneous route. 13 units with breakfast, 10 units with lunch, 7 units with dinner + correction insulin, up to 40 units/day  Indications: 10 lunch, 7 dinner), Disp: 15 Pen, Rfl: 3 
  famotidine (PEPCID) 20 mg tablet, Take 1 Tab by mouth two (2) times a day., Disp: 20 Tab, Rfl: 0 
  guaiFENesin (ROBITUSSIN) 100 mg/5 mL liquid, Take 200 mg by mouth three (3) times daily as needed for Cough. , Disp: , Rfl:  
  nitroglycerin (NITROSTAT) 0.4 mg SL tablet, 0.4 mg by SubLINGual route every five (5) minutes as needed for Chest Pain., Disp: , Rfl:  
  aspirin (ASPIRIN) 325 mg tablet, Take 325 mg by mouth daily. , Disp: , Rfl:  
  albuterol-ipratropium (DUO-NEB) 2.5 mg-0.5 mg/3 ml nebu, 3 mL by Nebulization route every six (6) hours as needed. , Disp: 100 Nebule, Rfl: 1   Nebulizer & Compressor machine, 1 Each by Does Not Apply route daily. , Disp: 1 Each, Rfl: 0 
  midodrine (PROAMITINE) 10 mg tablet, Take 10 mg by mouth two (2) times a day., Disp: , Rfl:  
  insulin syringe-needle U-100 1 mL 31 x 15/64\" syrg, 1 Syringe by SubCUTAneous route four (4) times daily. , Disp: 200 Each, Rfl: 11 
  Insulin Needles, Disposable, (ESPERANZA PEN NEEDLE) 32 gauge x 5/32\" ndle, 5 times a day, Disp: 150 Each, Rfl: 99 
  atorvastatin (LIPITOR) 40 mg tablet, Take 40 mg by mouth daily. , Disp: , Rfl:  
  gabapentin (NEURONTIN) 250 mg/5 mL solution, 750 mg by PEG Tube route three (3) times daily. , Disp: , Rfl:  
  multivitamin (ONE A DAY) tablet, Take 1 Tab by mouth daily. , Disp: , Rfl:  
  clopidogrel (PLAVIX) 75 mg tablet, Take 75 mg by mouth daily. , Disp: , Rfl:  
  tamsulosin (FLOMAX) 0.4 mg capsule, Take 0.4 mg by mouth as needed. , Disp: , Rfl:  
  acetaminophen (TYLENOL) 500 mg tablet, Take 1,000 mg by mouth every six (6) hours as needed for Pain., Disp: , Rfl:  
   HYDROcodone-acetaminophen (NORCO)  mg tablet, Take 1 Tab by mouth daily as needed. , Disp: , Rfl:  
  vit B Cmplx 3-FA-Vit C-Biotin (NEPHRO GRAYSON RX) 1- mg-mg-mcg tablet, Take 1 Tab by mouth daily. , Disp: , Rfl:  
  ondansetron hcl (ZOFRAN, AS HYDROCHLORIDE,) 8 mg tablet, Take 1 Tab by mouth every eight (8) hours as needed for Nausea., Disp: 20 Tab, Rfl: 0 Allergies Allergen Reactions  Demerol [Meperidine] Shortness of Breath  Paxil [Paroxetine Hcl] Unknown (comments) Pt gets shaky and loses control of legs  Amoxicillin Rash  Cleocin [Clindamycin Hcl] Rash  Pcn [Penicillins] Rash Review of Systems: A comprehensive review of systems was negative except for: Constitutional: positive for fatigue and malaise Respiratory: positive for pleurisy/chest pain, dyspnea on exertion or chronic bronchitis Gastrointestinal: positive for dysphagia, dyspepsia, reflux symptoms, nausea and abdominal pain Musculoskeletal: positive for myalgias, arthralgias, stiff joints, neck pain and back pain Neurological: positive for coordination problems, tremor and weakness Behvioral/Psych: positive for anxiety and depression Vitals:  
 07/09/18 1013 BP: 126/60 Pulse: 92 SpO2: 95% Weight: 174 lb (78.9 kg) Height: 5' 7\" (1.702 m) Objective: I 
 
 
 
NEUROLOGICAL EXAM: 
  
Appearance: The patient is well developed, well nourished, provides a fair history and is in no acute distress. Mental Status: Oriented to time, place and person and the president, cognitive function and fund of knowledge is slow but probably normal. Speech is fluent without aphasia or dysarthria. Mood and affect appropriate but depressed . Cranial Nerves:   Intact visual fields. Fundi are benign. RAY, EOM's full, no nystagmus, no ptosis. Facial sensation is normal. Corneal reflexes are not tested. Facial movement is asymmetric.  Hearing is abnormal bilaterally. Palate is midline with normal sternocleidomastoid and trapezius muscles are normal. Tongue is midline. Neck without meningismus or bruits Patient has marked limited range of motion of the cervical spine with pain in all directions Temporal arteries are not tender or enlarged Motor:  4/5 strength in upper and lower proximal and distal muscles, except for the right upper extremity which has strength about 3/5 associated with an arm drift and decreased rapid alternating movements in the right hand. Normal bulk and tone. No fasciculations. Reflexes:   Deep tendon reflexes 2+/4 on the right and 1+/4 on the left. No babinski or clonus present Sensory:   Abnormal to touch, pinprick and temperature and vibration decreased in both feet. DSS is intact Gait:  Normal gait though he has to move slowly due to his arthritis and his mild right hemiparesis . Tremor:   Minimal intention bilateral tremor noted, and possible left hand resting tremor noted, no cogwheeling or rigidity. Cerebellar:   Mildly abnormal Romberg and tandem cerebellar signs present. Neurovascular:  Abnormal heart sounds and irregular rhythm, peripheral pulses decreased, and no carotid bruits.  
  
 
 
 
Assessment: ICD-10-CM ICD-9-CM 1. 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  
  357.2 amantadine (SYMMETREL) 50 mg/5 mL solution  
   pramipexole (MIRAPEX) 0.25 mg tablet GABAPENTIN 2. Thrombotic stroke involving left middle cerebral artery ([facility]) I63.312 434.01 methylPREDNISolone (MEDROL DOSEPACK) 4 mg tablet  
   amantadine (SYMMETREL) 50 mg/5 mL solution  
   pramipexole (MIRAPEX) 0.25 mg tablet GABAPENTIN 3. Stenosis of both internal carotid arteries I65.23 433.10 methylPREDNISolone (MEDROL DOSEPACK) 4 mg tablet 433.30 amantadine (SYMMETREL) 50 mg/5 mL solution pramipexole (MIRAPEX) 0.25 mg tablet GABAPENTIN 4. Hemiplegia and hemiparesis following cerebral infarction affecting right dominant side (HCC) I69.351 438.21 methylPREDNISolone (MEDROL DOSEPACK) 4 mg tablet  
   amantadine (SYMMETREL) 50 mg/5 mL solution  
   pramipexole (MIRAPEX) 0.25 mg tablet GABAPENTIN  
5. Orthostatic hypotension I95.1 458.0 methylPREDNISolone (MEDROL DOSEPACK) 4 mg tablet  
   amantadine (SYMMETREL) 50 mg/5 mL solution  
   pramipexole (MIRAPEX) 0.25 mg tablet GABAPENTIN 6. History of stroke Z86.73 V12.54 methylPREDNISolone (MEDROL DOSEPACK) 4 mg tablet  
   amantadine (SYMMETREL) 50 mg/5 mL solution  
   pramipexole (MIRAPEX) 0.25 mg tablet GABAPENTIN 7. Tremors of nervous system R25.1 781.0 methylPREDNISolone (MEDROL DOSEPACK) 4 mg tablet  
   amantadine (SYMMETREL) 50 mg/5 mL solution  
   pramipexole (MIRAPEX) 0.25 mg tablet GABAPENTIN 8. 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  
   amantadine (SYMMETREL) 50 mg/5 mL solution  
   pramipexole (MIRAPEX) 0.25 mg tablet GABAPENTIN  
9. Weakness R53.1 780.79 methylPREDNISolone (MEDROL DOSEPACK) 4 mg tablet  
   amantadine (SYMMETREL) 50 mg/5 mL solution  
   pramipexole (MIRAPEX) 0.25 mg tablet GABAPENTIN 10. Benign essential tremor syndrome G25.0 333.1 methylPREDNISolone (MEDROL DOSEPACK) 4 mg tablet  
   amantadine (SYMMETREL) 50 mg/5 mL solution  
   pramipexole (MIRAPEX) 0.25 mg tablet GABAPENTIN 11. Diabetic peripheral neuropathy associated with type 2 diabetes mellitus (HCC) E11.42 250.60 methylPREDNISolone (MEDROL DOSEPACK) 4 mg tablet  
  357.2 amantadine (SYMMETREL) 50 mg/5 mL solution  
   pramipexole (MIRAPEX) 0.25 mg tablet GABAPENTIN 12. Parkinson's disease (tremor, stiffness, slow motion, unstable posture) ([address] G20 332.0 Active Problems: * No active hospital problems. * 
 
 
Plan: Patient with new problem of generalized weakness, gait instability, and reaction to his medication of Mysoline which she says does not help much. And he seems a little better on the Mirapex. He may have a component of Parkinson's disease in addition to essential tremor, we will continue the Mirapex and try amantadine for his tremors He is not a candidate for beta-blockers. Because of his heart disease. Patient with progressive tremors, but on exam today there really are not too apparent, but by his history suggest benign essential tremor Patient with moderate severe vertebrobasilar stenosis and insufficiency and chronic dizziness, and one wonders whether some of the tremors might be orthostatic tremors. Doppler done 6 weeks ago, showed no vertebral arteries, but carotids were okay We spent 45 minutes with the patient and his wife going over his history, going over his exam, and discussing various medications and treatments, we will try the medication as above and keep everything else the same, and also reviewing all his records from his hospitalization, his CT scan reviewed personally on the PACS system, his thyroid test, and his neurologic workup and evaluation, and I concluded that he might well padded some parkinsonian tremor plus essential tremor and generalized weakness and side effect of his medications of Mysoline. Magnolia Serrano If all else fails we may need to really do a CTA of his head and neck just to make sure there is no significant progression of disease there. We offered him Coumadin in the past for his vertebrobasilar disease but in view of his mother's history of bleeding complications on that medication he wants no part of it. We will see him again in 6 months time or earlier as needed, he will call me if any problem, he will check my chart for results of his test or give us a call then. Signed By: Tunde Dill MD   
 July 9, 2018 CC: Bernard Mcmahon MD 
FAX: [phone] This note will not be viewable in [address] If you have questions, please do not hesitate to call me. I look forward to following Mr. CHENGCASSANDRARAFY along with you. Sincerely, Warren Boast, MD

## 2018-07-09 NOTE — PATIENT INSTRUCTIONS
A Healthy Lifestyle: Care Instructions Your Care Instructions A healthy lifestyle can help you feel good, stay at a healthy weight, and have plenty of energy for both work and play. A healthy lifestyle is something you can share with your whole family. A healthy lifestyle also can lower your risk for serious health problems, such as high blood pressure, heart disease, and diabetes. You can follow a few steps listed below to improve your health and the health of your family. Follow-up care is a key part of your treatment and safety. Be sure to make and go to all appointments, and call your doctor if you are having problems. It's also a good idea to know your test results and keep a list of the medicines you take. How can you care for yourself at home? · Do not eat too much sugar, fat, or fast foods. You can still have dessert and treats now and then. The goal is moderation. · Start small to improve your eating habits. Pay attention to portion sizes, drink less juice and soda pop, and eat more fruits and vegetables. ¨ Eat a healthy amount of food. A 3-ounce serving of meat, for example, is about the size of a deck of cards. Fill the rest of your plate with vegetables and whole grains. ¨ Limit the amount of soda and sports drinks you have every day. Drink more water when you are thirsty. ¨ Eat at least 5 servings of fruits and vegetables every day. It may seem like a lot, but it is not hard to reach this goal. A serving or helping is 1 piece of fruit, 1 cup of vegetables, or 2 cups of leafy, raw vegetables. Have an apple or some carrot sticks as an afternoon snack instead of a candy bar. Try to have fruits and/or vegetables at every meal. 
· Make exercise part of your daily routine. You may want to start with simple activities, such as walking, bicycling, or slow swimming. Try to be active 30 to 60 minutes every day. You do not need to do all 30 to 60 minutes all at once.  For example, you can exercise 3 times a day for 10 or 20 minutes. Moderate exercise is safe for most people, but it is always a good idea to talk to your doctor before starting an exercise program. 
· Keep moving. [employer] the lawn, work in the garden, or [employer]. Take the stairs instead of the elevator at work. · If you smoke, quit. People who smoke have an increased risk for heart attack, stroke, cancer, and other lung illnesses. Quitting is hard, but there are ways to boost your chance of quitting tobacco for good. ¨ Use nicotine gum, patches, or lozenges. ¨ Ask your doctor about stop-smoking programs and medicines. ¨ Keep trying. In addition to reducing your risk of diseases in the future, you will notice some benefits soon after you stop using tobacco. If you have shortness of breath or asthma symptoms, they will likely get better within a few weeks after you quit. · Limit how much alcohol you drink. Moderate amounts of alcohol (up to 2 drinks a day for men, 1 drink a day for women) are okay. But drinking too much can lead to liver problems, high blood pressure, and other health problems. Family health If you have a family, there are many things you can do together to improve your health. · Eat meals together as a family as often as possible. · Eat healthy foods. This includes fruits, vegetables, lean meats and dairy, and whole grains. · Include your family in your fitness plan. Most people think of activities such as jogging or tennis as the way to fitness, but there are many ways you and your family can be more active. Anything that makes you breathe hard and gets your heart pumping is exercise. Here are some tips: 
¨ Walk to do errands or to take your child to school or the bus. ¨ Go for a family bike ride after dinner instead of watching TV. Where can you learn more? Go to http://aniket-tomas.info/. Enter A218 in the search box to learn more about \"A Healthy Lifestyle: Care Instructions. \" Current as of: May 12, 2017 Content Version: 11.4 © 4012-6159 [employer]. Care instructions adapted under license by [employer] (which disclaims liability or warranty for this information). If you have questions about a medical condition or this instruction, always ask your healthcare professional. [address] any warranty or liability for your use of this information. Benign Essential Tremor: Care Instructions Your Care Instructions Benign essential tremor is a medical term for shaking that you can't control. Your hand or fingers may shake when you lift a cup or point at something. Or your voice may shake when you speak. This type of tremor is not harmful. It is not caused by a stroke or Parkinson's disease. Some things can affect how much you shake. For example, drinking or eating something with caffeine may make tremors worse for a while. Some medicines also can increase tremors. These include antidepressants and too much thyroid replacement. Talk to your doctor if you think one of your medicines makes your tremors worse. If you are self-conscious about your tremors, there are some things you can do to reduce them or make them less noticeable. This includes taking medicine. Follow-up care is a key part of your treatment and safety. Be sure to make and go to all appointments, and call your doctor if you are having problems. It's also a good idea to know your test results and keep a list of the medicines you take. How can you care for yourself at home? · Take your medicines exactly as prescribed. Call your doctor if you think you are having a problem with your medicine. Some medicines that help control tremors have to be taken every day, even if you are not having tremors. You will get more details on the specific medicines your doctor prescribes. · Get plenty of rest. 
· Eat a balanced, healthy diet. · Try to reduce stress.  Regular exercise and massages may help. · Limit alcohol. Heavy drinking can make your tremors worse. · Avoid drinks or foods with caffeine if they make your tremors worse. These include tea, cola, coffee, and chocolate. · Wear a heavy bracelet or watch. This adds a little weight to your hand. The extra weight may reduce tremors. · Drink from cups or glasses that are only half full. You may also want to try drinking with a straw. When should you call for help? Watch closely for changes in your health, and be sure to contact your doctor if: 
? · You notice your tremors are getting worse. ? · You can't do your everyday activities because of your tremors. ? · You are sad and embarrassed about your shaking. Where can you learn more? Go to http://aniket-tomas.info/. Enter B746 in the search box to learn more about \"Benign Essential Tremor: Care Instructions. \" Current as of: October 14, 2016 Content Version: 11.4 © 3486-6602 [employer]. Care instructions adapted under license by [employer] (which disclaims liability or warranty for this information). If you have questions about a medical condition or this instruction, always ask your healthcare professional. [place] [address] any warranty or liability for your use of this information.

## 2018-07-10 NOTE — PROGRESS NOTES
Consult  REFERRED BY:  Jeromy Caruso MD    CHIEF COMPLAINT: Increasing tremors in his hands and legs      Subjective:     Tarah Aguilar is a 80 y.o. right-handed  male seen on an urgent work in basis at the request of Dr. Fishman Reasons for evaluation of new problem of generalized weakness, marked tremors to the point that he could not walk, drowsiness and lethargy, for which he was hospitalized at [facility] and just recently discharged when he was evaluated for these problems. His EEG just showed mild generalized slowing, and his CAT scan was negative. It was thought he might be having side effects of his medications of Mysoline which he was taking 100 mg at night, and that was discontinued. He felt that it did not help his tremor actually made his tremors worse. He was placed on Mirapex 0.25 mg 3 times a day and states that it seems to help a little bit with his tremors. He still has them and says it tend to get worse at times, and can be more disabling. He may have a mild resting tremor in his left hand, most of his tremors are more the essential type. He wants something new for his tremors, we will start him on amantadine 50 mg a day he is to watch for drowsiness and sedation. They seem by history to be more related to action and static tremors and not a rest tremor, but the history is very vague from both the patient and his wife. He has very little tremor on exam today was difficult to be sure what his problem really is. He has had no new weakness or sensory loss, but still has a mild right-sided weakness from his previous stroke one year ago. He is on Plavix because he could not take the Aggrenox down his tube. He does have a pacemaker and cannot get an MRI. His CTA of the head and neck did not show any significant carotid disease, but he did have significant disease of both vertebral and basilar artery suggesting some vertebrobasilar insufficiency.    He also has severe aortic stenosis and cardiology does not feel he is a candidate for surgery. 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 He is somewhat weak and debilitated in general.  His thyroid studies in the hospital were normal..  He has no family history of similar tremors, so there is nothing to suggest familial tremors. His examination does not suggest cogwheeling, rigidity, bradykinesia, or micrographia. He most likely has benign essential tremor but is very difficult to be sure by his history says he and his wife give a somewhat wandering and imprecise history. He has had no unusual headache, fever, trauma, meningismus, or new focal weakness or sensory loss or gait problems. No other new medical problems either. He has not been started on any new medications or neuroleptics that might cause tremors. 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 I suspect the latter. Achalasia  is not present.         GERD (gastroesophageal reflux disease)     Heart failure ([facility] [address] 10/2014     Cardiomyopathy:Pacemaker upgrade:Biv and AICD  Dr. Klaudia Echavarria heart  last visit 5/11/2015    Hepatitis C Dx 1996    treated at [facility] in past; as of 4/15/15 wife states pt currently not under any treatment    Hyperlipidemia     Hypertension     Myocardial infarct [facility]) 2013    Heart Cath: 40% LV EF, Stented distal LAD, patent Graft to circumflex    On tube feeding diet approx     still has as of 9/28/15  (no po food/liquid/meds at all); Dr Terry Huerta Other ill-defined conditions(799.89)     1 dose of chemotherapy/radiation for tongue cancer    Other ill-defined conditions(799.89)     BPH    Other ill-defined conditions(799.89)     orthostatic hypotension    Pneumonia ~ April -May 2010    Stroke [facility]) approx     left side-left finger tips numb; no imbalance or memory loss; as of  not seeing neuro MD    Suicidal thoughts       Past Surgical History:   Procedure Laterality Date    ABDOM[address]ERY [address]    peg tube    CABG, ARTERY-VEIN, THREE      HX CATARACT REMOVAL      bilateral    HX CHOLECYSTECTOMY      HX HEART CATHETERIZATION      Stented distal LAD    HX MOHS PROCEDURES      bilateral    HX ORTHOPAEDIC      back surgery times two    HX OTHER SURGICAL      Radical Left Neck    HX OTHER SURGICAL      NASAL POLYPS REMOVAL    HX OTHER SURGICAL      TURP    HX PACEMAKER      HX PACEMAKER  10/28/14     Defibrillator: [facility] # [id number], serial # [id number]; Dr. Mayra Shelton [phone]; Dr Jessee Miller HX UROLOGICAL      cystoscopy    NEUROLOGICAL PROCEDURE UNLISTED      cevical surgery    TN CHANGE GASTROSTOMY TUBE  2011         TN CHANGE GASTROSTOMY TUBE  2011         TN EGD INSERT GUIDE WIRE DILATOR PASSAGE ESOPHAGUS  2010         TN EGD TRANSORAL BIOPSY SINGLE/MULTIPLE  2010         STOMACH SURGERY PROCEDURE UNLISTED  2011         VASCULAR SURGERY PROCEDURE UNLIST      bilateral carotid stents     Family History   Problem Relation Age of Onset    Heart Disease Father       at age 52 from CAD    Colon Cancer Mother     Cancer Mother      colon ca    Heart Disease Brother       Social History   Substance Use Topics    Smoking status: Never Smoker    Smokeless tobacco: Never Used   [address] [place] [address] Venkat Alcohol use No         Current Outpatient Prescriptions:     methylPREDNISolone (MEDROL DOSEPACK) 4 mg tablet, Take  by mouth., Disp: , Rfl:     amantadine (SYMMETREL) 50 mg/5 mL solution, Take 5 mL by mouth daily. , Disp: 35 mL, Rfl: 11    pramipexole (MIRAPEX) 0.25 mg tablet, Take 1 Tab by mouth three (3) times daily. , Disp: 90 Tab, Rfl: 3    finasteride (PROSCAR) 5 mg tablet, Take 5 mg by mouth nightly., Disp: , Rfl:     alfuzosin SR (UROXATRAL) 10 mg SR tablet, , Disp: , Rfl:     LANTUS SOLOSTAR U-100 INSULIN 100 unit/mL (3 mL) inpn, INJECT 14 UNITS SUBCUTANEOUSLY ONCE DAILY, Disp: 15 Pen, Rfl: 5    furosemide (LASIX) 20 mg tablet, Take 2 Tabs by mouth daily. Indications: hypercalcemia (Patient taking differently: Take 40 mg by mouth daily. 40 mg in the morning and 20 mg evening  Indications: hypercalcemia), Disp: 30 Tab, Rfl: 2    cyanocobalamin 1,000 mcg tablet, Take 1,000 mcg by mouth daily. , Disp: , Rfl:     Lancets misc, Use preferred brand; Check glucose 4 times daily, Diagnosis E11.65, Disp: 400 Each, Rfl: 5    glucose blood VI test strips (PHARMACIST CHOICE) strip, Use preferred brand; Check glucose 4 times daily, Diagnosis E11.65, Disp: 400 Strip, Rfl: 5    Blood-Glucose Meter monitoring kit, 1 preferred brand glucometer for checking home glucose 4 times per day. DX Code: E11.65, Disp: 1 Kit, Rfl: 0    fluticasone (FLONASE) 50 mcg/actuation nasal spray, 1 [place] by Both Nostrils route two (2) times a day., Disp: , Rfl:     PYRIDOXINE HCL, VITAMIN B6, (VITAMIN B-6 PO), Take  by mouth., Disp: , Rfl:     insulin lispro (HUMALOG KWIKPEN INSULIN) 100 unit/mL kwikpen, 13 units with breakfast, 10 units with lunch, 7 units with dinner + correction insulin, up to 40 units/day (Patient taking differently: by SubCUTAneous route.  13 units with breakfast, 10 units with lunch, 7 units with dinner + correction insulin, up to 40 units/day  Indications: 10 lunch, 7 dinner), Disp: 15 Pen, Rfl: 3   famotidine (PEPCID) 20 mg tablet, Take 1 Tab by mouth two (2) times a day., Disp: 20 Tab, Rfl: 0    guaiFENesin (ROBITUSSIN) 100 mg/5 mL liquid, Take 200 mg by mouth three (3) times daily as needed for Cough. , Disp: , Rfl:     nitroglycerin (NITROSTAT) 0.4 mg SL tablet, 0.4 mg by SubLINGual route every five (5) minutes as needed for Chest Pain., Disp: , Rfl:     aspirin (ASPIRIN) 325 mg tablet, Take 325 mg by mouth daily. , Disp: , Rfl:     albuterol-ipratropium (DUO-NEB) 2.5 mg-0.5 mg/3 ml nebu, 3 mL by Nebulization route every six (6) hours as needed. , Disp: 100 Nebule, Rfl: 1    Nebulizer & Compressor machine, 1 Each by Does Not Apply route daily. , Disp: 1 Each, Rfl: 0    midodrine (PROAMITINE) 10 mg tablet, Take 10 mg by mouth two (2) times a day., Disp: , Rfl:     insulin syringe-needle U-100 1 mL 31 x 15/64\" syrg, 1 Syringe by SubCUTAneous route four (4) times daily. , Disp: 200 Each, Rfl: 11    Insulin Needles, Disposable, (ESPERANZA PEN NEEDLE) 32 gauge x 5/32\" ndle, 5 times a day, Disp: 150 Each, Rfl: 99    atorvastatin (LIPITOR) 40 mg tablet, Take 40 mg by mouth daily. , Disp: , Rfl:     gabapentin (NEURONTIN) 250 mg/5 mL solution, 750 mg by PEG Tube route three (3) times daily. , Disp: , Rfl:     multivitamin (ONE A DAY) tablet, Take 1 Tab by mouth daily. , Disp: , Rfl:     clopidogrel (PLAVIX) 75 mg tablet, Take 75 mg by mouth daily. , Disp: , Rfl:     tamsulosin (FLOMAX) 0.4 mg capsule, Take 0.4 mg by mouth as needed. , Disp: , Rfl:     acetaminophen (TYLENOL) 500 mg tablet, Take 1,000 mg by mouth every six (6) hours as needed for Pain., Disp: , Rfl:     HYDROcodone-acetaminophen (NORCO)  mg tablet, Take 1 Tab by mouth daily as needed. , Disp: , Rfl:     vit B Cmplx 3-FA-Vit C-Biotin (NEPHRO GRAYSON RX) 1- mg-mg-mcg tablet, Take 1 Tab by mouth daily. , Disp: , Rfl:     ondansetron hcl (ZOFRAN, AS HYDROCHLORIDE,) 8 mg tablet, Take 1 Tab by mouth every eight (8) hours as needed for Nausea. , Disp: 20 Tab, Rfl: 0        Allergies   Allergen Reactions    Demerol [Meperidine] Shortness of Breath    Paxil [Paroxetine Hcl] Unknown (comments)     Pt gets shaky and loses control of legs    Amoxicillin Rash    Cleocin [Clindamycin Hcl] Rash    Pcn [Penicillins] Rash        Review of Systems:  A comprehensive review of systems was negative except for: Constitutional: positive for fatigue and malaise  Respiratory: positive for pleurisy/chest pain, dyspnea on exertion or chronic bronchitis  Gastrointestinal: positive for dysphagia, dyspepsia, reflux symptoms, nausea and abdominal pain  Musculoskeletal: positive for myalgias, arthralgias, stiff joints, neck pain and back pain  Neurological: positive for coordination problems, tremor and weakness  Behvioral/Psych: positive for anxiety and depression   Vitals:    07/09/18 1013   BP: 126/60   Pulse: 92   SpO2: 95%   Weight: 174 lb (78.9 kg)   Height: 5' 7\" (1.702 m)     Objective:     I        NEUROLOGICAL EXAM:     Appearance: The patient is well developed, well nourished, provides a fair history and is in no acute distress. Mental Status: Oriented to time, place and person and the president, cognitive function and fund of knowledge is slow but probably normal. Speech is fluent without aphasia or dysarthria. Mood and affect appropriate but depressed . Cranial Nerves:   Intact visual fields. Fundi are benign. RAY, EOM's full, no nystagmus, no ptosis. Facial sensation is normal. Corneal reflexes are not tested. Facial movement is asymmetric. Hearing is abnormal bilaterally. Palate is midline with normal sternocleidomastoid and trapezius muscles are normal. Tongue is midline.   Neck without meningismus or bruits  Patient has marked limited range of motion of the cervical spine with pain in all directions  Temporal arteries are not tender or enlarged    Motor:  4/5 strength in upper and lower proximal and distal muscles, except for the right upper extremity which has strength about 3/5 associated with an arm drift and decreased rapid alternating movements in the right hand. Normal bulk and tone. No fasciculations. Reflexes:   Deep tendon reflexes 2+/4 on the right and 1+/4 on the left. No babinski or clonus present   Sensory:   Abnormal to touch, pinprick and temperature and vibration decreased in both feet. DSS is intact   Gait:  Normal gait though he has to move slowly due to his arthritis and his mild right hemiparesis . Tremor:   Minimal intention bilateral tremor noted, and possible left hand resting tremor noted, no cogwheeling or rigidity. Cerebellar:   Mildly abnormal Romberg and tandem cerebellar signs present. Neurovascular:  Abnormal heart sounds and irregular rhythm, peripheral pulses decreased, and no carotid bruits.            Assessment:       ICD-10-CM ICD-9-CM    1. 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 Thrombotic stroke involving left middle cerebral artery ([facility]) I63.312 434.01 methylPREDNISolone (MEDROL DOSEPACK) 4 mg tablet      amantadine (SYMMETREL) 50 mg/5 mL solution      pramipexole (MIRAPEX) 0.25 mg tablet      GABAPENTIN   3. Stenosis of both internal carotid arteries I65.23 433.10 methylPREDNISolone (MEDROL DOSEPACK) 4 mg tablet     433.30 amantadine (SYMMETREL) 50 mg/5 mL solution      pramipexole (MIRAPEX) 0.25 mg tablet      GABAPENTIN   4. Hemiplegia and hemiparesis following cerebral infarction affecting right dominant side ([facility]) I69.351 438.21 methylPREDNISolone (MEDROL DOSEPACK) 4 mg tablet      amantadine (SYMMETREL) 50 mg/5 mL solution      pramipexole (MIRAPEX) 0.25 mg tablet      GABAPENTIN   5.  Orthostatic hypotension I95.1 458.0 methylPREDNISolone (MEDROL DOSEPACK) 4 mg tablet      amantadine (SYMMETREL) 50 mg/5 mL solution      pramipexole (MIRAPEX) 0.25 mg tablet      GABAPENTIN   6. History of stroke Z86.73 V12.54 methylPREDNISolone (MEDROL DOSEPACK) 4 mg tablet      amantadine (SYMMETREL) 50 mg/5 mL solution      pramipexole (MIRAPEX) 0.25 mg tablet      GABAPENTIN   7. 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 Weakness R53.1 780.79 methylPREDNISolone (MEDROL DOSEPACK) 4 mg tablet      amantadine (SYMMETREL) 50 mg/5 mL solution      pramipexole (MIRAPEX) 0.25 mg tablet      GABAPENTIN   10. Benign essential tremor syndrome G25.0 333.1 methylPREDNISolone (MEDROL DOSEPACK) 4 mg tablet      amantadine (SYMMETREL) 50 mg/5 mL solution      pramipexole (MIRAPEX) 0.25 mg tablet      GABAPENTIN   11. Diabetic peripheral neuropathy associated with type 2 diabetes mellitus ([facility]) E11.42 250.60 methylPREDNISolone (MEDROL DOSEPACK) 4 mg tablet     357.2 amantadine (SYMMETREL) 50 mg/5 mL solution      pramipexole (MIRAPEX) 0.25 mg tablet      GABAPENTIN   12. Parkinson's disease (tremor, stiffness, slow motion, unstable posture) ([facility]) G20 332.0      Active Problems:    * No active hospital problems. *      Plan:     Patient with new problem of generalized weakness, gait instability, and reaction to his medication of Mysoline which she says does not help much. And he seems a little better on the Mirapex. He may have a component of Parkinson's disease in addition to essential tremor, we will continue the Mirapex and try amantadine for his tremors  He is not a candidate for beta-blockers. Because of his heart disease.   Patient with progressive tremors, but on exam today there really are not too apparent, but by his history suggest benign essential tremor  Patient with moderate severe vertebrobasilar stenosis and insufficiency and chronic dizziness, and one wonders whether some of the tremors might be orthostatic tremors. Doppler done 6 weeks ago, showed no vertebral arteries, but carotids were okay  We spent 45 minutes with the patient and his wife going over his history, going over his exam, and discussing various medications and treatments, we will try the medication as above and keep everything else the same, and also reviewing all his records from his hospitalization, his CT scan reviewed personally on the PACS system, his thyroid test, and his neurologic workup and evaluation, and I concluded that he might well padded some parkinsonian tremor plus essential tremor and generalized weakness and side effect of his medications of Mysoline. .  If all else fails we may need to really do a CTA of his head and neck just to make sure there is no significant progression of disease there. We offered him Coumadin in the past for his vertebrobasilar disease but in view of his mother's history of bleeding complications on that medication he wants no part of it. We will see him again in 6 months time or earlier as needed, he will call me if any problem, he will check my chart for results of his test or give us a call then. Signed By: Karly Robbins MD     July 9, 2018       CC: Selena Young MD  FAX: [phone]    This note will not be viewable in [address]

## 2018-07-11 NOTE — PROGRESS NOTES
Christin Meyers Goals      Understand CHF action plan. 5/23/18  Spoke with pt's wife who is managing pt very closely. We discussed the importance of daily wts. 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 We discussed the possibility for supportive care for the pt as well as pt's wife as she needs  I provided the telephone number to the pt's wife for [facility] ([phone]). Pt's wife needs to have hip replacement in June. We discussed coming up with a care plan for the pt and we also discussed how to assist family with care should the pt's wife need assistance as well. Pt's wife reports that pt has seen Dr. Jenna Lopez on 5/21/18 and pt's wife reports that pt will go to [address] [place] [address] for outpt rehab. Pt's wife reports that she did call [facility] this weekend as pt had steady wt gain. Pt's wife reports that she increased lasix and called and spoke with Dr. Eloisa Bennett on 5/21/18. Pt's wife stated appreciation for call. I provided my direct office telephone number to her. Pt's wife states that I may call next week to discuss care plan for when pt's wife needs to have surgery. Pt's wife reports that she and pt have family support (2 sons and grandchildren). AFP    6/6/18  Pt's wife reports that pt has followed up with Dr. Asher Garland and pt's wife reports that pt's platelets remain low and are at 67. Pt's wife states that other than that, pt is doing well  Pt's wife reports that she had a stress test and Dr. Patsy Saavedra is to call her in preparation for hip surgery. Pt's wife states that she is working on a plan to have granddaughter and great granddaughter take care of pt while pt's wife has surgery and pt's wife also has a friend who can recommend an aide. Pt's wife states that she will work on plan IF surgery is scheduled. Pt's wife reports that there have been not changes to pt's health status and states appreciation for call.   Will continue to call   AFP    6/22/18  Pt's wife reports that pt continues to go to PT at [facility]. Pt states that she will have surgery on 7/24/18 and that she is developing a plan to provide care for her  while she is in recovery. Pt's wife states that I may continue to call and that she appreciates someone checking on they. AFP    7/11/18  Pt's wife reports that pt was to begin [facility] but back pain has prevented that as pt''s pain specialist is recommending that pt attend out patient therapy at [address] [place] [address]. Pt's wife states that pt will begin [facility] after back pain has improved. Pt's wife reports that pt is okay.   [facility]

## 2018-07-14 NOTE — PROGRESS NOTES
Problem: Pressure Injury - Risk of  Goal: *Prevention of pressure injury  Document Mark Scale and appropriate interventions in the flowsheet. Outcome: Progressing Towards Goal  Pressure Injury Interventions:       Moisture Interventions: Absorbent underpads    Activity Interventions: Assess need for specialty bed, Pressure redistribution bed/mattress(bed type)    Mobility Interventions: Turn and reposition approx.  every two hours(pillow and wedges), PT/OT evaluation, Assess need for specialty bed    Nutrition Interventions: Discuss nutritional consult with provider    Friction and Shear Interventions: HOB 30 degrees or less, Lift sheet, Lift team/patient mobility team, Minimize layers

## 2018-07-14 NOTE — ED NOTES
Attempted to call report to IP nurse, but he is unavailable and will call the ED as soon as possible.

## 2018-07-14 NOTE — PROGRESS NOTES
Pharmacy Automatic Renal Dosing Protocol - Antimicrobials    Indication for Antimicrobials: CAP, aspiration pneumonia     Current Regimen of Each Antimicrobial:  Levofloxacin 750 mg IV q24hr (Start Date 18; Day # 1)  Piperacillin-tazobactam 750 mg IV q8hr (Start date ; Day #1)    Previous Antimicrobial Therapy:    Significant Cultures:    paired blood cx: results pending    Radiology / Imaging results: (X-ray, CT scan or MRI):    CT: Patchy bibasilar lung opacities, left greater than right, may represent aspiration, infection, or edema    Paralysis, amputations, malnutrition:     Labs:  Recent Labs      18   0440   CREA  1.82*   BUN  38*   WBC  9.7     Temp (24hrs), Av.1 °F (37.3 °C), Min:98.8 °F (37.1 °C), Max:99.3 °F (37.4 °C)    Creatinine Clearance (mL/min) or Dialysis: 28 ml/min    Impression/Plan:   · Will change to levofloxacin 750 mg IV q48hr   · Zosyn dose appropriate for current renal function  · Antimicrobial stop date 5 days for levofloxacin; no stop date for Zosyn currently     Pharmacy will follow daily and adjust medications as appropriate for renal function and/or serum levels.     Thank you,  Dilshad Carey

## 2018-07-14 NOTE — ED NOTES
TRANSFER - OUT REPORT:    Verbal report given to Shelia Gimenez RN (name) on Trini Dennis  being transferred to  (unit) for routine progression of care       Report consisted of patients Situation, Background, Assessment and   Recommendations(SBAR). Information from the following report(s) SBAR was reviewed with the receiving nurse. Lines:   Peripheral IV 07/14/18 Right Antecubital (Active)   Site Assessment Clean, dry, & intact 7/14/2018  4:39 AM   Phlebitis Assessment 0 7/14/2018  4:39 AM   Infiltration Assessment 0 7/14/2018  4:39 AM   Dressing Status Clean, dry, & intact 7/14/2018  4:39 AM   Dressing Type 4 X 4 7/14/2018  4:39 AM       Peripheral IV 07/14/18 Left Antecubital (Active)   Site Assessment Clean, dry, & intact 7/14/2018  5:10 AM   Phlebitis Assessment 0 7/14/2018  5:10 AM   Infiltration Assessment 0 7/14/2018  5:10 AM   Dressing Status Clean, dry, & intact 7/14/2018  5:10 AM   Dressing Type 4 X 4 7/14/2018  5:10 AM        Opportunity for questions and clarification was provided. Patient transported with:   Monitor and accompanied by RN.

## 2018-07-14 NOTE — PROGRESS NOTES
Initial Nutrition Assessment:    INTERVENTIONS/RECOMMENDATIONS:   · Recommend Jevity 1.5 @ 20 mL/hr and advance as tolerated by 10 mL q 6 hours to goal rate of 50 mL/hr + 150 mL water flushes q 4 hours (1800 kcal, 77g protein, 1812 mL water)    ASSESSMENT:   Patient medically noted for aspiration pneumonia. PMH for DM, stroke, CHF, HTN, tremors, and PEG. Patient well known to nutrition team from previous admissions. Typically on a bolus regimen at home with Isosource 1.5 but is always agreeable to it's [address] [place] [address] Road equivalent of Jevity 1.5 when admitted. Episode of vomiting this morning per notes. Given aspiration, recommend starting patient on continuous feedings for now. Once tolerating at goal, can consider transition back to his home bolus regimen. Bolus recommendations/Home regimen: Jevity 1.5 500 mL bolus 8am, 375 mL bolus 2 pm, and 375 mL bolus 8pm + 150 mL water flushes q 4 hours    Diet Order: NPO  % Eaten:  No data found. Pertinent Medications: [x]Reviewed []Other: Atorvastatin, Plavix, Famotidine, humalog, Midodrine   Pertinent Labs: [x]Reviewed []Other: -182  Food Allergies: [x]None []Other   Last BM:    []Active     []Hyperactive  []Hypoactive       [] Absent BS  Skin:    [x] Intact   [] Incision  [] Breakdown: [] Edema []Other:    Anthropometrics:   Height: 5' 7\" (170.2 cm) Weight: 72.2 kg (159 lb 2.8 oz)   IBW (%IBW):   ( ) UBW (%UBW):   (  %)   Last Weight Metrics:  Weight Loss Metrics 7/14/2018 7/9/2018 7/7/2018 7/5/2018 6/28/2018 6/23/2018 6/11/2018   Today's Wt 159 lb 2.8 oz 174 lb 170 lb 171 lb 155 lb 168 lb 172 lb 3.2 oz   BMI 24.93 kg/m2 27.25 kg/m2 26.63 kg/m2 26.78 kg/m2 24.28 kg/m2 26.31 kg/m2 26.97 kg/m2   Some encounter information is confidential and restricted. Go to Review Flowsheets activity to see all data. BMI: Body mass index is 24.93 kg/(m^2).     This BMI is indicative of:   []Underweight    [x]Normal    []Overweight    [] Obesity   [] Extreme Obesity (BMI>40)     Estimated Nutrition Needs (Based on):   1786 Kcals/day (BMR (1374) x 1. 3AF) , 72 g (-86g (1.0-1.2 g/kg bw)) Protein  Carbohydrate:  At Least 130 g/day  Fluids: 1800 mL/day (1ml/kcal)    Pt expected to meet estimated nutrient needs: [x]Yes []No    NUTRITION DIAGNOSES:   Problem:  Swallowing difficulty      Etiology: related to hx of throat cancer     Signs/Symptoms: as evidenced by PEG dependent       NUTRITION INTERVENTIONS:    Enteral/Parenteral Nutrition: Initiate enteral nutrition                GOAL:   TF started and advanced to goal rate next 2-4 days    LEARNING NEEDS (Diet, Food/Nutrient-Drug Interaction):    [x] None Identified   [] Identified and Education Provided/Documented   [] Identified and Pt declined/was not appropriate     Cultural, Zoroastrianism, OR Ethnic Dietary Needs:    [x] None Identified   [] Identified and Addressed     [x] Interdisciplinary Care Plan Reviewed/Documented    [x] Discharge Planning: Resume home TF regimen       MONITORING /EVALUATION:   Food/Nutrient Intake Outcomes: Enteral/parenteral nutrition intake, IV fluids  Physical Signs/Symptoms Outcomes: Weight/weight change, Electrolyte and renal profile, Glucose profile    NUTRITION RISK:    [x] High              [] Moderate           []  Low  []  Minimal/Uncompromised    PT SEEN FOR:    [x]  MD Consult: []Calorie Count      []Diabetic Diet Education        []Diet Education     []Electrolyte Management     []General Nutrition Management and Supplements     [x]Management of Tube Feeding     []TPN Recommendations    []  RN Referral:  []MST score >=2     []Enteral/Parenteral Nutrition PTA     []Pregnant: Gestational DM or Multigestation     []Pressure Ulcer/Wound Care needs        []  Low BMI  []  LOS    Giovani Caddo  Pager [phone]                 Weekend Pager [phone]

## 2018-07-14 NOTE — IP AVS SNAPSHOT
[address] [facility] 
[phone] Patient: Krystina Higgins MRN: [id number] MSB:[birth year] A check nemesio indicates which time of day the medication should be taken. My Medications START taking these medications Instructions Each Dose to Equal  
 Morning Noon Evening Bedtime  
 cefdinir 250 mg/5 mL suspension Commonly known as:  OMNICEF Your last dose was: Your next dose is:    
   
   
 6 mL by Per G Tube route two (2) times a day for 7 days. Start 7/21. Aware of PCN/ amoxicillin allergy but patient tolerated ceftriaxone infusion in the hospital.  Indications: STREPTOCOCCAL PNEUMONIA  
 300 mg  
    
   
   
   
  
 L. acidoph & paracasei- S therm- Bifido 8 billion cell Cap cap Commonly known as:  JAVY-Q/RISAQUAD Start taking on:  7/21/2018 Your last dose was: Your next dose is:    
   
   
 1 Cap by PEG Tube route daily. 1 Cap  
    
   
   
   
  
 metoclopramide HCl 10 mg tablet Commonly known as:  REGLAN Your last dose was: Your next dose is:    
   
   
 1 Tab by Per G Tube route every eight (8) hours as needed for up to 10 days. Take for nausea as needed 10 mg  
    
   
   
   
  
 metroNIDAZOLE 500 mg tablet Commonly known as:  FLAGYL Your last dose was: Your next dose is:    
   
   
 1 Tab by Per G Tube route three (3) times daily for 7 days. 500 mg CHANGE how you take these medications Instructions Each Dose to Equal  
 Morning Noon Evening Bedtime  
 famotidine 20 mg tablet Commonly known as:  PEPCID What changed:  how to take this Your last dose was: Your next dose is: Take 1 Tab by mouth two (2) times a day. 20 mg  
    
   
   
   
  
 insulin lispro 100 unit/mL kwikpen Commonly known as:  HumaLOG KwikPen Insulin What changed:   
- how to take this - additional instructions Your last dose was: Your next dose is:    
   
   
 13 units with breakfast, 10 units with lunch, 7 units with dinner + correction insulin, up to 40 units/day CONTINUE taking these medications Instructions Each Dose to Equal  
 Morning Noon Evening Bedtime  
 acetaminophen 500 mg tablet Commonly known as:  TYLENOL Your last dose was: Your next dose is:    
   
   
 1,000 mg by Per G Tube route every six (6) hours as needed for Pain. 1000 mg  
    
   
   
   
  
 alfuzosin SR 10 mg SR tablet Commonly known as:  Garrie Risk Your last dose was: Your next dose is:    
   
   
 10 mg by Per G Tube route daily. 10 mg  
    
   
   
   
  
 aspirin 325 mg tablet Commonly known as:  ASPIRIN Your last dose was: Your next dose is:    
   
   
 325 mg by Per G Tube route daily. 325 mg  
    
   
   
   
  
 atorvastatin 40 mg tablet Commonly known as:  LIPITOR Your last dose was: Your next dose is:    
   
   
 40 mg by Per G Tube route daily. 40 mg  
    
   
   
   
  
 clopidogrel 75 mg Tab Commonly known as:  PLAVIX Your last dose was: Your next dose is:    
   
   
 75 mg by Feeding Tube route daily. 75 mg  
    
   
   
   
  
 finasteride 5 mg tablet Commonly known as:  PROSCAR Your last dose was: Your next dose is:    
   
   
 5 mg by Per G Tube route nightly. 5 mg FLONASE ALLERGY RELIEF 50 mcg/actuation nasal spray Generic drug:  fluticasone Your last dose was: Your next dose is: 2 Sprays by Both Nostrils route daily. 2 Spray  
    
   
   
   
  
 gabapentin 250 mg/5 mL solution Commonly known as:  NEURONTIN Your last dose was: Your next dose is:    
   
   
 750 mg by PEG Tube route three (3) times daily.   
 750 mg  
    
   
   
   
  
 HYDROcodone-acetaminophen  mg tablet Commonly known as:  Josue Macias Your last dose was: Your next dose is:    
   
   
 1 Tab by Per G Tube route daily as needed for Pain. 1 Tab LANTUS SOLOSTAR U-100 INSULIN 100 unit/mL (3 mL) Inpn Generic drug:  insulin glargine Your last dose was: Your next dose is: INJECT 14 UNITS SUBCUTANEOUSLY ONCE DAILY * LASIX 20 mg tablet Generic drug:  furosemide Your last dose was: Your next dose is: Take 20 mg by mouth daily. 20 mg  
    
   
   
   
  
 * furosemide 40 mg tablet Commonly known as:  LASIX Your last dose was: Your next dose is: Take 40 mg by mouth daily. Patient takes 40 mg tablet in the morning, then 20 mg in the afternoon 40 mg  
    
   
   
   
  
 * midodrine 10 mg tablet Commonly known as:  Rejio Klinefelter Your last dose was: Your next dose is: Take 10 mg by mouth three (3) times daily. 10 mg  
    
   
   
   
  
 * midodrine 10 mg tablet Commonly known as:  Rudolpho Roderickfeltbravo Your last dose was: Your next dose is:    
   
   
 10 mg by Per G Tube route three (3) times daily (with meals). 10 mg  
    
   
   
   
  
 multivitamin tablet Commonly known as:  ONE A DAY Your last dose was: Your next dose is:    
   
   
 1 Tab by Per G Tube route daily. 1 Tab  
    
   
   
   
  
 nitroglycerin 0.4 mg SL tablet Commonly known as:  NITROSTAT Your last dose was: Your next dose is: 0.4 mg by SubLINGual route every five (5) minutes as needed for Chest Pain. 0.4 mg  
    
   
   
   
  
 ondansetron hcl 8 mg tablet Commonly known as:  Greer Crawford Your last dose was: Your next dose is: Take 1 Tab by mouth every eight (8) hours as needed for Nausea. 8 mg pramipexole 0.25 mg tablet Commonly known as:  MIRAPEX Your last dose was: Your next dose is: Take 1 Tab by mouth three (3) times daily. 0.25 mg  
    
   
   
   
  
 tamsulosin 0.4 mg capsule Commonly known as:  FLOMAX Your last dose was: Your next dose is: 0.4 mg by Per G Tube route as needed (per urinary flow issues). 0.4 mg  
    
   
   
   
  
 vit B Cmplx 3-FA-Vit C-Biotin 1- mg-mg-mcg tablet Commonly known as:  NEPHRO GRAYSON RX Your last dose was: Your next dose is: Take 1 Tab by mouth daily. 1 Tab * VITAMIN B-12 PO Your last dose was: Your next dose is: Take 1 Tab by mouth daily. 1 Tab * cyanocobalamin 1,000 mcg tablet Your last dose was: Your next dose is:    
   
   
 1,000 mcg by Per G Tube route daily. 1000 mcg VITAMIN B-6 PO Your last dose was: Your next dose is:    
   
   
 1 Tab by Per G Tube route daily. 1 Tab  
    
   
   
   
  
 zinc 50 mg Tab tablet Your last dose was: Your next dose is: Take 50 mg by mouth daily. 50 mg  
    
   
   
   
  
 * Notice: This list has 6 medication(s) that are the same as other medications prescribed for you. Read the directions carefully, and ask your doctor or other care provider to review them with you. STOP taking these medications   
 guaiFENesin 100 mg/5 mL liquid Commonly known as:  ROBITUSSIN Where to Get Your Medications Information on where to get these meds will be given to you by the nurse or doctor. ! Ask your nurse or doctor about these medications  
  cefdinir 250 mg/5 mL suspension L. acidoph & paracasei- S therm- Bifido 8 billion cell Cap cap  
 metoclopramide HCl 10 mg tablet  
 metroNIDAZOLE 500 mg tablet

## 2018-07-14 NOTE — H&P
[facility]  HISTORY AND PHYSICAL      Name:Flor Jasso  MR#: [id number]  : 1935  ACCOUNT #: [de-identified]   ADMIT DATE: 2018    CHIEF COMPLAINT:  Unable to obtain. 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 He presented to the emergency department via EMS on account of possible aspiration pneumonia. Currently at patient's bedside, he cannot provide any history to me at this time. History was obtained from his wife who is sitting by the bedside. As per wife, she tells me that patient yesterday was not feeling really well and that just before going to bed, she fed him through his PEG tube. She states that the patient was complaining of nausea. Then, at about 2:30 a.m. earlier this morning, the patient woke up and he was choking. She noticed that he threw up all over the bed. Again, she then realized that he was having worsening tremors and he was throwing up all over the place and shaking on his bed and she had to move him to his side. Based on all of this, she called EMS. There is no associated history of fever, but she states the patient felt warm. She also tells me that he complained of abdominal pain. No melena stool or passage of bloody stool per rectum. Today in the emergency department, an initial chest x-ray done was normal.  Subsequently, a CT scan of the abdomen and pelvis without contrast done shows patchy bibasilar lung opacities, left greater than right, which may represent aspiration, infection and/or edema. The patient was administered 3.375 g of Zosyn and the sepsis protocol was initiated.   He was bolused with at least 2.5 L of normal saline intravenous fluid. We have been consulted for hospital admission and for evaluation. RECENT HOSPITALIZATION:  The patient was just admitted on 06/28/2018 and discharged on the 07/03/2018 for worsening essential tremors. PAST MEDICAL HISTORY:  1. Essential tremors. 2.  Diabetes mellitus. 3.  Coronary artery disease status post AICD. 4.  Chronic systolic heart failure with ejection fraction of 40%. 5.  History of tongue and throat cancer status post PEG tube placement. 6.  Dietary. 7.  Neuropathy. PAST SURGICAL HISTORY:  1. He is status post PEG tube placement. 2.  Status post coronary artery bypass graft. 3.  Status post cataract removal.  4.  Status post cholecystectomy. 5.  Status post heart catheterization. 6.  Status post Mohs procedure. 7.  Status post back surgery. 8.  Status post pacemaker infection by Dr. Sayda Gonzalez in 2014. HOME MEDICATIONS:  Includes:   1. Alfuzosin 10 mg tablets daily. 2.  Aspirin 325 mg daily. 3.  Atorvastatin 40 mg daily. 4.  Clopidogrel 75 mg daily. 5.  Pepcid 20 mg 2 times a day. 6.  Finasteride 5 mg by mouth nightly. 7.  Lasix 60 mg by mouth daily. 8.  Gabapentin 750 mg via PEG tube 3 times daily. 9.  Guaifenesin 200 mg 3 times a day. 10.  Norco 10/325 mg by mouth daily as needed. 11.  Insulin lispro 13 units with breakfast, 10 units with lunch and 7 units with dinner. 12.  Midodrine 30 mg 3 times daily. 13.  Multivitamin 1 tablet by mouth daily. 14.  Pramipexole 1 tablet by mouth 3 times daily. 15.  Pyridoxine hydrochloride 1 tablet by mouth daily. ALLERGIES:  MEPERIDINE, PAXIL, AMOXICILLIN, CLEOCIN AND PENICILLIN. FAMILY HISTORY:  This was unobtainable currently due to patient factors. SOCIAL HISTORY:  He lives with his wife. He has 5 children. He does not smoke cigarettes. No alcohol use. His wife is the surrogate decision maker and HE IS A FULL CODE.     REVIEW OF SYSTEMS:  This was unobtainable due to current patient factors. PHYSICAL EXAMINATION:  TRIAGE VITAL SIGNS:  Temperature of 99.3, pulse 119 beats per minute, blood pressure 123/85 mmHg, respirations 32, oxygen saturation was 91% on room air. GENERAL APPEARANCE:  He is lying on the bed. He appears acutely ill looking and in mild distress. HEENT:  Atraumatic, normocephalic. His pupils are equal, round, reactive to light and accommodation. Extraocular muscles are intact. His oral mucosa appears slightly dry. He is edentulous. NECK:  Supple. No jugular venous distention. LUNGS:  He has bilateral coarse crackling all over his chest.    CARDIOVASCULAR:  He has a pacemaker in the left upper chest.  S1 and S2 heard. Regular rate and rhythm. ABDOMEN:  Soft, nontender, nondistended. He has a PEG tube in situ. No surrounding erythema. EXTREMITIES:  No pitting pedal edema. NEUROLOGIC:  He is lethargic, but easily arousable. He is oriented to his name, place, but not the time. He moves all his extremities. SKIN:  He is warm and dry. LABORATORY DATA:  His white blood cell is 9.7, hemoglobin 12.9, platelets 87, neutrophils 952. Urinalysis unremarkable. Chemistry:  Sodium of 137, potassium 4.3. Chloride 98, bicarbonate 34, glucose 182, BUN 38, creatinine 1.82, ALT 46, AST 48, alkaline phosphatase 106, lipase 89. CT scan of the abdomen and pelvis as mentioned above, consistent with patchy bibasilar lung opacities in the left greater than right. No acute abnormality in the abdomen or pelvis. ASSESSMENT AND PLAN:  The patient is an 71-year-old male with above past medical history. He was brought to the Emergency Department for concern of possible aspiration pneumonia. 1.  Aspiration pneumonia as evidenced by CT scan of the chest showing patchy bibasilar lung opacities, left greater than right. He also does meet sepsis criteria. On admission, he was tachypneic with a low blood pressure.   He also has bilateral diffuse crackling on auscultation of the chest.  He has been administered Zosyn in the emergency department. We will continue Zosyn empirically. We will also add levofloxacin renal dosing for atypical coverage. 2. Sepsis: Sepsis protocol has already been initiated and he was bolused with at least 2.5 L of normal saline intravenous fluid bolus. But based on history of prior congestive heart failure, we will be cautious not to fluid overloaded him. We will follow up with blood culture results. If at all possible, we will also obtain sputum for sputum Gram stain and culture. 3.  History of essential tremors. We will resume home pramipexole. 4.  History of chronic systolic congestive heart failure with last ejection fraction of 45-50% in 05/2018. He does not look overtly decompensated. We will hold home Lasix due to overall underlying hypotension. We will resume Coreg, aspirin and Lipitor. Daily weights. Cautioned not to fluid overload. 5.  History of diabetes mellitus type 2. We will initiate insulin sliding scale as per protocol. We will continue Accu-Cheks. 6.  We will resume home medications. 7.  Thrombocytopenia. This appears to be chronic and ongoing. We will monitor very closely. There are no obvious signs of bleeding. 8.  History of tongue and throat cancer status post surgery and radiation therapy. He can continue outpatient followup with his oncologist.  9.  Deep venous thrombosis prophylaxis with sequential compression device. DISPOSITION:  The patient will be admitted to intermediate care unit for routine progression of care.       MD MATT Noyola/ELLY  D: 07/14/2018 09:53     T: 07/14/2018 10:47  JOB #: [id number]

## 2018-07-14 NOTE — ED PROVIDER NOTES
EMERGENCY DEPARTMENT HISTORY AND PHYSICAL EXAM 
 
 
Date: 7/14/2018 Patient Name: Vianey Domínguez History of Presenting Illness Chief Complaint Patient presents with  Aspiration Patient with PEG tube, per family, he vomited today and they believe he may have aspirated History Provided By: Patient HPI: Vianey Domínguez, 80 y.o. male with PMHx significant for CAD, MI, arrhythmia, PNA, HTN, GERD, presents via EMS to the ED with cc of acute vomiting episode ~30min PTA with concern for aspiration. Per EMS, pt woke up wife covered in vomit with associated dizziness. Pt also c/o generalized body aches, 3/4 CP, SOB, mild HA, and weakness. Per EMS, pt's Sp02 was low at 86% RA, therefore pt was given 2L of O2 with improvement in Sp02 to 96%. Per wife, has experienced same symptoms before and was recently admitted to hospital on 06/28/18 x tremors secondary to aspiration. Pt's wife states, pt endorsed nausea medication PO at 9:00pm yesterday and pain medication at 9:55pm. Pt denies any exacerbating and alleviating factors. Pt specifically denies any signs of fever, chills, diaphoresis, nausea, fatigue, and any other associated symptoms. Chief Complaint: acute vomiting Duration: ~ 30 minutes PTA Timing:  Acute Location: N/A Quality: N/A Severity: N/A Modifying Factors: Denies any exacerbating and alleviating factors. Associated Symptoms: Associated dizziness, generalized body aches, 3/4 CP, SOB, mild HA, weakness, but denies any other associated signs or symptoms There are no other complaints, changes, or physical findings at this time. PCP: Michael Leigh MD 
 
Current Facility-Administered Medications Medication Dose Route Frequency Provider Last Rate Last Dose  sodium chloride (NS) flush 5-10 mL  5-10 mL IntraVENous PRN Bernard Ureña MD      
 piperacillin-tazobactam (ZOSYN) 3.375 g in 0.9% sodium chloride (MBP/ADV) 100 mL  3.375 g IntraVENous Q8H Bernard Ureña MD 25 mL/hr at 07/14/18 0645 3.375 g at 07/14/18 0645 Current Outpatient Prescriptions Medication Sig Dispense Refill  furosemide (LASIX) 20 mg tablet Take 60 mg by mouth daily.  midodrine (PROAMITINE) 10 mg tablet Take 30 mg by mouth three (3) times daily.  pramipexole (MIRAPEX) 0.25 mg tablet Take 1 Tab by mouth three (3) times daily. 90 Tab 3  
 finasteride (PROSCAR) 5 mg tablet Take 5 mg by mouth nightly.  alfuzosin SR (UROXATRAL) 10 mg SR tablet  LANTUS SOLOSTAR U-100 INSULIN 100 unit/mL (3 mL) inpn INJECT 14 UNITS SUBCUTANEOUSLY ONCE DAILY 15 Pen 5  
 Lancets misc Use preferred brand; Check glucose 4 times daily, Diagnosis E11.65 400 Each 5  
 glucose blood VI test strips (PHARMACIST CHOICE) strip Use preferred brand; Check glucose 4 times daily, Diagnosis E11.65 400 Strip 5  Blood-Glucose Meter monitoring kit 1 preferred brand glucometer for checking home glucose 4 times per day. DX Code: E11.65 1 Kit 0  
 PYRIDOXINE HCL, VITAMIN B6, (VITAMIN B-6 PO) Take  by mouth.  insulin lispro (HUMALOG KWIKPEN INSULIN) 100 unit/mL kwikpen 13 units with breakfast, 10 units with lunch, 7 units with dinner + correction insulin, up to 40 units/day (Patient taking differently: by SubCUTAneous route. 13 units with breakfast, 10 units with lunch, 7 units with dinner + correction insulin, up to 40 units/day  Indications: 10 lunch, 7 dinner) 15 Pen 3  
 HYDROcodone-acetaminophen (NORCO)  mg tablet Take 1 Tab by mouth daily as needed.  famotidine (PEPCID) 20 mg tablet Take 1 Tab by mouth two (2) times a day. 20 Tab 0  
 guaiFENesin (ROBITUSSIN) 100 mg/5 mL liquid Take 200 mg by mouth three (3) times daily as needed for Cough.  aspirin (ASPIRIN) 325 mg tablet Take 325 mg by mouth daily.  insulin syringe-needle U-100 1 mL 31 x 15/64\" syrg 1 Syringe by SubCUTAneous route four (4) times daily.  200 Each 11  
 Insulin Needles, Disposable, (ESPERANZA PEN NEEDLE) 32 gauge x 5/32\" ndle 5 times a day 150 Each 99  
 atorvastatin (LIPITOR) 40 mg tablet Take 40 mg by mouth daily.  gabapentin (NEURONTIN) 250 mg/5 mL solution 750 mg by PEG Tube route three (3) times daily.  multivitamin (ONE A DAY) tablet Take 1 Tab by mouth daily.  clopidogrel (PLAVIX) 75 mg tablet Take 75 mg by mouth daily.  nitroglycerin (NITROSTAT) 0.4 mg SL tablet 0.4 mg by SubLINGual route every five (5) minutes as needed for Chest Pain.  ondansetron hcl (ZOFRAN, AS HYDROCHLORIDE,) 8 mg tablet Take 1 Tab by mouth every eight (8) hours as needed for Nausea. 20 Tab 0 Past History Past Medical History: 
Past Medical History:  
Diagnosis Date  Antiplatelet or antithrombotic long-term use 12/4/2014  Anxiety disorder  Arrhythmia 2009  
 bradycardia  Arthritis  CAD (coronary artery disease) s/p CABG 2002; Dr Christ Morocho  Cancer ([facility] 1996  
 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   
 left leg, lower back,   
 Depression  Diabetes ([facility] Type II  
 Esophageal dysmotility s/p dilitation  Esophageal motility disorder 7/8/2013 Frequent simultaneous or failed contractions, low amplitude contractions  suggests severe myopathy or diffuse spasm. I suspect the latter. Achalasia  is not present.  GERD (gastroesophageal reflux disease)  Heart failure ([facility] 10/2014 Cardiomyopathy:Pacemaker upgrade:Biv and AICD  Dr. Sera Watkins heart  last visit 5/11/2015  Hepatitis C Dx 1996  
 treated at [facility] in past; as of 4/15/15 wife states pt currently not under any treatment  Hyperlipidemia  Hypertension  Myocardial infarct [facility]) 2013 Heart Cath: 40% LV EF, Stented distal LAD, patent Graft to circumflex  On tube feeding diet approx 2009  
 still has as of 9/28/15  (no po food/liquid/meds at all); Dr Mancil Bloch  Other ill-defined conditions(799.89) 1996  
 1 dose of chemotherapy/radiation for tongue cancer  Other ill-defined conditions(799.89) BPH  Other ill-defined conditions(799.89) orthostatic hypotension  Pneumonia ~ April -May 2010  Stroke [facility]) approx   
 left side-left finger tips numb; no imbalance or memory loss; as of  not seeing neuro MD  
 Suicidal thoughts Past Surgical History: 
Past Surgical History:  
Procedure Laterality Date  ABDOMEN SURGERY PROC UNLISTED    
 peg tube  CABG, ARTERY-VEIN, THREE    HX CATARACT REMOVAL    
 bilateral  
 HX CHOLECYSTECTOMY [address] CATHETERIZATION  2013 Stented distal LAD  HX MOHS PROCEDURES    
 bilateral  
 HX ORTHOPAEDIC    
 back surgery times two  HX OTHER SURGICAL Radical Left Neck  HX OTHER SURGICAL    
 NASAL POLYPS REMOVAL  
 HX OTHER SURGICAL   TURP  
 HX PACEMAKER    HX PACEMAKER  10/28/14 Defibrillator: [facility] # [id number], serial # [id number]; Dr. Joanne Tomlin [phone]; Dr Lor Huffman  HX UROLOGICAL    
 cystoscopy  [facility]  
 cevical surgery  FL CHANGE GASTROSTOMY TUBE  2011  FL CHANGE GASTROSTOMY TUBE  2011  FL EGD INSERT GUIDE WIRE DILATOR PASSAGE ESOPHAGUS  2010  FL EGD TRANSORAL BIOPSY SINGLE/MULTIPLE  2010  
    
 STOMACH SURGERY PROCEDURE UNLISTED  2011  VASCULAR SURGERY PROCEDURE UNLIST    
 bilateral carotid stents Family History: 
Family History Problem Relation Age of Onset  Heart Disease Father   
   at age 52 from CAD  Colon Cancer Mother  Cancer Mother   
  colon ca  
 Heart Disease Brother Social History: 
Social History Substance Use Topics  Smoking status: Never Smoker  Smokeless tobacco: Never Used  Alcohol use No  
 
 
Allergies: Allergies Allergen Reactions  Demerol [Meperidine] Shortness of Breath  Paxil [Paroxetine Hcl] Unknown (comments) Pt gets shaky and loses control of legs  Amoxicillin Rash  Cleocin [Clindamycin Hcl] Rash  Pcn [Penicillins] Rash Review of Systems Review of Systems Constitutional: Negative for chills, diaphoresis and fever. HENT: Negative for congestion. Eyes: Negative for visual disturbance. Respiratory: Positive for shortness of breath. Cardiovascular: Positive for chest pain. Gastrointestinal: Positive for vomiting. Negative for diarrhea and nausea. Genitourinary: Negative for flank pain. Musculoskeletal: Positive for myalgias (generalized body). Skin: Negative for rash. Allergic/Immunologic: Negative for immunocompromised state. Neurological: Positive for dizziness and weakness. Psychiatric/Behavioral: Negative for agitation. Physical Exam  
Physical Exam  
Constitutional: He is oriented to person, place, and time. He appears well-developed and well-nourished. He appears distressed. Mild distress HENT:  
Head: Normocephalic and atraumatic. Eyes: EOM are normal.  
Neck: Normal range of motion. Neck supple. Cardiovascular: Regular rhythm and normal heart sounds. Tachycardia present. Pacemaker L chest.   
Pulmonary/Chest: Effort normal. He has rales. Slight rales Abdominal: Soft. Bowel sounds are normal. There is tenderness. There is guarding. Mild diffuse tenderness with guarding. PEG tube in place Musculoskeletal: He exhibits tenderness. He exhibits no edema. B/L calf tenderness Neurological: He is alert and oriented to person, place, and time. Coordination normal.  
Skin: Skin is warm and dry. Psychiatric: He has a normal mood and affect. Nursing note and vitals reviewed. Diagnostic Study Results Labs - Radiologic Studies -  
XR CHEST PORT Final Result Initial Result:  
  Impression:  
  IMPRESSION: 
 
No acute process.  Stable exam. 
  
  Narrative:  
  EXAM:  XR CHEST PORT INDICATION:  Aspiration. SIRS criteria. COMPARISON: 7/11/2018 TECHNIQUE: Portable AP semiupright chest view at 0504 hours FINDINGS: The left chest ICD and wires are stable. Sternal wires and mediastinal 
clips are present. Cardiac monitoring wires overlie the thorax. The 
cardiomediastinal contours are stable. The pulmonary vasculature is within 
normal limits. The lungs and pleural spaces are clear. There is no pneumothorax. The bones and 
upper abdomen are stable. CT Results  (Last 48 hours) 07/14/18 0618  CT HEAD WO CONT Final result Impression:  IMPRESSION:   
There is no acute intracranial abnormality. Narrative:  EXAM:  CT head without contrast  
   
INDICATION: Headache and vomiting. COMPARISON: CT head 6/28/2018 TECHNIQUE: Axial noncontrast head CT from foramen magnum to vertex. Coronal and  
sagittal reformatted images were obtained. CT dose reduction was achieved  
through use of a standardized protocol tailored for this examination and  
automatic exposure control for dose modulation. FINDINGS:  There is diffuse age-related parenchymal volume loss. The ventricles  
and sulci are age-appropriate without hydrocephalus. There is no mass effect or  
midline shift. There is no intracranial hemorrhage or extra-axial fluid  
collection. Areas of low attenuation in the periventricular white matter  
represent stable chronic microvascular ischemic changes. There is stable chronic  
bilateral parietal infarcts. There is no new abnormal parenchymal attenuation. The basal cisterns are patent. The osseous structures are intact. The visualized paranasal sinuses and mastoid  
air cells are clear.  
   
  
 07/14/18 0618  CT ABD PELV WO CONT Final result Impression:  IMPRESSION:   
1. There is no acute abnormality in the abdomen or pelvis.   
   
2. Patchy bibasilar lung opacities, left greater than right, may represent  
aspiration, infection, or edema. Narrative:  EXAM:  CT ABD PELV WO CONT INDICATION: Abdominal pain. COMPARISON: CT 5/16/2018. TECHNIQUE: Helical CT of the abdomen  and pelvis  without contrast. Coronal and  
sagittal reformats are performed. CT dose reduction was achieved through use of  
a standardized protocol tailored for this examination and automatic exposure  
control for dose modulation. FINDINGS:   
Solid organ evaluation is limited without contrast.   
   
The visualized lung bases demonstrate patchy lung opacities, left greater than  
right. The heart size is normal. ICD wires are partially visualized. There is no  
pericardial or pleural effusion. There is a 4 mm nonobstructing right kidney calculus. The kidneys are symmetric  
without hydronephrosis. There is no perinephric fluid or fat stranding. A  
subcentimeter high density right kidney lesion is too small to characterize. Small liver cysts are again noted. The spleen, pancreas, and adrenal glands are  
normal.  The gall bladder is surgically absent without intra- or extra-hepatic  
biliary dilatation. A percutaneous gastrostomy tube is present. There are no dilated bowel loops. The appendix is normal.    
   
There are no enlarged lymph nodes. There is no free fluid or free air. The  
aorta tapers without aneurysm. There is aortoiliac atherosclerosis. The urinary bladder is unremarkable. There is no pelvic mass. The prostate is  
not enlarged. There are degenerative changes in the spine and hips. There is posterior fusion  
hardware at L3-L5. CXR Results  (Last 48 hours) 07/14/18 0505  XR CHEST PORT Final result Impression:  IMPRESSION:  
   
No acute process. Stable exam.  
   
  
 Narrative:  EXAM:  XR CHEST PORT INDICATION:  Aspiration. SIRS criteria. COMPARISON: 7/11/2018 TECHNIQUE: Portable AP semiupright chest view at 0504 hours FINDINGS: The left chest ICD and wires are stable. Sternal wires and mediastinal  
clips are present. Cardiac monitoring wires overlie the thorax. The  
cardiomediastinal contours are stable. The pulmonary vasculature is within  
normal limits. The lungs and pleural spaces are clear. There is no pneumothorax. The bones and  
upper abdomen are stable. Medical Decision Making I am the first provider for this patient. I reviewed the vital signs, available nursing notes, past medical history, past surgical history, family history and social history. Vital Signs-Reviewed the patient's vital signs. Patient Vitals for the past 12 hrs: 
 Temp Pulse Resp BP SpO2  
07/14/18 0600 - (!) 107 29 (!) 83/39 96 % 07/14/18 0556 98.8 °F (37.1 °C) (!) 108 18 110/67 96 % 07/14/18 0430 99.3 °F (37.4 °C) (!) 119 (!) 32 123/85 91 % Pulse Oximetry Analysis - 87% on RA Cardiac Monitor:  
Rate: 118 bpm 
Rhythm: Paced EKG interpretation: (Preliminary) 4:33 Rhythm: PAC's; and regular . Rate (approx.): 118 bpm; Axis: normal; ND interval: normal; QRS interval: normal ; ST/T wave: normal; Other findings: atrial-sensed ventricular-paced rhythm. Written by Frida Steele ED Scribe, as dictated by Yvonne Bales MD. Records Reviewed: Nursing Notes, Old Medical Records, Previous electrocardiograms, Ambulance Run Sheet, Previous Radiology Studies and Previous Laboratory Studies Provider Notes (Medical Decision Making): DDx: aspiration, PNa, sepsis, dehydration, electrolyte abnormality, gastritis, GERD, tension HA, obstruction, CAD, UTI, respiratory failure. ED Course:  
Initial assessment performed. The patients presenting problems have been discussed, and they are in agreement with the care plan formulated and outlined with them. I have encouraged them to ask questions as they arise throughout their visit. Progress Note: 
6:00 AM 
Pt re-evaluated, now having back pain. Written by Kym Connolly, ED Scribe, as dictated by Mai Galeazzi, MD. Critical Care Time: CRITICAL CARE NOTE : 
 
4:58 AM 
 
 
IMPENDING DETERIORATION -Respiratory, Cardiovascular, Metabolic and Renal 
ASSOCIATED RISK FACTORS - Hypotension, Hypoxia, Dysrhythmia, Metabolic changes and Dehydration MANAGEMENT- Bedside Assessment and Supervision of Care INTERPRETATION -  Xrays, CT Scan, ECG and Blood Pressure INTERVENTIONS - hemodynamic mngmt and Metobolic interventions CASE REVIEW - Hospitalist, Nursing and Family TREATMENT RESPONSE -Improved PERFORMED BY - Self NOTES   : 
 
I have spent 65 minutes of critical care time involved in lab review, consultations with specialist, family decision- making, bedside attention and documentation. During this entire length of time I was immediately available to the patient . Mai Galeazzi, MD 
 
Disposition: 
Admit Note: 
07:39am 
Pt is being admitted by Sergey Ndiaye MD. The results of their tests and reason(s) for their admission have been discussed with pt and/or available family. They convey agreement and understanding for the need to be admitted and for admission diagnosis. PLAN: 
1. Admit to Hospital 
2. Follow-up Information None Return to ED if worse Diagnosis Clinical Impression: 1. Aspiration pneumonia of both lower lobes due to vomit ([address] 2. Acute respiratory failure with hypoxia ([address] 3. LIZ (acute kidney injury) ([address] 4. Generalized abdominal pain 5. Tension headache Attestations: This note is prepared by Kym Connolly, acting as Scribe for Mai Galeazzi, MD. 
 
Mai Galeazzi, MD: The scribe's documentation has been prepared under my direction and personally reviewed by me in its entirety. I confirm that the note above accurately reflects all work, treatment, procedures, and medical decision making performed by me

## 2018-07-14 NOTE — IP AVS SNAPSHOT
[address] [facility] 
[phone] Patient: Claude Bells MRN: [id number] ALU:[id number] About your hospitalization You were admitted on:  July 14, 2018 You last received care in the:  [facility] [address] [facility] You were discharged on:  July 20, 2018 Why you were hospitalized Your primary diagnosis was:  Aspiration Pneumonia (Hcc) Your diagnoses also included:  Myalgia, Pneumonia Due To Group B Streptococcus (Hcc), Counseling Regarding Goals Of Care Follow-up Information Follow up With Details Comments Contact Info Rosalie Rascon [address] On 7/19/2018 this is your home care provider. If you have not heard from them in 1-2 days after discharge. .. please call them. [address] [facility] [address] [phone] Roger Mcardle., MD Go on 7/31/2018 Hospital follow-up scheduled at 2:00pm ( If you have questions or need to reschedule please call [facility] 
[phone] [address] [place] [address] will call the pt on Sunday for a Sunday visit. Phone # [phone] Lisa Wheat MD  as scheduled  [address] [facility] [address] [facility] 
[phone] Your Scheduled Appointments Wednesday September 12, 2018 12:10 PM EDT Follow Up with Jean Fuchs MD  
[facility]) [address] [facility] [address] [address] [phone] Discharge Orders None A check nemesio indicates which time of day the medication should be taken. My Medications START taking these medications Instructions Each Dose to Equal  
 Morning Noon Evening Bedtime  
 cefdinir 250 mg/5 mL suspension Commonly known as:  OMNICEF Your last dose was: Your next dose is:    
   
   
 6 mL by Per G Tube route two (2) times a day for 7 days. Start 7/21. Aware of PCN/ amoxicillin allergy but patient tolerated ceftriaxone infusion in the hospital.  Indications: STREPTOCOCCAL PNEUMONIA  
 300 mg  
    
   
   
   
  
 L. acidoph & paracasei- S therm- Bifido 8 billion cell Cap cap Commonly known as:  JAVY-Q/RISAQUAD Start taking on:  7/21/2018 Your last dose was: Your next dose is:    
   
   
 1 Cap by PEG Tube route daily. 1 Cap  
    
   
   
   
  
 metoclopramide HCl 10 mg tablet Commonly known as:  REGLAN Your last dose was: Your next dose is:    
   
   
 1 Tab by Per G Tube route every eight (8) hours as needed for up to 10 days. Take for nausea as needed 10 mg  
    
   
   
   
  
 metroNIDAZOLE 500 mg tablet Commonly known as:  FLAGYL Your last dose was: Your next dose is:    
   
   
 1 Tab by Per G Tube route three (3) times daily for 7 days. 500 mg CHANGE how you take these medications Instructions Each Dose to Equal  
 Morning Noon Evening Bedtime  
 famotidine 20 mg tablet Commonly known as:  PEPCID What changed:  how to take this Your last dose was: Your next dose is: Take 1 Tab by mouth two (2) times a day. 20 mg  
    
   
   
   
  
 insulin lispro 100 unit/mL kwikpen Commonly known as:  HumaLOG KwikPen Insulin What changed:   
- how to take this 
- additional instructions Your last dose was: Your next dose is:    
   
   
 13 units with breakfast, 10 units with lunch, 7 units with dinner + correction insulin, up to 40 units/day CONTINUE taking these medications Instructions Each Dose to Equal  
 Morning Noon Evening Bedtime  
 acetaminophen 500 mg tablet Commonly known as:  TYLENOL Your last dose was: Your next dose is:    
   
   
 1,000 mg by Per G Tube route every six (6) hours as needed for Pain.   
 1000 mg  
    
   
   
   
  
 alfuzosin SR 10 mg SR tablet Commonly known as:  Presley Daniels Your last dose was: Your next dose is:    
   
   
 10 mg by Per G Tube route daily. 10 mg  
    
   
   
   
  
 aspirin 325 mg tablet Commonly known as:  ASPIRIN Your last dose was: Your next dose is:    
   
   
 325 mg by Per G Tube route daily. 325 mg  
    
   
   
   
  
 atorvastatin 40 mg tablet Commonly known as:  LIPITOR Your last dose was: Your next dose is:    
   
   
 40 mg by Per G Tube route daily. 40 mg  
    
   
   
   
  
 clopidogrel 75 mg Tab Commonly known as:  PLAVIX Your last dose was: Your next dose is:    
   
   
 75 mg by Feeding Tube route daily. 75 mg  
    
   
   
   
  
 finasteride 5 mg tablet Commonly known as:  PROSCAR Your last dose was: Your next dose is:    
   
   
 5 mg by Per G Tube route nightly. 5 mg FLONASE ALLERGY RELIEF 50 mcg/actuation nasal spray Generic drug:  fluticasone Your last dose was: Your next dose is: 2 Sprays by Both Nostrils route daily. 2 Spray  
    
   
   
   
  
 gabapentin 250 mg/5 mL solution Commonly known as:  NEURONTIN Your last dose was: Your next dose is:    
   
   
 750 mg by PEG Tube route three (3) times daily. 750 mg HYDROcodone-acetaminophen  mg tablet Commonly known as:  Uriel Whiting Your last dose was: Your next dose is:    
   
   
 1 Tab by Per G Tube route daily as needed for Pain. 1 Tab LANTUS SOLOSTAR U-100 INSULIN 100 unit/mL (3 mL) Inpn Generic drug:  insulin glargine Your last dose was: Your next dose is: INJECT 14 UNITS SUBCUTANEOUSLY ONCE DAILY * LASIX 20 mg tablet Generic drug:  furosemide Your last dose was: Your next dose is: Take 20 mg by mouth daily. 20 mg * furosemide 40 mg tablet Commonly known as:  LASIX Your last dose was: Your next dose is: Take 40 mg by mouth daily. Patient takes 40 mg tablet in the morning, then 20 mg in the afternoon 40 mg  
    
   
   
   
  
 * midodrine 10 mg tablet Commonly known as:  Sam Redman Your last dose was: Your next dose is: Take 10 mg by mouth three (3) times daily. 10 mg  
    
   
   
   
  
 * midodrine 10 mg tablet Commonly known as:  Sam Redman Your last dose was: Your next dose is:    
   
   
 10 mg by Per G Tube route three (3) times daily (with meals). 10 mg  
    
   
   
   
  
 multivitamin tablet Commonly known as:  ONE A DAY Your last dose was: Your next dose is:    
   
   
 1 Tab by Per G Tube route daily. 1 Tab  
    
   
   
   
  
 nitroglycerin 0.4 mg SL tablet Commonly known as:  NITROSTAT Your last dose was: Your next dose is: 0.4 mg by SubLINGual route every five (5) minutes as needed for Chest Pain. 0.4 mg  
    
   
   
   
  
 ondansetron hcl 8 mg tablet Commonly known as:  Edith Actis Your last dose was: Your next dose is: Take 1 Tab by mouth every eight (8) hours as needed for Nausea. 8 mg  
    
   
   
   
  
 pramipexole 0.25 mg tablet Commonly known as:  MIRAPEX Your last dose was: Your next dose is: Take 1 Tab by mouth three (3) times daily. 0.25 mg  
    
   
   
   
  
 tamsulosin 0.4 mg capsule Commonly known as:  FLOMAX Your last dose was: Your next dose is: 0.4 mg by Per G Tube route as needed (per urinary flow issues). 0.4 mg  
    
   
   
   
  
 vit B Cmplx 3-FA-Vit C-Biotin 1- mg-mg-mcg tablet Commonly known as:  NEPHRO GRAYSON RX Your last dose was: Your next dose is: Take 1 Tab by mouth daily. 1 Tab * VITAMIN B-12 PO Your last dose was: Your next dose is: Take 1 Tab by mouth daily. 1 Tab * cyanocobalamin 1,000 mcg tablet Your last dose was: Your next dose is:    
   
   
 1,000 mcg by Per G Tube route daily. 1000 mcg VITAMIN B-6 PO Your last dose was: Your next dose is:    
   
   
 1 Tab by Per G Tube route daily. 1 Tab  
    
   
   
   
  
 zinc 50 mg Tab tablet Your last dose was: Your next dose is: Take 50 mg by mouth daily. 50 mg  
    
   
   
   
  
 * Notice: This list has 6 medication(s) that are the same as other medications prescribed for you. Read the directions carefully, and ask your doctor or other care provider to review them with you. STOP taking these medications   
 guaiFENesin 100 mg/5 mL liquid Commonly known as:  ROBITUSSIN Where to Get Your Medications Information on where to get these meds will be given to you by the nurse or doctor. ! Ask your nurse or doctor about these medications  
  cefdinir 250 mg/5 mL suspension L. acidoph & paracasei- S therm- Bifido 8 billion cell Cap cap  
 metoclopramide HCl 10 mg tablet  
 metroNIDAZOLE 500 mg tablet Opioid Education Prescription Opioids: What You Need to Know: 
 
 
 
 
Group B streptococcal bacteremia ( infection in blood) due to  aspiration pneumonia  
-complete additional 7 days of antibiotics Dysphagia Continue for now bolus feeding:  
  Isosource 1.5 1 can/250 mL bolus 5x/day (8am, 11am, 2pm, 5pm, 8pm as discussed with wife) + 150 mL flush q bolus (5oz); will provide 1875 kcal, 85g protein, 1705 mL water Alternate recommendations below in case not tolerating above: ( was faxed to [facility] already but Dr Denis Salomon will have to fax orders for pump if you decided to go with it) Isosource 1.5 @ 50 mL/hr x 24 hours/day + 150 mL water flushes q 4 hours (1800 kcal, 82g protein, 1817 Ml water) Isosource 1.5 @ 100 mL/hr x 12 hours (nocturnally) + 150 mL water flushes q 4 hours (x24 hours) (1800 kcal, 82g protein, 1817 mL water) Could also consider nocturnal feedings with an addition of bolus feedings during the day Isosource 1.5 @ 60 mL/hr + 12 hours (nocturnally) + 250 mL bolus x 2 during the day to provide a total of 1830 kcal, 83 protein 
  
Heart failure Aortic stenosis  
   
DM type II - continue insulin regiment as previously  
  
 
 
Take Home Medications You are being discharge on antibiotic Cefdinir / Flagyl for treatment of aspiration pneumonia What you should know about antibiotics: Antibiotics are medicines that help people fight infections caused by bacteria. They work by killing bacteria that are in the body. Antibiotics can cause side effects, such as nausea, vomiting. Nausea is a common side effect of many antibiotics. It is not an allergic reaction. If you are a woman and you get a yeast infection after taking an antibiotic, that does not mean you are allergic to it. Yeast infections are a common side effect of antibiotics. One of the most important side effect to watch while taking antibiotic or after you just finished taking it is diarrhea. This type of diarrhea may be caused by an infection with bacteria called C. difficile. C. difficile normally lives in the intestines. When people are on antibiotics, the C. difficile in their intestines can overgrow and cause infection. If you develop any side effect especially diarrhea while taking antibiotics it is very important to contact your doctor. We recommend taking probiotics while taking antibiotics. Probiotics can be bought over the counter. Allergies to antibiotics are common. You can develop an allergy to an antibiotic, even if you have not had a problem with it before. Symptoms of antibiotic allergy can be mild and include a flat rash and itching. They can also be more serious and include:  
   -----  Hives - are raised, red patches of skin that are usually very itchy  
   -----  Lip or tongue swelling 
   ------ Trouble swallowing or breathing Serious allergy symptoms can start right after you start taking an antibiotic if you are very sensitive. Less serious symptoms, on the other hand, often start a day or more later. If you think you are allergic to antibiotics tell your doctor. General drug facts If you have a very bad allergy, wear an allergy ID at all times. It is important that you take the medication exactly as they are prescribed. Keep your medication in the bottles provided by the pharmacist. 
Keep a list of all your drugs (prescription, natural products, vitamins, OTC) with you. Give this list to your doctor. Do not take other medications without consulting your doctor. Do not share your drugs with others and do not take anyone else's drugs. Keep all drugs out of the reach of children and pets. Most drugs may be thrown away in household trash after mixing with coffee grounds or lupillo litter and sealing in a plastic bag. Keep a list Call your doctor for help with any side effects. If in the [place] you may also call the FDA at 1-171-MNG-3854 Talk with the doctor before starting any new drug, including OTC, natural products, or vitamins. What to do at [facility] 1. Recommended diet: tube feedings as above 2.  Recommended activity: Activity as tolerated; home PT/OT; ambulate with help or rolator 3. If you experience any of the following symptoms then please call your primary care physician or return to the emergency room if you cannot get hold of your doctor: fever/chills 4. Bring these papers with you to your follow up appointments. The papers will help your doctors be sure to continue the care plan from the hospital. 
 
 
Follow-up with:  
PCP: Antwon Selby MD 
Follow-up Information Follow up With Details Comments Contact Info Rosalie Helton 227 On 7/19/2018 this is your home care provider. If you have not heard from them in 1-2 days after discharge. .. please call them. [address] [place] [address] 
[phone] Antwon Selby MD Go on 7/31/2018 Hospital follow-up scheduled at 2:00pm ( If you have questions or need to reschedule please call [facility] 
[phone] [address] will call the pt on Sunday for a Sunday visit. Phone # [phone] Robin Degroot MD  as scheduled  [address] [place] [address] 
[phone] Please call for your own appointment Information obtained by : 
I understand that if any problems occur once I am at home I am to contact my physician. I understand and acknowledge receipt of the instructions indicated above. Physician's or R.N.'s Signature                                                                  Date/Time Patient or Representative Signature                                                          Date/Time MyChart Announcement We are excited to announce that we are making your provider's discharge notes available to you in [employer]. You will see these notes when they are completed and signed by the physician that discharged you from your recent hospital stay. If you have any questions or concerns about any information you see in [employer], please call the Health Information Department where you were seen or reach out to your Primary Care Provider for more information about your plan of care. Introducing [facility]! Sanam Brandon introduces [employer] patient portal. Now you can access parts of your medical record, email your doctor's office, and request medication refills online. 1. In your internet browser, go to [email or web address] 2. Click on the First Time User? Click Here link in the Sign In box. You will see the New Member Sign Up page. 3. Enter your [employer] Access Code exactly as it appears below. You will not need to use this code after youve completed the sign-up process. If you do not sign up before the expiration date, you must request a new code. · [employer] Access Code: [email or web address] Expires: 8/12/2018  2:10 PM 
 
4. Enter the last four digits of your Social Security Number (xxxx) and Date of Birth (mm/dd/yyyy) as indicated and click Submit. You will be taken to the next sign-up page. 5. Create a [employer] ID. This will be your [employer] login ID and cannot be changed, so think of one that is secure and easy to remember. 6. Create a [employer] password. You can change your password at any time. 7. Enter your Password Reset Question and Answer. This can be used at a later time if you forget your password. 8. Enter your e-mail address. You will receive e-mail notification when new information is available in [address] 9. Click Sign Up. You can now view and download portions of your medical record.  
10. Click the Download Summary menu link to download a portable copy of your medical information. If you have questions, please visit the Frequently Asked Questions section of the [employer] website. Remember, [employer] is NOT to be used for urgent needs. For medical emergencies, dial 911. Now available from your iPhone and Android! Introducing Adebayo Thacker As a [facility] patient, I wanted to make you aware of our electronic visit tool called Adebayo Thacker. [facility] 24/7 allows you to connect within minutes with a medical provider 24 hours a day, seven days a week via a mobile device or tablet or logging into a secure website from your computer. You can access Adebayo Thacker from anywhere in the [place]. A virtual visit might be right for you when you have a simple condition and feel like you just dont want to get out of bed, or cant get away from work for an appointment, when your regular [facility] provider is not available (evenings, weekends or holidays), or when youre out of town and need minor care. Electronic visits cost only $49 and if the Our [facility] 24/7 provider determines a prescription is needed to treat your condition, one can be electronically transmitted to a nearby pharmacy*. Please take a moment to enroll today if you have not already done so. The enrollment process is free and takes just a few minutes. To enroll, please download the [facility] 24/[employer] latanya to your tablet or phone, or visit [email or web address] to enroll on your computer. And, as an [address] [place] [address] patient with a [employer] account, the results of your visits will be scanned into your electronic medical record and your primary care provider will be able to view the scanned results. We urge you to continue to see your regular [facility] provider for your ongoing medical care.   And while your primary care provider may not be the one available when you seek a Adebayo Thacker virtual visit, the peace of mind you get from getting a real diagnosis real time can be priceless. For more information on Adebayo Wescesar, view our Frequently Asked Questions (FAQs) at [email or web address]. Sincerely, 
 
Bella Patterson MD 
Chief Medical Officer 50[address]ledo *:  certain medications cannot be prescribed via Adebayo Thacker Unresulted Labs-Please follow up with your PCP about these lab tests Order Current Status CULTURE, BLOOD Preliminary result CULTURE, BLOOD Preliminary result Providers Seen During Your Hospitalization Provider Specialty Primary office phone Jaun Avilez MD Emergency Medicine [phone] Rachel Riggs MD Internal Medicine [phone] Andrez Benitez MD Internal Medicine MD [facility] [phone] Lisbeth Gaming MD Internal Medicine [phone] Your Primary Care Physician (PCP) Primary Care Physician Office Phone Office Fax Rena Licea [phone] You are allergic to the following Allergen Reactions Demerol (Meperidine) Shortness of Breath Paxil (Paroxetine Hcl) Unknown (comments) Pt gets shaky and loses control of legs Amoxicillin Rash Cleocin (Clindamycin Hcl) Rash Pcn (Penicillins) Rash Recent Documentation Height Weight BMI Smoking Status 1.702 m 74.1 kg 25.59 kg/m2 Never Smoker Emergency Contacts Name Discharge Info Relation Home Work Mobile [facility] DISCHARGE CAREGIVER [3] Spouse [3] [phone] EmersondelfinoSteven DISCHARGE CAREGIVER [3] Son [22] [phone] Patient Belongings The following personal items are in your possession at time of discharge: 
  Dental Appliances: None  Visual Aid: None      Home Medications: Sent home   Jewelry: Watch  Clothing: None    Other Valuables: None Discharge Instructions Attachments/References HEART FAILURE ZONES: GENERAL INFO (ENGLISH) HEART FAILURE: AVOIDING TRIGGERS (ENGLISH) Patient Handouts Learning About Heart Failure Zones What are heart failure zones? Heart failure zones give you an easy way to see changes in your heart failure symptoms. They also tell you when you need to get help. Check every day to see which zone you are in. Green zone. You are doing well. This is where you want to be. · Your weight is stable. This means it is not going up or down. · You breathe easily. · You are sleeping well. You are able to lie flat without shortness of breath. · You can do your usual activities. Yellow zone. Be careful. Your symptoms are changing. Call your doctor. · You have new or increased shortness of breath. · You are dizzy or lightheaded, or you feel like you may faint. · You have sudden weight gain, such as more than 2 to 3 pounds in a day or 5 pounds in a week. (Your doctor may suggest a different range of weight gain.) · You have increased swelling in your legs, ankles, or feet. · You are so tired or weak that you cannot do your usual activities. · You are not sleeping well. Shortness of breath wakes you up at night. You need extra pillows. Your doctor's name: ____________________________________________________________ Your doctor's contact information: _________________________________________________ Red zone. This is an emergency. Call 911. You have symptoms of sudden heart failure, such as: 
· You have severe trouble breathing. · You cough up pink, foamy mucus. · You have a new irregular or fast heartbeat. You have symptoms of a heart attack. These may include: · Chest pain or pressure, or a strange feeling in the chest. 
· Sweating. · Shortness of breath. · Nausea or vomiting. · Pain, pressure, or a strange feeling in the back, neck, jaw, or upper belly or in one or both shoulders or arms. · Lightheadedness or sudden weakness. · A fast or irregular heartbeat. If you have symptoms of a heart attack: After you call 911, the  may tell you to chew 1 adult-strength or 2 to 4 low-dose aspirin. Wait for an ambulance. Do not try to drive yourself. Follow-up care is a key part of your treatment and safety. Be sure to make and go to all appointments, and call your doctor if you are having problems. It's also a good idea to know your test results and keep a list of the medicines you take. Where can you learn more? Go to http://aniketNexBiotomas.info/. Enter T174 in the search box to learn more about \"Learning About Heart Failure Zones. \" Current as of: December 6, 2017 Content Version: 11.7 © 6810-8024 [employer]. Care instructions adapted under license by [employer] (which disclaims liability or warranty for this information). If you have questions about a medical condition or this instruction, always ask your healthcare professional. [place] [address] any warranty or liability for your use of this information. Avoiding Triggers With Heart Failure: Care Instructions Your Care Instructions Triggers are anything that make your heart failure flare up. A flare-up is also called \"sudden heart failure\" or \"acute heart failure. \" When you have a flare-up, fluid builds up in your lungs, and you have problems breathing. You might need to go to the hospital. By watching for changes in your condition and avoiding triggers, you can prevent heart failure flare-ups. Follow-up care is a key part of your treatment and safety. Be sure to make and go to all appointments, and call your doctor if you are having problems. It's also a good idea to know your test results and keep a list of the medicines you take. How can you care for yourself at home? Watch for changes in your weight and condition · Weigh yourself without clothing at the same time each day. Record your weight. Call your doctor if you have sudden weight gain, such as more than 2 to 3 pounds in a day or 5 pounds in a week. (Your doctor may suggest a different range of weight gain.) A sudden weight gain may mean that your heart failure is getting worse. · Keep a daily record of your symptoms. Write down any changes in how you feel, such as new shortness of breath, cough, or problems eating. Also record if your ankles are more swollen than usual and if you feel more tired than usual. Note anything that you ate or did that could have triggered these changes. Limit sodium Sodium causes your body to hold on to extra water. This may cause your heart failure symptoms to get worse. People get most of their sodium from processed foods. Fast food and restaurant meals also tend to be very high in sodium. · Your doctor may suggest that you limit sodium to 2,000 milligrams (mg) a day or less. That is less than 1 teaspoon of salt a day, including all the salt you eat in cooking or in packaged foods. · Read food labels on cans and food packages. They tell you how much sodium you get in one serving. Check the serving size. If you eat more than one serving, you are getting more sodium. · Be aware that sodium can come in forms other than salt, including monosodium glutamate (MSG), sodium citrate, and sodium bicarbonate (baking soda). MSG is often added to Asian food. You can sometimes ask for food without MSG or salt. · Slowly reducing salt will help you adjust to the taste. Take the salt shaker off the table. · Flavor your food with garlic, lemon juice, onion, vinegar, herbs, and spices instead of salt. Do not use soy sauce, steak sauce, onion salt, garlic salt, mustard, or ketchup on your food, unless it is labeled \"low-sodium\" or \"low-salt. \" 
· Make your own salad dressings, sauces, and ketchup without adding salt. · Use fresh or frozen ingredients, instead of canned ones, whenever you can. Choose low-sodium canned goods. · Eat less processed food and food from restaurants, including fast food. Exercise as directed Moderate, regular exercise is very good for your heart. It improves your blood flow and helps control your weight. But too much exercise can stress your heart and cause a heart failure flare-up. · Check with your doctor before you start an exercise program. 
· Walking is an easy way to get exercise. Start out slowly. Gradually increase the length and pace of your walk. Swimming, riding a bike, and using a treadmill are also good forms of exercise. · When you exercise, watch for signs that your heart is working too hard. You are pushing yourself too hard if you cannot talk while you are exercising. If you become short of breath or dizzy or have chest pain, stop, sit down, and rest. 
· Do not exercise when you do not feel well. Take medicines correctly · Take your medicines exactly as prescribed. Call your doctor if you think you are having a problem with your medicine. · Make a list of all the medicines you take. Include those prescribed to you by other doctors and any over-the-counter medicines, vitamins, or supplements you take. Take this list with you when you go to any doctor. · Take your medicines at the same time every day. It may help you to post a list of all the medicines you take every day and what time of day you take them. · Make taking your medicine as simple as you can. Plan times to take your medicines when you are doing other things, such as eating a meal or getting ready for bed. This will make it easier to remember to take your medicines. · Get organized. Use helpful tools, such as daily or weekly pill containers. When should you call for help? Call 911 if you have symptoms of sudden heart failure such as: 
  · You have severe trouble breathing.  
  · You cough up pink, foamy mucus.   · You have a new irregular or rapid heartbeat.  
 Call your doctor now or seek immediate medical care if: 
  · You have new or increased shortness of breath.  
  · You are dizzy or lightheaded, or you feel like you may faint.  
  · You have sudden weight gain, such as more than 2 to 3 pounds in a day or 5 pounds in a week. (Your doctor may suggest a different range of weight gain.)  
  · You have increased swelling in your legs, ankles, or feet.  
  · You are suddenly so tired or weak that you cannot do your usual activities.  
 Watch closely for changes in your health, and be sure to contact your doctor if you develop new symptoms. Where can you learn more? Go to http://aniket-tomas.info/. Enter P487 in the search box to learn more about \"Avoiding Triggers With Heart Failure: Care Instructions. \" Current as of: December 6, 2017 Content Version: 11.7 © 6853-4173 [employer]. Care instructions adapted under license by [employer] (which disclaims liability or warranty for this information). If you have questions about a medical condition or this instruction, always ask your healthcare professional. [address] any warranty or liability for your use of this information. Please provide this summary of care documentation to your next provider. Signatures-by signing, you are acknowledging that this After Visit Summary has been reviewed with you and you have received a copy. Patient Signature:  ____________________________________________________________ Date:  ____________________________________________________________  
  
Corrie Garcia Provider Signature:  ____________________________________________________________ Date:  ____________________________________________________________

## 2018-07-15 NOTE — PROGRESS NOTES
Hospitalist Progress Note NAME: Juliet Norris :  1935 MRN:  [id number] Admit date: 2018 Today's date: 07/15/18 PCP: MD eVrn White M.D. Cell [phone] Assessment / Plan: 
 
Severe sepsis POA , RR 32, WBC 13.6, hypotension Group B streptococcal bacteremia POA Possible aspiration pneumonia POA 
IVF NS 30 ml/kg bolus in ED, continue IVF Required pressors, now off 
 BC with Grp B strep Change to ceftriaxone and flagyl from levaquin and zosyn Source suspect is the lung Reviewed chest CT, ASD or edema left > right lungs No evidence of UTI or skin and soft tissue infection Recent increased back pain, had CT lumbar spine with chronic appearing DDD Check repeat BC in 24 to 48 hours Check echo Acute kidney injury POA admit creatinine 1.82 Baseline creat 1.0 to 1.2 Improving with IVF Recurrent N/V at home POA Hold TF till nausea quites down PRN zofran NPO at baseline Chronic systolic congestive heart failure POA LVEF 45-50% in 2018. Not overtly decompensated. Holding  home Lasix Coreg, aspirin and Lipitor. IVF as remains NPO Diabetes mellitus type 2 POA. , 167, 133, 137, 160 Sliding scale as per protocol. 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 Chronic dysphagia, NPO Overweight POA Body mass index is 27.07 kg/(m^2). Deep venous thrombosis prophylaxis with sequential compression device. Subjective: Chief Complaint / Reason for Physician Visit f/u Severe sepsis \"I am coughing and feel nauseated\". Discussed with RN events overnight. Nausea without vomiting at home, recurrent, now just with nausea Coughing a lot No diarrhea, headache, abdominal pain Recently increased low back pain, had CT last week of L spine with chronic degenerative changes BC positive for Grp B strep bacteremia Review of Systems: 
Symptom Y/N Comments  Symptom Y/N Comments Fever/Chills n   Chest Pain n   
Poor Appetite    Edema Cough y   Abdominal Pain n   
Sputum n   Joint Pain y LBP  
SOB/KRAMER n   Headache Nausea/vomit y   Tolerating PT/OT Diarrhea n   Tolerating Diet y Constipation    Other Could NOT obtain due to:   
 
Objective: VITALS:  
Last 24hrs VS reviewed since prior progress note. Most recent are: 
Patient Vitals for the past 24 hrs: 
 Temp Pulse Resp BP SpO2  
07/15/18 1126 98.1 °F (36.7 °C) 73 18 133/71 97 % 07/15/18 0400 97.8 °F (36.6 °C) 76 18 122/77 96 % 07/14/18 2345 97.8 °F (36.6 °C) 67 17 134/76 95 % 07/14/18 2145 - 73 - 102/75 96 % 07/14/18 2130 - 72 - 126/71 96 % 07/14/18 2115 - 72 - 122/72 95 % 07/14/18 2100 - 70 - 136/72 96 % 07/14/18 2045 - 72 - 141/80 95 % 07/14/18 2030 - 74 - 132/86 97 % 07/14/18 2000 97.7 °F (36.5 °C) 71 16 154/82 97 % 07/14/18 1945 - 72 - 136/77 94 % 07/14/18 1930 - 69 - 131/76 95 % 07/14/18 1915 - 71 - 144/80 94 % 07/14/18 1730 - 70 - 111/70 98 % 07/14/18 1715 - 63 - 108/66 98 % 07/14/18 1703 - 69 - 101/54 -  
07/14/18 1700 - 67 - (!) 61/45 97 % 07/14/18 1645 - 67 - - 95 % 07/14/18 1630 - 63 - 100/53 98 % 07/14/18 1615 - 64 - 96/64 100 % 07/14/18 1600 - 63 - 102/41 93 % 07/14/18 1545 - 65 - - 97 % 07/14/18 1530 98 °F (36.7 °C) 73 18 (!) 81/67 95 % 07/14/18 1515 98 °F (36.7 °C) 65 - 92/57 94 % 07/14/18 1500 - 66 - 99/65 98 % 07/14/18 1445 - 67 - - 97 % 07/14/18 1444 - 68 - 100/52 94 % 07/14/18 1430 - 67 - 99/50 98 % 07/14/18 1415 - 65 - (!) 89/56 98 % 07/14/18 1406 - 67 - (!) 84/51 -  
07/14/18 1400 - 69 - (!) 85/48 97 % 07/14/18 1357 - 68 - (!) 79/49 -  
07/14/18 1345 - 64 - (!) 79/49 98 % 07/14/18 1330 - 67 - (!) 85/59 97 % 07/14/18 1315 - 68 - (!) 73/44 98 % 07/14/18 1305 - 71 - (!) 77/50 99 %  
07/14/18 1300 - 69 - (!) 81/50 98 % 07/14/18 1255 - 70 - (!) 87/44 97 % 07/14/18 1245 - 70 - - 98 % 07/14/18 1240 - 71 - (!) 77/48 98 % 07/14/18 1230 - 71 - (!) 84/59 96 % 07/14/18 1215 - 70 - - 97 % 07/14/18 1205 - 74 - (!) 74/46 96 % 07/14/18 1200 - 73 - (!) 78/49 96 % 07/14/18 1155 - 78 - (!) 74/41 96 % 07/14/18 1150 - 77 - (!) 62/40 (!) 89 % 07/14/18 1145 - 81 - (!) 77/55 (!) 88 %  
07/14/18 1140 - 83 - (!) 76/49 - Intake/Output Summary (Last 24 hours) at 07/15/18 1136 Last data filed at 07/15/18 0600 Gross per 24 hour Intake                0 ml Output             1075 ml Net            -1075 ml Wt Readings from Last 12 Encounters:  
07/15/18 78.4 kg (172 lb 13.5 oz) 07/09/18 78.9 kg (174 lb) 07/07/18 77.1 kg (170 lb) 07/05/18 77.6 kg (171 lb)  
06/28/18 70.3 kg (155 lb)  
06/23/18 76.2 kg (168 lb)  
06/11/18 78.1 kg (172 lb 3.2 oz) 05/16/18 77.1 kg (170 lb) 05/09/18 75.7 kg (166 lb 14.2 oz) 05/07/18 83.5 kg (184 lb)  
03/12/18 77.2 kg (170 lb 1.6 oz) 02/15/18 79.8 kg (175 lb 14.8 oz) PHYSICAL EXAM: 
General: WD, WN. Alert, cooperative, no acute distress   
EENT:  PERRL. Anicteric sclerae. MMM Neck:  No meningismus, no thyromegaly, tissue loss on left Resp:  Decreased BS bilaterally, no wheezing. No accessory muscle use CV:  Regular  rhythm,  No edema GI:  Soft, Non distended, Non tender.  +Bowel sounds, no rebound LN:  No cervical or inguinal YARELIS Neurologic:  Alert and oriented X 3, normal speech, non focal motor exam 
Psych:   Good insight. Not anxious nor agitated Skin:  No rashes. No jaundice Reviewed most current lab test results and cultures  YES Reviewed most current radiology test results   YES Review and summation of old records today    NO Reviewed patient's current orders and MAR    YES 
PMH/SH reviewed - no change compared to H&P 
________________________________________________________________________ Care Plan discussed with: 
  Comments Patient x Family  x Wife at bedside RN x Care Manager Consultant Multidiciplinary team rounds were held today with , nursing, pharmacist and clinical coordinator. Patient's plan of care was discussed; medications were reviewed and discharge planning was addressed. ________________________________________________________________________ Total NON critical care TIME:  35  Minutes Total CRITICAL CARE TIME Spent:   Minutes non procedure based Comments >50% of visit spent in counseling and coordination of care    
________________________________________________________________________ Bebo Serrato MD  
 
Procedures: see electronic medical records for all procedures/Xrays and details which were not copied into this note but were reviewed prior to creation of Plan. LABS: 
I reviewed today's most current labs and imaging studies. Pertinent labs include: 
Recent Labs  
   07/15/18 
 0415  07/14/18 
 0440 WBC  13.6*  9.7 HGB  12.0*  12.9 HCT  37.5  40.2 PLT  77*  87* Recent Labs  
   07/15/18 
 0415  07/14/18 
 0440 NA  140  137  
K  4.4  4.3 CL  105  98 CO2  28  34* GLU  136*  182* BUN  36*  38* CREA  1.57*  1.82* CA  8.9  8.8 ALB   --   3.2* TBILI   --   0.9 SGOT   --   48* ALT   --   46

## 2018-07-15 NOTE — PROGRESS NOTES
Paged on call Hospitalist regarding patient condition, tube feed status, and pain meds. MD wants to start NS at 75. Will continue to monitor.

## 2018-07-16 NOTE — PROGRESS NOTES
Pharmacy Medication Reconciliation     The patient was interviewed regarding current PTA medication list, use and drug allergies; Patient's wife was present in room and obtained permission from patient to discuss drug regimen with visitor(s) present. The patient was questioned regarding use of any other inhalers, topical products, over the counter medications, herbal medications, vitamin products or ophthalmic/nasal/otic medication use. Allergy Update: No Changes    Recommendations/Findings: The following amendments were made to the patient's active medication list on file at [facility]:   1) Additions:     - Added Vitamin B12 per wife. 2) Deletions:      - Amantadine (per wife says that he suffered some intolerable adverse effects from the first few days of usage and has stopped using it. She is waiting on seeing Dr. Katia Hein to re-evaluate if need to use or change to different drug.)    3) Changes:      - Clarified the furosemide dosing, patient has 2 strengths on hand, where he gets 40 mg in the morning, then 20 mg in the afternoon for a total daily dose of 60 mg.     - Midodrine corrected the dosage, patient takes 10 mg three times daily not 30 mg three times daily    4) Additional information    - Patient's wife mentions that as inpatient they are giving him Jevity for tube feeding and notices that when discharged in the past he will have diarrhea for about a week and the wife would put on a diaper for him. At home she says that she uses IsoSource 1.5 and says there are no diarrhea issues, and was wondering if there is another tube feed he can get while as an inpatient. The wife attributes the diarrhea to possibly \"more fiber in the Jevity\". -Clarified PTA med list with patient's wife. PTA medication list was corrected to the following:     Prior to Admission Medications   Prescriptions Last Dose Informant Patient Reported? Taking?    HYDROcodone-acetaminophen (NORCO)  mg tablet 7/14/2018 at Unknown time Significant Other Yes Yes   Sig: Take 1 Tab by mouth daily as needed. LANTUS SOLOSTAR U-100 INSULIN 100 unit/mL (3 mL) inpn 2018 at Unknown time  No Yes   Sig: INJECT 14 UNITS SUBCUTANEOUSLY ONCE DAILY   PYRIDOXINE HCL, VITAMIN B6, (VITAMIN B-6 PO) 2018 at Unknown time Significant Other Yes Yes   Sig: Take 1 Tab by mouth daily. alfuzosin SR (UROXATRAL) 10 mg SR tablet 2018 at Unknown time  Yes Yes   Sig: Take 10 mg by mouth daily. aspirin (ASPIRIN) 325 mg tablet 2018 at Unknown time Significant Other Yes Yes   Sig: Take 325 mg by mouth daily. atorvastatin (LIPITOR) 40 mg tablet 2018 at Unknown time Significant Other Yes Yes   Sig: Take 40 mg by mouth daily. clopidogrel (PLAVIX) 75 mg tablet 2018 at Unknown time Significant Other Yes Yes   Sig: Take 75 mg by mouth daily. cyanocobalamin, vitamin B-12, (VITAMIN B-12 PO)   Yes Yes   Sig: Take 1 Tab by mouth daily. famotidine (PEPCID) 20 mg tablet 2018 at Unknown time Significant Other No Yes   Sig: Take 1 Tab by mouth two (2) times a day. finasteride (PROSCAR) 5 mg tablet 2018 at Unknown time  Yes Yes   Sig: Take 5 mg by mouth daily. fluticasone (FLONASE ALLERGY RELIEF) 50 mcg/actuation nasal spray   Yes Yes   Si Sprays by Both Nostrils route daily. furosemide (LASIX) 20 mg tablet 2018 at Unknown time  Yes Yes   Sig: Take 20 mg by mouth daily. furosemide (LASIX) 40 mg tablet   Yes Yes   Sig: Take 40 mg by mouth daily. Patient takes 40 mg tablet in the morning, then 20 mg in the afternoon   gabapentin (NEURONTIN) 250 mg/5 mL solution 2018 at Unknown time Significant Other Yes Yes   Si mg by PEG Tube route three (3) times daily. guaiFENesin (ROBITUSSIN) 100 mg/5 mL liquid 2018 at Unknown time Significant Other Yes Yes   Sig: Take 200 mg by mouth three (3) times daily as needed for Cough.    insulin lispro (HUMALOG KWIKPEN INSULIN) 100 unit/mL kwikpen 2018 at Unknown time Significant Other No Yes   Si units with breakfast, 10 units with lunch, 7 units with dinner + correction insulin, up to 40 units/day   Patient taking differently: by SubCUTAneous route. 13 units with breakfast, 10 units with lunch, 7 units with dinner + correction insulin, up to 40 units/day  Indications: 10 lunch, 7 dinner   midodrine (PROAMITINE) 10 mg tablet 2018 at Unknown time  Yes Yes   Sig: Take 10 mg by mouth three (3) times daily. multivitamin (ONE A DAY) tablet 2018 at Unknown time Significant Other Yes Yes   Sig: Take 1 Tab by mouth daily. nitroglycerin (NITROSTAT) 0.4 mg SL tablet  Significant Other Yes Yes   Si.4 mg by SubLINGual route every five (5) minutes as needed for Chest Pain. ondansetron hcl (ZOFRAN, AS HYDROCHLORIDE,) 8 mg tablet Unknown at Unknown time Significant Other No No   Sig: Take 1 Tab by mouth every eight (8) hours as needed for Nausea. pramipexole (MIRAPEX) 0.25 mg tablet 2018 at Unknown time  No Yes   Sig: Take 1 Tab by mouth three (3) times daily. zinc 50 mg tab tablet   Yes Yes   Sig: Take 50 mg by mouth daily.       Facility-Administered Medications: None       Thank you,  Christina James, PharmD Candidate [id number]

## 2018-07-16 NOTE — PROGRESS NOTES
Nutrition: MD consult received for management of TF. RD previously made recommendations on 7/14 for continuous feedings with Jevity 1.5 as patient has received this formula on multiple admissions previously and it is the [address] equivalent to patient's home TF of Isosource 1.5. RN reports concerns from wife about the formula. Wife reports patient has diarrhea each time he is placed on Jevity 1.5 and feels it is due to fiber content. No previous reports of this from prior admissions. Patient's home regimen with Isosource 1.5 provides 19g protein/day. Recommended goal rate with Jevity 1.5 would provide 26g protein. Wife reports patient did not tolerate Glucerna formula at all in the past. Wife prefers that patient have some fiber in his formula but not as much as provided from [place]. Also discussed starting patient with continuous feeds for now before transitioning back to his home bolus regimen. Will trial TwoCal HN @ 40 mL/hr + 175 mL water flushes q 4 hours which will provide 1920 kcal, 80g protein, 5g fiber, and 1722 mL water and meet ~103% of estimated kcal and protein needs. Estimated kcal needs: 1864 kcal (MSJ (BMR 1434) x 1.3 AF)  Estimated protein needs: 78g (1.0 g/kg bw)    Will monitor TF tolerance, weights, and labs.   Thanks,  Tom Sullivan, [address]  Ext [phone]  Pager [phone]

## 2018-07-16 NOTE — CARDIO/PULMONARY
Cardiopulmonary Rehab:    Chart reviewed. Pt is on CHF bundle list.  Pt is a 80 y.o. male Severe sepsis; Group B streptococcal bacteremia; Possible aspiration pneumonia.     PHX includes Chronic systolic HF, CAD, CABG, BIV-ICD, Chronic kidney disease stage 3, Moderate to severe aortic stenosis, diabetes, CVA, Hyperlipidemia, Head and neck cancer with feeding tube, AICD 2014. LVEF 45-50% in 05/2018. New echo pending. Pt on PEG feedings. Lasix held due to hypotension. Pt with CHF: \"He does not look overtly decompensated\". Cardiac teaching is not indicated at this time.

## 2018-07-16 NOTE — PROGRESS NOTES
Pharmacy Automatic Renal Dosing Protocol - Antimicrobials    Indication for Antimicrobials: CAP, aspiration pneumonia     Current Regimen of Each Antimicrobial:  Ceftriaxone 2g q 24 hours (7/15; day #2  Metronidazole 500 mg q 8 hours (7/15; day #2    Previous Antimicrobial Therapy:  Levofloxacin 750 mg IV q48hr (Start Date 18; Day # 2  Piperacillin-tazobactam 3.375 g IV q8hr (Start date ; Day #2      Significant Cultures:    blood cx: STREPTOCOCCI, BETA HEMOLYTIC GROUP B GROWING IN ALL 4 BOTTLES DRAWN, GPCpending    Radiology / Imaging results: (X-ray, CT scan or MRI):    CT: Patchy bibasilar lung opacities, left greater than right, may represent aspiration, infection, or edema    Paralysis, amputations, malnutrition:     Labs:  Recent Labs      18   0502  07/15/18   0415  18   0440   CREA  1.28  1.57*  1.82*   BUN  29*  36*  38*   WBC  10.7  13.6*  9.7     Temp (24hrs), Av.9 °F (36.6 °C), Min:97.1 °F (36.2 °C), Max:98.3 °F (36.8 °C)    Creatinine Clearance (mL/min) or Dialysis: 33 ml/min    Impression/Plan:   · D/C LVQ and Zosyn  · Continue with Ceftriaxone 2 g q 24 hours   · Will adjust the metronidazole 500 mg q 12 hours; per protocol  · Will follow cx  · Antimicrobial stop date pending     Pharmacy will follow daily and adjust medications as appropriate for renal function and/or serum levels. Thank you,  Niurka Thompson, JONATHAND    Recommended duration of therapy  [email or web address] [email or web address]    Renal Dosing  [email or web address]%[email or web address] pdf

## 2018-07-16 NOTE — PROGRESS NOTES
Hospitalist Progress Note NAME: Tarah Aguilar :  1935 MRN:  [id number] Admit date: 2018 Today's date: 18 PCP: MD Jessenia Mckeon M.D. Cell [phone] Assessment / Plan: 
 
Severe sepsis POA , RR 32, WBC 13.6, hypotension 61/45 in ED Group B streptococcal bacteremia POA Possible aspiration pneumonia POA 
IVF NS 30 ml/kg bolus in ED Required pressors, now off 
 BC with Grp B strep Change to ceftriaxone and flagyl from levaquin and zosyn Flagyl for concern for aspiration pneumonia Source suspected is the lung Reviewed chest CT, ASD or edema left > right lungs No evidence of UTI or skin and soft tissue infection Recent increased back pain, had CT lumbar spine with chronic appearing DDD Echo done, results pending Check follow up cultures today and In AM 
D/C IVF Clinically looking better Transfer to [facility] out of stepdown PT/OT If echo okay and follow up BC negative, start D/C planning Acute kidney injury POA admit creatinine 1.82 Baseline creat 1.0 to 1.2 Improving with IVF, close to baseline, heplock IVF Recurrent N/V at home POA Now resolved Suspect due to acute illness, CT abdomen/pelvis was negative Start TF back today PRN zofran Baseline does not take anything by mouth, uses PEG Chronic systolic congestive heart failure POA LVEF 45-50% in 2018. Not overtly decompensated. Resume lasix Coreg, aspirin and Lipitor. Diabetes mellitus type 2 POA. , 160, 165, 135, 137 Sliding scale as per protocol. 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 Chronic dysphagia, NPO, wife requested Dr Marnie Weaver see about the dysphagia Overweight POA Body mass index is 26.9 kg/(m^2). Deep venous thrombosis prophylaxis with sequential compression device. Subjective: Chief Complaint / Reason for Physician Visit f/u Severe sepsis \"I feel a lot better\". Discussed with RN events overnight. Looks and feels better Still with cough and trouble bringing up the phlegm No diarrhea, headache, abdominal pain, Cp Low back pain is comfortable back on his pain meds Feels ready to resume the TF Review of Systems: 
Symptom Y/N Comments  Symptom Y/N Comments Fever/Chills n   Chest Pain n   
Poor Appetite    Edema Cough y   Abdominal Pain n   
Sputum n   Joint Pain y   
SOB/KRAMER n   Headache Nausea/vomit y   Tolerating PT/OT Diarrhea n   Tolerating Diet y Constipation    Other Could NOT obtain due to:   
 
Objective: VITALS:  
Last 24hrs VS reviewed since prior progress note. Most recent are: 
Patient Vitals for the past 24 hrs: 
 Temp Pulse Resp BP SpO2  
07/16/18 1119 97.6 °F (36.4 °C) 68 18 149/65 100 % 07/16/18 0726 98.3 °F (36.8 °C) 83 18 147/83 98 % 07/16/18 0335 98 °F (36.7 °C) 96 16 107/77 97 % 07/15/18 2324 98 °F (36.7 °C) 76 16 127/81 100 % 07/15/18 1915 97.7 °F (36.5 °C) 69 18 128/73 98 % 07/15/18 1536 97.1 °F (36.2 °C) 73 19 137/84 97 % 07/15/18 1534 98 °F (36.7 °C) - - - - Intake/Output Summary (Last 24 hours) at 07/16/18 1132 Last data filed at 07/15/18 2324 Gross per 24 hour Intake                0 ml Output              630 ml Net             -630 ml Wt Readings from Last 12 Encounters:  
07/16/18 77.9 kg (171 lb 11.8 oz) 07/09/18 78.9 kg (174 lb) 07/07/18 77.1 kg (170 lb) 07/05/18 77.6 kg (171 lb)  
06/28/18 70.3 kg (155 lb)  
06/23/18 76.2 kg (168 lb)  
06/11/18 78.1 kg (172 lb 3.2 oz) 05/16/18 77.1 kg (170 lb) 05/09/18 75.7 kg (166 lb 14.2 oz) 05/07/18 83.5 kg (184 lb)  
03/12/18 77.2 kg (170 lb 1.6 oz) 02/15/18 79.8 kg (175 lb 14.8 oz) PHYSICAL EXAM: 
General: WD, WN.  Alert, cooperative, no acute distress   
EENT:  PERRL. Anicteric sclerae. MMM Neck:  No meningismus, no thyromegaly, tissue loss on left Resp:  Decreased BS bilaterally, crackles at bases  no wheezing. No accessory muscle use CV:  Regular  Rhythm, grade 2/6 SM at LSB, No edema GI:  Soft, Non distended, Non tender.  +Bowel sounds, no rebound LN:  No cervical or inguinal YARELIS Neurologic:  Alert, normal speech, non focal motor exam 
Psych:   Not anxious nor agitated Skin:  No rashes. No jaundice Reviewed most current lab test results and cultures  YES Reviewed most current radiology test results   YES Review and summation of old records today    NO Reviewed patient's current orders and MAR    YES 
PMH/SH reviewed - no change compared to H&P 
________________________________________________________________________ Care Plan discussed with: 
  Comments Patient x Family  x Wife at bedside RN x Care Manager Consultant Multidiciplinary team rounds were held today with , nursing, pharmacist and clinical coordinator. Patient's plan of care was discussed; medications were reviewed and discharge planning was addressed. ________________________________________________________________________ Total NON critical care TIME:  25  Minutes Total CRITICAL CARE TIME Spent:   Minutes non procedure based Comments >50% of visit spent in counseling and coordination of care    
________________________________________________________________________ Chase Lo MD  
 
Procedures: see electronic medical records for all procedures/Xrays and details which were not copied into this note but were reviewed prior to creation of Plan. LABS: 
I reviewed today's most current labs and imaging studies. Pertinent labs include: 
Recent Labs  
   07/16/18 
 0502  07/15/18 
 0415  07/14/18 
 0440 WBC  10.7  13.6*  9.7 HGB  12.0*  12.0*  12.9 HCT  39.0  37.5  40.2 PLT  73*  77*  87* Recent Labs  
   07/16/18 
 0502  07/15/18 
 0415  07/14/18 
 0440 NA  143  140  137  
K  4.4  4.4  4.3 CL  109*  105  98 CO2  27  28  34* GLU  149*  136*  182* BUN  29*  36*  38* CREA  1.28  1.57*  1.82* CA  9.1  8.9  8.8 MG  2.5*   --    --   
ALB   --    --   3.2* TBILI   --    --   0.9 SGOT   --    --   48* ALT   --    --   46

## 2018-07-16 NOTE — PROGRESS NOTES
TRANSFER - IN REPORT:    Verbal report received from Kati(name) on Conception Guiles  being received from PCP(unit) for routine progression of care      Report consisted of patients Situation, Background, Assessment and   Recommendations(SBAR). Information from the following report(s) SBAR, Kardex, Recent Results and Med Rec Status was reviewed with the receiving nurse. Opportunity for questions and clarification was provided. Assessment completed upon patients arrival to unit and care assumed.

## 2018-07-16 NOTE — PROGRESS NOTES

## 2018-07-16 NOTE — HOME CARE
Please note that this patient was open to [address] [place] [address] (SN/PT) services at the time of hospital admission. If services will be needed at discharge, please order to resume them. Viry Luong RN  [facility] Nurse Liaison    or [phone]

## 2018-07-16 NOTE — PROGRESS NOTES
2:12PM  Initial Assessment:  CM met with pt and his wife, Cathy, to complete assessment and discuss d/c planning. Pt is an 81 yo male admitted for aspiration pneumonia. Pt was previously admitted at [facility] 6/28-7/3. At baseline, pt is independent with ADLs and IADLs, not including driving. Pt relies on his wife for transportation. He uses a RW and has a feeding tube. Pt lives with his wife. He is currently open to [facility] for nursing and PT. Resumption order will need to be sent at d/c, if apprpriate. Pt has been to [address] [place] [address] in the past and had very bad experiences. Per pt's wife, pt to get OP therapy for his back once he is feeling better. No PT/OT currently order. Likely appropriate to resume HH at d/c. CM will continue to follow and assist with d/c planning. Care Management Interventions  PCP Verified by CM: Yes  Double )  Mode of Transport at Discharge: Other (see comment) (Pt's wife )  Transition of Care Consult (CM Consult): Discharge Planning, [address]: Yes  Discharge Durable Medical Equipment: No  Physical Therapy Consult: No  Occupational Therapy Consult: No  Speech Therapy Consult: No  Current Support Network: Lives with Spouse, Own Home  Confirm Follow Up Transport: Family  Plan discussed with Pt/Family/Caregiver: Yes  Discharge Location  Discharge Placement: Home with home health      CM Carter  Care Manager        Reason for Readmission:     Pneumonia          RRAT Score and Risk Level:  40/High        Level of Readmission:    1      Care Conference scheduled:   Not at this time        Resources/supports as identified by patient/family:   Supportive family        Top Challenges facing patient (as identified by patient/family and CM): Finances/Medication cost?     No needs identified   Transportation      No needs identified   Support system or lack thereof? Supportive family   Living arrangements?          With spouse   Self-care/ADLs/Cognition?      Independent/Oriented        Current Advanced Directive/Advance Care Plan:  None/Full code            Plan for utilizing home health:   Nursing/PT             Likelihood of additional readmission:   Moderate              Transition of Care Plan:    Home with spouse and home health

## 2018-07-16 NOTE — PROGRESS NOTES
PCU SHIFT NURSING NOTE      Bedside shift change report given to Jose Carlos Smith (oncoming nurse) by Mary Srivastava (offgoing nurse). Report included the following information SBAR, Kardex, Intake/Output, MAR and Recent Results. Shift Summary: 0726  Pt up in chair  A&O x 4  1 assist  NPO  PEG tube  Paced on monitor  4LO2 NC    0854  Meds administered through PEG  Site is CDI  No skin breakdown, no dressing  Flushes well    1033  Orders rec'd to begin TF - called camille and requested kangaroo pump   1124  Pt wife requesting to speak to the nutritionist prior to beginning TF  Zaina Posada advised and will come to bedside    1225  Blood culture sent   [phone]  Report called to Bernadette on med tele  Pt being transferred to [facility]. 28.            Admission Date 7/14/2018   Admission Diagnosis Aspiration pneumonia ([address]   Consults IP CONSULT TO HOSPITALIST        Consults   []PT   []OT   []Speech   []Case Management      [] Palliative      Cardiac Monitoring Order   []Yes   []No     IV drips   []Yes    Drip:                            Dose:  Drip:                            Dose:  Drip:                            Dose:   []No     GI Prophylaxis   []Yes   []No         DVT Prophylaxis   SCDs:             Osorio stockings:         [] Medication   []Contraindicated   []None      Activity Level Activity Level: Bed Rest     Activity Assistance: Partial (one person)   Purposeful Rounding every 1-2 hour? []Yes   Vázquez Score  Total Score: 6   Bed Alarm (If score 3 or >)   []Yes   [] Refused (See signed refusal form in chart)   Mark Score  Mark Score: 19   Mark Score (if score 14 or less)   []PMT consult   []Wound Care consult      []Specialty bed   [] Nutrition consult          Needs prior to discharge:   Home O2 required:    []Yes   []No    If yes, how much O2 required?     Other:    Last Bowel Movement: Last Bowel Movement Date: 07/13/18      Influenza Vaccine Received Flu Vaccine for Current Season (usually Sept-March): Not Flu Season        Pneumonia Vaccine           Diet Active Orders   Diet    DIET NPO      LDAs               Peripheral IV 07/14/18 Left Antecubital (Active)   Site Assessment Clean, dry, & intact 7/15/2018  8:00 PM   Phlebitis Assessment 0 7/15/2018  8:00 PM   Infiltration Assessment 0 7/15/2018  8:00 PM   Dressing Status Clean, dry, & intact 7/15/2018  8:00 PM   Dressing Type Tape;Transparent 7/15/2018  8:00 PM   Hub Color/Line Status Infusing 7/15/2018  8:00 PM   Action Taken Dressing reinforced 7/14/2018 11:31 AM       Peripheral IV 07/14/18 Right Antecubital (Active)   Site Assessment Clean, dry, & intact 7/15/2018  8:00 PM   Phlebitis Assessment 0 7/15/2018  8:00 PM   Infiltration Assessment 0 7/15/2018  8:00 PM   Dressing Status Clean, dry, & intact 7/15/2018  8:00 PM   Dressing Type Tape;Transparent 7/15/2018  8:00 PM   Hub Color/Line Status Flushed 7/15/2018  8:00 PM                      Urinary Catheter      Intake & Output   Date 07/15/18 0700 - 07/16/18 0659 07/16/18 0700 - 07/17/18 0659   Shift 9196-7259 4064-0436 24 Hour Total 8652-8261 6350-1403 24 Hour Total   I  N  T  A  K  E   Shift Total  (mL/kg)         O  U  T  P  U  T   Urine  (mL/kg/hr) 330  (0.4) 300  (0.3) 630  (0.3)         Urine Voided 330 300 630       Shift Total  (mL/kg) 330  (4.2) 300  (3.9) 630  (8.1)      NET -330 -300 -630      Weight (kg) 78.4 77.9 77.9 77.9 77.9 77.9         Readmission Risk Assessment Tool Score High Risk            40       Total Score        3 Has Seen PCP in Last 6 Months (Yes=3, No=0)    4 IP Visits Last 12 Months (1-3=4, 4=9, >4=11)    5 Pt. Coverage (Medicare=5 , Medicaid, or Self-Pay=4)    28 Charlson Comorbidity Score (Age + Comorbid Conditions)        Criteria that do not apply:    . Living with Significant Other. Assisted Living. LTAC. SNF.  or   Rehab    Patient Length of Stay (>5 days = 3)       Expected Length of Stay - - -   Actual Length of Stay 2

## 2018-07-17 NOTE — DIABETES MGMT
DTC Progress Note Recommendations/ Comments: If appropriate, please consider starting Lantus 11 units daily - approx 80% home dose. BG trending upwards with addition of TF. Current hospital DM medication:  
-Humalog normal sensitivity correction Chart reviewed on Augustjihan Annaglenn. Patient is a 80 y.o. male with known history of Type 2 Diabetes on Lantus 14 units once daily and Humalog 13 units acB, 10 units acL and 7 units acD + SSI at home. A1c:  
Lab Results Component Value Date/Time Hemoglobin A1c 8.6 (H) 07/14/2018 04:40 AM  
 Hemoglobin A1c 8.6 (H) 06/04/2018 09:16 AM  
 
 
Recent Glucose Results:  
Lab Results Component Value Date/Time  (H) 07/17/2018 05:39 AM  
 GLUCPOC 208 (H) 07/17/2018 12:22 PM  
 GLUCPOC 221 (H) 07/17/2018 05:52 AM  
 GLUCPOC 218 (H) 07/17/2018 12:08 AM  
  
 
Lab Results Component Value Date/Time Creatinine 1.28 07/17/2018 05:39 AM  
 
Estimated Creatinine Clearance: 40.9 mL/min (based on Cr of 1.28). Active Orders Diet DIET NPO With Tube Feedings PO intake: No data found. Will continue to follow as needed. Thank you Gissell Soares RD, CDE [facility] Office: [phone]

## 2018-07-17 NOTE — PROGRESS NOTES
Bedside and Verbal shift change report given to [facility] (oncoming nurse) by Oh Phillip (offgoing nurse). Report included the following information SBAR, Kardex, Intake/Output, MAR, Recent Results and Med Rec Status.

## 2018-07-17 NOTE — PROGRESS NOTES
[facility] Tele CM completed chart review. Pt transferred from PCU yesterday. Noted that PCU CM completed evaluation yesterday. Pt lives at home with spouse at home. Pt is a Readmit 6/28 to 7/3 and was discharged home with [facility] [place] RN/PT. Pt will need LAVERNE order at discharge for RN/PT so that we can resume. Per MD anticipate DC 7/18/18. CM got LAVERNE orders and alerted [facility] so that they can start planning. CM issued [facility] letter. Signed copy on bedside chart. CM emailed CM specialist to make follow up appointment. Wife will transport home in car. 12:20 PM   MD is now anticipating DC on 7/20/18. CM asked CM specialist to reschedule PCP appt. CM will continue to monitor discharge plan. Care Management Interventions  PCP Verified by CM: Yes Lula Butler )  Mode of Transport at Discharge:  Other (see comment) (Pt's wife )  Transition of Care Consult (CM Consult): Discharge Planning, [address]: Yes  Discharge Durable Medical Equipment: No  Physical Therapy Consult: No  Occupational Therapy Consult: No  Speech Therapy Consult: No  Current Support Network: Lives with Spouse, Own Home  Confirm Follow Up Transport: Family  Plan discussed with Pt/Family/Caregiver: Yes  Discharge Location  Discharge Placement: Home with home health    Haris, Ezra[address]

## 2018-07-17 NOTE — PROGRESS NOTES
Hospitalist Progress Note NAME: Carmel Hancock :  1935 MRN:  [id number] Assessment / Plan: 
Severe sepsis POA Group B streptococcal bacteremia POA Possible aspiration pneumonia POA 
-on admission , RR 32, WBC 13.6, hypotension 61/45 in ED Required pressors, now off Clinically improved. Vs stable. Leukocytosis resolved  
-4/ BC with Grp B strep on admission, repeated BC ,17 - NTD  
-AB: Change to ceftriaxone and flagyl from levaquin and zosyn Flagyl for concern for aspiration pneumonia Source suspected is the lung Reviewed chest CT, ASD or edema left > right lungs No evidence of UTI or skin and soft tissue infection Recent increased back pain, had CT lumbar spine with chronic appearing DDD 
-ECHO wo vegetation Chronic dysphagia 
-NPO at home with TF  [employer] 1.5 
- Followed Dr Jaky White OP for dysphasia, was considering EGD with possible attempt of dilatation. Cardiology clearance was pending ( per wife primary cardiology Dr Derrick Rosen procedure)  
-consulted primary GI to consider procedure while here   
  
Chronic systolic HF EF 92% / mod to severe AS    
Chronic orthostatic hypotension  
-no signs of fluid overload. HR/BP stable  
-on home lasix, aspirin/plavix. Lipitor. -ECHO: EF 40%, mild MR, mod to sev AS, mild TR  
  
DM type II  
-BS ~200 with TF  
-restart home lantus in am  
-dose of NPH now  
-cont SS LIZ POA, resolved, pre renal, adm cr 1.82--> 1.28 with IVF Recurrent N/V at home POA, resolved. CT abdomen/pelvis was negative Chronic thrombocytopenia POA stable   
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 Code status: Full Prophylaxis: SCD's ( thrombocytopenia) Recommended Disposition:  PT, OT, RN , hopefully 2-3 days pending negative repeated BCs PT: resume HH PT   
 
Subjective: Chief Complaint / Reason for Physician Visit Tolerating TF 
C/o cough and unable to clear sputum Wife/pt c/o \" hiccups\" for the last 3 weeks, better since in the hospital but still disturbing. Clinically looks like troubles with clearing mucus Discussed with RN events overnight. Review of Systems: 
Symptom Y/N Comments  Symptom Y/N Comments Fever/Chills n   Chest Pain n   
Poor Appetite    Edema Cough y   Abdominal Pain n   
Sputum    Joint Pain SOB/KRAMER n   Pruritis/Rash Nausea/vomit n   Tolerating PT/OT Diarrhea    Tolerating Diet Constipation    Other Could NOT obtain due to:   
 
Objective: VITALS:  
Last 24hrs VS reviewed since prior progress note. Most recent are: 
Patient Vitals for the past 24 hrs: 
 Temp Pulse Resp BP SpO2  
07/17/18 1200 98.1 °F (36.7 °C) 84 16 148/72 92 % 07/17/18 0848 97.9 °F (36.6 °C) 83 16 156/71 100 % 07/17/18 0400 97.5 °F (36.4 °C) 90 19 (!) 153/91 97 % 07/16/18 2303 98.2 °F (36.8 °C) 97 18 150/65 99 % 07/16/18 2049 98 °F (36.7 °C) 85 18 134/84 95 % 07/16/18 1527 97.3 °F (36.3 °C) 69 16 125/73 98 % Intake/Output Summary (Last 24 hours) at 07/17/18 1427 Last data filed at 07/17/18 0400 Gross per 24 hour Intake              880 ml Output              725 ml Net              155 ml PHYSICAL EXAM: 
General: WD, WN. Alert, cooperative, no acute distress. Thin   
EENT:  EOMI. Anicteric sclerae. MMM Resp:  Rhonchi bilaterally, no wheezing or rales. No accessory muscle use CV:  Regular  rhythm,  No edema, + murmur GI:  Soft, Non distended, Non tender.  +Bowel sounds Neurologic:  Alert and oriented X 3, normal speech, Psych:   Good insight. Not anxious nor agitated Skin:  No rashes. No jaundice Reviewed most current lab test results and cultures  YES Reviewed most current radiology test results   YES Review and summation of old records today    NO Reviewed patient's current orders and MAR    YES 
PMH/SH reviewed - no change compared to H&P 
________________________________________________________________________ Care Plan discussed with: 
  Comments Patient y Family  y Wife bedside RN y   
Care Manager Consultant  y GI Multidiciplinary team rounds were held today with , nursing, pharmacist and clinical coordinator. Patient's plan of care was discussed; medications were reviewed and discharge planning was addressed. ________________________________________________________________________ Total NON critical care TIME:  45   Minutes Total CRITICAL CARE TIME Spent:   Minutes non procedure based Comments >50% of visit spent in counseling and coordination of care    
________________________________________________________________________ Raegan Barajas MD  
 
Procedures: see electronic medical records for all procedures/Xrays and details which were not copied into this note but were reviewed prior to creation of Plan. LABS: 
I reviewed today's most current labs and imaging studies. Pertinent labs include: 
Recent Labs  
   07/17/18 
 0539  07/16/18 
 0502  07/15/18 
 0415 WBC  7.3  10.7  13.6* HGB  12.1  12.0*  12.0*  
HCT  38.4  39.0  37.5 PLT  69*  73*  77* Recent Labs  
   07/17/18 
 0539  07/16/18 
 0502  07/15/18 
 0415 NA  144  143  140  
K  4.5  4.4  4.4 CL  108  109*  105 CO2  31  27  28 GLU  220*  149*  136* BUN  30*  29*  36* CREA  1.28  1.28  1.57* CA  9.1  9.1  8.9 MG   --   2.5*   --   
 
 
Signed: Raegan Barajas MD

## 2018-07-17 NOTE — PROGRESS NOTES
GI note:  Pt seen and examined. D/w [place]. Full consult to follow. No plans for EGD +/- dilation. He is on Plavix and has very low platelets. BAS could not be completed. Will add IV reglan to decrease vomiting. He is a diabetic. Is gastric emptying an issue? Strict aspiration precautions. Hold tube feeds for today.     SMA Nathalia MD

## 2018-07-17 NOTE — PROGRESS NOTES
PCP SUSAN appt scheduled with Dr. Collette Green on 7/19/2018 at 2:00pm. Appt added to [address] [facility], CM Specialist  Second Initial PCP JO MENDOZA appt scheduled with Dr. Collette Green 7/31/2018 at 2:00pm. Appt added to [address] [facility], [facility]2 Brijesh Hector Specialist

## 2018-07-17 NOTE — CONSULTS
Gastroenterology Consult Referring Physician: Dr. Miri Sebastian Consult Date: 7/17/2018 Subjective: Chief Complaint: aspiration pneumonia History of Present Illness: Vianey Domínguez is a 80 y.o. male who is seen in consultation for dysphagia. Mr. Dee Duenas had a PEG tube placed years ago for throat cancer and uses it exclusively for nutrition. He has it replaced in the office periodically. He was admitted for aspiration pneumonia  He apparently started vomiting and then coughing and choking and was brought to ER where he was diagnosed with pneumonia. While here he has c/o mucous getting caught in his throat which has been an ongoing issue for 8 years. He says he has to cough and cough and it can take him 15-30 min to get it up. This happens several times per day. It is not progressing. While here he has been using a suction. The only thing he ever coughs up is mucous, never tube feeds. He does not eat or drink anything by mouth. He denies heartburn, indigestion, belching up fluid or issues tolerating tube feeds. He had an attempted barium swallow on 7/11/18: The patient could not stand and examination was attempted with the patient semiupright on the fluoroscopy table in the right oblique position. He took several mouthfuls of barium but could not swallow. This passively ran along the posterior pharynx. No aspiration identified during this time. He was asked to spelled the barium out. He had esophageal manometry in 2013 that showed: diffuse esophageal spasm. He had esophageal dilation in 2013. He reports he used to get botox injections in his esophagus and was wondering if repeat EGD dilation or botox would help his mucous problem. He takes Plavix for hx of CVA. Past Medical History:  
Diagnosis Date  Antiplatelet or antithrombotic long-term use 12/4/2014  Anxiety disorder  Arrhythmia 2009  
 bradycardia  Arthritis  CAD (coronary artery disease) s/p CABG 2002;   [place]  
 Cancer [facility]) 1996  
 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   
 left leg, lower back,   
 Depression  Diabetes ([facility] [address] Type II  
 Esophageal dysmotility s/p dilitation  Esophageal motility disorder 7/8/2013 Frequent simultaneous or failed contractions, low amplitude contractions  suggests severe myopathy or diffuse spasm. I suspect the latter. Achalasia  is not present.  GERD (gastroesophageal reflux disease)  Heart failure ([facility] [address] 10/2014 Cardiomyopathy:Pacemaker upgrade:Biv and AICD  Dr. Sharla Marti heart  last visit 5/11/2015  Hepatitis C Dx 1996  
 treated at [facility] in past; as of 4/15/15 wife states pt currently not under any treatment  Hyperlipidemia  Hypertension  Myocardial infarct [facility]) 2013 Heart Cath: 40% LV EF, Stented distal LAD, patent Graft to circumflex  On tube feeding diet approx 2009  
 still has as of 9/28/15  (no po food/liquid/meds at all); Dr Karlo Williamson  Other ill-defined conditions(799.89) 1996  
 1 dose of chemotherapy/radiation for tongue cancer  Other ill-defined conditions(799.89) BPH  Other ill-defined conditions(799.89) orthostatic hypotension  Pneumonia ~ April -May 2010  Stroke [facility]) approx 2003  
 left side-left finger tips numb; no imbalance or memory loss; as of 2015 not seeing neuro MD  
 Suicidal thoughts Past Surgical History:  
Procedure Laterality Date  ABDOMEN SURGERY PROC UNLISTED  1996  
 peg tube  CABG, ARTERY-VEIN, THREE  2000  HX CATARACT REMOVAL    
 bilateral  
 HX CHOLECYSTECTOMY [address] CATHETERIZATION  2013 Stented distal LAD  HX MOHS PROCEDURES    
 bilateral  
 HX ORTHOPAEDIC    
 back surgery times two  HX OTHER SURGICAL Radical Left Neck  HX OTHER SURGICAL    
 NASAL POLYPS REMOVAL  
 HX OTHER SURGICAL  2010 TURP  
 HX PACEMAKER  11/08  HX PACEMAKER  10/28/14 Defibrillator: [facility] # [id number], serial # [id number]; Dr. Mayra Shelton [phone]; Dr Chase Vuong  HX UROLOGICAL    
 cystoscopy  [facility]  
 cevical surgery  CO CHANGE GASTROSTOMY TUBE  2011  CO CHANGE GASTROSTOMY TUBE  2011  CO EGD INSERT GUIDE WIRE DILATOR PASSAGE ESOPHAGUS  2010  CO EGD TRANSORAL BIOPSY SINGLE/MULTIPLE  2010  
    
 STOMACH SURGERY PROCEDURE UNLISTED  2011  VASCULAR SURGERY PROCEDURE UNLIST    
 bilateral carotid stents Family History Problem Relation Age of Onset  Heart Disease Father   
   at age 52 from CAD  Colon Cancer Mother  Cancer Mother   
  colon ca  
 Heart Disease Brother Social History Substance Use Topics  Smoking status: Never Smoker  Smokeless tobacco: Never Used  Alcohol use No  
  
Allergies Allergen Reactions  Demerol [Meperidine] Shortness of Breath  Paxil [Paroxetine Hcl] Unknown (comments) Pt gets shaky and loses control of legs  Amoxicillin Rash  Cleocin [Clindamycin Hcl] Rash  Pcn [Penicillins] Rash Current Facility-Administered Medications Medication Dose Route Frequency  guaiFENesin (ROBITUSSIN) 100 mg/5 mL oral liquid 300 mg  300 mg Oral TID  [START ON 2018] furosemide (LASIX) tablet 40 mg  40 mg Oral DAILY  furosemide (LASIX) tablet 20 mg  20 mg Oral PCL  [START ON 2018] insulin glargine (LANTUS) injection 14 Units  14 Units SubCUTAneous DAILY  insulin NPH (NOVOLIN N, HUMULIN N) injection 10 Units  10 Units SubCUTAneous ONCE  
 metroNIDAZOLE (FLAGYL) IVPB premix 500 mg  500 mg IntraVENous Q12H  cefTRIAXone (ROCEPHIN) 2 g in 0.9% sodium chloride (MBP/ADV) 50 mL  2 g IntraVENous Q24H  
 gabapentin (NEURONTIN) 250 mg/5 mL solution 750 mg  750 mg Oral TID  
 HYDROcodone-acetaminophen (HYCET) 0.5-21.7 mg/mL oral solution 5-10 mg  5-10 mg PEG Tube Q6H PRN  
 klack's mouthwash oral suspension (COMPOUNDED)  5 mL Oral Q6H  
 sodium chloride (NS) flush 5-10 mL  5-10 mL IntraVENous PRN  
 aspirin (ASPIRIN) tablet 325 mg  325 mg Oral DAILY  atorvastatin (LIPITOR) tablet 40 mg  40 mg Oral DAILY  clopidogrel (PLAVIX) tablet 75 mg  75 mg Oral DAILY  finasteride (PROSCAR) tablet 5 mg  5 mg Oral QHS  nitroglycerin (NITROSTAT) tablet 0.4 mg  0.4 mg SubLINGual Q5MIN PRN  pramipexole (MIRAPEX) tablet 0.25 mg  0.25 mg Oral TID  sodium chloride (NS) flush 5-10 mL  5-10 mL IntraVENous Q8H  
 sodium chloride (NS) flush 5-10 mL  5-10 mL IntraVENous PRN  
 midodrine (PROAMITINE) tablet 30 mg  30 mg Oral TID  glucose chewable tablet 16 g  4 Tab Oral PRN  
 dextrose (D50W) injection syrg 12.5-25 g  12.5-25 g IntraVENous PRN  
 glucagon (GLUCAGEN) injection 1 mg  1 mg IntraMUSCular PRN  
 insulin lispro (HUMALOG) injection   SubCUTAneous Q6H  
 acetaminophen (TYLENOL) tablet 650 mg  650 mg Oral Q6H PRN  
 ondansetron (ZOFRAN) injection 4 mg  4 mg IntraVENous Q6H PRN  
 famotidine (PEPCID) tablet 20 mg  20 mg Oral ACB Review of Systems: A detailed 10 organ review of systems is obtained with pertinent positives as listed in the History of Present Illness and Past Medical History. A detailed review of systems was performed as follows: 
Constitutional:  Negative Eyes:  No ocular sensitivity to the sun, blurred vision or double vision. ENMT:  No nose or sinus problems. Respiratory: see HPI Cardiac:  See HPI Gastrointestinal:  See history of the present illness :   No pain with urination or hematuria Musculoskeletal:  No arthritis or hot swollen joints. Endocrine:  No thyroid disease or diabetes Psychiatric: No depression or feeling blue Integumentary:  No skin rash or sensitivity to the sun. Neurologic:  +Hx of stroke Heme-Lymphatic:  No history of anemia, no unexplained lumps or bumps Objective:  
 
Physical Exam: 
Visit Vitals  /72 (BP 1 Location: Left arm, BP Patient Position: Sitting;Post activity) Comment (BP Patient Position): Sitting up in chair. Just finished bath (partial help)  Pulse 84  Temp 98.1 °F (36.7 °C)  Resp 16  
 Ht 5' 7\" (1.702 m)  Wt 78.8 kg (173 lb 11.6 oz)  SpO2 92%  BMI 27.21 kg/m2 Gen: WDWN. NAD Skin:  Extremities and face reveal no rashes. HEENT: Sclerae anicteric. No abnormal pigmentation of the lips. The neck has healed surgical scars. Adentulous. Cardiovascular: Regular rate and rhythm. No murmurs, gallops, or rubs. Respiratory:  Comfortable breathing with no accessory muscle use. Clear breath sounds with no wheezes, rales, or rhonchi. GI:  Abdomen nondistended, soft, and nontender. PEG tube in LUQ with tube feeds infusing. Normal active bowel sounds. No enlargement of the liver or spleen. No masses palpable. Rectal:  Deferred Musculoskeletal:  No pitting edema of the lower legs. Neurological:  Gross memory appears intact. Patient is alert and oriented. Psychiatric:  Mood appears appropriate with judgement intact. Lymphatic:  No cervical or supraclavicular adenopathy. Lab/Data Review: 
Lab Results Component Value Date/Time WBC 7.3 07/17/2018 05:39 AM  
 HGB 12.1 07/17/2018 05:39 AM  
 HCT 38.4 07/17/2018 05:39 AM  
 PLATELET 69 (L) 52/75/5490 05:39 AM  
 MCV 97.0 07/17/2018 05:39 AM  
 
Lab Results Component Value Date/Time  Sodium 144 07/17/2018 05:39 AM  
 Potassium 4.5 07/17/2018 05:39 AM  
 Chloride 108 07/17/2018 05:39 AM  
 CO2 31 07/17/2018 05:39 AM  
 Anion gap 5 07/17/2018 05:39 AM  
 Glucose 220 (H) 07/17/2018 05:39 AM  
 BUN 30 (H) 07/17/2018 05:39 AM  
 Creatinine 1.28 07/17/2018 05:39 AM  
 BUN/Creatinine ratio 23 (H) 07/17/2018 05:39 AM  
 GFR est AA >60 07/17/2018 05:39 AM  
 GFR est non-AA 54 (L) 07/17/2018 05:39 AM  
 Calcium 9.1 07/17/2018 05:39 AM  
 Bilirubin, total 0.9 07/14/2018 04:40 AM  
 AST (SGOT) 48 (H) 07/14/2018 04:40 AM  
 Alk. phosphatase 106 07/14/2018 04:40 AM  
 Protein, total 7.2 07/14/2018 04:40 AM  
 Albumin 3.2 (L) 07/14/2018 04:40 AM  
 Globulin 4.0 07/14/2018 04:40 AM  
 A-G Ratio 0.8 (L) 07/14/2018 04:40 AM  
 ALT (SGPT) 46 07/14/2018 04:40 AM  
 
Lab Results Component Value Date/Time INR 1.1 05/16/2018 02:39 AM  
 INR 1.0 05/07/2018 02:12 PM  
 INR 1.2 (H) 01/14/2018 12:14 PM  
 Prothrombin time 10.8 05/16/2018 02:39 AM  
 Prothrombin time 10.1 05/07/2018 02:12 PM  
 Prothrombin time 12.0 (H) 01/14/2018 12:14 PM  
 
CT Results (most recent): 
 
Results from Hospital Encounter encounter on 07/14/18 CT ABD PELV WO CONT Addendum Addendum:   The PEG tube is satisfactorily positioned in the stomach. Ross Loera MD 7/14/2018 10:16 PM         
 
 Narrative EXAM:  CT ABD PELV WO CONT INDICATION: Abdominal pain. COMPARISON: CT 5/16/2018. TECHNIQUE: Helical CT of the abdomen  and pelvis  without contrast. Coronal and 
sagittal reformats are performed. CT dose reduction was achieved through use of 
a standardized protocol tailored for this examination and automatic exposure 
control for dose modulation. FINDINGS:  
Solid organ evaluation is limited without contrast.  
 
The visualized lung bases demonstrate patchy lung opacities, left greater than 
right. The heart size is normal. ICD wires are partially visualized. There is no 
pericardial or pleural effusion. There is a 4 mm nonobstructing right kidney calculus. The kidneys are symmetric 
without hydronephrosis. There is no perinephric fluid or fat stranding. A 
subcentimeter high density right kidney lesion is too small to characterize. Small liver cysts are again noted. The spleen, pancreas, and adrenal glands are 
normal.  The gall bladder is surgically absent without intra- or extra-hepatic 
biliary dilatation. A percutaneous gastrostomy tube is present. There are no dilated bowel loops.   
The appendix is normal.   
 
There are no enlarged lymph nodes. There is no free fluid or free air. The 
aorta tapers without aneurysm. There is aortoiliac atherosclerosis. The urinary bladder is unremarkable. There is no pelvic mass. The prostate is 
not enlarged. There are degenerative changes in the spine and hips. There is posterior fusion 
hardware at L3-L5. Impression IMPRESSION:  
1. There is no acute abnormality in the abdomen or pelvis. 2. Patchy bibasilar lung opacities, left greater than right, may represent 
aspiration, infection, or edema. Assessment/Plan:  
 
Principal Problem: 
  Aspiration pneumonia ([address] (11/28/2016) Patient developed vomiting and then aspiration pneumonia. The aspiration is most likely chronic but I question why he vomited. Given his diabetes, need to consider gastroparesis. Consider  a prokinetic agent like Reglan. [facility] can do a liquid phase GES as an outpatient. I explained to the patient that I don't think doing an EGD dilatation will help his mucous issue but he will set up an appointment to discuss his options with Dr. Gal Marlow in the office as an outpatient. We should not do an EGD while inpatient given his plavix use and low platelets. He may benefit from a suction at home. CELIO Guzman 
07/17/18 
5:08 PM 
 
I have personally reviewed the history and independently examined the patient. I have reviewed the chart and agree with the documentation recorded by the Mid Level Provider, including the assessment, treatment plan, and disposition. Pt seen and examined. D/w [facility]. No plans for EGD +/- dilation. He is on Plavix and has very low platelets. BAS could not be completed. See report. Will add IV reglan to decrease vomiting. He is a diabetic. Is gastric emptying now also an issue? Strict aspiration precautions.  
Hold tube feeds for today. 
  
SMA Nathalia MD

## 2018-07-17 NOTE — PROGRESS NOTES
Problem: Mobility Impaired (Adult and Pediatric)  Goal: *Acute Goals and Plan of Care (Insert Text)  Physical Therapy Goals  Initiated 7/17/2018  1. Patient will move from supine to sit and sit to supine , scoot up and down and roll side to side in bed with independence within 7 day(s). 2.  Patient will transfer from bed to chair and chair to bed with supervision/set-up using the least restrictive device within 7 day(s). 3.  Patient will perform sit to stand with supervision/set-up within 7 day(s). 4.  Patient will ambulate with supervision/set-up for 250 feet with the least restrictive device within 7 day(s). 5.  Patient will ascend/descend 7 stairs with 1 handrail(s) with minimal assistance/contact guard assist within 7 day(s). physical Therapy EVALUATION  Patient: Matias Paulino (61 y.o. male)  Date: 7/17/2018  Primary Diagnosis: Aspiration pneumonia ([facility]        Precautions:        ASSESSMENT :  Based on the objective data described below, the patient presents with generalized weakness, decreased activity tolerance/endurance, and overall decreased functional mobility from baseline modified independent levels. Pt received sitting in bedside chair, agreeable to joint PT/OT evaluation. Pt is currently on 2L O2, however does not wear at home. O2 removed for therapy session w/RN approval. Pt demonstrates ability to sit<>stand CGAx1 and perform ambulation trial of ~150ft w/RW support and CGAx1 (pt uses a rollator at home). Gait is characterized by decreased step clearance, wide CRISTIANO, decreased pace, and overall shuffled pattern. Despite gait abnormalities, pattern is steady and no overt LOB displayed. Pt demonstrates ability to transfer on/off commode CGAx1, and session concludes with pt resting comfortably in bedside chair w/all needs met, RN notified. Pt O2 sat dropped to 89% after ambulation trial, . O2 sat recovers to 94% w/1 min seated rest and VC for PLB.     Pt has good potential to benefit from skilled physical therapy to address the impairments noted above. Recommend progression of ambulation distance at next therapy session w/continued close monitoring of O2 Sat. response. Recommend resumption of  PT upon DC, as pt will have good family support at home to assist upon DC. Patient will benefit from skilled intervention to address the above impairments. Patients rehabilitation potential is considered to be Good  Factors which may influence rehabilitation potential include:   []         None noted  []         Mental ability/status  [x]         Medical condition (history of tremors)  []         Home/family situation and support systems  []         Safety awareness  []         Pain tolerance/management  []         Other:      PLAN :  Recommendations and Planned Interventions:  [x]           Bed Mobility Training             []    Neuromuscular Re-Education  [x]           Transfer Training                   []    Orthotic/Prosthetic Training  [x]           Gait Training                         []    Modalities  [x]           Therapeutic Exercises           []    Edema Management/Control  [x]           Therapeutic Activities            [x]    Patient and Family Training/Education  [x]           Other (comment):Stairclimbing    Frequency/Duration: Patient will be followed by physical therapy  3 times a week to address goals. Discharge Recommendations: Resume Home Health PT  Further Equipment Recommendations for Discharge: None needed at this time     SUBJECTIVE:   Patient stated I remember you guys. 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 I suspect the latter. Achalasia  is not present.         GERD (gastroesophageal reflux disease)     Heart failure ([facility] 10/2014     Cardiomyopathy:Pacemaker upgrade:Biv and AICD  Dr. Sharla Marti heart  last visit 5/11/2015    Hepatitis C Dx 1996    treated at [facility] in past; as of 4/15/15 wife states pt currently not under any treatment    Hyperlipidemia     Hypertension     Myocardial infarct [facility]) 2013    Heart Cath: 40% LV EF, Stented distal LAD, patent Graft to circumflex    On tube feeding diet approx 2009    still has as of 9/28/15  (no po food/liquid/meds at all); Dr Meghann Jasso Other ill-defined conditions(799.89) 1996    1 dose of chemotherapy/radiation for tongue cancer    Other ill-defined conditions(799.89)     BPH    Other ill-defined conditions(799.89)     orthostatic hypotension    Pneumonia ~ April -May 2010    Stroke [facility]) approx 2003    left side-left finger tips numb; no imbalance or memory loss; as of 2015 not seeing neuro MD    Suicidal thoughts      Past Surgical History:   Procedure Laterality Date    ABDOMEN SURGERY [address]    peg tube    CABG, ARTERY-VEIN, THREE  2000    HX CATARACT REMOVAL      bilateral    HX CHOLECYSTECTOMY      HX HEART CATHETERIZATION  2013    Stented distal LAD    HX MOHS PROCEDURES      bilateral    HX ORTHOPAEDIC      back surgery times two    HX OTHER SURGICAL      Radical Left Neck    HX OTHER SURGICAL      NASAL POLYPS REMOVAL    HX OTHER SURGICAL  2010    TURP    HX PACEMAKER  11/08    HX PACEMAKER  10/28/14     Defibrillator: [facility] # [id number], serial # [id number]; Dr. Cy Bosch [phone]; Dr Osvaldo Dover  HX UROLOGICAL      cystoscopy    NEUROLOGICAL PROCEDURE UNLISTED  1996    cevical surgery    LA CHANGE GASTROSTOMY TUBE  5/2/2011         LA CHANGE GASTROSTOMY TUBE  6/29/2011         LA EGD INSERT GUIDE WIRE DILATOR PASSAGE ESOPHAGUS  9/13/2010         LA EGD TRANSORAL BIOPSY SINGLE/MULTIPLE  9/13/2010         STOMACH SURGERY PROCEDURE UNLISTED  6/29/2011         VASCULAR SURGERY PROCEDURE UNLIST      bilateral carotid stents     Prior Level of Function/Home Situation: Pt lives w/his wife in a 1 story home. Pt is able to ambulate throughout home w/rollator support. Pt has baseline UE tremors, wife assists w/fine motor ADL's (buttons, etc.) Pt is a readmission w/recent hospitalization for increasing tremors in UE/LE. Pt was DC to [facility] and P.T. had just started before readmission. Personal factors and/or comorbidities impacting plan of care: Baseline Tremors, PEG tube    Home Situation  Home Environment: Private residence  # Steps to Enter: 7  Rails to Enter: Yes  Hand Rails : Right  One/Two Story Residence: One story  Living Alone: No  Support Systems: Spouse/Significant Other/Partner, Child(lisa)  Patient Expects to be Discharged to[de-identified] Private residence  Current DME Used/Available at Home: Naz Roberts, rollator, [address] chair, Cane, straight, Commode, bedside    EXAMINATION/PRESENTATION/DECISION MAKING:   Critical Behavior:         Hearing: Auditory  Auditory Impairment: Hard of hearing, right side  Skin:  Intact  Edema: None noted    Range Of Motion:  AROM: Generally decreased, functional      Strength:    Strength: Generally decreased, functional      Tone & Sensation:   Tone: Normal       Coordination:  Coordination: Generally decreased, functional (Baseline slight UE/LE tremors)     Functional Mobility:  Bed Mobility:  Scooting: Supervision    Transfers:  Sit to Stand: Contact guard assistance;Assist x1  Stand to Sit: Contact guard assistance;Assist x1     Balance:   Sitting: Intact  Standing: Intact; With support    Ambulation/Gait Training:  Distance (ft): 150 Feet (ft)  Assistive Device: Walker, rolling;Gait belt  Ambulation - Level of Assistance: Contact guard assistance;Assist x1  Gait Abnormalities: Decreased step clearance;Shuffling gait  Speed/Gertrudis: Pace decreased (<100 feet/min)  Step Length: Left shortened;Right shortened     Functional Measure:  Barthel Index:    Bathin  Bladder: 5  Bowels: 5  Groomin  Dressin  Feedin  Mobility: 10  Stairs: 0  Toilet Use: 5  Transfer (Bed to Chair and Back): 10  Total: 45       Barthel and G-code impairment scale:  Percentage of impairment CH  0% CI  1-19% CJ  20-39% CK  40-59% CL  60-79% CM  80-99% CN  100%   Barthel Score 0-100 100 99-80 79-60 59-40 20-39 1-19   0   Barthel Score 0-20 20 17-19 13-16 9-12 5-8 1-4 0      The Barthel ADL Index: Guidelines  1. The index should be used as a record of what a patient does, not as a record of what a patient could do. 2. The main aim is to establish degree of independence from any help, physical or verbal, however minor and for whatever reason. 3. The need for supervision renders the patient not independent. 4. A patient's performance should be established using the best available evidence. Asking the patient, friends/relatives and nurses are the usual sources, but direct observation and common sense are also important. However direct testing is not needed. 5. Usually the patient's performance over the preceding 24-48 hours is important, but occasionally longer periods will be relevant. 6. Middle categories imply that the patient supplies over 50 per cent of the effort. 7. Use of aids to be independent is allowed. Anjali Schaefer., Barthel, D.W. ([id number]). Functional evaluation: the Barthel Index. [address] [facility] (14)2. NOEL Blanchard, Loco Glass [place], [address] ().  Measuring the change indisability after inpatient rehabilitation; comparison of the responsiveness of the Barthel Index and Functional [place] Measure. Journal of Neurology, Neurosurgery, and Psychiatry, 66(4), 897-643. LISSETTE Bustos.A, QIANA Gifford, & Anu Kamara M.A. (2004.) Assessment of post-stroke quality of life in cost-effectiveness studies: The usefulness of the Barthel Index and the EuroQoL-5D. Quality of Life Research, 13, 289-14       G codes: In compliance with CMSs Claims Based Outcome Reporting, the following G-code set was chosen for this patient based on their primary functional limitation being treated: The outcome measure chosen to determine the severity of the functional limitation was the Barthel Index with a score of 45/100 which was correlated with the impairment scale. ? Mobility - Walking and Moving Around:     - CURRENT STATUS: CK - 40%-59% impaired, limited or restricted    - GOAL STATUS: CI - 1%-19% impaired, limited or restricted    - D/C STATUS:  ---------------To be determined---------------      Physical Therapy Evaluation Charge Determination   History Examination Presentation Decision-Making   MEDIUM  Complexity : 1-2 comorbidities / personal factors will impact the outcome/ POC  MEDIUM Complexity : 3 Standardized tests and measures addressing body structure, function, activity limitation and / or participation in recreation  MEDIUM Complexity : Evolving with changing characteristics  MEDIUM Complexity : FOTO score of 26-74      Based on the above components, the patient evaluation is determined to be of the following complexity level: MEDIUM    Pain:  Pain Scale 1: Numeric (0 - 10)  Pain Intensity 1: 0         Activity Tolerance:   Pt received on 2L O2, however taken off O2 during session w/RN approval.  Pt O2 sat dropped to 89% after ambulation trial, . O2 sat recovers to 94% w/1 min seated rest and VC for PLB  Please refer to the flowsheet for vital signs taken during this treatment.     After treatment:   [x]         Patient left in no apparent distress sitting up in chair  []         Patient left in no apparent distress in bed  [x]         Call bell left within reach  [x]         Nursing notified  [x]         Caregiver present  []         Bed alarm activated    COMMUNICATION/EDUCATION:   The patients plan of care was discussed with: Occupational Therapist and Registered Nurse. [x]         Fall prevention education was provided and the patient/caregiver indicated understanding. [x]         Patient/family have participated as able in goal setting and plan of care. [x]         Patient/family agree to work toward stated goals and plan of care. []         Patient understands intent and goals of therapy, but is neutral about his/her participation. []         Patient is unable to participate in goal setting and plan of care. Thank you for this referral.  Papito Ordonez, JENNIFER   Time Calculation: 20 mins  Regarding student involvement in patient care:  A student participated in this treatment session. Per CMS Medicare statements and APTA guidelines I certify that the following was true:  1. I was present and directly observed the entire session. 2. I made all skilled judgments and clinical decisions regarding care. 3. I am the practitioner responsible for assessment, treatment, and documentation.

## 2018-07-17 NOTE — PROGRESS NOTES
Occupational Therapy EVALUATION/discharge  Patient: Jose Lozano (68 y.o. male)  Date: 7/17/2018  Primary Diagnosis: Aspiration pneumonia ([facility] [address]        Precautions:        ASSESSMENT:   Based on the objective data described below, the patient presents at his independent to mod I baseline for ADLs, but is requiring CGA overall for functional mobility during ADL. No tremors or impairment in coordination/motor control noted in trunk or extremities during this evaluation. Patient indicates his wife has a scheduled hip surgery for next week and his family had a meeting a few days ago to determine a schedule so that someone would be present 24/7 at first.  At this point further skilled acute occupational therapy is not indicated at this time. Will defer balance mobility issues to PT. Discharge Recommendations: None for OT  Further Equipment Recommendations for Discharge: none      SUBJECTIVE:   Patient stated I'm good. 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 I suspect the latter. Achalasia  is not present.         GERD (gastroesophageal reflux disease)     Heart failure ([facility] 10/2014     Cardiomyopathy:Pacemaker upgrade:Biv and AICD  Dr. Itzel Goodman heart  last visit 5/11/2015    Hepatitis C Dx 1996    treated at [facility] in past; as of 4/15/15 wife states pt currently not under any treatment    Hyperlipidemia     Hypertension     Myocardial infarct [facility]) 2013    Heart Cath: 40% LV EF, Stented distal LAD, patent Graft to circumflex    On tube feeding diet approx 2009    still has as of 9/28/15  (no po food/liquid/meds at all); Dr Cody Hernandez Other ill-defined conditions(799.89) 1996    1 dose of chemotherapy/radiation for tongue cancer    Other ill-defined conditions(799.89)     BPH    Other ill-defined conditions(799.89)     orthostatic hypotension    Pneumonia ~ April -May 2010    Stroke [facility]) approx 2003    left side-left finger tips numb; no imbalance or memory loss; as of 2015 not seeing neuro MD    Suicidal thoughts      Past Surgical History:   Procedure Laterality Date    ABDOMEN SURGERY [address]    peg tube    CABG, ARTERY-VEIN, THREE  2000    HX CATARACT REMOVAL      bilateral    HX CHOLECYSTECTOMY      HX HEART CATHETERIZATION  2013    Stented distal LAD    HX MOHS PROCEDURES      bilateral    HX ORTHOPAEDIC      back surgery times two    HX OTHER SURGICAL      Radical Left Neck    HX OTHER SURGICAL      NASAL POLYPS REMOVAL    HX OTHER SURGICAL  2010    TURP    HX PACEMAKER  11/08    HX PACEMAKER  10/28/14     Defibrillator: [facility] # [id number], serial # [id number]; Dr. Noah Pena [phone]; Dr Leonardo Bishop      cystoscopy   [address]    cevical surgery    SD CHANGE GASTROSTOMY TUBE  5/2/2011         SD CHANGE GASTROSTOMY TUBE  6/29/2011         SD EGD INSERT GUIDE WIRE DILATOR PASSAGE ESOPHAGUS  9/13/2010         SD EGD TRANSORAL BIOPSY SINGLE/MULTIPLE  9/13/2010         STOMACH SURGERY PROCEDURE UNLISTED  6/29/2011         VASCULAR SURGERY PROCEDURE UNLIST      bilateral carotid stents       Prior Level of Function/Environment/Context:  Mod I with ADL; wife completes IADL  Expanded or extensive additional review of patient history:     [address] [facility] Environment: Private residence  # Steps to Enter: 7  Rails to Enter: Yes  [employer] : Right  One/Two Story Residence: One story  Living Alone: No  Support Systems: Spouse/Significant Other/Partner, Child(lisa)  Patient Expects to be Discharged to[de-identified] Private residence  Current DME Used/Available at Home: Alveta Gallery, rollator, Shower chair, Cane, straight, Commode, bedside    Hand dominance: Right    EXAMINATION OF PERFORMANCE DEFICITS:  Cognitive/Behavioral Status:  Neurologic State: Alert  Orientation Level: Oriented X4  Cognition: Appropriate for age attention/concentration; Follows commands  Perception: Appears intact  Perseveration: No perseveration noted  Safety/Judgement: Awareness of environment; Insight into deficits    Hearing: Auditory  Auditory Impairment: Hard of hearing, right side    Vision/Perceptual:                           Acuity: Within Defined Limits    Corrective Lenses: Glasses (for distance)    Range of Motion:  AROM: B UE Generally within functional limits                         Strength:  Strength: Generally decreased, functional                Coordination:  Coordination: Generally decreased, functional (Baseline slight UE/LE tremors)            Tone & Sensation:  Tone: Normal   Sensation: Impaired (paresthesias in L finger tips)                         Balance:  Sitting: Intact  Standing: Intact; With support    Functional Mobility and Transfers for ADLs:  Bed Mobility:  Scooting: Supervision    Transfers:  Sit to Stand: Contact guard assistance;Assist x1  Stand to Sit: Contact guard assistance;Assist x1  Toilet Transfer : Contact guard assistance    ADL Assessment:  Feeding:  Total assistance (NPO - tube feedings)    Oral Facial Hygiene/Grooming: Modified Independent    Bathing: Contact guard assistance    Upper Body Dressing: Setup    Lower Body Dressing: Contact guard assistance    Toileting: Contact guard assistance                ADL Intervention and task modifications:  Discussed safety and fall prevention during ADL. Emphasized the importance of activity and being out of bed as much as possible. Informed patient and wife that since mobility and activity tolerance are his primary issues, OT will defer to PT. Cognitive Retraining  Safety/Judgement: Awareness of environment; Insight into deficits    Functional Measure:  Barthel Index:    Bathin  Bladder: 5  Bowels: 5  Groomin  Dressin  Feedin  Mobility: 10  Stairs: 0  Toilet Use: 5  Transfer (Bed to Chair and Back): 10  Total: 45       Barthel and G-code impairment scale:  Percentage of impairment CH  0% CI  1-19% CJ  20-39% CK  40-59% CL  60-79% CM  80-99% CN  100%   Barthel Score 0-100 100 99-80 79-60 59-40 20-39 1-19   0   Barthel Score 0-20 20 17-19 13-16 9-12 5-8 1-4 0      The Barthel ADL Index: Guidelines  1. The index should be used as a record of what a patient does, not as a record of what a patient could do. 2. The main aim is to establish degree of independence from any help, physical or verbal, however minor and for whatever reason. 3. The need for supervision renders the patient not independent. 4. A patient's performance should be established using the best available evidence. Asking the patient, friends/relatives and nurses are the usual sources, but direct observation and common sense are also important. However direct testing is not needed. 5. Usually the patient's performance over the preceding 24-48 hours is important, but occasionally longer periods will be relevant. 6. Middle categories imply that the patient supplies over 50 per cent of the effort. 7. Use of aids to be independent is allowed. Brando Sylvester., Barthel, D.W. ([id number]). Functional evaluation: the Barthel Index. [address] [facility] (14)2. Macie Sanchez lawson Coleman, NOEL, Kenny Obrien.Analy., Ck, [address] [place] [address] ().  Measuring the change indisability after inpatient rehabilitation; comparison of the responsiveness of the Barthel Index and Functional [place] Measure. Journal of Neurology, Neurosurgery, and Psychiatry, 66(4), 515-892. KVNG Zuniga, QIANA Gifford, & Jose Kiser M.A. (2004.) Assessment of post-stroke quality of life in cost-effectiveness studies: The usefulness of the Barthel Index and the EuroQoL-5D. Quality of Life Research, 13, 080-92       G codes: In compliance with CMSs Claims Based Outcome Reporting, the following G-code set was chosen for this patient based on their primary functional limitation being treated: The outcome measure chosen to determine the severity of the functional limitation was the Barthel Index with a score of 45/100 which was correlated with the impairment scale. ?  Self Care:     - CURRENT STATUS: CK - 40%-59% impaired, limited or restricted    - GOAL STATUS: CK - 40%-59% impaired, limited or restricted    - D/C STATUS:  CK - 40%-59% impaired, limited or restricted     Occupational Therapy Evaluation Charge Determination   History Examination Decision-Making   LOW Complexity : Brief history review  LOW Complexity : 1-3 performance deficits relating to physical, cognitive , or psychosocial skils that result in activity limitations and / or participation restrictions  LOW Complexity : No comorbidities that affect functional and no verbal or physical assistance needed to complete eval tasks       Based on the above components, the patient evaluation is determined to be of the following complexity level: LOW   Pain:  Pain Scale 1: Numeric (0 - 10)  Pain Intensity 1: 0              Activity Tolerance:   Fair  After treatment:   [x]  Patient left in no apparent distress sitting up in chair  []  Patient left in no apparent distress in bed  [x]  Call bell left within reach  [x]  Nursing notified  [x]  Caregiver present  []  Bed alarm activated    COMMUNICATION/EDUCATION:   Communication/Collaboration:  [x]      Home safety education was provided and the patient/caregiver indicated understanding. [x]      Patient/family have participated as able and agree with findings and recommendations. []      Patient is unable to participate in plan of care at this time.   Findings and recommendations were discussed with: Physical Therapist and Registered Nurse    ISAAC Pardo/L  Time Calculation: 24 mins

## 2018-07-17 NOTE — PROGRESS NOTES
Problem: Falls - Risk of  Goal: *Absence of Falls  Document Olay[place]a Fall Risk and appropriate interventions in the flowsheet.    Outcome: Progressing Towards Goal  Fall Risk Interventions:  Mobility Interventions: Bed/chair exit alarm, OT consult for ADLs, Patient to call before getting OOB, PT Consult for mobility concerns, PT Consult for assist device competence, Utilize walker, cane, or other assistive device         Medication Interventions: Bed/chair exit alarm, Evaluate medications/consider consulting pharmacy, Patient to call before getting OOB, Teach patient to arise slowly    Elimination Interventions: Bed/chair exit alarm, Call light in reach, Patient to call for help with toileting needs, Toileting schedule/hourly rounds, Urinal in reach    History of Falls Interventions: Bed/chair exit alarm, Consult care management for discharge planning, Door open when patient unattended, Evaluate medications/consider consulting pharmacy, Investigate reason for fall

## 2018-07-18 NOTE — CARDIO/PULMONARY
Cardiopulmonary Rehab:     Chart reviewed. Pt is on CHF bundle list.  Pt is a 80 y.o. male Severe sepsis; Group B streptococcal bacteremia; Possible aspiration pneumonia.      PHX includes Chronic systolic HF, CAD, CABG, BIV-ICD, Chronic kidney disease stage 3, Moderate to severe aortic stenosis, diabetes, CVA, Hyperlipidemia, Head and neck cancer with feeding tube, AICD 2014.     LVEF 40-45%     Pt on PEG feedings. Lasix held due to hypotension. Pt with CHF: \"He does not look overtly decompensated\".      Cardiac teaching is not indicated at this time.

## 2018-07-18 NOTE — PROGRESS NOTES
Hospitalist Progress Note NAME: Rachel Cooney :  1935 MRN:  [id number] Assessment / Plan: 
Severe sepsis POA Group B streptococcal bacteremia POA Possible aspiration pneumonia POA 
-on admission , RR 32, WBC 13.6, hypotension 61/45 in ED Required pressors, now off Clinically improved. Vs stable. Leukocytosis resolved  
-/ BC with Grp B strep on admission, repeated BC , - NTD  
-AB: Change to ceftriaxone and flagyl from levaquin and zosyn ( total AB day ) Flagyl for concern for aspiration pneumonia Source suspected is the lung Reviewed chest CT, ASD or edema left > right lungs No evidence of UTI or skin and soft tissue infection Recent increased back pain, had CT lumbar spine with chronic appearing DDD 
-ECHO wo vegetation Chronic dysphagia 
-NPO at home with TF  IsoSource 1.5 
- Followed Dr Oleksandr Alcaraz OP for dysphasia, was considering EGD with possible attempt of dilatation. Cardiology clearance was pending ( per wife primary cardiology Dr Magdy Fung procedure)  
-No BM since Tuesday --> start bowel regiment  
-pt has suctioning at home -GI help was appreciated:no EGD planned  
  
Chronic systolic HF EF 38% / mod to severe AS    
Chronic orthostatic hypotension  
-no signs of fluid overload. HR/BP stable  
-on home lasix, aspirin/plavix. Lipitor. -ECHO: EF 40%, mild MR, mod to sev AS, mild TR  
  
DM type II  
-BS better ( 130/150s) -back on home lantus + SS  
 
LIZ POA, resolved, pre renal, adm cr 1.82--> 1.28 with IVF Recurrent N/V at home POA, resolved. CT abdomen/pelvis was negative Chronic thrombocytopenia POA stable   
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 Code status: Full Prophylaxis: SCD's ( thrombocytopenia) Recommended Disposition:  PT, OT, RN , hopefully on Friday pending negative repeated BCs PT: resume HH PT   
 
Subjective: Chief Complaint / Reason for Physician Visit: following aspiration pneumonia/ dysphasia TF: off, stopped by GI last night Denies nausea at this time Discussed with RN events overnight. Review of Systems: 
Symptom Y/N Comments  Symptom Y/N Comments Fever/Chills n   Chest Pain n   
Poor Appetite    Edema Cough y   Abdominal Pain n   
Sputum    Joint Pain SOB/KRAMER n   Pruritis/Rash Nausea/vomit n   Tolerating PT/OT Diarrhea    Tolerating Diet Constipation    Other Could NOT obtain due to:   
 
Objective: VITALS:  
Last 24hrs VS reviewed since prior progress note. Most recent are: 
Patient Vitals for the past 24 hrs: 
 Temp Pulse Resp BP SpO2  
07/18/18 0847 98.3 °F (36.8 °C) 94 18 (!) 143/101 90 % 07/18/18 0329 97.3 °F (36.3 °C) 84 18 116/75 92 % 07/17/18 2247 97.3 °F (36.3 °C) 89 20 (!) 173/99 90 % 07/17/18 1940 98.5 °F (36.9 °C) 72 18 (!) 174/93 90 % 07/17/18 1618 98.6 °F (37 °C) 93 24 144/84 93 % 07/17/18 1200 98.1 °F (36.7 °C) 84 16 148/72 92 % Intake/Output Summary (Last 24 hours) at 07/18/18 0347 Last data filed at 07/18/18 0630 Gross per 24 hour Intake             97.5 ml Output                0 ml Net             97.5 ml PHYSICAL EXAM: 
General: WD, WN. Alert, cooperative, no acute distress. Thin   
EENT:  EOMI. Anicteric sclerae. MMM Resp:  Rhonchi bilaterally, no wheezing or rales. No accessory muscle use CV:  Regular  rhythm,  No edema, + murmur GI:  Soft, Non distended, Non tender.  +Bowel sounds Neurologic:  Alert and oriented X 3, normal speech, Psych:   Good insight. Not anxious nor agitated Skin:  No rashes. No jaundice Reviewed most current lab test results and cultures  YES Reviewed most current radiology test results   YES Review and summation of old records today    NO Reviewed patient's current orders and MAR    YES 
PMH/SH reviewed - no change compared to H&P 
________________________________________________________________________ Care Plan discussed with: 
  Comments Patient y Family RN y   
Care Manager Consultant Multidiciplinary team rounds were held today with , nursing, pharmacist and clinical coordinator. Patient's plan of care was discussed; medications were reviewed and discharge planning was addressed. ________________________________________________________________________ Total NON critical care TIME:  25  Minutes Total CRITICAL CARE TIME Spent:   Minutes non procedure based Comments >50% of visit spent in counseling and coordination of care    
________________________________________________________________________ Armando Wilson MD  
 
Procedures: see electronic medical records for all procedures/Xrays and details which were not copied into this note but were reviewed prior to creation of Plan. LABS: 
I reviewed today's most current labs and imaging studies. Pertinent labs include: 
Recent Labs  
   07/17/18 
 0539  07/16/18 
 0502 WBC  7.3  10.7 HGB  12.1  12.0*  
HCT  38.4  39.0 PLT  69*  73* Recent Labs  
   07/17/18 
 0539  07/16/18 
 0502 NA  144  143  
K  4.5  4.4  
CL  108  109* CO2  31  27 GLU  220*  149* BUN  30*  29* CREA  1.28  1.28  
CA  9.1  9.1 MG   --   2.5* Signed: Armando Wilson MD

## 2018-07-18 NOTE — PROGRESS NOTES
Nutrition Assessment:    INTERVENTIONS/RECOMMENDATIONS:   Resume TwoCal HN @ 20 mL/hr + 175 mL water flushes q 4 hours per GI; advance as tolerated     ASSESSMENT:   Chart reviewed; patient with complaints of nausea and phlegm getting stuck in his throat. TF held yesterday. Concern for gastroparesis. Reglan added PRN. New MD consult received for management of TF today. Okay to restart TF at 1/2 rate per GI note. Orders placed. Recommend advancing as tolerated back to goal to meet nutritional needs. Goal rate will meet 104% of estimated kcal and protein needs. Patient reports tolerating formula well prior to it being held. States he is hungry this morning and ready to resume feedings. Diet Order: NPO  % Eaten:  No data found. Pertinent Medications: [x] Reviewed []Other: Atorvastatin, Plavix, famotidine, Lasix, Lantus, humalog, Midodrine   Pertinent Labs: [x]Reviewed  []Other:  [phone]  Food Allergies: [x]None []Other:     Last BM: 7/17 per flowsheets; not since admission per patient  [x]Active     []Hyperactive  []Hypoactive       [] Absent  BS  Skin:    [x] Intact   [] Incision  [] Breakdown   []Edema   []Other:    Anthropometrics: Height: 5' 7\" (170.2 cm) Weight: 76.6 kg (168 lb 14 oz)    IBW (%IBW):   ( ) UBW (%UBW):   (  %)    BMI: Body mass index is 26.45 kg/(m^2). This BMI is indicative of:  []Underweight   []Normal   [x]Overweight   [] Obesity   [] Extreme Obesity (BMI>40)  Last Weight Metrics:  Weight Loss Metrics 7/18/2018 7/9/2018 7/7/2018 7/5/2018 6/28/2018 6/23/2018 6/11/2018   Today's Wt 168 lb 14 oz 174 lb 170 lb 171 lb 155 lb 168 lb 172 lb 3.2 oz   BMI 26.45 kg/m2 27.25 kg/m2 26.63 kg/m2 26.78 kg/m2 24.28 kg/m2 26.31 kg/m2 26.97 kg/m2   Some encounter information is confidential and restricted. Go to Review Flowsheets activity to see all data. Estimated Nutrition Needs (Based on): 9686 Kcals/day (BMR (1424) x 1. 3AF) , 77 g (-92g (1.0-1.2 g/kg bw)) Protein  Carbohydrate:  At Least 130 g/day  Fluids: 1850 mL/day     Pt expected to meet estimated nutrient needs: [x]Yes []No    NUTRITION DIAGNOSES:   Problem:  Swallowing difficulty      Etiology: related to hx of throat cancer     Signs/Symptoms: as evidenced by PEG dependent       NUTRITION INTERVENTIONS:    Enteral/Parenteral Nutrition: Initiate enteral nutrition                GOAL:   TF resumed and advanced to goal rate next 2-4 days    NUTRITION MONITORING AND EVALUATION   Food/Nutrient Intake Outcomes: Enteral/parenteral nutrition intake  Physical Signs/Symptoms Outcomes: Weight/weight change, GI profile, Glucose profile, Electrolyte and renal profile    Previous Goal Met:   [] Met              [x] Progressing Towards Goal              [] Not Progressing Towards Goal   Previous Recommendations:   [x] Implemented          [] Not Implemented          [] Not Applicable    LEARNING NEEDS (Diet, Food/Nutrient-Drug Interaction):    [x] None Identified   [] Identified and Education Provided/Documented   [] Identified and Pt declined/was not appropriate     Cultural, Bahai, OR Ethnic Dietary Needs:    [x] None Identified   [] Identified and Addressed     [x] Interdisciplinary Care Plan Reviewed/Documented    [x] Discharge Planning: Resume home TF regimen    [] Participated in Interdisciplinary Rounds    NUTRITION RISK:    [x] High              [] Moderate           []  Low  []  Minimal/Uncompromised      Pina Salinasbury  Pager [phone]              Weekend Pager [phone]

## 2018-07-18 NOTE — PROGRESS NOTES
Problem: Mobility Impaired (Adult and Pediatric)  Goal: *Acute Goals and Plan of Care (Insert Text)  Physical Therapy Goals  Initiated 7/17/2018  1. Patient will move from supine to sit and sit to supine , scoot up and down and roll side to side in bed with independence within 7 day(s). 2.  Patient will transfer from bed to chair and chair to bed with supervision/set-up using the least restrictive device within 7 day(s). 3.  Patient will perform sit to stand with supervision/set-up within 7 day(s). 4.  Patient will ambulate with supervision/set-up for 250 feet with the least restrictive device within 7 day(s). 5.  Patient will ascend/descend 7 stairs with 1 handrail(s) with minimal assistance/contact guard assist within 7 day(s). physical Therapy TREATMENT  Patient: Michi Parker (87 y.o. male)  Date: 7/18/2018  Diagnosis: Aspiration pneumonia ([address]       Precautions:  falls  Chart, physical therapy assessment, plan of care and goals were reviewed. ASSESSMENT: pt tolerated tx well, no LOB or SOB, very motivated, does well with bed mob and transfers, did well with toileting and ther-ex, vc's for safety and proper RW use. Progression toward goals:  [x]    Improving appropriately and progressing toward goals  []    Improving slowly and progressing toward goals  []    Not making progress toward goals and plan of care will be adjusted     PLAN:  Patient continues to benefit from skilled intervention to address the above impairments. Continue treatment per established plan of care.   Discharge Recommendations:  Home Health  Further Equipment Recommendations for Discharge:  rolling walker     OBJECTIVE DATA SUMMARY:     Critical Behavior:  Neurologic State: Alert  Orientation Level: Oriented X4  Cognition: Appropriate decision making, Appropriate safety awareness  Safety/Judgement: Awareness of environment, Insight into deficits     Functional Mobility Training:  Bed Mobility:  Rolling: Modified independent  Supine to Sit: Modified independent  Scooting: Modified independent  Level of Assistance: Modified independent  Interventions: Verbal cues     Transfers:  Sit to Stand: Contact guard assistance  Stand to Sit: Contact guard assistance  Bed to Chair: Contact guard assistance  Interventions: Verbal cues; Tactile cues  Level of Assistance: Contact guard assistance     Balance:  Sitting: Intact; Without support  Standing: Intact; With support     Ambulation/Gait Training:  Distance (ft): 100 Feet (ft)  Assistive Device: Gait belt;Walker, rolling  Ambulation - Level of Assistance: Contact guard assistance  Gait Abnormalities: Decreased step clearance  Right Side Weight Bearing: Full  Left Side Weight Bearing: Full  Base of Support: Widened  Stance:  (equal)  Speed/Gertrudis: Pace decreased (<100 feet/min)  Step Length: Left shortened;Right shortened    Therapeutic Exercises:   sitting  EXERCISE   Sets   Reps   Active Active Assist   Passive   Comments   Ankle pumps 1 10 [x] [] [] bilat   Heel raises 1 10 [x] [] [] \"   Toe tap 1 10 [x] [] [] \"   Knee ext 1 10 [x] [] [] \"   Hip flex 1 10 [x] [] [] \"   Abd & Add 1 10 [x] [] [] \"     Pain:  Pain Scale 1: Numeric (0 - 10)  Pain Intensity 1: 0    Activity Tolerance: fair    After treatment:   [x]    Patient left in no apparent distress sitting up in chair  []    Patient left in no apparent distress in bed  [x]    Call bell left within reach  [x]    Nursing notified  []    Caregiver present  []    Bed alarm activated    COMMUNICATION/COLLABORATION:   The patients plan of care was discussed with: Registered Nurse    Susannah Lagunas PTA   Time Calculation: 30 mins

## 2018-07-18 NOTE — PROGRESS NOTES
Bedside shift change report given to Annetta Rodriguez RN (oncoming nurse) by Michael Lund RN (offgoing nurse). Report included the following information SBAR, Kardex, Intake/Output, MAR and Recent Results.

## 2018-07-18 NOTE — PROGRESS NOTES
Gastroenterology Progress Note 7/18/2018 Admit Date: 7/14/2018 Subjective: Follow up for: dysphagia in a complex pt, possible aspiration PNA 
   
C/o a lot of 'phlegm' coming up. On IV abx. RN is at bedside. Patient was seen in rounds by me today. Current Facility-Administered Medications Medication Dose Route Frequency  guaiFENesin (ROBITUSSIN) 100 mg/5 mL oral liquid 300 mg  300 mg Oral TID  furosemide (LASIX) tablet 40 mg  40 mg Oral DAILY  furosemide (LASIX) tablet 20 mg  20 mg Oral PCL  insulin glargine (LANTUS) injection 14 Units  14 Units SubCUTAneous DAILY  metoclopramide HCl (REGLAN) injection 10 mg  10 mg IntraVENous Q6H PRN  
 metroNIDAZOLE (FLAGYL) IVPB premix 500 mg  500 mg IntraVENous Q12H  cefTRIAXone (ROCEPHIN) 2 g in 0.9% sodium chloride (MBP/ADV) 50 mL  2 g IntraVENous Q24H  
 gabapentin (NEURONTIN) 250 mg/5 mL solution 750 mg  750 mg Oral TID  
 HYDROcodone-acetaminophen (HYCET) 0.5-21.7 mg/mL oral solution 5-10 mg  5-10 mg PEG Tube Q6H PRN  
 klack's mouthwash oral suspension (COMPOUNDED)  5 mL Oral Q6H  
 sodium chloride (NS) flush 5-10 mL  5-10 mL IntraVENous PRN  
 aspirin (ASPIRIN) tablet 325 mg  325 mg Oral DAILY  atorvastatin (LIPITOR) tablet 40 mg  40 mg Oral DAILY  clopidogrel (PLAVIX) tablet 75 mg  75 mg Oral DAILY  finasteride (PROSCAR) tablet 5 mg  5 mg Oral QHS  nitroglycerin (NITROSTAT) tablet 0.4 mg  0.4 mg SubLINGual Q5MIN PRN  pramipexole (MIRAPEX) tablet 0.25 mg  0.25 mg Oral TID  sodium chloride (NS) flush 5-10 mL  5-10 mL IntraVENous Q8H  
 sodium chloride (NS) flush 5-10 mL  5-10 mL IntraVENous PRN  
 midodrine (PROAMITINE) tablet 30 mg  30 mg Oral TID  glucose chewable tablet 16 g  4 Tab Oral PRN  
 dextrose (D50W) injection syrg 12.5-25 g  12.5-25 g IntraVENous PRN  
 glucagon (GLUCAGEN) injection 1 mg  1 mg IntraMUSCular PRN  
 insulin lispro (HUMALOG) injection   SubCUTAneous Q6H  
 acetaminophen (TYLENOL) tablet 650 mg  650 mg Oral Q6H PRN  
 ondansetron (ZOFRAN) injection 4 mg  4 mg IntraVENous Q6H PRN  
 famotidine (PEPCID) tablet 20 mg  20 mg Oral ACB Objective:  
 
Blood pressure (!) 143/101, pulse 94, temperature 98.3 °F (36.8 °C), resp. rate 18, height 5' 7\" (1.702 m), weight 78.8 kg (173 lb 11.6 oz), SpO2 90 %. 
 
  
 
07/16 1901 - 07/18 0700 In: 705 Out: 725 [Urine:725] Physical Examination:  
 
 
General:AAO x 3, HEENT:  EOMI, Chest:  Dec BS at bases Heart: S1, S2, RRR 
GI: Soft, NT, ND, + PEG + bowel sounds Extremities: No cyanosis Data Review Recent Results (from the past 24 hour(s)) GLUCOSE, POC Collection Time: 07/17/18 12:22 PM  
Result Value Ref Range Glucose (POC) 208 (H) 65 - 100 mg/dL Performed by Anastasia Argueta GLUCOSE, POC Collection Time: 07/17/18  5:11 PM  
Result Value Ref Range Glucose (POC) 257 (H) 65 - 100 mg/dL Performed by Anastasia Argueta GLUCOSE, POC Collection Time: 07/17/18  8:33 PM  
Result Value Ref Range Glucose (POC) 152 (H) 65 - 100 mg/dL Performed by Vicky Hong, POC Collection Time: 07/18/18  1:07 AM  
Result Value Ref Range Glucose (POC) 138 (H) 65 - 100 mg/dL Performed by [address] [place] [address], POC Collection Time: 07/18/18  6:09 AM  
Result Value Ref Range Glucose (POC) 158 (H) 65 - 100 mg/dL Performed by Chris Plummer Recent Labs  
   07/17/18 
 0539  07/16/18 
 0502 WBC  7.3  10.7 HGB  12.1  12.0*  
HCT  38.4  39.0 PLT  69*  73* Recent Labs  
   07/17/18 
 0539  07/16/18 
 0502 NA  144  143  
K  4.5  4.4  
CL  108  109* CO2  31  27 BUN  30*  29* CREA  1.28  1.28  
GLU  220*  149* CA  9.1  9.1 MG   --   2.5* No results for input(s): SGOT, GPT, AP, TBIL, TP, ALB, GLOB, GGT, AML, LPSE in the last 72 hours. No lab exists for component: AMYP, HLPSE No results for input(s): INR, PTP, APTT in the last 72 hours.  
 
No lab exists for component: INREXT No results for input(s): FE, TIBC, PSAT, FERR in the last 72 hours. No results found for: FOL, RBCF No results for input(s): PH, PCO2, PO2 in the last 72 hours. No results for input(s): CPK, CKNDX, TROIQ in the last 72 hours. No lab exists for component: CPKMB Lab Results Component Value Date/Time Cholesterol, total 123 06/04/2018 09:16 AM  
 HDL Cholesterol 35 (L) 06/04/2018 09:16 AM  
 LDL, calculated 62 06/04/2018 09:16 AM  
 Triglyceride 132 06/04/2018 09:16 AM  
 CHOL/HDL Ratio 3.6 02/02/2017 02:55 AM  
 
No components found for: [place] Lab Results Component Value Date/Time Color YELLOW/STRAW 07/14/2018 05:40 AM  
 Appearance CLEAR 07/14/2018 05:40 AM  
 Specific gravity 1.018 07/14/2018 05:40 AM  
 pH (UA) 5.5 07/14/2018 05:40 AM  
 Protein TRACE (A) 07/14/2018 05:40 AM  
 Glucose NEGATIVE  07/14/2018 05:40 AM  
 Ketone NEGATIVE  07/14/2018 05:40 AM  
 Bilirubin NEGATIVE  07/14/2018 05:40 AM  
 Urobilinogen 1.0 07/14/2018 05:40 AM  
 Nitrites NEGATIVE  07/14/2018 05:40 AM  
 Leukocyte Esterase TRACE (A) 07/14/2018 05:40 AM  
 Epithelial cells FEW 07/14/2018 05:40 AM  
 Bacteria NEGATIVE  07/14/2018 05:40 AM  
 WBC 0-4 07/14/2018 05:40 AM  
 RBC 5-10 07/14/2018 05:40 AM  
 
  
ROS: -CP, SOB, Dysuria, palpitations, cough. Assessment: 
Dysphagia Could not complete BAS ? Aspiration PNA 
DM Long term use of Plavix Thrombocytopenia Principal Problem: 
  Aspiration pneumonia ([address] (11/28/2016) Plan/Discussion: 1. There are no plans for EGD +/- dilation in this patient. He is on Plavix(needs to be off of it x 5-7 days) and has very low platelets. A dilation will likely not help as he could not complete the BAS as well. See report. 2. IV reglan to decrease vomiting was added. He is a diabetic. Is gastric emptying now also an issue? His glucose is over 200 and this can cause delayed gastric emptying.  Testing for that will be very tricky as 1:. He cannot take solids and 2: Has dysphagia 3. Will trial 1/2 rate tube feeds with strict aspiration precautions. 4. We could do a PEG Gastrografin study to see if his gastric outlet is open. Would hold off for now for risk of aspiration and concerns that this may have caused the PNA. 5. On famotidine. Signed By: Shamar Florian MD 
 
7/18/2018  8:54 AM

## 2018-07-18 NOTE — PROGRESS NOTES
CM met with Pt to discuss D/C planning. CM made Pt aware of [facility] being arranged as well as recommendations for Pt to be followed by Dilshad Barrios. CM provided education for [facility] and provided informational brochure. Pt voiced understanding and accepted literature. Pt reported that his wife would be up to visit with him and he would share the information received with her. CM encouraged Pt to answer phone call from [facility] when they call to set up appointment. Pt voiced understanding.     Torsten Blackman, [facility]   Ext # [phone]

## 2018-07-18 NOTE — PROGRESS NOTES
Problem: Pressure Injury - Risk of  Goal: *Prevention of pressure injury  Document Mark Scale and appropriate interventions in the flowsheet.    Outcome: Progressing Towards Goal  Pressure Injury Interventions:       Moisture Interventions: Apply protective barrier, creams and emollients, Moisture barrier, Minimize layers, Limit adult briefs    Activity Interventions: Chair cushion, Increase time out of bed    Mobility Interventions: HOB 30 degrees or less, Float heels, Chair cushion    Nutrition Interventions: Document food/fluid/supplement intake    Friction and Shear Interventions: Feet elevated on foot rest, HOB 30 degrees or less, Minimize layers

## 2018-07-19 PROBLEM — Z31.61 COUNSELING ABOUT NATURAL FAMILY PLANNING: Status: ACTIVE | Noted: 2018-01-01

## 2018-07-19 PROBLEM — M79.10 MYALGIA: Status: ACTIVE | Noted: 2018-01-01

## 2018-07-19 PROBLEM — J15.3 PNEUMONIA DUE TO GROUP B STREPTOCOCCUS (HCC): Status: ACTIVE | Noted: 2018-01-01

## 2018-07-19 PROBLEM — Z71.89 COUNSELING REGARDING GOALS OF CARE: Status: ACTIVE | Noted: 2018-01-01

## 2018-07-19 NOTE — DIABETES MGMT
DTC Progress Note Recommendations/ Comments: If appropriate, please consider increasing Lantus to 18 units daily. Pt has required 13 units of correction over the past 24 hours. Current hospital DM medication:  
-Humalog normal sensitivity correction Chart reviewed on [facility]. Patient is a 80 y.o. male with known history of Type 2 Diabetes on Lantus 14 units once daily and Humalog 13 units acB, 10 units acL and 7 units acD + SSI at home. A1c:  
Lab Results Component Value Date/Time Hemoglobin A1c 8.6 (H) 07/14/2018 04:40 AM  
 Hemoglobin A1c 8.6 (H) 06/04/2018 09:16 AM  
 
 
Recent Glucose Results:  
Lab Results Component Value Date/Time GLUCPOC 209 (H) 07/19/2018 01:30 PM  
 GLUCPOC 217 (H) 07/19/2018 06:23 AM  
 GLUCPOC 234 (H) 07/18/2018 11:50 PM  
  
 
Lab Results Component Value Date/Time Creatinine 1.28 07/17/2018 05:39 AM  
 
Estimated Creatinine Clearance: 40.9 mL/min (based on Cr of 1.28). Active Orders Diet DIET NPO With Tube Feedings PO intake: No data found. Will continue to follow as needed. Thank you Lety Ibarra RD, CDE [facility] Office: [phone]

## 2018-07-19 NOTE — PROGRESS NOTES
Problem: Mobility Impaired (Adult and Pediatric)  Goal: *Acute Goals and Plan of Care (Insert Text)  Physical Therapy Goals  Initiated 7/17/2018  1. Patient will move from supine to sit and sit to supine , scoot up and down and roll side to side in bed with independence within 7 day(s). 2.  Patient will transfer from bed to chair and chair to bed with supervision/set-up using the least restrictive device within 7 day(s). 3.  Patient will perform sit to stand with supervision/set-up within 7 day(s). 4.  Patient will ambulate with supervision/set-up for 250 feet with the least restrictive device within 7 day(s). 5.  Patient will ascend/descend 7 stairs with 1 handrail(s) with minimal assistance/contact guard assist within 7 day(s). physical Therapy TREATMENT  Patient: Melissa Limon (65 y.o. male)  Date: 7/19/2018  Diagnosis: Aspiration pneumonia ([address] Aspiration pneumonia ([address]       Precautions:    Chart, physical therapy assessment, plan of care and goals were reviewed. ASSESSMENT:  Pt received lying supine in bed, agreeable to therapy session. Pt performs bed mobility tasks under supervision this date. Pt sitting balance EOB is intact, and pt is able to sit<>stand w/appropriate hand placement throughout transfer and CGAx1. Pt ambulates ~150ft w/RW and CGAx1 w/shortened steps, wide CRISTIANO, and decreased step clearance. Despite gait abnormalities, no overt LOB displayed during ambulation trial. Pt is able to sit in bedside chair at end of session w/CGAx1 to control descent. Pt on RA throughout therapy session, and O2 sat found to be 84% after ambulation trial (however pt denies SOB/KRAMER). O2 Sat recovers to 98% upon 1-2 min seated rest break. Session concludes w/pt resting comfortably in bedside chair w/all needs met, RN notified. Pt continues to benefit from skilled therapy services, however expect he is rapidly approaching baseline fx mobility levels.  Recommend  PT upon DC for continued strengthening/conditioning, endurance training, and balance training. Progression toward goals:  [x]    Improving appropriately and progressing toward goals  []    Improving slowly and progressing toward goals  []    Not making progress toward goals and plan of care will be adjusted     PLAN:  Patient continues to benefit from skilled intervention to address the above impairments. Continue treatment per established plan of care. Discharge Recommendations:  Home Health  Further Equipment Recommendations for Discharge:  None needed at this time     SUBJECTIVE:   Patient stated \"Let's break out of here and go to [place]!     OBJECTIVE DATA SUMMARY:   Critical Behavior:  Neurologic State: Alert  Orientation Level: Oriented X4  Cognition: Appropriate decision making, Appropriate safety awareness  Safety/Judgement: Awareness of environment, Insight into deficits    Functional Mobility Training:  Bed Mobility:  Supine to Sit: Supervision  Scooting: Supervision     Transfers:  Sit to Stand: Contact guard assistance;Assist x1  Stand to Sit: Contact guard assistance;Assist x1         Balance:  Sitting: Intact  Standing: Intact; With support    Ambulation/Gait Training:  Distance (ft): 150 Feet (ft)  Assistive Device: Walker, rolling;Gait belt  Ambulation - Level of Assistance: Contact guard assistance;Assist x1  Gait Abnormalities: Decreased step clearance;Trunk sway increased  Base of Support: Widened  Speed/Gertrudis: Pace decreased (<100 feet/min)  Step Length: Left shortened;Right shortened     Pain:  Pain Scale 1: Numeric (0 - 10)  Pain Intensity 1: 8  Pain Location 1: Abdomen;Back  Pain Description 1: Aching  Pain Intervention(s) 1: Medication (see MAR)    Activity Tolerance:   Pt on RA throughout therapy session  O2 sat drops to 84%, HR 92bpm after ~150ft ambulation trial.   O2 sat recovers to 98%, HR 86bpm after ~1.5min seated rest break    Please refer to the flowsheet for vital signs taken during this treatment. After treatment:   [x]    Patient left in no apparent distress sitting up in chair  []    Patient left in no apparent distress in bed  [x]    Call bell left within reach  [x]    Nursing notified   []    Caregiver present  []    Bed alarm activated    COMMUNICATION/COLLABORATION:   The patients plan of care was discussed with: Registered Nurse    Tavon Myers, JENNIFER   Time Calculation: 18 mins  Regarding student involvement in patient care:  A student participated in this treatment session. Per CMS Medicare statements and APTA guidelines I certify that the following was true:  1. I was present and directly observed the entire session. 2. I made all skilled judgments and clinical decisions regarding care. 3. I am the practitioner responsible for assessment, treatment, and documentation.

## 2018-07-19 NOTE — PROGRESS NOTES
Spiritual Care Assessment/Progress Note  [facility]      NAME: Cristina Knight      MRN: [id number]  AGE: 80 y.o. SEX: male  Anabaptism Affiliation: [employer]ian   Language: English     7/19/2018     Total Time (in minutes): 52     Spiritual Assessment begun in [facility] 3 MED TELE through conversation with:         [x]Patient        [x] Family    [] Friend(s)        Reason for Consult: Request by patient     Spiritual beliefs: (Please include comment if needed)     [x] Identifies with a jordyn tradition:         [] Supported by a jordyn community:            [] Claims no spiritual orientation:           [] Seeking spiritual identity:                [] Adheres to an individual form of spirituality:           [] Not able to assess:                           Identified resources for coping:      [x] Prayer                               [] Music                  [] Guided Imagery     [x] Family/friends                 [] Pet visits     [] Devotional reading                         [] Unknown     [] Other:                                               Interventions offered during this visit: (See comments for more details)    Patient Interventions: Affirmation of emotions/emotional suffering, Affirmation of jordyn, Catharsis/review of pertinent events in supportive environment, Coping skills reviewed/reinforced, End of life issues discussed, Iconic (affirming the presence of God/Higher Power), Initial/Spiritual assessment, patient floor, Normalization of emotional/spiritual concerns, Prayer (assurance of), Anabaptism beliefs/image of God discussed, Life review/legacy     Family/Friend(s):  Affirmation of emotions/emotional suffering, Affirmation of jordyn, End of life issues discussed, Iconic (affirming the presence of God/Higher Power), Life review/legacy, Initial Assessment, Catharsis/review of pertinent events in supportive environment, Prayer (assurance of), Anabaptism beliefs/image of God discussed     Plan of Care:     [] Support spiritual and/or cultural needs    [] Support AMD and/or advance care planning process      [x] Support grieving process   [] Coordinate Rites and/or Rituals    [] Coordination with community clergy   [] No spiritual needs identified at this time   [] Detailed Plan of Care below (See Comments)  [] Make referral to Music Therapy  [] Make referral to Pet Therapy     [] Make referral to Addiction services  [] Make referral to [facility]  [] Make referral to Spiritual Care Partner  [] No future visits requested        [x] Follow up visits as needed     Comments: Responded to request for visit from patient. Patient's wife of 62 years was also present in the room. 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 Patient then became tearful as he shared extensive medical history and ongoing prayers to God for healing that he feels are being unanswered. Patient and wife come from the [facility] tradition and wife expressed the importance of accepting God's will. Explored deeper patient's prayers and the source of his fears. Patient revealed that he is not afraid to die but rather is grieving the idea of leaving behind his wife and family. Redirected visit to address this grief and loss. Patient also frequently brought up the obstacles, pain, and struggles he has experienced over the last ten years and his prayers for relief.  and wife addressed his statements from the context of their jordyn and the different ways that healing and relief may occur. Patient most appreciates safe space to voice questions of jordyn and statements of grief as he indicated he is often encouraged to readjust thinking to positive thoughts only. Normalized emotions as well as the duality of wanting to be released from his struggles while also wanting to remain with his family.  Wife is very supportive of open conversations that include end-of-life. Concluded visit as additional visitors arrived and assured patient and wife of ongoing support as needed and desired. Chaplain Mayela Jarvis M.Div.    Paging Service [phone])

## 2018-07-19 NOTE — PROGRESS NOTES
Hospitalist Progress Note NAME: Sly Celis :  1935 MRN:  [id number] Assessment / Plan: 
Severe sepsis POA  resolved Group B streptococcal bacteremia POA Possible aspiration pneumonia POA 
-on admission , RR 32, WBC 13.6, hypotension 61/45 in ED Required pressors, now off Clinically improved. Vs stable. Leukocytosis resolved  
-/ BC with Grp B strep on admission, repeated BC , - NTD  
-AB: Change to ceftriaxone and flagyl from levaquin and zosyn ( total AB day -10) Flagyl for concern for aspiration pneumonia Source suspected is the lung Reviewed chest CT, ASD or edema left > right lungs No evidence of UTI or skin and soft tissue infection Recent increased back pain, had CT lumbar spine with chronic appearing DDD 
-ECHO wo vegetation Chronic dysphagia 
-NPO at home with TF  IsoSource 1.5 ( 5 cans) - Followed Dr Duran Leos OP for dysphasia, was considering EGD with possible attempt of dilatation. Cardiology clearance was pending ( per wife primary cardiology Dr Nice Estimable procedure)  
-had 2 BMs s/p suppository  
-wife reported today that recently PEG got dislodged and was changed in ED. New PEG is more shorter then previous which making it difficult to use. Dr Duran Leos was planning to change it. D/w Dr Kyung Segura - he will review PEG today and tried to re place. -pt has suctioning at home -GI help was appreciated:no EGD planned  
  
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    
Chronic orthostatic hypotension  
-no signs of fluid overload. HR/BP stable  
-on home lasix, aspirin/plavix. Lipitor. -ECHO: EF 40%, mild MR, mod to sev AS, mild TR  
  
DM type II  
-BS 200s today with TF at a goal   
-back on home lantus + SS  
 
LIZ POA, resolved, pre renal, adm cr 1.82--> 1.28 with IVF Recurrent N/V at home POA, resolved. CT abdomen/pelvis was negative Chronic thrombocytopenia POA stable   
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 7/19: Pt was very emotional today \" I don't have life. I have so many problems and nobody can help\". He complain that he is tired from poor QOL. Per wife, 4/5 days they have different doctors appointments. Wife was also very emotional and were crying together with pt. Will ask palliative care team to see for overwhelming symptoms and to address goal of care. Wife is scheduled to have TKA 7/25. Per wife, children will help with their care at home after her surgery. Code status: Full Prophylaxis: SCD's ( thrombocytopenia) Recommended Disposition:  PT, OT, RN , hopefully on Friday pending negative repeated BCs PT: resume HH PT   
 
Subjective: Chief Complaint / Reason for Physician Visit: following aspiration pneumonia/ dysphasia TF:tolerating well wo residual   
Pt was very emotional today \" I don't have life. I have so many problems and nobody can help\". He complain that he is tired from poor QOL. Per wife, 4/5 days they have different doctors appointments. Discussed with RN events overnight. Review of Systems: 
Symptom Y/N Comments  Symptom Y/N Comments Fever/Chills n   Chest Pain n   
Poor Appetite    Edema Cough y   Abdominal Pain n   
Sputum    Joint Pain SOB/KRAMER n   Pruritis/Rash Nausea/vomit n   Tolerating PT/OT Diarrhea    Tolerating Diet Constipation    Other Could NOT obtain due to:   
 
Objective: VITALS:  
Last 24hrs VS reviewed since prior progress note. Most recent are: 
Patient Vitals for the past 24 hrs: 
 Temp Pulse Resp BP SpO2  
07/19/18 1222 98.4 °F (36.9 °C) 80 12 152/90 93 % 07/19/18 0818 98 °F (36.7 °C) 82 22 133/73 93 % 07/19/18 0231 97.9 °F (36.6 °C) 89 20 123/71 95 % 07/18/18 2229 98 °F (36.7 °C) 84 16 136/66 93 % 07/18/18 1937 97.7 °F (36.5 °C) 82 18 145/60 92 % 07/18/18 1702 97.6 °F (36.4 °C) 83 18 153/66 91 % Intake/Output Summary (Last 24 hours) at 07/19/18 1306 Last data filed at 07/19/18 1224 Gross per 24 hour Intake              410 ml Output             1650 ml Net            -1240 ml PHYSICAL EXAM: 
General: WD, WN. Alert, cooperative, no acute distress. Thin   
EENT:  EOMI. Anicteric sclerae. MMM Resp:  Rhonchi bilaterally, no wheezing or rales. No accessory muscle use CV:  Regular  rhythm,  No edema, + murmur GI:  Soft, Non distended, Non tender.  +Bowel sounds Neurologic:  Alert and oriented X 3, normal speech, Psych:   Good insight. Not anxious nor agitated Skin:  No rashes. No jaundice Reviewed most current lab test results and cultures  YES Reviewed most current radiology test results   YES Review and summation of old records today    NO Reviewed patient's current orders and MAR    YES 
PMH/SH reviewed - no change compared to H&P 
________________________________________________________________________ Care Plan discussed with: 
  Comments Patient y Family  y Wife bedside RN y   
Care Manager y Consultant  y GI Dr Remedios Deluca x 2 Multidiciplinary team rounds were held today with , nursing, pharmacist and clinical coordinator. Patient's plan of care was discussed; medications were reviewed and discharge planning was addressed. ________________________________________________________________________ Total NON critical care TIME:  40 Minutes Total CRITICAL CARE TIME Spent:   Minutes non procedure based Comments >50% of visit spent in counseling and coordination of care    
________________________________________________________________________ Venice Jones MD  
 
Procedures: see electronic medical records for all procedures/Xrays and details which were not copied into this note but were reviewed prior to creation of Plan. LABS: 
I reviewed today's most current labs and imaging studies. Pertinent labs include: 
Recent Labs  
   07/17/18 
 0539 WBC  7.3 HGB  12.1 HCT  38.4 PLT Luiz 28 Labs  
   07/17/18 
 0539 NA  144  
K  4.5  
CL  108 CO2  31  
GLU  220* BUN  30* CREA  1.28  
CA  9.1 Signed: Samira Flood MD

## 2018-07-19 NOTE — PROGRESS NOTES
Bedside and Verbal shift change report given to Bernadette (oncoming nurse) by Ankit Vargas (offgoing nurse). Report included the following information SBAR, Kardex, MAR and Recent Results.

## 2018-07-19 NOTE — PROGRESS NOTES
Gastroenterology Progress Note 7/19/2018 Admit Date: 7/14/2018 Subjective: Follow up for: dysphagia in a complex pt, possible aspiration PNA Had a good BM. c/o a lot of phlegm and mucous. On TF at 30 ml/hour with no residual. 
RN is at bedside. Current Facility-Administered Medications Medication Dose Route Frequency  docusate sodium (COLACE) capsule 100 mg  100 mg Per G Tube BID  
 guaiFENesin (ROBITUSSIN) 100 mg/5 mL oral liquid 300 mg  300 mg Oral TID  furosemide (LASIX) tablet 40 mg  40 mg Oral DAILY  furosemide (LASIX) tablet 20 mg  20 mg Oral PCL  insulin glargine (LANTUS) injection 14 Units  14 Units SubCUTAneous DAILY  metoclopramide HCl (REGLAN) injection 10 mg  10 mg IntraVENous Q6H PRN  
 metroNIDAZOLE (FLAGYL) IVPB premix 500 mg  500 mg IntraVENous Q12H  cefTRIAXone (ROCEPHIN) 2 g in 0.9% sodium chloride (MBP/ADV) 50 mL  2 g IntraVENous Q24H  
 gabapentin (NEURONTIN) 250 mg/5 mL solution 750 mg  750 mg Oral TID  
 HYDROcodone-acetaminophen (HYCET) 0.5-21.7 mg/mL oral solution 5-10 mg  5-10 mg PEG Tube Q6H PRN  
 klack's mouthwash oral suspension (COMPOUNDED)  5 mL Oral Q6H  
 sodium chloride (NS) flush 5-10 mL  5-10 mL IntraVENous PRN  
 aspirin (ASPIRIN) tablet 325 mg  325 mg Oral DAILY  atorvastatin (LIPITOR) tablet 40 mg  40 mg Oral DAILY  clopidogrel (PLAVIX) tablet 75 mg  75 mg Oral DAILY  finasteride (PROSCAR) tablet 5 mg  5 mg Oral QHS  nitroglycerin (NITROSTAT) tablet 0.4 mg  0.4 mg SubLINGual Q5MIN PRN  pramipexole (MIRAPEX) tablet 0.25 mg  0.25 mg Oral TID  sodium chloride (NS) flush 5-10 mL  5-10 mL IntraVENous Q8H  
 sodium chloride (NS) flush 5-10 mL  5-10 mL IntraVENous PRN  
 midodrine (PROAMITINE) tablet 30 mg  30 mg Oral TID  glucose chewable tablet 16 g  4 Tab Oral PRN  
 dextrose (D50W) injection syrg 12.5-25 g  12.5-25 g IntraVENous PRN  
 glucagon (GLUCAGEN) injection 1 mg  1 mg IntraMUSCular PRN  insulin lispro (HUMALOG) injection   SubCUTAneous Q6H  
 acetaminophen (TYLENOL) tablet 650 mg  650 mg Oral Q6H PRN  
 ondansetron (ZOFRAN) injection 4 mg  4 mg IntraVENous Q6H PRN  
 famotidine (PEPCID) tablet 20 mg  20 mg Oral ACB Objective:  
 
Blood pressure 123/71, pulse 89, temperature 97.9 °F (36.6 °C), resp. rate 20, height 5' 7\" (1.702 m), weight 76.6 kg (168 lb 14 oz), SpO2 95 %. 
 
  
 
07/17 1901 - 07/19 0700 In: 782.5 Out: 1400 [[id number]] Physical Examination:  
 
 
General:AAO x 3, spry HEENT:  EOMI, Chest:  Dec BS at bases Heart: S1, S2, RRR 
GI: Soft, NT, ND, + PEG + bowel sounds Extremities: No cyanosis Data Review Recent Results (from the past 24 hour(s)) GLUCOSE, POC Collection Time: 07/18/18 11:40 AM  
Result Value Ref Range Glucose (POC) 179 (H) 65 - 100 mg/dL Performed by [employer] GLUCOSE, POC Collection Time: 07/18/18  5:05 PM  
Result Value Ref Range Glucose (POC) 205 (H) 65 - 100 mg/dL Performed by [employer] GLUCOSE, POC Collection Time: 07/18/18  8:47 PM  
Result Value Ref Range Glucose (POC) 204 (H) 65 - 100 mg/dL Performed by [employer] GLUCOSE, POC Collection Time: 07/18/18 11:50 PM  
Result Value Ref Range Glucose (POC) 234 (H) 65 - 100 mg/dL Performed by [employer] GLUCOSE, POC Collection Time: 07/19/18  6:23 AM  
Result Value Ref Range Glucose (POC) 217 (H) 65 - 100 mg/dL Performed by [employer] Recent Labs  
   07/17/18 
 0539 WBC  7.3 HGB  12.1 HCT  38.4 PLT  69* Recent Labs  
   07/17/18 
 0539 NA  144  
K  4.5  
CL  108 CO2  31 BUN  30* CREA  1.28  
GLU  220* CA  9.1 No results for input(s): SGOT, GPT, AP, TBIL, TP, ALB, GLOB, GGT, AML, LPSE in the last 72 hours. No lab exists for component: AMYP, HLPSE No results for input(s): INR, PTP, APTT in the last 72 hours. No lab exists for component: INREXT, INREXT No results for input(s): FE, TIBC, PSAT, FERR in the last 72 hours. No results found for: FOL, RBCF No results for input(s): PH, PCO2, PO2 in the last 72 hours. No results for input(s): CPK, CKNDX, TROIQ in the last 72 hours. No lab exists for component: CPKMB Lab Results Component Value Date/Time Cholesterol, total 123 06/04/2018 09:16 AM  
 HDL Cholesterol 35 (L) 06/04/2018 09:16 AM  
 LDL, calculated 62 06/04/2018 09:16 AM  
 Triglyceride 132 06/04/2018 09:16 AM  
 CHOL/HDL Ratio 3.6 02/02/2017 02:55 AM  
 
No components found for: [place] Lab Results Component Value Date/Time Color YELLOW/STRAW 07/14/2018 05:40 AM  
 Appearance CLEAR 07/14/2018 05:40 AM  
 Specific gravity 1.018 07/14/2018 05:40 AM  
 pH (UA) 5.5 07/14/2018 05:40 AM  
 Protein TRACE (A) 07/14/2018 05:40 AM  
 Glucose NEGATIVE  07/14/2018 05:40 AM  
 Ketone NEGATIVE  07/14/2018 05:40 AM  
 Bilirubin NEGATIVE  07/14/2018 05:40 AM  
 Urobilinogen 1.0 07/14/2018 05:40 AM  
 Nitrites NEGATIVE  07/14/2018 05:40 AM  
 Leukocyte Esterase TRACE (A) 07/14/2018 05:40 AM  
 Epithelial cells FEW 07/14/2018 05:40 AM  
 Bacteria NEGATIVE  07/14/2018 05:40 AM  
 WBC 0-4 07/14/2018 05:40 AM  
 RBC 5-10 07/14/2018 05:40 AM  
 
  
ROS: -CP, SOB, Dysuria, palpitations, cough. Assessment: 
Dysphagia Could not complete BAS ? Aspiration PNA 
DM Long term use of Plavix Thrombocytopenia Principal Problem: 
  Aspiration pneumonia ([facility] [address] (11/28/2016) Plan/Discussion: 1. No current plans for EGD +/- dilation. He is on Plavix(needs to be off of it x 5-7 days) and has a thrombocytopenia. He could not complete the BAS. He is wondering if we can raise his platelets. I spoke with his wife in detail yesterday: 3 phone calls. 2. IV reglan  to decrease vomiting and as a gastrokinetic for motility. Is gastric emptying also an issue? Doing great with TF(Two Armando @30 ml/hr;goal 40 ml/hour) 3. He is on strict aspiration precautions.  
4. On treatment for the PNA 5. Continue famotidine. Signed By: Alvaro White.  Sandoval Harkins MD 
 
7/19/2018  8:04 AM

## 2018-07-19 NOTE — CONSULTS
Palliative Medicine Consult  [place]: [phone] ([phone])    Patient Name: Krystina Higgins  YOB: 1935    Date of Initial Consult: 7/19/18  Reason for Consult: Addressing goals of care: multiple medical problems, pt/wife are overwhelmed  Requesting Provider: Angelina Darden MD   Primary Care Physician: Nurys Zuleta MD     SUMMARY:   Krystina Higgins is a 80 y.o. with a past history of DM type 2, thrombocytopenia,  CAD, MI, arrhythmia, s/p AICD, PNA, HTN, GERD, tonsillar carcinoma status post dissection and dysphagia, now on PEG tube feedings, who was admitted on 7/14/2018 from home with a diagnosis of aspiration PNA of both lower lobes due to vomit. Current medical issues leading to Palliative Medicine involvement include: per , pt and wife tearful, pt unhappy with his QOL, wife overwhelmed. PSYCHOSOCIAL:  Lives with wife. At baseline is independent with ADLs and IADLs except driving. Uses RW and feeding tube. Wife is scheduled for hip surgery next week, family plans to assist pt 24/7. PALLIATIVE DIAGNOSES:   1. SOB  2. CP  3. Vomiting  4. Myalgias  5. Dizziness   6. Weakness   7. Chronic systolic HF EF 38%  8. Aspiration PNA  9. Essential tremors  10. Sepsis: , RR 32, WBC 13.6, hypotension  Group B Streptococcal bacteremia   11. Situational depression   12. LIZ (baseline 1.0-1.2)  13. Care decisions     PLAN:   1. 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   Past few years he gets emotional when he thinks about the things he can no longer do, that just taking a shower wears him out and he has to lie down and rest for about 20-30 min after, but the one issue that bothers him the most is mucus that gets stuck in his throat; it won't go down and it's very hard to cough up. It depresses him when the doctors tell him \"there is nothing we can do\" to make it better, however, he is \"trying to focus on the positive things in life. \"  Aside from his wife and family, his little dog Smokey makes him so happy and always cheers him up. 2. Spoke with his wife, who concurs to above statements. She will try to bring Smokey in for a visit If pt is not discharged home tomorrow. 3. Pt has a completed AMD at home; wife will try to find a copy to bring for the chart. Pt states that despite everything he still wants to live longer, wants aggressive medicine and CPR if there's hope, but does not want to be kept alive on machines if there is no hope. \"I've lived a good life and hope I still have more time. \"  4. PET VISITATION ORDER PLACED. Wife can bring pt's dog in for visit. 5. Will f/u tomorrow. 6. Initial consult note routed to primary continuity provider  7. Communicated plan of care with: Palliative IDT, RN       GOALS OF CARE / TREATMENT PREFERENCES:     GOALS OF CARE:  Patient/Health Care Proxy Stated Goals: Prolong life      TREATMENT PREFERENCES:   Code Status: Full Code    Advance Care Planning:  Advance Care Planning 7/19/2018   Patient's Healthcare Decision Maker is: Named in scanned ACP document   Primary Decision Maker Name Arvin Santa   Primary Decision Maker Phone Number [phone]   Primary Decision Maker Relationship to Patient Spouse   Confirm Advance Directive Yes, not on file   Does the patient have other document types -       Medical Interventions: Full interventions   Other Instructions: Other:    As far as possible, the palliative care team has discussed with patient / health care proxy about goals of care / treatment preferences for patient.            HISTORY:     History obtained from: chart, pt, mery    CHIEF COMPLAINT: vomiting    HPI/SUBJECTIVE:    The patient is:   [x] Verbal and participatory  [] Non-participatory due to:     BIBA following an acute vomiting episode ~30 min PTA with concerns for aspiration. Per wife, pt was not feeling well yesterday, and last PEG feeding was last night. Pt c/o nausea, than around 2:30am pt woke up choking, then vomited and \"was shaking on his bed. \"  Pt reports associated generalized body aches, 3/4CP, SOB, mild HA and weakness. EMS report Sp02 86% upon their arrival, with improvement to 96% with 2L 02. Pt recently admitted on 6/28/18 for tremors 2/2 aspiration. ED W/U:  EXAM: distressed, tachycardic, rales, tender and guarding abdomen  LAB:  IMAGING:  CXR:  No acute process. HEAD CT: neg. ABD CT: no acute abnormality in abd or pelvis. Patchy bibasilar lung opacities, left greater than right, may aspiration,        infection, or edema.          Clinical Pain Assessment (nonverbal scale for severity on nonverbal patients):   Clinical Pain Assessment  Severity: 4  Location: shoulders  Character: achy  Duration: months-years          Duration: for how long has pt been experiencing pain (e.g., 2 days, 1 month, years)  Frequency: how often pain is an issue (e.g., several times per day, once every few days, constant)     FUNCTIONAL ASSESSMENT:     Palliative Performance Scale (PPS):  PPS: 50       PSYCHOSOCIAL/SPIRITUAL SCREENING:     Palliative IDT has assessed this patient for cultural preferences / practices and a referral made as appropriate to needs (Cultural Services, Patient Advocacy, Ethics, etc.)    Advance Care Planning:  Advance Care Planning 7/19/2018   Patient's Healthcare Decision Maker is: Named in scanned ACP document   Primary Decision Maker Name Ruben Sutton   Primary Decision Maker Phone Number [phone]   Primary Decision Maker Relationship to Patient Spouse   Confirm Advance Directive Yes, not on file   Does the patient have other document types -       Any spiritual / Nondenominational concerns:  [] Yes / [x] No    Caregiver Burnout:  [] Yes /  [] No /  [x] No Caregiver Present      Anticipatory grief assessment:   [x] Normal  / [] Maladaptive       ESAS Anxiety: Anxiety: 0    ESAS Depression: Depression: 4        REVIEW OF SYSTEMS:     Positive and pertinent negative findings in ROS are noted above in HPI. The following systems were [x] reviewed / [] unable to be reviewed as noted in HPI  Other findings are noted below. Systems: constitutional, ears/nose/mouth/throat, respiratory, gastrointestinal, genitourinary, musculoskeletal, integumentary, neurologic, psychiatric, endocrine. Positive findings noted below. Modified ESAS Completed by: provider   Fatigue: 4 Drowsiness: 0   Depression: 4 Pain: 4   Anxiety: 0 Nausea: 0     Dyspnea: 0     Constipation: No              PHYSICAL EXAM:     From RN flowsheet:  Wt Readings from Last 3 Encounters:   07/19/18 165 lb 5.5 oz (75 kg)   07/09/18 174 lb (78.9 kg)   07/07/18 170 lb (77.1 kg)     Blood pressure 152/90, pulse 80, temperature 98.4 °F (36.9 °C), resp. rate 12, height 5' 7\" (1.702 m), weight 165 lb 5.5 oz (75 kg), SpO2 93 %.     Pain Scale 1: Numeric (0 - 10)  Pain Intensity 1: 2     Pain Location 1: Abdomen, Back     Pain Description 1: Aching  Pain Intervention(s) 1: Medication (see MAR)  Last bowel movement, if known:     Constitutional: elderly, awake alert, oriented  Eyes: pupils equal, anicteric  ENMT: no nasal discharge, moist mucous membranes  Cardiovascular: regular rhythm, distal pulses intact  Respiratory: breathing not labored, symmetric, rhonchi bilaterally  Gastrointestinal: soft non-tender, +bowel sounds  Musculoskeletal: no deformity, no tenderness to palpation  Skin: warm, dry  Neurologic: following commands, moving all extremities  Psychiatric: intermittently tearful, then happy     HISTORY:     Principal Problem:    Aspiration pneumonia ([address] (11/28/2016)      Past Medical History:   Diagnosis Date    Antiplatelet or antithrombotic long-term use 12/4/2014    Anxiety disorder     Arrhythmia 2009    bradycardia    Arthritis     CAD (coronary artery disease)     s/p CABG 2002; Dr Desiree Aquino [facility]) 1996    tongue/throat cancer s/p surgery / radiation and 1 dose of chemo    Carotid artery stenosis     s/p bilateral stents    Chronic pain     left leg, lower back,     Depression     Diabetes ([facility]     Type II    Esophageal dysmotility     s/p dilitation    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 I suspect the latter. Achalasia  is not present.         GERD (gastroesophageal reflux disease)     Heart failure ([facility] 10/2014     Cardiomyopathy:Pacemaker upgrade:Biv and AICD  Dr. Arabella Chase heart DrAlvaro last visit 5/11/2015    Hepatitis C Dx 1996    treated at [facility] in past; as of 4/15/15 wife states pt currently not under any treatment    Hyperlipidemia     Hypertension     Myocardial infarct [facility]) 2013    Heart Cath: 40% LV EF, Stented distal LAD, patent Graft to circumflex    On tube feeding diet approx 2009    still has as of 9/28/15  (no po food/liquid/meds at all); Dr Antonieta Levin Other ill-defined conditions(799.89) 1996    1 dose of chemotherapy/radiation for tongue cancer    Other ill-defined conditions(799.89)     BPH    Other ill-defined conditions(799.89)     orthostatic hypotension    Pneumonia ~ April -May 2010    Stroke [facility]) approx 2003    left side-left finger tips numb; no imbalance or memory loss; as of 2015 not seeing neuro MD    Suicidal thoughts       Past Surgical History:   Procedure Laterality Date    ABDOMEN SURGERY [address]    peg tube    CABG, ARTERY-VEIN, THREE  2000    HX CATARACT REMOVAL      bilateral    HX CHOLECYSTECTOMY      HX HEART CATHETERIZATION  2013    Stented distal LAD    HX MOHS PROCEDURES      bilateral    HX ORTHOPAEDIC      back surgery times two    HX OTHER SURGICAL      Radical Left Neck    HX OTHER SURGICAL      NASAL POLYPS REMOVAL    HX OTHER SURGICAL      TURP    HX PACEMAKER      HX PACEMAKER  10/28/14     Defibrillator: [facility] # [id number], serial # [id number]; Dr. Li Saeed [phone]; Dr Rebecca Prater      cystoscopy    NEUROLOGICAL PROCEDURE UNLISTED      cevical surgery    MS CHANGE GASTROSTOMY TUBE  2011         MS CHANGE GASTROSTOMY TUBE  2011         MS EGD INSERT GUIDE WIRE DILATOR PASSAGE ESOPHAGUS  2010         MS EGD TRANSORAL BIOPSY SINGLE/MULTIPLE  2010         STOMACH SURGERY PROCEDURE UNLISTED  2011         VASCULAR SURGERY PROCEDURE UNLIST      bilateral carotid stents      Family History   Problem Relation Age of Onset    Heart Disease Father       at age 52 from CAD    Colon Cancer Mother     Cancer Mother      colon ca    Heart Disease Brother       History reviewed, no pertinent family history.   Social History   Substance Use Topics    Smoking status: Never Smoker    Smokeless tobacco: Never Used    Alcohol use No     Allergies   Allergen Reactions    Demerol [Meperidine] Shortness of Breath    Paxil [Paroxetine Hcl] Unknown (comments)     Pt gets shaky and loses control of legs    Amoxicillin Rash    Cleocin [Clindamycin Hcl] Rash    Pcn [Penicillins] Rash      Current Facility-Administered Medications   Medication Dose Route Frequency    [START ON 2018] lactobac ac& pc-s.therm-b.anim (JAVY Q/RISAQUAD)  1 Cap PEG Tube DAILY    docusate sodium (COLACE) capsule 100 mg  100 mg Per G Tube BID    guaiFENesin (ROBITUSSIN) 100 mg/5 mL oral liquid 300 mg  300 mg Oral TID    furosemide (LASIX) tablet 40 mg  40 mg Oral DAILY    furosemide (LASIX) tablet 20 mg  20 mg Oral PCL    insulin glargine (LANTUS) injection 14 Units  14 Units SubCUTAneous DAILY    metoclopramide HCl (REGLAN) injection 10 mg  10 mg IntraVENous Q6H PRN    metroNIDAZOLE (FLAGYL) IVPB premix 500 mg  500 mg IntraVENous Q12H    cefTRIAXone (ROCEPHIN) 2 g in 0.9% sodium chloride (MBP/ADV) 50 mL  2 g IntraVENous Q24H    gabapentin (NEURONTIN) 250 mg/5 mL solution 750 mg  750 mg Oral TID    HYDROcodone-acetaminophen (HYCET) 0.5-21.7 mg/mL oral solution 5-10 mg  5-10 mg PEG Tube Q6H PRN    klack's mouthwash oral suspension (COMPOUNDED)  5 mL Oral Q6H    sodium chloride (NS) flush 5-10 mL  5-10 mL IntraVENous PRN    aspirin (ASPIRIN) tablet 325 mg  325 mg Oral DAILY    atorvastatin (LIPITOR) tablet 40 mg  40 mg Oral DAILY    clopidogrel (PLAVIX) tablet 75 mg  75 mg Oral DAILY    finasteride (PROSCAR) tablet 5 mg  5 mg Oral QHS    nitroglycerin (NITROSTAT) tablet 0.4 mg  0.4 mg SubLINGual Q5MIN PRN    pramipexole (MIRAPEX) tablet 0.25 mg  0.25 mg Oral TID    sodium chloride (NS) flush 5-10 mL  5-10 mL IntraVENous Q8H    sodium chloride (NS) flush 5-10 mL  5-10 mL IntraVENous PRN    midodrine (PROAMITINE) tablet 30 mg  30 mg Oral TID    glucose chewable tablet 16 g  4 Tab Oral PRN    dextrose (D50W) injection syrg 12.5-25 g  12.5-25 g IntraVENous PRN    glucagon (GLUCAGEN) injection 1 mg  1 mg IntraMUSCular PRN    insulin lispro (HUMALOG) injection   SubCUTAneous Q6H    acetaminophen (TYLENOL) tablet 650 mg  650 mg Oral Q6H PRN    ondansetron (ZOFRAN) injection 4 mg  4 mg IntraVENous Q6H PRN    famotidine (PEPCID) tablet 20 mg  20 mg Oral ACB          LAB AND IMAGING FINDINGS:     Lab Results   Component Value Date/Time    WBC 7.3 07/17/2018 05:39 AM    HGB 12.1 07/17/2018 05:39 AM    PLATELET 69 (L) 07/22/6305 05:39 AM     Lab Results   Component Value Date/Time    Sodium 144 07/17/2018 05:39 AM    Potassium 4.5 07/17/2018 05:39 AM    Chloride 108 07/17/2018 05:39 AM    CO2 31 07/17/2018 05:39 AM    BUN 30 (H) 07/17/2018 05:39 AM    Creatinine 1.28 07/17/2018 05:39 AM    Calcium 9.1 07/17/2018 05:39 AM    Magnesium 2.5 (H) 07/16/2018 05:02 AM    Phosphorus 3.8 08/01/2016 05:06 AM      Lab Results   Component Value Date/Time    AST (SGOT) 48 (H) 07/14/2018 04:40 AM    Alk. phosphatase 106 07/14/2018 04:40 AM    Protein, total 7.2 07/14/2018 04:40 AM    Albumin 3.2 (L) 07/14/2018 04:40 AM    Globulin 4.0 07/14/2018 04:40 AM     Lab Results   Component Value Date/Time    INR 1.1 05/16/2018 02:39 AM    Prothrombin time 10.8 05/16/2018 02:39 AM    aPTT 22.3 05/07/2018 02:12 PM      No results found for: IRON, FE, TIBC, IBCT, PSAT, FERR   No results found for: PH, PCO2, PO2  No components found for: [place]   Lab Results   Component Value Date/Time     05/07/2018 01:06 PM    CK - MB 4.4 (H) 05/07/2018 01:06 PM                Total time: 90 min  Counseling / coordination time, spent as noted above: 85 min  > 50% counseling / coordination?: yes    Prolonged service was provided for  []30 min   []75 min in face to face time in the presence of the patient, spent as noted above. Time Start:   Time End:   Note: this can only be billed with 07424 (initial) or 63910 (follow up). If multiple start / stop times, list each separately.

## 2018-07-19 NOTE — PROGRESS NOTES
Spiritual Care Partner Volunteer visited patient in med tele on 7/19/2018. Documented by:   Rev. Luzmaria Quezada.  Serene Vaughan MA, [facility]    Lead  Profession Development & Advancement

## 2018-07-20 NOTE — PROGRESS NOTES
Home Health Care Discharge Planning: [facility] Face to Face Encounter NAME: Vivek San :  1935 MRN:  [id number] Primary Diagnosis: aspiration pneumonia Date of Face to Face:  2018 1:38 PM        
                        
Face to Face Encounter findings are related to primary reason for home care:   YES 
 
1. I certify that the patient needs intermittent skilled nursing care, physical therapy and/or speech therapy. I will not be following this patient in the Community and Dr. Vladimir Ramos MD will be responsible for signing the [address] 2. Initial Orders for Care: Skilled Nursing, Physical Therapy and Occupational Therapy 3. I certify that this patient is homebound because of illness or injury, need the aid of supportive devices such as crutches, canes, wheelchairs, and walkers; the use of special transportation; or the assistance of another person in order to leave their place of residence. There exists a normal inability to leave home and leaving home requires a considerable and taxing effort. 4. I certify that this patient is under my care and that I had a Face-to-Face Encounter that meets the physician Face-to-Face Encounter requirements. Document the physical findings from the Face-to-Face Encounter that support the need for skilled services: Has new diagnosis that requires skilled nursing teaching and intervention , Has new medications that requires skilled nursing teaching and monitoring for understanding and compliance , Needs skilled safety assessment and interventions  and Has new finding of weakness and altered mobility that requires skilled physical/occupational and/or speech therapy services for evaluation and interventions. Venice Jones MD 
Discharging Physician Office: [phone] Fax:   [phone]

## 2018-07-20 NOTE — PROGRESS NOTES
Bedside shift change report given to [facility] ,RN(oncoming nurse) by Abdoulaye Mendez RN(offgoing nurse). Report included the following information SBAR, Kardex, Procedure Summary and Recent Results.

## 2018-07-20 NOTE — PROGRESS NOTES
Speech path  Spoke to Dr. Dewayne Brar about the MBS. Pt is complaining of thick ropy secretions that he cannot clear. The MBS will be cancelled. Pt most likely needs to be evaluated by ENT to determine best treatment. Most likely a lack of moisture. Will discuss with the pt and his wife. Wife reports that the pt puts Robitussin through his PEG and it helps thin the secretions some.    Rigo Meyers, SLP

## 2018-07-20 NOTE — DISCHARGE INSTRUCTIONS
Patient Discharge Instructions    Trini Dennis / [id number] : 1935    Admitted 2018 Discharged: 2018         DISCHARGE DIAGNOSIS:         Group B streptococcal bacteremia ( infection in blood) due to  aspiration pneumonia   -complete additional 7 days of antibiotics     Dysphagia   Continue for now bolus feeding:     Isosource 1.5 1 can/250 mL bolus 5x/day (8am, 11am, 2pm, 5pm, 8pm as discussed with wife) + 150 mL flush q bolus (5oz); will provide 1875 kcal, 85g protein, 1705 mL water    Alternate recommendations below in case not tolerating above: ( was faxed to [address] already but Dr Francia Estrada will have to fax orders for pump if you decided to go with it)   Isosource 1.5 @ 50 mL/hr x 24 hours/day + 150 mL water flushes q 4 hours (1800 kcal, 82g protein, 1817 Ml water)  Isosource 1.5 @ 100 mL/hr x 12 hours (nocturnally) + 150 mL water flushes q 4 hours (x24 hours) (1800 kcal, 82g protein, 1817 mL water)  Could also consider nocturnal feedings with an addition of bolus feedings during the day  Isosource 1.5 @ 60 mL/hr + 12 hours (nocturnally) + 250 mL bolus x 2 during the day to provide a total of 1830 kcal, 83 protein     Heart failure  Aortic stenosis       DM type II - continue insulin regiment as previously          Take Home Medications     You are being discharge on antibiotic Cefdinir / Flagyl for treatment of aspiration pneumonia      What you should know about antibiotics:     Antibiotics are medicines that help people fight infections caused by bacteria. They work by killing bacteria that are in the body. Antibiotics can cause side effects, such as nausea, vomiting. Nausea is a common side effect of many antibiotics. It is not an allergic reaction. If you are a woman and you get a yeast infection after taking an antibiotic, that does not mean you are allergic to it. Yeast infections are a common side effect of antibiotics.   One of the most important side effect to watch while taking antibiotic or after you just finished taking it is diarrhea. This type of diarrhea may be caused by an infection with bacteria called C. difficile. C. difficile normally lives in the intestines. When people are on antibiotics, the C. difficile in their intestines can overgrow and cause infection. If you develop any side effect especially diarrhea while taking antibiotics it is very important to contact your doctor. We recommend taking probiotics while taking antibiotics. Probiotics can be bought over the counter. Allergies to antibiotics are common. You can develop an allergy to an antibiotic, even if you have not had a problem with it before. Symptoms of antibiotic allergy can be mild and include a flat rash and itching. They can also be more serious and include:      -----  Hives - are raised, red patches of skin that are usually very itchy      -----  Lip or tongue swelling     ------ Trouble swallowing or breathing  Serious allergy symptoms can start right after you start taking an antibiotic if you are very sensitive. Less serious symptoms, on the other hand, often start a day or more later. If you think you are allergic to antibiotics tell your doctor. General drug facts     If you have a very bad allergy, wear an allergy ID at all times. It is important that you take the medication exactly as they are prescribed. Keep your medication in the bottles provided by the pharmacist.  Keep a list of all your drugs (prescription, natural products, vitamins, OTC) with you. Give this list to your doctor. Do not take other medications without consulting your doctor. Do not share your drugs with others and do not take anyone else's drugs. Keep all drugs out of the reach of children and pets. Most drugs may be thrown away in household trash after mixing with coffee grounds or lupillo litter and sealing in a plastic bag. Keep a list Call your doctor for help with any side effects.  If in the [place] you may also call the FDA at 1-055-TQX-5099    Talk with the doctor before starting any new drug, including OTC, natural products, or vitamins. What to do at Home    1. Recommended diet: tube feedings as above     2. Recommended activity: Activity as tolerated; home PT/OT; ambulate with help or rolator     3. If you experience any of the following symptoms then please call your primary care physician or return to the emergency room if you cannot get hold of your doctor: fever/chills    4. Bring these papers with you to your follow up appointments. The papers will help your doctors be sure to continue the care plan from the hospital.      Follow-up with:   PCP: Kin Beaulieu MD  Follow-up Information     Follow up With Details Comments [address] [facility] On 7/19/2018 this is your home care provider. If you have not heard from them in 1-2 days after discharge. .. please call them. [facility] [phone] [facility]    Kin Beaulieu MD Go on 7/31/2018 Hospital follow-up scheduled at 2:00pm ( If you have questions or need to reschedule please call [address] will call the pt on Sunday for a Sunday visit. Phone # [phone]    Paulino Odonnell MD  as scheduled  [address]  [phone]             Please call for your own appointment        Information obtained by :  I understand that if any problems occur once I am at home I am to contact my physician. I understand and acknowledge receipt of the instructions indicated above.                                                                                                                                            Physician's or R.N.'s Signature                                                                  Date/Time Patient or Representative Signature                                                          Date/Time

## 2018-07-20 NOTE — PROGRESS NOTES
0164 WILBUR Mckinney will follow at d/c. Please contact wife to transport if discharged. Thanks  09  D /C plans discussed with Dietician who requests chart info be sent to [facility](provides his PEG feedings) in case PCP would like to transition to feeding pump at home. 11a  GARCÍA left with [facility] regarding a weekend visit to the home after d/c    1115  I spoke with Fabiana Avalos at [facility] who will call the pt on Sunday for a Sunday visit. They will come out just like it was an urgent care visit; They can help with vitals//fluids//follow up MD, etc.  His Medicare should cover 80% and BC should cover th other 20%.

## 2018-07-20 NOTE — PROGRESS NOTES
Gastroenterology Progress Note 7/20/2018 Admit Date: 7/14/2018 Subjective: Follow up for: dysphagia in a complex pt, possible aspiration PNA(Nathalia for Papi Clayton) Did not sleep well as he is concerned about 'choking' on the mucous. On TF at 40 ml/hour with no residual reported. RN is at bedside. Current Facility-Administered Medications Medication Dose Route Frequency  lactobac ac& pc-s.therm-b.anim (JAVY Q/RISAQUAD)  1 Cap PEG Tube DAILY  docusate sodium (COLACE) capsule 100 mg  100 mg Per G Tube BID  
 guaiFENesin (ROBITUSSIN) 100 mg/5 mL oral liquid 300 mg  300 mg Oral TID  furosemide (LASIX) tablet 40 mg  40 mg Oral DAILY  furosemide (LASIX) tablet 20 mg  20 mg Oral PCL  insulin glargine (LANTUS) injection 14 Units  14 Units SubCUTAneous DAILY  metoclopramide HCl (REGLAN) injection 10 mg  10 mg IntraVENous Q6H PRN  
 metroNIDAZOLE (FLAGYL) IVPB premix 500 mg  500 mg IntraVENous Q12H  cefTRIAXone (ROCEPHIN) 2 g in 0.9% sodium chloride (MBP/ADV) 50 mL  2 g IntraVENous Q24H  
 gabapentin (NEURONTIN) 250 mg/5 mL solution 750 mg  750 mg Oral TID  
 HYDROcodone-acetaminophen (HYCET) 0.5-21.7 mg/mL oral solution 5-10 mg  5-10 mg PEG Tube Q6H PRN  
 klack's mouthwash oral suspension (COMPOUNDED)  5 mL Oral Q6H  
 sodium chloride (NS) flush 5-10 mL  5-10 mL IntraVENous PRN  
 aspirin (ASPIRIN) tablet 325 mg  325 mg Oral DAILY  atorvastatin (LIPITOR) tablet 40 mg  40 mg Oral DAILY  clopidogrel (PLAVIX) tablet 75 mg  75 mg Oral DAILY  finasteride (PROSCAR) tablet 5 mg  5 mg Oral QHS  nitroglycerin (NITROSTAT) tablet 0.4 mg  0.4 mg SubLINGual Q5MIN PRN  pramipexole (MIRAPEX) tablet 0.25 mg  0.25 mg Oral TID  sodium chloride (NS) flush 5-10 mL  5-10 mL IntraVENous Q8H  
 sodium chloride (NS) flush 5-10 mL  5-10 mL IntraVENous PRN  
 midodrine (PROAMITINE) tablet 30 mg  30 mg Oral TID  glucose chewable tablet 16 g  4 Tab Oral PRN  
 dextrose (D50W) injection syrg 12.5-25 g  12.5-25 g IntraVENous PRN  
 glucagon (GLUCAGEN) injection 1 mg  1 mg IntraMUSCular PRN  
 insulin lispro (HUMALOG) injection   SubCUTAneous Q6H  
 acetaminophen (TYLENOL) tablet 650 mg  650 mg Oral Q6H PRN  
 ondansetron (ZOFRAN) injection 4 mg  4 mg IntraVENous Q6H PRN  
 famotidine (PEPCID) tablet 20 mg  20 mg Oral ACB Objective:  
 
Blood pressure 129/79, pulse 79, temperature 97.8 °F (36.6 °C), resp. rate 18, height 5' 7\" (1.702 m), weight 75 kg (165 lb 5.5 oz), SpO2 93 %. 
 
  
 
07/18 1901 - 07/20 0700 In: 880 Out: 1400 [[id number]] Physical Examination:  
 
 
General:AAO x 3, spry male. Wife if 62 yrs at bedside. HEENT:  EOMI, Chest:  Dec BS at bases Heart: S1, S2, RRR 
GI: Soft, NT, ND, +22 Fr soft balloon PEG + bowel sounds Extremities: No cyanosis Data Review Recent Results (from the past 24 hour(s)) GLUCOSE, POC Collection Time: 07/19/18  1:30 PM  
Result Value Ref Range Glucose (POC) 209 (H) 65 - 100 mg/dL Performed by Iván French GLUCOSE, POC Collection Time: 07/19/18  8:28 PM  
Result Value Ref Range Glucose (POC) 187 (H) 65 - 100 mg/dL Performed by Zara Chapin GLUCOSE, POC Collection Time: 07/19/18 11:31 PM  
Result Value Ref Range Glucose (POC) 241 (H) 65 - 100 mg/dL Performed by [employer] GLUCOSE, POC Collection Time: 07/20/18  5:54 AM  
Result Value Ref Range Glucose (POC) 233 (H) 65 - 100 mg/dL Performed by [employer] No results for input(s): WBC, HGB, HCT, PLT, HGBEXT, HCTEXT, PLTEXT, HGBEXT, HCTEXT, PLTEXT in the last 72 hours. No results for input(s): NA, K, CL, CO2, BUN, CREA, GLU, CA, MG, PHOS, URICA in the last 72 hours. No results for input(s): SGOT, GPT, AP, TBIL, TP, ALB, GLOB, GGT, AML, LPSE in the last 72 hours. No lab exists for component: AMYP, HLPSE No results for input(s): INR, PTP, APTT in the last 72 hours.  
 
No lab exists for component: INREXT, [place] No results for input(s): FE, TIBC, PSAT, FERR in the last 72 hours. No results found for: FOL, RBCF No results for input(s): PH, PCO2, PO2 in the last 72 hours. No results for input(s): CPK, CKNDX, TROIQ in the last 72 hours. No lab exists for component: CPKMB Lab Results Component Value Date/Time Cholesterol, total 123 06/04/2018 09:16 AM  
 HDL Cholesterol 35 (L) 06/04/2018 09:16 AM  
 LDL, calculated 62 06/04/2018 09:16 AM  
 Triglyceride 132 06/04/2018 09:16 AM  
 CHOL/HDL Ratio 3.6 02/02/2017 02:55 AM  
 
No components found for: [place] Lab Results Component Value Date/Time Color YELLOW/STRAW 07/14/2018 05:40 AM  
 Appearance CLEAR 07/14/2018 05:40 AM  
 Specific gravity 1.018 07/14/2018 05:40 AM  
 pH (UA) 5.5 07/14/2018 05:40 AM  
 Protein TRACE (A) 07/14/2018 05:40 AM  
 Glucose NEGATIVE  07/14/2018 05:40 AM  
 Ketone NEGATIVE  07/14/2018 05:40 AM  
 Bilirubin NEGATIVE  07/14/2018 05:40 AM  
 Urobilinogen 1.0 07/14/2018 05:40 AM  
 Nitrites NEGATIVE  07/14/2018 05:40 AM  
 Leukocyte Esterase TRACE (A) 07/14/2018 05:40 AM  
 Epithelial cells FEW 07/14/2018 05:40 AM  
 Bacteria NEGATIVE  07/14/2018 05:40 AM  
 WBC 0-4 07/14/2018 05:40 AM  
 RBC 5-10 07/14/2018 05:40 AM  
 
  
ROS: -CP, SOB, Dysuria, palpitations, cough. Assessment: 
Dysphagia Could not complete BAS ? Aspiration PNA 
DM Long term use of Plavix Thrombocytopenia Principal Problem: 
  Aspiration pneumonia ([facility] [address] (11/28/2016) Active Problems: Myalgia (7/19/2018) Pneumonia due to group B Streptococcus ([facility] [address] (7/19/2018) Counseling regarding goals of care (7/19/2018) Plan/Discussion: 1. Discussion today was mostly about OP management of his feedings. His wife's concern was re: the 'length of the feeding tube'; his concern was re:the \"softness\". Other issues were OP management of tube feeds on D/C. I spoke with him, his wife and his RN at length.  Will ask for dietician's help to see how we can arrange for OP TF, if needed. 2. My plan was to change the  current 22 Fr PEG to a new 22 Fr PEG, per family/pt wishes, at bedside. I called the endo lab and spoke with the team. They do not have a 22 Fr Replacement PEG tube. They have either an 18 Fr(too narrow) or 24 Fr(too broad. On Plavix and low platelets.) I will see if we have a 22 FR replacement PEG in the office. 3. No current plans for EGD +/- dilation. He is on Plavix(needs to be off of it x 5-7 days) and has a thrombocytopenia. This can be pursued as an OP, if family/pt and Dr Papi Clayton want to. 
4. I will request a MBS today. 5. IV reglan  to decrease vomiting and as a gastrokinetic for motility. Is gastric emptying also an issue? Doing great with TF(Two Armando @goal of 40 ml/hour) 6. He is on strict aspiration precautions. On treatment for theaspiration PNA 7. I am concerned about his high glucose levels. 8. I reviewed palliative medicines insightful and thoughtful note. Thanks. Addendum: 
 Office does not have the 22 Fr replacement  PEG that they want either. I informed the wife. She was ok with getting it changed as OP. Signed By: August Leavitt.  Ginger Martínez MD 
 
7/20/2018  8:10 AM

## 2018-07-20 NOTE — PROGRESS NOTES
Bedside and Verbal shift change report given to Stefany Thomas (oncoming nurse) by Ashley Epperson (offgoing nurse). Report included the following information SBAR, Kardex, Intake/Output, MAR and Recent Results.

## 2018-07-20 NOTE — PROGRESS NOTES
Problem: Falls - Risk of  Goal: *Absence of Falls  Document Olayinka Fall Risk and appropriate interventions in the flowsheet.    Outcome: Progressing Towards Goal  Fall Risk Interventions:  Mobility Interventions: Bed/chair exit alarm         Medication Interventions: Bed/chair exit alarm, Patient to call before getting OOB, Teach patient to arise slowly    Elimination Interventions: Call light in reach, Bed/chair exit alarm, Patient to call for help with toileting needs, Urinal in reach, Toilet paper/wipes in reach    History of Falls Interventions: Door open when patient unattended, Bed/chair exit alarm, Room close to nurse's station

## 2018-07-20 NOTE — PROGRESS NOTES
Hospitalist Progress Note NAME: Rhonda Henry :  1935 MRN:  [id number] Assessment / Plan: 
Severe sepsis POA  resolved Group B streptococcal bacteremia POA Possible aspiration pneumonia POA 
-on admission , RR 32, WBC 13.6, hypotension 61/45 in ED Required pressors, now off Clinically improved. Vs stable. Leukocytosis resolved  
-4/4 BC with Grp B strep on admission, repeated BC , - NTD  
-AB: Change to ceftriaxone and flagyl from levaquin and zosyn ( total AB day 7) Flagyl for concern for aspiration pneumonia Source suspected is the lung Will complete 14 days of AB Rx with + BC: cefdinir + flagyl x 7 days Reviewed chest CT, ASD or edema left > right lungs No evidence of UTI or skin and soft tissue infection Recent increased back pain, had CT lumbar spine with chronic appearing DDD 
-ECHO wo vegetation Chronic dysphagia 
-NPO at home with TF  IsoSource 1.5 ( 5 cans) - Followed Dr Juliana Jones OP for dysphasia, was considering EGD with possible attempt of dilatation. Cardiology clearance was pending ( per wife primary cardiology Dr David Martinez procedure) -GI help was appreciated:no EGD planned this admission due to plavix/low plt Unable to find appropriate size PEG in the hospital to change it per wife request   
-had 2 BMs s/p suppository  
-Seen by dietitian today: After a long discussion with patient and wife, it was decided to continue bolus TF at home but decrease the amount given at one time and increase the frequency of feedings. If patient does not tolerate this regimen he will need to transition to gravity or pump feedings. Home TF recommendations: Isosource 1.5 1 can/250 mL bolus 5x/day (8am, 11am, 2pm, 5pm, 8pm as discussed with wife) + 150 mL flush q bolus (5oz); will provide 1875 kcal, 85g protein, 1705 mL water Provided alternate recommendations below in case patient is able to obtain a pump.  Wife plans to discuss possibility of pump feeding with Dr. Berman Cancer on an OP basis and I have discussed with CM to send patient's information to [facility] for the feasibility of obtaining a pump. Isosource 1.5 @ 50 mL/hr x 24 hours/day + 150 mL water flushes q 4 hours (1800 kcal, 82g protein, 1817 Ml water) Isosource 1.5 @ 100 mL/hr x 12 hours (nocturnally) + 150 mL water flushes q 4 hours (x24 hours) (1800 kcal, 82g protein, 1817 mL water) Could also consider nocturnal feedings with an addition of bolus feedings during the day Isosource 1.5 @ 60 mL/hr + 12 hours (nocturnally) + 250 mL bolus x 2 during the day to provide a total of 1830 kcal, 83 protein 
  
Chronic systolic HF EF 08% / mod to severe AS    
Chronic orthostatic hypotension  
-no signs of fluid overload. HR/BP stable  
-on home lasix, aspirin/plavix. Lipitor. -ECHO: EF 40%, mild MR, mod to sev AS, mild TR  
  
DM type II  
--200 
-dc on home regiment, follow with primary endocrinology LIZ POA, resolved, pre renal, adm cr 1.82--> 1.28 with IVF Recurrent N/V at home POA, resolved. CT abdomen/pelvis was negative Chronic thrombocytopenia POA stable   
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 7/19: Pt was very emotional today \" I don't have life. I have so many problems and nobody can help\". He complain that he is tired from poor QOL. Per wife, 4/5 days they have different doctors appointments. Wife was also very emotional and was crying together with pt. Will ask palliative care team to see for overwhelming symptoms and to address goal of care. Wife is scheduled to have TKA 7/25. Per wife, children will help with their care at home after her surgery. Code status: Full Prophylaxis: SCD's ( thrombocytopenia) Recommended Disposition:  PT, OT, RN today ; health dispatch to see on weekend PT: resume HH PT   
 
Subjective: Chief Complaint / Reason for Physician Visit: following aspiration pneumonia/ dysphasia TF:tolerating well wo residual   
In a better spirit today Discussed with RN events overnight. Review of Systems: 
Symptom Y/N Comments  Symptom Y/N Comments Fever/Chills n   Chest Pain n   
Poor Appetite    Edema Cough y   Abdominal Pain n   
Sputum    Joint Pain SOB/KRAMER n   Pruritis/Rash Nausea/vomit n   Tolerating PT/OT Diarrhea    Tolerating Diet Constipation    Other Could NOT obtain due to:   
 
Objective: VITALS:  
Last 24hrs VS reviewed since prior progress note. Most recent are: 
Patient Vitals for the past 24 hrs: 
 Temp Pulse Resp BP SpO2  
07/20/18 1108 98.3 °F (36.8 °C) 79 20 125/80 91 %  
07/20/18 0939 98.3 °F (36.8 °C) 86 14 144/87 93 % 07/20/18 0258 97.8 °F (36.6 °C) 79 18 129/79 93 % 07/19/18 2237 97.9 °F (36.6 °C) 82 20 138/80 92 % 07/19/18 1948 97.7 °F (36.5 °C) 74 18 144/81 92 % Intake/Output Summary (Last 24 hours) at 07/20/18 1242 Last data filed at 07/20/18 5125 Gross per 24 hour Intake              645 ml Output              950 ml Net             -305 ml PHYSICAL EXAM: 
General: WD, WN. Alert, cooperative, no acute distress. Thin   
EENT:  EOMI. Anicteric sclerae. MMM Resp:  Rhonchi bilaterally, no wheezing or rales. No accessory muscle use CV:  Regular  rhythm,  No edema, + murmur GI:  Soft, Non distended, Non tender.  +Bowel sounds Neurologic:  Alert and oriented X 3, normal speech, Psych:   Good insight. Not anxious nor agitated Skin:  No rashes. No jaundice Reviewed most current lab test results and cultures  YES Reviewed most current radiology test results   YES Review and summation of old records today    NO Reviewed patient's current orders and MAR    YES 
PMH/SH reviewed - no change compared to H&P 
________________________________________________________________________ Care Plan discussed with: 
  Comments Patient y Family  y Wife bedside RN y   
Care Manager y Consultant  y KIESHA Amin Remedios Deluca   
                  Multidiciplinary team rounds were held today with , nursing, pharmacist and clinical coordinator. Patient's plan of care was discussed; medications were reviewed and discharge planning was addressed. ________________________________________________________________________ Total NON critical care TIME:  40 Minutes Total CRITICAL CARE TIME Spent:   Minutes non procedure based Comments >50% of visit spent in counseling and coordination of care y Dc coordination   
________________________________________________________________________ Venice Jones MD  
 
Procedures: see electronic medical records for all procedures/Xrays and details which were not copied into this note but were reviewed prior to creation of Plan. LABS: 
I reviewed today's most current labs and imaging studies. Pertinent labs include: No results for input(s): WBC, HGB, HCT, PLT, HGBEXT, HCTEXT, PLTEXT, HGBEXT, HCTEXT, PLTEXT in the last 72 hours. No results for input(s): NA, K, CL, CO2, GLU, BUN, CREA, CA, MG, PHOS, ALB, TBIL, TBILI, SGOT, ALT, INR in the last 72 hours. No lab exists for component: INREXT, INREXT Signed: Venice Jones MD

## 2018-07-20 NOTE — PROGRESS NOTES
Nutrition Assessment:    INTERVENTIONS/RECOMMENDATIONS:   After a long discussion with patient and wife, it was decided to continue bolus TF at home but decrease the amount given at one time and increase the frequency of feedings. If patient does not tolerate this regimen he will need to transition to gravity or pump feedings. Home TF recommendations: Isosource 1.5 1 can/250 mL bolus 5x/day (8am, 11am, 2pm, 5pm, 8pm as discussed with wife) + 150 mL flush q bolus (5oz); will provide 1875 kcal, 85g protein, 1705 mL water    ASSESSMENT:   Chart reviewed; >45 minutes spent in patient's room discussing home TF regimen with him and his wife. Patient resistant when discussing feeding pump. It was reported that most days of the week patient or his wife has a doctors appointment and he doesn't see the feasibility of being hooked up all the time. We discussed continuing bolus feeds but adjusting the regimen, gravity feeds, pump feedings, and nocturnal feedings. For now, patient and wife would both like to continue the bolus regimen as it works best for them. We discussed decreasing the amount of formula/water going in at once and increasing the frequency (see TF recommendations above). His usual regimen includes getting 2 cans in the morning for breakfast plus an additional 9oz water which is 770 mL at one time; lunch and dinner is 1.5 cans + 6oz water which is 555 mL at once. Recommendations above will provide patient with 400 mL of formula and water at each feeding. Patient still worried about the frequency of feedings and having to leave the house for appointments, etc. Discussed that this is a starting point to ensure patient can tolerate bolus feedings; he could possible work his way up to 1.5 cans at a time in which it would decrease to 4 feedings/day (1.5 cans 3x/day + 0.5 can 1x/day; this regimen was discussed with wife as well who wrote it down).  Provided alternate recommendations below in case patient is able to obtain a pump. Wife plans to discuss possibility of pump feeding with Dr. Jayson Rivera on an OP basis and I have discussed with CM to send patient's information to [facility] for the feasibility of obtaining a pump. Isosource 1.5 @ 50 mL/hr x 24 hours/day + 150 mL water flushes q 4 hours (1800 kcal, 82g protein, 1817 Ml water)  Isosource 1.5 @ 100 mL/hr x 12 hours (nocturnally) + 150 mL water flushes q 4 hours (x24 hours) (1800 kcal, 82g protein, 1817 mL water)  Could also consider nocturnal feedings with an addition of bolus feedings during the day  Isosource 1.5 @ 60 mL/hr + 12 hours (nocturnally) + 250 mL bolus x 2 during the day to provide a total of 1830 kcal, 83 protein    Diet Order: NPO (TwoCal @ 40 mL/hr + 175 mL water flushes Q4; 1920 kcal, 80g protein, 1722 mL water)  % Eaten:  No data found. Pertinent Medications: [x] Reviewed []Other: Atorvastatin, Plavix, Colace, Famotidine, Lasix, Lantus, Humalog, Pita Q, Midodrine  Pertinent Labs: [x]Reviewed  []Other:  [phone]  Food Allergies: [x]None []Other:     Last BM: 7/19   [x]Active     []Hyperactive  []Hypoactive       [] Absent  BS  Skin:    [x] Intact   [] Incision  [] Breakdown   []Edema   []Other:    Anthropometrics: Height: 5' 7\" (170.2 cm) Weight: 75 kg (165 lb 5.5 oz)    IBW (%IBW):   ( ) UBW (%UBW):   (  %)    BMI: Body mass index is 25.9 kg/(m^2). This BMI is indicative of:  []Underweight   []Normal   [x]Overweight   [] Obesity   [] Extreme Obesity (BMI>40)  Last Weight Metrics:  Weight Loss Metrics 7/19/2018 7/9/2018 7/7/2018 7/5/2018 6/28/2018 6/23/2018 6/11/2018   Today's Wt 165 lb 5.5 oz 174 lb 170 lb 171 lb 155 lb 168 lb 172 lb 3.2 oz   BMI 25.9 kg/m2 27.25 kg/m2 26.63 kg/m2 26.78 kg/m2 24.28 kg/m2 26.31 kg/m2 26.97 kg/m2   Some encounter information is confidential and restricted. Go to Review Flowsheets activity to see all data. Estimated Nutrition Needs (Based on): 3516 Kcals/day (BMR (4881) x 1. 3AF) , 75 g (-90g (1.0-1.2 g/kg bw)) Protein  Carbohydrate:  At Least 130 g/day  Fluids: 1800 mL/day     Pt expected to meet estimated nutrient needs: [x]Yes []No    NUTRITION DIAGNOSES:   Problem:  Swallowing difficulty      Etiology: related to hx of throat cancer     Signs/Symptoms: as evidenced by PEG dependent       NUTRITION INTERVENTIONS:    Enteral/Parenteral Nutrition: Initiate enteral nutrition                GOAL:   Patient will tolerate TF with residual <250 mL next 1-3 days    NUTRITION MONITORING AND EVALUATION   Food/Nutrient Intake Outcomes: Enteral/parenteral nutrition intake  Physical Signs/Symptoms Outcomes: Weight/weight change, GI profile, Glucose profile, Electrolyte and renal profile    Previous Goal Met:   [x] Met              [] Progressing Towards Goal              [] Not Progressing Towards Goal   Previous Recommendations:   [x] Implemented          [] Not Implemented          [] Not Applicable    LEARNING NEEDS (Diet, Food/Nutrient-Drug Interaction):    [x] None Identified   [] Identified and Education Provided/Documented   [] Identified and Pt declined/was not appropriate     Cultural, Latter-day, OR Ethnic Dietary Needs:    [x] None Identified   [] Identified and Addressed     [x] Interdisciplinary Care Plan Reviewed/Documented    [x] Discharge Planning: See TF recommendations above   [] Participated in Interdisciplinary Rounds    NUTRITION RISK:    [x] High              [] Moderate           []  Low  []  Minimal/Uncompromised      [facility]  Pager [phone]              Weekend Pager [phone]

## 2018-07-20 NOTE — DIABETES MGMT
DTC Progress Note Recommendations/ Comments: If appropriate, please consider increasing Lantus to 18 units daily. Current hospital DM medication:  
-Humalog normal sensitivity correction Chart reviewed on [facility]. Patient is a 80 y.o. male with known history of Type 2 Diabetes on Lantus 14 units once daily and Humalog 13 units acB, 10 units acL and 7 units acD + SSI at home. A1c:  
Lab Results Component Value Date/Time Hemoglobin A1c 8.6 (H) 07/14/2018 04:40 AM  
 Hemoglobin A1c 8.6 (H) 06/04/2018 09:16 AM  
 
 
Recent Glucose Results:  
Lab Results Component Value Date/Time GLUCPOC 157 (H) 07/20/2018 11:16 AM  
 GLUCPOC 233 (H) 07/20/2018 05:54 AM  
 GLUCPOC 241 (H) 07/19/2018 11:31 PM  
  
 
Lab Results Component Value Date/Time Creatinine 1.28 07/17/2018 05:39 AM  
 
Estimated Creatinine Clearance: 40.9 mL/min (based on Cr of 1.28). Active Orders Diet DIET NPO With Tube Feedings PO intake: No data found. Will continue to follow as needed. Thank you Shantel Victoria RD, CDE [facility] Office: [phone]

## 2018-07-20 NOTE — PROGRESS NOTES
Problem: Mobility Impaired (Adult and Pediatric)  Goal: *Acute Goals and Plan of Care (Insert Text)  Physical Therapy Goals  Initiated 7/17/2018  1. Patient will move from supine to sit and sit to supine , scoot up and down and roll side to side in bed with independence within 7 day(s). 2.  Patient will transfer from bed to chair and chair to bed with supervision/set-up using the least restrictive device within 7 day(s). 3.  Patient will perform sit to stand with supervision/set-up within 7 day(s). 4.  Patient will ambulate with supervision/set-up for 250 feet with the least restrictive device within 7 day(s). 5.  Patient will ascend/descend 7 stairs with 1 handrail(s) with minimal assistance/contact guard assist within 7 day(s). physical Therapy TREATMENT  Patient: Ori Medina (76 y.o. male)  Date: 7/20/2018  Diagnosis: Aspiration pneumonia ([facility] [address]        Precautions:  falls  Chart, physical therapy assessment, plan of care and goals were reviewed. ASSESSMENT: pt tolerated tx well, no LOB or SOB, does well with bed mob and transfers, very motivated, did well with ther-ex, vc's for safety and proper RW use. Progression toward goals:  [x]    Improving appropriately and progressing toward goals  []    Improving slowly and progressing toward goals  []    Not making progress toward goals and plan of care will be adjusted     PLAN:  Patient continues to benefit from skilled intervention to address the above impairments. Continue treatment per established plan of care.   Discharge Recommendations:  Home Health  Further Equipment Recommendations for Discharge: has rollator     OBJECTIVE DATA SUMMARY:     Critical Behavior:  Neurologic State: Alert  Orientation Level: Oriented X4  Cognition: Appropriate decision making, Appropriate for age attention/concentration, Appropriate safety awareness, Follows commands  Safety/Judgement: Awareness of environment, Insight into deficits     Functional Mobility Training:  Bed Mobility:  Rolling: Modified independent  Supine to Sit: Modified independent  Scooting: Modified independent  Level of Assistance: Modified independent  Interventions: Verbal cues     Transfers:  Sit to Stand: Stand-by assistance  Stand to Sit: Stand-by assistance  Bed to Chair: Contact guard assistance  Interventions: Tactile cues; Verbal cues  Level of Assistance: Contact guard assistance     Balance:  Sitting: Intact; Without support  Standing: Intact; With support     Ambulation/Gait Training:  Distance (ft): 200 Feet (ft)  Assistive Device: Gait belt;Walker, rolling  Ambulation - Level of Assistance: Contact guard assistance  Gait Abnormalities: Decreased step clearance  Right Side Weight Bearing: Full  Left Side Weight Bearing: Full  Base of Support: Widened  Stance:  (equal)  Speed/Gertrudis: Pace decreased (<100 feet/min)  Step Length: Left shortened;Right shortened  Interventions: Verbal cues    Therapeutic Exercises:     sitting  EXERCISE   Sets   Reps   Active Active Assist   Passive   Comments   Ankle pumps 1 10 [x] [] [] bilat   Heel raises 1 10 [x] [] [] \"   Toe tap 1 10 [x] [] [] \"   Knee ext 1 10 [x] [] [] \"   Hip flex 1 10 [x] [] [] \"   Abd & Add 1 10 [x] [] [] \"     Pain:  Pain Scale 1: Numeric (0 - 10)  Pain Intensity 1: 0  Pain Location 1: Abdomen  Pain Description 1: Aching  Pain Intervention(s) 1: Medication (see MAR)     Activity Tolerance: good    After treatment:   [x]    Patient left in no apparent distress sitting up in chair  []    Patient left in no apparent distress in bed  [x]    Call bell left within reach  [x]    Nursing notified  [x]    Caregiver present  []    Bed alarm activated    COMMUNICATION/COLLABORATION:   The patients plan of care was discussed with: Registered Nurse    Govind Dawkins PTA   Time Calculation: 25 mins

## 2018-07-20 NOTE — PROGRESS NOTES
I have reviewed discharge instructions with the patient and spouse. The patient and spouse verbalized understanding. Patient was given a copy of discharge instructions and prescriptions. Patient discharged via wheelchair with belongings.

## 2018-07-20 NOTE — DISCHARGE SUMMARY
Hospitalist Discharge Summary Patient ID: Krystina Higgins 
[id number] 
89 y.o. 
1935 PCP on record: Nurys Zuleta MD 
 
Admit date: 7/14/2018 Discharge date and time: 7/20/2018 DISCHARGE DIAGNOSIS: 
 
Severe sepsis POA  resolved Group B streptococcal bacteremia POA Possible aspiration pneumonia POA Chronic dysphagia Chronic systolic HF EF 59% / mod to severe AS    
Chronic orthostatic hypotension DM type II  
LIZ POA, resolved Recurrent N/V at home POA, resolved Chronic thrombocytopenia POA stable   
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 
IP CONSULT TO GASTROENTEROLOGY 
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 He presented to the emergency department via EMS on account of possible aspiration pneumonia. Currently at patient's bedside, he cannot provide any history to me at this time. History was obtained from his wife who is sitting by the bedside. As per wife, she tells me that patient yesterday was not feeling really well and that just before going to bed, she fed him through his PEG tube. She states that the patient was complaining of nausea. Then, at about 2:30 a.m. earlier this morning, the patient woke up and he was choking. She noticed that he threw up all over the bed. Again, she then realized that he was having worsening tremors and he was throwing up all over the place and shaking on his bed and she had to move him to his side. Based on all of this, she called EMS.   There is no associated history of fever, but she states the patient felt warm. She also tells me that he complained of abdominal pain. No melena stool or passage of bloody stool per rectum. Today in the emergency department, an initial chest x-ray done was normal.  Subsequently, a CT scan of the abdomen and pelvis without contrast done shows patchy bibasilar lung opacities, left greater than right, which may represent aspiration, infection and/or edema. The patient was administered 3.375 g of Zosyn and the sepsis protocol was initiated. He was bolused with at least 2.5 L of normal saline intravenous fluid. We have been consulted for hospital admission and for evaluation. 
  
 
______________________________________________________________________ DISCHARGE SUMMARY/HOSPITAL COURSE:  for full details see H&P, daily progress notes, labs, consult notes. Severe sepsis POA  resolved Group B streptococcal bacteremia POA Possible aspiration pneumonia POA 
-on admission , RR 32, WBC 13.6, hypotension 61/45 in ED Required pressors, now off Clinically improved. Vs stable. Leukocytosis resolved  
-4/4 BC with Grp B strep on admission, repeated BC 7/16,17 - NTD  
-AB: Change to ceftriaxone and flagyl from levaquin and zosyn ( total AB day 7) Flagyl for concern for aspiration pneumonia Source suspected is the lung Will complete 14 days of AB Rx with + BC: cefdinir + flagyl x 7 days  
   Reviewed chest CT, ASD or edema left > right lungs 
   No evidence of UTI or skin and soft tissue infection 
   Recent increased back pain, had CT lumbar spine with chronic appearing DDD 
-ECHO wo vegetation  
  
Chronic dysphagia 
-NPO at home with TF  IsoSource 1.5 ( 5 cans) - Followed Dr Jayson Rivera OP for dysphasia, was considering EGD with possible attempt of dilatation. Cardiology clearance was pending ( per wife primary cardiology Dr June Soulier procedure) -GI help was appreciated:no EGD planned this admission due to plavix/low plt 
Unable to find appropriate size PEG in the hospital to change it per wife request   
-had 2 BMs s/p suppository  
-Seen by dietitian today: After a long discussion with patient and wife, it was decided to continue bolus TF at home but decrease the amount given at one time and increase the frequency of feedings. If patient does not tolerate this regimen he will need to transition to gravity or pump feedings. Home TF recommendations: Isosource 1.5 1 can/250 mL bolus 5x/day (8am, 11am, 2pm, 5pm, 8pm as discussed with wife) + 150 mL flush q bolus (5oz); will provide 1875 kcal, 85g protein, 1705 mL water Provided alternate recommendations below in case patient is able to obtain a pump. Wife plans to discuss possibility of pump feeding with Dr. Jeromy Reza on an OP basis and I have discussed with CM to send patient's information to [facility] for the feasibility of obtaining a pump.   
Isosource 1.5 @ 50 mL/hr x 24 hours/day + 150 mL water flushes q 4 hours (1800 kcal, 82g protein, 1817 Ml water) Isosource 1.5 @ 100 mL/hr x 12 hours (nocturnally) + 150 mL water flushes q 4 hours (x24 hours) (1800 kcal, 82g protein, 1817 mL water) Could also consider nocturnal feedings with an addition of bolus feedings during the day Isosource 1.5 @ 60 mL/hr + 12 hours (nocturnally) + 250 mL bolus x 2 during the day to provide a total of 1830 kcal, 83 protein 
  
Chronic systolic HF EF 61% / mod to severe AS    
Chronic orthostatic hypotension  
-no signs of fluid overload. HR/BP stable  
-on home lasix, aspirin/plavix. Lipitor. -ECHO: EF 40%, mild MR, mod to sev AS, mild TR  
   
DM type II  
--200 
-dc on home regiment, follow with primary endocrinology   
  
LIZ POA, resolved, pre renal, adm cr 1.82--> 1.28 with IVF Recurrent N/V at home POA, resolved. CT abdomen/pelvis was negative Chronic thrombocytopenia POA stable , followed with Dr Alannah Godinez   
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 
  
7/19: Pt was very emotional today \" I don't have life. I have so many problems and nobody can help\". He complain that he is tired from poor QOL. Per wife, 4/5 days they have different doctors appointments. Wife was also very emotional and was crying together with pt. Will ask palliative care team to see for overwhelming symptoms and to address goal of care. Wife is scheduled to have TKA 7/25. Per wife, children will help with their care at home after her surgery.  
  
  
  
Code status: Full Prophylaxis: SCD's ( thrombocytopenia) Recommended Disposition:  PT, OT, RN today ; health dispatch to see on weekend  
  
PT: resume  PT  
 
 
 
_______________________________________________________________________ Patient seen and examined by me on discharge day. PHYSICAL EXAM: 
General:                    WD, WN. Alert, cooperative, no acute distress. Thin   
EENT:                                  EOMI. Anicteric sclerae. MMM Resp:                                   Rhonchi bilaterally, no wheezing or rales. No accessory muscle use CV:                                      Regular  rhythm,  No edema, + murmur GI:                                       Soft, Non distended, Non tender.  +Bowel sounds Neurologic:                Alert and oriented X 3, normal speech, Psych:                       Good insight. Not anxious nor agitated Skin:                                    No rashes. No jaundice 
_______________________________________________________________________ DISCHARGE MEDICATIONS:  
Current Discharge Medication List  
  
START taking these medications Details L. acidoph & paracasei- S therm- Bifido (JAVY-Q/RISAQUAD) 8 billion cell cap cap 1 Cap by PEG Tube route daily. Qty: 30 Cap, Refills: 0  
  
metoclopramide HCl (REGLAN) 10 mg tablet 1 Tab by Per G Tube route every eight (8) hours as needed for up to 10 days.  Take for nausea as needed 
Qty: 20 Tab, Refills: 0  
  
cefdinir (OMNICEF) 250 mg/5 mL suspension 6 mL by Per G Tube route two (2) times a day for 7 days. Start 7/21. Aware of PCN/ amoxicillin allergy but patient tolerated ceftriaxone infusion in the hospital.  Indications: STREPTOCOCCAL PNEUMONIA Qty: 84 mL, Refills: 0  
  
metroNIDAZOLE (FLAGYL) 500 mg tablet 1 Tab by Per G Tube route three (3) times daily for 7 days. Qty: 21 Tab, Refills: 0 CONTINUE these medications which have NOT CHANGED Details  
!! furosemide (LASIX) 40 mg tablet Take 40 mg by mouth daily. Patient takes 40 mg tablet in the morning, then 20 mg in the afternoon  
  
!! cyanocobalamin, vitamin B-12, (VITAMIN B-12 PO) Take 1 Tab by mouth daily. !! furosemide (LASIX) 20 mg tablet Take 20 mg by mouth daily. !! midodrine (PROAMITINE) 10 mg tablet Take 10 mg by mouth three (3) times daily. fluticasone (FLONASE ALLERGY RELIEF) 50 mcg/actuation nasal spray 2 Sprays by Both Nostrils route daily. zinc 50 mg tab tablet Take 50 mg by mouth daily. pramipexole (MIRAPEX) 0.25 mg tablet Take 1 Tab by mouth three (3) times daily. Qty: 90 Tab, Refills: 3 Associated Diagnoses: 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 ([address] Thrombotic stroke involving left middle cerebral artery ([address] Stenosis of both internal carotid arteries; Hemiplegia and hemiparesis following cerebral infarction affecting right dominant side ([address] Orthostatic hypotension; History of stroke; Tremors of nervous system; Vertebrobasilar occlusive disease; Weakness; Benign essential tremor syndrome; Diabetic peripheral neuropathy associated with type 2 diabetes mellitus ([address] LANTUS SOLOSTAR U-100 INSULIN 100 unit/mL (3 mL) inpn INJECT 14 UNITS SUBCUTANEOUSLY ONCE DAILY Qty: 15 Pen, Refills: 5 Associated Diagnoses: 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 ([address]  Essential hypertension with goal blood pressure less than 140/90; Peripheral polyneuropathy  
  
insulin lispro (HUMALOG KWIKPEN INSULIN) 100 unit/mL kwikpen 13 units with breakfast, 10 units with lunch, 7 units with dinner + correction insulin, up to 40 units/day Qty: 15 Pen, Refills: 3 Associated Diagnoses: Essential hypertension with goal blood pressure less than 140/90  
  
famotidine (PEPCID) 20 mg tablet Take 1 Tab by mouth two (2) times a day. Qty: 20 Tab, Refills: 0  
  
nitroglycerin (NITROSTAT) 0.4 mg SL tablet 0.4 mg by SubLINGual route every five (5) minutes as needed for Chest Pain.  
  
gabapentin (NEURONTIN) 250 mg/5 mL solution 750 mg by PEG Tube route three (3) times daily. multivitamin (ONE A DAY) tablet 1 Tab by Per G Tube route daily. acetaminophen (TYLENOL) 500 mg tablet 1,000 mg by Per G Tube route every six (6) hours as needed for Pain. atorvastatin (LIPITOR) 40 mg tablet 40 mg by Per G Tube route daily. clopidogrel (PLAVIX) 75 mg tab 75 mg by Feeding Tube route daily. !! cyanocobalamin 1,000 mcg tablet 1,000 mcg by Per G Tube route daily. finasteride (PROSCAR) 5 mg tablet 5 mg by Per G Tube route nightly. alfuzosin SR (UROXATRAL) 10 mg SR tablet 10 mg by Per G Tube route daily. aspirin (ASPIRIN) 325 mg tablet 325 mg by Per G Tube route daily. !! midodrine (PROAMITINE) 10 mg tablet 10 mg by Per G Tube route three (3) times daily (with meals). pyridoxine HCl, vitamin B6, (VITAMIN B-6 PO) 1 Tab by Per G Tube route daily. vit B Cmplx 3-FA-Vit C-Biotin (NEPHRO GRAYSON RX) 1- mg-mg-mcg tablet Take 1 Tab by mouth daily. tamsulosin (FLOMAX) 0.4 mg capsule 0.4 mg by Per G Tube route as needed (per urinary flow issues). HYDROcodone-acetaminophen (NORCO)  mg tablet 1 Tab by Per G Tube route daily as needed for Pain. ondansetron hcl (ZOFRAN, AS HYDROCHLORIDE,) 8 mg tablet Take 1 Tab by mouth every eight (8) hours as needed for Nausea.  
Qty: 20 Tab, Refills: 0  
  
 !! - Potential duplicate medications found. Please discuss with provider. STOP taking these medications  
  
 guaiFENesin (ROBITUSSIN) 100 mg/5 mL liquid Comments:  
Reason for Stopping: My Recommended Diet, Activity, Wound Care, and follow-up labs are listed in the patient's Discharge Insturctions which I have personally completed and reviewed. ______________________________________________________________________ Risk of deterioration: High ( has multiple medical problems / high risk of re admission) Condition at Discharge:  Stable 
______________________________________________________________________ Disposition Home with family and home health services 
______________________________________________________________________ Care Plan discussed with:  
Patient, Family, RN, Care Manager, Consultant 
 
______________________________________________________________________ Code Status: Full Code 
______________________________________________________________________ Follow up with: PCP : Bertha Iyer MD 
Follow-up Information Follow up With Details Comments Contact [address] On 7/19/2018 this is your home care provider. If you have not heard from them in 1-2 days after discharge. .. please call them. [address] [place] [address] [phone] Bertha Iyer MD Go on 7/31/2018 Hospital follow-up scheduled at 2:00pm ( If you have questions or need to reschedule please call [facility] 
[phone] [address] [place] [address] will call the pt on Sunday for a Sunday visit. Phone # [phone] Gaye Michelle MD  as scheduled  [address] [place] [address] 
[phone] Total time in minutes spent coordinating this discharge (includes going over instructions, follow-up, prescriptions, and preparing report for sign off to her PCP) :  > 30 minutes Signed: 
Sheree Becerra MD

## 2018-07-21 NOTE — ED PROVIDER NOTES
EMERGENCY DEPARTMENT HISTORY AND PHYSICAL EXAM 
 
 
Date: 7/21/2018 Patient Name: Rc Young History of Presenting Illness Chief Complaint Patient presents with  Fatigue  
  just released from hospital for aspiration PNA, per family at approx 1700 today pt appeared more fatigued than usual  
 
 
History Provided By: Patient's Wife HPI: Rc Young, 80 y.o. male with PMHx significant for tongue/throat cancer, stroke, CAD, HTN, presents via EMS to the ED with cc of low blood pressure since this morning and generalized weakness that began at 1700 today. Per chart review, pt was discharged yesterday after admission for aspiration PNA. Per wife, she noticed the pt's blood pressure was dropping, he had difficulty hearing, trouble lifting his legs and ambulating around 1700 while he was being fed. Per wife, pt also c/o shoulder pain for which he was given a pain pill. Wife reports she was advised to switch his diet from 3 meals a day to 5 meals a day, in which he is given 1 can of food every 3 hours with 5 oz of water. Wife states she was advised the change in diet due to pt's GERD. Pt denies any CP and SOB. Chief Complaint: Generalized weakness Duration: Few Hours Timing:  Constant Quality: Generalized weakness Severity: Moderate There are no other complaints, changes, or physical findings at this time. PCP: Zaria Chen MD 
 
Current Facility-Administered Medications Medication Dose Route Frequency Provider Last Rate Last Dose  [START ON 7/22/2018] tamsulosin (FLOMAX) capsule 0.4 mg  0.4 mg Oral DAILY Cam Rodriguez MD      
 [START ON 7/22/2018] aspirin (ASPIRIN) tablet 325 mg  325 mg Per G Tube DAILY Joseph Figueroa MD      
 [START ON 7/22/2018] atorvastatin (LIPITOR) tablet 40 mg  40 mg Per G Tube DAILY Joseph Figueroa MD      
 [START ON 7/22/2018] clopidogrel (PLAVIX) tablet 75 mg  75 mg PEG Tube DAILY Cam GARCIA MD Alphonso Rodriguez ON 7/22/2018] famotidine (PEPCID) tablet 20 mg  20 mg Per G Tube BID Marimar Hutson MD      
 finasteride (PROSCAR) tablet 5 mg  5 mg Oral QHS Marimar Hutson MD      
 [START ON 7/22/2018] furosemide (LASIX) tablet 20 mg  20 mg Oral DAILY Marimar Hutson MD      
 gabapentin (NEURONTIN) 250 mg/5 mL solution 750 mg  750 mg Oral TID MD Alphonso Jin ON 7/22/2018] insulin glargine (LANTUS) injection 14 Units  14 Units SubCUTAneous DAILY Cam Rodriguez MD      
 metoclopramide HCl (REGLAN) tablet 10 mg  10 mg Per G Tube Q8H PRN Marimar Hutson MD      
 [START ON 7/22/2018] midodrine (PROAMITINE) tablet 10 mg  10 mg Per G Tube TID WITH MEALS Cam Woodard MD      
 pramipexole (MIRAPEX) tablet 0.25 mg  0.25 mg Oral TID Marimar Hutson MD      
 [facility]) capsule 0.4 mg  0.4 mg Oral PRN Marimar Hutson MD      
 sodium chloride (NS) flush 5-10 mL  5-10 mL IntraVENous Q8H Cam Woodard MD      
 sodium chloride (NS) flush 5-10 mL  5-10 mL IntraVENous PRN Marimar Hutson MD      
 enoxaparin (LOVENOX) injection 40 mg  40 mg SubCUTAneous Q24H Cam Rodriguez MD      
 0.9% sodium chloride infusion  50 mL/hr IntraVENous CONTINUOUS Marimar Hutson MD      
 insulin lispro (HUMALOG) injection   SubCUTAneous AC&HS Marimar Hutson MD      
 glucose chewable tablet 16 g  4 Tab Oral PRN Marimar Hutson MD      
 dextrose (D50W) injection syrg 12.5-25 g  12.5-25 g IntraVENous PRN Marimar Hutson MD      
 glucagon (GLUCAGEN) injection 1 mg  1 mg IntraMUSCular PRN Marimar Hutson MD      
 
Current Outpatient Prescriptions Medication Sig Dispense Refill  L. acidoph & paracasei- S therm- Bifido (JAVY-Q/RISAQUAD) 8 billion cell cap cap 1 Cap by PEG Tube route daily. 30 Cap 0  metoclopramide HCl (REGLAN) 10 mg tablet 1 Tab by Per G Tube route every eight (8) hours as needed for up to 10 days. Take for nausea as needed 20 Tab 0  
 cefdinir (OMNICEF) 250 mg/5 mL suspension 6 mL by Per G Tube route two (2) times a day for 7 days. Start 7/21. Aware of PCN/ amoxicillin allergy but patient tolerated ceftriaxone infusion in the hospital.  Indications: STREPTOCOCCAL PNEUMONIA 84 mL 0  
 metroNIDAZOLE (FLAGYL) 500 mg tablet 1 Tab by Per G Tube route three (3) times daily for 7 days. 21 Tab 0  
 acetaminophen (TYLENOL) 500 mg tablet 1,000 mg by Per G Tube route every six (6) hours as needed for Pain.  atorvastatin (LIPITOR) 40 mg tablet 40 mg by Per G Tube route daily.  clopidogrel (PLAVIX) 75 mg tab 75 mg by Feeding Tube route daily.  cyanocobalamin 1,000 mcg tablet 1,000 mcg by Per G Tube route daily.  finasteride (PROSCAR) 5 mg tablet 5 mg by Per G Tube route nightly.  alfuzosin SR (UROXATRAL) 10 mg SR tablet 10 mg by Per G Tube route daily.  aspirin (ASPIRIN) 325 mg tablet 325 mg by Per G Tube route daily.  midodrine (PROAMITINE) 10 mg tablet 10 mg by Per G Tube route three (3) times daily (with meals).  pyridoxine HCl, vitamin B6, (VITAMIN B-6 PO) 1 Tab by Per G Tube route daily.  vit B Cmplx 3-FA-Vit C-Biotin (NEPHRO GRAYSON RX) 1- mg-mg-mcg tablet Take 1 Tab by mouth daily.  tamsulosin (FLOMAX) 0.4 mg capsule 0.4 mg by Per G Tube route as needed (per urinary flow issues).  HYDROcodone-acetaminophen (NORCO)  mg tablet 1 Tab by Per G Tube route daily as needed for Pain.  furosemide (LASIX) 40 mg tablet Take 40 mg by mouth daily. Patient takes 40 mg tablet in the morning, then 20 mg in the afternoon  cyanocobalamin, vitamin B-12, (VITAMIN B-12 PO) Take 1 Tab by mouth daily.  furosemide (LASIX) 20 mg tablet Take 20 mg by mouth daily.     
 midodrine (PROAMITINE) 10 mg tablet Take 10 mg by mouth three (3) times daily.    
 fluticasone (FLONASE ALLERGY RELIEF) 50 mcg/actuation nasal spray 2 Sprays by Both Nostrils route daily.  zinc 50 mg tab tablet Take 50 mg by mouth daily.  pramipexole (MIRAPEX) 0.25 mg tablet Take 1 Tab by mouth three (3) times daily. 90 Tab 3  
 LANTUS SOLOSTAR U-100 INSULIN 100 unit/mL (3 mL) inpn INJECT 14 UNITS SUBCUTANEOUSLY ONCE DAILY 15 Pen 5  
 insulin lispro (HUMALOG KWIKPEN INSULIN) 100 unit/mL kwikpen 13 units with breakfast, 10 units with lunch, 7 units with dinner + correction insulin, up to 40 units/day (Patient taking differently: by SubCUTAneous route. 13 units with breakfast, 10 units with lunch, 7 units with dinner + correction insulin, up to 40 units/day  Indications: 10 lunch, 7 dinner) 15 Pen 3  
 famotidine (PEPCID) 20 mg tablet Take 1 Tab by mouth two (2) times a day. (Patient taking differently: 1 Tab by Per G Tube route two (2) times a day.) 20 Tab 0  
 nitroglycerin (NITROSTAT) 0.4 mg SL tablet 0.4 mg by SubLINGual route every five (5) minutes as needed for Chest Pain.  ondansetron hcl (ZOFRAN, AS HYDROCHLORIDE,) 8 mg tablet Take 1 Tab by mouth every eight (8) hours as needed for Nausea. 20 Tab 0  
 gabapentin (NEURONTIN) 250 mg/5 mL solution 750 mg by PEG Tube route three (3) times daily.  multivitamin (ONE A DAY) tablet 1 Tab by Per G Tube route daily. Past History Past Medical History: 
Past Medical History:  
Diagnosis Date  Antiplatelet or antithrombotic long-term use 12/4/2014  Anxiety disorder  Arrhythmia 2009  
 bradycardia  Arthritis  CAD (coronary artery disease) s/p CABG 2002; Dr Blanca Chandler  Cancer ([facility] [address] 1996  
 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   
 left leg, lower back,   
 Depression  Diabetes ([facility] [address] Type II  
 Esophageal dysmotility s/p dilitation  Esophageal motility disorder 7/8/2013 Frequent simultaneous or failed contractions, low amplitude contractions  suggests severe myopathy or diffuse spasm. I suspect the latter. Achalasia  is not present.  GERD (gastroesophageal reflux disease)  Heart failure ([address] 10/2014 Cardiomyopathy:Pacemaker upgrade:Biv and AICD  Dr. Sharla Marti heart DrAlvaro last visit 5/11/2015  Hepatitis C Dx 1996  
 treated at [facility] in past; as of 4/15/15 wife states pt currently not under any treatment  Hyperlipidemia  Hypertension  Myocardial infarct [facility]) 2013 Heart Cath: 40% LV EF, Stented distal LAD, patent Graft to circumflex  On tube feeding diet approx 2009  
 still has as of 9/28/15  (no po food/liquid/meds at all); Dr Karlo Williamson  Other ill-defined conditions(799.89) 1996  
 1 dose of chemotherapy/radiation for tongue cancer  Other ill-defined conditions(799.89) BPH  Other ill-defined conditions(799.89) orthostatic hypotension  Pneumonia ~ April -May 2010  Stroke [facility]) approx 2003  
 left side-left finger tips numb; no imbalance or memory loss; as of 2015 not seeing neuro MD  
 Suicidal thoughts Past Surgical History: 
Past Surgical History:  
Procedure Laterality Date  ABDOMEN SURGERY PROC UNLISTED  1996  
 peg tube  CABG, ARTERY-VEIN, THREE  2000  HX CATARACT REMOVAL    
 bilateral  
 HX CHOLECYSTECTOMY [address] CATHETERIZATION  2013 Stented distal LAD  HX MOHS PROCEDURES    
 bilateral  
 HX ORTHOPAEDIC    
 back surgery times two  HX OTHER SURGICAL Radical Left Neck  HX OTHER SURGICAL    
 NASAL POLYPS REMOVAL  
 HX OTHER SURGICAL  2010 TURP  
 HX PACEMAKER  11/08  HX PACEMAKER  10/28/14 Defibrillator: [facility] # [id number], serial # [id number]; Dr. Cy Bosch [phone]; Dr Osvaldo Dover  HX UROLOGICAL    
 cystoscopy [address] [facility]  
 cevical surgery  NM CHANGE GASTROSTOMY TUBE  5/2/2011  NM CHANGE GASTROSTOMY TUBE 2011  GA EGD INSERT GUIDE WIRE DILATOR PASSAGE ESOPHAGUS  2010  GA EGD TRANSORAL BIOPSY SINGLE/MULTIPLE  2010  
    
 STOMACH SURGERY PROCEDURE UNLISTED  2011  VASCULAR SURGERY PROCEDURE UNLIST    
 bilateral carotid stents Family History: 
Family History Problem Relation Age of Onset  Heart Disease Father   
   at age 52 from CAD  Colon Cancer Mother  Cancer Mother   
  colon ca  
 Heart Disease Brother Social History: 
Social History Substance Use Topics  Smoking status: Never Smoker  Smokeless tobacco: Never Used  Alcohol use No  
 
 
Allergies: Allergies Allergen Reactions  Demerol [Meperidine] Shortness of Breath  Paxil [Paroxetine Hcl] Unknown (comments) Pt gets shaky and loses control of legs  Amoxicillin Rash  Cleocin [Clindamycin Hcl] Rash  Pcn [Penicillins] Rash Review of Systems Review of Systems Constitutional: Positive for fatigue. Negative for activity change, chills and fever. HENT: Negative for congestion and sore throat. +Difficulty hearing Eyes: Negative for pain and redness. Respiratory: Negative for cough, chest tightness and shortness of breath. Cardiovascular: Negative for chest pain and palpitations. Gastrointestinal: Negative for abdominal pain, diarrhea, nausea and vomiting. Genitourinary: Negative for dysuria, frequency and urgency. Musculoskeletal: Positive for myalgias (Leg pain). Negative for back pain and neck pain. Skin: Negative for rash. Neurological: Negative for syncope, light-headedness and headaches. Psychiatric/Behavioral: Negative for confusion. All other systems reviewed and are negative. Physical Exam  
Physical Exam  
Constitutional: He appears well-developed and well-nourished. No distress. HENT:  
Head: Normocephalic and atraumatic.   
Nose: Nose normal.  
Mouth/Throat: Oropharynx is clear and moist.  
B/l cerumen impaction. TM not visible. Eyes: Conjunctivae and EOM are normal. Pupils are equal, round, and reactive to light. No scleral icterus. Conjunctiva clear bilaterally; Neck: Normal range of motion. Neck supple. No JVD present. No tracheal deviation present. No thyromegaly present. Cardiovascular: Normal rate, regular rhythm and intact distal pulses. Exam reveals no gallop and no friction rub. No murmur heard. Pulmonary/Chest: Effort normal and breath sounds normal. No stridor. No respiratory distress. He has no wheezes. He has no rales. He exhibits no tenderness. Abdominal: Soft. Bowel sounds are normal. He exhibits no distension. There is no tenderness. There is no rebound and no guarding. Peg tube in place Musculoskeletal: Normal range of motion. He exhibits no edema or tenderness. Neurological: He is alert. He displays normal reflexes. No cranial nerve deficit. He exhibits normal muscle tone. Coordination normal.  
Alert to person and place; mild confusion to time and current events; hard of hearing; clear speech; follows simple commands; moves all extremities; no facial asymmetry;  
  
Skin: Skin is warm and dry. No rash noted. He is not diaphoretic. No erythema. Psychiatric: He has a normal mood and affect. His behavior is normal.  
Nursing note and vitals reviewed. Diagnostic Study Results Labs - Recent Results (from the past 12 hour(s)) CBC WITH AUTOMATED DIFF Collection Time: 07/21/18  7:21 PM  
Result Value Ref Range WBC 5.9 4.1 - 11.1 K/uL  
 RBC 4.10 4. 10 - 5.70 M/uL  
 HGB 12.4 12.1 - 17.0 g/dL HCT 39.8 36.6 - 50.3 % MCV 97.1 80.0 - 99.0 FL  
 MCH 30.2 26.0 - 34.0 PG  
 MCHC 31.2 30.0 - 36.5 g/dL  
 RDW 16.3 (H) 11.5 - 14.5 % PLATELET 68 (L) 163 - 400 K/uL MPV 10.7 8.9 - 12.9 FL  
 NRBC 0.0 0  WBC ABSOLUTE NRBC 0.00 0.00 - 0.01 K/uL NEUTROPHILS 76 (H) 32 - 75 % LYMPHOCYTES 15 12 - 49 % MONOCYTES 7 5 - 13 %  EOSINOPHILS 1 0 - 7 % BASOPHILS 0 0 - 1 % IMMATURE GRANULOCYTES 1 (H) 0.0 - 0.5 % ABS. NEUTROPHILS 4.4 1.8 - 8.0 K/UL  
 ABS. LYMPHOCYTES 0.9 0.8 - 3.5 K/UL  
 ABS. MONOCYTES 0.4 0.0 - 1.0 K/UL  
 ABS. EOSINOPHILS 0.1 0.0 - 0.4 K/UL  
 ABS. BASOPHILS 0.0 0.0 - 0.1 K/UL  
 ABS. IMM. GRANS. 0.1 (H) 0.00 - 0.04 K/UL  
 DF SMEAR SCANNED    
 RBC COMMENTS NORMOCYTIC, NORMOCHROMIC METABOLIC PANEL, COMPREHENSIVE Collection Time: 07/21/18  7:21 PM  
Result Value Ref Range Sodium 145 136 - 145 mmol/L Potassium 3.7 3.5 - 5.1 mmol/L Chloride 105 97 - 108 mmol/L  
 CO2 34 (H) 21 - 32 mmol/L Anion gap 6 5 - 15 mmol/L Glucose 143 (H) 65 - 100 mg/dL BUN 30 (H) 6 - 20 MG/DL Creatinine 1.63 (H) 0.70 - 1.30 MG/DL  
 BUN/Creatinine ratio 18 12 - 20 GFR est AA 49 (L) >60 ml/min/1.73m2 GFR est non-AA 41 (L) >60 ml/min/1.73m2 Calcium 9.4 8.5 - 10.1 MG/DL Bilirubin, total 0.4 0.2 - 1.0 MG/DL  
 ALT (SGPT) 34 12 - 78 U/L  
 AST (SGOT) 57 (H) 15 - 37 U/L Alk. phosphatase 83 45 - 117 U/L Protein, total 7.1 6.4 - 8.2 g/dL Albumin 2.9 (L) 3.5 - 5.0 g/dL Globulin 4.2 (H) 2.0 - 4.0 g/dL A-G Ratio 0.7 (L) 1.1 - 2.2    
TROPONIN I Collection Time: 07/21/18  7:21 PM  
Result Value Ref Range Troponin-I, Qt. 0.18 (H) <0.05 ng/mL LACTIC ACID Collection Time: 07/21/18  8:01 PM  
Result Value Ref Range Lactic acid 2.1 (HH) 0.4 - 2.0 MMOL/L  
EKG, 12 LEAD, INITIAL Collection Time: 07/21/18  9:46 PM  
Result Value Ref Range Ventricular Rate 96 BPM  
 Atrial Rate 96 BPM  
 P-R Interval 138 ms QRS Duration 172 ms Q-T Interval 454 ms QTC Calculation (Bezet) 573 ms Calculated P Axis 40 degrees Calculated R Axis 174 degrees Calculated T Axis 7 degrees Diagnosis Radiologic Studies - CXR Results  (Last 48 hours) 07/21/18 1959  XR CHEST PORT Final result Impression:  IMPRESSION:  
   
Improved lung aeration with persistent left lung base opacity. Narrative:  EXAM:  XR CHEST PORT INDICATION:  Increasing fatigue, recent hospital discharge after treatment for  
aspiration pneumonia. COMPARISON: Portable chest on 7/14/2018. TECHNIQUE: Upright portable chest AP view FINDINGS: Defibrillator battery pack and leads are unchanged. Sternotomy wires  
and surgical clips indicate previous CABG. Cardiac monitoring wires overlie the  
thorax. Mild cardiomegaly is unchanged. Aortic arch is atherosclerotic but not  
enlarged. The pulmonary vasculature is within normal limits. Left lung base opacity is unchanged. However, ulnar edema has resolved. Overall,  
lung aeration is improved. No pneumothorax. Osteopenia and right rotator cuff  
arthropathy are unchanged. Upper abdomen is within normal limits. Medical Decision Making I am the first provider for this patient. I reviewed the vital signs, available nursing notes, past medical history, past surgical history, family history and social history. Vital Signs-Reviewed the patient's vital signs. Patient Vitals for the past 12 hrs: 
 Temp Pulse Resp BP SpO2  
07/21/18 2200 - 93 19 103/67 90 % 07/21/18 2130 - 98 21 106/87 91 %  
07/21/18 2115 - (!) 102 18 97/73 92 %  
07/21/18 2100 - 97 20 118/77 93 % 07/21/18 2045 - 94 20 111/67 90 % 07/21/18 2030 - 91 9 129/72 91 %  
07/21/18 2015 - 87 17 (!) 141/127 98 %  
07/21/18 2000 - 91 15 122/90 (!) 88 %  
07/21/18 1958 - 90 24 109/71 92 %  
07/21/18 1945 - 90 17 92/47 93 % 07/21/18 1930 - 88 18 94/72 96 %  
07/21/18 1928 - 94 19 (!) 87/55 91 %  
07/21/18 1920 - 91 14 (!) 122/93 94 %  
07/21/18 1916 97.8 °F (36.6 °C) 92 12 (!) 122/93 -  
 
 
Pulse Oximetry Analysis - 90% on RA 
 
EKG interpretation: (Preliminary) 21:46 Rhythm: ventricular paced rhythm; and regular .  Rate (approx.): 96; Chris Delgado MD 
 
 
Records Reviewed: Nursing Notes, Old Medical Records, Previous electrocardiograms, Previous Radiology Studies and Previous Laboratory Studies Provider Notes (Medical Decision Making): DDx: Sepsis, bacteremia, PNA  
 
ED Course:  
Initial assessment performed. The patients presenting problems have been discussed, and they are in agreement with the care plan formulated and outlined with them. I have encouraged them to ask questions as they arise throughout their visit. 7:20 PM 
I have reviewed medical records in the EHR, pertinent to patients present condition/presenting problems. Pt recently admitted here 7/14-7/20 (discharged yesterday) with the following discharge dx: Severe sepsis, Group B streptococcal bacteremia,Possible aspiration pneumonia,Chronic dysphagia Chronic systolic HF EF 62% / mod to severe AS ,Chronic orthostatic hypotension ,DM type II, LIZ,Chronic thrombocytopenia,History of tongue and throat cancer POA status post surgery and radiation therapy. Charu Vines MD 
 
Procedure Note - Cerumen Removal:  
7:45 PM 
Performed by: Charu Vines MD 
Pt's  left and right ear were irrigated. Cerumen successfully removed using puret. The procedure took 1-15 minutes, and pt tolerated well. Large amount of wax found in both ears with a significant amount still remaining. Will irrigate and re-assess. CONSULT NOTE:  
8:56 PM 
Charu Vines MD spoke with Dr. Polly Pacheco, Specialty: Hospitalist 
Discussed pt's hx, disposition, and available diagnostic and imaging results. Reviewed care plans. Consultant will evaluate pt for admission. Written by Lewis Troncoso ED Scribe, as dictated by Charu Vines MD. Disposition: 
Admit Note: 
10:06 PM 
Pt is being admitted by Dr. Polly Pacheco. The results of their tests and reason(s) for their admission have been discussed with pt and/or available family. They convey agreement and understanding for the need to be admitted and for admission diagnosis. Will admit to hospitalist; pt just discharged yesterday.   Mild increase in cr from baseline but work up otherwise unremarkable outside of trop at 0.18. Pt denying any cp. CXR grossly unchanged from prior; exam with significant bilateral cerumen impaction; bp's improved with iv fluids; I suspect pt will likely need snf placement and is simply too much for family to take care of at home. Wife stated that he had a bad experience at a prior rehab facility and that is why they did not wish to go to snf at discharge yesterday. Cyndee Ponce MD 
 
 
PLAN: 
1. Admit to Hospitalist  
 
Diagnosis Clinical Impression: 1. Acute renal insufficiency 2. Bilateral impacted cerumen 3. Elevated troponin Attestations: This note is prepared by [employer], acting as Scribe for MD Cyndee Reyes MD: The scribe's documentation has been prepared under my direction and personally reviewed by me in its entirety. I confirm that the note above accurately reflects all work, treatment, procedures, and medical decision making performed by me.

## 2018-07-21 NOTE — IP AVS SNAPSHOT
[address] [facility] 
[phone] Patient: Noel Machado MRN: [id number] About your hospitalization You were admitted on:  July 21, 2018 You last received care in the:  [facility] 2 CARDIOPULMONARY CARE You were discharged on:  July 27, 2018 Why you were hospitalized Your primary diagnosis was:  Not on File Your diagnoses also included:  Weakness Follow-up Information Follow up With Details Comments Contact Info Reji Mckay MD On 8/2/2018 For hospital follow up appointment at 11:00AM  [address] [facility] 
[phone] Major Mcwilliams MD  Set up for EGD and dilatation of esophagus on 8/17/20-18 at 10:30 am [facility] 
[phone] Your Scheduled Appointments Wednesday September 12, 2018 12:10 PM EDT Follow Up with Cristina Huang MD  
[facility] [address] [place] [address]) [facility] [employer] [address] [address] [phone] Discharge Orders None A check nemesio indicates which time of day the medication should be taken. My Medications START taking these medications Instructions Each Dose to Equal  
 Morning Noon Evening Bedtime  
 aspirin 81 mg chewable tablet Start taking on:  7/28/2018 Replaces:  aspirin 325 mg tablet Your last dose was: Your next dose is: Take 1 Tab by mouth daily. 81 mg  
    
   
   
   
  
 insulin regular 100 unit/mL injection Commonly known as:  Neoma Shows, HUMULIN R Start taking on:  7/28/2018 Your last dose was: Your next dose is:    
   
   
 14 units at 8 am, 12 units at 2 pm, 5 units at 8 pm  
     
   
   
   
  
 insulin syringe-needle U-100 0.3 mL 31 gauge x 15/64\" Syrg Your last dose was: Your next dose is: Use as directed CHANGE how you take these medications Instructions Each Dose to Equal  
 Morning Noon Evening Bedtime  
 famotidine 20 mg tablet Commonly known as:  PEPCID What changed:  Another medication with the same name was removed. Continue taking this medication, and follow the directions you see here. Your last dose was: Your next dose is:    
   
   
 20 mg by Per G Tube route two (2) times a day. 20 mg  
    
   
   
   
  
 furosemide 40 mg tablet Commonly known as:  LASIX What changed:   
- medication strength 
- how much to take 
- additional instructions - Another medication with the same name was removed. Continue taking this medication, and follow the directions you see here. Your last dose was: Your next dose is: Take 1 Tab by mouth daily. 40 mg  
    
   
   
   
  
 pramipexole 0.25 mg tablet Commonly known as:  MIRAPEX What changed:  Another medication with the same name was removed. Continue taking this medication, and follow the directions you see here. Your last dose was: Your next dose is:    
   
   
 0.25 mg by Per G Tube route three (3) times daily. 0.25 mg CONTINUE taking these medications Instructions Each Dose to Equal  
 Morning Noon Evening Bedtime  
 acetaminophen 500 mg tablet Commonly known as:  TYLENOL Your last dose was: Your next dose is:    
   
   
 1,000 mg by Per G Tube route every six (6) hours as needed for Pain. 1000 mg  
    
   
   
   
  
 alfuzosin SR 10 mg SR tablet Commonly known as:  Valeda Jerry Your last dose was: Your next dose is:    
   
   
 10 mg by Per G Tube route daily. 10 mg  
    
   
   
   
  
 atorvastatin 40 mg tablet Commonly known as:  LIPITOR Your last dose was: Your next dose is:    
   
   
 40 mg by Per G Tube route daily.   
 40 mg  
    
   
   
   
  
 clopidogrel 75 mg Tab Commonly known as:  PLAVIX Your last dose was: Your next dose is:    
   
   
 75 mg by Feeding Tube route daily. 75 mg  
    
   
   
   
  
 cyanocobalamin 1,000 mcg tablet Your last dose was: Your next dose is:    
   
   
 1,000 mcg by Per G Tube route daily. 1000 mcg  
    
   
   
   
  
 finasteride 5 mg tablet Commonly known as:  PROSCAR Your last dose was: Your next dose is:    
   
   
 5 mg by Per G Tube route nightly. 5 mg FLONASE ALLERGY RELIEF 50 mcg/actuation nasal spray Generic drug:  fluticasone Your last dose was: Your next dose is: 2 Sprays by Both Nostrils route daily. 2 Spray  
    
   
   
   
  
 gabapentin 250 mg/5 mL solution Commonly known as:  NEURONTIN Your last dose was: Your next dose is:    
   
   
 750 mg by PEG Tube route three (3) times daily. 750 mg HYDROcodone-acetaminophen  mg tablet Commonly known as:  Crystal Rivas Your last dose was: Your next dose is:    
   
   
 1 Tab by Per G Tube route daily as needed for Pain. 1 Tab  
    
   
   
   
  
 L. acidoph & paracasei- S therm- Bifido 8 billion cell Cap cap Commonly known as:  JAVY-Q/RISAQUAD Your last dose was: Your next dose is:    
   
   
 1 Cap by PEG Tube route daily. 1 Cap LANTUS SOLOSTAR U-100 INSULIN 100 unit/mL (3 mL) Inpn Generic drug:  insulin glargine Your last dose was: Your next dose is: INJECT 14 UNITS SUBCUTANEOUSLY ONCE DAILY  
     
   
   
   
  
 metoclopramide HCl 10 mg tablet Commonly known as:  REGLAN Your last dose was: Your next dose is:    
   
   
 1 Tab by Per G Tube route every eight (8) hours as needed for up to 10 days. Take for nausea as needed 10 mg  
    
   
   
   
  
 midodrine 10 mg tablet Commonly known as:  Nicolle Blackburn Your last dose was: Your next dose is:    
   
   
 10 mg by Per G Tube route three (3) times daily (with meals). 10 mg  
    
   
   
   
  
 multivitamin tablet Commonly known as:  ONE A DAY Your last dose was: Your next dose is:    
   
   
 1 Tab by Per G Tube route daily. 1 Tab  
    
   
   
   
  
 nitroglycerin 0.4 mg SL tablet Commonly known as:  NITROSTAT Your last dose was: Your next dose is: 0.4 mg by SubLINGual route every five (5) minutes as needed for Chest Pain. 0.4 mg  
    
   
   
   
  
 ondansetron hcl 8 mg tablet Commonly known as:  Lutricia Rye Your last dose was: Your next dose is: Take 1 Tab by mouth every eight (8) hours as needed for Nausea. 8 mg  
    
   
   
   
  
 tamsulosin 0.4 mg capsule Commonly known as:  FLOMAX Your last dose was: Your next dose is: 0.4 mg by Per G Tube route as needed (per urinary flow issues). 0.4 mg  
    
   
   
   
  
 VITAMIN B-6 PO Your last dose was: Your next dose is:    
   
   
 1 Tab by Per G Tube route daily. 1 Tab  
    
   
   
   
  
 zinc 50 mg Tab tablet Your last dose was: Your next dose is:    
   
   
 50 mg by Per G Tube route daily. 50 mg  
    
   
   
   
  
  
STOP taking these medications   
 aspirin 325 mg tablet Commonly known as:  ASPIRIN Replaced by:  aspirin 81 mg chewable tablet  
   
  
 cefdinir 250 mg/5 mL suspension Commonly known as:  OMNICEF  
   
  
 insulin lispro 100 unit/mL kwikpen Commonly known as:  HumaLOG KwikPen Insulin  
   
  
 metroNIDAZOLE 500 mg tablet Commonly known as:  FLAGYL Where to Get Your Medications Information on where to get these meds will be given to you by the nurse or doctor. ! Ask your nurse or doctor about these medications  
  aspirin 81 mg chewable tablet furosemide 40 mg tablet  
 insulin regular 100 unit/mL injection  
 insulin syringe-needle U-100 0.3 mL 31 gauge x 15/64\" Syrg Opioid Education Prescription Opioids: What You Need to Know: 
 
 
 
Hypotension Acute kidney injury What to do at [facility] 1. Recommended diet: NPO, all meds and feeds via PEG tube Isosource 1.5 1 can/250 mL bolus 5x/day (8am, 11am, 2pm, 5pm, 8pm ) + 150 mL flush q  
               bolus (5oz) Hold for residuals > 100 cc per Dr Juliana Jones 2. Recommended activity: Activity as tolerated 3. If you experience any of the following symptoms then please call your primary care physician or return to the emergency room if you cannot get hold of your doctor: 
 Fevers > 100.5, chills Nausea or vomiting, persistent diarrhea > 24 hours Blood in stool or black stools Chest pain or SOB Follow-up Information Follow up With Details Comments Contact Info Eusebio Sapp MD On 2018 For hospital follow up appointment at 11:00AM  [facility] 
[phone] Florian Freeman MD  Set up for EGD and dilatation of esophagus on 20- at 10:30 am [address] [place] [address] 
[phone] Stop the plavix on 2018, last dose should be on 2018 Stop the antibiotics, you completed a course in the hospital 
 
Use lasix 40 mg daily for now Check weight daily, if you gain more than 3 lbs over your baseline, resume the lasix 20 mg in the evening in addition to the morning lasix, if weight does not improve, contact Dr Andrey Flynn Information obtained by : 
I understand that if any problems occur once I am at home I am to contact my physician. I understand and acknowledge receipt of the instructions indicated above. Physician's or R.N.'s Signature                                                                  Date/Time Patient or Representative Signature                                                          Date/Time [employer] Announcement We are excited to announce that we are making your provider's discharge notes available to you in [employer]Club. You will see these notes when they are completed and signed by the physician that discharged you from your recent hospital stay. If you have any questions or concerns about any information you see in HipClub, please call the Health Information Department where you were seen or reach out to your Primary Care Provider for more information about your plan of care. Introducing [facility] SERVICES! [facility] introduces [employer] patient portal. Now you can access parts of your medical record, email your doctor's office, and request medication refills online. 1. In your internet browser, go to [email or web address] 2. Click on the First Time User? Click Here link in the Sign In box. You will see the New Member Sign Up page. 3. Enter your [employer] Access Code exactly as it appears below. You will not need to use this code after youve completed the sign-up process. If you do not sign up before the expiration date, you must request a new code. · [employer] Access Code: [email or web address] Expires: 8/12/2018  2:10 PM 
 
 4. Enter the last four digits of your Social Security Number (xxxx) and Date of Birth (mm/dd/yyyy) as indicated and click Submit. You will be taken to the next sign-up page. 5. Create a judo ID. This will be your judo login ID and cannot be changed, so think of one that is secure and easy to remember. 6. Create a judo password. You can change your password at any time. 7. Enter your Password Reset Question and Answer. This can be used at a later time if you forget your password. 8. Enter your e-mail address. You will receive e-mail notification when new information is available in [address] 9. Click Sign Up. You can now view and download portions of your medical record. 10. Click the Download Summary menu link to download a portable copy of your medical information. If you have questions, please visit the Frequently Asked Questions section of the judo website. Remember, judo is NOT to be used for urgent needs. For medical emergencies, dial 911. Now available from your iPhone and Android! Introducing Adebayo Thacker As a [facility]aiza patient, I wanted to make you aware of our electronic visit tool called Adebayo SrCubikal. [facility] 24/7 allows you to connect within minutes with a medical provider 24 hours a day, seven days a week via a mobile device or tablet or logging into a secure website from your computer. You can access Adebayo Thacker from anywhere in the [place]. A virtual visit might be right for you when you have a simple condition and feel like you just dont want to get out of bed, or cant get away from work for an appointment, when your regular [facility] Loaiza provider is not available (evenings, weekends or holidays), or when youre out of town and need minor care.   Electronic visits cost only $49 and if the Adebayo Thacker provider determines a prescription is needed to treat your condition, one can be electronically transmitted to a nearby pharmacy*. Please take a moment to enroll today if you have not already done so. The enrollment process is free and takes just a few minutes. To enroll, please download the [employer] 24/7 latanya to your tablet or phone, or visit [email or web address] to enroll on your computer. And, as an [address] [place] [address] patient with a [employer] account, the results of your visits will be scanned into your electronic medical record and your primary care provider will be able to view the scanned results. We urge you to continue to see your regular [employer] provider for your ongoing medical care. And while your primary care provider may not be the one available when you seek a [employer] virtual visit, the peace of mind you get from getting a real diagnosis real time can be priceless. For more information on [employer], view our Frequently Asked Questions (FAQs) at [email or web address]. Sincerely, 
 
Vivienne Negron MD 
Chief Medical Officer [address] [place] [address] *:  certain medications cannot be prescribed via [employer] Providers Seen During Your Hospitalization Provider Specialty Primary office phone Hafsa Nickerson MD Emergency Medicine [phone] Deb Poole MD Internal Medicine [phone]  
 Burnice Carrel, MD Hospitalist [phone] Your Primary Care Physician (PCP) Primary Care Physician Office Phone Office Fax Corry Mcgrath [phone] You are allergic to the following Allergen Reactions Demerol (Meperidine) Shortness of Breath Paxil (Paroxetine Hcl) Unknown (comments) Pt gets shaky and loses control of legs Amoxicillin Rash Cleocin (Clindamycin Hcl) Rash Pcn (Penicillins) Rash Recent Documentation Height Weight BMI Smoking Status 1.702 m 77.1 kg 26.62 kg/m2 Never Smoker Emergency Contacts Name Discharge Info Relation Home Work Mobile [facility] DISCHARGE CAREGIVER [3] Spouse [3] [phone] Steven Ghosh DISCHARGE CAREGIVER [3] Son [22] [phone] Patient Belongings The following personal items are in your possession at time of discharge: 
  Dental Appliances: None  Visual Aid: Glasses, With patient      Home Medications: None   Jewelry: None  Clothing: Slippers, Pajamas, Footwear, Undergarments, With patient    Other Valuables: None Please provide this summary of care documentation to your next provider. Signatures-by signing, you are acknowledging that this After Visit Summary has been reviewed with you and you have received a copy. Patient Signature:  ____________________________________________________________ Date:  ____________________________________________________________  
  
[facility] Provider Signature:  ____________________________________________________________ Date:  ____________________________________________________________

## 2018-07-22 NOTE — ED NOTES
TRANSFER - OUT REPORT:    Verbal report given to Haily Leiva RN(name) on [place]  being transferred to [facility](unit) for routine progression of care       Report consisted of patients Situation, Background, Assessment and   Recommendations(SBAR). Information from the following report(s) SBAR, ED Summary and Recent Results was reviewed with the receiving nurse. Lines:   Peripheral IV 07/21/18 Right Antecubital (Active)   Site Assessment Clean, dry, & intact 7/21/2018  7:20 PM   Phlebitis Assessment 0 7/21/2018  7:20 PM   Infiltration Assessment 0 7/21/2018  7:20 PM   Dressing Status Clean, dry, & intact 7/21/2018  7:20 PM   Hub Color/Line Status Pink 7/21/2018  7:20 PM   Action Taken Blood drawn 7/21/2018  7:20 PM        Opportunity for questions and clarification was provided.       Patient transported with:   [employer]

## 2018-07-22 NOTE — PROGRESS NOTES
Renal Dosing/Monitoring  Medication: Famotidine   Current regimen:  20 mg every 12 hr  Recent Labs      07/22/18   0317  07/21/18   1921   CREA  1.47*  1.63*   BUN  27*  30*     Estimated CrCl:  36 mL/min  Plan: Change to famotidine daily or CrCl < 50 mL/min per renal dosing protocol.      Thank you,   Milena Barry, PHARMD

## 2018-07-22 NOTE — PROGRESS NOTES
Problem: Falls - Risk of  Goal: *Absence of Falls  Document Olayinka Fall Risk and appropriate interventions in the flowsheet. Outcome: Progressing Towards Goal  Fall Risk Interventions:  Mobility Interventions: Bed/chair exit alarm, Assess mobility with egress test, Patient to call before getting OOB         Medication Interventions: Bed/chair exit alarm, Evaluate medications/consider consulting pharmacy, Patient to call before getting OOB, Assess postural VS orthostatic hypotension    Elimination Interventions: Call light in reach, Patient to call for help with toileting needs    History of Falls Interventions: Door open when patient unattended        Problem: Pressure Injury - Risk of  Goal: *Prevention of pressure injury  Document Mark Scale and appropriate interventions in the flowsheet.    Outcome: Progressing Towards Goal  Pressure Injury Interventions:  Sensory Interventions: Assess need for specialty bed, Assess changes in LOC, Float heels    Moisture Interventions: Apply protective barrier, creams and emollients, Limit adult briefs    Activity Interventions: Increase time out of bed, Pressure redistribution bed/mattress(bed type), PT/OT evaluation    Mobility Interventions: HOB 30 degrees or less, Float heels    Nutrition Interventions: Discuss nutritional consult with provider, Document food/fluid/supplement intake    Friction and Shear Interventions: HOB 30 degrees or less, Foam dressings/transparent film/skin sealants

## 2018-07-22 NOTE — PROGRESS NOTES
Hospitalist Progress Note NAME: Jose Lozano :  1935 MRN:  [id number] Assessment / Plan: Hypotension POA 
-episode of hypotension at home which resolved quickly. BP stable now. No signs of overwhelming infection at this time. + hypotensive on last admission So, unknown exact significance at present.  
-continue to monitor closely LIZ  
-difficult to tell true baseline , mostly 1.2-1.3 but last admission also presented with elevated cr at 1.82. This admission 1.63--> 1.47 Very frail and easily going from LIZ to dehydration, difficult manage to h/o severe AS  
-very gentle hydration at 25 cc with very close watch of fluid status  
-holding lasix, re assess daily IVF vs lasix -Avoid nephrotoxic drugs, adjust all meds to GFR. Diarrhea  
-with constipation last week treated with laxatives. Also, h/o diarrhea with TF in the hospital. Pt/wife didn't informed me that he had diarrhea prior to dc.  
-abd is tender today, so will check c.diff  
-if tenderness persist or lactic acid remain elevated may consider CT Lactic acidosis  
-no leukocytosis or fever. Unclear significance at this time Doubt that elevated due to sepsis   
-re check now Recovering from Group B streptococcal bacteremia POA due to Possible aspiration pneumonia POA  
- BC with Grp B strep on admission, repeated BC , - NTD  
-AB: he was DC home on cefdinir + flagyl x 7 days ( to complete 14 days A Rx) --> will c/w CTX + flagyl as last admission ( last dose AB ) -cxray on admission: Improved lung aeration with persistent left lung base opacity. 
-last admission w/u for bacteremia: 
Recent increased back pain, had CT lumbar spine with chronic appearing DDD 
ECHO wo vegetation  
   
Chronic dysphagia 
-NPO at home with TF  [employer] 1.5 ( 5 cans) Last admission changed to 5 cans a day ( every 3 hr); per wife tolerated first 3 feedings ok but then had residual  
Wife also considering changing to pump and continuous feeding Dietary consult - Followed Dr Miguel Cedillo OP for dysphasia, was considering EGD with possible attempt of dilatation. Cardiology clearance was pending ( per wife primary cardiology Dr Jm Chamberlain procedure) Also his PEG got recently dislodged and required to be changed in ED. Not has short PEG. Dr Дмитрий Valencia was planning to change it OP back to long.  
-seen by Dr Royer De La Cruz last admission:  no EGD was planned due to plavix/low plt Unable to find appropriate size of the PEG in the hospital to change it, so recommended to follow OP 
-will check tomorrow, if Dr Дмитрий Valencia is back will consult him  
  
Chronic systolic HF EF 56% / mod to severe AS    
Chronic orthostatic hypotension  
-no signs of fluid overload. HR/BP stable now  
+ elevated troponin on admission --> Will re check  
-holding lasix now with IVF Very frail and easily going from LIZ to dehydration Close fluid status monitoring with daily weight  
-cont on home aspirin/plavix. Lipitor.  
-continue midodrine  
consulting primary cardiology in am to help with management -ECHO: EF 40%, mild MR, mod to sev AS, mild TR  
   
DM type II  
- 
-cont lantus + SS - adjust as needed  
-consult primary endocrinology as needed if not controlled ( Dr Kamila Jimenez) Oral candidiasis 
-start nystatin and artificial  saliva    
   
Chronic thrombocytopenia POA stable , followed with Dr Mehul Brar   
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 
   
 
   
   
   
Code status: Full Prophylaxis: lovenox ok with reduced dose and close watch of plt Baseline: extremely frail Recommended Disposition:TBD - wife will find out in am if she is having surgery on Tuesday or not then will decide SNF vs [facility] [place]. Pt is overall is extremely frail with multiple medical problems, very high risk for re admissions. Consulting palliative care to help with addressing goals of care and emotional support.  Last admission had several long conversations with wife/pt on above. Pt c/o very poor QOL. Sad case. Subjective: Chief Complaint / Reason for Physician Visit: following hypotension/ LIZ I am fine today C/o diarrhea Discussed with RN events overnight. Review of Systems: 
Symptom Y/N Comments  Symptom Y/N Comments Fever/Chills n   Chest Pain Poor Appetite    Edema Cough    Abdominal Pain y Sputum    Joint Pain SOB/KRAMER n   Pruritis/Rash Nausea/vomit n   Tolerating PT/OT Diarrhea y   Tolerating Diet Constipation    Other Could NOT obtain due to:   
 
Objective: VITALS:  
Last 24hrs VS reviewed since prior progress note. Most recent are: 
Patient Vitals for the past 24 hrs: 
 Temp Pulse Resp BP SpO2  
07/22/18 1126 98 °F (36.7 °C) 93 18 159/76 97 %  
07/22/18 0715 98.1 °F (36.7 °C) 89 20 118/64 92 %  
07/22/18 0400 97.2 °F (36.2 °C) 88 20 110/56 92 %  
07/21/18 2336 98.5 °F (36.9 °C) 94 20 128/59 92 %  
07/21/18 2200 - 93 19 103/67 90 % 07/21/18 2130 - 98 21 106/87 91 %  
07/21/18 2115 - (!) 102 18 97/73 92 %  
07/21/18 2100 - 97 20 118/77 93 % 07/21/18 2045 - 94 20 111/67 90 % 07/21/18 2030 - 91 9 129/72 91 %  
07/21/18 2015 - 87 17 (!) 141/127 98 %  
07/21/18 2000 - 91 15 122/90 (!) 88 %  
07/21/18 1958 - 90 24 109/71 92 %  
07/21/18 1945 - 90 17 92/47 93 % 07/21/18 1930 - 88 18 94/72 96 %  
07/21/18 1928 - 94 19 (!) 87/55 91 %  
07/21/18 1920 - 91 14 (!) 122/93 94 %  
07/21/18 1916 97.8 °F (36.6 °C) 92 12 (!) 122/93 - Intake/Output Summary (Last 24 hours) at 07/22/18 1147 Last data filed at 07/22/18 7821 Gross per 24 hour Intake            407.5 ml Output                0 ml Net            407.5 ml PHYSICAL EXAM: 
General: WD, WN. Alert, cooperative, no acute distress. Thin/debilitated/extremily frail    
EENT:  EOMI. Anicteric sclerae. + dry MM. + oral thrush Resp:  CTA bilaterally, no wheezing or rales. No accessory muscle use CV:  Regular  rhythm,  No edema GI:  Soft, Non distended, + mild generalized tender.  +Bowel sounds Neurologic:  Alert and oriented X 3, normal speech, Psych:   Good insight. Not anxious nor agitated Skin:  No rashes. No jaundice Reviewed most current lab test results and cultures  YES Reviewed most current radiology test results   YES Review and summation of old records today    NO Reviewed patient's current orders and MAR    YES 
PMH/SH reviewed - no change compared to H&P 
________________________________________________________________________ Care Plan discussed with: 
  Comments Patient y Family  y Son and wife bedside ( 20 minutes spent at bedside discussing events that led to this hospitalization and further plan of care) RN y   
Care Manager Consultant Multidiciplinary team rounds were held today with , nursing, pharmacist and clinical coordinator. Patient's plan of care was discussed; medications were reviewed and discharge planning was addressed. ________________________________________________________________________ Total NON critical care TIME:  60   Minutes Total CRITICAL CARE TIME Spent:   Minutes non procedure based Comments >50% of visit spent in counseling and coordination of care    
________________________________________________________________________ Zackary Lennon MD  
 
Procedures: see electronic medical records for all procedures/Xrays and details which were not copied into this note but were reviewed prior to creation of Plan. LABS: 
I reviewed today's most current labs and imaging studies. Pertinent labs include: 
Recent Labs  
   07/22/18 0317 07/21/18 
 1921 WBC  4.4  5.9 HGB  10.7*  12.4 HCT  34.0*  39.8 PLT  62*  68* Recent Labs  
   07/22/18 
 0317  07/21/18 
 1921 NA  145  145  
K  4.0  3.7 CL  107  105 CO2  34*  34* GLU  175*  143* BUN  27*  30* CREA  1.47*  1.63* CA  8.7  9.4 ALB   -- 2.9*  
TBILI   --   0.4 SGOT   --   57* ALT   --   34 Signed: Kostas Ding MD

## 2018-07-22 NOTE — PROGRESS NOTES
Bedside and Verbal shift change report given to Hillary Ribera RN (oncoming nurse). Report included the following information SBAR, Kardex and Recent Results. SHIFT SUMMARY:    Family at bedside          [address] NURSING NOTE   Admission Date 7/21/2018   Admission Diagnosis Weakness   Consults None      Cardiac Monitoring [] Yes [] No      Purposeful Hourly Rounding [x] Yes    Olayinka Score Total Score: 3   Olayinka score 3 or > [x] Bed Alarm [] Avasys [] 1:1 sitter [] Patient refused (Signed refusal form in chart)   Mark Score Mark Score: 17   Mark score 14 or < [] PMT consult [] Wound Care consult    []  Specialty bed  [] Nutrition consult      Influenza Vaccine Received Flu Vaccine for Current Season (usually Sept-March): Not Flu Season           Oxygen needs? [x] Room air Oxygen @  []1L    []2L    []3L   []4L    []5L   []6L via  NC   Chronic home O2 use? [] Yes [x] No  Perform O2 challenge test and document in progress note using smartphrase (.Homeoxygen)      Last bowel movement Last Bowel Movement Date: 07/22/18      Urinary Catheter             LDAs               Peripheral IV 07/21/18 Right Antecubital (Active)   Site Assessment Clean, dry, & intact 7/21/2018 10:53 PM   Phlebitis Assessment 0 7/21/2018 10:53 PM   Infiltration Assessment 0 7/21/2018 10:53 PM   Dressing Status Clean, dry, & intact 7/21/2018 10:53 PM   Dressing Type Transparent 7/21/2018 10:53 PM   Hub Color/Line Status Pink;Patent; Infusing 7/21/2018 10:53 PM   Action Taken Blood drawn 7/21/2018  7:20 PM          PEG/Gastrostomy Tube (Active)   Site Assessment Clean, dry, & intact 7/21/2018 10:53 PM   Dressing Status Clean, dry, & intact 7/21/2018 10:53 PM                   Readmission Risk Assessment Tool Score High Risk            42       Total Score        3 Has Seen PCP in Last 6 Months (Yes=3, No=0)    2 . Living with Significant Other. Assisted Living. LTAC. SNF. or   Rehab    4 IP Visits Last 12 Months (1-3=4, 4=9, >4=11)    5 Pt. Coverage (Medicare=5 , Medicaid, or Self-Pay=4)    28 Charlson Comorbidity Score (Age + Comorbid Conditions)        Criteria that do not apply:    Patient Length of Stay (>5 days = 3)       Expected Length of Stay - - -   Actual Length of Stay 0

## 2018-07-22 NOTE — PROGRESS NOTES
TRANSFER - IN REPORT:    Verbal report received from [facility]) on Bedford Favre  being received from ED (unit) for routine progression of care      Report consisted of patients Situation, Background, Assessment and   Recommendations(SBAR). Information from the following report(s) Kardex, ED Summary and Recent Results was reviewed with the receiving nurse. Opportunity for questions and clarification was provided. Assessment completed upon patients arrival to unit and care assumed.            Primary Nurse Neela Dunn RN and Shereen Castillo RN performed a dual skin assessment on this patient No impairment noted  Mark score is 17

## 2018-07-22 NOTE — H&P
Hospitalist Admission Note NAME: Rhonda Henry :  1935 MRN:  [id number] Date/Time:  2018 9:16 PM 
 
Patient PCP: Eusebio Sapp MD 
________________________________________________________________________ My assessment of this patient's clinical condition and my plan of care is as follows. Assessment / Plan: 
1) Weakness/dizzyness: Likely secondary to hypotensive events and also Left ear wax impactation. patient without neuro deficits, EKG no acute findings. Per family they are concerned because he just left the hospital yesterday and they were  wondering if he needs \"rehab\". Patient got ear lavage and after that he is hearing better. At the time of this encounter BP is WNL and patient not feeling dizzy. Will keep in telemetry. 2) S/P Group B Strep  Bacteremia d/c yesterday 3) Chronic dysphagia, on G tube: Patient requested that he wants to bring his own food from home. In the past the one from the hospital made him feel \"sick\" 4) DM. Continue home meds - add RISS 5) EF 40% 6) Chronic orthostatic hypotension: Continue midodrine 7) Ao stenosis 8) Hx CABG 9) LIZ: Worsening Cr compared with previous admission, likely prerenal, will add IV fluids. IM labs 
 
   
Code Status: full Surrogate Decision Maker: DVT Prophylaxis: yes GI Prophylaxis: not indicated Baseline: lives at home Subjective: CHIEF COMPLAINT: weakness and feeling dizzy HISTORY OF PRESENT ILLNESS:    
Rhonda Henry is a 80 y.o. PMH significant for orthostatic hypotension, Dysphagia,DM,EF 40%, Ao stenosis who was d/c yesterday from here after GB strep bacteremia. He went home was doing ok and apparently today he was feeling weak, dizzy and hard of hearing in the left ear. For those reasons patient was taken to the ER for further eval 
 
We were asked to admit for work up and evaluation of the above problems. Past Medical History:  
Diagnosis Date  Antiplatelet or antithrombotic long-term use 12/4/2014  Anxiety disorder  Arrhythmia 2009  
 bradycardia  Arthritis  CAD (coronary artery disease) s/p CABG 2002; Dr Toni Couch  Cancer ([facility] 1996  
 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   
 left leg, lower back,   
 Depression  Diabetes ([facility] Type II  
 Esophageal dysmotility s/p dilitation  Esophageal motility disorder 7/8/2013 Frequent simultaneous or failed contractions, low amplitude contractions  suggests severe myopathy or diffuse spasm. I suspect the latter. Achalasia  is not present.  GERD (gastroesophageal reflux disease)  Heart failure ([facility] 10/2014 Cardiomyopathy:Pacemaker upgrade:Biv and AICD  Dr. Elmer Diallo heart Dr. last visit 5/11/2015  Hepatitis C Dx 1996  
 treated at [facility] in past; as of 4/15/15 wife states pt currently not under any treatment  Hyperlipidemia  Hypertension  Myocardial infarct [facility]) 2013 Heart Cath: 40% LV EF, Stented distal LAD, patent Graft to circumflex  On tube feeding diet approx 2009  
 still has as of 9/28/15  (no po food/liquid/meds at all); Dr Lydia Adam  Other ill-defined conditions(799.89) 1996  
 1 dose of chemotherapy/radiation for tongue cancer  Other ill-defined conditions(799.89) BPH  Other ill-defined conditions(799.89) orthostatic hypotension  Pneumonia ~ April -May 2010  Stroke [facility]) approx 2003  
 left side-left finger tips numb; no imbalance or memory loss; as of 2015 not seeing neuro MD  
 Suicidal thoughts Past Surgical History:  
Procedure Laterality Date  ABDOMEN SURGERY PROC UNLISTED  1996  
 peg tube  CABG, ARTERY-VEIN, THREE  2000  HX CATARACT REMOVAL    
 bilateral  
 HX CHOLECYSTECTOMY [address] CATHETERIZATION  2013 Stented distal LAD  HX MOHS PROCEDURES    
 bilateral  
 HX ORTHOPAEDIC back surgery times two  HX OTHER SURGICAL Radical Left Neck  HX OTHER SURGICAL    
 NASAL POLYPS REMOVAL  
 HX OTHER SURGICAL   TURP  
 HX PACEMAKER    HX PACEMAKER  10/28/14 Defibrillator: [facility] # [id number], serial # [id number]; Dr. Adrain Soulier [phone]; Dr Nicky Pitt  HX UROLOGICAL    
 cystoscopy [address]  
 cevical surgery  NV CHANGE GASTROSTOMY TUBE  2011  NV CHANGE GASTROSTOMY TUBE  2011  NV EGD INSERT GUIDE WIRE DILATOR PASSAGE ESOPHAGUS  2010  NV EGD TRANSORAL BIOPSY SINGLE/MULTIPLE  2010  
    
 STOMACH SURGERY PROCEDURE UNLISTED  2011  VASCULAR SURGERY PROCEDURE UNLIST    
 bilateral carotid stents Social History Substance Use Topics  Smoking status: Never Smoker  Smokeless tobacco: Never Used  Alcohol use No  
  
 
Family History Problem Relation Age of Onset  Heart Disease Father   
   at age 52 from CAD  Colon Cancer Mother  Cancer Mother   
  colon ca  
 Heart Disease Brother Allergies Allergen Reactions  Demerol [Meperidine] Shortness of Breath  Paxil [Paroxetine Hcl] Unknown (comments) Pt gets shaky and loses control of legs  Amoxicillin Rash  Cleocin [Clindamycin Hcl] Rash  Pcn [Penicillins] Rash Prior to Admission medications Medication Sig Start Date End Date Taking? Authorizing Provider L. acidoph & paracasei- S therm- Bifido (JAVY-Q/RISAQUAD) 8 billion cell cap cap 1 Cap by PEG Tube route daily. 18   Jenni Rajan MD  
metoclopramide HCl (REGLAN) 10 mg tablet 1 Tab by Per G Tube route every eight (8) hours as needed for up to 10 days. Take for nausea as needed 18  Jenni Rajan MD  
cefdinir (OMNICEF) 250 mg/5 mL suspension 6 mL by Per G Tube route two (2) times a day for 7 days. Start .  Aware of PCN/ amoxicillin allergy but patient tolerated ceftriaxone infusion in the hospital.  Indications: STREPTOCOCCAL PNEUMONIA 7/20/18 7/27/18  Koko Soliz MD  
metroNIDAZOLE (FLAGYL) 500 mg tablet 1 Tab by Per G Tube route three (3) times daily for 7 days. 7/20/18 7/27/18  Koko Soliz MD  
acetaminophen (TYLENOL) 500 mg tablet 1,000 mg by Per G Tube route every six (6) hours as needed for Pain. Historical Provider  
atorvastatin (LIPITOR) 40 mg tablet 40 mg by Per G Tube route daily. Booker Eric MD  
clopidogrel (PLAVIX) 75 mg tab 75 mg by Feeding Tube route daily. Booker Eric MD  
cyanocobalamin 1,000 mcg tablet 1,000 mcg by Per G Tube route daily. Historical Provider  
finasteride (PROSCAR) 5 mg tablet 5 mg by Per G Tube route nightly. Booker Eric MD  
alfuzosin SR (UROXATRAL) 10 mg SR tablet 10 mg by Per G Tube route daily. 5/24/18   Historical Provider  
aspirin (ASPIRIN) 325 mg tablet 325 mg by Per G Tube route daily. Booker Eric MD  
midodrine (PROAMITINE) 10 mg tablet 10 mg by Per G Tube route three (3) times daily (with meals). 11/1/16   Historical Provider  
pyridoxine HCl, vitamin B6, (VITAMIN B-6 PO) 1 Tab by Per G Tube route daily. Historical Provider  
vit B Cmplx 3-FA-Vit C-Biotin (NEPHRO GRAYSON RX) 1- mg-mg-mcg tablet Take 1 Tab by mouth daily. Booker Eric MD  
tamsulosin (FLOMAX) 0.4 mg capsule 0.4 mg by Per G Tube route as needed (per urinary flow issues). 5/21/18   Historical Provider HYDROcodone-acetaminophen (NORCO)  mg tablet 1 Tab by Per G Tube route daily as needed for Pain. Booker Eric MD  
furosemide (LASIX) 40 mg tablet Take 40 mg by mouth daily. Patient takes 40 mg tablet in the morning, then 20 mg in the afternoon    Historical Provider  
cyanocobalamin, vitamin B-12, (VITAMIN B-12 PO) Take 1 Tab by mouth daily. Historical Provider  
furosemide (LASIX) 20 mg tablet Take 20 mg by mouth daily.     Booker Eric MD  
midodrine (PROAMITINE) 10 mg tablet Take 10 mg by mouth three (3) times daily. Booker Eric MD  
fluticasone (FLONASE ALLERGY RELIEF) 50 mcg/actuation nasal spray 2 Sprays by Both Nostrils route daily. Historical Provider  
zinc 50 mg tab tablet Take 50 mg by mouth daily. Historical Provider  
pramipexole (MIRAPEX) 0.25 mg tablet Take 1 Tab by mouth three (3) times daily. 7/9/18   Roberto Tucker MD  
LANRLUS SOLOSTAR U-100 INSULIN 100 unit/mL (3 mL) inpn INJECT 14 UNITS SUBCUTANEOUSLY ONCE DAILY 5/21/18   Lalita Rios MD  
insulin lispro (HUMALOG [facility] INSULIN) 100 unit/mL kwikpen 13 units with breakfast, 10 units with lunch, 7 units with dinner + correction insulin, up to 40 units/day Patient taking differently: by SubCUTAneous route. 13 units with breakfast, 10 units with lunch, 7 units with dinner + correction insulin, up to 40 units/day  Indications: 10 lunch, 7 dinner 3/12/18   Lalita Rios MD  
famotidine (PEPCID) 20 mg tablet Take 1 Tab by mouth two (2) times a day. Patient taking differently: 1 Tab by Per G Tube route two (2) times a day. 1/14/18   Walker Anaya DO  
nitroglycerin (NITROSTAT) 0.4 mg SL tablet 0.4 mg by SubLINGual route every five (5) minutes as needed for Chest Pain. Booker Eric MD  
ondansetron hcl (ZOFRAN, AS HYDROCHLORIDE,) 8 mg tablet Take 1 Tab by mouth every eight (8) hours as needed for Nausea. 12/5/16   Alina Garrison MD  
gabapentin (NEURONTIN) 250 mg/5 mL solution 750 mg by PEG Tube route three (3) times daily. Booker Eric MD  
multivitamin (ONE A DAY) tablet 1 Tab by Per G Tube route daily. Booker Eric MD  
 
 
REVIEW OF SYSTEMS:    
I am not able to complete the review of systems because: The patient is intubated and sedated The patient has altered mental status due to his acute medical problems The patient has baseline aphasia from prior stroke(s) The patient has baseline dementia and is not reliable historian The patient is in acute medical distress and unable to provide information Total of 12 systems reviewed as follows:   
   POSITIVE= underlined text  Negative = text not underlined General:  fever, chills, sweats, generalized weakness, weight loss/gain,  
   loss of appetite Eyes:    blurred vision, eye pain, loss of vision, double vision ENT:    rhinorrhea, pharyngitis Respiratory:   cough, sputum production, SOB, KRAMER, wheezing, pleuritic pain  
Cardiology:   chest pain, palpitations, orthopnea, PND, edema, syncope Gastrointestinal:  abdominal pain , N/V, diarrhea, dysphagia, constipation, bleeding Genitourinary:  frequency, urgency, dysuria, hematuria, incontinence Muskuloskeletal :  arthralgia, myalgia, back pain Hematology:  easy bruising, nose or gum bleeding, lymphadenopathy Dermatological: rash, ulceration, pruritis, color change / jaundice Endocrine:   hot flashes or polydipsia Neurological:  headache, dizziness, confusion, focal weakness, paresthesia, Speech difficulties, memory loss, gait difficulty Psychological: Feelings of anxiety, depression, agitation Objective: VITALS:   
Visit Vitals  /77  Pulse 97  Temp 97.8 °F (36.6 °C)  Resp 20  
 Ht 5' 7\" (1.702 m)  Wt 77.1 kg (170 lb)  SpO2 93%  BMI 26.63 kg/m2 PHYSICAL EXAM: 
 
General:    Alert, cooperative, no distress, appears stated age. HEENT: Ear wax left blocking auditory canal , Atraumatic, anicteric sclerae, pink conjunctivae No oral ulcers, mucosa moist, throat clear, dentition fair Neck:  Supple, symmetrical,  thyroid: non tender Lungs:   Clear to auscultation bilaterally. No Wheezing or Rhonchi. No rales. Chest wall:  No tenderness  No Accessory muscle use. Heart:   Regular  rhythm,  No  murmur   No edema Abdomen:   G tube, Soft, non-tender. Not distended. Bowel sounds normal 
Extremities: No cyanosis. No clubbing,   
  Skin turgor normal, Capillary refill normal, Radial dial pulse 2+ Skin:     Not pale.   Not Jaundiced  No rashes Psych:  Good insight. Not depressed. Not anxious or agitated. Neurologic: EOMs intact. No facial asymmetry. No aphasia or slurred speech. Symmetrical strength, Sensation grossly intact. Alert and oriented.  
 
_______________________________________________________________________ Care Plan discussed with: 
  Comments Patient x Family  x   
RN Care Manager Consultant:     
_______________________________________________________________________ Expected  Disposition:  
Home with Family HH/PT/OT/RN   
SNF/LTC   
[facility]   
________________________________________________________________________ TOTAL TIME:  60 Minutes Critical Care Provided     Minutes non procedure based Comments Reviewed previous records  
>50% of visit spent in counseling and coordination of care  Discussion with patient and/or family and questions answered 
  
 
________________________________________________________________________ Signed: Leeanna Adames MD 
 
Procedures: see electronic medical records for all procedures/Xrays and details which were not copied into this note but were reviewed prior to creation of Plan. LAB DATA REVIEWED:   
Recent Results (from the past 24 hour(s)) CBC WITH AUTOMATED DIFF Collection Time: 07/21/18  7:21 PM  
Result Value Ref Range WBC 5.9 4.1 - 11.1 K/uL  
 RBC 4.10 4. 10 - 5.70 M/uL  
 HGB 12.4 12.1 - 17.0 g/dL HCT 39.8 36.6 - 50.3 % MCV 97.1 80.0 - 99.0 FL  
 MCH 30.2 26.0 - 34.0 PG  
 MCHC 31.2 30.0 - 36.5 g/dL  
 RDW 16.3 (H) 11.5 - 14.5 % PLATELET 68 (L) 951 - 400 K/uL MPV 10.7 8.9 - 12.9 FL  
 NRBC 0.0 0  WBC ABSOLUTE NRBC 0.00 0.00 - 0.01 K/uL NEUTROPHILS 76 (H) 32 - 75 % LYMPHOCYTES 15 12 - 49 % MONOCYTES 7 5 - 13 % EOSINOPHILS 1 0 - 7 % BASOPHILS 0 0 - 1 % IMMATURE GRANULOCYTES 1 (H) 0.0 - 0.5 % ABS. NEUTROPHILS 4.4 1.8 - 8.0 K/UL  
 ABS. LYMPHOCYTES 0.9 0.8 - 3.5 K/UL  
 ABS.  MONOCYTES 0.4 0.0 - 1.0 K/UL  
 ABS. EOSINOPHILS 0.1 0.0 - 0.4 K/UL  
 ABS. BASOPHILS 0.0 0.0 - 0.1 K/UL  
 ABS. IMM. GRANS. 0.1 (H) 0.00 - 0.04 K/UL  
 DF SMEAR SCANNED    
 RBC COMMENTS NORMOCYTIC, NORMOCHROMIC METABOLIC PANEL, COMPREHENSIVE Collection Time: 07/21/18  7:21 PM  
Result Value Ref Range Sodium 145 136 - 145 mmol/L Potassium 3.7 3.5 - 5.1 mmol/L Chloride 105 97 - 108 mmol/L  
 CO2 34 (H) 21 - 32 mmol/L Anion gap 6 5 - 15 mmol/L Glucose 143 (H) 65 - 100 mg/dL BUN 30 (H) 6 - 20 MG/DL Creatinine 1.63 (H) 0.70 - 1.30 MG/DL  
 BUN/Creatinine ratio 18 12 - 20 GFR est AA 49 (L) >60 ml/min/1.73m2 GFR est non-AA 41 (L) >60 ml/min/1.73m2 Calcium 9.4 8.5 - 10.1 MG/DL Bilirubin, total 0.4 0.2 - 1.0 MG/DL  
 ALT (SGPT) 34 12 - 78 U/L  
 AST (SGOT) 57 (H) 15 - 37 U/L Alk. phosphatase 83 45 - 117 U/L Protein, total 7.1 6.4 - 8.2 g/dL Albumin 2.9 (L) 3.5 - 5.0 g/dL Globulin 4.2 (H) 2.0 - 4.0 g/dL A-G Ratio 0.7 (L) 1.1 - 2.2    
TROPONIN I Collection Time: 07/21/18  7:21 PM  
Result Value Ref Range Troponin-I, Qt. 0.18 (H) <0.05 ng/mL LACTIC ACID Collection Time: 07/21/18  8:01 PM  
Result Value Ref Range  Lactic acid 2.1 (HH) 0.4 - 2.0 MMOL/L

## 2018-07-23 NOTE — HOME CARE
Please note that this patient was open to [facility] - INPATIENT SN/PT services at the time of hospital admission. If services will be needed at discharge, please order to resume them.  Da Koehler RN  [facility] Nurse Liaison    or [phone]

## 2018-07-23 NOTE — CONSULTS
Consult NAME: Marybeth Snell :  1935 MRN:  [id number] Date/Time:  2018 9:33 AM 
 
Patient PCP: Antwon Selby MD 
________________________________________________________________________ Assessment: 1. Hypotension; resolved 2. LIZ; resolved 3. Indeterminate troponin elevation 4. Recent admission for bacteremia 5. Chronic systolic heart failure 6. Coronary artery disease s/p remote CABG.  Cath  w/ EF 40%, PCI to distal LAD via LIMA, severe disease in large diagonal (not amenable to PCI), patent SVG-OM, SVG-PDA, SVG-PLB.  Lexiscan  w/ EF 28%, IWMI, and with diagonal ischemia. 7. Lexiscan stress MPI  w/ EF 29% and anterior ischemia w/ IWMI 8. Ischemic cardiomyopathy 9. BiV ICD 10. Chronic kidney disease; Stg 3 11. Moderate to severe AoS (Echo  w/ EF 50%, inferoseptal HK, mild to mod AI, diffuse HK, 33mmHg gradient and 3.7m/s velocity across AoV w/ SUSAN 0.7cm2). 12. Diabetes 13. 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 Orthostatic hypotension 15. Hyperlipidemia 16. Head/neck CA w/ feeding tube 17. Chronic thrombocytopenia (Dr. Lisa Roles) Plan: Hypotension resolved with IVF Hemodynamically he is preload dependent and thus would avoid any thing that drops preload (i.e. Nitrates) 1. Change ASA to 81mg 2. Continue midodrine 10mg TID 3. Continue plavix Thank you for this consult and allowing me to take part in this patients care. Please call with questions. [x]        High complexity decision making was performed Subjective: CHIEF COMPLAINT: Dizzy HISTORY OF PRESENT ILLNESS:    
Umer Vazquez is a 80 y.o.  male who \"presents via EMS to the ED with cc of low blood pressure since this morning and generalized weakness that began at 1700 today. Per chart review, pt was discharged yesterday after admission for aspiration PNA.  Per wife, she noticed the pt's blood pressure was dropping, he had difficulty hearing, trouble lifting his legs and ambulating around 1700 while he was being fed. Per wife, pt also c/o shoulder pain for which he was given a pain pill. Wife reports she was advised to switch his diet from 3 meals a day to 5 meals a day, in which he is given 1 can of food every 3 hours with 5 oz of water. Wife states she was advised the change in diet due to pt's GERD. Pt denies any CP and SOB. \" We were asked to consult for work up and evaluation of the above problems. Past Medical History:  
Diagnosis Date  Antiplatelet or antithrombotic long-term use 12/4/2014  Anxiety disorder  Arrhythmia 2009  
 bradycardia  Arthritis  CAD (coronary artery disease) s/p CABG 2002; Dr Jasvir Rios  Cancer ([facility] 1996  
 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   
 left leg, lower back,   
 Depression  Diabetes ([facility] Type II  
 Esophageal dysmotility s/p dilitation  Esophageal motility disorder 7/8/2013 Frequent simultaneous or failed contractions, low amplitude contractions  suggests severe myopathy or diffuse spasm. I suspect the latter. Achalasia  is not present.  GERD (gastroesophageal reflux disease)  Heart failure ([facility] 10/2014 Cardiomyopathy:Pacemaker upgrade:Biv and AICD  Dr. Rubi Iglesias heart Dr. last visit 5/11/2015  Hepatitis C Dx 1996  
 treated at [facility] in past; as of 4/15/15 wife states pt currently not under any treatment  Hyperlipidemia  Hypertension  Myocardial infarct [facility]) 2013 Heart Cath: 40% LV EF, Stented distal LAD, patent Graft to circumflex  On tube feeding diet approx 2009  
 still has as of 9/28/15  (no po food/liquid/meds at all); Dr Alyson Butler  Other ill-defined conditions(799.89) 1996  
 1 dose of chemotherapy/radiation for tongue cancer  Other ill-defined conditions(799.89) BPH  Other ill-defined conditions(799.89)  orthostatic hypotension  Pneumonia ~ April -May 2010  Stroke [facility]) approx   
 left side-left finger tips numb; no imbalance or memory loss; as of  not seeing neuro MD  
 Suicidal thoughts Past Surgical History:  
Procedure Laterality Date  ABDOMEN SURGERY PROC UNLISTED    
 peg tube  CABG, ARTERY-VEIN, THREE    HX CATARACT REMOVAL    
 bilateral  
 HX CHOLECYSTECTOMY [address] CATHETERIZATION   Stented distal LAD  HX MOHS PROCEDURES    
 bilateral  
 HX ORTHOPAEDIC    
 back surgery times two  HX OTHER SURGICAL Radical Left Neck  HX OTHER SURGICAL    
 NASAL POLYPS REMOVAL  
 HX OTHER SURGICAL   TURP  
 HX PACEMAKER    HX PACEMAKER  10/28/14 Defibrillator: [employer] # [id number], serial # [id number]; Dr. Mely Short [phone]; Dr Kirk Jarrett  HX UROLOGICAL    
 cystoscopy [address]  
 cevical surgery  WA CHANGE GASTROSTOMY TUBE  2011  WA CHANGE GASTROSTOMY TUBE  2011  WA EGD INSERT GUIDE WIRE DILATOR PASSAGE ESOPHAGUS  2010  WA EGD TRANSORAL BIOPSY SINGLE/MULTIPLE  2010  
    
 STOMACH SURGERY PROCEDURE UNLISTED  2011  VASCULAR SURGERY PROCEDURE UNLIST    
 bilateral carotid stents Allergies Allergen Reactions  Demerol [Meperidine] Shortness of Breath  Paxil [Paroxetine Hcl] Unknown (comments) Pt gets shaky and loses control of legs  Amoxicillin Rash  Cleocin [Clindamycin Hcl] Rash  Pcn [Penicillins] Rash Meds:  See below Social History Substance Use Topics  Smoking status: Never Smoker  Smokeless tobacco: Never Used  Alcohol use No  
  
Family History Problem Relation Age of Onset  Heart Disease Father   
   at age 52 from CAD  Colon Cancer Mother  Cancer Mother   
  colon ca  
 Heart Disease Brother REVIEW OF SYSTEMS:   
 []         Unable to obtain  ROS due to --- 
 [x] Total of 12 systems reviewed as follows: 
 
Constitutional: negative fever, negative chills, negative weight loss Eyes:   negative visual changes ENT:   negative sore throat, tongue or lip swelling Respiratory:  negative cough, negative dyspnea Cards:  negative for chest pain, palpitations, lower extremity edema GI:   negative for nausea, vomiting, diarrhea, and abdominal pain Genitourinary: negative for frequency, dysuria Integument:  negative for rash Hematologic:  negative for easy bruising and gum/nose bleeding Musculoskel: negative for myalgias,  back pain Neurological:  negative for headaches, dizziness, vertigo, weakness Behavl/Psych: negative for feelings of anxiety, depression Pertinent Positives include : 
 
Objective:  
  
Physical Exam: 
 
Last 24hrs VS reviewed since prior progress note. Most recent are: 
 
Visit Vitals  /68 (BP 1 Location: Left arm, BP Patient Position: Sitting)  Pulse 84  Temp 97.1 °F (36.2 °C)  Resp 18  Ht 5' 7\" (1.702 m)  Wt 77.1 kg (170 lb)  SpO2 99%  BMI 26.63 kg/m2 Intake/Output Summary (Last 24 hours) at 07/23/18 6009 Last data filed at 07/23/18 0875 Gross per 24 hour Intake          2474.17 ml Output                0 ml Net          2474.17 ml General Appearance: Well developed, well nourished, alert & oriented x 3,  
 no acute distress. Ears/Nose/Mouth/Throat: Pupils equal and round, Hearing grossly normal. 
Neck: Supple. JVP within normal limits. Carotids good upstrokes, with no bruit. Chest: Lungs clear to auscultation bilaterally. Cardiovascular: Regular rate and rhythm, S1S2 normal, harsh late-peaking TARA Abdomen: Soft, non-tender, bowel sounds are active. No organomegaly. Extremities: No edema bilaterally. Femoral pulses +2, Distal Pulses +1. Skin: Warm and dry. Neuro: CN II-XII grossly intact, Strength and sensation grossly intact.  
 
[]         Post-cath site without hematoma, bruit, tenderness, or thrill. Distal pulses intact. Data:  
  
Telemetry:  paced EKG:  paced 
[]  No new EKG for review. Prior to Admission medications Medication Sig Start Date End Date Taking? Authorizing Provider ISHAN ram & paracasei- S therm- Bifido (JAVY-Q/RISAQUAD) 8 billion cell cap cap 1 Cap by PEG Tube route daily. 7/21/18   Deb Poole MD  
metoclopramide HCl (REGLAN) 10 mg tablet 1 Tab by Per G Tube route every eight (8) hours as needed for up to 10 days. Take for nausea as needed 7/20/18 7/30/18  Deb Poole MD  
cefdinir (OMNICEF) 250 mg/5 mL suspension 6 mL by Per G Tube route two (2) times a day for 7 days. Start 7/21. Aware of PCN/ amoxicillin allergy but patient tolerated ceftriaxone infusion in the hospital.  Indications: STREPTOCOCCAL PNEUMONIA 7/20/18 7/27/18  Deb Poole MD  
metroNIDAZOLE (FLAGYL) 500 mg tablet 1 Tab by Per G Tube route three (3) times daily for 7 days. 7/20/18 7/27/18  Deb Poole MD  
acetaminophen (TYLENOL) 500 mg tablet 1,000 mg by Per G Tube route every six (6) hours as needed for Pain. Historical Provider  
atorvastatin (LIPITOR) 40 mg tablet 40 mg by Per G Tube route daily. Booker Eric MD  
clopidogrel (PLAVIX) 75 mg tab 75 mg by Feeding Tube route daily. Booker Eric MD  
cyanocobalamin 1,000 mcg tablet 1,000 mcg by Per G Tube route daily. Historical Provider  
finasteride (PROSCAR) 5 mg tablet 5 mg by Per G Tube route nightly. Booker Eric MD  
alfuzosin SR (UROXATRAL) 10 mg SR tablet 10 mg by Per G Tube route daily. 5/24/18   Historical Provider  
aspirin (ASPIRIN) 325 mg tablet 325 mg by Per G Tube route daily. Booker Eric MD  
midodrine (PROAMITINE) 10 mg tablet 10 mg by Per G Tube route three (3) times daily (with meals). 11/1/16   Historical Provider  
pyridoxine HCl, vitamin B6, (VITAMIN B-6 PO) 1 Tab by Per G Tube route daily.     Historical Provider  
vit B Cmplx 3-FA-Vit C-Biotin (NEPHRO GRAYSON RX) 160-273 mg-mg-mcg tablet Take 1 Tab by mouth daily. Booker Eric MD  
tamsulosin (FLOMAX) 0.4 mg capsule 0.4 mg by Per G Tube route as needed (per urinary flow issues). 5/21/18   Historical Provider HYDROcodone-acetaminophen (NORCO)  mg tablet 1 Tab by Per G Tube route daily as needed for Pain. Booker Eric MD  
furosemide (LASIX) 40 mg tablet Take 40 mg by mouth daily. Patient takes 40 mg tablet in the morning, then 20 mg in the afternoon    Historical Provider  
cyanocobalamin, vitamin B-12, (VITAMIN B-12 PO) Take 1 Tab by mouth daily. Historical Provider  
furosemide (LASIX) 20 mg tablet Take 20 mg by mouth daily. Booker Eric MD  
midodrine (PROAMITINE) 10 mg tablet Take 10 mg by mouth three (3) times daily. Booker Eric MD  
fluticasone (FLONASE ALLERGY RELIEF) 50 mcg/actuation nasal spray 2 Sprays by Both Nostrils route daily. Historical Provider  
zinc 50 mg tab tablet Take 50 mg by mouth daily. Historical Provider  
pramipexole (MIRAPEX) 0.25 mg tablet Take 1 Tab by mouth three (3) times daily. 7/9/18   Moises Cuevas MD  
LANTUS SOLOSTAR U-100 INSULIN 100 unit/mL (3 mL) inpn INJECT 14 UNITS SUBCUTANEOUSLY ONCE DAILY 5/21/18   Blake Pascal MD  
insulin lispro (HUMALOG [facility] INSULIN) 100 unit/mL kwikpen 13 units with breakfast, 10 units with lunch, 7 units with dinner + correction insulin, up to 40 units/day Patient taking differently: by SubCUTAneous route. 13 units with breakfast, 10 units with lunch, 7 units with dinner + correction insulin, up to 40 units/day  Indications: 10 lunch, 7 dinner 3/12/18   Blake Pascal MD  
famotidine (PEPCID) 20 mg tablet Take 1 Tab by mouth two (2) times a day. Patient taking differently: 1 Tab by Per G Tube route two (2) times a day. 1/14/18   Carlos Hopper DO  
nitroglycerin (NITROSTAT) 0.4 mg SL tablet 0.4 mg by SubLINGual route every five (5) minutes as needed for Chest Pain.     Booker Eric MD  
ondansetron hcl (ZOFRAN, AS HYDROCHLORIDE,) 8 mg tablet Take 1 Tab by mouth every eight (8) hours as needed for Nausea. 12/5/16   Blaine Barcenas MD  
gabapentin (NEURONTIN) 250 mg/5 mL solution 750 mg by PEG Tube route three (3) times daily. Booker Eric MD  
multivitamin (ONE A DAY) tablet 1 Tab by Per G Tube route daily. Booker Eric MD  
 
 
Recent Results (from the past 24 hour(s)) GLUCOSE, POC Collection Time: 07/22/18 12:16 PM  
Result Value Ref Range Glucose (POC) 169 (H) 65 - 100 mg/dL Performed by Jose Moore (CON) LACTIC ACID Collection Time: 07/22/18  1:06 PM  
Result Value Ref Range Lactic acid 1.4 0.4 - 2.0 MMOL/L  
TROPONIN I Collection Time: 07/22/18  1:06 PM  
Result Value Ref Range Troponin-I, Qt. 0.09 (H) <0.05 ng/mL GLUCOSE, POC Collection Time: 07/22/18  5:57 PM  
Result Value Ref Range Glucose (POC) 244 (H) 65 - 100 mg/dL Performed by Julieta Guerra GLUCOSE, POC Collection Time: 07/23/18  1:33 AM  
Result Value Ref Range Glucose (POC) 301 (H) 65 - 100 mg/dL Performed by Avery Cortez METABOLIC PANEL, BASIC Collection Time: 07/23/18  5:36 AM  
Result Value Ref Range Sodium 148 (H) 136 - 145 mmol/L Potassium 3.9 3.5 - 5.1 mmol/L Chloride 111 (H) 97 - 108 mmol/L  
 CO2 33 (H) 21 - 32 mmol/L Anion gap 4 (L) 5 - 15 mmol/L Glucose 184 (H) 65 - 100 mg/dL BUN 19 6 - 20 MG/DL Creatinine 1.00 0.70 - 1.30 MG/DL  
 BUN/Creatinine ratio 19 12 - 20 GFR est AA >60 >60 ml/min/1.73m2 GFR est non-AA >60 >60 ml/min/1.73m2 Calcium 8.7 8.5 - 10.1 MG/DL MAGNESIUM Collection Time: 07/23/18  5:36 AM  
Result Value Ref Range Magnesium 2.6 (H) 1.6 - 2.4 mg/dL PHOSPHORUS Collection Time: 07/23/18  5:36 AM  
Result Value Ref Range Phosphorus 2.5 (L) 2.6 - 4.7 MG/DL  
CBC WITH AUTOMATED DIFF Collection Time: 07/23/18  5:36 AM  
Result Value Ref Range  WBC 3.9 (L) 4.1 - 11.1 K/uL  
 RBC 3.60 (L) 4.10 - 5.70 M/uL  
 HGB 10.9 (L) 12.1 - 17.0 g/dL HCT 34.4 (L) 36.6 - 50.3 % MCV 95.6 80.0 - 99.0 FL  
 MCH 30.3 26.0 - 34.0 PG  
 MCHC 31.7 30.0 - 36.5 g/dL  
 RDW 16.2 (H) 11.5 - 14.5 % PLATELET 58 (L) 393 - 400 K/uL MPV 11.3 8.9 - 12.9 FL  
 NRBC 0.0 0  WBC ABSOLUTE NRBC 0.00 0.00 - 0.01 K/uL NEUTROPHILS 67 32 - 75 % LYMPHOCYTES 23 12 - 49 % MONOCYTES 7 5 - 13 % EOSINOPHILS 3 0 - 7 % BASOPHILS 0 0 - 1 % IMMATURE GRANULOCYTES 0 0.0 - 0.5 % ABS. NEUTROPHILS 2.6 1.8 - 8.0 K/UL  
 ABS. LYMPHOCYTES 0.9 0.8 - 3.5 K/UL  
 ABS. MONOCYTES 0.3 0.0 - 1.0 K/UL  
 ABS. EOSINOPHILS 0.1 0.0 - 0.4 K/UL  
 ABS. BASOPHILS 0.0 0.0 - 0.1 K/UL  
 ABS. IMM. GRANS. 0.0 0.00 - 0.04 K/UL  
 DF AUTOMATED    
GLUCOSE, POC Collection Time: 07/23/18  6:53 AM  
Result Value Ref Range Glucose (POC) 165 (H) 65 - 100 mg/dL Performed by Jaquelin Adamson III, DO

## 2018-07-23 NOTE — PROGRESS NOTES
Music Therapy Assessment    Matias Paulino [id number]  [id number]    1935  80 y.o.  male    Patient Telephone Number: [phone] (home)   [facility] Affiliation: [facility]   Language: English   Extended Emergency Contact Information  Primary Emergency Contact: Sachi Zelaya, [address] Phone: [phone]  Mobile Phone: [phone]  Relation: Spouse  Secondary Emergency Contact: Helen Escoto Phone: [phone]  Relation: Son   Patient Active Problem List    Diagnosis Date Noted    Myalgia 07/19/2018    Pneumonia due to group B Streptococcus ([address] 07/19/2018    Counseling regarding goals of care 07/19/2018    Parkinson's disease (tremor, stiffness, slow motion, unstable posture) ([address] 07/09/2018    Physical deconditioning 06/28/2018    Tremors of nervous system 06/28/2018    Type 2 diabetes with nephropathy ([address] 05/07/2018    Diabetic peripheral neuropathy associated with type 2 diabetes mellitus ([address] 05/07/2018    Benign essential tremor syndrome 05/07/2018    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    Wrist pain, acute, right 05/07/2018    Thrombotic stroke involving left middle cerebral artery ([address] 02/02/2017    Stenosis of both internal carotid arteries 02/02/2017    Hemiplegia and hemiparesis following cerebral infarction affecting right dominant side ([address] 02/02/2017    Weakness 02/01/2017    Stroke ([address] 02/01/2017    Aspiration pneumonia ([address] 11/28/2016    History of stroke 07/30/2016     Class: Present on Admission    Polyneuropathy associated with underlying disease ([address] 02/11/2016    Peripheral neuropathy ([address] 01/09/2016    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 ([address] 01/08/2016    Percutaneous endoscopic gastrostomy status ([address] 12/04/2014    Antiplatelet or antithrombotic long-term use 12/04/2014    Heart failure ([address] 09/11/2014  Esophageal motility disorder 07/02/2013    CAD (coronary artery disease) 02/12/2013    Status post implantation of automatic cardioverter/defibrillator (AICD) 02/12/2013     Class: Present on Admission    Aortic stenosis, mild 02/12/2013    S/P PTCA (percutaneous transluminal coronary angioplasty) 01/18/2013    Non-cardiac chest pain 01/04/2013    Feeding difficulties 05/02/2011    Abnormal cardiovascular stress test 06/24/2010    S/P CABG x 3 06/24/2010    Atherosclerotic cerebrovascular disease 06/24/2010    Orthostatic hypotension 06/24/2010    Dysphagia 05/03/2010        Date: 7/23/2018       Mental Status:   [x] Alert [  ] Carl Mansoor [  ]  Confused  [  ] Minimally responsive    Communication Status: [  ] Impaired Speech [  ] Nonverbal -N/A    Physical Status:   [  ] Oxygen in use  [  ] Hard of Hearing [  ] Vision Impaired  [  ] Ambulatory  [  ] Ambulatory with assistance [  ] Non-ambulatory -N/A    Music Preferences, Background: N/A: Please see Session Observations below. Clinical Problem to be addressed: Self-expression. Goal(s) met in session: N/A: Please see Session Observations below. Physical/Pain management (Scale of 1-10):    Pre-session rating ___________    Post-session rating __________   [  ] Increased relaxation   [  ] Regulated breathing patterns  [  ] Decreased muscle tension   [  ] Minimized physical distress     Emotional/Psychological:  [  ] Increased self-expression   [  ] Decreased aggressive behavior   [  ] Decreased sadness   [  ] Discussed healthy coping skills     [  ] Improved mood    [  ] Decreased withdrawn behavior     Social:  [  ] Decreased feelings of isolation/loneliness [  ] Positive social interaction   [  ] Provided support and/or comfort for family/friends    Spiritual:  [  ] Spiritual support    [  ] Expressed peace  [  ] Expressed jordyn    [  ] Discussed beliefs    Techniques Utilized (Check all that apply): N/A: Please see Session Observations below.   [ ] Procedural support MT [  ] Music for relaxation [  ] Patient preferred music  [  ] Fabi analysis  [  ] Song choice  [  ] Music for validation  [  ] Entrainment  [  ] Progressive muscle relax. [  ] Guided visualization  [  ] Roc Haywood  [  ] Patient instrument playing [  ] Yan Freitas writing  [  ] Pedro Herrera along   [  ] Mayberry Hasten  [  ] Sensory stimulation  [  ] Active Listening  [  ] Music for spiritual support [  ] Making of CDs as gifts    Session Observations:  F/u visit; This music therapist (MT) attempted to offer patient (pt) music therapy. Pt had just pressed his call bell, and MT heard pt tell the CNA who responded to it that he needed help getting to use the restroom. MT will follow as able.     CESAR PittsBC (Music Therapist-Board Certified)  Spiritual Care Department  Referral-based service

## 2018-07-23 NOTE — PROGRESS NOTES
Physical Therapy Screening:    An [facility] screening referral was triggered for physical therapy based on results obtained during the nursing admission assessment. The patients chart was reviewed and the patient is appropriate for a skilled therapy evaluation if there is a decline in functional mobility from baseline. Please order a consult for physical therapy if you are in agreement and would like an evaluation to be completed. Thank you.     Che Mccarthy, PT

## 2018-07-23 NOTE — CONSULTS
[address] Mattson Ace  MR#: [id number]  : 1935  ACCOUNT #: [de-identified]   DATE OF SERVICE: 2018    GASTROENTEROLOGY CONSULTATION     REFERRING PHYSICIAN:  Cornel Bojorquez     CHIEF COMPLAINT:  Three different GI complaints. HISTORY OF ILLNESS:  I have seen this patient for a number of years. He had a head and neck cancer and underwent surgery and radiation therapy. He has had moderate to severe oropharyngeal dysphagia dating back to at least  on a speech pathology study. In addition, he has had diffuse esophageal spasm by manometry. I have treated his esophagus with dilation, and also he has a chronic indwelling PEG tube. He tells me he has had a Botox injection to his lower sphincter, but I do not find evidence of that in my review of his prior procedure notes. His current complaint is that everything seems to get stuck at the inlet to his esophagus. There is coughing and gagging and choking. He wants to know if he can have Botox to the head and neck area. He was admitted for recurrent aspiration pneumonia. His PEG tube needs frequent replacement and he would like a length of PEG tube that is longer than his current PEG tube. He was constipated last week when seen by Dr. Jose Moran; this admission, he has had some diarrhea. PAST MEDICAL/SURGICAL HISTORY:  Diabetes mellitus, aortic stenosis, chronic Plavix use/antiplatelet drug, weakness, dizziness, poor exercise tolerance, orthostatic hypotension, status post CABG, acute kidney injury. Past surgical history in addition to above:  Heart catheterization, Mohs procedure, back surgery, and other. SOCIAL HISTORY:  Nonsmoker, nondrinker. FAMILY HISTORY:  Positive for heart disease and for colon cancer. ALLERGIES:  DEMEROL, PAXIL, AMOXICILLIN, CLEOCIN, PENICILLIN.     PRIOR TO ADMISSION MEDICATIONS:  Probiotics, Omnicef, metronidazole, Tylenol, Lipitor, Plavix, cyanocobalamin, Proscar, aspirin, midodrine, Uroxatral, vitamin B complex, Flomax, hydrocodone and acetaminophen, Lasix, vitamin B12, and all others. REVIEW OF SYSTEMS:  I reviewed Dr. Werner Cormier' admission review of systems and I communicated regarding this with the patient. It was negative in detail except as above. He can walk perhaps 15 feet without getting short of breath. PHYSICAL EXAMINATION:  VITAL SIGNS:  Temperature 97.7, pulse 88, blood pressure 126/72, respirations 18. GENERAL:  Talks with garbled speech and some liquid at the back of the throat. Stiff neck. EYES:  No icterus. CHEST:  Clear to auscultation. No use of accessory muscles. HEART:  Regular rate and rhythm, normal S1, S2.  Loud crescendo-decrescendo systolic murmur 4/6, heard best at left upper sternal border. ABDOMEN:  There is tenderness in the right side of the abdomen. Bowel sounds are present. No rebound tenderness, and no mass. There is a PEG tube in place, it is about 13 cm long. The area around the PEG site is not tender. LYMPHATIC:  No anterior, posterior cervical, supraclavicular or inguinal lymphadenopathy. EXTREMITIES:  No edema. NEUROLOGIC:  Moves all 4 extremities well. LABORATORY REVIEWED:  Hemoglobin 10.9, white count 3.9, platelets 28,104. Liver tests normal.    IMPRESSION/REPORT/PLAN:  1. Dysphagia. This is oropharyngeal dysphagia. It is likely due to his prior surgery and radiation. I do not do Botox to this area, and Botox to this area is not likely to be helpful. 2.  Esophageal motor disorder. This aggravates his dysphagia, and certainly poor emptying of esophageal contents could contribute to his aspiration. 3.  Chronic indwelling PEG tube. 4. Low platelet count. 5.  Right-sided abdominal pain. I note that CT was negative on 07/14/2018. RECOMMENDATIONS:  1.  KUB, flat and erect.   2.  It is reasonable for me to do an upper endoscopy with Savary dilation of the upper esophageal sphincter and the lower esophageal sphincter. I can arrange this as an outpatient. the third week in August.  It would be good for him to be off of Plavix for this. 3.  I will see if I have a longer PEG tube to change, otherwise he will have to have a PEG tube change in the office by Ms. Curt Abbie. I do not think supply-wise we will have a better tube than the one he has right now. 4.  I agree with the wife that he may do better with more frequent lower volume feedings. This may decrease his aspiration risk. On the other hand, with his diabetes, I be nervous about overnight feedings because there is about an 8- to 10-hour fast during daylight. I would worry about possible hypoglycemia with not being fed. He can work out some of the details of these concerns with Dr. Jerzy Hooker as an outpatient. 5.  I would like to get an esophageal manometry to see what his [place] diagnosis on his manometry is. I do not think it is going to  plans, so I will not order this yet until I have a chance to talk to him and his wife as well as Dr. Angelina Darden. 6.  He will need to be off of Plavix for the endoscopy. I thank Dr. Carmelo Bennett for this interesting consultation.       MD AURA Lange / KNETRELL  D: 07/23/2018 16:28     T: 07/23/2018 17:39  JOB #: [id number]  CC: Sravanthi Clifton III, DO  CC: Jillian Patel MD

## 2018-07-23 NOTE — PROGRESS NOTES
Spiritual Care Assessment/Progress Note  [facility]      NAME: Deena Taylor      MRN: [id number]  AGE: 80 y.o. SEX: male  Jew Affiliation: Sikh   Language: English     7/23/2018     Total Time (in minutes): 37     Spiritual Assessment begun in [facility] CARDIOPULMONARY CARE through conversation with:         [x]Patient        [x] Family    [] Friend(s)        Reason for Consult: Initial/Spiritual assessment, patient floor     Spiritual beliefs: (Please include comment if needed)     [x] Identifies with a jordyn tradition:         [x] Supported by a jordyn community:            [] Claims no spiritual orientation:           [] Seeking spiritual identity:                [] Adheres to an individual form of spirituality:           [] Not able to assess:                           Identified resources for coping:      [x] Prayer                               [] Music                  [] Guided Imagery     [x] Family/friends                 [] Pet visits     [] Devotional reading                         [] Unknown     [] Other:                                               Interventions offered during this visit: (See comments for more details)    Patient Interventions: Affirmation of emotions/emotional suffering, Affirmation of jordyn, Catharsis/review of pertinent events in supportive environment, Coping skills reviewed/reinforced, Iconic (affirming the presence of God/Higher Power), Initial/Spiritual assessment, patient floor, Normalization of emotional/spiritual concerns, Prayer (actual), Prayer (assurance of), Jew beliefs/image of God discussed, Life review/legacy     Family/Friend(s):  Affirmation of emotions/emotional suffering, Affirmation of jordyn, Catharsis/review of pertinent events in supportive environment, Coping skills reviewed/reinforced, End of life issues discussed, Iconic (affirming the presence of God/Higher Power), Life review/legacy, Normalization of emotional/spiritual concerns, Prayer (actual), Prayer (assurance of), Scientologist beliefs/image of God discussed     Plan of Care:     [x] Support spiritual and/or cultural needs    [] Support AMD and/or advance care planning process      [x] Support grieving process   [] Coordinate Rites and/or Rituals    [] Coordination with community clergy   [] No spiritual needs identified at this time   [] Detailed Plan of Care below (See Comments)  [] Make referral to Music Therapy  [] Make referral to Pet Therapy     [] Make referral to Addiction services  [] Make referral to [facility]  [] Make referral to Spiritual Care Partner  [] No future visits requested        [x] Follow up visits as needed     Comments:  initiated visit with patient and wife, Monserrat Gold, after recognizing patient's name from recent admission and visit. Patient was in better spirits than during previous visit and shared his hopes about a potential procedure that may provide more comfort. Patient continues to talk about his desires to want to fight, get well, and have more time with his wife. Wife appears to be more accepting of patient's declining health but offers reassurances to patient in an effort to combat his sadness. Patient continued to state his desire for doctors to be straight with him about whether or not there is anything to be done to help him but also stated his wishes for the care team to try to help him even if there is not. Patient's wife and  attempted unsuccessfully, to bring attention to incongruence in patient's statements. Offered prayer at conclusion of visit and assured of ongoing support as needed and desired. irvin Peraza M.Div.    Paging Service [phone])

## 2018-07-23 NOTE — PROGRESS NOTES
Cardiopulmonary Care Interdisciplinary rounds were held today to discuss patient's plan of care and outcomes. The following members were present: NP/Physician, Pharmacy, Nursing and Case Management.     Expected Length of Stay:  - - -    Plan of Care: Continue current treatment plan

## 2018-07-23 NOTE — PROGRESS NOTES
I have seen and examined patient  I have spoken to Dr HELMS.   I have spoken to wife    Full consult to follow    Michael Clarke MD  1:36 PM  7/23/2018

## 2018-07-23 NOTE — CONSULTS
CONSULTATION REQUESTED BY: Carol Gutierrez MD    REASON FOR CONSULT: Evaluation of Type 2 diabetes    CHIEF COMPLAINT: diabetes control with change in Tube Feeds    HISTORY OF PRESENT ILLNESS:   Matias Paulino is a 80 y.o. male with a PMHx as noted below who was admitted to the hospital on 7/21/2018  7:08 PM with a diagnosis of Weakness; Weakness. Endocrinology was consulted for the evaluation of type 2 diabetes with change in tube feeds. Patient is known to me in the endocrine clinic. He has had a slightly complex regimen of insulin 3 times per day to accommodate his tube feed bolus regimen, which was adjusted during times of steroid use. The patient, although with some issues here and there, has not had too many complications with his sugars considering the complexity of his diabetes control. Most recently his regimen was as follows per my recommendation in the clinic:     Can TF's,   2 cans w/ Breakfast = 13 units humalog      1.5 can lunch  =  10 units humalog  1.5 can dinner =  7 units humalog  Correction Scale to 1:25>150,   Lantus 14 units in the AM     First 4 days after steroid injection:   2 cans w/ Breakfast = 17 units humalog      1.5 can lunch  =  14 units humalog  1.5 can dinner =  11 units humalog  Correction Scale to 1:25>150,   Lantus 17 units in the AM    I have called the patients wife by phone after evaluating the patient, and have noted that he did not receive steroids in the past few weeks. Note also that each of his TF cans are Isosource 1.5, with each can containing a volume of 250 ml. His nutritional evaluation resulted in suggestion of using 5 tube feeds daily rather than 3, and they would be 3 hours apart. Notably the patient and his wife would require an increase frequency in dosing the insulin in that case with use of short acting insulin.      Sugars thus far have been 200-300's,   He is currently receiving lantus and only correction insulin,  No current standing treatment for his tube feeds. 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 I suspect the latter. Achalasia  is not present.         GERD (gastroesophageal reflux disease)     Heart failure ([facility] [address] 10/2014     Cardiomyopathy:Pacemaker upgrade:Biv and AICD  Dr. Nancy Mckeon heart DrAlvaro last visit 5/11/2015    Hepatitis C Dx 1996    treated at [facility] in past; as of 4/15/15 wife states pt currently not under any treatment    Hyperlipidemia     Hypertension     Myocardial infarct [facility]) 2013    Heart Cath: 40% LV EF, Stented distal LAD, patent Graft to circumflex    On tube feeding diet approx 2009    still has as of 9/28/15  (no po food/liquid/meds at all); Dr Radha Sales Other ill-defined conditions(799.89) 1996    1 dose of chemotherapy/radiation for tongue cancer    Other ill-defined conditions(799.89)     BPH    Other ill-defined conditions(799.89)     orthostatic hypotension    Pneumonia ~ April -May 2010    Stroke [facility]) approx 2003    left side-left finger tips numb; no imbalance or memory loss; as of 2015 not seeing neuro MD    Suicidal thoughts      Past Surgical History:   Procedure Laterality Date    ABDOMEN SURGERY [facility]    peg tube    CABG, ARTERY-VEIN, THREE  2000    HX CATARACT REMOVAL      bilateral    HX CHOLECYSTECTOMY      HX HEART CATHETERIZATION  2013    Stented distal LAD    HX MOHS PROCEDURES      bilateral    HX ORTHOPAEDIC      back surgery times two    HX OTHER SURGICAL      Radical Left Neck    HX OTHER SURGICAL      NASAL POLYPS REMOVAL    HX OTHER SURGICAL  2010    TURP    HX PACEMAKER  11/08    HX PACEMAKER  10/28/14     Defibrillator: [facility] # [id number], serial # [id number]; Dr. Frey Officer [phone]; Dr Conner Smith HX UROLOGICAL      cystoscopy   [address]    cevical surgery    ND CHANGE GASTROSTOMY TUBE  5/2/2011         ND CHANGE GASTROSTOMY TUBE  6/29/2011         ND EGD INSERT GUIDE WIRE DILATOR PASSAGE ESOPHAGUS  9/13/2010         ND EGD TRANSORAL BIOPSY SINGLE/MULTIPLE  9/13/2010         STOMACH SURGERY PROCEDURE UNLISTED  6/29/2011         VASCULAR SURGERY PROCEDURE UNLIST      bilateral carotid stents       ALLERGIES:   Allergies   Allergen Reactions    Demerol [Meperidine] Shortness of Breath    Paxil [Paroxetine Hcl] Unknown (comments)     Pt gets shaky and loses control of legs    Amoxicillin Rash    Cleocin [Clindamycin Hcl] Rash    Pcn [Penicillins] Rash       MEDICATIONS ON ADMISSION:     Current Facility-Administered Medications:     aspirin chewable tablet 81 mg, 81 mg, Oral, DAILY, Marrion Standard Louis III, DO    guaiFENesin (ROBITUSSIN) 100 mg/5 mL oral liquid 300 mg, 300 mg, Oral, TID, Fide García, NP, 300 mg at 07/23/18 1644    [START ON 7/24/2018] furosemide (LASIX) tablet 20 mg, 20 mg, Oral, DAILY, Leonardo Berger MD    famotidine (PEPCID) tablet 20 mg, 20 mg, Per G Tube, DAILY, Leonardo Berger MD, 20 mg at 07/23/18 0907    insulin glargine (LANTUS) injection 14 Units, 14 Units, SubCUTAneous, DAILY, Leonardo eBrger MD, 14 Units at 07/23/18 0907    cefTRIAXone (ROCEPHIN) 1 g in 0.9% sodium chloride (MBP/ADV) 50 mL, 1 g, IntraVENous, Q24H, Leonardo Berger MD, Last Rate: 100 mL/hr at 07/23/18 1401, 1 g at 07/23/18 1401   metroNIDAZOLE (FLAGYL) IVPB premix 500 mg, 500 mg, IntraVENous, Q12H, Zackary Lennon MD, Last Rate: 100 mL/hr at 07/23/18 1241, 500 mg at 07/23/18 1241    acetaminophen (TYLENOL) solution 650 mg, 650 mg, Per G Tube, Q4H PRN, Zackary Lennon MD, 650 mg at 07/22/18 2108    ondansetron [facility]) injection 4 mg, 4 mg, IntraVENous, Q4H PRN, Zackary Lennon MD, 4 mg at 07/23/18 1400    insulin lispro (HUMALOG) injection, , SubCUTAneous, Q6H, Zackary Lennon MD, 5 Units at 07/23/18 1242    nystatin (MYCOSTATIN) 100,000 unit/mL oral suspension 500,000 Units, 500,000 Units, Oral, QID, Zackary Lennon MD, 500,000 Units at 07/23/18 1743    artificial saliva (MOUTH KOTE) 1 Spray, 1 Spray, Oral, PRN, Zackary Lennon MD    enoxaparin (LOVENOX) injection 30 mg, 30 mg, SubCUTAneous, Q24H, Zackary Lennon MD, 30 mg at 07/22/18 2111    HYDROcodone-acetaminophen (HYCET) 0.5-21.7 mg/mL oral solution 5 mg, 5 mg, Oral, Q6H PRN, Vicente Cannon MD, 5 mg at 07/22/18 2249    tamsulosin (FLOMAX) capsule 0.4 mg, 0.4 mg, Oral, DAILY, Cam Rodriguez MD, 0.4 mg at 07/23/18 0907    atorvastatin (LIPITOR) tablet 40 mg, 40 mg, Per G Tube, DAILY, Destiney Gonzalez MD, 40 mg at 07/23/18 0907    clopidogrel (PLAVIX) tablet 75 mg, 75 mg, PEG Tube, DAILY, Cam Rodriguez MD, 75 mg at 07/23/18 0907    finasteride (PROSCAR) tablet 5 mg, 5 mg, Oral, QHS, Cam Rodriguez MD, 5 mg at 07/22/18 2108    gabapentin (NEURONTIN) 250 mg/5 mL solution 750 mg, 750 mg, Oral, TID, Destiney Gonzalez MD, Stopped at 07/23/18 1600    midodrine (PROAMITINE) tablet 10 mg, 10 mg, Per G Tube, TID WITH MEALS, Cam Rodriguez MD, 10 mg at 07/23/18 1644    pramipexole (MIRAPEX) tablet 0.25 mg, 0.25 mg, Oral, TID, Destiney Gonzalez MD, 0.25 mg at 07/23/18 1644    sodium chloride (NS) flush 5-10 mL, 5-10 mL, IntraVENous, Q8H, Destiney Gonzalez MD, 10 mL at 07/23/18 1400    sodium chloride (NS) flush 5-10 mL, 5-10 mL, IntraVENous, PRN, Cam Rodriguez MD    glucose chewable tablet 16 g, 4 Tab, Oral, PRN, Cam Rodriguez MD    dextrose (D50W) injection syrg 12.5-25 g, 12.5-25 g, IntraVENous, PRN, Cam Rodriguez MD    glucagon (GLUCAGEN) injection 1 mg, 1 mg, IntraMUSCular, PRN, Héctor Tenorio MD    SOCIAL HISTORY:   Social History     Social History    Marital status:      Spouse name: N/A    Number of children: N/A    Years of education: N/A     Occupational History    Retired       He was a stained      Social History Main Topics    Smoking status: Never Smoker    Smokeless tobacco: Never Used    Alcohol use No    Drug use: No    Sexual activity: Yes     Partners: Female     Other Topics Concern    Not on file     Social History Narrative       FAMILY HISTORY:  Family History   Problem Relation Age of Onset    Heart Disease Father       at age 52 from CAD    Colon Cancer Mother     Cancer Mother      colon ca    Heart Disease Brother        REVIEW OF SYSTEMS: Complete ROS assessed and noted for that which is described above, all else are negative.   Eyes: normal  ENT: normal  CVS: normal  Resp: normal  GI: normal  : normal  GYN: normal  Endocrine: normal  Integument: normal  Musculoskeletal: normal  Neuro: normal  Psych: normal      PHYSICAL EXAMINATION:    VITAL SIGNS:  Visit Vitals    /72 (BP 1 Location: Left arm, BP Patient Position: Supine)    Pulse 88    Temp 97.7 °F (36.5 °C)    Resp 18    Ht 5' 7\" (1.702 m)    Wt 165 lb 12.6 oz (75.2 kg)    SpO2 94%    BMI 25.97 kg/m2     GENERAL: NCAT, laying comfortably, NAD  EYES: EOMI, non-icteric, no proptosis  Ear/Nose/Throat: NCAT, no inflammation, no masses  LYMPH NODES: No LAD  CARDIOVASCULAR: no JVD, No edema, normal peripheral perfusion  RESPIRATORY: no cyanosis, normal chest expansion, comfortable respirations  GASTROINTESTINAL: soft NT, ND,  MUSCULOSKELETAL: Normal ROM, no atrophy  SKIN: warm, no edema/rash/ or other skin changes  NEUROLOGIC: 5/5 power all extremities, no tremors, AAOx3  PSYCHIATRIC: Normal affect, Normal insight and judgement    REVIEW OF LABORATORY AND RADIOLOGY DATA:   Labs and documentation have been reviewed as described above. ASSESSMENT AND PLAN:   Rhonda Henry is a 80 y.o. male with a PMHx as noted below who was admitted to the hospital on 7/21/2018  7:08 PM with a diagnosis of Weakness; Weakness. Endocrinology was consulted for the evaluation of diabetes control with change in tube feed regimen. Diagnoses:  Type 2 diabetes, uncontrolled    Assessment:  80 M with uncontrolled type 2 diabetes, admitted with hypotension, with planned change in tube feeds from 3 times daily to 5 times daily, requiring change in insulin regimen. A1c last month 8.6%. Plan for Isosource 1.5, 1 can (250ml) q 3 hrs during daytime. Plan:  Continue Lantus 14 units once daily  Change to Regular (R) insulin  Trial of the following which will need to be adjusted as needed. 8AM Start of TF #1 daily, Regular insulin 12 units  2PM Start of TF #3 daily, Regular insulin 12 units  8PM Start of TF #5 daily, Regular insulin 5 units    HOLDING TUBE FEED CAN RESULT IN HYPOGLYCEMIA IF INSULIN IS ADMINISTERED,  PLEASE ADVISE IF THERE IS ANY CHANGE TO TF REGIMEN AS DOSING WILL NEED TO BE ADJUSTED,    If the above dose not prove to be a reasonable regimen, we will proceed to change to a rapid acting insulin 5 times per day, which is not as convenient to the patient, however may be more suitable if that is the case. Patient will need to be discharged on the new current regimen as of the time of discharge, assuming he will continue the same TF regimen at home. Thank you, will continue to follow along,    Antony Chong.  [address] [facility]

## 2018-07-23 NOTE — PROGRESS NOTES
Music Therapy Assessment    Ashwin Arora [id number]  [id number]    1935  80 y.o.  male    Patient Telephone Number: [phone] (home)   Congregational Affiliation: [facility]   Language: English   Extended Emergency Contact Information  Primary Emergency Contact: Veronica Roque, [address] [facility] Phone: [phone]  Mobile Phone: [phone]  Relation: Spouse  Secondary Emergency Contact: Helen Escoto Phone: [phone]  Relation: Son   Patient Active Problem List    Diagnosis Date Noted    Myalgia 07/19/2018    Pneumonia due to group B Streptococcus ([address] 07/19/2018    Counseling regarding goals of care 07/19/2018    Parkinson's disease (tremor, stiffness, slow motion, unstable posture) ([address] 07/09/2018    Physical deconditioning 06/28/2018    Tremors of nervous system 06/28/2018    Type 2 diabetes with nephropathy ([address] 05/07/2018    Diabetic peripheral neuropathy associated with type 2 diabetes mellitus ([address] 05/07/2018    Benign essential tremor syndrome 05/07/2018    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    Wrist pain, acute, right 05/07/2018    Thrombotic stroke involving left middle cerebral artery ([address] 02/02/2017    Stenosis of both internal carotid arteries 02/02/2017    Hemiplegia and hemiparesis following cerebral infarction affecting right dominant side ([address] 02/02/2017    Weakness 02/01/2017    Stroke ([address] 02/01/2017    Aspiration pneumonia ([address] 11/28/2016    History of stroke 07/30/2016     Class: Present on Admission    Polyneuropathy associated with underlying disease ([address] 02/11/2016    Peripheral neuropathy ([address] 01/09/2016    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 ([address] 01/08/2016    Percutaneous endoscopic gastrostomy status ([address] 12/04/2014    Antiplatelet or antithrombotic long-term use 12/04/2014    Heart failure ([address] 09/11/2014  Esophageal motility disorder 07/02/2013    CAD (coronary artery disease) 02/12/2013    Status post implantation of automatic cardioverter/defibrillator (AICD) 02/12/2013     Class: Present on Admission    Aortic stenosis, mild 02/12/2013    S/P PTCA (percutaneous transluminal coronary angioplasty) 01/18/2013    Non-cardiac chest pain 01/04/2013    Feeding difficulties 05/02/2011    Abnormal cardiovascular stress test 06/24/2010    S/P CABG x 3 06/24/2010    Atherosclerotic cerebrovascular disease 06/24/2010    Orthostatic hypotension 06/24/2010    Dysphagia 05/03/2010        Date: 7/23/2018       Mental Status:   [  ] Alert [  ] Evalina Nutley [  ]  Confused  [  ] Minimally responsive-N/A: Please see Session Observations below. Communication Status: [  ] Impaired Speech [  ] Nonverbal -N/A    Physical Status:   [  ] Oxygen in use  [  ] Hard of Hearing [  ] Vision Impaired  [  ] Ambulatory  [  ] Ambulatory with assistance [  ] Non-ambulatory -N/A    Music Preferences, Background: N/A: Please see Session Observations below. Clinical Problem addressed: N/A: Please see Session Observations below.     Goal(s) met in session: N/A: \"                                       \"  Physical/Pain management (Scale of 1-10):    Pre-session rating ___________    Post-session rating __________   [  ] Increased relaxation   [  ] Regulated breathing patterns  [  ] Decreased muscle tension   [  ] Minimized physical distress     Emotional/Psychological:  [  ] Increased self-expression   [  ] Decreased aggressive behavior   [  ] Decreased sadness   [  ] Discussed healthy coping skills     [  ] Improved mood    [  ] Decreased withdrawn behavior     Social:  [  ] Decreased feelings of isolation/loneliness [  ] Positive social interaction   [  ] Provided support and/or comfort for family/friends    Spiritual:  [  ] Spiritual support    [  ] Expressed peace  [  ] Expressed jordyn    [  ] Discussed beliefs    Techniques Utilized (Check all that apply): N/A: Please see Session Observations below. [  ] Procedural support MT [  ] Music for relaxation [  ] Patient preferred music  [  ] Fabi analysis  [  ] Song choice  [  ] Music for validation  [  ] Entrainment  [  ] Progressive muscle relax. [  ] Guided visualization  [  ] Alvy Staple  [  ] Patient instrument playing [  ] Vivica Leaven writing  [  ] Dionne Plank along   [  ] Tory Sales  [  ] Sensory stimulation  [  ] Active Listening  [  ] Music for spiritual support [  ] Making of CDs as gifts    Session Observations:  Referral from Marlen Jensen, Palliative NP. This music therapist (MT) attempted to offer music therapy to the patient (pt). One of the pt's doctors was speaking with him and MT didn't want to interrupt. Will follow as able.     Oralia Ortiz MT-BC (Music Therapist-Board Certified)  Spiritual Care Department  Referral-based service

## 2018-07-23 NOTE — CONSULTS
Palliative Medicine Consult  [place]: [phone] ([phone])    Patient Name: Kristyn Sotelo  YOB: 1935    Date of Initial Consult: 7/23/18  Reason for Consult: extremely frail pt with multiple medical problems; overwhelming problems  Requesting Provider: Dinesh Charles MD   Primary Care Physician: Lilly Bailey MD     SUMMARY:   Kristyn Sotelo is a 80 y.o. 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 Current medical issues leading to Palliative Medicine involvement include: CXR on admission shows improved lung aeration with persistent left lung base opacity. PSYCHOSOCIAL:  Lives with wife. At baseline is independent with ADLs and IADLs except driving. Uses RW and feeding tube. Wife is scheduled for hip surgery next week, family plans to assist pt 24/7. PALLIATIVE DIAGNOSES:   1. Weakness and fatigue (secondary to hypotension)  2. Myalgias (legs)  3. Depression   4. Chronic Dysphagia   5. Acute renal failure 30/2.63  6. Elevated troponin 0.18  7. Lactic acidosis 2.1  8. C systolic HF  9. CAD  10. Moderate to severe AoS    11. Care decisions   PLAN:   1. Met with pt and wife, obtained history, discussed reason for readmission, pt's overall medical condition and goals of care. Pt is having difficulty dealing with his medical issues and decline, does not understand why we cannot fix him now,ie, both he and his wife tell me that that his cardiologist told them that he can fix pt's aortic valve, which will make him feel better and give him more energy, but he has to wait until the valve is in worse condition, and that he will reassess this in Sept. \"Why do I have to wait until I'm worse, then he won't be able to fix it because I'll be too sick. \"  They also voiced frustration because it is their understanding that GI can possibly fix his mucus/dysphagia problems but he needs to be off the Plavix for 5 days, that this was discussed last admission but no one is doing anything. They are unhappy that Palliative SW spoke to them about Hospice, stating that as long as their are \"things we can do to help him\", ie, cardiac and GI procedures, \"we are not going to give up until there is nothing left to try. \"  He complains that he was discharged too soon last week. 2. Provided supportive listening, reinforced that Palliative SW was not proposing pt transition to hospice, rather was just talking about hospice option when pt is at end of life. 3. Will continue psychosocial support during this admission. 4. Initial consult note routed to primary continuity provider  5. Communicated plan of care with: Palliative IDT, RN,      GOALS OF CARE / TREATMENT PREFERENCES:     GOALS OF CARE:  Patient/Health Care Proxy Stated Goals: Prolong life      TREATMENT PREFERENCES:   Code Status: Full Code    Advance Care Planning:  Advance Care Planning 7/23/2018   Patient's Healthcare Decision Maker is: -   Primary Decision Maker Name Mil Lynch   Primary Decision Maker Phone Number [phone]   Primary Decision Maker Relationship to Patient Spouse   Confirm Advance Directive Yes, not on file   Patient Would Like to Complete Advance Directive No   Does the patient have other document types -       Medical Interventions: Full interventions   Other Instructions: Other:    As far as possible, the palliative care team has discussed with patient / health care proxy about goals of care / treatment preferences for patient.            HISTORY:     History obtained from: chart, pt, wife    CHIEF COMPLAINT: hypotension     HPI/SUBJECTIVE:    The patient is:   [x] Verbal and participatory  [] Non-participatory due to:     BIBA with c/o low BP since the morning and generalized weakness that started around 1700 on day of admission. Pt had been discharged from the hospital the day before this admission after treatment for aspiration PNA. Pt's diet was recently changed from 3 meals a day to 5 meals a day so that he is getting 1 can every 3 hours with 5 oz of water. Wife reports this was working well until the 5pm feeding, at which time he had a high residual so she didn't feed him. Pt decided to ambulate to try to stimulate his gut \"to move\" the feeding, shortly after which he became light headed. Wife checked his BP and found it to be very low, SBP in the low 70s, and he became mentally altered. Wife called EMS, who recommended transfer to ED as his BP was still too low. Pt is c/o pain in his right medial ankle for days, which is TTP just above the medial maleoli. Does not hurt to ambulate or bare weight, but noticed it was swollen and bluish in color below the medial maleoli.         Clinical Pain Assessment (nonverbal scale for severity on nonverbal patients):   Clinical Pain Assessment  Severity: 5  Location: right ankle  Character: sharp     Activity (Movement): Lying quietly, normal position    Duration: for how long has pt been experiencing pain (e.g., 2 days, 1 month, years)  Frequency: how often pain is an issue (e.g., several times per day, once every few days, constant)     FUNCTIONAL ASSESSMENT:     Palliative Performance Scale (PPS):  PPS: 30       PSYCHOSOCIAL/SPIRITUAL SCREENING:     Palliative IDT has assessed this patient for cultural preferences / practices and a referral made as appropriate to needs (Cultural Services, Patient Advocacy, Ethics, etc.)    Advance Care Planning:  Advance Care Planning 7/23/2018   Patient's Healthcare Decision Maker is: -   Primary Decision Maker Name Sriram Boo   Primary Decision Maker Phone Number [phone]   Primary Decision Maker Relationship to Patient Spouse   Confirm Advance Directive Yes, not on file   Patient Would Like to Complete Advance Directive No   Does the patient have other document types -       Any spiritual / Anabaptist concerns:  [] Yes /  [x] No    Caregiver Burnout:  [] Yes /  [x] No /  [] No Caregiver Present      Anticipatory grief assessment:   [x] Normal  / [] Maladaptive       ESAS Anxiety: Anxiety: 3    ESAS Depression: Depression: 5        REVIEW OF SYSTEMS:     Positive and pertinent negative findings in ROS are noted above in HPI. The following systems were [x] reviewed / [] unable to be reviewed as noted in HPI  Other findings are noted below. Systems: constitutional, ears/nose/mouth/throat, respiratory, gastrointestinal, genitourinary, musculoskeletal, integumentary, neurologic, psychiatric, endocrine. Positive findings noted below. Modified ESAS Completed by: provider   Fatigue: 8 Drowsiness: 3   Depression: 5 Pain: 5   Anxiety: 3 Nausea: 0     Dyspnea: 0     Constipation: No     Stool Occurrence(s): 1        PHYSICAL EXAM:     From RN flowsheet:  Wt Readings from Last 3 Encounters:   07/23/18 165 lb 12.6 oz (75.2 kg)   07/20/18 163 lb 5.8 oz (74.1 kg)   07/09/18 174 lb (78.9 kg)     Blood pressure 126/72, pulse 88, temperature 97.7 °F (36.5 °C), resp. rate 18, height 5' 7\" (1.702 m), weight 165 lb 12.6 oz (75.2 kg), SpO2 94 %.     Pain Scale 1: Numeric (0 - 10)  Pain Intensity 1: 0     Pain Location 1: Back  Pain Orientation 1: Posterior  Pain Description 1: Aching  Pain Intervention(s) 1: Medication (see MAR)  Last bowel movement, if known:     Constitutional: elderly, frail, awake, alert, oriented  Eyes: pupils equal, anicteric  ENMT: no nasal discharge, moist mucous membranes  Cardiovascular: regular rhythm, distal pulses intact, loud murmur  Respiratory: breathing not labored, symmetric  Gastrointestinal: soft non-tender, +bowel sounds  Musculoskeletal: no deformity, right ankle TTP  Skin: warm, dry  Neurologic: following commands, moving all extremities  Psychiatric: irritable affect, no hallucinations          HISTORY:     Active Problems:    Weakness (2/1/2017)      Past Medical History:   Diagnosis Date    Antiplatelet or antithrombotic long-term use 12/4/2014    Anxiety disorder     Arrhythmia 2009    bradycardia    Arthritis     CAD (coronary artery disease)     s/p CABG 2002; Dr Lobo Rolon [facility]) 1996    tongue/throat cancer s/p surgery / radiation and 1 dose of chemo    Carotid artery stenosis     s/p bilateral stents    Chronic pain     left leg, lower back,     Depression     Diabetes ([facility]     Type II    Esophageal dysmotility     s/p dilitation    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 I suspect the latter. Achalasia  is not present.         GERD (gastroesophageal reflux disease)     Heart failure ([facility] 10/2014     Cardiomyopathy:Pacemaker upgrade:Biv and AICD  Dr. Isaias Georges heart Dr. last visit 5/11/2015    Hepatitis C Dx 1996    treated at [facility] in past; as of 4/15/15 wife states pt currently not under any treatment    Hyperlipidemia     Hypertension     Myocardial infarct [facility]) 2013    Heart Cath: 40% LV EF, Stented distal LAD, patent Graft to circumflex    On tube feeding diet approx 2009    still has as of 9/28/15  (no po food/liquid/meds at all); Dr Antony Seip Other ill-defined conditions(799.89) 1996    1 dose of chemotherapy/radiation for tongue cancer    Other ill-defined conditions(799.89)     BPH    Other ill-defined conditions(799.89)     orthostatic hypotension    Pneumonia ~ April -May 2010    Stroke [facility]) approx 2003    left side-left finger tips numb; no imbalance or memory loss; as of 2015 not seeing neuro MD    Suicidal thoughts       Past Surgical History:   Procedure Laterality Date    ABDOMEN SURGERY [address]    peg tube    CABG, ARTERY-VEIN, THREE  2000    HX CATARACT REMOVAL      bilateral    HX CHOLECYSTECTOMY      HX HEART CATHETERIZATION      Stented distal LAD    HX MOHS PROCEDURES      bilateral    HX ORTHOPAEDIC      back surgery times two    HX OTHER SURGICAL      Radical Left Neck    HX OTHER SURGICAL      NASAL POLYPS REMOVAL    HX OTHER SURGICAL      TURP    HX PACEMAKER      HX PACEMAKER  10/28/14     Defibrillator: [facility] # [id number], serial # [id number]; Dr. Suann Frankel [phone]; Dr Piyush Oviedo      cystoscopy    NEUROLOGICAL PROCEDURE UNLISTED      cevical surgery    ME CHANGE GASTROSTOMY TUBE  2011         ME CHANGE GASTROSTOMY TUBE  2011         ME EGD INSERT GUIDE WIRE DILATOR PASSAGE ESOPHAGUS  2010         ME EGD TRANSORAL BIOPSY SINGLE/MULTIPLE  2010         STOMACH SURGERY PROCEDURE UNLISTED  2011         VASCULAR SURGERY PROCEDURE UNLIST      bilateral carotid stents      Family History   Problem Relation Age of Onset    Heart Disease Father       at age 52 from CAD    Colon Cancer Mother     Cancer Mother      colon ca    Heart Disease Brother       History reviewed, no pertinent family history.   Social History   Substance Use Topics    Smoking status: Never Smoker    Smokeless tobacco: Never Used    Alcohol use No     Allergies   Allergen Reactions    Demerol [Meperidine] Shortness of Breath    Paxil [Paroxetine Hcl] Unknown (comments)     Pt gets shaky and loses control of legs    Amoxicillin Rash    Cleocin [Clindamycin Hcl] Rash    Pcn [Penicillins] Rash      Current Facility-Administered Medications   Medication Dose Route Frequency    aspirin chewable tablet 81 mg  81 mg Oral DAILY    guaiFENesin (ROBITUSSIN) 100 mg/5 mL oral liquid 300 mg  300 mg Oral TID    famotidine (PEPCID) tablet 20 mg  20 mg Per G Tube DAILY    insulin glargine (LANTUS) injection 14 Units  14 Units SubCUTAneous DAILY    cefTRIAXone (ROCEPHIN) 1 g in 0.9% sodium chloride (MBP/ADV) 50 mL  1 g IntraVENous Q24H    metroNIDAZOLE (FLAGYL) IVPB premix 500 mg  500 mg IntraVENous Q12H    acetaminophen (TYLENOL) solution 650 mg  650 mg Per G Tube Q4H PRN    ondansetron (ZOFRAN) injection 4 mg  4 mg IntraVENous Q4H PRN    insulin lispro (HUMALOG) injection   SubCUTAneous Q6H    nystatin (MYCOSTATIN) 100,000 unit/mL oral suspension 500,000 Units  500,000 Units Oral QID    artificial saliva (MOUTH KOTE) 1 Spray  1 Spray Oral PRN    enoxaparin (LOVENOX) injection 30 mg  30 mg SubCUTAneous Q24H    HYDROcodone-acetaminophen (HYCET) 0.5-21.7 mg/mL oral solution 5 mg  5 mg Oral Q6H PRN    tamsulosin (FLOMAX) capsule 0.4 mg  0.4 mg Oral DAILY    atorvastatin (LIPITOR) tablet 40 mg  40 mg Per G Tube DAILY    clopidogrel (PLAVIX) tablet 75 mg  75 mg PEG Tube DAILY    finasteride (PROSCAR) tablet 5 mg  5 mg Oral QHS    gabapentin (NEURONTIN) 250 mg/5 mL solution 750 mg  750 mg Oral TID    midodrine (PROAMITINE) tablet 10 mg  10 mg Per G Tube TID WITH MEALS    pramipexole (MIRAPEX) tablet 0.25 mg  0.25 mg Oral TID    sodium chloride (NS) flush 5-10 mL  5-10 mL IntraVENous Q8H    sodium chloride (NS) flush 5-10 mL  5-10 mL IntraVENous PRN    glucose chewable tablet 16 g  4 Tab Oral PRN    dextrose (D50W) injection syrg 12.5-25 g  12.5-25 g IntraVENous PRN    glucagon (GLUCAGEN) injection 1 mg  1 mg IntraMUSCular PRN          LAB AND IMAGING FINDINGS:     Lab Results   Component Value Date/Time    WBC 3.9 (L) 07/23/2018 05:36 AM    HGB 10.9 (L) 07/23/2018 05:36 AM    PLATELET 58 (L) 68/07/9891 05:36 AM     Lab Results   Component Value Date/Time    Sodium 148 (H) 07/23/2018 05:36 AM    Potassium 3.9 07/23/2018 05:36 AM    Chloride 111 (H) 07/23/2018 05:36 AM    CO2 33 (H) 07/23/2018 05:36 AM    BUN 19 07/23/2018 05:36 AM    Creatinine 1.00 07/23/2018 05:36 AM    Calcium 8.7 07/23/2018 05:36 AM    Magnesium 2.6 (H) 07/23/2018 05:36 AM    Phosphorus 2.5 (L) 07/23/2018 05:36 AM      Lab Results   Component Value Date/Time AST (SGOT) 57 (H) 07/21/2018 07:21 PM    Alk. phosphatase 83 07/21/2018 07:21 PM    Protein, total 7.1 07/21/2018 07:21 PM    Albumin 2.9 (L) 07/21/2018 07:21 PM    Globulin 4.2 (H) 07/21/2018 07:21 PM     Lab Results   Component Value Date/Time    INR 1.1 05/16/2018 02:39 AM    Prothrombin time 10.8 05/16/2018 02:39 AM    aPTT 22.3 05/07/2018 02:12 PM      No results found for: IRON, FE, TIBC, IBCT, PSAT, FERR   No results found for: PH, PCO2, PO2  No components found for: [place]   Lab Results   Component Value Date/Time     05/07/2018 01:06 PM    CK - MB 4.4 (H) 05/07/2018 01:06 PM                Total time: 90  Counseling / coordination time, spent as noted above: 75  > 50% counseling / coordination?: yes    Prolonged service was provided for  []30 min   []75 min in face to face time in the presence of the patient, spent as noted above. Time Start:   Time End:   Note: this can only be billed with 46744 (initial) or 70394 (follow up). If multiple start / stop times, list each separately.

## 2018-07-23 NOTE — PROGRESS NOTES
1500 tube feed is due now, however patient and wife are  meeting with palliative care.  Report given to Monie Link RN, she is aware

## 2018-07-23 NOTE — PROGRESS NOTES
Palliative Medicine  [place] [id number] [phone] (COPE)      Michi Parker is a 80 y.o.  male who \"presents via EMS to the ED with cc of low blood pressure since this morning and generalized weakness that began at 1700 today. Per chart review, pt was discharged yesterday after admission for aspiration PNA. Per wife, she noticed the pt's blood pressure was dropping, he had difficulty hearing, trouble lifting his legs and ambulating around 1700 while he was being fed. Per wife, pt also c/o shoulder pain for which he was given a pain pill. Wife reports she was advised to switch his diet from 3 meals a day to 5 meals a day, in which he is given 1 can of food every 3 hours with 5 oz of water. Wife states she was advised the change in diet due to pt's GERD. Pt denies any CP and SOB. \"      Palliative Medicine was asked to be involved with patient due to Care Decisions. Patient is currently on observation status. He was home for just a day before her returned to the hospital for above. Diagnoses:  Chronic Systolic Heart Failure , EF 40%, Iscehemic Cardiomyopathy, CKD Stage 3, Head and Neck CA (tube feeds X 10 years), Chronic Thrombocytopenia. GOALS OF CARE: Full Restorative Care. \"I'm tired, I don't have energy. I want to get that heart procedure done but I was told that the insurance company won't allow it until September. They call the shots. I can't get good care at home that I get in the hospital, I was home for a day and had to come back\".         TREATMENT PREFERENCES:   Code Status: Full Code    Advance Care Planning:  Advance Care Planning 7/21/2018   Patient's Healthcare Decision Maker is: Named in scanned ACP document   Primary Decision Maker Name Ruben Sutton   Primary Decision Maker Phone Number [phone]   Primary Decision Maker Relationship to Patient Spouse   Confirm Advance Directive None   Patient Would Like to Complete Advance Directive No   Does the patient have other document types -               Family Meeting Documentation    Participants: Patient, Arvin Santa (wife), Delicia Galvan (son), Ulices Maggy, ISAAC. Psychosocial: Patient lives at home with his wife, Salbador Yin (According to patient, Chen Lui has health problems too, she can't be under much stress to take care of me\". Patient shared that he can't do what he used to be able to. He feels that the hospital is sending him home \"too quickly\". He is not entirely realistic about his condition and unable to accept that he is declining and may have to accept a \"new normal\". LCSW shared this with him and family. Patient shared that he met with Dr Jackie Burroughs this morning and indicated that there is a \"procedure\" that could be done but that he would have to wait until 9/18 \"because insurance won't allow it before that\". LCSW discussed with patient and family that being in and out of the hospital is a cycle for patient, that he may be stuck in a hard place as there is no easy solution for his many medical problems and how he can stay home successfully. Patient and wife are not on board with comfort care at this time (hospice/comfort explained as an option for treatment and support). Wife indicated that if the heart procedure gives patient some quality of life, that she would want him to have this opportunity. Patient is also wanting this. Encouraged wife and son to speak to Dr Jackie Burroughs to get more information about his thoughts. Son Adithya Martínez shared that his mother (patient's ex wife) has \"hospice now, she is close to dying\". He understands comfort care and knows that it can be helpful. However he also knows his father is not ready for this yet. LCSW did not discuss code status today due to family and patient being somewhat anxious regarding plan of care at this time. Outcome / Plan: Continue full restorative care.  Wife and patient would benefit from support at home, but unsure how they could have this, other than with home health for a period of time. Discussed above briefly with Dr Damaris Rendon who was rounding.

## 2018-07-23 NOTE — PHYSICIAN ADVISORY
Letter of Status Determination:   Recommend hospitalization status upgraded from   OBSERVATION  to INPATIENT  Status     Pt Name:  Matias Paulino   MR#   [address] [facility] # [id number] /  [id number]   [facility]#  [id number]   [address] [place] [address]  2212/01  @ [facility]   Hospitalization date  7/21/2018  7:08 PM   Current Attending Physician  Carol Gutierrez MD   Principal diagnosis  <principal problem not specified>   Weakness    Clinicals  80 y.o. y.o  male hospitalized with above diagnosis   The pt suffers from complex multiple chronic/subacute conditions. He developed diarrhea, hypotension, Long, weakness. Labs also showed marginally elevated troponin level. Due to appropriate and necessary medical care, this pt's hospitalization has now exceeded two midnights . [place] ([facility]) criteria   Does  NOT apply    STATUS DETERMINATION  This patient is at above high risk of deterioration based on documented presenting clinical data, comorbid conditions, high risk of adverse events and current acute care course. Mr. Matias Paulino now meets Inpatient Admission status criteria in accordance with CMS regulation Section 43 .3. Specifically, due to medical necessity the patient's stay now exceeds Two Midnights. It is our recommendation that this patient's hospitalization status should be upgraded from  OBSERVATION to INPATIENT status.      The final decision of the patient's hospitalization status depends on the attending physician's judgment             Additional comments     Payor: Osvaldo Serrano / Plan: [address] / Product Type: Medicare /         Ari Jade MD MPH FACP     Physician [address]   President Medical Staff, [address] [place] [address] Cell  [phone]        [phone]    .

## 2018-07-23 NOTE — PROGRESS NOTES
Initial Nutrition Assessment:    INTERVENTIONS/RECOMMENDATIONS:   EN:  Continue Isosource 1.5 lo tube feedings - 250 ml 5 times per day. Flush with 150 ml free water before and after each feeding. May bring formula from home as approved by MD. Current regimen provides daily approx. 1875 kcals, 85 g protein, and 1705 ml free water. ASSESSMENT:   7/23:  Chart reviewed; med noted for hypotension and weakness on admission. Pt has a PEG tube and receives Isosource 1.5 lo bolus feedings - 250 ml 5 times per day + 150 ml free water with each bolus. RD discussed regimen at length on recent admission and enouraged smaller volume, more frequent feedings. From 3 bolus feedings per day to 5 per day. Pt prefers to bring formula from home and continue bolus feedings. The pt's wife reports that pt has frequent loose stools. Dr. Mala Hansen has been consulted to evaluate during hospital stay. Will coordinate further nutrition care pending GI eval.    Diet Order: NPO  % Eaten:  No data found. Pertinent Medications: [x]Reviewed []Other  Pertinent Labs: [x]Reviewed []Other: -301  Food Allergies: [x]None []Other   Last BM: 7/22   [x]Active     []Hyperactive  []Hypoactive       [] Absent BS  Skin:    [x] Intact   [] Incision  [] Breakdown  [] Other:    Anthropometrics:   Height: 5' 7\" (170.2 cm) Weight: 77.1 kg (170 lb)   IBW (%IBW):   ( ) UBW (%UBW):   (  %)   Last Weight Metrics:  Weight Loss Metrics 7/21/2018 7/20/2018 7/9/2018 7/7/2018 7/5/2018 6/28/2018 6/23/2018   Today's Wt 170 lb 163 lb 5.8 oz 174 lb 170 lb 171 lb 155 lb 168 lb   BMI 26.63 kg/m2 25.59 kg/m2 27.25 kg/m2 26.63 kg/m2 26.78 kg/m2 24.28 kg/m2 26.31 kg/m2   Some encounter information is confidential and restricted. Go to Review Flowsheets activity to see all data. BMI: Body mass index is 26.63 kg/(m^2).     This BMI is indicative of:   []Underweight    []Normal    [x]Overweight    [] Obesity   [] Extreme Obesity (BMI>40)     Estimated Nutrition Needs (Based on):   3430 Kcals/day (BMR (1424) x 1. 3AF) , 77 g (1.0 g/kg bw) Protein  Carbohydrate: At Least 130 g/day  Fluids: 1800 mL/day (1ml/kcal)    NUTRITION DIAGNOSES:   Problem:  Altered nutrition-related lab values      Etiology: related to current medical condition     Signs/Symptoms: as evidenced by elevated BG levels      NUTRITION INTERVENTIONS:    Enteral/Parenteral Nutrition: Other                GOAL:   Pt will tolerate 100% of bolus feedings while trend BG levels WNL next 2-4 days    LEARNING NEEDS (Diet, Food/Nutrient-Drug Interaction):    [x] None Identified   [] Identified and Education Provided/Documented   [] Identified and Pt declined/was not appropriate     Cultureal, Anabaptist, OR Ethnic Dietary Needs:    [x] None Identified   [] Identified and Addressed     [x] Interdisciplinary Care Plan Reviewed/Documented    [x] Discharge Planning:  Continue Isosource 1.5 lo tube feedings - 250 ml 5 times per day + 150 ml free water with each bolus feeding     MONITORING /EVALUATION:   Food/Nutrient Intake Outcomes:  Total energy intake, Enteral/parenteral nutrition intake  Physical Signs/Symptoms Outcomes: Weight/weight change, GI profile, Glucose profile, Electrolyte and renal profile    NUTRITION RISK:    [x] Patient At Nutritional Risk    [] High              [x] Moderate/Mild           []  Low     [] Patient Not At Nutritional Risk    PT SEEN FOR:    [x]  MD Consult: []Calorie Count      []Diabetic Diet Education        []Diet Education     []Electrolyte Management     []General Nutrition Management and Supplements     [x]Management of Tube Feeding     []TPN Recommendations    [x]  RN Referral:  [x]MST score >=2     []Enteral/Parenteral Nutrition PTA     []Pregnant: Gestational DM or Multigestation     []Pressure Ulcer/Wound Care needs        []  Low BMI  []  ABBEY Velez, [address]  Pager [phone]  Weekend Pager [phone]

## 2018-07-23 NOTE — PROGRESS NOTES
Hospitalist Progress Note NAME: Cristina Knight :  1935 MRN:  [id number] Assessment / Plan: Hypotension POA 
-episode of hypotension at home which resolved quickly. BP stable since admission. No signs of overwhelming infection at this time. + hypotensive on last admission  
-cardiology consult appreciated - likely due to hemodynamical changes due to AS LIZ  
-difficult to tell true baseline , mostly 1.2-1.3 but last admission also presented with elevated cr at 1.82. This admission 1.63--> 1.47--> 1.00 Very frail and easily going from LIZ to fluid overload, difficult manage due to h/o severe AS  
-s/p very gentle hydration at 25 cc , stopped   
-restart lasix at lower dose then previously in am with close watch  
-Avoid nephrotoxic drugs, adjust all meds to GFR. Diarrhea  
-+ had constipation last week treated with laxatives. Also, h/o diarrhea with TF in the hospital. Pt/wife didn't informed me that he had diarrhea prior to dc.  
-abd was tender yesterday , much better today Had only 1 loose stool today 
-follow c.diff. KUB per GI. Lactic acidosis  
-no leukocytosis or fever. Unclear significance at this time Doubt that elevated due to sepsis   
-resolved Recovering from Group B streptococcal bacteremia POA due to Possible aspiration pneumonia POA  
- BC with Grp B strep on admission, repeated BC ,17 - NTD  
-AB: he was DC home on cefdinir + flagyl x 7 days ( to complete 14 days A Rx) --> will c/w CTX + flagyl as last admission ( last dose AB ) -cxray on admission: Improved lung aeration with persistent left lung base opacity. 
-last admission w/u for bacteremia: 
Recent increased back pain, had CT lumbar spine with chronic appearing DDD 
ECHO wo vegetation  
   
Chronic dysphagia 
-NPO at home with TF  IsoSource 1.5 ( 5 cans) Last admission changed to 5 cans a day every 3 hr; per wife tolerated first 3 feedings ok but then had residual  
Wife also considering changing to pump and continuous feeding if above will not work ( orders already was send to [address] last admission just in case) Dietary following - Followed Dr Gail Strong OP for dysphasia, was considering EGD with possible attempt of dilatation. Cardiology clearance was pending ( per wife primary cardiology Dr Saira armando) Also his PEG got recently dislodged and required to be changed in ED. Not has short PEG. Dr Wilbert Hutchison was planning to change it OP back to long.  
-seen by Dr Dewayne Brar last admission:  no EGD was planned due to plavix/low plt Unable to find appropriate size of the PEG in the hospital to change it, so recommended to follow OP 
-seen by Dr Gail Strong, help as greatly appreciated: OP EGD and possible dilatation next week , will need to be off Plavix for 5 days. Will change PEG if find the right size.  
  
Chronic systolic HF EF 08% / mod to severe AS    
Chronic orthostatic hypotension  
-no signs of fluid overload. HR/BP stable now Mild elevated troponin - not ischemic   
-lasix was on hold since admission due to LIZ --> stopping IVF. Restart lasix in am 
PTA: pt was taking 40 mg in am and 20 mg in afternoon. He is very frail / very thin euvolemic line --> will start with lasix 20 mg daily + daily weight management Very frail and easily going from LIZ /dehydration vs fluid overload Close fluid status monitoring with daily weight  
-cont on home aspirin/plavix. Lipitor.  
-continue midodrine - Dr Jackie Burroughs help was greatly appreciated: intermittent hypotension is likely due to AS. Hemodynamically he is preload dependent and thus would avoid any thing that drops preload (i.e. Nitrates). Following echo every couple of months. Once AS become critical he may qualify for repair. ( pt/wife is very hopefull for that) -ECHO: EF 40%, mild MR, mod to sev AS, mild TR  
   
DM type II  
--200  
-cont lantus + SS - adjust as needed  
-wife asking if primary endocrinology can review BSs while pt is here and advise on insulin regiment since his TF regiment was changed. Consulting Dr Mina Sadler in am  
 
Oral candidiasis 
-oral nystatin and artificial  saliva    
 
R foot pain/swelling 
-for last 2-3 weeks per wife 
-follow xray  
   
Chronic thrombocytopenia POA stable , followed with Dr Alannah Godinez   
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 
   
 
   
   
   
Code status: Full Prophylaxis: lovenox ok with reduced dose and close watch of plt Baseline: extremely frail Recommended Disposition:wife will not have surgery tomorrow ( it was postponed) , so pt/wife wishes to be dc home with [facility] when ready Pt is overall is extremely frail with multiple medical problems, very high risk for re admissions. Palliative care team help was greatly appreciated. Pt c/o very poor QOL but in the same time not very realistic about his personal norm with having all medical issues. DC home 1-3 days ( pending c.diff/ resolving abd tenderness/stable HF and Cr) Subjective: Chief Complaint / Reason for Physician Visit: following hypotension/ LIZ Diarrhea: had only one loose stool since admission Now c/o R foot swollen/discoloration x couple of weeks but only mentioned it today. No trauma Both feet are cold to touch Discussed with RN events overnight. Review of Systems: 
Symptom Y/N Comments  Symptom Y/N Comments Fever/Chills n   Chest Pain Poor Appetite    Edema Cough    Abdominal Pain y Sputum    Joint Pain SOB/KRAMER n   Pruritis/Rash Nausea/vomit n   Tolerating PT/OT Diarrhea n   Tolerating Diet Constipation    Other Could NOT obtain due to:   
 
Objective: VITALS:  
Last 24hrs VS reviewed since prior progress note.  Most recent are: 
Patient Vitals for the past 24 hrs: 
 Temp Pulse Resp BP SpO2  
07/23/18 1525 97.7 °F (36.5 °C) 88 18 126/72 94 %  
07/23/18 1142 98 °F (36.7 °C) 85 20 129/81 94 %  
07/23/18 0723 97.1 °F (36.2 °C) 84 18 138/68 99 % 07/23/18 0418 97.3 °F (36.3 °C) 63 20 137/69 96 %  
07/22/18 2220 97.7 °F (36.5 °C) 95 20 (!) 156/99 98 %  
07/22/18 1850 97.7 °F (36.5 °C) 85 20 120/47 97 % Intake/Output Summary (Last 24 hours) at 07/23/18 1644 Last data filed at 07/23/18 1120 Gross per 24 hour Intake          2096.25 ml Output                0 ml Net          2096.25 ml PHYSICAL EXAM: 
General: WD, WN. Alert, cooperative, no acute distress. Thin/debilitated/extremily frail    
EENT:  EOMI. Anicteric sclerae. + dry MM. + oral thrush Resp:  CTA bilaterally, no wheezing or rales. No accessory muscle use CV:  Regular  rhythm,  No edema GI:  Soft, Non distended, much less tender today.  +Bowel sounds Neurologic:  Alert and oriented X 3, normal speech, Psych:   Good insight. Not anxious nor agitated Skin:  No rashes. No jaundice Extr:                R foot swollen/ tender on one spot slightly above med malleolus; no calf tenderness Reviewed most current lab test results and cultures  YES Reviewed most current radiology test results   YES Review and summation of old records today    NO Reviewed patient's current orders and MAR    YES 
PMH/SH reviewed - no change compared to H&P 
________________________________________________________________________ Care Plan discussed with: 
  Comments Patient y Family  y Wife at bedside ( 20 minutes spent reviewing clinical status/ available results and plan of care) RN y   
Care Manager y Consultant  lalita Palliative/ GI Multidiciplinary team rounds were held today with , nursing, pharmacist and clinical coordinator. Patient's plan of care was discussed; medications were reviewed and discharge planning was addressed. ________________________________________________________________________ Total NON critical care TIME:  60  Minutes Total CRITICAL CARE TIME Spent:   Minutes non procedure based Comments >50% of visit spent in counseling and coordination of care    
________________________________________________________________________ Jenni Rajan MD  
 
Procedures: see electronic medical records for all procedures/Xrays and details which were not copied into this note but were reviewed prior to creation of Plan. LABS: 
I reviewed today's most current labs and imaging studies. Pertinent labs include: 
Recent Labs  
   07/23/18 0536 07/22/18 0317 07/21/18 1921 WBC  3.9*  4.4  5.9 HGB  10.9*  10.7*  12.4 HCT  34.4*  34.0*  39.8 PLT  58*  62*  68* Recent Labs  
   07/23/18 0536 07/22/18 0317 07/21/18 1921 NA  148*  145  145  
K  3.9  4.0  3.7 CL  111*  107  105 CO2  33*  34*  34* GLU  184*  175*  143* BUN  19  27*  30* CREA  1.00  1.47*  1.63* CA  8.7  8.7  9.4 MG  2.6*   --    --   
PHOS  2.5*   --    --   
ALB   --    --   2.9* TBILI   --    --   0.4 SGOT   --    --   57* ALT   --    --   34 Signed: Jenni Rajan MD

## 2018-07-24 NOTE — PROGRESS NOTES
Bedside shift change report given to Martin Don and Vazquez Sung (oncoming nurse) by Zaria Rabago (offgoing nurse). Report included the following information SBAR, Kardex, Intake/Output, MAR, Recent Results and Cardiac Rhythm Paced.

## 2018-07-24 NOTE — PROGRESS NOTES
Hospitalist Progress Note NAME: Dante Del Valle :  1935 MRN:  [id number] Assessment / Plan: Hypotension POA 
-episode of hypotension at home which resolved quickly. - BP stableovernight 
-cardiology consult appreciated , likely due to hemodynamical changes due to AS  
-Will follow, if stable overnight, then start D/C planning in AM 
 
Acute kidney injury POA 
-difficult to tell true baseline , mostly 1.2-1.3 but last admission also presented with elevated cr at 1.82. This admission 1.63--> 1.47--> 1.00  
-Very frail and easily going from LIZ to fluid overload 
       difficult manage due to h/o severe AS  
-s/p very gentle hydration at 25 cc, now off 
- lower dose lasix -Avoid nephrotoxic drugs, adjust all meds to GFR. Diarrhea  
-+ had constipation last week treated with laxatives. Also, h/o diarrhea with TF in the hospital. Pt/wife didn't informed me that he had diarrhea prior to dc.  
-abd was tender yesterday , much better today 
-No diarrhea overnight, improved 
-C difficile pending 
-follow c.diff. KUB per GI. Lactic acidosis POA  
-no leukocytosis or fever.  
-suspect due to hypovolemia 
-resolved Recent Group B streptococcal bacteremia POA due to Possible aspiration pneumonia POA  
- BC with Grp B strep on admission, repeated BC ,17 - NTD  
-AB: he was DC home on cefdinir + flagyl x 7 days ( to complete 14 days A Rx) --> will c/w CTX + flagyl as last admission ( last dose AB ) -cxray on admission: Improved lung aeration with persistent left lung base opacity. 
-last admission w/u for bacteremia: 
-Recent increased back pain, had CT lumbar spine with chronic appearing DDD 
-ECHO wo vegetation  
   
Chronic dysphagia 
-NPO at home with TF  IsoSource 1.5 ( 5 cans) Last admission changed to 5 cans a day every 3 hr; per wife tolerated first 3 feedings ok but then had residual  
Wife also considering changing to pump and continuous feeding if above will not work  
( orders already was send to [facility] last admission just in case) Dietary following - Followed Dr Michael Leblanc OP for dysphasia, was considering EGD with possible attempt of dilatation. Cardiology clearance was pending ( per wife primary cardiology Dr Luis Angel Zuritaed procedure) Also his PEG got recently dislodged and required to be changed in ED. Not has short PEG. Dr Denis Salomon was planning to change it OP back to long.  
-seen by Dr Fern Schirmer last admission:  no EGD was planned due to plavix/low plt Unable to find appropriate size of the PEG in the hospital to change it, so recommended to follow OP 
-seen by Dr Michael Leblanc, help as greatly appreciated: OP EGD and possible dilatation next week , will need to be off Plavix for 5 days. Will change PEG if find the right size.  
  
Chronic systolic HF EF 88%  
 mod to severe AS POA  
Chronic orthostatic hypotension  
-no signs of fluid overload. HR/BP stable now Mild elevated troponin - not ischemic   
-lasix was on hold since admission due to LIZ --> stopping IVF. Restart lasix in am 
PTA: pt was taking 40 mg in am and 20 mg in afternoon. He is very frail / very thin euvolemic line --> will start with lasix 20 mg daily + daily weight management Very frail and easily going from LIZ /dehydration vs fluid overload Close fluid status monitoring with daily weight  
-cont on home aspirin/plavix. Lipitor.  
-continue midodrine - Dr Vivien Horn help was greatly appreciated: intermittent hypotension is likely due to AS. Hemodynamically he is preload dependent and thus would avoid any thing that drops preload (i.e. Nitrates). Following echo every couple of months. Once AS become critical he may qualify for repair. ( pt/wife is very hopefull for that) -ECHO: EF 40%, mild MR, mod to sev AS, mild TR  
   
DM type II  
-, 248, 153, 177, 137 
-cont lantus + SS - adjust as needed  
-wife asking if primary endocrinology can review BSs while pt is here and advise on insulin regiment since his TF regiment was changed. Consulting Dr Rina Hernandez in am  
 
Oral candidiasis 
-oral nystatin and artificial  saliva    
 
R foot pain/swelling 
-for last 2-3 weeks per wife 
-follow xray  
   
Chronic thrombocytopenia POA stable , followed with Dr Marcelo Deshpande   
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 
   
 
   
   
   
Code status: Full Prophylaxis: lovenox ok with reduced dose and close watch of plt Baseline: extremely frail Recommended Disposition:wife will not have surgery tomorrow ( it was postponed) , so pt/wife wishes to be dc home with [facility] when ready Pt is overall is extremely frail with multiple medical problems, very high risk for re admissions. Palliative care team help was greatly appreciated. Pt c/o very poor QOL but in the same time not very realistic about his personal norm with having all medical issues. DC home 1-3 days ( pending c.diff/ resolving abd tenderness/stable HF and Cr) Subjective: Chief Complaint / Reason for Physician Visit: following hypotension/ LIZ \"No diarrhea overnight\" Tender with bruising right calf Mildly sore in abdomen Discussed with RN events overnight. Review of Systems: 
Symptom Y/N Comments  Symptom Y/N Comments Fever/Chills n   Chest Pain ny   
Poor Appetite    Edema Cough n   Abdominal Pain y less Sputum    Joint Pain SOB/KRAMER n   Pruritis/Rash Nausea/vomit n   Tolerating PT/OT Diarrhea n   Tolerating Diet y Tube feed Constipation    Other Could NOT obtain due to:   
 
Objective: VITALS:  
Last 24hrs VS reviewed since prior progress note.  Most recent are: 
Patient Vitals for the past 24 hrs: 
 Temp Pulse Resp BP SpO2  
07/24/18 0414 96.9 °F (36.1 °C) 74 20 146/71 97 %  
07/23/18 2250 97.6 °F (36.4 °C) 92 20 142/78 95 %  
07/23/18 2100 97.6 °F (36.4 °C) 77 18 156/87 97 %  
07/23/18 1525 97.7 °F (36.5 °C) 88 18 126/72 94 %  
07/23/18 1142 98 °F (36.7 °C) 85 20 129/81 94 % Intake/Output Summary (Last 24 hours) at 07/24/18 5652 Last data filed at 07/24/18 6418 Gross per 24 hour Intake          2006.25 ml Output              500 ml Net          1506.25 ml PHYSICAL EXAM: 
General: WD, WN. Alert, cooperative, no acute distress. Thin/debilitated/extremily frail    
EENT:  EOMI. Anicteric sclerae. Resp:             Crackles at bases bilaterally, no wheezing. No accessory muscle use CV:  Regular  rhythm,  No edema GI:  Soft, Non distended, minimal tenderness +Bowel sounds Neurologic:  Alert and oriented X 3, normal speech, Psych:   Good insight. Not anxious nor agitated Skin:  No rashes. No jaundice Extr:                R foot swollen/ tender on one spot slightly above med malleolus; posiitve calf tenderness Reviewed most current lab test results and cultures  YES Reviewed most current radiology test results   YES Review and summation of old records today    NO Reviewed patient's current orders and MAR    YES 
PMH/ reviewed - no change compared to H&P 
________________________________________________________________________ Care Plan discussed with: 
  Comments Patient y Family RN y   
Care Manager Consultant Multidiciplinary team rounds were held today with , nursing, pharmacist and clinical coordinator. Patient's plan of care was discussed; medications were reviewed and discharge planning was addressed. ________________________________________________________________________ Total NON critical care TIME:  25  Minutes Total CRITICAL CARE TIME Spent:   Minutes non procedure based Comments >50% of visit spent in counseling and coordination of care    
________________________________________________________________________ Manisha Ashley MD  
 
Procedures: see electronic medical records for all procedures/Xrays and details which were not copied into this note but were reviewed prior to creation of Plan. LABS: 
I reviewed today's most current labs and imaging studies. Pertinent labs include: 
Recent Labs  
   07/24/18 0314 07/23/18 0536 07/22/18 2074 WBC  4.6  3.9*  4.4 HGB  11.4*  10.9*  10.7* HCT  37.0  34.4*  34.0*  
PLT  63*  58*  62* Recent Labs  
   07/24/18 0314 07/23/18 0536 07/22/18 0317 07/21/18 
 1921 NA  143  148*  145  145  
K  4.0  3.9  4.0  3.7 CL  110*  111*  107  105 CO2  29  33*  34*  34* GLU  173*  184*  175*  143* BUN  17  19  27*  30* CREA  1.03  1.00  1.47*  1.63* CA  9.0  8.7  8.7  9.4 MG   --   2.6*   --    --   
PHOS   --   2.5*   --    --   
ALB   --    --    --   2.9* TBILI   --    --    --   0.4 SGOT   --    --    --   57* ALT   --    --    --   34 Signed: Joe Salgado MD

## 2018-07-24 NOTE — PROGRESS NOTES
0700: Bedside report received from [address] [place] [address].    [id number]: Pt off the floor for doppler and xray. First tube feeding missed. 1030: Pt arrived back from xray and doppler. TF given. 1520: Pts BP 88/60 at rest sitting up in chair. Pt received morning lasix. Pt asymptomatic. Dr. Josselin Long notified. 1530: Pts /57 when retaken. Pt asymptomatic.     9438: Pts BP retaken while supine 124/55.     1713: Gastric Residual at 132 mL. Pt requested to hold TF until 1800.    1740: Pt requested hemorrhoid cream. MD pages 2X. 1815: Gastric Residual 120 mL. Pt requested to hold TF for 30 minutes. 1834: Dr. Josslein Long made aware of earlier orthostatics. Medication changes pending. Made aware of patient requesting preparation H. Dr. Josselin Long also made aware of pt request to hold TF. No new orders at this time. 1855: Gastric residual 36 mL. TF given. 1918: Bedside report given to [facility] AT THE [facility], [address] [place] [address].

## 2018-07-24 NOTE — ROUTINE PROCESS
Bedside report provided by Enedelia Carlin RN. SBAR report reviewed. Family at bedside. Noted adjustments needed for administration of tube feedings and insulin as off schedule for feeding regimen.

## 2018-07-24 NOTE — PROGRESS NOTES
1100  Cardiopulmonary Care Interdisciplinary rounds were held today to discuss patient's plan of care and outcomes. The following members were present: NP/Physician, Pharmacy, Nursing and Case Management.     Expected Length of Stay:  3d 7h    Plan of Care: Continue current treatment plan

## 2018-07-24 NOTE — PROGRESS NOTES
Reason for Readmission:     weakness         RRAT Score and Risk Level:   42       Level of Readmission:    high      Care Conference scheduled:   Pt discussed with care team during IDR. Resources/supports as identified by patient/family:  Pt's wife. Top Challenges facing patient (as identified by patient/family and CM): Finances/Medication cost?  No issues reported at this time     Transportation     No issues reported at this time. Support system or lack thereof? No issues reported at this time. Living arrangements? No issues reported at this time. Pt lives with wife        Self-care/ADLs/Cognition? Pt is independent in managing self care and ADLs  Not including driving. Current Advanced Directive/Advance Care Plan:  Not on file at this time. Plan for utilizing home health: At last admission, Pt was discharged with [facility]. Likelihood of additional readmission:   high             Transition of Care Plan:    Based on readmission, the patient's previous Plan of Care   has been evaluated and/or modified. The current Transition of Care Plan is:     Pt will likely discharge home with wife with [facility] and follow-ups. Pt is a readmission. Pt was last admitted on 7/14/18 for  Aspiration pneumonia. Pt was recently admitted on 7/23/18 for weakness. Pt has access to a rolling walker at home. Pt's wife is a primary support and provides transportation for Pt. Pt has received services from [facility] and [facility] [place] in the past and did not have pleasant experiences. Care Management Interventions  PCP Verified by CM: Yes  Mode of Transport at Discharge:  Other (see comment) (wife private vehicle)  Transition of Care Consult (CM Consult): Discharge Planning  Discharge Durable Medical Equipment: No  Physical Therapy Consult: No  Occupational Therapy Consult: No  Current Support Network: Lives with Spouse, Own Home (Wife)  Confirm Follow Up Transport: Family (wife)  Discharge Location  Discharge Placement: Home with home health      Jalen Winkler, [facility], [address] [place] [address]  [phone]

## 2018-07-24 NOTE — PROGRESS NOTES
F/U for PEG tube issues S: Mr. Sid Arroyo was seen by me today during rounds. At this time, he is resting comfortably. The patient has no new complaints today. Please see admission consult for details of ROS; there are no changes today. We have a 22Fr replacement PEG tube in the office but it is the same length as the one he has now. We discussed replacing the PEG tube in the endoscopy suite so we could cut the tube longer, to the desired length. The downside is that the bumper is not deflatable and would lead to some discomfort the next time he needs it replaced in the office. Patient and wife decided to keep his current PEG tube. He has some mild discomfort in lower abd and bloating this AM.  KUB was unremarkable. Diarrhea occurs every time he is in the hospital and his home tube feeds are switched from isosource to jevity. Wife states it always resolves when they go home and switch back to his usual tube feeds. C. Diff is pending. O: Blood pressure 161/78, pulse 83, temperature 97.7 °F (36.5 °C), resp. rate 18, height 5' 7\" (1.702 m), weight 75.7 kg (166 lb 14.2 oz), SpO2 95 %. Gen: Patient is in no acute distress. There is no jaundice. Lungs: Clear to auscultation bilaterally . Heart:RRR. Abd: Soft,  Non tender,very minimally distended compared to exam last week, bowel sounds present. PEG tube in LUQ with no surround erythema, drainage or tenderness. Extremities: Warm. EXAM: Supine and decubitus views of the abdomen and a frontal view of the chest 
are provided. 
  
INDICATION:  colonic distention 
  
The bowel gas pattern is nonobstructive and unremarkable. There is no 
significant stool. There is no free intraperitoneal air. There is no evident 
organomegaly or significant intraabdominal calcification. PEG tube appears 
satisfactorily positioned. 
  
The lungs are hypoexpanded without consolidation. There is no overt pulmonary 
edema. There is prior CABG. ICD is present. No pneumothorax, midline shift or 
apparent pleural effusion. 
  
IMPRESSION IMPRESSION:  No acute disease. A: Active Problems: 
  Weakness (2/1/2017) Diarrhea Lower abd pain PEG tube issues Dysphagia Comment:  Doing well from a GI standpoint. Follow C. Dif result. Suspect diarrhea is due to tube feeds. Pain may be due to gas. Keep current PEG tube. Outpatient EGD dilatation off plavix and esophageal manometry. P: Trial mylicon for bloating/abd discomfort.  
 
CELIO Polo 
07/24/18 
11:09 AM

## 2018-07-24 NOTE — PROGRESS NOTES
CM spoke with Pt's wife in regards to discharge recommendation for [place]. Pt wife was receptive indicating that Pt was planning to receive services with [facility], however was hospitalized before the first visit. Pt wife was receptive to resuming [place] services with [facility]. CM contacted [facility] liaison to verify if they could continue services with Pt at discharge. CM sent resumption of care referral to [facility] via [facility].        Johnathon Bustillo, [employer]  [phone]

## 2018-07-24 NOTE — PROGRESS NOTES
Renal Dosing/Monitoring  Medication: Famotidine   Current regimen:  20 mg every 24 hr  Recent Labs      07/24/18   0314  07/23/18   0536  07/22/18 0317   CREA  1.03  1.00  1.47*   BUN  17  19  27*     Estimated CrCl:  51 mL/min  Plan: Will change back to famotidine 20 mg BID for improved CrCl to > 50 mL/min per renal dosing protocol.      Thank you,   Christal Paz, PHARMD

## 2018-07-24 NOTE — PROGRESS NOTES
Progress Note 7/24/2018 7:40 AM 
NAME: Tarah Aguilar MRN:  [id number] Admit Diagnosis: Weakness Weakness Problem List: 1. Hypotension; resolved 2. LIZ; resolved 3. Indeterminate troponin elevation 4. Recent admission for bacteremia 5. Chronic systolic heart failure 6. Coronary artery disease s/p remote CABG.  Cath '13 w/ EF 40%, PCI to distal LAD via LIMA, severe disease in large diagonal (not amenable to PCI), patent SVG-OM, SVG-PDA, SVG-PLB.  Lexiscan 11/13 w/ EF 28%, IWMI, and with diagonal ischemia. 7. Lexiscan stress MPI 9/14 w/ EF 29% and anterior ischemia w/ IWMI 8. Ischemic cardiomyopathy 9. BiV ICD 10. Chronic kidney disease; Stg 3 11. Moderate to severe AoS (Echo 6/18 w/ EF 50%, inferoseptal HK, mild to mod AI, diffuse HK, 33mmHg gradient and 3.7m/s velocity across AoV w/ SUSAN 0.7cm2). 12. Diabetes 13. 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 Orthostatic hypotension 15. Hyperlipidemia 16. Head/neck CA w/ feeding tube 17. Chronic thrombocytopenia (Dr. Cindi Amaro) Assessment/Plan: 1. Continue ASA and plavix 2. OK to be off plavix for 5 days prior to endoscopy w/ Dr. Gal Marlow next month. He is at low to intermediate risk for a low risk procedure. I would proceed as clinically indicated without further cardiac testing. 3. Continue midodrine 10mg TID []       High complexity decision making was performed in this patient at high risk for decompensation with multiple organ involvement. Subjective:  
 
Tarah Aguilar denies chest pain, dyspnea. Discussed with RN events overnight. Review of Systems: 
 
Symptom Y/N Comments  Symptom Y/N Comments Fever/Chills N   Chest Pain N Poor Appetite N   Edema N   
Cough N   Abdominal Pain N Sputum N   Joint Pain N   
SOB/KRAMER N   Pruritis/Rash N   
Nausea/vomit N   Tolerating PT/OT Y Diarrhea N   Tolerating Diet Y Constipation N   Other Could NOT obtain due to:   
 
Objective:  
  
Physical Exam: 
 
Last 24hrs VS reviewed since prior progress note. Most recent are: 
 
Visit Vitals  /71 (BP 1 Location: Left arm, BP Patient Position: At rest)  Pulse 74  Temp 96.9 °F (36.1 °C)  Resp 20  
 Ht 5' 7\" (1.702 m)  Wt 75.7 kg (166 lb 14.2 oz)  SpO2 97%  BMI 26.14 kg/m2 Intake/Output Summary (Last 24 hours) at 07/24/18 0740 Last data filed at 07/24/18 8653 Gross per 24 hour Intake          2006.25 ml Output              500 ml Net          1506.25 ml General Appearance: Well developed, well nourished, alert & oriented x 3,  
 no acute distress. Ears/Nose/Mouth/Throat: Hearing grossly normal. 
Neck: Supple. Chest: Lungs clear to auscultation bilaterally. Cardiovascular: Regular rate and rhythm, S1S2 normal, no murmur. Abdomen: Soft, non-tender, bowel sounds are active. Extremities: No edema bilaterally. Skin: Warm and dry. []         Post-cath site without hematoma, bruit, tenderness, or thrill. Distal pulses intact. PMH/SH reviewed - no change compared to H&P Data Review Telemetry: normal sinus rhythm EKG:  
[]  No new EKG for review Lab Data Personally Reviewed: 
 
Recent Labs  
   07/24/18 
 2639  07/23/18 
 5789 WBC  4.6  3.9* HGB  11.4*  10.9* HCT  37.0  34.4* PLT  63*  58* No results for input(s): INR, PTP, APTT in the last 72 hours. No lab exists for component: INREXT Recent Labs  
   07/24/18 
 0314  07/23/18 
 0536  07/22/18 
 4103 NA  143  148*  145  
K  4.0  3.9  4.0  
CL  110*  111*  107 CO2  29  33*  34* BUN  17  19  27* CREA  1.03  1.00  1.47* GLU  173*  184*  175* CA  9.0  8.7  8.7 MG   --   2.6*   --   
 
Recent Labs  
   07/22/18 
 1306  07/21/18 
 1921 TROIQ  0.09*  0.18* Lab Results Component Value Date/Time  Cholesterol, total 123 06/04/2018 09:16 AM  
 HDL Cholesterol 35 (L) 06/04/2018 09:16 AM  
 LDL, calculated 62 06/04/2018 09:16 AM  
 Triglyceride 132 06/04/2018 09:16 AM  
 CHOL/HDL Ratio 3.6 02/02/2017 02:55 AM  
 
 
Recent Labs  
   07/21/18 1921 SGOT  57* AP  83  
TP  7.1 ALB  2.9*  
GLOB  4.2* No results for input(s): PH, PCO2, PO2 in the last 72 hours. Medications Personally Reviewed: 
 
Current Facility-Administered Medications Medication Dose Route Frequency  aspirin chewable tablet 81 mg  81 mg Oral DAILY  guaiFENesin (ROBITUSSIN) 100 mg/5 mL oral liquid 300 mg  300 mg Oral TID  furosemide (LASIX) tablet 20 mg  20 mg Oral DAILY  insulin regular (NOVOLIN R, HUMULIN R) injection 12 Units  12 Units SubCUTAneous Daily  insulin regular (NOVOLIN R, HUMULIN R) injection 12 Units  12 Units SubCUTAneous Daily  insulin regular (NOVOLIN R, HUMULIN R) injection 5 Units  5 Units SubCUTAneous Daily  famotidine (PEPCID) tablet 20 mg  20 mg Per G Tube DAILY  insulin glargine (LANTUS) injection 14 Units  14 Units SubCUTAneous DAILY  cefTRIAXone (ROCEPHIN) 1 g in 0.9% sodium chloride (MBP/ADV) 50 mL  1 g IntraVENous Q24H  
 metroNIDAZOLE (FLAGYL) IVPB premix 500 mg  500 mg IntraVENous Q12H  
 acetaminophen (TYLENOL) solution 650 mg  650 mg Per G Tube Q4H PRN  
 ondansetron (ZOFRAN) injection 4 mg  4 mg IntraVENous Q4H PRN  
 nystatin (MYCOSTATIN) 100,000 unit/mL oral suspension 500,000 Units  500,000 Units Oral QID  artificial saliva (MOUTH KOTE) 1 Spray  1 Spray Oral PRN  
 enoxaparin (LOVENOX) injection 30 mg  30 mg SubCUTAneous Q24H  
 HYDROcodone-acetaminophen (HYCET) 0.5-21.7 mg/mL oral solution 5 mg  5 mg Oral Q6H PRN  
 tamsulosin (FLOMAX) capsule 0.4 mg  0.4 mg Oral DAILY  atorvastatin (LIPITOR) tablet 40 mg  40 mg Per G Tube DAILY  clopidogrel (PLAVIX) tablet 75 mg  75 mg PEG Tube DAILY  finasteride (PROSCAR) tablet 5 mg  5 mg Oral QHS  gabapentin (NEURONTIN) 250 mg/5 mL solution 750 mg  750 mg Oral TID  midodrine (PROAMITINE) tablet 10 mg  10 mg Per G Tube TID WITH MEALS  pramipexole (MIRAPEX) tablet 0.25 mg  0.25 mg Oral TID  sodium chloride (NS) flush 5-10 mL  5-10 mL IntraVENous Q8H  
 sodium chloride (NS) flush 5-10 mL  5-10 mL IntraVENous PRN  
 glucose chewable tablet 16 g  4 Tab Oral PRN  
 dextrose (D50W) injection syrg 12.5-25 g  12.5-25 g IntraVENous PRN  
 glucagon (GLUCAGEN) injection 1 mg  1 mg IntraMUSCular PRN Maureen Mackey III, DO

## 2018-07-24 NOTE — PROGRESS NOTES
CHIEF COMPLAINT: f/u evaluation of type 2 diabetes    HISTORY OF PRESENT ILLNESS:   Jodie Mora is a 80 y.o. male with a PMHx as noted below who was admitted to the hospital on 7/21/2018  7:08 PM with a diagnosis of Weakness; Weakness. Endocrinology was consulted initially for the evaluation of type 2 diabetes. See yesterdays initial consultation for full details of patients prior diabetes treatment. Patients sugars overnight have been better, stable. Started new regimen with Regular (R) insulin with his TF's, see below. Patient was transported out of the room this afternoon and so missed his 11AM TF,  Will need caution when TF's are missed since he is receiving insulin,  Patient was evaluated, family visiting, patient appears well, communicating at his baseline from my perspective,  Spoke yesterday to patients wife, I will call her again to update her on plan,  Patient describes being advised that he may go home tomorrow. Results for Williams Chacon (MRN [id number]) as of 7/24/2018 13:46   Ref.  Range 7/23/2018 21:32 7/24/2018 00:49 7/24/2018 03:14 7/24/2018 06:05 7/24/2018 09:00 7/24/2018 09:25 7/24/2018 12:12   GLUCOSE,FAST - POC Latest Ref Range: 65 - 100 mg/dL 153 (H) 177 (H)  137 (H)   180 (H)           PAST MEDICAL/SURGICAL HISTORY:   Past Medical History:   Diagnosis Date    Antiplatelet or antithrombotic long-term use 12/4/2014    Anxiety disorder     Arrhythmia 2009    bradycardia    Arthritis     CAD (coronary artery disease)     s/p CABG 2002; Dr Beverlyn Kehr 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 I suspect the latter. Achalasia  is not present.         GERD (gastroesophageal reflux disease)     Heart failure ([facility] [address] 10/2014     Cardiomyopathy:Pacemaker upgrade:Biv and AICD  Dr. Mar Fees heart DrAlvaro last visit 5/11/2015    Hepatitis C Dx 1996    treated at [facility] in past; as of 4/15/15 wife states pt currently not under any treatment    Hyperlipidemia     Hypertension     Myocardial infarct [facility]) 2013    Heart Cath: 40% LV EF, Stented distal LAD, patent Graft to circumflex    On tube feeding diet approx 2009    still has as of 9/28/15  (no po food/liquid/meds at all); Dr aGrrison Montague Other ill-defined conditions(799.89) 1996    1 dose of chemotherapy/radiation for tongue cancer    Other ill-defined conditions(799.89)     BPH    Other ill-defined conditions(799.89)     orthostatic hypotension    Pneumonia ~ April -May 2010    Stroke [facility]) approx 2003    left side-left finger tips numb; no imbalance or memory loss; as of 2015 not seeing neuro MD    Suicidal thoughts      Past Surgical History:   Procedure Laterality Date    ABDOMEN SURGERY [address]    peg tube    CABG, ARTERY-VEIN, THREE  2000    HX CATARACT REMOVAL      bilateral    HX CHOLECYSTECTOMY      HX HEART CATHETERIZATION  2013    Stented distal LAD    HX MOHS PROCEDURES      bilateral    HX ORTHOPAEDIC      back surgery times two    HX OTHER SURGICAL      Radical Left Neck    HX OTHER SURGICAL      NASAL POLYPS REMOVAL    HX OTHER SURGICAL  2010    TURP    HX PACEMAKER  11/08    HX PACEMAKER  10/28/14     Defibrillator: [facility] # [id number], serial # [id number]; Dr. Ladd Drain [phone]; Dr Hardy Folds      cystoscopy   [address] [place] [address]    cevical surgery    IN CHANGE GASTROSTOMY TUBE  5/2/2011         IN CHANGE GASTROSTOMY TUBE  6/29/2011         IN EGD INSERT GUIDE WIRE DILATOR PASSAGE ESOPHAGUS  9/13/2010         IN EGD TRANSORAL BIOPSY SINGLE/MULTIPLE 9/13/2010         STOMACH SURGERY PROCEDURE UNLISTED  6/29/2011         VASCULAR SURGERY PROCEDURE UNLIST      bilateral carotid stents       ALLERGIES:   Allergies   Allergen Reactions    Demerol [Meperidine] Shortness of Breath    Paxil [Paroxetine Hcl] Unknown (comments)     Pt gets shaky and loses control of legs    Amoxicillin Rash    Cleocin [Clindamycin Hcl] Rash    Pcn [Penicillins] Rash       MEDICATIONS ON ADMISSION:     Current Facility-Administered Medications:     simethicone (MYLICON) tablet 678 mg, 160 mg, Per G Tube, QID PRN, CELIO Fernandez    famotidine (PEPCID) tablet 20 mg, 20 mg, Per G Tube, BID, Sherry Gerber MD    aspirin chewable tablet 81 mg, 81 mg, Oral, DAILY, Cassandra Louis III, DO, 81 mg at 07/24/18 0954    guaiFENesin (ROBITUSSIN) 100 mg/5 mL oral liquid 300 mg, 300 mg, Oral, TID, Burgess Broderick NP, 300 mg at 07/24/18 1338    furosemide (LASIX) tablet 20 mg, 20 mg, Oral, DAILY, Yojana Holly MD, 20 mg at 07/24/18 0954    insulin regular (NOVOLIN R, HUMULIN R) injection 12 Units, 12 Units, SubCUTAneous, Daily, Nahomi Hwang MD, 12 Units at 07/24/18 0956    insulin regular (NOVOLIN R, HUMULIN R) injection 12 Units, 12 Units, SubCUTAneous, Daily, Nahomi Hwang MD    insulin regular (NOVOLIN R, HUMULIN R) injection 5 Units, 5 Units, SubCUTAneous, Daily, Nahomi Hwang MD, 5 Units at 07/23/18 2133    insulin glargine (LANTUS) injection 14 Units, 14 Units, SubCUTAneous, DAILY, Yojana Holly MD, 14 Units at 07/24/18 0956    cefTRIAXone (ROCEPHIN) 1 g in 0.9% sodium chloride (MBP/ADV) 50 mL, 1 g, IntraVENous, Q24H, Yojana Holly MD, Last Rate: 100 mL/hr at 07/23/18 1401, 1 g at 07/23/18 1401    metroNIDAZOLE (FLAGYL) IVPB premix 500 mg, 500 mg, IntraVENous, Q12H, Yojana Holly MD, Last Rate: 100 mL/hr at 07/24/18 1206, 500 mg at 07/24/18 1206    acetaminophen (TYLENOL) solution 650 mg, 650 mg, Per G Tube, Q4H PRN, Kerline Lombardo Nayeli Johnson MD, 650 mg at 07/22/18 2108    ondansetron [facility]) injection 4 mg, 4 mg, IntraVENous, Q4H PRN, Porter Mckeon MD, 4 mg at 07/23/18 1400    nystatin (MYCOSTATIN) 100,000 unit/mL oral suspension 500,000 Units, 500,000 Units, Oral, QID, Porter Mckeon MD, 500,000 Units at 07/24/18 1206    artificial saliva (MOUTH KOTE) 1 Spray, 1 Spray, Oral, PRN, Porter Mckeon MD    enoxaparin (LOVENOX) injection 30 mg, 30 mg, SubCUTAneous, Q24H, Porter Mckeon MD, 30 mg at 07/23/18 2127    HYDROcodone-acetaminophen (HYCET) 0.5-21.7 mg/mL oral solution 5 mg, 5 mg, Oral, Q6H PRN, Jam Solis MD, 5 mg at 07/24/18 1204    tamsulosin (FLOMAX) capsule 0.4 mg, 0.4 mg, Oral, DAILY, Cam Rodriguez MD, 0.4 mg at 07/24/18 0954    atorvastatin (LIPITOR) tablet 40 mg, 40 mg, Per G Tube, DAILY, Marimar Hutson MD, 40 mg at 07/24/18 0954    clopidogrel (PLAVIX) tablet 75 mg, 75 mg, PEG Tube, DAILY, Cam Rodriguez MD, 75 mg at 07/24/18 0954    finasteride (PROSCAR) tablet 5 mg, 5 mg, Oral, QHS, Marimar Hutson MD, 5 mg at 07/23/18 2127    gabapentin (NEURONTIN) 250 mg/5 mL solution 750 mg, 750 mg, Oral, TID, Marimar Hutson MD, 750 mg at 07/24/18 0956    midodrine (PROAMITINE) tablet 10 mg, 10 mg, Per G Tube, TID WITH MEALS, Marimar Hutson MD, 10 mg at 07/24/18 1338    pramipexole (MIRAPEX) tablet 0.25 mg, 0.25 mg, Oral, TID, Marimar Hutson MD, 0.25 mg at 07/24/18 0955    sodium chloride (NS) flush 5-10 mL, 5-10 mL, IntraVENous, Q8H, Cam Rodriguez MD, 10 mL at 07/24/18 0612    sodium chloride (NS) flush 5-10 mL, 5-10 mL, IntraVENous, PRN, Marimar Hutson MD    glucose chewable tablet 16 g, 4 Tab, Oral, PRN, Marimar Hutson MD    dextrose (D50W) injection syrg 12.5-25 g, 12.5-25 g, IntraVENous, PRN, Cam Rodriguez MD    glucagon (GLUCAGEN) injection 1 mg, 1 mg, IntraMUSCular, PRN, Eleazar Friedman MD    SOCIAL HISTORY:   Social History     Social History    Marital status:      Spouse name: N/A    Number of children: N/A    Years of education: N/A     Occupational History    Retired       He was a stained      Social History Main Topics    Smoking status: Never Smoker    Smokeless tobacco: Never Used    Alcohol use No    Drug use: No    Sexual activity: Yes     Partners: Female     Other Topics Concern    Not on file     Social History Narrative       FAMILY HISTORY:  Family History   Problem Relation Age of Onset    Heart Disease Father       at age 52 from CAD    Colon Cancer Mother     Cancer Mother      colon ca    Heart Disease Brother        REVIEW OF SYSTEMS:  ENT: normal  CVS: normal  Resp: normal  GI: normal  Endocrine: normal  Musculoskeletal: normal  Neuro: normal    PHYSICAL EXAMINATION:    VITAL SIGNS:  Visit Vitals    /82 (BP 1 Location: Left arm, BP Patient Position: At rest)    Pulse 84    Temp 97.8 °F (36.6 °C)    Resp 18    Ht 5' 7\" (1.702 m)    Wt 166 lb 14.2 oz (75.7 kg)    SpO2 95%    BMI 26.14 kg/m2       GENERAL: NCAT, laying comfortably, NAD  EYES: EOMI, non-icteric, no proptosis  Ear/Nose/Throat: NCAT, no inflammation, no masses  CARDIOVASCULAR: S1 S2, RRR, No murmur, 2+ radial pulses  RESPIRATORY: CTA b/l, no wheeze/rales  SKIN: warm, no edema/rash/ or other skin changes  NEUROLOGIC: 5/5 power all extremities, no tremors, AAOx3  PSYCHIATRIC: Normal affect, Normal insight and judgement       REVIEW OF LABORATORY AND RADIOLOGY DATA:   Labs and documentation have been reviewed as described above. ASSESSMENT AND PLAN:   Jodie Mora is a 80 y.o. male with a PMHx as noted below who was admitted to the hospital on 2018  7:08 PM with a diagnosis of Weakness; Weakness.  Endocrinology was consulted initially for the evaluation of type 2 diabetes complicated by changes in TF regimen requiring customization of his diabetes treatment. Diagnoses:  Uncontrolled type 2 diabetes    Assessment:  80 M with complex diabetes treatment with insulin due to changes in tube feeding regimen. Patient generally doing much better, sugars have been stable on current regimen. As noted above he missed his 11AM TF, however did get his insulin with his 8AM TF, and thus I advised the nurse today upon preparing his 2PM TF, that if his sugars are >180, to go ahead and give the 2PM dose of Regular insulin as below. Plan:  BASAL:  Continue Lantus 14 units once daily    BOLUS:  Regular (R) insulin  8AM Start of TF #1 daily, Regular insulin 14 units  2PM Start of TF #3 daily, Regular insulin 12 units  8PM Start of TF #5 daily, Regular insulin 5 units     HOLDING TUBE FEED CAN RESULT IN HYPOGLYCEMIA IF INSULIN IS ADMINISTERED,  PLEASE ADVISE IF THERE IS ANY CHANGE TO TF REGIMEN AS DOSING WILL NEED TO BE ADJUSTED,     Patient will need to be discharged on the new current regimen as of the time of discharge, assuming he will continue the same TF regimen at home. Please take notice that we are using REGULAR (R) Insulin now and NOT LISPRO/HUMALOG.      Thank you, will continue to follow alongBrian.  [address] [facility]

## 2018-07-25 NOTE — PROGRESS NOTES
Pt complaining of gastric reflux symptoms and nasal irritation. Dr. Kadie Smith on call for hospitalist and was notified--orders provided.

## 2018-07-25 NOTE — PROGRESS NOTES
Problem: Falls - Risk of  Goal: *Absence of Falls  Document Olayinka Fall Risk and appropriate interventions in the flowsheet. Outcome: Progressing Towards Goal  Fall Risk Interventions:  Mobility Interventions: Bed/chair exit alarm, Communicate number of staff needed for ambulation/transfer, Patient to call before getting OOB    Mentation Interventions: Bed/chair exit alarm, Door open when patient unattended, Eyeglasses and hearing aids, Increase mobility, Room close to nurse's station    Medication Interventions: Bed/chair exit alarm, Patient to call before getting OOB    Elimination Interventions: Call light in reach, Patient to call for help with toileting needs    History of Falls Interventions: Bed/chair exit alarm, Door open when patient unattended        Problem: Pressure Injury - Risk of  Goal: *Prevention of pressure injury  Document Mark Scale and appropriate interventions in the flowsheet. Outcome: Progressing Towards Goal  Pressure Injury Interventions:  Sensory Interventions: Assess changes in LOC    Moisture Interventions: Absorbent underpads, Minimize layers    Activity Interventions: Chair cushion, Increase time out of bed    Mobility Interventions: PT/OT evaluation    Nutrition Interventions: Offer support with meals,snacks and hydration    Friction and Shear Interventions: Lift sheet, Lift team/patient mobility team, Minimize layers               Problem: Hypotension  Goal: *Blood pressure within specified parameters  Outcome: Progressing Towards Goal  Pt's b/p ok overnight.  Orthostatic b/p check to be performed at 0900

## 2018-07-25 NOTE — ROUTINE PROCESS
Bedside report provided by Taty Ibanez RN. SBAR report reviewed. Pt awake in room--pain under control, still reporting diarrhea but notes volume reduced and substance is solidifying. Pt remains paced per cardiac telemetry.

## 2018-07-25 NOTE — CARDIO/PULMONARY
C/P rehab note- chart reviewed. Pt is on CHF bundle list    Previous teachings by Cardiac Rehab previous admissions. Currently receiving tube feeds. Per notes also having some gastric and BP issues. Not appropriate for CHF teaching at this time.

## 2018-07-25 NOTE — PROGRESS NOTES
F/U for mucous in throat, PEG tube issues S: Mr. Aryan Begum was seen by me today during rounds. At this time, he is resting uncomfortably. The patient has + new complaints today. Please see admission consult for details of ROS; there are + changes today. He has multiple c/o. He states he feels terrible. He is fatigued, c/o back pain, neck pain, leg pain, swelling of left hand, hemorrhoids, abd soreness and continued thick mucous. He is passing soft stools and flatus. O: Blood pressure 100/52, pulse 86, temperature 98 °F (36.7 °C), resp. rate 20, height 5' 7\" (1.702 m), weight 76 kg (167 lb 8.8 oz), SpO2 93 %. Gen: Patient is in no acute distress. There is no jaundice. Lungs: Clear to auscultation bilaterally . Heart:RRR. 3/6 TARA  Abd: Soft, mild generalized tenderness, no guarding or rebounding, non-distended, bowel sounds present. Extremities: Warm. A: Active Problems: 
  Weakness (2/1/2017) Comment:  He has multiple non GI c/o and I have advised his wife and him to discuss with the hospitalist.  He has preparation H ordered for his hemorrhoids and was made aware of this. I have changed his mylicon order from PRN to scheduled QID in case some of his abd pain is due to gas as he didn't know to ask for it yesterday. I will check another KUB. He could also have muscular soreness from all his coughing. We again had another long conversation about peg tube options which include: 1)keeping the same PEG tube 2) changing to a replacement we have in the office which might be a little \"sturdier tubing material\" but is the same length 3) changing the PEG in the endoscopy suite but would involve a rubber bumper that has to removed via traction and can cause pain 4) wife and patient purchasing their own replacement tube and bringing it to the office for an exchange. They chose option #4 
 
P: I ordered KUB. Outpatient EGD already scheduled.   Consider outpatient esophageal manometry.   
 
CELIO Samano 
07/25/18 
9:59 AM

## 2018-07-26 NOTE — PROGRESS NOTES
F/U for choking and gagging Oropharyngeal dysphagia Feeding difficulties s/p peg Abdominal pain 
diarrhea 
 
S: Mr. Aubrey Hooker was seen by me today during rounds. At this time, he is resting + comfortably. Abdominal pain is still present but better. Diarrhea is gone. The patient has no new complaints today. Please see admission consult for details of ROS; there are no other changes today. O: Blood pressure 136/63, pulse 79, temperature 98.5 °F (36.9 °C), resp. rate 18, height 5' 7\" (1.702 m), weight 76 kg (167 lb 8.8 oz), SpO2 97 %. Gen: Patient is in no acute distress. There is no jaundice. Abd: Soft, nontender, non-distended,  Extremities: Warm. Cross sectional imaging:  KUB no acute abnormality Lab Results Component Value Date/Time WBC 5.7 07/26/2018 06:03 AM  
 HGB 12.1 07/26/2018 06:03 AM  
 HCT 37.9 07/26/2018 06:03 AM  
 PLATELET 77 (L) 96/97/2342 06:03 AM  
 MCV 96.2 07/26/2018 06:03 AM  
 
Lab Results Component Value Date/Time Sodium 139 07/26/2018 06:03 AM  
 Potassium 4.2 07/26/2018 06:03 AM  
 Chloride 104 07/26/2018 06:03 AM  
 CO2 30 07/26/2018 06:03 AM  
 Anion gap 5 07/26/2018 06:03 AM  
 Glucose 114 (H) 07/26/2018 06:03 AM  
 BUN 16 07/26/2018 06:03 AM  
 Creatinine 0.94 07/26/2018 06:03 AM  
 BUN/Creatinine ratio 17 07/26/2018 06:03 AM  
 GFR est AA >60 07/26/2018 06:03 AM  
 GFR est non-AA >60 07/26/2018 06:03 AM  
 Calcium 8.5 07/26/2018 06:03 AM  
 Bilirubin, total 0.4 07/21/2018 07:21 PM  
 AST (SGOT) 57 (H) 07/21/2018 07:21 PM  
 Alk. phosphatase 83 07/21/2018 07:21 PM  
 Protein, total 7.1 07/21/2018 07:21 PM  
 Albumin 2.9 (L) 07/21/2018 07:21 PM  
 Globulin 4.2 (H) 07/21/2018 07:21 PM  
 A-G Ratio 0.7 (L) 07/21/2018 07:21 PM  
 ALT (SGPT) 34 07/21/2018 07:21 PM  
  
A: Active Problems: 
  Weakness (2/1/2017) Comment:  Wife attributes prior diarrhea to tube feeding issues. P:  Gi issues are stable I am off tomorrow.   I will make Mr [id number] see on request for Dr Dalila Mcclain off plavix [phone] as outpatient Fallon Weems MD 
8:27 AM 
7/26/2018

## 2018-07-26 NOTE — TELEPHONE ENCOUNTER
----- Message from Ernst Gil sent at 7/26/2018  1:04 PM EDT -----  Regarding: Hospital Call  26[address] Berhane Parada, with ED [facility], called and would like you to call as soon as you can regarding this patient in Room 2212. Gila can be reached directly at:  [phone].

## 2018-07-26 NOTE — PROGRESS NOTES
Patient's medical status, POC, and transitional care plans were reviewed/discussed w/ Patient and Spouse during IDT rounds today. Patient has been medically cleared for d/c, plan is to return home w/ Spouse. Resumption of care orders sent to [facility]. No additional questions/concerns reported at this time. CM remains available to assist w/ any d/c needs as indicated.      Eddie Godinez, [address] [place] [address]

## 2018-07-26 NOTE — PROGRESS NOTES
Problem: Self Care Deficits Care Plan (Adult)  Goal: *Acute Goals and Plan of Care (Insert Text)  Occupational Therapy Goals  Initiated 7/26/2018  1. Patient will perform grooming at the sink with modified independence within 7 day(s). 2.  Patient will perform bathing at the sink with supervision/set-up within 7 day(s). 3.  Patient will perform lower body dressing with supervision/set-up within 7 day(s). 4.  Patient will perform toilet transfers with modified independence within 7 day(s). 5.  Patient will perform all aspects of toileting with independence within 7 day(s). 6.  Patient will participate in upper extremity therapeutic exercise/activities with independence for 5 minutes within 7 day(s). 7.  Patient will utilize energy conservation techniques during functional activities with verbal cues within 7 day(s). Occupational Therapy EVALUATION  Patient: Philly Edmond (37 y.o. male)  Date: 7/26/2018  Primary Diagnosis: Weakness  Weakness        Precautions:        ASSESSMENT :  Based on the objective data described below, the patient presents with generalized weakness, decreased endurance, strength, functional mobility, and ADLs. Pt was living with family and per pt and wife he was able to bathe and dress self with some assist for buttons due to his tremors in his hands. Pt was cleared to be seen for therapy and all VSS and pt states that he was dizzy but BP did not drop. Pt was CGA to SBA to stand from bed and was SBA for bed mobility. He was able to transfer to and from toilet , chair with CGA to SBA . He has functional range in BUE and strength is functional.  Pt was able to bentley his socks on while pt was sitting up in bed with no problems. Pt was left sitting up in chair with bed alarm activated and call bell with in reach. Recommend that pt have further therapy at discharge at home with wife and may benefit from home care PT , pending progress.     Patient will benefit from skilled intervention to address the above impairments. Patients rehabilitation potential is considered to be Good  Factors which may influence rehabilitation potential include:   [x]             None noted  []             Mental ability/status  []             Medical condition  []             Home/family situation and support systems  []             Safety awareness  []             Pain tolerance/management  []             Other:      PLAN :  Recommendations and Planned Interventions:  [x]               Self Care Training                  []        Therapeutic Activities  [x]               Functional Mobility Training    []        Cognitive Retraining  [x]               Therapeutic Exercises           [x]        Endurance Activities  []               Balance Training                   []        Neuromuscular Re-Education  []               Visual/Perceptual Training     [x]   Home Safety Training  [x]               Patient Education                 [x]        Family Training/Education  []               Other (comment):    Frequency/Duration: Patient will be followed by occupational therapy 4 times a week to address goals. Discharge Recommendations: Home Health PT  Further Equipment Recommendations for Discharge: tbd     SUBJECTIVE:   Patient stated I always feel dizzy but I keep moving. 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 I suspect the latter. Achalasia  is not present.         GERD (gastroesophageal reflux disease)     Heart failure ([address] 10/2014     Cardiomyopathy:Pacemaker upgrade:Biv and AICD  Dr. Marilee Simon heart DrAlvaro last visit 5/11/2015    Hepatitis C Dx 1996    treated at [facility] in past; as of 4/15/15 wife states pt currently not under any treatment    Hyperlipidemia     Hypertension     Myocardial infarct [facility]) 2013    Heart Cath: 40% LV EF, Stented distal LAD, patent Graft to circumflex    On tube feeding diet approx 2009    still has as of 9/28/15  (no po food/liquid/meds at all); Dr Lakshmi Benitez Other ill-defined conditions(799.89) 1996    1 dose of chemotherapy/radiation for tongue cancer    Other ill-defined conditions(799.89)     BPH    Other ill-defined conditions(799.89)     orthostatic hypotension    Pneumonia ~ April -May 2010    Stroke [facility]) approx 2003    left side-left finger tips numb; no imbalance or memory loss; as of 2015 not seeing neuro MD    Suicidal thoughts      Past Surgical History:   Procedure Laterality Date    ABDOMEN SURGERY [address]    peg tube    CABG, ARTERY-VEIN, THREE  2000    HX CATARACT REMOVAL      bilateral    HX CHOLECYSTECTOMY      HX HEART CATHETERIZATION  2013    Stented distal LAD    HX MOHS PROCEDURES      bilateral    HX ORTHOPAEDIC      back surgery times two    HX OTHER SURGICAL      Radical Left Neck    HX OTHER SURGICAL      NASAL POLYPS REMOVAL    HX OTHER SURGICAL  2010    TURP    HX PACEMAKER  11/08    HX PACEMAKER  10/28/14     Defibrillator: [facility] # [id number], serial # [id number]; Dr. Derrell Hartmann [phone]; Dr Nicolás Jacinto HX UROLOGICAL      cystoscopy    NEUROLOGICAL PROCEDURE UNLISTED  1996    cevical surgery    SC CHANGE GASTROSTOMY TUBE  5/2/2011         SC CHANGE GASTROSTOMY TUBE  6/29/2011         SC EGD INSERT GUIDE WIRE DILATOR PASSAGE ESOPHAGUS 9/13/2010         NC EGD TRANSORAL BIOPSY SINGLE/MULTIPLE  9/13/2010         STOMACH SURGERY PROCEDURE UNLISTED  6/29/2011         VASCULAR SURGERY PROCEDURE UNLIST      bilateral carotid stents       Prior Level of Function/Environment/Context: pt lives with family and was fairly independent with ADLs prior and only needed assist with buttons due to his tremors and used rollator for walking. Expanded or extensive additional review of patient history:     Home Situation  Home Environment: Private residence  # Steps to Enter: 7  Rails to Enter: Yes  Hand Rails : Right  One/Two Story Residence: One story  Living Alone: No  Support Systems: Child(lisa), Family member(s), Spouse/Significant Other/Partner  Patient Expects to be Discharged to[de-identified] Private residence  Current DME Used/Available at Home: Belita Gallop, rollator, Wheelchair, [address] [employer] chair  Tub or Shower Type: Shower    Hand dominance: Right    EXAMINATION OF PERFORMANCE DEFICITS:  Cognitive/Behavioral Status:  Neurologic State: Alert  Orientation Level: Oriented X4     Perception: Appears intact  Perseveration: No perseveration noted  Safety/Judgement: Fall prevention    Skin: in good health     Edema: none    Hearing: Auditory  Auditory Impairment: None, Hard of hearing, bilateral    Vision/Perceptual:                 intact                    Range of Motion:    AROM: Generally decreased, functional                         Strength:    Strength: Generally decreased, functional                Coordination:  Coordination: Within functional limits  Fine Motor Skills-Upper: Left Intact; Right Intact    Gross Motor Skills-Upper: Left Intact; Right Intact    Tone & Sensation:    Tone: Normal                         Balance:  Sitting: Intact  Standing: Impaired; With support  Standing - Static: Good  Standing - Dynamic : Fair    Functional Mobility and Transfers for ADLs:  Bed Mobility:  Rolling: Stand-by assistance  Supine to Sit: Stand-by assistance  Scooting: Stand-by assistance    Transfers:  Sit to Stand: Contact guard assistance;Stand-by assistance  Stand to Sit: Stand-by assistance  Bed to Chair: Stand-by assistance  Toilet Transfer : Stand-by assistance;Contact guard assistance    ADL Assessment:  Feeding: Other (comment) (pt does not eat, tube feedings)    Oral Facial Hygiene/Grooming: Setup    Bathing: Minimum assistance;Contact guard assistance    Upper Body Dressing: Setup    Lower Body Dressing: Contact guard assistance          Cognitive Retraining  Safety/Judgement: Fall prevention         Functional Measure:  Barthel Index:    Bathin  Bladder: 10  Bowels: 10  Groomin  Dressing: 10  Feeding: 10  Mobility: 10  Stairs: 5  Toilet Use: 5  Transfer (Bed to Chair and Back): 10  Total: 70       Barthel and G-code impairment scale:  Percentage of impairment CH  0% CI  1-19% CJ  20-39% CK  40-59% CL  60-79% CM  80-99% CN  100%   Barthel Score 0-100 100 99-80 79-60 59-40 20-39 1-19   0   Barthel Score 0-20 20 17-19 13-16 9-12 5-8 1-4 0      The Barthel ADL Index: Guidelines  1. The index should be used as a record of what a patient does, not as a record of what a patient could do. 2. The main aim is to establish degree of independence from any help, physical or verbal, however minor and for whatever reason. 3. The need for supervision renders the patient not independent. 4. A patient's performance should be established using the best available evidence. Asking the patient, friends/relatives and nurses are the usual sources, but direct observation and common sense are also important. However direct testing is not needed. 5. Usually the patient's performance over the preceding 24-48 hours is important, but occasionally longer periods will be relevant. 6. Middle categories imply that the patient supplies over 50 per cent of the effort. 7. Use of aids to be independent is allowed. Debbie De La Cruz., Barthel, D.W. ([id number]). Functional evaluation: the Barthel Index.  Md [facility] Med J ([phone]. NOEL Byrnes, Pepe Olson., Sy Tariq., Ck, [address] (1999). Measuring the change indisability after inpatient rehabilitation; comparison of the responsiveness of the Barthel Index and Functional [place] Measure. Journal of Neurology, Neurosurgery, and Psychiatry, 66(4), 561-506. KVNG Casey, QIANA Gifford, & Hany Coker M.A. (2004.) Assessment of post-stroke quality of life in cost-effectiveness studies: The usefulness of the Barthel Index and the EuroQoL-5D. Quality of Life Research, 13, 580-81         G codes: In compliance with CMSs Claims Based Outcome Reporting, the following G-code set was chosen for this patient based on their primary functional limitation being treated: The outcome measure chosen to determine the severity of the functional limitation was the Barthel with a score of 70/100 which was correlated with the impairment scale. ? Self Care:     - CURRENT STATUS: CJ - 20%-39% impaired, limited or restricted    - GOAL STATUS: CI - 1%-19% impaired, limited or restricted    - D/C STATUS:  ---------------To be determined---------------     Occupational Therapy Evaluation Charge Determination   History Examination Decision-Making   MEDIUM Complexity : Expanded review of history including physical, cognitive and psychosocial  history  LOW Complexity : 1-3 performance deficits relating to physical, cognitive , or psychosocial skils that result in activity limitations and / or participation restrictions  LOW Complexity : No comorbidities that affect functional and no verbal or physical assistance needed to complete eval tasks       Based on the above components, the patient evaluation is determined to be of the following complexity level: LOW   Pain:  Pain Scale 1: Numeric (0 - 10)  Pain Intensity 1: 2              Activity Tolerance:   vss/good  Please refer to the flowsheet for vital signs taken during this treatment.   After treatment:   [x] Patient left in no apparent distress sitting up in chair  [] Patient left in no apparent distress in bed  [x] Call bell left within reach  [x] Nursing notified  [x] Caregiver present  [x] Bed alarm activated    COMMUNICATION/EDUCATION:   The patients plan of care was discussed with: Physical Therapist and Registered Nurse. [x] Home safety education was provided and the patient/caregiver indicated understanding. [x] Patient/family have participated as able in goal setting and plan of care. [x] Patient/family agree to work toward stated goals and plan of care. [] Patient understands intent and goals of therapy, but is neutral about his/her participation. [] Patient is unable to participate in goal setting and plan of care. This patients plan of care is appropriate for delegation to [facility].     Thank you for this referral.  Ting Lee OT  Time Calculation: 27 mins

## 2018-07-26 NOTE — PROGRESS NOTES
Bedside shift change report given to MITCH Johnson (oncoming nurse). Report included the following information SBAR. SHIFT SUMMARY:  Held 80 TF today due to 150 ml residual.  Plan to d/c home tomorrow. [address] NURSING NOTE   Admission Date 7/21/2018   Admission Diagnosis Weakness  Weakness   Consults IP CONSULT TO PALLIATIVE CARE - PROVIDER  IP CONSULT TO GASTROENTEROLOGY  IP CONSULT TO ENDOCRINOLOGY  IP CONSULT TO CARDIOLOGY      Cardiac Monitoring [x] Yes [] No      Purposeful Hourly Rounding [x] Yes    Olayinka Score Total Score: 4   Olayinka score 3 or > [x] Bed Alarm [] Avasys [] 1:1 sitter [] Patient refused (Signed refusal form in chart)   Mark Score Mark Score: 19   Mark score 14 or < [] PMT consult [] Wound Care consult    []  Specialty bed  [] Nutrition consult      Influenza Vaccine Received Flu Vaccine for Current Season (usually Sept-March): Not Flu Season           Oxygen needs? [x] Room air Oxygen @  []1L    []2L    []3L   []4L    []5L   []6L via  NC   Chronic home O2 use?  [] Yes [] No  Perform O2 challenge test and document in progress note using [employer])      Last bowel movement Last Bowel Movement Date: 07/26/18      Urinary Catheter             LDAs               Peripheral IV 07/25/18 Left Forearm (Active)   Site Assessment Clean, dry, & intact 7/26/2018  7:42 AM   Phlebitis Assessment 0 7/26/2018  7:42 AM   Infiltration Assessment 0 7/26/2018  7:42 AM   Dressing Status Clean, dry, & intact 7/26/2018  7:42 AM   Dressing Type Transparent 7/26/2018  7:42 AM   Hub Color/Line Status Pink 7/26/2018  7:42 AM          PEG/Gastrostomy Tube (Active)   Site Assessment Clean, dry, & intact 7/26/2018  9:15 AM   Dressing Status Clean, dry, & intact 7/26/2018  9:15 AM   G Port Status Clamped 7/26/2018  9:15 AM   Gastric Residual (mL) 0 ml 7/26/2018  2:44 PM   Tube Feeding/Formula Options Other 7/26/2018  2:44 PM   Water Flush Volume (mL) 150 mL 7/26/2018  2:44 PM   Intake (ml) 250 ml 7/26/2018  2:44 PM   Medication Volume 50 ml 7/25/2018  6:49 PM                   Readmission Risk Assessment Tool Score High Risk            42       Total Score        3 Has Seen PCP in Last 6 Months (Yes=3, No=0)    2 . Living with Significant Other. Assisted Living. LTAC. SNF. or   Rehab    4 IP Visits Last 12 Months (1-3=4, 4=9, >4=11)    5 Pt.  Coverage (Medicare=5 , Medicaid, or Self-Pay=4)    28 Charlson Comorbidity Score (Age + Comorbid Conditions)        Criteria that do not apply:    Patient Length of Stay (>5 days = 3)       Expected Length of Stay 3d 7h   Actual Length of Stay 3

## 2018-07-26 NOTE — PROGRESS NOTES
Problem: Falls - Risk of  Goal: *Absence of Falls  Document Olayinka Fall Risk and appropriate interventions in the flowsheet. Outcome: Progressing Towards Goal  Fall Risk Interventions:  Mobility Interventions: Bed/chair exit alarm    Mentation Interventions: Adequate sleep, hydration, pain control    Medication Interventions: Bed/chair exit alarm    Elimination Interventions: Bed/chair exit alarm    History of Falls Interventions: Bed/chair exit alarm, Door open when patient unattended, Room close to nurse's station        Problem: Pressure Injury - Risk of  Goal: *Prevention of pressure injury  Document Mark Scale and appropriate interventions in the flowsheet. Outcome: Progressing Towards Goal  Pressure Injury Interventions:  Sensory Interventions: Assess changes in LOC, Check visual cues for pain    Moisture Interventions: Absorbent underpads, Minimize layers    Activity Interventions: Increase time out of bed    Mobility Interventions: Pressure redistribution bed/mattress (bed type)    Nutrition Interventions: Document food/fluid/supplement intake, Offer support with meals,snacks and hydration    Friction and Shear Interventions: HOB 30 degrees or less, Lift sheet, Minimize layers               Problem: Hypotension  Goal: *Blood pressure within specified parameters  Outcome: Progressing Towards Goal  Pt without orthostatic symptoms when transitioning from lying to sitting to standing.

## 2018-07-26 NOTE — DIABETES MGMT
DTC Progress Note Recommendations/ Comments: Noted Dr. Lola Fothergill following pt for medication recommendations. Please defer to him for medication recommendations. Current hospital DM medication:  
-Humalog normal sensitivity correction - Lantus 14 units, regular insulin, 14 units at breakfast, 12 units at 1400, 5 units at 2000 Chart reviewed on Vianey Catrachita. Patient is a 80 y.o. male with known history of Type 2 Diabetes on Lantus 14 units once daily and Humalog 13 units acB, 10 units acL and 7 units acD + SSI at home. A1c:  
Lab Results Component Value Date/Time Hemoglobin A1c 8.6 (H) 07/14/2018 04:40 AM  
 Hemoglobin A1c 8.6 (H) 06/04/2018 09:16 AM  
 
 
Recent Glucose Results:  
Lab Results Component Value Date/Time  (H) 07/26/2018 06:03 AM  
 GLUCPOC 170 (H) 07/26/2018 02:19 PM  
 GLUCPOC 231 (H) 07/26/2018 12:05 PM  
 GLUCPOC 112 (H) 07/26/2018 08:08 AM  
  
 
Lab Results Component Value Date/Time Creatinine 0.94 07/26/2018 06:03 AM  
 
Estimated Creatinine Clearance: 55.7 mL/min (based on Cr of 0.94). Active Orders Diet DIET NPO With Tube Feedings PO intake: No data found. Will continue to follow as needed. Thank you Blanca Meek [facility] Office: [phone]

## 2018-07-26 NOTE — PROGRESS NOTES
Pt's 1100 TF was held due to residual 150, I s/w Dr. Shant Barr re: pt's scheduled insulin at 1400, he stated to give 1400 insulin if pt's BG is > 120. Next TF is also due at 1400.

## 2018-07-26 NOTE — PROGRESS NOTES
Pt's brother at bedside, on phone with sister. Brother provided Dr. Obrien Counter card--discussed photographing and texting to sister on phone. Pt's blood pressures reviewed.

## 2018-07-26 NOTE — PROGRESS NOTES
Hospitalist Progress Note NAME: Vianey Domínguez :  1935 MRN:  [id number] Assessment / Plan: Hypotension POA 
-episode of hypotension at home which resolved, wife said he was very lethargic, SBP in the 70s - BP stable overnight, occasionally drops when he gets up 
-cardiology consult appreciated , likely due to hemodynamical changes due to AS with superimposed hypovolemia from the diarrhea last admit(? TF) and diuretics 
-Will follow, plan discharge to home in AM 
-PTconsult Acute kidney injury POA 
-difficult to tell true baseline , mostly 1.2-1.3 but last admission also presented with elevated cr at 1.82. This admission 1.63--> 1.47--> 1.00  
-Very frail and easily going from LIZ to fluid overload 
       difficult manage due to h/o severe AS  
-s/p very gentle hydration at 25 cc, now off 
- lower dose lasix -Avoid nephrotoxic drugs, adjust all meds to GFR.  
- diarrhea improved Diarrhea  
-+ had constipation last week treated with laxatives. Also, h/o diarrhea with TF in the hospital. Pt/wife didn't informed me that he had diarrhea prior to dc.  
-abd was tender yesterday , much better today 
-No diarrhea overnight, improved 
-C difficile negative 
-follow c.diff. KUB per GI. Lactic acidosis POA  
-no leukocytosis or fever.  
-suspect due to hypovolemia 
-resolved Recent Group B streptococcal bacteremia POA due to Possible aspiration pneumonia POA  
-4/ BC with Grp B strep on admission, repeated BC , - NTD  
-AB: he was DC home on cefdinir + flagyl x 7 days ( to complete 14 days A Rx) --> will c/w CTX + flagyl as last admission ( last dose AB ) -cxray on admission: Improved lung aeration with persistent left lung base opacity. 
-last admission w/u for bacteremia: 
-Recent increased back pain, had CT lumbar spine with chronic appearing DDD 
-ECHO wo vegetation,  completed antibiotics at home 
   
Chronic dysphagia 
-NPO at home with TF  [employer] 1.5 ( 5 cans) Last admission changed to 5 cans a day every 3 hr; per wife tolerated first 3 feedings ok but then had residuals Wife also considering changing to pump and continuous feeding if above will not work - Followed Dr Tarun Lovell OP for dysphasia, was considering EGD with possible attempt of dilatation to be done mid August   
-seen by Dr Tarun Lovell, help as greatly appreciated, will need to be off Plavix for 5 days.  
  
Chronic systolic HF EF 75%  
mod to severe AS POA  
Chronic orthostatic hypotension  
-no signs of fluid overload. HR/BP stable now  
-Mild elevated troponin - not ischemic   
-off IVF and still holding lasix 
-Resume in AM, 40 mg in am and 20 mg in afternoon. He is very frail / very thin euvolemic line --> will start with lasix 20 mg daily Very frail and easily going from LIZ /dehydration vs fluid overload  
-cont on home aspirin/plavix. Lipitor.  
-continue midodrine - Dr Galdino Oneil help was greatly appreciated: intermittent hypotension is likely due to AS. Hemodynamically he is preload dependent and thus would avoid any thing that drops preload (i.e. Nitrates) or over diuresis Following echo every couple of months. Once AS become critical he may qualify for repair. ( pt/wife is very hopeful for that) -ECHO: EF 40%, mild MR, mod to sev AS, mild TR  
   
DM type II  
-cont lantus + SS - adjust as needed  
-Dr Cantu Innocent following Oral candidiasis 
-oral nystatin and artificial  saliva    
 
R foot pain/swelling 
-for last 2-3 weeks per wife -x-rays and LE dopplers negative Chronic thrombocytopenia POA stable , followed with Dr Beryl Altamirano   
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 
   
Code status: Full Prophylaxis: lovenox ok with reduced dose and close watch of plt Baseline: extremely frail Recommended Disposition home with home health HI home in AM if stable Subjective: Chief Complaint / Reason for Physician Visit: following hypotension/ LIZ  
\"diarrhea is getting better\" Mildly sore in abdomen, very small loose stool today Anxious about going home and BP dropping Wife holds TF for residuals > 60, D/W Dr Mala Hansen, will use residuals > 125 cc Discussed with RN events overnight. Review of Systems: 
Symptom Y/N Comments  Symptom Y/N Comments Fever/Chills n   Chest Pain n   
Poor Appetite    Edema Cough n   Abdominal Pain y less Sputum    Joint Pain SOB/KRAMER n   Pruritis/Rash Nausea/vomit n   Tolerating PT/OT Diarrhea n   Tolerating Diet y Tube feed Constipation    Other Could NOT obtain due to:   
 
Objective: VITALS:  
Last 24hrs VS reviewed since prior progress note. Most recent are: 
Patient Vitals for the past 24 hrs: 
 Temp Pulse Resp BP SpO2  
07/26/18 1512 97.8 °F (36.6 °C) 78 18 139/71 100 % 07/26/18 1140 98 °F (36.7 °C) 80 18 102/49 95 %  
07/26/18 1114 - 89 - 133/85 -  
07/26/18 1103 - 91 - 105/67 92 %  
07/26/18 0703 98.5 °F (36.9 °C) 79 18 136/63 97 %  
07/26/18 0420 97.3 °F (36.3 °C) 77 20 136/71 99 % 07/26/18 0242 97.5 °F (36.4 °C) 74 20 133/57 94 %  
07/25/18 2332 98.1 °F (36.7 °C) 78 18 122/56 92 %  
07/25/18 1911 98.2 °F (36.8 °C) 77 20 123/69 96 % Intake/Output Summary (Last 24 hours) at 07/26/18 1717 Last data filed at 07/26/18 1444 Gross per 24 hour Intake             1950 ml Output                0 ml Net             1950 ml PHYSICAL EXAM: 
General: WD,thin. Alert, cooperative, no acute distress. Resp:             Few crackles at bases bilaterally, no wheezing. No accessory muscle use CV:  Regular  rhythm,  1+ edema GI:  Soft, Non distended, minimal tenderness +Bowel sounds Neurologic:  Alert and oriented X 3, normal speech, Psych:   Not anxious nor agitated Skin:  No rashes. No jaundice Extr:                R foot swollen/ tender on lower calf Reviewed most current lab test results and cultures  YES Reviewed most current radiology test results YES 
Review and summation of old records today    NO Reviewed patient's current orders and MAR    YES 
PMH/SH reviewed - no change compared to H&P 
________________________________________________________________________ Care Plan discussed with: 
  Comments Patient y Family RN y   
Care Manager Consultant Multidiciplinary team rounds were held today with , nursing, pharmacist and clinical coordinator. Patient's plan of care was discussed; medications were reviewed and discharge planning was addressed. ________________________________________________________________________ Total NON critical care TIME:  25  Minutes Total CRITICAL CARE TIME Spent:   Minutes non procedure based Comments >50% of visit spent in counseling and coordination of care    
________________________________________________________________________ Home Flanagan MD  
 
Procedures: see electronic medical records for all procedures/Xrays and details which were not copied into this note but were reviewed prior to creation of Plan. LABS: 
I reviewed today's most current labs and imaging studies. Pertinent labs include: 
Recent Labs  
   07/26/18 
 0603  07/25/18 
 0419 07/24/18 
 9985 WBC  5.7  5.1  4.6 HGB  12.1  11.2*  11.4* HCT  37.9  35.7*  37.0 PLT  77*  56*  63* Recent Labs  
   07/26/18 
 0603  07/25/18 
 0419  07/24/18 
 4568 NA  139  142  143  
K  4.2  4.1  4.0  
CL  104  108  110* CO2  30  30  29 GLU  114*  160*  173* BUN  16  12  17 CREA  0.94  0.93  1.03  
CA  8.5  8.8  9.0 Signed: Home Flanagan MD

## 2018-07-26 NOTE — TELEPHONE ENCOUNTER
I called and spoke to his wife and also his nurse. Discussed his sugars, plan for his next dose, and potential changes to regimen when discharged. Wife will think about options and let me know. Ie.   humalog pens 5 times with meals  Or  R insulin with vial/syringes only 3 times with meals    Natanael SHINE.  [address] Endocrinology  [facility]

## 2018-07-26 NOTE — PROGRESS NOTES
Pharmacy Medication Reconciliation     The patient was interviewed regarding current PTA medication list, use and drug allergies; patient's wife present in room and obtained permission from patient to discuss drug regimen with visitor(s) present. The patient was questioned regarding use of any other inhalers, topical products, over the counter medications, herbal medications, vitamin products or ophthalmic/nasal/otic medication use. Allergy Update: As documented in [facility]    Recommendations/Findings: The following amendments were made to the patient's active medication list on file at [facility]:   1) Additions: None    2) Deletions: cyanocobalamin (vitamin B-12 PO), midodrine 10 mg tablet (Proamitine PO) -- both of these medications were duplicates for the per G tube route medications and patient no longer takes anything PO (per patient's wife); vit-B Complex 3-FA-Vit C-Biotin (Nephro Renetta)    3) Changes:   - famotidine (PEPCID) 20 mg tablet instructions were changed from \"PO\" to \"Per G tube\"   - furosemide (LASIX) 20 mg tablet instructions were changed from \"20 mg 1 tablet daily\" to \"20 mg 1 tablet daily after lunch. \" Patient takes 40 mg in the morning and 20 mg in the afternoon  - pramipexole (MIRAPEX) 0.25 mg tablet instructions were changed from \"PO\" to \"Per G tube\"     4) Notes: tamsulosin (FLOMAX) 0.4 mg capsule - Patient's wife states he is not taking this medication as she was not given an Rx for this after previous discharge. It was on patient's discharge medication list to continue taking, but is duplicate therapy with afluzosin that patient does take at home.         -Clarified PTA med list with patient's wife. PTA medication list was corrected to the following:     Prior to Admission Medications   Prescriptions Last Dose Informant Patient Reported? Taking? HYDROcodone-acetaminophen (NORCO)  mg tablet  Significant Other Yes Yes   Si Tab by Per G Tube route daily as needed for Pain.    L. acidoph & paracasei- S therm- Bifido (JAVY-Q/RISAQUAD) 8 billion cell cap cap  Significant Other No Yes   Si Cap by PEG Tube route daily. LANTUS SOLOSTAR U-100 INSULIN 100 unit/mL (3 mL) inpn  Significant Other No Yes   Sig: INJECT 14 UNITS SUBCUTANEOUSLY ONCE DAILY   acetaminophen (TYLENOL) 500 mg tablet  Significant Other Yes Yes   Si,000 mg by Per G Tube route every six (6) hours as needed for Pain. alfuzosin SR (UROXATRAL) 10 mg SR tablet  Significant Other Yes Yes   Sig: 10 mg by Per G Tube route daily. aspirin (ASPIRIN) 325 mg tablet  Significant Other Yes Yes   Si mg by Per G Tube route daily. atorvastatin (LIPITOR) 40 mg tablet  Significant Other Yes Yes   Si mg by Per G Tube route daily. cefdinir (OMNICEF) 250 mg/5 mL suspension  Significant Other No Yes   Si mL by Per G Tube route two (2) times a day for 7 days. Start . Aware of PCN/ amoxicillin allergy but patient tolerated ceftriaxone infusion in the hospital.  Indications: STREPTOCOCCAL PNEUMONIA   clopidogrel (PLAVIX) 75 mg tab  Significant Other Yes Yes   Si mg by Feeding Tube route daily. cyanocobalamin 1,000 mcg tablet  Significant Other Yes Yes   Si,000 mcg by Per G Tube route daily. famotidine (PEPCID) 20 mg tablet  Significant Other Yes Yes   Si mg by Per G Tube route two (2) times a day. finasteride (PROSCAR) 5 mg tablet  Significant Other Yes Yes   Si mg by Per G Tube route nightly. fluticasone (FLONASE ALLERGY RELIEF) 50 mcg/actuation nasal spray  Significant Other Yes Yes   Si Sprays by Both Nostrils route daily. furosemide (LASIX) 20 mg tablet  Significant Other Yes Yes   Sig: Take 20 mg by mouth daily. In the afternoon   furosemide (LASIX) 40 mg tablet  Significant Other Yes Yes   Sig: Take 40 mg by mouth daily.  Patient takes 40 mg tablet in the morning, then 20 mg in the afternoon   gabapentin (NEURONTIN) 250 mg/5 mL solution  Significant Other Yes Yes   Si mg by PEG Tube route three (3) times daily. insulin lispro (HUMALOG KWIKPEN INSULIN) 100 unit/mL kwikpen  Significant Other No Yes   Si units with breakfast, 10 units with lunch, 7 units with dinner + correction insulin, up to 40 units/day   metoclopramide HCl (REGLAN) 10 mg tablet  Significant Other No Yes   Si Tab by Per G Tube route every eight (8) hours as needed for up to 10 days. Take for nausea as needed   metroNIDAZOLE (FLAGYL) 500 mg tablet  Significant Other No Yes   Si Tab by Per G Tube route three (3) times daily for 7 days. midodrine (PROAMITINE) 10 mg tablet  Significant Other Yes Yes   Sig: 10 mg by Per G Tube route three (3) times daily (with meals). multivitamin (ONE A DAY) tablet  Significant Other Yes Yes   Si Tab by Per G Tube route daily. nitroglycerin (NITROSTAT) 0.4 mg SL tablet  Significant Other Yes Yes   Si.4 mg by SubLINGual route every five (5) minutes as needed for Chest Pain. ondansetron hcl (ZOFRAN, AS HYDROCHLORIDE,) 8 mg tablet  Significant Other No Yes   Sig: Take 1 Tab by mouth every eight (8) hours as needed for Nausea. pramipexole (MIRAPEX) 0.25 mg tablet  Significant Other Yes Yes   Si.25 mg by Per G Tube route three (3) times daily. pyridoxine HCl, vitamin B6, (VITAMIN B-6 PO)  Significant Other Yes Yes   Si Tab by Per G Tube route daily. tamsulosin (FLOMAX) 0.4 mg capsule Not Taking at Unknown time Significant Other Yes No   Si.4 mg by Per G Tube route as needed (per urinary flow issues). zinc 50 mg tab tablet  Significant Other Yes Yes   Si mg by Per G Tube route daily. Facility-Administered Medications: None          Thank you,  Wilmer RAMOS  Candidate 2019

## 2018-07-26 NOTE — PROGRESS NOTES
Progress Note 7/26/2018 7:40 AM 
NAME: Michi Parker MRN:  [id number] Admit Diagnosis: Weakness Weakness Problem List: 1. Hypotension; resolved 2. LIZ; resolved 3. Indeterminate troponin elevation 4. Recent admission for bacteremia 5. Chronic systolic heart failure 6. Coronary artery disease s/p remote CABG.  Cath '13 w/ EF 40%, PCI to distal LAD via LIMA, severe disease in large diagonal (not amenable to PCI), patent SVG-OM, SVG-PDA, SVG-PLB.  Lexiscan 11/13 w/ EF 28%, IWMI, and with diagonal ischemia. 7. Lexiscan stress MPI 9/14 w/ EF 29% and anterior ischemia w/ IWMI 8. Ischemic cardiomyopathy 9. BiV ICD 10. Chronic kidney disease; Stg 3 11. Moderate to severe AoS (Echo 6/18 w/ EF 50%, inferoseptal HK, mild to mod AI, diffuse HK, 33mmHg gradient and 3.7m/s velocity across AoV w/ SUSAN 0.7cm2). 12. Diabetes 13. 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 Orthostatic hypotension 15. Hyperlipidemia 16. Head/neck CA w/ feeding tube 17. Chronic thrombocytopenia (Dr. Jo Ambriz) Assessment/Plan: BP better 1. Continue ASA and plavix 2. Continue midodrine 10mg TID 3. Working w/ PT/OT 4. Echo in Sept to eval valve 5. No new recs today 6. Can see my NP in the office in 2 weeks []       High complexity decision making was performed in this patient at high risk for decompensation with multiple organ involvement. Subjective:  
 
Michi Parker denies chest pain, dyspnea. Discussed with RN events overnight. Review of Systems: 
 
Symptom Y/N Comments  Symptom Y/N Comments Fever/Chills N   Chest Pain N Poor Appetite N   Edema N   
Cough N   Abdominal Pain N Sputum N   Joint Pain N   
SOB/KRAMER N   Pruritis/Rash N   
Nausea/vomit N   Tolerating PT/OT Y Diarrhea N   Tolerating Diet Y Constipation N   Other Could NOT obtain due to:   
 
Objective:  
  
Physical Exam: 
 
Last 24hrs VS reviewed since prior progress note. Most recent are: 
 
Visit Vitals  BP 136/63 (BP 1 Location: Left arm, BP Patient Position: At rest)  Pulse 79  Temp 98.5 °F (36.9 °C)  Resp 18  Ht 5' 7\" (1.702 m)  Wt 76 kg (167 lb 8.8 oz)  SpO2 97%  BMI 26.24 kg/m2 Intake/Output Summary (Last 24 hours) at 07/26/18 1101 Last data filed at 07/26/18 1983 Gross per 24 hour Intake             2450 ml Output                0 ml Net             2450 ml General Appearance: Well developed, well nourished, alert & oriented x 3,  
 no acute distress. Ears/Nose/Mouth/Throat: Hearing grossly normal. 
Neck: Supple. Chest: Lungs clear to auscultation bilaterally. Cardiovascular: Regular rate and rhythm, S1S2 normal, no murmur. Abdomen: Soft, non-tender, bowel sounds are active. Extremities: No edema bilaterally. Skin: Warm and dry. []         Post-cath site without hematoma, bruit, tenderness, or thrill. Distal pulses intact. PMH/ reviewed - no change compared to H&P Data Review Telemetry: normal sinus rhythm EKG:  
[]  No new EKG for review Lab Data Personally Reviewed: 
 
Recent Labs  
   07/26/18 
 2434  07/25/18 
 9656 WBC  5.7  5.1 HGB  12.1  11.2* HCT  37.9  35.7*  
PLT  77*  56* No results for input(s): INR, PTP, APTT in the last 72 hours. No lab exists for component: INREXT, INREXT Recent Labs  
   07/26/18 
 0603  07/25/18 
 0419  07/24/18 
 4576 NA  139  142  143  
K  4.2  4.1  4.0  
CL  104  108  110* CO2  30  30  29 BUN  16  12  17 CREA  0.94  0.93  1.03  
GLU  114*  160*  173* CA  8.5  8.8  9.0 No results for input(s): CPK, CKNDX, TROIQ in the last 72 hours. No lab exists for component: CPKMB Lab Results Component Value Date/Time  Cholesterol, total 123 06/04/2018 09:16 AM  
 HDL Cholesterol 35 (L) 06/04/2018 09:16 AM  
 LDL, calculated 62 06/04/2018 09:16 AM  
 Triglyceride 132 06/04/2018 09:16 AM  
 CHOL/HDL Ratio 3.6 02/02/2017 02:55 AM  
 
 
No results for input(s): SGOT, GPT, AP, TBIL, TP, ALB, GLOB, GGT, AML, LPSE in the last 72 hours. No lab exists for component: AMYP, HLPSE No results for input(s): PH, PCO2, PO2 in the last 72 hours. Medications Personally Reviewed: 
 
Current Facility-Administered Medications Medication Dose Route Frequency  famotidine (PF) (PEPCID) 20 mg in sodium chloride 0.9% 10 mL injection  20 mg IntraVENous DAILY PRN  
 bacitracin zinc (BACITRACIN) 500 unit/gram ointment   Topical TID  simethicone (MYLICON) tablet 319 mg  160 mg Per G Tube QID  furosemide (LASIX) tablet 20 mg  20 mg Oral DAILY  famotidine (PEPCID) tablet 20 mg  20 mg Per G Tube BID  insulin regular (NOVOLIN R, HUMULIN R) injection 14 Units  14 Units SubCUTAneous Daily  phenyleph-min oil-petrolatum (PREPARATION H) 0.25-14-74.9 % ointment   PeriANAL QID PRN  
 aspirin chewable tablet 81 mg  81 mg Oral DAILY  guaiFENesin (ROBITUSSIN) 100 mg/5 mL oral liquid 300 mg  300 mg Oral TID  insulin regular (NOVOLIN R, HUMULIN R) injection 12 Units  12 Units SubCUTAneous Daily  insulin regular (NOVOLIN R, HUMULIN R) injection 5 Units  5 Units SubCUTAneous Daily  insulin glargine (LANTUS) injection 14 Units  14 Units SubCUTAneous DAILY  cefTRIAXone (ROCEPHIN) 1 g in 0.9% sodium chloride (MBP/ADV) 50 mL  1 g IntraVENous Q24H  
 metroNIDAZOLE (FLAGYL) IVPB premix 500 mg  500 mg IntraVENous Q12H  
 acetaminophen (TYLENOL) solution 650 mg  650 mg Per G Tube Q4H PRN  
 ondansetron (ZOFRAN) injection 4 mg  4 mg IntraVENous Q4H PRN  
 nystatin (MYCOSTATIN) 100,000 unit/mL oral suspension 500,000 Units  500,000 Units Oral QID  artificial saliva (MOUTH KOTE) 1 Spray  1 Spray Oral PRN  
 enoxaparin (LOVENOX) injection 30 mg  30 mg SubCUTAneous Q24H  
 HYDROcodone-acetaminophen (HYCET) 0.5-21.7 mg/mL oral solution 5 mg  5 mg Oral Q6H PRN  
 atorvastatin (LIPITOR) tablet 40 mg  40 mg Per G Tube DAILY  clopidogrel (PLAVIX) tablet 75 mg  75 mg PEG Tube DAILY  finasteride (PROSCAR) tablet 5 mg  5 mg Oral QHS  gabapentin (NEURONTIN) 250 mg/5 mL solution 750 mg  750 mg Oral TID  midodrine (PROAMITINE) tablet 10 mg  10 mg Per G Tube TID WITH MEALS  pramipexole (MIRAPEX) tablet 0.25 mg  0.25 mg Oral TID  sodium chloride (NS) flush 5-10 mL  5-10 mL IntraVENous Q8H  
 sodium chloride (NS) flush 5-10 mL  5-10 mL IntraVENous PRN  
 glucose chewable tablet 16 g  4 Tab Oral PRN  
 dextrose (D50W) injection syrg 12.5-25 g  12.5-25 g IntraVENous PRN  
 glucagon (GLUCAGEN) injection 1 mg  1 mg IntraMUSCular PRN Alyssia Field III, DO

## 2018-07-26 NOTE — PROGRESS NOTES
1100  Cardiopulmonary Care Interdisciplinary rounds were held today to discuss patient's plan of care and outcomes. The following members were present: NP/Physician, Pharmacy, Nursing and Case Management. Expected Length of Stay:  3d 7h    Plan of Care: Continue current treatment plan, possible discharge home tomorrow.

## 2018-07-26 NOTE — PROGRESS NOTES
Problem: Mobility Impaired (Adult and Pediatric)  Goal: *Acute Goals and Plan of Care (Insert Text)  Physical Therapy Goals  Initiated 7/26/2018  1. Patient will move from supine to sit and sit to supine , scoot up and down and roll side to side in bed with independence within 7 day(s). 2.  Patient will transfer from bed to chair and chair to bed with independence using the least restrictive device within 7 day(s). 3.  Patient will perform sit to stand with independence within 7 day(s). 4.  Patient will ambulate with modified independence for 300 feet with the least restrictive device within 7 day(s). 5.  Patient will ascend/descend 7 stairs with 1 handrail(s) with modified independence within 7 day(s). physical Therapy EVALUATION  Patient: Noel Machado (77 y.o. male)  Date: 7/26/2018  Primary Diagnosis: Weakness  Weakness        Precautions:        ASSESSMENT :  Based on the objective data described below, the patient presents with impaired functional mobility secondary to impaired standing balance, impaired gait mechanics, generalized weakness, decreased AROM, chronic MALINDA shoulder and back pain, decreased safety awareness, and decreased activity tolerance following admission for weakness. Pt received supine in bed with wife present and agreeable to PT evaluation. Pt cleared by nursing for mobility. Bed mobility performed with SBA. Pt with c/o mild dizziness upon sitting EOB with VSS on RA, however pt notes that he always is dizzy. Sit<>stand transfers performed with CGA/SBA. He ambulated to/from the bathroom with CGA x 1 and BP stable post-activity in the bathroom. He ambulated additional 180 ft with CGA x 1 using RW, however needs min A for safety for RW management. Exhibited mild deviations with slow gait speed overall during ambulation, however no LOB noted.  Pt was assisted into bedside chair and left with all needs met, VSS on RA, wife present, RN aware, and chair alarm on following therapy session. Anticipate pt will continue to progress well in acute PT and return home with 24/7 supervision and HHPT. PT will continue to follow to address mobility impairments as noted above. Recommend stair training and 6MWT during next therapy session as able and appropriate. Recommend with nursing patient to complete as able in order to maintain strength, endurance and independence: OOB to chair 3x/day and walking daily with CG assist. Thank you for your assistance. Patient will benefit from skilled intervention to address the above impairments. Patients rehabilitation potential is considered to be Fair  Factors which may influence rehabilitation potential include:   []         None noted  []         Mental ability/status  [x]         Medical condition  []         Home/family situation and support systems  []         Safety awareness  []         Pain tolerance/management  []         Other:      PLAN :  Recommendations and Planned Interventions:  [x]           Bed Mobility Training             []    Neuromuscular Re-Education  [x]           Transfer Training                   []    Orthotic/Prosthetic Training  [x]           Gait Training                         []    Modalities  [x]           Therapeutic Exercises           []    Edema Management/Control  [x]           Therapeutic Activities            [x]    Patient and Family Training/Education  []           Other (comment):    Frequency/Duration: Patient will be followed by physical therapy  4 times a week to address goals. Discharge Recommendations: Home Health and 24/7 supervision/assistance  Further Equipment Recommendations for Discharge: TBD - likely none     SUBJECTIVE:   Patient stated I need to go to the bathroom.     OBJECTIVE DATA SUMMARY:   HISTORY:    Past Medical History:   Diagnosis Date    Antiplatelet or antithrombotic long-term use 12/4/2014    Anxiety disorder     Arrhythmia 2009    bradycardia    Arthritis     CAD (coronary artery disease)     s/p CABG 2002; Dr Rocio Morocho [facility]) 1996    tongue/throat cancer s/p surgery / radiation and 1 dose of chemo    Carotid artery stenosis     s/p bilateral stents    Chronic pain     left leg, lower back,     Depression     Diabetes ([facility] [address]     Type II    Esophageal dysmotility     s/p dilitation    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 I suspect the latter. Achalasia  is not present.         GERD (gastroesophageal reflux disease)     Heart failure ([facility] [address] 10/2014     Cardiomyopathy:Pacemaker upgrade:Biv and AICD  Dr. Tess Zurita heart DrAlvaro last visit 5/11/2015    Hepatitis C Dx 1996    treated at [facility] in past; as of 4/15/15 wife states pt currently not under any treatment    Hyperlipidemia     Hypertension     Myocardial infarct [facility]) 2013    Heart Cath: 40% LV EF, Stented distal LAD, patent Graft to circumflex    On tube feeding diet approx 2009    still has as of 9/28/15  (no po food/liquid/meds at all); Dr Dakota Ureña Other ill-defined conditions(799.89) 1996    1 dose of chemotherapy/radiation for tongue cancer    Other ill-defined conditions(799.89)     BPH    Other ill-defined conditions(799.89)     orthostatic hypotension    Pneumonia ~ April -May 2010    Stroke [facility]) approx 2003    left side-left finger tips numb; no imbalance or memory loss; as of 2015 not seeing neuro MD    Suicidal thoughts      Past Surgical History:   Procedure Laterality Date    ABDOMEN SURGERY [address]    peg tube    CABG, ARTERY-VEIN, THREE  2000    HX CATARACT REMOVAL      bilateral    HX CHOLECYSTECTOMY      HX HEART CATHETERIZATION  2013    Stented distal LAD    HX MOHS PROCEDURES      bilateral    HX ORTHOPAEDIC      back surgery times two    HX OTHER SURGICAL      Radical Left Neck    HX OTHER SURGICAL      NASAL POLYPS REMOVAL    HX OTHER SURGICAL  2010 TURP    HX PACEMAKER  11/08    HX PACEMAKER  10/28/14     Defibrillator: [facility] # [id number], serial # [id number]; Dr. Margarita Villarreal [phone]; Dr Nacho Borjas      cystoscopy   [address]    cevical surgery    IA CHANGE GASTROSTOMY TUBE  5/2/2011         IA CHANGE GASTROSTOMY TUBE  6/29/2011         IA EGD INSERT GUIDE WIRE DILATOR PASSAGE ESOPHAGUS  9/13/2010         IA EGD TRANSORAL BIOPSY SINGLE/MULTIPLE  9/13/2010         STOMACH SURGERY PROCEDURE UNLISTED  6/29/2011         VASCULAR SURGERY PROCEDURE UNLIST      bilateral carotid stents     Prior Level of Function/Home Situation: Pt is mod I with rollator for ambulation at baseline. Lives with supportive wife who assists pt as needed. Does have h/o recent GLFs. Personal factors and/or comorbidities impacting plan of care: HF; h/o CVA    Home Situation  Home Environment: Private residence  # Steps to Enter: 7  Rails to Enter: Yes  Hand Rails : Right  One/Two Story Residence: One story  Living Alone: No  Support Systems: Child(lisa), Family member(s), Spouse/Significant Other/Partner  Patient Expects to be Discharged to[de-identified] Private residence  Current DME Used/Available at Home: Kandy Real, rollator, Wheelchair, [address] chair  Tub or Shower Type: Shower    EXAMINATION/PRESENTATION/DECISION MAKING:   Critical Behavior:  Neurologic State: Alert  Orientation Level: Oriented X4  Cognition: Appropriate decision making, Appropriate for age attention/concentration, Appropriate safety awareness, Follows commands     Hearing:   Auditory  Auditory Impairment: None, Hard of hearing, bilateral  Skin:  Intact  Edema: Mild BUE edema  Range Of Motion:  AROM: Generally decreased, functional                       Strength:    Strength: Generally decreased, functional                    Tone & Sensation:   Tone: Normal                              Coordination:  Coordination: Within functional limits  Vision:      Functional Mobility:  Bed Mobility:  Rolling: Stand-by assistance  Supine to Sit: Stand-by assistance     Scooting: Stand-by assistance  Transfers:  Sit to Stand: Contact guard assistance;Stand-by assistance  Stand to Sit: Stand-by assistance                       Balance:   Sitting: Intact  Standing: Impaired; With support  Standing - Static: Good  Standing - Dynamic : Fair  Ambulation/Gait Training:  Distance (ft): 195 Feet (ft)  Assistive Device: Gait belt;Walker, rolling  Ambulation - Level of Assistance: Contact guard assistance;Minimal assistance;Assist x1;Additional time; Adaptive equipment     Gait Description (WDL): Exceptions to WDL  Gait Abnormalities: Decreased step clearance; Path deviations        Base of Support: Widened     Speed/Gertrudis: Pace decreased (<100 feet/min); Fluctuations  Step Length: Left shortened;Right shortened      Functional Measure:  Barthel Index:    Bathin  Bladder: 10  Bowels: 10  Groomin  Dressing: 10  Feeding: 10  Mobility: 10  Stairs: 5  Toilet Use: 5  Transfer (Bed to Chair and Back): 10  Total: 70       Barthel and G-code impairment scale:  Percentage of impairment CH  0% CI  1-19% CJ  20-39% CK  40-59% CL  60-79% CM  80-99% CN  100%   Barthel Score 0-100 100 99-80 79-60 59-40 20-39 1-19   0   Barthel Score 0-20 20 17-19 13-16 9-12 5-8 1-4 0      The Barthel ADL Index: Guidelines  1. The index should be used as a record of what a patient does, not as a record of what a patient could do. 2. The main aim is to establish degree of independence from any help, physical or verbal, however minor and for whatever reason. 3. The need for supervision renders the patient not independent. 4. A patient's performance should be established using the best available evidence. Asking the patient, friends/relatives and nurses are the usual sources, but direct observation and common sense are also important. However direct testing is not needed.   5. Usually the patient's performance over the preceding 24-48 hours is important, but occasionally longer periods will be relevant. 6. Middle categories imply that the patient supplies over 50 per cent of the effort. 7. Use of aids to be independent is allowed. Sha Gooden., Barthel, D.W. (0210). Functional evaluation: the Barthel Index. [address] [facility] (14)2. Melinda Higgins lawson NOEL Cee, Mercy Jones., Jadyn Pozo, [place], [address] (1999). Measuring the change indisability after inpatient rehabilitation; comparison of the responsiveness of the Barthel Index and Functional [place] Measure. Journal of Neurology, Neurosurgery, and Psychiatry, 66(4), 045-938. Monique Jacob, N.[place].DIEGO, QIANA Gifford, & Jose Kiser MJUANITA. (2004.) Assessment of post-stroke quality of life in cost-effectiveness studies: The usefulness of the Barthel Index and the EuroQoL-5D. Quality of Life Research, 13, 646-65       G codes: In compliance with CMSs Claims Based Outcome Reporting, the following G-code set was chosen for this patient based on their primary functional limitation being treated: The outcome measure chosen to determine the severity of the functional limitation was the Barthel Index with a score of 70/100 which was correlated with the impairment scale.     ? Mobility - Walking and Moving Around:     - CURRENT STATUS: CJ - 20%-39% impaired, limited or restricted    - GOAL STATUS: CI - 1%-19% impaired, limited or restricted    - D/C STATUS:  ---------------To be determined---------------      Physical Therapy Evaluation Charge Determination   History Examination Presentation Decision-Making   MEDIUM  Complexity : 1-2 comorbidities / personal factors will impact the outcome/ POC  HIGH Complexity : 4+ Standardized tests and measures addressing body structure, function, activity limitation and / or participation in recreation  MEDIUM Complexity : Evolving with changing characteristics  Other outcome measures Barthel Index  MEDIUM      Based on the above components, the patient evaluation is determined to be of the following complexity level: MEDIUM    Pain:  Pain Scale 1: Numeric (0 - 10)  Pain Intensity 1: 2              Activity Tolerance:   Fair/good - VSS prior to and after activity on RA; mild fatigue with activity; 3-4/10 chronic pain in MALINDA shoulders and back  Please refer to the flowsheet for vital signs taken during this treatment. After treatment:   [x]         Patient left in no apparent distress sitting up in chair  []         Patient left in no apparent distress in bed  [x]         Call bell left within reach  [x]         Nursing notified  [x]         Caregiver present  [x]         Bed alarm activated    COMMUNICATION/EDUCATION:   The patients plan of care was discussed with: Physical Therapist, Occupational Therapist and Registered Nurse. [x]         Fall prevention education was provided and the patient/caregiver indicated understanding. [x]         Patient/family have participated as able in goal setting and plan of care. [x]         Patient/family agree to work toward stated goals and plan of care. []         Patient understands intent and goals of therapy, but is neutral about his/her participation. []         Patient is unable to participate in goal setting and plan of care.     Thank you for this referral.  Migdalia Ann, PT, DPT   Time Calculation: 31 mins

## 2018-07-27 NOTE — PROGRESS NOTES
The objective of todays visit was to review the transitional care plan with the patient. We discussed the importance of transitional care as it relates to reducing the likelihood of readmission. We reviewed the goals for the first 30 days following hospital discharge. The patient and I discussed wrap around services including nurse navigation, care coordination, home health, transitional care appointments with their primary care provider and specialist team and the role of dispatch health. Patient education focused on readmission zones as described as  The Red Zone: High risk for readmission, days 1-21  The Yellow Zone: Moderate risk for readmission, days 22-29  The Green Zone: Lower risk for readmission, days 30 and after    The patient was instructed on worrisome symptoms for worsening of their medical conditions and sepsis. Learning was assessed using teach back in the form of role playing. The patient identified appropriate wrap around services during this interaction. A written individual care plan was reviewed with the patient as well today. The patient expressed the following specific concerns regarding their discharge  None  Reviewed discharge plan with follow up appointments. Pt spouse states still has appointment set for 7/31 from preceding discharge. Franky discharge lists follow up appointment set for 8/2. Encouraged spouse to call office to insure she attends correct appointment but that the earlier appointment would be better to see how he is progressing with home health services after this episode of significant illness.

## 2018-07-27 NOTE — FACE TO FACE
Home Health Care Discharge Planning: [facility] Face to Face Encounter NAME: Trini Dennis :  1935 MRN:  [id number] Primary Diagnosis: Active Problems: 
  Weakness (2017) Date of Face to Face:  2018 11:07 AM        
                        
Face to Face Encounter findings are related to primary reason for home care:   YES 
 
1. I certify that the patient needs intermittent skilled nursing care, physical therapy and/or speech therapy. I will not be following this patient in the Community and Dr. Phyllis Mcmahon MD will be responsible for signing the [address] 2. Initial Orders for Care: {orders:[id number]} 3. I certify that this patient is homebound because of illness, need the aid of supportive devices such as walker and the assistance of another person in order to leave their place of residence. There exists a normal inability to leave home and leaving home requires a considerable and taxing effort. 4. I certify that this patient is under my care and that I had a Face-to-Face Encounter that meets the physician Face-to-Face Encounter requirements. Pt admitted to Hospital  From 2018 to 2018, I saw the patient on the day of discharge. Seen by Physical therapy and occupational therapy in house and recommended that home therapy be set up. Document the physical findings from the Face-to-Face Encounter that support the need for skilled services: {findings:[id number]} Verner Barrio, MD 
Discharging Physician Office:  [phone] Fax:    [phone]

## 2018-07-27 NOTE — DISCHARGE SUMMARY
Hospitalist Discharge Note NAME: Melissa Limon :  1935 MRN:  [id number] Admit date: 2018 Discharge date: 18 PCP: Nicole Canada MD 
 
Discharge Diagnoses: Hypotension SBP 70s at home POA Acute kidney injury POA resolved Diarrhea POA C difficle negative Lactic acidosis POA suspect due to hypovolemia Recent Group B streptococcal bacteremia POA due to Possible aspiration pneumonia POA  
   
Chronic dysphagia 
  
Chronic systolic HF EF 50% POA 
 
mod to severe AS POA  
 
Chronic orthostatic hypotension  
   
DM type II POA Oral candidiasis   
 
R foot pain/swelling Chronic thrombocytopenia POA stable , followed with Dr Lexis Goode   
 
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 
   
Code status: Full Discharge Medications: 
Current Discharge Medication List  
  
START taking these medications Details  
aspirin 81 mg chewable tablet Take 1 Tab by mouth daily. Qty: 30 Tab, Refills: 6  
  
insulin regular (NOVOLIN R, HUMULIN R) 100 unit/mL injection 14 units at 8 am, 12 units at 2 pm, 5 units at 8 pm 
Qty: 1 Vial, Refills: 6  
  
insulin syringe-needle U-100 0.3 mL 31 gauge x 15/64\" syrg Use as directed Qty: 100 Pen Needle, Refills: 6 CONTINUE these medications which have CHANGED Details  
furosemide (LASIX) 40 mg tablet Take 1 Tab by mouth daily. Qty: 40 Tab, Refills: 6 CONTINUE these medications which have NOT CHANGED Details  
famotidine (PEPCID) 20 mg tablet 20 mg by Per G Tube route two (2) times a day. pramipexole (MIRAPEX) 0.25 mg tablet 0.25 mg by Per G Tube route three (3) times daily. L. acidoph & paracasei- S therm- Bifido (JAVY-Q/RISAQUAD) 8 billion cell cap cap 1 Cap by PEG Tube route daily. Qty: 30 Cap, Refills: 0  
  
metoclopramide HCl (REGLAN) 10 mg tablet 1 Tab by Per G Tube route every eight (8) hours as needed for up to 10 days.  Take for nausea as needed 
Qty: 20 Tab, Refills: 0  
  
acetaminophen (TYLENOL) 500 mg tablet 1,000 mg by Per G Tube route every six (6) hours as needed for Pain. atorvastatin (LIPITOR) 40 mg tablet 40 mg by Per G Tube route daily. clopidogrel (PLAVIX) 75 mg tab 75 mg by Feeding Tube route daily. cyanocobalamin 1,000 mcg tablet 1,000 mcg by Per G Tube route daily. finasteride (PROSCAR) 5 mg tablet 5 mg by Per G Tube route nightly. alfuzosin SR (UROXATRAL) 10 mg SR tablet 10 mg by Per G Tube route daily. midodrine (PROAMITINE) 10 mg tablet 10 mg by Per G Tube route three (3) times daily (with meals). pyridoxine HCl, vitamin B6, (VITAMIN B-6 PO) 1 Tab by Per G Tube route daily. HYDROcodone-acetaminophen (NORCO)  mg tablet 1 Tab by Per G Tube route daily as needed for Pain. fluticasone (FLONASE ALLERGY RELIEF) 50 mcg/actuation nasal spray 2 Sprays by Both Nostrils route daily. zinc 50 mg tab tablet 50 mg by Per G Tube route daily. LANTUS SOLOSTAR U-100 INSULIN 100 unit/mL (3 mL) inpn INJECT 14 UNITS SUBCUTANEOUSLY ONCE DAILY Qty: 15 Pen, Refills: 5 Associated Diagnoses: 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 ([address] Essential hypertension with goal blood pressure less than 140/90; Peripheral polyneuropathy  
  
nitroglycerin (NITROSTAT) 0.4 mg SL tablet 0.4 mg by SubLINGual route every five (5) minutes as needed for Chest Pain. ondansetron hcl (ZOFRAN, AS HYDROCHLORIDE,) 8 mg tablet Take 1 Tab by mouth every eight (8) hours as needed for Nausea. Qty: 20 Tab, Refills: 0  
  
gabapentin (NEURONTIN) 250 mg/5 mL solution 750 mg by PEG Tube route three (3) times daily. multivitamin (ONE A DAY) tablet 1 Tab by Per G Tube route daily. tamsulosin (FLOMAX) 0.4 mg capsule 0.4 mg by Per G Tube route as needed (per urinary flow issues).   
  
  
STOP taking these medications  
  
 cefdinir (OMNICEF) 250 mg/5 mL suspension Comments: Reason for Stopping:   
   
 metroNIDAZOLE (FLAGYL) 500 mg tablet Comments:  
Reason for Stopping:   
   
 aspirin (ASPIRIN) 325 mg tablet Comments:  
Reason for Stopping:   
   
 insulin lispro (HUMALOG KWIKPEN INSULIN) 100 unit/mL kwikpen Comments:  
Reason for Stopping: Follow-up Information Follow up With Details Comments Contact Linh Leigh MD On 8/2/2018 For hospital follow up appointment at 11:00AM  [address] [facility] 
[phone] Harish Casper MD  Set up for EGD and dilatation of esophagus on 8/17/20-18 at 10:30 am [address] [facility] [address] [facility] 
[phone] Time spent on discharge:   I spent greater than 30 minutes on discharge, seeing and examining the patient, reconciling home meds and new meds, coordinating care with case management, doing the discharge papers and the D/C summary Discharge disposition: home with home health Discharge Condition: Stable Summary of admission H+P(copied from Dr Sunny Soliz Note): CHIEF COMPLAINT: weakness and feeling dizzy 
  
HISTORY OF PRESENT ILLNESS:    
Vianey Domínguez is a 80 y.o. PMH significant for orthostatic hypotension, Dysphagia,DM,EF 40%, Ao stenosis who was d/c yesterday from here after GB strep bacteremia. He went home was doing ok and apparently today he was feeling weak, dizzy and hard of hearing in the left ear. For those reasons patient was taken to the ER for further eval 
  We were asked to admit for work up and evaluation of the above problems.  
  
    
Past Medical History:  
Diagnosis Date  Antiplatelet or antithrombotic long-term use 12/4/2014  Anxiety disorder    
 Arrhythmia 2009  
  bradycardia  Arthritis    
 CAD (coronary artery disease)    
  s/p CABG 2002; Dr Tran Barroso  [facility] 1996  
  tongue/throat cancer s/p surgery / radiation and 1 dose of chemo  Carotid artery stenosis    
  s/p bilateral stents  Chronic pain    
  left leg, lower back,   
 Depression    
 Diabetes ([facility]    
  Type II  
 Esophageal dysmotility    
  s/p dilitation  Esophageal motility disorder 7/8/2013  
  Frequent simultaneous or failed contractions, low amplitude contractions  suggests severe myopathy or diffuse spasm. I suspect the latter. Achalasia  is not present.  GERD (gastroesophageal reflux disease)    
 Heart failure ([facility] 10/2014  
   Cardiomyopathy:Pacemaker upgrade:Biv and AICD  Dr. Tess Zurita heart  last visit 5/11/2015  Hepatitis C Dx 1996  
  treated at [facility] in past; as of 4/15/15 wife states pt currently not under any treatment  Hyperlipidemia    
 Hypertension    
 Myocardial infarct [facility]) 2013  
  Heart Cath: 40% LV EF, Stented distal LAD, patent Graft to circumflex  On tube feeding diet approx 2009  
  still has as of 9/28/15  (no po food/liquid/meds at all); Dr Vaughn Dance  Other ill-defined conditions(799.89) 1996  
  1 dose of chemotherapy/radiation for tongue cancer  Other ill-defined conditions(799.89)    
  BPH  Other ill-defined conditions(799.89)    
  orthostatic hypotension  Pneumonia ~ April -May 2010  Stroke [facility]) approx 2003  
  left side-left finger tips numb; no imbalance or memory loss; as of 2015 not seeing neuro MD  
 Suicidal thoughts Admit abdominal-x-rays INDICATION:  colonic distention The bowel gas pattern is nonobstructive and unremarkable. There is no 
significant stool. There is no free intraperitoneal air. There is no evident 
organomegaly or significant intraabdominal calcification. PEG tube appears 
satisfactorily positioned. The lungs are hypoexpanded without consolidation. There is no overt pulmonary 
edema. There is prior CABG. ICD is present. No pneumothorax, midline shift or 
apparent pleural effusion. IMPRESSION:  No acute disease.  
 
Right foot X-rays FINDINGS:   
Three views of the right foot demonstrate no fracture or other acute 
osseous or articular abnormality. There is mild osteoarthritis. There is no 
bony erosion or periostitis. There is vascular calcification. There is a plantar 
calcaneal spur. IMPRESSION:  No acute abnormality. Right tib/fib X-rays FINDINGS 
2 views of the right tibia/fibula submitted for review. No evidence for acute fracture or dislocation. No destructive osseous process. Mild to moderate degenerative changes in the right knee and ankle joints IMPRESSION: 
No evidence for acute abnormality US Right LE Right leg duplex venous Doppler examination was performed from the inguinal 
ligament to the midcalf. Deep venous structures are compressible throughout. Both wave form and color flow analyses demonstrated normal flow patterns. Augmentation was demonstrable. IMPRESSION:  NO DVT OF THE RIGHT LEG. Hospital course: Hypotension POA 
-episode of hypotension at home which resolved, wife said he was very lethargic, SBP in the 70s - BP stable after admit, IVF and holding BP meds 
- occasionally drops when he gets up 
-cardiology consult appreciated , likely due to hemodynamical changes due to AS with superimposed hypovolemia from the diarrhea last admit(? TF) and diuretics 
-Will follow, plan discharge to home with home health 
-Reduced dose of diuretic at discharge -PTconsult Acute kidney injury POA 
-difficult to tell true baseline , mostly 1.2-1.3 but last admission also presented with elevated cr at 1.82. This admission 1.63--> 1.47--> 1.00  
-Very frail and easily going from LIZ to fluid overload 
       difficult manage due to h/o severe AS  
- Suspect hypovolemia with diuretic use and diarrhea from hospital TF at last admit 
-s/p very gentle hydration at 25 cc, now off 
- lower dose lasix at discharge 
-Avoid nephrotoxic drugs, adjust all meds to GFR.  
- diarrhea improved Diarrhea  
- had constipation last week treated with laxatives.  
- diarrhea with TF in the hospital during last admit, was not using his usual home TF during last admit, also was receiving antibiotics. - this admit used his home TF and finished up the antibiotics 
-No diarrhea overnight, improved 
-C difficile negative Lactic acidosis POA  
-no leukocytosis or fever.  
-suspect due to hypovolemia 
-resolved with IVF Recent Group B streptococcal bacteremia POA due to Possible aspiration pneumonia POA  
-4/4 BC with Grp B strep on admission, repeated BC 7/16,17 - NTD  
-AB: he was DC home on cefdinir + flagyl x 7 days ( to complete 14 days A Rx) --> will c/w CTX + flagyl as last admission ( last dose AB 7/26) -cxray on admission: Improved lung aeration with persistent left lung base opacity. 
-last admission w/u for bacteremia: 
-Recent increased back pain, had CT lumbar spine with chronic appearing DDD 
-ECHO wo vegetation,  completed antibiotics at home 
   
Chronic dysphagia 
-NPO at home with TF  IsoSource 1.5 ( 5 cans) Last admission changed to 5 cans a day every 3 hr; per wife tolerated first 3 feedings ok but then had residuals Wife also considering changing to pump and continuous feeding if above will not work - Followed by Dr Papi Clayton, set up for EGD with possible attempt of dilatation to be done mid August   
-seen by Dr Papi Clayotn, help as greatly appreciated, will need to be off Plavix for 5 days.  
  
Chronic systolic HF EF 75%  
mod to severe AS POA  
Chronic orthostatic hypotension  
-no signs of fluid overload. HR/BP stable now  
-Mild elevated troponin - not ischemic   
-off IVF and still holding lasix 
-Resume in AM, 40 mg in am, hold PM dose - Very frail and easily going from LIZ /dehydration vs fluid overload  
- cont on home aspirin/plavix. Lipitor.  
- continue midodrine - Dr Patsy Saavedra help was greatly appreciated: intermittent hypotension is likely due to AS.  Hemodynamically he is preload dependent and thus would avoid any thing that drops preload (i.e. Nitrates) or over diuresis Following echo every couple of months. Once AS become critical he may qualify for repair. ( pt/wife is very hopeful for that) -ECHO: EF 40%, mild MR, mod to sev AS, mild TR  
   
DM type II  
-cont lantus + SS - adjust as needed  
-Dr Rafita Alcantara following, changed to regular insulin to avoid hypoglycemia with the new TF regimen, TF was adjusted has residuals were common at home Oral candidiasis 
-oral nystatin and artificial  saliva    
 
R foot pain/swelling 
-for last 2-3 weeks per wife -x-rays and LE dopplers negative Chronic thrombocytopenia POA stable , followed with Dr Ursula Rondon   
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 
   
Code status: Full Prophylaxis: lovenox ok with reduced dose and close watch of plt Baseline: extremely frail Recommended Disposition home with home health [place] home in AM if stable Subjective: Chief Complaint / Reason for Physician Visit: following hypotension/ LIZ \"No problems\" Stools are getting more solid, BP stable Feels ready to go home, D/W patient and wife Discussed with RN events overnight. Review of Systems: 
Symptom Y/N Comments  Symptom Y/N Comments Fever/Chills n   Chest Pain n   
Poor Appetite    Edema Cough n   Abdominal Pain y less Sputum    Joint Pain SOB/KRAMER n   Pruritis/Rash Nausea/vomit n   Tolerating PT/OT Diarrhea n   Tolerating Diet y Tube feed Constipation    Other Could NOT obtain due to:   
 
Objective: VITALS:  
Last 24hrs VS reviewed since prior progress note. Most recent are: 
Patient Vitals for the past 24 hrs: 
 Temp Pulse Resp BP SpO2  
07/27/18 1042 97.4 °F (36.3 °C) 77 20 103/77 96 %  
07/27/18 0736 - - - - 96 %  
07/27/18 0720 97.7 °F (36.5 °C) 77 20 131/61 96 %  
07/27/18 0350 97.8 °F (36.6 °C) 77 24 123/67 93 % 07/27/18 0000 97.9 °F (36.6 °C) 74 24 145/66 100 % 07/26/18 2113 97.8 °F (36.6 °C) 76 20 136/66 98 %  
07/26/18 1512 97.8 °F (36.6 °C) 78 18 139/71 100 % 07/26/18 1140 98 °F (36.7 °C) 80 18 102/49 95 %  
07/26/18 1114 - 89 - 133/85 - Intake/Output Summary (Last 24 hours) at 07/27/18 1105 Last data filed at 07/27/18 2952 Gross per 24 hour Intake             1630 ml Output                0 ml Net             1630 ml PHYSICAL EXAM: 
General: WD,thin. Alert, cooperative, no acute distress. Resp:             Few crackles at bases bilaterally, no wheezing. No accessory muscle use CV:  Regular  rhythm,  1+ edema GI:  Soft, Non distended, minimal tenderness +Bowel sounds Neurologic:  Alert and oriented X 3, normal speech, Psych:   Not anxious nor agitated Skin:  No rashes. No jaundice Reviewed most current lab test results and cultures  YES Reviewed most current radiology test results   YES Review and summation of old records today    NO Reviewed patient's current orders and MAR    YES 
PMH/ reviewed - no change compared to H&P 
________________________________________________________________________ Care Plan discussed with: 
  Comments Patient y Family RN y   
Care Manager Consultant Multidiciplinary team rounds were held today with , nursing, pharmacist and clinical coordinator. Patient's plan of care was discussed; medications were reviewed and discharge planning was addressed. ________________________________________________________________________ Comments >50% of visit spent in counseling and coordination of care    
________________________________________________________________________ Home Flanagan MD  
 
Procedures: see electronic medical records for all procedures/Xrays and details which were not copied into this note but were reviewed prior to creation of Plan. LABS: 
I reviewed today's most current labs and imaging studies. Pertinent labs include: 
Recent Labs  
   07/26/18 
 0603  07/25/18 
 0419 WBC  5.7  5.1 HGB  12.1  11.2* HCT  37.9  35.7*  
PLT  77*  56* Recent Labs  
   07/26/18 
 0603  07/25/18 
 7599 NA  139  142  
K  4.2  4.1 CL  104  108 CO2  30  30 GLU  114*  160* BUN  16  12 CREA  0.94  0.93  
CA  8.5  8.8 Signed: Sawyer Smith MD

## 2018-07-27 NOTE — DISCHARGE INSTRUCTIONS
Patient Discharge Instructions    Douglas Garcia / [id number] : 1935    Admitted 2018 Discharged: 2018         DISCHARGE DIAGNOSIS:       Hypotension    Acute kidney injury       What to do at Home    1. Recommended diet: NPO, all meds and feeds via PEG tube       Isosource 1.5 1 can/250 mL bolus 5x/day (8am, 11am, 2pm, 5pm, 8pm ) + 150 mL flush q                  bolus (5oz)        Hold for residuals > 100 cc per Dr Miguel Cedillo    2. Recommended activity: Activity as tolerated    3. If you experience any of the following symptoms then please call your primary care physician or return to the emergency room if you cannot get hold of your doctor:   Fevers > 100.5, chills   Nausea or vomiting, persistent diarrhea > 24 hours   Blood in stool or black stools   Chest pain or SOB      Follow-up Information     Follow up With Details Comments Contact Info    Peg Mallory MD On 2018 For hospital follow up appointment at 11:00AM  [facility] [address] [address] [phone]      Rafal Mukherjee MD  Set up for EGD and dilatation of esophagus on 20- at 10:30 am [address] [facility]  [address]  [phone]          Stop the plavix on 2018, last dose should be on 2018    Stop the antibiotics, you completed a course in the hospital    Use lasix 40 mg daily for now    Check weight daily, if you gain more than 3 lbs over your baseline, resume the lasix 20 mg in the evening in addition to the morning lasix, if weight does not improve, contact Dr Burgess Case obtained by :  I understand that if any problems occur once I am at home I am to contact my physician. I understand and acknowledge receipt of the instructions indicated above.                                                                                                                                            Physician's or R.N.'s Signature Date/Time                                                                                                                                              Patient or Representative Signature                                                          Date/Time

## 2018-07-27 NOTE — PROGRESS NOTES
Problem: Self Care Deficits Care Plan (Adult) Goal: *Acute Goals and Plan of Care (Insert Text) Occupational Therapy Goals Initiated 7/26/2018 1. Patient will perform grooming at the sink with modified independence within 7 day(s). 2.  Patient will perform bathing at the sink with supervision/set-up within 7 day(s). 3.  Patient will perform lower body dressing with supervision/set-up within 7 day(s). 4.  Patient will perform toilet transfers with modified independence within 7 day(s). 5.  Patient will perform all aspects of toileting with independence within 7 day(s). 6.  Patient will participate in upper extremity therapeutic exercise/activities with independence for 5 minutes within 7 day(s). 7.  Patient will utilize energy conservation techniques during functional activities with verbal cues within 7 day(s). Occupational Therapy TREATMENT Patient: Cristina Knight (80 y.o. male) Date: 7/27/2018 Diagnosis: Weakness Weakness <principal problem not specified> Precautions:   
Chart, occupational therapy assessment, plan of care, and goals were reviewed. ASSESSMENT: 
Pt was cleared to be seen for therapy and all VSS. Pt was supine in bed and SBA for bed mobility, able to stand with SBA. He was SBA, with use of RW, to transfer to sink, and he was able to bathe UB with SBA. OT had pt sit to wash his legs and then gown was changed and pt needed mod assist.  He then stood with SBA and washed halie area and buttocks. Pt did not care to use the RW from toilet to sink and then he did ask for RW to walk back to the chair. Pt was left sitting up in chair with wife in room and recommend that pt have further, to return home with wife and no further OT needed at discharge.  
Progression toward goals: 
[x]       Improving appropriately and progressing toward goals 
[]       Improving slowly and progressing toward goals 
[]       Not making progress toward goals and plan of care will be adjusted PLAN: 
Patient continues to benefit from skilled intervention to address the above impairments. Continue treatment per established plan of care. Discharge Recommendations:  None Further Equipment Recommendations for Discharge:  tbd SUBJECTIVE:  
Patient stated I know that I will do fine at home.  OBJECTIVE DATA SUMMARY:  
Cognitive/Behavioral Status: 
Neurologic State: Alert Orientation Level: Appropriate for age Cognition: Follows commands Perception: Appears intact Perseveration: No perseveration noted Functional Mobility and Transfers for ADLs: 
Bed Mobility: 
Rolling: Stand-by assistance;Supervision Supine to Sit: Stand-by assistance;Supervision Scooting: Independent Transfers: 
Sit to Stand: Stand-by assistance Functional Transfers Bathroom Mobility: Stand-by assistance Toilet Transfer : Stand-by assistance Bed to Chair: Stand-by assistance Balance: 
Sitting: Intact Standing: Impaired; Without support Standing - Static: Good;Constant support Standing - Dynamic : Good ADL Intervention: 
Feeding Feeding Assistance:  (tube feedings only) Grooming Grooming Assistance: Stand-by assistance Washing Face: Stand-by assistance Washing Hands: Stand-by assistance Upper Body Bathing Bathing Assistance: Stand-by assistance Position Performed: Standing Lower Body Bathing Bathing Assistance: Stand-by assistance Perineal  : Stand-by assistance Position Performed: Standing Pain: 
Pain Scale 1: Numeric (0 - 10) Pain Intensity 1: 0 Pain Location 1: Back; Shoulder Pain Orientation 1: Posterior Pain Description 1: Aching Pain Intervention(s) 1: Medication (see MAR) Activity Tolerance:  
vss/good Please refer to the flowsheet for vital signs taken during this treatment. After treatment:  
[x] Patient left in no apparent distress sitting up in chair 
[] Patient left in no apparent distress in bed 
[x] Call bell left within reach [x] Nursing notified 
[x] Caregiver present 
[] Bed alarm activated COMMUNICATION/COLLABORATION:  
The patients plan of care was discussed with: Physical Therapist, Registered Nurse and wife Siobhan, OT Time Calculation: 23 mins

## 2018-07-27 NOTE — PROGRESS NOTES
Discharge instructions reviewed with patient including follow-up appointments, medications, and side effects. Opportunity for questions provided. Patient verbalized understanding. PIV and telemetry box removed. Patient stable for discharge with family at this time.

## 2018-07-27 NOTE — PROGRESS NOTES
Bedside and Verbal shift change report given to Yamilex Simeon RN (oncoming nurse). Report included the following information SBAR, Kardex and Recent Results. SHIFT SUMMARY:              [address] NURSING NOTE   Admission Date 7/21/2018   Admission Diagnosis Weakness  Weakness   Consults IP CONSULT TO PALLIATIVE CARE - PROVIDER  IP CONSULT TO GASTROENTEROLOGY  IP CONSULT TO ENDOCRINOLOGY  IP CONSULT TO CARDIOLOGY      Cardiac Monitoring [x] Yes [] No      Purposeful Hourly Rounding [x] Yes    Olayinka Score Total Score: 4   Olayinka score 3 or > [] Bed Alarm [] Avasys [] 1:1 sitter [] Patient refused (Signed refusal form in chart)   Mark Score Mark Score: 19   Mark score 14 or < [] PMT consult [] Wound Care consult    []  Specialty bed  [] Nutrition consult      Influenza Vaccine Received Flu Vaccine for Current Season (usually Sept-March): Not Flu Season           Oxygen needs? [x] Room air Oxygen @  []1L    []2L    []3L   []4L    []5L   []6L via  NC   Chronic home O2 use?  [] Yes [x] No  Perform O2 challenge test and document in progress note using smartphrase (.Homeoxygen)      Last bowel movement Last Bowel Movement Date: 07/26/18      Urinary Catheter             LDAs               Peripheral IV 07/25/18 Left Forearm (Active)   Site Assessment Clean, dry, & intact 7/26/2018  7:40 PM   Phlebitis Assessment 0 7/26/2018  7:40 PM   Infiltration Assessment 0 7/26/2018  7:40 PM   Dressing Status Clean, dry, & intact 7/26/2018  7:40 PM   Dressing Type Transparent 7/26/2018  7:40 PM   Hub Color/Line Status Pink;Flushed;Patent 7/26/2018  7:40 PM          PEG/Gastrostomy Tube (Active)   Site Assessment Clean, dry, & intact 7/26/2018  7:40 PM   Dressing Status Clean, dry, & intact 7/26/2018  7:40 PM   G Port Status Clamped 7/26/2018  7:40 PM   Gastric Residual (mL) 35 ml 7/26/2018  9:28 PM   Tube Feeding/Formula Options Other 7/26/2018  7:40 PM   Water Flush Volume (mL) 150 mL 7/26/2018  9:28 PM   Intake (ml) 250 ml 7/26/2018 9:28 PM   Medication Volume 50 ml 7/25/2018  6:49 PM                   Readmission Risk Assessment Tool Score High Risk            45       Total Score        3 Has Seen PCP in Last 6 Months (Yes=3, No=0)    2 . Living with Significant Other. Assisted Living. LTAC. SNF. or   Rehab    3 Patient Length of Stay (>5 days = 3)    4 IP Visits Last 12 Months (1-3=4, 4=9, >4=11)    5 Pt.  Coverage (Medicare=5 , Medicaid, or Self-Pay=4)    28 Charlson Comorbidity Score (Age + Comorbid Conditions)       Expected Length of Stay 3d 7h   Actual Length of Stay 4

## 2018-07-31 NOTE — Clinical Note
Good morning Katiana, Can you assist me with this pt and his wife? Very complicated.   Limited income, HF bundle, etc. Thanks, Mario

## 2018-08-01 NOTE — TELEPHONE ENCOUNTER
----- Message from [facility] sent at 8/1/2018  8:49 AM EDT -----  Regarding: Dr. Jarvis Camarillo  Pt spouse Tripp Bondbinder [phone] says that the pt was d/c from [place] [address] on 7/27/18. He may have had a  Mini stroke. No definitive answers from the hospital. Would like Dr. Ludwin Quijano to do a work up since could not speak for a few minutes and was \"out of it\". But then stated that he could not hear anything and he is having memory issues.

## 2018-08-01 NOTE — TELEPHONE ENCOUNTER
Efrain Abad, I called the patient, his wife wants him checked, I told him to come at 56 tomorrow for [phone] appointment and [phone] Doppler

## 2018-08-01 NOTE — PROGRESS NOTES
Goals  Understand CHF action plan. 5/23/18  Spoke with pt's wife who is managing pt very closely. We discussed the importance of daily wts. 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 We discussed the possibility for supportive care for the pt as well as pt's wife as she needs  I provided the telephone number to the pt's wife for [facility] ([phone]). Pt's wife needs to have hip replacement in June. We discussed coming up with a care plan for the pt and we also discussed how to assist family with care should the pt's wife need assistance as well. Pt's wife reports that pt has seen Dr. Jesus Dwyer on 5/21/18 and pt's wife reports that pt will go to [address] [place] [address] for outpt rehab. Pt's wife reports that she did call [facility] this weekend as pt had steady wt gain. Pt's wife reports that she increased lasix and called and spoke with Dr. Marito Nunes on 5/21/18. Pt's wife stated appreciation for call. I provided my direct office telephone number to her. Pt's wife states that I may call next week to discuss care plan for when pt's wife needs to have surgery. Pt's wife reports that she and pt have family support (2 sons and grandchildren). [facility] 
 
6/6/18  Pt's wife reports that pt has followed up with Dr. Mandy Gallo and pt's wife reports that pt's platelets remain low and are at 67. Pt's wife states that other than that, pt is doing well  Pt's wife reports that she had a stress test and Dr. Wagner Rahman is to call her in preparation for hip surgery. Pt's wife states that she is working on a plan to have granddaughter and great granddaughter take care of pt while pt's wife has surgery and pt's wife also has a friend who can recommend an aide. Pt's wife states that she will work on plan IF surgery is scheduled. Pt's wife reports that there have been not changes to pt's health status and states appreciation for call.   Will continue to call [facility] 
 
6/22/18  Pt's wife reports that pt continues to go to PT at [facility]. Pt states that she will have surgery on 7/24/18 and that she is developing a plan to provide care for her  while she is in recovery. Pt's wife states that I may continue to call and that she appreciates someone checking on they. [facility] 
 
7/11/18  Pt's wife reports that pt was to begin [facility] but back pain has prevented that as pt''s pain specialist is recommending that pt attend out patient therapy at [address] [place] [address]. Pt's wife states that pt will begin [facility] after back pain has improved. Pt's wife reports that pt is okay. [facility] 
 
7/31/18  Spoke with pt's wife who reports that pt is having diarrhea and has some altered mental status. Pt's wife reports pt is producing very little urine. Pt's wife reports that pt is seeing Dr. Blanca Shah this afternoon as is she for a bladder infection. We discussed that we need to work on a plan to get her some help so that she can take care of herself. Will call on Thursday, 8/2/18 to review PCP appt findings. [facility] 
 
8/1/18  Pt's wife reports that PCP thinks that pt may have had a stroke on 7/21 after wife described pt's hearing loss and poor memory. Pt's wife reports that pt had been able to hear out of 1 ear and did not have memory issues prior to the event on 7/21/18. Pt's wife reports that pt's urine is clear and they are waiting on lab results. Pt's wife reports that she is calling Dr. Jose Whittaker from neurology to follow up on possible stroke. 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 Pt's wife states that she will follow up with urologist concerning her chronic bladder infections. Pt's wife also states understanding that she needs help to provide care for her  and for herself as she cancelled her hip replacement. Will continue to follow.

## 2018-08-02 PROBLEM — E53.8 B12 DEFICIENCY: Status: ACTIVE | Noted: 2018-01-01

## 2018-08-02 PROBLEM — R41.3 DISTURBANCE OF MEMORY: Status: ACTIVE | Noted: 2018-01-01

## 2018-08-02 PROBLEM — E55.9 VITAMIN D DEFICIENCY: Status: ACTIVE | Noted: 2018-01-01

## 2018-08-02 PROBLEM — E03.4 HYPOTHYROIDISM DUE TO ACQUIRED ATROPHY OF THYROID: Status: ACTIVE | Noted: 2018-01-01

## 2018-08-02 PROBLEM — H91.13 PRESBYCUSIS OF BOTH EARS: Status: ACTIVE | Noted: 2018-01-01

## 2018-08-02 NOTE — LETTER
8/2/2018 3:25 PM 
 
Patient:  Reema Foley YOB: 1935 Date of Visit: 8/2/2018 Dear No Recipients: Thank you for referring Mr. Reema Foley to me for evaluation/treatment. Below are the relevant portions of my assessment and plan of care. Consult REFERRED BY: 
Justin Zhao MD 
 
CHIEF COMPLAINT: Episode of severe hypotension followed by marked memory loss, loss of hearing, and slurred speech Subjective:  
 
Reema Foley is a 80 y.o. right-handed  male seen on an urgent work in basis at the request of Dr. Brittany Joseph for evaluation of new problem of increasing hearing loss, worsening of memory, and slurred speech that occurred after he had an episode of profound hypotension from urosepsis within the last 2 weeks for which she spent time in the hospital getting IV antibiotics, but he just has not improved, and his wife insists we see him today to rule out stroke or other new neurologic problems. He had no hearing in his right ear before, but now seems to have decreased hearing in his left ear, and you have to speak fairly loudly get him to hear you. His wife complains his memory is not as good, but he is oriented ×4, but he cannot do serial sevens, could remember 2 of 3 words at 30 seconds, and had a little difficulty spelling world backwards, but with difficulty could draw a clock showing the time 10:50. He has no focal weakness or sensory loss, and no visual loss,. He never got a repeat CT scan of the head on his last admission so we will check that plus a carotid Doppler study which was done in the office today which showed no new stenosis in the right internal carotid artery but his left could not be imaged because of the head neck surgery he had in the past.  He had an adverse reaction to Mysoline in June, and that was discontinued for his tremor.   He was placed on Mirapex 0.25 mg 3 times a day and states that it seems to help a little bit with his tremors. He still has them and says it tend to get worse at times, and can be more disabling. He may have a mild resting tremor in his left hand, most of his tremors are more the essential type. They seem by history to be more related to action and static tremors and not a rest tremor, but the history is very vague from both the patient and his wife. He has very little tremor on exam today was difficult to be sure what his problem really is. He was tried on amantadine last visit, but is no longer taking it and may have had a side effect. He has had no new weakness or sensory loss, but still has a mild right-sided weakness from his previous stroke one year ago. He is on Plavix because he could not take the Aggrenox down his tube. He does have a pacemaker and cannot get an MRI. His CTA of the head and neck did not show any significant carotid disease, but he did have significant disease of both vertebral and basilar artery suggesting some vertebrobasilar insufficiency. He also has severe aortic stenosis and cardiology does not feel he is a candidate for surgery. 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 He is somewhat weak and debilitated in general.  His thyroid studies in the hospital were normal..  He has no family history of similar tremors, so there is nothing to suggest familial tremors. His examination does not suggest cogwheeling, rigidity, bradykinesia, or micrographia. He most likely has benign essential tremor but is very difficult to be sure by his history says he and his wife give a somewhat wandering and imprecise history. He has had no unusual headache, fever, trauma, meningismus, or new focal weakness or sensory loss or gait problems. No other new medical problems either. He has not been started on any new medications or neuroleptics that might cause tremors. Past Medical History:  
Diagnosis Date  Antiplatelet or antithrombotic long-term use 12/4/2014  Anxiety disorder  Arrhythmia 2009  
 bradycardia  Arthritis  CAD (coronary artery disease) s/p CABG 2002; Dr Simon Turk  Cancer ([facility] 1996  
 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   
 left leg, lower back,   
 Depression  Diabetes ([facility] Type II  
 Esophageal dysmotility s/p dilitation  Esophageal motility disorder 7/8/2013 Frequent simultaneous or failed contractions, low amplitude contractions  suggests severe myopathy or diffuse spasm. I suspect the latter. Achalasia  is not present.  GERD (gastroesophageal reflux disease)  Heart failure ([facility] 10/2014 Cardiomyopathy:Pacemaker upgrade:Biv and AICD  Dr. Jere Wasserman heart Dr. last visit 5/11/2015  Hepatitis C Dx 1996  
 treated at [facility] in past; as of 4/15/15 wife states pt currently not under any treatment  Hyperlipidemia  Hypertension  Myocardial infarct [facility]) 2013 Heart Cath: 40% LV EF, Stented distal LAD, patent Graft to circumflex  On tube feeding diet approx 2009  
 still has as of 9/28/15  (no po food/liquid/meds at all); Dr Amaury Blancas  Other ill-defined conditions(799.89) 1996  
 1 dose of chemotherapy/radiation for tongue cancer  Other ill-defined conditions(799.89) BPH  Other ill-defined conditions(799.89) orthostatic hypotension  Pneumonia ~ April -May 2010  Stroke [facility]) approx 2003  
 left side-left finger tips numb; no imbalance or memory loss; as of 2015 not seeing neuro MD  
 Suicidal thoughts Past Surgical History:  
Procedure Laterality Date  ABDOMEN SURGERY PROC UNLISTED  1996  
 peg tube  CABG, ARTERY-VEIN, THREE  2000  HX CATARACT REMOVAL    
 bilateral  
 HX CHOLECYSTECTOMY [address] CATHETERIZATION  2013 Stented distal LAD  HX MOHS PROCEDURES    
 bilateral  
  HX ORTHOPAEDIC    
 back surgery times two  HX OTHER SURGICAL Radical Left Neck  HX OTHER SURGICAL    
 NASAL POLYPS REMOVAL  
 HX OTHER SURGICAL   TURP  
 HX PACEMAKER    HX PACEMAKER  10/28/14 Defibrillator: [facility] # [id number], serial # [id number]; Dr. Mely Short [phone]; Dr Kirk Jarrett  HX UROLOGICAL    
 cystoscopy [address]  
 cevical surgery  [place] CHANGE GASTROSTOMY TUBE  2011  [place] CHANGE GASTROSTOMY TUBE  2011  [place] EGD INSERT GUIDE WIRE DILATOR PASSAGE ESOPHAGUS  2010  [place] EGD TRANSORAL BIOPSY SINGLE/MULTIPLE  2010  
    
 STOMACH SURGERY PROCEDURE UNLISTED  2011  VASCULAR SURGERY PROCEDURE UNLIST    
 bilateral carotid stents Family History Problem Relation Age of Onset  Heart Disease Father   
   at age 52 from CAD  Colon Cancer Mother  Cancer Mother   
  colon ca  
 Heart Disease Brother Social History Substance Use Topics  Smoking status: Never Smoker  Smokeless tobacco: Never Used  Alcohol use No  
   
 
Current Outpatient Prescriptions:  
  metoclopramide HCl (REGLAN) 10 mg tablet, , Disp: , Rfl:  
  guaiFENesin (ROBITUSSIN) 100 mg/5 mL liquid, 10mL three times daily, Disp: , Rfl:  
  albuterol (PROVENTIL HFA, VENTOLIN HFA, PROAIR HFA) 90 mcg/actuation inhaler, Take  by inhalation two (2) times a day., Disp: , Rfl:  
  OTHER,NON-FORMULARY,, Klack's Solution swish and spit out 4 times daily, Disp: , Rfl:  
  aspirin 81 mg chewable tablet, Take 1 Tab by mouth daily. , Disp: 30 Tab, Rfl: 6 
  insulin regular (NOVOLIN R, HUMULIN R) 100 unit/mL injection, 14 units at 8 am, 12 units at 2 pm, 5 units at 8 pm, Disp: 1 Vial, Rfl: 6 
  insulin syringe-needle U-100 0.3 mL 31 gauge x 15/64\" syrg, Use as directed, Disp: 100 Pen Needle, Rfl: 6 
  furosemide (LASIX) 40 mg tablet, Take 1 Tab by mouth daily. , Disp: 40 Tab, Rfl: 6   famotidine (PEPCID) 20 mg tablet, 20 mg by Per G Tube route two (2) times a day., Disp: , Rfl:  
  pramipexole (MIRAPEX) 0.25 mg tablet, 0.25 mg by Per G Tube route three (3) times daily. , Disp: , Rfl:  
  L. acidoph & paracasei- S therm- Bifido (JAVY-Q/RISAQUAD) 8 billion cell cap cap, 1 Cap by PEG Tube route daily. , Disp: 30 Cap, Rfl: 0 
  acetaminophen (TYLENOL) 500 mg tablet, 1,000 mg by Per G Tube route every six (6) hours as needed for Pain., Disp: , Rfl:  
  atorvastatin (LIPITOR) 40 mg tablet, 40 mg by Per G Tube route daily. , Disp: , Rfl:  
  clopidogrel (PLAVIX) 75 mg tab, 75 mg by Feeding Tube route daily. , Disp: , Rfl:  
  cyanocobalamin 1,000 mcg tablet, 1,000 mcg by Per G Tube route daily. , Disp: , Rfl:  
  finasteride (PROSCAR) 5 mg tablet, 5 mg by Per G Tube route nightly., Disp: , Rfl:  
  alfuzosin SR (UROXATRAL) 10 mg SR tablet, 10 mg by Per G Tube route daily. , Disp: , Rfl:  
  midodrine (PROAMITINE) 10 mg tablet, 10 mg by Per G Tube route three (3) times daily (with meals). , Disp: , Rfl:  
  HYDROcodone-acetaminophen (NORCO)  mg tablet, 1 Tab by Per G Tube route daily as needed for Pain., Disp: , Rfl:  
  fluticasone (FLONASE ALLERGY RELIEF) 50 mcg/actuation nasal spray, 2 Sprays by Both Nostrils route daily. , Disp: , Rfl:  
  zinc 50 mg tab tablet, 50 mg by Per G Tube route daily. , Disp: , Rfl:  
  LANTUS SOLOSTAR U-100 INSULIN 100 unit/mL (3 mL) inpn, INJECT 14 UNITS SUBCUTANEOUSLY ONCE DAILY, Disp: 15 Pen, Rfl: 5 
  nitroglycerin (NITROSTAT) 0.4 mg SL tablet, 0.4 mg by SubLINGual route every five (5) minutes as needed for Chest Pain., Disp: , Rfl:  
  ondansetron hcl (ZOFRAN, AS HYDROCHLORIDE,) 8 mg tablet, Take 1 Tab by mouth every eight (8) hours as needed for Nausea., Disp: 20 Tab, Rfl: 0 
  gabapentin (NEURONTIN) 250 mg/5 mL solution, 750 mg by PEG Tube route three (3) times daily. , Disp: , Rfl:  
   multivitamin (ONE A DAY) tablet, 1 Tab by Per G Tube route daily. , Disp: , Rfl:  
 
 
 
Allergies Allergen Reactions  Demerol [Meperidine] Shortness of Breath  Paxil [Paroxetine Hcl] Unknown (comments) Pt gets shaky and loses control of legs  Amoxicillin Rash  Cleocin [Clindamycin Hcl] Rash  Pcn [Penicillins] Rash Review of Systems: A comprehensive review of systems was negative except for: Constitutional: positive for fatigue and malaise Respiratory: positive for pleurisy/chest pain, dyspnea on exertion or chronic bronchitis Gastrointestinal: positive for dysphagia, dyspepsia, reflux symptoms, nausea and abdominal pain Musculoskeletal: positive for myalgias, arthralgias, stiff joints, neck pain and back pain Neurological: positive for coordination problems, tremor and weakness Behvioral/Psych: positive for anxiety and depression Vitals:  
 08/02/18 1054 BP: 110/52 Pulse: 84 Resp: 16 SpO2: 96% Weight: 170 lb (77.1 kg) Height: 5' 7\" (1.702 m) Objective: I 
 
 
 
NEUROLOGICAL EXAM: 
  
Appearance: The patient is poorly developed and nourished, provides a fair history and is in no acute distress. Patient status post radical head and neck surgery Mental Status: Oriented to time, place and person and the president, he is oriented ×4, but he cannot do serial sevens, could remember 2 of 3 words at 30 seconds, and had a little difficulty spelling world backwards, but with difficulty could draw a clock showing the time 10:50.   cognitive function and fund of knowledge is slow  probably mildly abnormal. Speech is fluent without aphasia or dysarthria. Mood and affect appropriate but depressed . Cranial Nerves:   Intact visual fields. Fundi are benign. RAY, EOM's full, no nystagmus, no ptosis. Facial sensation is normal. Corneal reflexes are not tested. Facial movement is asymmetric.  Hearing is abnormal bilaterally. Patient has previous radical head neck surgery on the left, and deformed oral pharyngeal structures. Neck without meningismus or bruits Patient has marked limited range of motion of the cervical spine with pain in all directions Temporal arteries are not tender or enlarged Motor:  4/5 strength in upper and lower proximal and distal muscles, except for the right upper extremity which has strength about 3/5 associated with an arm drift and decreased rapid alternating movements in the right hand. Normal bulk and tone. No fasciculations. Reflexes:   Deep tendon reflexes 2+/4 on the right and 1+/4 on the left. No babinski or clonus present Sensory:   Abnormal to touch, pinprick and temperature and vibration decreased in both feet. DSS is intact Gait:  Abnormal gait as he has to use a walker and he has to move slowly due to his arthritis and his mild right hemiparesis . Tremor:   Minimal intention bilateral tremor noted, and possible left hand resting tremor noted, no cogwheeling or rigidity. Cerebellar:   Mildly abnormal Romberg and tandem cerebellar signs present. Neurovascular:  Abnormal heart sounds and irregular rhythm, peripheral pulses decreased, and no carotid bruits.  
  
 
 
 
Assessment: ICD-10-CM ICD-9-CM 1. Thrombotic stroke involving left middle cerebral artery ([facility]) I63.312 434.01 metoclopramide HCl (REGLAN) 10 mg tablet  
   guaiFENesin (ROBITUSSIN) 100 mg/5 mL liquid  
   albuterol (PROVENTIL HFA, VENTOLIN HFA, PROAIR HFA) 90 mcg/actuation inhaler OTHER,NON-FORMULARY,  
   VITAMIN D, 25 HYROXY PANEL  
   VITAMIN B12 & FOLATE  
   TSH 3RD GENERATION  
   DUPLEX CAROTID BILATERAL AMB NEURO  
   EEG 2. Parkinson's disease (tremor, stiffness, slow motion, unstable posture) ([facility]) G20 332.0 metoclopramide HCl (REGLAN) 10 mg tablet  
   guaiFENesin (ROBITUSSIN) 100 mg/5 mL liquid  
   albuterol (PROVENTIL HFA, VENTOLIN HFA, PROAIR HFA) 90 mcg/actuation inhaler OTHER,NON-FORMULARY,  
   VITAMIN D, 25 HYROXY PANEL  
   VITAMIN B12 & FOLATE  
   TSH 3RD GENERATION  
   DUPLEX CAROTID BILATERAL AMB NEURO  
   EEG 3. Benign essential tremor syndrome G25.0 333.1 metoclopramide HCl (REGLAN) 10 mg tablet  
   guaiFENesin (ROBITUSSIN) 100 mg/5 mL liquid  
   albuterol (PROVENTIL HFA, VENTOLIN HFA, PROAIR HFA) 90 mcg/actuation inhaler OTHER,NON-FORMULARY,  
   VITAMIN D, 25 HYROXY PANEL  
   VITAMIN B12 & FOLATE  
   TSH 3RD GENERATION  
   DUPLEX CAROTID BILATERAL AMB NEURO  
   EEG 4. Hemiplegia and hemiparesis following cerebral infarction affecting right dominant side ([facility]) I69.351 438.21 metoclopramide HCl (REGLAN) 10 mg tablet  
   guaiFENesin (ROBITUSSIN) 100 mg/5 mL liquid  
   albuterol (PROVENTIL HFA, VENTOLIN HFA, PROAIR HFA) 90 mcg/actuation inhaler OTHER,NON-FORMULARY,  
   VITAMIN D, 25 HYROXY PANEL  
   VITAMIN B12 & FOLATE  
   TSH 3RD GENERATION  
   DUPLEX CAROTID BILATERAL AMB NEURO  
   EEG  
5. Stenosis of both internal carotid arteries I65.23 433.10 metoclopramide HCl (REGLAN) 10 mg tablet 433.30 guaiFENesin (ROBITUSSIN) 100 mg/5 mL liquid  
   albuterol (PROVENTIL HFA, VENTOLIN HFA, PROAIR HFA) 90 mcg/actuation inhaler OTHER,NON-FORMULARY,  
   VITAMIN D, 25 HYROXY PANEL  
   VITAMIN B12 & FOLATE  
   TSH 3RD GENERATION  
   DUPLEX CAROTID BILATERAL AMB NEURO  
   EEG 6. 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  
  357.2 guaiFENesin (ROBITUSSIN) 100 mg/5 mL liquid  
   albuterol (PROVENTIL HFA, VENTOLIN HFA, PROAIR HFA) 90 mcg/actuation inhaler OTHER,NON-FORMULARY,  
   VITAMIN D, 25 HYROXY PANEL  
   VITAMIN B12 & FOLATE  
   TSH 3RD GENERATION  
   DUPLEX CAROTID BILATERAL AMB NEURO  
   EEG  
7. Disturbance of memory R41.3 780.93 metoclopramide HCl (REGLAN) 10 mg tablet  
   guaiFENesin (ROBITUSSIN) 100 mg/5 mL liquid albuterol (PROVENTIL HFA, VENTOLIN HFA, PROAIR HFA) 90 mcg/actuation inhaler OTHER,NON-FORMULARY,  
   VITAMIN D, 25 HYROXY PANEL  
   VITAMIN B12 & FOLATE  
   TSH 3RD GENERATION  
   DUPLEX CAROTID BILATERAL AMB NEURO  
   EEG 8. Presbycusis of both ears H91.13 388.01 metoclopramide HCl (REGLAN) 10 mg tablet  
   guaiFENesin (ROBITUSSIN) 100 mg/5 mL liquid  
   albuterol (PROVENTIL HFA, VENTOLIN HFA, PROAIR HFA) 90 mcg/actuation inhaler OTHER,NON-FORMULARY,  
   VITAMIN D, 25 HYROXY PANEL  
   VITAMIN B12 & FOLATE  
   TSH 3RD GENERATION  
   DUPLEX CAROTID BILATERAL AMB NEURO  
   EEG 9. Physical deconditioning R53.81 799.3 metoclopramide HCl (REGLAN) 10 mg tablet  
   guaiFENesin (ROBITUSSIN) 100 mg/5 mL liquid  
   albuterol (PROVENTIL HFA, VENTOLIN HFA, PROAIR HFA) 90 mcg/actuation inhaler OTHER,NON-FORMULARY,  
   VITAMIN D, 25 HYROXY PANEL  
   VITAMIN B12 & FOLATE  
   TSH 3RD GENERATION  
   DUPLEX CAROTID BILATERAL AMB NEURO  
   EEG 10. Hypothyroidism due to acquired atrophy of thyroid E03.4 244.8 metoclopramide HCl (REGLAN) 10 mg tablet 246.8 guaiFENesin (ROBITUSSIN) 100 mg/5 mL liquid  
   albuterol (PROVENTIL HFA, VENTOLIN HFA, PROAIR HFA) 90 mcg/actuation inhaler OTHER,NON-FORMULARY,  
   VITAMIN D, 25 HYROXY PANEL  
   VITAMIN B12 & FOLATE  
   TSH 3RD GENERATION  
   DUPLEX CAROTID BILATERAL AMB NEURO  
   EEG 11. Vitamin D deficiency E55.9 268.9 metoclopramide HCl (REGLAN) 10 mg tablet  
   guaiFENesin (ROBITUSSIN) 100 mg/5 mL liquid  
   albuterol (PROVENTIL HFA, VENTOLIN HFA, PROAIR HFA) 90 mcg/actuation inhaler OTHER,NON-FORMULARY,  
   VITAMIN D, 25 HYROXY PANEL  
   VITAMIN B12 & FOLATE  
   TSH 3RD GENERATION  
   DUPLEX CAROTID BILATERAL AMB NEURO  
   EEG 12.  B12 deficiency E53.8 266.2 metoclopramide HCl (REGLAN) 10 mg tablet  
   guaiFENesin (ROBITUSSIN) 100 mg/5 mL liquid  
   albuterol (PROVENTIL HFA, VENTOLIN HFA, PROAIR HFA) 90 mcg/actuation inhaler OTHER,NON-FORMULARY,  
   VITAMIN D, 25 HYROXY PANEL  
   VITAMIN B12 & FOLATE  
   TSH 3RD GENERATION  
   DUPLEX CAROTID BILATERAL AMB NEURO  
   EEG Active Problems: * No active hospital problems. * 
 
 
Plan:  
 
Patient with new problem of decreased hearing, memory problems, slurred speech, but I do not really appreciate a slurred speech, but wife is concerned about a stroke, and in view of his past history of vertebrobasilar insufficiency, with his marked hypotension he could have had a mild mini stroke, so we will check a CT scan since we can get an MRI, check his carotid Dopplers and there was no change there, and he is going to see ENT about his hearing loss. He is no longer taking amantadine, not sure that it much for the tremor but he continues his Mirapex for that. He may have a component of Parkinson's disease in addition to essential tremor, we will continue the Mirapex He is not a candidate for beta-blockers, because of his heart disease and hypotension. Patient with progressive tremors, but on exam today there really are not too apparent, but by his history suggest benign essential tremor Patient with moderate severe vertebrobasilar stenosis and insufficiency and chronic dizziness, and one wonders whether some of the tremors might be orthostatic tremors. We spent 45 minutes with the patient and his wife going over his history, going over his exam, and discussing various medications and treatments, we will try the medication as above and keep everything else the same, and also reviewing all his records from his hospitalization, his CT scan reviewed personally on the PACS system, his thyroid test, and his neurologic workup and evaluation, and I concluded that he might well padded some parkinsonian tremor plus essential tremor and generalized weakness and side effect of his medications of Mysoline. Jessica Lin If all else fails we may need to really do a CTA of his head and neck just to make sure there is no significant progression of disease there. We offered him Coumadin in the past for his vertebrobasilar disease but in view of his mother's history of bleeding complications on that medication he wants no part of it. We will see him again in 6 months time or earlier as needed, he will call me if any problem, he will check my chart for results of his test or give us a call then. Signed By: Laureen Garland MD   
 August 2, 2018 CC: Roger Mcardle., MD 
FAX: [phone] This note will not be viewable in [address] If you have questions, please do not hesitate to call me. I look forward to following Mr. NINO along with you. Sincerely, Laureen Garland MD

## 2018-08-02 NOTE — LETTER
8/2/2018 8:18 PM 
 
Patient:  Rc Young YOB: 1935 Date of Visit: 8/2/2018 Dear No Recipients: Thank you for referring Mr. Rc Young to me for evaluation/treatment. Below are the relevant portions of my assessment and plan of care. This study consisted of pulsed wave Doppler examination, Color-flow imaging, and Duplex imaging of both the right and left carotid systems, and both vertebral arteries.   
   
Imaging of right carotid systems showed mild mixed plaquing at the bifurcations and proximal and distal internal and external carotid arteries bilaterally, with stenosis in the range of 16-49% only and with no flow abnormalities identified.   
The left carotid system could not be imaged secondary to the patient's previous surgery and stenting of his vessels. If clinically indicated, other imaging modalities like CTA or MRA may be of further diagnostic value in this patient. Clinical correlation recommended 
   
Neither vertebral arteries could not be imaged, most likely reflecting technical difficulty, but cannot completely rule out obstructive or occlusive cerebrovascular disease, or congenital variant. If clinically indicated other imaging modalities like CTA or MRA may be of further diagnostic value in this patient. Clinical correlation recommended 
   
 
If you have questions, please do not hesitate to call me. I look forward to following  [id number] along with you. Sincerely, [address]

## 2018-08-02 NOTE — PROGRESS NOTES
Consult REFERRED BY: 
Michael Leigh MD 
 
CHIEF COMPLAINT: Episode of severe hypotension followed by marked memory loss, loss of hearing, and slurred speech Subjective:  
 
Vianey Domínguez is a 80 y.o. right-handed  male seen on an urgent work in basis at the request of Dr. Megan Avendaño for evaluation of new problem of increasing hearing loss, worsening of memory, and slurred speech that occurred after he had an episode of profound hypotension from urosepsis within the last 2 weeks for which she spent time in the hospital getting IV antibiotics, but he just has not improved, and his wife insists we see him today to rule out stroke or other new neurologic problems. He had no hearing in his right ear before, but now seems to have decreased hearing in his left ear, and you have to speak fairly loudly get him to hear you. His wife complains his memory is not as good, but he is oriented ×4, but he cannot do serial sevens, could remember 2 of 3 words at 30 seconds, and had a little difficulty spelling world backwards, but with difficulty could draw a clock showing the time 10:50. He has no focal weakness or sensory loss, and no visual loss,. He never got a repeat CT scan of the head on his last admission so we will check that plus a carotid Doppler study which was done in the office today which showed no new stenosis in the right internal carotid artery but his left could not be imaged because of the head neck surgery he had in the past.  He had an adverse reaction to Mysoline in June, and that was discontinued for his tremor. He was placed on Mirapex 0.25 mg 3 times a day and states that it seems to help a little bit with his tremors. He still has them and says it tend to get worse at times, and can be more disabling. He may have a mild resting tremor in his left hand, most of his tremors are more the essential type.   They seem by history to be more related to action and static tremors and not a rest tremor, but the history is very vague from both the patient and his wife. He has very little tremor on exam today was difficult to be sure what his problem really is. He was tried on amantadine last visit, but is no longer taking it and may have had a side effect. He has had no new weakness or sensory loss, but still has a mild right-sided weakness from his previous stroke one year ago. He is on Plavix because he could not take the Aggrenox down his tube. He does have a pacemaker and cannot get an MRI. His CTA of the head and neck did not show any significant carotid disease, but he did have significant disease of both vertebral and basilar artery suggesting some vertebrobasilar insufficiency. He also has severe aortic stenosis and cardiology does not feel he is a candidate for surgery. 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 He is somewhat weak and debilitated in general.  His thyroid studies in the hospital were normal..  He has no family history of similar tremors, so there is nothing to suggest familial tremors. His examination does not suggest cogwheeling, rigidity, bradykinesia, or micrographia. He most likely has benign essential tremor but is very difficult to be sure by his history says he and his wife give a somewhat wandering and imprecise history. He has had no unusual headache, fever, trauma, meningismus, or new focal weakness or sensory loss or gait problems. No other new medical problems either. He has not been started on any new medications or neuroleptics that might cause tremors. Past Medical History:  
Diagnosis Date  Antiplatelet or antithrombotic long-term use 12/4/2014  Anxiety disorder  Arrhythmia 2009  
 bradycardia  Arthritis  CAD (coronary artery disease) s/p CABG 2002; Dr Jasvir Rios  Cancer ([facility] [address] 1996  
 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   
 left leg, lower back,   
 Depression  Diabetes ([facility] [address] Type II  
 Esophageal dysmotility s/p dilitation  Esophageal motility disorder 7/8/2013 Frequent simultaneous or failed contractions, low amplitude contractions  suggests severe myopathy or diffuse spasm. I suspect the latter. Achalasia  is not present.  GERD (gastroesophageal reflux disease)  Heart failure ([facility] [address] 10/2014 Cardiomyopathy:Pacemaker upgrade:Biv and AICD  Dr. Kwon Emery heart DrAlvaro last visit 5/11/2015  Hepatitis C Dx 1996  
 treated at [facility] in past; as of 4/15/15 wife states pt currently not under any treatment  Hyperlipidemia  Hypertension  Myocardial infarct [facility]) 2013 Heart Cath: 40% LV EF, Stented distal LAD, patent Graft to circumflex  On tube feeding diet approx 2009  
 still has as of 9/28/15  (no po food/liquid/meds at all); Dr Abbie Del Valle  Other ill-defined conditions(799.89) 1996  
 1 dose of chemotherapy/radiation for tongue cancer  Other ill-defined conditions(799.89) BPH  Other ill-defined conditions(799.89) orthostatic hypotension  Pneumonia ~ April -May 2010  Stroke [facility]) approx 2003  
 left side-left finger tips numb; no imbalance or memory loss; as of 2015 not seeing neuro MD  
 Suicidal thoughts Past Surgical History:  
Procedure Laterality Date  ABDOMEN SURGERY PROC UNLISTED  1996  
 peg tube  CABG, ARTERY-VEIN, THREE  2000  HX CATARACT REMOVAL    
 bilateral  
 HX CHOLECYSTECTOMY [address] CATHETERIZATION  2013 Stented distal LAD  HX MOHS PROCEDURES    
 bilateral  
 HX ORTHOPAEDIC    
 back surgery times two  HX OTHER SURGICAL Radical Left Neck  HX OTHER SURGICAL    
 NASAL POLYPS REMOVAL  
 HX OTHER SURGICAL  2010 TURP  
 HX PACEMAKER  11/08  HX PACEMAKER  10/28/14 Defibrillator: [facility] # [id number], serial # [id number]; Dr. Serge De Jesus [phone]; Dr Felice Zaidi  HX UROLOGICAL    
 cystoscopy [facility]  
 cevical surgery  GA CHANGE GASTROSTOMY TUBE  2011  GA CHANGE GASTROSTOMY TUBE  2011  GA EGD INSERT GUIDE WIRE DILATOR PASSAGE ESOPHAGUS  2010  GA EGD TRANSORAL BIOPSY SINGLE/MULTIPLE  2010  
    
 STOMACH SURGERY PROCEDURE UNLISTED  2011  VASCULAR SURGERY PROCEDURE UNLIST    
 bilateral carotid stents Family History Problem Relation Age of Onset  Heart Disease Father   
   at age 52 from CAD  Colon Cancer Mother  Cancer Mother   
  colon ca  
 Heart Disease Brother Social History Substance Use Topics  Smoking status: Never Smoker  Smokeless tobacco: Never Used  Alcohol use No  
   
 
Current Outpatient Prescriptions:  
  metoclopramide HCl (REGLAN) 10 mg tablet, , Disp: , Rfl:  
  guaiFENesin (ROBITUSSIN) 100 mg/5 mL liquid, 10mL three times daily, Disp: , Rfl:  
  albuterol (PROVENTIL HFA, VENTOLIN HFA, PROAIR HFA) 90 mcg/actuation inhaler, Take  by inhalation two (2) times a day., Disp: , Rfl:  
  OTHER,NON-FORMULARY,, Klack's Solution swish and spit out 4 times daily, Disp: , Rfl:  
  aspirin 81 mg chewable tablet, Take 1 Tab by mouth daily. , Disp: 30 Tab, Rfl: 6 
  insulin regular (NOVOLIN R, HUMULIN R) 100 unit/mL injection, 14 units at 8 am, 12 units at 2 pm, 5 units at 8 pm, Disp: 1 Vial, Rfl: 6 
  insulin syringe-needle U-100 0.3 mL 31 gauge x 15/64\" syrg, Use as directed, Disp: 100 Pen Needle, Rfl: 6 
  furosemide (LASIX) 40 mg tablet, Take 1 Tab by mouth daily. , Disp: 40 Tab, Rfl: 6 
  famotidine (PEPCID) 20 mg tablet, 20 mg by Per G Tube route two (2) times a day., Disp: , Rfl:  
  pramipexole (MIRAPEX) 0.25 mg tablet, 0.25 mg by Per G Tube route three (3) times daily. , Disp: , Rfl:  
  L. acidoph & paracasei- S therm- Bifido (JAVY-Q/RISAQUAD) 8 billion cell cap cap, 1 Cap by PEG Tube route daily. , Disp: 30 Cap, Rfl: 0 
 acetaminophen (TYLENOL) 500 mg tablet, 1,000 mg by Per G Tube route every six (6) hours as needed for Pain., Disp: , Rfl:  
  atorvastatin (LIPITOR) 40 mg tablet, 40 mg by Per G Tube route daily. , Disp: , Rfl:  
  clopidogrel (PLAVIX) 75 mg tab, 75 mg by Feeding Tube route daily. , Disp: , Rfl:  
  cyanocobalamin 1,000 mcg tablet, 1,000 mcg by Per G Tube route daily. , Disp: , Rfl:  
  finasteride (PROSCAR) 5 mg tablet, 5 mg by Per G Tube route nightly., Disp: , Rfl:  
  alfuzosin SR (UROXATRAL) 10 mg SR tablet, 10 mg by Per G Tube route daily. , Disp: , Rfl:  
  midodrine (PROAMITINE) 10 mg tablet, 10 mg by Per G Tube route three (3) times daily (with meals). , Disp: , Rfl:  
  HYDROcodone-acetaminophen (NORCO)  mg tablet, 1 Tab by Per G Tube route daily as needed for Pain., Disp: , Rfl:  
  fluticasone (FLONASE ALLERGY RELIEF) 50 mcg/actuation nasal spray, 2 Sprays by Both Nostrils route daily. , Disp: , Rfl:  
  zinc 50 mg tab tablet, 50 mg by Per G Tube route daily. , Disp: , Rfl:  
  LANTUS SOLOSTAR U-100 INSULIN 100 unit/mL (3 mL) inpn, INJECT 14 UNITS SUBCUTANEOUSLY ONCE DAILY, Disp: 15 Pen, Rfl: 5 
  nitroglycerin (NITROSTAT) 0.4 mg SL tablet, 0.4 mg by SubLINGual route every five (5) minutes as needed for Chest Pain., Disp: , Rfl:  
  ondansetron hcl (ZOFRAN, AS HYDROCHLORIDE,) 8 mg tablet, Take 1 Tab by mouth every eight (8) hours as needed for Nausea., Disp: 20 Tab, Rfl: 0 
  gabapentin (NEURONTIN) 250 mg/5 mL solution, 750 mg by PEG Tube route three (3) times daily. , Disp: , Rfl:  
  multivitamin (ONE A DAY) tablet, 1 Tab by Per G Tube route daily. , Disp: , Rfl:  
 
 
 
Allergies Allergen Reactions  Demerol [Meperidine] Shortness of Breath  Paxil [Paroxetine Hcl] Unknown (comments) Pt gets shaky and loses control of legs  Amoxicillin Rash  Cleocin [Clindamycin Hcl] Rash  Pcn [Penicillins] Rash Review of Systems: A comprehensive review of systems was negative except for: Constitutional: positive for fatigue and malaise Respiratory: positive for pleurisy/chest pain, dyspnea on exertion or chronic bronchitis Gastrointestinal: positive for dysphagia, dyspepsia, reflux symptoms, nausea and abdominal pain Musculoskeletal: positive for myalgias, arthralgias, stiff joints, neck pain and back pain Neurological: positive for coordination problems, tremor and weakness Behvioral/Psych: positive for anxiety and depression Vitals:  
 08/02/18 1054 BP: 110/52 Pulse: 84 Resp: 16 SpO2: 96% Weight: 170 lb (77.1 kg) Height: 5' 7\" (1.702 m) Objective: I 
 
 
 
NEUROLOGICAL EXAM: 
  
Appearance: The patient is poorly developed and nourished, provides a fair history and is in no acute distress. Patient status post radical head and neck surgery Mental Status: Oriented to time, place and person and the president, he is oriented ×4, but he cannot do serial sevens, could remember 2 of 3 words at 30 seconds, and had a little difficulty spelling world backwards, but with difficulty could draw a clock showing the time 10:50.   cognitive function and fund of knowledge is slow  probably mildly abnormal. Speech is fluent without aphasia or dysarthria. Mood and affect appropriate but depressed . Cranial Nerves:   Intact visual fields. Fundi are benign. RAY, EOM's full, no nystagmus, no ptosis. Facial sensation is normal. Corneal reflexes are not tested. Facial movement is asymmetric. Hearing is abnormal bilaterally. Patient has previous radical head neck surgery on the left, and deformed oral pharyngeal structures. Neck without meningismus or bruits Patient has marked limited range of motion of the cervical spine with pain in all directions Temporal arteries are not tender or enlarged Motor:  4/5 strength in upper and lower proximal and distal muscles, except for the right upper extremity which has strength about 3/5 associated with an arm drift and decreased rapid alternating movements in the right hand. Normal bulk and tone. No fasciculations. Reflexes:   Deep tendon reflexes 2+/4 on the right and 1+/4 on the left. No babinski or clonus present Sensory:   Abnormal to touch, pinprick and temperature and vibration decreased in both feet. DSS is intact Gait:  Abnormal gait as he has to use a walker and he has to move slowly due to his arthritis and his mild right hemiparesis . Tremor:   Minimal intention bilateral tremor noted, and possible left hand resting tremor noted, no cogwheeling or rigidity. Cerebellar:   Mildly abnormal Romberg and tandem cerebellar signs present. Neurovascular:  Abnormal heart sounds and irregular rhythm, peripheral pulses decreased, and no carotid bruits.  
  
 
 
 
Assessment: ICD-10-CM ICD-9-CM 1. Thrombotic stroke involving left middle cerebral artery ([facility]) I63.312 434.01 metoclopramide HCl (REGLAN) 10 mg tablet  
   guaiFENesin (ROBITUSSIN) 100 mg/5 mL liquid  
   albuterol (PROVENTIL HFA, VENTOLIN HFA, PROAIR HFA) 90 mcg/actuation inhaler OTHER,NON-FORMULARY,  
   VITAMIN D, 25 HYROXY PANEL  
   VITAMIN B12 & FOLATE  
   TSH 3RD GENERATION  
   DUPLEX CAROTID BILATERAL AMB NEURO  
   EEG 2. Parkinson's disease (tremor, stiffness, slow motion, unstable posture) ([facility]) G20 332.0 metoclopramide HCl (REGLAN) 10 mg tablet  
   guaiFENesin (ROBITUSSIN) 100 mg/5 mL liquid  
   albuterol (PROVENTIL HFA, VENTOLIN HFA, PROAIR HFA) 90 mcg/actuation inhaler OTHER,NON-FORMULARY,  
   VITAMIN D, 25 HYROXY PANEL  
   VITAMIN B12 & FOLATE  
   TSH 3RD GENERATION  
   DUPLEX CAROTID BILATERAL AMB NEURO  
   EEG 3. Benign essential tremor syndrome G25.0 333.1 metoclopramide HCl (REGLAN) 10 mg tablet  
   guaiFENesin (ROBITUSSIN) 100 mg/5 mL liquid  
   albuterol (PROVENTIL HFA, VENTOLIN HFA, PROAIR HFA) 90 mcg/actuation inhaler    OTHER,NON-FORMULARY,  
   VITAMIN D, 25 HYROXY PANEL  
   VITAMIN B12 & FOLATE  
   TSH 3RD GENERATION  
   DUPLEX CAROTID BILATERAL AMB NEURO  
   EEG 4. Hemiplegia and hemiparesis following cerebral infarction affecting right dominant side ([facility]) I69.351 438.21 metoclopramide HCl (REGLAN) 10 mg tablet  
   guaiFENesin (ROBITUSSIN) 100 mg/5 mL liquid  
   albuterol (PROVENTIL HFA, VENTOLIN HFA, PROAIR HFA) 90 mcg/actuation inhaler OTHER,NON-FORMULARY,  
   VITAMIN D, 25 HYROXY PANEL  
   VITAMIN B12 & FOLATE  
   TSH 3RD GENERATION  
   DUPLEX CAROTID BILATERAL AMB NEURO  
   EEG  
5. Stenosis of both internal carotid arteries I65.23 433.10 metoclopramide HCl (REGLAN) 10 mg tablet 433.30 guaiFENesin (ROBITUSSIN) 100 mg/5 mL liquid  
   albuterol (PROVENTIL HFA, VENTOLIN HFA, PROAIR HFA) 90 mcg/actuation inhaler OTHER,NON-FORMULARY,  
   VITAMIN D, 25 HYROXY PANEL  
   VITAMIN B12 & FOLATE  
   TSH 3RD GENERATION  
   DUPLEX CAROTID BILATERAL AMB NEURO  
   EEG 6. 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  
  357.2 guaiFENesin (ROBITUSSIN) 100 mg/5 mL liquid  
   albuterol (PROVENTIL HFA, VENTOLIN HFA, PROAIR HFA) 90 mcg/actuation inhaler OTHER,NON-FORMULARY,  
   VITAMIN D, 25 HYROXY PANEL  
   VITAMIN B12 & FOLATE  
   TSH 3RD GENERATION  
   DUPLEX CAROTID BILATERAL AMB NEURO  
   EEG  
7. Disturbance of memory R41.3 780.93 metoclopramide HCl (REGLAN) 10 mg tablet  
   guaiFENesin (ROBITUSSIN) 100 mg/5 mL liquid  
   albuterol (PROVENTIL HFA, VENTOLIN HFA, PROAIR HFA) 90 mcg/actuation inhaler OTHER,NON-FORMULARY,  
   VITAMIN D, 25 HYROXY PANEL  
   VITAMIN B12 & FOLATE  
   TSH 3RD GENERATION  
   DUPLEX CAROTID BILATERAL AMB NEURO  
   EEG 8. Presbycusis of both ears H91.13 388.01 metoclopramide HCl (REGLAN) 10 mg tablet  
   guaiFENesin (ROBITUSSIN) 100 mg/5 mL liquid  
   albuterol (PROVENTIL HFA, VENTOLIN HFA, PROAIR HFA) 90 mcg/actuation inhaler    OTHER,NON-FORMULARY,  
 VITAMIN D, 25 HYROXY PANEL  
   VITAMIN B12 & FOLATE  
   TSH 3RD GENERATION  
   DUPLEX CAROTID BILATERAL AMB NEURO  
   EEG 9. Physical deconditioning R53.81 799.3 metoclopramide HCl (REGLAN) 10 mg tablet  
   guaiFENesin (ROBITUSSIN) 100 mg/5 mL liquid  
   albuterol (PROVENTIL HFA, VENTOLIN HFA, PROAIR HFA) 90 mcg/actuation inhaler OTHER,NON-FORMULARY,  
   VITAMIN D, 25 HYROXY PANEL  
   VITAMIN B12 & FOLATE  
   TSH 3RD GENERATION  
   DUPLEX CAROTID BILATERAL AMB NEURO  
   EEG 10. Hypothyroidism due to acquired atrophy of thyroid E03.4 244.8 metoclopramide HCl (REGLAN) 10 mg tablet 246.8 guaiFENesin (ROBITUSSIN) 100 mg/5 mL liquid  
   albuterol (PROVENTIL HFA, VENTOLIN HFA, PROAIR HFA) 90 mcg/actuation inhaler OTHER,NON-FORMULARY,  
   VITAMIN D, 25 HYROXY PANEL  
   VITAMIN B12 & FOLATE  
   TSH 3RD GENERATION  
   DUPLEX CAROTID BILATERAL AMB NEURO  
   EEG 11. Vitamin D deficiency E55.9 268.9 metoclopramide HCl (REGLAN) 10 mg tablet  
   guaiFENesin (ROBITUSSIN) 100 mg/5 mL liquid  
   albuterol (PROVENTIL HFA, VENTOLIN HFA, PROAIR HFA) 90 mcg/actuation inhaler OTHER,NON-FORMULARY,  
   VITAMIN D, 25 HYROXY PANEL  
   VITAMIN B12 & FOLATE  
   TSH 3RD GENERATION  
   DUPLEX CAROTID BILATERAL AMB NEURO  
   EEG 12. B12 deficiency E53.8 266.2 metoclopramide HCl (REGLAN) 10 mg tablet  
   guaiFENesin (ROBITUSSIN) 100 mg/5 mL liquid  
   albuterol (PROVENTIL HFA, VENTOLIN HFA, PROAIR HFA) 90 mcg/actuation inhaler OTHER,NON-FORMULARY,  
   VITAMIN D, 25 HYROXY PANEL  
   VITAMIN B12 & FOLATE  
   TSH 3RD GENERATION  
   DUPLEX CAROTID BILATERAL AMB NEURO  
   EEG Active Problems: * No active hospital problems.  * 
 
 
Plan:  
 
Patient with new problem of decreased hearing, memory problems, slurred speech, but I do not really appreciate a slurred speech, but wife is concerned about a stroke, and in view of his past history of vertebrobasilar insufficiency, with his marked hypotension he could have had a mild mini stroke, so we will check a CT scan since we can get an MRI, check his carotid Dopplers and there was no change there, and he is going to see ENT about his hearing loss. He is no longer taking amantadine, not sure that it much for the tremor but he continues his Mirapex for that. He may have a component of Parkinson's disease in addition to essential tremor, we will continue the Mirapex He is not a candidate for beta-blockers, because of his heart disease and hypotension. Patient with progressive tremors, but on exam today there really are not too apparent, but by his history suggest benign essential tremor Patient with moderate severe vertebrobasilar stenosis and insufficiency and chronic dizziness, and one wonders whether some of the tremors might be orthostatic tremors. We spent 45 minutes with the patient and his wife going over his history, going over his exam, and discussing various medications and treatments, we will try the medication as above and keep everything else the same, and also reviewing all his records from his hospitalization, his CT scan reviewed personally on the PACS system, his thyroid test, and his neurologic workup and evaluation, and I concluded that he might well padded some parkinsonian tremor plus essential tremor and generalized weakness and side effect of his medications of Mysoline. Candance Huger If all else fails we may need to really do a CTA of his head and neck just to make sure there is no significant progression of disease there. We offered him Coumadin in the past for his vertebrobasilar disease but in view of his mother's history of bleeding complications on that medication he wants no part of it. We will see him again in 6 months time or earlier as needed, he will call me if any problem, he will check my chart for results of his test or give us a call then. Signed By: Laureen Garland MD   
 August 2, 2018 CC: Zeinab Mckenzie Luna Henry MD 
FAX: [phone] This note will not be viewable in [address]

## 2018-08-02 NOTE — MR AVS SNAPSHOT
[facility], 
[id number], [address] [facility] 
[phone] Patient: Jelly Molina MRN: [id number] Visit Information Date & Time Provider Department Dept. Phone Encounter #  
 8/2/2018 10:30 AM Samira Dickerson MD Neurology Clinic at [facility] [phone] [id number] Follow-up Instructions Return if symptoms worsen or fail to improve. Your Appointments 8/2/2018 11:30 AM  
PROCEDURE with DOPPLER_NEUMR [facility]rology [facility] at [facility] Appt Note: doppler after tas visit [address] [facility], 
[address] [place] [address], [address] [phone]  
[address] [facility], [address] [place] [address], [address] [address]  
  
    
 9/12/2018 12:10 PM  
Follow Up with Preston Noyola MD  
[facility] Appt Note: 3 month f/u Diabetes [address] [phone] [address] [place] [address]  
  
    
 10/24/2018 12:00 PM  
Follow Up with Samira Dickerson MD  
Neurology Clinic at [facility] Appt Note: f/u tremors, jrb 7/9/18  
 [address] [facility], 
[address] [place] [address], [address] [address]  
[address] [facility], [address] [place] [address], [address] [phone]  
  
    
 1/7/2019 10:40 AM  
Follow Up with Samira Dickerson MD  
Neurology Clinic at [facility]) Appt Note: f/u stroke tremor, jrb 5/7/18  
 [address] [facility], 
[address] [place] [address], [address] [facility]  
[phone] Upcoming Health Maintenance Date Due ZOSTER VACCINE AGE 60> 1/31/1995 MEDICARE YEARLY EXAM 3/14/2018 Influenza Age 5 to Adult 8/1/2018 FOOT EXAM Q1 11/9/2018 EYE EXAM RETINAL OR DILATED Q1 11/15/2018 HEMOGLOBIN A1C Q6M 1/14/2019 MICROALBUMIN Q1 6/4/2019 LIPID PANEL Q1 6/4/2019 GLAUCOMA SCREENING Q2Y 11/15/2019 DTaP/Tdap/Td series (2 - Td) 9/9/2025 Allergies as of 8/2/2018  Review Complete On: 8/2/2018 By: Laureen Garland MD  
  
 Severity Noted Reaction Type Reactions Demerol [Meperidine] High 04/12/2010   Systemic Shortness of Breath Paxil [Paroxetine Hcl] High 04/12/2010   Systemic Unknown (comments) Pt gets shaky and loses control of legs Amoxicillin  01/18/2013    Rash Cleocin [Clindamycin Hcl]  02/15/2018    Rash Pcn [Penicillins]  04/12/2010    Rash Current Immunizations  Reviewed on 2/11/2013 Name Date Influenza Vaccine 10/1/2016, 10/1/2014 Influenza Vaccine (Quad) PF 10/7/2015 11:20 AM  
 Influenza Vaccine Split 9/24/2012 Influenza Vaccine Whole 12/3/2009 Pneumococcal Polysaccharide (PPSV-23) 10/7/2015 11:22 AM  
 TD Vaccine 12/1/2011  6:03 PM  
 Tdap 9/9/2015  7:38 AM  
 ZZZ-RETIRED (DO NOT USE) Pneumococcal Vaccine (Unspecified Type) 12/3/2007 Not reviewed this visit You Were Diagnosed With   
  
 Codes Comments Thrombotic stroke involving left middle cerebral artery ([facility]    -  Primary ICD-10-CM: I06.189 ICD-9-CM: 434.01 Parkinson's disease (tremor, stiffness, slow motion, unstable posture) ([facility]     ICD-10-CM: G20 
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 
ICD-9-CM: 250.60, 357.2 Disturbance of memory     ICD-10-CM: R41.3 ICD-9-CM: 780.93  Presbycusis of both ears     ICD-10-CM: H91.13 
ICD-9-CM: 388.01   
 Physical deconditioning     ICD-10-CM: R53.81 ICD-9-CM: 799.3 Hypothyroidism due to acquired atrophy of thyroid     ICD-10-CM: E03.4 ICD-9-CM: 244.8, 246.8 Vitamin D deficiency     ICD-10-CM: E55.9 ICD-9-CM: 268.9 B12 deficiency     ICD-10-CM: E53.8 ICD-9-CM: 266.2 Vitals BP Pulse Resp Height(growth percentile) Weight(growth percentile) SpO2  
 110/52 84 16 5' 7\" (1.702 m) 170 lb (77.1 kg) 96% BMI Smoking Status 26.63 kg/m2 Never Smoker Vitals History BMI and BSA Data Body Mass Index Body Surface Area  
 26.63 kg/m 2 1.91 m 2 Preferred Pharmacy Pharmacy Name Phone [facility] [address], [address] [place] [address] Avenue [phone] Your Updated Medication List  
  
   
This list is accurate as of 8/2/18 11:21 AM.  Always use your most recent med list.  
  
  
  
  
 acetaminophen 500 mg tablet Commonly known as:  TYLENOL  
1,000 mg by Per G Tube route every six (6) hours as needed for Pain. albuterol 90 mcg/actuation inhaler Commonly known as:  PROVENTIL HFA, VENTOLIN HFA, PROAIR HFA Take  by inhalation two (2) times a day. alfuzosin SR 10 mg SR tablet Commonly known as:  Garrie Risk 10 mg by Per G Tube route daily. aspirin 81 mg chewable tablet Take 1 Tab by mouth daily. atorvastatin 40 mg tablet Commonly known as:  LIPITOR 40 mg by Per G Tube route daily. clopidogrel 75 mg Tab Commonly known as:  PLAVIX 75 mg by Feeding Tube route daily. cyanocobalamin 1,000 mcg tablet  
1,000 mcg by Per G Tube route daily. famotidine 20 mg tablet Commonly known as:  PEPCID  
20 mg by Per G Tube route two (2) times a day. finasteride 5 mg tablet Commonly known as:  PROSCAR  
5 mg by Per G Tube route nightly. FLONASE ALLERGY RELIEF 50 mcg/actuation nasal spray Generic drug:  fluticasone 2 Sprays by Both Nostrils route daily. furosemide 40 mg tablet Commonly known as:  LASIX Take 1 Tab by mouth daily. gabapentin 250 mg/5 mL solution Commonly known as:  NEURONTIN  
750 mg by PEG Tube route three (3) times daily. guaiFENesin 100 mg/5 mL liquid Commonly known as:  ROBITUSSIN  
10mL three times daily HYDROcodone-acetaminophen  mg tablet Commonly known as:  Benetta Pock  
1 Tab by Per G Tube route daily as needed for Pain. insulin regular 100 unit/mL injection Commonly known as:  NOVOLIN R, HUMULIN R  
14 units at 8 am, 12 units at 2 pm, 5 units at 8 pm  
  
 insulin syringe-needle U-100 0.3 mL 31 gauge x 15/64\" Syrg Use as directed L. acidoph & paracasei- S therm- Bifido 8 billion cell Cap cap Commonly known as:  JAVY-Q/RISAQUAD  
1 Cap by PEG Tube route daily. LANTUS SOLOSTAR U-100 INSULIN 100 unit/mL (3 mL) Inpn Generic drug:  insulin glargine INJECT 14 UNITS SUBCUTANEOUSLY ONCE DAILY  
  
 metoclopramide HCl 10 mg tablet Commonly known as:  REGLAN  
  
 midodrine 10 mg tablet Commonly known as:  Gypsy Mass 10 mg by Per G Tube route three (3) times daily (with meals). multivitamin tablet Commonly known as:  ONE A DAY  
1 Tab by Per G Tube route daily. nitroglycerin 0.4 mg SL tablet Commonly known as:  NITROSTAT  
0.4 mg by SubLINGual route every five (5) minutes as needed for Chest Pain. ondansetron hcl 8 mg tablet Commonly known as:  [place] Gaudencio Take 1 Tab by mouth every eight (8) hours as needed for Nausea. OTHER(NON-FORMULARY) Klack's Solution swish and spit out 4 times daily  
  
 pramipexole 0.25 mg tablet Commonly known as:  MIRAPEX  
0.25 mg by Per G Tube route three (3) times daily. zinc 50 mg Tab tablet 50 mg by Per G Tube route daily. We Performed the Following TSH 3RD GENERATION [28503 CPT(R)] VITAMIN B12 & FOLATE [02745 CPT(R)] VITAMIN D, 25 HYROXY PANEL [ZCC88079 Custom] Follow-up Instructions Return if symptoms worsen or fail to improve. To-Do List   
 08/02/2018 To Be Determined Appointment with Trudy Sanchez LPN at [place] [address]  
  
 08/02/2018 Imaging:  DUPLEX CAROTID BILATERAL AMB NEURO   
  
 08/06/2018 To Be Determined Appointment with Trudy Sanchez LPN at [place] [address]  
  
 08/06/2018 Neurology:  EEG   
  
 08/09/2018 To Be Determined Appointment with Trudy Sanchez LPN at [place] [address]  
  
 08/13/2018 To Be Determined Appointment with Trudy Sanchez LPN at [place] [address]  
  
 08/20/2018 To Be Determined Appointment with Trudy Sanchez LPN at [place] [address]  
  
 08/30/2018 To Be Determined Appointment with Maribel Almaraz at [place] [address] Patient Instructions Office Policies 
 
o Phone calls/patient messages: Please allow up to 24 hours for someone in the office to contact you about your call or message. Be mindful your provider may be out of the office or your message may require further review. We encourage you to use [employer] for your messages as this is a faster, more efficient way to communicate with our office 
 
o Medication Refills: 
Prescription medications require up to 48 business hours to process. We encourage you to use [employer] for your refills. For controlled medications: Please allow up to 72 business hours to process. Certain medications may require you to  a written prescription at our office. NO narcotic/controlled medications will be prescribed after 4pm Monday through Friday or on weekends 
 
o Form/Paperwork Completion: 
Please note there is a $25 fee for all paperwork completed by our providers. We ask that you allow 7-14 business days. Pre-payment is due prior to picking up/faxing the completed form.  You may also download your forms to alooma to have your doctor print off. Introducing [facility]! Naun Harris introduces [employer]oma patient portal. Now you can access parts of your medical record, email your doctor's office, and request medication refills online. 1. In your internet browser, go to [email or web address] 2. Click on the First Time User? Click Here link in the Sign In box. You will see the New Member Sign Up page. 3. Enter your [employer]oma Access Code exactly as it appears below. You will not need to use this code after youve completed the sign-up process. If you do not sign up before the expiration date, you must request a new code. · [employer] Access Code: [email or web address] Expires: 8/12/2018  2:10 PM 
 
4. Enter the last four digits of your Social Security Number (xxxx) and Date of Birth (mm/dd/yyyy) as indicated and click Submit. You will be taken to the next sign-up page. 5. Create a alooma ID. This will be your [employer]oma login ID and cannot be changed, so think of one that is secure and easy to remember. 6. Create a [employer]oma password. You can change your password at any time. 7. Enter your Password Reset Question and Answer. This can be used at a later time if you forget your password. 8. Enter your e-mail address. You will receive e-mail notification when new information is available in [address] 9. Click Sign Up. You can now view and download portions of your medical record. 10. Click the Download Summary menu link to download a portable copy of your medical information. If you have questions, please visit the Frequently Asked Questions section of the [employer]oma website. Remember, [employer]oma is NOT to be used for urgent needs. For medical emergencies, dial 911. Now available from your iPhone and Android! Please provide this summary of care documentation to your next provider. Your primary care clinician is listed as Emilia Ortiz.  If you have any questions after today's visit, please call [phone].

## 2018-08-02 NOTE — PATIENT INSTRUCTIONS
Office Policies 
 
o Phone calls/patient messages: Please allow up to 24 hours for someone in the office to contact you about your call or message. Be mindful your provider may be out of the office or your message may require further review. We encourage you to use [employer] for your messages as this is a faster, more efficient way to communicate with our office 
 
o Medication Refills: 
Prescription medications require up to 48 business hours to process. We encourage you to use [employer] for your refills. For controlled medications: Please allow up to 72 business hours to process. Certain medications may require you to  a written prescription at our office. NO narcotic/controlled medications will be prescribed after 4pm Monday through Friday or on weekends 
 
o Form/Paperwork Completion: 
Please note there is a $25 fee for all paperwork completed by our providers. We ask that you allow 7-14 business days. Pre-payment is due prior to picking up/faxing the completed form. You may also download your forms to [employer] to have your doctor print off.

## 2018-08-07 PROBLEM — R41.82 ALTERED MENTAL STATUS, UNSPECIFIED: Status: ACTIVE | Noted: 2018-01-01

## 2018-08-07 PROBLEM — R56.9 CONVULSION (HCC): Status: ACTIVE | Noted: 2018-01-01

## 2018-08-08 NOTE — PERIOP NOTES
[facility]  Ambulatory Surgery Unit  Pre-operative Instructions    Surgery/Procedure Date  Friday, August 17, 2018            Tentative Arrival Time 0915      1. On the day of your surgery/procedure, please report to the Ambulatory Surgery Unit Registration Desk and sign in at your designated time. The Ambulatory Surgery Unit is located in [facility] on the [facility] side of the [facility] across from the [facility] building. Please have all of your health insurance cards and a photo ID. 2. You must have someone with you to drive you home, as you should not drive a car for 24 hours following anesthesia. Please make arrangements for a responsible adult friend or family member to stay with you for at least the first 24 hours after your surgery. 3. Do not have anything to eat or drink (including water, gum, mints, coffee, juice) after midnight, Thursday. This may not apply to medications prescribed by your physician. (Please note below the special instructions with medications to take the morning of surgery, if applicable.)    4. We recommend you do not drink any alcoholic beverages for 24 hours before and after your surgery. 5. Contact your surgeons office for instructions on the following medications: non-steroidal anti-inflammatory drugs (i.e. Advil, Aleve), vitamins, and supplements. (Some surgeons will want you to stop these medications prior to surgery and others may allow you to take them)   **If you are currently taking Plavix, Coumadin, Aspirin and/or other blood-thinning agents, contact your surgeon for instructions. ** Your surgeon will partner with the physician prescribing these medications to determine if it is safe to stop or if you need to continue taking. Please do not stop taking these medications without instructions from your surgeon.     6. In an effort to help prevent surgical site infection, we ask that you shower with an anti-bacterial soap (i.e. Dial or Safeguard) for 3 days prior to and on the morning of surgery, using a fresh towel after each shower. (Please begin this process with fresh bed linens.) Do not apply any lotions, powders, or deodorants after the shower on the day of your procedure. If applicable, please do not shave the operative site for 48 hours prior to surgery. 7. Wear comfortable clothes. Wear glasses instead of contacts. Do not bring any jewelry or money (other than copays or fees as instructed). Do not wear make-up, particularly mascara, the morning of your surgery. Do not wear nail polish, particularly if you are having foot /hand surgery. Wear your hair loose or down, no ponytails, buns, shari pins or clips. All body piercings must be removed. 8. You should understand that if you do not follow these instructions your surgery may be cancelled. If your physical condition changes (i.e. fever, cold or flu) please contact your surgeon as soon as possible. 9. It is important that you be on time. If a situation occurs where you may be late, or if you have any questions or problems, please call [phone].    10. Your surgery time may be subject to change. You will receive a phone call the day prior to surgery to confirm your arrival time. 11. Pediatric patients: please bring a change of clothes, diapers, bottle/sippy cup, pacifier, etc.      Special Instructions: Take all medications and inhalers, as prescribed, on the morning of surgery with a sip of water EXCEPT: do not take any insulin day of procedure      Insulin Dependent Diabetic patients: Take your diabetic medications as prescribed the day before surgery. Hold all diabetic medications the day of surgery. If you are scheduled to arrive for surgery after 8:00 AM, and your AM blood sugar is >200, please call Ambulatory Surgery. I understand a pre-operative phone call will be made to verify my surgery time.   In the event that I am not available, I give permission for a message to be left on my answering service and/or with another person?       yes    Preop instructions reviewed  Pt verbalized understanding.      ___________________      ___________________      ________________  (Signature of Patient)          (Witness)                   (Date and Time)

## 2018-08-08 NOTE — PROCEDURES
[address]  EEG    Name:Patsy GRAY  MR#: [id number]  : 1935  ACCOUNT #: [de-identified]   DATE OF SERVICE: 2018    CLINICAL INDICATIONS FOR THIS ELECTROENCEPHALOGRAM:  Patient is an 79-year-old male with a history of sudden onset tremors. The patient with altered mental status. Patient seen for possible seizures. Patient with spells. EEG to rule out seizures, rule out cortical abnormality. ELECTROENCEPHALOGRAM CLASSIFICATION:  Essentially normal awake and asleep for age. DESCRIPTION OF RECORD:  This is a 16-channel EEG recording for possible seizures with EKG monitoring on patient having generalized tremors, spells, confusion, altered mental status. Possible seizures, possible epilepsy. EEG to rule out cortical abnormality. DESCRIPTION OF THE RECORD:  The background of this recording contains a posteriorly located occipital alpha rhythm of 8-9 Hz that attenuates some with eye opening. The background rhythms at times are somewhat borderline slow, but may have been due to drowsiness or medication effect. No clear focal process or spike or spike and wave discharges were seen. Hyperventilation was not performed. Photic stimulation produced a mild driving response in the posterior head regions. Throughout the recording, there were no areas of clear focal slowing or spike or spike and wave discharges seen. The patient did enter brief stage of sleep with K complexes and sleep spindles seen in the central head regions. No recorded spells of any type. INTERPRETATION:  This is an essentially normal electroencephalogram that is somewhat borderline but probably normal for age without clear areas of focal slowing, spike discharges, or recorded spells of any type. No tremors apparently were noted on the diary.       MD ANNA MARIE Costa / Naomi Millard  D: 2018 16:14     T: 2018 01:07  JOB #: [id number]  CC: Tereasa Seip MD

## 2018-08-08 NOTE — PERIOP NOTES
Reviewed notes in CC, since pt has had recent admissions, one for aspiration pneumonia and last one for hypotension and symptomatic bradycardia. Dr. Carrero Fuelmarcos note wanted pt to follow up with NP in office in two weeks. Reviewed this information with Dr. Dandre Linares. She wants pt to be cleared by cardiology before this procedure. Called office, they will reach out to pt to get into office, I also sent a device form to be filled out for his AICD.

## 2018-08-10 NOTE — PERIOP NOTES
Dr. Jeanne Rosario reviewed chart, called cardiology office to obtain notes from pt's visit, will give to Dr. Jeanne Rosario when received.

## 2018-08-11 NOTE — ED NOTES
MD discussed dc instructions and information with pt. Pt verbalized understanding. Pt assisted out of ED via w/c by staff.  No signs of distress noted

## 2018-08-11 NOTE — PROGRESS NOTES
Mr. Ny Silva is an 80 YOM, seen in ED for chest pain. CM reviewed medical record, met with Patient and spouse Arnaud [facility] [phone], introduced self/explained role. Patient lives at home with his wife in [place], reported continued independence with most ADLs/IADLs, does receive assistance as needed from spouse. Patient is currently enrolled in [facility]. DME includes walker, wheelchair, bathroom DME. Patient/Spouse denied present concerns/psychosocial needs/problems, stated they feel well supported by OP services currently in place, as well as planned f/u w/ community providers. Transitional care goal/plan remains to return home when medically cleared for d/c, transportation to be provided by Spouse. No additional questions/concerns reported at this time. CM provided emotional support, encouragement. Care Management Interventions  PCP Verified by CM: Yes  Mode of Transport at Discharge:  Other (see comment) (Spouse Deena Taylor)  Transition of Care Consult (CM Consult): Home Health, Discharge Planning (Patient is active with MARIA G [facility] PT/RN)  [facility] - INPATIENT: Yes  Discharge Durable Medical Equipment: No  Physical Therapy Consult: No  Occupational Therapy Consult: No  Speech Therapy Consult: No  Current Support Network: Own Home, Lives with Spouse  Confirm Follow Up Transport: Family  Plan discussed with Pt/Family/Caregiver: Yes  Discharge Location  Discharge Placement: Home    Aditi Gill, [facility]

## 2018-08-11 NOTE — DISCHARGE INSTRUCTIONS
Thoracic Aortic Aneurysm: Care Instructions    Please call Dr. Pablo Wolf to make a follow up appointment. Your Care Instructions  A thoracic aortic aneurysm is a bulge in a blood vessel (aorta) in the chest. The bulge occurs in a weak spot in the vessel. A large aneurysm can be very dangerous. If it bursts, it can cause bleeding that leads to death. A thoracic aortic aneurysm can be caused by an injury to the chest, hardening of the arteries, or an infection. Sometimes aneurysms run in families. Small aneurysms may not need treatment. But you will need regular checkups to see how fast the aneurysm is growing. A large aneurysm may need surgery to repair the weak area of the aorta. Follow-up care is a key part of your treatment and safety. Be sure to make and go to all appointments, and call your doctor if you are having problems. It's also a good idea to know your test results and keep a list of the medicines you take. How can you care for yourself at home? · Control high blood pressure. High blood pressure increases the chance of an aneurysm bursting. A healthy lifestyle along with medicines may help you lower your blood pressure. · Manage cholesterol to help keep your blood vessels healthy. A healthy lifestyle along with medicines may help you manage cholesterol. · Do not smoke. Smoking can make the aneurysm grow faster. If you need help quitting, talk to your doctor about stop-smoking programs and medicines. These can increase your chances of quitting for good. · Stay at a healthy weight. Being overweight may not make the aneurysm any worse. But being at a healthy weight can reduce your risks from surgery if you need it. · Eat heart-healthy foods. These include fruits, vegetables, whole grains, fish, and low-fat or nonfat dairy foods. Limit sodium, alcohol, and sweets. · If your doctor recommends it, get more exercise. Walking is a good choice. Bit by bit, increase the amount you walk every day. Try for at least 30 minutes on most days of the week. You also may want to swim, bike, or do other activities. When should you call for help? Call 911 anytime you think you may need emergency care. For example, call if:    · You have sudden chest pain and shortness of breath, or you cough up blood.     · You passed out (lost consciousness).    Call your doctor now or seek immediate medical care if:    · You have any new chest pain.    Watch closely for changes in your health, and be sure to contact your doctor if:    · You have any problems making your doctor visits.     · You do not get better as expected. Where can you learn more? Go to http://aniketAltocomtomas.info/. Enter [phone] in the search box to learn more about \"Thoracic Aortic Aneurysm: Care Instructions. \"  Current as of: November 21, 2017  Content Version: 11.7  © 6439-2028 [employer]. Care instructions adapted under license by [employer] (which disclaims liability or warranty for this information). If you have questions about a medical condition or this instruction, always ask your healthcare professional. [place] [address] any warranty or liability for your use of this information. Chest Pain: Care Instructions  Your Care Instructions    There are many things that can cause chest pain. Some are not serious and will get better on their own in a few days. But some kinds of chest pain need more testing and treatment. Your doctor may have recommended a follow-up visit in the next 8 to 12 hours. If you are not getting better, you may need more tests or treatment. Even though your doctor has released you, you still need to watch for any problems. The doctor carefully checked you, but sometimes problems can develop later. If you have new symptoms or if your symptoms do not get better, get medical care right away.   If you have worse or different chest pain or pressure that lasts more than 5 minutes or you passed out (lost consciousness), call 911 or seek other emergency help right away. A medical visit is only one step in your treatment. Even if you feel better, you still need to do what your doctor recommends, such as going to all suggested follow-up appointments and taking medicines exactly as directed. This will help you recover and help prevent future problems. How can you care for yourself at home? · Rest until you feel better. · Take your medicine exactly as prescribed. Call your doctor if you think you are having a problem with your medicine. · Do not drive after taking a prescription pain medicine. When should you call for help? Call 911 if:    · You passed out (lost consciousness).     · You have severe difficulty breathing.     · You have symptoms of a heart attack. These may include:  ¨ Chest pain or pressure, or a strange feeling in your chest.  ¨ Sweating. ¨ Shortness of breath. ¨ Nausea or vomiting. ¨ Pain, pressure, or a strange feeling in your back, neck, jaw, or upper belly or in one or both shoulders or arms. ¨ Lightheadedness or sudden weakness. ¨ A fast or irregular heartbeat. After you call 911, the  may tell you to chew 1 adult-strength or 2 to 4 low-dose aspirin. Wait for an ambulance. Do not try to drive yourself.    Call your doctor today if:    · You have any trouble breathing.     · Your chest pain gets worse.     · You are dizzy or lightheaded, or you feel like you may faint.     · You are not getting better as expected.     · You are having new or different chest pain. Where can you learn more? Go to http://aniket-tomas.info/. Enter A120 in the search box to learn more about \"Chest Pain: Care Instructions. \"  Current as of: November 20, 2017  Content Version: 11.7  © 6944-9098 [employer]. Care instructions adapted under license by [employer] (which disclaims liability or warranty for this information).  If you have questions about a medical condition or this instruction, always ask your healthcare professional. [place] [address] any warranty or liability for your use of this information.

## 2018-08-11 NOTE — ED PROVIDER NOTES
EMERGENCY DEPARTMENT HISTORY AND PHYSICAL EXAM      Date: 8/11/2018  Patient Name: Michi Parker    History of Presenting Illness     Chief Complaint   Patient presents with    Chest Pain     L sided chest pain onset 1030 yesterday morning       History Provided By: Patient and Patient's significant other    HPI: Michi Parker, 80 y.o. male with PMHx significant for CVA, arthritis, DM, and CAD, presents himself w/ significant other to the ED with cc of intermittent, sharp 7/10 non-radiating left sided CP since 10:30 AM yesterday (8/10/18). Pt reports associated nausea since onset of CP. Pt notes little improvement of pain w/ taking some pain medicine at home. His significant other notes that pt recently saw his cardiologist on 8/8/18 for f/u on impending EGD procedure on 8/17/18 w/ his GI. She notes no complains or abnormalities from the visit. Pt specifically denies leg swelling, SOB, and vomiting. Chief Complaint: CP  Duration: 2 Days  Timing:  Intermittent  Location: left sided chest  Quality: Sharp  Severity: 7 out of 10  Modifying Factors: little improvement of pain w/ taking at home pain medication  Associated Symptoms: nausea    There are no other complaints, changes, or physical findings at this time. PCP: Raad Whitfield MD   Cardiology: Dr. Jose Brown  GI: Dr. Gael Stokes    Current Facility-Administered Medications   Medication Dose Route Frequency Provider Last Rate Last Dose    morphine injection 4 mg  4 mg IntraVENous NOW Deveron Pin, DO        morphine 2 mg/mL injection             lidocaine (LIDODERM) 5 % patch 1 Patch  1 Patch TransDERmal Q24H Sarath Pin, DO   1 Patch at 08/11/18 1322     Current Outpatient Prescriptions   Medication Sig Dispense Refill    klack's mixture Solution Take 5 mL by mouth every six (6) hours.  Diphenhydramine elixir 12.5mg/ml 500ml, Nystatin suspension 120ml,Tetracycline 500mg capsules  12 capsules (6g), Hydrocortisone 20mg tablet 12 tablets (240mg), Water for irrigation QS ad 1000ml      albuterol (PROVENTIL VENTOLIN) 2.5 mg /3 mL (0.083 %) nebulizer solution 2.5 mg by Nebulization route once.  lactose-reduced food/fiber (ISOSOURCE 1.5 RAE FEEDING TUBE) 250 mL by adductor canal block route five (5) times daily.  metoclopramide HCl (REGLAN) 10 mg tablet 10 mg by Per G Tube route Before breakfast, lunch, and dinner.  guaiFENesin (ROBITUSSIN) 100 mg/5 mL liquid 10mL three times daily      aspirin 81 mg chewable tablet Take 1 Tab by mouth daily. (Patient taking differently: 1 Tab by Per G Tube route daily.) 30 Tab 6    insulin regular (NOVOLIN R, HUMULIN R) 100 unit/mL injection 14 units at 8 am, 12 units at 2 pm, 5 units at 8 pm (Patient taking differently: by SubCUTAneous route. 14 units at 8 am, 12 units at 2 pm, 5 units at 8 pm) 1 Vial 6    insulin syringe-needle U-100 0.3 mL 31 gauge x 15/64\" syrg Use as directed 100 Pen Needle 6    furosemide (LASIX) 40 mg tablet Take 1 Tab by mouth daily. (Patient taking differently: 1 Tab by Per G Tube route daily.) 40 Tab 6    famotidine (PEPCID) 20 mg tablet 20 mg by Per G Tube route two (2) times a day.  pramipexole (MIRAPEX) 0.25 mg tablet 0.25 mg by Per G Tube route three (3) times daily.  L. acidoph & paracasei- S therm- Bifido (JAVY-Q/RISAQUAD) 8 billion cell cap cap 1 Cap by PEG Tube route daily. 30 Cap 0    acetaminophen (TYLENOL) 500 mg tablet 1,000 mg by Per G Tube route every six (6) hours as needed for Pain.  atorvastatin (LIPITOR) 40 mg tablet 40 mg by Per G Tube route daily.  clopidogrel (PLAVIX) 75 mg tab 75 mg by Feeding Tube route daily.  finasteride (PROSCAR) 5 mg tablet 5 mg by Per G Tube route nightly.  alfuzosin SR (UROXATRAL) 10 mg SR tablet 10 mg by Per G Tube route daily.  midodrine (PROAMITINE) 10 mg tablet 10 mg by Per G Tube route three (3) times daily (with meals).       HYDROcodone-acetaminophen (NORCO)  mg tablet 1 Tab by Per G Tube route daily as needed for Pain.  fluticasone (FLONASE ALLERGY RELIEF) 50 mcg/actuation nasal spray 2 Sprays by Both Nostrils route daily.  zinc 50 mg tab tablet 50 mg by Per G Tube route daily.  LANTUS SOLOSTAR U-100 INSULIN 100 unit/mL (3 mL) inpn INJECT 14 UNITS SUBCUTANEOUSLY ONCE DAILY 15 Pen 5    nitroglycerin (NITROSTAT) 0.4 mg SL tablet 0.4 mg by SubLINGual route every five (5) minutes as needed for Chest Pain.  ondansetron hcl (ZOFRAN, AS HYDROCHLORIDE,) 8 mg tablet Take 1 Tab by mouth every eight (8) hours as needed for Nausea. 20 Tab 0    gabapentin (NEURONTIN) 250 mg/5 mL solution 750 mg by PEG Tube route three (3) times daily.  multivitamin (ONE A DAY) tablet 1 Tab by Per G Tube route daily. 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 I suspect the latter. Achalasia  is not present.         GERD (gastroesophageal reflux disease)     Heart failure ([facility] [address] 10/2014     Cardiomyopathy:Pacemaker upgrade:Biv and AICD  Dr. Sera Watkins heart  last visit 5/11/2015    Hepatitis C Dx 1996    treated at [facility] in past; as of 4/15/15 wife states pt currently not under any treatment    Hyperlipidemia     Myocardial infarct [facility]) 2013    Heart Cath: 40% LV EF, Stented distal LAD, patent Graft to circumflex  On tube feeding diet approx     still has as of 9/28/15  (no po food/liquid/meds at all); Dr Bernadette Chen Other ill-defined conditions(799.89)     1 dose of chemotherapy/radiation for tongue cancer    Other ill-defined conditions(799.89)     BPH    Other ill-defined conditions(799.89)     orthostatic hypotension    Pneumonia ~ April -May 2010    Stroke [facility]) approx     left side-left finger tips numb; no imbalance or memory loss; as of  not seeing neuro MD    Suicidal thoughts        Past Surgical History:  Past Surgical History:   Procedure Laterality Date    ABDOMEN SURGERY [address]    peg tube    CABG, ARTERY-VEIN, THREE      HX CATARACT REMOVAL      bilateral    HX CHOLECYSTECTOMY      HX COLONOSCOPY      HX HEART CATHETERIZATION      Stented distal LAD    HX MOHS PROCEDURES      bilateral    HX ORTHOPAEDIC      back surgery times two    HX OTHER SURGICAL      Radical Left Neck    HX OTHER SURGICAL      NASAL POLYPS REMOVAL    HX OTHER SURGICAL      TURP    HX PACEMAKER      HX PACEMAKER  10/28/14     Defibrillator: [facility] # [id number], serial # [id number]; Dr. Henry  [phone]; Dr Malvin Osuna      cystoscopy    NEUROLOGICAL PROCEDURE UNLISTED      cevical surgery    GA CHANGE GASTROSTOMY TUBE  2011         GA CHANGE GASTROSTOMY TUBE  2011         GA EGD INSERT GUIDE WIRE DILATOR PASSAGE ESOPHAGUS  2010         GA EGD TRANSORAL BIOPSY SINGLE/MULTIPLE  2010         STOMACH SURGERY PROCEDURE UNLISTED  2011         VASCULAR SURGERY PROCEDURE UNLIST      bilateral carotid stents       Family History:  Family History   Problem Relation Age of Onset    Heart Disease Father       at age 52 from CAD    Colon Cancer Mother     Cancer Mother      colon ca    Heart Disease Brother        Social History:  Social History   Substance Use Topics    Smoking status: Never Smoker    Smokeless tobacco: Never Used    Alcohol use No       Allergies: Allergies   Allergen Reactions    Demerol [Meperidine] Shortness of Breath    Paxil [Paroxetine Hcl] Unknown (comments)     Pt gets shaky and loses control of legs    Amoxicillin Rash    Cleocin [Clindamycin Hcl] Rash    Pcn [Penicillins] Rash         Review of Systems   Review of Systems   Constitutional: Negative for chills, fatigue and fever. HENT: Negative for congestion and rhinorrhea. Eyes: Negative for visual disturbance. Respiratory: Negative for cough, shortness of breath and wheezing. Cardiovascular: Positive for chest pain (left sided). Negative for palpitations. Gastrointestinal: Positive for nausea. Negative for abdominal distention, abdominal pain, constipation, diarrhea and vomiting. Endocrine: Negative. Genitourinary: Negative for difficulty urinating and dysuria. Musculoskeletal: Negative. Skin: Negative for rash. Neurological: Negative for dizziness, weakness and light-headedness. Psychiatric/Behavioral: Negative for suicidal ideas. Physical Exam   Physical Exam   Constitutional: He is oriented to person, place, and time. He appears well-developed and well-nourished. No distress. HENT:   Head: Normocephalic and atraumatic. Mouth/Throat: Oropharynx is clear and moist.   Eyes: Conjunctivae and EOM are normal.   Neck: Neck supple. No JVD present. No tracheal deviation present. Cardiovascular: Normal rate, regular rhythm and intact distal pulses. Exam reveals no gallop and no friction rub. No murmur heard. Pacemaker to left chest   Pulmonary/Chest: Effort normal. No stridor. No respiratory distress. He has no wheezes. He has rales (to left lower lobes). Abdominal: Soft. Bowel sounds are normal. He exhibits no distension and no mass. There is no tenderness. There is no guarding. Musculoskeletal: Normal range of motion. He exhibits no edema (peripheral) or tenderness.    No deformity Neurological: He is alert and oriented to person, place, and time. He has normal strength. No focal deficits   Skin: Skin is warm, dry and intact. No rash noted. Old sternotomy scar noted. Psychiatric: He has a normal mood and affect. His behavior is normal. Judgment and thought content normal.   Nursing note and vitals reviewed. Diagnostic Study Results     Labs -     Recent Results (from the past 12 hour(s))   EKG, 12 LEAD, INITIAL    Collection Time: 08/11/18  8:47 AM   Result Value Ref Range    Ventricular Rate 90 BPM    Atrial Rate 90 BPM    P-R Interval 134 ms    QRS Duration 178 ms    Q-T Interval 430 ms    QTC Calculation (Bezet) 526 ms    Calculated P Axis 38 degrees    Calculated R Axis 170 degrees    Calculated T Axis 15 degrees    Diagnosis       *Suspect unspecified pacemaker failure  Atrial-sensed ventricular-paced rhythm  When compared with ECG of 21-JUL-2018 21:46,  Vent. rate has decreased BY   6 BPM     METABOLIC PANEL, COMPREHENSIVE    Collection Time: 08/11/18  9:25 AM   Result Value Ref Range    Sodium 136 136 - 145 mmol/L    Potassium 4.4 3.5 - 5.1 mmol/L    Chloride 98 97 - 108 mmol/L    CO2 33 (H) 21 - 32 mmol/L    Anion gap 5 5 - 15 mmol/L    Glucose 137 (H) 65 - 100 mg/dL    BUN 22 (H) 6 - 20 MG/DL    Creatinine 1.19 0.70 - 1.30 MG/DL    BUN/Creatinine ratio 18 12 - 20      GFR est AA >60 >60 ml/min/1.73m2    GFR est non-AA 58 (L) >60 ml/min/1.73m2    Calcium 8.8 8.5 - 10.1 MG/DL    Bilirubin, total 0.6 0.2 - 1.0 MG/DL    ALT (SGPT) 42 12 - 78 U/L    AST (SGOT) 52 (H) 15 - 37 U/L    Alk.  phosphatase 83 45 - 117 U/L    Protein, total 7.4 6.4 - 8.2 g/dL    Albumin 3.0 (L) 3.5 - 5.0 g/dL    Globulin 4.4 (H) 2.0 - 4.0 g/dL    A-G Ratio 0.7 (L) 1.1 - 2.2     CK W/ CKMB & INDEX    Collection Time: 08/11/18  9:25 AM   Result Value Ref Range    CK 82 39 - 308 U/L    CK - MB 2.4 <3.6 NG/ML    CK-MB Index 2.9 (H) 0 - 2.5     CBC WITH AUTOMATED DIFF    Collection Time: 08/11/18  9:25 AM Result Value Ref Range    WBC 6.1 4.1 - 11.1 K/uL    RBC 3.63 (L) 4.10 - 5.70 M/uL    HGB 10.9 (L) 12.1 - 17.0 g/dL    HCT 35.0 (L) 36.6 - 50.3 %    MCV 96.4 80.0 - 99.0 FL    MCH 30.0 26.0 - 34.0 PG    MCHC 31.1 30.0 - 36.5 g/dL    RDW 15.4 (H) 11.5 - 14.5 %    PLATELET 83 (L) 953 - 400 K/uL    MPV 10.7 8.9 - 12.9 FL    NRBC 0.0 0  WBC    ABSOLUTE NRBC 0.00 0.00 - 0.01 K/uL    NEUTROPHILS 76 (H) 32 - 75 %    LYMPHOCYTES 12 12 - 49 %    MONOCYTES 9 5 - 13 %    EOSINOPHILS 2 0 - 7 %    BASOPHILS 0 0 - 1 %    IMMATURE GRANULOCYTES 1 (H) 0.0 - 0.5 %    ABS. NEUTROPHILS 4.7 1.8 - 8.0 K/UL    ABS. LYMPHOCYTES 0.7 (L) 0.8 - 3.5 K/UL    ABS. MONOCYTES 0.5 0.0 - 1.0 K/UL    ABS. EOSINOPHILS 0.1 0.0 - 0.4 K/UL    ABS. BASOPHILS 0.0 0.0 - 0.1 K/UL    ABS. IMM. GRANS. 0.1 (H) 0.00 - 0.04 K/UL    DF SMEAR SCANNED      RBC COMMENTS ANISOCYTOSIS  1+       TROPONIN I    Collection Time: 08/11/18  9:25 AM   Result Value Ref Range    Troponin-I, Qt. <0.05 <0.05 ng/mL   POC TROPONIN-I    Collection Time: 08/11/18  1:41 PM   Result Value Ref Range    Troponin-I (POC) <0.04 0.00 - 0.08 ng/mL       Radiologic Studies -   CT Results  (Last 48 hours)               08/11/18 1232  CTA CHEST W OR W WO CONT Final result    Impression:  IMPRESSION:    There is no pulmonary embolism. Thoracic aortic aneurysm. Follow-up CT scan of chest in 12 months to evaluate   for continued stability is recommended. Chronic basilar pleural and parenchymal change is consistent with a chronic   infectious/inflammatory process predominating in the lower lobes bilaterally. Cardiomegaly. Narrative:  CLINICAL HISTORY: Acute pulmonary embolism suspected. Chest pain,       INDICATION: Chest pain CVA, arthritis, nausea       COMPARISON: 2/15/2018       TECHNIQUE: CT of the chest with  IV contrast , Isovue-370 is performed. Axial   images from the thoracic inlet to the level of the upper abdomen are obtained.    Manual post-processing of the images and coronal reformatting is also performed. CT dose reduction was achieved through use of a standardized protocol tailored   for this examination and automatic exposure control for dose modulation. Multiplanar reformatted imaging was performed. Sagittal and coronal reformatting. 3-D Postprocessing of imaging was performed. 3-D MIP reconstructed images were obtained in the coronal plane. FINDINGS:    There is no pulmonary embolism. Resting aorta measures 40 mm in size. Hepatic steatosis. Postcholecystectomy. Cardiomegaly. Bibasilar atelectatic   change and bibasilar interstitial and interfissural increased markings. Moderate   bronchial wall thickening particularly in the basilar segments. There is no   pleural or pericardial effusion. Aortic valvular disease. Coronary vascular   disease. Poststernotomy. There is no mediastinal, axillary or hilar   lymphadenopathy. The aorta is normal in course and caliber. The proximal   pulmonary arteries are without evidence of filling defects. No lytic or blastic   lesions are identified. CXR Results  (Last 48 hours)               08/11/18 1048  XR CHEST PA LAT Final result    Impression:  IMPRESSION:    Shallow volumes and patchy interstitial opacity. Narrative:  PA AND LATERAL CHEST RADIOGRAPHS: 8/11/2018 10:48 AM       INDICATION: Left-sided chest pain starting at 10:30 this morning. COMPARISON: 7/23/2018, 7/14/2018. CT chest 2/15/2018. TECHNIQUE: Frontal and lateral radiographs of the chest.       FINDINGS:   The lungs are hypoinflated. Patchy interstitial opacity is stable. The central   airways are patent. The heart size is normal. Post pacemaker/AICD placement and   CABG. No pneumothorax or pleural effusion. Medical Decision Making   I am the first provider for this patient.     I reviewed the vital signs, available nursing notes, past medical history, past surgical history, family history and social history. Vital Signs-Reviewed the patient's vital signs. Patient Vitals for the past 12 hrs:   Temp Pulse Resp BP SpO2   08/11/18 1130 - 81 19 113/67 90 %   08/11/18 1105 - 80 20 - 92 %   08/11/18 1000 - 82 19 126/76 90 %   08/11/18 0855 98.5 °F (36.9 °C) 86 16 175/87 96 %     Pulse Oximetry Analysis - 95% on 0L    Cardiac Monitor:   Rate: 88 bpm  Rhythm: Paced      EKG interpretation: (Preliminary) 8:47  Rhythm: atrial sensed ventricular paced rhythm; and regular . Rate (approx.): 90; Axis: normal; AZ interval: normal; QRS interval: normal ; ST/T wave: normal; Other findings: normal.  Written by Garrison Sanabria ED Scribe, as dictated by Joe Smith DO. Records Reviewed: Nursing Notes and Old Medical Records    Provider Notes (Medical Decision Making):   DDx: atypical CP, ACS, PNA, pleurisy, PE, pleural effusion    ED Course:   Initial assessment performed. The patients presenting problems have been discussed, and they are in agreement with the care plan formulated and outlined with them. I have encouraged them to ask questions as they arise throughout their visit. PROGRESS NOTE:   11:49 AM  Notified by RN, pt is still complaining of CP. Will now order a CTA chest.     1:58 PM  Updated pt on repeat troponin. Discussed lab and imaging results with patient and family including incidental finding of thoracic aneurysm. Provided family with copy of lab and imaging results and advised follow up with primary care doctor, cardiology for CP and potentially vascular surgery if thoracic aneurysm gets bigger. Patient and family express understanding and agreement with plan. Will discharge pt soon. Critical Care Time:   0 min    Discharge Note:  1:58 PM  The pt is ready for discharge. The pt's signs, symptoms, diagnosis, and discharge instructions have been discussed and pt has conveyed their understanding. The pt is to follow up as recommended or return to ER should their symptoms worsen.  Plan has been discussed and pt is in agreement. PLAN:  1. Current Discharge Medication List        2. Follow-up Information     None        Return to ED if worse     Diagnosis     Clinical Impression:   1. Chest pain, unspecified type    2. Thoracic aortic aneurysm without rupture [facility])        Attestations: This note is prepared by [employer], acting as Scribe for Lisa Arroyo, DO: The scribe's documentation has been prepared under my direction and personally reviewed by me in its entirety. I confirm that the note above accurately reflects all work, treatment, procedures, and medical decision making performed by me.

## 2018-08-16 NOTE — PERIOP NOTES
Dr. Chip Raya has reviewed chart. Pt was seen in ED 8/11/18 for left sided CP. Workup was negative. Call to wife. Reports that pt still having mild discomfort \"indigestion\", denies SOB or other complaints. Wife reports saw Dr. Kerrin Bence 8/15/18, and was told GI in nature and to keep procedure appt for 8/17/18 for EGD dil. Dr. Chip Raya notified. Will evaluate DOS. Wife is aware of plan.

## 2018-08-17 PROBLEM — R10.13 EPIGASTRIC PAIN: Status: ACTIVE | Noted: 2018-01-01

## 2018-08-17 NOTE — ANESTHESIA PREPROCEDURE EVALUATION
Anesthetic History   No history of anesthetic complications            Review of Systems / Medical History  Patient summary reviewed, nursing notes reviewed and pertinent labs reviewed    Pulmonary  Within defined limits                 Neuro/Psych       CVA  TIA and psychiatric history    Comments: CVA with residual left fingertip numbness  Cardiovascular    Hypertension: well controlled      CHF: NYHA Classification II, dyspnea on exertion  Dysrhythmias   Pacemaker ( AICD), CAD and CABG (2000)    Exercise tolerance: >4 METS  Comments: Cardiomyopathy: EF 50%    Recent ED visit for CP; negative troponins, felt to be GI  EKG= A sensed, V-paced   GI/Hepatic/Renal     GERD      Liver disease    Comments: Hepatitis C  H/O Tongue cancer - Patient has PEG tube due to dysphagia Endo/Other    Diabetes: well controlled, type 2    Arthritis and cancer (tongue)     Other Findings            Physical Exam    Airway  Mallampati: II  TM Distance: > 6 cm  Neck ROM: normal range of motion   Mouth opening: Normal     Cardiovascular    Rhythm: regular  Rate: normal    Murmur: Grade 3, Aortic area     Dental    Dentition: Edentulous     Pulmonary  Breath sounds clear to auscultation               Abdominal  GI exam deferred      Comments: PEG tibe Other Findings            Anesthetic Plan    ASA: 4  Anesthesia type: total IV anesthesia  With Sedation        Induction: Intravenous  Anesthetic plan and risks discussed with: Patient and Spouse      preop glucose 138

## 2018-08-17 NOTE — ANESTHESIA POSTPROCEDURE EVALUATION
Post-Anesthesia Evaluation and Assessment    Patient: Juliet Norris MRN: [id number]  SSN: [id number]    YOB: 1935  Age: 80 y.o. Sex: male       Cardiovascular Function/Vital Signs  Visit Vitals    /53 (BP 1 Location: Left arm, BP Patient Position: At rest)    Pulse 73    Temp 36.7 °C (98.1 °F)    Resp 18    Ht 5' 7\" (1.702 m)    Wt 77.6 kg (171 lb)    SpO2 95%    BMI 26.78 kg/m2       Patient is status post total IV anesthesia anesthesia for Procedure(s):  ESOPHAGOGASTRODUODENOSCOPY (EGD) WITH DILATION AND FLUORO  ESOPHAGEAL DILATION  ESOPHAGOGASTRODUODENAL (EGD) BIOPSY  PYLORIC DILATATION. Nausea/Vomiting: None    Postoperative hydration reviewed and adequate. Pain:  Pain Scale 1: Numeric (0 - 10) (08/17/18 0912)  Pain Intensity 1: 0 (08/17/18 0912)   Managed    Neurological Status:   Neuro (WDL): Exceptions to WDL (08/17/18 0844)  Neuro  Neurologic State: Alert (08/17/18 0912)   At baseline    Mental Status and Level of Consciousness: Arousable    Pulmonary Status:   O2 Device: Room air (08/17/18 0912)   Adequate oxygenation and airway patent    Complications related to anesthesia: None    Post-anesthesia assessment completed.  No concerns    Signed By: Khurram Chisholm MD     August 17, 2018

## 2018-08-17 NOTE — PERIOP NOTES
Permission received to review discharge instructions and discuss private health information with Ad Prabhakar, wife.

## 2018-08-17 NOTE — DISCHARGE INSTRUCTIONS
Cristina Knight  [id number]  1935              Procedure  Discharge Instructions:      Discomfort:  Redness at IV site- apply warm compress to area; if redness or soreness persist- contact your physician  There may be a slight amount of blood passed from the rectum  Gaseous discomfort- walking, belching will help relieve any discomfort  You may not operate a vehicle for 12 hours  You may not engage in an occupation involving machinery or appliances for rest of today  You may not drink alcoholic beverages for at least 12 hours  Avoid making any critical decisions for at least 24 hour  DIET:   You may resume your normal diet today. You should not overeat or \"feast\" today as your abdomen may become distended or uncomfortable. MEDICATIONS:   I reconciled this list from the list you gave us when you came today for the procedure. Please clarify with me, your primary care physician and the nurse who is discharging you if we have any discrepancies. Aspirin and or non-steroidal medication (Ibuprofen, Motrin, naproxen, etc.) is ok in limited quantities. ACTIVITY:  You may resume your normal daily activities it is recommended that you spend the remainder of the day resting -  avoid any strenuous activity. CALL M.D. ANY SIGN OF:  Increasing pain, nausea, vomiting  Abdominal distension (swelling)  New increased bleeding (oral or rectal)  Fever (chills)  Pain in chest area  Bloody discharge from nose or mouth  Shortness of breath          Follow-up Instructions:   Call Dr. Josefa Porter for the results of  biopsy in approximately one week  Telephone #  [phone]  Follow up visit as previously scheduled. Jessy Cooper MD  9:01 AM  8/17/2018    TAKE NARCOTIC PAIN MEDICATIONS WITH FOOD  If given 2 pain narcotics do NOT take together! Narcotics tend to be constipating, we suggest taking a stool softener such as Colace or Miralax (follow package instructions).     DO NOT DRIVE WHILE TAKING NARCOTIC PAIN MEDICATIONS. DO NOT TAKE SLEEPING MEDICATIONS OR ANTIANXIETY MEDICATIONS WHILE TAKING NARCOTIC PAIN MEDICATIONS,  ESPECIALLY THE NIGHT OF ANESTHESIA. CPAP PATIENTS BE SURE TO WEAR MACHINE WHENEVER NAPPING OR SLEEPING. DISCHARGE SUMMARY from Nurse    The following personal items collected during your admission are returned to you:   Dental Appliance: Dental Appliances: None  Vision: Visual Aid: None  Hearing Aid:    Jewelry:    Clothing:    Other Valuables:    Valuables sent to safe:        PATIENT INSTRUCTIONS:    After General Anesthesia or Intravenous Sedation, for 24 hours or while taking prescription Narcotics:        Someone should be with you for the next 24 hours. For your own safety, a responsible adult must drive you home. · Limit your activities  · Recommended activity: Rest today, up with assistance today. Do not climb stairs or shower unattended for the next 24 hours. · Please start with a soft bland diet and advance as tolerated (no nausea) to regular diet. · If you have a sore throat you should try the following: fluids, warm salt water gargles, or throat lozenges. If it does not improve after several days please follow up with your primary physician. · Do not drive and operate hazardous machinery  · Do not make important personal or business decisions  · Do  not drink alcoholic beverages  · If you have not urinated within 8 hours after discharge, please contact your surgeon on call. Report the following to your surgeon:  · Excessive pain, swelling, redness or odor of or around the surgical area  · Temperature over 100.5  · Nausea and vomiting lasting longer than 4 hours or if unable to take medications  · Any signs of decreased circulation or nerve impairment to extremity: change in color, persistent  numbness, tingling, coldness or increase pain      · You will receive a Post Operative Call from one of the Recovery Room Nurses on the day after your surgery to check on you. It is very important for us to know how you are recovering after your surgery. If you have an issue or need to speak with someone, please call your surgeon, do not wait for the post operative call. · You may receive an e-mail or letter in the mail from [employer] regarding your experience with us in the Ambulatory Surgery Unit. Your feedback is valuable to us and we appreciate your participation in the survey. · If the above instructions are not adequate, please contact Rikki Simmons RN, [facility] anesthesia Nurse Manager or our Anesthesiologist, at [phone]. If you are having problems after your surgery, call the physician at their office number. · We wish you a speedy recovery ? What to do at Home:      *  Please give a list of your current medications to your Primary Care Provider. *  Please update this list whenever your medications are discontinued, doses are      changed, or new medications (including over-the-counter products) are added. *  Please carry medication information at all times in case of emergency situations. These are general instructions for a healthy lifestyle:    No smoking/ No tobacco products/ Avoid exposure to second hand smoke    Surgeon General's Warning:  Quitting smoking now greatly reduces serious risk to your health. Obesity, smoking, and sedentary lifestyle greatly increases your risk for illness    A healthy diet, regular physical exercise & weight monitoring are important for maintaining a healthy lifestyle    You may be retaining fluid if you have a history of heart failure or if you experience any of the following symptoms:  Weight gain of 3 pounds or more overnight or 5 pounds in a week, increased swelling in our hands or feet or shortness of breath while lying flat in bed. Please call your doctor as soon as you notice any of these symptoms; do not wait until your next office visit.     Recognize signs and symptoms of STROKE:    B - Balance  E - Eyes    F-  Face looks uneven    A-  Arms unable to move or move even    S-  Speech slurred or non-existent    T-  Time-call 911 as soon as signs and symptoms begin-DO NOT go       Back to bed or wait to see if you get better-TIME IS BRAIN. If you have not received your influenza and/or pneumococcal vaccine, please follow up with your primary care physician. The discharge information has been reviewed with the patient and caregiver. The patient and caregiver verbalized understanding.

## 2018-08-17 NOTE — PROCEDURES
Esophagogastroduodenoscopy    Indications:  compex dysphagia    Medications:  See anesthesia form    Post procedure diagnosis:  PEG TUBE IN STOMACH, ATROPHIC GASTRITIS, RETICULATION AT PYLORUS SUCCESSFULL DILATION TO 46 [place] (ESOPHAGUS), DEFORMED ANTRUM    Description of Procedure:    Prior to the procedure its objectives, risks, consequences and alternatives were discussed with the patient who then elected to proceed. The Olympus video endoscope was inserted under direct vision into the mouth and then into the esophagus. The esophagus looked normal.    The z-line was located at 39 cm. The stomach had an indwelling peg tube. There was a paucity of folds and prominent veins sugesting of atrophic gastritis. The antrum was deformed but there was no pyloric stenosis. There was some reticulation at the pylorus. Gastric exam  included direct and retroflexion examination. The first and second portion of the duodenum appeared normal.  I took biopsies of the duodenum, stomach, and the mid-esophagus. .I  placed a spring tipped guide wire in the lumen of the duodenum and confirmed it fluoroscopically. I removed the scope. I then passed an [employer] dilator size 48Fr over the wire. Using active fluoroscopy I saw that the widest portion of the dilator passed the radiographic G-E junction. I repeated this after re intubation of the duodeum with dthe scope and passed a 51 Fr dilator with active fluoro. There was a tear at the crico after the procedure. I passed a TTS pyloric balloon dilator through the suction channel of the endoscope while the endoscope was in the lumen of the duodenum. I gradually pulled back on the endoscope as the dilator entered the lumen of the duodenum. I could see that the balloon spanned the pylorus. I dilated the balloon from 12mm to 13.5mm to 15 each for sixty seconds. After the procedure I inspected the pylorus and saw no complications.  I did not see appreciable effect on the lumebn. Additional diagnostic [id number]) Radiiological supervision and interpretation   Complications: There were no apparent complications and the patient tolerated the procedure well.         Estimated Blood Loss:  30ml at biopsy sites, seemed to stop  Specimens Removed:  Duodenum, stomach, mid esophagus  Impressions:  Successful dilation of pylorus to 15mm  Deformed antrum   Likely atrophic gastris  Successful dilation of crico and esophagus  PEG in place      Signed By: Sanket Mccall MD                        August 17, 2018     8:46 AM

## 2018-08-17 NOTE — H&P
Pre-endoscopy H and P    The patient was seen and examined in the [facility] pre op. The airway was assessed and docuemented. The problem list, past medical history, and medications were reviewed. There is complex dysphagia. In addition:      He has a sharp pain left chest. Comes and goes. Given sucralfate over phone. Sharp or dull. May last 2-3 hours. Not heart. related to feedings. see in ED. Liver test normal except ast52 hgb 10.9     recent hospitliazation for aspiration. we were concerned about regurgitation and aspiration pneumona. Bowels are ok. some lumpy stools some watery stools overall pretty good. back on home formula. q 3 hour enteral feeding.  one can      Patient Active Problem List   Diagnosis Code    Dysphagia R13.10    Abnormal cardiovascular stress test R94.39    S/P CABG x 3 Z95.1    Atherosclerotic cerebrovascular disease I67.2    Orthostatic hypotension I95.1    Feeding difficulties R63.3    Non-cardiac chest pain R07.89    S/P PTCA (percutaneous transluminal coronary angioplasty) Z98.61    CAD (coronary artery disease) I25.10    Status post implantation of automatic cardioverter/defibrillator (AICD) Z95.810    Aortic stenosis, mild I35.0    Esophageal motility disorder K22.4    Heart failure (HCC) I50.9    Percutaneous endoscopic gastrostomy status ([facility]) Z93.1    Antiplatelet or antithrombotic long-term use Z79.02    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 ([facility]) E11.42    Peripheral neuropathy ([facility]) G62.9    Polyneuropathy associated with underlying disease ([address] G63    History of stroke Z86.73    Aspiration pneumonia ([address] J69.0    Weakness R53.1    Stroke ([address] I63.9    Thrombotic stroke involving left middle cerebral artery ([facility]) A46.350    Stenosis of both internal carotid arteries I65.23    Hemiplegia and hemiparesis following cerebral infarction affecting right dominant side ([facility]) I69.351    Type 2 diabetes with nephropathy ([facility] E11.21    Diabetic peripheral neuropathy associated with type 2 diabetes mellitus ([facility]) E11.42    Benign essential tremor syndrome G25.0    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    Wrist pain, acute, right M25.531    Physical deconditioning R53.81    Tremors of nervous system R25.1    Parkinson's disease (tremor, stiffness, slow motion, unstable posture) ([facility]) G20    Myalgia M79.1    Pneumonia due to group B Streptococcus ([facility]) J15.3    Counseling regarding goals of care Z71.89    Disturbance of memory R41.3    Presbycusis of both ears H91.13    B12 deficiency E53.8    Vitamin D deficiency E55.9    Hypothyroidism due to acquired atrophy of thyroid E03.4    Altered mental status, unspecified R41.82    Convulsion ([facility] R56.9    Epigastric pain R10.13     Social History     Social History    Marital status:      Spouse name: N/A    Number of children: N/A    Years of education: N/A     Occupational History    Retired       He was a stained      Social History Main Topics    Smoking status: Never Smoker    Smokeless tobacco: Never Used    Alcohol use No    Drug use: No    Sexual activity: Yes     Partners: Female     Other Topics Concern    Not on file     Social History Narrative     Past Medical History:   Diagnosis Date    Antiplatelet or antithrombotic long-term use 12/4/2014    Anxiety disorder     Arrhythmia 2009    bradycardia    Arthritis     CAD (coronary artery disease)     s/p CABG 2002; Dr [facility]) 1996    tongue/throat cancer s/p surgery / radiation and 1 dose of chemo    Carotid artery stenosis     s/p bilateral stents    Chronic pain     left leg, lower back,     Depression     Diabetes ([facility])     Type II    Esophageal dysmotility     s/p dilitation    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  I suspect the latter. Achalasia  is not present.  GERD (gastroesophageal reflux disease)     Heart failure ([address] 10/2014     Cardiomyopathy:Pacemaker upgrade:Biv and AICD  Dr. Carmella Levin heart  last visit 2015    Hepatitis C Dx     treated at [facility] in past; as of 4/15/15 wife states pt currently not under any treatment    Hyperlipidemia     Myocardial infarct [facility])     Heart Cath: 40% LV EF, Stented distal LAD, patent Graft to circumflex    On tube feeding diet approx     still has as of 9/28/15  (no po food/liquid/meds at all); Dr Geo Falcon Other ill-defined conditions(799.89)     1 dose of chemotherapy/radiation for tongue cancer    Other ill-defined conditions(799.89)     BPH    Other ill-defined conditions(799.89)     orthostatic hypotension    Pneumonia ~ April -May 2010    Stroke [facility]) approx     left side-left finger tips numb; no imbalance or memory loss; as of  not seeing neuro MD    Suicidal thoughts      The patient has a family history of na    Prior to Admission Medications   Prescriptions Last Dose Informant Patient Reported? Taking? HYDROcodone-acetaminophen (NORCO)  mg tablet 2018 at Unknown time Significant Other Yes Yes   Si Tab by Per G Tube route daily as needed for Pain. L. acidoph & paracasei- S therm- Bifido (JAVY-Q/RISAQUAD) 8 billion cell cap cap 2018 at Unknown time Significant Other No Yes   Si Cap by PEG Tube route daily. LANTUS SOLOSTAR U-100 INSULIN 100 unit/mL (3 mL) inpn 2018 at Unknown time Significant Other No Yes   Sig: INJECT 14 UNITS SUBCUTANEOUSLY ONCE DAILY   acetaminophen (TYLENOL) 500 mg tablet 2018 at Unknown time Significant Other Yes Yes   Si,000 mg by Per G Tube route every six (6) hours as needed for Pain. albuterol (PROVENTIL VENTOLIN) 2.5 mg /3 mL (0.083 %) nebulizer solution 8/15/2018 at Unknown time  Yes Yes   Si.5 mg by Nebulization route once.    alfuzosin SR (UROXATRAL) 10 mg SR tablet 2018 at Unknown time Significant Other Yes Yes   Sig: 10 mg by Per G Tube route daily. aspirin 81 mg chewable tablet 2018 at Unknown time  No Yes   Sig: Take 1 Tab by mouth daily. Patient taking differently: 1 Tab by Per G Tube route daily. atorvastatin (LIPITOR) 40 mg tablet 2018 at Unknown time Significant Other Yes Yes   Si mg by Per G Tube route daily. clopidogrel (PLAVIX) 75 mg tab 2018 at Unknown time Significant Other Yes Yes   Si mg by Feeding Tube route daily. famotidine (PEPCID) 20 mg tablet 2018 at Unknown time Significant Other Yes Yes   Si mg by Per G Tube route two (2) times a day. finasteride (PROSCAR) 5 mg tablet 2018 at Unknown time Significant Other Yes Yes   Si mg by Per G Tube route nightly. fluticasone (FLONASE ALLERGY RELIEF) 50 mcg/actuation nasal spray 2018 at Unknown time Significant Other Yes Yes   Si Sprays by Both Nostrils route daily. furosemide (LASIX) 40 mg tablet 2018 at Unknown time  No Yes   Sig: Take 1 Tab by mouth daily. Patient taking differently: 1 Tab by Per G Tube route daily. gabapentin (NEURONTIN) 250 mg/5 mL solution 2018 at Unknown time Significant Other Yes Yes   Si mg by PEG Tube route three (3) times daily. guaiFENesin (ROBITUSSIN) 100 mg/5 mL liquid 2018 at Unknown time  Yes Yes   Sig: 10mL three times daily   insulin regular (NOVOLIN R, HUMULIN R) 100 unit/mL injection 2018 at Unknown time  No Yes   Si units at 8 am, 12 units at 2 pm, 5 units at 8 pm   Patient taking differently: by SubCUTAneous route. 14 units at 8 am, 12 units at 2 pm, 5 units at 8 pm   insulin syringe-needle U-100 0.3 mL 31 gauge x 15/64\" syrg 2018 at Unknown time  No Yes   Sig: Use as directed   klack's mixture Solution 2018 at Unknown time  Yes Yes   Sig: Take 5 mL by mouth every six (6) hours.  Diphenhydramine elixir 12.5mg/ml 500ml, Nystatin suspension 120ml,Tetracycline 500mg capsules  12 capsules (6g), Hydrocortisone 20mg tablet 12 tablets (240mg), Water for irrigation QS ad 1000ml   lactose-reduced food/fiber (ISOSOURCE 1.5 RAE FEEDING TUBE) 2018 at Unknown time  Yes Yes   Si mL by adductor canal block route five (5) times daily. lidocaine (SALONPAS/ASPERCREME) 4 % patch Not Taking at Unknown time  No No   Sig: Use as needed on affected site   metoclopramide HCl (REGLAN) 10 mg tablet 2018 at Unknown time  Yes Yes   Sig: 10 mg by Per G Tube route Before breakfast, lunch, and dinner. midodrine (PROAMITINE) 10 mg tablet 2018 at Unknown time Significant Other Yes Yes   Sig: 10 mg by Per G Tube route three (3) times daily (with meals). multivitamin (ONE A DAY) tablet 2018 at Unknown time Significant Other Yes Yes   Si Tab by Per G Tube route daily. nitroglycerin (NITROSTAT) 0.4 mg SL tablet Unknown at Unknown time Significant Other Yes No   Si.4 mg by SubLINGual route every five (5) minutes as needed for Chest Pain. ondansetron hcl (ZOFRAN, AS HYDROCHLORIDE,) 8 mg tablet 2018 at Unknown time Significant Other No Yes   Sig: Take 1 Tab by mouth every eight (8) hours as needed for Nausea. pramipexole (MIRAPEX) 0.25 mg tablet 2018 at Unknown time Significant Other Yes Yes   Si.25 mg by Per G Tube route three (3) times daily. sucralfate (CARAFATE) 1 gram tablet 2018 at Unknown time  Yes Yes   Sig: Take 1 g by mouth three (3) times daily. zinc 50 mg tab tablet 2018 at Unknown time Significant Other Yes Yes   Si mg by Per G Tube route daily. Facility-Administered Medications: None           The review of systems is:  negative for shortness of breath and positive for chest pain      The heart, lungs, and mental status were satisfactory for the administration of anesthesia sedation and for the procedure.       I discussed with the patient the objectives, risks, consequences and alternatives to the procedure.       Stan Salomon MD  8/17/2018  7:25 AM

## 2018-08-17 NOTE — PERIOP NOTES
CRE balloon dilatation of the PYLORUS   12 mm Balloon inflated to 3 ATMs and held for 60 seconds. 13.5 mm Balloon inflated to 4.5 ATMs and held for 60 seconds. 15 mm Balloon inflated to 8 ATMs and held for 60 seconds. No subcutaneous crepitus of the chest or cervical region was noted post dilatation.

## 2018-08-17 NOTE — IP AVS SNAPSHOT
[address] [facility] 
[phone] Patient: Melissa Limon MRN: [id number] VTA:[id number] About your hospitalization You were admitted on:  August 17, 2018 You last received care in the:  [facility] PACU You were discharged on:  August 17, 2018 Why you were hospitalized Your primary diagnosis was:  Not on File Your diagnoses also included:  Dysphagia, Esophageal Motility Disorder, Epigastric Pain Follow-up Information Follow up With Details Comments Contact Info MD Addie Perales [address] [facility] 
[phone] Your Scheduled Appointments Monday August 20, 2018 To Be Determined ROUTINE with Greg Miramontes, LPN  
[address] ([address]) [address] ([address]) Tuesday August 21, 2018 11:00 AM EDT  
PT ROUTINE with Linda Carrel Crostic, PTA  
[address] ([address]) [address] ([address]) Friday August 24, 2018 To Be Determined PT ROUTINE with [address], PTA  
[address] ([address]) [address] ([address]) Monday August 27, 2018 To Be Determined PT ROUTINE with [address], PTA  
[address] ([address]) [address] ([address]) Thursday August 30, 2018 To Be Determined SN REASSESSMENT with Sherl Leyden [address] ([address]) [address] ([address]) Thursday August 30, 2018 To Be Determined PT DISCIPLINE DISCHARGE with Farrah Roberts, [address] [facility] [address] ([address]) [address] ([address]) Wednesday September 12, 2018 12:10 PM EDT Follow Up with MD Yue Mathis Diabetes and Endocrinology [facility] [address] [address] [phone] Discharge Orders None A check nemesio indicates which time of day the medication should be taken. My Medications CHANGE how you take these medications Instructions Each Dose to Equal  
 Morning Noon Evening Bedtime  
 aspirin 81 mg chewable tablet What changed:  how to take this Your last dose was: Your next dose is: Take 1 Tab by mouth daily. 81 mg  
    
   
   
   
  
 clopidogrel 75 mg Tab Commonly known as:  PLAVIX Start taking on:  8/20/2018 What changed:  how to take this Your last dose was: Your next dose is:    
   
   
 1 Tab by PEG Tube route daily. 75 mg  
    
   
   
   
  
 furosemide 40 mg tablet Commonly known as:  LASIX What changed:  how to take this Your last dose was: Your next dose is: Take 1 Tab by mouth daily. 40 mg  
    
   
   
   
  
 insulin regular 100 unit/mL injection Commonly known as:  MOE Martel What changed:   
- how to take this 
- additional instructions Your last dose was: Your next dose is:    
   
   
 14 units at 8 am, 12 units at 2 pm, 5 units at 8 pm  
     
   
   
   
  
  
CONTINUE taking these medications Instructions Each Dose to Equal  
 Morning Noon Evening Bedtime  
 acetaminophen 500 mg tablet Commonly known as:  TYLENOL Your last dose was: Your next dose is:    
   
   
 1,000 mg by Per G Tube route every six (6) hours as needed for Pain.   
 1000 mg  
    
   
   
   
  
 albuterol 2.5 mg /3 mL (0.083 %) nebulizer solution Commonly known as:  PROVENTIL VENTOLIN Your last dose was: Your next dose is:    
   
   
 2.5 mg by Nebulization route once. 2.5 mg  
    
   
   
   
  
 alfuzosin SR 10 mg SR tablet Commonly known as:  Presley Daniels Your last dose was: Your next dose is:    
   
   
 10 mg by Per G Tube route daily. 10 mg  
    
   
   
   
  
 atorvastatin 40 mg tablet Commonly known as:  LIPITOR Your last dose was: Your next dose is:    
   
   
 40 mg by Per G Tube route daily. 40 mg  
    
   
   
   
  
 CARAFATE 1 gram tablet Generic drug:  sucralfate Your last dose was: Your next dose is: Take 1 g by mouth three (3) times daily. 1 g  
    
   
   
   
  
 famotidine 20 mg tablet Commonly known as:  PEPCID Your last dose was: Your next dose is:    
   
   
 20 mg by Per G Tube route two (2) times a day. 20 mg  
    
   
   
   
  
 finasteride 5 mg tablet Commonly known as:  PROSCAR Your last dose was: Your next dose is:    
   
   
 5 mg by Per G Tube route nightly. 5 mg FLONASE ALLERGY RELIEF 50 mcg/actuation nasal spray Generic drug:  fluticasone Your last dose was: Your next dose is: 2 Sprays by Both Nostrils route daily. 2 Spray  
    
   
   
   
  
 gabapentin 250 mg/5 mL solution Commonly known as:  NEURONTIN Your last dose was: Your next dose is:    
   
   
 750 mg by PEG Tube route three (3) times daily. 750 mg  
    
   
   
   
  
 guaiFENesin 100 mg/5 mL liquid Commonly known as:  ROBITUSSIN Your last dose was: Your next dose is:    
   
   
 10mL three times daily HYDROcodone-acetaminophen  mg tablet Commonly known as:  Uriel Nevarezner Your last dose was: Your next dose is: 1 Tab by Per G Tube route daily as needed for Pain. 1 Tab  
    
   
   
   
  
 insulin syringe-needle U-100 0.3 mL 31 gauge x 15/64\" Syrg Your last dose was: Your next dose is:    
   
   
 Use as directed ISOSOURCE 1.5 RAE FEEDING TUBE Your last dose was: Your next dose is:    
   
   
 250 mL by adductor canal block route five (5) times daily. 250 mL  
    
   
   
   
  
 klack's mixture Solution Your last dose was: Your next dose is: Take 5 mL by mouth every six (6) hours. Diphenhydramine elixir 12.5mg/ml 500ml, Nystatin suspension 120ml,Tetracycline 500mg capsules  12 capsules (6g), Hydrocortisone 20mg tablet 12 tablets (240mg), Water for irrigation QS ad 1000ml 5 mL  
    
   
   
   
  
 L. acidoph & paracasei- S therm- Bifido 8 billion cell Cap cap Commonly known as:  JAVY-Q/RISAQUAD Your last dose was: Your next dose is:    
   
   
 1 Cap by PEG Tube route daily. 1 Cap LANTUS SOLOSTAR U-100 INSULIN 100 unit/mL (3 mL) Inpn Generic drug:  insulin glargine Your last dose was: Your next dose is: INJECT 14 UNITS SUBCUTANEOUSLY ONCE DAILY  
     
   
   
   
  
 lidocaine 4 % patch Commonly known as:  SALONPAS/ASPERCREME Your last dose was: Your next dose is:    
   
   
 Use as needed on affected site  
     
   
   
   
  
 metoclopramide HCl 10 mg tablet Commonly known as:  REGLAN Your last dose was: Your next dose is:    
   
   
 10 mg by Per G Tube route Before breakfast, lunch, and dinner. 10 mg  
    
   
   
   
  
 midodrine 10 mg tablet Commonly known as:  Leah Sameera Your last dose was: Your next dose is:    
   
   
 10 mg by Per G Tube route three (3) times daily (with meals). 10 mg  
    
   
   
   
  
 multivitamin tablet Commonly known as:  ONE A DAY Your last dose was: Your next dose is:    
   
   
 1 Tab by Per G Tube route daily. 1 Tab  
    
   
   
   
  
 nitroglycerin 0.4 mg SL tablet Commonly known as:  NITROSTAT Your last dose was: Your next dose is: 0.4 mg by SubLINGual route every five (5) minutes as needed for Chest Pain. 0.4 mg  
    
   
   
   
  
 ondansetron hcl 8 mg tablet Commonly known as:  Huff Faisal Your last dose was: Your next dose is: Take 1 Tab by mouth every eight (8) hours as needed for Nausea. 8 mg  
    
   
   
   
  
 pramipexole 0.25 mg tablet Commonly known as:  MIRAPEX Your last dose was: Your next dose is:    
   
   
 0.25 mg by Per G Tube route three (3) times daily. 0.25 mg  
    
   
   
   
  
 zinc 50 mg Tab tablet Your last dose was: Your next dose is:    
   
   
 50 mg by Per G Tube route daily. 50 mg Where to Get Your Medications Information on where to get these meds will be given to you by the nurse or doctor. ! Ask your nurse or doctor about these medications  
  clopidogrel 75 mg Tab Opioid Education Prescription Opioids: What You Need to Know: 
 
 
Discomfort: 
Redness at IV site- apply warm compress to area; if redness or soreness persist- contact your physician There may be a slight amount of blood passed from the rectum Gaseous discomfort- walking, belching will help relieve any discomfort You may not operate a vehicle for 12 hours You may not engage in an occupation involving machinery or appliances for rest of today You may not drink alcoholic beverages for at least 12 hours Avoid making any critical decisions for at least 24 hour DIET: 
 You may resume your normal diet today. You should not overeat or \"feast\" today as your abdomen may become distended or uncomfortable. MEDICATIONS: 
 I reconciled this list from the list you gave us when you came today for the procedure. Please clarify with me, your primary care physician and the nurse who is discharging you if we have any discrepancies. Aspirin and or non-steroidal medication (Ibuprofen, Motrin, naproxen, etc.) is ok in limited quantities. ACTIVITY: 
You may resume your normal daily activities it is recommended that you spend the remainder of the day resting -  avoid any strenuous activity. CALL M.D. ANY SIGN OF: Increasing pain, nausea, vomiting Abdominal distension (swelling) New increased bleeding (oral or rectal) Fever (chills) Pain in chest area Bloody discharge from nose or mouth Shortness of breath Follow-up Instructions: 
 Call Dr. Roseanne Burger for the results of  biopsy in approximately one week Telephone #  [phone] Follow up visit as previously scheduled. Fallon Weems MD 
9:01 AM 
8/17/2018 DO NOT TAKE TYLENOL/ACETAMINOPHEN WITH PERCOCET, [facility], [address] TAKE NARCOTIC PAIN MEDICATIONS WITH FOOD If given 2 pain narcotics do NOT take together! Narcotics tend to be constipating, we suggest taking a stool softener such as Colace or Miralax (follow package instructions). DO NOT DRIVE WHILE TAKING NARCOTIC PAIN MEDICATIONS. DO NOT TAKE SLEEPING MEDICATIONS OR ANTIANXIETY MEDICATIONS WHILE TAKING NARCOTIC PAIN MEDICATIONS,  ESPECIALLY THE NIGHT OF ANESTHESIA. CPAP PATIENTS BE SURE TO WEAR MACHINE WHENEVER NAPPING OR SLEEPING. DISCHARGE SUMMARY from Nurse The following personal items collected during your admission are returned to you:  
Dental Appliance: Dental Appliances: None Vision: Visual Aid: None Hearing Aid:   
Jewelry:   
Clothing:   
Other Valuables:   
Valuables sent to safe:   
 
 
PATIENT INSTRUCTIONS: 
 
After General Anesthesia or Intravenous Sedation, for 24 hours or while taking prescription Narcotics: 
      Someone should be with you for the next 24 hours. For your own safety, a responsible adult must drive you home. · Limit your activities · Recommended activity: Rest today, up with assistance today. Do not climb stairs or shower unattended for the next 24 hours. · Please start with a soft bland diet and advance as tolerated (no nausea) to regular diet. · If you have a sore throat you should try the following: fluids, warm salt water gargles, or throat lozenges. If it does not improve after several days please follow up with your primary physician. · Do not drive and operate hazardous machinery · Do not make important personal or business decisions · Do  not drink alcoholic beverages · If you have not urinated within 8 hours after discharge, please contact your surgeon on call. Report the following to your surgeon: 
· Excessive pain, swelling, redness or odor of or around the surgical area · Temperature over 100.5 · Nausea and vomiting lasting longer than 4 hours or if unable to take medications · Any signs of decreased circulation or nerve impairment to extremity: change in color, persistent  numbness, tingling, coldness or increase pain · You will receive a Post Operative Call from one of the Recovery Room Nurses on the day after your surgery to check on you. It is very important for us to know how you are recovering after your surgery. If you have an issue or need to speak with someone, please call your surgeon, do not wait for the post operative call. · You may receive an e-mail or letter in the mail from [place] regarding your experience with us in the Ambulatory Surgery Unit.  Your feedback is valuable to us and we appreciate your participation in the survey. · If the above instructions are not adequate, please contact Daneil Severance, RN, [facility] anesthesia Nurse Manager or our Anesthesiologist, at [phone]. If you are having problems after your surgery, call the physician at their office number. · We wish you a speedy recovery ? What to do at Home: *  Please give a list of your current medications to your Primary Care Provider. *  Please update this list whenever your medications are discontinued, doses are 
    changed, or new medications (including over-the-counter products) are added. *  Please carry medication information at all times in case of emergency situations. These are general instructions for a healthy lifestyle: No smoking/ No tobacco products/ Avoid exposure to second hand smoke Surgeon General's Warning:  Quitting smoking now greatly reduces serious risk to your health. Obesity, smoking, and sedentary lifestyle greatly increases your risk for illness A healthy diet, regular physical exercise & weight monitoring are important for maintaining a healthy lifestyle You may be retaining fluid if you have a history of heart failure or if you experience any of the following symptoms:  Weight gain of 3 pounds or more overnight or 5 pounds in a week, increased swelling in our hands or feet or shortness of breath while lying flat in bed. Please call your doctor as soon as you notice any of these symptoms; do not wait until your next office visit. Recognize signs and symptoms of STROKE: 
 
B - Balance E - Eyes F-  Face looks uneven A-  Arms unable to move or move even S-  Speech slurred or non-existent T-  Time-call 911 as soon as signs and symptoms begin-DO NOT go Back to bed or wait to see if you get better-TIME IS BRAIN.  
 
 
If you have not received your influenza and/or pneumococcal vaccine, please follow up with your primary care physician. The discharge information has been reviewed with the patient and caregiver. The patient and caregiver verbalized understanding. Introducing [facility]! [facility] introduces [employer] patient portal. Now you can access parts of your medical record, email your doctor's office, and request medication refills online. 1. In your internet browser, go to [email or web address] 2. Click on the First Time User? Click Here link in the Sign In box. You will see the New Member Sign Up page. 3. Enter your [employer] Access Code exactly as it appears below. You will not need to use this code after youve completed the sign-up process. If you do not sign up before the expiration date, you must request a new code. · [employer] Access Code: [email or web address] Expires: 11/10/2018  3:43 PM 
 
4. Enter the last four digits of your Social Security Number (xxxx) and Date of Birth (mm/dd/yyyy) as indicated and click Submit. You will be taken to the next sign-up page. 5. Create a [employer] ID. This will be your [employer] login ID and cannot be changed, so think of one that is secure and easy to remember. 6. Create a [employer] password. You can change your password at any time. 7. Enter your Password Reset Question and Answer. This can be used at a later time if you forget your password. 8. Enter your e-mail address. You will receive e-mail notification when new information is available in [address] 9. Click Sign Up. You can now view and download portions of your medical record. 10. Click the Download Summary menu link to download a portable copy of your medical information. If you have questions, please visit the Frequently Asked Questions section of the [employer] website. Remember, [employer] is NOT to be used for urgent needs. For medical emergencies, dial 911. Now available from your iPhone and Android! Introducing Adebayo Thacker As a [employer] patient, I wanted to make you aware of our electronic visit tool called Adebayo Thacker. [employer] 24/7 allows you to connect within minutes with a medical provider 24 hours a day, seven days a week via a mobile device or tablet or logging into a secure website from your computer. You can access Adebayo Thacker from anywhere in the [place]. A virtual visit might be right for you when you have a simple condition and feel like you just dont want to get out of bed, or cant get away from work for an appointment, when your regular [employer] provider is not available (evenings, weekends or holidays), or when youre out of town and need minor care. Electronic visits cost only $49 and if the [employer] 24/7 provider determines a prescription is needed to treat your condition, one can be electronically transmitted to a nearby pharmacy*. Please take a moment to enroll today if you have not already done so. The enrollment process is free and takes just a few minutes. To enroll, please download the [employer] 24/7 latanya to your tablet or phone, or visit [email or web address] to enroll on your computer. And, as an [address] [place] [address] patient with a [employer]er account, the results of your visits will be scanned into your electronic medical record and your primary care provider will be able to view the scanned results. We urge you to continue to see your regular [employer] provider for your ongoing medical care. And while your primary care provider may not be the one available when you seek a Adebayo Thacker virtual visit, the peace of mind you get from getting a real diagnosis real time can be priceless. For more information on Adeabyo Thacker, view our Frequently Asked Questions (FAQs) at [email or web address]. Sincerely, 
 
Arabella Aguilera MD 
Chief Medical Officer [facility] *:  certain medications cannot be prescribed via Adebayo Thacker Unresulted Labs-Please follow up with your PCP about these lab tests Order Current Status XR FLUOROSCOPY UNDER 60 MINUTES In process Providers Seen During Your Hospitalization Provider Specialty Primary office phone Stan Salomon MD Gastroenterology [phone] Your Primary Care Physician (PCP) Primary Care Physician Office Phone Office Fax Rosy Stevenson [phone] You are allergic to the following Allergen Reactions Demerol (Meperidine) Shortness of Breath Paxil (Paroxetine Hcl) Unknown (comments) Pt gets shaky and loses control of legs Amoxicillin Rash Cleocin (Clindamycin Hcl) Rash Pcn (Penicillins) Rash Recent Documentation Height Weight BMI Smoking Status 1.702 m 77.6 kg 26.78 kg/m2 Never Smoker Emergency Contacts Name Discharge Info Relation Home Work Mobile [facility] DISCHARGE CAREGIVER [3] Spouse [3] [phone] Steven Ghosh DISCHARGE CAREGIVER [3] Son [22] [phone] Patient Belongings The following personal items are in your possession at time of discharge: 
  Dental Appliances: None  Visual Aid: None   Hearing Aids/Status: Does not own Please provide this summary of care documentation to your next provider. Signatures-by signing, you are acknowledging that this After Visit Summary has been reviewed with you and you have received a copy. Patient Signature:  ____________________________________________________________ Date:  ____________________________________________________________  
  
Zeus Villavicencio Provider Signature:  ____________________________________________________________ Date:  ____________________________________________________________

## 2018-08-25 NOTE — ED PROVIDER NOTES
EMERGENCY DEPARTMENT HISTORY AND PHYSICAL EXAM      Date: 8/25/2018  Patient Name: Marilu Kraft    History of Presenting Illness     Chief Complaint   Patient presents with    Knee Pain     Into triage via wheelchair for c/o non-traumatic Rt knee pain x 3 days. Evaluated at [address] [place] [address] yesterday- xray showed arthritis. Cortizone injection given. Pt states pain and swelling is worse today. History Provided By: Patient and Patient's Wife    HPI: Marilu Kraft, 80 y.o. male with PMHx significant for CAD, MI, stroke, DM, arthritis, presents via wheelchair to the ED with cc of new onset R knee pain and R calf pain that began ~3 days ago. The pt reports he received a steroid injection to the side of his R knee and had an X-ray of his R knee done at [facility] yesterday. He states he came to [address] [facility] ED for pain of severity 10/10. Per pt's wife, the pain is underneath and on the side of his R knee. She reports he is currently prescribed hydrocodone as needed for shoulder pain, which the pt has been taking for his knee pain recently. She also notes that the patient is currently taking 81 mg aspirin and 75 mg Plavix. He denies a history of blood clots. He specifically denies any N/V/D, fevers, chills, dysuria and constipation. There are no other complaints, changes, or physical findings at this time. PCP: Pattie Callahan MD    Current Outpatient Prescriptions   Medication Sig Dispense Refill    methylPREDNISolone (MEDROL, RADHA,) 4 mg tablet As directed 1 Dose Pack 0    naproxen (NAPROSYN) 250 mg tablet Take 1 Tab by mouth every twelve (12) hours as needed for Pain for up to 7 days. 14 Tab 0    clopidogrel (PLAVIX) 75 mg tab 1 Tab by PEG Tube route daily. 1 Tab 0    sucralfate (CARAFATE) 1 gram tablet Take 1 g by mouth three (3) times daily.  lidocaine (SALONPAS/ASPERCREME) 4 % patch Use as needed on affected site 1 Package 0    klack's mixture Solution Take 5 mL by mouth every six (6) hours. Diphenhydramine elixir 12.5mg/ml 500ml, Nystatin suspension 120ml,Tetracycline 500mg capsules  12 capsules (6g), Hydrocortisone 20mg tablet 12 tablets (240mg), Water for irrigation QS ad 1000ml      albuterol (PROVENTIL VENTOLIN) 2.5 mg /3 mL (0.083 %) nebulizer solution 2.5 mg by Nebulization route once.  lactose-reduced food/fiber (ISOSOURCE 1.5 RAE FEEDING TUBE) 250 mL by adductor canal block route five (5) times daily.  metoclopramide HCl (REGLAN) 10 mg tablet 10 mg by Per G Tube route Before breakfast, lunch, and dinner.  guaiFENesin (ROBITUSSIN) 100 mg/5 mL liquid 10mL three times daily      aspirin 81 mg chewable tablet Take 1 Tab by mouth daily. (Patient taking differently: 1 Tab by Per G Tube route daily.) 30 Tab 6    insulin regular (NOVOLIN R, HUMULIN R) 100 unit/mL injection 14 units at 8 am, 12 units at 2 pm, 5 units at 8 pm (Patient taking differently: by SubCUTAneous route. 14 units at 8 am, 12 units at 2 pm, 5 units at 8 pm) 1 Vial 6    insulin syringe-needle U-100 0.3 mL 31 gauge x 15/64\" syrg Use as directed 100 Pen Needle 6    furosemide (LASIX) 40 mg tablet Take 1 Tab by mouth daily. (Patient taking differently: 1 Tab by Per G Tube route daily.) 40 Tab 6    famotidine (PEPCID) 20 mg tablet 20 mg by Per G Tube route two (2) times a day.  pramipexole (MIRAPEX) 0.25 mg tablet 0.25 mg by Per G Tube route three (3) times daily.  L. acidoph & paracasei- S therm- Bifido (JAVY-Q/RISAQUAD) 8 billion cell cap cap 1 Cap by PEG Tube route daily. 30 Cap 0    acetaminophen (TYLENOL) 500 mg tablet 1,000 mg by Per G Tube route every six (6) hours as needed for Pain.  atorvastatin (LIPITOR) 40 mg tablet 40 mg by Per G Tube route daily.  finasteride (PROSCAR) 5 mg tablet 5 mg by Per G Tube route nightly.  alfuzosin SR (UROXATRAL) 10 mg SR tablet 10 mg by Per G Tube route daily.       midodrine (PROAMITINE) 10 mg tablet 10 mg by Per G Tube route three (3) times daily (with meals).  HYDROcodone-acetaminophen (NORCO)  mg tablet 1 Tab by Per G Tube route daily as needed for Pain.  fluticasone (FLONASE ALLERGY RELIEF) 50 mcg/actuation nasal spray 2 Sprays by Both Nostrils route daily.  zinc 50 mg tab tablet 50 mg by Per G Tube route daily.  LANTUS SOLOSTAR U-100 INSULIN 100 unit/mL (3 mL) inpn INJECT 14 UNITS SUBCUTANEOUSLY ONCE DAILY 15 Pen 5    nitroglycerin (NITROSTAT) 0.4 mg SL tablet 0.4 mg by SubLINGual route every five (5) minutes as needed for Chest Pain.  ondansetron hcl (ZOFRAN, AS HYDROCHLORIDE,) 8 mg tablet Take 1 Tab by mouth every eight (8) hours as needed for Nausea. 20 Tab 0    gabapentin (NEURONTIN) 250 mg/5 mL solution 750 mg by PEG Tube route three (3) times daily.  multivitamin (ONE A DAY) tablet 1 Tab by Per G Tube route daily. Past History     Past Medical History:  Past Medical History:   Diagnosis Date    Antiplatelet or antithrombotic long-term use 12/4/2014    Anxiety disorder     Arrhythmia 2009    bradycardia    Arthritis     CAD (coronary artery disease)     s/p CABG 2002; Dr Flaquito Lang 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 I suspect the latter. Achalasia  is not present.         GERD (gastroesophageal reflux disease)     Heart failure ([address] 10/2014     Cardiomyopathy:Pacemaker upgrade:Biv and AICD  Dr. Reynolds [facility] heart  last visit 5/11/2015    Hepatitis C Dx 1996    treated at [facility] in past; as of 4/15/15 wife states pt currently not under any treatment    Hyperlipidemia     Myocardial infarct [facility]) 2013    Heart Cath: 40% LV EF, Stented distal LAD, patent Graft to circumflex    On tube feeding diet approx 2009    still has as of 9/28/15  (no po food/liquid/meds at all); Dr Garrison Montgaue Other ill-defined conditions(799.89) 1996    1 dose of chemotherapy/radiation for tongue cancer    Other ill-defined conditions(799.89)     BPH    Other ill-defined conditions(799.89)     orthostatic hypotension    Pneumonia ~ April -May 2010    Stroke [facility]) approx 2003    left side-left finger tips numb; no imbalance or memory loss; as of 2015 not seeing neuro MD    Suicidal thoughts        Past Surgical History:  Past Surgical History:   Procedure Laterality Date    ABDOMEN SURGERY [address]    peg tube    CABG, ARTERY-VEIN, THREE  2000    HX CATARACT REMOVAL      bilateral    HX CHOLECYSTECTOMY      HX COLONOSCOPY      HX HEART CATHETERIZATION  2013    Stented distal LAD    HX MOHS PROCEDURES      bilateral    HX ORTHOPAEDIC      back surgery times two    HX OTHER SURGICAL      Radical Left Neck    HX OTHER SURGICAL      NASAL POLYPS REMOVAL    HX OTHER SURGICAL  2010    TURP    HX PACEMAKER  11/08    HX PACEMAKER  10/28/14     Defibrillator: [facility] # [id number], serial # [id number]; Dr. Ladd Drain [phone]; Dr Hardy Folds      cystoscopy    NEUROLOGICAL PROCEDURE UNLISTED  1996    cevical surgery    NC CHANGE GASTROSTOMY TUBE  5/2/2011         NC CHANGE GASTROSTOMY TUBE  6/29/2011         NC EGD INSERT GUIDE WIRE DILATOR PASSAGE ESOPHAGUS  9/13/2010         NC EGD TRANSORAL BIOPSY SINGLE/MULTIPLE  9/13/2010         STOMACH SURGERY PROCEDURE UNLISTED  6/29/2011         UPPER GI ENDOSCOPY,BIOPSY  8/17/2018         UPPER GI ENDOSCOPY,DILATN W GUIDE  8/17/2018         UPPER GI ENDOSCOPY,W/DILAT,GASTRIC OUT  8/17/2018         VASCULAR SURGERY PROCEDURE UNLIST      bilateral carotid stents       Family History:  Family History   Problem Relation Age of Onset    Heart Disease Father       at age 52 from CAD    Colon Cancer Mother     Cancer Mother      colon ca    Heart Disease Brother        Social History:  Social History   Substance Use Topics    Smoking status: Never Smoker    Smokeless tobacco: Never Used    Alcohol use No       Allergies: Allergies   Allergen Reactions    Demerol [Meperidine] Shortness of Breath    Paxil [Paroxetine Hcl] Unknown (comments)     Pt gets shaky and loses control of legs    Amoxicillin Rash    Cleocin [Clindamycin Hcl] Rash    Pcn [Penicillins] Rash         Review of Systems   Review of Systems   Constitutional: Negative for chills and fever. Respiratory: Negative for cough and shortness of breath. Cardiovascular: Negative for chest pain. Gastrointestinal: Negative for constipation, diarrhea, nausea and vomiting. Musculoskeletal: Positive for arthralgias and myalgias. Positive for R knee pain. Positive for R calf pain. Neurological: Negative for weakness and numbness. All other systems reviewed and are negative. Physical Exam   Physical Exam   Constitutional: He is oriented to person, place, and time. He appears well-developed and well-nourished. HENT:   Head: Normocephalic and atraumatic. Eyes: Conjunctivae and EOM are normal.   Neck: Normal range of motion. Neck supple. Cardiovascular: Normal rate and regular rhythm. Pulmonary/Chest: Effort normal and breath sounds normal. No respiratory distress. Abdominal: Soft. He exhibits no distension. There is no tenderness. Musculoskeletal:   Great ROM of L knee. Able to flex R knee, but limited ROM secondary to pain. R knee and R calf are swollen. Tender over lateral aspect of R knee and behind R calf. 2+ dp pulses. Neurological: He is alert and oriented to person, place, and time. Skin: Skin is warm and dry. Psychiatric: He has a normal mood and affect. Nursing note and vitals reviewed.       Diagnostic Study Results     Labs -     Recent Results (from the past 12 hour(s))   CBC W/O DIFF    Collection Time: 08/25/18  5:05 PM   Result Value Ref Range    WBC 5.2 4.1 - 11.1 K/uL    RBC 3.47 (L) 4.10 - 5.70 M/uL    HGB 10.3 (L) 12.1 - 17.0 g/dL    HCT 32.8 (L) 36.6 - 50.3 %    MCV 94.5 80.0 - 99.0 FL    MCH 29.7 26.0 - 34.0 PG    MCHC 31.4 30.0 - 36.5 g/dL    RDW 15.1 (H) 11.5 - 14.5 %    PLATELET 90 (L) 056 - 400 K/uL    MPV 11.2 8.9 - 12.9 FL    NRBC 0.0 0  WBC    ABSOLUTE NRBC 0.00 0.00 - 5.22 K/uL   METABOLIC PANEL, BASIC    Collection Time: 08/25/18  5:05 PM   Result Value Ref Range    Sodium 136 136 - 145 mmol/L    Potassium 4.6 3.5 - 5.1 mmol/L    Chloride 99 97 - 108 mmol/L    CO2 33 (H) 21 - 32 mmol/L    Anion gap 4 (L) 5 - 15 mmol/L    Glucose 195 (H) 65 - 100 mg/dL    BUN 31 (H) 6 - 20 MG/DL    Creatinine 1.24 0.70 - 1.30 MG/DL    BUN/Creatinine ratio 25 (H) 12 - 20      GFR est AA >60 >60 ml/min/1.73m2    GFR est non-AA 56 (L) >60 ml/min/1.73m2    Calcium 8.6 8.5 - 10.1 MG/DL       Radiologic Studies -   XR KNEE RT 3 V   Final Result   EXAM:  XR KNEE RT 3 V     INDICATION:   pain eval for any effusionp. Pain and swelling, nontraumatic, 3  days. Injection yesterday      COMPARISON: None.     FINDINGS: Three views of the right knee demonstrate no fracture or other acute  osseous or articular abnormality. There is mild sized joint effusion. Severe  medial joint space narrowing. Prominent DJD patella vascular calcification,  evidence of prior vascular surgery.     IMPRESSION  IMPRESSION:  DJD joint effusion. DUPLEX LOWER EXT VENOUS RIGHT   Final Result   Clinical indication: Leg swelling, pain, DVT suspected     Unilateral venous Doppler performed on theright lower extremity shows no  evidence for deep venous thrombosis masses or fluid collection. Spectral  analysis was performed  Color-flow, compression and real-time examination were performed.     IMPRESSION  IMPRESSION: Negative examination. Medical Decision Making   I am the first provider for this patient. I reviewed the vital signs, available nursing notes, past medical history, past surgical history, family history and social history. Vital Signs-Reviewed the patient's vital signs. Patient Vitals for the past 12 hrs:   Temp Pulse Resp BP SpO2   08/25/18 1715 - - - 113/55 95 %   08/25/18 1614 98.3 °F (36.8 °C) 89 16 102/66 93 %       Pulse Oximetry Analysis - 93% on RA    Cardiac Monitor:   Rate: 89 bpm  Rhythm: Normal Sinus Rhythm      Records Reviewed: Nursing Notes and Old Medical Records    Provider Notes (Medical Decision Making):   DDx: Arthritis, joint effusion, DVT. No signs of septic arthritis. Pt able to bear weight on leg and range it though limited. ED Course:   Initial assessment performed. The patients presenting problems have been discussed, and they are in agreement with the care plan formulated and outlined with them. I have encouraged them to ask questions as they arise throughout their visit. 5:54 PM  US shows no DVT. X-ray shows arthritis with a very small joint effusion. Given its size, will not attempt arthrocentesis. Will do Medrol dose pack and naproxen for next few days and f/u with Orthopedics. Spoke with pharmacy about low dose naproxen for few days, given the pt is on Plavix. Critical Care Time:   0    Disposition:  Discharge Note:  5:57 PM  The pt is ready for discharge. The pt's signs, symptoms, diagnosis, and discharge instructions have been discussed and pt has conveyed their understanding. The pt is to follow up as recommended or return to ER should their symptoms worsen. Plan has been discussed and pt is in agreement. PLAN:  1.  Discharge  Current Discharge Medication List      START taking these medications    Details   methylPREDNISolone (MEDROL, RADHA,) 4 mg tablet As directed  Qty: 1 Dose Pack, Refills: 0      naproxen (NAPROSYN) 250 mg tablet Take 1 Tab by mouth every twelve (12) hours as needed for Pain for up to 7 days. Qty: 14 Tab, Refills: 0           2. Follow-up Information     Follow up With Details Comments Contact Info    Orthopedics Call          Return to ED if worse     Diagnosis     Clinical Impression:   1. Arthritis of right knee    2. Effusion of right knee        Attestations: This note is prepared by Jose Ochoa, acting as Scribe for MD Lalo Oleary MD: The scribe's documentation has been prepared under my direction and personally reviewed by me in its entirety. I confirm that the note above accurately reflects all work, treatment, procedures, and medical decision making performed by me.

## 2018-08-25 NOTE — ED NOTES
Assumed care of pt. Pt. Placed in position of comfort. Call bell within reach. Pt. Made aware of care plan. Pt given warm blanket and pillow for comfort.

## 2018-08-25 NOTE — ED NOTES
Discharge instructions given to patient by Dr. Karoline Delgado. Patient verbalized understanding of discharge instructions. Pt discharged without difficulty. Pt. Discharged in stable condition via wheelchair , accompanied by wife.

## 2018-08-25 NOTE — DISCHARGE INSTRUCTIONS
Knee Arthritis: Care Instructions  Your Care Instructions    Knee arthritis is a breakdown of the cartilage that cushions your knee joint. When the cartilage wears down, your bones rub against each other. This causes pain and stiffness. Knee arthritis tends to get worse with time. Treatment for knee arthritis involves reducing pain, making the leg muscles stronger, and staying at a healthy body weight. The treatment usually does not improve the health of the cartilage, but it can reduce pain and improve how well your knee works. You can take simple measures to protect your knee joints, ease your pain, and help you stay active. Follow-up care is a key part of your treatment and safety. Be sure to make and go to all appointments, and call your doctor if you are having problems. It's also a good idea to know your test results and keep a list of the medicines you take. How can you care for yourself at home? · Know that knee arthritis will cause more pain on some days than on others. · Stay at a healthy weight. Lose weight if you are overweight. When you stand up, the pressure on your knees from every pound of body weight is multiplied four times. So if you lose 10 pounds, you will reduce the pressure on your knees by 40 pounds. · Talk to your doctor or physical therapist about exercises that will help ease joint pain. ¨ Stretch to help prevent stiffness and to prevent injury before you exercise. You may enjoy gentle forms of yoga to help keep your knee joints and muscles flexible. ¨ Walk instead of jog. ¨ Ride a bike. This makes your thigh muscles stronger and takes pressure off your knee. ¨ Wear well-fitting and comfortable shoes. ¨ Exercise in chest-deep water. This can help you exercise longer with less pain. ¨ Avoid exercises that include squatting or kneeling. They can put a lot of strain on your knees.   ¨ Talk to your doctor to make sure that the exercise you do is not making the arthritis worse.  · Do not sit for long periods of time. Try to walk once in a while to keep your knee from getting stiff. · Ask your doctor or physical therapist whether shoe inserts may reduce your arthritis pain. · If you can afford it, get new athletic shoes at least every year. This can help reduce the strain on your knees. · Use a device to help you do everyday activities. ¨ A cane or walking stick can help you keep your balance when you walk. Hold the cane or walking stick in the hand opposite the painful knee. ¨ If you feel like you may fall when you walk, try using crutches or a front-wheeled walker. These can prevent falls that could cause more damage to your knee. ¨ A knee brace may help keep your knee stable and prevent pain. ¨ You also can use other things to make life easier, such as a higher toilet seat and handrails in the bathtub or shower. · Take pain medicines exactly as directed. ¨ Do not wait until you are in severe pain. You will get better results if you take it sooner. ¨ If you are not taking a prescription pain medicine, take an over-the-counter medicine such as acetaminophen (Tylenol), ibuprofen (Advil, Motrin), or naproxen (Aleve). Read and follow all instructions on the label. ¨ Do not take two or more pain medicines at the same time unless the doctor told you to. Many pain medicines have acetaminophen, which is Tylenol. Too much acetaminophen (Tylenol) can be harmful. ¨ Tell your doctor if you take a blood thinner, have diabetes, or have allergies to shellfish. · Ask your doctor if you might benefit from a shot of steroid medicine into your knee. This may provide pain relief for several months. · Many people take the supplements glucosamine and chondroitin for osteoarthritis. Some people feel they help, but the medical research does not show that they work. Talk to your doctor before you take these supplements. When should you call for help?   Call your doctor now or seek immediate medical care if:    · You have sudden swelling, warmth, or pain in your knee.     · You have knee pain and a fever or rash.     · You have such bad pain that you cannot use your knee.    Watch closely for changes in your health, and be sure to contact your doctor if you have any problems. Where can you learn more? Go to http://aniket-tomas.info/. Enter E405 in the search box to learn more about \"Knee Arthritis: Care Instructions. \"  Current as of: October 10, 2017  Content Version: 11.7  © 2902-3799 [employer]. Care instructions adapted under license by [employer] (which disclaims liability or warranty for this information). If you have questions about a medical condition or this instruction, always ask your healthcare professional. [address] any warranty or liability for your use of this information.

## 2018-08-30 NOTE — PROGRESS NOTES
Spoke with pt's wife concerning her health and pt's health. Pt's wife reports that pt has \"water on his knee. \"  Pt and wife are going to [facility] next week. Pt reports that she continues to have bladder infections. Pt's wife reports that she takes abx continuously. Pt has appts for vascular issues and bladder. Pt's wife has explored care options for . Once wife and pt return from vacation, wife and I will explore care options for pt.

## 2018-09-12 NOTE — PROGRESS NOTES
CHIEF COMPLAINT: f/u for type 2 diabetes    HISTORY OF PRESENT ILLNESS:   Lenard Corey is a 80 y.o. male with a PMHx as noted below who presents for f/u evaluation of type 2 diabetes. Diabetes History:  History obtained from both patient and his wife who is present:Patient presents today to establish care for his diabetes. He was previously managed by endocrinologist Dr. Huber Arreola, however would like to be treated closer to home. He notably had a recent stroke for which he had been admitted. Currently he ambulates with a wheelchair. He is present with his wife today for evaluation. Patients co-morbidities include multiple CVA, HTN, Coronary disease, and CHF with an AICD. He is on tube feeds as noted below. Diabetes was diagnosed in early 2015. Family History of diabetes is negative. Last A1c prior to initial visit was 6.6% on July 31st.   Regimen at time of establishing care includes   - Januvia 50mg daily, crushed   - Lantus 14U qAM   - Humalog nursing home schedule: 6U 4x/day on iso-source 1.5. Also on 2:50>150 correction sliding scale. 1.5 cans breakfast, 1 can with lunch and dinner, 1.5 cans with dinner. Reportedly 44Gm carbs/can suggesting 1:7 carb ratio per can    INTERVAL HISTORY:  Patient was seen in the hospital recently during which his TF regimen was changed to 5x/day. Rather than 5 injections of humalog, with ongoing discussion with patients wife, they chose to use R insulin 3 times daily to reduce burden of injections and finger sticks. Regimen:   BASAL:  Continue Lantus 14 units once daily     BOLUS:  Regular (R) insulin with tube feeds  8AM Start of TF #1 daily, Regular insulin 14 units  2PM Start of TF #3 daily, Regular insulin 12 units  8PM Start of TF #5 daily, Regular insulin 5 units    Takes 4 extra units for 4 days following cortisone injections.      Review of home glucose:  TF#1 (AM):        TF#3 (2PM): 122-200, variable       TF#5 (8PM): 125-170 mostly, +/-  No hypoglycemia, lowest sugar was 90 once in the AM       Review of most recent diabetes-related labs:  Lab Results   Component Value Date    HBA1C 8.6 (H) 07/14/2018    HBA1C 8.6 (H) 06/04/2018    HBA1C 6.5 (H) 02/02/2017    MXB5LTSC 7.6 03/12/2018    VBT9JSLM 6.5 11/09/2017    HUH6ILFA 7.6 08/08/2017    CHOL 123 06/04/2018    LDLC 62 06/04/2018    GFRAA >60 08/25/2018    GFRNA 56 (L) 08/25/2018    MCACR 40.9 (H) 06/04/2018    TSH 2.200 08/02/2018    VITD3 34.7 06/29/2018     138861 = IA-2 pancreatic islet cell autoantibody  GADLT = JOVANY-65 autoantibody   MCACR = Urine Microalbumin           PAST MEDICAL/SURGICAL HISTORY:   Past Medical History:   Diagnosis Date    Antiplatelet or antithrombotic long-term use 12/4/2014    Anxiety disorder     Arrhythmia 2009    bradycardia    Arthritis     CAD (coronary artery disease)     s/p CABG 2002; Dr Aysha Gonzalez [facility]) 1996    tongue/throat cancer s/p surgery / radiation and 1 dose of chemo    Carotid artery stenosis     s/p bilateral stents    Chronic pain     left leg, lower back,     Depression     Diabetes [address] [address]     Type II    Epigastric pain 8/17/2018    Esophageal dysmotility     s/p dilitation    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 I suspect the latter. Achalasia  is not present.         GERD (gastroesophageal reflux disease)     Heart failure ([address] 10/2014     Cardiomyopathy:Pacemaker upgrade:Biv and AICD  Dr. Rubi Iglesias heart DrAlvaro last visit 5/11/2015    Hepatitis C Dx 1996    treated at [facility] in past; as of 4/15/15 wife states pt currently not under any treatment    Hyperlipidemia     Myocardial infarct [facility]) 2013    Heart Cath: 40% LV EF, Stented distal LAD, patent Graft to circumflex    On tube feeding diet approx 2009    still has as of 9/28/15  (no po food/liquid/meds at all); Dr Bernadette Chen Other ill-defined conditions(799.89) 1996    1 dose of chemotherapy/radiation for tongue cancer    Other ill-defined conditions(799.89)     BPH    Other ill-defined conditions(799.89)     orthostatic hypotension    Pneumonia ~ April -May 2010    Stroke [facility]) approx 2003    left side-left finger tips numb; no imbalance or memory loss; as of 2015 not seeing neuro MD    Suicidal thoughts      Past Surgical History:   Procedure Laterality Date    ABDOMEN SURGERY [address]    peg tube    CABG, ARTERY-VEIN, THREE  2000    HX CATARACT REMOVAL      bilateral    HX CHOLECYSTECTOMY      HX COLONOSCOPY      HX HEART CATHETERIZATION  2013    Stented distal LAD    HX MOHS PROCEDURES      bilateral    HX ORTHOPAEDIC      back surgery times two    HX OTHER SURGICAL      Radical Left Neck    HX OTHER SURGICAL      NASAL POLYPS REMOVAL    HX OTHER SURGICAL  2010    TURP    HX PACEMAKER  11/08    HX PACEMAKER  10/28/14     Defibrillator: [facility] # [id number], serial # [id number]; Dr. Eric Salazar [phone]; Dr Beto Perera      cystoscopy    NEUROLOGICAL PROCEDURE UNLISTED  1996    cevical surgery    IA CHANGE GASTROSTOMY TUBE  5/2/2011         IA CHANGE GASTROSTOMY TUBE  6/29/2011         IA EGD INSERT GUIDE WIRE DILATOR PASSAGE ESOPHAGUS  9/13/2010         IA EGD TRANSORAL BIOPSY SINGLE/MULTIPLE  9/13/2010         STOMACH SURGERY PROCEDURE UNLISTED  6/29/2011         UPPER GI ENDOSCOPY,BIOPSY  8/17/2018         UPPER GI ENDOSCOPY,DILATN W GUIDE  8/17/2018         UPPER GI ENDOSCOPY,W/DILAT,GASTRIC OUT  8/17/2018         VASCULAR SURGERY PROCEDURE UNLIST      bilateral carotid stents       ALLERGIES:   Allergies   Allergen Reactions    Demerol [Meperidine] Shortness of Breath    Paxil [Paroxetine Hcl] Unknown (comments)     Pt gets shaky and loses control of legs    Amoxicillin Rash    Cleocin [Clindamycin Hcl] Rash    Pcn [Penicillins] Rash       MEDICATIONS ON ADMISSION:     Current Outpatient Prescriptions:     furosemide (LASIX) 20 mg tablet, 20 mg daily. Take 1 tab in the PM, Disp: , Rfl:     OTHER, diltiazen 2% lidocaine cream. Apply to rectal area as needed, Disp: , Rfl:     clopidogrel (PLAVIX) 75 mg tab, 1 Tab by PEG Tube route daily. , Disp: 1 Tab, Rfl: 0    klack's mixture Solution, Take 5 mL by mouth every six (6) hours. Diphenhydramine elixir 12.5mg/ml 500ml, Nystatin suspension 120ml,Tetracycline 500mg capsules  12 capsules (6g), Hydrocortisone 20mg tablet 12 tablets (240mg), Water for irrigation QS ad 1000ml, Disp: , Rfl:     albuterol (PROVENTIL VENTOLIN) 2.5 mg /3 mL (0.083 %) nebulizer solution, 2.5 mg by Nebulization route once., Disp: , Rfl:     lactose-reduced food/fiber (ISOSOURCE 1.5 RAE FEEDING TUBE), 250 mL by adductor canal block route five (5) times daily. , Disp: , Rfl:     guaiFENesin (ROBITUSSIN) 100 mg/5 mL liquid, 10mL three times daily, Disp: , Rfl:     aspirin 81 mg chewable tablet, Take 1 Tab by mouth daily. (Patient taking differently: 1 Tab by Per G Tube route daily.), Disp: 30 Tab, Rfl: 6    insulin regular (NOVOLIN R, HUMULIN R) 100 unit/mL injection, 14 units at 8 am, 12 units at 2 pm, 5 units at 8 pm (Patient taking differently: by SubCUTAneous route. 14 units at 8 am, 12 units at 2 pm, 5 units at 8 pm), Disp: 1 Vial, Rfl: 6    insulin syringe-needle U-100 0.3 mL 31 gauge x 15/64\" syrg, Use as directed, Disp: 100 Pen Needle, Rfl: 6    furosemide (LASIX) 40 mg tablet, Take 1 Tab by mouth daily. (Patient taking differently: 40 mg by Per G Tube route daily. Take 1 tab in the AM), Disp: 40 Tab, Rfl: 6    famotidine (PEPCID) 20 mg tablet, 20 mg by Per G Tube route two (2) times a day., Disp: , Rfl:     pramipexole (MIRAPEX) 0.25 mg tablet, 0.25 mg by Per G Tube route three (3) times daily. , Disp: , Rfl:     L. acidoph & paracasei- S therm- Bifido (JAVY-Q/RISAQUAD) 8 billion cell cap cap, 1 Cap by PEG Tube route daily. , Disp: 30 Cap, Rfl: 0   atorvastatin (LIPITOR) 40 mg tablet, 40 mg by Per G Tube route daily. , Disp: , Rfl:     finasteride (PROSCAR) 5 mg tablet, 5 mg by Per G Tube route nightly., Disp: , Rfl:     alfuzosin SR (UROXATRAL) 10 mg SR tablet, 10 mg by Per G Tube route daily. , Disp: , Rfl:     midodrine (PROAMITINE) 10 mg tablet, 10 mg by Per G Tube route three (3) times daily (with meals). , Disp: , Rfl:     HYDROcodone-acetaminophen (NORCO)  mg tablet, 1 Tab by Per G Tube route daily as needed for Pain., Disp: , Rfl:     fluticasone (FLONASE ALLERGY RELIEF) 50 mcg/actuation nasal spray, 2 Sprays by Both Nostrils route daily. , Disp: , Rfl:     zinc 50 mg tab tablet, 50 mg by Per G Tube route daily. , Disp: , Rfl:     LANTUS SOLOSTAR U-100 INSULIN 100 unit/mL (3 mL) inpn, INJECT 14 UNITS SUBCUTANEOUSLY ONCE DAILY, Disp: 15 Pen, Rfl: 5    nitroglycerin (NITROSTAT) 0.4 mg SL tablet, 0.4 mg by SubLINGual route every five (5) minutes as needed for Chest Pain., Disp: , Rfl:     gabapentin (NEURONTIN) 250 mg/5 mL solution, 750 mg by PEG Tube route three (3) times daily. , Disp: , Rfl:     multivitamin (ONE A DAY) tablet, 1 Tab by Per G Tube route daily. , Disp: , Rfl:     methylPREDNISolone (MEDROL, RADHA,) 4 mg tablet, As directed, Disp: 1 Dose Pack, Rfl: 0    sucralfate (CARAFATE) 1 gram tablet, Take 1 g by mouth three (3) times daily. , Disp: , Rfl:     lidocaine (SALONPAS/ASPERCREME) 4 % patch, Use as needed on affected site, Disp: 1 Package, Rfl: 0    metoclopramide HCl (REGLAN) 10 mg tablet, 10 mg by Per G Tube route Before breakfast, lunch, and dinner., Disp: , Rfl:     acetaminophen (TYLENOL) 500 mg tablet, 1,000 mg by Per G Tube route every six (6) hours as needed for Pain., Disp: , Rfl:     ondansetron hcl (ZOFRAN, AS HYDROCHLORIDE,) 8 mg tablet, Take 1 Tab by mouth every eight (8) hours as needed for Nausea., Disp: 20 Tab, Rfl: 0    SOCIAL HISTORY:   Social History     Social History    Marital status:      Spouse name: N/A    Number of children: N/A    Years of education: N/A     Occupational History    Retired       He was a stained      Social History Main Topics    Smoking status: Never Smoker    Smokeless tobacco: Never Used    Alcohol use No    Drug use: No    Sexual activity: Yes     Partners: Female     Other Topics Concern    Not on file     Social History Narrative       FAMILY HISTORY:  Family History   Problem Relation Age of Onset    Heart Disease Father       at age 52 from CAD    Colon Cancer Mother     Cancer Mother      colon ca    Heart Disease Brother        REVIEW OF SYSTEMS: Complete ROS assessed and noted for that which is described above, all else are negative. Eyes: normal  ENT: normal  CVS: normal  Resp: normal  GI: normal  : normal  GYN: normal  Endocrine: normal  Integument: normal  Musculoskeletal: normal  Neuro: mild weakness  Psych: normal      PHYSICAL EXAMINATION:    VITAL SIGNS:  Visit Vitals    /70 (BP 1 Location: Left arm, BP Patient Position: Sitting)    Pulse 76    Ht 5' 7\" (1.702 m)    Wt 166 lb 6.4 oz (75.5 kg)    BMI 26.06 kg/m2       GENERAL: NCAT, Sitting comfortably, NAD  EYES: EOMI, non-icteric, no proptosis  Ear/Nose/Throat: NCAT, no inflammation, no masses  LYMPH NODES: No LAD  CARDIOVASCULAR: S1 S2, RRR, No murmur, 2+ radial pulses  RESPIRATORY: CTA b/l, no wheeze/rales  GASTROINTESTINAL: soft, ND  MUSCULOSKELETAL: Normal ROM, no atrophy, mild 1+ edema in lower extremities  SKIN: warm, no edema/rash/ or other skin changes  NEUROLOGIC: 4/5 power all extremities,  AAOx3  PSYCHIATRIC: Normal affect, Normal insight and judgement    REVIEW OF LABORATORY AND RADIOLOGY DATA:   Labs and documentation have been reviewed as described above. ASSESSMENT AND PLAN:   Marybeth Snell is a 80 y.o. male with a PMHx as noted below who presents for f/u evaluation of uncontrolled type 2 diabetes.      Type 2 diabetes, uncontrolled  Hyperlipidemia  Hypertension    Patient needs just a little boost in his R insulin with the second dose (3rd TF). Also the patients wife is wondering if they may eliminate the 5th TF, and redistribute it among the other 4 TF's. I advised her this is best left to the nutritionist to consider together with her, but that they would need to let me know if so in order to assure regimen is adjusted. I suspect if that occurs, we would just eliminate the last R dose, and wait to see if more insulin will be needed with the other feeds (likley but wish to reduce risk of hypoglycemia at his age). Plan: Type 2 Diabetes  Medications:  BASAL:  Continue Lantus 14 units once daily     BOLUS:  Regular (R) insulin with tube feeds  8AM Start of TF #1 daily, Regular insulin 14 units  2PM Start of TF #3 daily, Regular insulin 14 units  8PM Start of TF #5 daily, Regular insulin 5 units    Takes 4 extra units for 4 days following cortisone injections. Holding correction scale for now (1:25>150) as often TF's get disrupted and the R insulin lingers and drops sugar. HTN: Stable today, metoprolol 12.5mg BID  HLD: stable on statin , lipitor 40mg daily    RTC: I would like to see them back in 3 months    [facility], [address] [facility]  [address] [facility]

## 2018-09-12 NOTE — PATIENT INSTRUCTIONS
BASAL:  Continue Lantus 14 units once daily     BOLUS:  Regular (R) insulin with tube feeds  8AM Start of TF #1 daily, Regular insulin 14 units  2PM Start of TF #3 daily, Regular insulin 14 units  8PM Start of TF #5 daily, Regular insulin 5 units    Takes 4 extra units for 4 days following cortisone injections. Please note our new policy, you must arrive to the clinic 15 minutes before your appointment time to allow enough time for proper check-in, adequate time to spend with your doctor, and also to respect the appointment time of the next patient. Not arriving 15 minutes in advance may result in having your appointment rescheduled for the next available day/time.  ----------------------------------------------------------------------------------------------------------------------    Below you will find a glucose log sheet which you can use to record your blood sugars. Without checking and recording what your home glucose levels are, it will be difficult to make any changes to your medication dose, even when significant changes may be needed. Please feel free to use the log below to record your home glucose levels. At the very least, I would like for you to login the entire 2-3 weeks just before your visit so we can make your visit much more productive and beneficial to you. GLUCOSE LOG SHEET:    Date Breakfast Lunch Dinner Bedtime Comments ? GLUCOSE LOG SHEET:    Date Breakfast Lunch Dinner Bedtime Comments ?                                                                                                                                                                                                                                                  GLUCOSE LOG SHEET:    Date Breakfast Lunch Dinner Bedtime Comments ?

## 2018-09-12 NOTE — MR AVS SNAPSHOT
[address] [facility] [address] [id number] [phone] Patient: Marilu Kraft MRN: [id number] Visit Information Date & Time Provider Department Dept. Phone Encounter #  
 9/12/2018 12:10 PM Marisol Bhat, [address] [place] [address] Diabetes and Endocrinology [id number] Follow-up Instructions Return in about 3 months (around 12/12/2018). Your Appointments 10/24/2018 12:00 PM  
Follow Up with Javier Edmond MD  
Neurology Clinic at [address]) Appt Note: f/u tremors, jrb 7/9/18  
 [facility], 
[address], [address] [id number]  
[address], [address] [place] [address] Avenue, [address] [facility] [address] [id number]  
  
    
 1/7/2019 10:40 AM  
Follow Up with Javier Edmond MD  
Neurology Clinic at [address]) Appt Note: f/u stroke tremor, jrb 5/7/18  
 [facility], 
[address] [facility], [address] [facility]  
[phone] Upcoming Health Maintenance Date Due ZOSTER VACCINE AGE 60> 1/31/1995 MEDICARE YEARLY EXAM 3/14/2018 Influenza Age 5 to Adult 8/1/2018 FOOT EXAM Q1 11/9/2018 EYE EXAM RETINAL OR DILATED Q1 11/15/2018 HEMOGLOBIN A1C Q6M 1/14/2019 MICROALBUMIN Q1 6/4/2019 LIPID PANEL Q1 6/4/2019 GLAUCOMA SCREENING Q2Y 11/15/2019 DTaP/Tdap/Td series (2 - Td) 9/9/2025 Allergies as of 9/12/2018  Review Complete On: 9/12/2018 By: Marisol Bhat MD  
  
 Severity Noted Reaction Type Reactions Demerol [Meperidine] High 04/12/2010   Systemic Shortness of Breath Paxil [Paroxetine Hcl] High 04/12/2010   Systemic Unknown (comments) Pt gets shaky and loses control of legs Amoxicillin  01/18/2013    Rash Cleocin [Clindamycin Hcl]  02/15/2018    Rash Pcn [Penicillins]  04/12/2010    Rash Current Immunizations  Reviewed on 2/11/2013 Name Date Influenza Vaccine 10/1/2016, 10/1/2014 Influenza Vaccine (Quad) PF 10/7/2015 11:20 AM  
 Influenza Vaccine Split 9/24/2012 Influenza Vaccine Whole 12/3/2009 Pneumococcal Polysaccharide (PPSV-23) 10/7/2015 11:22 AM  
 TD Vaccine 12/1/2011  6:03 PM  
 Tdap 9/9/2015  7:38 AM  
 ZZZ-RETIRED (DO NOT USE) Pneumococcal Vaccine (Unspecified Type) 12/3/2007 Not reviewed this visit You Were Diagnosed With   
  
 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 
ICD-9-CM: 250.60, 357.2 Essential hypertension with goal blood pressure less than 140/90     ICD-10-CM: I10 
ICD-9-CM: 401.9 Hyperlipidemia, unspecified hyperlipidemia type     ICD-10-CM: E78.5 ICD-9-CM: 272.4 Vitals BP Pulse Height(growth percentile) Weight(growth percentile) BMI Smoking Status 134/70 (BP 1 Location: Left arm, BP Patient Position: Sitting) 76 5' 7\" (1.702 m) 166 lb 6.4 oz (75.5 kg) 26.06 kg/m2 Never Smoker BMI and BSA Data Body Mass Index Body Surface Area 26.06 kg/m 2 1.89 m 2 Preferred Pharmacy Pharmacy Name Phone [facility] [place] [address], [address] [place] [address] Avenue [phone] Your Updated Medication List  
  
   
This list is accurate as of 9/12/18 12:57 PM.  Always use your most recent med list.  
  
  
  
  
 acetaminophen 500 mg tablet Commonly known as:  TYLENOL  
1,000 mg by Per G Tube route every six (6) hours as needed for Pain. albuterol 2.5 mg /3 mL (0.083 %) nebulizer solution Commonly known as:  PROVENTIL VENTOLIN  
2.5 mg by Nebulization route once. alfuzosin SR 10 mg SR tablet Commonly known as:  Sueanne Marciano 10 mg by Per G Tube route daily. aspirin 81 mg chewable tablet Take 1 Tab by mouth daily. atorvastatin 40 mg tablet Commonly known as:  LIPITOR 40 mg by Per G Tube route daily. CARAFATE 1 gram tablet Generic drug:  sucralfate Take 1 g by mouth three (3) times daily. clopidogrel 75 mg Tab Commonly known as:  PLAVIX  
1 Tab by PEG Tube route daily. famotidine 20 mg tablet Commonly known as:  PEPCID  
20 mg by Per G Tube route two (2) times a day. finasteride 5 mg tablet Commonly known as:  PROSCAR  
5 mg by Per G Tube route nightly. FLONASE ALLERGY RELIEF 50 mcg/actuation nasal spray Generic drug:  fluticasone 2 Sprays by Both Nostrils route daily. * furosemide 20 mg tablet Commonly known as:  LASIX  
20 mg daily. Take 1 tab in the PM  
  
 * furosemide 40 mg tablet Commonly known as:  LASIX Take 1 Tab by mouth daily. gabapentin 250 mg/5 mL solution Commonly known as:  NEURONTIN  
750 mg by PEG Tube route three (3) times daily. guaiFENesin 100 mg/5 mL liquid Commonly known as:  ROBITUSSIN  
10mL three times daily HYDROcodone-acetaminophen  mg tablet Commonly known as:  Seda Yonas  
1 Tab by Per G Tube route daily as needed for Pain. insulin regular 100 unit/mL injection Commonly known as:  NOVOLIN R, HUMULIN R  
14 units at 8 am, 12 units at 2 pm, 5 units at 8 pm  
  
 insulin syringe-needle U-100 0.3 mL 31 gauge x 15/64\" Syrg Use as directed ISOSOURCE 1.5 RAE FEEDING TUBE  
250 mL by adductor canal block route five (5) times daily. klack's mixture Solution Take 5 mL by mouth every six (6) hours. Diphenhydramine elixir 12.5mg/ml 500ml, Nystatin suspension 120ml,Tetracycline 500mg capsules  12 capsules (6g), Hydrocortisone 20mg tablet 12 tablets (240mg), Water for irrigation QS ad 1000ml L. acidoph & paracasei- S therm- Bifido 8 billion cell Cap cap Commonly known as:  JAVY-Q/RISAQUAD  
1 Cap by PEG Tube route daily. LANTUS SOLOSTAR U-100 INSULIN 100 unit/mL (3 mL) Inpn Generic drug:  insulin glargine INJECT 14 UNITS SUBCUTANEOUSLY ONCE DAILY  
  
 lidocaine 4 % patch Use as needed on affected site  
  
 methylPREDNISolone 4 mg tablet Commonly known as:  MEDROL (RADHA) As directed  
  
 metoclopramide HCl 10 mg tablet Commonly known as:  REGLAN  
10 mg by Per G Tube route Before breakfast, lunch, and dinner. midodrine 10 mg tablet Commonly known as:  Nakia Clos 10 mg by Per G Tube route three (3) times daily (with meals). multivitamin tablet Commonly known as:  ONE A DAY  
1 Tab by Per G Tube route daily. nitroglycerin 0.4 mg SL tablet Commonly known as:  NITROSTAT  
0.4 mg by SubLINGual route every five (5) minutes as needed for Chest Pain. ondansetron hcl 8 mg tablet Commonly known as:  Donnel Blank Take 1 Tab by mouth every eight (8) hours as needed for Nausea. OTHER  
diltiazen 2% lidocaine cream. Apply to rectal area as needed  
  
 pramipexole 0.25 mg tablet Commonly known as:  MIRAPEX  
0.25 mg by Per G Tube route three (3) times daily. zinc 50 mg Tab tablet 50 mg by Per G Tube route daily. * Notice: This list has 2 medication(s) that are the same as other medications prescribed for you. Read the directions carefully, and ask your doctor or other care provider to review them with you. We Performed the Following AMB POC HEMOGLOBIN A1C [28156 CPT(R)] HEMOGLOBIN A1C WITH EAG [99523 CPT(R)] LIPID PANEL [50902 CPT(R)] METABOLIC PANEL, COMPREHENSIVE [54842 CPT(R)] MICROALBUMIN, UR, RAND W/ MICROALB/CREAT RATIO [id number] CPT(R)] Follow-up Instructions Return in about 3 months (around 12/12/2018). Patient Instructions BASAL:  Continue Lantus 14 units once daily 
  
BOLUS:  Regular (R) insulin with tube feeds 8AM Start of TF #1 daily, Regular insulin 14 units 2PM Start of TF #3 daily, Regular insulin 14 units 8PM Start of TF #5 daily, Regular insulin 5 units Takes 4 extra units for 4 days following cortisone injections. Please note our new policy, you must arrive to the clinic 15 minutes before your appointment time to allow enough time for proper check-in, adequate time to spend with your doctor, and also to respect the appointment time of the next patient. Not arriving 15 minutes in advance may result in having your appointment rescheduled for the next available day/time. 
---------------------------------------------------------------------------------------------------------------------- Below you will find a glucose log sheet which you can use to record your blood sugars. Without checking and recording what your home glucose levels are, it will be difficult to make any changes to your medication dose, even when significant changes may be needed. Please feel free to use the log below to record your home glucose levels. At the very least, I would like for you to login the entire 2-3 weeks just before your visit so we can make your visit much more productive and beneficial to you. GLUCOSE LOG SHEET: 
 
Date Breakfast Lunch Dinner Bedtime Comments ? GLUCOSE LOG SHEET: 
 
Date Breakfast Lunch Dinner Bedtime Comments ? GLUCOSE LOG SHEET: 
 
Date Breakfast Lunch Dinner Bedtime Comments ? Introducing [facility]! [employer] introduces [employer] patient portal. Now you can access parts of your medical record, email your doctor's office, and request medication refills online. 1. In your internet browser, go to [email or web address] 2. Click on the First Time User? Click Here link in the Sign In box. You will see the New Member Sign Up page. 3. Enter your [employer] Access Code exactly as it appears below. You will not need to use this code after youve completed the sign-up process. If you do not sign up before the expiration date, you must request a new code. · [employer] Access Code: [email or web address] Expires: 11/10/2018  3:43 PM 
 
4. Enter the last four digits of your Social Security Number (xxxx) and Date of Birth (mm/dd/yyyy) as indicated and click Submit. You will be taken to the next sign-up page. 5. Create a [employer] ID. This will be your [employer] login ID and cannot be changed, so think of one that is secure and easy to remember. 6. Create a [employer] password. You can change your password at any time. 7. Enter your Password Reset Question and Answer. This can be used at a later time if you forget your password. 8. Enter your e-mail address. You will receive e-mail notification when new information is available in [address] 9. Click Sign Up. You can now view and download portions of your medical record. 10. Click the Download Summary menu link to download a portable copy of your medical information. If you have questions, please visit the Frequently Asked Questions section of the [employer] website. Remember, [employer] is NOT to be used for urgent needs. For medical emergencies, dial 911. Now available from your iPhone and Android! Please provide this summary of care documentation to your next provider. Your primary care clinician is listed as Marletta Yonas. If you have any questions after today's visit, please call [phone].

## 2018-09-26 NOTE — PROGRESS NOTES
Spoke with pt's wife who reports that pt uses oxygen at night now and sometimes during the day. Pt's wife is following up with her physicians and has appts with urology, PCP, and surgeon. Pt's wife is taking a \"nerve\" pill and is feeling a little better. Pt's wife states that their children are willing to assist and that she is beginning to let go but remains very tired from providing a high level of care to the pt. Will call pt in one month to follow up on her appts and to follow up on pt's health status.

## 2018-10-19 NOTE — ED PROVIDER NOTES
EMERGENCY DEPARTMENT HISTORY AND PHYSICAL EXAM 
 
 
Date: 10/19/2018 Patient Name: Nasir Hatfield History of Presenting Illness Chief Complaint Patient presents with  
 Skin Infection  
  pt stated infection around his feeding tube, past few days, denies fever History Provided By: Patient and wife HPI: Nasir Hatfield, 80 y.o. male presents to the ED with concerns of an infection around his feeding tube. Pt notes he has had a feeding tube for more than 10 years. Per wife he refuses to wear a bandage around the tube. Pt did some work outside several days ago and afterwards the area was irritated. The noted a tender, hard nodule that popped last night with bloody, purulent drainage. There has been no treatment PTA. There are no other complaints, changes, or physical findings at this time. Social Hx: Tobacco (denies), EtOH (denies), Illicit drug use (denies) PCP: Maynor Townsend MD 
 
Current Outpatient Medications Medication Sig Dispense Refill  doxycycline (VIBRA-TABS) 100 mg tablet Take 1 Tab by mouth two (2) times a day for 7 days. 14 Tab 0  
 furosemide (LASIX) 20 mg tablet 20 mg daily. Take 1 tab in the PM    
 OTHER diltiazen 2% lidocaine cream. Apply to rectal area as needed  clopidogrel (PLAVIX) 75 mg tab 1 Tab by PEG Tube route daily. 1 Tab 0  
 lidocaine (SALONPAS/ASPERCREME) 4 % patch Use as needed on affected site 1 Package 0  
 klack's mixture Solution Take 5 mL by mouth every six (6) hours. Diphenhydramine elixir 12.5mg/ml 500ml, Nystatin suspension 120ml,Tetracycline 500mg capsules  12 capsules (6g), Hydrocortisone 20mg tablet 12 tablets (240mg), Water for irrigation QS ad 1000ml  albuterol (PROVENTIL VENTOLIN) 2.5 mg /3 mL (0.083 %) nebulizer solution 2.5 mg by Nebulization route once.  lactose-reduced food/fiber (ISOSOURCE 1.5 RAE FEEDING TUBE) 250 mL by adductor canal block route five (5) times daily.  metoclopramide HCl (REGLAN) 10 mg tablet 10 mg by Per G Tube route Before breakfast, lunch, and dinner.  guaiFENesin (ROBITUSSIN) 100 mg/5 mL liquid 10mL three times daily  aspirin 81 mg chewable tablet Take 1 Tab by mouth daily. (Patient taking differently: 1 Tab by Per G Tube route daily.) 30 Tab 6  
 insulin regular (NOVOLIN R, HUMULIN R) 100 unit/mL injection 14 units at 8 am, 12 units at 2 pm, 5 units at 8 pm (Patient taking differently: by SubCUTAneous route. 14 units at 8 am, 12 units at 2 pm, 5 units at 8 pm) 1 Vial 6  furosemide (LASIX) 40 mg tablet Take 1 Tab by mouth daily. (Patient taking differently: 40 mg by Per G Tube route daily. Take 1 tab in the AM) 40 Tab 6  famotidine (PEPCID) 20 mg tablet 20 mg by Per G Tube route two (2) times a day.  L. acidoph & paracasei- S therm- Bifido (JAVY-Q/RISAQUAD) 8 billion cell cap cap 1 Cap by PEG Tube route daily. 30 Cap 0  
 acetaminophen (TYLENOL) 500 mg tablet 1,000 mg by Per G Tube route every six (6) hours as needed for Pain.  atorvastatin (LIPITOR) 40 mg tablet 40 mg by Per G Tube route daily.  finasteride (PROSCAR) 5 mg tablet 5 mg by Per G Tube route nightly.  alfuzosin SR (UROXATRAL) 10 mg SR tablet 10 mg by Per G Tube route daily.  midodrine (PROAMITINE) 10 mg tablet 10 mg by Per G Tube route three (3) times daily (with meals).  HYDROcodone-acetaminophen (NORCO)  mg tablet 1 Tab by Per G Tube route daily as needed for Pain.  fluticasone (FLONASE ALLERGY RELIEF) 50 mcg/actuation nasal spray 2 Sprays by Both Nostrils route daily.  LANTUS SOLOSTAR U-100 INSULIN 100 unit/mL (3 mL) inpn INJECT 14 UNITS SUBCUTANEOUSLY ONCE DAILY 15 Pen 5  
 nitroglycerin (NITROSTAT) 0.4 mg SL tablet 0.4 mg by SubLINGual route every five (5) minutes as needed for Chest Pain.  ondansetron hcl (ZOFRAN, AS HYDROCHLORIDE,) 8 mg tablet Take 1 Tab by mouth every eight (8) hours as needed for Nausea.  20 Tab 0  
  gabapentin (NEURONTIN) 250 mg/5 mL solution 750 mg by PEG Tube route three (3) times daily.  multivitamin (ONE A DAY) tablet 1 Tab by Per G Tube route daily.  insulin syringe-needle U-100 0.3 mL 31 gauge x 15/64\" syrg Use as directed 100 Pen Needle 6  
 zinc 50 mg tab tablet 50 mg by Per G Tube route daily. Past History Past Medical History: 
Past Medical History:  
Diagnosis Date  Antiplatelet or antithrombotic long-term use 12/4/2014  Anxiety disorder  Arrhythmia 2009  
 bradycardia  Arthritis  CAD (coronary artery disease) s/p CABG 2002; Dr Rascon Chelly  Cancer ([facility] 1996  
 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   
 left leg, lower back,   
 Depression  Diabetes ([facility] Type II  Epigastric pain 8/17/2018  Esophageal dysmotility s/p dilitation  Esophageal motility disorder 7/8/2013 Frequent simultaneous or failed contractions, low amplitude contractions  suggests severe myopathy or diffuse spasm. I suspect the latter. Achalasia  is not present.  GERD (gastroesophageal reflux disease)  Heart failure ([facility] 10/2014 Cardiomyopathy:Pacemaker upgrade:Biv and AICD  Dr. Eve Cano heart Dr. last visit 5/11/2015  Hepatitis C Dx 1996  
 treated at [facility] in past; as of 4/15/15 wife states pt currently not under any treatment  Hyperlipidemia  Myocardial infarct [facility]) 2013 Heart Cath: 40% LV EF, Stented distal LAD, patent Graft to circumflex  On tube feeding diet approx 2009  
 still has as of 9/28/15  (no po food/liquid/meds at all); Dr Blondell Opitz  Other ill-defined conditions(799.89) 1996  
 1 dose of chemotherapy/radiation for tongue cancer  Other ill-defined conditions(799.89) BPH  Other ill-defined conditions(799.89) orthostatic hypotension  Pneumonia ~ April -May 2010  Stroke [facility]) approx 2003 left side-left finger tips numb; no imbalance or memory loss; as of  not seeing neuro MD  
 Suicidal thoughts Past Surgical History: 
Past Surgical History:  
Procedure Laterality Date  ABDOMEN SURGERY PROC UNLISTED    
 peg tube  CABG, ARTERY-VEIN, THREE    HX CATARACT REMOVAL    
 bilateral  
 HX CHOLECYSTECTOMY  HX COLONOSCOPY    
 HX HEART CATHETERIZATION  2013 Stented distal LAD  HX MOHS PROCEDURES    
 bilateral  
 HX ORTHOPAEDIC    
 back surgery times two  HX OTHER SURGICAL Radical Left Neck  HX OTHER SURGICAL    
 NASAL POLYPS REMOVAL  
 HX OTHER SURGICAL   TURP  
 HX PACEMAKER    HX PACEMAKER  10/28/14 Defibrillator: [facility] # [id number], serial # [id number]; Dr. Mack Fabian [phone]; Dr Narciso Hernandez  HX UROLOGICAL    
 cystoscopy [address]  
 cevical surgery  NE CHANGE GASTROSTOMY TUBE  2011  NE CHANGE GASTROSTOMY TUBE  2011  NE EGD INSERT GUIDE WIRE DILATOR PASSAGE ESOPHAGUS  2010  NE EGD TRANSORAL BIOPSY SINGLE/MULTIPLE  2010  
    
 STOMACH SURGERY PROCEDURE UNLISTED  2011  UPPER GI ENDOSCOPY,BIOPSY  2018  UPPER GI ENDOSCOPY,DILATN W GUIDE  2018  UPPER GI ENDOSCOPY,W/DILAT,GASTRIC OUT  2018  VASCULAR SURGERY PROCEDURE UNLIST    
 bilateral carotid stents Family History: 
Family History Problem Relation Age of Onset  Heart Disease Father   
      at age 52 from CAD  Colon Cancer Mother  Cancer Mother   
     colon ca  
 Heart Disease Brother Social History: 
Social History Tobacco Use  Smoking status: Never Smoker  Smokeless tobacco: Never Used Substance Use Topics  Alcohol use: No  
 Drug use: No  
 
 
Allergies: Allergies Allergen Reactions  Demerol [Meperidine] Shortness of Breath  Paxil [Paroxetine Hcl] Unknown (comments) Pt gets shaky and loses control of legs  Amoxicillin Rash  Cleocin [Clindamycin Hcl] Rash  Pcn [Penicillins] Rash Review of Systems Review of Systems Constitutional: Negative for chills, diaphoresis and fever. HENT: Negative for congestion, ear pain, rhinorrhea and sore throat. Respiratory: Negative for cough and shortness of breath. Cardiovascular: Negative for chest pain. Gastrointestinal: Negative for abdominal pain, constipation, diarrhea, nausea and vomiting. Genitourinary: Negative for difficulty urinating, dysuria, frequency and hematuria. Musculoskeletal: Negative for arthralgias and myalgias. Skin: Positive for color change, rash and wound. Neurological: Negative for headaches. All other systems reviewed and are negative. Physical Exam  
Physical Exam  
Constitutional: He is oriented to person, place, and time. He appears well-developed and well-nourished. No distress. 80 y.o.  male HENT:  
Head: Normocephalic and atraumatic. Eyes: Conjunctivae are normal. Right eye exhibits no discharge. Left eye exhibits no discharge. Neck: Normal range of motion. Neck supple. Cardiovascular: Normal rate and regular rhythm. Murmur heard. Pulmonary/Chest: Effort normal and breath sounds normal. No respiratory distress. Abdominal:  
Slight erythema with scabbing around the feeding tube. No drainage at present. Neurological: He is alert and oriented to person, place, and time. Skin: Skin is warm and dry. He is not diaphoretic. Psychiatric: He has a normal mood and affect. His behavior is normal.  
Nursing note and vitals reviewed. Diagnostic Study Results Labs - None Radiologic Studies - None Medical Decision Making I am the first provider for this patient. I reviewed the vital signs, available nursing notes, past medical history, past surgical history, family history and social history. Vital Signs-Reviewed the patient's vital signs. Patient Vitals for the past 12 hrs: 
 Temp Pulse Resp BP SpO2  
10/19/18 1014 97.5 °F (36.4 °C) 75 16 114/60 100 % Records Reviewed: Nursing Notes Provider Notes (Medical Decision Making):  
Cellulitis, dermatitis, abscess/boil ED Course:  
Initial assessment performed. The patients presenting problems have been discussed, and they are in agreement with the care plan formulated and outlined with them. I have encouraged them to ask questions as they arise throughout their visit. 11:41 AM 
Case discussed with Adelita Abernathy MD who has evaluated the patient. Critical Care Time:  
None Disposition: 
DISCHARGE NOTE: 
11:48 AM 
The pt is ready for discharge. The pt's signs, symptoms, diagnosis, and discharge instructions have been discussed and pt has conveyed their understanding. The pt is to follow up as recommended or return to ER should their symptoms worsen. Plan has been discussed and pt is in agreement. PLAN: 
1. Current Discharge Medication List  
  
START taking these medications Details  
doxycycline (VIBRA-TABS) 100 mg tablet Take 1 Tab by mouth two (2) times a day for 7 days. Qty: 14 Tab, Refills: 0 CONTINUE these medications which have NOT CHANGED Details  
!! furosemide (LASIX) 20 mg tablet 20 mg daily. Take 1 tab in the PM  
  
OTHER diltiazen 2% lidocaine cream. Apply to rectal area as needed  
  
clopidogrel (PLAVIX) 75 mg tab 1 Tab by PEG Tube route daily. Qty: 1 Tab, Refills: 0  
  
lidocaine (SALONPAS/ASPERCREME) 4 % patch Use as needed on affected site Qty: 1 Package, Refills: 0  
  
klack's mixture Solution Take 5 mL by mouth every six (6) hours. Diphenhydramine elixir 12.5mg/ml 500ml, Nystatin suspension 120ml,Tetracycline 500mg capsules  12 capsules (6g), Hydrocortisone 20mg tablet 12 tablets (240mg), Water for irrigation QS ad 1000ml albuterol (PROVENTIL VENTOLIN) 2.5 mg /3 mL (0.083 %) nebulizer solution 2.5 mg by Nebulization route once. lactose-reduced food/fiber (ISOSOURCE 1.5 RAE FEEDING TUBE) 250 mL by adductor canal block route five (5) times daily. metoclopramide HCl (REGLAN) 10 mg tablet 10 mg by Per G Tube route Before breakfast, lunch, and dinner. Associated Diagnoses: Thrombotic stroke involving left middle cerebral artery ([address] Parkinson's disease (tremor, stiffness, slow motion, unstable posture) ([address] Benign essential tremor syndrome; Hemiplegia and hemiparesis following cerebral infarction affecting right dominant side ([address] Stenosis of both internal carotid arteries; Diabetic peripheral neuropathy associated with type 2 diabetes mellitus ([address] Disturbance of memory; Presbycusis of both ears; Physical deconditioning; Hypothyroidism due to acquired atrophy of thyroid; Vitamin D deficiency; B12 deficiency  
  
guaiFENesin (ROBITUSSIN) 100 mg/5 mL liquid 10mL three times daily Associated Diagnoses: Thrombotic stroke involving left middle cerebral artery ([address] Parkinson's disease (tremor, stiffness, slow motion, unstable posture) ([address] Benign essential tremor syndrome; Hemiplegia and hemiparesis following cerebral infarction affecting right dominant side ([address] Stenosis of both internal carotid arteries; Diabetic peripheral neuropathy associated with type 2 diabetes mellitus ([address] Disturbance of memory; Presbycusis of both ears; Physical deconditioning; Hypothyroidism due to acquired atrophy of thyroid; Vitamin D deficiency; B12 deficiency  
  
aspirin 81 mg chewable tablet Take 1 Tab by mouth daily. Qty: 30 Tab, Refills: 6  
  
insulin regular (NOVOLIN R, HUMULIN R) 100 unit/mL injection 14 units at 8 am, 12 units at 2 pm, 5 units at 8 pm 
Qty: 1 Vial, Refills: 6  
  
!! furosemide (LASIX) 40 mg tablet Take 1 Tab by mouth daily. Qty: 40 Tab, Refills: 6 famotidine (PEPCID) 20 mg tablet 20 mg by Per G Tube route two (2) times a day. L. acidoph & paracasei- S therm- Bifido (JAVY-Q/RISAQUAD) 8 billion cell cap cap 1 Cap by PEG Tube route daily. Qty: 30 Cap, Refills: 0  
  
acetaminophen (TYLENOL) 500 mg tablet 1,000 mg by Per G Tube route every six (6) hours as needed for Pain. atorvastatin (LIPITOR) 40 mg tablet 40 mg by Per G Tube route daily. finasteride (PROSCAR) 5 mg tablet 5 mg by Per G Tube route nightly. alfuzosin SR (UROXATRAL) 10 mg SR tablet 10 mg by Per G Tube route daily. midodrine (PROAMITINE) 10 mg tablet 10 mg by Per G Tube route three (3) times daily (with meals). HYDROcodone-acetaminophen (NORCO)  mg tablet 1 Tab by Per G Tube route daily as needed for Pain. fluticasone (FLONASE ALLERGY RELIEF) 50 mcg/actuation nasal spray 2 Sprays by Both Nostrils route daily. LANTUS SOLOSTAR U-100 INSULIN 100 unit/mL (3 mL) inpn INJECT 14 UNITS SUBCUTANEOUSLY ONCE DAILY Qty: 15 Pen, Refills: 5 Associated Diagnoses: 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 ([address] Essential hypertension with goal blood pressure less than 140/90; Peripheral polyneuropathy  
  
nitroglycerin (NITROSTAT) 0.4 mg SL tablet 0.4 mg by SubLINGual route every five (5) minutes as needed for Chest Pain. ondansetron hcl (ZOFRAN, AS HYDROCHLORIDE,) 8 mg tablet Take 1 Tab by mouth every eight (8) hours as needed for Nausea. Qty: 20 Tab, Refills: 0  
  
gabapentin (NEURONTIN) 250 mg/5 mL solution 750 mg by PEG Tube route three (3) times daily. multivitamin (ONE A DAY) tablet 1 Tab by Per G Tube route daily. insulin syringe-needle U-100 0.3 mL 31 gauge x 15/64\" syrg Use as directed Qty: 100 Pen Needle, Refills: 6  
  
zinc 50 mg tab tablet 50 mg by Per G Tube route daily. !! - Potential duplicate medications found. Please discuss with provider. 2.  
Follow-up Information Follow up With Specialties Details Why Contact Info Juanis Zaragoza MD Family Practice In 3 days As needed for wound check [id number] [phone] 
  
  
3. Wound care as discussed. Return to ED if worse Diagnosis Clinical Impression: 1. Cellulitis of abdominal wall

## 2018-10-19 NOTE — DISCHARGE INSTRUCTIONS

## 2018-10-24 NOTE — PROGRESS NOTES
Consult REFERRED BY: 
Debbie Bishop MD 
 
CHIEF COMPLAINT: Episode of severe hypotension followed by marked memory loss, loss of hearing, and slurred speech Subjective:  
 
Sofie Giraldo is a 80 y.o. right-handed  male seen at the request of Dr. Clare Guevara for evaluation of new problem of evaluating his response to taking medication of Mirapex 0.25 mg 3 times a day, and the wife states that he is doing better, the tremor seems to be better, and he has had no side effects as far as hallucinations, delusions, altered mental status, hypotension, or other neurologic problems. 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 He is a very difficult gentleman with multiple medical problems, and was just hospitalized several times for feeding tube problems. He has not developed any new focal weakness, sensory loss, visual changes, or other new neurologic deficits. Patient had a Doppler study done in August 2018 and repeat head CT that showed no new lesions, when he was being evaluated for increasing hearing loss, worsening of memory, and slurred speech that occurred after he had an episode of profound hypotension from urosepsis which occurred in July 2018. He spent time in the hospital getting IV antibiotics. He had no hearing in his right ear before, but now seems to have decreased hearing in his left ear, and you have to speak fairly loudly get him to hear you. His wife complains his memory is not as good, but he is oriented ×4, but he cannot do serial sevens, could remember 2 of 3 words at 30 seconds, and had a little difficulty spelling world backwards, but with difficulty could draw a clock showing the time 10:50. He has no focal weakness or sensory loss, and no visual loss,.     He had an adverse reaction to Mysoline in June, and that was discontinued for his tremor. He was placed on Mirapex 0.25 mg 3 times a day and states that it seems to help a little bit with his tremors. He still has them and says it tend to get worse at times, and can be more disabling. He may have a mild resting tremor in his left hand, most of his tremors are more the essential type. They seem by history to be more related to action and static tremors and not a rest tremor, but the history is very vague from both the patient and his wife. He has very little tremor on exam today was difficult to be sure what his problem really is. He was tried on amantadine last visit, but is no longer taking it and may have had a side effect. He has had no new weakness or sensory loss, but still has a mild right-sided weakness from his previous stroke one year ago. He is on Plavix because he could not take the Aggrenox down his tube. He does have a pacemaker and cannot get an MRI. His CTA of the head and neck did not show any significant carotid disease, but he did have significant disease of both vertebral and basilar artery suggesting some vertebrobasilar insufficiency. He also has severe aortic stenosis and cardiology does not feel he is a candidate for surgery. 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 He is somewhat weak and debilitated in general.  His thyroid studies in the hospital were normal..  He has no family history of similar tremors, so there is nothing to suggest familial tremors. His examination does not suggest cogwheeling, rigidity, bradykinesia, or micrographia. He most likely has benign essential tremor but is very difficult to be sure by his history says he and his wife give a somewhat wandering and imprecise history. He has had no unusual headache, fever, trauma, meningismus, or new focal weakness or sensory loss or gait problems. No other new medical problems either.   He has not been started on any new medications or neuroleptics that might cause tremors. Past Medical History:  
Diagnosis Date  Antiplatelet or antithrombotic long-term use 12/4/2014  Anxiety disorder  Arrhythmia 2009  
 bradycardia  Arthritis  CAD (coronary artery disease) s/p CABG 2002; Dr Noreen Mccarthy  Cancer ([facility] 1996  
 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   
 left leg, lower back,   
 Depression  Diabetes ([facility] Type II  Epigastric pain 8/17/2018  Esophageal dysmotility s/p dilitation  Esophageal motility disorder 7/8/2013 Frequent simultaneous or failed contractions, low amplitude contractions  suggests severe myopathy or diffuse spasm. I suspect the latter. Achalasia  is not present.  GERD (gastroesophageal reflux disease)  Heart failure ([facility] 10/2014 Cardiomyopathy:Pacemaker upgrade:Biv and AICD  Dr. James Nolasco heart Dr. last visit 5/11/2015  Hepatitis C Dx 1996  
 treated at [facility] in past; as of 4/15/15 wife states pt currently not under any treatment  Hyperlipidemia  Myocardial infarct [facility]) 2013 Heart Cath: 40% LV EF, Stented distal LAD, patent Graft to circumflex  On tube feeding diet approx 2009  
 still has as of 9/28/15  (no po food/liquid/meds at all); Dr Andrew Wesley  Other ill-defined conditions(799.89) 1996  
 1 dose of chemotherapy/radiation for tongue cancer  Other ill-defined conditions(799.89) BPH  Other ill-defined conditions(799.89) orthostatic hypotension  Pneumonia ~ April -May 2010  Stroke [facility]) approx 2003  
 left side-left finger tips numb; no imbalance or memory loss; as of 2015 not seeing neuro MD  
 Suicidal thoughts Past Surgical History:  
Procedure Laterality Date  ABDOMEN SURGERY PROC UNLISTED  1996  
 peg tube  CABG, ARTERY-VEIN, THREE  2000  HX CATARACT REMOVAL    
 bilateral  
 HX CHOLECYSTECTOMY  HX COLONOSCOPY    
 HX HEART CATHETERIZATION   Stented distal LAD  HX MOHS PROCEDURES    
 bilateral  
 HX ORTHOPAEDIC    
 back surgery times two  HX OTHER SURGICAL Radical Left Neck  HX OTHER SURGICAL    
 NASAL POLYPS REMOVAL  
 HX OTHER SURGICAL   TURP  
 HX PACEMAKER    HX PACEMAKER  10/28/14 Defibrillator: [facility] # [id number], serial # [id number]; Dr. Ranjana Bradford [phone]; Dr Nathan Urban  HX UROLOGICAL    
 cystoscopy [address]  
 cevical surgery  IA CHANGE GASTROSTOMY TUBE  2011  IA CHANGE GASTROSTOMY TUBE  2011  IA EGD INSERT GUIDE WIRE DILATOR PASSAGE ESOPHAGUS  2010  IA EGD TRANSORAL BIOPSY SINGLE/MULTIPLE  2010  
    
 STOMACH SURGERY PROCEDURE UNLISTED  2011  UPPER GI ENDOSCOPY,BIOPSY  2018  UPPER GI ENDOSCOPY,DILATN W GUIDE  2018  UPPER GI ENDOSCOPY,W/DILAT,GASTRIC OUT  2018  VASCULAR SURGERY PROCEDURE UNLIST    
 bilateral carotid stents Family History Problem Relation Age of Onset  Heart Disease Father   
      at age 52 from CAD  Colon Cancer Mother  Cancer Mother   
     colon ca  
 Heart Disease Brother Social History Tobacco Use  Smoking status: Never Smoker  Smokeless tobacco: Never Used Substance Use Topics  Alcohol use: No  
   
 
Current Outpatient Medications:  
  pramipexole (MIRAPEX) 0.25 mg tablet, Take 1 Tab by mouth three (3) times daily. , Disp: 100 Tab, Rfl: 11 
  doxycycline (VIBRA-TABS) 100 mg tablet, Take 1 Tab by mouth two (2) times a day for 7 days. , Disp: 14 Tab, Rfl: 0 
  furosemide (LASIX) 20 mg tablet, 20 mg daily. Take 1 tab in the PM, Disp: , Rfl:  
  OTHER, diltiazen 2% lidocaine cream. Apply to rectal area as needed, Disp: , Rfl:  
  clopidogrel (PLAVIX) 75 mg tab, 1 Tab by PEG Tube route daily. , Disp: 1 Tab, Rfl: 0 
   lidocaine (SALONPAS/ASPERCREME) 4 % patch, Use as needed on affected site, Disp: 1 Package, Rfl: 0 
  klack's mixture Solution, Take 5 mL by mouth every six (6) hours. Diphenhydramine elixir 12.5mg/ml 500ml, Nystatin suspension 120ml,Tetracycline 500mg capsules  12 capsules (6g), Hydrocortisone 20mg tablet 12 tablets (240mg), Water for irrigation QS ad 1000ml, Disp: , Rfl:  
  albuterol (PROVENTIL VENTOLIN) 2.5 mg /3 mL (0.083 %) nebulizer solution, 2.5 mg by Nebulization route once., Disp: , Rfl:  
  lactose-reduced food/fiber (ISOSOURCE 1.5 RAE FEEDING TUBE), 250 mL by adductor canal block route five (5) times daily. , Disp: , Rfl:  
  guaiFENesin (ROBITUSSIN) 100 mg/5 mL liquid, 10mL three times daily, Disp: , Rfl:  
  aspirin 81 mg chewable tablet, Take 1 Tab by mouth daily. (Patient taking differently: 1 Tab by Per G Tube route daily.), Disp: 30 Tab, Rfl: 6 
  insulin regular (NOVOLIN R, HUMULIN R) 100 unit/mL injection, 14 units at 8 am, 12 units at 2 pm, 5 units at 8 pm (Patient taking differently: by SubCUTAneous route. 14 units at 8 am, 12 units at 2 pm, 5 units at 8 pm), Disp: 1 Vial, Rfl: 6 
  insulin syringe-needle U-100 0.3 mL 31 gauge x 15/64\" syrg, Use as directed, Disp: 100 Pen Needle, Rfl: 6 
  furosemide (LASIX) 40 mg tablet, Take 1 Tab by mouth daily. (Patient taking differently: 40 mg by Per G Tube route daily. Take 1 tab in the AM), Disp: 40 Tab, Rfl: 6 
  famotidine (PEPCID) 20 mg tablet, 20 mg by Per G Tube route two (2) times a day., Disp: , Rfl:  
  L. acidoph & paracasei- S therm- Bifido (JAVY-Q/RISAQUAD) 8 billion cell cap cap, 1 Cap by PEG Tube route daily. , Disp: 30 Cap, Rfl: 0 
  acetaminophen (TYLENOL) 500 mg tablet, 1,000 mg by Per G Tube route every six (6) hours as needed for Pain., Disp: , Rfl:  
  atorvastatin (LIPITOR) 40 mg tablet, 40 mg by Per G Tube route daily. , Disp: , Rfl:  
  finasteride (PROSCAR) 5 mg tablet, 5 mg by Per G Tube route nightly. , Disp: , Rfl:  
  alfuzosin SR (UROXATRAL) 10 mg SR tablet, 10 mg by Per G Tube route daily. , Disp: , Rfl:  
  midodrine (PROAMITINE) 10 mg tablet, 10 mg by Per G Tube route three (3) times daily (with meals). , Disp: , Rfl:  
  HYDROcodone-acetaminophen (NORCO)  mg tablet, 1 Tab by Per G Tube route daily as needed for Pain., Disp: , Rfl:  
  fluticasone (FLONASE ALLERGY RELIEF) 50 mcg/actuation nasal spray, 2 Sprays by Both Nostrils route daily. , Disp: , Rfl:  
  zinc 50 mg tab tablet, 50 mg by Per G Tube route daily. , Disp: , Rfl:  
  LANTUS SOLOSTAR U-100 INSULIN 100 unit/mL (3 mL) inpn, INJECT 14 UNITS SUBCUTANEOUSLY ONCE DAILY, Disp: 15 Pen, Rfl: 5 
  nitroglycerin (NITROSTAT) 0.4 mg SL tablet, 0.4 mg by SubLINGual route every five (5) minutes as needed for Chest Pain., Disp: , Rfl:  
  ondansetron hcl (ZOFRAN, AS HYDROCHLORIDE,) 8 mg tablet, Take 1 Tab by mouth every eight (8) hours as needed for Nausea., Disp: 20 Tab, Rfl: 0 
  gabapentin (NEURONTIN) 250 mg/5 mL solution, 750 mg by PEG Tube route three (3) times daily. , Disp: , Rfl:  
  multivitamin (ONE A DAY) tablet, 1 Tab by Per G Tube route daily. , Disp: , Rfl:  
 
 
 
Allergies Allergen Reactions  Demerol [Meperidine] Shortness of Breath  Paxil [Paroxetine Hcl] Unknown (comments) Pt gets shaky and loses control of legs  Amoxicillin Rash  Cleocin [Clindamycin Hcl] Rash  Pcn [Penicillins] Rash Review of Systems: A comprehensive review of systems was negative except for: Constitutional: positive for fatigue and malaise Respiratory: positive for pleurisy/chest pain, dyspnea on exertion or chronic bronchitis Gastrointestinal: positive for dysphagia, dyspepsia, reflux symptoms, nausea and abdominal pain Musculoskeletal: positive for myalgias, arthralgias, stiff joints, neck pain and back pain Neurological: positive for coordination problems, tremor and weakness Behvioral/Psych: positive for anxiety and depression Vitals:  
 10/24/18 1239 BP: 120/56 Pulse: 74 SpO2: 96% Weight: 171 lb (77.6 kg) Height: 5' 7\" (1.702 m) Objective: I 
 
 
 
NEUROLOGICAL EXAM: 
  
Appearance: The patient is poorly developed and nourished, provides a fair history and is in no acute distress. Patient status post radical head and neck surgery Mental Status: Oriented to time, place and person and the president, he is oriented ×4, but he cannot do serial sevens, could remember 2 of 3 words at 30 seconds, and had a little difficulty spelling world backwards, but with difficulty could draw a clock showing the time 10:50.   cognitive function and fund of knowledge is slow  probably mildly abnormal. Speech is fluent without aphasia or dysarthria. Mood and affect appropriate but depressed . Cranial Nerves:   Intact visual fields. Fundi are benign. RAY, EOM's full, no nystagmus, no ptosis. Facial sensation is normal. Corneal reflexes are not tested. Facial movement is asymmetric. Hearing is abnormal bilaterally. Patient has previous radical head neck surgery on the left, and deformed oral pharyngeal structures. Neck without meningismus or bruits Patient has marked limited range of motion of the cervical spine with pain in all directions Temporal arteries are not tender or enlarged Motor:  4/5 strength in upper and lower proximal and distal muscles, except for the right upper extremity which has strength about 3/5 associated with an arm drift and decreased rapid alternating movements in the right hand. Normal bulk and tone. No fasciculations. Reflexes:   Deep tendon reflexes 2+/4 on the right and 1+/4 on the left. No babinski or clonus present Sensory:   Abnormal to touch, pinprick and temperature and vibration decreased in both feet. DSS is intact Gait:  Abnormal gait as he has to use a walker and he has to move slowly due to his arthritis and his mild right hemiparesis . Tremor:   Minimal intention bilateral tremor noted, and possible left hand resting tremor noted, no cogwheeling or rigidity. Cerebellar:   Mildly abnormal Romberg and tandem cerebellar signs present. Neurovascular:  Abnormal heart sounds and irregular rhythm, peripheral pulses decreased, and no carotid bruits.  
  
 
 
 
Assessment: ICD-10-CM ICD-9-CM 1. Weakness R53.1 780.79 pramipexole (MIRAPEX) 0.25 mg tablet 2. Tremors of nervous system R25.1 781.0 pramipexole (MIRAPEX) 0.25 mg tablet 3. Physical deconditioning R53.81 799.3 pramipexole (MIRAPEX) 0.25 mg tablet 4. Counseling regarding goals of care Z71.89 V65.49 pramipexole (MIRAPEX) 0.25 mg tablet 5. Myalgia M79.10 729.1 pramipexole (MIRAPEX) 0.25 mg tablet 6. Altered mental status, unspecified altered mental status type R41.82 780.97 pramipexole (MIRAPEX) 0.25 mg tablet 7. Disturbance of memory R41.3 780.93 pramipexole (MIRAPEX) 0.25 mg tablet 8. Benign essential tremor syndrome G25.0 333.1 pramipexole (MIRAPEX) 0.25 mg tablet 9. Stenosis of both internal carotid arteries I65.23 433.10 pramipexole (MIRAPEX) 0.25 mg tablet 433.30   
10. Parkinson's disease (tremor, stiffness, slow motion, unstable posture) ([facility] G20 332.0 pramipexole (MIRAPEX) 0.25 mg tablet 11. Diabetic peripheral neuropathy associated with type 2 diabetes mellitus ([facility]) E11.42 250.60 pramipexole (MIRAPEX) 0.25 mg tablet  
  357.2 12. Hemiplegia and hemiparesis following cerebral infarction affecting right dominant side ([facility]) I69.351 438. 21 pramipexole (MIRAPEX) 0.25 mg tablet 13. Orthostatic hypotension I95.1 458.0 pramipexole (MIRAPEX) 0.25 mg tablet Active Problems: * No active hospital problems. * 
 
 
Plan: The patient is doing well and tolerating the Mirapex 0.25 mg 3 times a day, which seem to be helping his tremor, helping his mobility, and is not having any side effects as far as mental status changes, GI symptoms, or cardiac symptoms, and both the patient and his wife feel very comfortable with the way he is performing now and want to leave him alone. Refill sent in for patient medication for the patient today. Patient CT scan showed no stroke after his last evaluation as carotid Doppler showed no significant stenosis, I do not think he had a stroke after his hypotensive episode and he continues to slowly improve. He is no longer taking amantadine, not sure that it much for the tremor but he continues his Mirapex for that. He may have a component of Parkinson's disease in addition to essential tremor, we will continue the Mirapex He is not a candidate for beta-blockers, because of his heart disease and hypotension. Patient with progressive tremors, but on exam today there really are not too apparent, but by his history suggest benign essential tremor Patient with moderate severe vertebrobasilar stenosis and insufficiency and chronic dizziness, and one wonders whether some of the tremors might be orthostatic tremors. We spent 45 minutes with the patient and his wife going over his history, going over his exam, and discussing various medications and treatments, we will try the medication as above and keep everything else the same, and also reviewing all his records from his hospitalization, his CT scan reviewed personally on the PACS system, his thyroid test, and his neurologic workup and evaluation, and I concluded that he might well padded some parkinsonian tremor plus essential tremor and generalized weakness and side effect of his medications of Mysoline. Darien Grimes If all else fails we may need to really do a CTA of his head and neck just to make sure there is no significant progression of disease there.  
We offered him Coumadin in the past for his vertebrobasilar disease but in view of his mother's history of bleeding complications on that medication he wants no part of it. We will see him again in 6 months time or earlier as needed, he will call me if any problem, he will check my chart for results of his test or give us a call then. Signed By: Mario Alberto Mcknight MD   
 October 24, 2018 CC: Ramon Gonzalez MD 
FAX: [phone] This note will not be viewable in [address]

## 2018-10-24 NOTE — LETTER
10/24/2018 1:35 PM 
 
Patient:  Adela Anderson YOB: 1935 Date of Visit: 10/24/2018 Dear No Recipients: Thank you for referring Mr. Adela Anderson to me for evaluation/treatment. Below are the relevant portions of my assessment and plan of care. Consult REFERRED BY: 
Adriane Walsh MD 
 
CHIEF COMPLAINT: Episode of severe hypotension followed by marked memory loss, loss of hearing, and slurred speech Subjective:  
 
Adela Anderson is a 80 y.o. right-handed  male seen at the request of Dr. Gerald Chin for evaluation of new problem of evaluating his response to taking medication of Mirapex 0.25 mg 3 times a day, and the wife states that he is doing better, the tremor seems to be better, and he has had no side effects as far as hallucinations, delusions, altered mental status, hypotension, or other neurologic problems. 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 He is a very difficult gentleman with multiple medical problems, and was just hospitalized several times for feeding tube problems. He has not developed any new focal weakness, sensory loss, visual changes, or other new neurologic deficits. Patient had a Doppler study done in August 2018 and repeat head CT that showed no new lesions, when he was being evaluated for increasing hearing loss, worsening of memory, and slurred speech that occurred after he had an episode of profound hypotension from urosepsis which occurred in July 2018. He spent time in the hospital getting IV antibiotics. He had no hearing in his right ear before, but now seems to have decreased hearing in his left ear, and you have to speak fairly loudly get him to hear you.   His wife complains his memory is not as good, but he is oriented ×4, but he cannot do serial sevens, could remember 2 of 3 words at 30 seconds, and had a little difficulty spelling world backwards, but with difficulty could draw a clock showing the time 10:50. He has no focal weakness or sensory loss, and no visual loss,. He had an adverse reaction to Mysoline in June, and that was discontinued for his tremor. He was placed on Mirapex 0.25 mg 3 times a day and states that it seems to help a little bit with his tremors. He still has them and says it tend to get worse at times, and can be more disabling. He may have a mild resting tremor in his left hand, most of his tremors are more the essential type. They seem by history to be more related to action and static tremors and not a rest tremor, but the history is very vague from both the patient and his wife. He has very little tremor on exam today was difficult to be sure what his problem really is. He was tried on amantadine last visit, but is no longer taking it and may have had a side effect. He has had no new weakness or sensory loss, but still has a mild right-sided weakness from his previous stroke one year ago. He is on Plavix because he could not take the Aggrenox down his tube. He does have a pacemaker and cannot get an MRI. His CTA of the head and neck did not show any significant carotid disease, but he did have significant disease of both vertebral and basilar artery suggesting some vertebrobasilar insufficiency. He also has severe aortic stenosis and cardiology does not feel he is a candidate for surgery. 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 He is somewhat weak and debilitated in general.  His thyroid studies in the hospital were normal..  He has no family history of similar tremors, so there is nothing to suggest familial tremors. His examination does not suggest cogwheeling, rigidity, bradykinesia, or micrographia.   He most likely has benign essential tremor but is very difficult to be sure by his history says he and his wife give a somewhat wandering and imprecise history. He has had no unusual headache, fever, trauma, meningismus, or new focal weakness or sensory loss or gait problems. No other new medical problems either. He has not been started on any new medications or neuroleptics that might cause tremors. Past Medical History:  
Diagnosis Date  Antiplatelet or antithrombotic long-term use 12/4/2014  Anxiety disorder  Arrhythmia 2009  
 bradycardia  Arthritis  CAD (coronary artery disease) s/p CABG 2002; Dr Tunde Taylor  Cancer ([facility] [address] 1996  
 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   
 left leg, lower back,   
 Depression  Diabetes ([facility] [address] Type II  Epigastric pain 8/17/2018  Esophageal dysmotility s/p dilitation  Esophageal motility disorder 7/8/2013 Frequent simultaneous or failed contractions, low amplitude contractions  suggests severe myopathy or diffuse spasm. I suspect the latter. Achalasia  is not present.  GERD (gastroesophageal reflux disease)  Heart failure ([facility] [address] 10/2014 Cardiomyopathy:Pacemaker upgrade:Biv and AICD  Dr. Juliana Huff heart Dr. last visit 5/11/2015  Hepatitis C Dx 1996  
 treated at [facility] in past; as of 4/15/15 wife states pt currently not under any treatment  Hyperlipidemia  Myocardial infarct [facility]) 2013 Heart Cath: 40% LV EF, Stented distal LAD, patent Graft to circumflex  On tube feeding diet approx 2009  
 still has as of 9/28/15  (no po food/liquid/meds at all); Dr Jayson Poole  Other ill-defined conditions(799.89) 1996  
 1 dose of chemotherapy/radiation for tongue cancer  Other ill-defined conditions(799.89) BPH  Other ill-defined conditions(799.89) orthostatic hypotension  Pneumonia ~ April -May 2010  Stroke [facility]) approx   
 left side-left finger tips numb; no imbalance or memory loss; as of  not seeing neuro MD  
 Suicidal thoughts Past Surgical History:  
Procedure Laterality Date  ABDOMEN SURGERY PROC UNLISTED    
 peg tube  CABG, ARTERY-VEIN, THREE    HX CATARACT REMOVAL    
 bilateral  
 HX CHOLECYSTECTOMY  HX COLONOSCOPY    
 HX HEART CATHETERIZATION   Stented distal LAD  HX MOHS PROCEDURES    
 bilateral  
 HX ORTHOPAEDIC    
 back surgery times two  HX OTHER SURGICAL Radical Left Neck  HX OTHER SURGICAL    
 NASAL POLYPS REMOVAL  
 HX OTHER SURGICAL   TURP  
 HX PACEMAKER    HX PACEMAKER  10/28/14 Defibrillator: [facility] # [id number], serial # [id number]; Dr. Ema Massey [phone]; Dr Nimisha Corley  HX UROLOGICAL    
 cystoscopy [address]  
 cevical surgery  WA CHANGE GASTROSTOMY TUBE  2011  WA CHANGE GASTROSTOMY TUBE  2011  WA EGD INSERT GUIDE WIRE DILATOR PASSAGE ESOPHAGUS  2010  WA EGD TRANSORAL BIOPSY SINGLE/MULTIPLE  2010  
    
 STOMACH SURGERY PROCEDURE UNLISTED  2011  UPPER GI ENDOSCOPY,BIOPSY  2018  UPPER GI ENDOSCOPY,DILATN W GUIDE  2018  UPPER GI ENDOSCOPY,W/DILAT,GASTRIC OUT  2018  VASCULAR SURGERY PROCEDURE UNLIST    
 bilateral carotid stents Family History Problem Relation Age of Onset  Heart Disease Father   
      at age 52 from CAD  Colon Cancer Mother  Cancer Mother   
     colon ca  
 Heart Disease Brother Social History Tobacco Use  Smoking status: Never Smoker  Smokeless tobacco: Never Used Substance Use Topics  Alcohol use: No  
   
 
Current Outpatient Medications:  
  pramipexole (MIRAPEX) 0.25 mg tablet, Take 1 Tab by mouth three (3) times daily. , Disp: 100 Tab, Rfl: 11 
   doxycycline (VIBRA-TABS) 100 mg tablet, Take 1 Tab by mouth two (2) times a day for 7 days. , Disp: 14 Tab, Rfl: 0 
  furosemide (LASIX) 20 mg tablet, 20 mg daily. Take 1 tab in the PM, Disp: , Rfl:  
  OTHER, diltiazen 2% lidocaine cream. Apply to rectal area as needed, Disp: , Rfl:  
  clopidogrel (PLAVIX) 75 mg tab, 1 Tab by PEG Tube route daily. , Disp: 1 Tab, Rfl: 0 
  lidocaine (SALONPAS/ASPERCREME) 4 % patch, Use as needed on affected site, Disp: 1 Package, Rfl: 0 
  klack's mixture Solution, Take 5 mL by mouth every six (6) hours. Diphenhydramine elixir 12.5mg/ml 500ml, Nystatin suspension 120ml,Tetracycline 500mg capsules  12 capsules (6g), Hydrocortisone 20mg tablet 12 tablets (240mg), Water for irrigation QS ad 1000ml, Disp: , Rfl:  
  albuterol (PROVENTIL VENTOLIN) 2.5 mg /3 mL (0.083 %) nebulizer solution, 2.5 mg by Nebulization route once., Disp: , Rfl:  
  lactose-reduced food/fiber (ISOSOURCE 1.5 RAE FEEDING TUBE), 250 mL by adductor canal block route five (5) times daily. , Disp: , Rfl:  
  guaiFENesin (ROBITUSSIN) 100 mg/5 mL liquid, 10mL three times daily, Disp: , Rfl:  
  aspirin 81 mg chewable tablet, Take 1 Tab by mouth daily. (Patient taking differently: 1 Tab by Per G Tube route daily.), Disp: 30 Tab, Rfl: 6 
  insulin regular (NOVOLIN R, HUMULIN R) 100 unit/mL injection, 14 units at 8 am, 12 units at 2 pm, 5 units at 8 pm (Patient taking differently: by SubCUTAneous route. 14 units at 8 am, 12 units at 2 pm, 5 units at 8 pm), Disp: 1 Vial, Rfl: 6 
  insulin syringe-needle U-100 0.3 mL 31 gauge x 15/64\" syrg, Use as directed, Disp: 100 Pen Needle, Rfl: 6 
  furosemide (LASIX) 40 mg tablet, Take 1 Tab by mouth daily. (Patient taking differently: 40 mg by Per G Tube route daily.  Take 1 tab in the AM), Disp: 40 Tab, Rfl: 6 
  famotidine (PEPCID) 20 mg tablet, 20 mg by Per G Tube route two (2) times a day., Disp: , Rfl:  
   L. acidoph & paracasei- S therm- Bifido (JAVY-Q/RISAQUAD) 8 billion cell cap cap, 1 Cap by PEG Tube route daily. , Disp: 30 Cap, Rfl: 0 
  acetaminophen (TYLENOL) 500 mg tablet, 1,000 mg by Per G Tube route every six (6) hours as needed for Pain., Disp: , Rfl:  
  atorvastatin (LIPITOR) 40 mg tablet, 40 mg by Per G Tube route daily. , Disp: , Rfl:  
  finasteride (PROSCAR) 5 mg tablet, 5 mg by Per G Tube route nightly., Disp: , Rfl:  
  alfuzosin SR (UROXATRAL) 10 mg SR tablet, 10 mg by Per G Tube route daily. , Disp: , Rfl:  
  midodrine (PROAMITINE) 10 mg tablet, 10 mg by Per G Tube route three (3) times daily (with meals). , Disp: , Rfl:  
  HYDROcodone-acetaminophen (NORCO)  mg tablet, 1 Tab by Per G Tube route daily as needed for Pain., Disp: , Rfl:  
  fluticasone (FLONASE ALLERGY RELIEF) 50 mcg/actuation nasal spray, 2 Sprays by Both Nostrils route daily. , Disp: , Rfl:  
  zinc 50 mg tab tablet, 50 mg by Per G Tube route daily. , Disp: , Rfl:  
  LANTUS SOLOSTAR U-100 INSULIN 100 unit/mL (3 mL) inpn, INJECT 14 UNITS SUBCUTANEOUSLY ONCE DAILY, Disp: 15 Pen, Rfl: 5 
  nitroglycerin (NITROSTAT) 0.4 mg SL tablet, 0.4 mg by SubLINGual route every five (5) minutes as needed for Chest Pain., Disp: , Rfl:  
  ondansetron hcl (ZOFRAN, AS HYDROCHLORIDE,) 8 mg tablet, Take 1 Tab by mouth every eight (8) hours as needed for Nausea., Disp: 20 Tab, Rfl: 0 
  gabapentin (NEURONTIN) 250 mg/5 mL solution, 750 mg by PEG Tube route three (3) times daily. , Disp: , Rfl:  
  multivitamin (ONE A DAY) tablet, 1 Tab by Per G Tube route daily. , Disp: , Rfl:  
 
 
 
Allergies Allergen Reactions  Demerol [Meperidine] Shortness of Breath  Paxil [Paroxetine Hcl] Unknown (comments) Pt gets shaky and loses control of legs  Amoxicillin Rash  Cleocin [Clindamycin Hcl] Rash  Pcn [Penicillins] Rash Review of Systems: A comprehensive review of systems was negative except for: Constitutional: positive for fatigue and malaise Respiratory: positive for pleurisy/chest pain, dyspnea on exertion or chronic bronchitis Gastrointestinal: positive for dysphagia, dyspepsia, reflux symptoms, nausea and abdominal pain Musculoskeletal: positive for myalgias, arthralgias, stiff joints, neck pain and back pain Neurological: positive for coordination problems, tremor and weakness Behvioral/Psych: positive for anxiety and depression Vitals:  
 10/24/18 1239 BP: 120/56 Pulse: 74 SpO2: 96% Weight: 171 lb (77.6 kg) Height: 5' 7\" (1.702 m) Objective: I 
 
 
 
NEUROLOGICAL EXAM: 
  
Appearance: The patient is poorly developed and nourished, provides a fair history and is in no acute distress. Patient status post radical head and neck surgery Mental Status: Oriented to time, place and person and the president, he is oriented ×4, but he cannot do serial sevens, could remember 2 of 3 words at 30 seconds, and had a little difficulty spelling world backwards, but with difficulty could draw a clock showing the time 10:50.   cognitive function and fund of knowledge is slow  probably mildly abnormal. Speech is fluent without aphasia or dysarthria. Mood and affect appropriate but depressed . Cranial Nerves:   Intact visual fields. Fundi are benign. RAY, EOM's full, no nystagmus, no ptosis. Facial sensation is normal. Corneal reflexes are not tested. Facial movement is asymmetric. Hearing is abnormal bilaterally. Patient has previous radical head neck surgery on the left, and deformed oral pharyngeal structures. Neck without meningismus or bruits Patient has marked limited range of motion of the cervical spine with pain in all directions Temporal arteries are not tender or enlarged Motor:  4/5 strength in upper and lower proximal and distal muscles, except for the right upper extremity which has strength about 3/5 associated with an arm drift and decreased rapid alternating movements in the right hand. Normal bulk and tone. No fasciculations. Reflexes:   Deep tendon reflexes 2+/4 on the right and 1+/4 on the left. No babinski or clonus present Sensory:   Abnormal to touch, pinprick and temperature and vibration decreased in both feet. DSS is intact Gait:  Abnormal gait as he has to use a walker and he has to move slowly due to his arthritis and his mild right hemiparesis . Tremor:   Minimal intention bilateral tremor noted, and possible left hand resting tremor noted, no cogwheeling or rigidity. Cerebellar:   Mildly abnormal Romberg and tandem cerebellar signs present. Neurovascular:  Abnormal heart sounds and irregular rhythm, peripheral pulses decreased, and no carotid bruits.  
  
 
 
 
Assessment: ICD-10-CM ICD-9-CM 1. Weakness R53.1 780.79 pramipexole (MIRAPEX) 0.25 mg tablet 2. Tremors of nervous system R25.1 781.0 pramipexole (MIRAPEX) 0.25 mg tablet 3. Physical deconditioning R53.81 799.3 pramipexole (MIRAPEX) 0.25 mg tablet 4. Counseling regarding goals of care Z71.89 V65.49 pramipexole (MIRAPEX) 0.25 mg tablet 5. Myalgia M79.10 729.1 pramipexole (MIRAPEX) 0.25 mg tablet 6. Altered mental status, unspecified altered mental status type R41.82 780.97 pramipexole (MIRAPEX) 0.25 mg tablet 7. Disturbance of memory R41.3 780.93 pramipexole (MIRAPEX) 0.25 mg tablet 8. Benign essential tremor syndrome G25.0 333.1 pramipexole (MIRAPEX) 0.25 mg tablet 9. Stenosis of both internal carotid arteries I65.23 433.10 pramipexole (MIRAPEX) 0.25 mg tablet 433.30   
10. Parkinson's disease (tremor, stiffness, slow motion, unstable posture) ([facility]) G20 332.0 pramipexole (MIRAPEX) 0.25 mg tablet 11. Diabetic peripheral neuropathy associated with type 2 diabetes mellitus ([facility]) E11.42 250.60 pramipexole (MIRAPEX) 0.25 mg tablet  
  357.2 12.  Hemiplegia and hemiparesis following cerebral infarction affecting right dominant side ([facility] [address] I69.351 438. 21 pramipexole (MIRAPEX) 0.25 mg tablet 13. Orthostatic hypotension I95.1 458.0 pramipexole (MIRAPEX) 0.25 mg tablet Active Problems: * No active hospital problems. * 
 
 
Plan: The patient is doing well and tolerating the Mirapex 0.25 mg 3 times a day, which seem to be helping his tremor, helping his mobility, and is not having any side effects as far as mental status changes, GI symptoms, or cardiac symptoms, and both the patient and his wife feel very comfortable with the way he is performing now and want to leave him alone. Refill sent in for patient medication for the patient today. Patient CT scan showed no stroke after his last evaluation as carotid Doppler showed no significant stenosis, I do not think he had a stroke after his hypotensive episode and he continues to slowly improve. He is no longer taking amantadine, not sure that it much for the tremor but he continues his Mirapex for that. He may have a component of Parkinson's disease in addition to essential tremor, we will continue the Mirapex He is not a candidate for beta-blockers, because of his heart disease and hypotension. Patient with progressive tremors, but on exam today there really are not too apparent, but by his history suggest benign essential tremor Patient with moderate severe vertebrobasilar stenosis and insufficiency and chronic dizziness, and one wonders whether some of the tremors might be orthostatic tremors.  
We spent 45 minutes with the patient and his wife going over his history, going over his exam, and discussing various medications and treatments, we will try the medication as above and keep everything else the same, and also reviewing all his records from his hospitalization, his CT scan reviewed personally on the PACS system, his thyroid test, and his neurologic workup and evaluation, and I concluded that he might well padded some parkinsonian tremor plus essential tremor and generalized weakness and side effect of his medications of Mysoline. Junius El If all else fails we may need to really do a CTA of his head and neck just to make sure there is no significant progression of disease there. We offered him Coumadin in the past for his vertebrobasilar disease but in view of his mother's history of bleeding complications on that medication he wants no part of it. We will see him again in 6 months time or earlier as needed, he will call me if any problem, he will check my chart for results of his test or give us a call then. Signed By: Yesica Escudero MD   
 October 24, 2018 CC: Nicolasa Eli MD 
FAX: [phone] This note will not be viewable in [address] If you have questions, please do not hesitate to call me. I look forward to following  [id number] along with you. Sincerely, Yesica Escudero MD

## 2018-11-11 PROBLEM — R42 DIZZINESS: Status: ACTIVE | Noted: 2018-01-01

## 2018-11-11 NOTE — ED NOTES
Bedside and Verbal shift change report given to this RN (oncoming nurse) by Thomas Hanks RN (offgoing nurse). Report included the following information SBAR. Assumed care of patient. Patient placed in position of comfort. Call bell in reach. Skin warm and dry. Respirations even and unlabored. In no apparent distress at this time. Pt currently in CT.

## 2018-11-11 NOTE — ED PROVIDER NOTES
EMERGENCY DEPARTMENT HISTORY AND PHYSICAL EXAM 
 
 
Date: 11/11/2018 Patient Name: Adela Anderson History of Presenting Illness Chief Complaint Patient presents with  Abdominal Pain Ambulatory into triage with c/o increased headache, dizziness and tremors x several days-family states its r/t \"the vessels in the back of his neck. \" Abdominal pain x today  Dizziness History Provided By: Patient and Patient's Daughter HPI: Adela Anderson, 80 y.o. male with PMHx significant for HLD, CVA, DM, CA, presents ambulatory to the ED with cc of dizziness x several days. Pt endorses associated nausea. Pt notes that his dizziness is exacerbated with ambulation. Pt denies any alleviating factors. Pt explains that he has been having increasing dizziness for the past several day causing him to have a near syncopal episode yesterday. Pt states that his daughter caught him before falling to the floor during his episode yesterday. Pt reports that he had a CT scan in the past secondary to similar sxs which showed that veins and arteries in the back of his neck were not receiving blood. Pt additionally complains of generalized abdominal pain x several day. Pt states that he ha a pacemaker in place, and his pacemaker did not fire during his episode yesterday. Pt also mentions that he has been having occasional episodes of chest pain, but he notes that this is chronic. Pt states that he uses O2 at night. Pt endorses normal urinary and bowel habits. Pt specifically denies HA, chest pain, fever, or chills. There are no other complaints, changes, or physical findings at this time. PSHx: cholecystectomy Social Hx: - tobacco, - EtOH, - illicit drug use PCP: Adriane Walsh MD 
 
Current Outpatient Medications Medication Sig Dispense Refill  pramipexole (MIRAPEX) 0.25 mg tablet Take 1 Tab by mouth three (3) times daily.  100 Tab 11  
  furosemide (LASIX) 20 mg tablet 20 mg daily. Take 1 tab in the PM    
 OTHER diltiazen 2% lidocaine cream. Apply to rectal area as needed  clopidogrel (PLAVIX) 75 mg tab 1 Tab by PEG Tube route daily. 1 Tab 0  
 klack's mixture Solution Take 5 mL by mouth every six (6) hours. Diphenhydramine elixir 12.5mg/ml 500ml, Nystatin suspension 120ml,Tetracycline 500mg capsules  12 capsules (6g), Hydrocortisone 20mg tablet 12 tablets (240mg), Water for irrigation QS ad 1000ml  albuterol (PROVENTIL VENTOLIN) 2.5 mg /3 mL (0.083 %) nebulizer solution 2.5 mg by Nebulization route once.  guaiFENesin (ROBITUSSIN) 100 mg/5 mL liquid 10mL three times daily  aspirin 81 mg chewable tablet Take 1 Tab by mouth daily. (Patient taking differently: 1 Tab by Per G Tube route daily.) 30 Tab 6  
 insulin regular (NOVOLIN R, HUMULIN R) 100 unit/mL injection 14 units at 8 am, 12 units at 2 pm, 5 units at 8 pm (Patient taking differently: by SubCUTAneous route. 14 units at 8 am, 12 units at 2 pm, 5 units at 8 pm) 1 Vial 6  
 insulin syringe-needle U-100 0.3 mL 31 gauge x 15/64\" syrg Use as directed 100 Pen Needle 6  furosemide (LASIX) 40 mg tablet Take 1 Tab by mouth daily. (Patient taking differently: 40 mg by Per G Tube route daily. Take 1 tab in the AM) 40 Tab 6  famotidine (PEPCID) 20 mg tablet 20 mg by Per G Tube route two (2) times a day.  L. acidoph & paracasei- S therm- Bifido (JAVY-Q/RISAQUAD) 8 billion cell cap cap 1 Cap by PEG Tube route daily. 30 Cap 0  
 atorvastatin (LIPITOR) 40 mg tablet 40 mg by Per G Tube route daily.  finasteride (PROSCAR) 5 mg tablet 5 mg by Per G Tube route nightly.  alfuzosin SR (UROXATRAL) 10 mg SR tablet 10 mg by Per G Tube route daily.  midodrine (PROAMITINE) 10 mg tablet 10 mg by Per G Tube route three (3) times daily (with meals).  HYDROcodone-acetaminophen (NORCO)  mg tablet 1 Tab by Per G Tube route daily as needed for Pain.  fluticasone (FLONASE ALLERGY RELIEF) 50 mcg/actuation nasal spray 2 Sprays by Both Nostrils route daily.  zinc 50 mg tab tablet 50 mg by Per G Tube route daily.  LANTUS SOLOSTAR U-100 INSULIN 100 unit/mL (3 mL) inpn INJECT 14 UNITS SUBCUTANEOUSLY ONCE DAILY 15 Pen 5  
 nitroglycerin (NITROSTAT) 0.4 mg SL tablet 0.4 mg by SubLINGual route every five (5) minutes as needed for Chest Pain.  gabapentin (NEURONTIN) 250 mg/5 mL solution 750 mg by PEG Tube route three (3) times daily.  multivitamin (ONE A DAY) tablet 1 Tab by Per G Tube route daily.  lidocaine (SALONPAS/ASPERCREME) 4 % patch Use as needed on affected site 1 Package 0  
 lactose-reduced food/fiber (ISOSOURCE 1.5 RAE FEEDING TUBE) 250 mL by adductor canal block route five (5) times daily.  acetaminophen (TYLENOL) 500 mg tablet 1,000 mg by Per G Tube route every six (6) hours as needed for Pain.  ondansetron hcl (ZOFRAN, AS HYDROCHLORIDE,) 8 mg tablet Take 1 Tab by mouth every eight (8) hours as needed for Nausea. 20 Tab 0 Past History Past Medical History: 
Past Medical History:  
Diagnosis Date  Antiplatelet or antithrombotic long-term use 12/4/2014  Anxiety disorder  Arrhythmia 2009  
 bradycardia  Arthritis  CAD (coronary artery disease) s/p CABG 2002; Dr Sandrine Jeffers  [facility]  
 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   
 left leg, lower back,   
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 I suspect the latter. Achalasia  is not present.  GERD (gastroesophageal reflux disease)  Heart failure ([facility] 10/2014 Cardiomyopathy:Pacemaker upgrade:Biv and AICD  Dr. Sahil Villasenor heart  last visit 5/11/2015  Hepatitis C Dx 1996  
 treated at [facility] in past; as of 4/15/15 wife states pt currently not under any treatment  Hyperlipidemia  Myocardial infarct [facility]) 2013 Heart Cath: 40% LV EF, Stented distal LAD, patent Graft to circumflex  On tube feeding diet approx 2009  
 still has as of 9/28/15  (no po food/liquid/meds at all); Dr Tessie Solis  Other ill-defined conditions(799.89) 1996  
 1 dose of chemotherapy/radiation for tongue cancer  Other ill-defined conditions(799.89) BPH  Other ill-defined conditions(799.89) orthostatic hypotension  Pneumonia ~ April -May 2010  Stroke [facility]) approx 2003  
 left side-left finger tips numb; no imbalance or memory loss; as of 2015 not seeing neuro MD  
 Suicidal thoughts Past Surgical History: 
Past Surgical History:  
Procedure Laterality Date  ABDOMEN SURGERY PROC UNLISTED  1996  
 peg tube  CABG, ARTERY-VEIN, THREE  2000  HX CATARACT REMOVAL    
 bilateral  
 HX CHOLECYSTECTOMY  HX COLONOSCOPY    
 HX HEART CATHETERIZATION  2013 Stented distal LAD  HX MOHS PROCEDURES    
 bilateral  
 HX ORTHOPAEDIC    
 back surgery times two  HX OTHER SURGICAL Radical Left Neck  HX OTHER SURGICAL    
 NASAL POLYPS REMOVAL  
 HX OTHER SURGICAL  2010 TURP  
 HX PACEMAKER  11/08  HX PACEMAKER  10/28/14 Defibrillator: [facility] # [id number], serial # [id number]; Dr. Parekh Cancer [phone]; Dr Mott Parent  HX UROLOGICAL    
 cystoscopy [address] [place] [address]  
 cevical surgery  KS CHANGE GASTROSTOMY TUBE  5/2/2011  KS CHANGE GASTROSTOMY TUBE  6/29/2011  KS EGD INSERT GUIDE WIRE DILATOR PASSAGE ESOPHAGUS  9/13/2010  KS EGD TRANSORAL BIOPSY SINGLE/MULTIPLE  9/13/2010  
    
 STOMACH SURGERY PROCEDURE UNLISTED  6/29/2011  UPPER GI ENDOSCOPY,BIOPSY  8/17/2018  UPPER GI ENDOSCOPY,DILATN W GUIDE  2018  UPPER GI ENDOSCOPY,W/DILAT,GASTRIC OUT  2018  VASCULAR SURGERY PROCEDURE UNLIST    
 bilateral carotid stents Family History: 
Family History Problem Relation Age of Onset  Heart Disease Father   
      at age 52 from CAD  Colon Cancer Mother  Cancer Mother   
     colon ca  
 Heart Disease Brother Social History: 
Social History Tobacco Use  Smoking status: Never Smoker  Smokeless tobacco: Never Used Substance Use Topics  Alcohol use: No  
 Drug use: No  
 
 
Allergies: Allergies Allergen Reactions  Demerol [Meperidine] Shortness of Breath  Paxil [Paroxetine Hcl] Unknown (comments) Pt gets shaky and loses control of legs  Amoxicillin Rash  Cleocin [Clindamycin Hcl] Rash  Pcn [Penicillins] Rash Review of Systems Review of Systems Constitutional: Negative for chills and fever. HENT: Negative for congestion. Eyes: Negative. Respiratory: Positive for shortness of breath. Cardiovascular: Negative for chest pain. Gastrointestinal: Positive for abdominal pain. Endocrine: Negative for heat intolerance. Genitourinary: Negative. Musculoskeletal: Negative for back pain. Skin: Negative for rash. Allergic/Immunologic: Negative for immunocompromised state. Neurological: Positive for dizziness. Negative for headaches. Hematological: Does not bruise/bleed easily. Psychiatric/Behavioral: Negative. All other systems reviewed and are negative. Physical Exam  
Physical Exam  
Constitutional: He is oriented to person, place, and time. He appears well-developed and well-nourished. No acute distress HENT:  
Head: Normocephalic and atraumatic. Eyes: EOM are normal. Pupils are equal, round, and reactive to light. No nystagmus Neck: Normal range of motion. Neck supple. Cardiovascular: Normal rate, regular rhythm and normal heart sounds. Pulmonary/Chest: Effort normal and breath sounds normal. He has no wheezes. He exhibits tenderness. Abdominal: Soft. Bowel sounds are normal. There is tenderness in the epigastric area. Musculoskeletal: Normal range of motion. He exhibits no edema. Bilateral calf tenderness which pt states is baseline, no edema Neurological: He is alert and oriented to person, place, and time. No cranial nerve deficit. Skin: Skin is warm and dry. Psychiatric: He has a normal mood and affect. His behavior is normal.  
Nursing note and vitals reviewed. Diagnostic Study Results Labs - Recent Results (from the past 12 hour(s)) CBC WITH AUTOMATED DIFF Collection Time: 11/11/18 11:15 AM  
Result Value Ref Range WBC 7.3 4.1 - 11.1 K/uL  
 RBC 3.54 (L) 4.10 - 5.70 M/uL  
 HGB 10.5 (L) 12.1 - 17.0 g/dL HCT 33.9 (L) 36.6 - 50.3 % MCV 95.8 80.0 - 99.0 FL  
 MCH 29.7 26.0 - 34.0 PG  
 MCHC 31.0 30.0 - 36.5 g/dL  
 RDW 15.6 (H) 11.5 - 14.5 % PLATELET 75 (L) 079 - 400 K/uL MPV 11.2 8.9 - 12.9 FL  
 NRBC 0.0 0  WBC ABSOLUTE NRBC 0.00 0.00 - 0.01 K/uL NEUTROPHILS 87 (H) 32 - 75 % LYMPHOCYTES 7 (L) 12 - 49 % MONOCYTES 5 5 - 13 % EOSINOPHILS 1 0 - 7 % BASOPHILS 0 0 - 1 % IMMATURE GRANULOCYTES 0 0.0 - 0.5 % ABS. NEUTROPHILS 6.3 1.8 - 8.0 K/UL  
 ABS. LYMPHOCYTES 0.5 (L) 0.8 - 3.5 K/UL  
 ABS. MONOCYTES 0.4 0.0 - 1.0 K/UL  
 ABS. EOSINOPHILS 0.0 0.0 - 0.4 K/UL  
 ABS. BASOPHILS 0.0 0.0 - 0.1 K/UL  
 ABS. IMM. GRANS. 0.0 0.00 - 0.04 K/UL  
 DF AUTOMATED METABOLIC PANEL, COMPREHENSIVE Collection Time: 11/11/18 11:15 AM  
Result Value Ref Range Sodium 140 136 - 145 mmol/L Potassium 4.8 3.5 - 5.1 mmol/L Chloride 101 97 - 108 mmol/L  
 CO2 35 (H) 21 - 32 mmol/L Anion gap 4 (L) 5 - 15 mmol/L Glucose 209 (H) 65 - 100 mg/dL BUN 46 (H) 6 - 20 MG/DL  Creatinine 1.61 (H) 0.70 - 1.30 MG/DL  
 BUN/Creatinine ratio 29 (H) 12 - 20    
 GFR est AA 50 (L) >60 ml/min/1.73m2 GFR est non-AA 41 (L) >60 ml/min/1.73m2 Calcium 8.8 8.5 - 10.1 MG/DL Bilirubin, total 0.6 0.2 - 1.0 MG/DL  
 ALT (SGPT) 52 12 - 78 U/L  
 AST (SGOT) 59 (H) 15 - 37 U/L Alk. phosphatase 75 45 - 117 U/L Protein, total 7.2 6.4 - 8.2 g/dL Albumin 3.2 (L) 3.5 - 5.0 g/dL Globulin 4.0 2.0 - 4.0 g/dL A-G Ratio 0.8 (L) 1.1 - 2.2 LIPASE Collection Time: 11/11/18 11:15 AM  
Result Value Ref Range Lipase 58 (L) 73 - 393 U/L  
CK Collection Time: 11/11/18 11:15 AM  
Result Value Ref Range  39 - 308 U/L  
TROPONIN I Collection Time: 11/11/18 11:15 AM  
Result Value Ref Range Troponin-I, Qt. 0.11 (H) <0.05 ng/mL MAGNESIUM Collection Time: 11/11/18 11:15 AM  
Result Value Ref Range Magnesium 2.6 (H) 1.6 - 2.4 mg/dL EKG, 12 LEAD, INITIAL Collection Time: 11/11/18 11:21 AM  
Result Value Ref Range Ventricular Rate 80 BPM  
 Atrial Rate 80 BPM  
 P-R Interval 128 ms QRS Duration 166 ms  
 Q-T Interval 462 ms QTC Calculation (Bezet) 532 ms Calculated P Axis 32 degrees Calculated R Axis 172 degrees Calculated T Axis -3 degrees Diagnosis Suspect unspecified pacemaker failure Atrial-sensed ventricular-paced rhythm When compared with ECG of 11-AUG-2018 08:47, 
Vent. rate has decreased BY  10 BPM 
  
 
 
Radiologic Studies -  
 
CT Results  (Last 48 hours)  
          
 11/11/18 1136  CT HEAD WO CONT Final result Impression:  IMPRESSION: No acute process or change compared to the prior exam.  
   
   
  
 Narrative:  EXAM:  CT HEAD WO CONT INDICATION:   Dizziness for several days COMPARISON: 7/14/2018. CONTRAST:  None. TECHNIQUE: Unenhanced CT of the head was performed using 5 mm images. Brain and  
bone windows were generated. CT dose reduction was achieved through use of a  
standardized protocol tailored for this examination and automatic exposure control for dose modulation. FINDINGS:  
The ventricles and sulci are normal in size, shape and configuration and  
midline. Small vessel ischemic changes are stable. Old right posterior parietal  
and left occipital infarcts are unchanged. There is no intracranial hemorrhage,  
extra-axial collection, mass, mass effect or midline shift. The basilar  
cisterns are open. No acute infarct is identified. The bone windows demonstrate  
no abnormalities. The visualized portions of the paranasal sinuses and mastoid  
air cells are clear. Vascular calcification is noted. CXR Results  (Last 48 hours)  
          
 11/11/18 1158  XR ABD ACUTE W 1 V CHEST Final result Impression:  IMPRESSION: Pulmonary edema with a small left pleural effusion. Normal bowel gas  
pattern. Narrative:  EXAM:  XR ABD ACUTE W 1 V CHEST INDICATION:  Abdominal pain COMPARISON: 8/11/2018. FINDINGS: The upright chest radiograph demonstrates stable heart size. Pacer  
leads are unchanged in position. Pulmonary edema is noted. There is a small left  
pleural effusion. The patient is status post CABG procedure. Supine and decubitus views of the abdomen demonstrate a nonobstructive bowel gas  
pattern. There is no free intraperitoneal air. Surgical clips project over the  
right upper quadrant. The patient is status post fusion of the lumbar spine. Degenerative changes are seen in the hip joints bilaterally. Medical Decision Making I am the first provider for this patient. I reviewed the vital signs, available nursing notes, past medical history, past surgical history, family history and social history. Vital Signs-Reviewed the patient's vital signs. Patient Vitals for the past 12 hrs: 
 Temp Pulse Resp BP SpO2  
11/11/18 1230  84 19 97/64 97 % 11/11/18 1030  87 18 93/54 94 % 11/11/18 1015    104/65 (!) 76 % 11/11/18 0959 97.5 °F (36.4 °C) 80 16 (!) 129/107  Pulse Oximetry Analysis - 94% on RA Cardiac Monitor:  
Rate: 79 bpm 
Rhythm: Paced EKG interpretation: (Preliminary) 11:21 Rhythm: atrial-sensed ventricular paced rhythm; and regular . Rate (approx.): 80 bpm; Axis: normal; NE interval: normal; QRS interval: normal ; ST/T wave: normal; Other findings: No significant change. Written by LUANNE Maxwell, as dictated by Papi Ward MD. Records Reviewed: Nursing Notes, Old Medical Records, Previous electrocardiograms, Previous Radiology Studies and Previous Laboratory Studies Provider Notes (Medical Decision Making): DDx: vertigo, CVA, electrolyte abnormality, pancreatitis, CAD, UTI, NAD 
 
ED Course:  
Initial assessment performed. The patients presenting problems have been discussed, and they are in agreement with the care plan formulated and outlined with them. I have encouraged them to ask questions as they arise throughout their visit. CONSULT NOTE: 
1:16 PM 
Papi Ward MD spoke with Oralia Sharma MD 
Specialty: Cardiology Discussed patient's hx, disposition, and available diagnostic and imaging results. Reviewed care plans. Consultant agrees with plans as outlined. Dr. Truman Nichols agrees to evaluate the pt. Written by LUANNE Maxwell, as dictated by Papi Ward MD. 
 
CONSULT NOTE:  
1:27 PM 
Papi Ward MD spoke with Diana Paul MD  
Specialty: Hospitalist 
Discussed pt's hx, disposition, and available diagnostic and imaging results. Reviewed care plans. Consultant will evaluate pt for admission. Written by LUANNE Maxwell, as dictated by Papi Ward MD. Critical Care Time: CRITICAL CARE NOTE : 
 
1:17 PM 
 
 
IMPENDING DETERIORATION -Respiratory, Cardiovascular and Metabolic ASSOCIATED RISK FACTORS - Hypoxia, Dysrhythmia, Metabolic changes and Dehydration MANAGEMENT- Bedside Assessment and Supervision of Care INTERPRETATION -  Xrays, Blood Gases, ECG and Blood Pressure INTERVENTIONS - hemodynamic mngmt and Metobolic interventions CASE REVIEW - Hospitalist, Medical Sub-Specialist, Nursing and Family TREATMENT RESPONSE -Unchanged PERFORMED BY - Self NOTES   : 
 
 
I have spent 45 minutes of critical care time involved in lab review, consultations with specialist, family decision- making, bedside attention and documentation. During this entire length of time I was immediately available to the patient . Geary Kussmaul, MD 
 
Disposition: PLAN: 
1. Admit to Hospitalist 
 
ADMIT NOTE: 
1:27 PM 
Patient is being admitted to the hospital by Dr. Emir Mcguire. The results of their tests and reasons for their admission have been discussed with them and/or available family. They convey agreement and understanding for the need to be admitted and for their admission diagnosis. Consultation has been made with the inpatient physician specialist for hospitalization. Diagnosis Clinical Impression: 1. Acute respiratory failure with hypoxia and hypercapnia (HCC) 2. Acute pulmonary edema (HCC) 3. Epigastric pain 4. Elevated troponin Attestations: This note is prepared by Car Richter, acting as Scribe for Geary Kussmaul, MD. 
 
Geary Kussmaul, MD: The scribe's documentation has been prepared under my direction and personally reviewed by me in its entirety. I confirm that the note above accurately reflects all work, treatment, procedures, and medical decision making performed by me.

## 2018-11-11 NOTE — ED NOTES
Pt resting in position of comfort with eyes closed. Skin warm and dry. Respirations even and unlabored. In no apparent distress at this time. Family at bedside.

## 2018-11-11 NOTE — PROGRESS NOTES
Will adjust gabapentin dose from 500 mg TID to 500 BID per renal dosing protocol for CrCl < 60 ml/min JONATHAN SifuentesD

## 2018-11-11 NOTE — PROGRESS NOTES
Primary Nurse [facility] and Sol Delatorre RN performed a dual skin assessment on this patient Impairment noted-Red area on sacrum, bruising on hands, PEG tube in abdomen, and old scar where defibrillator on chest. 
Mark score is 19

## 2018-11-11 NOTE — PROGRESS NOTES
6: 15 PM Neuro consult called. Spoke with [facility]. Dr. Horn Fitting on call. 6:17 PM Spoke with Dr. Horn Fitting re: consult. Admitting diagnosis provided. Informed consult is for evaluation of dizziness. MD to assess tomorrow.

## 2018-11-11 NOTE — ED TRIAGE NOTES
Assumed care of pt from triage. Pt is A&O x 4. Pt reports CC of dizziness that has been ongoing for the past year and gotten worse over the past few days. Pt reports he had a CT done and there is restrictive blood vessels in his neck. Pt also reports abdominal pain x a few days. Pt reports he was nauseous a few days ago but no longer. Pt reports he wears O2 at night to sleep.

## 2018-11-11 NOTE — PROGRESS NOTES
Bedside, Verbal and Written shift change report given to Stevan Pfeiffer RN (oncoming nurse). Report included the following information SBAR. SHIFT SUMMARY: Patient was sent from ED at 5:30 pm.  Report received from Nany Dey in ED. Put in orders for dietary consult to clarify tube feedings. Cardiac consult also put in. Dual skin assessment done with Collette Rimes, RN. [id number] [facility] NURSING NOTE Admission Date 11/11/2018 Admission Diagnosis Dizziness Consults IP CONSULT TO CARDIOLOGY 
IP CONSULT TO CARDIOLOGY 
IP CONSULT TO NEUROLOGY Cardiac Monitoring [x] Yes [] No  
  
Purposeful Hourly Rounding [x] Yes   
Olayinka Score Total Score: 3 Olayinka score 3 or > [x] Bed Alarm [] Avasys [] 1:1 sitter [] Patient refused (Signed refusal form in chart) Mark Score Mark Score: 19 Mark score 14 or < [] PMT consult [] Wound Care consult  
 []  Specialty bed  [] Nutrition consult Influenza Vaccine Received Flu Vaccine for Current Season (usually Sept-March): Yes Oxygen needs? [] Room air Oxygen @  []1L    [x]2L    []3L   []4L    []5L   []6L via NC Chronic home O2 use? [x] Yes [] No 
Perform O2 challenge test and document in progress note using smartphpanpane (.Homeoxygen) Last bowel movement Last Bowel Movement Date: 11/09/18 Urinary Catheter LDAs Peripheral IV 11/11/18 Left Arm (Active) Site Assessment Clean, dry, & intact 11/11/2018  5:53 PM  
Phlebitis Assessment 0 11/11/2018  5:53 PM  
Infiltration Assessment 0 11/11/2018  5:53 PM  
Dressing Status Clean, dry, & intact 11/11/2018  5:53 PM  
Hub Color/Line Status Pink;Flushed 11/11/2018  5:53 PM  
Action Taken Blood drawn 11/11/2018 11:15 AM  
                  
  
Readmission Risk Assessment Tool Score High Risk 48 Total Score 3 Has Seen PCP in Last 6 Months (Yes=3, No=0)  
 9 IP Visits Last 12 Months (1-3=4, 4=9, >4=11) 5 Pt. Coverage (Medicare=5 , Medicaid, or Self-Pay=4) 33 Charlson Comorbidity Score (Age + Comorbid Conditions) Criteria that do not apply:  
 . Living with Significant Other. Assisted Living. LTAC. SNF. or  
Rehab Patient Length of Stay (>5 days = 3) Expected Length of Stay - - - Actual Length of Stay 0

## 2018-11-12 NOTE — PROGRESS NOTES
[id number] [facility]  Preliminary Report:  Carotid Duplex Scan Right: 
Mild plaque noted in the right carotid system. Right ICA velocities suggest less than 50% diameter reduction. Right vertebral artery flow is antegrade. Left: 
Moderate plaque noted in the left carotid system. Left ICA velocities suggest 50-69% diameter reduction. Left vertebral artery flow is antegrade. Technically challenging exam. 
 
Final report to follow.

## 2018-11-12 NOTE — PROGRESS NOTES
Problem: Mobility Impaired (Adult and Pediatric) Goal: *Acute Goals and Plan of Care (Insert Text) Physical Therapy Goals Initiated 11/12/2018 1. Patient will move from supine to sit and sit to supine  in bed with independence within 7 day(s). 2.  Patient will transfer from bed to chair and chair to bed with modified independence using the least restrictive device within 7 day(s). 3.  Patient will perform sit to stand with modified independence within 7 day(s). 4.  Patient will ambulate with modified independence for 100 feet with the least restrictive device within 7 day(s). 5.  Patient will ascend/descend 6 stairs with 1 handrail(s) with supervision/set-up within 7 day(s). physical Therapy EVALUATION Patient: Tor Nguyen (80 y.o. male) Date: 11/12/2018 Primary Diagnosis: Dizziness Precautions:   Fall ASSESSMENT : 
Based on the objective data described below, the patient presents with generalized weakness,decreased activity tolerance, impaired balance and altered gait. Pt lives with wife and uses a rollator, only uses oxygen at night 2L. He was received in supine on 2L and cleared by nursing to mobilize. Bed mobility was performed with SBA to come to the EOB, VSS. Sit<>stand was performed with CGA to come to full stand with RW. Needs multiple cues to mange the walker safely. He was was able to take a few step to get to the chair and left sitting up. VSS. Family was present, but not able to answer specific questions when pt became confused. He reported that he was doing OPPT over a month ago, but could not tell author location. Recommending HHPT at discharge. Patient will benefit from skilled intervention to address the above impairments. Patients rehabilitation potential is considered to be Fair Factors which may influence rehabilitation potential include:  
[]         None noted 
[]         Mental ability/status [x]         Medical condition []         Home/family situation and support systems 
[]         Safety awareness 
[]         Pain tolerance/management 
[]         Other: PLAN : 
Recommendations and Planned Interventions: 
[x]           Bed Mobility Training             []    Neuromuscular Re-Education 
[x]           Transfer Training                   []    Orthotic/Prosthetic Training 
[x]           Gait Training                         []    Modalities [x]           Therapeutic Exercises           []    Edema Management/Control 
[x]           Therapeutic Activities            [x]    Patient and Family Training/Education 
[]           Other (comment): Frequency/Duration: Patient will be followed by physical therapy  4 times a week to address goals. Discharge Recommendations: Home Health Further Equipment Recommendations for Discharge: has DME SUBJECTIVE:  
Patient stated Jerome Brawn are we going to go out.  OBJECTIVE DATA SUMMARY:  
HISTORY:   
Past Medical History:  
Diagnosis Date  Antiplatelet or antithrombotic long-term use 12/4/2014  Anxiety disorder  Arrhythmia 2009  
 bradycardia  Arthritis  CAD (coronary artery disease) s/p CABG 2002; Dr Yury Conde  Cancer ([facility] 1996  
 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   
 left leg, lower back,   
 Depression  Diabetes ([facility] Type II  Epigastric pain 8/17/2018  Esophageal dysmotility s/p dilitation  Esophageal motility disorder 7/8/2013 Frequent simultaneous or failed contractions, low amplitude contractions  suggests severe myopathy or diffuse spasm. I suspect the latter. Achalasia  is not present.  GERD (gastroesophageal reflux disease)  Heart failure ([facility] 10/2014 Cardiomyopathy:Pacemaker upgrade:Biv and AICD  Dr. Hawk Kidney heart DrAlvaro last visit 5/11/2015  Hepatitis C Dx 1996 treated at [facility] in past; as of 4/15/15 wife states pt currently not under any treatment  Hyperlipidemia  Myocardial infarct [facility]) 2013 Heart Cath: 40% LV EF, Stented distal LAD, patent Graft to circumflex  On tube feeding diet approx 2009  
 still has as of 9/28/15  (no po food/liquid/meds at all); Dr Judy Page  Other ill-defined conditions(799.89) 1996  
 1 dose of chemotherapy/radiation for tongue cancer  Other ill-defined conditions(799.89) BPH  Other ill-defined conditions(799.89) orthostatic hypotension  Pneumonia ~ April -May 2010  Stroke [facility]) approx 2003  
 left side-left finger tips numb; no imbalance or memory loss; as of 2015 not seeing neuro MD  
 Suicidal thoughts Past Surgical History:  
Procedure Laterality Date  ABDOMEN SURGERY PROC UNLISTED  1996  
 peg tube  CABG, ARTERY-VEIN, THREE  2000  HX CATARACT REMOVAL    
 bilateral  
 HX CHOLECYSTECTOMY  HX COLONOSCOPY    
 HX HEART CATHETERIZATION  2013 Stented distal LAD  HX MOHS PROCEDURES    
 bilateral  
 HX ORTHOPAEDIC    
 back surgery times two  HX OTHER SURGICAL Radical Left Neck  HX OTHER SURGICAL    
 NASAL POLYPS REMOVAL  
 HX OTHER SURGICAL  2010 TURP  
 HX PACEMAKER  11/08  HX PACEMAKER  10/28/14 Defibrillator: [facility] # [id number], serial # [id number]; Dr. Palma Stairs [phone]; Dr Roberto Gallegos  HX UROLOGICAL    
 cystoscopy [address] [place] [address]  
 [facility] NM CHANGE GASTROSTOMY TUBE  5/2/2011  NM CHANGE GASTROSTOMY TUBE  6/29/2011  NM EGD INSERT GUIDE WIRE DILATOR PASSAGE ESOPHAGUS  9/13/2010  NM EGD TRANSORAL BIOPSY SINGLE/MULTIPLE  9/13/2010  
    
 STOMACH SURGERY PROCEDURE UNLISTED  6/29/2011  UPPER GI ENDOSCOPY,BIOPSY  8/17/2018  UPPER GI ENDOSCOPY,DILATN W GUIDE  8/17/2018  UPPER GI ENDOSCOPY,W/DILAT,GASTRIC OUT  8/17/2018  VASCULAR SURGERY PROCEDURE UNLIST    
 bilateral carotid stents Prior Level of Function/Home Situation: pt lives with wife and uses a rollator. Uses 2L of oxygen at night only. Lives with wife. Personal factors and/or comorbidities impacting plan of care: CA Home Situation Home Environment: Private residence One/Two Story Residence: One story Living Alone: No 
Support Systems: Child(lisa) Patient Expects to be Discharged to[de-identified] Private residence Current DME Used/Available at Home: Rhea Escobar, straight, Commode, bedside, Shower chair, Dartha Cruz Tub or Shower Type: ShowerEXAMINATION/PRESENTATION/DECISION MAKING: Critical Behavior: 
Neurologic State: Alert Orientation Level: Oriented to person, Oriented to place, Oriented to situation Cognition: Follows commands, Appropriate for age attention/concentration Safety/Judgement: Awareness of environment, Insight into deficits Hearing: Auditory Auditory Impairment: NoneSkin:  intact Edema: none Range Of Motion: 
AROM: Generally decreased, functional 
  
  
  
  
  
  
  
Strength:   
Strength: Generally decreased, functional 
  
  
  
  
  
  
Tone & Sensation:  
Tone: Normal 
  
  
  
  
Sensation: Impaired(Neuropathy in feet. h/o numbness L s/p CVA) Coordination: 
Coordination: Within functional limits Vision:  
Acuity: Within Defined Limits Corrective Lenses: Glasses Functional Mobility: 
Bed Mobility: 
Rolling: Stand-by assistance Supine to Sit: Stand-by assistance Scooting: Independent Transfers: 
Sit to Stand: Modified independent Stand to Sit: Modified independent Bed to Chair: Supervision(ambulating with RW) Balance:  
Sitting: Intact Standing: Impaired Standing - Static: Good Standing - Dynamic : GoodAmbulation/Gait Training:Distance (ft): 5 Feet (ft) Assistive Device: Gait belt;Walker, rolling Ambulation - Level of Assistance: Contact guard assistance Gait Abnormalities: Decreased step clearance;Shuffling gait Base of Support: Widened Speed/Gertrudis: Pace decreased (<100 feet/min); Shuffled Step Length: Left shortened;Right shortened Functional Measure: 
Barthel Index: 
 
Bathin Bladder: 10 Bowels: 10 
Groomin Dressing: 10 Feedin Mobility: 10 Stairs: 0 Toilet Use: 10 Transfer (Bed to Chair and Back): 10 Total: 70 Barthel and G-code impairment scale: 
Percentage of impairment CH 
0% CI 
1-19% CJ 
20-39% CK 
40-59% CL 
60-79% CM 
80-99% CN 
100% Barthel Score 0-100 100 99-80 79-60 59-40 20-39 1-19 
 0 Barthel Score 0-20 20 17-19 13-16 9-12 5-8 1-4 0 The Barthel ADL Index: Guidelines 1. The index should be used as a record of what a patient does, not as a record of what a patient could do. 2. The main aim is to establish degree of independence from any help, physical or verbal, however minor and for whatever reason. 3. The need for supervision renders the patient not independent. 4. A patient's performance should be established using the best available evidence. Asking the patient, friends/relatives and nurses are the usual sources, but direct observation and common sense are also important. However direct testing is not needed. 5. Usually the patient's performance over the preceding 24-48 hours is important, but occasionally longer periods will be relevant. 6. Middle categories imply that the patient supplies over 50 per cent of the effort. 7. Use of aids to be independent is allowed. Stiven Cevallos., Barthel, D.W. (7908). Functional evaluation: the Barthel Index. [facility] (14)2. Selinda Matthew lawson NOEL Cee, Daisy Scott., [facility]., Ernul, [address] (). Measuring the change indisability after inpatient rehabilitation; comparison of the responsiveness of the Barthel Index and Functional Kinney Measure. Journal of Neurology, Neurosurgery, and Psychiatry, 66(4), 578-976. KVNG Carlson, QIANA Gifford, & Rimma Garcia M.A. (2004.) Assessment of post-stroke quality of life in cost-effectiveness studies: The usefulness of the Barthel Index and the EuroQoL-5D. [facility], 13, 441-90 G codes: In compliance with CMSs Claims Based Outcome Reporting, the following G-code set was chosen for this patient based on their primary functional limitation being treated: The outcome measure chosen to determine the severity of the functional limitation was the barthel with a score of 70/100 which was correlated with the impairment scale. ? Mobility - Walking and Moving Around:  
  - CURRENT STATUS: CJ - 20%-39% impaired, limited or restricted  - GOAL STATUS: CI - 1%-19% impaired, limited or restricted  - D/C STATUS:  ---------------To be determined--------------- Physical Therapy Evaluation Charge Determination History Examination Presentation Decision-Making HIGH Complexity :3+ comorbidities / personal factors will impact the outcome/ POC  MEDIUM Complexity : 3 Standardized tests and measures addressing body structure, function, activity limitation and / or participation in recreation  LOW Complexity : Stable, uncomplicated  Other outcome measures barthel  MEDIUM Based on the above components, the patient evaluation is determined to be of the following complexity level: MEDIUM Pain: 
Pain Scale 1: Numeric (0 - 10) Pain Intensity 1: 0 Activity Tolerance: VSS on 2L Please refer to the flowsheet for vital signs taken during this treatment. After treatment:  
[x]         Patient left in no apparent distress sitting up in chair 
[]         Patient left in no apparent distress in bed 
[x]         Call bell left within reach [x]         Nursing notified 
[]         Caregiver present [x]         Bed alarm activated COMMUNICATION/EDUCATION:  
 The patients plan of care was discussed with: Occupational Therapist and Registered Nurse. [x]         Fall prevention education was provided and the patient/caregiver indicated understanding. []         Patient/family have participated as able in goal setting and plan of care. [x]         Patient/family agree to work toward stated goals and plan of care. []         Patient understands intent and goals of therapy, but is neutral about his/her participation. []         Patient is unable to participate in goal setting and plan of care. Thank you for this referral. 
Kristopher Ding, PT, DPT Time Calculation: 23 mins

## 2018-11-12 NOTE — PROGRESS NOTES
Initial Nutrition Assessment: 
 
INTERVENTIONS/RECOMMENDATIONS:  
· Family to provide TF formula per pt request 
· Isosource 1.5 bolus, 5 bottles per day + 60 ml water flush before and after bolus · Provides: 1875 kcal, 85 g protein and 1550 ml free water · Could add soluble fiber to help form stools ASSESSMENT:  
Chart reviewed, medically noted for h/o tongue CA with PEG and PMH shown below. Pt home TF regimen is Isosource 1.5 x 5 bottles per day. Pt prefers to use is home formula vs Jevity 1.5 (abbott equivalent) as he has been here before and states our formula gives him diarrhea. He tolerates TF well at home however recently has been experiencing diarrhea. He is currently on probiotic and could add soluble fiber to help form stools. Past Medical History:  
Diagnosis Date  Antiplatelet or antithrombotic long-term use 12/4/2014  Anxiety disorder  Arrhythmia 2009  
 bradycardia  Arthritis  CAD (coronary artery disease) s/p CABG 2002; Dr Wei Poplin  Cancer ([facility] [address] 1996  
 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   
 left leg, lower back,   
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 I suspect the latter. Achalasia  is not present.  GERD (gastroesophageal reflux disease)  Heart failure ([address] 10/2014 Cardiomyopathy:Pacemaker upgrade:Biv and AICD  Dr. Lakshmi Guerrero heart  last visit 5/11/2015  Hepatitis C Dx 1996  
 treated at [facility] in past; as of 4/15/15 wife states pt currently not under any treatment  Hyperlipidemia  Myocardial infarct [facility]) 2013 Heart Cath: 40% LV EF, Stented distal LAD, patent Graft to circumflex  On tube feeding diet approx 2009  
 still has as of 9/28/15  (no po food/liquid/meds at all); Dr Samina Lawson  Other ill-defined conditions(799.89) 1996  
 1 dose of chemotherapy/radiation for tongue cancer  Other ill-defined conditions(799.89) BPH  Other ill-defined conditions(799.89) orthostatic hypotension  Pneumonia ~ April -May 2010  Stroke [facility]) approx 2003  
 left side-left finger tips numb; no imbalance or memory loss; as of 2015 not seeing neuro MD  
 Suicidal thoughts Diet Order: NPO 
% Eaten:  No data found. Pertinent Medications: [x]Reviewed: pepcid, lactobac Pertinent Labs: [x]Reviewed:  
Food Allergies: [x]NKFA  []Other Last BM: 11/9 Edema:        []RUE   []LUE   []RLE   []LLE Pressure Injury:      [] Stage I   [] Stage II   [] Stage III   [] Stage IV Wt Readings from Last 30 Encounters:  
11/12/18 75.8 kg (167 lb 3.2 oz) 10/24/18 77.6 kg (171 lb) 10/19/18 77.6 kg (171 lb 1.2 oz) 09/12/18 75.5 kg (166 lb 6.4 oz) 08/30/18 75.7 kg (166 lb 12.8 oz) 08/25/18 76.2 kg (168 lb) 08/17/18 77.6 kg (171 lb) 08/11/18 77.3 kg (170 lb 6.7 oz) 08/02/18 77.1 kg (170 lb)  
07/27/18 77.1 kg (169 lb 15.6 oz)  
07/20/18 74.1 kg (163 lb 5.8 oz) 07/09/18 78.9 kg (174 lb) 07/07/18 77.1 kg (170 lb) 07/05/18 77.6 kg (171 lb)  
06/28/18 70.3 kg (155 lb)  
06/23/18 76.2 kg (168 lb)  
06/11/18 78.1 kg (172 lb 3.2 oz) 05/16/18 77.1 kg (170 lb) 05/09/18 75.7 kg (166 lb 14.2 oz) 05/07/18 83.5 kg (184 lb)  
03/12/18 77.2 kg (170 lb 1.6 oz) 02/15/18 79.8 kg (175 lb 14.8 oz) 01/14/18 77.1 kg (170 lb) 11/09/17 77.2 kg (170 lb 1.6 oz) 08/08/17 76 kg (167 lb 9.6 oz) 05/10/17 71.7 kg (158 lb) 02/16/17 74.8 kg (165 lb)  
02/10/17 73.8 kg (162 lb 11.2 oz) 02/01/17 73 kg (160 lb 15 oz) 12/17/16 74.6 kg (164 lb 7.4 oz) Anthropometrics:  
Height: 5' 7\" (170.2 cm) Weight: 75.8 kg (167 lb 3.2 oz) IBW (%IBW):   ( ) UBW (%UBW):   (  %)  
 Last Weight Metrics: 
Weight Loss Metrics 11/12/2018 10/24/2018 10/19/2018 9/12/2018 8/30/2018 8/25/2018 8/17/2018 Today's Wt 167 lb 3.2 oz 171 lb 171 lb 1.2 oz 166 lb 6.4 oz 166 lb 12.8 oz 168 lb 171 lb BMI 26.19 kg/m2 26.78 kg/m2 26.79 kg/m2 26.06 kg/m2 26.12 kg/m2 26.31 kg/m2 26.78 kg/m2 Some encounter information is confidential and restricted. Go to Review Flowsheets activity to see all data. BMI: Body mass index is 26.19 kg/m². This BMI is indicative of: 
 []Underweight    []Normal    [x]Overweight    [] Obesity   [] Extreme Obesity (BMI>40) Estimated Nutrition Needs (Based on):  
2604 Kcals/day(BMR: 1400 x 1.3) , 75 g(1 g/kg) Protein Carbohydrate: At Least 130 g/day  Fluids: 1825 mL/day (1ml/kcal) or per primary team 
 
NUTRITION DIAGNOSES:  
Problem:  Altered GI function Etiology: related to Head/neck CA Signs/Symptoms: as evidenced by PEG dependent NUTRITION INTERVENTIONS: 
  Enteral/Parenteral Nutrition: Initiate enteral nutrition GOAL:  
initiate TF in 24-48 hrs LEARNING NEEDS (Diet, Food/Nutrient-Drug Interaction):  
 [x] None Identified 
 [] Identified and Education Provided/Documented 
 [] Identified and Pt declined/was not appropriate Cultureal, Baptist, OR Ethnic Dietary Needs:  
 [x] None Identified 
 [] Identified and Addressed 
 
 [x] Interdisciplinary Care Plan Reviewed/Documented  
 [x] Discharge Planning:   Continue home TF regimen of Isosource 1.5 x 5 bottles per day MONITORING /EVALUATION:  
  
Food/Nutrient Intake Outcomes: Total energy intake Physical Signs/Symptoms Outcomes: Weight/weight change, Electrolyte and renal profile, GI 
 
NUTRITION RISK:  
 [] High              [x] Moderate           []  Low  []  Minimal/Uncompromised PT SEEN FOR:  
 [x]  MD Consult: []Calorie Count []Diabetic Diet Education []Diet Education []Electrolyte Management []General Nutrition Management and Supplements [x]Management of Tube Feeding []TPN Recommendations []  RN Referral:  []MST score >=2 
   []Enteral/Parenteral Nutrition PTA []Pregnant: Gestational DM or Multigestation 
   []Pressure Ulcer/Wound Care needs 
     
[]  Low BMI 
[]  LOS Referral  
 
 
Bola Mojica RDN Pager [phone] Weekend Pager [phone]

## 2018-11-12 NOTE — PROGRESS NOTES
Occupational Therapy EVALUATION/discharge Patient: Deirdre Farah (80 y.o. male) Date: 11/12/2018 Primary Diagnosis: Dizziness Precautions: Fall ASSESSMENT:  
Based on the objective data described below, the patient presents with mild GW and decreased safety awareness which is mainly impeding his independence with ambulation. He is overall demonstrating the ability to perform ADLs at an independent to mod I level and independent to supervision for functional mobility. Further skilled acute occupational therapy is not indicated at this time. He will benefit from continue PT, acutely and via [facility] after discharge. Discharge Recommendations: Home Health PT and supervision. Further Equipment Recommendations for Discharge: TBD OBJECTIVE DATA SUMMARY:  
HISTORY:  
Past Medical History:  
Diagnosis Date  Antiplatelet or antithrombotic long-term use 12/4/2014  Anxiety disorder  Arrhythmia 2009  
 bradycardia  Arthritis  CAD (coronary artery disease) s/p CABG 2002; Dr Yury Conde  Cancer ([facility] [address] 1996  
 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   
 left leg, lower back,   
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 I suspect the latter. Achalasia  is not present.  GERD (gastroesophageal reflux disease)  Heart failure ([address] 10/2014 Cardiomyopathy:Pacemaker upgrade:Biv and AICD  Dr. [facility] heart DrAlvaro last visit 5/11/2015  Hepatitis C Dx 1996  
 treated at [facility] in past; as of 4/15/15 wife states pt currently not under any treatment  Hyperlipidemia  Myocardial infarct [facility]) 2013 Heart Cath: 40% LV EF, Stented distal LAD, patent Graft to circumflex  On tube feeding diet approx 2009  
 still has as of 9/28/15  (no po food/liquid/meds at all); Dr Blondell Opitz  Other ill-defined conditions(799.89) 1996  
 1 dose of chemotherapy/radiation for tongue cancer  Other ill-defined conditions(799.89) BPH  Other ill-defined conditions(799.89) orthostatic hypotension  Pneumonia ~ April -May 2010  Stroke [facility]) approx 2003  
 left side-left finger tips numb; no imbalance or memory loss; as of 2015 not seeing neuro MD  
 Suicidal thoughts Past Surgical History:  
Procedure Laterality Date  ABDOMEN SURGERY PROC UNLISTED  1996  
 peg tube  CABG, ARTERY-VEIN, THREE  2000  HX CATARACT REMOVAL    
 bilateral  
 HX CHOLECYSTECTOMY  HX COLONOSCOPY    
 HX HEART CATHETERIZATION  2013 Stented distal LAD  HX MOHS PROCEDURES    
 bilateral  
 HX ORTHOPAEDIC    
 back surgery times two  HX OTHER SURGICAL Radical Left Neck  HX OTHER SURGICAL    
 NASAL POLYPS REMOVAL  
 HX OTHER SURGICAL  2010 TURP  
 HX PACEMAKER  11/08  HX PACEMAKER  10/28/14 Defibrillator: [facility] # [id number], serial # [id number]; Dr. Ranjana Bradford [phone]; Dr Nathan Urban  HX UROLOGICAL    
 cystoscopy [address]  
 cevical surgery  AL CHANGE GASTROSTOMY TUBE  5/2/2011  AL CHANGE GASTROSTOMY TUBE  6/29/2011  AL EGD INSERT GUIDE WIRE DILATOR PASSAGE ESOPHAGUS  9/13/2010  AL EGD TRANSORAL BIOPSY SINGLE/MULTIPLE  9/13/2010  
    
 STOMACH SURGERY PROCEDURE UNLISTED  6/29/2011  UPPER GI ENDOSCOPY,BIOPSY  8/17/2018  UPPER GI ENDOSCOPY,DILATN W GUIDE  8/17/2018  UPPER GI ENDOSCOPY,W/DILAT,GASTRIC OUT  8/17/2018  VASCULAR SURGERY PROCEDURE UNLIST    
 bilateral carotid stents Prior Level of Function/Environment/Context: Independent to mod I for ADLs and functional mobility using a rollator for ambulation. Occupations in which the patient is/was successful, what are the barriers preventing that success:  
Performance Patterns (routines, roles, habits, and rituals):  
Personal Interests and/or values:  
Expanded or extensive additional review of patient history:  
 
Home Situation Home Environment: Private residence One/Two Story Residence: One story Living Alone: No 
Support Systems: Child(lisa) Patient Expects to be Discharged to[de-identified] Private residence Current DME Used/Available at Home: Svitlana Felling, straight, Commode, bedside, Shower chair, Reggy Hem Tub or Shower Type: Shower Hand dominance: Right EXAMINATION OF PERFORMANCE DEFICITS: 
Cognitive/Behavioral Status: 
Neurologic State: Alert Orientation Level: Oriented to person;Oriented to place;Oriented to situation Cognition: Follows commands; Appropriate for age attention/concentration Safety/Judgement: Awareness of environment; Insight into deficits Hearing: Auditory Auditory Impairment: None Vision/Perceptual:   
Acuity: Within Defined Limits Corrective Lenses: Glasses Range of Motion: 
AROM: Generally decreased, functional 
  
  
  
  
  
  
  
Strength: 
Strength: Generally decreased, functional 
  
  
  
  
 
Coordination: 
Coordination: Within functional limits Fine Motor Skills-Upper: Left Intact; Right Intact Gross Motor Skills-Upper: Left Intact; Right Intact Tone & Sensation: 
Tone: Normal 
Sensation: Impaired(Neuropathy in feet. h/o numbness L s/p CVA) Balance: 
Sitting: Intact Standing: Impaired Standing - Static: Good Standing - Dynamic : Good Functional Mobility and Transfers for ADLs:Bed Mobility: 
Scooting: Independent Transfers: 
Sit to Stand: Modified independent Stand to Sit: Modified independent Bed to Chair: Supervision(ambulating with RW) Bathroom Mobility: Supervision/set up(ambulating with RW) Toilet Transfer : Independent ADL Assessment: Feeding: Total assistance(receives nutrition via PEG) Oral Facial Hygiene/Grooming: Independent Bathing: Modified independent Upper Body Dressing: Modified independent Lower Body Dressing: Modified independent Toileting: Independent Functional Measure: 
Barthel Index: 
 
Bathin Bladder: 10 Bowels: 10 
Groomin Dressing: 10 Feedin Mobility: 10 Stairs: 5 Toilet Use: 10 Transfer (Bed to Chair and Back): 15 Total: 80 Barthel and G-code impairment scale: 
Percentage of impairment CH 
0% CI 
1-19% CJ 
20-39% CK 
40-59% CL 
60-79% CM 
80-99% CN 
100% Barthel Score 0-100 100 99-80 79-60 59-40 20-39 1-19 
 0 Barthel Score 0-20 20 17-19 13-16 9-12 5-8 1-4 0 The Barthel ADL Index: Guidelines 1. The index should be used as a record of what a patient does, not as a record of what a patient could do. 2. The main aim is to establish degree of independence from any help, physical or verbal, however minor and for whatever reason. 3. The need for supervision renders the patient not independent. 4. A patient's performance should be established using the best available evidence. Asking the patient, friends/relatives and nurses are the usual sources, but direct observation and common sense are also important. However direct testing is not needed. 5. Usually the patient's performance over the preceding 24-48 hours is important, but occasionally longer periods will be relevant. 6. Middle categories imply that the patient supplies over 50 per cent of the effort. 7. Use of aids to be independent is allowed. Leslie Sosa., Barthel, D.W. (8102). Functional evaluation: the Barthel Index. [address] [facility] (14)2. Valeria Romero lawson NOEL Cee, Demarco Grossman., Henna Arias., Ck, [address] [place] [address] ().  Measuring the change indisability after inpatient rehabilitation; comparison of the responsiveness of the Barthel Index and Functional [place] Measure. Journal of Neurology, Neurosurgery, and Psychiatry, 66(4), 560-309. Arverne KVNG Garcia, QIANA Gifford, & Kostas Luna M.A. (2004.) Assessment of post-stroke quality of life in cost-effectiveness studies: The usefulness of the Barthel Index and the EuroQoL-5D. [facility], 13, 581-58 G codes: In compliance with CMSs Claims Based Outcome Reporting, the following G-code set was chosen for this patient based on their primary functional limitation being treated: The outcome measure chosen to determine the severity of the functional limitation was the Barthel Index with a score of 80/100 which was correlated with the impairment scale. ? Self Care:  
  - CURRENT STATUS: CI - 1%-19% impaired, limited or restricted  - GOAL STATUS: CI - 1%-19% impaired, limited or restricted  - D/C STATUS:  CI - 1%-19% impaired, limited or restricted Pain: 
Pain Scale 1: Numeric (0 - 10) Pain Intensity 1: 0 Activity Tolerance: VSS Please refer to the flowsheet for vital signs taken during this treatment. After treatment:  
[x]  Patient left in no apparent distress sitting up in chair 
[]  Patient left in no apparent distress in bed 
[x]  Call bell left within reach [x]  Nursing notified 
[]  Caregiver present 
[]  Bed alarm activated COMMUNICATION/EDUCATION:  
Communication/Collaboration: 
[x]      Home safety education was provided and the patient/caregiver indicated understanding. [x]      Patient/family have participated as able and agree with findings and recommendations. []      Patient is unable to participate in plan of care at this time. Findings and recommendations were discussed with: Physical Therapist 
 
Gerardo Poole OTR/L Time Calculation: 26 mins

## 2018-11-12 NOTE — CONSULTS
NEUROLOGY NOTE Chief Complaint Patient presents with  Abdominal Pain Ambulatory into triage with c/o increased headache, dizziness and tremors x several days-family states its r/t \"the vessels in the back of his neck. \" Abdominal pain x today  Dizziness Reason for Consult I have been asked by Holland Noyola MD to see the patient in neurological consultation to render advice and opinion regarding dizziness. HPI Marichuy Petersen is a 80 y.o. male who presents to the hospital because of dizziness. Pt has been having dizziness for atleast the last 1 yr and he came as his dizziness has been gradually worsening. No passing out spells. States that it is a room spinning sensation. He did have a stroke in the past and states that he started having it after that. No new slurring of speech of any weakness of the arms or the legs. 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 Neurology has been consulted for further management. ROS A ten system review of constitutional, cardiovascular, respiratory, musculoskeletal, endocrine, skin, SHEENT, genitourinary, psychiatric and neurologic systems was obtained and is unremarkable except as stated in HPI  
 
PMH Past Medical History:  
Diagnosis Date  Antiplatelet or antithrombotic long-term use 12/4/2014  Anxiety disorder  Arrhythmia 2009  
 bradycardia  Arthritis  CAD (coronary artery disease) s/p CABG 2002; Dr Cassandra Thrasher  Cancer ([facility] [address] 1996  
 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   
 left leg, lower back,   
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 I suspect the latter.  Achalasia is not present.  GERD (gastroesophageal reflux disease)  Heart failure ([facility] [address] 10/2014 Cardiomyopathy:Pacemaker upgrade:Biv and AICD  Dr. Darrin Nichols heart  last visit 2015  Hepatitis C Dx 1996  
 treated at [facility] in past; as of 4/15/15 wife states pt currently not under any treatment  Hyperlipidemia  Myocardial infarct [facility])  Heart Cath: 40% LV EF, Stented distal LAD, patent Graft to circumflex  On tube feeding diet approx   
 still has as of 9/28/15  (no po food/liquid/meds at all); Dr Arina Zhang  Other ill-defined conditions(799.89)   
 1 dose of chemotherapy/radiation for tongue cancer  Other ill-defined conditions(799.89) BPH  Other ill-defined conditions(799.89) orthostatic hypotension  Pneumonia ~ April -May 2010  Stroke [facility]) approx   
 left side-left finger tips numb; no imbalance or memory loss; as of  not seeing neuro MD  
 Suicidal thoughts [facility] Family History Problem Relation Age of Onset  Heart Disease Father   
      at age 52 from CAD  Colon Cancer Mother  Cancer Mother   
     colon ca  
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  
 Drug use: No  
 Sexual activity: Yes  
  Partners: Female Other Topics Concern  Not on file Social History Narrative  Not on file ALLERGIES Allergies Allergen Reactions  Demerol [Meperidine] Shortness of Breath  Paxil [Paroxetine Hcl] Unknown (comments) Pt gets shaky and loses control of legs  Amoxicillin Rash  Cleocin [Clindamycin Hcl] Rash  Pcn [Penicillins] Rash PHYSICAL EXAMINATION:  
Patient Vitals for the past 24 hrs: 
 Temp Pulse Resp BP SpO2  
11/12/18 1113 98.9 °F (37.2 °C) 82 18 133/52 97 % 11/12/18 0711 98.7 °F (37.1 °C) 86 21 118/55 96 % 11/12/18 0345 99 °F (37.2 °C) 85 20 97/61 95 % 11/11/18 2323 100.4 °F (38 °C) 89 16 132/56 98 % 11/11/18 2034 99.4 °F (37.4 °C) 90 20 115/58 100 % 11/11/18 2031 99.4 °F (37.4 °C)   (!) 158/113   
11/11/18 1733 98.2 °F (36.8 °C) 86 22 (!) 82/54 96 % 11/11/18 1630  87 22 97/49 100 % General:  
General appearance: Pt is in no acute distress Distal pulses are preserved Fundoscopic exam: attempted Neurological Examination:  
Mental Status:  AAO x3. Speech is fluent. Follows commands, has normal fund of knowledge, attention, short term recall, comprehension and insight. Cranial Nerves: Visual fields are full. PERRL, Extraocular movements are full. Facial sensation intact. Facial movement intact. Hearing intact to conversation. Palate elevates symmetrically. Shoulder shrug symmetric. Tongue midline. Motor: Strength is 5/5 in all 4 ext. Normal tone. No atrophy. Sensation: Decreased PP distally in the LE Coordination/Cerebellar: Intact to finger-nose-finger Gait: ataxic Skin: No significant bruising or lacerations. LAB DATA REVIEWED:   
Recent Results (from the past 24 hour(s)) GLUCOSE, POC Collection Time: 11/11/18  6:00 PM  
Result Value Ref Range Glucose (POC) 156 (H) 65 - 100 mg/dL Performed by Reese Zaidi (PCT) GLUCOSE, POC Collection Time: 11/11/18  9:26 PM  
Result Value Ref Range Glucose (POC) 117 (H) 65 - 100 mg/dL Performed by [facility] Ritastad METABOLIC PANEL, BASIC Collection Time: 11/12/18 12:55 AM  
Result Value Ref Range Sodium 139 136 - 145 mmol/L Potassium 4.4 3.5 - 5.1 mmol/L Chloride 100 97 - 108 mmol/L  
 CO2 35 (H) 21 - 32 mmol/L Anion gap 4 (L) 5 - 15 mmol/L Glucose 156 (H) 65 - 100 mg/dL BUN 42 (H) 6 - 20 MG/DL Creatinine 1.53 (H) 0.70 - 1.30 MG/DL  
 BUN/Creatinine ratio 27 (H) 12 - 20 GFR est AA 53 (L) >60 ml/min/1.73m2 GFR est non-AA 44 (L) >60 ml/min/1.73m2 Calcium 9.2 8.5 - 10.1 MG/DL  
CBC WITH AUTOMATED DIFF Collection Time: 11/12/18 12:55 AM  
Result Value Ref Range WBC 7.4 4.1 - 11.1 K/uL  
 RBC 3.86 (L) 4.10 - 5.70 M/uL  
 HGB 11.3 (L) 12.1 - 17.0 g/dL HCT 36.8 36.6 - 50.3 % MCV 95.3 80.0 - 99.0 FL  
 MCH 29.3 26.0 - 34.0 PG  
 MCHC 30.7 30.0 - 36.5 g/dL  
 RDW 15.9 (H) 11.5 - 14.5 % PLATELET 82 (L) 519 - 400 K/uL MPV 11.9 8.9 - 12.9 FL  
 NRBC 0.0 0  WBC ABSOLUTE NRBC 0.00 0.00 - 0.01 K/uL NEUTROPHILS 79 (H) 32 - 75 % LYMPHOCYTES 12 12 - 49 % MONOCYTES 9 5 - 13 % EOSINOPHILS 0 0 - 7 % BASOPHILS 0 0 - 1 % IMMATURE GRANULOCYTES 0 0.0 - 0.5 % ABS. NEUTROPHILS 5.8 1.8 - 8.0 K/UL  
 ABS. LYMPHOCYTES 0.9 0.8 - 3.5 K/UL  
 ABS. MONOCYTES 0.7 0.0 - 1.0 K/UL  
 ABS. EOSINOPHILS 0.0 0.0 - 0.4 K/UL  
 ABS. BASOPHILS 0.0 0.0 - 0.1 K/UL  
 ABS. IMM. GRANS. 0.0 0.00 - 0.04 K/UL  
 DF AUTOMATED    
GLUCOSE, POC Collection Time: 11/12/18  7:09 AM  
Result Value Ref Range Glucose (POC) 189 (H) 65 - 100 mg/dL Performed by Nancy Fuel  (PCT) GLUCOSE, POC Collection Time: 11/12/18 11:10 AM  
Result Value Ref Range Glucose (POC) 233 (H) 65 - 100 mg/dL Performed by Nancy Fuel  (PCT) Imaging review: 
11/11/18 CT Head Old right posterior parietal and left occipital infarcts are unchanged. 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 HOME MEDS Prior to Admission Medications Prescriptions Last Dose Informant Patient Reported? Taking? HYDROcodone-acetaminophen (NORCO)  mg tablet 2018 at Unknown time Significant Other Yes Yes Si Tab by Per G Tube route daily as needed for Pain. L. acidoph & paracasei- S therm- Bifido (JAVY-Q/RISAQUAD) 8 billion cell cap cap 2018 at Unknown time Significant Other No Yes Si Cap by PEG Tube route daily. LANTUS SOLOSTAR U-100 INSULIN 100 unit/mL (3 mL) inpn 2018 at Unknown time Significant Other No Yes Sig: INJECT 14 UNITS SUBCUTANEOUSLY ONCE DAILY  
OTHER 2018 at Unknown time  Yes Yes Sig: diltiazen 2% lidocaine cream. Apply to rectal area as needed  
acetaminophen (TYLENOL) 500 mg tablet Not Taking at Unknown time Significant Other Yes No  
Si,000 mg by Per G Tube route every six (6) hours as needed for Pain. albuterol (PROVENTIL VENTOLIN) 2.5 mg /3 mL (0.083 %) nebulizer solution 2018 at Unknown time  Yes Yes Si.5 mg by Nebulization route once. alfuzosin SR (UROXATRAL) 10 mg SR tablet 2018 at Unknown time Significant Other Yes Yes Sig: 10 mg by Per G Tube route daily. aspirin 81 mg chewable tablet 2018 at Unknown time  No Yes Sig: Take 1 Tab by mouth daily. Patient taking differently: 1 Tab by Per G Tube route daily. atorvastatin (LIPITOR) 40 mg tablet 2018 at Unknown time Significant Other Yes Yes Si mg by Per G Tube route daily. clopidogrel (PLAVIX) 75 mg tab 2018 at Unknown time  No Yes Si Tab by PEG Tube route daily. famotidine (PEPCID) 20 mg tablet 2018 at Unknown time Significant Other Yes Yes Si mg by Per G Tube route two (2) times a day. finasteride (PROSCAR) 5 mg tablet 2018 at Unknown time Significant Other Yes Yes Si mg by Per G Tube route nightly.   
fluticasone (FLONASE ALLERGY RELIEF) 50 mcg/actuation nasal spray 2018 at Unknown time Significant Other Yes Yes Si Sprays by Both Nostrils route daily. furosemide (LASIX) 20 mg tablet 2018 at Unknown time  Yes Yes Si mg daily. Take 1 tab in the PM  
furosemide (LASIX) 40 mg tablet 2018 at Unknown time  No Yes Sig: Take 1 Tab by mouth daily. Patient taking differently: 40 mg by Per G Tube route daily. Take 1 tab in the AM  
gabapentin (NEURONTIN) 250 mg/5 mL solution 2018 at Unknown time Significant Other Yes Yes Si mg by PEG Tube route three (3) times daily. guaiFENesin (ROBITUSSIN) 100 mg/5 mL liquid 2018 at Unknown time  Yes Yes Sig: 10mL three times daily  
insulin regular (NOVOLIN R, HUMULIN R) 100 unit/mL injection 2018 at Unknown time  No Yes Si units at 8 am, 12 units at 2 pm, 5 units at 8 pm  
Patient taking differently: by SubCUTAneous route. 14 units at 8 am, 12 units at 2 pm, 5 units at 8 pm  
insulin syringe-needle U-100 0.3 mL 31 gauge x 15/64\" syrg 2018 at Unknown time  No Yes Sig: Use as directed  
klack's mixture Solution 2018 at Unknown time  Yes Yes Sig: Take 5 mL by mouth every six (6) hours. Diphenhydramine elixir 12.5mg/ml 500ml, Nystatin suspension 120ml,Tetracycline 500mg capsules  12 capsules (6g), Hydrocortisone 20mg tablet 12 tablets (240mg), Water for irrigation QS ad 1000ml  
lactose-reduced food/fiber (ISOSOURCE 1.5 RAE FEEDING TUBE) 2018 at Unknown time  Yes Yes Si mL by adductor canal block route five (5) times daily. lidocaine (SALONPAS/ASPERCREME) 4 % patch   No No  
Sig: Use as needed on affected site  
midodrine (PROAMITINE) 10 mg tablet 2018 at Unknown time Significant Other Yes Yes Sig: 10 mg by Per G Tube route three (3) times daily (with meals). multivitamin (ONE A DAY) tablet 2018 at Unknown time Significant Other Yes Yes Si Tab by Per G Tube route daily. nitroglycerin (NITROSTAT) 0.4 mg SL tablet 10/11/2018 at Unknown time Significant Other Yes Yes Si.4 mg by SubLINGual route every five (5) minutes as needed for Chest Pain. ondansetron hcl (ZOFRAN, AS HYDROCHLORIDE,) 8 mg tablet  Significant Other No No  
Sig: Take 1 Tab by mouth every eight (8) hours as needed for Nausea. pramipexole (MIRAPEX) 0.25 mg tablet 2018 at Unknown time  No Yes Sig: Take 1 Tab by mouth three (3) times daily. zinc 50 mg tab tablet 2018 at Unknown time Significant Other Yes Yes Si mg by Per G Tube route daily. Facility-Administered Medications: None CURRENT MEDS Current Facility-Administered Medications Medication Dose Route Frequency  [START ON 2018] insulin glargine (LANTUS) injection 14 Units  14 Units SubCUTAneous DAILY  finasteride (PROSCAR) tablet 5 mg  5 mg Oral DAILY  tamsulosin (FLOMAX) capsule 0.4 mg  0.4 mg Oral DAILY  aspirin chewable tablet 81 mg  81 mg Per G Tube DAILY  atorvastatin (LIPITOR) tablet 40 mg  40 mg Per G Tube DAILY  clopidogrel (PLAVIX) tablet 75 mg  75 mg PEG Tube DAILY  famotidine (PEPCID) tablet 20 mg  20 mg Per G Tube BID  fluticasone (FLONASE) 50 mcg/actuation nasal spray 2 Spray  2 Spray Both Nostrils DAILY  furosemide (LASIX) tablet 20 mg  20 mg Per G Tube BID Mon Wed & Fri  
 gabapentin (NEURONTIN) 250 mg/5 mL solution 500 mg  500 mg Per G Tube BID  lactobac ac& pc-s.therm-b.anim (JAVY Q/RISAQUAD)  1 Cap PEG Tube DAILY  midodrine (PROAMITINE) tablet 10 mg  10 mg Per G Tube TID WITH MEALS  pramipexole (MIRAPEX) tablet 0.25 mg  0.25 mg Oral TID  zinc sulfate (ZINCATE) capsule 220 mg  220 mg Per G Tube DAILY  sodium chloride (NS) flush 5-10 mL  5-10 mL IntraVENous Q8H  
 heparin (porcine) injection 5,000 Units  5,000 Units SubCUTAneous Q8H  
 0.9% sodium chloride infusion  50 mL/hr IntraVENous CONTINUOUS  
  insulin lispro (HUMALOG) injection   SubCUTAneous AC&HS  insulin regular (NOVOLIN R, HUMULIN R) injection 5-14 Units  5-14 Units SubCUTAneous TID  lactose-reduced food with fibr 0.07 gram-1.5 kcal/mL liqd 250 mL  (Patient Supplied)  250 mL Feeding Tube 5XD  magic mouthwash cpd (with sucralfate)  5 mL Oral TIDAC IMPRESSION: 
Levar Peterson is a 80 y.o. male who presents with dizziness. He states that it is a room spinning sensation all the time. This has been going on for more than a year. CTA head and neck showed severe vertebrobasilar disease in 2017. Cannot get get MRI of the brain but will repeat CTA head and neck. He will need outpatient vascular neurology evaluation for severe vertebrobasilar disease causing his dizziness. RECOMMENDATIONS: 
1. CTA Head and neck 2. Meclizine 25 mg po tid 3. Decrease gabapentin to 250 mg po bid 4. Cont aspirin, plavix and statin 5. Outpatient evaluation by Interventional neurology - Dr. Duran Dewey Thank you very much for this consultation.   
 
Jack Flores MD 
Neurologist

## 2018-11-12 NOTE — H&P
History and Physical   
 
   
Pt Name  Nathalie Johnson Date of Birth 1935 Medical Record Number  [id number] Age  80 y.o. PCP Idalia Fernandez MD  
Admit date:  11/11/2018 Room Number  7802/79  @ [facility] Date of Service  11/11/2018 Admission Diagnoses:  Dizziness and progressive weakness and decreased exercise tolerance Certification: We are admitting Nathalie Johnson 80 y.o. male with a principle diagnosis of Dizziness This patient also suffers from other comorbidities listed below. I have a high level of concern for progression of AS leading to life threatening complications Assessment and plan: 
Dizziness - persistent associated with  
Progressive decline in exercise capacity Documented moderate to severe AS and MR  
-Admit to remote tele bed  
-NPO / TF as was PTA with home supplied TF  
-cardiologist consultation  
-Echo complete.  
-Fall precautions.  
-resume home meds as they were. -Watch chemistry closely CAD S/p CABG x3 S/p AICD  
-no change in Rx . Essential tremor? Clinically this seems to be parkinsonian with features of resting tremor, increased frequency of tremor on the right hand, mild dysmetria  
-we will ask for neurologist evaluation. Along with request for evaluation of the dizziness. Hx of Tongue CA  
S/p radical neck resection. S/p neck XRT leading to complete dysphagia now fed through PEG tube. CODE STATUS  Full Functional Status  Pt lives with his family Surrogate decision maker: Pt's son Prophylaxis  Hep SQ Discharge Plan: [facility] PT, OT, RN, There are currently no Active Isolations [place]: [place] / Plan: [place] MED[place]ARE PART A & B / Product Type: Medicare /   
Social issues  Date Comment 11/11/18  
5872 I met with pt's son in the ER room. He participated in the patient's care plan and we explained to him the above in simple language and answered all questions. We also talked about code status. Prognosis  Fair Subjective Data History of Present Illness : The pt reports that his dizziness is not new. But for the last two weeks his dizziness has exacerbated. Upon elaboration we learned that he is complaining of more shortness of breath and lightheadedness which he describes like feeling 'drunk' that does get better with resting. This is reminiscent of presyncope with activity. In light of his known severe AS and MR we remain concerned if his valvular disease has progressed further and now pt is symptomatic. Review of Systems - History obtained from child and the patient General ROS: positive for  - fatigue 
negative for - chills or fever Psychological ROS: negative Ophthalmic ROS: negative Respiratory ROS: negative for - cough, hemoptysis or orthopnea Cardiovascular ROS: no chest pain But positive for dyspnea on exertion Gastrointestinal ROS: no abdominal pain, change in bowel habits, or black or bloody stools Genito-Urinary ROS: no dysuria, trouble voiding, or hematuria Musculoskeletal ROS: positive for - muscular weakness Neurological ROS: no TIA or stroke symptoms ROS Past Medical History:  
Diagnosis Date  Antiplatelet or antithrombotic long-term use 12/4/2014  Anxiety disorder  Arrhythmia 2009  
 bradycardia  Arthritis  CAD (coronary artery disease) s/p CABG 2002; Dr Damari Chamberlain  Cancer ([facility] [address] 1996  
 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   
 left leg, lower back,   
 Depression  Diabetes ([facility] [address] Type II  Epigastric pain 8/17/2018  Esophageal dysmotility s/p dilitation  Esophageal motility disorder 7/8/2013 Frequent simultaneous or failed contractions, low amplitude contractions  suggests severe myopathy or diffuse spasm. I suspect the latter. Achalasia  is not present.  GERD (gastroesophageal reflux disease)  Heart failure ([facility] [address] 10/2014 Cardiomyopathy:Pacemaker upgrade:Biv and AICD  Dr. Artemio Huerta heart  last visit 5/11/2015  Hepatitis C Dx 1996  
 treated at [facility] in past; as of 4/15/15 wife states pt currently not under any treatment  Hyperlipidemia  Myocardial infarct [facility]) 2013 Heart Cath: 40% LV EF, Stented distal LAD, patent Graft to circumflex  On tube feeding diet approx 2009  
 still has as of 9/28/15  (no po food/liquid/meds at all); Dr Jazmine Salinas  Other ill-defined conditions(799.89) 1996  
 1 dose of chemotherapy/radiation for tongue cancer  Other ill-defined conditions(799.89) BPH  Other ill-defined conditions(799.89) orthostatic hypotension  Pneumonia ~ April -May 2010  Stroke [facility]) approx 2003  
 left side-left finger tips numb; no imbalance or memory loss; as of 2015 not seeing neuro MD  
 Suicidal thoughts Past Surgical History:  
Procedure Laterality Date  ABDOMEN SURGERY PROC UNLISTED  1996  
 peg tube  CABG, ARTERY-VEIN, THREE  2000  HX CATARACT REMOVAL    
 bilateral  
 HX CHOLECYSTECTOMY  HX COLONOSCOPY    
 HX HEART CATHETERIZATION  2013 Stented distal LAD  HX MOHS PROCEDURES    
 bilateral  
 HX ORTHOPAEDIC    
 back surgery times two  HX OTHER SURGICAL Radical Left Neck  HX OTHER SURGICAL    
 NASAL POLYPS REMOVAL  
 HX OTHER SURGICAL  2010 TURP  
 HX PACEMAKER  11/08  HX PACEMAKER  10/28/14 Defibrillator: [facility] # [id number], serial # [id number]; Dr. Ortiz [facility] [phone]; Dr Ramiro Moore  HX UROLOGICAL    
 cystoscopy [address] [place] [address] [facility]  
 cevical surgery  LA CHANGE GASTROSTOMY TUBE  5/2/2011  LA CHANGE GASTROSTOMY TUBE  6/29/2011  LA EGD INSERT GUIDE WIRE DILATOR PASSAGE ESOPHAGUS  9/13/2010  LA EGD TRANSORAL BIOPSY SINGLE/MULTIPLE  9/13/2010  STOMACH SURGERY PROCEDURE UNLISTED  2011  UPPER GI ENDOSCOPY,BIOPSY  2018  UPPER GI ENDOSCOPY,DILATN W GUIDE  2018  UPPER GI ENDOSCOPY,W/DILAT,GASTRIC OUT  2018  VASCULAR SURGERY PROCEDURE UNLIST    
 bilateral carotid stents Social History Tobacco Use  Smoking status: Never Smoker  Smokeless tobacco: Never Used Substance Use Topics  Alcohol use: No  
   
 
Family History Problem Relation Age of Onset  Heart Disease Father   
      at age 52 from CAD  Colon Cancer Mother  Cancer Mother   
     colon ca  
 Heart Disease Brother Objective data Comment:  thin built pleasant  gentleman in no distress. His son is in the room Patient Vitals for the past 24 hrs: 
 Temp 18 2031 99.4 °F (37.4 °C)  
18 1733 98.2 °F (36.8 °C)  
18 0959 97.5 °F (36.4 °C) Patient Vitals for the past 24 hrs: 
 Pulse 18 2034 90  
18 1733 86  
18 1630 87  
18 1230 84  
18 1030 87  
18 0959 80 Patient Vitals for the past 24 hrs: 
 Resp  
18 2034 20  
18 1733 22  
18 1630 22  
18 1230 19  
18 1030 18  
18 0959 16 Patient Vitals for the past 24 hrs: 
 BP  
18 2034 115/58  
18 2031 (!) 158/113  
18 1733 (!) 82/54  
18 1630 97/49  
18 1230 97/64  
18 1030 93/54  
18 1015 104/65  
18 0959 (!) 129/107 SpO2 Readings from Last 6 Encounters:  
18 100% 10/24/18 96% 10/19/18 100% 18 93% 18 95% 18 95% O2 Flow Rate (L/min): 2 l/min  O2 Device: Nasal cannula Body mass index is 27.59 kg/m². - Wt Readings from Last 10 Encounters:  
18 79.9 kg (176 lb 2.4 oz) 10/24/18 77.6 kg (171 lb) 10/19/18 77.6 kg (171 lb 1.2 oz) 18 75.5 kg (166 lb 6.4 oz) 18 75.7 kg (166 lb 12.8 oz) 18 76.2 kg (168 lb) 18 77.6 kg (171 lb) 08/11/18 77.3 kg (170 lb 6.7 oz) 08/02/18 77.1 kg (170 lb)  
07/27/18 77.1 kg (169 lb 15.6 oz) Physical Exam:            
General:  Alert, cooperative,  
well noursished,  
well developed,  
appears stated age Ears/Eyes:  Hearing intact Sclera anicteric. Pupils equal  
Mouth/Throat:  Mucous membranes normal pink and dry Oral pharynx clear Neck:  oldsurgery scar is noted. Lungs:  Trachea midline Chest excursion symmetrical  
Auscultation B/L Symmetrical with Vesicular breath sounds No Crepitations noted Percussion note resonant on mid Clavicular line; no sign of pneumothorax CVS:  Regular rate and rhythm AS and MR  murmur, No click, rub or gallop S1 normal  
S2 normal  
Pedal pulses  b/l symmetrical  
Abdomen:  PEG site intact Soft, non-tender Bowel sounds normal 
No distension Percussion note tympanitic Extremities: No cyanosis, jaundice No edema noted No sign of DVT/cord like lesion on palpation No sign of acute trauma Age appropriate OA changes noted. Skin:   
Skin color, texture, turgor normal. no acute rash or lesions Lymph nodes: Musculoskeletal Muscle bulk B/L symmetrical  
Neuro Cranial nerves are intact,  
motor movement b/l symmetrical, Sensory evaluation b/l symmetrical   
Psych:  Alert and oriented, normal mood & affect given the clinical scenario Medications reviewed Current Facility-Administered Medications Medication Dose Route Frequency  acetaminophen (TYLENOL) tablet 1,000 mg  1,000 mg Per G Tube Q6H PRN  
 albuterol (PROVENTIL VENTOLIN) nebulizer solution 1.25 mg  1.25 mg Nebulization Q6H PRN  
 [START ON 11/12/2018] tamsulosin (FLOMAX) capsule 0.4 mg  0.4 mg Oral DAILY  [START ON 11/12/2018] aspirin chewable tablet 81 mg  81 mg Per G Tube DAILY  [START ON 11/12/2018] atorvastatin (LIPITOR) tablet 40 mg  40 mg Per G Tube DAILY  [START ON 11/12/2018] clopidogrel (PLAVIX) tablet 75 mg  75 mg PEG Tube DAILY  famotidine (PEPCID) tablet 20 mg  20 mg Per G Tube BID  [START ON 11/12/2018] fluticasone (FLONASE) 50 mcg/actuation nasal spray 2 Spray  2 Spray Both Nostrils DAILY  [START ON 11/12/2018] furosemide (LASIX) tablet 20 mg  20 mg Per G Tube BID Mon Wed & Fri  
 gabapentin (NEURONTIN) 250 mg/5 mL solution 500 mg  500 mg Per G Tube BID  [START ON 11/12/2018] lactobac ac& pc-s.therm-b.anim (JAVY Q/RISAQUAD)  1 Cap PEG Tube DAILY  midodrine (PROAMITINE) tablet 10 mg  10 mg Per G Tube TID WITH MEALS  pramipexole (MIRAPEX) tablet 0.25 mg  0.25 mg Oral TID  [START ON 11/12/2018] zinc sulfate (ZINCATE) capsule 220 mg  220 mg Per G Tube DAILY  sodium chloride (NS) flush 5-10 mL  5-10 mL IntraVENous Q8H  
 sodium chloride (NS) flush 5-10 mL  5-10 mL IntraVENous PRN  
 naloxone (NARCAN) injection 0.4 mg  0.4 mg IntraVENous PRN  
 ondansetron (ZOFRAN) injection 2 mg  2 mg IntraVENous Q6H PRN  
 heparin (porcine) injection 5,000 Units  5,000 Units SubCUTAneous Q8H  
 0.9% sodium chloride infusion  50 mL/hr IntraVENous CONTINUOUS  
 insulin lispro (HUMALOG) injection   SubCUTAneous AC&HS  
 glucose chewable tablet 16 g  4 Tab Oral PRN  
 dextrose (D50W) injection syrg 12.5-25 g  12.5-25 g IntraVENous PRN  
 glucagon (GLUCAGEN) injection 1 mg  1 mg IntraMUSCular PRN  
 insulin regular (NOVOLIN R, HUMULIN R) injection 5-14 Units  5-14 Units SubCUTAneous TID  lactose-reduced food with fibr 0.07 gram-1.5 kcal/mL liqd 250 mL  (Patient Supplied)  250 mL Feeding Tube 5XD  . PHARMACY TO SUBSTITUTE PER PROTOCOL (Reordered from: klack's mixture Solution)    Per Protocol Relevant other informations: Other medical conditions listed in this following active hospital problem list section; all of these and other pertinent data were taken into consideration when treatment plan is developed and customized to this patient's unique overall circumstances and needs.  We have reviewed available old medical records within the constraints of this admission process. Present on Admission: **None** Data Review:  
Recent Days: 
All Micro Results None Recent Labs 11/11/18 [address] WBC 7.3 HGB 10.5* HCT 33.9*  
PLT 75* Recent Labs 11/11/18 [address]   
K 4.8  
 CO2 35* * BUN 46* CREA 1.61* CA 8.8 MG 2.6* ALB 3.2* TBILI 0.6 SGOT 59* ALT 52 Lab Results Component Value Date/Time TSH 2.200 08/02/2018 12:00 PM  
 
 
 
Care Plan discussed with: Patient/Family and Nurse Other medical conditions are listed in the active hospital problem list section; these and other pertinent data were taken into consideration when the treatment plan was developed and customized to this patient's unique overall circumstances and needs. High complexity decision making was performed for this patient who is at high risk for decompensation with multiple organ involvement. Today total floor/unit time was 60  minutes while caring for this patient and greater than 50% of that time was spent with patient (and/or family) coordinating patients clinical issues. Angy Miramontes MD MPH  FACP                              
11/11/2018  
 
 
__________________________________________________________ FOR SOUND PHYSICIAN ADMINISTRATIVE USE ONLY  
[facility] 
 
  
INPT 223 Albert Nguyen MD MPH FACP  
9:44 PM 
11/11/2018

## 2018-11-12 NOTE — PROGRESS NOTES
Pharmacy Medication Reconciliation Allergy Update: no changes Recommendations/Findings: The following amendments were made to the patient's active medication list on file at [address]:  
1) Additions: - Metoclopramide 10 mg three times daily as needed 2) Deletions:  
     - Lidocaine patches 3) Changes: - Albuterol: Takes twice daily scheduled - Famotidine 20 mg: Takes three times daily - Flonase: 1 spray in each nostril twice daily - Norco: Patient able to take every 4 hours as needed for pain - Insulin regular: 14 units with AM and 2 PM dose and 5 units with 8 PM dose 4) Additional Information: 
     - Patient receives cortisone shots in the shoulder from his ortho provider every 3 months. Patient is due to have another shot around  
     - Diltiazem/lidocaine cream: Per wife, the anal fissure has healed but she does still have some of this medication at home in case a new fissure forms (instructed per provider) 
 
 
-Clarified PTA med list with patient's wife with a medication list (called her at home). PTA medication list was corrected to the following:  
 
Prior to Admission Medications Prescriptions Last Dose Informant Patient Reported? Taking? HYDROcodone-acetaminophen (NORCO)  mg tablet 2018 at Unknown time Significant Other Yes Yes Si Tab by Per G Tube route every four (4) hours as needed for Pain. L. acidoph & paracasei- S therm- Bifido (JAVY-Q/RISAQUAD) 8 billion cell cap cap 2018 at Unknown time Significant Other No Yes Si Cap by PEG Tube route daily. LANTUS SOLOSTAR U-100 INSULIN 100 unit/mL (3 mL) inpn 2018 at Unknown time Significant Other No Yes Sig: INJECT 14 UNITS SUBCUTANEOUSLY ONCE DAILY  
OTHER 2018 at Unknown time  Yes Yes Sig: diltiazem 2% lidocaine cream. Apply to rectal area as needed  
acetaminophen (TYLENOL) 500 mg tablet Not Taking at Unknown time Significant Other Yes No  
 Si-1,000 mg by Per G Tube route every six (6) hours as needed for Pain. albuterol (PROVENTIL VENTOLIN) 2.5 mg /3 mL (0.083 %) nebulizer solution 2018 at Unknown time  Yes Yes Si.5 mg by Nebulization route two (2) times a day. alfuzosin SR (UROXATRAL) 10 mg SR tablet 2018 at Unknown time Significant Other Yes Yes Sig: 10 mg by Per G Tube route daily. aspirin 81 mg chewable tablet 2018 at Unknown time  No Yes Sig: Take 1 Tab by mouth daily. Patient taking differently: 1 Tab by Per G Tube route daily. atorvastatin (LIPITOR) 40 mg tablet 2018 at Unknown time Significant Other Yes Yes Si mg by Per G Tube route daily. clopidogrel (PLAVIX) 75 mg tab 2018 at Unknown time  No Yes Si Tab by PEG Tube route daily. famotidine (PEPCID) 20 mg tablet 2018 at Unknown time Significant Other Yes Yes Si mg by Per G Tube route three (3) times daily. finasteride (PROSCAR) 5 mg tablet 2018 at Unknown time Significant Other Yes Yes Si mg by Per G Tube route nightly. fluticasone (FLONASE ALLERGY RELIEF) 50 mcg/actuation nasal spray 2018 at Unknown time Significant Other Yes Yes Si [place] by Both Nostrils route two (2) times a day. furosemide (LASIX) 40 mg tablet 2018 at Unknown time  No Yes Sig: Take 1 Tab by mouth daily. Patient taking differently: 40 mg by Per G Tube route daily. Take 1 tab in the AM  
gabapentin (NEURONTIN) 250 mg/5 mL solution 2018 at Unknown time Significant Other Yes Yes Si mg by PEG Tube route three (3) times daily. guaiFENesin (ROBITUSSIN) 100 mg/5 mL liquid 2018 at Unknown time  Yes Yes Sig: 10mL three times daily  
insulin regular (NOVOLIN R REGULAR U-100 INSULN) 100 unit/mL injection   Yes Yes Si Units by SubCUTAneous route two (2) times a day.  14 units at 8 AM, 14 units at 2 PM, and 5 units at 8 PM  
 insulin regular (NOVOLIN R REGULAR U-100 INSULN) 100 unit/mL injection   Yes Yes Si Units by SubCUTAneous route every evening. 14 units at 8 AM, 14 units at 2 PM, and 5 units at 8 PM  
klack's mixture Solution 2018 at Unknown time  Yes Yes Sig: Take 5 mL by mouth two (2) times a day. Diphenhydramine elixir 12.5mg/ml 500ml, Nystatin suspension 120ml,Tetracycline 500mg capsules  12 capsules (6g), Hydrocortisone 20mg tablet 12 tablets (240mg), Water for irrigation QS ad 1000ml 
 
   
lactose-reduced food/fiber (ISOSOURCE 1.5 RAE FEEDING TUBE) 2018 at Unknown time  Yes Yes Si mL by adductor canal block route five (5) times daily. metoclopramide HCl (REGLAN) 10 mg tablet   Yes Yes Sig: Take 10 mg by mouth three (3) times daily as needed. midodrine (PROAMITINE) 10 mg tablet 2018 at Unknown time Significant Other Yes Yes Sig: 10 mg by Per G Tube route three (3) times daily (with meals). multivitamin (ONE A DAY) tablet 2018 at Unknown time Significant Other Yes Yes Si Tab by Per G Tube route daily. nitroglycerin (NITROSTAT) 0.4 mg SL tablet 10/11/2018 at Unknown time Significant Other Yes Yes Si.4 mg by SubLINGual route every five (5) minutes as needed for Chest Pain. ondansetron hcl (ZOFRAN, AS HYDROCHLORIDE,) 8 mg tablet  Significant Other No No  
Sig: Take 1 Tab by mouth every eight (8) hours as needed for Nausea. pramipexole (MIRAPEX) 0.25 mg tablet 2018 at Unknown time  No Yes Sig: Take 1 Tab by mouth three (3) times daily. zinc 50 mg tab tablet 2018 at Unknown time Significant Other Yes Yes Si mg by Per G Tube route daily. Facility-Administered Medications: None Thank you, Arleth Garner, PHARMD

## 2018-11-12 NOTE — PROGRESS NOTES
Renal Dosing/Monitoring Medication: Famotidine Current regimen:  20 mg every 12 hr 
Recent Labs 11/12/18 
0055 11/11/18 [address] CREA 1.53* 1.61* BUN 42* 46* Estimated CrCl:  34 mL/min Plan: Change to 20 mg every 24 hours for CrCl < 50 mL/min per renal dosing protocol. Thank you, Alfonzo Montague, PHARMD

## 2018-11-12 NOTE — PROGRESS NOTES
1100 
Cardiopulmonary Care Interdisciplinary Rounds were held today to discuss patient's plan of care and outcomes. The following members were present: NP/Physician, Pharmacy, Nursing and Case Management. Expected Length of Stay:  - - - Plan of Care: Continue current treatment plan

## 2018-11-12 NOTE — PROGRESS NOTES
0830: Patient states difficulty voiding and uncomfortable, bladder scan showed 340 ml. Notified Dr. Garry Skinner, order received for one time straight cath if >200ml. Primary care RN notified. 0900: Straight cath output = 275 ml hazy,yellow urine. Patient tolerated it well and states feels a lot better.

## 2018-11-12 NOTE — PROGRESS NOTES
Hospitalist Progress Note NAME: Abigail Spatz :  1935 MRN:  [id number] Addendum: called by RN with concern that pt is getting more wet, c/o dyspnea. Stop IVF. Dose of lasix now Assessment / Plan: 
Dizziness - persistent associated with  
Progressive decline in exercise capacity Documented moderate to severe AS and MR  
-dizziness - persist, near fall at home  
etiology unclear at this time Cardiology and neurology consulted  
-monitor on tele  
-follow echo /carotid duplex  
-Head CT: negative Cannot have MRI due to ICD  
-PT: Talia 78 LIZ POA / Urinary retention  
-baseline Cr ~ 1.1-1.2, admission 1.6 . UA clear Was on IVF gentle hydration + lasix continued on admission D/w Louis: will continue hydration overnight and re assess in am  
Cont lasix 3 times a week for now ( had dose today), re assess in am  
Clinically very hard to assess fluid status properly  
-Avoid nephrotoxic drugs, adjust all meds to GFR. - bladder scan 340, continue to monitor straight cath as needed ( proscar re started)  
  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  
-NPO at home with TF  IsoSource 1.5 ( 5 cans, wife gives 1 can every 3 hr making sure there is no residual before given new can) - Followed Dr Nancy Nielsen OP for dysphasia 
  
Chronic systolic HF EF 15% / mod to severe AS    
Chronic orthostatic hypotension CSD s/p remote CABG / BiV ICD Elevated troponin  
-cycle troponin  
-on home lasix, aspirin/plavix. Lipitor.  
-follow new echo  
-cardiology help appreciated  
   
DM type II  
--200 
-start home dose lantus + SS Takes novolin R with TF ( substitute by SS) Follows with primary endocrinology   
  
Chronic respiratory failure with hypoxia, cont home O2 2 L at night, follows Dr Maddison Graham H/o streptococcal bacteremia 2018 H/o stroke  Chronic thrombocytopenia POA stable , followed with Dr Herrera Hinojosa   
 
 
 Pharmacy consulted for med rec Code status: Full NOK: wife [phone] - wife would like to be kept updated daily. She just had ESHA and confined to her home now Prophylaxis: Hep SQ Baseline: lives with wife, he has very good family support at home; ambulating with walker Recommended Disposition: TBD Seen by PT: Jaclynedwin Amaya Subjective: Chief Complaint / Reason for Physician Visit: following dizziness/ CAD/ dysphasia Pt well known to me from last hospitalization He is chronic ally weak and debilitated \" I feel dizzy all the time\" but \" nobody believes me\" Discussed with RN events overnight. Review of Systems: 
Symptom Y/N Comments  Symptom Y/N Comments Fever/Chills n   Chest Pain n   
Poor Appetite    Edema Cough    Abdominal Pain n   
Sputum    Joint Pain SOB/KRAMER n   Pruritis/Rash Nausea/vomit    Tolerating PT/OT Diarrhea    Tolerating Diet Constipation    Other Could NOT obtain due to:   
 
Objective: VITALS:  
Last 24hrs VS reviewed since prior progress note. Most recent are: 
Patient Vitals for the past 24 hrs: 
 Temp Pulse Resp BP SpO2  
11/12/18 0711 98.7 °F (37.1 °C) 86 21 118/55 96 % 11/12/18 0345 99 °F (37.2 °C) 85 20 97/61 95 % 11/11/18 2323 100.4 °F (38 °C) 89 16 132/56 98 % 11/11/18 2034 99.4 °F (37.4 °C) 90 20 115/58 100 % 11/11/18 2031 99.4 °F (37.4 °C)   (!) 158/113   
11/11/18 1733 98.2 °F (36.8 °C) 86 22 (!) 82/54 96 % 11/11/18 1630  87 22 97/49 100 % 11/11/18 1230  84 19 97/64 97 % 11/11/18 1030  87 18 93/54 94 % 11/11/18 1015    104/65 (!) 76 % 11/11/18 0959 97.5 °F (36.4 °C) 80 16 (!) 129/107  Intake/Output Summary (Last 24 hours) at 11/12/2018 8841 Last data filed at 11/12/2018 5637 Gross per 24 hour Intake 600 ml Output 1150 ml Net -550 ml PHYSICAL EXAM: 
General: WD, WN. Alert, cooperative, no acute distress. Chronic ally weak/debilitated    
EENT:  EOMI. Anicteric sclerae. MMM Resp: CTA bilaterally, no wheezing or rales. No accessory muscle use CV:  Regular  rhythm,  No edema GI:  Soft, Non distended, Non tender.  +Bowel sounds. + PEG intact Neurologic:  Alert and oriented X 3, normal speech, Psych:   Good insight. Not anxious nor agitated Skin:  No rashes. No jaundice Reviewed most current lab test results and cultures  YES Reviewed most current radiology test results   YES Review and summation of old records today    NO Reviewed patient's current orders and MAR    YES 
PMH/SH reviewed - no change compared to H&P 
________________________________________________________________________ Care Plan discussed with: 
  Comments Patient y Family  yy Brother in low bedside Wife on the phone RN y   
Care Manager Consultant  y cardiology Multidiciplinary team rounds were held today with , nursing, pharmacist and clinical coordinator. Patient's plan of care was discussed; medications were reviewed and discharge planning was addressed. ________________________________________________________________________ Total NON critical care TIME: 45  Minutes Old chart from last 2 hospitalizations were reviewed. Admitted 07/2018 with re admission. Total CRITICAL CARE TIME Spent:   Minutes non procedure based Comments >50% of visit spent in counseling and coordination of care    
________________________________________________________________________ Duran Roberts MD  
 
Procedures: see electronic medical records for all procedures/Xrays and details which were not copied into this note but were reviewed prior to creation of Plan. LABS: 
I reviewed today's most current labs and imaging studies. Pertinent labs include: 
Recent Labs 11/12/18 
0055 11/11/18 [address] WBC 7.4 7.3 HGB 11.3* 10.5* HCT 36.8 33.9*  
PLT 82* 75* Recent Labs 11/12/18 
0055 11/11/18 [address]  140  
K 4.4 4.8  
 101 CO2 35* 35* * 209* BUN 42* 46* CREA 1.53* 1.61* CA 9.2 8.8 MG  --  2.6* ALB  --  3.2* TBILI  --  0.6 SGOT  --  59* ALT  --  52 Signed: Shima Chase MD

## 2018-11-12 NOTE — PROGRESS NOTES
Progress Note 11/12/2018 12:01 PM 
NAME: [place] Fofana MRN:  [id number] Admit Diagnosis: Dizziness Problem List:  
 
Admitted with dizziness and falling.  
  
  Hx:  
1. Chronic systolic heart failure 2. Coronary artery disease s/p remote CABG.  Cath '13 w/ EF 40%, PCI to distal LAD via LIMA, severe disease in large diagonal (not amenable to PCI), patent SVG-OM, SVG-PDA, SVG-PLB.  Lexiscan 11/13 w/ EF 28%, IWMI, and with diagonal ischemia. 3. Lexiscan stress MPI 9/14 w/ EF 29% and anterior ischemia w/ IWMI 4. Ischemic cardiomyopathy 5. BiV ICD 6. Chronic kidney disease; Stg 3 
7. 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   
8. Diabetes 9. 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 
10. Orthostatic hypotension 11. Hyperlipidemia 12. Head/neck CA w/ feeding tube 13. Chronic thrombocytopenia (Dr. Carmela Gaxiola) 
   
 
Assessment/Plan:  
Depressed 1. Agree w/ gentle hydration. Also has lasix ordered. 2. Continue midodrine 10mg TID 3. Echo ordered by primary; had one scheduled in my office next month to follow up on AoS []       High complexity decision making was performed in this patient at high risk for decompensation with multiple organ involvement. Subjective:  
 
[place] Fofana denies chest pain, dyspnea. Discussed with RN events overnight. Review of Systems: 
 
Symptom Y/N Comments  Symptom Y/N Comments Fever/Chills N   Chest Pain N Poor Appetite N   Edema N   
Cough N   Abdominal Pain N Sputum N   Joint Pain N   
SOB/KRAMER N   Pruritis/Rash N   
Nausea/vomit N   Tolerating PT/OT Y Diarrhea N   Tolerating Diet Y Constipation N   Other Could NOT obtain due to:   
 
Objective:  
  
Physical Exam: 
 
Last 24hrs VS reviewed since prior progress note. Most recent are: 
 
Visit Vitals /52 (BP 1 Location: Left arm, BP Patient Position: Sitting) Pulse 82 Temp 98.9 °F (37.2 °C) Resp 18 Ht 5' 7\" (1.702 m) Wt 75.8 kg (167 lb 3.2 oz) SpO2 97% BMI 26.19 kg/m² Intake/Output Summary (Last 24 hours) at 11/12/2018 1201 Last data filed at 11/12/2018 4356 Gross per 24 hour Intake 600 ml Output 1150 ml Net -550 ml General Appearance: Well developed, well nourished, alert & oriented x 3,  
 no acute distress. Ears/Nose/Mouth/Throat: Hearing grossly normal. 
Neck: Supple. Chest: Lungs clear to auscultation bilaterally. Cardiovascular: Regular rate and rhythm, S1S2 normal, harsh TARA Abdomen: Soft, non-tender, bowel sounds are active. Extremities: No edema bilaterally. Skin: Warm and dry. []         Post-cath site without hematoma, bruit, tenderness, or thrill. Distal pulses intact. PMH/SH reviewed - no change compared to H&P Data Review Telemetry: paced rhythm EKG:  
[x]  No new EKG for review Lab Data Personally Reviewed: 
 
Recent Labs 11/12/18 
0055 11/11/18 [address] WBC 7.4 7.3 HGB 11.3* 10.5* HCT 36.8 33.9*  
PLT 82* 75* No results for input(s): INR, PTP, APTT in the last 72 hours. No lab exists for component: INREXT Recent Labs 11/12/18 
0055 11/11/18 [address]  140  
K 4.4 4.8  
 101 CO2 35* 35* BUN 42* 46* CREA 1.53* 1.61* * 209* CA 9.2 8.8 MG  --  2.6* Recent Labs 11/11/18 [address]  TROIQ 0.11* Lab Results Component Value Date/Time Cholesterol, total 123 06/04/2018 09:16 AM  
 HDL Cholesterol 35 (L) 06/04/2018 09:16 AM  
 LDL, calculated 62 06/04/2018 09:16 AM  
 Triglyceride 132 06/04/2018 09:16 AM  
 CHOL/HDL Ratio 3.6 02/02/2017 02:55 AM  
 
 
Recent Labs 11/11/18 [address] SGOT 59* AP 75  
TP 7.2 ALB 3.2*  
GLOB 4.0  
LPSE 58* Recent Labs 11/11/18 
1300 PH 7.43  
PCO2 53* PO2 84 Medications Personally Reviewed: 
 
Current Facility-Administered Medications Medication Dose Route Frequency  acetaminophen (TYLENOL) tablet 1,000 mg  1,000 mg Per G Tube Q6H PRN  
 albuterol (PROVENTIL VENTOLIN) nebulizer solution 1.25 mg  1.25 mg Nebulization Q6H PRN  
 tamsulosin (FLOMAX) capsule 0.4 mg  0.4 mg Oral DAILY  aspirin chewable tablet 81 mg  81 mg Per G Tube DAILY  atorvastatin (LIPITOR) tablet 40 mg  40 mg Per G Tube DAILY  clopidogrel (PLAVIX) tablet 75 mg  75 mg PEG Tube DAILY  famotidine (PEPCID) tablet 20 mg  20 mg Per G Tube BID  fluticasone (FLONASE) 50 mcg/actuation nasal spray 2 Spray  2 Spray Both Nostrils DAILY  furosemide (LASIX) tablet 20 mg  20 mg Per G Tube BID Mon Wed & Fri  
 gabapentin (NEURONTIN) 250 mg/5 mL solution 500 mg  500 mg Per G Tube BID  lactobac ac& pc-s.therm-b.anim (JAVY Q/RISAQUAD)  1 Cap PEG Tube DAILY  midodrine (PROAMITINE) tablet 10 mg  10 mg Per G Tube TID WITH MEALS  pramipexole (MIRAPEX) tablet 0.25 mg  0.25 mg Oral TID  zinc sulfate (ZINCATE) capsule 220 mg  220 mg Per G Tube DAILY  sodium chloride (NS) flush 5-10 mL  5-10 mL IntraVENous Q8H  
 sodium chloride (NS) flush 5-10 mL  5-10 mL IntraVENous PRN  
 naloxone (NARCAN) injection 0.4 mg  0.4 mg IntraVENous PRN  
 ondansetron (ZOFRAN) injection 2 mg  2 mg IntraVENous Q6H PRN  
 heparin (porcine) injection 5,000 Units  5,000 Units SubCUTAneous Q8H  
 0.9% sodium chloride infusion  50 mL/hr IntraVENous CONTINUOUS  
 insulin lispro (HUMALOG) injection   SubCUTAneous AC&HS  
 glucose chewable tablet 16 g  4 Tab Oral PRN  
 dextrose (D50W) injection syrg 12.5-25 g  12.5-25 g IntraVENous PRN  
 glucagon (GLUCAGEN) injection 1 mg  1 mg IntraMUSCular PRN  
 insulin regular (NOVOLIN R, HUMULIN R) injection 5-14 Units  5-14 Units SubCUTAneous TID  lactose-reduced food with fibr 0.07 gram-1.5 kcal/mL liqd 250 mL  (Patient Supplied)  250 mL Feeding Tube 5XD  magic mouthwash cpd (with sucralfate)  5 mL Oral TIDAC [address], DO

## 2018-11-13 NOTE — PROCEDURES
[facility] *** FINAL REPORT *** Name: Lauren Marquez 
MRN: [id number]    Inpatient : 31 Mar 1935 HIS Order #: [id number] [facility] Visit #: [id number] Date: 2018 TYPE OF TEST: Cerebrovascular Duplex REASON FOR TEST Dizziness/vertigo Right Carotid:- 
           Proximal               Mid                 Distal 
cm/s  Systolic  Diastolic  Systolic  Diastolic  Systolic  Diastolic CCA:     76.0      19.0                            95.0      27.0 Bulb: 
ECA:     89.0       9.0 ICA:     63.0      12.0       56.0      14.0       68.0      16.0 ICA/CCA:  0.7       0.4 ICA Stenosis: <50% Right Vertebral:- 
Finding: Antegrade Sys:       19.0 Diana:        8.0 Right Subclavian: Normal 
 
Left Carotid:- 
          Proximal                Mid                 Distal 
cm/s  Systolic  Diastolic  Systolic  Diastolic  Systolic  Diastolic CCA:    120.0      30.0                           152.0      39.0 Bulb: 
ECA:    147.0       6.0 ICA:    141.0      11.0 ICA/CCA:  0.9       0.3 ICA Stenosis: 50-69% Left Vertebral:- 
Finding: Antegrade Sys:       18.0 Diana:        4.0 Left Subclavian: Normal 
 
INTERPRETATION/FINDINGS 
1. <50% stenosis in the right internal carotid artery. 2. 50-69% stenosis in the left internal carotid artery. 3. No significant stenosis in the external carotid arteries 
bilaterally. 4. Antegrade flow in both vertebral arteries. 5. Normal flow in both subclavian arteries. Plaque Morphology: 1. Calcific plaque in the bulb and right ICA. 2. Calcific plaque in the bulb and left ICA. ADDITIONAL COMMENTS Difficult exam due to radiation and patient coughing. I have personally reviewed the data relevant to the interpretation of 
this 
study. TECHNOLOGIST: Sekou Gonzalez Signed: 2018 10:59 AM 
 
PHYSICIAN: Jaelyn Maurice MD 
Signed: 2018 01:07 PM

## 2018-11-13 NOTE — PROGRESS NOTES
Visit attempted pt off floor. Will continue to follow. x2 Visit attempted pt politely declined due to being tired from test earlier today. Will defer at this time and continue to follow.

## 2018-11-13 NOTE — PROGRESS NOTES
Attending Hospitalist Attestation: 
 
I have personally seen and examined the patient, reviewed pertinent labs and imaging, and discussed the plan of care with NP Claudia Lozano. I reviewed and agree with the history, exam, assessment and plan as outlined in her note. \"still coughing up green phlegm\" Mod CP with coughing Denies SOB at rest 
No HA, abdominal pain, N/V, diarrhea Patient Vitals for the past 24 hrs: 
 Temp Pulse Resp BP SpO2  
11/13/18 0806 97.8 °F (36.6 °C) 77 16 113/55 97 % 11/13/18 0754 97.8 °F (36.6 °C)      
11/13/18 0330 98.8 °F (37.1 °C) 77 16 94/64 100 % 11/12/18 2328 97.8 °F (36.6 °C) 75  101/62 100 % 11/12/18 2003    105/48   
11/12/18 1959 97.7 °F (36.5 °C) 73 20 90/50 94 % 11/12/18 1523 97.9 °F (36.6 °C) 87 20 110/73 94 % 11/12/18 1113 98.9 °F (37.2 °C) 82 18 133/52 97 % No distress sitting up in chair Lungs crackles at bases, rhonchi with inspiration and expiration that clear with coughing bilaterally Heart: RRR nl S1S2, grade 3/6 SM at LSB, no LE edema Abdomen: soft, NT, ND, Positive BS, no rebound Skin: No rashes Neuro: Alert, non focal motor exam 
 
 
Assessment/plan: 
Dizziness POA room spinning for months Progressive decline in exercise capacity due to dizziness POA Documented moderate to severe AS and MR Dizziness - persistent, near fall at home Etiology unclear at this time Neurology input appreciated, prior severe VBI 
    CTA neck pending Monitor on tele Follow echo Head CT: negative Cannot have MRI due to ICD  
PT: [facility]  
  
LIZ POA Urinary retention  
baseline Cr ~ 1.1-1.2, admission 1.6, now improved with IVF 
D/w Louis: will continue hydration overnight and re assess in am  
Cont lasix 3 times a week for now ( had dose today) Clinically very hard to assess fluid status properly Avoid nephrotoxic drugs, adjust all meds to GFR.  
Bladder scan 340, continue to monitor straight cath as needed ( proscar re started)  
  
 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  
NPO at home with TF  IsoSource 1.5 ( 5 cans, wife gives 1 can every 3 hr making sure there is no residual before given new can) Followed Dr Lakesha Mclean OP for dysphasia 
  
Chronic systolic HF EF 42% / mod to severe AS    
Chronic orthostatic hypotension CSD s/p remote CABG / BiV ICD Elevated troponin  
-on home lasix, aspirin/plavix. Lipitor.  
-follow new echo  
-cardiology help appreciated  
   
DM type II  
--200 
-start home dose lantus + SS Takes novolin R with TF ( substitute by SS) Follows with primary endocrinology   
  
Chronic respiratory failure with hypoxia, cont home O2 2 L at night, follows Dr Janet Nichols H/o streptococcal bacteremia 07/2018 H/o stroke 2015 Chronic thrombocytopenia POA stable , followed with Dr Porfirio Grimes 
  
Code status: Full NOK: wife [phone] - wife would like to be kept updated daily. She just had ESHA and confined to her home now Prophylaxis: Hep SQ Additional time:    25   min rendering and coordinating care Lisa Hannon MD

## 2018-11-13 NOTE — PROGRESS NOTES
Progress Note 11/13/2018 12:01 PM 
NAME: Richie Dias MRN:  [id number] Admit Diagnosis: Dizziness Problem List:  
 
Admitted with dizziness and falling.  
  
  Hx:  
1. Chronic systolic heart failure 2. Coronary artery disease s/p remote CABG.  Cath '13 w/ EF 40%, PCI to distal LAD via LIMA, severe disease in large diagonal (not amenable to PCI), patent SVG-OM, SVG-PDA, SVG-PLB.  Lexiscan 11/13 w/ EF 28%, IWMI, and with diagonal ischemia. 3. Lexiscan stress MPI 9/14 w/ EF 29% and anterior ischemia w/ IWMI 4. Ischemic cardiomyopathy 5. BiV ICD 6. Chronic kidney disease; Stg 3 
7. 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   
8. Diabetes 9. 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 
10. Orthostatic hypotension 11. Hyperlipidemia 12. Head/neck CA w/ feeding tube 13. Chronic thrombocytopenia (Dr. Cisco Garcia) 
   
 
Assessment/Plan:  
Depressed 
sCr better w/ gentle hydration Neuro note noted Upper airway congestion 1. Continue midodrine 10mg TID 2. Echo still pending  
 
  
 []       High complexity decision making was performed in this patient at high risk for decompensation with multiple organ involvement. Subjective:  
 
Richie Dias denies chest pain, dyspnea. Still dizzy, somewhat better. Discussed with RN events overnight. Review of Systems: 
 
Symptom Y/N Comments  Symptom Y/N Comments Fever/Chills N   Chest Pain N Poor Appetite N   Edema N   
Cough N   Abdominal Pain N Sputum N   Joint Pain N   
SOB/KRAMER N   Pruritis/Rash N   
Nausea/vomit N   Tolerating PT/OT Y Diarrhea N   Tolerating Diet Y Constipation N   Other Could NOT obtain due to:   
 
Objective:  
  
Physical Exam: 
 
Last 24hrs VS reviewed since prior progress note. Most recent are: 
 
Visit Vitals BP 94/64 (BP 1 Location: Right arm, BP Patient Position: At rest) Pulse 77 Temp 98.8 °F (37.1 °C) Resp 16 Ht 5' 7\" (1.702 m) Wt 74.2 kg (163 lb 9.3 oz) SpO2 100% BMI 25.62 kg/m² Intake/Output Summary (Last 24 hours) at 11/13/2018 7687 Last data filed at 11/13/2018 2031 Gross per 24 hour Intake 600 ml Output 1050 ml Net -450 ml General Appearance: Well developed, well nourished, alert & oriented x 3,  
 no acute distress. Ears/Nose/Mouth/Throat: Hearing grossly normal. 
Neck: Supple. Chest: Lungs clear to auscultation bilaterally. Cardiovascular: Regular rate and rhythm, S1S2 normal, harsh TARA Abdomen: Soft, non-tender, bowel sounds are active. Extremities: No edema bilaterally. Skin: Warm and dry. []         Post-cath site without hematoma, bruit, tenderness, or thrill. Distal pulses intact. PMH/SH reviewed - no change compared to H&P Data Review Telemetry: paced rhythm EKG:  
[x]  No new EKG for review Lab Data Personally Reviewed: 
 
Recent Labs 11/13/18 
0118 11/12/18 
1623 WBC 5.8 7.4 HGB 10.0* 11.3* HCT 32.9* 36.8 PLT 71* 82* No results for input(s): INR, PTP, APTT in the last 72 hours. No lab exists for component: INREXT, INREXT Recent Labs 11/13/18 
0324 11/12/18 
0055 11/11/18 [address]  [address] 140  
K 3.5 4.4 4.8  
 100 101 CO2 34* 35* 35* BUN 34* 42* 46* CREA 1.14 1.53* 1.61* * 156* 209* CA 7.5* 9.2 8.8 MG 2.3  --  2.6* Recent Labs 11/12/18 
1648 11/11/18 [address] CPK  --  222 TROIQ 0.10* 0.11* Lab Results Component Value Date/Time Cholesterol, total 123 06/04/2018 09:16 AM  
 HDL Cholesterol 35 (L) 06/04/2018 09:16 AM  
 LDL, calculated 62 06/04/2018 09:16 AM  
 Triglyceride 132 06/04/2018 09:16 AM  
 CHOL/HDL Ratio 3.6 02/02/2017 02:55 AM  
 
 
Recent Labs 11/11/18 [address] SGOT 59* AP 75  
TP 7.2 ALB 3.2*  
GLOB 4.0  
LPSE 58* Recent Labs 11/11/18 
1300 PH 7.43  
PCO2 53* PO2 84 Medications Personally Reviewed: 
 
Current Facility-Administered Medications Medication Dose Route Frequency  potassium chloride 10 mEq in 100 ml IVPB  10 mEq IntraVENous ONCE  
 guaiFENesin (ROBITUSSIN) 100 mg/5 mL oral liquid 100 mg  100 mg Per G Tube Q8H PRN  
 HYDROcodone-acetaminophen (NORCO)  mg tablet 1 Tab  1 Tab Per G Tube Q6H PRN  
 insulin glargine (LANTUS) injection 14 Units  14 Units SubCUTAneous DAILY  finasteride (PROSCAR) tablet 5 mg  5 mg Oral DAILY  gabapentin (NEURONTIN) 250 mg/5 mL solution 250 mg  250 mg Per G Tube BID  meclizine (ANTIVERT) tablet 25 mg  25 mg Oral TID  famotidine (PEPCID) tablet 20 mg  20 mg Per G Tube DAILY  acetaminophen (TYLENOL) tablet 1,000 mg  1,000 mg Per G Tube Q6H PRN  
 albuterol (PROVENTIL VENTOLIN) nebulizer solution 1.25 mg  1.25 mg Nebulization Q6H PRN  
 tamsulosin (FLOMAX) capsule 0.4 mg  0.4 mg Oral DAILY  aspirin chewable tablet 81 mg  81 mg Per G Tube DAILY  atorvastatin (LIPITOR) tablet 40 mg  40 mg Per G Tube DAILY  clopidogrel (PLAVIX) tablet 75 mg  75 mg PEG Tube DAILY  fluticasone (FLONASE) 50 mcg/actuation nasal spray 2 Spray  2 Spray Both Nostrils DAILY  furosemide (LASIX) tablet 20 mg  20 mg Per G Tube BID Mon Wed & Fri  lactobac ac& pc-s.therm-b.anim (JAVY Q/RISAQUAD)  1 Cap PEG Tube DAILY  midodrine (PROAMITINE) tablet 10 mg  10 mg Per G Tube TID WITH MEALS  pramipexole (MIRAPEX) tablet 0.25 mg  0.25 mg Oral TID  zinc sulfate (ZINCATE) capsule 220 mg  220 mg Per G Tube DAILY  sodium chloride (NS) flush 5-10 mL  5-10 mL IntraVENous Q8H  
 sodium chloride (NS) flush 5-10 mL  5-10 mL IntraVENous PRN  
 naloxone (NARCAN) injection 0.4 mg  0.4 mg IntraVENous PRN  
 ondansetron (ZOFRAN) injection 2 mg  2 mg IntraVENous Q6H PRN  
 heparin (porcine) injection 5,000 Units  5,000 Units SubCUTAneous Q8H  
 insulin lispro (HUMALOG) injection   SubCUTAneous AC&HS  
 glucose chewable tablet 16 g  4 Tab Oral PRN  
 dextrose (D50W) injection syrg 12.5-25 g  12.5-25 g IntraVENous PRN  
  glucagon (GLUCAGEN) injection 1 mg  1 mg IntraMUSCular PRN  
 insulin regular (NOVOLIN R, HUMULIN R) injection 5-14 Units  5-14 Units SubCUTAneous TID  lactose-reduced food with fibr 0.07 gram-1.5 kcal/mL liqd 250 mL  (Patient Supplied)  250 mL Feeding Tube 5XD  magic mouthwash cpd (with sucralfate)  5 mL Oral TIDAC [place] III, DO

## 2018-11-13 NOTE — PROGRESS NOTES
Pt will be set up to have an appt w/ the [facility] @ [facility] on Friday w/ Dr. Hasmukh Williamson.

## 2018-11-13 NOTE — PROGRESS NOTES
1900:  
Bedside shift change report given to Glenn Zarco RN (oncoming nurse). Report included the following information SBAR and Kardex. SHIFT SUMMARY: 
 
 
 
 
 
[facility] NURSING NOTE Admission Date 11/11/2018 Admission Diagnosis Dizziness Consults IP CONSULT TO CARDIOLOGY 
IP CONSULT TO CARDIOLOGY 
IP CONSULT TO NEUROLOGY Cardiac Monitoring [x] Yes [] No  
  
Purposeful Hourly Rounding [x] Yes   
Olayinka Score Total Score: 4 Olayinka score 3 or > [x] Bed Alarm [] Avasys [] 1:1 sitter [] Patient refused (Signed refusal form in chart) Mark Score Mark Score: 19 Mark score 14 or < [] PMT consult [] Wound Care consult  
 []  Specialty bed  [] Nutrition consult Influenza Vaccine Received Flu Vaccine for Current Season (usually Sept-March): Yes Oxygen needs? [] Room air Oxygen @  []1L    [x]2L    []3L   []4L    []5L   []6L via NC Chronic home O2 use? [] Yes [x] No 
Perform O2 challenge test and document in progress note using smartphrase (.Homeoxygen) Last bowel movement Last Bowel Movement Date: 11/09/18 Urinary Catheter LDAs Peripheral IV 11/11/18 Anterior; Inferior; Lower;Proximal;Right Arm (Active) Site Assessment Clean, dry, & intact 11/13/2018  2:00 PM  
Phlebitis Assessment 0 11/13/2018  2:00 PM  
Infiltration Assessment 0 11/13/2018  2:00 PM  
Dressing Status Clean, dry, & intact 11/13/2018  2:00 PM  
Dressing Type Transparent 11/13/2018  2:00 PM  
Hub Color/Line Status Blue;Flushed 11/13/2018  2:00 PM  
   
Peripheral IV 11/12/18 Anterior;Distal;Left;Superior; Upper Arm (Active) Site Assessment Clean, dry, & intact 11/13/2018  2:00 PM  
Phlebitis Assessment 0 11/13/2018  2:00 PM  
Infiltration Assessment 0 11/13/2018  2:00 PM  
Dressing Status Clean, dry, & intact 11/13/2018  2:00 PM  
Dressing Type Transparent 11/13/2018  2:00 PM  
Hub Color/Line Status Pink;Flushed 11/13/2018  2:00 PM  
      
PEG/Gastrostomy Tube (Active) Site Assessment Clean, dry, & intact 11/13/2018  2:00 PM  
G Port Status Clamped 11/13/2018  2:00 PM  
Gastric Residual (mL) 15 ml 11/13/2018  1:12 PM  
Tube Feeding/Formula Options Other 11/13/2018  2:00 PM  
Water Flush Volume (mL) 30 mL 11/13/2018  4:29 PM  
Intake (ml) 300 ml 11/13/2018  4:29 PM  
            
  
Readmission Risk Assessment Tool Score High Risk 48 Total Score 3 Has Seen PCP in Last 6 Months (Yes=3, No=0)  
 9 IP Visits Last 12 Months (1-3=4, 4=9, >4=11) 5 Pt. Coverage (Medicare=5 , Medicaid, or Self-Pay=4) 33 Charlson Comorbidity Score (Age + Comorbid Conditions) Criteria that do not apply:  
 . Living with Significant Other. Assisted Living. LTAC. SNF. or  
Rehab Patient Length of Stay (>5 days = 3) Expected Length of Stay 2d 2h Actual Length of Stay 2

## 2018-11-13 NOTE — PROGRESS NOTES
0700: Bedside shift change report given to Myriam Luciano RN (oncoming nurse) by Dorene Wheeler RN (offgoing nurse). Report included the following information SBAR and Kardex. 0930: Patient getting IV potassium, c/o burning at the site. Per Jovita Hung, NP stop potassium infusion and give PO potassium via pegg tube. 1020: Per Jovita Hung, NP call respiratory therapist to give pt neb treatment. Patient going down to CT will administer upon return to unit.

## 2018-11-13 NOTE — PROGRESS NOTES
NEUROLOGY NOTE Chief Complaint Patient presents with  Abdominal Pain Ambulatory into triage with c/o increased headache, dizziness and tremors x several days-family states its r/t \"the vessels in the back of his neck. \" Abdominal pain x today  Dizziness SUBJECTIVE: 
Pt still dizzy No improvement with meclizine HPI Corey Chance is a 80 y.o. male who presents to the hospital because of dizziness. Pt has been having dizziness for atleast the last 1 yr and he came as his dizziness has been gradually worsening. No passing out spells. States that it is a room spinning sensation. He did have a stroke in the past and states that he started having it after that. No new slurring of speech of any weakness of the arms or the legs. 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 Neurology has been consulted for further management. ROS A ten system review of constitutional, cardiovascular, respiratory, musculoskeletal, endocrine, skin, SHEENT, genitourinary, psychiatric and neurologic systems was obtained and is unremarkable except as stated in HPI  
 
PMH Past Medical History:  
Diagnosis Date  Antiplatelet or antithrombotic long-term use 12/4/2014  Anxiety disorder  Arrhythmia 2009  
 bradycardia  Arthritis  CAD (coronary artery disease) s/p CABG 2002; Dr Aline Chapman  Cancer ([facility] [address] 1996  
 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   
 left leg, lower back,   
 Depression  Diabetes ([facility] [address] Type II  Epigastric pain 8/17/2018  Esophageal dysmotility s/p dilitation  Esophageal motility disorder 7/8/2013 Frequent simultaneous or failed contractions, low amplitude contractions  suggests severe myopathy or diffuse spasm. I suspect the latter. Achalasia  is not present.  GERD (gastroesophageal reflux disease)  Heart failure ([facility] [address] 10/2014 Cardiomyopathy:Pacemaker upgrade:Biv and AICD  Dr. Duarte Numbers heart Dr. last visit 2015  Hepatitis C Dx 1996  
 treated at [facility] in past; as of 4/15/15 wife states pt currently not under any treatment  Hyperlipidemia  Myocardial infarct [facility])  Heart Cath: 40% LV EF, Stented distal LAD, patent Graft to circumflex  On tube feeding diet approx   
 still has as of 9/28/15  (no po food/liquid/meds at all); Dr Macrina Chen  Other ill-defined conditions(799.89)   
 1 dose of chemotherapy/radiation for tongue cancer  Other ill-defined conditions(799.89) BPH  Other ill-defined conditions(799.89) orthostatic hypotension  Pneumonia ~ April -May 2010  Stroke [facility]) approx   
 left side-left finger tips numb; no imbalance or memory loss; as of  not seeing neuro MD  
 Suicidal thoughts [facility] Family History Problem Relation Age of Onset  Heart Disease Father   
      at age 52 from CAD  Colon Cancer Mother  Cancer Mother   
     colon ca  
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  
 Drug use: No  
 Sexual activity: Yes  
  Partners: Female Other Topics Concern  Not on file Social History Narrative  Not on file ALLERGIES Allergies Allergen Reactions  Demerol [Meperidine] Shortness of Breath  Paxil [Paroxetine Hcl] Unknown (comments) Pt gets shaky and loses control of legs  Amoxicillin Rash  Cleocin [Clindamycin Hcl] Rash  Pcn [Penicillins] Rash PHYSICAL EXAMINATION:  
Patient Vitals for the past 24 hrs: 
 Temp Pulse Resp BP SpO2  
11/13/18 1147 97.9 °F (36.6 °C) 88 18 134/63 99 % 11/13/18 0806 97.8 °F (36.6 °C) 77 16 113/55 97 % 11/13/18 0754 97.8 °F (36.6 °C)      
11/13/18 0330 98.8 °F (37.1 °C) 77 16 94/64 100 % 11/12/18 2328 97.8 °F (36.6 °C) 75  101/62 100 % 11/12/18 2003    105/48   
11/12/18 1959 97.7 °F (36.5 °C) 73 20 90/50 94 % 11/12/18 1523 97.9 °F (36.6 °C) 87 20 110/73 94 % General:  
General appearance: Pt is in no acute distress Distal pulses are preserved Neurological Examination:  
Mental Status:  AAO x3. Speech is fluent. Follows commands, has normal fund of knowledge, attention, short term recall, comprehension and insight. Cranial Nerves: Visual fields are full. PERRL, Extraocular movements are full. Facial sensation intact. Facial movement intact. Hearing intact to conversation. Palate elevates symmetrically. Shoulder shrug symmetric. Tongue midline. Motor: Strength is 5/5 in all 4 ext. Normal tone. No atrophy. Sensation: Decreased PP distally in the LE Coordination/Cerebellar: Intact to finger-nose-finger Gait: ataxic Skin: No significant bruising or lacerations. LAB DATA REVIEWED:   
Recent Results (from the past 24 hour(s)) GLUCOSE, POC Collection Time: 11/12/18  4:45 PM  
Result Value Ref Range Glucose (POC) 120 (H) 65 - 100 mg/dL Performed by Ji Calles  (PCT) TROPONIN I Collection Time: 11/12/18  4:48 PM  
Result Value Ref Range Troponin-I, Qt. 0.10 (H) <0.05 ng/mL GLUCOSE, POC Collection Time: 11/12/18  9:22 PM  
Result Value Ref Range Glucose (POC) 99 65 - 100 mg/dL Performed by Heydi [facility] METABOLIC PANEL, BASIC Collection Time: 11/13/18  3:24 AM  
Result Value Ref Range Sodium 143 136 - 145 mmol/L Potassium 3.5 3.5 - 5.1 mmol/L Chloride 106 97 - 108 mmol/L  
 CO2 34 (H) 21 - 32 mmol/L Anion gap 3 (L) 5 - 15 mmol/L Glucose 153 (H) 65 - 100 mg/dL BUN 34 (H) 6 - 20 MG/DL Creatinine 1.14 0.70 - 1.30 MG/DL  
 BUN/Creatinine ratio 30 (H) 12 - 20 GFR est AA >60 >60 ml/min/1.73m2 GFR est non-AA >60 >60 ml/min/1.73m2 Calcium 7.5 (L) 8.5 - 10.1 MG/DL MAGNESIUM Collection Time: 11/13/18  3:24 AM  
Result Value Ref Range Magnesium 2.3 1.6 - 2.4 mg/dL PHOSPHORUS Collection Time: 11/13/18  3:24 AM  
Result Value Ref Range Phosphorus 3.0 2.6 - 4.7 MG/DL  
CBC WITH AUTOMATED DIFF Collection Time: 11/13/18  5:12 AM  
Result Value Ref Range WBC 5.8 4.1 - 11.1 K/uL  
 RBC 3.46 (L) 4.10 - 5.70 M/uL  
 HGB 10.0 (L) 12.1 - 17.0 g/dL HCT 32.9 (L) 36.6 - 50.3 % MCV 95.1 80.0 - 99.0 FL  
 MCH 28.9 26.0 - 34.0 PG  
 MCHC 30.4 30.0 - 36.5 g/dL  
 RDW 15.9 (H) 11.5 - 14.5 % PLATELET 71 (L) 826 - 400 K/uL MPV 11.7 8.9 - 12.9 FL  
 NRBC 0.0 0  WBC ABSOLUTE NRBC 0.00 0.00 - 0.01 K/uL NEUTROPHILS 77 (H) 32 - 75 % LYMPHOCYTES 15 12 - 49 % MONOCYTES 7 5 - 13 % EOSINOPHILS 1 0 - 7 % BASOPHILS 0 0 - 1 % IMMATURE GRANULOCYTES 0 0.0 - 0.5 % ABS. NEUTROPHILS 4.4 1.8 - 8.0 K/UL  
 ABS. LYMPHOCYTES 0.9 0.8 - 3.5 K/UL  
 ABS. MONOCYTES 0.4 0.0 - 1.0 K/UL  
 ABS. EOSINOPHILS 0.0 0.0 - 0.4 K/UL  
 ABS. BASOPHILS 0.0 0.0 - 0.1 K/UL  
 ABS. IMM. GRANS. 0.0 0.00 - 0.04 K/UL  
 DF AUTOMATED    
GLUCOSE, POC Collection Time: 11/13/18  7:22 AM  
Result Value Ref Range Glucose (POC) 150 (H) 65 - 100 mg/dL Performed by Macrina Smith  (PCT) GLUCOSE, POC Collection Time: 11/13/18 11:54 AM  
Result Value Ref Range Glucose (POC) 183 (H) 65 - 100 mg/dL Performed by Macrina Smith  (PCT) Imaging review: 
18 CT Head Old right posterior parietal and left occipital infarcts are unchanged. 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 18 CTA Head and neck 1. There are extensive venous collaterals in the neck and around the cervical 
spine making interpretation of the vertebral arteries technically challenging. However they are small in caliber bilaterally but grossly patent. 2. Patent bilateral carotid artery stents. 3. No acute intracranial abnormality. No large vessel occlusion or intracranial 
aneurysm. HOME MEDS Prior to Admission Medications Prescriptions Last Dose Informant Patient Reported? Taking? HYDROcodone-acetaminophen (NORCO)  mg tablet 2018 at Unknown time Significant Other Yes Yes Si Tab by Per G Tube route daily as needed for Pain. L. acidoph & paracasei- S therm- Bifido (JAVY-Q/RISAQUAD) 8 billion cell cap cap 2018 at Unknown time Significant Other No Yes Si Cap by PEG Tube route daily. LANTUS SOLOSTAR U-100 INSULIN 100 unit/mL (3 mL) inpn 2018 at Unknown time Significant Other No Yes Sig: INJECT 14 UNITS SUBCUTANEOUSLY ONCE DAILY  
OTHER 2018 at Unknown time  Yes Yes Sig: diltiazen 2% lidocaine cream. Apply to rectal area as needed  
acetaminophen (TYLENOL) 500 mg tablet Not Taking at Unknown time Significant Other Yes No  
Si,000 mg by Per G Tube route every six (6) hours as needed for Pain. albuterol (PROVENTIL VENTOLIN) 2.5 mg /3 mL (0.083 %) nebulizer solution 2018 at Unknown time  Yes Yes Si.5 mg by Nebulization route once. alfuzosin SR (UROXATRAL) 10 mg SR tablet 2018 at Unknown time Significant Other Yes Yes Sig: 10 mg by Per G Tube route daily. aspirin 81 mg chewable tablet 2018 at Unknown time  No Yes Sig: Take 1 Tab by mouth daily. Patient taking differently: 1 Tab by Per G Tube route daily. atorvastatin (LIPITOR) 40 mg tablet 2018 at Unknown time Significant Other Yes Yes Si mg by Per G Tube route daily. clopidogrel (PLAVIX) 75 mg tab 2018 at Unknown time  No Yes Si Tab by PEG Tube route daily. famotidine (PEPCID) 20 mg tablet 2018 at Unknown time Significant Other Yes Yes Si mg by Per G Tube route two (2) times a day. finasteride (PROSCAR) 5 mg tablet 2018 at Unknown time Significant Other Yes Yes Si mg by Per G Tube route nightly. fluticasone (FLONASE ALLERGY RELIEF) 50 mcg/actuation nasal spray 2018 at Unknown time Significant Other Yes Yes Si Sprays by Both Nostrils route daily. furosemide (LASIX) 20 mg tablet 2018 at Unknown time  Yes Yes Si mg daily. Take 1 tab in the PM  
furosemide (LASIX) 40 mg tablet 2018 at Unknown time  No Yes Sig: Take 1 Tab by mouth daily. Patient taking differently: 40 mg by Per G Tube route daily. Take 1 tab in the AM  
gabapentin (NEURONTIN) 250 mg/5 mL solution 2018 at Unknown time Significant Other Yes Yes Si mg by PEG Tube route three (3) times daily. guaiFENesin (ROBITUSSIN) 100 mg/5 mL liquid 2018 at Unknown time  Yes Yes Sig: 10mL three times daily  
insulin regular (NOVOLIN R, HUMULIN R) 100 unit/mL injection 2018 at Unknown time  No Yes Si units at 8 am, 12 units at 2 pm, 5 units at 8 pm  
Patient taking differently: by SubCUTAneous route. 14 units at 8 am, 12 units at 2 pm, 5 units at 8 pm  
insulin syringe-needle U-100 0.3 mL 31 gauge x 15/64\" syrg 2018 at Unknown time  No Yes Sig: Use as directed  
klack's mixture Solution 2018 at Unknown time  Yes Yes Sig: Take 5 mL by mouth every six (6) hours.  Diphenhydramine elixir 12.5mg/ml 500ml, Nystatin suspension 120ml,Tetracycline 500mg capsules  12 capsules (6g), Hydrocortisone 20mg tablet 12 tablets (240mg), Water for irrigation QS ad 1000ml  
lactose-reduced food/fiber (ISOSOURCE 1.5 RAE FEEDING TUBE) 2018 at Unknown time  Yes Yes Si mL by adductor canal block route five (5) times daily. lidocaine (SALONPAS/ASPERCREME) 4 % patch   No No  
Sig: Use as needed on affected site  
midodrine (PROAMITINE) 10 mg tablet 2018 at Unknown time Significant Other Yes Yes Sig: 10 mg by Per G Tube route three (3) times daily (with meals). multivitamin (ONE A DAY) tablet 2018 at Unknown time Significant Other Yes Yes Si Tab by Per G Tube route daily. nitroglycerin (NITROSTAT) 0.4 mg SL tablet 10/11/2018 at Unknown time Significant Other Yes Yes Si.4 mg by SubLINGual route every five (5) minutes as needed for Chest Pain. ondansetron hcl (ZOFRAN, AS HYDROCHLORIDE,) 8 mg tablet  Significant Other No No  
Sig: Take 1 Tab by mouth every eight (8) hours as needed for Nausea. pramipexole (MIRAPEX) 0.25 mg tablet 2018 at Unknown time  No Yes Sig: Take 1 Tab by mouth three (3) times daily. zinc 50 mg tab tablet 2018 at Unknown time Significant Other Yes Yes Si mg by Per G Tube route daily. Facility-Administered Medications: None CURRENT MEDS Current Facility-Administered Medications Medication Dose Route Frequency  potassium chloride 10 mEq in 100 ml IVPB  10 mEq IntraVENous ONCE  
 guaiFENesin (ROBITUSSIN) 100 mg/5 mL oral liquid 100 mg  100 mg Per G Tube TID  albuterol (PROVENTIL VENTOLIN) nebulizer solution 2.5 mg  2.5 mg Nebulization BID RT  
 insulin glargine (LANTUS) injection 14 Units  14 Units SubCUTAneous DAILY  finasteride (PROSCAR) tablet 5 mg  5 mg Oral DAILY  gabapentin (NEURONTIN) 250 mg/5 mL solution 250 mg  250 mg Per G Tube BID  meclizine (ANTIVERT) tablet 25 mg  25 mg Oral TID  famotidine (PEPCID) tablet 20 mg  20 mg Per G Tube DAILY  tamsulosin (FLOMAX) capsule 0.4 mg  0.4 mg Oral DAILY  aspirin chewable tablet 81 mg  81 mg Per G Tube DAILY  atorvastatin (LIPITOR) tablet 40 mg  40 mg Per G Tube DAILY  clopidogrel (PLAVIX) tablet 75 mg  75 mg PEG Tube DAILY  fluticasone (FLONASE) 50 mcg/actuation nasal spray 2 Spray  2 Spray Both Nostrils DAILY  furosemide (LASIX) tablet 20 mg  20 mg Per G Tube BID Mon Wed & Fri  lactobac ac& pc-s.therm-b.anim (JAVY Q/RISAQUAD)  1 Cap PEG Tube DAILY  midodrine (PROAMITINE) tablet 10 mg  10 mg Per G Tube TID WITH MEALS  pramipexole (MIRAPEX) tablet 0.25 mg  0.25 mg Oral TID  zinc sulfate (ZINCATE) capsule 220 mg  220 mg Per G Tube DAILY  sodium chloride (NS) flush 5-10 mL  5-10 mL IntraVENous Q8H  
 heparin (porcine) injection 5,000 Units  5,000 Units SubCUTAneous Q8H  
 insulin lispro (HUMALOG) injection   SubCUTAneous AC&HS  insulin regular (NOVOLIN R, HUMULIN R) injection 5-14 Units  5-14 Units SubCUTAneous TID  lactose-reduced food with fibr 0.07 gram-1.5 kcal/mL liqd 250 mL  (Patient Supplied)  250 mL Feeding Tube 5XD  magic mouthwash cpd (with sucralfate)  5 mL Oral TIDAC IMPRESSION: 
Jose Cardenas is a 80 y.o. male who presents with dizziness. He states that it is a room spinning sensation all the time. This has been going on for more than a year. CTA head and neck showed severe vertebrobasilar disease in 2017. Repeat CTA did not visualize the vertebral artery well. RECOMMENDATIONS: 
1. D/C Meclizine. It has not helped the pt 2. Cont aspirin, plavix and statin 3. Outpatient evaluation by Interventional neurology - Dr. Gabby Valente 4. Outpatient vestibular rehab. No further inpatient neuro w/u. Call with questions.   
 
Jolanta Rios MD 
Neurologist

## 2018-11-13 NOTE — PROGRESS NOTES
Hospitalist Progress Note NAME: Nathalie Johnson :  1935 MRN:  [id number] Interim Hospital Summary: 80 y.o. male whom presented on 2018 with Assessment / Plan: 
 
Dizziness - persistent associated with  
Progressive decline in exercise capacity Documented moderate to severe AS and MR  
- appreciate neurology input; d/c meclizine (not effective). Recommend outpatient evaluation by interventional neurologist; Dr. Glenys Arana 
- outpatient vestibular rehab 
- continue with ASA, plavix, and statin 
- carotid doppler study: <50% stenosis in the right internal carotid artery. 50-69% stenosis in the left internal carotid artery. No significant stenosis in the external carotid arteries bilaterally. Antegrade flow in both vertebral arteries. Normal flow in both subclavian arteries. - pt usually uses O2 at night time. We will assess his day time O2 needs. LIZ POA / Urinary retention  
- creat at baseline 1.1 
- UA clear  
- received IVF on admission - Avoid nephrotoxic drugs, adjust all meds to GFR. - continue with flomax and proscar 
  
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  
 -NPO at home with TF  [employer]so[employer] 1.5 ( 5 cans, wife gives 1 can every 3 hr making sure there is no residual before given new can) - Followed Dr Nicolás Solares OP for dysphasia 
- tolerating tube feeding without difficulty 
  
Chronic systolic HF EF 57% / mod to severe AS    
Chronic orthostatic hypotension CAD s/p remote CABG / BiV ICD Elevated troponin  
- Echo: Systolic function was moderately to markedly reduced. Ejection fraction was estimated in the range of 30 % to 35 %. There was severe hypokinesis of the mid inferior, basal-mid inferolateral, basal-mid anterolateral, and apical lateral wall(s). - appreciate cardiology input; 
- continue with midodrin TID 
- continue with ASA, plavix, statin, and lasix.  Daily weight; 75.8kg -> 74.2kg 
   
DM type II  
 - A1C 8.6 
- continue with lantus. Check blood glucose q 6 hour and follow SSI 
- Follows with primary endocrinology   
  
Chronic respiratory failure with hypoxia, cont home O2 2 L at night, follows Dr Cody Cardenas H/o streptococcal bacteremia 07/2018 H/o stroke 2015 Chronic thrombocytopenia POA stable , followed with Dr Christin Corrigan 
 
Chest congestion 
- will continue with scheduled home dose mucinex and scheduled duoneb 
  
Code status: Full NOK: wife [phone] - wife would like to be kept updated daily. She just had ESHA and confined to her home now Prophylaxis: Hep SQ  
  
Baseline: lives with wife, he has very good family support at home; ambulating with walker Recommended Disposition: HH Subjective: Chief Complaint / Reason for Physician Visit \"I have chest congestion\". Discussed with RN events overnight. Review of Systems: 
Symptom Y/N Comments  Symptom Y/N Comments Fever/Chills n   Chest Pain n   
Poor Appetite    Edema Cough y   Abdominal Pain Sputum n   Joint Pain SOB/KRAMER n   Pruritis/Rash Nausea/vomit n   Tolerating PT/OT Diarrhea    Tolerating Diet Constipation    Other Could NOT obtain due to:   
 
Objective: VITALS:  
Last 24hrs VS reviewed since prior progress note. Most recent are: 
Patient Vitals for the past 24 hrs: 
 Temp Pulse Resp BP SpO2  
11/13/18 1511 97.7 °F (36.5 °C) 75 18 95/61 97 % 11/13/18 1501     97 % 11/13/18 1147 97.9 °F (36.6 °C) 88 18 134/63 99 % 11/13/18 0806 97.8 °F (36.6 °C) 77 16 113/55 97 % 11/13/18 0754 97.8 °F (36.6 °C)      
11/13/18 0330 98.8 °F (37.1 °C) 77 16 94/64 100 % 11/12/18 2328 97.8 °F (36.6 °C) 75  101/62 100 % 11/12/18 2003    105/48   
11/12/18 1959 97.7 °F (36.5 °C) 73 20 90/50 94 % Intake/Output Summary (Last 24 hours) at 11/13/2018 1801 Last data filed at 11/13/2018 1708 Gross per 24 hour Intake 1560 ml Output 1500 ml Net 60 ml PHYSICAL EXAM: 
 General: Ill appearing. Alert, cooperative, no acute distress   
EENT:  EOMI. Anicteric sclerae. MMM Resp:  CTA bilaterally, no wheezing or rales. No accessory muscle use CV:  Regular  rhythm,  No edema, (+) Systolic Murmur GI:  Soft, Non distended, Non tender.  +Bowel sounds. PEG tube in place Neurologic:  Alert and oriented X 3, normal speech, Psych:   Good insight. Not anxious nor agitated Skin:  No rashes. No jaundice Reviewed most current lab test results and cultures  YES Reviewed most current radiology test results   YES Review and summation of old records today    NO Reviewed patient's current orders and MAR    YES 
PMH/SH reviewed - no change compared to H&P 
________________________________________________________________________ Care Plan discussed with: 
  Comments Patient y Family  y Updated the wife, Mrs. Michelle Moralesjosephine RN y   
Care Manager y Consultant     
                 y Multidiciplinary team rounds were held today with , nursing, pharmacist and clinical coordinator. Patient's plan of care was discussed; medications were reviewed and discharge planning was addressed. ________________________________________________________________________ Total NON critical care TIME:  30  Minutes Total CRITICAL CARE TIME Spent:   Minutes non procedure based Comments >50% of visit spent in counseling and coordination of care    
________________________________________________________________________ Kwame Cross NP Procedures: see electronic medical records for all procedures/Xrays and details which were not copied into this note but were reviewed prior to creation of Plan. LABS: 
I reviewed today's most current labs and imaging studies. Pertinent labs include: 
Recent Labs 11/13/18 
[id number] 11/12/18 
0055 11/11/18 [address] WBC 5.8 7.4 7.3 HGB 10.0* 11.3* 10.5* HCT 32.9* 36.8 33.9*  
PLT 71* 82* 75* Recent Labs 11/13/18 
0324 11/12/18 7039 11/11/18 200  139 140  
K 3.5 4.4 4.8  
 100 101 CO2 34* 35* 35* * 156* 209* BUN 34* 42* 46* CREA 1.14 1.53* 1.61* CA 7.5* 9.2 8.8 MG 2.3  --  2.6* PHOS 3.0  --   --   
ALB  --   --  3.2* TBILI  --   --  0.6 SGOT  --   --  59* ALT  --   --  52 Signed: )Isabel Fortune, NP

## 2018-11-13 NOTE — PROGRESS NOTES
1900 Received report from [place], [facility]. Assumed patient care. 0530 Pt complaining of SOB and wheezing. Respiratory called to give prn albuterol neb. Pt had little relief after receiving nebulizer. Pt on O2 2L NC. Contacted on call physician. Dr Bryan Bolton placed orders for a stat CXR, a one time dose of Lasix 20mg IV, and added on troponin and ProBNP STAT labs. Will continue to monitor. 0600 Walked down to lab to check to make sure STAT/Add on labs are being done and  informed me that they will be done after all other labs are caught up. I informed her that it is a STAT lab and she said it will be done after other labs are finished. I will pass on to day nurse if doctor questions. 0587 STAT labs and CXR results are back. Contacted on call physician to make aware. Pt VSS.  made aware of current VS. Physician called floor and informed me that as long as pt vitals are stable and pt is diuresing well, he will add another Lasix 20mg IV order to be given later today.  is placing order. Will make day shift nurse aware. Pt currently resting quietly in bed with no complaints. Bedside shift change report given to Silvano gauthier RN (oncoming nurse) by Fallon Mojica RN (offgoing nurse). Report included the following information SBAR, Kardex, Intake/Output, MAR, Recent Results and Cardiac Rhythm Paced Dewayne Chen

## 2018-11-13 NOTE — PROGRESS NOTES
Problem: Falls - Risk of 
Goal: *Absence of Falls Document Anand Jolly Fall Risk and appropriate interventions in the flowsheet. Outcome: Progressing Towards Goal 
Fall Risk Interventions: 
Mobility Interventions: Bed/chair exit alarm, Assess mobility with egress test, PT Consult for assist device competence, PT Consult for mobility concerns, OT consult for ADLs, Patient to call before getting OOB, Strengthening exercises (ROM-active/passive), Utilize walker, cane, or other assistive device, Utilize gait belt for transfers/ambulation Mentation Interventions: Adequate sleep, hydration, pain control, Bed/chair exit alarm, Door open when patient unattended, Increase mobility, More frequent rounding, Reorient patient Medication Interventions: Bed/chair exit alarm, Teach patient to arise slowly, Patient to call before getting OOB Elimination Interventions: Call light in reach, Bed/chair exit alarm, Toilet paper/wipes in reach, Patient to call for help with toileting needs, Urinal in reach, Toileting schedule/hourly rounds History of Falls Interventions: Consult care management for discharge planning

## 2018-11-13 NOTE — PROGRESS NOTES
ADULT PROTOCOL: JET AEROSOL ASSESSMENT Patient  Abigail Spatz     80 y.o.   male     11/13/2018  3:37 PM 
 
Breath Sounds Pre Procedure: Right Breath Sounds: Diminished Left Breath Sounds: Diminished Breath Sounds Post Procedure: Right Breath Sounds: Diminished Left Breath Sounds: Diminished Breathing pattern: Pre procedure Post procedure Heart Rate: Pre procedure Pulse: 85 
         Post procedure Pulse: 88 Resp Rate: Pre procedure Respirations: 20 
         Post procedure Respirations: 20 
 
Oxygen: O2 Device: Nasal cannula SpO2: Pre procedure SpO2: 97 % Post procedure SpO2: 96 % Nebulizer Therapy: Current medications Aerosolized Medications: Albuterol Problem List:  
Patient Active Problem List  
Diagnosis Code  Dysphagia R13.10  Abnormal cardiovascular stress test R94.39  
 S/P CABG x 3 Z95.1  Atherosclerotic cerebrovascular disease I67.2  Orthostatic hypotension I95.1  Feeding difficulties R63.3  Non-cardiac chest pain R07.89  S/P PTCA (percutaneous transluminal coronary angioplasty) Z98.61  
 CAD (coronary artery disease) I25.10  Status post implantation of automatic cardioverter/defibrillator (AICD) Z95.810  
 Aortic stenosis, mild I35.0  Esophageal motility disorder K22.4  Heart failure (HCC) I50.9  Percutaneous endoscopic gastrostomy status ([facility] Z93.1  Antiplatelet or antithrombotic long-term use Z79.02  
 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 ([facility]) E11.42  
 Peripheral neuropathy ([facility]) G62.9  
 Polyneuropathy associated with underlying disease ([facility] G63  
 History of stroke Z86.73  
 Aspiration pneumonia ([facility] J69.0  Weakness R53.1  Stroke ([facility] I63.9  Thrombotic stroke involving left middle cerebral artery ([facility] Y89.657  Stenosis of both internal carotid arteries I65.23  
  Hemiplegia and hemiparesis following cerebral infarction affecting right dominant side ([facility]) I69.351  Type 2 diabetes with nephropathy ([facility] E11.21  
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  
 Tremors of nervous system R25.1  Parkinson's disease (tremor, stiffness, slow motion, unstable posture) ([facility]) G20  
 Myalgia M79.10  Pneumonia due to group B Streptococcus ([facility] J15.3  Counseling regarding goals of care Z71.89  
 Disturbance of memory R41.3  Presbycusis of both ears H91.13  
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  
 Convulsion ([facility] R56.9  Epigastric pain R10.13  
 Dizziness R42 Respiratory Therapist: John Pinto RT

## 2018-11-13 NOTE — INTERDISCIPLINARY ROUNDS
Cardiopulmonary Care Interdisciplinary Rounds were held today to discuss patient's plan of care and outcomes. The following members were present: NP/Physician, Pharmacy, Nursing and Case Management. Expected Length of Stay:  2d 2h 
 
Plan of Care: Continue current treatment plan

## 2018-11-14 NOTE — PROGRESS NOTES
Problem: Mobility Impaired (Adult and Pediatric) Goal: *Acute Goals and Plan of Care (Insert Text) Physical Therapy Goals Initiated 11/12/2018 1. Patient will move from supine to sit and sit to supine  in bed with independence within 7 day(s). 2.  Patient will transfer from bed to chair and chair to bed with modified independence using the least restrictive device within 7 day(s). 3.  Patient will perform sit to stand with modified independence within 7 day(s). 4.  Patient will ambulate with modified independence for 100 feet with the least restrictive device within 7 day(s). 5.  Patient will ascend/descend 6 stairs with 1 handrail(s) with supervision/set-up within 7 day(s). physical Therapy TREATMENT Patient: Brett Hagen (80 y.o. male) Date: 11/14/2018 Diagnosis: Dizziness <principal problem not specified> Precautions: Fall Chart, physical therapy assessment, plan of care and goals were reviewed. ASSESSMENT: 
Pt cleared by nurse to mobilize. Pt received up in chair on 2LPM. Pt agreeable to therapy. Pt performed sit to stand transfer at supervision. Pt ambulated 75ft with RW at [facility]. Chelsea Josue Pt requiring cueing to stand upright and and to look up. Pt able to correct for short time. Pt reported feeling fatigued after ambulation but o2 stats at 93%. Pt left up in chair with all needs met. Pt will benefit from HHPT to improve strength and endurance. Progression toward goals: 
[x]    Improving appropriately and progressing toward goals 
[]    Improving slowly and progressing toward goals 
[]    Not making progress toward goals and plan of care will be adjusted PLAN: 
Patient continues to benefit from skilled intervention to address the above impairments. Continue treatment per established plan of care. Discharge Recommendations:  Home Health Further Equipment Recommendations for Discharge:  Pt has DME SUBJECTIVE:  
 Patient stated Parish Brady are going to get rid of me because I'm up walking.  OBJECTIVE DATA SUMMARY:  
Critical Behavior: 
Neurologic State: Alert Orientation Level: Oriented X4 Cognition: Follows commands Safety/Judgement: Awareness of environment, Insight into deficits Functional Mobility Training: 
  
Transfers: 
Sit to Stand: Supervision Stand to Sit: Supervision Balance: 
Sitting: Intact Standing: Impaired Standing - Static: Good;Constant support Standing - Dynamic : Fair(to good)Ambulation/Gait Training: 
Distance (ft): 75 Feet (ft) Assistive Device: Gait belt;Walker, rolling Ambulation - Level of Assistance: Contact guard assistance Gait Abnormalities: Decreased step clearance(head down posture) Base of Support: Widened Speed/Gertrudis: Pace decreased (<100 feet/min) Step Length: Left shortened;Right shortened Pain: 
Pain Scale 1: Numeric (0 - 10) Pain Intensity 1: 0 Pain Location 1: Chest 
Pain Orientation 1: Left;Right Pain Description 1: Aching Pain Intervention(s) 1: Medication (see MAR) Activity Tolerance: Pt with improved mobility tolerance. After treatment:  
[x]    Patient left in no apparent distress sitting up in chair 
[]    Patient left in no apparent distress in bed 
[x]    Call bell left within reach [x]    Nursing notified 
[]    Caregiver present [x]    Bed alarm activated COMMUNICATION/COLLABORATION:  
The patients plan of care was discussed with: Registered Nurse Josafat Montalvo Time Calculation: 12 mins

## 2018-11-14 NOTE — PROGRESS NOTES
**Consult Information** 
Member Facility: [address] [place] [address],[address] Facility MRN: [id number] Consult ID: [id number] Facility Time Zone: ET 
Date and Time of Consult: 11/14/2018 12:39:50 AM 
Requesting Clinician: Daya Muro Patient Name: Lauren Mann Date of Birth: [birth year] Gender: Male **Clinical Note** Clinical Note: Pt unable to sleep. Says he has not been able to sleep for 2 days. VSS. systolic BP has been 06-51/37'J. Pt on O2 2L NC. Pt has no complaints other than not being able to rest. Can I get an order for Melatonin or something low dose to help him sleep?  
 
 
Ordered melatonin 3 mg qhs.

## 2018-11-14 NOTE — PROGRESS NOTES
1360 Tomas Young CM completed chart review. Attempted to see Pt but he was going to bathroom when I was trying to see him before the end of day. CM left VM for wife Cathy at [phone] and asked her to call us back to discuss DC plan. Unsure of DC date but Pt needs HH set up. Pt has used [facility] in the past just need to talk with them about [address] and send out referral.   
 
CM Consult to set up apt with Dr. Camelia Dewey. I called to set up apt but office was closed. CM left VM for them to call us back in AM to set up apt. CM will reattempt evaluation tomorrow with Pt and continue to monitor discharge plan. Lorena Joel, CM Ext [phone]

## 2018-11-14 NOTE — PROGRESS NOTES
Problem: Falls - Risk of 
Goal: *Absence of Falls Document Margarita Cagle Fall Risk and appropriate interventions in the flowsheet. Outcome: Progressing Towards Goal 
Fall Risk Interventions: 
Mobility Interventions: Bed/chair exit alarm, Assess mobility with egress test, PT Consult for assist device competence, PT Consult for mobility concerns, OT consult for ADLs, Patient to call before getting OOB, Strengthening exercises (ROM-active/passive), Utilize walker, cane, or other assistive device, Utilize gait belt for transfers/ambulation Mentation Interventions: Adequate sleep, hydration, pain control, Bed/chair exit alarm, Door open when patient unattended, Increase mobility, More frequent rounding, Reorient patient Medication Interventions: Bed/chair exit alarm, Teach patient to arise slowly, Patient to call before getting OOB Elimination Interventions: Call light in reach, Bed/chair exit alarm, Toilet paper/wipes in reach, Patient to call for help with toileting needs, Urinal in reach, Toileting schedule/hourly rounds History of Falls Interventions: Consult care management for discharge planning

## 2018-11-14 NOTE — PROGRESS NOTES
Attending Hospitalist Attestation: 
 
I have personally seen and examined the patient, reviewed pertinent labs and imaging, and discussed the plan of care with NP Prince Farrell. I reviewed and agree with the history, exam, assessment and plan as outlined in her note. \"I feel better than last night\" Increased SOB overnight, improved with diuresis Mildly SOB currently No HA, abdominal pain, N/V, diarrhea Patient Vitals for the past 24 hrs: 
 Temp Pulse Resp BP SpO2  
11/14/18 1707    129/52   
11/14/18 1459 97.5 °F (36.4 °C) 72 20 98/64 100 % 11/14/18 1044 97.4 °F (36.3 °C) 72 20 126/59 96 % 11/14/18 0902  78  90/64 98 % 11/14/18 0806     97 % 11/14/18 0758 97.5 °F (36.4 °C) 70 20 90/52 98 % 11/14/18 0640  73 20 111/67 95 % 11/14/18 0454     96 % 11/14/18 0434 97.6 °F (36.4 °C) 78 20  97 % 11/14/18 0244 97.9 °F (36.6 °C) 87 18 115/68 97 % 11/13/18 2308 98.4 °F (36.9 °C) 81 18 98/67 96 % 11/13/18 2013     95 % 11/13/18 1951 98.6 °F (37 °C) 77 18 95/61 99 % No distress sitting up in chair Lungs crackles at bases, rhonchi with inspiration and expiration that clear with coughing bilaterally Heart: RRR nl S1S2, grade 3/6 SM at LSB, no LE edema Abdomen: soft, NT, ND, Positive BS, no rebound Skin: No rashes Neuro: Alert, non focal motor exam 
 
 
Assessment/plan: 
Dizziness POA room spinning for months Progressive decline in exercise capacity due to dizziness POA Documented moderate to severe AS and MR Dizziness - persistent, near fall at home Etiology unclear at this time Neurology input appreciated, prior severe VBI 
    CTA neck pending Monitor on tele Head CT: negative Cannot have MRI due to ICD  
PT: [facility]  
  
LIZ POA Urinary retention  
baseline Cr ~ 1.1-1.2, admission 1.6, now improved with IVF 
D/w Louis: will continue hydration overnight and re assess in am  
Cont lasix 3 times a week for now ( had dose today) Clinically very hard to assess fluid status properly Avoid nephrotoxic drugs, adjust all meds to GFR. Bladder scan 340, continue to monitor straight cath as needed ( proscar re started)  
  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  
NPO at home with TF  IsoSource 1.5 ( 5 cans, wife gives 1 can every 3 hr making sure there is no residual before given new can) Followed Dr Fanny Munoz OP for dysphasia 
  
Chronic systolic HF EF 30% / mod to severe AS    
Chronic orthostatic hypotension CSD s/p remote CABG / BiV ICD Elevated troponin  
-on home lasix, aspirin/plavix. Lipitor.  
-follow new echo  
-cardiology help appreciated  
   
DM type II  
--200 
-start home dose lantus + SS Takes novolin R with TF ( substitute by SS) Follows with primary endocrinology   
  
Chronic respiratory failure with hypoxia, cont home O2 2 L at night, follows Dr Lazaro Betancourt H/o streptococcal bacteremia 07/2018 H/o stroke 2015 Chronic thrombocytopenia POA stable , followed with Dr Nguyễn Huerta 
  
Code status: Full NOK: wife [phone] - wife would like to be kept updated daily. She just had ESHA and confined to her home now Prophylaxis: Hep SQ Additional time:    25   min rendering and coordinating care Beverly Mejia MD

## 2018-11-14 NOTE — PROGRESS NOTES
Hospitalist Progress Note NAME: Sravanthi Villalobos :  1935 MRN:  [id number] Interim Hospital Summary: 80 y.o. male whom presented on 2018 with Assessment / Plan: 
Dizziness - persistent associated with  
Progressive decline in exercise capacity Documented moderate to severe AS and MR  
- appreciate neurology input; d/c meclizine (not effective). Recommend outpatient evaluation by interventional neurologist; Dr. Chanelle Young 
- outpatient vestibular rehab 
- continue with ASA, plavix, and statin 
- carotid doppler study: <50% stenosis in the right internal carotid artery. 50-69% stenosis in the left internal carotid artery. No significant stenosis in the external carotid arteries bilaterally. Antegrade flow in both vertebral arteries. Normal flow in both subclavian arteries. - pt usually uses O2 at night time. We will assess his day time O2 needs this afternoon Chronic systolic HF EF 43% / mod to severe AS    
Chronic orthostatic hypotension  
CAD s/p remote CABG / BiV ICD Elevated troponin Pulmonary Edema 
- Echo: Systolic function was moderately to markedly reduced. Ejection fraction was estimated in the range of 30 % to 35 %. There was severe hypokinesis of the mid inferior, basal-mid inferolateral, basal-mid anterolateral, and apical lateral wall(s). - appreciate cardiology input; 
- continue with midodrin TID 
- continue with ASA, plavix, statin, and lasix. Daily weight; 75.8kg -> 74.2kg -> 80.1kg Overnight CXR revealed worsening of pulmonary edema. He received additional 20mg IV lasix along with scheduled lasix. Dr. dAelaida Hughes set up @ valve clinic at [facility] this Friday. 
- NT pro-BNP 6433 -> 7648 
  
LIZ POA / Urinary retention  
- creat 1.2 
- UA clear  
- received IVF on admission - Avoid nephrotoxic drugs, adjust all meds to GFR.  
- continue with flomax and proscar 
  
Hx of Tongue CA S/p radical neck resection andS/p neck XRT  
Chronic dysphagia with PEG  
 -NPO at home with TF  IsoSource 1.5 ( 5 cans, wife gives 1 can every 3 hr making sure there is no residual before given new can) - Followed Dr Marlyn Snyder OP for dysphasia 
- tolerating tube feeding without difficulty 
   
DM type II  
- A1C 8.6 
- continue with lantus. Check blood glucose q 6 hour and follow SSI 
- Follows with primary endocrinology   
  
Chronic respiratory failure with hypoxia, cont home O2 2 L at night, follows Dr Jassi Camarillo H/o streptococcal bacteremia 07/2018 H/o stroke 2015  
Chronic thrombocytopenia POA stable , followed with Dr Polanco Ing 
  
Chest congestion 
- will continue with scheduled home dose mucinex and scheduled duoneb 
  
Code status: Full  
NOK: wife [phone] - wife would like to be kept updated daily. She just had ESHA and confined to her home now  
Prophylaxis: Hep SQ  
  
Baseline: lives with wife, he has very good family support at home; ambulating with walker  
Recommended [address] Subjective: Chief Complaint / Reason for Physician Visit \"I can breath better today than last night\". Discussed with RN events overnight. Review of Systems: 
Symptom Y/N Comments  Symptom Y/N Comments Fever/Chills n   Chest Pain n   
Poor Appetite    Edema n   
Cough    Abdominal Pain Sputum    Joint Pain SOB/KRAMER y   Pruritis/Rash Nausea/vomit n   Tolerating PT/OT Diarrhea    Tolerating Diet Constipation    Other Could NOT obtain due to:   
 
Objective: VITALS:  
Last 24hrs VS reviewed since prior progress note. Most recent are: 
Patient Vitals for the past 24 hrs: 
 Temp Pulse Resp BP SpO2  
11/14/18 1044 97.4 °F (36.3 °C) 72 20 126/59 96 % 11/14/18 0902  78  90/64 98 % 11/14/18 0806     97 % 11/14/18 0758 97.5 °F (36.4 °C) 70 20 90/52 98 % 11/14/18 0640  73 20 111/67 95 % 11/14/18 0454     96 % 11/14/18 0434 97.6 °F (36.4 °C) 78 20  97 % 11/14/18 0244 97.9 °F (36.6 °C) 87 18 115/68 97 % 11/13/18 2308 98.4 °F (36.9 °C) 81 18 98/67 96 % 11/13/18 2013     95 % 11/13/18 1951 98.6 °F (37 °C) 77 18 95/61 99 % 11/13/18 1511 97.7 °F (36.5 °C) 75 18 95/61 97 % 11/13/18 1501     97 % Intake/Output Summary (Last 24 hours) at 11/14/2018 1442 Last data filed at 11/14/2018 1329 Gross per 24 hour Intake 1395 ml Output 2050 ml Net -655 ml PHYSICAL EXAM: 
General: Ill appearing. Alert, cooperative, no acute distress   
EENT:  EOMI. Anicteric sclerae. MMM Resp:  Bilateral rhonchi that clears after deep cough, decreased breath sounds at bases, no wheezing or rales. No accessory muscle use CV:  Regular  rhythm,  No edema GI:  Soft, Non distended, Non tender.  +Bowel sounds, PEG tube in place Neurologic:  Alert and oriented X 3, normal speech, Psych:   Good insight. Not anxious nor agitated Skin:  No rashes. No jaundice Reviewed most current lab test results and cultures  YES Reviewed most current radiology test results   YES Review and summation of old records today    NO Reviewed patient's current orders and MAR    YES 
PMH/SH reviewed - no change compared to H&P 
________________________________________________________________________ Care Plan discussed with: 
  Comments Patient y Family  y Updated Pt's wife via phone RN y   
Care Manager y Consultant     
                 y Multidiciplinary team rounds were held today with , nursing, pharmacist and clinical coordinator. Patient's plan of care was discussed; medications were reviewed and discharge planning was addressed. ________________________________________________________________________ Total NON critical care TIME:  30   Minutes Total CRITICAL CARE TIME Spent:   Minutes non procedure based Comments >50% of visit spent in counseling and coordination of care    
________________________________________________________________________ Kwame Porter NP  
 
 Procedures: see electronic medical records for all procedures/Xrays and details which were not copied into this note but were reviewed prior to creation of Plan. LABS: 
I reviewed today's most current labs and imaging studies. Pertinent labs include: 
Recent Labs 11/14/18 0055 11/13/18 
4652 11/12/18 
5105 WBC 5.1 5.8 7.4 HGB 10.3* 10.0* 11.3* HCT 33.3* 32.9* 36.8 PLT 75* 71* 82* Recent Labs 11/14/18 0055 11/13/18 
0324 11/12/18 
4692  143 139  
K 4.3 3.5 4.4 CL 99 106 100 CO2 33* 34* 35* * 153* 156* BUN 39* 34* 42* CREA 1.22 1.14 1.53* CA 8.4* 7.5* 9.2 MG  --  2.3  --   
PHOS  --  3.0  --   
 
 
Signed: )Kwame Cross NP

## 2018-11-14 NOTE — PROGRESS NOTES
1900 Received report from Genaro Foster [facility]. Assumed patient care. Home Oxygen Test 
Date of test: 11/15/18 Time of test: 0430 Sa02 97 % on room air AT REST. Sa02 98 % on room air DURING AMBULATION. Sa02 97 % on 2 Liters DURING AMBULATION. Sa02 98 % on 2 Liters AT REST/AFTER AMBULATION. Bedside shift change report given to Genaro Foster RN (oncoming nurse) by Darline Gotti RN (offgoing nurse). Report included the following information SBAR, Kardex, Intake/Output, MAR, Recent Results and Cardiac Rhythm Paced.

## 2018-11-14 NOTE — PROGRESS NOTES
**Consult Information** 
Member Facility: [address] Facility MRN: [id number] Consult ID: [id number] Facility Time Zone: ET 
Date and Time of Consult: 11/14/2018 06:35:27 AM 
Requesting Clinician: Herson Bettencourt Time of Call : 11/14/2018 06:40:00 AM 
Patient Name: Radha Chaudhari Date of Birth: [birth year] Gender: Male **Clinical Note** Clinical Note: I spoke with you earlier regarding this pt. ProBNP came back at 7648, Trop is neg, and CXR shows increased moderate severe pulmonary edema with small bilateral pleural effusions and bibasilar atelectasis. Lasix 20mg IV one time dose was given. Is there anything else you would like me to do?  
 
Will monitor urine out put and patient may need one more dose of lasix later; further lasix to be determined by primary team.

## 2018-11-14 NOTE — PROGRESS NOTES
Progress Note 11/14/2018 12:01 PM 
NAME: Lidia Thao MRN:  [id number] Admit Diagnosis: Dizziness Problem List:  
 
Admitted with dizziness and falling.  
  
  Hx:  
1. Chronic systolic heart failure 2. Coronary artery disease s/p remote CABG.  Cath '13 w/ EF 40%, PCI to distal LAD via LIMA, severe disease in large diagonal (not amenable to PCI), patent SVG-OM, SVG-PDA, SVG-PLB.  Lexiscan 11/13 w/ EF 28%, IWMI, and with diagonal ischemia. 3. Lexiscan stress MPI 9/14 w/ EF 29% and anterior ischemia w/ IWMI 4. Ischemic cardiomyopathy 5. BiV ICD 6. Chronic kidney disease; Stg 3 
7. 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   
8. Diabetes 9. 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 
10. Orthostatic hypotension 11. Hyperlipidemia 12. Head/neck CA w/ feeding tube 13. Chronic thrombocytopenia (Dr. Luciana Chu) 
   
 
Assessment/Plan:  
Depressed 
sCr stable Neuro note noted Echo unchanged SOB overnight; CXR w/ more pulmonary edema. 20mg IV lasix given. 1. Continue midodrine 10mg TID 2. Needs IV diuresis as tolerated by BP. Give additional 20mg IV lasix this AM. 3. Strict I/Os 4. Daily weights 5. Has a tentative appt @ [facility] @ [facility] on Friday; this may need to be adjusted pending course  
 
  
 []       High complexity decision making was performed in this patient at high risk for decompensation with multiple organ involvement. Subjective:  
 
Lidia Thao denies chest pain. Still dizzy, somewhat better. SOB worse but improved when compared to overnight. Wants to go home. Discussed with RN events overnight. Review of Systems: 
 
Symptom Y/N Comments  Symptom Y/N Comments Fever/Chills N   Chest Pain N Poor Appetite N   Edema N   
Cough y   Abdominal Pain N Sputum N   Joint Pain N   
SOB/KRAMER y   Pruritis/Rash N   
Nausea/vomit N   Tolerating PT/OT Y Diarrhea N   Tolerating Diet Y Constipation N   Other Could NOT obtain due to:   
 
Objective:  
  
Physical Exam: Last 24hrs VS reviewed since prior progress note. Most recent are: 
 
Visit Vitals BP 90/52 (BP 1 Location: Right arm, BP Patient Position: Supine; Head of bed elevated (Comment degrees)) Comment (BP Patient Position): 30 degrees Pulse 70 Temp 97.5 °F (36.4 °C) Resp 20 Ht 5' 7\" (1.702 m) Wt 80.1 kg (176 lb 9.4 oz) SpO2 97% BMI 27.66 kg/m² Intake/Output Summary (Last 24 hours) at 11/14/2018 8210 Last data filed at 11/14/2018 9134 Gross per 24 hour Intake 1005 ml Output 1850 ml Net -845 ml General Appearance: Well developed, well nourished, alert & oriented x 3,  
 no acute distress. Ears/Nose/Mouth/Throat: Hearing grossly normal. 
Neck: Supple. Chest: Lungs w/ diffuse rhonchi. Cardiovascular: Regular rate and rhythm, S1S2 normal, harsh TARA Abdomen: Soft, non-tender, bowel sounds are active. Extremities: No edema bilaterally. Skin: Warm and dry. []         Post-cath site without hematoma, bruit, tenderness, or thrill. Distal pulses intact. PMH/SH reviewed - no change compared to H&P Data Review Telemetry: paced rhythm EKG:  
[x]  No new EKG for review Lab Data Personally Reviewed: 
 
Recent Labs 11/14/18 0055 11/13/18 
7784 WBC 5.1 5.8 HGB 10.3* 10.0* HCT 33.3* 32.9*  
PLT 75* 71* No results for input(s): INR, PTP, APTT in the last 72 hours. No lab exists for component: INREXT, INREXT Recent Labs 11/14/18 
0055 11/13/18 
0324 11/12/18 
0055 11/11/18 [address]  143 139 140  
K 4.3 3.5 4.4 4.8  
CL 99 106 100 101 CO2 33* 34* 35* 35* BUN 39* 34* 42* 46* CREA 1.22 1.14 1.53* 1.61* * 153* 156* 209* CA 8.4* 7.5* 9.2 8.8 MG  --  2.3  --  2.6* Recent Labs 11/14/18 
0055 11/12/18 
1648 11/11/18 [address] CPK  --   --  222 TROIQ <0.05 0.10* 0.11* Lab Results Component Value Date/Time  Cholesterol, total 123 06/04/2018 09:16 AM  
 HDL Cholesterol 35 (L) 06/04/2018 09:16 AM  
 LDL, calculated 62 06/04/2018 09:16 AM  
 Triglyceride 132 06/04/2018 09:16 AM  
 CHOL/HDL Ratio 3.6 02/02/2017 02:55 AM  
 
 
Recent Labs 11/11/18 [address] SGOT 59* AP 75  
TP 7.2 ALB 3.2*  
GLOB 4.0  
LPSE 58* Recent Labs 11/11/18 
1300 PH 7.43  
PCO2 53* PO2 84 Medications Personally Reviewed: 
 
Current Facility-Administered Medications Medication Dose Route Frequency  melatonin tablet 3 mg  3 mg Oral QHS  melatonin 3 mg tablet  furosemide (LASIX) injection 20 mg  20 mg IntraVENous ONCE  
 sodium chloride (OCEAN) 0.65 % nasal squeeze bottle 2 Spray  2 Spray Both Nostrils Q2H PRN  
 guaiFENesin (ROBITUSSIN) 100 mg/5 mL oral liquid 100 mg  100 mg Per G Tube TID  albuterol (PROVENTIL VENTOLIN) nebulizer solution 2.5 mg  2.5 mg Nebulization BID RT  
 HYDROcodone-acetaminophen (NORCO)  mg tablet 1 Tab  1 Tab Per G Tube Q6H PRN  
 insulin glargine (LANTUS) injection 14 Units  14 Units SubCUTAneous DAILY  finasteride (PROSCAR) tablet 5 mg  5 mg Oral DAILY  gabapentin (NEURONTIN) 250 mg/5 mL solution 250 mg  250 mg Per G Tube BID  famotidine (PEPCID) tablet 20 mg  20 mg Per G Tube DAILY  acetaminophen (TYLENOL) tablet 1,000 mg  1,000 mg Per G Tube Q6H PRN  
 albuterol (PROVENTIL VENTOLIN) nebulizer solution 1.25 mg  1.25 mg Nebulization Q6H PRN  
 tamsulosin (FLOMAX) capsule 0.4 mg  0.4 mg Oral DAILY  aspirin chewable tablet 81 mg  81 mg Per G Tube DAILY  atorvastatin (LIPITOR) tablet 40 mg  40 mg Per G Tube DAILY  clopidogrel (PLAVIX) tablet 75 mg  75 mg PEG Tube DAILY  fluticasone (FLONASE) 50 mcg/actuation nasal spray 2 Spray  2 Spray Both Nostrils DAILY  furosemide (LASIX) tablet 20 mg  20 mg Per G Tube BID Mon Wed & Fri  lactobac ac& pc-s.therm-b.anim (JAVY Q/RISAQUAD)  1 Cap PEG Tube DAILY  midodrine (PROAMITINE) tablet 10 mg  10 mg Per G Tube TID WITH MEALS  pramipexole (MIRAPEX) tablet 0.25 mg  0.25 mg Oral TID  zinc sulfate (ZINCATE) capsule 220 mg  220 mg Per G Tube DAILY  sodium chloride (NS) flush 5-10 mL  5-10 mL IntraVENous Q8H  
 sodium chloride (NS) flush 5-10 mL  5-10 mL IntraVENous PRN  
 naloxone (NARCAN) injection 0.4 mg  0.4 mg IntraVENous PRN  
 ondansetron (ZOFRAN) injection 2 mg  2 mg IntraVENous Q6H PRN  
 heparin (porcine) injection 5,000 Units  5,000 Units SubCUTAneous Q8H  
 insulin lispro (HUMALOG) injection   SubCUTAneous AC&HS  
 glucose chewable tablet 16 g  4 Tab Oral PRN  
 dextrose (D50W) injection syrg 12.5-25 g  12.5-25 g IntraVENous PRN  
 glucagon (GLUCAGEN) injection 1 mg  1 mg IntraMUSCular PRN  
 insulin regular (NOVOLIN R, HUMULIN R) injection 5-14 Units  5-14 Units SubCUTAneous TID  lactose-reduced food with fibr 0.07 gram-1.5 kcal/mL liqd 250 mL  (Patient Supplied)  250 mL Feeding Tube 5XD  magic mouthwash cpd (with sucralfate)  5 mL Oral TIDAC [address], DO

## 2018-11-14 NOTE — PROGRESS NOTES
0700: Bedside shift change report given to Julio Cesar Pulido, RN (oncoming nurse) by Dennie Haus, RN (offgoing nurse). Report included the following information SBAR.  
 
0800: Paged Dr. Jaylyn Chaudhary and told to hold PO lasix and give IV lasix this morning instead. Porter Delaney, JENNIFER made aware of pt's low BP of 90/60, told okay to administer despite this.

## 2018-11-14 NOTE — PROGRESS NOTES
**Consult Information** 
Member Facility: [address] [place] [address] Facility MRN: [id number] Consult ID: [id number] Facility Time Zone: ET 
Date and Time of Consult: 11/14/2018 05:09:36 AM 
Requesting Clinician: Soo Nation Time of Call : 11/14/2018 05:19:00 AM 
Patient Name: Marco Hernandez Date of Birth: [birth year] Gender: Male **Clinical Note** Clinical Note: Pt complaining of SOB. Currently on O2 2L NC with Sats 96-98%. Pt being treated for Dizziness with ProBNP 6433 on admission. Pt has not had a CXR this admission but XR Abd showed pulmonary edema and pleural effusions. Lung sounds are very wet. PRN albuterol neb being administered now with little help in the past. Can I get an order for a stat CXR? Patient is feeling more short of breath and crackly on examination as per the nursing staff. Will order stat chest x-ray. Patient had a x-ray KUB before which showed possible pulmonary edema and pleural effusion. Give 20 of IV Lasix 1 time dose. Will also check troponin and proBNP.

## 2018-11-15 NOTE — PROGRESS NOTES
0700: Bedside shift change report given to Vlad Howard RN (oncoming nurse) by Carlos Alberto Olson RN (offgoing nurse). Report included the following information SBAR and Kardex. 0800: Discussed with Dr. Breanna Fermin pt's increased SOB and crackles bilaterally. Ordered 40mg IV lasix to give. Told to give midodrine first secondary to patient's low BPs. Pt to have repeat CXR done. Paged X-ray to make aware. VSS.

## 2018-11-15 NOTE — PROGRESS NOTES
Nutrition Assessment: 
 
INTERVENTIONS/RECOMMENDATIONS:  
· Isosource 1.5 bolus, 5 bottles per day + 60 ml water flush before and after bolus ? Provides: 1875 kcal, 85 g protein and 1550 ml free water ASSESSMENT:  
Chart reviewed. Pt continues with his home TF regimen Isosource 5x per day and tolerating well Diet Order: NPO 
% Eaten:  No data found. Pertinent Medications: [x]Reviewed: pepcid, lantus, humalog, humalin, lactobac Pertinent Labs: [x]Reviewed:  
Food Allergies: [x]NKFA  []Other Last BM:  11/15 Edema:      []RUE   []LUE   []RLE   []LLE Pressure Ulcer:      [] Stage I   [] Stage II   [] Stage III   [] Stage IV Anthropometrics: Height: 5' 7\" (170.2 cm) Weight: 79.5 kg (175 lb 4.8 oz) IBW (%IBW):   ( ) UBW (%UBW):   (  %) BMI: Body mass index is 27.46 kg/m². This BMI is indicative of: 
[]Underweight   []Normal   [x]Overweight   [] Obesity   [] Extreme Obesity (BMI>40) Last Weight Metrics: 
Weight Loss Metrics 11/15/2018 10/24/2018 10/19/2018 9/12/2018 8/30/2018 8/25/2018 8/17/2018 Today's Wt 175 lb 4.8 oz 171 lb 171 lb 1.2 oz 166 lb 6.4 oz 166 lb 12.8 oz 168 lb 171 lb BMI 27.46 kg/m2 26.78 kg/m2 26.79 kg/m2 26.06 kg/m2 26.12 kg/m2 26.31 kg/m2 26.78 kg/m2 Some encounter information is confidential and restricted. Go to Review Flowsheets activity to see all data. Estimated Nutrition Needs (Based on): 8327 Kcals/day(BMR: 1400 x 1.3) , 75 g(1 g/kg) Protein Carbohydrate: At Least 130 g/day  Fluids: 1825 mL/day or per primary team 
 
NUTRITION DIAGNOSES:  
Problem:  Altered GI function Etiology: related to Head/neck CA Signs/Symptoms: as evidenced by PEG dependent Previous Nutrition Dx:  [] Resolved  [x] Unresolved           [] Progressing NUTRITION INTERVENTIONS: 
  Enteral/Parenteral Nutrition: Initiate enteral nutrition GOAL:  
Meet >80% of TF in 5-7 days NUTRITION MONITORING AND EVALUATION  
  
 Food/Nutrient Intake Outcomes: Total energy intake Physical Signs/Symptoms Outcomes: Weight/weight change, Electrolyte and renal profile, GI 
 
Previous Goal Met: 
 [x] Met              [] Progressing Towards Goal              [] Not Progressing Towards Goal  
Previous Recommendations: 
 [x] Implemented          [] Not Implemented          [] Not Applicable LEARNING NEEDS (Diet, Food/Nutrient-Drug Interaction):  
 [x] None Identified 
 [] Identified and Education Provided/Documented 
 [] Identified and Pt declined/was not appropriate Cultural, Spiritism, OR Ethnic Dietary Needs:  
 [x] None Identified 
 [] Identified and Addressed 
 
 [x] Interdisciplinary Care Plan Reviewed/Documented  
 [x] Discharge Planning: continue home TF regimen  
 [] Participated in Interdisciplinary Rounds NUTRITION RISK:  
 [] High              [] Moderate           [x]  Low  []  Minimal/Uncompromised Froylan Villanueva RDN Pager [phone] Weekend Pager [phone]

## 2018-11-15 NOTE — PROGRESS NOTES
Problem: Falls - Risk of 
Goal: *Absence of Falls Document Samm Judd Fall Risk and appropriate interventions in the flowsheet. Outcome: Progressing Towards Goal 
Fall Risk Interventions: 
Mobility Interventions: Bed/chair exit alarm, OT consult for ADLs, Patient to call before getting OOB, Strengthening exercises (ROM-active/passive), PT Consult for mobility concerns, PT Consult for assist device competence, Utilize walker, cane, or other assistive device, Utilize gait belt for transfers/ambulation Mentation Interventions: Bed/chair exit alarm, More frequent rounding Medication Interventions: Bed/chair exit alarm, Patient to call before getting OOB Elimination Interventions: Call light in reach, Bed/chair exit alarm, Patient to call for help with toileting needs, Urinal in reach, Toileting schedule/hourly rounds, Toilet paper/wipes in reach History of Falls Interventions: Bed/chair exit alarm, Door open when patient unattended

## 2018-11-15 NOTE — PROGRESS NOTES
Progress Note 11/15/2018 12:01 PM 
NAME: Urvashi Curry MRN:  [id number] Admit Diagnosis: Dizziness Problem List:  
 
Admitted with dizziness and falling.  
  
  Hx:  
1. Chronic systolic heart failure 2. Coronary artery disease s/p remote CABG.  Cath '13 w/ EF 40%, PCI to distal LAD via LIMA, severe disease in large diagonal (not amenable to PCI), patent SVG-OM, SVG-PDA, SVG-PLB.  Lexiscan 11/13 w/ EF 28%, IWMI, and with diagonal ischemia. 3. Lexiscan stress MPI 9/14 w/ EF 29% and anterior ischemia w/ IWMI 4. Ischemic cardiomyopathy 5. BiV ICD 6. Chronic kidney disease; Stg 3 
7. 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   
8. Diabetes 9. 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 
10. Orthostatic hypotension 11. Hyperlipidemia 12. Head/neck CA w/ feeding tube 13. Chronic thrombocytopenia (Dr. Aurora Gill) 
   
 
Assessment/Plan:  
Depressed 
sCr stable Neuro note noted Echo unchanged 1. Continue midodrine 10mg TID 2. Additional IV lasix this AM after midodrine dosing 3. CXR 4. Has a tentative appt @ [facility] @ [facility] tomorrow; this may need to be adjusted pending course  
 
  
 []       High complexity decision making was performed in this patient at high risk for decompensation with multiple organ involvement. Subjective:  
 
Urvashi Curry denies chest pain. Still dizzy, somewhat better. Still SOB. Discussed with RN events overnight. Review of Systems: 
 
Symptom Y/N Comments  Symptom Y/N Comments Fever/Chills N   Chest Pain N Poor Appetite N   Edema N   
Cough y   Abdominal Pain N Sputum N   Joint Pain N   
SOB/KRAMER y   Pruritis/Rash N   
Nausea/vomit N   Tolerating PT/OT Y Diarrhea N   Tolerating Diet Y Constipation N   Other Could NOT obtain due to:   
 
Objective:  
  
Physical Exam: 
 
Last 24hrs VS reviewed since prior progress note. Most recent are: 
 
Visit Vitals /65 (BP 1 Location: Right arm, BP Patient Position: At rest) Pulse 86 Temp 97.9 °F (36.6 °C) Resp 22 Ht 5' 7\" (1.702 m) Wt 79.5 kg (175 lb 4.8 oz) SpO2 94% BMI 27.46 kg/m² Intake/Output Summary (Last 24 hours) at 11/15/2018 9434 Last data filed at 11/15/2018 7051 Gross per 24 hour Intake 1035 ml Output 1450 ml Net -415 ml General Appearance: Well developed, well nourished, alert & oriented x 3,  
 no acute distress. Ears/Nose/Mouth/Throat: Hearing grossly normal. 
Neck: Supple. Chest: Lungs w/ diffuse rhonchi. Cardiovascular: Regular rate and rhythm, S1S2 normal, harsh TARA Abdomen: Soft, non-tender, bowel sounds are active. Extremities: Trivial edema bilaterally. Skin: Warm and dry. []         Post-cath site without hematoma, bruit, tenderness, or thrill. Distal pulses intact. PMH/SH reviewed - no change compared to H&P Data Review Telemetry: paced rhythm EKG:  
[x]  No new EKG for review Lab Data Personally Reviewed: 
 
Recent Labs 11/14/18 
0055 11/13/18 
6922 WBC 5.1 5.8 HGB 10.3* 10.0* HCT 33.3* 32.9*  
PLT 75* 71* No results for input(s): INR, PTP, APTT in the last 72 hours. No lab exists for component: INREXT, INREXT Recent Labs 11/15/18 
0016 11/14/18 
0055 11/13/18 
2931  137 143  
K 4.0 4.3 3.5 CL 97 99 106 CO2 35* 33* 34* BUN 35* 39* 34* CREA 1.26 1.22 1.14 * 177* 153* CA 8.7 8.4* 7.5* MG  --   --  2.3 Recent Labs 11/14/18 
0055 11/12/18 
1648 TROIQ <0.05 0.10* Lab Results Component Value Date/Time Cholesterol, total 123 06/04/2018 09:16 AM  
 HDL Cholesterol 35 (L) 06/04/2018 09:16 AM  
 LDL, calculated 62 06/04/2018 09:16 AM  
 Triglyceride 132 06/04/2018 09:16 AM  
 CHOL/HDL Ratio 3.6 02/02/2017 02:55 AM  
 
 
No results for input(s): SGOT, GPT, AP, TBIL, TP, ALB, GLOB, GGT, AML, LPSE in the last 72 hours. No lab exists for component: AMYP, HLPSE No results for input(s): PH, PCO2, PO2 in the last 72 hours. Medications Personally Reviewed: Current Facility-Administered Medications Medication Dose Route Frequency  melatonin tablet 3 mg  3 mg Oral QHS  sodium chloride (OCEAN) 0.65 % nasal squeeze bottle 2 Spray  2 Spray Both Nostrils Q2H PRN  
 guaiFENesin (ROBITUSSIN) 100 mg/5 mL oral liquid 100 mg  100 mg Per G Tube TID  albuterol (PROVENTIL VENTOLIN) nebulizer solution 2.5 mg  2.5 mg Nebulization BID RT  
 HYDROcodone-acetaminophen (NORCO)  mg tablet 1 Tab  1 Tab Per G Tube Q6H PRN  
 insulin glargine (LANTUS) injection 14 Units  14 Units SubCUTAneous DAILY  finasteride (PROSCAR) tablet 5 mg  5 mg Oral DAILY  gabapentin (NEURONTIN) 250 mg/5 mL solution 250 mg  250 mg Per G Tube BID  famotidine (PEPCID) tablet 20 mg  20 mg Per G Tube DAILY  acetaminophen (TYLENOL) tablet 1,000 mg  1,000 mg Per G Tube Q6H PRN  
 albuterol (PROVENTIL VENTOLIN) nebulizer solution 1.25 mg  1.25 mg Nebulization Q6H PRN  
 tamsulosin (FLOMAX) capsule 0.4 mg  0.4 mg Oral DAILY  aspirin chewable tablet 81 mg  81 mg Per G Tube DAILY  atorvastatin (LIPITOR) tablet 40 mg  40 mg Per G Tube DAILY  clopidogrel (PLAVIX) tablet 75 mg  75 mg PEG Tube DAILY  fluticasone (FLONASE) 50 mcg/actuation nasal spray 2 Spray  2 Spray Both Nostrils DAILY  furosemide (LASIX) tablet 20 mg  20 mg Per G Tube BID Mon Wed & Fri  lactobac ac& pc-s.therm-b.anim (JAVY Q/RISAQUAD)  1 Cap PEG Tube DAILY  midodrine (PROAMITINE) tablet 10 mg  10 mg Per G Tube TID WITH MEALS  pramipexole (MIRAPEX) tablet 0.25 mg  0.25 mg Oral TID  zinc sulfate (ZINCATE) capsule 220 mg  220 mg Per G Tube DAILY  sodium chloride (NS) flush 5-10 mL  5-10 mL IntraVENous Q8H  
 sodium chloride (NS) flush 5-10 mL  5-10 mL IntraVENous PRN  
 naloxone (NARCAN) injection 0.4 mg  0.4 mg IntraVENous PRN  
 ondansetron (ZOFRAN) injection 2 mg  2 mg IntraVENous Q6H PRN  
 heparin (porcine) injection 5,000 Units  5,000 Units SubCUTAneous Q8H  
  insulin lispro (HUMALOG) injection   SubCUTAneous AC&HS  
 glucose chewable tablet 16 g  4 Tab Oral PRN  
 dextrose (D50W) injection syrg 12.5-25 g  12.5-25 g IntraVENous PRN  
 glucagon (GLUCAGEN) injection 1 mg  1 mg IntraMUSCular PRN  
 insulin regular (NOVOLIN R, HUMULIN R) injection 5-14 Units  5-14 Units SubCUTAneous TID  lactose-reduced food with fibr 0.07 gram-1.5 kcal/mL liqd 250 mL  (Patient Supplied)  250 mL Feeding Tube 5XD  magic mouthwash cpd (with sucralfate)  5 mL Oral TIDAC Shayy Pitts III, DO

## 2018-11-15 NOTE — PROGRESS NOTES
Hospitalist Progress Note NAME: Tony Avalos :  1935 MRN:  [id number] Interim Hospital Summary: 80 y.o. male whom presented on 2018 with Assessment / Plan: 
  
Chronic systolic HF Chronic orthostatic hypotension  
CAD s/p remote CABG / BiV ICD Ischemic cardiomyopathy Elevated troponin Pulmonary Edema 
- Echo: Systolic function was moderately to markedly reduced. Ejection fraction was estimated in the range of 30 % to 35 %. There was severe hypokinesis of the mid inferior, basal-mid inferolateral, basal-mid anterolateral, and apical lateral wall(s). - appreciate cardiology input; 
- continue with midodrin TID 
- continue with ASA, plavix, statin, and lasix. Daily weight; 75.8kg -> 74.2kg -> 80.1kg -> 79.5kg CXR () revealed worsening of pulmonary edema. He received additional 20mg IV lasix along with scheduled lasix. Repeat CXR today to be done 
- Dr. Baron Rose set up @ valve clinic at [facility] this Friday. 
- NT pro-BNP 6433 -> 7648 Dizziness - persistent associated with  
Progressive decline in exercise capacity Documented moderate to severe AS and MR  
- appreciate neurology input; d/c meclizine (not effective). Recommend outpatient evaluation by interventional neurologist; Dr. Nico Orozco 
- outpatient vestibular rehab 
- continue with ASA, plavix, and statin 
- carotid doppler study: <50% stenosis in the right internal carotid artery. 50-69% stenosis in the left internal carotid artery. No significant stenosis in the external carotid arteries bilaterally. Antegrade flow in both vertebral arteries. Normal flow in both subclavian arteries. - pt will be needing supplemental O2: 
Sa02 95 % on room air AT REST. Sa02 88 % on room air DURING AMBULATION. Sa02 91 % on 2 Liters DURING AMBULATION. Sa02 94 % on 2 Liters AT REST/AFTER AMBULATION. LIZ POA / Urinary retention  
- creat 1.2 
- UA clear  
- received IVF on admission - Avoid nephrotoxic drugs, adjust all meds to GFR. - continue with flomax and proscar 
  
Hx of Tongue CA S/p radical neck resection andS/p neck XRT  
Chronic dysphagia with PEG  
 -NPO at home with TF  IsoSource 1.5 ( 5 cans, wife gives 1 can every 3 hr making sure there is no residual before given new can) - Followed Dr Compa Sandoval OP for dysphasia 
- tolerating tube feeding without difficulty 
   
DM type II  
- A1C 8.6 
- continue with lantus. Check blood glucose q 6 hour and follow SSI 
- Follows with primary endocrinology   
  
Chronic respiratory failure with hypoxia, cont home O2 2 L at night, follows Dr Diya Brown H/o streptococcal bacteremia 07/2018 H/o stroke 2015  
Chronic thrombocytopenia POA stable , followed with Dr Sandi Nogueira 
  
Chest congestion 
- will continue with scheduled home dose mucinex and scheduled duoneb 
  
Code status: Full  
NOK: wife [phone] - wife would like to be kept updated daily. She just had ESHA and confined to her home now  
Prophylaxis: Hep SQ  
  
Baseline: lives with wife, he has very good family support at home; ambulating with walker  
Recommended Disposition:  
  
 
 
Subjective: Chief Complaint / Reason for Physician Visit \"my breathing seemed to worse today than yesterday\". Discussed with RN events overnight. Review of Systems: 
Symptom Y/N Comments  Symptom Y/N Comments Fever/Chills n   Chest Pain n   
Poor Appetite    Edema Cough y   Abdominal Pain Sputum    Joint Pain SOB/KRAMER y   Pruritis/Rash Nausea/vomit n   Tolerating PT/OT Diarrhea n   Tolerating Diet Constipation n   Other Could NOT obtain due to:   
 
Objective: VITALS:  
Last 24hrs VS reviewed since prior progress note. Most recent are: 
Patient Vitals for the past 24 hrs: 
 Temp Pulse Resp BP SpO2  
11/15/18 1029 97.9 °F (36.6 °C) 84 20 115/70 99 % 11/15/18 0903    122/54   
11/15/18 0742     94 % 11/15/18 0721 97.9 °F (36.6 °C) 86 22 105/65 99 % 11/15/18 0418 98.2 °F (36.8 °C) 75 20 110/62 97 % 11/15/18 0009 98.1 °F (36.7 °C) 73 20 107/60 98 % 11/14/18 2002 98.3 °F (36.8 °C) 70 18 98/56 97 % 11/14/18 1707    129/52   
11/14/18 1459 97.5 °F (36.4 °C) 72 20 98/64 100 % Intake/Output Summary (Last 24 hours) at 11/15/2018 1425 Last data filed at 11/15/2018 1400 Gross per 24 hour Intake 1035 ml Output 2450 ml Net -1415 ml PHYSICAL EXAM: 
General: Ill appearing. Alert, cooperative, no acute distress   
EENT:  EOMI. Anicteric sclerae. MMM Resp:  Bilateral rhonchi with a few crackles at bases, no wheezing or rales. No accessory muscle use CV:  Regular  rhythm,  trace of pitting edema, (+) systolic murmur GI:  Soft, Non distended, Non tender.  +Bowel sounds Neurologic:  Alert and oriented X 3, normal speech, Psych:   Fair insight. Not anxious nor agitated Skin:  No rashes. No jaundice Reviewed most current lab test results and cultures  YES Reviewed most current radiology test results   YES Review and summation of old records today    NO Reviewed patient's current orders and MAR    YES 
PMH/SH reviewed - no change compared to H&P 
________________________________________________________________________ Care Plan discussed with: 
  Comments Patient y Family  y Updated his wife RN y   
Care Manager y Consultant     
                 y Multidiciplinary team rounds were held today with , nursing, pharmacist and clinical coordinator. Patient's plan of care was discussed; medications were reviewed and discharge planning was addressed. ________________________________________________________________________ Total NON critical care TIME:  30   Minutes Total CRITICAL CARE TIME Spent:   Minutes non procedure based Comments >50% of visit spent in counseling and coordination of care    
________________________________________________________________________ Kwame Aviles NP  
 
 Procedures: see electronic medical records for all procedures/Xrays and details which were not copied into this note but were reviewed prior to creation of Plan. LABS: 
I reviewed today's most current labs and imaging studies. Pertinent labs include: 
Recent Labs 11/14/18 
0055 11/13/18 
0040 WBC 5.1 5.8 HGB 10.3* 10.0* HCT 33.3* 32.9*  
PLT 75* 71* Recent Labs 11/15/18 
0016 11/14/18 0055 11/13/18 
5173  137 143  
K 4.0 4.3 3.5 CL 97 99 106 CO2 35* 33* 34* * 177* 153* BUN 35* 39* 34* CREA 1.26 1.22 1.14  
CA 8.7 8.4* 7.5* MG  --   --  2.3 PHOS  --   --  3.0 Signed: )Mariann Avila, NP

## 2018-11-15 NOTE — FACE TO FACE
Please arrange for Home Oxygen at 2 Liters/ min via Nasal Canula to be dispensed at all times night and with activity during day due to CHF. Sa02 95 % on room air AT REST. Sa02 88 % on room air DURING AMBULATION. Sa02 91 % on 2 Liters DURING AMBULATION. Sa02 94 % on 2 Liters AT REST/AFTER AMBULATION.

## 2018-11-15 NOTE — PROGRESS NOTES
Attending Hospitalist Attestation: 
 
I have personally seen and examined the patient, reviewed pertinent labs and imaging, and discussed the plan of care with NP Kassie Gomez. I reviewed and agree with the history, exam, assessment and plan as outlined in her note. \"more SOB overnight\" Feels more SOB, additional lasix ordered for this morning No HA, abdominal pain, N/V, diarrhea Patient Vitals for the past 24 hrs: 
 Temp Pulse Resp BP SpO2  
11/15/18 0903    122/54   
11/15/18 0742     94 % 11/15/18 0721 97.9 °F (36.6 °C) 86 22 105/65 99 % 11/15/18 0418 98.2 °F (36.8 °C) 75 20 110/62 97 % 11/15/18 0009 98.1 °F (36.7 °C) 73 20 107/60 98 % 11/14/18 2002 98.3 °F (36.8 °C) 70 18 98/56 97 % 11/14/18 1707    129/52   
11/14/18 1459 97.5 °F (36.4 °C) 72 20 98/64 100 % 11/14/18 1044 97.4 °F (36.3 °C) 72 20 126/59 96 % No distress sitting up in chair Lungs crackles at bases, decreased BS bilaterally Heart: RRR nl S1S2, grade 3/6 SM at LSB, no LE edema Abdomen: soft, NT, ND, Positive BS, no rebound Skin: No rashes Neuro: Alert, non focal motor exam 
 
 
Assessment/plan: 
 
Dizziness POA room spinning for months Suspect orthostasis and ? Peripheral vertigo, cannot rule out VBI Progressive decline in exercise capacity due to dizziness Dizziness - persistent, near fall at home Neurology input appreciated, prior severe VBI 
CTA head and neck There are extensive venous collaterals in the neck and around the cervical 
          spine making interpretation of the vertebral arteries technically challenging. However they are small in caliber bilaterally but grossly patent. Patent bilateral carotid artery stents. No acute intracranial abnormality. No large vessel occlusion or intracranial aneurysm. Head CT: negative Cannot have MRI due to ICD  
PT: [facility] Acute on chronic systolic HF EF 55% POA Mod to severe AS POA Chronic orthostatic hypotension CSD s/p remote CABG / BiV ICD Elevated troponin  
aspirin/plavix. Lipitor. Additional IV lasix Echo LVEF 30 to 35%, mod MR and mod to severe AS Dr Maye Andino following 
  
Acute kidney injury POA Urinary retention  
baseline Cr ~ 1.1-1.2, admission 1.6, now improved Watch volume status carefully with the diuretics 
  
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  
NPO at home with TF  IsoSource 1.5 ( 5 cans, wife gives 1 can every 3 hr making sure there is no residual before given new can) Follows with Dr Yulissa Zhang OP for dysphasia DM type II  
, 243, 131, 98, 165 Continue lantus and SSI, regular insulin with TF Follows with endocrinology   
  
Chronic respiratory failure with hypoxia, cont home O2 2 L at night, follows Dr Edwina Motley H/o streptococcal bacteremia 07/2018 H/o stroke 2015 Chronic thrombocytopenia POA stable , followed with Dr Js Wagner 
  
Code status: Full NOK: wife [phone] - wife would like to be kept updated daily. She just had ESHA and confined to her home now Prophylaxis: Hep SQ Additional time:    25   min rendering and coordinating care Edgard Berrios MD

## 2018-11-15 NOTE — PROGRESS NOTES
Reason for Admission:  Dizziness RRAT Score:     50 Resources/supports as identified by patient/family:   Patient lives with wife, who is presently recovering from a total hip replacement. Wife is requiring 24/7 assistance at this time and unable to provide assistance with care to patient. Top Challenges facing patient (as identified by patient/family and CM): Finances/Medication cost?     Patient has Medicare part [address] as secondary Transportation?  family Support system or lack thereof? Family is available to help intermittently - currently assisting with care of patient's wife. Living arrangements? Lives in a 1 story home, 3 steps entry. Steps with railing. Self-care/ADLs/Cognition? Patient reports that he could bathe, dress and feed self. Patient has Peg tube. Wife and patient would manage feedings prior to admission. Current Advanced Directive/Advance Care Plan:  There is no AMD on file Plan for utilizing home health:    [facility] signed. [address] to be sent referral for SN, PTOT Likelihood of readmission: Mod to High 
              
Transition of Care Plan:          HH, follow up appointments. Cm met with patient. Pt name and  confirmed as pt identifiers. Demographics confirmed. SonRee Son is alternate emergency contact. [phone] Patient reports being independent prior to admission with ADL's/IADL's to include steps. Family provides transportation. DME - walker,nocturnal O2. O2 provider is [facility] in the past.   [facility] signed,  Copy to patient and on chart. Preferred Rx - [facility] at [address]. Tube feedings - wife/patient self manage tube feedings.  
 
Wife is presently recovering from Total Hip Replacement and requires a paid caregiver for assistance. Wife is researching assistance from the "[employer] family. This CM offerred Home health aide but wife informed this won't help. Care Management Interventions PCP Verified by CM: Yes Mode of Transport at Discharge: Other (see comment)(family) Transition of Care Consult (CM Consult): Home Health, Discharge Planning Discharge Durable Medical Equipment: No 
Physical Therapy Consult: Yes Occupational Therapy Consult: Yes Current Support Network: Lives with Spouse(wife is presently recovering from Total Hip Replacement. Unable to assist with care) Confirm Follow Up Transport: Family Plan discussed with Pt/Family/Caregiver: Yes Freedom of Choice Offered: Yes Discharge Location Discharge Placement: Home with home health Stefan Eugene [phone] can provide transportation. Patient only has nocturnal O2. Will need portable tank to be provided prior to discharge. 24 hour O2 is now needed. [facility] to be sent referral for SNPTOT. CM has spoke with Anni Daniel and Mrs. [id number] to confirm the above. Jessika Martinez RN CM Ext [phone]

## 2018-11-15 NOTE — PROGRESS NOTES
1100: Home Oxygen Test 
Date of test: 11/15/18 Time of test: 1100 Sa02 95 % on room air AT REST. Sa02 88 % on room air DURING AMBULATION. Sa02 91 % on 2 Liters DURING AMBULATION. Sa02 94 % on 2 Liters AT REST/AFTER AMBULATION.

## 2018-11-15 NOTE — PROGRESS NOTES
HH order submitted to [employer]. O2 order submitted to [employer]. 1400 - Chelle Begum has informed this CM they will accept to provide service. 1420 - [employer] will need chart notes from the physician showing the home plan and that portable o2 is needed and the diagnosis that makes it necessary. CM reviewed with Nancy Simon NP. 
 
1430 - 
NP informed CM face to face and note completed. 1500 - This CM uploaded face to face and most recent note to [employer] via [employer]. 1637 - [facility] can provide continuous O2. Will deliver tank in the morning. CM will continue to follow. Renato Mehta RN CM Ext [phone]

## 2018-11-15 NOTE — PROGRESS NOTES
1900:  
Bedside shift change report given to Garry Almeida RN (oncoming nurse). Report included the following information SBAR and Kardex. SHIFT SUMMARY: 
 
 
 
 
 
[address] NURSING NOTE Admission Date 11/11/2018 Admission Diagnosis Dizziness Consults IP CONSULT TO CARDIOLOGY 
IP CONSULT TO CARDIOLOGY 
IP CONSULT TO NEUROLOGY Cardiac Monitoring [x] Yes [] No  
  
Purposeful Hourly Rounding [x] Yes   
Olayinka Score Total Score: 4 Olayinka score 3 or > [x] Bed Alarm [] Avasys [] 1:1 sitter [] Patient refused (Signed refusal form in chart) Mark Score Mark Score: 19 Mark score 14 or < [] PMT consult [] Wound Care consult  
 []  Specialty bed  [] Nutrition consult Influenza Vaccine Received Flu Vaccine for Current Season (usually Sept-March): Yes Oxygen needs? [] Room air Oxygen @  []1L    [x]2L    []3L   []4L    []5L   []6L via NC Chronic home O2 use? [x] Yes [] No 
Perform O2 challenge test and document in progress note using [employer] (.Homeoxygen) Last bowel movement Last Bowel Movement Date: 11/13/18 Urinary Catheter LDAs Peripheral IV 11/11/18 Anterior; Inferior; Lower;Proximal;Right Arm (Active) Site Assessment Clean, dry, & intact 11/14/2018  2:00 PM  
Phlebitis Assessment 0 11/14/2018  2:00 PM  
Infiltration Assessment 0 11/14/2018  2:00 PM  
Dressing Status Clean, dry, & intact 11/14/2018  2:00 PM  
Dressing Type Transparent 11/14/2018  2:00 PM  
Hub Color/Line Status Blue;Flushed 11/14/2018  2:00 PM  
      
PEG/Gastrostomy Tube (Active) Site Assessment Clean, dry, & intact 11/14/2018  1:28 PM  
G Port Status Clamped 11/14/2018  7:32 AM  
Gastric Residual (mL) 15 ml 11/14/2018 10:02 AM  
Tube Feeding/Formula Options Other 11/14/2018  7:32 AM  
Water Flush Volume (mL) 30 mL 11/14/2018  1:28 PM  
Intake (ml) 300 ml 11/14/2018  1:28 PM  
Medication Volume 15 ml 11/14/2018  1:28 PM  
            
  
 Readmission Risk Assessment Tool Score High Risk 48 Total Score 3 Has Seen PCP in Last 6 Months (Yes=3, No=0)  
 9 IP Visits Last 12 Months (1-3=4, 4=9, >4=11) 5 Pt. Coverage (Medicare=5 , Medicaid, or Self-Pay=4) 33 Charlson Comorbidity Score (Age + Comorbid Conditions) Criteria that do not apply:  
 . Living with Significant Other. Assisted Living. LTAC. SNF. or  
Rehab Patient Length of Stay (>5 days = 3) Expected Length of Stay 2d 2h Actual Length of Stay 3

## 2018-11-16 NOTE — PROGRESS NOTES
1910-Bedside shift change report given to MITCH Cohen (oncoming nurse) by Cesar Melara (offgoing nurse). Report included the following information SBAR and Kardex. Bedside shift change report given to Radha Serrano RN (oncoming nurse). Report included the following information SBAR and Kardex. SHIFT SUMMARY: 
 
 
 
 
 
[address] NURSING NOTE Admission Date 11/11/2018 Admission Diagnosis Dizziness Consults IP CONSULT TO CARDIOLOGY 
IP CONSULT TO CARDIOLOGY 
IP CONSULT TO NEUROLOGY Cardiac Monitoring [x] Yes [] No  
  
Purposeful Hourly Rounding [x] Yes   
Olayinka Score Total Score: 4 Olayinka score 3 or > [x] Bed Alarm [] Avasys [] 1:1 sitter [] Patient refused (Signed refusal form in chart) Mark Score Mark Score: 18 Mark score 14 or < [] PMT consult [] Wound Care consult  
 []  Specialty bed  [] Nutrition consult Influenza Vaccine Received Flu Vaccine for Current Season (usually Sept-March): Yes Oxygen needs? [] Room air Oxygen @  []1L    [x]2L    []3L   []4L    []5L   []6L via NC Chronic home O2 use? [] Yes [] No 
Perform O2 challenge test and document in progress note using smartphrase (.Homeoxygen) Last bowel movement Last Bowel Movement Date: 11/15/18 Urinary Catheter LDAs Peripheral IV 11/11/18 Anterior; Inferior; Lower;Proximal;Right Arm (Active) Site Assessment Clean, dry, & intact 11/16/2018  3:38 AM  
Phlebitis Assessment 0 11/16/2018  3:38 AM  
Infiltration Assessment 0 11/16/2018  3:38 AM  
Dressing Status Clean, dry, & intact 11/16/2018  3:38 AM  
Dressing Type Transparent 11/16/2018  3:38 AM  
Hub Color/Line Status Flushed 11/16/2018  3:38 AM  
      
PEG/Gastrostomy Tube (Active) Site Assessment Intact 11/16/2018  3:38 AM  
G Port Status Clamped 11/16/2018  3:38 AM  
Gastric Residual (mL) 10 ml 11/16/2018  3:38 AM  
Tube Feeding/Formula Options Other 11/15/2018  9:00 PM  
Water Flush Volume (mL) 30 mL 11/15/2018  9:00 PM  
 Intake (ml) 30 ml 11/16/2018  3:38 AM  
Medication Volume 15 ml 11/16/2018  3:38 AM  
            
  
Readmission Risk Assessment Tool Score High Risk 48 Total Score 3 Has Seen PCP in Last 6 Months (Yes=3, No=0)  
 9 IP Visits Last 12 Months (1-3=4, 4=9, >4=11) 5 Pt. Coverage (Medicare=5 , Medicaid, or Self-Pay=4) 33 Charlson Comorbidity Score (Age + Comorbid Conditions) Criteria that do not apply:  
 . Living with Significant Other. Assisted Living. LTAC. SNF. or  
Rehab Patient Length of Stay (>5 days = 3) Expected Length of Stay 2d 2h Actual Length of Stay 5

## 2018-11-16 NOTE — PROGRESS NOTES
Attending Hospitalist Attestation: 
 
I have personally seen and examined the patient, reviewed pertinent labs and imaging, and discussed the plan of care with NP Eufemia Zamora. I reviewed and agree with the history, exam, assessment and plan as outlined in her note. \" Still feel a bit SOB\" Also notes some abdominal pain diffusely and loose stools, he says the loose stools are chronic Patient Vitals for the past 24 hrs: 
 Temp Pulse Resp BP SpO2  
11/16/18 1442 98 °F (36.7 °C) 72 20 94/48 94 % 11/16/18 1146 98.1 °F (36.7 °C) 90 20 99/64 96 % 11/16/18 0824     96 % 11/16/18 0758 97.6 °F (36.4 °C) 79 20 134/83 98 % 11/16/18 0227 97.4 °F (36.3 °C) 78 20 120/73 100 % 11/15/18 2349 98.1 °F (36.7 °C) 74 22 97/60 99 % 11/15/18 1941 97.8 °F (36.6 °C) 86 24 136/83 98 % No distress sitting up in chair Lungs crackles at bases, decreased BS bilaterally Heart: RRR nl S1S2, grade 3/6 SM at LSB, no LE edema Abdomen: soft, mildly diffusely tender, ND, Positive BS, no rebound Skin: No rashes Neuro: Alert, non focal motor exam 
 
 
Assessment/plan: 
 
Dizziness POA room spinning for months Suspect orthostasis and ? Peripheral vertigo, cannot rule out VBI Progressive decline in exercise capacity due to dizziness Dizziness - persistent, near fall at home Neurology input appreciated, prior severe VBI 
CTA head and neck There are extensive venous collaterals in the neck and around the cervical 
          spine making interpretation of the vertebral arteries technically challenging. However they are small in caliber bilaterally but grossly patent. Patent bilateral carotid artery stents. No acute intracranial abnormality. No large vessel occlusion or intracranial aneurysm. Head CT: negative Cannot have MRI due to ICD  
PT: [facility] AT [facility] Acute on chronic systolic HF EF 16% POA Mod to severe AS POA Chronic orthostatic hypotension CSD s/p remote CABG / BiV ICD  
 Elevated troponin  
aspirin/plavix. Lipitor. Still SOB and still with edema on CXR 11/15/2018 Additional IV lasix x 2 today, reassess in AM 
Echo LVEF 30 to 35%, mod MR and mod to severe AS Dr Beverly Hooper following 
  
Acute kidney injury POA Urinary retention  
baseline Cr ~ 1.1-1.2, admission 1.6, now improved to 10.4 Watch volume status carefully with the diuretics 
  
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  
NPO at home with TF  IsoSource 1.5 ( 5 cans, wife gives 1 can every 3 hr making sure there is no residual before given new can) Follows with Dr De La O Course OP for dysphasia DM type II  
, 123, 198, 213 Continue lantus and SSI, regular insulin with TF Follows with endocrinology   
  
Chronic respiratory failure with hypoxia, cont home O2 2 L at night, follows Dr Falk Erm H/o streptococcal bacteremia 07/2018 H/o stroke 2015 Chronic thrombocytopenia POA stable , followed with Dr Morton Prom 
  
Code status: Full NOK: wife [phone] - wife would like to be kept updated daily. She just had ESHA and confined to her home now Prophylaxis: Hep SQ Additional time:    25   min rendering and coordinating care Caryn De Leon MD

## 2018-11-16 NOTE — PROGRESS NOTES
Problem: Mobility Impaired (Adult and Pediatric) Goal: *Acute Goals and Plan of Care (Insert Text) Physical Therapy Goals Initiated 11/12/2018 1. Patient will move from supine to sit and sit to supine  in bed with independence within 7 day(s). 2.  Patient will transfer from bed to chair and chair to bed with modified independence using the least restrictive device within 7 day(s). 3.  Patient will perform sit to stand with modified independence within 7 day(s). 4.  Patient will ambulate with modified independence for 100 feet with the least restrictive device within 7 day(s). 5.  Patient will ascend/descend 6 stairs with 1 handrail(s) with supervision/set-up within 7 day(s). physical Therapy TREATMENT Patient: Marichuy Petersen (80 y.o. male) Date: 11/16/2018 Diagnosis: Dizziness <principal problem not specified> Precautions: Fall Chart, physical therapy assessment, plan of care and goals were reviewed. ASSESSMENT: 
Pt cleared by nurse to mobilize. Pt received up in chair on 2LPM. Pt agreeable to therapy. Pt performed sit to stand transfer at supervision. Pt ambulated 100ft with RW at [facility]. Pt requiring cueing to take longer steps and for PLB during ambulation. Pt able to correct for short time. Pt with x2 standing rest breaks during ambulation. Pts o2 stats at 93% after ambulation. Pt left up in chair one 2LPM. Pt with improved mobility tolerance today. Pt will benefit from HHPT to improve strength and endurance for safe mobility. Progression toward goals: 
[x]    Improving appropriately and progressing toward goals 
[]    Improving slowly and progressing toward goals 
[]    Not making progress toward goals and plan of care will be adjusted PLAN: 
Patient continues to benefit from skilled intervention to address the above impairments. Continue treatment per established plan of care. Discharge Recommendations:  Home Health Further Equipment Recommendations for Discharge:  None SUBJECTIVE:  
Patient stated I'm like a little mole I like the dark.  OBJECTIVE DATA SUMMARY:  
Critical Behavior: 
Neurologic State: Alert Orientation Level: Oriented X4 Cognition: Appropriate decision making Safety/Judgement: Awareness of environment, Insight into deficits Functional Mobility Training: 
  
Transfers: 
Sit to Stand: Supervision Stand to Sit: Supervision Balance: 
Sitting: Intact Standing: Intact; With support Standing - Static: Good;Constant support Standing - Dynamic : FairAmbulation/Gait Training: 
Distance (ft): 100 Feet (ft) Assistive Device: Gait belt;Walker, rolling Ambulation - Level of Assistance: Contact guard assistance Gait Abnormalities: Decreased step clearance; Step to gait Base of Support: Widened Speed/Gertrudis: Pace decreased (<100 feet/min) Step Length: Right shortened;Left shortened Pain: 
Pain Scale 1: Numeric (0 - 10) Pain Intensity 1: 7 Pain Location 1: Abdomen Pain Description 1: Aching Pain Intervention(s) 1: Medication (see MAR) Activity Tolerance: Pt with increased mobility tolerance today. After treatment:  
[x]    Patient left in no apparent distress sitting up in chair 
[]    Patient left in no apparent distress in bed 
[x]    Call bell left within reach [x]    Nursing notified 
[]    Caregiver present [x]    Bed alarm activated COMMUNICATION/COLLABORATION:  
The patients plan of care was discussed with: Registered Nurse Jameel Flores Time Calculation: 17 mins

## 2018-11-16 NOTE — PROGRESS NOTES
Hospitalist Progress Note NAME: Nora Argueta :  1935 MRN:  [id number] Interim Hospital Summary: 80 y.o. male whom presented on 2018 with Assessment / Plan: 
Chronic systolic HF Chronic orthostatic hypotension  
CAD s/p remote CABG / BiV ICD Ischemic cardiomyopathy Elevated troponin Pulmonary Edema 
- Echo: Systolic function was moderately to markedly reduced. Ejection fraction was estimated in the range of 30 % to 35 %. There was severe hypokinesis of the mid inferior, basal-mid inferolateral, basal-mid anterolateral, and apical lateral wall(s). - appreciate cardiology input; 
- continue with midodrin TID 
- continue with ASA, plavix, statin, and lasix. Daily weight; 75.8kg -> 74.2kg -> 80.1kg -> 79.5kg -> 78.6kg 
  CXR (11/15) revealed worsening of pulmonary edema. Lasix 40mg BID today then daily per Dr. Abiola Herrera 
- NT pro-BNP 6433 -> 7648 
  
Dizziness - persistent associated with  
Progressive decline in exercise capacity Documented moderate to severe AS and MR  
- appreciate neurology input; d/c meclizine (not effective). Recommend outpatient evaluation by interventional neurologist; Dr. Camelia Dewey 
- outpatient vestibular rehab 
- continue with ASA, plavix, and statin 
- carotid doppler study: <50% stenosis in the right internal carotid artery. 50-69% stenosis in the left internal carotid artery. No significant stenosis in the external carotid arteries bilaterally. Antegrade flow in both vertebral arteries. Normal flow in both subclavian arteries. - pt will be going home with home O2 when ready 
  
LIZ POA / Urinary retention  
- creat 1.0 
- UA clear  
- received IVF on admission - Avoid nephrotoxic drugs, adjust all meds to GFR.  
- continue with flomax and proscar 
  
Hx of Tongue CA S/p radical neck resection andS/p neck XRT  
Chronic dysphagia with PEG  
 -NPO at home with TF  IsoSource 1.5 ( 5 cans, wife gives 1 can every 3 hr making sure there is no residual before given new can) - Followed Dr Cynthia Odonnell OP for dysphasia 
- tolerating tube feeding without difficulty 
   
DM type II  
- A1C 8.6 
- continue with lantus. Check blood glucose q 6 hour and follow SSI 
- Follows with primary endocrinology   
  
Chronic respiratory failure with hypoxia, cont home O2 2 L at night, follows Dr Yareli Millard H/o streptococcal bacteremia 07/2018 H/o stroke 2015  
Chronic thrombocytopenia POA stable , followed with Dr Luiza Garcia 
  
Chest congestion 
- will continue with scheduled home dose mucinex and scheduled duoneb 
  
Code status: Full  
NOK: wife [phone] - wife would like to be kept updated daily. She just had ESHA and confined to her home now  
Prophylaxis: Hep SQ  
  
Baseline: lives with wife, he has very good family support at home; ambulating with walker  
Recommended Disposition: HH 
  
 
 
 
Subjective: Chief Complaint / Reason for Physician Visit \"I feel about the same as yesterday\". Discussed with RN events overnight. Review of Systems: 
Symptom Y/N Comments  Symptom Y/N Comments Fever/Chills n   Chest Pain n   
Poor Appetite    Edema y Cough y   Abdominal Pain Sputum n   Joint Pain SOB/KRAMER y   Pruritis/Rash Nausea/vomit n   Tolerating PT/OT Diarrhea n   Tolerating Diet Constipation n   Other Could NOT obtain due to:   
 
Objective: VITALS:  
Last 24hrs VS reviewed since prior progress note. Most recent are: 
Patient Vitals for the past 24 hrs: 
 Temp Pulse Resp BP SpO2  
11/16/18 1442 98 °F (36.7 °C) 72 20 94/48 94 % 11/16/18 1146 98.1 °F (36.7 °C) 90 20 99/64 96 % 11/16/18 0824     96 % 11/16/18 0758 97.6 °F (36.4 °C) 79 20 134/83 98 % 11/16/18 0227 97.4 °F (36.3 °C) 78 20 120/73 100 % 11/15/18 2349 98.1 °F (36.7 °C) 74 22 97/60 99 % 11/15/18 1941 97.8 °F (36.6 °C) 86 24 136/83 98 % Intake/Output Summary (Last 24 hours) at 11/16/2018 0938 Last data filed at 11/16/2018 4015 Gross per 24 hour Intake 1410 ml Output 2100 ml Net -690 ml PHYSICAL EXAM: 
General: Ill appearing, Alert, cooperative, no acute distress   
EENT:  EOMI. Anicteric sclerae. Dry mucous membrane Resp:  Diffuse bilateral rhonchi with a few crackles at bases, no wheezing or rales. No accessory muscle use CV:  Regular  Rhythm, trace of pitting edema GI:  Soft, Non distended, Non tender.  +Bowel sounds Neurologic:  Alert and oriented X 3, normal speech, Psych:   Good insight. Not anxious nor agitated Skin:  No rashes. No jaundice Reviewed most current lab test results and cultures  YES Reviewed most current radiology test results   YES Review and summation of old records today    NO Reviewed patient's current orders and MAR    YES 
PMH/ reviewed - no change compared to H&P 
________________________________________________________________________ Care Plan discussed with: 
  Comments Patient y Family  y Updated pt's wife RN y   
Care Manager y Consultant     
                 y Multidiciplinary team rounds were held today with , nursing, pharmacist and clinical coordinator. Patient's plan of care was discussed; medications were reviewed and discharge planning was addressed. ________________________________________________________________________ Total NON critical care TIME:  30  Minutes Total CRITICAL CARE TIME Spent:   Minutes non procedure based Comments >50% of visit spent in counseling and coordination of care    
________________________________________________________________________ Hyunsun Mendel Ingles, NP Procedures: see electronic medical records for all procedures/Xrays and details which were not copied into this note but were reviewed prior to creation of Plan. LABS: 
I reviewed today's most current labs and imaging studies. Pertinent labs include: 
Recent Labs 11/16/18 
8622 11/14/18 
0055 WBC 6.2 5.1 HGB 11.3* 10.3*  
 HCT 35.7* 33.3*  
PLT 85* 75* Recent Labs 11/16/18 
0127 11/15/18 
0016 11/14/18 
0055 * 137 137  
K 4.2 4.0 4.3 CL 98 97 99 CO2 30 35* 33* * 125* 177* BUN 26* 35* 39* CREA 1.04 1.26 1.22  
CA 9.0 8.7 8.4* MG 2.3  --   --   
 
 
Signed: )[facility]Formerly [facility], NP

## 2018-11-16 NOTE — PROGRESS NOTES
Progress Note 11/16/2018 12:01 PM 
NAME: Natahlie Johnson MRN:  [id number] Admit Diagnosis: Dizziness Problem List:  
 
Admitted with dizziness and falling.  
  
  Hx:  
1. Chronic systolic heart failure 2. Coronary artery disease s/p remote CABG.  Cath '13 w/ EF 40%, PCI to distal LAD via LIMA, severe disease in large diagonal (not amenable to PCI), patent SVG-OM, SVG-PDA, SVG-PLB.  Lexiscan 11/13 w/ EF 28%, IWMI, and with diagonal ischemia. 3. Lexiscan stress MPI 9/14 w/ EF 29% and anterior ischemia w/ IWMI 4. Ischemic cardiomyopathy 5. BiV ICD 6. Chronic kidney disease; Stg 3 
7. 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   
8. Diabetes 9. 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 
10. Orthostatic hypotension 11. Hyperlipidemia 12. Head/neck CA w/ feeding tube 13. Chronic thrombocytopenia (Dr. Kurtis Clifton) 
   
 
Assessment/Plan:  
Depressed 
sCr stable Neuro note noted Echo unchanged CXR worse No labs this am 
 
1. Continue midodrine 10mg TID 2. Lasix 40mg BID IV today and then daily; will have to see how he responds 3. Will need to reschedule [facility] appt  
 
  
 []       High complexity decision making was performed in this patient at high risk for decompensation with multiple organ involvement. Subjective:  
 
Nathalie Johnson denies chest pain. Still dizzy, somewhat better. Still SOB. Discussed with RN events overnight. Review of Systems: 
 
Symptom Y/N Comments  Symptom Y/N Comments Fever/Chills N   Chest Pain N Poor Appetite N   Edema N   
Cough y   Abdominal Pain N Sputum N   Joint Pain N   
SOB/KRAMER y   Pruritis/Rash N   
Nausea/vomit N   Tolerating PT/OT Y Diarrhea N   Tolerating Diet Y Constipation N   Other Could NOT obtain due to:   
 
Objective:  
  
Physical Exam: 
 
Last 24hrs VS reviewed since prior progress note. Most recent are: 
 
Visit Vitals /83 (BP 1 Location: Right arm) Comment: typo Pulse 79 Temp 97.6 °F (36.4 °C) Resp 20 Ht 5' 7\" (1.702 m) Wt 78.6 kg (173 lb 4.8 oz) SpO2 96% BMI 27.14 kg/m² Intake/Output Summary (Last 24 hours) at 11/16/2018 1004 Last data filed at 11/16/2018 4145 Gross per 24 hour Intake 1425 ml Output 2100 ml Net -675 ml General Appearance: Well developed, well nourished, alert & oriented x 3,  
 no acute distress. Ears/Nose/Mouth/Throat: Hearing grossly normal. 
Neck: Supple. Chest: Lungs w/ diffuse rhonchi. Cardiovascular: Regular rate and rhythm, S1S2 normal, harsh TARA Abdomen: Soft, non-tender, bowel sounds are active. Extremities: Trivial edema bilaterally. Skin: Warm and dry. []         Post-cath site without hematoma, bruit, tenderness, or thrill. Distal pulses intact. PMH/SH reviewed - no change compared to H&P Data Review Telemetry: paced rhythm EKG:  
[x]  No new EKG for review Lab Data Personally Reviewed: 
 
Recent Labs 11/14/18 0055 WBC 5.1 HGB 10.3* HCT 33.3*  
PLT 75* No results for input(s): INR, PTP, APTT in the last 72 hours. No lab exists for component: INREXT, INREXT Recent Labs 11/15/18 
0016 11/14/18 0055  137  
K 4.0 4.3 CL 97 99 CO2 35* 33* BUN 35* 39* CREA 1.26 1.22  
* 177* CA 8.7 8.4* Recent Labs 11/14/18 0055 TROIQ <0.05 Lab Results Component Value Date/Time Cholesterol, total 123 06/04/2018 09:16 AM  
 HDL Cholesterol 35 (L) 06/04/2018 09:16 AM  
 LDL, calculated 62 06/04/2018 09:16 AM  
 Triglyceride 132 06/04/2018 09:16 AM  
 CHOL/HDL Ratio 3.6 02/02/2017 02:55 AM  
 
 
No results for input(s): SGOT, GPT, AP, TBIL, TP, ALB, GLOB, GGT, AML, LPSE in the last 72 hours. No lab exists for component: AMYP, HLPSE No results for input(s): PH, PCO2, PO2 in the last 72 hours. Medications Personally Reviewed: 
 
Current Facility-Administered Medications Medication Dose Route Frequency  [START ON 11/17/2018] furosemide (LASIX) tablet 20 mg  20 mg Per G Tube BID Mon Wed & Fri  
 [START ON 11/17/2018] furosemide (LASIX) injection 40 mg  40 mg IntraVENous DAILY  furosemide (LASIX) injection 40 mg  40 mg IntraVENous ONCE  
 melatonin tablet 3 mg  3 mg Oral QHS  sodium chloride (OCEAN) 0.65 % nasal squeeze bottle 2 Spray  2 Spray Both Nostrils Q2H PRN  
 guaiFENesin (ROBITUSSIN) 100 mg/5 mL oral liquid 100 mg  100 mg Per G Tube TID  albuterol (PROVENTIL VENTOLIN) nebulizer solution 2.5 mg  2.5 mg Nebulization BID RT  
 HYDROcodone-acetaminophen (NORCO)  mg tablet 1 Tab  1 Tab Per G Tube Q6H PRN  
 insulin glargine (LANTUS) injection 14 Units  14 Units SubCUTAneous DAILY  finasteride (PROSCAR) tablet 5 mg  5 mg Oral DAILY  gabapentin (NEURONTIN) 250 mg/5 mL solution 250 mg  250 mg Per G Tube BID  famotidine (PEPCID) tablet 20 mg  20 mg Per G Tube DAILY  acetaminophen (TYLENOL) tablet 1,000 mg  1,000 mg Per G Tube Q6H PRN  
 albuterol (PROVENTIL VENTOLIN) nebulizer solution 1.25 mg  1.25 mg Nebulization Q6H PRN  
 tamsulosin (FLOMAX) capsule 0.4 mg  0.4 mg Oral DAILY  aspirin chewable tablet 81 mg  81 mg Per G Tube DAILY  atorvastatin (LIPITOR) tablet 40 mg  40 mg Per G Tube DAILY  clopidogrel (PLAVIX) tablet 75 mg  75 mg PEG Tube DAILY  fluticasone (FLONASE) 50 mcg/actuation nasal spray 2 Spray  2 Spray Both Nostrils DAILY  lactobac ac& pc-s.therm-b.anim (JAVY Q/RISAQUAD)  1 Cap PEG Tube DAILY  midodrine (PROAMITINE) tablet 10 mg  10 mg Per G Tube TID WITH MEALS  pramipexole (MIRAPEX) tablet 0.25 mg  0.25 mg Oral TID  zinc sulfate (ZINCATE) capsule 220 mg  220 mg Per G Tube DAILY  sodium chloride (NS) flush 5-10 mL  5-10 mL IntraVENous Q8H  
 sodium chloride (NS) flush 5-10 mL  5-10 mL IntraVENous PRN  
 naloxone (NARCAN) injection 0.4 mg  0.4 mg IntraVENous PRN  
 ondansetron (ZOFRAN) injection 2 mg  2 mg IntraVENous Q6H PRN  
 heparin (porcine) injection 5,000 Units  5,000 Units SubCUTAneous Q8H  
  insulin lispro (HUMALOG) injection   SubCUTAneous AC&HS  
 glucose chewable tablet 16 g  4 Tab Oral PRN  
 dextrose (D50W) injection syrg 12.5-25 g  12.5-25 g IntraVENous PRN  
 glucagon (GLUCAGEN) injection 1 mg  1 mg IntraMUSCular PRN  
 insulin regular (NOVOLIN R, HUMULIN R) injection 5-14 Units  5-14 Units SubCUTAneous TID  lactose-reduced food with fibr 0.07 gram-1.5 kcal/mL liqd 250 mL  (Patient Supplied)  250 mL Feeding Tube 5XD  magic mouthwash cpd (with sucralfate)  5 mL Oral TIDAC Antonio Esparza III, DO

## 2018-11-16 NOTE — PROGRESS NOTES
Spiritual Care Assessment/Progress Note [place] [address] 
 
 
NAME: Amina Ramirez      MRN: [id number] AGE: 80 y.o. SEX: male Restoration Affiliation: Yazidism  
Language: English  
 
11/16/2018     Total Time (in minutes): 57 Spiritual Assessment begun in [facility] through conversation with: 
  
    []Patient        [] Family    [] Friend(s) Reason for Consult: Palliative Care, Initial/Spiritual Assessment Spiritual beliefs: (Please include comment if needed) [x] Identifies with a jordyn tradition:     
   [x] Supported by a jordyn community:        
   [] Claims no spiritual orientation:       
   [] Seeking spiritual identity:            
   [] Adheres to an individual form of spirituality:       
   [] Not able to assess:                   
 
    
Identified resources for coping:  
   [x] Prayer                           
   [] Music                  [] Guided Imagery 
   [] Family/friends                 [] Pet visits [] Devotional reading                         [] Unknown 
   [] Other:  Member of local [place] on [facility] Interventions offered during this visit: (See comments for more details) Plan of Care: 
 
 [x] Support spiritual and/or cultural needs  
 [] Support AMD and/or advance care planning process    
 [] Support grieving process 
 [] Coordinate Rites and/or Rituals  
 [] Coordination with community clergy 
 [x] No spiritual needs identified at this time 
 [] Detailed Plan of Care below (See Comments)  [] Make referral to Music Therapy 
[] Make referral to Pet Therapy    
[] Make referral to Addiction services 
[] Make referral to [facility] 
[] Make referral to Spiritual Care Partner 
[] No future visits requested       
[x] Follow up visits as needed Comments: The patient was sitting in a quiet room. The TV was not on.  As I entered and introduced myself I was well received by the patient. A discussion about the patient medical challenges was rather extended in length. The patient spoke about his family and a variety of subjects. At various times the patient would come to tears and tell me how much he wished he could stop crying. At an appropriate time after the patient indicated that he is supported by a jordyn community, I suggested that we have prayer. We prayed together and the patient resumed our conversation. I sat down and listened again for several minutes. We talked about his early life. How it was at the time of the Great Depression, He talked about his early years and how he made bad choices when his father  and he was a [de-identified] years old, etc., etc. A medical staff person came in to check to see if his oxygen tank was in the room for him to take home. She left quickly. I used the opportunity to stand and bring the conversation to an end. I assured the patient of continuing prayer on his behalf. [address] Sandip Doyle EdD, MDiv For [facility] Page [phone])

## 2018-11-17 NOTE — PROGRESS NOTES
Attending Hospitalist Attestation: 
 
I have personally seen and examined the patient, reviewed pertinent labs and imaging, and discussed the plan of care with NP Alejandra Parks. I reviewed and agree with the history, exam, assessment and plan as outlined in her note. \" Breathing starting to feel a bit better\" Less SOB Still with some diffuse abdominal pain No CP or N/V Patient Vitals for the past 24 hrs: 
 Temp Pulse Resp BP SpO2  
11/17/18 1151 98.6 °F (37 °C) 84 20 108/75 99 % 11/17/18 0816     98 % 11/17/18 0757 98.1 °F (36.7 °C) 70 20 97/55 95 % 11/17/18 0419 98.5 °F (36.9 °C) 94 20 124/78 98 % 11/17/18 0333 97.7 °F (36.5 °C) 72 20 112/51 99 % 11/16/18 2355 97.9 °F (36.6 °C) 74 20 98/47 92 % 11/16/18 1932 98.2 °F (36.8 °C) 74 20 118/52 98 % No distress sitting up in chair Lungs Few crackles at bases, decreased BS bilaterally Heart: RRR nl S1S2, grade 3/6 SM at LSB, no LE edema Abdomen: soft, mildly diffusely tender, ND, Positive BS, no rebound Skin: No rashes Neuro: Alert, non focal motor exam 
 
 
Assessment/plan: 
 
Acute on chronic systolic HF EF 63% POA Mod to severe AS POA Chronic orthostatic hypotension CSD s/p remote CABG / BiV ICD Elevated troponin  
aspirin/plavix. Lipitor. Still SOB and still with edema on CXR 11/15/2018 Additional IV lasix x 2 today, clinically improving Check repeat CXR in AM 
Echo LVEF 30 to 35%, mod MR and mod to severe AS Dr Danielle Hines following D/C once volume status improved Generalized abdomina pain POA Check KUB and labs Bowel regimen 
  
Acute kidney injury POA Urinary retention  
baseline Cr ~ 1.1-1.2, admission 1.6, now improved to 10.4 Watch volume status carefully with the diuretics Dizziness POA room spinning for months Suspect orthostasis and ? Peripheral vertigo, cannot rule out VBI Progressive decline in exercise capacity due to dizziness Dizziness - persistent, near fall at home Neurology input appreciated, prior severe VBI 
CTA head and neck There are extensive venous collaterals in the neck and around the cervical 
          spine making interpretation of the vertebral arteries technically challenging. However they are small in caliber bilaterally but grossly patent. Patent bilateral carotid artery stents. No acute intracranial abnormality. No large vessel occlusion or intracranial aneurysm. Head CT: negative Cannot have MRI due to ICD  
PT: [facility] 
  
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  
NPO at home with TF  IsoSource 1.5 ( 5 cans, wife gives 1 can every 3 hr making sure there is no residual before given new can) Follows with Dr David Matson OP for dysphasia DM type II  
, 213, 235, 188, 265 Continue lantus and SSI, regular insulin with TF Follows with endocrinology   
  
Chronic respiratory failure with hypoxia, cont home O2 2 L at night, follows Dr Chriss Goncalves H/o streptococcal bacteremia 07/2018 H/o stroke 2015 Chronic thrombocytopenia POA stable , followed with Dr Parag Barraza 
  
Code status: Full NOK: wife [phone] - wife would like to be kept updated daily. She just had ESHA and confined to her home now Prophylaxis: Hep SQ Additional time:    25   min rendering and coordinating care Mary Rogers MD

## 2018-11-17 NOTE — PROGRESS NOTES
Report received from [facility]. Introduced myself as primary RN. 
  
Assumed care of patient. Instructed patient to call for assistance prior to getting up. Pt verbalized understanding. Call bell within reach. Bedside and Verbal shift change report given to Carlos (oncoming nurse) by Dolly Salgado (offgoing nurse). Report included the following information SBAR, Kardex, [facility] and Cardiac Rhythm paced.  
 
  
[phone]

## 2018-11-17 NOTE — PROGRESS NOTES
Hospitalist Progress Note NAME: Antonina Steele :  1935 MRN:  [id number] Interim Hospital Summary: 80 y.o. male whom presented on 2018 with Assessment / Plan: 
Chronic systolic HF Chronic orthostatic hypotension  
CAD s/p remote CABG / BiV ICD  Ischemic cardiomyopathy Elevated troponin Pulmonary Edema 
- Echo: Systolic function was moderately to markedly reduced. Ejection fraction was estimated in the range of 30 % to 35 %. There was severe hypokinesis of the mid inferior, basal-mid inferolateral, basal-mid anterolateral, and apical lateral wall(s). - appreciate cardiology input; 
- continue with midodrin TID 
- continue with ASA, plavix, statin, and lasix. Daily weight; 75.8kg -> 74.2kg -> 80.1kg -> 79.5kg -> 78.6kg ->77.3kg 
  CXR (11/15) revealed worsening of pulmonary edema. Lasix 40mg BID today then daily per Dr. Stefany Shields 
- NT pro-BNP 6433 -> 7648 
- repeat CXR in am 
- pt voices feeling about the same as yesterday.  
  
Dizziness - persistent associated with  
Progressive decline in exercise capacity Documented moderate to severe AS and MR  
- appreciate neurology input; d/c meclizine (not effective). Recommend outpatient evaluation by interventional neurologist; Dr. Terry Smith 
- outpatient vestibular rehab 
- continue with ASA, plavix, and statin 
- carotid doppler study: <50% stenosis in the right internal carotid artery. 50-69% stenosis in the left internal carotid artery. No significant stenosis in the external carotid arteries bilaterally. Antegrade flow in both vertebral arteries. Normal flow in both subclavian arteries. - pt will be going home with home O2 when ready 
  
LIZ POA / Urinary retention  
- creat 1.1 
- UA clear  
- received IVF on admission - Avoid nephrotoxic drugs, adjust all meds to GFR.  
- continue with flomax and proscar 
  
Hx of Tongue CA S/p radical neck resection andS/p neck XRT  
Chronic dysphagia with PEG  
  -NPO at home with TF  IsoSource 1.5 ( 5 cans, wife gives 1 can every 3 hr making sure there is no residual before given new can) - Followed Dr Angel Goodrich OP for dysphasia 
- tolerating tube feeding without difficulty 
   
DM type II  
- A1C 8.6 
- continue with lantus. Check blood glucose q 6 hour and follow SSI 
- Follows with primary endocrinology   
  
Chronic respiratory failure with hypoxia, cont home O2 2 L at night, follows Dr Adan Espinal H/o streptococcal bacteremia 07/2018 H/o stroke 2015  
Chronic thrombocytopenia POA stable , followed with Dr Lonnie Snell 
  
Chest congestion 
- will continue with scheduled home dose mucinex and scheduled duoneb 
 
constipation 
- yesterday, he informed me that he was having small BM every time when he goes to bathroom. Today, he c/o of constipation. Dulcolax suppository now. If it is not effective, lactulose PRN via peg.  
- will consider KUB tomorrow if he is still c/o abd discomfort/bloated it Code status: Full  
NOK: wife [phone] - wife would like to be kept updated daily. She just had ESHA and confined to her home now  
Prophylaxis: Hep SQ  
  
Baseline: lives with wife, he has very good family support at home; ambulating with walker  
Recommended Disposition: HH 
  
  
 
 
Subjective: Chief Complaint / Reason for Physician Visit \"i am feeling about the same, getting weaker everyday\". Discussed with RN events overnight. Review of Systems: 
Symptom Y/N Comments  Symptom Y/N Comments Fever/Chills n   Chest Pain n   
Poor Appetite    Edema Cough y   Abdominal Pain Sputum    Joint Pain SOB/KRAMER y   Pruritis/Rash Nausea/vomit n   Tolerating PT/OT Diarrhea    Tolerating Diet Constipation    Other Could NOT obtain due to:   
 
Objective: VITALS:  
Last 24hrs VS reviewed since prior progress note. Most recent are: 
Patient Vitals for the past 24 hrs: 
 Temp Pulse Resp BP SpO2  
11/17/18 1151 98.6 °F (37 °C) 84 20 108/75 99 % 11/17/18 0816     98 % 11/17/18 0757 98.1 °F (36.7 °C) 70 20 97/55 95 % 11/17/18 0419 98.5 °F (36.9 °C) 94 20 124/78 98 % 11/17/18 0333 97.7 °F (36.5 °C) 72 20 112/51 99 % 11/16/18 2355 97.9 °F (36.6 °C) 74 20 98/47 92 % 11/16/18 1932 98.2 °F (36.8 °C) 74 20 118/52 98 % 11/16/18 1442 98 °F (36.7 °C) 72 20 94/48 94 % Intake/Output Summary (Last 24 hours) at 11/17/2018 1437 Last data filed at 11/17/2018 1408 Gross per 24 hour Intake 290 ml Output 2400 ml Net -2110 ml PHYSICAL EXAM: 
General: Ill appearing, Alert, cooperative, no acute distress   
EENT:  EOMI. Anicteric sclerae. MMM Resp:  Clear in apex with decreased breath sounds at bases, no wheezing or rales. No accessory muscle use CV:  Regular  rhythm,  trace of edema GI:  Soft, distended, Non tender.  +Bowel sounds Neurologic:  Alert and oriented X 3, normal speech, Psych:   Good insight. Not anxious nor agitated Skin:  No rashes. No jaundice Reviewed most current lab test results and cultures  YES Reviewed most current radiology test results   YES Review and summation of old records today    NO Reviewed patient's current orders and MAR    YES 
PMH/SH reviewed - no change compared to H&P 
________________________________________________________________________ Care Plan discussed with: 
  Comments Patient y Family  y  Wife has been updated it RN y   
Care Manager Consultant Multidiciplinary team rounds were held today with , nursing, pharmacist and clinical coordinator. Patient's plan of care was discussed; medications were reviewed and discharge planning was addressed. ________________________________________________________________________ Total NON critical care TIME:  30   Minutes Total CRITICAL CARE TIME Spent:   Minutes non procedure based Comments >50% of visit spent in counseling and coordination of care ________________________________________________________________________ Kwame Solano NP Procedures: see electronic medical records for all procedures/Xrays and details which were not copied into this note but were reviewed prior to creation of Plan. LABS: 
I reviewed today's most current labs and imaging studies. Pertinent labs include: 
Recent Labs 11/16/18 
5781 WBC 6.2 HGB 11.3* HCT 35.7* PLT 85* Recent Labs 11/17/18 
0025 11/16/18 
9532 11/15/18 
0016  135* 137  
K 3.9 4.2 4.0  
CL 98 98 97 CO2 33* 30 35* * 187* 125* BUN 31* 26* 35* CREA 1.12 1.04 1.26  
CA 8.7 9.0 8.7 MG 2.3 2.3  --   
 
 
Signed: )Kwame Solano NP

## 2018-11-17 NOTE — PROGRESS NOTES
Progress Note 11/17/2018 12:01 PM 
NAME: Luna Heredia MRN:  [id number] Admit Diagnosis: Dizziness Problem List:  
 
Admitted with dizziness and falling.  
  
  Hx:  
1. Chronic systolic heart failure 2. Coronary artery disease s/p remote CABG.  Cath '13 w/ EF 40%, PCI to distal LAD via LIMA, severe disease in large diagonal (not amenable to PCI), patent SVG-OM, SVG-PDA, SVG-PLB.  Lexiscan 11/13 w/ EF 28%, IWMI, and with diagonal ischemia. 3. Lexiscan stress MPI 9/14 w/ EF 29% and anterior ischemia w/ IWMI 4. Ischemic cardiomyopathy 5. BiV ICD 6. Chronic kidney disease; Stg 3 
7. 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   
8. Diabetes 9. 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 
10. Orthostatic hypotension 11. Hyperlipidemia 12. Head/neck CA w/ feeding tube 13. Chronic thrombocytopenia (Dr. Monserrat Bronson) 
   
 
Assessment/Plan:  
Depressed 
sCr stable Neuro note noted Echo unchanged 1. Continue midodrine 10mg TID 2. Lasix 40mg BID IV today and then daily 3. CXR in the AM 
4. Will need to reschedule [facility] appt  
 
  
 []       High complexity decision making was performed in this patient at high risk for decompensation with multiple organ involvement. Subjective:  
 
Luna Heredia denies chest pain. Still dizzy, somewhat better. Still SOB, but better. Less rattling. Discussed with RN events overnight. Review of Systems: 
 
Symptom Y/N Comments  Symptom Y/N Comments Fever/Chills N   Chest Pain N Poor Appetite N   Edema N   
Cough y   Abdominal Pain N Sputum N   Joint Pain N   
SOB/KRAMER y   Pruritis/Rash N   
Nausea/vomit N   Tolerating PT/OT Y Diarrhea N   Tolerating Diet Y Constipation N   Other Could NOT obtain due to:   
 
Objective:  
  
Physical Exam: 
 
Last 24hrs VS reviewed since prior progress note. Most recent are: 
 
Visit Vitals BP 97/55 (BP 1 Location: Right arm, BP Patient Position: At rest;Head of bed elevated (Comment degrees)) Comment (BP Patient Position): 85  
Pulse 70 Temp 98.1 °F (36.7 °C) Resp 20 Ht 5' 7\" (1.702 m) Wt 77.3 kg (170 lb 6.4 oz) SpO2 98% BMI 26.69 kg/m² Intake/Output Summary (Last 24 hours) at 11/17/2018 1049 Last data filed at 11/17/2018 2333 Gross per 24 hour Intake 620 ml Output 2400 ml Net -1780 ml General Appearance: Well developed, well nourished, alert & oriented x 3,  
 no acute distress. Ears/Nose/Mouth/Throat: Hearing grossly normal. 
Neck: Supple. Chest: Lungs w/ dec BS in the bases. Cardiovascular: Regular rate and rhythm, S1S2 normal, harsh TARA Abdomen: Soft, non-tender, bowel sounds are active. Extremities: Trivial edema bilaterally. Skin: Warm and dry. []         Post-cath site without hematoma, bruit, tenderness, or thrill. Distal pulses intact. PMH/SH reviewed - no change compared to H&P Data Review Telemetry: paced rhythm EKG:  
[x]  No new EKG for review Lab Data Personally Reviewed: 
 
Recent Labs 11/16/18 
0175 WBC 6.2 HGB 11.3* HCT 35.7* PLT 85* No results for input(s): INR, PTP, APTT in the last 72 hours. No lab exists for component: INREXT, INREXT Recent Labs 11/17/18 
0025 11/16/18 
1657 11/15/18 
0016  135* 137  
K 3.9 4.2 4.0  
CL 98 98 97 CO2 33* 30 35* BUN 31* 26* 35* CREA 1.12 1.04 1.26  
* 187* 125* CA 8.7 9.0 8.7 MG 2.3 2.3  -- No results for input(s): CPK, CKNDX, TROIQ in the last 72 hours. No lab exists for component: CPKMB Lab Results Component Value Date/Time Cholesterol, total 123 06/04/2018 09:16 AM  
 HDL Cholesterol 35 (L) 06/04/2018 09:16 AM  
 LDL, calculated 62 06/04/2018 09:16 AM  
 Triglyceride 132 06/04/2018 09:16 AM  
 CHOL/HDL Ratio 3.6 02/02/2017 02:55 AM  
 
 
No results for input(s): SGOT, GPT, AP, TBIL, TP, ALB, GLOB, GGT, AML, LPSE in the last 72 hours. No lab exists for component: AMYP, HLPSE No results for input(s): PH, PCO2, PO2 in the last 72 hours. Medications Personally Reviewed: 
 
Current Facility-Administered Medications Medication Dose Route Frequency  bisacodyl (DULCOLAX) suppository 10 mg  10 mg Rectal DAILY PRN  
 lactulose (CHRONULAC) solution 10 g  15 mL Oral BID PRN  
 furosemide (LASIX) injection 40 mg  40 mg IntraVENous ONCE  
 furosemide (LASIX) injection 40 mg  40 mg IntraVENous DAILY  melatonin tablet 3 mg  3 mg Oral QHS  sodium chloride (OCEAN) 0.65 % nasal squeeze bottle 2 Spray  2 Spray Both Nostrils Q2H PRN  
 guaiFENesin (ROBITUSSIN) 100 mg/5 mL oral liquid 100 mg  100 mg Per G Tube TID  albuterol (PROVENTIL VENTOLIN) nebulizer solution 2.5 mg  2.5 mg Nebulization BID RT  
 HYDROcodone-acetaminophen (NORCO)  mg tablet 1 Tab  1 Tab Per G Tube Q6H PRN  
 insulin glargine (LANTUS) injection 14 Units  14 Units SubCUTAneous DAILY  finasteride (PROSCAR) tablet 5 mg  5 mg Oral DAILY  gabapentin (NEURONTIN) 250 mg/5 mL solution 250 mg  250 mg Per G Tube BID  famotidine (PEPCID) tablet 20 mg  20 mg Per G Tube DAILY  acetaminophen (TYLENOL) tablet 1,000 mg  1,000 mg Per G Tube Q6H PRN  
 albuterol (PROVENTIL VENTOLIN) nebulizer solution 1.25 mg  1.25 mg Nebulization Q6H PRN  
 tamsulosin (FLOMAX) capsule 0.4 mg  0.4 mg Oral DAILY  aspirin chewable tablet 81 mg  81 mg Per G Tube DAILY  atorvastatin (LIPITOR) tablet 40 mg  40 mg Per G Tube DAILY  clopidogrel (PLAVIX) tablet 75 mg  75 mg PEG Tube DAILY  fluticasone (FLONASE) 50 mcg/actuation nasal spray 2 Spray  2 Spray Both Nostrils DAILY  lactobac ac& pc-s.therm-b.anim (JAVY Q/RISAQUAD)  1 Cap PEG Tube DAILY  midodrine (PROAMITINE) tablet 10 mg  10 mg Per G Tube TID WITH MEALS  pramipexole (MIRAPEX) tablet 0.25 mg  0.25 mg Oral TID  zinc sulfate (ZINCATE) capsule 220 mg  220 mg Per G Tube DAILY  sodium chloride (NS) flush 5-10 mL  5-10 mL IntraVENous Q8H  
  sodium chloride (NS) flush 5-10 mL  5-10 mL IntraVENous PRN  
 naloxone (NARCAN) injection 0.4 mg  0.4 mg IntraVENous PRN  
 ondansetron (ZOFRAN) injection 2 mg  2 mg IntraVENous Q6H PRN  
 heparin (porcine) injection 5,000 Units  5,000 Units SubCUTAneous Q8H  
 insulin lispro (HUMALOG) injection   SubCUTAneous AC&HS  
 glucose chewable tablet 16 g  4 Tab Oral PRN  
 dextrose (D50W) injection syrg 12.5-25 g  12.5-25 g IntraVENous PRN  
 glucagon (GLUCAGEN) injection 1 mg  1 mg IntraMUSCular PRN  
 insulin regular (NOVOLIN R, HUMULIN R) injection 5-14 Units  5-14 Units SubCUTAneous TID  lactose-reduced food with fibr 0.07 gram-1.5 kcal/mL liqd 250 mL  (Patient Supplied)  250 mL Feeding Tube 5XD  magic mouthwash cpd (with sucralfate)  5 mL Oral TIDAC Devonda Harada III, DO

## 2018-11-17 NOTE — PROGRESS NOTES
Bedside and Verbal shift change report given to Sharifa Hughes RN (oncoming nurse). Report included the following information SBAR, Kardex, ED Summary, Procedure Summary, MAR, Recent Results and Cardiac Rhythm (NSR). SHIFT SUMMARY: 
 
 
 
 
 
[address] NURSING NOTE Admission Date 11/11/2018 Admission Diagnosis Dizziness Consults IP CONSULT TO CARDIOLOGY 
IP CONSULT TO CARDIOLOGY 
IP CONSULT TO NEUROLOGY 
IP CONSULT TO PALLIATIVE CARE - PROVIDER Cardiac Monitoring [x] Yes [] No  
  
Purposeful Hourly Rounding [] Yes   
Olayinka Score Total Score: 4 Olayinka score 3 or > [x] Bed Alarm [] Avasys [] 1:1 sitter [] Patient refused (Signed refusal form in chart) Mark Score Mark Score: 18 Mark score 14 or < [] PMT consult [] Wound Care consult  
 []  Specialty bed  [] Nutrition consult Influenza Vaccine Received Flu Vaccine for Current Season (usually Sept-March): Yes Oxygen needs? [] Room air Oxygen @  []1L    [x]2L    []3L   []4L    []5L   []6L via NC Chronic home O2 use? [x] Yes [] No 
Perform O2 challenge test and document in progress note using smartphrase (.Homeoxygen) Last bowel movement Last Bowel Movement Date: 11/15/18 Urinary Catheter LDAs Peripheral IV 11/11/18 Anterior; Inferior; Lower;Proximal;Right Arm (Active) Site Assessment Clean, dry, & intact 11/16/2018  3:11 PM  
Phlebitis Assessment 0 11/16/2018  3:11 PM  
Infiltration Assessment 0 11/16/2018  3:11 PM  
Dressing Status Clean, dry, & intact 11/16/2018  3:11 PM  
Dressing Type Tape 11/16/2018  3:11 PM  
Hub Color/Line Status Blue 11/16/2018  3:11 PM  
      
PEG/Gastrostomy Tube (Active) Site Assessment Clean, dry, & intact 11/16/2018  4:22 PM  
Dressing Status Clean, dry, & intact 11/16/2018  4:22 PM  
G Port Status Clamped 11/16/2018  4:22 PM  
Gastric Residual (mL) 75 ml 11/16/2018  4:22 PM  
Tube Feeding/Formula Options Other 11/16/2018  4:22 PM  
 Water Flush Volume (mL) 30 mL 11/16/2018  4:22 PM  
Intake (ml) 250 ml 11/16/2018  4:22 PM  
Medication Volume 10 ml 11/16/2018  4:22 PM  
            
  
Readmission Risk Assessment Tool Score High Risk 48 Total Score 3 Has Seen PCP in Last 6 Months (Yes=3, No=0) 3 Patient Length of Stay (>5 days = 3) 9 IP Visits Last 12 Months (1-3=4, 4=9, >4=11) 5 Pt. Coverage (Medicare=5 , Medicaid, or Self-Pay=4) 33 Charlson Comorbidity Score (Age + Comorbid Conditions) Criteria that do not apply:  
 . Living with Significant Other. Assisted Living. LTAC. SNF. or  
Rehab Expected Length of Stay 2d 2h Actual Length of Stay 6

## 2018-11-18 NOTE — PROGRESS NOTES
Attending Hospitalist Attestation: 
 
I have personally seen and examined the patient, reviewed pertinent labs and imaging, and discussed the plan of care with NP Preston Medina. I reviewed and agree with the history, exam, assessment and plan as outlined in her note. \" Breathing better\" Less SOB, CXR with less edema and effusions Still with some diffuse abdominal pain No CP or N/V Patient Vitals for the past 24 hrs: 
 Temp Pulse Resp BP SpO2  
11/18/18 1424 98.1 °F (36.7 °C) 77 23 120/53 99 % 11/18/18 1046 97.9 °F (36.6 °C) 84 20 117/65 99 % 11/18/18 0912     99 % 11/18/18 0715 98.2 °F (36.8 °C) 74 22 115/52 93 % 11/18/18 0107 97.9 °F (36.6 °C) 75 22 127/58 99 % 11/17/18 2329    91/55   
11/17/18 2250 97.6 °F (36.4 °C) 72 22 99/44 97 % 11/17/18 2041     98 % 11/17/18 1923 98.1 °F (36.7 °C) 77 22 111/67 99 % No distress sitting up in chair Lungs Few crackles at bases, no accessory muscle use or retractions Heart: RRR nl S1S2, grade 3/6 SM at LSB, no LE edema Abdomen: soft, mildly diffusely tender, ND, Positive BS, no rebound Skin: No rashes Neuro: Alert, non focal motor exam 
 
 
Assessment/plan: 
 
Acute on chronic systolic HF EF 53% POA Mod to severe AS POA Chronic orthostatic hypotension CAD s/p remote CABG / BiV ICD Elevated troponin  
aspirin/plavix. Lipitor. Still SOB and still with edema on CXR 11/15/2018 Additional IV lasix x 2 today Echo LVEF 30 to 35%, mod MR and mod to severe AS Dr Marcelino Garcia following CXR and clinical symptoms improved ? D/C in AM on increased lasix, will d/w cardiology Generalized abdomina pain POA KUB negative Bowel regimen 
  
Acute kidney injury POA Urinary retention  
baseline Cr ~ 1.1-1.2, admission 1.6, now improved to 10.4 Watch volume status carefully with the diuretics Dizziness POA room spinning for months Suspect orthostasis and ? Peripheral vertigo, cannot rule out VBI Progressive decline in exercise capacity due to dizziness Dizziness - persistent, near fall at home Neurology input appreciated, prior severe VBI 
CTA head and neck There are extensive venous collaterals in the neck and around the cervical 
          spine making interpretation of the vertebral arteries technically challenging. However they are small in caliber bilaterally but grossly patent. Patent bilateral carotid artery stents. No acute intracranial abnormality. No large vessel occlusion or intracranial aneurysm. Head CT: negative Cannot have MRI due to ICD  
PT: [facility] 
  
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  
NPO at home with TF  IsoSource 1.5 ( 5 cans, wife gives 1 can every 3 hr making sure there is no residual before given new can) Follows with Dr Jaya Pozo OP for dysphasia DM type II  
, 213, 235, 188, 265 Continue lantus and SSI, regular insulin with TF Follows with endocrinology   
  
Chronic respiratory failure with hypoxia, cont home O2 2 L at night, follows Dr Joyce Garnica H/o streptococcal bacteremia 07/2018 H/o stroke 2015 Chronic thrombocytopenia POA stable , followed with Dr Lani Hawkins 
  
Code status: Full NOK: wife [phone] - wife would like to be kept updated daily. She just had ESHA and confined to her home now Prophylaxis: Hep SQ Additional time:    25   min rendering and coordinating care Kandy Duron MD

## 2018-11-18 NOTE — PROGRESS NOTES
Report received from MITCH [place]. Introduced myself as primary RN. Assumed care of patient. Instructed patient to call for assistance prior to getting up. Pt verbalized understanding. Call bell within reach. 9:35 AM Pt off fl for testing.  
 
1:57 PM Patient with complaints of the urge to void, however unable to do so. Denies pain with urination  Will perform bladder scan and continue to follow. 2:24PM Bladder scan, 282 mls. Patient requesting to use RR at this time. 2:38 PM Urine output, 200 mls. Patient with some relief. Hx of BPH. Will continue to monitor output. 2:45 PM Gastric residual, 116 mls. 3pm bolus TFs to be held at this time for 1 hour. Will continue to follow. 4:00 PM Gastric residual, 0 mls. Bolus TFs resumed. Next TF scheduled for 7pm.  
 
Bedside and Verbal shift change report given to Mauricio (oncoming nurse) by Iggy Jimenez (offgoing nurse). Report included the following information SBAR, Kardex, MAR, Recent Results, Med Rec Status and Cardiac Rhythm Paced.

## 2018-11-18 NOTE — PROGRESS NOTES
Hospitalist Progress Note NAME: Urvashi Curry :  1935 MRN:  [id number] Interim Hospital Summary: 80 y.o. male whom presented on 2018 with Assessment / Plan: 
Chronic systolic HF Chronic orthostatic hypotension  
CAD s/p remote CABG / BiV ICD  Ischemic cardiomyopathy Elevated troponin Pulmonary Edema 
- Echo: Systolic function was moderately to markedly reduced. Ejection fraction was estimated in the range of 30 % to 35 %. There was severe hypokinesis of the mid inferior, basal-mid inferolateral, basal-mid anterolateral, and apical lateral wall(s). - appreciate cardiology input; 
- continue with midodrin TID 
- continue with ASA, plavix, statin, and lasix. Daily weight; 80.1kg -> 79.5kg -> 78.6kg ->77.3kg ->76.3kg Diuresing, but pt's overall symptoms not improved 
  CXR (11/15) revealed worsening of pulmonary edema.  
  Lasix 40mg daily per Dr. Conway Parent 
- NT pro-BNP 6433 -> 7648 
- CXR with ABD X-ray (2018) 1. Decreased mild bilateral pleural effusions. Improved edema. 2. No evidence of acute abdominal process. 
  
Dizziness - persistent associated with  
Progressive decline in exercise capacity Documented moderate to severe AS and MR  
- appreciate neurology input; d/c meclizine (not effective). Recommend outpatient evaluation by interventional neurologist; Dr. Adelene Oppenheim 
- outpatient vestibular rehab 
- continue with ASA, plavix, and statin 
- carotid doppler study: <50% stenosis in the right internal carotid artery. 50-69% stenosis in the left internal carotid artery. No significant stenosis in the external carotid arteries bilaterally. Antegrade flow in both vertebral arteries. Normal flow in both subclavian arteries. - pt will be going home with home O2 when ready 
  
LIZ POA / Urinary retention  
- creat 1.1 
- UA clear  
- received IVF on admission - Avoid nephrotoxic drugs, adjust all meds to GFR.  
- continue with flomax and proscar 
  
 Hx of Tongue CA S/p radical neck resection andS/p neck XRT  
Chronic dysphagia with PEG  
 -NPO at home with TF  IsoSource 1.5 ( 5 cans, wife gives 1 can every 3 hr making sure there is no residual before given new can) - Followed Dr Dorothy Garrett OP for dysphasia 
- tolerating tube feeding without difficulty 
   
DM type II  
- A1C 8.6 
- continue with lantus. Check blood glucose q 6 hour and follow SSI 
- Follows with primary endocrinology   
  
Chronic respiratory failure with hypoxia, cont home O2 2 L at night, follows Dr Jessica Alcaraz H/o streptococcal bacteremia 07/2018 H/o stroke 2015  
Chronic thrombocytopenia POA stable , followed with Dr Randolph Haynes 
  
Chest congestion 
- will continue with scheduled home dose mucinex and scheduled duoneb 
  
constipation 
- small BM yesterday. KUB (-) 
- Miralax BID via PEG 
  
Code status: Full  
NOK: wife [phone] - wife would like to be kept updated daily. She just had ESHA and confined to her home now  
Prophylaxis: Hep SQ  
  
Baseline: lives with wife, he has very good family support at home; ambulating with walker  
Recommended Disposition:  
  
  
 
 
 
Subjective: Chief Complaint / Reason for Physician Visit \"I am just not feeling any better today\". Discussed with RN events overnight. Review of Systems: 
Symptom Y/N Comments  Symptom Y/N Comments Fever/Chills n   Chest Pain n   
Poor Appetite    Edema Cough y   Abdominal Pain Sputum n   Joint Pain SOB/KRAMER y   Pruritis/Rash Nausea/vomit n   Tolerating PT/OT Diarrhea    Tolerating Diet Constipation    Other Could NOT obtain due to:   
 
Objective: VITALS:  
Last 24hrs VS reviewed since prior progress note. Most recent are: 
Patient Vitals for the past 24 hrs: 
 Temp Pulse Resp BP SpO2  
11/18/18 1046 97.9 °F (36.6 °C) 84 20 117/65 99 % 11/18/18 0912     99 % 11/18/18 0715 98.2 °F (36.8 °C) 74 22 115/52 93 % 11/18/18 0107 97.9 °F (36.6 °C) 75 22 127/58 99 % 11/17/18 2329    91/55   
11/17/18 2250 97.6 °F (36.4 °C) 72 22 99/44 97 % 11/17/18 2041     98 % 11/17/18 1923 98.1 °F (36.7 °C) 77 22 111/67 99 % 11/17/18 1731 97.7 °F (36.5 °C) 81 28 130/63 98 % Intake/Output Summary (Last 24 hours) at 11/18/2018 1259 Last data filed at 11/18/2018 1150 Gross per 24 hour Intake 585 ml Output 2350 ml Net -1765 ml PHYSICAL EXAM: 
General: Ill appearing. Alert, cooperative, no acute distress   
EENT:  EOMI. Anicteric sclerae. MMM Resp:  Bilateral rhonchi with a few crackles at bases, no wheezing or rales. No accessory muscle use CV:  Regular  rhythm,  No edema GI:  Soft, distended, Non tender.  +Bowel sounds Neurologic:  Alert and oriented X 3, normal speech, Psych:   Good insight. Not anxious nor agitated Skin:  No rashes. No jaundice Reviewed most current lab test results and cultures  YES Reviewed most current radiology test results   YES Review and summation of old records today    NO Reviewed patient's current orders and MAR    YES 
PMH/ reviewed - no change compared to H&P 
________________________________________________________________________ Care Plan discussed with: 
  Comments Patient y Family  y Updated the wife RN y   
Care Manager Consultant Multidiciplinary team rounds were held today with , nursing, pharmacist and clinical coordinator. Patient's plan of care was discussed; medications were reviewed and discharge planning was addressed. ________________________________________________________________________ Total NON critical care TIME:  30  Minutes Total CRITICAL CARE TIME Spent:   Minutes non procedure based Comments >50% of visit spent in counseling and coordination of care    
________________________________________________________________________ Kwame Mejia NP Procedures: see electronic medical records for all procedures/Xrays and details which were not copied into this note but were reviewed prior to creation of Plan. LABS: 
I reviewed today's most current labs and imaging studies. Pertinent labs include: 
Recent Labs 11/18/18 
0031 11/16/18 
5190 WBC 6.3 6.2 HGB 10.0* 11.3* HCT 31.5* 35.7*  
PLT 79* 85* Recent Labs 11/18/18 
0031 11/17/18 
0025 11/16/18 
7068  137 135* K 3.8 3.9 4.2 CL 98 98 98 CO2 37* 33* 30  
* 232* 187* BUN 29* 31* 26* CREA 1.16 1.12 1.04  
CA 8.9 8.7 9.0 MG  --  2.3 2.3 ALB 2.7*  --   --   
TBILI 0.4  --   --   
SGOT 50*  --   --   
ALT 44  --   --   
 
 
Signed: )Kwame Mast, NP

## 2018-11-18 NOTE — PROGRESS NOTES
ADULT PROTOCOL: JET AEROSOL  REASSESSMENT Patient  Mary Oliveira     80 y.o.   male     11/18/2018  11:13 AM 
 
Breath Sounds Pre Procedure: Right Breath Sounds: Diminished, Lower, Crackles Left Breath Sounds: Diminished, Lower, Crackles Breath Sounds Post Procedure: Right Breath Sounds: Diminished, Lower, Crackles Left Breath Sounds: Diminished, Lower, Crackles Breathing pattern: Pre procedure Breathing Pattern: Regular Post procedure Breathing Pattern: Regular Heart Rate: Pre procedure Pulse: 88 
         Post procedure Pulse: 82 Resp Rate: Pre procedure Respirations: 16 Post procedure Respirations: 16 
 
     
 
Cough: Pre procedure Cough: Non-productive, Congested Post procedure Cough: Non-productive, Congested Oxygen: O2 Device: Nasal cannula   Flow rate (L/min) 2 lpm: Home O2 Changed: NO SpO2: Pre procedure SpO2: 99 %   with oxygen Post procedure SpO2: 99 %  with oxygen Nebulizer Therapy: Current medications Aerosolized Medications: Albuterol Changed: NO Smoking History: never Problem List:  
Patient Active Problem List  
Diagnosis Code  Dysphagia R13.10  Abnormal cardiovascular stress test R94.39  
 S/P CABG x 3 Z95.1  Atherosclerotic cerebrovascular disease I67.2  Orthostatic hypotension I95.1  Feeding difficulties R63.3  Non-cardiac chest pain R07.89  S/P PTCA (percutaneous transluminal coronary angioplasty) Z98.61  
 CAD (coronary artery disease) I25.10  Status post implantation of automatic cardioverter/defibrillator (AICD) Z95.810  
 Aortic stenosis, mild I35.0  Esophageal motility disorder K22.4  Heart failure (HCC) I50.9  Percutaneous endoscopic gastrostomy status ([facility] Z93.1  Antiplatelet or antithrombotic long-term use Z79.02  
 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 ([address] E11.42  
 Peripheral neuropathy ([address] G62.9  
 Polyneuropathy associated with underlying disease ([address] G63  
 History of stroke Z86.73  
 Aspiration pneumonia ([address] J69.0  Weakness R53.1  Stroke ([address] I63.9  Thrombotic stroke involving left middle cerebral artery ([address] N41.194  Stenosis of both internal carotid arteries I65.23  
 Hemiplegia and hemiparesis following cerebral infarction affecting right dominant side ([facility]) W77.927  Type 2 diabetes with nephropathy ([address] E11.21  
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  
 Tremors of nervous system R25.1  Parkinson's disease (tremor, stiffness, slow motion, unstable posture) ([facility]) G20  
 Myalgia M79.10  Pneumonia due to group B Streptococcus ([address] J15.3  Counseling regarding goals of care Z71.89  
 Disturbance of memory R41.3  Presbycusis of both ears H91.13  
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  
 Convulsion ([address] R56.9  Epigastric pain R10.13  
 Dizziness R42 Respiratory Therapist: Love Biswas, RT

## 2018-11-18 NOTE — PROGRESS NOTES
Progress Note 11/18/2018 12:01 PM 
NAME: Amina Ramirez MRN:  [id number] Admit Diagnosis: Dizziness Problem List:  
 
Admitted with dizziness and falling.  
  
  Hx:  
1. Chronic systolic heart failure 2. Coronary artery disease s/p remote CABG.  Cath '13 w/ EF 40%, PCI to distal LAD via LIMA, severe disease in large diagonal (not amenable to PCI), patent SVG-OM, SVG-PDA, SVG-PLB.  Lexiscan 11/13 w/ EF 28%, IWMI, and with diagonal ischemia. 3. Lexiscan stress MPI 9/14 w/ EF 29% and anterior ischemia w/ IWMI 4. Ischemic cardiomyopathy 5. BiV ICD 6. Chronic kidney disease; Stg 3 
7. 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   
8. Diabetes 9. 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 
10. Orthostatic hypotension 11. Hyperlipidemia 12. Head/neck CA w/ feeding tube 13. Chronic thrombocytopenia (Dr. Kali Bullard) 
   
 
Assessment/Plan:  
Depressed 
sCr stable Neuro note noted Echo unchanged 1. Continue midodrine 10mg TID 2. Lasix 40mg BID IV again today and then daily 3. Acute series ordered by primary 4. Will need to reschedule [facility] appt  
 
  
 []       High complexity decision making was performed in this patient at high risk for decompensation with multiple organ involvement. Subjective:  
 
Amina Ramirez denies chest pain. Still dizzy, somewhat better. Still SOB, but better. Less rattling. Discussed with RN events overnight. Review of Systems: 
 
Symptom Y/N Comments  Symptom Y/N Comments Fever/Chills N   Chest Pain N Poor Appetite N   Edema N   
Cough y   Abdominal Pain N Sputum N   Joint Pain N   
SOB/KRAMER y   Pruritis/Rash N   
Nausea/vomit N   Tolerating PT/OT Y Diarrhea N   Tolerating Diet Y Constipation N   Other Could NOT obtain due to:   
 
Objective:  
  
Physical Exam: 
 
Last 24hrs VS reviewed since prior progress note. Most recent are: 
 
Visit Vitals /52 (BP 1 Location: Left arm, BP Patient Position: Supine) Pulse 74 Temp 98.2 °F (36.8 °C) Resp 22 Ht 5' 7\" (1.702 m) Wt 76.3 kg (168 lb 5 oz) SpO2 93% BMI 26.36 kg/m² Intake/Output Summary (Last 24 hours) at 11/18/2018 9042 Last data filed at 11/18/2018 3730 Gross per 24 hour Intake 335 ml Output 2250 ml Net -1915 ml General Appearance: Well developed, well nourished, alert & oriented x 3,  
 no acute distress. Ears/Nose/Mouth/Throat: Hearing grossly normal. 
Neck: Supple. Chest: Lungs w/ dec BS in the bases. Cardiovascular: Regular rate and rhythm, S1S2 normal, harsh TARA Abdomen: Soft, non-tender, bowel sounds are active. Extremities: Trivial edema bilaterally. Skin: Warm and dry. []         Post-cath site without hematoma, bruit, tenderness, or thrill. Distal pulses intact. PMH/SH reviewed - no change compared to H&P Data Review Telemetry: paced rhythm EKG:  
[x]  No new EKG for review Lab Data Personally Reviewed: 
 
Recent Labs 11/18/18 
0031 11/16/18 
4347 WBC 6.3 6.2 HGB 10.0* 11.3* HCT 31.5* 35.7*  
PLT 79* 85* No results for input(s): INR, PTP, APTT in the last 72 hours. No lab exists for component: INREXT, INREXT Recent Labs 11/18/18 
0031 11/17/18 
0025 11/16/18 
7927  137 135* K 3.8 3.9 4.2 CL 98 98 98 CO2 37* 33* 30  
BUN 29* 31* 26* CREA 1.16 1.12 1.04  
* 232* 187* CA 8.9 8.7 9.0 MG  --  2.3 2.3 No results for input(s): CPK, CKNDX, TROIQ in the last 72 hours. No lab exists for component: CPKMB Lab Results Component Value Date/Time Cholesterol, total 123 06/04/2018 09:16 AM  
 HDL Cholesterol 35 (L) 06/04/2018 09:16 AM  
 LDL, calculated 62 06/04/2018 09:16 AM  
 Triglyceride 132 06/04/2018 09:16 AM  
 CHOL/HDL Ratio 3.6 02/02/2017 02:55 AM  
 
 
Recent Labs 11/18/18 
0031 SGOT 50* AP 68  
TP 6.7 ALB 2.7*  
GLOB 4.0  
LPSE 92 No results for input(s): PH, PCO2, PO2 in the last 72 hours. Medications Personally Reviewed: Current Facility-Administered Medications Medication Dose Route Frequency  polyethylene glycol (MIRALAX) packet 17 g  17 g Per G Tube BID  furosemide (LASIX) injection 40 mg  40 mg IntraVENous ONCE  
 bisacodyl (DULCOLAX) suppository 10 mg  10 mg Rectal DAILY PRN  
 lactulose (CHRONULAC) solution 10 g  15 mL Oral BID PRN  
 furosemide (LASIX) injection 40 mg  40 mg IntraVENous DAILY  melatonin tablet 3 mg  3 mg Oral QHS  sodium chloride (OCEAN) 0.65 % nasal squeeze bottle 2 Spray  2 Spray Both Nostrils Q2H PRN  
 guaiFENesin (ROBITUSSIN) 100 mg/5 mL oral liquid 100 mg  100 mg Per G Tube TID  albuterol (PROVENTIL VENTOLIN) nebulizer solution 2.5 mg  2.5 mg Nebulization BID RT  
 HYDROcodone-acetaminophen (NORCO)  mg tablet 1 Tab  1 Tab Per G Tube Q6H PRN  
 insulin glargine (LANTUS) injection 14 Units  14 Units SubCUTAneous DAILY  finasteride (PROSCAR) tablet 5 mg  5 mg Oral DAILY  gabapentin (NEURONTIN) 250 mg/5 mL solution 250 mg  250 mg Per G Tube BID  famotidine (PEPCID) tablet 20 mg  20 mg Per G Tube DAILY  acetaminophen (TYLENOL) tablet 1,000 mg  1,000 mg Per G Tube Q6H PRN  
 albuterol (PROVENTIL VENTOLIN) nebulizer solution 1.25 mg  1.25 mg Nebulization Q6H PRN  
 tamsulosin (FLOMAX) capsule 0.4 mg  0.4 mg Oral DAILY  aspirin chewable tablet 81 mg  81 mg Per G Tube DAILY  atorvastatin (LIPITOR) tablet 40 mg  40 mg Per G Tube DAILY  clopidogrel (PLAVIX) tablet 75 mg  75 mg PEG Tube DAILY  fluticasone (FLONASE) 50 mcg/actuation nasal spray 2 Spray  2 Spray Both Nostrils DAILY  lactobac ac& pc-s.therm-b.anim (JAVY Q/RISAQUAD)  1 Cap PEG Tube DAILY  midodrine (PROAMITINE) tablet 10 mg  10 mg Per G Tube TID WITH MEALS  pramipexole (MIRAPEX) tablet 0.25 mg  0.25 mg Oral TID  zinc sulfate (ZINCATE) capsule 220 mg  220 mg Per G Tube DAILY  sodium chloride (NS) flush 5-10 mL  5-10 mL IntraVENous Q8H  
  sodium chloride (NS) flush 5-10 mL  5-10 mL IntraVENous PRN  
 naloxone (NARCAN) injection 0.4 mg  0.4 mg IntraVENous PRN  
 ondansetron (ZOFRAN) injection 2 mg  2 mg IntraVENous Q6H PRN  
 heparin (porcine) injection 5,000 Units  5,000 Units SubCUTAneous Q8H  
 insulin lispro (HUMALOG) injection   SubCUTAneous AC&HS  
 glucose chewable tablet 16 g  4 Tab Oral PRN  
 dextrose (D50W) injection syrg 12.5-25 g  12.5-25 g IntraVENous PRN  
 glucagon (GLUCAGEN) injection 1 mg  1 mg IntraMUSCular PRN  
 insulin regular (NOVOLIN R, HUMULIN R) injection 5-14 Units  5-14 Units SubCUTAneous TID  lactose-reduced food with fibr 0.07 gram-1.5 kcal/mL liqd 250 mL  (Patient Supplied)  250 mL Feeding Tube 5XD  magic mouthwash cpd (with sucralfate)  5 mL Oral TIDAC Masha Pillow III, DO

## 2018-11-19 PROBLEM — Z85.810 HX OF TONGUE CANCER: Status: ACTIVE | Noted: 2018-01-01

## 2018-11-19 NOTE — PROGRESS NOTES
Hospitalist Progress Note NAME: Nasir Hatfield :  1935 MRN:  [id number] Interim Hospital Summary: 80 y.o. male whom presented on 2018 with Assessment / Plan: 
Chronic systolic HF Chronic orthostatic hypotension  
CAD s/p remote CABG / BiV ICD  Ischemic cardiomyopathy Elevated troponin Pulmonary Edema 
- Echo: Systolic function was moderately to markedly reduced. Ejection fraction was estimated in the range of 30 % to 35 %. There was severe hypokinesis of the mid inferior, basal-mid inferolateral, basal-mid anterolateral, and apical lateral wall(s). - appreciate cardiology input; 
- continue with midodrin TID 
- continue with ASA, plavix, statin, and lasix. Daily weight; 80.1kg ->76.6kg Diuresing, but pt's overall symptoms not improved 
  CXR (11/15) revealed worsening of pulmonary edema.  
  Lasix 40mg daily per Dr. Ramiro Amaya 
- NT pro-BNP 6433 -> 7648 
- CXR with ABD X-ray (2018) 1. Decreased mild bilateral pleural effusions. Improved edema. 2. No evidence of acute abdominal process. 
  
Dizziness - persistent associated with  
Progressive decline in exercise capacity Documented moderate to severe AS and MR  
- appreciate neurology input; d/c meclizine (not effective). Recommend outpatient evaluation by interventional neurologist; Dr. Clay Gallo 
- outpatient vestibular rehab 
- continue with ASA, plavix, and statin 
- carotid doppler study: <50% stenosis in the right internal carotid artery. 50-69% stenosis in the left internal carotid artery. No significant stenosis in the external carotid arteries bilaterally. Antegrade flow in both vertebral arteries. Normal flow in both subclavian arteries. - pt will be going home with home O2 when ready 
  
LIZ POA / Urinary retention  
- creat 1.2 
- UA clear  
- received IVF on admission - Avoid nephrotoxic drugs, adjust all meds to GFR.  
- continue with flomax and proscar 
  
 Hx of Tongue CA S/p radical neck resection andS/p neck XRT  
Chronic dysphagia with PEG  
 -NPO at home with TF  IsoSource 1.5 ( 5 cans, wife gives 1 can every 3 hr making sure there is no residual before given new can) - Followed Dr Mayi Pickett OP for dysphasia 
- tolerating tube feeding without difficulty 
   
DM type II  
- A1C 8.6 
- continue with lantus. Check blood glucose q 6 hour and follow SSI 
- Follows with primary endocrinology   
  
Chronic respiratory failure with hypoxia, cont home O2 2 L at night, follows Dr Heike Vickers H/o streptococcal bacteremia 07/2018 H/o stroke 2015  
Chronic thrombocytopenia POA stable , followed with Dr Neema Delgado 
  
Chest congestion 
- will continue with scheduled home dose mucinex and scheduled duoneb 
  
constipation 
- small BM yesterday. KUB (-) 
- Miralax BID via PEG 
  
Code status: Full  
NOK: wife [phone] - wife would like to be kept updated daily. She just had ESHA and confined to her home now  
Prophylaxis: Hep SQ  
  
Baseline: lives with wife, he has very good family support at home; ambulating with walker  
Recommended You 44 
  
Met with Mrs. Nam (pt's wife) in pt's room. Address and answered all of their concerns. We were not able to set up an appointment at [facility] at this time due to lack of contact information. Dr. Amina Perez has been notified. Subjective: Chief Complaint / Reason for Physician Visit \"I am having a difficult time getting around\". Discussed with RN events overnight. Review of Systems: 
Symptom Y/N Comments  Symptom Y/N Comments Fever/Chills n   Chest Pain n   
Poor Appetite    Edema Cough    Abdominal Pain Sputum    Joint Pain SOB/KRAMER y   Pruritis/Rash Nausea/vomit n   Tolerating PT/OT Diarrhea    Tolerating Diet Constipation    Other Could NOT obtain due to:   
 
Objective: VITALS:  
Last 24hrs VS reviewed since prior progress note. Most recent are: Patient Vitals for the past 24 hrs: 
 Temp Pulse Resp BP SpO2  
11/19/18 1105 98 °F (36.7 °C) 85 20 115/50 98 % 11/19/18 0817     96 % 11/19/18 0726 97.9 °F (36.6 °C) 85 21 121/66 96 % 11/19/18 0409 97.7 °F (36.5 °C) 72 16 120/49 99 % 11/18/18 2235 97.9 °F (36.6 °C) 86 18 138/53 99 % 11/18/18 2017     97 % 11/18/18 1941 98.4 °F (36.9 °C) 78 24 132/55 100 % 11/18/18 1424 98.1 °F (36.7 °C) 77 23 120/53 99 % Intake/Output Summary (Last 24 hours) at 11/19/2018 1359 Last data filed at 11/19/2018 1246 Gross per 24 hour Intake 1035 ml Output 2900 ml Net -1865 ml PHYSICAL EXAM: 
General: Ill appearing, Alert, cooperative, no acute distress   
EENT:  EOMI. Anicteric sclerae. MMM Resp:  Bilateral rhonchi with decreased breath sounds at bases, no wheezing or rales. No accessory muscle use CV:  Regular  rhythm,  trace of pitting edema GI:  Soft, distended, Non tender.  +Bowel sounds Neurologic:  Alert and oriented X 3, normal speech, Psych:   Good insight. Not anxious nor agitated Skin:  No rashes. No jaundice Reviewed most current lab test results and cultures  YES Reviewed most current radiology test results   YES Review and summation of old records today    NO Reviewed patient's current orders and MAR    YES 
PMH/SH reviewed - no change compared to H&P 
________________________________________________________________________ Care Plan discussed with: 
  Comments Patient y Family  y Updated wife RN y   
Care Manager y Consultant     
                 y Multidiciplinary team rounds were held today with , nursing, pharmacist and clinical coordinator. Patient's plan of care was discussed; medications were reviewed and discharge planning was addressed. ________________________________________________________________________ Total NON critical care TIME:  30   Minutes Total CRITICAL CARE TIME Spent:   Minutes non procedure based Comments >50% of visit spent in counseling and coordination of care    
________________________________________________________________________ Kwame Fields NP Procedures: see electronic medical records for all procedures/Xrays and details which were not copied into this note but were reviewed prior to creation of Plan. LABS: 
I reviewed today's most current labs and imaging studies. Pertinent labs include: 
Recent Labs 11/18/18 
0031 WBC 6.3 HGB 10.0* HCT 31.5* PLT 79* Recent Labs 11/19/18 78 [phone] 11/18/18 
0031 11/17/18 
0025  138 137  
K 4.1 3.8 3.9 CL 96* 98 98 CO2 37* 37* 33* * 202* 232* BUN 30* 29* 31* CREA 1.26 1.16 1.12  
CA 9.4 8.9 8.7 MG  --   --  2.3 ALB  --  2.7*  --   
TBILI  --  0.4  --   
SGOT  --  50*  --   
ALT  --  44  --   
 
 
Signed: )Kwame Mast NP

## 2018-11-19 NOTE — CONSULTS
Palliative Medicine Consult [place]: [phone] ([id number]) Patient Name: Bon Heredia YOB: 1935 Date of Initial Consult: 11/19/18 Reason for Consult: care decisions Requesting Provider: Dr. Pace [place] Primary Care Physician: Mehrdad Caldwell MD 
 
 SUMMARY:  
Bon Heredia is a 80 y.o. with a past history of complex medical history  (DM, CKD  Chronic respiratory failure on 2L at night at home CAD s/p CABG/ s/p BIvCD, mod-severe aortic stenosis, bilateral carotid artery stents, hx Tongue cancer s/p resection / XRT, chronic dysphagia, PEG tube, hx posterior CVA, known severe vertebrobasilar disease on CTA Head/Neck 2017 and resultant chronic dizziness) , who was admitted on 11/11/2018 from home with a diagnosis of dizziness and falling. Found to have volume overload, ECHO (11/12/18) EF 30-35%, mod-sev aortic stenosis. Current medical issues leading to Palliative Medicine involvement include:   Debility from multiple issues above, patient is hopeful that aortic valve surgery will help to improve is QOL and keep fluid off abdomen. Patient is  62 years. His wife just had Hip replacement surgery last week. Patient ambulates using rolling walker. They have lots of support from their sons who live nearby and other family, friends. Patient is retired from multiple jobs, including [employer], has a dog at home named \"Smokey\" PALLIATIVE DIAGNOSES:  
1. Debility 2. Chronic dizziness, known vertebrobasilar disease, frequent falls 3. Mod-sev aortic stenosis 4. Ischemic cardiomyopathy EF 40% 5. CKD stage 3 
6. Chronic dysphagia, PEG tube (hx tongue cancer) 7. Dyspnea with exertion 8. Depression, anxiety about health 9. Polypharmacy 10. hypoalbuminemia PLAN:  
1. Palliative Medicine Services introduced to patient, See also Gabriella Medina's note.  
1. Patient's current understanding is that Dr. Kandy Matson is recommending aortic valve procedure for him, and is arranging referral as outpatient, hopefully soon after Thanksgiving. Patient has heard that this could improve his QOL and lessen fluid overload. He understands that it's risky, and he could \"die on the table\", he is accepting of large amount of risk to perhaps prolong life. He says he likes to focus on the \"positive\" because hearing negative things just make him worry and \"why worry? \". Patient plans to attend this appointment with surgeon along with his wife and sons so all can have chance to ask questions, participate in discussion around whether this should be done (or not). 2. Patient's outlook is \"I'm not looking for comfort, I'm looking for life\". He is hopeful he can live as long as possible, and is willing to undergo burdening interventions if doctors think it will help him. Patient is concerned about leaving his family, concerned about being a burden, tearful at times, speaks about reese of being with his family. 3. Patient has AMD at home. His mPOA would be his wife first, alternates would be his sons Jimmy Montana and Claudia Sanders. Patient already knows he needs to bring a copy sometime so we have this document on his record at the hospital 
4. Patient feels well supported at home, He and his wife manage things together, sons are helpful and right now they have hired help while his wife recovers from hip surgery. 5. He understands from past neurology consults that there is \"nothing more that can be done for me\" about the the blockages in the brain causing the dizziness. He is not aware of any new recommendations from neurology team this admission (for outpatient f/y with Dr. Clare Al, with vascular int. Radiology) 2. Will plan to continue to follow for support during this admission 3. Initial consult note routed to primary continuity provider 4.  Communicated plan of care with: Palliative IDT 
 
 
 GOALS OF CARE / TREATMENT PREFERENCES:  
 
 GOALS OF CARE: 
Patient/Health Care Proxy Stated Goals: Prolong life TREATMENT PREFERENCES:  
Code Status: Full Code Advance Care Planning: 
[x] The [facility] Interdisciplinary Team has updated the ACP Navigator with [address] and Patient Capacity Primary Decision Maker [facility] Agent): Wife, [employer] Relationship to patient:wife Phone [phone] [] Named in a scanned document  
[x] Legal Next of Kin 
[] Guardian Secondary Decision Maker (First Alternate Health Care Agent):  
Relationship to patient: 
Phone number: 
[] Named in a scanned document  
[] Legal Next of Kin 
[] Guardian Medical Interventions: Full interventions Other Instructions: Artificially Administered Nutrition: Feeding tube long-term, if indicated Other: As far as possible, the palliative care team has discussed with patient / health care proxy about goals of care / treatment preferences for patient. HISTORY:  
 
History obtained from: chart, patient CHIEF COMPLAINT: dizziness and falling HPI/SUBJECTIVE: The patient is:  
[x] Verbal and participatory [] Non-participatory due to:  
 
80year old male with complex history as above. He has hx of progressive decline in exercise capacity, more fluid building up on abdomen Chronic dizziness, no real change, like the room spinning all the time (for at least a year) Clinical Pain Assessment (nonverbal scale for severity on nonverbal patients):  
Clinical Pain Assessment Severity: 0 Duration: for how long has pt been experiencing pain (e.g., 2 days, 1 month, years) Frequency: how often pain is an issue (e.g., several times per day, once every few days, constant) FUNCTIONAL ASSESSMENT:  
 
Palliative Performance Scale (PPS): PPS: 40  PSYCHOSOCIAL/SPIRITUAL SCREENING:  
 
Palliative IDT has assessed this patient for cultural preferences / practices and a referral made as appropriate to needs (Cultural Services, Patient Advocacy, Ethics, etc.) Any spiritual / Sabianism concerns: 
[] Yes /  [x] No 
 
Caregiver Burnout: 
[] Yes /  [x] No /  [] No Caregiver Present Anticipatory grief assessment:  
[x] Normal  / [] Maladaptive ESAS Anxiety: Anxiety: 0 
 
ESAS Depression: Depression: 4 REVIEW OF SYSTEMS:  
 
Positive and pertinent negative findings in ROS are noted above in HPI. The following systems were [x] reviewed / [] unable to be reviewed as noted in HPI Other findings are noted below. Systems: constitutional, ears/nose/mouth/throat, respiratory, gastrointestinal, genitourinary, musculoskeletal, integumentary, neurologic, psychiatric, endocrine. Positive findings noted below. Modified ESAS Completed by: provider Fatigue: 4 Drowsiness: 0 Depression: 4 Pain: 0 Anxiety: 0 Nausea: 0 Anorexia: 0 Dyspnea: 3 Constipation: No  
  Stool Occurrence(s): 1 PHYSICAL EXAM:  
 
From RN flowsheet: 
Wt Readings from Last 3 Encounters:  
11/19/18 76.6 kg (168 lb 14 oz) 10/24/18 77.6 kg (171 lb) 10/19/18 77.6 kg (171 lb 1.2 oz) Blood pressure 115/50, pulse 85, temperature 98 °F (36.7 °C), resp. rate 20, height 5' 7\" (1.702 m), weight 76.6 kg (168 lb 14 oz), SpO2 98 %. Pain Scale 1: Numeric (0 - 10) Pain Intensity 1: 0 Pain Onset 1: chronic Pain Location 1: Abdomen Pain Orientation 1: Posterior Pain Description 1: Aching Pain Intervention(s) 1: Medication (see MAR) Last bowel movement, if known:  
 
Constitutional: pleasant elderly male, NAD Sitting up in recliner Well healed surgical changes neck No respiratory distress, nasal cannula in place Pleasant engaging affect, get emotional/tearful at times talking about his health challenges and his family Able to report medical history HISTORY:  
 
Principal Problem: 
  Orthostatic hypotension (6/24/2010) Active Problems: CAD (coronary artery disease) (2/12/2013) Aortic stenosis, mild (2/12/2013) Heart failure ([facility] [address] (9/11/2014) Stroke [facility]) (2/1/2017) Thrombotic stroke involving left middle cerebral artery ([facility] [address] (2/2/2017) Stenosis of both internal carotid arteries (2/2/2017) Dizziness (11/11/2018) Past Medical History:  
Diagnosis Date  Antiplatelet or antithrombotic long-term use 12/4/2014  Anxiety disorder  Arrhythmia 2009  
 bradycardia  Arthritis  CAD (coronary artery disease) s/p CABG 2002; Dr Damari Chamberlain  Cancer ([facility] [address] 1996  
 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   
 left leg, lower back,   
 Depression  Diabetes ([facility] [address] [facility] Type II  Epigastric pain 8/17/2018  Esophageal dysmotility s/p dilitation  Esophageal motility disorder 7/8/2013 Frequent simultaneous or failed contractions, low amplitude contractions  suggests severe myopathy or diffuse spasm. I suspect the latter. Achalasia  is not present.  GERD (gastroesophageal reflux disease)  Heart failure ([facility] [address] 10/2014 Cardiomyopathy:Pacemaker upgrade:Biv and AICD  Dr. Donna Jones heart DrAlvaro last visit 5/11/2015  Hepatitis C Dx 1996  
 treated at [facility] in past; as of 4/15/15 wife states pt currently not under any treatment  Hyperlipidemia  Myocardial infarct [facility]) 2013 Heart Cath: 40% LV EF, Stented distal LAD, patent Graft to circumflex  On tube feeding diet approx 2009  
 still has as of 9/28/15  (no po food/liquid/meds at all); Dr Reji Benson  Other ill-defined conditions(799.89) 1996  
 1 dose of chemotherapy/radiation for tongue cancer  Other ill-defined conditions(799.89) BPH  Other ill-defined conditions(799.89) orthostatic hypotension  Pneumonia ~ April -May 2010  Stroke [facility]) approx 2003 left side-left finger tips numb; no imbalance or memory loss; as of  not seeing neuro MD  
 Suicidal thoughts Past Surgical History:  
Procedure Laterality Date  ABDOMEN SURGERY PROC UNLISTED    
 peg tube  CABG, ARTERY-VEIN, THREE    HX CATARACT REMOVAL    
 bilateral  
 HX CHOLECYSTECTOMY  HX COLONOSCOPY    
 HX HEART CATHETERIZATION   Stented distal LAD  HX MOHS PROCEDURES    
 bilateral  
 HX ORTHOPAEDIC    
 back surgery times two  HX OTHER SURGICAL Radical Left Neck  HX OTHER SURGICAL    
 NASAL POLYPS REMOVAL  
 HX OTHER SURGICAL   TURP  
 HX PACEMAKER    HX PACEMAKER  10/28/14 Defibrillator: [facility] # [id number], serial # [id number]; Dr. Parekh Cancer [phone]; Dr Mott Parent  HX UROLOGICAL    
 cystoscopy [address]  
 cevical surgery  CA CHANGE GASTROSTOMY TUBE  2011  CA CHANGE GASTROSTOMY TUBE  2011  CA EGD INSERT GUIDE WIRE DILATOR PASSAGE ESOPHAGUS  2010  CA EGD TRANSORAL BIOPSY SINGLE/MULTIPLE  2010  
    
 STOMACH SURGERY PROCEDURE UNLISTED  2011  UPPER GI ENDOSCOPY,BIOPSY  2018  UPPER GI ENDOSCOPY,DILATN W GUIDE  2018  UPPER GI ENDOSCOPY,W/DILAT,GASTRIC OUT  2018  VASCULAR SURGERY PROCEDURE UNLIST    
 bilateral carotid stents Family History Problem Relation Age of Onset  Heart Disease Father   
      at age 52 from CAD  Colon Cancer Mother  Cancer Mother   
     colon ca  
 Heart Disease Brother History reviewed, no pertinent family history. Social History Tobacco Use  Smoking status: Never Smoker  Smokeless tobacco: Never Used Substance Use Topics  Alcohol use: No  
 
Allergies Allergen Reactions  Demerol [Meperidine] Shortness of Breath  Paxil [Paroxetine Hcl] Unknown (comments) Pt gets shaky and loses control of legs  Amoxicillin Rash  Cleocin [Clindamycin Hcl] Rash  Pcn [Penicillins] Rash Current Facility-Administered Medications Medication Dose Route Frequency  furosemide (LASIX) injection 40 mg  40 mg IntraVENous ONCE  polyethylene glycol (MIRALAX) packet 17 g  17 g Per G Tube BID  
 bisacodyl (DULCOLAX) suppository 10 mg  10 mg Rectal DAILY PRN  
 lactulose (CHRONULAC) solution 10 g  15 mL Oral BID PRN  
 furosemide (LASIX) injection 40 mg  40 mg IntraVENous DAILY  melatonin tablet 3 mg  3 mg Oral QHS  sodium chloride (OCEAN) 0.65 % nasal squeeze bottle 2 Spray  2 Spray Both Nostrils Q2H PRN  
 guaiFENesin (ROBITUSSIN) 100 mg/5 mL oral liquid 100 mg  100 mg Per G Tube TID  albuterol (PROVENTIL VENTOLIN) nebulizer solution 2.5 mg  2.5 mg Nebulization BID RT  
 HYDROcodone-acetaminophen (NORCO)  mg tablet 1 Tab  1 Tab Per G Tube Q6H PRN  
 insulin glargine (LANTUS) injection 14 Units  14 Units SubCUTAneous DAILY  finasteride (PROSCAR) tablet 5 mg  5 mg Oral DAILY  gabapentin (NEURONTIN) 250 mg/5 mL solution 250 mg  250 mg Per G Tube BID  famotidine (PEPCID) tablet 20 mg  20 mg Per G Tube DAILY  acetaminophen (TYLENOL) tablet 1,000 mg  1,000 mg Per G Tube Q6H PRN  
 albuterol (PROVENTIL VENTOLIN) nebulizer solution 1.25 mg  1.25 mg Nebulization Q6H PRN  
 tamsulosin (FLOMAX) capsule 0.4 mg  0.4 mg Oral DAILY  aspirin chewable tablet 81 mg  81 mg Per G Tube DAILY  atorvastatin (LIPITOR) tablet 40 mg  40 mg Per G Tube DAILY  clopidogrel (PLAVIX) tablet 75 mg  75 mg PEG Tube DAILY  fluticasone (FLONASE) 50 mcg/actuation nasal spray 2 Spray  2 Spray Both Nostrils DAILY  lactobac ac& pc-s.therm-b.anim (JAVY Q/RISAQUAD)  1 Cap PEG Tube DAILY  midodrine (PROAMITINE) tablet 10 mg  10 mg Per G Tube TID WITH MEALS  pramipexole (MIRAPEX) tablet 0.25 mg  0.25 mg Oral TID  zinc sulfate (ZINCATE) capsule 220 mg  220 mg Per G Tube DAILY  sodium chloride (NS) flush 5-10 mL  5-10 mL IntraVENous Q8H  
 sodium chloride (NS) flush 5-10 mL  5-10 mL IntraVENous PRN  
 naloxone (NARCAN) injection 0.4 mg  0.4 mg IntraVENous PRN  
 ondansetron (ZOFRAN) injection 2 mg  2 mg IntraVENous Q6H PRN  
 heparin (porcine) injection 5,000 Units  5,000 Units SubCUTAneous Q8H  
 insulin lispro (HUMALOG) injection   SubCUTAneous AC&HS  
 glucose chewable tablet 16 g  4 Tab Oral PRN  
 dextrose (D50W) injection syrg 12.5-25 g  12.5-25 g IntraVENous PRN  
 glucagon (GLUCAGEN) injection 1 mg  1 mg IntraMUSCular PRN  
 insulin regular (NOVOLIN R, HUMULIN R) injection 5-14 Units  5-14 Units SubCUTAneous TID  lactose-reduced food with fibr 0.07 gram-1.5 kcal/mL liqd 250 mL  (Patient Supplied)  250 mL Feeding Tube 5XD  magic mouthwash cpd (with sucralfate)  5 mL Oral TIDAC  
 
 
 
 LAB AND IMAGING FINDINGS:  
 
Lab Results Component Value Date/Time WBC 6.3 11/18/2018 12:31 AM  
 HGB 10.0 (L) 11/18/2018 12:31 AM  
 PLATELET 79 (L) 31/95/9706 12:31 AM  
 
Lab Results Component Value Date/Time Sodium 138 11/18/2018 12:31 AM  
 Potassium 3.8 11/18/2018 12:31 AM  
 Chloride 98 11/18/2018 12:31 AM  
 CO2 37 (H) 11/18/2018 12:31 AM  
 BUN 29 (H) 11/18/2018 12:31 AM  
 Creatinine 1.16 11/18/2018 12:31 AM  
 Calcium 8.9 11/18/2018 12:31 AM  
 Magnesium 2.3 11/17/2018 12:25 AM  
 Phosphorus 3.0 11/13/2018 03:24 AM  
  
Lab Results Component Value Date/Time AST (SGOT) 50 (H) 11/18/2018 12:31 AM  
 Alk. phosphatase 68 11/18/2018 12:31 AM  
 Protein, total 6.7 11/18/2018 12:31 AM  
 Albumin 2.7 (L) 11/18/2018 12:31 AM  
 Globulin 4.0 11/18/2018 12:31 AM  
 
Lab Results Component Value Date/Time INR 1.1 05/16/2018 02:39 AM  
 Prothrombin time 10.8 05/16/2018 02:39 AM  
 aPTT 22.3 05/07/2018 02:12 PM  
  
No results found for: IRON, FE, TIBC, IBCT, PSAT, FERR Lab Results Component Value Date/Time  pH 7.43 11/11/2018 01:00 PM  
 PCO2 53 (H) 11/11/2018 01:00 PM  
 PO2 84 11/11/2018 01:00 PM  
 
No components found for: [place] Lab Results Component Value Date/Time  11/11/2018 11:15 AM  
 CK - MB 2.4 08/11/2018 09:25 AM  
  
 
 
   
 
Total time: 50min Counseling / coordination time, spent as noted above: 35 
> 50% counseling / coordination?:  
yes Prolonged service was provided for  []30 min   []75 min in face to face time in the presence of the patient, spent as noted above. Time Start:  
Time End:  
Note: this can only be billed with 24182 (initial) or 33212 (follow up). If multiple start / stop times, list each separately.

## 2018-11-19 NOTE — PROGRESS NOTES
Problem: Mobility Impaired (Adult and Pediatric) Goal: *Acute Goals and Plan of Care (Insert Text) Physical Therapy Goals Goals reviewed and remain appropriate 11/19/18 Initiated 11/12/2018 1. Patient will move from supine to sit and sit to supine  in bed with independence within 7 day(s). 2.  Patient will transfer from bed to chair and chair to bed with modified independence using the least restrictive device within 7 day(s). 3.  Patient will perform sit to stand with modified independence within 7 day(s). 4.  Patient will ambulate with modified independence for 100 feet with the least restrictive device within 7 day(s). 5.  Patient will ascend/descend 6 stairs with 1 handrail(s) with supervision/set-up within 7 day(s). physical Therapy TREATMENT: WEEKLY REASSESSMENT Patient: Adela Anderson (80 y.o. male) Date: 11/19/2018 Diagnosis: Dizziness Orthostatic hypotension Precautions: Fall Chart, physical therapy assessment, plan of care and goals were reviewed. ASSESSMENT: 
Pt received sitting in bedside chair on 2 L/min O2 and agreeable to PT intervention. Pt cleared by nursing for mobility. Pt with slow progress towards therapy goals and needs MAX encouragement and education to progress mobility. Pt with poor reception to cueing to improve safety and quality of movement this session. He continues to demonstrate impaired standing balance, impaired gait mechanics, generalized weakness, increased pain, limited endurance, and decreased activity tolerance. Pt with c/o lightheadedness in sitting with /47. SaO2 98% on 2 L/min O2. Transfers performed with SBA. Standing balance good-fair with RW support and no overt LOB noted during session. /47 in standing. He ambulated 110 ft with CGA x 1 and RW support.  Attempted to provide cueing to improve patient's step length, gait speed, and trunk extension during ambulation, however pt argumentative and no improvements made. Encouraged patient to progress gait distance and attempt stair training this date, however pt refusing. VSS post-activity on 2 L/min O2. Pt was left sitting in bedside chair with all needs met, RN aware, and chair alarm on following therapy session. Recommend pt return home with HHPT and use of RW at discharge. Pt will require supervision once home (no physical assistance needs anticipated), if wife unable to provide supervision, then patient will require SNF rehab. PT will continue to follow to address mobility impairments as noted above. Patient's progression toward goals since last assessment: Slow progress PLAN: 
Goals have been updated based on progression since last assessment. Patient continues to benefit from skilled intervention to address the above impairments. Continue to follow the patient 3 times a week to address goals. Planned Interventions: 
[x]              Bed Mobility Training             []       Neuromuscular Re-Education [x]              Transfer Training                   []       Orthotic/Prosthetic Training 
[x]              Gait Training                         []       Modalities [x]              Therapeutic Exercises           []       Edema Management/Control [x]              Therapeutic Activities            [x]       Patient and Family Training/Education []              Other (comment): 
Discharge Recommendations: Home Health Further Equipment Recommendations for Discharge: RW - pt owns rollator SUBJECTIVE:  
Patient stated I just want to apologize.  OBJECTIVE DATA SUMMARY:  
Critical Behavior: 
Neurologic State: Alert(paranoid) Orientation Level: Oriented X4 Cognition: Appropriate decision making, Appropriate for age attention/concentration, Appropriate safety awareness Safety/Judgement: Awareness of environment, Insight into deficits Functional Mobility Training: 
Bed Mobility: Scooting: Independent Transfers: 
Sit to Stand: Stand-by assistance Stand to Sit: Stand-by assistance Balance: 
Sitting: Intact Standing: Impaired; With support Standing - Static: Good Standing - Dynamic : FairAmbulation/Gait Training: 
Distance (ft): 110 Feet (ft) Assistive Device: Gait belt;Walker, rolling Ambulation - Level of Assistance: Contact guard assistance;Assist x1;Adaptive equipment; Additional time Gait Abnormalities: Decreased step clearance;Shuffling gait(increased trunk flexion) Base of Support: Widened Speed/Gertrudis: Pace decreased (<100 feet/min) Step Length: Left shortened;Right shortened Pain: 
Pain Scale 1: Numeric (0 - 10) Pain Intensity 1: 0 Activity Tolerance:  
Fair/limited - limited endurance; increased SOB with activity, however SaO2 >92% on 2 L/min O2; c/o lightheadedness in sitting on arrival with BP stable in sitting and standing during session Please refer to the flowsheet for vital signs taken during this treatment. After treatment:  
[x]  Patient left in no apparent distress sitting up in chair 
[]  Patient left in no apparent distress in bed 
[x]  Call bell left within reach [x]  Nursing notified 
[]  Caregiver present [x]  Bed alarm activated COMMUNICATION/COLLABORATION:  
The patients plan of care was discussed with: Physical Therapist and Registered Nurse Sammie Garcia PT, DPT Time Calculation: 23 mins

## 2018-11-19 NOTE — PROGRESS NOTES
Chart review. PTOT are recommending home with [facility] PTOT with Supervision. CM met with patient and his wife to discuss discharge planning. Wife is agreeable to discharge home with [facility]. She is agreeable to be available for supervision. Wife asked about a shoulder pack for O2 portability. CM explained this will be something that can be followed as OP with Pulmonary or PCP. Dr. Maria D Bullock is the MD that originally ordered nocturnal O2. This CM will schedule follow up with Dr. Jose Vila to further investigate a smaller portable pack. Cm also recommended that patient/wife follow up with [facility]. Patient has a rollator and prefers rollator as his dme. Wife asked if there were any meds being given to the patient to cause increased confusion/paranoia? Also asked if patient would be receiving a Chest x-ray prior to discharge? CM informed Sammi MEYER of questions to follow up with attending. Cm offerred Home health aide, explaining this could be requested but not guaranteed. It would need approved by Medicare. Patient is agreeable. Patient requested follow up with Dr. Maria D Bullock. CM will follow up. Valve Clinic -  Tyree Sotelo NP requested Clinic information from Dr. Wale Sparks. Information is not available at this time. This patient has a follow up appointment scheduled with Dr. Wale Sparks. [address] [facility] [address] informed this Cm to contact [employer]. This CM called and spoke with India Pak. There is no history of this patient having an appointment scheduled for 11/16/2018. Referred this CM to [facility] office. [id number] [facility] - there is no active Valve Clinic at [facility]. Udell Meckel referred this CM to [facility] - [facility]  [phone]. This Cm left message on voicemail. Carla Seymour RN CM Ext [phone]

## 2018-11-19 NOTE — PROGRESS NOTES
[facility] [phone] ([phone]) [facility]: [phone] ([phone]) Tor Nguyen is an 81 yo  man who has had multiple hospitalizations and is known to the Palliative team from past admissions. Palliative team was asked to see patient for care decisions and goals of care. Patient has a medical history including: DM, CKD  Chronic respiratory failure on 2L at night at home CAD s/p CABG/ s/p BIvCD, mod-severe aortic stenosis, bilateral carotid artery stents, hx Tongue cancer s/p resection / XRT, chronic dysphagia, PEG tube, hx posterior CVA, known severe vertebrobasilar disease on CTA Head/Neck 2017 and resultant chronic dizziness. Patient is alert and talkative today, no family in the room. Patient seen with Dr Jourdan Nichols, palliative MD. Patient shared that he is likely to be discharged in a day or so. He was fully aware of his plan of care and was clear that he wants to continue to [place] as long as possible if there is a chance that I can feel better\". Patient states that he has a good relationship with Dr Silvano Cooney and that he trusts his guidance in terms of recommendations for his heart. Patient noted that he does not want to be a burden on his family and that there is some \"back and forth\" that he and his wife discuss about his medical issues but he is clear that heart surgery will be something that he would want if it is recommended, \"even if I have to take a chance of dying on the table\". Patient's wife of 62 years, just had hip surgery last week. Patient notes she is \"getting around with a walker and our sons are helping us\". Palliative team met wife at time of last hospital stay. Wife was present regularly during that admission. Actively listened to patient's feelings about his health (he wants full restorative measures for treatment), his desire to spend more time with his family and to pursue surgery for his heart if it is recommended.  He needs clear information and discussion if this is not something that is indicated. Patient likes direct communication, \"don't hold the butter\"! At the same time, he likes to focus on the positives and may not want to hear the negatives of any procedure or issue. He gets reese from seeing his grandkids and kids. He feels he and his wife are well supported at home and they manage well with the help that they have. Patient did voice that he is eager to get home for Thanksgiving. Goals are clear, full restorative measures for treatment. Available for support as needed.

## 2018-11-19 NOTE — PROGRESS NOTES
This CM has contacted [facility], Dr. Reta Stinson office and no one knows what the [facility] is. Dr. Patricia Edwards informed. Renato Mehta RN CM Ext [phone]

## 2018-11-19 NOTE — PROGRESS NOTES
Progress Note 11/19/2018 12:01 PM 
NAME: Urvashi Curry MRN:  [id number] Admit Diagnosis: Dizziness Problem List:  
 
Admitted with dizziness and falling.  
  
  Hx:  
1. Chronic systolic heart failure 2. Coronary artery disease s/p remote CABG.  Cath '13 w/ EF 40%, PCI to distal LAD via LIMA, severe disease in large diagonal (not amenable to PCI), patent SVG-OM, SVG-PDA, SVG-PLB.  Lexiscan 11/13 w/ EF 28%, IWMI, and with diagonal ischemia. 3. Lexiscan stress MPI 9/14 w/ EF 29% and anterior ischemia w/ IWMI 4. Ischemic cardiomyopathy 5. BiV ICD 6. Chronic kidney disease; Stg 3 
7. 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   
8. Diabetes 9. 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 
10. Orthostatic hypotension 11. Hyperlipidemia 12. Head/neck CA w/ feeding tube 13. Chronic thrombocytopenia (Dr. Aurora Gill) 
   
 
Assessment/Plan:  
Depressed 
sCr stable Neuro note noted Echo unchanged CXR w/ marked improvement in edema No labs today 1. Continue midodrine 10mg TID 2. Additional lasix this afternoon. I think can transition to oral lasix tomorrow. 3. Would send out on 40mg po lasix BID 4. He needs daily BMP 5. Will need to reschedule [facility] Clinic appt  
 
  
 []       High complexity decision making was performed in this patient at high risk for decompensation with multiple organ involvement. Subjective:  
 
Urvashi Curry denies chest pain. Still dizzy, somewhat better. Still SOB, but much better. No rattling. Discussed with RN events overnight. Review of Systems: 
 
Symptom Y/N Comments  Symptom Y/N Comments Fever/Chills N   Chest Pain N Poor Appetite N   Edema N   
Cough y   Abdominal Pain N Sputum N   Joint Pain N   
SOB/KRAMER y   Pruritis/Rash N   
Nausea/vomit N   Tolerating PT/OT Y Diarrhea N   Tolerating Diet Y Constipation N   Other Could NOT obtain due to:   
 
Objective:  
  
Physical Exam: 
 
Last 24hrs VS reviewed since prior progress note. Most recent are: Visit Vitals /66 (BP 1 Location: Left arm, BP Patient Position: Supine) Pulse 85 Temp 97.9 °F (36.6 °C) Resp 21 Ht 5' 7\" (1.702 m) Wt 76.6 kg (168 lb 14 oz) SpO2 96% BMI 26.45 kg/m² Intake/Output Summary (Last 24 hours) at 11/19/2018 4424 Last data filed at 11/19/2018 9637 Gross per 24 hour Intake 1285 ml Output 2250 ml Net -965 ml General Appearance: Well developed, well nourished, alert & oriented x 3,  
 no acute distress. Ears/Nose/Mouth/Throat: Hearing grossly normal. 
Neck: Supple. Chest: Lungs w/ scattered rhonchi in bases (R>L) Cardiovascular: Regular rate and rhythm, S1S2 normal, harsh TARA Abdomen: Soft, non-tender, bowel sounds are active. Extremities: No edema bilaterally. Skin: Warm and dry. []         Post-cath site without hematoma, bruit, tenderness, or thrill. Distal pulses intact. PMH/SH reviewed - no change compared to H&P Data Review Telemetry: paced rhythm EKG:  
[x]  No new EKG for review Lab Data Personally Reviewed: 
 
Recent Labs 11/18/18 
0031 WBC 6.3 HGB 10.0* HCT 31.5* PLT 79* No results for input(s): INR, PTP, APTT in the last 72 hours. No lab exists for component: INREXT, INREXT Recent Labs 11/18/18 
0031 11/17/18 
0025  137  
K 3.8 3.9 CL 98 98 CO2 37* 33*  
BUN 29* 31* CREA 1.16 1.12  
* 232* CA 8.9 8.7 MG  --  2.3 No results for input(s): CPK, CKNDX, TROIQ in the last 72 hours. No lab exists for component: CPKMB Lab Results Component Value Date/Time Cholesterol, total 123 06/04/2018 09:16 AM  
 HDL Cholesterol 35 (L) 06/04/2018 09:16 AM  
 LDL, calculated 62 06/04/2018 09:16 AM  
 Triglyceride 132 06/04/2018 09:16 AM  
 CHOL/HDL Ratio 3.6 02/02/2017 02:55 AM  
 
 
Recent Labs 11/18/18 
0031 SGOT 50* AP 68  
TP 6.7 ALB 2.7*  
GLOB 4.0  
LPSE 92 No results for input(s): PH, PCO2, PO2 in the last 72 hours. Medications Personally Reviewed: Current Facility-Administered Medications Medication Dose Route Frequency  polyethylene glycol (MIRALAX) packet 17 g  17 g Per G Tube BID  
 bisacodyl (DULCOLAX) suppository 10 mg  10 mg Rectal DAILY PRN  
 lactulose (CHRONULAC) solution 10 g  15 mL Oral BID PRN  
 furosemide (LASIX) injection 40 mg  40 mg IntraVENous DAILY  melatonin tablet 3 mg  3 mg Oral QHS  sodium chloride (OCEAN) 0.65 % nasal squeeze bottle 2 Spray  2 Spray Both Nostrils Q2H PRN  
 guaiFENesin (ROBITUSSIN) 100 mg/5 mL oral liquid 100 mg  100 mg Per G Tube TID  albuterol (PROVENTIL VENTOLIN) nebulizer solution 2.5 mg  2.5 mg Nebulization BID RT  
 HYDROcodone-acetaminophen (NORCO)  mg tablet 1 Tab  1 Tab Per G Tube Q6H PRN  
 insulin glargine (LANTUS) injection 14 Units  14 Units SubCUTAneous DAILY  finasteride (PROSCAR) tablet 5 mg  5 mg Oral DAILY  gabapentin (NEURONTIN) 250 mg/5 mL solution 250 mg  250 mg Per G Tube BID  famotidine (PEPCID) tablet 20 mg  20 mg Per G Tube DAILY  acetaminophen (TYLENOL) tablet 1,000 mg  1,000 mg Per G Tube Q6H PRN  
 albuterol (PROVENTIL VENTOLIN) nebulizer solution 1.25 mg  1.25 mg Nebulization Q6H PRN  
 tamsulosin (FLOMAX) capsule 0.4 mg  0.4 mg Oral DAILY  aspirin chewable tablet 81 mg  81 mg Per G Tube DAILY  atorvastatin (LIPITOR) tablet 40 mg  40 mg Per G Tube DAILY  clopidogrel (PLAVIX) tablet 75 mg  75 mg PEG Tube DAILY  fluticasone (FLONASE) 50 mcg/actuation nasal spray 2 Spray  2 Spray Both Nostrils DAILY  lactobac ac& pc-s.therm-b.anim (JAVY Q/RISAQUAD)  1 Cap PEG Tube DAILY  midodrine (PROAMITINE) tablet 10 mg  10 mg Per G Tube TID WITH MEALS  pramipexole (MIRAPEX) tablet 0.25 mg  0.25 mg Oral TID  zinc sulfate (ZINCATE) capsule 220 mg  220 mg Per G Tube DAILY  sodium chloride (NS) flush 5-10 mL  5-10 mL IntraVENous Q8H  
 sodium chloride (NS) flush 5-10 mL  5-10 mL IntraVENous PRN  
  naloxone (NARCAN) injection 0.4 mg  0.4 mg IntraVENous PRN  
 ondansetron (ZOFRAN) injection 2 mg  2 mg IntraVENous Q6H PRN  
 heparin (porcine) injection 5,000 Units  5,000 Units SubCUTAneous Q8H  
 insulin lispro (HUMALOG) injection   SubCUTAneous AC&HS  
 glucose chewable tablet 16 g  4 Tab Oral PRN  
 dextrose (D50W) injection syrg 12.5-25 g  12.5-25 g IntraVENous PRN  
 glucagon (GLUCAGEN) injection 1 mg  1 mg IntraMUSCular PRN  
 insulin regular (NOVOLIN R, HUMULIN R) injection 5-14 Units  5-14 Units SubCUTAneous TID  lactose-reduced food with fibr 0.07 gram-1.5 kcal/mL liqd 250 mL  (Patient Supplied)  250 mL Feeding Tube 5XD  magic mouthwash cpd (with sucralfate)  5 mL Oral TIDAC Anish Maxi III, DO

## 2018-11-19 NOTE — PROGRESS NOTES
Cm acknowledged consult regarding follow up with [facility]. Cardiac follow up scheduled. Cm left message at [facility] on voicemail. Chart review. O2 challenge has . Juan Lai will provide service. Sammi MEYER informed of need for new O2 challenge. Most recent PTOT note is 2018. Patient will need to be re-evaluated prior to discharge. THANIA spoke with Mrs. NOBLES, she is currently recovering from a total hip replacement and cannot provide care at home. Thania inquired as to patient's primary Cardiologist - Dr. Carleen Dave is primary Cardiologist. 
 
Cm contacted [facility] and asked that  Patient re-evaulated To determine needs at time of discharge based on wife not being able to provide care at this time. Sunita Isabel RN CM Ext [phone]

## 2018-11-19 NOTE — PROGRESS NOTES
Problem: Falls - Risk of 
Goal: *Absence of Falls Document Binh Kappa Fall Risk and appropriate interventions in the flowsheet. Outcome: Progressing Towards Goal 
Fall Risk Interventions: 
Mobility Interventions: Bed/chair exit alarm, Patient to call before getting OOB Mentation Interventions: Adequate sleep, hydration, pain control, Bed/chair exit alarm Medication Interventions: Assess postural VS orthostatic hypotension, Bed/chair exit alarm, Patient to call before getting OOB Elimination Interventions: Bed/chair exit alarm, Call light in reach, Patient to call for help with toileting needs History of Falls Interventions: Bed/chair exit alarm

## 2018-11-19 NOTE — FACE TO FACE
Face to Face Order for 2003 Telephone [facility] Pt Name  Levar Peterson Date of Birth 1935 Age  80 y.o. Medical Record Number  [id number] Room Number  8129/77 Admit date:  11/11/2018 Date of Face to Face: 11/19/2018 Admission Diagnosis:  Orthostatic hypotension Diagnoses Present on Admission:  Dizziness  Stenosis of both internal carotid arteries  Stroke ([address]  Thrombotic stroke involving left middle cerebral artery ([address]  Orthostatic hypotension  Heart failure ([address]  CAD (coronary artery disease)  Aortic stenosis, mild  S/P CABG x 3 
 Hx of tongue cancer Past Medical History:  
Diagnosis Date  Antiplatelet or antithrombotic long-term use 12/4/2014  Anxiety disorder  Arrhythmia 2009  
 bradycardia  Arthritis  CAD (coronary artery disease) s/p CABG 2002; Dr Daniele Thomas  Cancer ([facility] 1996  
 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   
 left leg, lower back,   
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 I suspect the latter. Achalasia  is not present.  GERD (gastroesophageal reflux disease)  Heart failure ([address] 10/2014 Cardiomyopathy:Pacemaker upgrade:Biv and AICD  Dr. Baxter [facility] heart  last visit 5/11/2015  Hepatitis C Dx 1996  
 treated at [facility] in past; as of 4/15/15 wife states pt currently not under any treatment  Hyperlipidemia  Myocardial infarct [facility]) 2013 Heart Cath: 40% LV EF, Stented distal LAD, patent Graft to circumflex  On tube feeding diet approx 2009  
 still has as of 9/28/15  (no po food/liquid/meds at all); Dr Tessie Solis  Other ill-defined conditions(799.89) 1996 1 dose of chemotherapy/radiation for tongue cancer  Other ill-defined conditions(799.89) BPH  Other ill-defined conditions(799.89) orthostatic hypotension  Pneumonia ~ April -May 2010  Stroke [facility]) approx 2003  
 left side-left finger tips numb; no imbalance or memory loss; as of 2015 not seeing neuro MD  
 Suicidal thoughts Visit Vitals /53 (BP 1 Location: Left arm, BP Patient Position: Sitting;Post activity) Pulse 89 Temp 98.2 °F (36.8 °C) Resp 21 Ht 5' 7\" (1.702 m) Wt 76.6 kg (168 lb 14 oz) SpO2 98% BMI 26.45 kg/m² Allergies Allergen Reactions  Demerol [Meperidine] Shortness of Breath  Paxil [Paroxetine Hcl] Unknown (comments) Pt gets shaky and loses control of legs  Amoxicillin Rash  Cleocin [Clindamycin Hcl] Rash  Pcn [Penicillins] Rash ? I certify that the patient needs home health care as prescribed below and I will not be following this patient in the Community. ? I also certify that the patient is homebound as evidenced by 
 
? Patient with increased shortness of breath with all exertional activity limiting ambulation outside the home. Patient with strength deficits limiting the ability to carry portable O2 for distances outside the home without the assistance of a caregiver. ? Requires considerable and taxing effort to leave the home   
? and also as noted in various sections of this medical record. ? Monique Burr MD  will be responsible for signing the Plan of Care and will follow/coordinate ongoing care. ? Initial Orders for Care: 
? skilled nursing care:  skilled observation/assessment, patient education and tube feedings ? physical therapy: strengthening, stretching/ROM, transfer training, gait/stair training and balance training 
? occupational therapy:  ADL safety (ie. cooking, bathing, dressing), ROM and pt/caregiver education ?  Report to Silvino Sanchez MD 
 
 ? I certify that I am taking care of the patient today (Please see hospital Discharge Records for details of clinical issues pertaining to this order). ________________________________________________________________________ Orlando Roberto, MSN, FNP-C, APRN-BC 
(electronically signed; no signature required ) Hospitalist, [address], [address], [address] [place], [address] Tel: [phone]

## 2018-11-19 NOTE — DIABETES MGMT
DTC Progress Note Recommendations/ Comments: Chart reviewed for hyperglycemia, likely due to bolus tube feeds. Noted tube feeds given at pt's preference. If appropriate, please consider: 
Timing regular insulin with bolus tube feeds. Increasing 0800 dose of regular insulin to 16 units Current hospital DM medication: regular insulin at 0800, 1200 and 2000, Humalog for correction, normal sensitivity scale ACHS and Lantus 14 units daily Chart reviewed on [facility]. Patient is a 80 y.o. male with hx Type 2 Diabetes on regular insulin with bolus tube feeds at home. A1c:  
Lab Results Component Value Date/Time Hemoglobin A1c 8.6 (H) 07/14/2018 04:40 AM  
 Hemoglobin A1c 8.6 (H) 06/04/2018 09:16 AM  
 
 
Recent Glucose Results:  
Lab Results Component Value Date/Time  (H) 11/19/2018 11:15 AM  
 GLUCPOC 229 (H) 11/19/2018 11:07 AM  
 GLUCPOC 122 (H) 11/19/2018 07:30 AM  
 GLUCPOC 196 (H) 11/18/2018 09:04 PM  
  
 
Lab Results Component Value Date/Time Creatinine 1.26 11/19/2018 11:15 AM  
 
Estimated Creatinine Clearance: 41.5 mL/min (based on SCr of 1.26 mg/dL). Active Orders Diet DIET NPO  
  
 
PO intake: No data found. Will continue to follow as needed. Thank you Orlando Watkins RD, [facility]

## 2018-11-19 NOTE — PROGRESS NOTES
PCP SUSAN appt scheduled with Dr. Clare Guevara on 11/26/2018 at 2:15pm Appt added to AVS. Hawa Holder CM Specialist

## 2018-11-20 NOTE — HOME CARE
Home Health Care Discharge Planning: [facility] Face to Face Encounter NAME: Abigail Spatz :  1935 MRN:  [id number] Primary Diagnosis:  
Acute systolic CHF Moderate to severe aortic stenosis Chronic orthostatic hypotension  
Coronary artery disease Urinary retention  
Chronic dizziness Hx of tongue ca s/p radical neck resection and radiation Chronic dysphagia with PEG  
Diabetes Chronic respiratory failure with hypoxia, home O2 2 LPM 
H/o streptococcal bacteremia 2018 H/o stroke   
 
Date of Face to Face:  2018 12:25 PM        
                        
Face to Face Encounter findings are related to primary reason for home care:   YES 
 
1. I certify that the patient needs intermittent skilled nursing care, physical therapy and/or speech therapy. I will not be following this patient in the Community and Dr. Zahra Guajardo, Almas Brennan MD will be responsible for signing the [address] 2. Initial Orders for Care: Skilled Nursing and Physical Therapy 3. I certify that this patient is homebound because of illness or injury, need the aid of supportive devices such as crutches, canes, wheelchairs, and walkers; the use of special transportation; or the assistance of another person in order to leave their place of residence. There exists a normal inability to leave home and leaving home requires a considerable and taxing effort. 4. I certify that this patient is under my care and that I had a Face-to-Face Encounter that meets the physician Face-to-Face Encounter requirements.  
Document the physical findings from the Face-to-Face Encounter that support the need for skilled services: Has new diagnosis that requires skilled nursing teaching and intervention , Has new medications that requires skilled nursing teaching and monitoring for understanding and compliance  and Has new finding of weakness and altered mobility that requires skilled physical/occupational and/or speech therapy services for evaluation and interventions. Ericka Hollingsworth MD 
Discharging Physician Office: [phone] Fax:   [phone]

## 2018-11-20 NOTE — PROGRESS NOTES
Lexie Smiley RN [facility] informed of additional order for Justin Sands. Genesis Byers, RNCM Ext [phone]

## 2018-11-20 NOTE — PROGRESS NOTES
Progress Note 
 
 
11/20/2018 12:01 PM 
NAME: Bon Heredia MRN:  [id number] Admit Diagnosis: Dizziness Problem List:  
 
Admitted with dizziness and falling.  
  
  Hx:  
1. Chronic systolic heart failure 2. Coronary artery disease s/p remote CABG.  Cath '13 w/ EF 40%, PCI to distal LAD via LIMA, severe disease in large diagonal (not amenable to PCI), patent SVG-OM, SVG-PDA, SVG-PLB.  Lexiscan 11/13 w/ EF 28%, IWMI, and with diagonal ischemia. 3. Lexiscan stress MPI 9/14 w/ EF 29% and anterior ischemia w/ IWMI 4. Ischemic cardiomyopathy 5. BiV ICD 6. Chronic kidney disease; Stg 3 
7. 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   
8. Diabetes 9. 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 
10. Orthostatic hypotension 11. Hyperlipidemia 12. Head/neck CA w/ feeding tube 13. Chronic thrombocytopenia (Dr. Atilio De La Garza) 
   
 
Assessment/Plan:  
Depressed 
sCr stable Neuro note noted Echo unchanged CXR w/ marked improvement in edema 
 
sCr stable 1. Continue midodrine 10mg TID 2. I think he is ok for discharge today 3. Would send out on 40mg po lasix BID 4. Will need to reschedule Valve Clinic appt before discharge  
 
  
 []       High complexity decision making was performed in this patient at high risk for decompensation with multiple organ involvement. Subjective:  
 
Bon Heredia denies chest pain. SOB much better. No rattling. Discussed with RN events overnight. Review of Systems: 
 
Symptom Y/N Comments  Symptom Y/N Comments Fever/Chills N   Chest Pain N Poor Appetite N   Edema N   
Cough y   Abdominal Pain N Sputum N   Joint Pain N   
SOB/KRAMER y   Pruritis/Rash N   
Nausea/vomit N   Tolerating PT/OT Y Diarrhea N   Tolerating Diet Y Constipation N   Other Could NOT obtain due to:   
 
Objective:  
  
Physical Exam: 
 
Last 24hrs VS reviewed since prior progress note. Most recent are: 
 
Visit Vitals /52 (BP 1 Location: Left arm, BP Patient Position: At rest;Supine) Pulse 77 Temp 98 °F (36.7 °C) Resp 18 Ht 5' 7\" (1.702 m) Wt 74.6 kg (164 lb 7.4 oz) SpO2 100% BMI 25.76 kg/m² Intake/Output Summary (Last 24 hours) at 11/20/2018 7198 Last data filed at 11/20/2018 8189 Gross per 24 hour Intake 1435 ml Output 3050 ml Net -1615 ml General Appearance: Well developed, well nourished, alert & oriented x 3,  
 no acute distress. Ears/Nose/Mouth/Throat: Hearing grossly normal. 
Neck: Supple. Chest: Lungs w/ scattered rhonchi in bases (R>L) Cardiovascular: Regular rate and rhythm, S1S2 normal, harsh TARA Abdomen: Soft, non-tender, bowel sounds are active. Extremities: No edema bilaterally. Skin: Warm and dry. []         Post-cath site without hematoma, bruit, tenderness, or thrill. Distal pulses intact. PMH/SH reviewed - no change compared to H&P Data Review Telemetry: paced rhythm EKG:  
[x]  No new EKG for review Lab Data Personally Reviewed: 
 
Recent Labs  
  11/20/18 
0211 11/18/18 
0031 WBC 5.8 6.3 HGB 10.6* 10.0* HCT 33.7* 31.5* PLT 95* 79* No results for input(s): INR, PTP, APTT in the last 72 hours. No lab exists for component: INREXT, INREXT Recent Labs  
  11/20/18 
0211 11/19/18 78 [phone] 11/18/18 
0031  136 138  
K 3.9 4.1 3.8 CL 99 96* 98  
CO2 38* 37* 37* BUN 37* 30* 29* CREA 1.27 1.26 1.16  
* 234* 202* CA 9.1 9.4 8.9 No results for input(s): CPK, CKNDX, TROIQ in the last 72 hours. No lab exists for component: CPKMB Lab Results Component Value Date/Time Cholesterol, total 123 06/04/2018 09:16 AM  
 HDL Cholesterol 35 (L) 06/04/2018 09:16 AM  
 LDL, calculated 62 06/04/2018 09:16 AM  
 Triglyceride 132 06/04/2018 09:16 AM  
 CHOL/HDL Ratio 3.6 02/02/2017 02:55 AM  
 
 
Recent Labs 11/18/18 
0031 SGOT 50* AP 68  
TP 6.7 ALB 2.7*  
GLOB 4.0  
LPSE 92 No results for input(s): PH, PCO2, PO2 in the last 72 hours. Medications Personally Reviewed: Current Facility-Administered Medications Medication Dose Route Frequency  albuterol (PROVENTIL VENTOLIN) nebulizer solution 2.5 mg  2.5 mg Nebulization BID PRN  
 insulin glargine (LANTUS) injection 16 Units  16 Units SubCUTAneous DAILY  polyethylene glycol (MIRALAX) packet 17 g  17 g Per G Tube BID  
 bisacodyl (DULCOLAX) suppository 10 mg  10 mg Rectal DAILY PRN  
 lactulose (CHRONULAC) solution 10 g  15 mL Oral BID PRN  
 furosemide (LASIX) injection 40 mg  40 mg IntraVENous DAILY  melatonin tablet 3 mg  3 mg Oral QHS  sodium chloride (OCEAN) 0.65 % nasal squeeze bottle 2 Spray  2 Spray Both Nostrils Q2H PRN  
 guaiFENesin (ROBITUSSIN) 100 mg/5 mL oral liquid 100 mg  100 mg Per G Tube TID  
 HYDROcodone-acetaminophen (NORCO)  mg tablet 1 Tab  1 Tab Per G Tube Q6H PRN  finasteride (PROSCAR) tablet 5 mg  5 mg Oral DAILY  gabapentin (NEURONTIN) 250 mg/5 mL solution 250 mg  250 mg Per G Tube BID  famotidine (PEPCID) tablet 20 mg  20 mg Per G Tube DAILY  acetaminophen (TYLENOL) tablet 1,000 mg  1,000 mg Per G Tube Q6H PRN  
 albuterol (PROVENTIL VENTOLIN) nebulizer solution 1.25 mg  1.25 mg Nebulization Q6H PRN  
 tamsulosin (FLOMAX) capsule 0.4 mg  0.4 mg Oral DAILY  aspirin chewable tablet 81 mg  81 mg Per G Tube DAILY  atorvastatin (LIPITOR) tablet 40 mg  40 mg Per G Tube DAILY  clopidogrel (PLAVIX) tablet 75 mg  75 mg PEG Tube DAILY  fluticasone (FLONASE) 50 mcg/actuation nasal spray 2 Spray  2 Spray Both Nostrils DAILY  lactobac ac& pc-s.therm-b.anim (JAVY Q/RISAQUAD)  1 Cap PEG Tube DAILY  midodrine (PROAMITINE) tablet 10 mg  10 mg Per G Tube TID WITH MEALS  pramipexole (MIRAPEX) tablet 0.25 mg  0.25 mg Oral TID  zinc sulfate (ZINCATE) capsule 220 mg  220 mg Per G Tube DAILY  sodium chloride (NS) flush 5-10 mL  5-10 mL IntraVENous Q8H  
 sodium chloride (NS) flush 5-10 mL  5-10 mL IntraVENous PRN  
  naloxone (NARCAN) injection 0.4 mg  0.4 mg IntraVENous PRN  
 ondansetron (ZOFRAN) injection 2 mg  2 mg IntraVENous Q6H PRN  
 heparin (porcine) injection 5,000 Units  5,000 Units SubCUTAneous Q8H  
 insulin lispro (HUMALOG) injection   SubCUTAneous AC&HS  
 glucose chewable tablet 16 g  4 Tab Oral PRN  
 dextrose (D50W) injection syrg 12.5-25 g  12.5-25 g IntraVENous PRN  
 glucagon (GLUCAGEN) injection 1 mg  1 mg IntraMUSCular PRN  
 insulin regular (NOVOLIN R, HUMULIN R) injection 5-14 Units  5-14 Units SubCUTAneous TID  lactose-reduced food with fibr 0.07 gram-1.5 kcal/mL liqd 250 mL  (Patient Supplied)  250 mL Feeding Tube 5XD  magic mouthwash cpd (with sucralfate)  5 mL Oral TIDAC Eric Moore III, DO

## 2018-11-20 NOTE — PROGRESS NOTES
CM acknowledged discharge order. All follow up appointments are scheduled including Valve Clinic. [facility] has been informed of discharge and addition of [address]. Current O2 Challenge is pending. Nursing informed of need prior to discharge. Patient informed of need. Patient informed of discharge today. Wife informed of discharge plan. Family should arrive between 1230-1:00pm.   
 
Wife asked if patient has had a bowel movement and passed urine? CM will defer to nursing. Xena MEYER informed of wife's concerns. 1245pm 
Updated O2 Challenge. Patient does not qualify for continuous O2 at this time. CM uploaded current O2 challenge to [facility]ant[facility] Massey. Call placed to inquire what was to be done with portable that was delivered to the room. This patient was being serviced prior to admission by Shantelle Massey for nocturnal O2. He will still need service for nocturnal O2. Reema Hurt RN  Ext [phone]

## 2018-11-20 NOTE — PROGRESS NOTES
Physical Therapy Note Chart reviewed. Spoke with RN. Nursing reporting that patient is going to be discharged soon and requests PT defer due to impending discharge. Will defer.   
 
Thank you, 
Agustín Valdovinos, PT, DPT

## 2018-11-20 NOTE — PROGRESS NOTES
Attending Hospitalist Attestation: 
 
I have personally seen and examined the patient, reviewed pertinent labs and imaging, and discussed the plan of care with NP Jose Rome. I reviewed and agree with the history, exam, assessment and plan as outlined in her note. \" Breathing about the same\"\" CXR with less edema and effusions, says subjectively not improved No CP or N/V Patient Vitals for the past 24 hrs: 
 Temp Pulse Resp BP SpO2  
11/19/18 1950 97.1 °F (36.2 °C) 88 24 101/66 98 % 11/19/18 1434 98.2 °F (36.8 °C) 89 21 145/53 98 % 11/19/18 1105 98 °F (36.7 °C) 85 20 115/50 98 % 11/19/18 0817     96 % 11/19/18 0726 97.9 °F (36.6 °C) 85 21 121/66 96 % 11/19/18 0409 97.7 °F (36.5 °C) 72 16 120/49 99 % 11/18/18 2235 97.9 °F (36.6 °C) 86 18 138/53 99 % No distress sitting up in chair Lungs Crackles at bases L > R, no accessory muscle use or retractions Heart: RRR nl S1S2, grade 3/6 SM at LSB, no LE edema Abdomen: soft, mildly diffusely tender, ND, Positive BS, no rebound Skin: No rashes Neuro: Alert, non focal motor exam 
 
 
Assessment/plan: 
 
Acute on chronic systolic HF EF 21% POA Mod to severe AS POA Chronic orthostatic hypotension CAD s/p remote CABG / BiV ICD Elevated troponin POA Aspirin/plavix, Lipitor. Still SOB and still with edema on CXR 11/15/2018, received additional BID lasix IV 
CXR improved, looks more comfortable Echo LVEF 30 to 35%, mod MR and mod to severe AS Dr Wale Sparks following, Change to lasix 40 mg PO BID in AM 
Needs referral for TAVR evaluation at [facility] ? D/C in AM on increased lasix, will d/w cardiology Generalized abdomina pain POA KUB negative Bowel regimen 
  
Acute kidney injury POA Urinary retention  
baseline Cr ~ 1.1-1.2, admission 1.6, now improved to 1.0 to 1.2 Watch volume status carefully with the diuretics Dizziness POA room spinning for months Suspect orthostasis and ? Peripheral vertigo, cannot rule out VBI Progressive decline in exercise capacity due to dizziness Dizziness persistent, near fall at home Neurology input appreciated, prior severe VBI 
CTA head and neck There are extensive venous collaterals in the neck and around the cervical 
          spine making interpretation of the vertebral arteries technically challenging. However they are small in caliber bilaterally but grossly patent. Patent bilateral carotid artery stents. No acute intracranial abnormality. No large vessel occlusion or intracranial aneurysm. Head CT: negative Cannot have MRI due to ICD  
PT: [facility] 
  
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  
NPO at home with TF  IsoSource 1.5 ( 5 cans, wife gives 1 can every 3 hr making sure there is no residual before given new can) Follows with Dr Sandra Koroma OP for dysphasia DM type II Continue lantus and SSI, regular insulin with TF Follows with endocrinology   
  
Chronic respiratory failure with hypoxia, cont home O2 2 L at night, follows Dr Linda Mcdonnell H/o streptococcal bacteremia 07/2018 H/o stroke 2015 Chronic thrombocytopenia POA stable , followed with Dr Gabrielle Philip 
  
Code status: Full NOK: wife [phone], updated today by [facility] Prophylaxis: Hep SQ Additional time:    25   min rendering and coordinating care Dimas Subramanian MD

## 2018-11-20 NOTE — PROGRESS NOTES
Bedside shift change report given to Cami Zhou RN (oncoming nurse) by Danna Lewis RN (offgoing nurse). Report included the following information SBAR.

## 2018-11-20 NOTE — PROGRESS NOTES
Martha Fenton from [address] [place] [address] Valve Clinic returned call. Appointment scheduled. Rona Bryant RN CM Ext [phone]

## 2018-11-20 NOTE — PROGRESS NOTES
DISCHARGE SUMMARY FROM NURSE: 
 
Patient is stable for discharge. I have reviewed the discharge instructions with the patient and patient's son and verbalized understanding. All questions were fully answered and denies any complaints. There were no personal belongings, valuables or home medications left at patients bedside,  or safe. Hard scripts (furosemide) and medication handouts were given and reviewed with the patient. Appropriate educational materials and side effects teaching were provided. Cardiac monitor and IV line(s) removed.

## 2018-11-20 NOTE — DISCHARGE SUMMARY
Hospitalist Discharge Summary Patient ID: Amina Ramirez 
[id number] 
76 y.o. 
1935 PCP on record: Desiree Amezcua MD 
 
Admit date: 11/11/2018 Discharge date and time: 11/20/2018 DISCHARGE DIAGNOSIS: 
Acute systolic CHF Moderate to severe aortic stenosis Chronic orthostatic hypotension  
Coronary artery disease Urinary retention  
Chronic dizziness Hx of tongue ca s/p radical neck resection and radiation Chronic dysphagia with PEG  
Diabetes Chronic respiratory failure with hypoxia, home O2 2 LPM 
H/o streptococcal bacteremia 07/2018 H/o stroke 2015  
 
CONSULTATIONS: 
IP CONSULT TO CARDIOLOGY 
IP CONSULT TO CARDIOLOGY 
IP CONSULT TO NEUROLOGY 
IP CONSULT TO PALLIATIVE CARE - PROVIDER Excerpted HPI from H&P of [facility] MD: The pt reports that his dizziness is not new. But for the last two weeks his dizziness has exacerbated. Upon elaboration we learned that he is complaining of more shortness of breath and lightheadedness which he describes like feeling 'drunk' that does get better with resting. This is reminiscent of presyncope with activity. In light of his known severe AS and MR we remain concerned if his valvular disease has progressed further and now pt is symptomatic.  
 
______________________________________________________________________ DISCHARGE SUMMARY/HOSPITAL COURSE:  for full details see H&P, daily progress notes, labs, consult notes. Hospital course: 
Acute on chronic systolic HF EF 31% POA, Mod to severe AS, Chronic orthostatic hypotension, CAD s/p remote CABG / BiV ICD and elevated troponin:  Pt seen by cardiology while here. He was continued on aspirin, plavix, and lipitor. Pt with pulmonary edema on CXR 11/15/2018. He was treated with IV lasix BID with symptomatic improvement as well as improvement on CXR.   Pt had Echo LVEF 30 to 35%, mod MR and mod to severe AS.  Per recommendations by cardiology, has has been set up with TAVR evaluation in clinic next week. Additionally, he is discharging on BID lasix (on daily PTA).   
Generalized abdominal pain, POA: KUB negative. Doing well on discharge. Acute kidney injury and urinary retention: baseline Cr ~ 1.1-1.2, admission 1.6, now improved to 1.0 to 1.2 Dizziness POA room spinning for months: Progressive decline in exercise capacity due to dizziness. Pt was seen by Neurology for additional input. 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 Pt had CTA head and neck with extensive venous collaterals in the neck and around the cervical spine making interpretation of the vertebral arteries technically challenging. However they are small in caliber bilaterally but grossly patent. Patent bilateral carotid artery stents. No acute intracranial abnormality. No large vessel occlusion or intracranial aneurysm. Head CT was negative. Pt cannot have MRI due to ICD.  
Hx of tongue ca s/p radical neck resection andS/p neck XRT, chronic dysphagia with PEG: NPO at home with tube feedings [ IsoSource 1.5 ( 5 cans, wife gives 1 can every 3 hr making sure there is no residual before given new can)]. He follows with Dr Orr OP for dysphasia as well.  
DM type II: con't lantus and SSI, regular insulin with TF. Chronic respiratory failure with hypoxia, cont home O2 2 LPM, follows Dr Kylie Aldana H/o streptococcal bacteremia 07/2018 H/o stroke 2015  
Chronic thrombocytopenia POA stable , followed with Dr Lord Woodward 
 ______________________________________________________________________ Patient seen and examined by me on discharge day. Pertinent Findings: 
Gen: awake, appropriate, NAD HEENT: cl delia, no lesions Chest: CTA bilaterally, no crackles or wheezes Cv: RRR, loud TARA at upper sternal border, no edema Abd: soft, NT, ND, BS+, no mass Neuro: CN intact _______________________________________________________________________ DISCHARGE MEDICATIONS:  
Current Discharge Medication List  
  
CONTINUE these medications which have CHANGED Details  
furosemide (LASIX) 40 mg tablet 1 Tab by Per G Tube route Before breakfast and dinner for 30 days. Qty: 40 Tab, Refills: 6 CONTINUE these medications which have NOT CHANGED Details  
!! insulin regular (NOVOLIN R REGULAR U-100 INSULN) 100 unit/mL injection 14 Units by SubCUTAneous route two (2) times a day. 14 units at 8 AM, 14 units at 2 PM, and 5 units at 8 PM  
  
!! insulin regular (NOVOLIN R REGULAR U-100 INSULN) 100 unit/mL injection 5 Units by SubCUTAneous route every evening. 14 units at 8 AM, 14 units at 2 PM, and 5 units at 8 PM  
  
metoclopramide HCl (REGLAN) 10 mg tablet Take 10 mg by mouth three (3) times daily as needed. pramipexole (MIRAPEX) 0.25 mg tablet Take 1 Tab by mouth three (3) times daily. Qty: 100 Tab, Refills: 11  
 Associated Diagnoses: Weakness; Tremors of nervous system; Physical deconditioning; Counseling regarding goals of care; Myalgia; Altered mental status, unspecified altered mental status type; Disturbance of memory; Benign essential tremor syndrome; Stenosis of both internal carotid arteries; Parkinson's disease (tremor, stiffness, slow motion, unstable posture) ([address] Diabetic peripheral neuropathy associated with type 2 diabetes mellitus ([address] Hemiplegia and hemiparesis following cerebral infarction affecting right dominant side ([address] Orthostatic hypotension OTHER diltiazem 2% lidocaine cream. Apply to rectal area as needed  
  
clopidogrel (PLAVIX) 75 mg tab 1 Tab by PEG Tube route daily. Qty: 1 Tab, Refills: 0  
  
klack's mixture Solution Take 5 mL by mouth two (2) times a day.  Diphenhydramine elixir 12.5mg/ml 500ml, Nystatin suspension 120ml,Tetracycline 500mg capsules  12 capsules (6g), Hydrocortisone 20mg tablet 12 tablets (240mg), Water for irrigation QS ad 1000ml 
 
   
  
albuterol (PROVENTIL VENTOLIN) 2.5 mg /3 mL (0.083 %) nebulizer solution 2.5 mg by Nebulization route two (2) times a day. lactose-reduced food/fiber (ISOSOURCE 1.5 RAE FEEDING TUBE) 250 mL by adductor canal block route five (5) times daily. guaiFENesin (ROBITUSSIN) 100 mg/5 mL liquid 10mL three times daily Associated Diagnoses: Thrombotic stroke involving left middle cerebral artery ([address] Parkinson's disease (tremor, stiffness, slow motion, unstable posture) ([address] Benign essential tremor syndrome; Hemiplegia and hemiparesis following cerebral infarction affecting right dominant side ([address] Stenosis of both internal carotid arteries; Diabetic peripheral neuropathy associated with type 2 diabetes mellitus ([address] Disturbance of memory; Presbycusis of both ears; Physical deconditioning; Hypothyroidism due to acquired atrophy of thyroid; Vitamin D deficiency; B12 deficiency  
  
aspirin 81 mg chewable tablet Take 1 Tab by mouth daily. Qty: 30 Tab, Refills: 6  
  
famotidine (PEPCID) 20 mg tablet 20 mg by Per G Tube route three (3) times daily. L. acidoph & paracasei- S therm- Bifido (JAVY-Q/RISAQUAD) 8 billion cell cap cap 1 Cap by PEG Tube route daily. Qty: 30 Cap, Refills: 0  
  
atorvastatin (LIPITOR) 40 mg tablet 40 mg by Per G Tube route daily. finasteride (PROSCAR) 5 mg tablet 5 mg by Per G Tube route nightly. alfuzosin SR (UROXATRAL) 10 mg SR tablet 10 mg by Per G Tube route daily. midodrine (PROAMITINE) 10 mg tablet 10 mg by Per G Tube route three (3) times daily (with meals). HYDROcodone-acetaminophen (NORCO)  mg tablet 1 Tab by Per G Tube route every four (4) hours as needed for Pain. fluticasone (FLONASE ALLERGY RELIEF) 50 mcg/actuation nasal spray 1 [place] by Both Nostrils route two (2) times a day. zinc 50 mg tab tablet 50 mg by Per G Tube route daily. LANTUS SOLOSTAR U-100 INSULIN 100 unit/mL (3 mL) inpn INJECT 14 UNITS SUBCUTANEOUSLY ONCE DAILY Qty: 15 Pen, Refills: 5 Associated Diagnoses: 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 ([address] Essential hypertension with goal blood pressure less than 140/90; Peripheral polyneuropathy  
  
nitroglycerin (NITROSTAT) 0.4 mg SL tablet 0.4 mg by SubLINGual route every five (5) minutes as needed for Chest Pain.  
  
gabapentin (NEURONTIN) 250 mg/5 mL solution 750 mg by PEG Tube route three (3) times daily. multivitamin (ONE A DAY) tablet 1 Tab by Per G Tube route daily. acetaminophen (TYLENOL) 500 mg tablet 500-1,000 mg by Per G Tube route every six (6) hours as needed for Pain. ondansetron hcl (ZOFRAN, AS HYDROCHLORIDE,) 8 mg tablet Take 1 Tab by mouth every eight (8) hours as needed for Nausea. Qty: 20 Tab, Refills: 0  
  
 !! - Potential duplicate medications found. Please discuss with provider. My Recommended Diet, Activity, Wound Care, and follow-up labs are listed in the patient's Discharge Insturctions which I have personally completed and reviewed. ______________________________________________________________________ Risk of deterioration: Moderate Condition at Discharge:  Stable 
______________________________________________________________________ Disposition Home with family and home health services 
______________________________________________________________________ Care Plan discussed with:  
Patient, Family (son at bedside), RN, Care Manager, Consultant (cardiology) 
 
______________________________________________________________________ Code Status: Full Code 
______________________________________________________________________ Follow up with: PCP : Juanis Zaragoza MD 
Follow-up Information Follow up With Specialties Details Why Contact Info Rosita Terrazas MD Endovascular Surgical Neuroradiology Go on 12/7/2018 Neurology - hospital follow up. Friday, December 7th  at 10:00am  [address] [place] [address] [address]  
[phone] Miguel Sims MD Family Practice Go on 11/26/2018 Hospital follow-up scheduled at 2:15pm ( If you have questions or need to reschedule please call  [facility] 
[phone] [facility] [id number] [phone] [facility]  Will provide Oxygen service. Has been informed of need for continuous O2.  [address]  [phone] 
[phone] Shantelle Stapleton,  Cardiology Go on 12/3/2018 Cardiology - Monday December 3 at 3:34pm [address] [place] [facility] 
[phone] [facility].  On 11/27/2018 Pulmonary - [phone]  follow up. Change in O2 needs. Tuesday November 27th at 11:00am.  Geronimo Smith NP  under Dr. Jassi Camarillo. [address]   [phone] [facility]   CM called and left message on Med[employer]. [phone] [facility]  On 11/28/2018 Valve Clinic appointment. November 28th at 1:00pm [facility] [facility] 
4th Floor, [address] Total time in minutes spent coordinating this discharge (includes going over instructions, follow-up, prescriptions, and preparing report for sign off to her PCP) :  35 minutes Signed: Irene Penaloza MD

## 2018-11-20 NOTE — DISCHARGE INSTRUCTIONS
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 · Keep your medication in the bottles provided by the pharmacist and keep a list of the medication names, dosages, and times to be taken in your wallet. · Do not take other medications without consulting your doctor. Pain Management: per above medications    What to do at Home    Recommended diet:  Resume previous diet    Recommended activity: Activity as tolerated, home PT/OT eval and treat    If you have questions regarding the hospital related prescriptions or hospital related issues please call [facility] at . You can always direct your questions to your primary care doctor if you are unable to reach your hospital physician; your PCP works as an extension of your hospital doctor just like your hospital doctor is an extension of your PCP for your time at [facility]. If you experience any of the following symptoms then please call your primary care physician or return to the emergency room if you cannot get hold of your doctor:  Fever, chills, nausea, vomiting, diarrhea, change in mentation, falling, bleeding, shortness of breath    Additional Instructions:      Bring these papers with you to your follow up appointments.  The papers will help your doctors be sure to continue the care plan from the hospital.              Information obtained by :  I understand that if any problems occur once I am at home I am to contact my physician. I understand and acknowledge receipt of the instructions indicated above.                                                                                                                                            Physician's or R.N.'s Signature                                                                  Date/Time                                                                                                                                              Patient or Representative Signature                                                          Date/Time

## 2018-11-20 NOTE — PROGRESS NOTES
Home Oxygen Test 
Date of test: 11/20/2018 Time of test: 1230 Sa02 99 % on room air AT REST. Sa02 94 % on room air DURING AMBULATION. Sa02 n/a % on  Liters DURING AMBULATION. Sa02 n/a % on  Liters AT REST/AFTER AMBULATION. Ambulated patient for 6 minutes, slow walk. Patient never desaturated below 94% on room air. Patient returned to room, sitting in recliner on room air.

## 2018-11-28 NOTE — PROGRESS NOTES
Patient: Silvia Reynoso   Age: 80 y.o. Patient Care Team:  Hernan Garcia MD as PCP - [facility])  Gregory Jorge MD (Endocrinology)  Christin Echeverria MD as Consulting Provider (Internal Medicine)    PCP: Hernan Garcia MD    Cardiologist: Beverly Hooper    Diagnosis/Reason for Consultation: The primary encounter diagnosis was Nonrheumatic aortic valve stenosis. Diagnoses of Vertebrobasilar occlusive disease, Thrombotic stroke involving left middle cerebral artery ([address] Heart failure, unspecified HF chronicity, unspecified heart failure type ([address] Hx of tongue cancer, Type 2 diabetes with nephropathy ([address] Diabetic peripheral neuropathy associated with type 2 diabetes mellitus ([address] Benign essential tremor syndrome, PEG (percutaneous endoscopic gastrostomy) status ([address] Status post implantation of automatic cardioverter/defibrillator (AICD), S/P PTCA (percutaneous transluminal coronary angioplasty), S/P CABG x 3, Parkinson's disease (tremor, stiffness, slow motion, unstable posture) ([address] and Orthostatic hypotension were also pertinent to this visit.     Problem List:   Patient Active Problem List   Diagnosis Code    Dysphagia R13.10    Abnormal cardiovascular stress test R94.39    S/P CABG x 3 Z95.1    Atherosclerotic cerebrovascular disease I67.2    Orthostatic hypotension I95.1    Feeding difficulties R63.3    Non-cardiac chest pain R07.89    S/P PTCA (percutaneous transluminal coronary angioplasty) Z98.61    CAD (coronary artery disease) I25.10    Status post implantation of automatic cardioverter/defibrillator (AICD) Z95.810    Aortic stenosis, mild I35.0    Esophageal motility disorder K22.4    Heart failure (HCC) I50.9    PEG (percutaneous endoscopic gastrostomy) status (HCC) Z93.1    Antiplatelet or antithrombotic long-term use Z79.02    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 ([address] E11.42    Peripheral neuropathy ([facility] G62.9    Polyneuropathy associated with underlying disease ([address] G63    History of stroke Z86.73    Aspiration pneumonia ([address] J69.0    Weakness R53.1    Stroke ([facility] I63.9    Thrombotic stroke involving left middle cerebral artery ([facility]) R03.477    Stenosis of both internal carotid arteries I65.23    Hemiplegia and hemiparesis following cerebral infarction affecting right dominant side ([facility]) I69.351    Type 2 diabetes with nephropathy ([facility]) E11.21    Diabetic peripheral neuropathy associated with type 2 diabetes mellitus ([facility]) E11.42    Benign essential tremor syndrome G25.0    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    Wrist pain, acute, right M25.531    Physical deconditioning R53.81    Tremors of nervous system R25.1    Parkinson's disease (tremor, stiffness, slow motion, unstable posture) ([facility]) G20    Myalgia M79.10    Pneumonia due to group B Streptococcus ([facility]) J15.3    Counseling regarding goals of care Z71.89    Disturbance of memory R41.3    Presbycusis of both ears H91.13    B12 deficiency E53.8    Vitamin D deficiency E55.9    Hypothyroidism due to acquired atrophy of thyroid E03.4    Altered mental status, unspecified R41.82    Convulsion ([facility] R56.9    Epigastric pain R10.13    Dizziness R42    Hx of tongue cancer Z85.810      HPI: 80 y.o.   male with PMHx of AS,CABG X 3 (2002) s/p PCI to distal LAD via LIMA, severe disease in large diagonal (not amenable to PCI), Thoracic Aneurysm, s/p BiV-AICD (5/2015), Carotid disease s/p stents, Tongue CA s/p radical neck resection/chemo/XRT (1996), DM II (Insulin dependent), Chronic dysphagia w/ PEG (2006), Home oxygen, CVA (Clopdiogrel 2015), CKD (Stage III), Streptococcal bacteremia from urosepsis (7/2018), Chronic thrombocytopenia (Dr. Ankit Azevedo), Essential tremor (? Parkinson?), Chronic Orthostatic hypotension (midodrine), & BPH that is referred to the [address] [place] [address] by Dr. Kandy Matson for interventional evaluation of AS. Mr. Koko Gomez has been followed by Dr. Tk Davila since his previous cardiologist, Dr. Kassie Majano, retired. He and his wife state they've known about his AS for some time but have recently been informed it is severe and would need replacing. Today, Mr. Koko Gomez reports he gets considerable SOB/KRAMER w/ any activity when he doesn't wear his supplemental oxygen and even when he does with ADLs. He has recently increased his use of oxygen as it was initially nocturnal only following a hospital discharge in September but has been ATC since his November hospital discharge. He also c/o profound fatigue and his wife states he sleeps a lot more than he used to. They both blame his sleepiness on the gabapentin he takes for his peripheral neuropathy for the past 2 yrs. They deny any recent dose increases and in fact had it decreased by his neurologist Dr. Sha Montalvo in the recent months. Mr. Koko Gomez has an adjustable bed that he sleeps with the [facility] quite elevated d/t mucus/choking issues r/t an esophageal flap malfunction. This flap malfunction has restricted his ability to swallow/eat and this is the reason he had a PEG placed and is not completely dependent on tube feeds. Mr. Koko Gomez also has a persistent & chronic dizziness for which the neurologist has suggested it may be d/t moderate to severe vertebrobasilar stenosis and insufficiency that may be compounded by orthostatic tremors for which he has been referred to Dr. Jasvir Matson (Neuro interventionalist). He does not have an appt set up as of yet. Mr. Koko Gomez has been of midodrine for many years d/t orthostatic hypotension and this dose has not been adjusted for over a yr. Mr. Koko Gomez does deny any CP, chest tightness, palpitations, or PND. Nayana Schwartz He has had multiple falls in the past 6 months, a couple are mechnical and one was d/t a syncopal episode when standing from a car.   His pacer was interrogated at that time and there were no abnormalities noted. Dr. Kodak Limon - DM  Dr. Jovany Montejo - urologist  Dr. Tiffany Bishop - neurology  Dr. Sherita Rebolledo - interventional neurology    NYHA Classification: III   Class III (Moderate): Marked limitation of physical activity. Comfortable at rest, but less than ordinary activity causes fatigue, palpitation, or dyspnea    Angina Classification: 0   Class 0: No symptoms    Past Medical History:   Diagnosis Date    Antiplatelet or antithrombotic long-term use 12/4/2014    Anxiety disorder     Arrhythmia 2009    bradycardia    Arthritis     CAD (coronary artery disease)     s/p CABG 2002; Dr Evert Santizo [facility]) 1996    tongue/throat cancer s/p surgery / radiation and 1 dose of chemo    Carotid artery stenosis     s/p bilateral stents    Chronic pain     left leg, lower back,     Depression     Diabetes ([facility]     Type II    Epigastric pain 8/17/2018    Esophageal dysmotility     s/p dilitation    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 I suspect the latter. Achalasia  is not present.         GERD (gastroesophageal reflux disease)     Heart failure ([facility] 10/2014     Cardiomyopathy:Pacemaker upgrade:Biv and AICD  Dr. Pedro Pablo Vazquez heart DrAlvaro last visit 5/11/2015    Hepatitis C Dx 1996    treated at [facility] in past; as of 4/15/15 wife states pt currently not under any treatment    Hyperlipidemia     Myocardial infarct [facility]) 2013    Heart Cath: 40% LV EF, Stented distal LAD, patent Graft to circumflex    On tube feeding diet approx 2009    still has as of 9/28/15  (no po food/liquid/meds at all); Dr Nelsy Zepeda Other ill-defined conditions(799.89) 1996    1 dose of chemotherapy/radiation for tongue cancer    Other ill-defined conditions(799.89)     BPH    Other ill-defined conditions(799.89)     orthostatic hypotension    Pneumonia ~ April -May 2010    Stroke [facility]) approx     left side-left finger tips numb; no imbalance or memory loss; as of  not seeing neuro MD    Suicidal thoughts      Endocarditis: no  Pulmonary HTN:  yes Greater than 2/3 systemic: no  Immunocompromised/Steroids: no  Heart Failure Admission w/in past year: yes  Heart Failure Admission w/in past 2 weeks: no  Liver Dz/Cirrhosis: unknown - hepatic steatosis noted on CTA below  If yes, MELD score:  N/A      Past Surgical History:   Procedure Laterality Date    ABDOMEN SURGERY [address]    peg tube    CABG, ARTERY-VEIN, THREE      HX CATARACT REMOVAL      bilateral    HX CHOLECYSTECTOMY      HX COLONOSCOPY      HX HEART CATHETERIZATION      Stented distal LAD    HX MOHS PROCEDURES      bilateral    HX ORTHOPAEDIC      back surgery times two    HX OTHER SURGICAL      Radical Left Neck    HX OTHER SURGICAL      NASAL POLYPS REMOVAL    HX OTHER SURGICAL      TURP    HX PACEMAKER      HX PACEMAKER  10/28/14     Defibrillator: [facility] # [id number], serial # [id number]; Dr. Ema Massey [phone]; Dr [place] HX UROLOGICAL      cystoscopy    NEUROLOGICAL PROCEDURE UNLISTED      cevical surgery    OR CHANGE GASTROSTOMY TUBE  2011         OR CHANGE GASTROSTOMY TUBE  2011         OR EGD INSERT GUIDE WIRE DILATOR PASSAGE ESOPHAGUS  2010         OR EGD TRANSORAL BIOPSY SINGLE/MULTIPLE  2010         STOMACH SURGERY PROCEDURE UNLISTED  2011         UPPER GI ENDOSCOPY,BIOPSY  2018         UPPER GI ENDOSCOPY,DILATN W GUIDE  2018         UPPER GI ENDOSCOPY,W/DILAT,GASTRIC OUT  2018         VASCULAR SURGERY PROCEDURE UNLIST      bilateral carotid stents      Social History     Tobacco Use    Smoking status: Never Smoker    Smokeless tobacco: Never Used   Substance Use Topics    Alcohol use: No      Family History   Problem Relation Age of Onset    Heart Disease Father          at age 52 from CAD    Colon Cancer Mother     Cancer Mother         colon ca    Heart Disease Brother      Prior to Admission medications    Medication Sig Start Date End Date Taking? Authorizing Provider   B.infantis-B.ani-B.long-B.bifi (PROBIOTIC 4X) 10-15 mg TbEC by PEG Tube route daily. Yes Provider, Historical   furosemide (LASIX) 40 mg tablet 1 Tab by Per G Tube route Before breakfast and dinner for 30 days. 11/20/18 12/20/18 Yes Eusebio Sauceda MD   insulin regular (NOVOLIN R REGULAR U-100 INSULN) 100 unit/mL injection 5 Units by SubCUTAneous route every evening. 14 units at 8 AM, 14 units at 2 PM, and 5 units at 8 PM   Yes Provider, Historical   pramipexole (MIRAPEX) 0.25 mg tablet Take 1 Tab by mouth three (3) times daily. 10/24/18  Yes Elpidio De Los Santos MD   clopidogrel (PLAVIX) 75 mg tab 1 Tab by PEG Tube route daily. 8/20/18  Yes Brenna Santo MD   klack's mixture Solution Take 5 mL by mouth two (2) times a day. Diphenhydramine elixir 12.5mg/ml 500ml, Nystatin suspension 120ml,Tetracycline 500mg capsules  12 capsules (6g), Hydrocortisone 20mg tablet 12 tablets (240mg), Water for irrigation QS ad 1000ml        Yes Provider, Historical   albuterol (PROVENTIL VENTOLIN) 2.5 mg /3 mL (0.083 %) nebulizer solution 2.5 mg by Nebulization route two (2) times a day. Yes Provider, Historical   lactose-reduced food/fiber (ISOSOURCE 1.5 RAE FEEDING TUBE) 250 mL by adductor canal block route five (5) times daily. Yes Provider, Historical   guaiFENesin (ROBITUSSIN) 100 mg/5 mL liquid 10mL three times daily   Yes Provider, Historical   aspirin 81 mg chewable tablet Take 1 Tab by mouth daily. Patient taking differently: 1 Tab by Per G Tube route daily. 7/28/18  Yes William Byers MD   famotidine (PEPCID) 20 mg tablet 20 mg by Per G Tube route three (3) times daily. Yes Provider, Historical   acetaminophen (TYLENOL) 500 mg tablet 500-1,000 mg by Per G Tube route every six (6) hours as needed for Pain. Yes Provider, Historical   atorvastatin (LIPITOR) 40 mg tablet 40 mg by Per G Tube route daily. Yes Other, MD Booker   finasteride (PROSCAR) 5 mg tablet 5 mg by Per G Tube route nightly. Yes Other, MD Booker   alfuzosin SR (UROXATRAL) 10 mg SR tablet 10 mg by Per G Tube route daily. 5/24/18  Yes Provider, Historical   midodrine (PROAMITINE) 10 mg tablet 10 mg by Per G Tube route three (3) times daily (with meals). 11/1/16  Yes Provider, Historical   HYDROcodone-acetaminophen (NORCO)  mg tablet 1 Tab by Per G Tube route every four (4) hours as needed for Pain. Yes Other, MD Booker   fluticasone (FLONASE ALLERGY RELIEF) 50 mcg/actuation nasal spray 1 [place] by Both Nostrils route two (2) times a day. Yes Provider, Historical   zinc 50 mg tab tablet 50 mg by Per G Tube route daily. Yes Provider, Historical   LANTUS SOLOSTAR U-100 INSULIN 100 unit/mL (3 mL) inpn INJECT 14 UNITS SUBCUTANEOUSLY ONCE DAILY 5/21/18  Yes Maria Elena Gaming MD   nitroglycerin (NITROSTAT) 0.4 mg SL tablet 0.4 mg by SubLINGual route every five (5) minutes as needed for Chest Pain. Yes Other, MD Booker   ondansetron hcl (ZOFRAN, AS HYDROCHLORIDE,) 8 mg tablet Take 1 Tab by mouth every eight (8) hours as needed for Nausea. 12/5/16  Yes Chao Yo MD   gabapentin (NEURONTIN) 250 mg/5 mL solution 750 mg by PEG Tube route three (3) times daily. Yes Other, MD Booker   multivitamin (ONE A DAY) tablet 1 Tab by Per G Tube route daily. Yes Other, MD Booker   metoclopramide HCl (REGLAN) 10 mg tablet Take 10 mg by mouth three (3) times daily as needed.     Provider, Historical   OTHER diltiazem 2% lidocaine cream. Apply to rectal area as needed    Provider, Historical       Allergies   Allergen Reactions    Demerol [Meperidine] Shortness of Breath    Paxil [Paroxetine Hcl] Unknown (comments)     Pt gets shaky and loses control of legs    Amoxicillin Rash    Cleocin [Clindamycin Hcl] Rash    Pcn [Penicillins] Rash       Current Medications:   Current Outpatient Medications   Medication Sig Dispense Refill    B.infantis-B.ani-B.long-B.bifi (PROBIOTIC 4X) 10-15 mg TbEC by PEG Tube route daily.  furosemide (LASIX) 40 mg tablet 1 Tab by Per G Tube route Before breakfast and dinner for 30 days. 40 Tab 6    insulin regular (NOVOLIN R REGULAR U-100 INSULN) 100 unit/mL injection 5 Units by SubCUTAneous route every evening. 14 units at 8 AM, 14 units at 2 PM, and 5 units at 8 PM      pramipexole (MIRAPEX) 0.25 mg tablet Take 1 Tab by mouth three (3) times daily. 100 Tab 11    clopidogrel (PLAVIX) 75 mg tab 1 Tab by PEG Tube route daily. 1 Tab 0    klack's mixture Solution Take 5 mL by mouth two (2) times a day. Diphenhydramine elixir 12.5mg/ml 500ml, Nystatin suspension 120ml,Tetracycline 500mg capsules  12 capsules (6g), Hydrocortisone 20mg tablet 12 tablets (240mg), Water for irrigation QS ad 1000ml           albuterol (PROVENTIL VENTOLIN) 2.5 mg /3 mL (0.083 %) nebulizer solution 2.5 mg by Nebulization route two (2) times a day.  lactose-reduced food/fiber (ISOSOURCE 1.5 RAE FEEDING TUBE) 250 mL by adductor canal block route five (5) times daily.  guaiFENesin (ROBITUSSIN) 100 mg/5 mL liquid 10mL three times daily      aspirin 81 mg chewable tablet Take 1 Tab by mouth daily. (Patient taking differently: 1 Tab by Per G Tube route daily.) 30 Tab 6    famotidine (PEPCID) 20 mg tablet 20 mg by Per G Tube route three (3) times daily.  acetaminophen (TYLENOL) 500 mg tablet 500-1,000 mg by Per G Tube route every six (6) hours as needed for Pain.  atorvastatin (LIPITOR) 40 mg tablet 40 mg by Per G Tube route daily.  finasteride (PROSCAR) 5 mg tablet 5 mg by Per G Tube route nightly.  alfuzosin SR (UROXATRAL) 10 mg SR tablet 10 mg by Per G Tube route daily.  midodrine (PROAMITINE) 10 mg tablet 10 mg by Per G Tube route three (3) times daily (with meals).       HYDROcodone-acetaminophen (NORCO)  mg tablet 1 Tab by Per G Tube route every four (4) hours as needed for Pain.  fluticasone (FLONASE ALLERGY RELIEF) 50 mcg/actuation nasal spray 1 [place] by Both Nostrils route two (2) times a day.  zinc 50 mg tab tablet 50 mg by Per G Tube route daily.  LANTUS SOLOSTAR U-100 INSULIN 100 unit/mL (3 mL) inpn INJECT 14 UNITS SUBCUTANEOUSLY ONCE DAILY 15 Pen 5    nitroglycerin (NITROSTAT) 0.4 mg SL tablet 0.4 mg by SubLINGual route every five (5) minutes as needed for Chest Pain.  ondansetron hcl (ZOFRAN, AS HYDROCHLORIDE,) 8 mg tablet Take 1 Tab by mouth every eight (8) hours as needed for Nausea. 20 Tab 0    gabapentin (NEURONTIN) 250 mg/5 mL solution 750 mg by PEG Tube route three (3) times daily.  multivitamin (ONE A DAY) tablet 1 Tab by Per G Tube route daily.  metoclopramide HCl (REGLAN) 10 mg tablet Take 10 mg by mouth three (3) times daily as needed.  OTHER diltiazem 2% lidocaine cream. Apply to rectal area as needed         Vitals: Blood pressure 120/62, pulse 75, temperature 97.6 °F (36.4 °C), temperature source Oral, resp. rate 16, height 5' 7\" (1.702 m), weight 168 lb (76.2 kg), SpO2 98 %. Allergies: is allergic to demerol [meperidine]; paxil [paroxetine hcl]; amoxicillin; cleocin [clindamycin hcl]; and pcn [penicillins].     Review of Systems: Pertinent Positives per HPI   [x]Total of 13 systems reviewed as follows:  Constitutional: Negative fever, negative chills  Eyes:   Negative for amauroses fugax  ENT:   Negative sore throat,oral absecess  Endocrine Negative for thyroid replacement Rx; goiter; Positive DM  Respiratory:  Negative chronic cough,sputum production  Cards:   Negative for palpitations, varicosities, claudication  GI:   Negative for bleeding, nausea, vomiting, diarrhea, and abdominal pain - Positive for dysphagia  Genitourinary: Negative for frequency, dysuria  Integument:  Negative for rash and pruritus  Hematologic:  Negative for easy bruising; bleeding dyscarsia  Musculoskel: Negative for muscle weakness inhibiting ambulation  Neurological:  Negative for stroke, TIA, Positive for syncope, dizziness  Behavl/Psych: Negative for feelings of anxiety, depression     Cardiovascular Testing:   EKG: Pacer    TTE 2018:  LEFT VENTRICLE: Size was normal. Systolic function was moderately to markedly reduced. Ejection fraction was estimated in the range of 30 % to 35 %. There was severe hypokinesis of the mid inferior, basal-mid inferolateral, basal-mid anterolateral, and apical lateral wall(s). RIGHT VENTRICLE: The size was normal. Systolic function was mildly reduced. LEFT ATRIUM: Size was normal. RIGHT ATRIUM: Size was normal. MITRAL VALVE: There was moderate annular calcification. Normal valve structure. DOPPLER: There was moderate regurgitation. AORTIC VALVE: The valve was trileaflet. Leaflets exhibited moderately increased thickness, moderate to marked calcification, and markedly reduced cuspal separation. DOPPLER: There was moderate to severe stenosis. There was trivial regurgitation. TRICUSPID VALVE: Not well visualized. DOPPLER: There was mild to moderate regurgitation. PULMONIC VALVE: Not well visualized. AORTA: The root exhibited normal size. PERICARDIUM: Insignificant pericardial effusion and/or pericardial fat was present. AV Vmax: 3.4 m/s  AV maxP.9 mmHg  AV meanP.7 mmHg  SUSAN (VTI): 0.6 cm2  SUSAN Vmax: 0.6 cm2  RVSP: 51.3 mmHg    PENNIE: none    Cardiac catheterization: none    Carotid Dopplers 2018:   1. <50% stenosis in the right internal carotid artery. 2. 50-69% stenosis in the left internal carotid artery. 3. No significant stenosis in the external carotid arteries bilaterally. 4. Antegrade flow in both vertebral arteries. 5. Normal flow in both subclavian arteries. Plaque Morphology:  1. Calcific plaque in the bulb and right ICA. 2. Calcific plaque in the bulb and left ICA. Chest CTA 2018: There is no pulmonary embolism. Resting aorta measures 40 mm in size.  Hepatic steatosis. Postcholecystectomy. Cardiomegaly. Bibasilar atelectatic change and bibasilar interstitial and interfissural increased markings. Moderate bronchial wall thickening particularly in the basilar segments. There is no pleural or pericardial effusion. Aortic valvular disease. Coronary vascular disease. Poststernotomy. There is no mediastinal, axillary or hilar lymphadenopathy. The aorta is normal in course and caliber. The proximal pulmonary arteries are without evidence of filling defects. No lytic or blastic lesions are identified. IMPRESSION:   There is no pulmonary embolism. Thoracic aortic aneurysm. Follow-up CT scan of chest in 12 months to evaluate for continued stability is recommended. Chronic basilar pleural and parenchymal change is consistent with a chronic infectious/inflammatory process predominating in the lower lobes bilaterally. Cardiomegaly. Physical Exam:  General: Well nourished well groomed elderly man appearing older than stated age accompanied by his wife  Neuro: A&OX3. EVANS. PERRL. Steady assisted gait w/ rollator  Head:Normocephalic. Atraumatic. Symmetrical  Neck: Trachea Midline  Resp: CTA B. No Adv BS/cough/sputum/tachypnea with seated conversation  CV: S1S2 RRR. TARA V/VI. No JVD/carotid bruits. Pink/warm/dry extremities. Mild LE peripheral edema  GI:Benign ab. Soft. NT/ND. Active BS  : Voids  Integ: No obvious s/s of infection or breakdown  Musculo/Skeletal: FROM in all major joints. Modest muscle tone    Clinic Evaluation:   KCCQ-12: scanned into EMR    5 meter gait: 11.70 seconds    Frality Survey:  [place] Index ADL - 6/6  scanned into EMR     Assessment/Plan:   1. AS - severe and symptomatic. Extreme Risk Surgical Candidate. Unclear if replacing AV will improve/change pt's QOL or longevity. Potential Cohort C/futility for TAVR. PT/wife insistent on pursuing.  Has met with palliative care during recent hospitalizations and has declared his wife to be full code and aggressively treated for any/all conditions. To start w/ [place] C/A/P CTA to eval vascular access options. If TF, would consider proceeding w/ cath & PFTs  2. Throat CA s/p radical neck resection - chronic dysphagia, PEG tube feeds dependent and current issues w/ managing oral secretions. Needs anesthesia consult to airway eval options if to pursue potential intervention. 3. Further plan/care by Isaac Eduardo [id number]

## 2018-11-28 NOTE — PROGRESS NOTES
Saw patient. History and films reviewed. Risks and benefits explained. Agrees with TAVR work-up. Patient seen by NP Ms Amira Mg and agree with above.    Findings/Conditions: severe AS  Plan of Care: TAVR work-up     Risk of morbidity and mortality - high   Medical decision making - high complexity

## 2018-11-28 NOTE — PROGRESS NOTES
I had the pleasure of seeing Mr. [id number] in the valve clinic earlier today with Ms. Romy Araujo NP - please see her note for details. He has severe symptomatic AS and is extreme surgical risk, bordering on futility, due to concomitant airway, frailty, and Parkinson's and lung disease. Will start with a CTA to see if he TF candidate, and have anesthesia see him to see if they think they could sedate/intubate him, before going on to get a cath (history of CAD and recently [address] LV dysfunction).

## 2018-12-04 PROBLEM — S06.5XAA SDH (SUBDURAL HEMATOMA): Status: ACTIVE | Noted: 2018-01-01

## 2018-12-04 NOTE — ED NOTES
When pt is no longer NPO, family states he uses ISOSOURCE 1.5. They report that Jevity causes pt diarrhea.

## 2018-12-04 NOTE — CONSULTS
Patient: [place]   Age: 80 y.o. Patient Care Team: 
Sal Villagomez MD as PCP - [facility]) Tanvi Quezada MD (Endocrinology) Miriam Moss MD as Consulting Provider (Internal Medicine) PCP: Sal Villagomez MD 
 
Cardiologist: Marcelino Garcia Diagnosis/Reason for Consultation: Aortic Stenosis, Syncope Problem List:  
Patient Active Problem List  
Diagnosis Code  Dysphagia R13.10  Abnormal cardiovascular stress test R94.39  
 S/P CABG x 3 Z95.1  Atherosclerotic cerebrovascular disease I67.2  Orthostatic hypotension I95.1  Feeding difficulties R63.3  Non-cardiac chest pain R07.89  S/P PTCA (percutaneous transluminal coronary angioplasty) Z98.61  
 CAD (coronary artery disease) I25.10  Status post implantation of automatic cardioverter/defibrillator (AICD) Z95.810  
 Aortic stenosis, mild I35.0  Esophageal motility disorder K22.4  Heart failure (HCC) I50.9  PEG (percutaneous endoscopic gastrostomy) status ([address] Z93.1  Antiplatelet or antithrombotic long-term use Z79.02  
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  
 Peripheral neuropathy ([facility]) G62.9  
 Polyneuropathy associated with underlying disease ([address] G63  
 History of stroke Z86.73  
 Aspiration pneumonia ([address] J69.0  Weakness R53.1  Stroke ([address] I63.9  Thrombotic stroke involving left middle cerebral artery ([address] N95.499  Stenosis of both internal carotid arteries I65.23  
 Hemiplegia and hemiparesis following cerebral infarction affecting right dominant side (HCC) D04.036  Type 2 diabetes with nephropathy ([address] E11.21  
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  
 Tremors of nervous system R25.1  Parkinson's disease (tremor, stiffness, slow motion, unstable posture) ([facility]) G20  
 Myalgia M79.10  Pneumonia due to group B Streptococcus ([facility] J15.3  Counseling regarding goals of care Z71.89  
 Disturbance of memory R41.3  Presbycusis of both ears H91.13  
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  
 Convulsion ([facility] R56.9  Epigastric pain R10.13  
 Dizziness R42  Hx of tongue cancer Z85.810  
 SDH (subdural hematoma) ([facility]) S06.5X9A  
  
HPI: 80 y.o.  male with PMHx of AS, CABG X 3 (2002) s/p PCI to distal LAD via LIMA, severe disease in large diagonal (not amenable to PCI), Thoracic Aneurysm, s/p BiV-AICD (5/2015), Carotid disease s/p stents, Tongue CA s/p radical neck resection/chemo/XRT (1996), Chronic dysphagia w/ PEG (2006), DM, Home oxygen, CVA (Clopdiogrel 2015), CKD (Stage III), Streptococcal bacteremia from urosepsis (7/2018), Chronic thrombocytopenia (Dr. Kevin Wray), Essential tremor (? Parkinson?), Chronic Orthostatic hypotension (midodrine) & severe vertebrobasilar stenosis and insufficiency that was seen in the [address] [place] [address] at the referral of Dr. Abiola Herrera last week for interventional evaluation of AS presented to the ED today via EMS after a syncopal episode and LOC for several minutes. Mr. Sierra Espinosa relays a story of trying to put a towel on the dryer but then blacking out and waking up on the floor. Per notes from ED staff, wife reports up to 10 minutes from the time of the fall to her abiilty to arouse him. Per brief neurology consult note in EMR pt currently with parafalcine SDH without mass effect per Head CT. He is awaiting an inpt bed for observation. Mr. Sierra Espinosa denies any change in sx from his clinic appt last week.   He states he has SOB/KRAMER w/ most activity, particularly when he doesn't wear his supplemental oxygen. He continues to have a perpetual 'room spinning' sensation but it is not any worse now after his fall. He denies any CP, chest tightness. He continues to sleep w/ his bed slightly elevated d/t his aspiration secretion/aspiration issues. This is at least his second syncopal episode in the past 6 months.  
  
Dr. Wild Tapia - [place] Drought Dr. Isabelle Greene - DM 
Dr. Sloan Cuenca Killer Dr. Luzmaria Johnson - urologist 
Dr. Breanna Ludwig - neurology Past Medical History:  
Diagnosis Date  Antiplatelet or antithrombotic long-term use 12/4/2014  Anxiety disorder  Arrhythmia 2009  
 bradycardia  Arthritis  CAD (coronary artery disease) s/p CABG 2002; Dr Lucy Garcia  Cancer ([facility] 1996  
 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   
 left leg, lower back,   
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 I suspect the latter. Achalasia  is not present.  GERD (gastroesophageal reflux disease)  Heart failure ([facility] 10/2014 Cardiomyopathy:Pacemaker upgrade:Biv and AICD  Dr. Artemio Huerta heart DrAlvaro last visit 5/11/2015  Hepatitis C Dx 1996  
 treated at [facility] in past; as of 4/15/15 wife states pt currently not under any treatment  Hyperlipidemia  Myocardial infarct [facility]) 2013 Heart Cath: 40% LV EF, Stented distal LAD, patent Graft to circumflex  On tube feeding diet approx 2009  
 still has as of 9/28/15  (no po food/liquid/meds at all); Dr Jazmine Salinas  Other ill-defined conditions(799.89) 1996  
 1 dose of chemotherapy/radiation for tongue cancer  Other ill-defined conditions(799.89) BPH  Other ill-defined conditions(799.89) orthostatic hypotension  Pneumonia ~ April -May 2010  Stroke [facility]) approx   
 left side-left finger tips numb; no imbalance or memory loss; as of  not seeing neuro MD  
 Suicidal thoughts Past Surgical History:  
Procedure Laterality Date  ABDOMEN SURGERY PROC UNLISTED    
 peg tube  CABG, ARTERY-VEIN, THREE    HX CATARACT REMOVAL    
 bilateral  
 HX CHOLECYSTECTOMY  HX COLONOSCOPY    
 HX HEART CATHETERIZATION   Stented distal LAD  HX MOHS PROCEDURES    
 bilateral  
 HX ORTHOPAEDIC    
 back surgery times two  HX OTHER SURGICAL Radical Left Neck  HX OTHER SURGICAL    
 NASAL POLYPS REMOVAL  
 HX OTHER SURGICAL   TURP  
 HX PACEMAKER    HX PACEMAKER  10/28/14 Defibrillator: [facility] # [id number], serial # [id number]; Dr. Yajaira Mendoza [phone]; Dr Caryn Valente  HX UROLOGICAL    
 cystoscopy [address]  
 cevical surgery  ID CHANGE GASTROSTOMY TUBE  2011  ID CHANGE GASTROSTOMY TUBE  2011  ID EGD INSERT GUIDE WIRE DILATOR PASSAGE ESOPHAGUS  2010  ID EGD TRANSORAL BIOPSY SINGLE/MULTIPLE  2010  
    
 STOMACH SURGERY PROCEDURE UNLISTED  2011  UPPER GI ENDOSCOPY,BIOPSY  2018  UPPER GI ENDOSCOPY,DILATN W GUIDE  2018  UPPER GI ENDOSCOPY,W/DILAT,GASTRIC OUT  2018  VASCULAR SURGERY PROCEDURE UNLIST    
 bilateral carotid stents Social History Tobacco Use  Smoking status: Never Smoker  Smokeless tobacco: Never Used Substance Use Topics  Alcohol use: No  
  
Family History Problem Relation Age of Onset  Heart Disease Father   
      at age 52 from CAD  Colon Cancer Mother  Cancer Mother   
     colon ca  
 Heart Disease Brother Prior to Admission medications Medication Sig Start Date End Date Taking? Authorizing Provider B.infantis-B.ani-B.long-B.bifi (PROBIOTIC 4X) 10-15 mg TbEC by PEG Tube route daily. Provider, Historical  
furosemide (LASIX) 40 mg tablet 1 Tab by Per G Tube route Before breakfast and dinner for 30 days. 11/20/18 12/20/18  Rudy Sosa MD  
insulin regular (NOVOLIN R REGULAR U-100 INSULN) 100 unit/mL injection 5 Units by SubCUTAneous route every evening. 14 units at 8 AM, 14 units at 2 PM, and 5 units at 8 PM    Provider, Historical  
pramipexole (MIRAPEX) 0.25 mg tablet Take 1 Tab by mouth three (3) times daily. 10/24/18   Selene Grossman MD  
OTHER diltiazem 2% lidocaine cream. Apply to rectal area as needed    Provider, Historical  
clopidogrel (PLAVIX) 75 mg tab 1 Tab by PEG Tube route daily. 8/20/18   Stevie Alcaraz MD  
klack's mixture Solution Take 5 mL by mouth two (2) times a day. Diphenhydramine elixir 12.5mg/ml 500ml, Nystatin suspension 120ml,Tetracycline 500mg capsules  12 capsules (6g), Hydrocortisone 20mg tablet 12 tablets (240mg), Water for irrigation QS ad 1000ml Provider, Historical  
albuterol (PROVENTIL VENTOLIN) 2.5 mg /3 mL (0.083 %) nebulizer solution 2.5 mg by Nebulization route two (2) times a day. Per wife patient can use 3 times a day if needed    Provider, Historical  
lactose-reduced food/fiber (ISOSOURCE 1.5 RAE FEEDING TUBE) 250 mL by adductor canal block route five (5) times daily. Provider, Historical  
guaiFENesin (ROBITUSSIN) 100 mg/5 mL liquid 10mL three times daily    Provider, Historical  
aspirin 81 mg chewable tablet Take 1 Tab by mouth daily. Patient taking differently: 1 Tab by Per G Tube route daily. 7/28/18   Dasha Godinez MD  
famotidine (PEPCID) 20 mg tablet 20 mg by Per G Tube route three (3) times daily. Provider, Historical  
acetaminophen (TYLENOL) 500 mg tablet 500-1,000 mg by Per G Tube route every six (6) hours as needed for Pain. Provider, Historical  
atorvastatin (LIPITOR) 40 mg tablet 40 mg by Per G Tube route daily. Booker Eric MD  
finasteride (PROSCAR) 5 mg tablet 5 mg by Per G Tube route nightly. Booker Eric MD  
alfuzosin SR (UROXATRAL) 10 mg SR tablet 10 mg by Per G Tube route daily. 5/24/18   Provider, Historical  
midodrine (PROAMITINE) 10 mg tablet 10 mg by Per G Tube route three (3) times daily (with meals). 11/1/16   Provider, Historical  
HYDROcodone-acetaminophen (NORCO)  mg tablet 1 Tab by Per G Tube route every four (4) hours as needed for Pain. Booker Eric MD  
fluticasone (FLONASE ALLERGY RELIEF) 50 mcg/actuation nasal spray 1 [place] by Both Nostrils route two (2) times a day. Provider, Historical  
zinc 50 mg tab tablet 50 mg by Per G Tube route daily. Provider, Historical  
LANTUS SOLOSTAR U-100 INSULIN 100 unit/mL (3 mL) inpn INJECT 14 UNITS SUBCUTANEOUSLY ONCE DAILY 5/21/18   Karin Santos MD  
nitroglycerin (NITROSTAT) 0.4 mg SL tablet 0.4 mg by SubLINGual route every five (5) minutes as needed for Chest Pain. Booker Eric MD  
ondansetron hcl (ZOFRAN, AS HYDROCHLORIDE,) 8 mg tablet Take 1 Tab by mouth every eight (8) hours as needed for Nausea. 12/5/16   Valentina Wong MD  
gabapentin (NEURONTIN) 250 mg/5 mL solution 750 mg by PEG Tube route three (3) times daily. Booker Eric MD  
multivitamin (ONE A DAY) tablet 1 Tab by Per G Tube route daily. Booker Eric MD  
   
Allergies Allergen Reactions  Demerol [Meperidine] Shortness of Breath  Paxil [Paroxetine Hcl] Unknown (comments) Pt gets shaky and loses control of legs  Amoxicillin Rash  Cleocin [Clindamycin Hcl] Rash  Pcn [Penicillins] Rash Current Medications:  
Current Facility-Administered Medications Medication Dose Route Frequency Provider Last Rate Last Dose  sodium chloride (NS) flush 5-10 mL  5-10 mL IntraVENous Q8H Bryan Mujica MD      
 sodium chloride (NS) flush 5-10 mL  5-10 mL IntraVENous PRN Alexsander Ram MD      
  glucose chewable tablet 16 g  4 Tab Oral PRN Green Liter, MD      
 dextrose (D50W) injection syrg 12.5-25 g  25-50 mL IntraVENous PRN Green Liter, MD      
 glucagon (GLUCAGEN) injection 1 mg  1 mg IntraMUSCular PRN Green Liter, MD      
 insulin lispro (HUMALOG) injection   SubCUTAneous Q6H Green Liter, MD      
 
Current Outpatient Medications Medication Sig Dispense Refill  B.infantis-B.ani-B.long-B.bifi (PROBIOTIC 4X) 10-15 mg TbEC by PEG Tube route daily.  furosemide (LASIX) 40 mg tablet 1 Tab by Per G Tube route Before breakfast and dinner for 30 days. 40 Tab 6  
 insulin regular (NOVOLIN R REGULAR U-100 INSULN) 100 unit/mL injection 5 Units by SubCUTAneous route every evening. 14 units at 8 AM, 14 units at 2 PM, and 5 units at 8 PM    
 pramipexole (MIRAPEX) 0.25 mg tablet Take 1 Tab by mouth three (3) times daily. 100 Tab 11  
 OTHER diltiazem 2% lidocaine cream. Apply to rectal area as needed  clopidogrel (PLAVIX) 75 mg tab 1 Tab by PEG Tube route daily. 1 Tab 0  
 klack's mixture Solution Take 5 mL by mouth two (2) times a day. Diphenhydramine elixir 12.5mg/ml 500ml, Nystatin suspension 120ml,Tetracycline 500mg capsules  12 capsules (6g), Hydrocortisone 20mg tablet 12 tablets (240mg), Water for irrigation QS ad 1000ml  albuterol (PROVENTIL VENTOLIN) 2.5 mg /3 mL (0.083 %) nebulizer solution 2.5 mg by Nebulization route two (2) times a day. Per wife patient can use 3 times a day if needed  lactose-reduced food/fiber (ISOSOURCE 1.5 RAE FEEDING TUBE) 250 mL by adductor canal block route five (5) times daily.  guaiFENesin (ROBITUSSIN) 100 mg/5 mL liquid 10mL three times daily  aspirin 81 mg chewable tablet Take 1 Tab by mouth daily. (Patient taking differently: 1 Tab by Per G Tube route daily.) 30 Tab 6  famotidine (PEPCID) 20 mg tablet 20 mg by Per G Tube route three (3) times daily.  acetaminophen (TYLENOL) 500 mg tablet 500-1,000 mg by Per G Tube route every six (6) hours as needed for Pain.  atorvastatin (LIPITOR) 40 mg tablet 40 mg by Per G Tube route daily.  finasteride (PROSCAR) 5 mg tablet 5 mg by Per G Tube route nightly.  alfuzosin SR (UROXATRAL) 10 mg SR tablet 10 mg by Per G Tube route daily.  midodrine (PROAMITINE) 10 mg tablet 10 mg by Per G Tube route three (3) times daily (with meals).  HYDROcodone-acetaminophen (NORCO)  mg tablet 1 Tab by Per G Tube route every four (4) hours as needed for Pain.  fluticasone (FLONASE ALLERGY RELIEF) 50 mcg/actuation nasal spray 1 [place] by Both Nostrils route two (2) times a day.  zinc 50 mg tab tablet 50 mg by Per G Tube route daily.  LANTUS SOLOSTAR U-100 INSULIN 100 unit/mL (3 mL) inpn INJECT 14 UNITS SUBCUTANEOUSLY ONCE DAILY 15 Pen 5  
 nitroglycerin (NITROSTAT) 0.4 mg SL tablet 0.4 mg by SubLINGual route every five (5) minutes as needed for Chest Pain.  ondansetron hcl (ZOFRAN, AS HYDROCHLORIDE,) 8 mg tablet Take 1 Tab by mouth every eight (8) hours as needed for Nausea. 20 Tab 0  
 gabapentin (NEURONTIN) 250 mg/5 mL solution 750 mg by PEG Tube route three (3) times daily.  multivitamin (ONE A DAY) tablet 1 Tab by Per G Tube route daily. Vitals: Blood pressure 122/61, pulse 80, temperature 97.6 °F (36.4 °C), resp. rate 28, height 5' 7\" (1.702 m), weight 163 lb (73.9 kg), SpO2 92 %. BMP:  
Lab Results Component Value Date/Time  12/04/2018 08:53 AM  
 K 4.8 12/04/2018 08:53 AM  
  12/04/2018 08:53 AM  
 CO2 33 (H) 12/04/2018 08:53 AM  
 AGAP 6 12/04/2018 08:53 AM  
  (H) 12/04/2018 08:53 AM  
 BUN 48 (H) 12/04/2018 08:53 AM  
 CREA 1.68 (H) 12/04/2018 08:53 AM  
 GFRAA 48 (L) 12/04/2018 08:53 AM  
 GFRNA 39 (L) 12/04/2018 08:53 AM  
 
CMP:  
Lab Results Component Value Date/Time   12/04/2018 08:53 AM  
 K 4.8 12/04/2018 08:53 AM  
  12/04/2018 08:53 AM  
 CO2 33 (H) 12/04/2018 08:53 AM  
 AGAP 6 12/04/2018 08:53 AM  
  (H) 12/04/2018 08:53 AM  
 BUN 48 (H) 12/04/2018 08:53 AM  
 CREA 1.68 (H) 12/04/2018 08:53 AM  
 GFRAA 48 (L) 12/04/2018 08:53 AM  
 GFRNA 39 (L) 12/04/2018 08:53 AM  
 CA 9.5 12/04/2018 08:53 AM  
 ALB 3.3 (L) 12/04/2018 08:53 AM  
 TP 8.0 12/04/2018 08:53 AM  
 GLOB 4.7 (H) 12/04/2018 08:53 AM  
 AGRAT 0.7 (L) 12/04/2018 08:53 AM  
 SGOT 51 (H) 12/04/2018 08:53 AM  
 ALT 48 12/04/2018 08:53 AM  
 
CBC:  
Lab Results Component Value Date/Time WBC 8.5 12/04/2018 08:53 AM  
 HGB 10.6 (L) 12/04/2018 08:53 AM  
 HCT 35.3 (L) 12/04/2018 08:53 AM  
 PLT 81 (L) 12/04/2018 08:53 AM  
 
All Cardiac Markers in the last 24 hours:  
Lab Results Component Value Date/Time TROIQ <0.05 12/04/2018 08:53 AM  
 
Recent Glucose Results:  
Lab Results Component Value Date/Time  (H) 12/04/2018 08:53 AM  
 
ABG: No results found for: PH, PHI, PCO2, PCO2I, PO2, PO2I, HCO3, HCO3I, FIO2, FIO2I 
COAGS:  
Lab Results Component Value Date/Time APTT 23.0 12/04/2018 08:53 AM  
 PTP 10.9 12/04/2018 08:53 AM  
 INR 1.1 12/04/2018 08:53 AM  
 
Liver Panel:  
Lab Results Component Value Date/Time ALB 3.3 (L) 12/04/2018 08:53 AM  
 TP 8.0 12/04/2018 08:53 AM  
 GLOB 4.7 (H) 12/04/2018 08:53 AM  
 AGRAT 0.7 (L) 12/04/2018 08:53 AM  
 SGOT 51 (H) 12/04/2018 08:53 AM  
 ALT 48 12/04/2018 08:53 AM  
 AP 71 12/04/2018 08:53 AM  
 
 
Allergies: is allergic to demerol [meperidine]; paxil [paroxetine hcl]; amoxicillin; cleocin [clindamycin hcl]; and pcn [penicillins]. Review of Systems: Pertinent Positives per HPI [x]Total of 13 systems reviewed as follows: 
Constitutional: Negative fever, negative chills Eyes:   Negative for amauroses fugax ENT:   Negative sore throat,oral absecess Endocrine Negative for thyroid replacement Rx; goiter; DM Respiratory:  Negative chronic cough, POSITIVE sputum production Cards:   Negative for palpitations, lower extremity edema, varicosities, claudication GI:   Negative for dysphagia, bleeding, nausea, vomiting, diarrhea, and abdominal pain Genitourinary: Negative for frequency, dysuria Integument:  Negative for rash and pruritus Hematologic:  Negative for easy bruising; bleeding dyscarsia Musculoskel: Negative for muscle weakness inhibiting ambulation Neurological:  Negative for stroke, TIA Behavl/Psych: Negative for feelings of anxiety, depression TTE 2018:  LEFT VENTRICLE: Size was normal. Systolic function was moderately to markedly reduced. Ejection fraction was estimated in the range of 30 % to 35 %. There was severe hypokinesis of the mid inferior, basal-mid inferolateral, basal-mid anterolateral, and apical lateral wall(s). RIGHT VENTRICLE: The size was normal. Systolic function was mildly reduced. LEFT ATRIUM: Size was normal. RIGHT ATRIUM: Size was normal. MITRAL VALVE: There was moderate annular calcification. Normal valve structure. DOPPLER: There was moderate regurgitation. AORTIC VALVE: The valve was trileaflet. Leaflets exhibited moderately increased thickness, moderate to marked calcification, and markedly reduced cuspal separation. DOPPLER: There was moderate to severe stenosis. There was trivial regurgitation. TRICUSPID VALVE: Not well visualized. DOPPLER: There was mild to moderate regurgitation. PULMONIC VALVE: Not well visualized. AORTA: The root exhibited normal size. PERICARDIUM: Insignificant pericardial effusion and/or pericardial fat was present. AV Vmax: 3.4 m/s AV maxP.9 mmHg AV meanP.7 mmHg SUSAN (VTI): 0.6 cm2 SUSAN Vmax: 0.6 cm2 RVSP: 51.3 mmHg 
  
PENNIE: none 
  
Cardiac catheterization: none 
  
Carotid Dopplers 2018:  
1. <50% stenosis in the right internal carotid artery. 2. 50-69% stenosis in the left internal carotid artery. 3. No significant stenosis in the external carotid arteries bilaterally. 4. Antegrade flow in both vertebral arteries. 5. Normal flow in both subclavian arteries. Plaque Morphology: 1. Calcific plaque in the bulb and right ICA. 2. Calcific plaque in the bulb and left ICA. 
  
Chest CTA 8/2018: There is no pulmonary embolism. Resting aorta measures 40 mm in size. 
 Hepatic steatosis. Postcholecystectomy. Cardiomegaly. Bibasilar atelectatic change and bibasilar interstitial and interfissural increased markings. Moderate bronchial wall thickening particularly in the basilar segments. There is no pleural or pericardial effusion. Aortic valvular disease. Coronary vascular disease. Poststernotomy. There is no mediastinal, axillary or hilar lymphadenopathy. The aorta is normal in course and caliber. The proximal pulmonary arteries are without evidence of filling defects. No lytic or blastic lesions are identified. IMPRESSION:  
There is no pulmonary embolism. Thoracic aortic aneurysm. Follow-up CT scan of chest in 12 months to evaluate for continued stability is recommended. Chronic basilar pleural and parenchymal change is consistent with a chronic infectious/inflammatory process predominating in the lower lobes bilaterally. Cardiomegaly. Non-con Head CT today: FINDINGS: The ventricles and sulci are normal in size, shape and configuration and midline. Small vessel ischemic changes are stable compared to the prior exam. Old bilateral occipital infarcts are again noted. There is a 7 mm left parafalcine subdural hematoma and minimal associated subarachnoid hemorrhage. The basilar cisterns are open. No acute infarct is identified. The bone windows demonstrate no abnormalities.  The visualized portions of the paranasal sinuses and mastoid air cells are clear.   
IMPRESSION: 7 mm left parafalcine subdural hematoma and minimal associated subarachnoid hemorrhage 
  
Physical Exam: 
General: Well nourished well groomed elderly man appearing older than stated age standing beside stretcher attempting to urinate in urinal. 
Neuro: A&OX3. EVANS. PERRL. Generalized Parkinson-like movement in upper extremities Head:R neck & jaw malformation. Symmetrical otherwise. Quarter size laceration/small golf-ball size hematoma on back of head Neck: Trachea Midline Resp: CTA B. Diffuse rhonchi throughout CV: S1S2 RRR. TARA V/VI. No JVD/carotid bruits. Pink/warm/dry extremities. Mild LE peripheral edema GI:Benign ab. Soft. NT/ND. Active BS 
: Voids Integ: No obvious s/s of infection or breakdown w/ exception of posterior aspect of head Musculo/Skeletal: FROM in all major joints. Modest muscle tone Assessment/Plan: 1. AS - severe and symptomatic. High Risk Surgical Candidate. Still awaiting Gated Chest/Ab/Pelvic CTA to eval vascular access options. If TF, would proceed w/ cath & PFTs. Currently scheduled as oupt for tomorrow. Unclear if will acquire testing while inpt w/ [facility]. Will be unable to cath or perform TAVR that require full heparinization during this hospitalization. Timing TBD. Needs to recover from neuro insult prior to pursuing cardiac intervention. 2. Syncope - unclear etiology at this point. Has hx of severe AS but also w/ severe vertebrobasilar stenosis and insufficiency,  that was undergoing evaluation and potential interventional evaluation by Dr. Tessa Joseph. Also arrythymia event potential and pacer interrogation is needed. Also w/ hx of orthostatic hypotension on midodrine & hypoxia on supplemental oxygen 3. Throat CA s/p radical neck resection - Needs anesthesia consult to eval airway for potential intervention. Will get this while inpt to assist w/ future procedure planning of any sort (cath, neuro intervention, TAVR, etc) 4. Further care/paln per Dr. Elian Rios Saw patient, agree with above Risk of morbidity and mortality - high Medical decision making - high complexity AS - plan for TAVR work-up Syncope - likely from AS 
 Throat CA - monitor Pulmonary disease - monitor

## 2018-12-04 NOTE — ED PROVIDER NOTES
80 y.o. male with past medical history significant for carotid artery stenosis, hyperlipidemia, esophageal dysmotility, pneumonia, BPH, on feeding tube diet, hepatitis C, CAD, MI, type II diabetes, heart failure, bradycardia, stroke, and tongue/throat cancer who presents from home via EMS for evaluation s/p syncope. Per wife, she heard the pt fall while she was in the other room and when she walked in to the room he was stretched out on the floor with his eyes and mouth open and was \"out of it. \" She states she tried to shake him and he \"wasn't responding\" so she called for EMS. Pt's wife believes that he had LOC lasting for ~10 minutes before he began speaking and moving. Pt statse that he does not remember anything before or during the event. He is scheduled by Cardiology for a CT scan of chest, abdomen, and pelvis tomorrow. Pt notes that he has been experiencing abdominal pain for the past 6-8 days. Pt is on 2L home O2 at baseline. Pt denies chest pain or SOB. There are no other acute medical concerns at this time. Chart Review: Pt was admitted at [facility] 11/11/18-11/20/18 for acute on chronic systolic HF EF 20% POA, moderate to severe aortic stenosis, chronic orthostatic hypotension, CAD s/p remove CABH/BIG ICD and elevated troponin. Pt saw Dr. Vero Taylor, Cardiology 11/28/18. He had the following Assessment and Plan at that time: 1. AS - severe and symptomatic. High Risk Surgical Candidate. CTA to eval vascular access options. If TF, would proceed w/ cath & PFTs 2. Throat CA s/p radical neck resection. Needs anesthesia consult to eval airway for potential intervention. 3. Further plan/care by Isaac Eduardo [address] Social hx: no tobacco use; no EtOH use PCP: Eladio Bhat MD 
Cardiology: Dr. Thomas Casillas GI: Dr. Gayland Cowden Pulmonology: Dr. Chelsea Valdovinos Heme: Dr. Milton Sanchez Neurology: Dr. Turpin Query Note written by Len Bruno, as dictated by Shant Aldrich MD 10:29 AM 
 
 
 The history is provided by the spouse, the patient and a relative (son). No  was used. Past Medical History:  
Diagnosis Date  Antiplatelet or antithrombotic long-term use 12/4/2014  Anxiety disorder  Arrhythmia 2009  
 bradycardia  Arthritis  CAD (coronary artery disease) s/p CABG 2002; Dr Tyra Casanova  Cancer ([facility] 1996  
 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   
 left leg, lower back,   
 Depression  Diabetes ([facility] Type II  Epigastric pain 8/17/2018  Esophageal dysmotility s/p dilitation  Esophageal motility disorder 7/8/2013 Frequent simultaneous or failed contractions, low amplitude contractions  suggests severe myopathy or diffuse spasm. I suspect the latter. Achalasia  is not present.  GERD (gastroesophageal reflux disease)  Heart failure ([facility] 10/2014 Cardiomyopathy:Pacemaker upgrade:Biv and AICD  Dr. Darrin Nichols heart DrAlvaro last visit 5/11/2015  Hepatitis C Dx 1996  
 treated at [facility] in past; as of 4/15/15 wife states pt currently not under any treatment  Hyperlipidemia  Myocardial infarct [facility]) 2013 Heart Cath: 40% LV EF, Stented distal LAD, patent Graft to circumflex  On tube feeding diet approx 2009  
 still has as of 9/28/15  (no po food/liquid/meds at all); Dr Arina Zhang  Other ill-defined conditions(799.89) 1996  
 1 dose of chemotherapy/radiation for tongue cancer  Other ill-defined conditions(799.89) BPH  Other ill-defined conditions(799.89) orthostatic hypotension  Pneumonia ~ April -May 2010  Stroke [facility]) approx 2003  
 left side-left finger tips numb; no imbalance or memory loss; as of 2015 not seeing neuro MD  
 Suicidal thoughts Past Surgical History:  
Procedure Laterality Date  ABDOMEN SURGERY PROC UNLISTED  1996  
 peg tube  CABG, ARTERY-VEIN, THREE  2000  HX CATARACT REMOVAL    
 bilateral  
 HX CHOLECYSTECTOMY  HX COLONOSCOPY    
 HX HEART CATHETERIZATION   Stented distal LAD  HX MOHS PROCEDURES    
 bilateral  
 HX ORTHOPAEDIC    
 back surgery times two  HX OTHER SURGICAL Radical Left Neck  HX OTHER SURGICAL    
 NASAL POLYPS REMOVAL  
 HX OTHER SURGICAL  2010 TURP  
 HX PACEMAKER    HX PACEMAKER  10/28/14 Defibrillator: [facility] # [id number], serial # [id number]; Dr. Sarah Chisholm [phone]; Dr Mildred Huerta  HX UROLOGICAL    
 cystoscopy [address]  
 cevical surgery  [place] CHANGE GASTROSTOMY TUBE  2011  [place] CHANGE GASTROSTOMY TUBE  2011  [place] EGD INSERT GUIDE WIRE DILATOR PASSAGE ESOPHAGUS  2010  [place] EGD TRANSORAL BIOPSY SINGLE/MULTIPLE  2010  
    
 STOMACH SURGERY PROCEDURE UNLISTED  2011  UPPER GI ENDOSCOPY,BIOPSY  2018  UPPER GI ENDOSCOPY,DILATN W GUIDE  2018  UPPER GI ENDOSCOPY,W/DILAT,GASTRIC OUT  2018  VASCULAR SURGERY PROCEDURE UNLIST    
 bilateral carotid stents Family History:  
Problem Relation Age of Onset  Heart Disease Father   
      at age 52 from CAD  Colon Cancer Mother  Cancer Mother   
     colon ca  
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  
 Drug use: No  
 Sexual activity: Yes  
  Partners: Female Other Topics Concern  Not on file Social History Narrative  Not on file ALLERGIES: Demerol [meperidine]; Paxil [paroxetine hcl]; Amoxicillin; Cleocin [clindamycin hcl]; and Pcn [penicillins] Review of Systems Constitutional: Negative for chills and fever. Respiratory: Negative for shortness of breath. Cardiovascular: Negative for chest pain. Gastrointestinal: Positive for abdominal pain. Musculoskeletal: Negative for myalgias. Neurological: Positive for syncope. All other systems reviewed and are negative. Vitals:  
 12/04/18 0853 12/04/18 0900 12/04/18 1000 12/04/18 1100 BP: 128/63 143/55 128/62 150/82 Pulse: 93 97 86 82 Resp: 20 25 22 16 Temp: 97.8 °F (36.6 °C) SpO2: 100% 100% 99% 98% Weight: 73.9 kg (163 lb) Height: 5' 7\" (1.702 m) Physical Exam  
  
Nursing note and vitals reviewed. Constitutional: appears well-developed and well-nourished. No distress. HENT:  
Head: Normocephalic and ABRASION AND HEMATOMA TO LEFT OCCIPITAL AREA. Sclera anicteric Nose: No rhinorrhea Mouth/Throat: Oropharynx is clear and moist. Pharynx normal 
Eyes: Conjunctivae are normal. Pupils are equal, round, and reactive to light. Right eye exhibits no discharge. Left eye exhibits no discharge. No scleral icterus. Neck: Painless normal range of motion. Supple Cardiovascular: Normal rate, regular rhythm, normal heart sounds and intact distal pulses. Exam reveals no gallop and no friction rub. No murmur heard. Pulmonary/Chest: Effort normal and breath sounds normal. No respiratory distress. no wheezes. no rales. Abdominal: Soft. Bowel sounds are normal. Exhibits no distension and no mass. No tenderness. No guarding. Musculoskeletal: Normal range of motion. no tenderness. No edema Lymphadenopathy:   No cervical adenopathy. Neurological:  Alert and oriented to person, place, and time. Coordination normal. CN 2-12 intact. Moving all extremities. Skin: Skin is warm and dry. No rash noted. No pallor. MDM  
SYNCOPE WITHOUT FOREWARNING. H/O AS. HEAD TRAUMA ON PLAVIX.  DDX;  DSRYRYTHMIA, AORTIC STENOSIS RELATED, ICH AND OTHERS. CHECK HEAD CT, LABS. Procedures PROGRESS NOTE: 
10:29 AM 
Pt has a small subdural.  
 
CONSULT NOTE: 
10:42 AM La Woods MD communicated with Dr. Mk Joy, Consult for Hospitalist via [facility] Text. Discussed available diagnostic tests and clinical findings. Dr. Mk Joy will admit the pt. 
 
10:42 AM 
Patient is being admitted to the hospital.  The results of their tests and reasons for their admission have been discussed with them and/or available family. They convey agreement and understanding for the need to be admitted and for their admission diagnosis. Consultation will be made now with the inpatient physician for hospitalization. CONSULT NOTE: 
11:16 AM La Woods MD spoke with Dr. Yaniv Rosenthal, Consult for Neurosurgery. Discussed available diagnostic tests and clinical findings. Dr. Yaniv Rosenthal will consult on the pt. Recent Results (from the past 24 hour(s)) SAMPLES BEING HELD Collection Time: 12/04/18  8:53 AM  
Result Value Ref Range SAMPLES BEING HELD 1LAV,1BLUE,1RED,1PST COMMENT Add-on orders for these samples will be processed based on acceptable specimen integrity and analyte stability, which may vary by analyte. CBC WITH AUTOMATED DIFF Collection Time: 12/04/18  8:53 AM  
Result Value Ref Range WBC 8.5 4.1 - 11.1 K/uL  
 RBC 3.65 (L) 4.10 - 5.70 M/uL  
 HGB 10.6 (L) 12.1 - 17.0 g/dL HCT 35.3 (L) 36.6 - 50.3 % MCV 96.7 80.0 - 99.0 FL  
 MCH 29.0 26.0 - 34.0 PG  
 MCHC 30.0 30.0 - 36.5 g/dL  
 RDW 15.8 (H) 11.5 - 14.5 % PLATELET 81 (L) 283 - 400 K/uL MPV 12.7 8.9 - 12.9 FL  
 NRBC 0.0 0  WBC ABSOLUTE NRBC 0.00 0.00 - 0.01 K/uL NEUTROPHILS 94 (H) 32 - 75 % LYMPHOCYTES 3 (L) 12 - 49 % MONOCYTES 3 (L) 5 - 13 % EOSINOPHILS 0 0 - 7 % BASOPHILS 0 0 - 1 % IMMATURE GRANULOCYTES 0 0.0 - 0.5 % ABS. NEUTROPHILS 7.9 1.8 - 8.0 K/UL  
 ABS. LYMPHOCYTES 0.3 (L) 0.8 - 3.5 K/UL  
 ABS. MONOCYTES 0.3 0.0 - 1.0 K/UL  
 ABS. EOSINOPHILS 0.0 0.0 - 0.4 K/UL  
 ABS. BASOPHILS 0.0 0.0 - 0.1 K/UL  
 ABS. IMM. GRANS. 0.0 0.00 - 0.04 K/UL  
 DF SMEAR SCANNED    
 RBC COMMENTS ANISOCYTOSIS 1+ 
    
 RBC COMMENTS POLYCHROMASIA PRESENT 
    
METABOLIC PANEL, COMPREHENSIVE Collection Time: 12/04/18  8:53 AM  
Result Value Ref Range Sodium 139 136 - 145 mmol/L Potassium 4.8 3.5 - 5.1 mmol/L Chloride 100 97 - 108 mmol/L  
 CO2 33 (H) 21 - 32 mmol/L Anion gap 6 5 - 15 mmol/L Glucose 260 (H) 65 - 100 mg/dL BUN 48 (H) 6 - 20 MG/DL Creatinine 1.68 (H) 0.70 - 1.30 MG/DL  
 BUN/Creatinine ratio 29 (H) 12 - 20 GFR est AA 48 (L) >60 ml/min/1.73m2 GFR est non-AA 39 (L) >60 ml/min/1.73m2 Calcium 9.5 8.5 - 10.1 MG/DL Bilirubin, total 0.5 0.2 - 1.0 MG/DL  
 ALT (SGPT) 48 12 - 78 U/L  
 AST (SGOT) 51 (H) 15 - 37 U/L Alk. phosphatase 71 45 - 117 U/L Protein, total 8.0 6.4 - 8.2 g/dL Albumin 3.3 (L) 3.5 - 5.0 g/dL Globulin 4.7 (H) 2.0 - 4.0 g/dL A-G Ratio 0.7 (L) 1.1 - 2.2    
TROPONIN I Collection Time: 12/04/18  8:53 AM  
Result Value Ref Range Troponin-I, Qt. <0.05 <0.05 ng/mL NT-PRO BNP Collection Time: 12/04/18  8:53 AM  
Result Value Ref Range NT pro-BNP 8,018 (H) 0 - 450 PG/ML  
PROTHROMBIN TIME + INR Collection Time: 12/04/18  8:53 AM  
Result Value Ref Range INR 1.1 0.9 - 1.1 Prothrombin time 10.9 9.0 - 11.1 sec PTT Collection Time: 12/04/18  8:53 AM  
Result Value Ref Range aPTT 23.0 22.1 - 32.0 sec  
 aPTT, therapeutic range     58.0 - 77.0 SECS  
EKG, 12 LEAD, INITIAL  Collection Time: 12/04/18  8:53 AM  
 Result Value Ref Range Ventricular Rate 92 BPM  
 Atrial Rate 92 BPM  
 P-R Interval 128 ms QRS Duration 184 ms Q-T Interval 452 ms QTC Calculation (Bezet) 558 ms Calculated P Axis 36 degrees Calculated R Axis 169 degrees Calculated T Axis 1 degrees Diagnosis Atrial-sensed ventricular-paced rhythm When compared with ECG of 11-NOV-2018 11:21, 
Vent. rate has increased BY  12 BPM 
Confirmed by Rosangela Nunez M.D., Sherie Lester ([id number]) on 12/4/2018 12:44:04 PM 
  
GLUCOSE, POC Collection Time: 12/04/18  1:03 PM  
Result Value Ref Range Glucose (POC) 155 (H) 65 - 100 mg/dL Performed by [facility] Ct Head Wo Cont Result Date: 12/4/2018 EXAM:  CT HEAD WO CONT INDICATION:   Syncope/fainting COMPARISON: 11/13/2018. CONTRAST:  None. TECHNIQUE: Unenhanced CT of the head was performed using 5 mm images. Brain and bone windows were generated. CT dose reduction was achieved through use of a standardized protocol tailored for this examination and automatic exposure control for dose modulation. FINDINGS: The ventricles and sulci are normal in size, shape and configuration and midline. Small vessel ischemic changes are stable compared to the prior exam. Old bilateral occipital infarcts are again noted. There is a 7 mm left parafalcine subdural hematoma and minimal associated subarachnoid hemorrhage. The basilar cisterns are open. No acute infarct is identified. The bone windows demonstrate no abnormalities. The visualized portions of the paranasal sinuses and mastoid air cells are clear. IMPRESSION: 7 mm left parafalcine subdural hematoma and minimal associated subarachnoid hemorrhage. The findings were called to Dr. Beverly Maxwell on 12/4/2018 at 10:28 AM by myself. [address] Ct Chest Wo Cont Addendum Date: 12/4/2018 Addendum: Addendum: Correction: Stable ascending aortic ectasia with axial dimension of 3.4 x 3.6 cm. Result Date: 12/4/2018 INDICATION: Syncopal episode resulting in head trauma. Patient taking Plavix. Patient admitted with subdural hematoma. New shortness of breath and diffuse abdominal pain. History of throat cancer. History of hepatitis C, CHF, GERD. COMPARISON: 8/11/2018 CT of the chest and 7/14/2018 CT of the abdomen and pelvis. CONTRAST: None. TECHNIQUE:  5 mm axial images were obtained through the chest, abdomen, and pelvis. Oral contrast was given. Coronal and sagittal reconstructions were generated. CT dose reduction was achieved through use of a standardized protocol tailored for this examination and automatic exposure control for dose modulation. The absence of intravenous contrast reduces the sensitivity for evaluation of the mediastinum and upper abdominal organs. FINDINGS: THYROID: No nodule. MEDIASTINUM: No mass or lymphadenopathy. YOLANDA: No mass or lymphadenopathy. THORACIC AORTA: No aneurysm. MAIN PULMONARY ARTERY: Normal in caliber. TRACHEA/BRONCHI: Patent. ESOPHAGUS: No wall thickening or dilatation. HEART: Enlarged. Pacemaker. Coronary artery disease. PLEURA: No effusion or pneumothorax. LUNGS: No nodule, mass, or airspace disease. Bibasilar fibrosis. LIVER: No mass or biliary dilatation. GALLBLADDER: Surgically absent. SPLEEN: No mass. PANCREAS: No mass or ductal dilatation. ADRENALS: Unremarkable. KIDNEYS: Nonobstructing right renal calculus. STOMACH: Unremarkable. SMALL BOWEL: No dilatation or wall thickening. COLON: No dilatation or wall thickening. APPENDIX: Unremarkable. PERITONEUM: No ascites or pneumoperitoneum. RETROPERITONEUM: No lymphadenopathy or aortic aneurysm. REPRODUCTIVE ORGANS: Small prostate URINARY BLADDER: No mass or calculus. BONES: No destructive bone lesion. Right iliac bone graft donor site. Posterior lumbar fusion. Stable L2 compression fracture. ADDITIONAL COMMENTS: N/A IMPRESSION:  Stable L2 compression fracture. Nonobstructing renal calculi. No evidence for pneumonia or intra-abdominal bleeding. Ct Abd Pelv Wo Cont Addendum Date: 12/4/2018 Addendum: Addendum: Correction: Stable ascending aortic ectasia with axial dimension of 3.4 x 3.6 cm. Result Date: 12/4/2018 INDICATION: Syncopal episode resulting in head trauma. Patient taking Plavix. Patient admitted with subdural hematoma. New shortness of breath and diffuse abdominal pain. History of throat cancer. History of hepatitis C, CHF, GERD. COMPARISON: 8/11/2018 CT of the chest and 7/14/2018 CT of the abdomen and pelvis. CONTRAST: None. TECHNIQUE:  5 mm axial images were obtained through the chest, abdomen, and pelvis. Oral contrast was given. Coronal and sagittal reconstructions were generated. CT dose reduction was achieved through use of a standardized protocol tailored for this examination and automatic exposure control for dose modulation. The absence of intravenous contrast reduces the sensitivity for evaluation of the mediastinum and upper abdominal organs. FINDINGS: THYROID: No nodule. MEDIASTINUM: No mass or lymphadenopathy. YOLANDA: No mass or lymphadenopathy. THORACIC AORTA: No aneurysm. MAIN PULMONARY ARTERY: Normal in caliber. TRACHEA/BRONCHI: Patent. ESOPHAGUS: No wall thickening or dilatation. HEART: Enlarged. Pacemaker. Coronary artery disease. PLEURA: No effusion or pneumothorax. LUNGS: No nodule, mass, or airspace disease. Bibasilar fibrosis. LIVER: No mass or biliary dilatation. GALLBLADDER: Surgically absent. SPLEEN: No mass. PANCREAS: No mass or ductal dilatation. ADRENALS: Unremarkable. KIDNEYS: Nonobstructing right renal calculus. STOMACH: Unremarkable. SMALL BOWEL: No dilatation or wall thickening. COLON: No dilatation or wall thickening. APPENDIX: Unremarkable. PERITONEUM: No ascites or pneumoperitoneum. RETROPERITONEUM: No lymphadenopathy or aortic aneurysm.  REPRODUCTIVE ORGANS: Small prostate URINARY BLADDER: No mass or calculus. BONES: No destructive bone lesion. Right iliac bone graft donor site. Posterior lumbar fusion. Stable L2 compression fracture. ADDITIONAL COMMENTS: N/A IMPRESSION:  Stable L2 compression fracture. Nonobstructing renal calculi. No evidence for pneumonia or intra-abdominal bleeding. Xr Chest [facility] Result Date: 12/4/2018 Indication: Syncope, dizziness, pacemaker Comparison: 11/18/2018 Portable exam of the chest obtained at 1045 demonstrates normal heart size. The patient is status post CABG procedure. Pacer leads are unchanged in position. Mild pulmonary edema is noted. The osseous structures are unremarkable. Impression: Mild pulmonary edema.

## 2018-12-04 NOTE — HOME CARE
Patient opened to [facility] for [address] period 26/05-5/90/91 and if applicable will need resumption order for SN and PT prior to discharge. Mihaela Somers RN liaison [facility]

## 2018-12-04 NOTE — PROCEDURES
ELECTROENCEPHALOGRAM REPORT 
 
HISTORY: Patient is a 66-year-old female who is being evaluated for syncope. DESCRIPTION: This is an 18-channel EEG performed on an awake/drowsy 
patient. The dominant posterior background rhythm consists of 
medium-voltage rhythms in the 6 Hz frequency range out of the posterior 
head region. Diffuse slowing is noted throughout the recording. Photic stimulation and hyperventilation were not performed. ELECTROENCEPHALOGRAM SUMMARY: Mildly abnormal EEG due to mild slowing of the background rhythms. CLINICAL INTERPRETATION: This EEG is suggestive of some mild generalized encephalopathic process, nonspecific in type. This may be related to underlying structural brain injury and/or toxic/metabolic abnormality. No 
clearly lateralizing or epileptiform features were seen. Please correlate 
clinically. Vitaly Valenet DO 
12/04/18

## 2018-12-04 NOTE — PROGRESS NOTES
Admission Medication Reconciliation: 
 
Information obtained from: Wife (Caretaker), Patients's Medication List 
 
Significant PMH/Disease States:  
Past Medical History:  
Diagnosis Date  Antiplatelet or antithrombotic long-term use 12/4/2014  Anxiety disorder  Arrhythmia 2009  
 bradycardia  Arthritis  CAD (coronary artery disease) s/p CABG 2002; Dr Maggi Skinner  Cancer ([facility] 1996  
 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   
 left leg, lower back,   
 Depression  Diabetes ([facility] Type II  Epigastric pain 8/17/2018  Esophageal dysmotility s/p dilitation  Esophageal motility disorder 7/8/2013 Frequent simultaneous or failed contractions, low amplitude contractions  suggests severe myopathy or diffuse spasm. I suspect the latter. Achalasia  is not present.  GERD (gastroesophageal reflux disease)  Heart failure ([facility] 10/2014 Cardiomyopathy:Pacemaker upgrade:Biv and AICD  Dr. Mayra Delgado heart  last visit 5/11/2015  Hepatitis C Dx 1996  
 treated at [facility] in past; as of 4/15/15 wife states pt currently not under any treatment  Hyperlipidemia  Myocardial infarct [facility]) 2013 Heart Cath: 40% LV EF, Stented distal LAD, patent Graft to circumflex  On tube feeding diet approx 2009  
 still has as of 9/28/15  (no po food/liquid/meds at all); Dr Kateryna Bernardo  Other ill-defined conditions(799.89) 1996  
 1 dose of chemotherapy/radiation for tongue cancer  Other ill-defined conditions(799.89) BPH  Other ill-defined conditions(799.89) orthostatic hypotension  Pneumonia ~ April -May 2010  Stroke [facility]) approx 2003  
 left side-left finger tips numb; no imbalance or memory loss; as of 2015 not seeing neuro MD  
 Suicidal thoughts Chief Complaint for this Admission:  Syncope Allergies:  Cleocin [clindamycin hcl]; Demerol [meperidine]; Paxil [paroxetine hcl]; Amoxicillin; and Pcn [penicillins] Prior to Admission Medications:  
Prior to Admission Medications Prescriptions Last Dose Informant Patient Reported? Taking? B.infantis-B.ani-B.long-B.bifi (PROBIOTIC 4X) 10-15 mg TbEC 12/3/2018 at Unknown time  Yes Yes Sig: by PEG Tube route daily. HYDROcodone-acetaminophen (NORCO)  mg tablet 2018 at 0630 Significant Other Yes Yes Si Tab by Per G Tube route every four (4) hours as needed for Pain. LANTUS SOLOSTAR U-100 INSULIN 100 unit/mL (3 mL) inpn 2018 at 0630 Significant Other No Yes Sig: INJECT 14 UNITS SUBCUTANEOUSLY ONCE DAILY  
OTHER Unknown at Unknown time  Yes No  
Sig: diltiazem 2% lidocaine cream. Apply to rectal area as needed  
acetaminophen (TYLENOL) 500 mg tablet Unknown at Unknown time Significant Other Yes No  
Si-1,000 mg by Per G Tube route every six (6) hours as needed for Pain. albuterol (PROVENTIL VENTOLIN) 2.5 mg /3 mL (0.083 %) nebulizer solution Unknown at Unknown time  Yes No  
Si.5 mg by Nebulization route two (2) times a day. Per wife patient can use 3 times a day if needed  
alfuzosin SR (UROXATRAL) 10 mg SR tablet 12/3/2018 at Unknown time Significant Other Yes Yes Sig: 10 mg by Per G Tube route daily. aspirin 81 mg chewable tablet 2018 at 0630  No Yes Sig: Take 1 Tab by mouth daily. Patient taking differently: 1 Tab by Per G Tube route daily. atorvastatin (LIPITOR) 40 mg tablet 2018 at 0630 Significant Other Yes Yes Si mg by Per G Tube route daily. clopidogrel (PLAVIX) 75 mg tab 2018 at 0630  No Yes Si Tab by PEG Tube route daily. famotidine (PEPCID) 20 mg tablet 2018 at 0630 Significant Other Yes Yes Si mg by Per G Tube route three (3) times daily. finasteride (PROSCAR) 5 mg tablet 12/3/2018 at Unknown time Significant Other Yes Yes Si mg by Per G Tube route nightly. fluticasone (FLONASE ALLERGY RELIEF) 50 mcg/actuation nasal spray 12/3/2018 at Unknown time Significant Other Yes Yes Si [place] by Both Nostrils route two (2) times a day. furosemide (LASIX) 40 mg tablet 2018 at 77991  No Yes Si Tab by Per G Tube route Before breakfast and dinner for 30 days. gabapentin (NEURONTIN) 250 mg/5 mL solution 2018 at 0630 Significant Other Yes Yes Si mg by PEG Tube route three (3) times daily. guaiFENesin (ROBITUSSIN) 100 mg/5 mL liquid 2018 at 0630  Yes Yes Sig: 10mL three times daily  
hydrocortisone sodium succ/PF (HYDROCORTISONE SOD SUCC, PF, INJECTION) 2018 at Unknown time  Yes Yes Sig: by Injection route every three (3) months. Gets injection every 3 months from orthopedist  
insulin regular (NOVOLIN R REGULAR U-100 INSULN) 100 unit/mL injection 2018 at 0630  Yes Yes Si Units by SubCUTAneous route every evening. 14 units at 8 AM, 14 units at 2 PM, and 5 units at 8 PM  
 
Takes additional units as well depending on BG  
klack's mixture Solution 2018 at 0630  Yes Yes Sig: Take 5 mL by mouth two (2) times a day. Diphenhydramine elixir 12.5mg/ml 500ml, Nystatin suspension 120ml,Tetracycline 500mg capsules  12 capsules (6g), Hydrocortisone 20mg tablet 12 tablets (240mg), Water for irrigation QS ad 1000ml 
 
   
lactose-reduced food/fiber (ISOSOURCE 1.5 RAE FEEDING TUBE) 2018 at 0630  Yes Yes Si mL by adductor canal block route five (5) times daily. midodrine (PROAMITINE) 10 mg tablet 2018 at 0630 Significant Other Yes Yes Sig: 10 mg by Per G Tube route three (3) times daily (with meals). multivitamin (ONE A DAY) tablet 2018 at 0630 Significant Other Yes Yes Si Tab by Per G Tube route daily.   
nitroglycerin (NITROSTAT) 0.4 mg SL tablet Unknown at Unknown time Significant Other Yes No  
 Si.4 mg by SubLINGual route every five (5) minutes as needed for Chest Pain. ondansetron hcl (ZOFRAN, AS HYDROCHLORIDE,) 8 mg tablet 10/4/2018 Significant Other No No  
Sig: Take 1 Tab by mouth every eight (8) hours as needed for Nausea. pramipexole (MIRAPEX) 0.25 mg tablet 2018 at 0630  No Yes Sig: Take 1 Tab by mouth three (3) times daily. zinc 50 mg tab tablet 12/3/2018 at Unknown time Significant Other Yes Yes Si mg by Per G Tube route daily. Facility-Administered Medications: None Comments/Recommendations: Added: Hydrocortisone injections (got yesterday) Revised: Insulin (Uses additional units depending on Bg), Cleocin allergy (Severe) Removed: Metoclopramide (has not used since July, causes worse tremors) Notes: Has not used nitrostat in >year, zofran in ~2 months Received hydrocortisone injection yesterday so getting 4 extra units of insulin for ~4 days Thank you for allowing me to participate in the care of your patient! Domenic Andrews, Pharm. D. Candidate 2019

## 2018-12-04 NOTE — H&P
[address] HISTORY AND PHYSICAL Name:FRIEDHOFFKathlyne Dance 
MR#: [id number] : 1935 ACCOUNT #: [de-identified] ADMIT DATE: 2018 CHIEF COMPLAINT:  Syncope. HISTORY OF PRESENT ILLNESS:  The patient is an 80-year-old gentleman with a past medical history of carotid artery stenosis, hyperlipidemia, esophageal dysmotility, pneumonia, BPH, on PEG tube and feeding tube diet, hepatitis C, CAD, MI, type 2 diabetes, history of CHF, bradycardia, stroke, history of lung and throat cancer, history of moderate-to- severe aortic stenosis, who presents to the hospital with the above-mentioned symptoms. Currently, there is no relative present at the bedside. Patient is not a very good historian and primarily history was obtained from the patient and the ER physician. Per ER physician, patient's wife reported that she heard the patient fall while she was in the other room, and when she walked into the room, he was stretched on the floor with his eyes and mouth open and was \"out of it. \"  Patient reports that the last thing he remembers was getting up to go to the bathroom, and then the next thing he remembers he was on the floor. Patient's wife reported to the ER physician that she tried to wake him up, but he was not responding, so she called EMS. The wife believes that the loss of consciousness lasted for around 10 minutes before he began speaking and moving again. Patient was brought to the ER and reports that for those 10-12 minutes, he does not remember anything. The patient has a history of severe aortic stenosis and is undergoing evaluation for valve replacement. Patient is scheduled by Cardiology for a CT of the chest, abdomen, and pelvis tomorrow. He notes he has been experiencing some abdominal pain for the past 6-8 days. Patient is on home oxygen at 2 liters at baseline. Patient denies any other complaints or problems.   Of note, the patient was admitted at [facility] from 11/11/2018 to 11/20/2018 for acute on chronic systolic heart failure exacerbation. Patient also had elevated troponin and was seen by Dr. Susana Mcneil on 11/28/2018, and he considered the patient to be a high risk surgical candidate. He was looking to obtain a CTA to evaluate vascular access options and figure out a plan of care. The patient denies any other complaints or problems. Denies any headache, blurry vision, sore throat, trouble swallowing, trouble with speech, chest pain, shortness of breath, cough, fever, chills, urinary symptoms, focal or generalized neurological weakness, constipation, diarrhea, any hematemesis, melena, hemoptysis, or any other concerns or problems. PAST MEDICAL HISTORY:  See above. HOME MEDICATIONS:  Currently, the patient is on Lipitor 40 mg daily, Pepcid 20 mg daily, finasteride 5 mg at bedtime, gabapentin, midodrine 10 mg t.i.d.,  0.25 mg b.i.d. Home medication also includes Plavix 75 mg daily, aspirin 81 mg daily, Isosource tube feedings, guaifenesin, aspirin 81 mg daily, Pepcid 20 daily, Lipitor 40 mg daily, Proscar 5 mg daily, alfuzosin 10 mg daily, midodrine 10 mg daily, Lantus  units subcu daily, gabapentin  mg daily, multivitamins, albuterol, Zofran. SOCIAL HISTORY:  No history of tobacco abuse. No alcohol or IV drug abuse. ALLERGIES:  CLEOCIN, DEMEROL, PAXIL, AMOXICILLIN, AND PENICILLIN. REVIEW OF SYSTEMS:  All systems were reviewed and found to be essentially negative except for the symptoms mentioned above. FAMILY HISTORY:  Father had a history of heart disease. Mother had a history of colon cancer. Brother has a history of heart disease. PHYSICAL EXAMINATION: 
VITAL SIGNS:  Temperature 97.6, pulse 80, respiration rate 28, blood pressure 122/61, pulse of 92% on 2 liters. GENERAL:  Alert x2, awake, in no acute distress, resting in bed, pleasant male, appears to be stated age. HEENT:  Pupils equal and reactive to light. Dry mucous membranes. Tympanic membranes clear. NECK:  Supple. CHEST:  Decreased basal breath sounds. CORONARY:  S1, S2 were heard. Loud systolic ejection murmur heard at the right upper sternal border. ABDOMEN:  Soft, tender to palpation diffusely. No rebound, no guarding. PEG tube site looks clean, dry, and intact. EXTREMITIES:  No clubbing, cyanosis, or edema. NEUROPSYCHIATRIC:  Pleasant mood and affect. No focal neurological deficits were noted. Cranial nerves II-XII grossly intact. Sensory grossly within normal limits. DTRs 1+/4. Strength 5/5. SKIN:  Warm. LABORATORY DATA:  White count 8.5, hemoglobin 10.6, hematocrit 35.3, platelets 78,302. INR 1.1. Sodium 139, potassium 4.3, chloride 100, bicarbonate 23, anion gap 6, glucose 260, BUN 48, creatinine 1.68, calcium 9.5, bilirubin total 0.5, ALT 48, AST 51, alkaline phosphatase 91. Troponin less than 0.05. CT of the abdomen and chest showed stable L2 compression fracture, nonobstructing renal calculi. No evidence of pneumonia or intra-abdominal bleeding. CT of the head shows a 7 mm left parafalcine subdural hematoma and minimal associated subarachnoid hemorrhage. X-ray of the chest shows mild pulmonary edema. EKG shows atrial paced rhythm. ASSESSMENT AND PLAN: 
1.  Subdural and subarachnoid hematoma. Patient will be admitted to the hospital.  We will discontinue all anticoagulation as well as antiplatelets. Closely monitor neuro checks. Any changes in neurological status will mandate emergent intervention. Repeat CT scan in the morning. Appreciate neurosurgical input. Optimize blood pressure control and provide close monitoring. No surgical intervention for now per Neurosurgery. Continue to closely monitor and reassess as needed. 2.  Syncope, unclear etiology, concern secondary to aortic stenosis versus orthostasis versus other etiology. The patient will be admitted on a telemetry bed.   We will get cardiac enzymes, echocardiogram, carotid duplex, EEG, TSH, B12, folate levels, and we will continue to closely monitor. We will get orthostatic vitals, neurovascular checks, MRI, and put the patient on fall precautions. Further intervention will be per hospital course. Continue to monitor. CT Surgery consult has been requested. Reassess as needed. Further intervention will be per hospital course. 3.  Chronic kidney disease stage III, now stage IIIB, most likely secondary to intravascular volume depletion. The patient has significant aortic stenosis. We will provide some albumin and reassess, and continue to closely monitor. If symptoms persist, may consider further intervention or diagnostics. Avoid nephrotoxic medications, renally dose all other medications. May consider getting a Nephrology consult in the morning. Continue to closely monitor. 4.  Anemia appears to be chronic. Continue to monitor H and H. Further intervention will be per hospital course. 5.  Thrombocytopenia, again appears to be chronic. No obvious signs of bleeding except for mild, very small subdural hematoma. Continue to closely monitor and further intervention will be per hospital course. Neurosurgery does not recommend transfusing this patient at this point in time. Continue to monitor. 6.  Diabetes mellitus type 2. We will give sliding scale NovoLog insulin, Accu-Cheks, diet control, close monitoring, poorly controlled. 7.  Gastrointestinal and deep venous thrombosis prophylaxis. Patient will be on SCDs. Gretchen Paiz MD MM/ELLY 
D: 12/04/2018 13:55    
T: 12/04/2018 14:48 JOB #: [id number]

## 2018-12-04 NOTE — PROCEDURES
[address] 
*** FINAL REPORT *** Name: Floyd Tilley 
MRN: [id number]    Inpatient : 31 Mar 1935 HIS Order #: [id number] [facility] Visit #: [id number] Date: 04 Dec 2018 TYPE OF TEST: Cerebrovascular Duplex REASON FOR TEST Syncope Right Carotid:- 
           Proximal               Mid                 Distal 
cm/s  Systolic  Diastolic  Systolic  Diastolic  Systolic  Diastolic CCA:     73.0      10.0                            86.0      29.0 Bulb: 
ECA:     61.0 ICA:     48.0      13.0       50.0      15.0       69.0      15.0 ICA/CCA:  0.6       0.4 ICA Stenosis: 
 
Right Vertebral:- 
Finding: Antegrade Sys:       26.0 Diana: 
 
Right Subclavian: 
 
Left Carotid:- 
          Proximal                Mid                 Distal 
cm/s  Systolic  Diastolic  Systolic  Diastolic  Systolic  Diastolic CCA:    149.0      29.0                           145.0      29.0 Bulb: 
ECA:    100.0 ICA:     88.0      17.0      129.0      37.0       84.0      21.0 ICA/CCA:  0.6       0.6 ICA Stenosis: 
 
Left Vertebral:- 
Finding: Antegrade Sys:       13.0 Diana:        0.0 Left Subclavian: INTERPRETATION/FINDINGS 
PROCEDURE:  Color duplex ultrasound imaging of extracranial 
cerebrovascular arteries. FINDINGS: 
     Right:  Internal carotid velocity is within normal limits. There 
 is narrowing of the internal carotid flow channel on color Doppler 
imaging and calcific plaque on B-mode imaging, consistent with less 
than 50 percent stenosis. The common and external carotid arteries 
are patent and without evidence of hemodynamically significant 
stenosis. Plaque and wall thickening seen in common carotid. Left:  Internal carotid velocity is within normal limits. There 
is narrowing of the internal carotid flow channel on color Doppler 
imaging and calcific plaque on B-mode imaging, consistent with less 
than 50 percent stenosis.   Moderate plaque and wall thickening seen in 
 common carotid. Elevated peak systolic velocities seen in common 
carotid. The external carotid appears patent and without evidence of 
hemodynamically significant stenosis. IMPRESSION:  Consistent with less than 50% stenosis of the right 
internal carotid and less than 50% stenosis of the left internal 
carotid. Probable near 50% stenosis of the left common carotid. Vertebrals are patent with antegrade flow. Vertebrals appear very 
small with monophasic flow on the right and resistive flow on the left 
 and occlusion cannot be completely excluded. Incidental finding of 
what appear to stented bilateral internal carotid arteries. ADDITIONAL COMMENTS I have personally reviewed the data relevant to the interpretation of 
this 
study. TECHNOLOGIST: Pramod Carroll RVT Signed: 12/04/2018 06:00 PM 
 
PHYSICIAN: Joselin Goodson MD 
Signed: 12/06/2018 08:01 AM

## 2018-12-04 NOTE — CONSULTS
Full consult to follow. Briefly, 84yo s/p syncopal episode. CT shows parafalcine SDH without mass effect. Agree with holding anticoagulation, BP control, neuro checks and repeat CT tomorrow am.  Will follow with you. Thank you for this consultation.

## 2018-12-04 NOTE — ED TRIAGE NOTES
Patient comes to the ER via EMS c/o syncope. patient was in the kitchen this morning when he became dizzy and had a syncopal episode. +Hematoma to L side of head. +Currently taking Plavix

## 2018-12-04 NOTE — CONSULTS
[address] Name:Marty Nova 
MR#: [id number] : 1935 ACCOUNT #: [de-identified] DATE OF SERVICE: 2018 REASON FOR CONSULTATION:  Subdural hematoma. HISTORY OF PRESENT ILLNESS:  This is an 54-year-old gentleman who had a syncopal episode today. He has had multiple medical comorbidities. He says that he was feeling well. He does not recall exactly what happened. He says that his wife found him on the floor. He was unconscious for at least 10 minutes. She called emergency medical services. He said he woke up and felt fine and did not think that he needed to be evaluated, but upon admission to the emergency room, head CT revealed a parafalcine subdural hematoma. He is on Plavix and aspirin. He had a recent admission to [facility]  through  for acute on chronic systolic heart failure. He also is being followed by a cardiologist for consideration for valve replacement for severe aortic stenosis; however, he is a high-risk surgical candidate according to reports. Since he has been admitted to the emergency room, he has not had any further episodes of syncope. He has not had any seizure activity. He has not had any focal neurologic deficit. PAST MEDICAL HISTORY:  Carotid artery stenosis, hyperlipidemia, esophageal dysmotility, pneumonia, BPH, PEG tube for feeding secondary to esophageal dysmotility, hepatitis C, coronary artery disease, myocardial infarction, type 2 diabetes, CHF, bradycardia, stroke, history of lung and throat cancer, severe aortic stenosis. MEDICATIONS PRIOR TO ADMISSION:  Lipitor 40 mg every day, Pepcid 20 mg every day, finasteride 5 mg at bedtime, gabapentin, midodrine 10 mg t.i.d., Plavix 75 mg every day, aspirin 81 mg every day, tube feedings, guaifenesin, multivitamins, Lantus. SOCIAL HISTORY:  He is . No alcohol, tobacco or IV drug use currently. ALLERGIES:  CLEOCIN, DEMEROL, PAXIL, AMOXICILLIN, PENICILLIN. FAMILY HISTORY:  Positive for coronary artery disease, cancer. REVIEW OF SYSTEMS:  He denies any headache, vision changes, hearing difficulty, chest pain, shortness of breath, abdominal pain, urinary dysfunction, numbness, muscle spasm, skin eruption. Of note, he is on oxygen at home, 2 liters. PHYSICAL EXAMINATION: 
VITAL SIGNS:  Temperature 97.6, blood pressure 122/61, pulse 80. GENERAL:  This is a well-developed, frail looking man who is examined in the hospital bed. NEUROLOGIC:  He is alert. He is oriented to person and place. He is able to tell me some parts of his medical history, but is somewhat confused. He does not know the month or the year. He has a conjugate gaze, symmetric face. No pronator drift. Full strength in bilateral upper extremities. Full strength in bilateral lower extremities. Sensory grossly intact. LABORATORY DATA:  Studies remarkable for platelets of 81. He has chronic thrombocytopenia. CT scan of the head shows a 7 mm left parafalcine subdural hematoma with some associated subarachnoid hemorrhage. No significant mass effect. Basal cisterns are open. No intraventricular hemorrhage. ASSESSMENT AND PLAN:  This is an 68-year-old gentleman on Plavix as well as aspirin. He had a syncopal episode today and struck his head. He has a small parafalcine subdural hematoma. Neurologically, he is at what I believe is his baseline. He will be admitted for frequent neurologic checks and have a repeat head CT tomorrow morning. We will follow with you. Thank you for this consultation. MD DALTON Henderson / ELLY 
D: 12/04/2018 16:55    
T: 12/04/2018 17:49 JOB #: [id number]

## 2018-12-05 NOTE — PROGRESS NOTES
PCCM 
Pt without WOB AFVSS, no accessory muscle use Labs chart reviewed SMall falx SDH with small SAH unchanged on CT head this AM 
Cardiology and nsgy following No neurosurgery planned Can go to [facility] Will be available to see as needed.

## 2018-12-05 NOTE — PROGRESS NOTES
Repeat CT reviewed. Parafalcine SDH stable. Neuro checks stable overnight. He can be transferred out of unit from neurosurgical standpoint. Would hold off on TAVR until SDH cleared radiographically. He can follow up with me in 2 weeks with repeat CT.

## 2018-12-05 NOTE — PROGRESS NOTES
1745: TRANSFER - IN REPORT: 
 
Verbal report received from [place], RN(name) on Delta Dy  being received from ED(unit) for routine progression of care Report consisted of patients Situation, Background, Assessment and  
Recommendations(SBAR). Information from the following report(s) SBAR, Kardex, ED Summary, Intake/Output, MAR, Accordion, Recent Results, Med Rec Status and Cardiac Rhythm PACED was reviewed with the receiving nurse. Opportunity for questions and clarification was provided. Assessment completed upon patients arrival to unit and care assumed. 1720: Received call from lab regarding elevated troponin of 0.41. Received orders from hospitalist Dr. Jarvis Bullard, to consult pts cardiologist. Spoke with Dr. Mayco Barrios. Received orders to repeat troponin with morning labs. 1930:Bedside and Verbal shift change report given to Ovidio Robbins RN (oncoming nurse) by Javier Lorenz RN (offgoing nurse). Report included the following information SBAR, Kardex, ED Summary, Intake/Output, MAR, Accordion, Recent Results, Med Rec Status and Cardiac Rhythm PACED.

## 2018-12-05 NOTE — PROGRESS NOTES
VM received from [address]. Call placed to Liaison and VM left for Critical access hospital patient is receiving [place] SN/ PT will need resumption orders if applicable.

## 2018-12-05 NOTE — PROGRESS NOTES
Hospitalist Progress Note Sheldon Franco MD 
Answering service: [phone] OR [phone] from in house phone Date of Service:  2018 NAME:  Silvia Reynoso :  1935 MRN:  [id number] Admission Summary:  
The patient is an 30-year-old gentleman with a past medical history of carotid artery stenosis, hyperlipidemia, esophageal dysmotility, pneumonia, BPH, on PEG tube and feeding tube diet, hepatitis C, CAD, MI, type 2 diabetes, history of CHF, bradycardia, stroke, history of lung and throat cancer, history of moderate-to- severe aortic stenosis, who presents to the hospital with the above-mentioned symptoms Interval history / Subjective:  
  
Pt doing about the same , he would like to go home Assessment & Plan: 1.  Subdural and subarachnoid hematoma. -.No surgical intervention for now per Neurosurgery. - BP controlContinue to closely monitor and reassess as needed. Repeat  head CT unchanged pt was on asa/ plavix - ? When is safe to do A/c after TAVR ? 2. Syncope, unclear etiology, concern secondary to aortic stenosis versus orthostasis -  Pt recently being evaluated for a TAVR get echocardiogram, carotid duplex, EEG, TSH, B12, folate levels, and we will continue to closely monitor.   
- seen by cardiology Pacemaker will be interrogated 
- he was on Norco and lasix need to stop both 
- Consult Dr. Darel Duane and Dr. Zacarias Bodily 3. Chronic kidney disease stage III, now stage IIIB, most likely secondary to intravascular volume depletion.   
- The patient has significant aortic stenosis. We will provide some albumin and reassess, and continue to closely monitor.  
-  Avoid nephrotoxic medications, renally dose all other medications. - stop lasix 4. Anemia appears to be chronic.   
- Continue to monitor H and H. Further intervention will be per hospital course. 5.  Thrombocytopenia, again appears to be chronic. 6.  Diabetes mellitus type 2. We will give sliding scale NovoLog insulin, Accu-Cheks, diet control, close monitoring, poorly controlled. 7. CDMP: Chronic Respiratory Failure in the setting of oxygen dependent CHF requiring continued O2 @ 2 lts Code status: Full DVT prophylaxis: SCD Care Plan discussed with: Patient/Family and Nurse [facility]  Date Reviewed: 11/19/2018 Codes Class Noted POA * (Principal) SDH (subdural hematoma) ([facility] [address] ICD-10-CM: S98.1P5R 
ICD-9-CM: 432.1  12/4/2018 Unknown Review of Systems: A comprehensive review of systems was negative. Vital Signs:  
 Last 24hrs VS reviewed since prior progress note. Most recent are: 
Visit Vitals /62 Pulse 77 Temp 97.7 °F (36.5 °C) Resp 23 Ht 5' 7\" (1.702 m) Wt 74.7 kg (164 lb 10.9 oz) SpO2 94% BMI 25.79 kg/m² Intake/Output Summary (Last 24 hours) at 12/5/2018 1059 Last data filed at 12/5/2018 3639 Gross per 24 hour Intake  Output 1500 ml Net -1500 ml Physical Examination:  
 
 
     
Constitutional:  No acute distress, cooperative, pleasant   
ENT:  Oral mucous moist, oropharynx benign. Neck supple, Resp:  CTA bilaterally. No wheezing/rhonchi/rales. No accessory muscle use CV:  Regular rhythm, normal rate, no murmurs, gallops, rubs GI:  Soft, non distended, non tender. normoactive bowel sounds, no hepatosplenomegaly Musculoskeletal:  No edema, warm, 2+ pulses throughout Neurologic:  Moves all extremities. AAOx3, CN II-XII reviewed Psych:  Good insight, Not anxious nor agitated. Data Review:  
 I personally reviewed  Image and labs Ct Head Wo Cont Result Date: 12/5/2018 IMPRESSION: 1. Unchanged subdural hemorrhage along the falx with associated minimal subarachnoid hemorrhage Ct Head Wo Cont Result Date: 12/4/2018 IMPRESSION: 7 mm left parafalcine subdural hematoma and minimal associated subarachnoid hemorrhage. The findings were called to Dr. Hannah Villela on 12/4/2018 at 10:28 AM by myself. [place] [address] Ct Chest Wo Cont Addendum Date: 12/4/2018 Addendum: Addendum: Correction: Stable ascending aortic ectasia with axial dimension of 3.4 x 3.6 cm. Result Date: 12/4/2018 IMPRESSION:  Stable L2 compression fracture. Nonobstructing renal calculi. No evidence for pneumonia or intra-abdominal bleeding. Ct Abd Pelv Wo Cont Addendum Date: 12/4/2018 Addendum: Addendum: Correction: Stable ascending aortic ectasia with axial dimension of 3.4 x 3.6 cm. Result Date: 12/4/2018 IMPRESSION:  Stable L2 compression fracture. Nonobstructing renal calculi. No evidence for pneumonia or intra-abdominal bleeding. Xr Chest [facility] Result Date: 12/4/2018 Impression: Mild pulmonary edema. Xr Hips Bi W Ap Pelv Result Date: 12/4/2018 IMPRESSION:  No acute abnormality Labs:  
 
Recent Labs 12/05/18 
79 770 20 12 12/04/18 
8970 WBC 9.3 8.5 HGB 10.7* 10.6* HCT 34.7* 35.3*  
PLT 84* 81* Recent Labs 12/05/18 
79 770 20 12 12/04/18 
1633  139  
K 4.3 4.8  
 100 CO2 36* 33* BUN 47* 48* CREA 1.45* 1.68* * 260* CA 9.4 9.5 Recent Labs 12/04/18 
5127 SGOT 51* ALT 48 AP 71 TBILI 0.5 TP 8.0 ALB 3.3*  
GLOB 4.7* Recent Labs 12/04/18 
5344 INR 1.1 PTP 10.9 APTT 23.0 No results for input(s): FE, TIBC, PSAT, FERR in the last 72 hours. Lab Results Component Value Date/Time Folate >20.0 08/02/2018 12:00 PM  
  
No results for input(s): PH, PCO2, PO2 in the last 72 hours. Recent Labs 12/05/18 
0344 12/04/18 
2228 12/04/18 
1624 CPK  --  219 227 TROIQ 0.57* 0.62* 0.41* Lab Results Component Value Date/Time  Cholesterol, total 123 06/04/2018 09:16 AM  
 HDL Cholesterol 35 (L) 06/04/2018 09:16 AM  
 LDL, calculated 62 06/04/2018 09:16 AM  
 Triglyceride 132 06/04/2018 09:16 AM  
 CHOL/HDL Ratio 3.6 02/02/2017 02:55 AM  
 
Lab Results Component Value Date/Time Glucose (POC) 149 (H) 12/05/2018 07:21 AM  
 Glucose (POC) 151 (H) 12/04/2018 11:51 PM  
 Glucose (POC) 105 (H) 12/04/2018 05:32 PM  
 Glucose (POC) 155 (H) 12/04/2018 01:03 PM  
 Glucose (POC) 231 (H) 11/20/2018 11:35 AM  
 
Lab Results Component Value Date/Time Color YELLOW/STRAW 11/11/2018 12:31 PM  
 Appearance CLEAR 11/11/2018 12:31 PM  
 Specific gravity 1.019 11/11/2018 12:31 PM  
 pH (UA) 5.0 11/11/2018 12:31 PM  
 Protein NEGATIVE  11/11/2018 12:31 PM  
 Glucose NEGATIVE  11/11/2018 12:31 PM  
 Ketone NEGATIVE  11/11/2018 12:31 PM  
 Bilirubin NEGATIVE  11/11/2018 12:31 PM  
 Urobilinogen 0.2 11/11/2018 12:31 PM  
 Nitrites NEGATIVE  11/11/2018 12:31 PM  
 Leukocyte Esterase NEGATIVE  11/11/2018 12:31 PM  
 Epithelial cells FEW 07/14/2018 05:40 AM  
 Bacteria NEGATIVE  07/14/2018 05:40 AM  
 WBC 0-4 07/14/2018 05:40 AM  
 RBC 5-10 07/14/2018 05:40 AM  
 
 
 
Medications Reviewed:  
 
Current Facility-Administered Medications Medication Dose Route Frequency  tamsulosin (FLOMAX) capsule 0.4 mg  0.4 mg Oral DAILY  atorvastatin (LIPITOR) tablet 40 mg  40 mg Per G Tube DAILY  famotidine (PEPCID) tablet 20 mg  20 mg Per G Tube Q24H  
 finasteride (PROSCAR) tablet 5 mg  5 mg Oral QHS  gabapentin (NEURONTIN) 250 mg/5 mL solution 750 mg  750 mg Per G Tube TID  midodrine (PROAMITINE) tablet 10 mg  10 mg Per G Tube TID WITH MEALS  pramipexole (MIRAPEX) tablet 0.25 mg  0.25 mg Oral TID  sodium chloride (NS) flush 5-10 mL  5-10 mL IntraVENous Q8H  
 sodium chloride (NS) flush 5-10 mL  5-10 mL IntraVENous PRN  
 glucose chewable tablet 16 g  4 Tab Oral PRN  
 dextrose (D50W) injection syrg 12.5-25 g  25-50 mL IntraVENous PRN  
 glucagon (GLUCAGEN) injection 1 mg  1 mg IntraMUSCular PRN  
 insulin lispro (HUMALOG) injection   SubCUTAneous Q6H  
  acetaminophen (TYLENOL) solution 500 mg  500 mg Per G Tube Q4H PRN  
 
______________________________________________________________________ EXPECTED LENGTH OF STAY: 3d 4h 
ACTUAL LENGTH OF STAY:          1 Terence Ratliff MD

## 2018-12-05 NOTE — PROGRESS NOTES
Neurosurgery Progress Note Nadir Amador [place] 
[phone] Admit Date: 2018 LOS: 1 day Daily Progress Note: 2018 Subjective: The patient had a syncopal episode yesterday where he fell back and hit his head. He was unconscious for about 10 minutes per his wife who found him on the floor. He has a history of severe aortic stenosis and is being considered for a TAVR procedure. He was on ASA and plavix. In the ER, the patient was found to have a parafalcine SDH. He is asking when he can go home. Denies numbness, tingling, chest pain, leg pain, nausea, vomiting, difficulty swallowing, and dyspnea. Objective:  
 
Vital signs Temp (24hrs), Av °F (36.7 °C), Min:97.7 °F (36.5 °C), Max:98.6 °F (37 °C) 701 - 1900 In: -  
Out: 200 [Urine:200]  1901 - 700 In: -  
Out: 0854 [OEADN:6580] Visit Vitals /62 Pulse 77 Temp 97.7 °F (36.5 °C) Resp 23 Ht 5' 7\" (1.702 m) Wt 74.7 kg (164 lb 10.9 oz) SpO2 94% BMI 25.79 kg/m² O2 Flow Rate (L/min): 2 l/min O2 Device: Nasal cannula Pain control Pain Assessment Pain Scale 1: Numeric (0 - 10) Pain Intensity 1: 0 Pain Onset 1: (chronic) Pain Location 1: Back, Shoulder Pain Intervention(s) 1: Medication (see MAR) PT/OT Gait Physical Exam: 
Gen:NAD. Frail looking gnetleman. Neuro: A&Ox2. Doesn't know year. Follows commands. Speech clear. Affect normal. 
PERRL. EOMI. Face symmetric. Palate symmetric. Tongue midline. EVANS. Strength 5/5 in UE and LE BL. Negative drift. Gait deferred. Skin: Multiple scabs on extremities. CT head without contrast on 18 shows 7 mm left parafalcine subdural hematoma and minimal associated subarachnoid hemorrhage CT head without contrast on 18 shows unchanged subdural hemorrhage along the falx with associated minimal subarachnoid hemorrhage. 24 hour results: 
 
Recent Results (from the past 24 hour(s)) GLUCOSE, POC Collection Time: 12/04/18  1:03 PM  
Result Value Ref Range Glucose (POC) 155 (H) 65 - 100 mg/dL Performed by Shai Parada A1C WITH EAG Collection Time: 12/04/18  4:24 PM  
Result Value Ref Range Hemoglobin A1c 7.2 (H) 4.2 - 6.3 % Est. average glucose 160 mg/dL CK Collection Time: 12/04/18  4:24 PM  
Result Value Ref Range  39 - 308 U/L  
TROPONIN I Collection Time: 12/04/18  4:24 PM  
Result Value Ref Range Troponin-I, Qt. 0.41 (H) <0.05 ng/mL GLUCOSE, POC Collection Time: 12/04/18  5:32 PM  
Result Value Ref Range Glucose (POC) 105 (H) 65 - 100 mg/dL Performed by Mirna Prado CK Collection Time: 12/04/18 10:28 PM  
Result Value Ref Range  39 - 308 U/L  
TROPONIN I Collection Time: 12/04/18 10:28 PM  
Result Value Ref Range Troponin-I, Qt. 0.62 (H) <0.05 ng/mL GLUCOSE, POC Collection Time: 12/04/18 11:51 PM  
Result Value Ref Range Glucose (POC) 151 (H) 65 - 100 mg/dL Performed by Yuri Hernandez METABOLIC PANEL, BASIC Collection Time: 12/05/18  3:44 AM  
Result Value Ref Range Sodium 143 136 - 145 mmol/L Potassium 4.3 3.5 - 5.1 mmol/L Chloride 103 97 - 108 mmol/L  
 CO2 36 (H) 21 - 32 mmol/L Anion gap 4 (L) 5 - 15 mmol/L Glucose 122 (H) 65 - 100 mg/dL BUN 47 (H) 6 - 20 MG/DL Creatinine 1.45 (H) 0.70 - 1.30 MG/DL  
 BUN/Creatinine ratio 32 (H) 12 - 20 GFR est AA 56 (L) >60 ml/min/1.73m2 GFR est non-AA 46 (L) >60 ml/min/1.73m2 Calcium 9.4 8.5 - 10.1 MG/DL  
CBC WITH AUTOMATED DIFF Collection Time: 12/05/18  3:44 AM  
Result Value Ref Range WBC 9.3 4.1 - 11.1 K/uL  
 RBC 3.61 (L) 4.10 - 5.70 M/uL  
 HGB 10.7 (L) 12.1 - 17.0 g/dL HCT 34.7 (L) 36.6 - 50.3 % MCV 96.1 80.0 - 99.0 FL  
 MCH 29.6 26.0 - 34.0 PG  
 MCHC 30.8 30.0 - 36.5 g/dL  
 RDW 16.1 (H) 11.5 - 14.5 % PLATELET 84 (L) 420 - 400 K/uL MPV 11.9 8.9 - 12.9 FL  
 NRBC 0.0 0  WBC ABSOLUTE NRBC 0.00 0.00 - 0.01 K/uL NEUTROPHILS 87 (H) 32 - 75 % LYMPHOCYTES 8 (L) 12 - 49 % MONOCYTES 5 5 - 13 % EOSINOPHILS 0 0 - 7 % BASOPHILS 0 0 - 1 % IMMATURE GRANULOCYTES 0 0.0 - 0.5 % ABS. NEUTROPHILS 8.1 (H) 1.8 - 8.0 K/UL  
 ABS. LYMPHOCYTES 0.8 0.8 - 3.5 K/UL  
 ABS. MONOCYTES 0.4 0.0 - 1.0 K/UL  
 ABS. EOSINOPHILS 0.0 0.0 - 0.4 K/UL  
 ABS. BASOPHILS 0.0 0.0 - 0.1 K/UL  
 ABS. IMM. GRANS. 0.0 0.00 - 0.04 K/UL  
 DF AUTOMATED    
TROPONIN I Collection Time: 12/05/18  3:44 AM  
Result Value Ref Range Troponin-I, Qt. 0.57 (H) <0.05 ng/mL TSH 3RD GENERATION Collection Time: 12/05/18  3:44 AM  
Result Value Ref Range TSH 3.19 0.36 - 3.74 uIU/mL GLUCOSE, POC Collection Time: 12/05/18  7:21 AM  
Result Value Ref Range Glucose (POC) 149 (H) 65 - 100 mg/dL Performed by Lyndsay Frederick Assessment:  
 
Principal Problem: 
  SDH (subdural hematoma) ([facility] [address] (12/4/2018) Plan: 1. Traumatic SDH 
 - Head CT stable 
 - Ok to transfer to [facility] 
 - No MRI due to AICD 
 - ok to resume home tube feeds 2. Syncopal episode - Likely d/t to aortic stenosis 
 - Hospitalist following 3. Severe aortic stenosis 
 - CTS working up for TAVR. Would not be able to undergo TAVR and be on blood thinning agents until SDH has resolved radiographically on CT. 4. CAD 
 - Hold Asa and Plavix 5. Esophageal dysmotility 
 - ok to cont tube feedings Activity: up w/ assist 
DVT ppx: SCDs Dispo: likely home Plan d/w Dr. Leola Rubin. Ok to go to [facility].  
 
 
Christal Mello NP

## 2018-12-05 NOTE — PROGRESS NOTES
[address] at bedside to interrogate pacer. [address] [facility] with Efrain Saenz from Dr [employer] group. MD aware of pt's admission, will schedule work-up for TAVR outpt once cleared by Neurosurgery.

## 2018-12-05 NOTE — CDMP QUERY
#1 
 
Please clarify if this patient is (was) being treated/managed for:  
 
=> Metabolic Alkalosis in the setting of CKD requiring lab monitoring  
=> Other explanation of clinical findings 
=> Clinically Undetermined (no explanation for clinical findings) The medical record reflects the following clinical findings, treatment, and risk factors. Risk Factors:  CKD Clinical Indicators:  serum CO2 33 Treatment: lab monitoring Please clarify and document your clinical opinion in the progress notes and discharge summary including the definitive and/or presumptive diagnosis, (suspected or probable), related to the above clinical findings. Please include clinical findings supporting your diagnosis. Thank you, 
       Sil Pan [facility], [address] [facility]

## 2018-12-05 NOTE — CONSULTS
Cardiology Consult Note CC: [email or web address] Reason for consult:  SDH in this patient being worked up for TAVR Requesting MD:  Dr. Kandy Matson Subjective:  
  
Date of  Admission: 12/4/2018  8:42 AM  
 
Admission type:Emergency Bon Heredia is a 80 y.o. male admitted for SDH (subdural hematoma) ([facility] [S06.5X9A]. Patient had sustained a small SDH after syncope at home. He was wiping off water off table and then he got up to sink when he passed out for about ten minutes. He denies any sudden chest pain or dizziness or palpitations prior to the spell. He did have another fall hitting his head within this last month. His past cardiac data include CAD, s/p 4 vs CABG ( LIMA-LAD, SVGs-OM; PDA; PLB ), ischemic cardiomyopathy ( 40% in 2013 but last echo showing 28% ), s/p stent in LIMA-LAD insertion in 2013 and last stress test in 2014 showing anterior ischemia due to tight lesion in D1 not amenable to PCI, h/o acute on chronic systolic HF, severe symptomatic AS ( Cp, SOB, dizziness ) for which he was seen by Dr. Isaak Bridges, Dr. True Keating and was about to have CTA of heart and aorta for TAVR access vessel. He has not had a cardiac cath. He has a number of other severe medical conditions well described in chart. Patient Active Problem List  
 Diagnosis Date Noted  SDH (subdural hematoma) (HCC) 12/04/2018  Hx of tongue cancer 11/19/2018  Dizziness 11/11/2018  Epigastric pain 08/17/2018  Altered mental status, unspecified 08/07/2018  Convulsion ([facility] 08/07/2018  Disturbance of memory 08/02/2018  Presbycusis of both ears 08/02/2018  B12 deficiency 08/02/2018  Vitamin D deficiency 08/02/2018  Hypothyroidism due to acquired atrophy of thyroid 08/02/2018  Myalgia 07/19/2018  Pneumonia due to group B Streptococcus ([facility] 07/19/2018  Counseling regarding goals of care 07/19/2018  Parkinson's disease (tremor, stiffness, slow motion, unstable posture) ([facility] 07/09/2018  Physical deconditioning 06/28/2018  Tremors of nervous system 06/28/2018  Type 2 diabetes with nephropathy ([facility] 05/07/2018  Diabetic peripheral neuropathy associated with type 2 diabetes mellitus ([facility] [facility] 05/07/2018  Benign essential tremor syndrome 05/07/2018  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  Wrist pain, acute, right 05/07/2018  Thrombotic stroke involving left middle cerebral artery ([address] 02/02/2017  Stenosis of both internal carotid arteries 02/02/2017  Hemiplegia and hemiparesis following cerebral infarction affecting right dominant side ([address] 02/02/2017  Weakness 02/01/2017  Stroke ([address] 02/01/2017  Aspiration pneumonia ([address] 11/28/2016  History of stroke 07/30/2016 Class: Present on Admission  Polyneuropathy associated with underlying disease ([address] 02/11/2016  Peripheral neuropathy ([address] 01/09/2016  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 ([address] 01/08/2016  PEG (percutaneous endoscopic gastrostomy) status ([address] 12/04/2014  Antiplatelet or antithrombotic long-term use 12/04/2014  Heart failure ([address] 09/11/2014  Esophageal motility disorder 07/02/2013  CAD (coronary artery disease) 02/12/2013  Status post implantation of automatic cardioverter/defibrillator (AICD) 02/12/2013 Class: Present on Admission  Aortic stenosis, mild 02/12/2013  S/P PTCA (percutaneous transluminal coronary angioplasty) 01/18/2013  Non-cardiac chest pain 01/04/2013  Feeding difficulties 05/02/2011  Abnormal cardiovascular stress test 06/24/2010  S/P CABG x 3 06/24/2010  Atherosclerotic cerebrovascular disease 06/24/2010  Orthostatic hypotension 06/24/2010  Dysphagia 05/03/2010 Hernan Garcia MD 
Past Medical History:  
Diagnosis Date  Antiplatelet or antithrombotic long-term use 12/4/2014  Anxiety disorder  Arrhythmia 2009  
 bradycardia  Arthritis  CAD (coronary artery disease) s/p CABG 2002; Dr Aileen Hooker  Cancer ([facility] 1996  
 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   
 left leg, lower back,   
 Depression  Diabetes ([facility] Type II  Epigastric pain 8/17/2018  Esophageal dysmotility s/p dilitation  Esophageal motility disorder 7/8/2013 Frequent simultaneous or failed contractions, low amplitude contractions  suggests severe myopathy or diffuse spasm. I suspect the latter. Achalasia  is not present.  GERD (gastroesophageal reflux disease)  Heart failure ([facility] [facility] 10/2014 Cardiomyopathy:Pacemaker upgrade:Biv and AICD  Dr. Camille Lorenz heart Dr. last visit 5/11/2015  Hepatitis C Dx 1996  
 treated at [facility] in past; as of 4/15/15 wife states pt currently not under any treatment  Hyperlipidemia  Myocardial infarct [facility]) 2013 Heart Cath: 40% LV EF, Stented distal LAD, patent Graft to circumflex  On tube feeding diet approx 2009  
 still has as of 9/28/15  (no po food/liquid/meds at all); Dr Samina Lawson  Other ill-defined conditions(799.89) 1996  
 1 dose of chemotherapy/radiation for tongue cancer  Other ill-defined conditions(799.89) BPH  Other ill-defined conditions(799.89) orthostatic hypotension  Pneumonia ~ April -May 2010  Stroke [facility]) approx 2003  
 left side-left finger tips numb; no imbalance or memory loss; as of 2015 not seeing neuro MD  
 Suicidal thoughts Past Surgical History:  
Procedure Laterality Date  ABDOMEN SURGERY PROC UNLISTED  1996  
 peg tube  CABG, ARTERY-VEIN, THREE  2000  HX CATARACT REMOVAL    
 bilateral  
 HX CHOLECYSTECTOMY  HX COLONOSCOPY    
 HX HEART CATHETERIZATION  2013 Stented distal LAD  HX MOHS PROCEDURES    
 bilateral  
 HX ORTHOPAEDIC    
 back surgery times two  HX OTHER SURGICAL Radical Left Neck  HX OTHER SURGICAL NASAL POLYPS REMOVAL  
 HX OTHER SURGICAL   TURP  
 HX PACEMAKER    HX PACEMAKER  10/28/14 Defibrillator: [facility] # [id number], serial # [id number]; Dr. Rajesh Sanon [phone]; Dr Yeny Zepeda  HX UROLOGICAL    
 cystoscopy [address]  
 cevical surgery  WI CHANGE GASTROSTOMY TUBE  2011  WI CHANGE GASTROSTOMY TUBE  2011  WI EGD INSERT GUIDE WIRE DILATOR PASSAGE ESOPHAGUS  2010  WI EGD TRANSORAL BIOPSY SINGLE/MULTIPLE  2010  
    
 STOMACH SURGERY PROCEDURE UNLISTED  2011  UPPER GI ENDOSCOPY,BIOPSY  2018  UPPER GI ENDOSCOPY,DILATN W GUIDE  2018  UPPER GI ENDOSCOPY,W/DILAT,GASTRIC OUT  2018  VASCULAR SURGERY PROCEDURE UNLIST    
 bilateral carotid stents Allergies Allergen Reactions  Cleocin [Clindamycin Hcl] Rash  Demerol [Meperidine] Shortness of Breath  Paxil [Paroxetine Hcl] Unknown (comments) Pt gets shaky and loses control of legs  Amoxicillin Rash  Pcn [Penicillins] Rash Family History Problem Relation Age of Onset  Heart Disease Father   
      at age 52 from CAD  Colon Cancer Mother  Cancer Mother   
     colon ca  
 Heart Disease Brother Current Facility-Administered Medications Medication Dose Route Frequency  tamsulosin (FLOMAX) capsule 0.4 mg  0.4 mg Oral DAILY  atorvastatin (LIPITOR) tablet 40 mg  40 mg Per G Tube DAILY  famotidine (PEPCID) tablet 20 mg  20 mg Per G Tube Q24H  
 finasteride (PROSCAR) tablet 5 mg  5 mg Oral QHS  gabapentin (NEURONTIN) 250 mg/5 mL solution 750 mg  750 mg Per G Tube TID  midodrine (PROAMITINE) tablet 10 mg  10 mg Per G Tube TID WITH MEALS  pramipexole (MIRAPEX) tablet 0.25 mg  0.25 mg Oral TID  sodium chloride (NS) flush 5-10 mL  5-10 mL IntraVENous Q8H  
 sodium chloride (NS) flush 5-10 mL  5-10 mL IntraVENous PRN  
  glucose chewable tablet 16 g  4 Tab Oral PRN  
 dextrose (D50W) injection syrg 12.5-25 g  25-50 mL IntraVENous PRN  
 glucagon (GLUCAGEN) injection 1 mg  1 mg IntraMUSCular PRN  
 insulin lispro (HUMALOG) injection   SubCUTAneous Q6H  
 niCARdipine (CARDENE) 25 mg in 0.9% sodium chloride 250 mL infusion  0-15 mg/hr IntraVENous TITRATE  acetaminophen (TYLENOL) solution 500 mg  500 mg Per G Tube Q4H PRN Prior to Admission Medications: 
Prior to Admission medications Medication Sig Start Date End Date Taking? Authorizing Provider  
hydrocortisone sodium succ/PF (HYDROCORTISONE SOD SUCC, PF, INJECTION) by Injection route every three (3) months. Gets injection every 3 months from orthopedist   Yes Provider, Historical  
B.infantis-B.ani-B.long-B.bifi (PROBIOTIC 4X) 10-15 mg TbEC by PEG Tube route daily. Yes Provider, Historical  
furosemide (LASIX) 40 mg tablet 1 Tab by Per G Tube route Before breakfast and dinner for 30 days. 11/20/18 12/20/18 Yes Leonor Michelle MD  
insulin regular (NOVOLIN R REGULAR U-100 INSULN) 100 unit/mL injection 5 Units by SubCUTAneous route every evening. 14 units at 8 AM, 14 units at 2 PM, and 5 units at 8 PM  
 
Takes additional units as well depending on BG   Yes Provider, Historical  
pramipexole (MIRAPEX) 0.25 mg tablet Take 1 Tab by mouth three (3) times daily. 10/24/18  Yes Marin Tellez MD  
clopidogrel (PLAVIX) 75 mg tab 1 Tab by PEG Tube route daily. 8/20/18  Yes MD radha Herrera's mixture Solution Take 5 mL by mouth two (2) times a day. Diphenhydramine elixir 12.5mg/ml 500ml, Nystatin suspension 120ml,Tetracycline 500mg capsules  12 capsules (6g), Hydrocortisone 20mg tablet 12 tablets (240mg), Water for irrigation QS ad 1000ml Yes Provider, Historical  
lactose-reduced food/fiber (ISOSOURCE 1.5 RAE FEEDING TUBE) 250 mL by adductor canal block route five (5) times daily.    Yes Provider, Historical  
 guaiFENesin (ROBITUSSIN) 100 mg/5 mL liquid 10mL three times daily   Yes Provider, Historical  
aspirin 81 mg chewable tablet Take 1 Tab by mouth daily. Patient taking differently: 1 Tab by Per G Tube route daily. 7/28/18  Yes Christiane Claros MD  
famotidine (PEPCID) 20 mg tablet 20 mg by Per G Tube route three (3) times daily. Yes Provider, Historical  
atorvastatin (LIPITOR) 40 mg tablet 40 mg by Per G Tube route daily. Yes Other, MD Booker  
finasteride (PROSCAR) 5 mg tablet 5 mg by Per G Tube route nightly. Yes Other, MD Booker  
alfuzosin SR (UROXATRAL) 10 mg SR tablet 10 mg by Per G Tube route daily. 5/24/18  Yes Provider, Historical  
midodrine (PROAMITINE) 10 mg tablet 10 mg by Per G Tube route three (3) times daily (with meals). 11/1/16  Yes Provider, Historical  
HYDROcodone-acetaminophen (NORCO)  mg tablet 1 Tab by Per G Tube route every four (4) hours as needed for Pain. Yes Other, MD Booker  
fluticasone (FLONASE ALLERGY RELIEF) 50 mcg/actuation nasal spray 1 Becc[place] by Both Nostrils route two (2) times a day. Yes Provider, Historical  
zinc 50 mg tab tablet 50 mg by Per G Tube route daily. Yes Provider, Historical  
LANTUS SOLOSTAR U-100 INSULIN 100 unit/mL (3 mL) inpn INJECT 14 UNITS SUBCUTANEOUSLY ONCE DAILY 5/21/18  Yes Huma Ferguson MD  
gabapentin (NEURONTIN) 250 mg/5 mL solution 750 mg by PEG Tube route three (3) times daily. Yes Other, MD Booker  
multivitamin (ONE A DAY) tablet 1 Tab by Per G Tube route daily. Yes Other, MD SUZANNA Celeste diltiazem 2% lidocaine cream. Apply to rectal area as needed    Provider, Historical  
albuterol (PROVENTIL VENTOLIN) 2.5 mg /3 mL (0.083 %) nebulizer solution 2.5 mg by Nebulization route two (2) times a day. Per wife patient can use 3 times a day if needed    Provider, Historical  
acetaminophen (TYLENOL) 500 mg tablet 500-1,000 mg by Per G Tube route every six (6) hours as needed for Pain.     Provider, Historical  
 nitroglycerin (NITROSTAT) 0.4 mg SL tablet 0.4 mg by SubLINGual route every five (5) minutes as needed for Chest Pain. Other, MD Booker  
ondansetron hcl (ZOFRAN, AS HYDROCHLORIDE,) 8 mg tablet Take 1 Tab by mouth every eight (8) hours as needed for Nausea. 16   Skylar Segura MD  
 
 
 Review of Symptoms: 
Except as noted in HPI, patient denies recent fever or chills, nausea, vomiting, diarrhea, hemoptysis, hematemesis, dysuria, myalgias, focal neurologic symptoms, ecchymosis, angioedema, odynophagia, dysphagia, sore throat, earache,rash, melena, hematochezia, depression, GERD, cold intolerance, petechia, bleeding gums, or significant weight loss. A comprehensive review of systems was negative except for that written in the HPI. Subjective:  
 24 hr VS reviewed, overall VSSAF Temp (24hrs), Av °F (36.7 °C), Min:97.6 °F (36.4 °C), Max:98.6 °F (37 °C) Patient Vitals for the past 8 hrs: 
 Pulse 18 0500 81  
18 0400 72  
18 0300 77  
18 0200 82  
18 0100 73  
18 0000 75  
18 2300 88  
18 2200 83 Patient Vitals for the past 8 hrs: 
 Resp 18 0500 22  
18 0400 17  
18 0300 19  
18 0200 25  
18 0000 20  
18 2300 23  
18 2200 21 Patient Vitals for the past 8 hrs: 
 BP  
18 0500 (!) 122/99  
18 0400 120/60  
18 0300 122/60  
18 0200 111/68  
18 0100 105/54  
18 0000 120/86  
18 2300 134/61  
18 2200 125/56 Intake/Output Summary (Last 24 hours) at 2018 0534 Last data filed at 2018 0000 Gross per 24 hour Intake  Output 1300 ml Net -1300 ml Physical Exam (complete single organ system exam) Visit Vitals BP (!) 122/99 Pulse 81 Temp 98.6 °F (37 °C) Resp 22 Ht 5' 7\" (1.702 m) Wt 74.7 kg (164 lb 10.9 oz) SpO2 97% BMI 25.79 kg/m² General Appearance:  Well developed, well nourished,alert and oriented x 3, and individual in no acute distress. Ears/Nose/Mouth/Throat:   Hearing grossly normal. 
  
    Neck: Supple. Chest:   Lungs rales at bases Cardiovascular:  Regular rate and rhythm, S1, S2 diminished, 3/6 AS murmur. Abdomen:   Soft, non-tender, bowel sounds are active. Extremities: No edema bilaterally. Skin: Warm and dry. Cardiographics Telemetry: normal sinus rhythm; AV sequential pacing ECG: normal sinus rhythm, A sensed and V paced beats Echocardiogram: Not done Labs:  
Recent Results (from the past 24 hour(s)) SAMPLES BEING HELD Collection Time: 12/04/18  8:53 AM  
Result Value Ref Range SAMPLES BEING HELD 1LAV,1BLUE,1RED,1PST COMMENT Add-on orders for these samples will be processed based on acceptable specimen integrity and analyte stability, which may vary by analyte. CBC WITH AUTOMATED DIFF Collection Time: 12/04/18  8:53 AM  
Result Value Ref Range WBC 8.5 4.1 - 11.1 K/uL  
 RBC 3.65 (L) 4.10 - 5.70 M/uL  
 HGB 10.6 (L) 12.1 - 17.0 g/dL HCT 35.3 (L) 36.6 - 50.3 % MCV 96.7 80.0 - 99.0 FL  
 MCH 29.0 26.0 - 34.0 PG  
 MCHC 30.0 30.0 - 36.5 g/dL  
 RDW 15.8 (H) 11.5 - 14.5 % PLATELET 81 (L) 002 - 400 K/uL MPV 12.7 8.9 - 12.9 FL  
 NRBC 0.0 0  WBC ABSOLUTE NRBC 0.00 0.00 - 0.01 K/uL NEUTROPHILS 94 (H) 32 - 75 % LYMPHOCYTES 3 (L) 12 - 49 % MONOCYTES 3 (L) 5 - 13 % EOSINOPHILS 0 0 - 7 % BASOPHILS 0 0 - 1 % IMMATURE GRANULOCYTES 0 0.0 - 0.5 % ABS. NEUTROPHILS 7.9 1.8 - 8.0 K/UL  
 ABS. LYMPHOCYTES 0.3 (L) 0.8 - 3.5 K/UL  
 ABS. MONOCYTES 0.3 0.0 - 1.0 K/UL  
 ABS. EOSINOPHILS 0.0 0.0 - 0.4 K/UL  
 ABS. BASOPHILS 0.0 0.0 - 0.1 K/UL  
 ABS. IMM. GRANS. 0.0 0.00 - 0.04 K/UL  
 DF SMEAR SCANNED    
 RBC COMMENTS ANISOCYTOSIS 1+ 
    
 RBC COMMENTS POLYCHROMASIA PRESENT 
    
METABOLIC PANEL, COMPREHENSIVE Collection Time: 12/04/18  8:53 AM  
Result Value Ref Range  Sodium 139 136 - 145 mmol/L  
 Potassium 4.8 3.5 - 5.1 mmol/L Chloride 100 97 - 108 mmol/L  
 CO2 33 (H) 21 - 32 mmol/L Anion gap 6 5 - 15 mmol/L Glucose 260 (H) 65 - 100 mg/dL BUN 48 (H) 6 - 20 MG/DL Creatinine 1.68 (H) 0.70 - 1.30 MG/DL  
 BUN/Creatinine ratio 29 (H) 12 - 20 GFR est AA 48 (L) >60 ml/min/1.73m2 GFR est non-AA 39 (L) >60 ml/min/1.73m2 Calcium 9.5 8.5 - 10.1 MG/DL Bilirubin, total 0.5 0.2 - 1.0 MG/DL  
 ALT (SGPT) 48 12 - 78 U/L  
 AST (SGOT) 51 (H) 15 - 37 U/L Alk. phosphatase 71 45 - 117 U/L Protein, total 8.0 6.4 - 8.2 g/dL Albumin 3.3 (L) 3.5 - 5.0 g/dL Globulin 4.7 (H) 2.0 - 4.0 g/dL A-G Ratio 0.7 (L) 1.1 - 2.2    
TROPONIN I Collection Time: 12/04/18  8:53 AM  
Result Value Ref Range Troponin-I, Qt. <0.05 <0.05 ng/mL NT-PRO BNP Collection Time: 12/04/18  8:53 AM  
Result Value Ref Range NT pro-BNP 8,018 (H) 0 - 450 PG/ML  
PROTHROMBIN TIME + INR Collection Time: 12/04/18  8:53 AM  
Result Value Ref Range INR 1.1 0.9 - 1.1 Prothrombin time 10.9 9.0 - 11.1 sec PTT Collection Time: 12/04/18  8:53 AM  
Result Value Ref Range aPTT 23.0 22.1 - 32.0 sec  
 aPTT, therapeutic range     58.0 - 77.0 SECS  
VITAMIN B12 Collection Time: 12/04/18  8:53 AM  
Result Value Ref Range Vitamin B12 394 193 - 986 pg/mL TSH 3RD GENERATION Collection Time: 12/04/18  8:53 AM  
Result Value Ref Range TSH 1.81 0.36 - 3.74 uIU/mL EKG, 12 LEAD, INITIAL Collection Time: 12/04/18  8:53 AM  
Result Value Ref Range Ventricular Rate 92 BPM  
 Atrial Rate 92 BPM  
 P-R Interval 128 ms QRS Duration 184 ms Q-T Interval 452 ms QTC Calculation (Bezet) 558 ms Calculated P Axis 36 degrees Calculated R Axis 169 degrees Calculated T Axis 1 degrees Diagnosis Atrial-sensed ventricular-paced rhythm When compared with ECG of 11-NOV-2018 11:21, 
Vent.  rate has increased BY  12 BPM 
Confirmed by Beatriz Day M.D., [place] ([id number]) on 12/4/2018 12:44:04 PM 
 GLUCOSE, POC Collection Time: 12/04/18  1:03 PM  
Result Value Ref Range Glucose (POC) 155 (H) 65 - 100 mg/dL Performed by Shai DOVER WITH EAG Collection Time: 12/04/18  4:24 PM  
Result Value Ref Range Hemoglobin A1c 7.2 (H) 4.2 - 6.3 % Est. average glucose 160 mg/dL CK Collection Time: 12/04/18  4:24 PM  
Result Value Ref Range  39 - 308 U/L  
TROPONIN I Collection Time: 12/04/18  4:24 PM  
Result Value Ref Range Troponin-I, Qt. 0.41 (H) <0.05 ng/mL GLUCOSE, POC Collection Time: 12/04/18  5:32 PM  
Result Value Ref Range Glucose (POC) 105 (H) 65 - 100 mg/dL Performed by Jose Longo CK Collection Time: 12/04/18 10:28 PM  
Result Value Ref Range  39 - 308 U/L  
TROPONIN I Collection Time: 12/04/18 10:28 PM  
Result Value Ref Range Troponin-I, Qt. 0.62 (H) <0.05 ng/mL GLUCOSE, POC Collection Time: 12/04/18 11:51 PM  
Result Value Ref Range Glucose (POC) 151 (H) 65 - 100 mg/dL Performed by Viry Altamirano METABOLIC PANEL, BASIC Collection Time: 12/05/18  3:44 AM  
Result Value Ref Range Sodium 143 136 - 145 mmol/L Potassium 4.3 3.5 - 5.1 mmol/L Chloride 103 97 - 108 mmol/L  
 CO2 36 (H) 21 - 32 mmol/L Anion gap 4 (L) 5 - 15 mmol/L Glucose 122 (H) 65 - 100 mg/dL BUN 47 (H) 6 - 20 MG/DL Creatinine 1.45 (H) 0.70 - 1.30 MG/DL  
 BUN/Creatinine ratio 32 (H) 12 - 20 GFR est AA 56 (L) >60 ml/min/1.73m2 GFR est non-AA 46 (L) >60 ml/min/1.73m2 Calcium 9.4 8.5 - 10.1 MG/DL  
CBC WITH AUTOMATED DIFF Collection Time: 12/05/18  3:44 AM  
Result Value Ref Range WBC 9.3 4.1 - 11.1 K/uL  
 RBC 3.61 (L) 4.10 - 5.70 M/uL  
 HGB 10.7 (L) 12.1 - 17.0 g/dL HCT 34.7 (L) 36.6 - 50.3 % MCV 96.1 80.0 - 99.0 FL  
 MCH 29.6 26.0 - 34.0 PG  
 MCHC 30.8 30.0 - 36.5 g/dL  
 RDW 16.1 (H) 11.5 - 14.5 % PLATELET 84 (L) 882 - 400 K/uL MPV 11.9 8.9 - 12.9 FL  
 NRBC 0.0 0  WBC ABSOLUTE NRBC 0.00 0.00 - 0.01 K/uL NEUTROPHILS 87 (H) 32 - 75 % LYMPHOCYTES 8 (L) 12 - 49 % MONOCYTES 5 5 - 13 % EOSINOPHILS 0 0 - 7 % BASOPHILS 0 0 - 1 % IMMATURE GRANULOCYTES 0 0.0 - 0.5 % ABS. NEUTROPHILS 8.1 (H) 1.8 - 8.0 K/UL  
 ABS. LYMPHOCYTES 0.8 0.8 - 3.5 K/UL  
 ABS. MONOCYTES 0.4 0.0 - 1.0 K/UL  
 ABS. EOSINOPHILS 0.0 0.0 - 0.4 K/UL  
 ABS. BASOPHILS 0.0 0.0 - 0.1 K/UL  
 ABS. IMM. GRANS. 0.0 0.00 - 0.04 K/UL  
 DF AUTOMATED    
TROPONIN I Collection Time: 12/05/18  3:44 AM  
Result Value Ref Range Troponin-I, Qt. 0.57 (H) <0.05 ng/mL TSH 3RD GENERATION Collection Time: 12/05/18  3:44 AM  
Result Value Ref Range TSH 3.19 0.36 - 3.74 uIU/mL Assessment: 
 
 Assessment:  
Syncope; due to AS; can not rule out arrhythmia Elevated troponin; due to demand ischemia; he had a recent elevated troponin as well CAD; severe AS; severe; being evaluated for TAVR S/p pacer Ischemic CM; EF 28% CHF; chronic systolic and compensated for the time being CKD; at baseline COPD H/o CVA 
SDH; from fall yesterday Plan:  
Tele Continue cardiac regimen Will notify TAVR team of his SDH Interrogate pacer for any tachyarrhythmia yesterday when he had syncope Await neuro surgery's recommendation for his discharge and when he is safe to get TAVR Chang Aguirre MD

## 2018-12-05 NOTE — PROGRESS NOTES
NUTRITION COMPLETE ASSESSMENT 
 
RECOMMENDATIONS:  
Miralax once daily DTC consult for medication management Interventions/Plan:  
Food/Nutrient Delivery:  Initiate enteral nutrition Assessment:  
Reason for Assessment:  
[x] Provider Consult-Tube Feeding management Diet: NPO Nutritionally Significant Medications: [x] Reviewed & Includes: correction scale insulin Meal Intake: No data found. Subjective: I keep my weight between 165 and 170#. Has not had a BM in past 2-3 days; normally takes Miralax. Objective: Mr Sathish Mccarthy was admitted with SDH. Noted: small SDH and SAH; being worked up for TAVR d/t severe AS. Extensive PMHx including: Ischemic CM-EF 28%, DM 2, Tongue CA, esophageal dysmotility, CVA, Peg tube, others noted. BUN and creatinine elevated-pt has been without enteral feeding/IVF since yesterday. Should improve once feedings are resumed. Elevated A1c-pt may benefit from DTC consult for medication management-may need adjustment in home meds. Recommend resuming usual regimen (250 ml Isosource 1.5 five times per day-fed q 3 hr) but will substitute Osmolite 1.5 per [facility] formulary. Goal: 250 ml Osmolite 1.5 q 3 hr x 5 feedings with 60 ml water flush before and after each bolus. This will provide 1250 ml, 1875 calories, 78 gm protein and 1550 ml free water (tube feeding/flush) per day. Adjust flush prn. Estimated Nutrition Needs:  
Kcals/day: 5523 Kcals/day(MSJ x 1.3) Protein: 75 g(1.0g/kg) Fluid: ~1500 ml Based On: [place] 1898 Weight Used: Actual wt(75 kg) Pt expected to meet estimated nutrient needs:  [x]   Yes     []  No  [] Unable to predict at this time Nutrition Diagnosis:  
1. Inadequate protein-energy intake related to SDH as evidenced by temporary cessation of tube feeding. Goals:   
 Resume tube feeding to meet at least 90% estimated needs x 5-7 days. Monitoring & Evaluation: - Enteral/parenteral nutrition intake - Electrolyte and renal profile, Weight/weight change Previous Nutrition Goals Met: N/A Previous Recommendations: N/A Education & Discharge Needs: 
 [x] None Identified 
 [] Identified and addressed [x] Participated in care plan, discharge planning, and/or interdisciplinary rounds Cultural, Rastafarian and ethnic food preferences identified: 
 None Skin Integrity: [x]Intact  []Other Edema: [x]None [x]Other Last BM: PTA Food Allergies: [x]None []Other Anthropometrics:   
Weight Loss Metrics 12/5/2018 12/4/2018 11/30/2018 11/28/2018 11/26/2018 11/24/2018 11/20/2018 Today's Wt 164 lb 10.9 oz - 163 lb 12.8 oz 168 lb 163 lb 163 lb 6.4 oz 164 lb 7.4 oz BMI - 25.79 kg/m2 25.65 kg/m2 26.31 kg/m2 25.53 kg/m2 25.59 kg/m2 25.76 kg/m2 Some encounter information is confidential and restricted. Go to Review Flowsheets activity to see all data. Last 3 Recorded Weights in this Encounter 12/04/18 1369 12/04/18 1647 12/05/18 7545 Weight: 73.9 kg (163 lb) 74.7 kg (164 lb 10.9 oz) 74.7 kg (164 lb 10.9 oz) Weight Source: Bed Height: 5' 7\" (170.2 cm), Body mass index is 25.79 kg/m². IBW : 67.1 kg (148 lb), % IBW (Calculated): 111.27 % 
 ,   
 
Labs:   
Lab Results Component Value Date/Time Sodium 143 12/05/2018 03:44 AM  
 Potassium 4.3 12/05/2018 03:44 AM  
 Chloride 103 12/05/2018 03:44 AM  
 CO2 36 (H) 12/05/2018 03:44 AM  
 Glucose 122 (H) 12/05/2018 03:44 AM  
 BUN 47 (H) 12/05/2018 03:44 AM  
 Creatinine 1.45 (H) 12/05/2018 03:44 AM  
 Calcium 9.4 12/05/2018 03:44 AM  
 Magnesium 2.3 11/17/2018 12:25 AM  
 Phosphorus 3.0 11/13/2018 03:24 AM  
 Albumin 3.3 (L) 12/04/2018 08:53 AM  
 
Lab Results Component Value Date/Time Hemoglobin A1c 7.2 (H) 12/04/2018 04:24 PM  
 Hemoglobin A1c (POC) 7.0 09/12/2018 12:05 PM  
 
Lab Results Component Value Date/Time Glucose (POC) 149 (H) 12/05/2018 07:21 AM  
  
Lab Results Component Value Date/Time ALT (SGPT) 48 12/04/2018 08:53 AM  
 AST (SGOT) 51 (H) 12/04/2018 08:53 AM  
 Alk.  phosphatase 71 12/04/2018 08:53 AM  
 Bilirubin, direct 0.1 05/07/2010 03:40 AM  
 Bilirubin, total 0.5 12/04/2018 08:53 AM  
  
 
Kenzie Renteria RD [facility]

## 2018-12-05 NOTE — CDMP QUERY
#2 
 
Please clarify if this patient is (was) being treated/managed for:  
 
=> Chronic Respiratory Failure in the setting of oxygen dependent CHF requiring continued O2 @ 2 lts 
=> Other explanation of clinical findings 
=> Clinically Undetermined (no explanation for clinical findings) The medical record reflects the following clinical findings, treatment, and risk factors. Risk Factors:  CHF Clinical Indicators:  on home oxygen @ 2 lts Treatment: continued oxygen support Please clarify and document your clinical opinion in the progress notes and discharge summary including the definitive and/or presumptive diagnosis, (suspected or probable), related to the above clinical findings. Please include clinical findings supporting your diagnosis. Thank you, 
       Claudia Cheng [facility], [address]

## 2018-12-06 NOTE — PROGRESS NOTES
Reason for Admission:   Syncopal episode at home resulting in a SDH. RRAT Score:     52 Resources/supports as identified by patient/family:   Patient has Medicare A,Band a [employer] supplement. Lives with wife and is open to [address] for SN and PT. .  
             
Top Challenges facing patient (as identified by patient/family and CM): Finances/Medication cost?     No financial concerns voiced. Patient recieves Social Security Transportation? Wife transports Support system or lack thereof? Wife and 2 sons who live close by 
                  
Living arrangements? Lives with wife in a one story home with 7 steps to enter Self-care/ADLs/Cognition? A&O x 4, Independent with ADL's Current Advanced Directive/Advance Care Plan:  Not on File Plan for utilizing home health: Yes Likelihood of readmission: High r/t to diease process Transition of Care Plan:    TBD Care manager met with patient and his wife Eros Guthrie ([phone]) to introduce self and discuss discharge planning. Patient confirmed his PCP to be Dr Gerald Chin and sees hime 3-4 times a year and uses the [facility] [place] as his pharmacy. Patient has a peg tube that he receives feedings through. CM confirmed patient's address and insurance information to be correct on the demographic sheet. Care management will follow for potential discharge planning needs. Dez Vazquez RN,CRM Care Management Interventions PCP Verified by CM: Yes(Dr Candelaria ) Transition of Care Consult (CM Consult): Home Health [address]: Yes [facility] Signup: No 
Discharge Durable Medical Equipment: No 
Physical Therapy Consult: Yes Occupational Therapy Consult: Yes Speech Therapy Consult: No 
Current Support Network: Lives with Spouse(Cathy Tabor [phone], Son : Miri Manning [phone])

## 2018-12-06 NOTE — PROGRESS NOTES
physical Therapy EVALUATION/DISCHARGE Patient: Tony Avalos (80 y.o. male) Date: 12/6/2018 Primary Diagnosis: SDH (subdural hematoma) ([facility] [S06.5X9A] Precautions:   Fall ASSESSMENT : 
Based on the objective data described below, the patient presents with independence with mobility consistent with his pre-fall baseline. Patient is modified independent with use of rollator at home (RW here in hospital). Demonstrates ability to bentley socks, fix gown, transfer, ambulate, and negotiate steps (8) with no assistance or difficulty. Returned to room with all needs met. Patient eager to d/c home. Family present and emphasize that patient has great support system and has returned to his baseline. VSS throughout assessment. Cleared by PT to d/c home when medically stable. Discussed with OT- formal assessment not warranted. Skilled physical therapy is not indicated at this time. PLAN : 
Discharge Recommendations: None Further Equipment Recommendations for Discharge: has equipment SUBJECTIVE:  
Patient stated Im ready to go.  OBJECTIVE DATA SUMMARY:  
HISTORY:   
Past Medical History:  
Diagnosis Date  Antiplatelet or antithrombotic long-term use 12/4/2014  Anxiety disorder  Arrhythmia 2009  
 bradycardia  Arthritis  CAD (coronary artery disease) s/p CABG 2002; Dr Omero Fishman  Cancer ([facility] 1996  
 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   
 left leg, lower back,   
 Depression  Diabetes ([facility] Type II  Epigastric pain 8/17/2018  Esophageal dysmotility s/p dilitation  Esophageal motility disorder 7/8/2013 Frequent simultaneous or failed contractions, low amplitude contractions  suggests severe myopathy or diffuse spasm. I suspect the latter. Achalasia  is not present.  GERD (gastroesophageal reflux disease)  Heart failure ([facility] 10/2014 Cardiomyopathy:Pacemaker upgrade:Biv and AICD  Dr. Juliana Huff heart DrAlvaro last visit 5/11/2015  Hepatitis C Dx 1996  
 treated at [facility] in past; as of 4/15/15 wife states pt currently not under any treatment  Hyperlipidemia  Myocardial infarct [facility]) 2013 Heart Cath: 40% LV EF, Stented distal LAD, patent Graft to circumflex  On tube feeding diet approx 2009  
 still has as of 9/28/15  (no po food/liquid/meds at all); Dr Jayson Poole  Other ill-defined conditions(799.89) 1996  
 1 dose of chemotherapy/radiation for tongue cancer  Other ill-defined conditions(799.89) BPH  Other ill-defined conditions(799.89) orthostatic hypotension  Pneumonia ~ April -May 2010  Stroke [facility]) approx 2003  
 left side-left finger tips numb; no imbalance or memory loss; as of 2015 not seeing neuro MD  
 Suicidal thoughts Past Surgical History:  
Procedure Laterality Date  ABDOMEN SURGERY PROC UNLISTED  1996  
 peg tube  CABG, ARTERY-VEIN, THREE  2000  HX CATARACT REMOVAL    
 bilateral  
 HX CHOLECYSTECTOMY  HX COLONOSCOPY    
 HX HEART CATHETERIZATION  2013 Stented distal LAD  HX MOHS PROCEDURES    
 bilateral  
 HX ORTHOPAEDIC    
 back surgery times two  HX OTHER SURGICAL Radical Left Neck  HX OTHER SURGICAL    
 NASAL POLYPS REMOVAL  
 HX OTHER SURGICAL  2010 TURP  
 HX PACEMAKER  11/08  HX PACEMAKER  10/28/14 Defibrillator: [facility] # [id number], serial # [id number]; Dr. Yolie Jeffers [phone]; Dr Jo Cheng  HX UROLOGICAL    
 cystoscopy [address] [place] [address]  
 cevical surgery  CT CHANGE GASTROSTOMY TUBE  5/2/2011  CT CHANGE GASTROSTOMY TUBE  6/29/2011  CT EGD INSERT GUIDE WIRE DILATOR PASSAGE ESOPHAGUS  9/13/2010  CT EGD TRANSORAL BIOPSY SINGLE/MULTIPLE  9/13/2010  
    
 STOMACH SURGERY PROCEDURE UNLISTED  6/29/2011  UPPER GI ENDOSCOPY,BIOPSY  8/17/2018  UPPER GI ENDOSCOPY,DEVONN W GUIDE  8/17/2018  UPPER GI ENDOSCOPY,W/DILAT,GASTRIC OUT  8/17/2018  VASCULAR SURGERY PROCEDURE UNLIST    
 bilateral carotid stents Prior Level of Function/Home Situation: mod I with rollator Personal factors and/or comorbidities impacting plan of care:  
 
Home Situation Home Environment: Private residence # Steps to Enter: 6 Rails to Enter: Yes Hand Rails : Bilateral 
One/Two Story Residence: One story Living Alone: No 
Support Systems: Spouse/Significant Other/Partner, Child(lisa), Family member(s) Patient Expects to be Discharged to[de-identified] Private residence Current DME Used/Available at Home: [employer] [place], rollator Tub or Shower Type: Shower EXAMINATION/PRESENTATION/DECISION MAKING:  
Critical Behavior: 
Neurologic State: Alert Orientation Level: Oriented X4 Cognition: Appropriate decision making, Appropriate for age attention/concentration, Appropriate safety awareness, Follows commands Hearing: Auditory Auditory Impairment: None Range Of Motion: 
AROM: Within functional limits PROM: Within functional limits Strength:   
Strength: Within functional limits Tone & Sensation:  
Tone: Normal 
  
  
  
  
Sensation: Intact Coordination: 
Coordination: Within functional limits Vision:  
  
Functional Mobility: 
Bed Mobility: 
  
  
  
  
Transfers: 
Sit to Stand: Modified independent Stand to Sit: Modified independent Stand Pivot Transfers: Modified independent Balance:  
Sitting: Intact Standing: Intact Ambulation/Gait Training: 
Distance (ft): 120 Feet (ft) Assistive Device: Gait belt;Walker, rolling Ambulation - Level of Assistance: Modified independent Gait Description (WDL): Exceptions to [facility] Gait Abnormalities: Decreased step clearance Step Length: Right shortened;Left shortened Stairs: 
Number of Stairs Trained: 8 Stairs - Level of Assistance: Modified independent Rail Use: Both Functional Measure: 
Tinetti test: 
 
Sitting Balance: 1 Arises: 1 Attempts to Rise: 2 Immediate Standing Balance: 2 Standing Balance: 1 Nudged: 2 Eyes Closed: 1 Turn 360 Degrees - Continuous/Discontinuous: 1 Turn 360 Degrees - Steady/Unsteady: 1 Sitting Down: 2 Balance Score: 14 Indication of Gait: 1 
R Step Length/Height: 1 L Step Length/Height: 1 
R Foot Clearance: 1 L Foot Clearance: 1 Step Symmetry: 1 Step Continuity: 1 Path: 1 Trunk: 0 Walking Time: 1 Gait Score: 9 Total Score: 23 Tinetti Test and G-code impairment scale: 
Percentage of Impairment CH 
 
0% 
 CI 
 
1-19% CJ 
 
20-39% CK 
 
40-59% CL 
 
60-79% CM 
 
80-99% CN  
 
100% Tinetti Score 0-28 28 23-27 17-22 12-16 6-11 1-5 0 Tinetti Tool Score Risk of Falls 
<19 = High Fall Risk 19-24 = Moderate Fall Risk 25-28 = Low Fall Risk Tinetti ME. Performance-Oriented Assessment of Mobility Problems in Elderly Patients. [place]ason 66; [id number]. (Scoring Description: PT Bulletin Feb. 10, 1993) Older adults: Chepe Waters et al, 2009; n = [address] elderly evaluated with ABC, MARTIN, ADL, and IADL) · Mean MARTIN score for males aged 69-68 years = 26.21(3.40) · Mean MARTIN score for females age 69-68 years = 25.16(4.30) · Mean MARTIN score for males over 80 years = 23.29(6.02) · Mean MARTIN score for females over 80 years = 17.20(8.32) G codes: In compliance with CMSs Claims Based Outcome Reporting, the following G-code set was chosen for this patient based on their primary functional limitation being treated: The outcome measure chosen to determine the severity of the functional limitation was the Tinetti with a score of 23/28 which was correlated with the impairment scale. ? Mobility - Walking and Moving Around:  
  - CURRENT STATUS: CI - 1%-19% impaired, limited or restricted  - GOAL STATUS: CI - 1%-19% impaired, limited or restricted  - D/C STATUS:  CI - 1%-19% impaired, limited or restricted Physical Therapy Evaluation Charge Determination History Examination Presentation Decision-Making HIGH Complexity :3+ comorbidities / personal factors will impact the outcome/ POC  MEDIUM Complexity : 3 Standardized tests and measures addressing body structure, function, activity limitation and / or participation in recreation  LOW Complexity : Stable, uncomplicated  LOW Complexity : FOTO score of  Based on the above components, the patient evaluation is determined to be of the following complexity level: LOW Pain: 
Pain Scale 1: Numeric (0 - 10) Pain Intensity 1: 0 Activity Tolerance: VSS Please refer to the flowsheet for vital signs taken during this treatment. After treatment:  
[x]   Patient left in no apparent distress sitting up in chair 
[]   Patient left in no apparent distress in bed 
[x]   Call bell left within reach [x]   Nursing notified 
[x]   Caregiver present 
[]   Bed alarm activated COMMUNICATION/EDUCATION:  
Communication/Collaboration: 
[x]   Fall prevention education was provided and the patient/caregiver indicated understanding. [x]   Patient/family have participated as able and agree with findings and recommendations. []   Patient is unable to participate in plan of care at this time. Findings and recommendations were discussed with: Occupational Therapist and Registered Nurse Thank you for this referral. 
Wilman Lemos, PT , DPT Time Calculation: 23 mins

## 2018-12-06 NOTE — DISCHARGE SUMMARY
Discharge Summary Patient:  Nora Argueta MRN: [id number] YOB: 1935 Age: 80 y.o. Date of admission:  12/4/2018 Date of discharge:  12/6/2018 Primary care provider: Dr. Escobar Juarez MD 
 
Admitting provider:  Anitra Page MD 
 
Discharging provider:  Krys Holden MD - Office [place]: [phone]. If unavailable, call [phone] and ask the  to page the triage hospitalist. 
 
Consultations · IP CONSULT TO NEUROSURGERY 
· IP CONSULT TO NEUROSURGERY 
· IP CONSULT TO CARDIAC SURGERY 
· IP CONSULT TO ANESTHESIOLOGY · IP CONSULT TO CARDIOLOGY Procedures · * No surgery found * Discharge destination: home. The patient is stable for discharge. Admission diagnosis · SDH [facility]subd[facility] hemat[facility]) ([facility] [V71.4J7P] Current Discharge Medication List  
  
CONTINUE these medications which have NOT CHANGED Details  
hydrocortisone sodium succ/PF (HYDROCORTISONE SOD SUCC, PF, INJECTION) by Injection route every three (3) months. Gets injection every 3 months from orthopedist  
  
B.infantis-B.ani-B.long-B.bifi (PROBIOTIC 4X) 10-15 mg TbEC by PEG Tube route daily. furosemide (LASIX) 40 mg tablet 1 Tab by Per G Tube route Before breakfast and dinner for 30 days. Qty: 40 Tab, Refills: 6  
  
insulin regular (NOVOLIN R REGULAR U-100 INSULN) 100 unit/mL injection 5 Units by SubCUTAneous route every evening. 14 units at 8 AM, 14 units at 2 PM, and 5 units at 8 PM  
 
Takes additional units as well depending on BG  
  
pramipexole (MIRAPEX) 0.25 mg tablet Take 1 Tab by mouth three (3) times daily. Qty: 100 Tab, Refills: 11  
 Associated Diagnoses: Weakness; Tremors of nervous system; Physical deconditioning; Counseling regarding goals of care; Myalgia; Altered mental status, unspecified altered mental status type;  Disturbance of memory; Benign essential tremor syndrome; Stenosis of both internal carotid arteries; Parkinson's disease (tremor, stiffness, slow motion, unstable posture) ([address] Diabetic peripheral neuropathy associated with type 2 diabetes mellitus ([facility] Hemiplegia and hemiparesis following cerebral infarction affecting right dominant side ([facility] Orthostatic hypotension  
  
klack's mixture Solution Take 5 mL by mouth two (2) times a day. Diphenhydramine elixir 12.5mg/ml 500ml, Nystatin suspension 120ml,Tetracycline 500mg capsules  12 capsules (6g), Hydrocortisone 20mg tablet 12 tablets (240mg), Water for irrigation QS ad 1000ml 
 
   
  
lactose-reduced food/fiber (ISOSOURCE 1.5 RAE FEEDING TUBE) 250 mL by adductor canal block route five (5) times daily. famotidine (PEPCID) 20 mg tablet 20 mg by Per G Tube route three (3) times daily. atorvastatin (LIPITOR) 40 mg tablet 40 mg by Per G Tube route daily. finasteride (PROSCAR) 5 mg tablet 5 mg by Per G Tube route nightly. alfuzosin SR (UROXATRAL) 10 mg SR tablet 10 mg by Per G Tube route daily. midodrine (PROAMITINE) 10 mg tablet 10 mg by Per G Tube route three (3) times daily (with meals). HYDROcodone-acetaminophen (NORCO)  mg tablet 1 Tab by Per G Tube route every four (4) hours as needed for Pain. fluticasone (FLONASE ALLERGY RELIEF) 50 mcg/actuation nasal spray 1 Roll by Both Nostrils route two (2) times a day. zinc 50 mg tab tablet 50 mg by Per G Tube route daily. LANTUS SOLOSTAR U-100 INSULIN 100 unit/mL (3 mL) inpn INJECT 14 UNITS SUBCUTANEOUSLY ONCE DAILY Qty: 15 Pen, Refills: 5 Associated Diagnoses: 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 ([facility] Essential hypertension with goal blood pressure less than 140/90; Peripheral polyneuropathy  
  
gabapentin (NEURONTIN) 250 mg/5 mL solution 750 mg by PEG Tube route three (3) times daily. multivitamin (ONE A DAY) tablet 1 Tab by Per G Tube route daily. guaiFENesin (ROBITUSSIN) 100 mg/5 mL liquid 200 mg by Per G Tube route three (3) times daily as needed for Congestion. OXYGEN-AIR DELIVERY SYSTEMS Take 2 L/min by inhalation as needed for Other (SOB). OTHER diltiazem 2% lidocaine cream. Apply to rectal area as needed  
  
albuterol (PROVENTIL VENTOLIN) 2.5 mg /3 mL (0.083 %) nebulizer solution 2.5 mg by Nebulization route two (2) times a day. Per wife patient can use 3 times a day if needed  
  
acetaminophen (TYLENOL) 500 mg tablet 500-1,000 mg by Per G Tube route every six (6) hours as needed for Pain. nitroglycerin (NITROSTAT) 0.4 mg SL tablet 0.4 mg by SubLINGual route every five (5) minutes as needed for Chest Pain. ondansetron hcl (ZOFRAN, AS HYDROCHLORIDE,) 8 mg tablet Take 1 Tab by mouth every eight (8) hours as needed for Nausea. Qty: 20 Tab, Refills: 0 STOP taking these medications  
  
 clopidogrel (PLAVIX) 75 mg tab Comments:  
Reason for Stopping:   
   
 aspirin 81 mg chewable tablet Comments:  
Reason for Stopping:   
   
 metoclopramide HCl (REGLAN) 10 mg tablet Comments:  
Reason for Stopping: Follow-up Information Follow up With Specialties Details Why Contact Info Keila Giraldo MD Neurosurgery Schedule an appointment as soon as possible for a visit in 2 weeks for CT review. The patient is to call the office for a CT review appt. The hospital will call the patient to schedule a head CT prior to the review appt 5 [facility]. [address] [place] [address] 
[phone] Zane Corrigan MD Family Practice In 1 week  [address] 
[phone] Final discharge diagnoses and brief hospital course The patient is an 24-year-old gentleman with a past medical history of carotid artery stenosis, hyperlipidemia, esophageal dysmotility, pneumonia, BPH, on PEG tube and feeding tube diet, hepatitis C, CAD, MI, type 2 diabetes, history of CHF, bradycardia, stroke, history of lung and throat cancer, history of moderate-to- severe aortic stenosis, who presents to the hospital with syncope. 1.  Subdural and subarachnoid hematoma.   
-. No surgical intervention per Neurosurgery.   
- BP controlContinue to closely monitor and reassess as needed. Repeat  head CT unchanged pt was on asa/ plavix 
- advised to hold ASA and Plavix for 2 weeks. F/u with neurosurgery office in 2 weeks for CT head.  
  
2.  Syncope, unclear etiology, concern secondary to aortic stenosis versus orthostasis -  Pt recently being evaluated for a TAVR get echocardiogram, carotid duplex, EEG, TSH, B12, folate levels, and we will continue to closely monitor.   
- seen by cardiology. Pacemaker interrogated 
- he was on Norco and lasix need to stop both 3.  Chronic kidney disease stage III, now stage IIIB, most likely secondary to intravascular volume depletion.   
- The patient has significant aortic stenosis.  We will provide some albumin and reassess, and continue to closely monitor.  
-  Avoid nephrotoxic medications, renally dose all other medications. - kidney function back to baseline 
   
4.  Anemia appears to be chronic.   
- Continue to monitor H and H.  Further intervention will be per hospital course. 
  
5.  Thrombocytopenia, again appears to be chronic.   
  
6.  Diabetes mellitus type 2.  We will give sliding scale NovoLog insulin, Accu-Cheks, diet control, close monitoring, poorly controlled.  
  
7. CDMP: Chronic Respiratory Failure in the setting of oxygen dependent CHF requiring continued O2 @ 2 lts Physical examination at discharge Visit Vitals /58 Pulse 79 Temp 97.5 °F (36.4 °C) Resp 18 Ht 5' 7\" (1.702 m) Wt 72.3 kg (159 lb 6.3 oz) SpO2 96% BMI 24.96 kg/m² Constitutional:  No acute distress, cooperative, pleasant   
 ENT:  Oral mucous moist, oropharynx benign. Neck supple, Resp:  CTA bilaterally. No wheezing/rhonchi/rales. No accessory muscle use CV:  Regular rhythm, normal rate, no murmurs, gallops, rubs GI:  Soft, non distended, non tender. normoactive bowel sounds, no hepatosplenomegaly Musculoskeletal:  No edema, warm, 2+ pulses throughout Neurologic:  Moves all extremities. AAOx3, CN II-XII reviewed Psych:  Good insight, Not anxious nor agitated Pertinent imaging studies: 
 
Ct Head Wo Cont 
  
Result Date: 12/5/2018 IMPRESSION: 1. Unchanged subdural hemorrhage along the falx with associated minimal subarachnoid hemorrhage  
  
Ct Head Wo Cont 
  
Result Date: 12/4/2018 IMPRESSION: 7 mm left parafalcine subdural hematoma and minimal associated subarachnoid hemorrhage. The findings were called to Dr. Nasim Blankenship on 12/4/2018 at 10:28 AM by myself. [phone]  
  
Ct Chest Wo Cont 
  
Addendum Date: 12/4/2018 Addendum: Addendum: Correction: Stable ascending aortic ectasia with axial dimension of 3.4 x 3.6 cm. 
  
Result Date: 12/4/2018 IMPRESSION:  Stable L2 compression fracture. Nonobstructing renal calculi. No evidence for pneumonia or intra-abdominal bleeding.  
  
Ct Abd Pelv Wo Cont 
  
Addendum Date: 12/4/2018 Addendum: Addendum: Correction: Stable ascending aortic ectasia with axial dimension of 3.4 x 3.6 cm. 
  
Result Date: 12/4/2018 IMPRESSION:  Stable L2 compression fracture. Nonobstructing renal calculi. No evidence for pneumonia or intra-abdominal bleeding.  
  
Xr Chest Port 
  
Result Date: 12/4/2018 Impression: Mild pulmonary edema.  
  
Xr Hips Bi W Ap Pelv 
  
Result Date: 12/4/2018 IMPRESSION:  No acute abnormality Recent Labs 12/06/18 
0451 12/05/18 
0344 12/04/18 
0505 WBC 5.2 9.3 8.5 HGB 10.7* 10.7* 10.6* HCT 34.9* 34.7* 35.3*  
PLT 68* 84* 81* Recent Labs 12/06/18 
0451 12/05/18 
0344 12/04/18 
3742  143 139 K 4.4 4.3 4.8  
 103 100 CO2 35* 36* 33* BUN 41* 47* 48* CREA 1.18 1.45* 1.68* * 122* 260* CA 8.7 9.4 9.5 Recent Labs 12/04/18 
9093 SGOT 51* AP 71  
TP 8.0 ALB 3.3*  
GLOB 4.7* Recent Labs 12/04/18 
4271 INR 1.1 PTP 10.9 APTT 23.0 No results for input(s): FE, TIBC, PSAT, FERR in the last 72 hours. No results for input(s): PH, PCO2, PO2 in the last 72 hours. Recent Labs 12/04/18 
2228 12/04/18 
1624  227 No components found for: [place] Chronic Diagnoses:   
Problem List as of 12/6/2018 Date Reviewed: 11/19/2018 Codes Class Noted - Resolved * (Principal) SDH (subdural hematoma) ([facility] ICD-10-CM: Y11.7C1W 
ICD-9-CM: 432.1  12/4/2018 - Present Hx of tongue cancer ICD-10-CM: Z85.810 ICD-9-CM: V10.01  11/19/2018 - Present Dizziness ICD-10-CM: I47 ICD-9-CM: 780.4  11/11/2018 - Present Epigastric pain ICD-10-CM: R10.13 ICD-9-CM: 789.06  8/17/2018 - Present Altered mental status, unspecified ICD-10-CM: R41.82 
ICD-9-CM: 780.97  8/7/2018 - Present Convulsion ([facility] ICD-10-CM: R56.9 ICD-9-CM: 780.39  8/7/2018 - Present Disturbance of memory ICD-10-CM: R41.3 ICD-9-CM: 780.93  8/2/2018 - Present Presbycusis of both ears ICD-10-CM: H91.13 
ICD-9-CM: 388.01  8/2/2018 - Present B12 deficiency ICD-10-CM: E53.8 ICD-9-CM: 266.2  8/2/2018 - Present Vitamin D deficiency ICD-10-CM: E55.9 ICD-9-CM: 268.9  8/2/2018 - Present Hypothyroidism due to acquired atrophy of thyroid ICD-10-CM: E03.4 ICD-9-CM: 244.8, 246.8  8/2/2018 - Present Myalgia ICD-10-CM: M79.10 ICD-9-CM: 729.1  7/19/2018 - Present Pneumonia due to group B Streptococcus ([facility] ICD-10-CM: J15.3 ICD-9-CM: 482.32  7/19/2018 - Present Counseling regarding goals of care ICD-10-CM: Z71.89 ICD-9-CM: V65.49  7/19/2018 - Present Parkinson's disease (tremor, stiffness, slow motion, unstable posture) ([facility] ICD-10-CM: G20 
ICD-9-CM: 332.0  7/9/2018 - Present Physical deconditioning ICD-10-CM: R53.81 ICD-9-CM: 799.3  6/28/2018 - Present Tremors of nervous system ICD-10-CM: R25.1 ICD-9-CM: 781.0  6/28/2018 - Present Type 2 diabetes with nephropathy ([facility] ICD-10-CM: E11.21 
ICD-9-CM: 250.40, 583.81  5/7/2018 - Present Diabetic peripheral neuropathy associated with type 2 diabetes mellitus ([facility] ICD-10-CM: E11.42 
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) ICD-10-CM: A72.073 ICD-9-CM: 434.01  2/2/2017 - Present Stenosis of both internal carotid arteries ICD-10-CM: I65.23 ICD-9-CM: 433.10, 433.30  2/2/2017 - Present Hemiplegia and hemiparesis following cerebral infarction affecting right dominant side (HCC) ICD-10-CM: M81.595 ICD-9-CM: 438.21  2/2/2017 - Present Weakness ICD-10-CM: R53.1 ICD-9-CM: 780.79  2/1/2017 - Present Stroke [facility]) ICD-10-CM: I63.9 ICD-9-CM: 434.91  2/1/2017 - Present Aspiration pneumonia ([facility] ICD-10-CM: J69.0 ICD-9-CM: 507.0  11/28/2016 - Present History of stroke ICD-10-CM: Z86.73 
ICD-9-CM: V12.54 Present on Admission 7/30/2016 - Present Polyneuropathy associated with underlying disease ([facility] ICD-10-CM: Q20 ICD-9-CM: 357.4  2/11/2016 - Present Peripheral neuropathy (HCC) ICD-10-CM: G62.9 ICD-9-CM: 356.9  1/9/2016 - Present 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 ([facility] ICD-10-CM: E11.42 
ICD-9-CM: 250.60, 357.2  1/8/2016 - Present PEG (percutaneous endoscopic gastrostomy) status ([facility] ICD-10-CM: Z93.1 ICD-9-CM: V44.1  12/4/2014 - Present Antiplatelet or antithrombotic long-term use ICD-10-CM: Z79.02 
ICD-9-CM: V58.63  12/4/2014 - Present Heart failure ([facility] ICD-10-CM: I50.9 ICD-9-CM: 428.9  9/11/2014 - Present Esophageal motility disorder ICD-10-CM: K22.4 ICD-9-CM: 530.5  7/2/2013 - Present Overview Signed 7/8/2013  1:40 PM by Shweta Evans MD  
  Frequent simultaneous or failed contractions, low amplitude contractions  
suggests severe myopathy or diffuse spasm. I suspect the latter. Achalasia  
is not present. CAD (coronary artery disease) ICD-10-CM: I25.10 ICD-9-CM: 414.00  2/12/2013 - Present Status post implantation of automatic cardioverter/defibrillator (AICD) ICD-10-CM: Z95.810 ICD-9-CM: V45.02 Present on Admission 2/12/2013 - Present Aortic stenosis, mild ICD-10-CM: I35.0 ICD-9-CM: 424.1  2/12/2013 - Present S/P PTCA (percutaneous transluminal coronary angioplasty) ICD-10-CM: Z98.61 ICD-9-CM: V45.82  1/18/2013 - Present Non-cardiac chest pain ICD-10-CM: R07.89 ICD-9-CM: 786.59  1/4/2013 - Present Feeding difficulties ICD-10-CM: R63.3 ICD-9-CM: 783.3  5/2/2011 - Present Abnormal cardiovascular stress test ICD-10-CM: R94.39 
ICD-9-CM: 794.39  6/24/2010 - Present S/P CABG x 3 ICD-10-CM: Z95.1 ICD-9-CM: V45.81  6/24/2010 - Present Atherosclerotic cerebrovascular disease ICD-10-CM: I67.2 ICD-9-CM: 437.0  6/24/2010 - Present Orthostatic hypotension ICD-10-CM: I95.1 ICD-9-CM: 458.0  6/24/2010 - Present Dysphagia ICD-10-CM: R13.10 ICD-9-CM: 787.20  5/3/2010 - Present RESOLVED: Respiratory failure (HCC) ICD-10-CM: J96.90 ICD-9-CM: 518.81  5/7/2018 - 5/11/2018 RESOLVED: Unstable angina (HCC) ICD-10-CM: I20.0 ICD-9-CM: 411.1  9/12/2014 - 11/28/2016  RESOLVED: Chest tightness ICD-10-CM: R07.89 
 ICD-9-CM: 786.59  9/9/2014 - 11/29/2016 RESOLVED: Chest pain, non-cardiac ICD-10-CM: R07.89 ICD-9-CM: 786.59  2/12/2013 - 11/29/2016 RESOLVED: ARF (acute renal failure) (HCC) ICD-10-CM: N17.9 ICD-9-CM: 584.9  5/3/2010 - 11/28/2016 RESOLVED: Dehydration ICD-10-CM: E86.0 ICD-9-CM: 276.51  5/3/2010 - 11/29/2016 RESOLVED: Chest pain ICD-10-CM: R07.9 ICD-9-CM: 786.50  5/3/2010 - 11/29/2016 Time spent on discharge related activities today greater than 30 minutes. Signed:  Jovi Berrios MD 
               Hospitalist, Internal Medicine Cc: Harsh Tenorio., MD

## 2018-12-06 NOTE — PROGRESS NOTES
Occupational Therapy Note 
12/6/2018 Orders acknowledged, chart reviewed. Pt admitted for syncopal episode with subsequent SDH from fall. Spoke with PT and RN. Per PT assessment, pt is performing all ADLs and mobility at his baseline of [place] to supervision with rollator; lives at home with wife whom assists with ADLs as needed. Pt has very supportive family and also confirm he is back to baseline. Will sign-off at this time; please re-consult should pt have decline in baseline performance with ADLs. Thank you.  
 
Deanne Cunningham, OTR/L

## 2018-12-06 NOTE — DIABETES MGMT
DTC Progress Note Recommendations/ Comments: Chart reviewed for hyperglycemia, likely due to bolus tube feeds. If appropriate, please consider: 
 - adding lantus 10 units (patient takes 14 units at home per PTA) 
 - may need to add additional regular insulin with tube feedings, but do not suggest q 3 hours  - could start with 4 units at first and third feeding and 2 units for 5th feeding(7am and 1pm and 2 units at 7pm) Current hospital DM medication: Lispro correction, normal sensitivity q 6 hours Patient is a 80 y.o. male with hx Type 2 Diabetes on regular insulin with bolus tube feeds and Lantus 14 units at home. A1c:  
Lab Results Component Value Date/Time Hemoglobin A1c 7.2 (H) 12/04/2018 04:24 PM  
 Hemoglobin A1c 8.6 (H) 07/14/2018 04:40 AM  
 
 
Recent Glucose Results:  
Lab Results Component Value Date/Time  (H) 12/06/2018 04:51 AM  
 GLUCPOC 159 (H) 12/06/2018 07:27 AM  
 GLUCPOC 262 (H) 12/06/2018 12:16 AM  
 GLUCPOC 340 (H) 12/05/2018 05:58 PM  
  
 
Lab Results Component Value Date/Time Creatinine 1.18 12/06/2018 04:51 AM  
 
Estimated Creatinine Clearance: 44.3 mL/min (based on SCr of 1.18 mg/dL). Active Orders There are no active orders of the following type(s): Diet. PO intake: No data found. Will continue to follow as needed. Thank you Valeria Moses MS, RN, CDE Time Spent: 12 minutes

## 2018-12-06 NOTE — DISCHARGE INSTRUCTIONS
Subdural Hematoma: Care Instructions  Your Care Instructions  A subdural hematoma is a buildup of blood between the layers of tissue that cover the brain. The blood collects under the layer closest to the skull. (This layer is called the dura.) The bleeding is most often caused by a head injury, but there can be other causes. In an older adult, even a minor injury can lead to a subdural hematoma. The buildup of blood inside the skull can put pressure on the brain. This may cause symptoms, such as a severe headache, confusion, or seizures. There are two kinds of hematomas: acute and chronic. · With an acute hematoma, symptoms start soon after the injury. · With a chronic hematoma, it may be days or weeks before symptoms appear. Doctors use imaging tests to find the buildup of blood. You may have a test such as a CT scan or MRI. The doctor may also do a test to check the pressure inside your skull. Bleeding inside the skull may get worse over time. So it is very important to pay attention to your symptoms. And be sure to see your doctor for follow-up testing. In some cases, treatment is needed to remove the blood. This helps relieve the pressure on the brain. Your doctor may make one or two small holes in your skull. For a large hematoma, the doctor may need to remove a piece of the skull. You may not need treatment if you have a small hematoma that is not causing symptoms. If you take aspirin or some other blood thinner, you may need to stop taking it. The doctor may give you treatment to undo the effects of the blood thinner. This can help prevent more bleeding in the skull. The doctor has checked you carefully, but problems can develop later. If you notice any problems or new symptoms, get medical treatment right away. Follow-up care is a key part of your treatment and safety. Be sure to make and go to all appointments, and call your doctor if you are having problems.  It's also a good idea to know your test results and keep a list of the medicines you take. How can you care for yourself at home? For an acute hematoma  · Follow your doctor's instructions. He or she will tell you if you need someone to watch you closely for the next 24 hours or longer. For a chronic hematoma  · Get plenty of sleep at night, and take it easy during the day. Rest is the best way to recover. · Avoid activities that are physically or mentally demanding. These include housework, exercise, paperwork, video games, text messaging, and using the computer. You may need to change your work or school schedule for a while. · Return to your normal activities slowly. Do not try to do too much at once. For either type of hematoma  · Do not drink alcohol until your doctor says it is okay. · Don't drive a car, ride a bike, or operate machinery until your doctor says it's okay. · If you take aspirin or some other blood thinner, be sure to talk to your doctor. He or she will tell you if and when to start taking this medicine again. Make sure that you understand exactly what your doctor wants you to do. · If you normally take medicine, your doctor will tell you if and when you can restart it. He or she will also give you instructions about taking any new medicines. When should you call for help? Call 911 anytime you think you may need emergency care. For example, call if:    · You have a seizure.     · You passed out (lost consciousness).     · You are confused or can't stay awake.    Call your doctor now or seek immediate medical care if:    · You have new or worse vomiting.     · You feel less alert.     · You have new weakness or numbness in any part of your body.     · You have a headache that is getting worse.    Watch closely for changes in your health, and be sure to contact your doctor if:    · You do not get better as expected.     · You have new symptoms, such as headaches, trouble concentrating, or changes in mood.    Where can you learn more? Go to http://aniket-tomas.info/. Enter T378 in the search box to learn more about \"Subdural Hematoma: Care Instructions. \"  Current as of: June 4, 2018  Content Version: 11.8  © 3179-9372 [employer]. Care instructions adapted under license by [employer] (which disclaims liability or warranty for this information). If you have questions about a medical condition or this instruction, always ask your healthcare professional. [address] any warranty or liability for your use of this information.        Please bring this form with you to show your primary care provider at your follow-up appointment. Primary care provider:  Dr. Mary Caballero MD    Discharging provider:  Mekhi Moss MD    You have been admitted to the hospital with the following diagnoses:  · SDH (subdural hematoma) ([facility] [address] [S06.5X9A]    FOLLOW-UP CARE RECOMMENDATIONS:    Recommend to hold aspirin and Plavix for 2 weeks and then cleared by neurosurgery. APPOINTMENTS:  · Follow-up with primary care provider, Dr. Mary Caballero MD  -  Please call [phone] shortly after discharge to set up an appointment to be seen in  1 week   · Neurosurgery in 2 weeks    FOLLOW-UP TESTS recommended: CT scan    PENDING TEST RESULTS:  At the time of your discharge the following test results are still pending: none  Please make sure you review these results with your outpatient follow-up provider(s). SYMPTOMS to watch for: chest pain, shortness of breath, fever, chills, nausea, vomiting, diarrhea, change in mentation, falling, weakness, bleeding. DIET/what to eat:  Resume previous diet    ACTIVITY:  Activity as tolerated    What to do if new or unexpected symptoms occur? If you experience any of the above symptoms (or should other concerns or questions arise after discharge) please call your primary care physician.  Return to the emergency room if you cannot get hold of your doctor. · It is very important that you keep your follow-up appointment(s). · Please bring discharge papers, medication list (and/or medication bottles) to your follow-up appointments for review by your outpatient provider(s). · Please check the list of medications and be sure it includes every medication (even non-prescription medications) that your provider wants you to take. · It is important that you take the medication exactly as they are prescribed. · Keep your medication in the bottles provided by the pharmacist and keep a list of the medication names, dosages, and times to be taken in your wallet. · Do not take other medications without consulting your doctor. · If you have any questions about your medications or other instructions, please talk to your nurse or care provider before you leave the hospital.    I understand that if any problems occur once I am at home I am to contact my physician. These instructions were explained to me and I had the opportunity to ask questions.

## 2018-12-07 NOTE — PROGRESS NOTES
Hospital Discharge Follow-Up      Date/Time:  2018 9:57 AM    Patient was admitted to [facility] on  and discharged on 2018 for fall/ subdural hematoma / with history of CHF. The physician discharge summary was available at the time of outreach. Patient was contacted within 1 business days of discharge. Call to and reached pt's wife -she is driving - requests call at 3 pm today - to review discharge. Top Challenges reviewed with the provider   Fall and subdural hematoma  History of larynx and throat cancer -   Peg feeding tube  Moderate to severe aortic stenosis - pending TAVR evaluation  Debility, frailty; high fall risk - on Midodrine -        Method of communication with provider :chart routing, staff message, phone    Inpatient RRAT score: 47  Was this a readmission? yes   Patient stated reason for the readmission: syncope/ head injury    Previous admission - -  Acute systolic CHF  Moderate to severe aortic stenosis  Chronic orthostatic hypotension   Coronary artery disease  Urinary retention   Chronic dizziness  Hx of tongue ca s/p radical neck resection and radiation  Chronic dysphagia with PEG   Diabetes  Chronic respiratory failure with hypoxia, home O2 2 LPM  H/o streptococcal bacteremia 2018   H/o stroke    __________________________________________________________________  Nurse Navigator (NN) contacted the family by telephone to perform post hospital discharge assessment. Verified name and  with family as identifiers. Provided introduction to self, and explanation of the Nurse Navigator role. Reviewed discharge instructions and red flags with family who verbalized understanding. Family given an opportunity to ask questions and does not have any further questions or concerns at this time. The family agrees to contact the PCP office for questions related to their healthcare. NN provided contact information for future reference.     Disease Specific: CHF    Summary of patient's top problems:  1. Syncope/ fall / and subdural hematoma  2. Moderate to severe aortic stenosis  3. CHF  4. History of throat cancer with Peg feeding tube    Heart Failure Note    Do you have a Scale:    yes   How often do you weigh:  daily - morning    Daily Weight (document daily weights in flowsheets):     Amount:       Provider Notified:   yes     Zone:(Pt Reported)  green     EF:     LEFT VENTRICLE: Size was normal. Ejection fraction was estimated in the  range of 30 % to 35 %. There was moderate diffuse hypokinesis. There was  hypokinesis of the basal-mid inferior wall(s). There was hypokinesis of  the basal-mid anteroseptal wall(s). There was hypokinesis of the apical  wall(s). Wall thickness was mildly to moderately increased. There was  moderate concentric hypertrophy. RIGHT VENTRICLE: The size was normal. Systolic function was mildly reduced. LEFT ATRIUM: The atrium was mildly dilated. RIGHT ATRIUM: Size was normal.  MITRAL VALVE: There was moderate to marked annular calcification. There was normal leaflet separation. DOPPLER: There was mild regurgitation. AORTIC VALVE: Leaflets exhibited sclerosis. DOPPLER: There was severe aortic stenosis. There was moderate regurgitation. TRICUSPID VALVE: Normal valve structure. There was normal leaflet separation. DOPPLER: The transtricuspid velocity was within the normal range. There was no evidence for tricuspid stenosis. There was mild regurgitation. PULMONIC VALVE: Not well visualized. AORTA: The root exhibited normal size. PERICARDIUM: There was no pericardial effusion.   Type of HF:   HFrEF     Cardiac Device present: pacemaker      Heart Failure Medications: Diuretic   Home Health orders at discharge: pending  [address]: pending  Date of initial visit: pending    Durable Medical Equipment ordered/company: none/ pt on oxygen pta  Durable Medical Equipment received: n/a    Barriers to care? lack of knowledge about disease, stages of grief, support system, utilization of services/ wife is very involved - wife with potential for self neglect d/t 's needs -     Advance Care Planning:   Does patient have an Advance Directive:  not on file / will address    Medication(s):   New Medications at Discharge:   Changed Medications at Discharge: none  Discontinued Medications at Discharge: asa, Plavix, Reglan    Medication reconciliation was performed with family, who verbalizes understanding of administration of home medications. There were no barriers to obtaining medications identified at this time. Referral to Pharm D needed: no     [facility] follow up appointment(s):   Future Appointments   Date Time Provider Helen Tinajero   12/14/2018 10:30 AM Sj Hubbard MD RDE NILAY [address]   12/17/2018 11:00 AM MD MIKE Avery   12/20/2018 To Be Determined [facility]        Goals      Understand CHF action plan. 5/23/18  Spoke with pt's wife who is managing pt very closely. We discussed the importance of daily wts. 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 We discussed the possibility for supportive care for the pt as well as pt's wife as she needs  I provided the telephone number to the pt's wife for [facility] ([phone]). Pt's wife needs to have hip replacement in June. We discussed coming up with a care plan for the pt and we also discussed how to assist family with care should the pt's wife need assistance as well. Pt's wife reports that pt has seen Dr. Clare Guevara on 5/21/18 and pt's wife reports that pt will go to [address] [place] [address] for outpt rehab. Pt's wife reports that she did call [facility] this weekend as pt had steady wt gain. Pt's wife reports that she increased lasix and called and spoke with Dr. Ramirez Valdez on 5/21/18. Pt's wife stated appreciation for call.   I provided my direct office telephone number to her. Pt's wife states that I may call next week to discuss care plan for when pt's wife needs to have surgery. Pt's wife reports that she and pt have family support (2 sons and grandchildren). AFP    6/6/18  Pt's wife reports that pt has followed up with Dr. Kali Bullard and pt's wife reports that pt's platelets remain low and are at 67. Pt's wife states that other than that, pt is doing well  Pt's wife reports that she had a stress test and Dr. Jerilee Councilman is to call her in preparation for hip surgery. Pt's wife states that she is working on a plan to have granddaughter and great granddaughter take care of pt while pt's wife has surgery and pt's wife also has a friend who can recommend an aide. Pt's wife states that she will work on plan IF surgery is scheduled. Pt's wife reports that there have been not changes to pt's health status and states appreciation for call. Will continue to call   AFP    6/22/18  Pt's wife reports that pt continues to go to PT at [facility]. Pt states that she will have surgery on 7/24/18 and that she is developing a plan to provide care for her  while she is in recovery. Pt's wife states that I may continue to call and that she appreciates someone checking on they. AFP    7/11/18  Pt's wife reports that pt was to begin [facility] but back pain has prevented that as pt''s pain specialist is recommending that pt attend out patient therapy at [address] [place] [address]. Pt's wife states that pt will begin [facility] after back pain has improved. Pt's wife reports that pt is okay. AFP    7/31/18  Spoke with pt's wife who reports that pt is having diarrhea and has some altered mental status. Pt's wife reports pt is producing very little urine. Pt's wife reports that pt is seeing Dr. Stacy Mackenzie this afternoon as is she for a bladder infection. We discussed that we need to work on a plan to get her some help so that she can take care of herself.   Will call on Thursday, 8/2/18 to review PCP appt findings. [facility]    8/1/18  Pt's wife reports that PCP thinks that pt may have had a stroke on 7/21 after wife described pt's hearing loss and poor memory. Pt's wife reports that pt had been able to hear out of 1 ear and did not have memory issues prior to the event on 7/21/18. Pt's wife reports that pt's urine is clear and they are waiting on lab results. Pt's wife reports that she is calling Dr. Sharon Mai from neurology to follow up on possible stroke. Also, pt is to see Dr. Christiano Santo for enlarged protate. Pt's wife states that she will follow up with urologist concerning her chronic bladder infections. Pt's wife also states understanding that she needs help to provide care for her  and for herself as she cancelled her hip replacement. Will continue to follow. [facility]    8/16/18  Pt's wife reports that pt went to the ED for chest pain beneath his device. Pt's wife reports that pt will have EGD with Dr. Mayi Pickett on 8/17/18. Pt's wife reports that pt has had pain for a while and also reports that diarrhea finally has subsided after pepto and immodium. Pt's wife reports that she continues to have bladder issues, that she needs surgery on both feet and hip. We discussed that pt's wife needs to care for herself. Pt's wife reports that tube feeding every 3 hours is wearing her out. Pt's wife reminded that she needs help in the home (she has someone cleaning the house q3 weeks). We discussed that she needs to include her 2 children and 2 grandchildren. Pt and pt's wife are going to [facility] in a couple of weeks. Will continue to work with pt's wife.   [facility]         Pt had discharge [facility] 12/5 -   The patient is an 70-year-old gentleman with a past medical history of carotid artery stenosis, hyperlipidemia, esophageal dysmotility, pneumonia, BPH, on PEG tube and feeding tube diet, hepatitis C, CAD, MI, type 2 diabetes, history of CHF, bradycardia, stroke, history of lung and throat cancer, history of moderate-to- severe aortic stenosis, who presents to the hospital with syncope.     1.  Subdural and subarachnoid hematoma.    -. No surgical intervention per Neurosurgery.    - BP controlContinue to closely monitor and reassess as needed. Repeat  head CT unchanged pt was on asa/ plavix  - advised to hold ASA and Plavix for 2 weeks. F/u with neurosurgery office in 2 weeks for CT head.      2.  Syncope, unclear etiology, concern secondary to aortic stenosis versus orthostasis  -  Pt recently being evaluated for a TAVR get echocardiogram, carotid duplex, EEG, TSH, B12, folate levels, and we will continue to closely monitor.    - seen by cardiology. Pacemaker interrogated  - he was on Norco and lasix need to stop both     3.  Chronic kidney disease stage III, now stage IIIB, most likely secondary to intravascular volume depletion.    - The patient has significant aortic stenosis.  We will provide some albumin and reassess, and continue to closely monitor.   -  Avoid nephrotoxic medications, renally dose all other medications.   - kidney function back to baseline      4.  Anemia appears to be chronic.    - Continue to monitor H and H.  Further intervention will be per hospital course.     5.  Thrombocytopenia, again appears to be chronic.       6.  Diabetes mellitus type 2.  We will give sliding scale NovoLog insulin, Accu-Cheks, diet control, close monitoring, poorly controlled.      7.  CDMP: Chronic Respiratory Failure in the setting of oxygen dependent CHF requiring continued O2 @ 2 lts  __________________________________________________________________________    Brown Gonzalez RN , [address], [facility]   [address]  [phone]

## 2018-12-07 NOTE — PROGRESS NOTES
Home health orders reinstated - or attempted - Note to liaison - and  to restart Home Health -Call to [phone] - reached exchange who could only call them for emergencies - 
 
Goal to [facility] - nursing, OT, PT. Adeel Solorio, RN , CHFN, CCM 
NN [facility] EMORY Herrera [phone]

## 2018-12-09 NOTE — ED PROVIDER NOTES
EMERGENCY DEPARTMENT HISTORY AND PHYSICAL EXAM 
 
 
Date: 12/9/2018 Patient Name: Balta Dick History of Presenting Illness Chief Complaint Patient presents with  Arm Pain  
  left shoulder pain worse since last night History Provided By: Patient HPI: Balta Dick, 80 y.o. male with PMHx significant for left shoulder surgery, GERD, arthritis, hepatitis C, CAD, MI, arrhythmia, stroke, cholecystectomy, presents via EMS to the ED with cc of gradual worsening left shoulder pain, which pt reports is currently radiating to left arm and hand, causing subjective numbness and weakness in LUE. Pt reports sxs began to worsen night of 12/08/2018. Pt reports GLF several days ago, and he states he hit the posterior of his head on the ground. Pt reports using ice and taking pain medications for sxs, and reports minimal relief. He specifically denies any fevers, chills, nausea, vomiting, chest pain, shortness of breath, headache, rash, diarrhea, sweating or weight loss. There are no other complaints, changes, or physical findings at this time. PCP: Duane Ser., MD 
 
Current Outpatient Medications Medication Sig Dispense Refill  lidocaine (LIDODERM) 5 % Apply patch to the affected area for 12 hours a day and remove for 12 hours a day. 1 Each 0  
 guaiFENesin (ROBITUSSIN) 100 mg/5 mL liquid 200 mg by Per G Tube route three (3) times daily as needed for Congestion.  OXYGEN-AIR DELIVERY SYSTEMS Take 2 L/min by inhalation as needed for Other (SOB).  hydrocortisone sodium succ/PF (HYDROCORTISONE SOD SUCC, PF, INJECTION) by Injection route every three (3) months. Gets injection every 3 months from orthopedist    
 B.infantis-B.ani-B.long-B.bifi (PROBIOTIC 4X) 10-15 mg TbEC by PEG Tube route daily.  furosemide (LASIX) 40 mg tablet 1 Tab by Per G Tube route Before breakfast and dinner for 30 days. 40 Tab 6  insulin regular (NOVOLIN R REGULAR U-100 INSULN) 100 unit/mL injection 5 Units by SubCUTAneous route every evening. 14 units at 8 AM, 14 units at 2 PM, and 5 units at 8 PM  
 
Takes additional units as well depending on BG    
 pramipexole (MIRAPEX) 0.25 mg tablet Take 1 Tab by mouth three (3) times daily. 100 Tab 11  
 OTHER diltiazem 2% lidocaine cream. Apply to rectal area as needed  klack's mixture Solution Take 5 mL by mouth two (2) times a day. Diphenhydramine elixir 12.5mg/ml 500ml, Nystatin suspension 120ml,Tetracycline 500mg capsules  12 capsules (6g), Hydrocortisone 20mg tablet 12 tablets (240mg), Water for irrigation QS ad 1000ml  albuterol (PROVENTIL VENTOLIN) 2.5 mg /3 mL (0.083 %) nebulizer solution 2.5 mg by Nebulization route two (2) times a day. Per wife patient can use 3 times a day if needed  lactose-reduced food/fiber (ISOSOURCE 1.5 RAE FEEDING TUBE) 250 mL by adductor canal block route five (5) times daily.  famotidine (PEPCID) 20 mg tablet 20 mg by Per G Tube route three (3) times daily.  acetaminophen (TYLENOL) 500 mg tablet 500-1,000 mg by Per G Tube route every six (6) hours as needed for Pain.  atorvastatin (LIPITOR) 40 mg tablet 40 mg by Per G Tube route daily.  finasteride (PROSCAR) 5 mg tablet 5 mg by Per G Tube route nightly.  alfuzosin SR (UROXATRAL) 10 mg SR tablet 10 mg by Per G Tube route daily.  midodrine (PROAMITINE) 10 mg tablet 10 mg by Per G Tube route three (3) times daily (with meals).  HYDROcodone-acetaminophen (NORCO)  mg tablet 1 Tab by Per G Tube route every four (4) hours as needed for Pain.  fluticasone (FLONASE ALLERGY RELIEF) 50 mcg/actuation nasal spray 1 [place] by Both Nostrils route two (2) times a day.  zinc 50 mg tab tablet 50 mg by Per G Tube route daily.     
 LANTUS SOLOSTAR U-100 INSULIN 100 unit/mL (3 mL) inpn INJECT 14 UNITS SUBCUTANEOUSLY ONCE DAILY 15 Pen 5  
  nitroglycerin (NITROSTAT) 0.4 mg SL tablet 0.4 mg by SubLINGual route every five (5) minutes as needed for Chest Pain.  ondansetron hcl (ZOFRAN, AS HYDROCHLORIDE,) 8 mg tablet Take 1 Tab by mouth every eight (8) hours as needed for Nausea. 20 Tab 0  
 gabapentin (NEURONTIN) 250 mg/5 mL solution 750 mg by PEG Tube route three (3) times daily.  multivitamin (ONE A DAY) tablet 1 Tab by Per G Tube route daily. Past History Past Medical History: 
Past Medical History:  
Diagnosis Date  Antiplatelet or antithrombotic long-term use 12/4/2014  Anxiety disorder  Arrhythmia 2009  
 bradycardia  Arthritis  CAD (coronary artery disease) s/p CABG 2002; Dr Francisco Back  Cancer ([facility] [address] 1996  
 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   
 left leg, lower back,   
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 I suspect the latter. Achalasia  is not present.  GERD (gastroesophageal reflux disease)  Heart failure ([facility] [address] 10/2014 Cardiomyopathy:Pacemaker upgrade:Biv and AICD  Dr. Swapnil Montes heart  last visit 5/11/2015  Hepatitis C Dx 1996  
 treated at [facility] in past; as of 4/15/15 wife states pt currently not under any treatment  Hyperlipidemia  Myocardial infarct [facility]) 2013 Heart Cath: 40% LV EF, Stented distal LAD, patent Graft to circumflex  On tube feeding diet approx 2009  
 still has as of 9/28/15  (no po food/liquid/meds at all); Dr Robert Garcia  Other ill-defined conditions(799.89) 1996  
 1 dose of chemotherapy/radiation for tongue cancer  Other ill-defined conditions(799.89) BPH  Other ill-defined conditions(799.89) orthostatic hypotension  Pneumonia ~ April -May 2010  Stroke [facility]) approx   
 left side-left finger tips numb; no imbalance or memory loss; as of  not seeing neuro MD  
 Suicidal thoughts Past Surgical History: 
Past Surgical History:  
Procedure Laterality Date  ABDOMEN SURGERY PROC UNLISTED    
 peg tube  CABG, ARTERY-VEIN, THREE    HX CATARACT REMOVAL    
 bilateral  
 HX CHOLECYSTECTOMY  HX COLONOSCOPY    
 HX HEART CATHETERIZATION   Stented distal LAD  HX MOHS PROCEDURES    
 bilateral  
 HX ORTHOPAEDIC    
 back surgery times two  HX OTHER SURGICAL Radical Left Neck  HX OTHER SURGICAL    
 NASAL POLYPS REMOVAL  
 HX OTHER SURGICAL   TURP  
 HX PACEMAKER    HX PACEMAKER  10/28/14 Defibrillator: [facility] # [id number], serial # [id number]; Dr. Becky Riley [phone]; Dr Estefany Olivera  HX UROLOGICAL    
 cystoscopy  [facility]  
 cevical surgery  AL CHANGE GASTROSTOMY TUBE  2011  AL CHANGE GASTROSTOMY TUBE  2011  AL EGD INSERT GUIDE WIRE DILATOR PASSAGE ESOPHAGUS  2010  AL EGD TRANSORAL BIOPSY SINGLE/MULTIPLE  2010  
    
 STOMACH SURGERY PROCEDURE UNLISTED  2011  UPPER GI ENDOSCOPY,BIOPSY  2018  UPPER GI ENDOSCOPY,DILATN W GUIDE  2018  UPPER GI ENDOSCOPY,W/DILAT,GASTRIC OUT  2018  VASCULAR SURGERY PROCEDURE UNLIST    
 bilateral carotid stents Family History: 
Family History Problem Relation Age of Onset  Heart Disease Father   
      at age 52 from CAD  Colon Cancer Mother  Cancer Mother   
     colon ca  
 Heart Disease Brother Social History: 
Social History Tobacco Use  Smoking status: Never Smoker  Smokeless tobacco: Never Used Substance Use Topics  Alcohol use: No  
 Drug use: No  
 
 
Allergies: Allergies Allergen Reactions  Cleocin [Clindamycin Hcl] Rash  Demerol [Meperidine] Shortness of Breath  Paxil [Paroxetine Hcl] Unknown (comments) Pt gets shaky and loses control of legs  Amoxicillin Rash  Pcn [Penicillins] Rash Review of Systems Review of Systems Constitutional: Negative for activity change, appetite change, chills, fatigue, fever and unexpected weight change. HENT: Negative. Negative for congestion, hearing loss, rhinorrhea, sneezing and voice change. Eyes: Negative. Negative for pain and visual disturbance. Respiratory: Negative. Negative for apnea, cough, choking, chest tightness and shortness of breath. Cardiovascular: Negative. Negative for chest pain and palpitations. Gastrointestinal: Negative. Negative for abdominal distention, abdominal pain, blood in stool, diarrhea, nausea and vomiting. Genitourinary: Negative. Negative for difficulty urinating, flank pain, frequency and urgency. No discharge Musculoskeletal: Positive for myalgias (left shoulder, arm, and hand). Negative for arthralgias, back pain and neck stiffness. Skin: Negative. Negative for color change and rash. Neurological: Positive for weakness (LUE) and numbness (LUE). Negative for dizziness, seizures, syncope, speech difficulty and headaches. Hematological: Negative for adenopathy. Psychiatric/Behavioral: Negative. Negative for agitation, behavioral problems, dysphoric mood and suicidal ideas. The patient is not nervous/anxious. Physical Exam  
Physical Exam  
Constitutional: He is oriented to person, place, and time. He appears well-developed and well-nourished. No distress. HENT:  
Head: Normocephalic and atraumatic. Mouth/Throat: Oropharynx is clear and moist. No oropharyngeal exudate. Eyes: Conjunctivae and EOM are normal. Pupils are equal, round, and reactive to light. Right eye exhibits no discharge. Left eye exhibits no discharge. Neck: Normal range of motion. Neck supple. Cardiovascular: Normal rate, regular rhythm and intact distal pulses. Exam reveals no gallop and no friction rub. No murmur heard. Pulmonary/Chest: Effort normal and breath sounds normal. No respiratory distress. He has no wheezes. He has no rales. He exhibits no tenderness. Pacemaker left upper chest  
Abdominal: Soft. Bowel sounds are normal. He exhibits no distension and no mass. There is no tenderness. There is no rebound and no guarding. Musculoskeletal: Normal range of motion. He exhibits no edema. TTP over left scapula Lymphadenopathy:  
  He has no cervical adenopathy. Neurological: He is alert and oriented to person, place, and time. No cranial nerve deficit. Coordination normal.  
Skin: Skin is warm and dry. No rash noted. No erythema. Psychiatric: He has a normal mood and affect. Nursing note and vitals reviewed. Diagnostic Study Results Labs - Recent Results (from the past 12 hour(s)) CBC WITH AUTOMATED DIFF Collection Time: 12/09/18  8:53 AM  
Result Value Ref Range WBC 6.5 4.1 - 11.1 K/uL  
 RBC 3.78 (L) 4.10 - 5.70 M/uL  
 HGB 11.3 (L) 12.1 - 17.0 g/dL HCT 36.2 (L) 36.6 - 50.3 % MCV 95.8 80.0 - 99.0 FL  
 MCH 29.9 26.0 - 34.0 PG  
 MCHC 31.2 30.0 - 36.5 g/dL  
 RDW 15.8 (H) 11.5 - 14.5 % PLATELET 64 (L) 049 - 400 K/uL MPV 10.6 8.9 - 12.9 FL  
 NRBC 0.0 0  WBC ABSOLUTE NRBC 0.00 0.00 - 0.01 K/uL NEUTROPHILS 83 (H) 32 - 75 % LYMPHOCYTES 11 (L) 12 - 49 % MONOCYTES 5 5 - 13 % EOSINOPHILS 1 0 - 7 % BASOPHILS 0 0 - 1 % IMMATURE GRANULOCYTES 0 0.0 - 0.5 % ABS. NEUTROPHILS 5.4 1.8 - 8.0 K/UL  
 ABS. LYMPHOCYTES 0.7 (L) 0.8 - 3.5 K/UL  
 ABS. MONOCYTES 0.3 0.0 - 1.0 K/UL  
 ABS. EOSINOPHILS 0.1 0.0 - 0.4 K/UL  
 ABS. BASOPHILS 0.0 0.0 - 0.1 K/UL  
 ABS. IMM. GRANS. 0.0 0.00 - 0.04 K/UL  
 DF AUTOMATED    
 RBC COMMENTS NORMOCYTIC, NORMOCHROMIC METABOLIC PANEL, COMPREHENSIVE  
 Collection Time: 12/09/18  8:53 AM  
Result Value Ref Range Sodium 142 136 - 145 mmol/L Potassium 4.1 3.5 - 5.1 mmol/L Chloride 102 97 - 108 mmol/L  
 CO2 38 (H) 21 - 32 mmol/L Anion gap 2 (L) 5 - 15 mmol/L Glucose 239 (H) 65 - 100 mg/dL BUN 43 (H) 6 - 20 MG/DL Creatinine 1.41 (H) 0.70 - 1.30 MG/DL  
 BUN/Creatinine ratio 30 (H) 12 - 20 GFR est AA 58 (L) >60 ml/min/1.73m2 GFR est non-AA 48 (L) >60 ml/min/1.73m2 Calcium 9.0 8.5 - 10.1 MG/DL Bilirubin, total 0.6 0.2 - 1.0 MG/DL  
 ALT (SGPT) 40 12 - 78 U/L  
 AST (SGOT) 40 (H) 15 - 37 U/L Alk. phosphatase 68 45 - 117 U/L Protein, total 7.5 6.4 - 8.2 g/dL Albumin 3.2 (L) 3.5 - 5.0 g/dL Globulin 4.3 (H) 2.0 - 4.0 g/dL A-G Ratio 0.7 (L) 1.1 - 2.2    
TROPONIN I Collection Time: 12/09/18  8:53 AM  
Result Value Ref Range Troponin-I, Qt. <0.05 <0.05 ng/mL SAMPLES BEING HELD Collection Time: 12/09/18  8:53 AM  
Result Value Ref Range SAMPLES BEING HELD blue COMMENT Add-on orders for these samples will be processed based on acceptable specimen integrity and analyte stability, which may vary by analyte. EKG, 12 LEAD, INITIAL Collection Time: 12/09/18  9:01 AM  
Result Value Ref Range Ventricular Rate 99 BPM  
 Atrial Rate 99 BPM  
 P-R Interval 240 ms QRS Duration 152 ms Q-T Interval 374 ms QTC Calculation (Bezet) 479 ms Calculated P Axis 47 degrees Calculated R Axis 151 degrees Calculated T Axis -19 degrees Diagnosis Atrial-sensed ventricular-paced rhythm with prolonged AV conduction Biventricular pacemaker detected When compared with ECG of 04-DEC-2018 08:53, 
Vent. rate has increased BY   7 BPM 
  
 
 
Radiologic Studies -  
XR SHOULDER LT AP/LAT MIN 2 V Final Result IMPRESSION:  No acute abnormality. Degenerative disease. CT SPINE CERV WO CONT Final Result IMPRESSION: No fracture. Degenerative disease as described above. CT Results  (Last 48 hours) 12/09/18 0942  CT SPINE CERV WO CONT Final result Impression:  IMPRESSION: No fracture. Degenerative disease as described above. Narrative:  EXAM:  CT CERVICAL SPINE WITHOUT CONTRAST INDICATION:   Cervical spine fracture. Left shoulder pain, increasing since last  
night. COMPARISON: 5/3/2017. TECHNIQUE: Multislice helical CT of the cervical spine was performed without  
intravenous contrast administration. Sagittal and coronal reconstructions were  
generated. CT dose reduction was achieved through use of a standardized protocol  
tailored for this examination and automatic exposure control for dose  
modulation. FINDINGS:  
There is straightening of normal cervical lordosis, with minimal kyphosis  
centered around C5. There is autofusion of the vertebral bodies C4-C7, and  
autofusion of the right facets C2-C5. There is less complete autofusion of the  
right facets C5-T1, and of the left facets C2-C7. No fracture or dislocation. Mild atlantoaxial osteoarthritis. There are bilateral internal carotid artery  
stents. The parotid glands are fatty replaced. Post left neck dissection. Degenerative disease causes the following stenoses:  
C1-C2: Bulky osteoarthritis of the left lateral mass articulation causes neural  
foraminal stenosis. C2-C3:  No herniation or stenosis. C3-C4: Facet osteoarthritis causes right and mild left neural foraminal  
stenosis. Mild canal stenosis. C4-C5:  Mild canal stenosis. C5-C6:  Uncovertebral osteoarthritis causes mild bilateral neural foraminal  
stenosis. Mild canal stenosis. C6-C7:  Left and mild right foraminal stenosis. Mild to moderate canal stenosis. C7-T1:  No gross stenosis. Overlying shoulders obscured. CXR Results  (Last 48 hours) None Medical Decision Making I am the first provider for this patient. I reviewed the vital signs, available nursing notes, past medical history, past surgical history, family history and social history. Vital Signs-Reviewed the patient's vital signs. Patient Vitals for the past 12 hrs: 
 Temp Pulse Resp BP SpO2  
12/09/18 1150  76 11  98 % 12/09/18 1145  76 13 117/64 98 % 12/09/18 1130  80 14 103/69 97 % 12/09/18 1115  79 13 104/71 98 % 12/09/18 1054  84 17  99 % 12/09/18 1045  88 19 117/72 98 % 12/09/18 1001  90 12  97 % 12/09/18 1000  94 20 117/77   
12/09/18 0913  96 26  98 % 12/09/18 0909  96 20  98 % 12/09/18 0845    (!) 122/95   
12/09/18 0841 97.7 °F (36.5 °C) (!) 102 18 (!) 122/95 97 % Pulse Oximetry Analysis - 97% on RA Cardiac Monitor:  
Rate: 102 bpm 
Rhythm: Atrial-sensed ventricular-paced rhythm with prolonged AV conduction 0841 EKG interpretation: (Preliminary) 0901 Rhythm: Atrial-sensed ventricular-paced rhythm with prolonged AV conduction; and regular . Rate (approx.): 99; Axis: normal; [place] interval: normal; QRS interval: normal ; ST/T wave: normal; Other findings: normal. 
Written by Janee Escobedo. Cathie ED Scribe, as dictated by Gunnar Golden. Tana Osborne MD. Records Reviewed: Nursing Notes, Old Medical Records and Ambulance Run Sheet Pt diagnosed with subdural on 12/08/2018. Provider Notes (Medical Decision Making): DDx: ACS, arrhythmia, muscular skeletal 
 
ED Course:  
Initial assessment performed. The patients presenting problems have been discussed, and they are in agreement with the care plan formulated and outlined with them. I have encouraged them to ask questions as they arise throughout their visit. Disposition: 
Discharge Note: 
12:05 PM 
The pt is ready for discharge. The pt's signs, symptoms, diagnosis, and discharge instructions have been discussed and pt has conveyed their understanding.  The pt is to follow up as recommended or return to ER should their symptoms worsen. Plan has been discussed and pt is in agreement. PLAN: 
1. Current Discharge Medication List  
  
START taking these medications Details  
lidocaine (LIDODERM) 5 % Apply patch to the affected area for 12 hours a day and remove for 12 hours a day. Qty: 1 Each, Refills: 0  
  
  
 
2. Follow-up Information Follow up With Specialties Details Why Contact Info Lou Gross MD Orthopedic Surgery Call in 2 days  [address] 
[phone] Return to ED if worse Diagnosis Clinical Impression: 1. Pain in joint of left shoulder Attestations: This note is prepared by Ayden Brand, acting as Scribe for [employer] Yue Stephenson 78 Lidia Watkins MD: The scribe's documentation has been prepared under my direction and personally reviewed by me in its entirety. I confirm that the note above accurately reflects all work, treatment, procedures, and medical decision making performed by me.

## 2018-12-09 NOTE — PROGRESS NOTES
Date of previous inpatient admission/ ED visit?  
11/11/18 - 11/20/18 What brought the patient back to ED? Arm Pain Did patient decline recommended services during last admission/ ED visit (if yes, what)? No, still active with [facility] Has patient seen a provider since their last inpatient admission/ED visit (if yes, when)? Orthopedics on 12/3/18 CM Interventions: 
From previous inpatient admission/ED visit: [facility] resumption of services setup during previous admission, follow-up care appointments From current inpatient admission/ED visit: CM introduced self, role and provided assistance as needed. SW attempted to complete initial assessment, pt declined to answer further questions and deferred CM to speak with his spouse in the waiting room. Pt is a CHF bundle pt. Pt lives in a [facility] ([address]) with spouse, son also provides assistance as needed. Pt is currently active with [facility] services. CM Beyer Supervisee in Social Work, [facility] [phone]

## 2018-12-09 NOTE — DISCHARGE INSTRUCTIONS
Joint Pain: Care Instructions  Your Care Instructions    Many people have small aches and pains from overuse or injury to muscles and joints. Joint injuries often happen during sports or recreation, work tasks, or projects around the home. An overuse injury can happen when you put too much stress on a joint or when you do an activity that stresses the joint over and over, such as using the computer or rowing a boat. You can take action at home to help your muscles and joints get better. You should feel better in 1 to 2 weeks, but it can take 3 months or more to heal completely. Follow-up care is a key part of your treatment and safety. Be sure to make and go to all appointments, and call your doctor if you are having problems. It's also a good idea to know your test results and keep a list of the medicines you take. How can you care for yourself at home? · Do not put weight on the injured joint for at least a day or two. · For the first day or two after an injury, do not take hot showers or baths, and do not use hot packs. The heat could make swelling worse. · Put ice or a cold pack on the sore joint for 10 to 20 minutes at a time. Try to do this every 1 to 2 hours for the next 3 days (when you are awake) or until the swelling goes down. Put a thin cloth between the ice and your skin. · Wrap the injury in an elastic bandage. Do not wrap it too tightly because this can cause more swelling. · Prop up the sore joint on a pillow when you ice it or anytime you sit or lie down during the next 3 days. Try to keep it above the level of your heart. This will help reduce swelling. · Take an over-the-counter pain medicine, such as acetaminophen (Tylenol), ibuprofen (Advil, Motrin), or naproxen (Aleve). Read and follow all instructions on the label. · After 1 or 2 days of rest, begin moving the joint gently.  While the joint is still healing, you can begin to exercise using activities that do not strain or hurt the painful joint. When should you call for help? Call your doctor now or seek immediate medical care if:    · You have signs of infection, such as:  ? Increased pain, swelling, warmth, and redness. ? Red streaks leading from the joint. ? A fever.    Watch closely for changes in your health, and be sure to contact your doctor if:    · Your movement or symptoms are not getting better after 1 to 2 weeks of home treatment. Where can you learn more? Go to http://aniket-tomas.info/. Enter P205 in the search box to learn more about \"Joint Pain: Care Instructions. \"  Current as of: November 29, 2017  Content Version: 11.8  © 1338-3460 [employer]. Care instructions adapted under license by [employer] (which disclaims liability or warranty for this information). If you have questions about a medical condition or this instruction, always ask your healthcare professional. [place] [address] any warranty or liability for your use of this information. Musculoskeletal Pain: Care Instructions  Your Care Instructions  Different problems with the bones, muscles, nerves, ligaments, and tendons in the body can cause pain. One or more areas of your body may ache or burn. Or they may feel tired, stiff, or sore. The medical term for this type of pain is musculoskeletal pain. It can have many different causes. Sometimes the pain is caused by an injury such as a strain or sprain. Or you might have pain from using one part of your body in the same way over and over again. This is called overuse. In some cases, the cause of the pain is another health problem such as arthritis or fibromyalgia. The doctor will examine you and ask you questions about your health to help find the cause of your pain. Blood tests or imaging tests like an X-ray may also be helpful. But sometimes doctors can't find a cause of the pain.   Treatment depends on your symptoms and the cause of the pain, if known. The doctor has checked you carefully, but problems can develop later. If you notice any problems or new symptoms, get medical treatment right away. Follow-up care is a key part of your treatment and safety. Be sure to make and go to all appointments, and call your doctor if you are having problems. It's also a good idea to know your test results and keep a list of the medicines you take. How can you care for yourself at home? · Rest until you feel better. · Do not do anything that makes the pain worse. Return to exercise gradually if you feel better and your doctor says it's okay. · Be safe with medicines. Read and follow all instructions on the label. ¨ If the doctor gave you a prescription medicine for pain, take it as prescribed. ¨ If you are not taking a prescription pain medicine, ask your doctor if you can take an over-the-counter medicine. · Put ice or a cold pack on the area for 10 to 20 minutes at a time to ease pain. Put a thin cloth between the ice and your skin. When should you call for help? Call your doctor now or seek immediate medical care if:  · You have new pain, or your pain gets worse. · You have new symptoms such as a fever, a rash, or chills. Watch closely for changes in your health, and be sure to contact your doctor if:  · You do not get better as expected. Where can you learn more? Go to Deal500Friendser.be  Enter Q624 in the search box to learn more about \"Musculoskeletal Pain: Care Instructions. \"   © 1658-8246 [employer]. Care instructions adapted under license by [facility] (which disclaims liability or warranty for this information). This care instruction is for use with your licensed healthcare professional. If you have questions about a medical condition or this instruction, always ask your healthcare professional. [address] any warranty or liability for your use of this information.   Content Version: 72.7.992703; Current as of: November 20, 2015

## 2018-12-09 NOTE — ED NOTES
MD Gail Wang have reviewed discharge instructions with the patient and family. The patient and family verbalized understanding.

## 2018-12-09 NOTE — ED NOTES
I have reviewed discharge instructions with the patient and caregivers. The patient and caregivers verbalized understanding. Pt left via w/c.

## 2018-12-10 NOTE — PROGRESS NOTES
Nurse navigator note - cardiology Call to and reached pt's wife - as they went to the ER yesterday by EMS - for left arm pain/ shoulder pain - post recent fall (pt has chronic shoulder pain, as well - post cortisone injection); Pt was evaluated and discharged home with his son transporting. Mr. Simon Downs was given Lidoderm patch for pain - which is helping some. Call to and reached his wife - Pt is on continuous oxygen - they have concentrator and portable concentrator - they have a spare small tank - they were told by [address] that Medicare does not pay for large tanks - They can plug the small concentrator to the car for charging. No home generator; limitations on oxygen if power is out - Discussed with wife - They need plan for outages - they have 2 sons locally. Follow up appts - Dr. Rodgers [address], Dr. Priscilla Graham, 13th, Dr. [facility] neuro clinic -  
CT scan [id number] [facility] 12/18 Dr. Jozef Coleman 12/19 - Wife to transport to all the above. Plan: Resources for oxygen if power out Call pt's son to discuss action plan Would wife allow palliativ/e at home team for evaluations? Advanced directive - not on file [facility] discussed with wife again and phone # given - mail information Vangie Rouse, RN , CHFN, CCM 
[facility] Kirk Hetal [phone]

## 2018-12-11 NOTE — PROGRESS NOTES
Nurse navigator note - cardiology  Discussion with pt's son - Clarita Morin - re: judy's multiple appointments and care -   He said they are doing well - that they use the appointments to get out - and that MrAlvaro And Mrs. Koko Gomez are handling it well - they have family support - 2 sons and other family to help. Ms. Koko Gomez is recovering from hip replacement -     We discussed at home team for care - for future - if it is too hard to get out to appts -    [id number] has appts with :  Dr. Jose Quinn 12/13; Dr. Lefty Mckay 12/14; Dr. Lyly Holloway - Dec 17th -   [address] scan - 12/18  Dr. Kendra Conde Dec 19th - .    Dr. Rachelle Morris - DM  Dr. Thalia Matta - urologist  Dr. Miguelangel Shin - neurology        Son said pt has living will - but no copy is scanned in   Son was at work - could not obtain more information.     Lan Chavis, RN , [address], [facility]   [address]  [phone]

## 2018-12-13 NOTE — PROGRESS NOTES
Nurse navigator note - cardiology  Visit today with pcp - Dr. William Alonso to review the hospital visits - no changes -   Tomorrow - they will see Dr. Gaurav Barron - b/c of shoulder - which continues to hurt -   And Dr. Sadie Barrera for diabetes -     And then appointments - next week -   Home health visits continue - nurse this am - PT this afternoon. Pt continues with home health - and multiple provider visits -   12/14 - Shoulder doctor - Dr. Gaurav Alonso 12/13; pcp;  Dr. Albaro [place] 12/14; Diabetes - regular sched appt    Next week -    Dr. Anjelica Gonzalez - Dec 17th -   [facility] CT scan - 12/18  Dr. Antonina Waters Dec 19th - .    Per Ms.  KXLODSDLV - he has good days and not good days -   Geeta Whitlock, RN , [facility], [address] [place] [address] [address] [facility]  [phone]

## 2018-12-14 NOTE — PROGRESS NOTES
CHIEF COMPLAINT: f/u for type 2 diabetes    HISTORY OF PRESENT ILLNESS:   Adam Mishra is a 80 y.o. male with a PMHx as noted below who presents for f/u evaluation of type 2 diabetes. Diabetes History:  History obtained from both patient and his wife who is present:Patient presents today to establish care for his diabetes. He was previously managed by endocrinologist Dr. Lety Welch, however would like to be treated closer to home. He notably had a recent stroke for which he had been admitted. Currently he ambulates with a wheelchair. He is present with his wife today for evaluation. Patients co-morbidities include multiple CVA, HTN, Coronary disease, and CHF with an AICD. He is on tube feeds as noted below. Diabetes was diagnosed in early 2015. Family History of diabetes is negative. Last A1c prior to initial visit was 6.6% on July 31st.   Regimen at time of establishing care includes   - Januvia 50mg daily, crushed   - Lantus 14U qAM   - Humalog nursing home schedule: 6U 4x/day on iso-source 1.5. Also on 2:50>150 correction sliding scale. 1.5 cans breakfast, 1 can with lunch and dinner, 1.5 cans with dinner. Reportedly 44Gm carbs/can suggesting 1:7 carb ratio per can    INTERVAL HISTORY:  Patient doing ok. Post prandial sugars remain an issue. Notably still receiving steroid injections followed by higher than usual sugars. Had been hospitalized not too long ago due to a fall. He is off metoprolol and aspirin, noted for a subdural hemorrhage per CT finding. Will be using prednisone, with a taper, for his shoulder. A1c recently 7.3%. Regimen:   BASAL:  Continue Lantus 14 units once daily     BOLUS:  Regular (R) insulin with tube feeds  8AM Start of TF #1 daily, Regular insulin 14 units  2PM Start of TF #3 daily, Regular insulin 14 units  8PM Start of TF #5 daily, Regular insulin 5 units    Takes 4 extra units for 4 days following cortisone injections.      Review of home glucose:  TF#1 (AM): 111-145 mostly       TF#3 (2PM): 200's following steroid injection, otherwise 120-160       TF#5 (8PM): 170-190's following steroid injection, otherwise 118-190  No hypoglycemia       Review of most recent diabetes-related labs:  Lab Results   Component Value Date    HBA1C 7.3 (H) 12/07/2018    HBA1C 7.2 (H) 12/04/2018    HBA1C 8.6 (H) 07/14/2018    JRT4HKLJ 7.0 09/12/2018    VPF9PCAW 7.6 03/12/2018    CVL3CUJQ 6.5 11/09/2017    CHOL 128 12/07/2018    LDLC 72 12/07/2018    GFRAA 58 (L) 12/09/2018    GFRNA 48 (L) 12/09/2018    MCACR 150.6 (H) 12/07/2018    TSH 3.19 12/05/2018    VITD3 34.7 06/29/2018     [id number] = IA-2 pancreatic islet cell autoantibody  GADLT = JOVANY-65 autoantibody   MCACR = Urine Microalbumin           PAST MEDICAL/SURGICAL HISTORY:   Past Medical History:   Diagnosis Date    Antiplatelet or antithrombotic long-term use 12/4/2014    Anxiety disorder     Arrhythmia 2009    bradycardia    Arthritis     CAD (coronary artery disease)     s/p CABG 2002; Dr Destiney Sanchez [facility]) 1996    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 I suspect the latter. Achalasia  is not present.         GERD (gastroesophageal reflux disease)     Heart failure ([address] 10/2014     Cardiomyopathy:Pacemaker upgrade:Biv and AICD  Dr. Josi Patrick heart  last visit 5/11/2015    Hepatitis C Dx 1996    treated at [facility] in past; as of 4/15/15 wife states pt currently not under any treatment    Hyperlipidemia     Myocardial infarct [facility]) 2013    Heart Cath: 40% LV EF, Stented distal LAD, patent Graft to circumflex    On tube feeding diet approx 2009    still has as of 9/28/15  (no po food/liquid/meds at all); Dr Wes Romo Other ill-defined conditions(799.89) 1996    1 dose of chemotherapy/radiation for tongue cancer    Other ill-defined conditions(799.89)     BPH    Other ill-defined conditions(799.89)     orthostatic hypotension    Pneumonia ~ April -May 2010    Stroke [facility]) approx 2003    left side-left finger tips numb; no imbalance or memory loss; as of 2015 not seeing neuro MD    Suicidal thoughts      Past Surgical History:   Procedure Laterality Date    ABDOMEN SURGERY [address]    peg tube    CABG, ARTERY-VEIN, THREE  2000    HX CATARACT REMOVAL      bilateral    HX CHOLECYSTECTOMY      HX COLONOSCOPY      HX HEART CATHETERIZATION  2013    Stented distal LAD    HX MOHS PROCEDURES      bilateral    HX ORTHOPAEDIC      back surgery times two    HX OTHER SURGICAL      Radical Left Neck    HX OTHER SURGICAL      NASAL POLYPS REMOVAL    HX OTHER SURGICAL  2010    TURP    HX PACEMAKER  11/08    HX PACEMAKER  10/28/14     Defibrillator: [facility] # [id number], serial # [id number]; Dr. Rajesh Sanon [phone]; Dr Josue Oh      cystoscopy    NEUROLOGICAL PROCEDURE UNLISTED  1996    cevical surgery    ID CHANGE GASTROSTOMY TUBE  5/2/2011         ID CHANGE GASTROSTOMY TUBE  6/29/2011         ID EGD INSERT GUIDE WIRE DILATOR PASSAGE ESOPHAGUS  9/13/2010         ID EGD TRANSORAL BIOPSY SINGLE/MULTIPLE  9/13/2010         STOMACH SURGERY PROCEDURE UNLISTED  6/29/2011         UPPER GI ENDOSCOPY,BIOPSY  8/17/2018         UPPER GI ENDOSCOPY,DILATN W GUIDE  8/17/2018         UPPER GI ENDOSCOPY,W/DILAT,GASTRIC OUT  8/17/2018         VASCULAR SURGERY PROCEDURE UNLIST      bilateral carotid stents       ALLERGIES:   Allergies   Allergen Reactions    Cleocin [Clindamycin Hcl] Rash    Demerol [Meperidine] Shortness of Breath    Paxil [Paroxetine Hcl] Unknown (comments)     Pt gets shaky and loses control of legs    Amoxicillin Rash    Pcn [Penicillins] Rash       MEDICATIONS ON ADMISSION:     Current Outpatient Medications:     guaiFENesin (ROBITUSSIN) 100 mg/5 mL liquid, 200 mg by Per G Tube route three (3) times daily as needed for Congestion. , Disp: , Rfl:     OXYGEN-AIR DELIVERY SYSTEMS, Take 2 L/min by inhalation as needed for Other (SOB). , Disp: , Rfl:     hydrocortisone sodium succ/PF (HYDROCORTISONE SOD SUCC, PF, INJECTION), by Injection route every three (3) months. Gets injection every 3 months from orthopedist, Disp: , Rfl:     B.infantis-B.ani-B.long-B.bifi (PROBIOTIC 4X) 10-15 mg TbEC, by PEG Tube route daily. , Disp: , Rfl:     furosemide (LASIX) 40 mg tablet, 1 Tab by Per G Tube route Before breakfast and dinner for 30 days. , Disp: 40 Tab, Rfl: 6    insulin regular (NOVOLIN R REGULAR U-100 INSULN) 100 unit/mL injection, by SubCUTAneous route. 14 units at 8 AM, 14 units at 2 PM, and 5 units at 8 PM   Takes additional units as well depending on BG, Disp: , Rfl:     pramipexole (MIRAPEX) 0.25 mg tablet, Take 1 Tab by mouth three (3) times daily. , Disp: 100 Tab, Rfl: 11    klack's mixture Solution, Take 5 mL by mouth two (2) times a day. Diphenhydramine elixir 12.5mg/ml 500ml, Nystatin suspension 120ml,Tetracycline 500mg capsules  12 capsules (6g), Hydrocortisone 20mg tablet 12 tablets (240mg), Water for irrigation QS ad 1000ml   , Disp: , Rfl:     albuterol (PROVENTIL VENTOLIN) 2.5 mg /3 mL (0.083 %) nebulizer solution, 2.5 mg by Nebulization route two (2) times a day. Per wife patient can use 3 times a day if needed, Disp: , Rfl:     lactose-reduced food/fiber (ISOSOURCE 1.5 RAE FEEDING TUBE), 250 mL by adductor canal block route five (5) times daily. , Disp: , Rfl:     famotidine (PEPCID) 20 mg tablet, 20 mg by Per G Tube route three (3) times daily. , Disp: , Rfl:     atorvastatin (LIPITOR) 40 mg tablet, 40 mg by Per G Tube route daily. , Disp: , Rfl:     finasteride (PROSCAR) 5 mg tablet, 5 mg by Per G Tube route nightly., Disp: , Rfl:     alfuzosin SR (UROXATRAL) 10 mg SR tablet, 10 mg by Per G Tube route daily. , Disp: , Rfl:     midodrine (PROAMITINE) 10 mg tablet, 10 mg by Per G Tube route three (3) times daily (with meals). , Disp: , Rfl:     HYDROcodone-acetaminophen (NORCO)  mg tablet, 1 Tab by Per G Tube route every four (4) hours as needed for Pain., Disp: , Rfl:     fluticasone (FLONASE ALLERGY RELIEF) 50 mcg/actuation nasal spray, 1 [place] by Both Nostrils route two (2) times a day., Disp: , Rfl:     zinc 50 mg tab tablet, 50 mg by Per G Tube route daily. , Disp: , Rfl:     LANTUS SOLOSTAR U-100 INSULIN 100 unit/mL (3 mL) inpn, INJECT 14 UNITS SUBCUTANEOUSLY ONCE DAILY, Disp: 15 Pen, Rfl: 5    nitroglycerin (NITROSTAT) 0.4 mg SL tablet, 0.4 mg by SubLINGual route every five (5) minutes as needed for Chest Pain., Disp: , Rfl:     gabapentin (NEURONTIN) 250 mg/5 mL solution, 750 mg by PEG Tube route three (3) times daily. , Disp: , Rfl:     lidocaine (LIDODERM) 5 %, Apply patch to the affected area for 12 hours a day and remove for 12 hours a day., Disp: 1 Each, Rfl: 0    OTHER, diltiazem 2% lidocaine cream. Apply to rectal area as needed, Disp: , Rfl:     acetaminophen (TYLENOL) 500 mg tablet, 500-1,000 mg by Per G Tube route every six (6) hours as needed for Pain., Disp: , Rfl:     ondansetron hcl (ZOFRAN, AS HYDROCHLORIDE,) 8 mg tablet, Take 1 Tab by mouth every eight (8) hours as needed for Nausea., Disp: 20 Tab, Rfl: 0    multivitamin (ONE A DAY) tablet, 1 Tab by Per G Tube route daily. 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    Tobacco Use    Smoking status: Never Smoker    Smokeless tobacco: Never Used   Substance and Sexual Activity    Alcohol use: No    Drug use: No    Sexual activity: Yes     Partners: Female   Other Topics Concern    Not on file   Social History Narrative    Not on file       FAMILY HISTORY:  Family History   Problem Relation Age of Onset    Heart Disease Father          at age 52 from CAD    Colon Cancer Mother     Cancer Mother         colon ca    Heart Disease Brother        REVIEW OF SYSTEMS: Complete ROS assessed and noted for that which is described above, all else are negative. Eyes: normal  ENT: normal  CVS: normal  Resp: normal  GI: normal  : normal  GYN: normal  Endocrine: normal  Integument: normal  Musculoskeletal: normal  Neuro: mild weakness  Psych: normal      PHYSICAL EXAMINATION:    VITAL SIGNS:  Visit Vitals  /59 (BP 1 Location: Left arm, BP Patient Position: Sitting)   Pulse (!) 59   Ht 5' 6\" (1.676 m)   Wt 163 lb 6.4 oz (74.1 kg)   BMI 26.37 kg/m²       GENERAL: NCAT, Sitting comfortably, walks with walker, NAD  EYES: EOMI, non-icteric, no proptosis  Ear/Nose/Throat: NCAT, no inflammation, no masses  LYMPH NODES: No LAD  CARDIOVASCULAR: S1 S2, RRR, No murmur, 2+ radial pulses  RESPIRATORY: CTA b/l, no wheeze/rales  GASTROINTESTINAL: soft, ND  MUSCULOSKELETAL: Normal ROM, no atrophy  SKIN: warm, no edema/rash/ or other skin changes  NEUROLOGIC: 4/5 power all extremities,  AAOx3  PSYCHIATRIC: Normal affect, Normal insight and judgement    REVIEW OF LABORATORY AND RADIOLOGY DATA:   Labs and documentation have been reviewed as described above.      ASSESSMENT AND PLAN:   Balta Dick is a 80 y.o. male with a PMHx as noted below who presents for f/u evaluation of uncontrolled type 2 diabetes. Type 2 diabetes, uncontrolled  Hyperlipidemia  Hypertension    Next steroid injection in March. But planning for some steroids for his shoulder in the next week. We will increase his insulin as below to further customize his regimen, which has historically worked well for him. Advised caution when ambulating to avoid recurrent falls. Welcomed to call me with concerns. Plan: Type 2 Diabetes  Medications:  BASAL:  Continue Lantus 14 units once daily     BOLUS:  Regular (R) insulin with tube feeds  8AM Start of TF #1 daily, Regular insulin 15 units  2PM Start of TF #3 daily, Regular insulin 15 units  8PM Start of TF #5 daily, Regular insulin 5 units    Take 5 extra units for 4 days following cortisone injections. HTN: Stable today  HLD: Stable on statin , lipitor 40mg daily    RTC: I would like to see them back in 3 months    Matthew Lawyer FAULKNER  [address] [facility]

## 2018-12-14 NOTE — PATIENT INSTRUCTIONS
BASAL:  Continue Lantus 14 units once daily     BOLUS:  Regular (R) insulin with tube feeds  8AM Start of TF #1 daily, Regular insulin 15 units  2PM Start of TF #3 daily, Regular insulin 15 units  8PM Start of TF #5 daily, Regular insulin 5 units    Take 5 extra units for 4 days following cortisone injections. Please note our new policy, you must arrive to the clinic 15 minutes before your appointment time to allow enough time for proper check-in, adequate time to spend with your doctor, and also to respect the appointment time of the next patient. Not arriving 15 minutes in advance may result in having your appointment rescheduled for the next available day/time.  ----------------------------------------------------------------------------------------------------------------------    Below you will find a glucose log sheet which you can use to record your blood sugars. Without checking and recording what your home glucose levels are, it will be difficult to make any changes to your medication dose, even when significant changes may be needed. Please feel free to use the log below to record your home glucose levels. At the very least, I would like for you to login the entire 2-3 weeks just before your visit so we can make your visit much more productive and beneficial to you. GLUCOSE LOG SHEET:    Date Breakfast Lunch Dinner Bedtime Comments ? GLUCOSE LOG SHEET:    Date Breakfast Lunch Dinner Bedtime Comments ?                                                                                                                                                                                                                                                  GLUCOSE LOG SHEET:    Date Breakfast Lunch Dinner Bedtime Comments ?

## 2018-12-17 NOTE — PROGRESS NOTES
New patient from hospital follow up. Reports dizziness for over a year and multiple falls. Spouse reports patient has had 2 CVA's in the past but the dizziness has been an ongoing problem. She was told that patient has some narrowing at the base of the neck that is causing the dizziness. Asked if patient had a subdural hematoma per hospital note. Stated, no. Patient reports no headaches, visual problems, confusion or numbness.

## 2018-12-17 NOTE — PROGRESS NOTES
Neurointerventional Surgery  Ambulatory Progress Note      Patient: Silvia Reynoso MRN: [id number]  SSN: [id number]    YOB: 1935  Age: 80 y.o. Sex: male      Chief Complaint: Dizziness    History of Present Illness:    Mr. Brandy Rodriges is an 80year-old  male with a significant PMH of carotid artery stenosis s/p bilateral carotid stents, CAD s/p CABG, tongue/throat cancer in 1996, Diabetes mellitus type II, stroke x 2, hyperlipidemia, and CHF who is being seen in clinic today as a new patient for ongoing dizziness. Patient's wife is also accompanying patient to provide history. The dizziness started approximately 12 years ago and the wife noticed that his balance was off and he was falling against walls in the house. Per wife, he had imaging done which showed some narrowing of his vertebral arteries. He has an extensive cardiac history and has been on ASA/Plavix for years. Per wife he was recently admitted to [address] [place] [address] from 12/4/2018-12/6/2018 due to a brain hemorrhage secondary to a fall at home. The ASA/Plavix was stopped and he has a follow up head CT and appointment with neurosurgery tomorrow to clear him to resume antiplatelet therapy. He was suppose to undergo a procedure to have a valve in his heart repaired, but it was held off due to his recent brain hemorrhage per wife. Of note per wife, he was also admitted to [facility] from 11/11/2018-11/20/2018 for hypotension and fluid overload. The patient states, the dizziness has worsened over the years. He has been seen by neurology, Dr. Eldon Luna for his symptoms. His dizziness does not seem to correlate with his blood pressure per the wife. His wife states that his blood pressure can go up and down, but the dizziness is constant. His dizziness is worse with physical activity and he denies any relieving factors. He denies any headache, visual disturbances, speech changes, nausea, vomiting, weakness, or chest pain.  He does have mild SOB at rest and is currently on nasal cannula continuously for oxygen support. He complains of some numbness/tingling in his left hand that all started when he recently fell. His balance is slightly off and he requires a walker to assist with ambulation. He had a CTA of head and neck on 11/13/2018 that shows occlusion of both proximal vertebral arteries. Allergies   Allergen Reactions    Cleocin [Clindamycin Hcl] Rash    Demerol [Meperidine] Shortness of Breath    Paxil [Paroxetine Hcl] Unknown (comments)     Pt gets shaky and loses control of legs    Amoxicillin Rash    Pcn [Penicillins] Rash     Past Medical History:   Diagnosis Date    Antiplatelet or antithrombotic long-term use 12/4/2014    Anxiety disorder     Arrhythmia 2009    bradycardia    Arthritis     CAD (coronary artery disease)     s/p CABG 2002; Dr Luciana Rich [facility]) 1996    tongue/throat cancer s/p surgery / radiation and 1 dose of chemo    Carotid artery stenosis     s/p bilateral stents    Chest pain     Chronic pain     left leg, lower back,     Cough     Depression     Diabetes ([address]     Type II    Epigastric pain 8/17/2018    Esophageal dysmotility     s/p dilitation    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 I suspect the latter. Achalasia  is not present.         Fainting     Falls     Fatigue     GERD (gastroesophageal reflux disease)     Heart failure ([address] 10/2014     Cardiomyopathy:Pacemaker upgrade:Biv and AICD  Dr. Watkins Knows heart  last visit 5/11/2015    Hepatitis C Dx 1996    treated at [facility] in past; as of 4/15/15 wife states pt currently not under any treatment    Hyperlipidemia     Joint pain     Liver disease     Memory loss     Muscle pain     Muscle weakness     Myocardial infarct ([address] 2013    Heart Cath: 40% LV EF, Stented distal LAD, patent Graft to circumflex    On tube feeding diet approx     still has as of 9/28/15  (no po food/liquid/meds at all); Dr Isaiah Gallo Other ill-defined conditions(799.89)     1 dose of chemotherapy/radiation for tongue cancer    Other ill-defined conditions(799.89)     BPH    Other ill-defined conditions(799.89)     orthostatic hypotension    Pneumonia ~ April -May 2010    SOB (shortness of breath)     Stroke [facility]) approx     left side-left finger tips numb; no imbalance or memory loss; as of  not seeing neuro MD    Suicidal thoughts     Swallowing difficulty      Past Surgical History:   Procedure Laterality Date    ABDOMEN SURGERY [address]    peg tube    CABG, ARTERY-VEIN, THREE      HX CATARACT REMOVAL      bilateral    HX CHOLECYSTECTOMY      HX COLONOSCOPY      HX HEART CATHETERIZATION      Stented distal LAD    HX MOHS PROCEDURES      bilateral    HX ORTHOPAEDIC      back surgery times two    HX OTHER SURGICAL      Radical Left Neck    HX OTHER SURGICAL      NASAL POLYPS REMOVAL    HX OTHER SURGICAL      TURP    HX PACEMAKER      HX PACEMAKER  10/28/14     Defibrillator: [facility] # [id number], serial # [id number]; Dr. Estrellita Najera [phone]; Dr Joann Rosario      cystoscopy    NEUROLOGICAL PROCEDURE UNLISTED      cevical surgery    MI CHANGE GASTROSTOMY TUBE  2011         MI CHANGE GASTROSTOMY TUBE  2011         MI EGD INSERT GUIDE WIRE DILATOR PASSAGE ESOPHAGUS  2010         MI EGD TRANSORAL BIOPSY SINGLE/MULTIPLE  2010         STOMACH SURGERY PROCEDURE UNLISTED  2011         UPPER GI ENDOSCOPY,BIOPSY  2018         UPPER GI ENDOSCOPY,DILATN W GUIDE  2018         UPPER GI ENDOSCOPY,W/DILAT,GASTRIC OUT  2018         VASCULAR SURGERY PROCEDURE UNLIST      bilateral carotid stents     Family History   Problem Relation Age of Onset    Heart Disease Father          at age 52 from CAD    Colon Cancer Mother  Cancer Mother         colon ca    Heart Disease Brother      Social History     Tobacco Use    Smoking status: Never Smoker    Smokeless tobacco: Never Used   Substance Use Topics    Alcohol use: No        Review of Systems    Pertinent items listed in the HPI. Objective:          Visit Vitals  /80 (BP 1 Location: Left arm)   Pulse 83   Temp 97.4 °F (36.3 °C)   Resp 17   Ht 5' 6\" (1.676 m)   Wt 160 lb (72.6 kg)   SpO2 98%   BMI 25.82 kg/m²       Allergies   Allergen Reactions    Cleocin [Clindamycin Hcl] Rash    Demerol [Meperidine] Shortness of Breath    Paxil [Paroxetine Hcl] Unknown (comments)     Pt gets shaky and loses control of legs    Amoxicillin Rash    Pcn [Penicillins] Rash       Current Outpatient Medications   Medication Sig    aspirin delayed-release 81 mg tablet Take  by mouth daily.  guaiFENesin (ROBITUSSIN) 100 mg/5 mL liquid 200 mg by Per G Tube route three (3) times daily as needed for Congestion.  OXYGEN-AIR DELIVERY SYSTEMS Take 2 L/min by inhalation as needed for Other (SOB).  B.infantis-B.ani-B.long-B.bifi (PROBIOTIC 4X) 10-15 mg TbEC by PEG Tube route daily.  furosemide (LASIX) 40 mg tablet 1 Tab by Per G Tube route Before breakfast and dinner for 30 days.  insulin regular (NOVOLIN R REGULAR U-100 INSULN) 100 unit/mL injection by SubCUTAneous route. 14 units at 8 AM, 14 units at 2 PM, and 5 units at 8 PM     Takes additional units as well depending on BG    pramipexole (MIRAPEX) 0.25 mg tablet Take 1 Tab by mouth three (3) times daily.  OTHER diltiazem 2% lidocaine cream. Apply to rectal area as needed    klack's mixture Solution Take 5 mL by mouth two (2) times a day.  Diphenhydramine elixir 12.5mg/ml 500ml, Nystatin suspension 120ml,Tetracycline 500mg capsules  12 capsules (6g), Hydrocortisone 20mg tablet 12 tablets (240mg), Water for irrigation QS ad 1000ml         albuterol (PROVENTIL VENTOLIN) 2.5 mg /3 mL (0.083 %) nebulizer solution 2.5 mg by Nebulization route two (2) times a day. Per wife patient can use 3 times a day if needed    famotidine (PEPCID) 20 mg tablet 20 mg by Per G Tube route three (3) times daily.  atorvastatin (LIPITOR) 40 mg tablet 40 mg by Per G Tube route daily.  finasteride (PROSCAR) 5 mg tablet 5 mg by Per G Tube route nightly.  alfuzosin SR (UROXATRAL) 10 mg SR tablet 10 mg by Per G Tube route daily.  midodrine (PROAMITINE) 10 mg tablet 10 mg by Per G Tube route three (3) times daily (with meals).  HYDROcodone-acetaminophen (NORCO)  mg tablet 1 Tab by Per G Tube route every four (4) hours as needed for Pain.  fluticasone (FLONASE ALLERGY RELIEF) 50 mcg/actuation nasal spray 1 [place] by Both Nostrils route two (2) times a day.  LANTUS SOLOSTAR U-100 INSULIN 100 unit/mL (3 mL) inpn INJECT 14 UNITS SUBCUTANEOUSLY ONCE DAILY    nitroglycerin (NITROSTAT) 0.4 mg SL tablet 0.4 mg by SubLINGual route every five (5) minutes as needed for Chest Pain.  gabapentin (NEURONTIN) 250 mg/5 mL solution 750 mg by PEG Tube route three (3) times daily.  multivitamin (ONE A DAY) tablet 1 Tab by Per G Tube route daily.  lidocaine (LIDODERM) 5 % Apply patch to the affected area for 12 hours a day and remove for 12 hours a day.  hydrocortisone sodium succ/PF (HYDROCORTISONE SOD SUCC, PF, INJECTION) by Injection route every three (3) months. Gets injection every 3 months from orthopedist    lactose-reduced food/fiber (ISOSOURCE 1.5 RAE FEEDING TUBE) 250 mL by adductor canal block route five (5) times daily.  acetaminophen (TYLENOL) 500 mg tablet 500-1,000 mg by Per G Tube route every six (6) hours as needed for Pain.  zinc 50 mg tab tablet 50 mg by Per G Tube route daily.  ondansetron hcl (ZOFRAN, AS HYDROCHLORIDE,) 8 mg tablet Take 1 Tab by mouth every eight (8) hours as needed for Nausea. No current facility-administered medications for this visit.          Physical Exam:  General: NAD  Lungs: clear to auscultation bilaterally, on 2L nasal cannula  Heart: regular rate and rhythm  Extremities: extremities normal, atraumatic, no cyanosis or edema    Neurologic Exam:  Mental Status:  Alert and oriented x 4. Appropriate affect, mood and behavior. Language:    Normal fluency, repetition, comprehension and naming. Cranial Nerves:   Pupils equal, round and reactive to light. 3 mm bilaterally     Visual fields full to confrontation. Extraocular movements intact. Facial sensation intact. Full facial strength, no asymmetry. No dysarthria. Tongue protrudes to midline, palate elevates symmetrically. Shoulder shrug 5/5 bilaterally. Motor:    No pronator drift. Bulk and tone normal.      5/5 power in all extremities proximally and distally. No involuntary movements. Sensation:    Sensation intact throughout to light touch. Coordination & Gait:   FTN and HTS intact. Steady gait using walker for assistance. Labs:  Lab Results   Component Value Date/Time    Sodium 142 12/09/2018 08:53 AM    Potassium 4.1 12/09/2018 08:53 AM    Chloride 102 12/09/2018 08:53 AM    CO2 38 (H) 12/09/2018 08:53 AM    Anion gap 2 (L) 12/09/2018 08:53 AM    Glucose 239 (H) 12/09/2018 08:53 AM    BUN 43 (H) 12/09/2018 08:53 AM    Creatinine 1.41 (H) 12/09/2018 08:53 AM    BUN/Creatinine ratio 30 (H) 12/09/2018 08:53 AM    GFR est AA 58 (L) 12/09/2018 08:53 AM    GFR est non-AA 48 (L) 12/09/2018 08:53 AM    Calcium 9.0 12/09/2018 08:53 AM     Lab Results   Component Value Date/Time    WBC 6.5 12/09/2018 08:53 AM    HGB 11.3 (L) 12/09/2018 08:53 AM    HCT 36.2 (L) 12/09/2018 08:53 AM    PLATELET 64 (L) 12/57/1821 08:53 AM    MCV 95.8 12/09/2018 08:53 AM     Lab Results   Component Value Date/Time    MCH 29.9 12/09/2018 08:53 AM    MCHC 31.2 12/09/2018 08:53 AM    BASOPHILS 0 12/09/2018 08:53 AM    ABS. LYMPHOCYTES 0.7 (L) 12/09/2018 08:53 AM    ABS. MONOCYTES 0.3 12/09/2018 08:53 AM    ABS. EOSINOPHILS 0.1 12/09/2018 08:53 AM    ABS. BASOPHILS 0.0 12/09/2018 08:53 AM    Phosphorus 3.0 11/13/2018 03:24 AM    Magnesium 2.3 11/17/2018 12:25 AM       IMAGING:  CTA of Head and Neck on 11/13/2018 shows    IMPRESSION:  1. There are extensive venous collaterals in the neck and around the cervical  spine making interpretation of the vertebral arteries technically challenging. However they are small in caliber bilaterally but grossly patent. 2. Patent bilateral carotid artery stents. 3. No acute intracranial abnormality. No large vessel occlusion or intracranial  aneurysm. Assessment/Plan:   80year-old  male with a significant PMH of carotid artery stenosis s/p bilateral carotid stents, CAD s/p CABG, tongue/throat cancer in 1996, Diabetes mellitus type II, stroke x 2, hyperlipidemia, and CHF who is being seen in clinic today as a new patient for ongoing dizziness. His dizziness is complicated by his extensive cardiac disease. There is definitely an element of vertebrobasilar insufficiency based on CTA results showing apparent occlusion of both proximal vertebral arteries, which has worsened compared to his CTA in 2015. However, the patient's symptoms do not appear to be pressure dependent. An angiogram would likely be of little benefit based on the CTA results. Recommend restarting ASA/Plavix once cleared to resume and follow up with his cardiologist.  Patient/wife instructed over the next two months to obtain a log of the patient's blood pressure with his symptoms to identify if there is any correlation between the two and follow up in clinic in 2 months for re-evaluation, once patient has cardiac issues sorted out at upcoming Cardiology clinic appointment. The patient was given the opportunity to review images and ask questions. Thank you for allowing me to participate in the care of this patient.     Greater than 60 minutes were spent in patient management, greater than half of which was spent in counseling and coordination of care. JENNIFER Landeros.  Esetla Amezcua MD

## 2018-12-17 NOTE — PATIENT INSTRUCTIONS
Today we discussed your ongoing dizziness. We discussed how evaluating this symptom is complicated due to your extensive cardiac disease. There is an element of vertebrobasilar insufficiency based on imaging, which has worsened since your CTA in 2015. However, your symptoms do not seem to be pressure dependent. I believe that an angiogram would likely be of little utility since the recent CTA shows marked caliber decreased of the right vertebral artery and apparent occlusion of both proximal vertebral arteries. I recommend restarting the aspirin and Plavix once you have been cleared to do so, following up with your cardiologist, and returning in 2 months. Over the next two months, you will keep track of your symptoms as they relate to your blood pressure to see if there is any correlation.

## 2018-12-20 NOTE — PROGRESS NOTES
Nurse navigator note - cardiology  Call to and reached Mrs. MEDINA - for updates on Mr. Ghosh's course -     She has started to dehumidify his oxygen - and that has helped minimally - he is up at night with very dry mouth - and he rinses his mouth - he can't swallow -     He saw Dr. Crystal Soria - diabetes -  on Dec 14 at 10:30am; with slight amendments to his Insulin regimen - and to use extra Insulin short term with steroid injections. He saw his pcp - Lyndon Garrison -   He has seen Dr. Nader Aguirre - Monday Dec 17 - neuro interventional surgery - /angiogram not felt to be appropriate at this time - revisit in 2 months. Pt had CT on  - and  Dr. Vanessa Hartman - neuro-surgery visit on / follow up post CT scan. Dr. Jacqueline Juan advised restart Plavix and asa - which wife has done. Plan: Note to Valve clinic coordinator - Mihaela Matias - Np - re: appt for evaluation for    TAVR - after new year. Call to Dr. Wale Sparks - cardiology - re: Lasix dosing - 40 mg bid - or every day -    Was given rx for Lasix 40 mg bid for 30 days. -   Request that they advise pt - Weight today - 156 lbs. Mr. Brock Connell has high level of debility and complex multi comorbids - but he nor his wife show interest in palliative discussions - NN has discussed mortality with Mrs. Brock Connell with conversations - there is comfort care/ and comfort teams available - if pt wants to transition. Josh Chapin DO/cardiology   [address] [place], [place]  [phone]  Call to Dr. Anglin Andrés office - should they drop back to Lasix 40 mg every day after 30 days -   Weight today 156.     furosemide (LASIX) 40 mg tablet [[id number]]   Order Details   Dose: 40 mg Route: Per G Tube Frequency: 2 TIMES DAILY BEFORE MEALS   Dispense Quantity: 40 Tab Refills: 6 Fills remaining: --           Si Tab by Per G Tube route Before breakfast and dinner for 30 days.         Pt has no upcoming cardiology appts - but might see cardiac surgery/ valve clinic - evaluation re: TAVR evaluation and anesthesia evaluation -     Brown Gonzalez RN , [facility], [facility]   [address] [facility]  [phone]

## 2018-12-26 NOTE — PROGRESS NOTES
Nurse navigator note - cardiology  Call to [place] - to request from nurse re: situation in the home - the nurse arrived during the call. His wife went to the ER - 12/23 - she started to have pressure on her chest - and some trouble breathing and her cardiac workup was ok - they gave her a GI cocktail - and she is to get Mylanta - she had a CT scan -   Stress or stomach issues - it also showed some fluid in her hip area. NN discussed with her - concerns about the amount of stress on her - overdoing post hip replacement - constant care needs for Mr. Ghosh - multiple appts -     Her sons seem to think this is normal and that they are managing fine -   Ms. [id number] admitted to stress and overdoing - and that she needs a backup plan for Elyse Rice should she be sick -     We agreed to talk in a few days to see resources to enlist care / help and a backup plan for Mr. Ghosh.  She agreed to converse about this -    Jayna Mcintosh RN , [facility], [facility]   [address]  [phone]

## 2018-12-26 NOTE — PROGRESS NOTES
Nurse navigator note - cardiology  Incoming call from [facility] nurse - post visit -   She states that Mr. Johnny Santana has some irritation around the peg tube site and needed a breathing treatment with the visit -   She felt his wife looked stressed today - and worried about the care issues -    Plan: Discuss aspects of care - and resources for help    Social work consultation/ if desired   Palliative referral is wife will agree to this    Roopa Rivas RN , [facility], [address] [place] [address]  [phone]

## 2019-01-01 ENCOUNTER — PATIENT OUTREACH (OUTPATIENT)
Dept: CASE MANAGEMENT | Age: 84
End: 2019-01-01

## 2019-01-01 ENCOUNTER — HOSPITAL ENCOUNTER (INPATIENT)
Age: 84
LOS: 1 days | DRG: 189 | End: 2019-02-06
Attending: INTERNAL MEDICINE | Admitting: INTERNAL MEDICINE
Payer: OTHER MISCELLANEOUS

## 2019-01-01 ENCOUNTER — APPOINTMENT (OUTPATIENT)
Dept: GENERAL RADIOLOGY | Age: 84
DRG: 871 | End: 2019-01-01
Attending: EMERGENCY MEDICINE
Payer: MEDICARE

## 2019-01-01 ENCOUNTER — HOME CARE VISIT (OUTPATIENT)
Dept: HOME HEALTH SERVICES | Facility: HOME HEALTH | Age: 84
End: 2019-01-01
Payer: MEDICARE

## 2019-01-01 ENCOUNTER — APPOINTMENT (OUTPATIENT)
Dept: ULTRASOUND IMAGING | Age: 84
DRG: 246 | End: 2019-01-01
Attending: INTERNAL MEDICINE
Payer: MEDICARE

## 2019-01-01 ENCOUNTER — DOCUMENTATION ONLY (OUTPATIENT)
Dept: CASE MANAGEMENT | Age: 84
End: 2019-01-01

## 2019-01-01 ENCOUNTER — APPOINTMENT (OUTPATIENT)
Dept: GENERAL RADIOLOGY | Age: 84
DRG: 246 | End: 2019-01-01
Attending: EMERGENCY MEDICINE
Payer: MEDICARE

## 2019-01-01 ENCOUNTER — HOSPITAL ENCOUNTER (OUTPATIENT)
Dept: INFUSION THERAPY | Age: 84
Discharge: HOME | End: 2019-01-08
Payer: MEDICARE

## 2019-01-01 ENCOUNTER — APPOINTMENT (OUTPATIENT)
Dept: NUCLEAR MEDICINE | Age: 84
DRG: 246 | End: 2019-01-01
Attending: EMERGENCY MEDICINE
Payer: MEDICARE

## 2019-01-01 ENCOUNTER — TELEPHONE (OUTPATIENT)
Dept: NEUROLOGY | Age: 84
End: 2019-01-01

## 2019-01-01 ENCOUNTER — OFFICE VISIT (OUTPATIENT)
Dept: URGENT CARE | Age: 84
End: 2019-01-01

## 2019-01-01 ENCOUNTER — APPOINTMENT (OUTPATIENT)
Dept: CT IMAGING | Age: 84
DRG: 246 | End: 2019-01-01
Attending: INTERNAL MEDICINE
Payer: MEDICARE

## 2019-01-01 ENCOUNTER — HOME CARE VISIT (OUTPATIENT)
Dept: SCHEDULING | Facility: HOME HEALTH | Age: 84
End: 2019-01-01
Payer: MEDICARE

## 2019-01-01 ENCOUNTER — APPOINTMENT (OUTPATIENT)
Dept: ULTRASOUND IMAGING | Age: 84
DRG: 871 | End: 2019-01-01
Attending: EMERGENCY MEDICINE
Payer: MEDICARE

## 2019-01-01 ENCOUNTER — HOSPITAL ENCOUNTER (INPATIENT)
Age: 84
LOS: 2 days | Discharge: HOSPICE | DRG: 871 | End: 2019-02-05
Attending: EMERGENCY MEDICINE | Admitting: INTERNAL MEDICINE
Payer: MEDICARE

## 2019-01-01 ENCOUNTER — APPOINTMENT (OUTPATIENT)
Dept: GENERAL RADIOLOGY | Age: 84
DRG: 871 | End: 2019-01-01
Attending: RADIOLOGY
Payer: MEDICARE

## 2019-01-01 ENCOUNTER — HOSPICE ADMISSION (OUTPATIENT)
Dept: HOSPICE | Facility: HOSPICE | Age: 84
End: 2019-01-01
Payer: MEDICARE

## 2019-01-01 ENCOUNTER — TELEPHONE (OUTPATIENT)
Dept: ENDOCRINOLOGY | Age: 84
End: 2019-01-01

## 2019-01-01 ENCOUNTER — HOME CARE VISIT (OUTPATIENT)
Dept: HOSPICE | Facility: HOSPICE | Age: 84
End: 2019-01-01
Payer: MEDICARE

## 2019-01-01 ENCOUNTER — HOSPITAL ENCOUNTER (OUTPATIENT)
Dept: CT IMAGING | Age: 84
Discharge: HOME | End: 2019-01-08
Attending: NURSE PRACTITIONER
Payer: MEDICARE

## 2019-01-01 ENCOUNTER — HOME CARE VISIT (OUTPATIENT)
Dept: HOME HEALTH SERVICES | Facility: HOME HEALTH | Age: 84
End: 2019-01-01

## 2019-01-01 ENCOUNTER — HOSPITAL ENCOUNTER (INPATIENT)
Age: 84
LOS: 7 days | Discharge: NURSING, REHAB OR LONG-TERM CARE | DRG: 246 | End: 2019-01-18
Attending: EMERGENCY MEDICINE | Admitting: FAMILY MEDICINE
Payer: MEDICARE

## 2019-01-01 ENCOUNTER — APPOINTMENT (OUTPATIENT)
Dept: CT IMAGING | Age: 84
DRG: 871 | End: 2019-01-01
Attending: EMERGENCY MEDICINE
Payer: MEDICARE

## 2019-01-01 ENCOUNTER — APPOINTMENT (OUTPATIENT)
Dept: CT IMAGING | Age: 84
DRG: 246 | End: 2019-01-01
Attending: EMERGENCY MEDICINE
Payer: MEDICARE

## 2019-01-01 VITALS
BODY MASS INDEX: 25.43 KG/M2 | OXYGEN SATURATION: 98 % | WEIGHT: 162.04 LBS | HEIGHT: 67 IN | TEMPERATURE: 98.2 F | SYSTOLIC BLOOD PRESSURE: 127 MMHG | HEART RATE: 84 BPM | RESPIRATION RATE: 18 BRPM | DIASTOLIC BLOOD PRESSURE: 50 MMHG

## 2019-01-01 VITALS
HEART RATE: 87 BPM | DIASTOLIC BLOOD PRESSURE: 78 MMHG | OXYGEN SATURATION: 98 % | SYSTOLIC BLOOD PRESSURE: 145 MMHG | TEMPERATURE: 98 F

## 2019-01-01 VITALS
DIASTOLIC BLOOD PRESSURE: 58 MMHG | TEMPERATURE: 99.4 F | SYSTOLIC BLOOD PRESSURE: 95 MMHG | OXYGEN SATURATION: 96 % | HEART RATE: 97 BPM | RESPIRATION RATE: 22 BRPM | BODY MASS INDEX: 25.9 KG/M2 | WEIGHT: 165.34 LBS

## 2019-01-01 VITALS
WEIGHT: 166 LBS | SYSTOLIC BLOOD PRESSURE: 141 MMHG | RESPIRATION RATE: 15 BRPM | TEMPERATURE: 97.4 F | OXYGEN SATURATION: 91 % | DIASTOLIC BLOOD PRESSURE: 65 MMHG | HEIGHT: 67 IN | HEART RATE: 52 BPM | BODY MASS INDEX: 26.06 KG/M2

## 2019-01-01 VITALS
TEMPERATURE: 97.2 F | OXYGEN SATURATION: 96 % | DIASTOLIC BLOOD PRESSURE: 63 MMHG | HEART RATE: 75 BPM | SYSTOLIC BLOOD PRESSURE: 122 MMHG | RESPIRATION RATE: 18 BRPM

## 2019-01-01 VITALS
SYSTOLIC BLOOD PRESSURE: 108 MMHG | HEART RATE: 64 BPM | OXYGEN SATURATION: 97 % | TEMPERATURE: 98 F | RESPIRATION RATE: 17 BRPM | DIASTOLIC BLOOD PRESSURE: 78 MMHG

## 2019-01-01 VITALS
OXYGEN SATURATION: 95 % | TEMPERATURE: 98.4 F | RESPIRATION RATE: 16 BRPM | HEART RATE: 75 BPM | DIASTOLIC BLOOD PRESSURE: 59 MMHG | SYSTOLIC BLOOD PRESSURE: 96 MMHG

## 2019-01-01 VITALS
HEART RATE: 99 BPM | RESPIRATION RATE: 16 BRPM | DIASTOLIC BLOOD PRESSURE: 52 MMHG | OXYGEN SATURATION: 81 % | SYSTOLIC BLOOD PRESSURE: 115 MMHG | TEMPERATURE: 98.4 F

## 2019-01-01 VITALS
TEMPERATURE: 97.5 F | OXYGEN SATURATION: 96 % | SYSTOLIC BLOOD PRESSURE: 118 MMHG | HEART RATE: 71 BPM | RESPIRATION RATE: 18 BRPM | DIASTOLIC BLOOD PRESSURE: 62 MMHG

## 2019-01-01 VITALS — BODY MASS INDEX: 25.37 KG/M2 | WEIGHT: 162 LBS

## 2019-01-01 DIAGNOSIS — I63.343 CEREBROVASCULAR ACCIDENT (CVA) DUE TO BILATERAL THROMBOSIS OF CEREBELLAR ARTERIES (HCC): ICD-10-CM

## 2019-01-01 DIAGNOSIS — Z95.810 STATUS POST IMPLANTATION OF AUTOMATIC CARDIOVERTER/DEFIBRILLATOR (AICD): ICD-10-CM

## 2019-01-01 DIAGNOSIS — Z93.1 PEG (PERCUTANEOUS ENDOSCOPIC GASTROSTOMY) STATUS (HCC): ICD-10-CM

## 2019-01-01 DIAGNOSIS — J96.21 ACUTE ON CHRONIC RESPIRATORY FAILURE WITH HYPOXIA (HCC): ICD-10-CM

## 2019-01-01 DIAGNOSIS — G63 POLYNEUROPATHY ASSOCIATED WITH UNDERLYING DISEASE (HCC): ICD-10-CM

## 2019-01-01 DIAGNOSIS — G25.0 BENIGN ESSENTIAL TREMOR SYNDROME: ICD-10-CM

## 2019-01-01 DIAGNOSIS — R53.81 DEBILITY: ICD-10-CM

## 2019-01-01 DIAGNOSIS — M25.551 RIGHT HIP PAIN: ICD-10-CM

## 2019-01-01 DIAGNOSIS — N17.9 AKI (ACUTE KIDNEY INJURY) (HCC): ICD-10-CM

## 2019-01-01 DIAGNOSIS — R13.10 DYSPHAGIA, UNSPECIFIED TYPE: ICD-10-CM

## 2019-01-01 DIAGNOSIS — J90 PLEURAL EFFUSION ON LEFT: ICD-10-CM

## 2019-01-01 DIAGNOSIS — E11.42 TYPE 2 DIABETES MELLITUS WITH DIABETIC NEUROPATHY AFFECTING BOTH SIDES OF BODY (HCC): ICD-10-CM

## 2019-01-01 DIAGNOSIS — R40.4 TRANSIENT ALTERATION OF AWARENESS: ICD-10-CM

## 2019-01-01 DIAGNOSIS — E87.20 LACTIC ACIDOSIS: ICD-10-CM

## 2019-01-01 DIAGNOSIS — E86.1 HYPOTENSION DUE TO HYPOVOLEMIA: ICD-10-CM

## 2019-01-01 DIAGNOSIS — G45.0 VERTEBROBASILAR OCCLUSIVE DISEASE: ICD-10-CM

## 2019-01-01 DIAGNOSIS — G62.9 PERIPHERAL POLYNEUROPATHY: ICD-10-CM

## 2019-01-01 DIAGNOSIS — E11.42 DIABETIC PERIPHERAL NEUROPATHY ASSOCIATED WITH TYPE 2 DIABETES MELLITUS (HCC): ICD-10-CM

## 2019-01-01 DIAGNOSIS — L08.9 SUPERFICIAL BACTERIAL SKIN INFECTION: Primary | ICD-10-CM

## 2019-01-01 DIAGNOSIS — J18.9 HCAP (HEALTHCARE-ASSOCIATED PNEUMONIA): ICD-10-CM

## 2019-01-01 DIAGNOSIS — E11.21 TYPE 2 DIABETES WITH NEPHROPATHY (HCC): ICD-10-CM

## 2019-01-01 DIAGNOSIS — Z85.810 HX OF TONGUE CANCER: ICD-10-CM

## 2019-01-01 DIAGNOSIS — I35.0 NONRHEUMATIC AORTIC VALVE STENOSIS: ICD-10-CM

## 2019-01-01 DIAGNOSIS — G20 PARKINSON'S DISEASE (TREMOR, STIFFNESS, SLOW MOTION, UNSTABLE POSTURE) (HCC): ICD-10-CM

## 2019-01-01 DIAGNOSIS — I95.89 HYPOTENSION DUE TO HYPOVOLEMIA: ICD-10-CM

## 2019-01-01 DIAGNOSIS — Z71.89 COUNSELING REGARDING GOALS OF CARE: ICD-10-CM

## 2019-01-01 DIAGNOSIS — I35.0 AORTIC STENOSIS, MILD: ICD-10-CM

## 2019-01-01 DIAGNOSIS — Z95.1 S/P CABG X 3: ICD-10-CM

## 2019-01-01 DIAGNOSIS — I63.312 THROMBOTIC STROKE INVOLVING LEFT MIDDLE CEREBRAL ARTERY (HCC): ICD-10-CM

## 2019-01-01 DIAGNOSIS — J96.01 ACUTE RESPIRATORY FAILURE WITH HYPOXIA (HCC): Primary | ICD-10-CM

## 2019-01-01 DIAGNOSIS — I10 ESSENTIAL HYPERTENSION WITH GOAL BLOOD PRESSURE LESS THAN 140/90: ICD-10-CM

## 2019-01-01 DIAGNOSIS — B96.89 SUPERFICIAL BACTERIAL SKIN INFECTION: Primary | ICD-10-CM

## 2019-01-01 DIAGNOSIS — I50.9 HEART FAILURE, UNSPECIFIED HF CHRONICITY, UNSPECIFIED HEART FAILURE TYPE (HCC): ICD-10-CM

## 2019-01-01 DIAGNOSIS — J15.3 PNEUMONIA DUE TO GROUP B STREPTOCOCCUS, UNSPECIFIED LATERALITY, UNSPECIFIED PART OF LUNG (HCC): ICD-10-CM

## 2019-01-01 DIAGNOSIS — R55 SYNCOPE AND COLLAPSE: Primary | ICD-10-CM

## 2019-01-01 DIAGNOSIS — I95.1 ORTHOSTATIC HYPOTENSION: ICD-10-CM

## 2019-01-01 DIAGNOSIS — Z98.61 S/P PTCA (PERCUTANEOUS TRANSLUMINAL CORONARY ANGIOPLASTY): ICD-10-CM

## 2019-01-01 LAB
ACT BLD: 307 SECS (ref 79–138)
ALBUMIN SERPL-MCNC: 3 G/DL (ref 3.5–5)
ALBUMIN SERPL-MCNC: 3.1 G/DL (ref 3.5–5)
ALBUMIN SERPL-MCNC: 3.2 G/DL (ref 3.5–5)
ALBUMIN/GLOB SERPL: 0.6 {RATIO} (ref 1.1–2.2)
ALBUMIN/GLOB SERPL: 0.7 {RATIO} (ref 1.1–2.2)
ALBUMIN/GLOB SERPL: 0.7 {RATIO} (ref 1.1–2.2)
ALP SERPL-CCNC: 73 U/L (ref 45–117)
ALP SERPL-CCNC: 90 U/L (ref 45–117)
ALP SERPL-CCNC: 93 U/L (ref 45–117)
ALT SERPL-CCNC: 38 U/L (ref 12–78)
ALT SERPL-CCNC: 46 U/L (ref 12–78)
ALT SERPL-CCNC: 51 U/L (ref 12–78)
AMMONIA PLAS-SCNC: 47 UMOL/L
ANION GAP SERPL CALC-SCNC: 3 MMOL/L (ref 5–15)
ANION GAP SERPL CALC-SCNC: 4 MMOL/L (ref 5–15)
ANION GAP SERPL CALC-SCNC: 5 MMOL/L (ref 5–15)
ANION GAP SERPL CALC-SCNC: 6 MMOL/L (ref 5–15)
ANION GAP SERPL CALC-SCNC: 7 MMOL/L (ref 5–15)
APPEARANCE FLD: ABNORMAL
APPEARANCE UR: ABNORMAL
APPEARANCE UR: CLEAR
ARTERIAL PATENCY WRIST A: YES
AST SERPL-CCNC: 60 U/L (ref 15–37)
AST SERPL-CCNC: 67 U/L (ref 15–37)
AST SERPL-CCNC: 68 U/L (ref 15–37)
ATRIAL RATE: 108 BPM
ATRIAL RATE: 87 BPM
BACTERIA SPEC CULT: NORMAL
BACTERIA SPEC CULT: NORMAL
BACTERIA URNS QL MICRO: NEGATIVE /HPF
BASE EXCESS BLDA CALC-SCNC: 3.1 MMOL/L
BASOPHILS # BLD: 0 K/UL (ref 0–0.1)
BASOPHILS NFR BLD: 0 % (ref 0–1)
BDY SITE: ABNORMAL
BILIRUB DIRECT SERPL-MCNC: 0.4 MG/DL (ref 0–0.2)
BILIRUB SERPL-MCNC: 0.7 MG/DL (ref 0.2–1)
BILIRUB SERPL-MCNC: 0.7 MG/DL (ref 0.2–1)
BILIRUB SERPL-MCNC: 1.1 MG/DL (ref 0.2–1)
BILIRUB UR QL: NEGATIVE
BILIRUB UR QL: NEGATIVE
BNP SERPL-MCNC: 9636 PG/ML (ref 0–450)
BNP SERPL-MCNC: ABNORMAL PG/ML (ref 0–450)
BREATHS.SPONTANEOUS ON VENT: 16
BUN SERPL-MCNC: 30 MG/DL (ref 6–20)
BUN SERPL-MCNC: 30 MG/DL (ref 6–20)
BUN SERPL-MCNC: 40 MG/DL (ref 6–20)
BUN SERPL-MCNC: 47 MG/DL (ref 6–20)
BUN SERPL-MCNC: 47 MG/DL (ref 6–20)
BUN SERPL-MCNC: 49 MG/DL (ref 6–20)
BUN SERPL-MCNC: 49 MG/DL (ref 6–20)
BUN SERPL-MCNC: 52 MG/DL (ref 6–20)
BUN SERPL-MCNC: 56 MG/DL (ref 6–20)
BUN SERPL-MCNC: 56 MG/DL (ref 6–20)
BUN/CREAT SERPL: 24 (ref 12–20)
BUN/CREAT SERPL: 25 (ref 12–20)
BUN/CREAT SERPL: 26 (ref 12–20)
BUN/CREAT SERPL: 27 (ref 12–20)
BUN/CREAT SERPL: 27 (ref 12–20)
BUN/CREAT SERPL: 28 (ref 12–20)
BUN/CREAT SERPL: 29 (ref 12–20)
BUN/CREAT SERPL: 32 (ref 12–20)
BUN/CREAT SERPL: 33 (ref 12–20)
BUN/CREAT SERPL: 33 (ref 12–20)
CALCIUM SERPL-MCNC: 8.4 MG/DL (ref 8.5–10.1)
CALCIUM SERPL-MCNC: 8.6 MG/DL (ref 8.5–10.1)
CALCIUM SERPL-MCNC: 8.7 MG/DL (ref 8.5–10.1)
CALCIUM SERPL-MCNC: 8.8 MG/DL (ref 8.5–10.1)
CALCIUM SERPL-MCNC: 8.8 MG/DL (ref 8.5–10.1)
CALCIUM SERPL-MCNC: 9.1 MG/DL (ref 8.5–10.1)
CALCIUM SERPL-MCNC: 9.4 MG/DL (ref 8.5–10.1)
CALCIUM SERPL-MCNC: 9.6 MG/DL (ref 8.5–10.1)
CALCULATED P AXIS, ECG09: -77 DEGREES
CALCULATED R AXIS, ECG10: -3 DEGREES
CALCULATED R AXIS, ECG10: 157 DEGREES
CALCULATED T AXIS, ECG11: 167 DEGREES
CALCULATED T AXIS, ECG11: 17 DEGREES
CHLORIDE SERPL-SCNC: 94 MMOL/L (ref 97–108)
CHLORIDE SERPL-SCNC: 96 MMOL/L (ref 97–108)
CHLORIDE SERPL-SCNC: 97 MMOL/L (ref 97–108)
CHLORIDE SERPL-SCNC: 97 MMOL/L (ref 97–108)
CHLORIDE SERPL-SCNC: 98 MMOL/L (ref 97–108)
CO2 SERPL-SCNC: 31 MMOL/L (ref 21–32)
CO2 SERPL-SCNC: 35 MMOL/L (ref 21–32)
CO2 SERPL-SCNC: 36 MMOL/L (ref 21–32)
CO2 SERPL-SCNC: 37 MMOL/L (ref 21–32)
CO2 SERPL-SCNC: 39 MMOL/L (ref 21–32)
CO2 SERPL-SCNC: 39 MMOL/L (ref 21–32)
CO2 SERPL-SCNC: 40 MMOL/L (ref 21–32)
COLOR FLD: YELLOW
COLOR UR: ABNORMAL
COLOR UR: ABNORMAL
COMMENT, HOLDF: NORMAL
CREAT SERPL-MCNC: 1.11 MG/DL (ref 0.7–1.3)
CREAT SERPL-MCNC: 1.11 MG/DL (ref 0.7–1.3)
CREAT SERPL-MCNC: 1.24 MG/DL (ref 0.7–1.3)
CREAT SERPL-MCNC: 1.42 MG/DL (ref 0.7–1.3)
CREAT SERPL-MCNC: 1.7 MG/DL (ref 0.7–1.3)
CREAT SERPL-MCNC: 1.74 MG/DL (ref 0.7–1.3)
CREAT SERPL-MCNC: 1.77 MG/DL (ref 0.7–1.3)
CREAT SERPL-MCNC: 1.91 MG/DL (ref 0.7–1.3)
CREAT SERPL-MCNC: 2.04 MG/DL (ref 0.7–1.3)
CREAT SERPL-MCNC: 2.15 MG/DL (ref 0.7–1.3)
D DIMER PPP FEU-MCNC: 1.21 MG/L FEU (ref 0–0.65)
DIAGNOSIS, 93000: NORMAL
DIAGNOSIS, 93000: NORMAL
DIFFERENTIAL METHOD BLD: ABNORMAL
EOSINOPHIL # BLD: 0 K/UL (ref 0–0.4)
EOSINOPHIL # BLD: 0.1 K/UL (ref 0–0.4)
EOSINOPHIL NFR BLD: 0 % (ref 0–7)
EOSINOPHIL NFR BLD: 1 % (ref 0–7)
EPITH CASTS URNS QL MICRO: ABNORMAL /LPF
ERYTHROCYTE [DISTWIDTH] IN BLOOD BY AUTOMATED COUNT: 16.1 % (ref 11.5–14.5)
ERYTHROCYTE [DISTWIDTH] IN BLOOD BY AUTOMATED COUNT: 16.2 % (ref 11.5–14.5)
ERYTHROCYTE [DISTWIDTH] IN BLOOD BY AUTOMATED COUNT: 16.3 % (ref 11.5–14.5)
ERYTHROCYTE [DISTWIDTH] IN BLOOD BY AUTOMATED COUNT: 16.4 % (ref 11.5–14.5)
ERYTHROCYTE [DISTWIDTH] IN BLOOD BY AUTOMATED COUNT: 16.5 % (ref 11.5–14.5)
ERYTHROCYTE [DISTWIDTH] IN BLOOD BY AUTOMATED COUNT: 16.6 % (ref 11.5–14.5)
ERYTHROCYTE [DISTWIDTH] IN BLOOD BY AUTOMATED COUNT: 16.8 % (ref 11.5–14.5)
ERYTHROCYTE [DISTWIDTH] IN BLOOD BY AUTOMATED COUNT: 17 % (ref 11.5–14.5)
ERYTHROCYTE [DISTWIDTH] IN BLOOD BY AUTOMATED COUNT: 17 % (ref 11.5–14.5)
EST. AVERAGE GLUCOSE BLD GHB EST-MCNC: 157 MG/DL
FIO2 ON VENT: 100 %
GLOBULIN SER CALC-MCNC: 4.5 G/DL (ref 2–4)
GLOBULIN SER CALC-MCNC: 4.5 G/DL (ref 2–4)
GLOBULIN SER CALC-MCNC: 5.1 G/DL (ref 2–4)
GLUCOSE BLD STRIP.AUTO-MCNC: 102 MG/DL (ref 65–100)
GLUCOSE BLD STRIP.AUTO-MCNC: 108 MG/DL (ref 65–100)
GLUCOSE BLD STRIP.AUTO-MCNC: 113 MG/DL (ref 65–100)
GLUCOSE BLD STRIP.AUTO-MCNC: 134 MG/DL (ref 65–100)
GLUCOSE BLD STRIP.AUTO-MCNC: 134 MG/DL (ref 65–100)
GLUCOSE BLD STRIP.AUTO-MCNC: 136 MG/DL (ref 65–100)
GLUCOSE BLD STRIP.AUTO-MCNC: 139 MG/DL (ref 65–100)
GLUCOSE BLD STRIP.AUTO-MCNC: 147 MG/DL (ref 65–100)
GLUCOSE BLD STRIP.AUTO-MCNC: 152 MG/DL (ref 65–100)
GLUCOSE BLD STRIP.AUTO-MCNC: 161 MG/DL (ref 65–100)
GLUCOSE BLD STRIP.AUTO-MCNC: 162 MG/DL (ref 65–100)
GLUCOSE BLD STRIP.AUTO-MCNC: 165 MG/DL (ref 65–100)
GLUCOSE BLD STRIP.AUTO-MCNC: 174 MG/DL (ref 65–100)
GLUCOSE BLD STRIP.AUTO-MCNC: 181 MG/DL (ref 65–100)
GLUCOSE BLD STRIP.AUTO-MCNC: 185 MG/DL (ref 65–100)
GLUCOSE BLD STRIP.AUTO-MCNC: 203 MG/DL (ref 65–100)
GLUCOSE BLD STRIP.AUTO-MCNC: 209 MG/DL (ref 65–100)
GLUCOSE BLD STRIP.AUTO-MCNC: 215 MG/DL (ref 65–100)
GLUCOSE BLD STRIP.AUTO-MCNC: 216 MG/DL (ref 65–100)
GLUCOSE BLD STRIP.AUTO-MCNC: 221 MG/DL (ref 65–100)
GLUCOSE BLD STRIP.AUTO-MCNC: 228 MG/DL (ref 65–100)
GLUCOSE BLD STRIP.AUTO-MCNC: 238 MG/DL (ref 65–100)
GLUCOSE BLD STRIP.AUTO-MCNC: 246 MG/DL (ref 65–100)
GLUCOSE BLD STRIP.AUTO-MCNC: 254 MG/DL (ref 65–100)
GLUCOSE BLD STRIP.AUTO-MCNC: 255 MG/DL (ref 65–100)
GLUCOSE BLD STRIP.AUTO-MCNC: 255 MG/DL (ref 65–100)
GLUCOSE BLD STRIP.AUTO-MCNC: 256 MG/DL (ref 65–100)
GLUCOSE BLD STRIP.AUTO-MCNC: 260 MG/DL (ref 65–100)
GLUCOSE BLD STRIP.AUTO-MCNC: 273 MG/DL (ref 65–100)
GLUCOSE BLD STRIP.AUTO-MCNC: 280 MG/DL (ref 65–100)
GLUCOSE BLD STRIP.AUTO-MCNC: 288 MG/DL (ref 65–100)
GLUCOSE BLD STRIP.AUTO-MCNC: 290 MG/DL (ref 65–100)
GLUCOSE BLD STRIP.AUTO-MCNC: 293 MG/DL (ref 65–100)
GLUCOSE BLD STRIP.AUTO-MCNC: 297 MG/DL (ref 65–100)
GLUCOSE BLD STRIP.AUTO-MCNC: 298 MG/DL (ref 65–100)
GLUCOSE BLD STRIP.AUTO-MCNC: 310 MG/DL (ref 65–100)
GLUCOSE BLD STRIP.AUTO-MCNC: 361 MG/DL (ref 65–100)
GLUCOSE BLD STRIP.AUTO-MCNC: 56 MG/DL (ref 65–100)
GLUCOSE SERPL-MCNC: 108 MG/DL (ref 65–100)
GLUCOSE SERPL-MCNC: 134 MG/DL (ref 65–100)
GLUCOSE SERPL-MCNC: 142 MG/DL (ref 65–100)
GLUCOSE SERPL-MCNC: 150 MG/DL (ref 65–100)
GLUCOSE SERPL-MCNC: 154 MG/DL (ref 65–100)
GLUCOSE SERPL-MCNC: 167 MG/DL (ref 65–100)
GLUCOSE SERPL-MCNC: 170 MG/DL (ref 65–100)
GLUCOSE SERPL-MCNC: 204 MG/DL (ref 65–100)
GLUCOSE SERPL-MCNC: 263 MG/DL (ref 65–100)
GLUCOSE SERPL-MCNC: 392 MG/DL (ref 65–100)
GLUCOSE UR STRIP.AUTO-MCNC: NEGATIVE MG/DL
GLUCOSE UR STRIP.AUTO-MCNC: NEGATIVE MG/DL
HBA1C MFR BLD: 7.1 % (ref 4.2–6.3)
HCO3 BLDA-SCNC: 28 MMOL/L (ref 22–26)
HCT VFR BLD AUTO: 29.2 % (ref 36.6–50.3)
HCT VFR BLD AUTO: 29.8 % (ref 36.6–50.3)
HCT VFR BLD AUTO: 30.7 % (ref 36.6–50.3)
HCT VFR BLD AUTO: 30.7 % (ref 36.6–50.3)
HCT VFR BLD AUTO: 31 % (ref 36.6–50.3)
HCT VFR BLD AUTO: 32.2 % (ref 36.6–50.3)
HCT VFR BLD AUTO: 33.1 % (ref 36.6–50.3)
HCT VFR BLD AUTO: 36.9 % (ref 36.6–50.3)
HCT VFR BLD AUTO: 39.3 % (ref 36.6–50.3)
HGB BLD-MCNC: 10 G/DL (ref 12.1–17)
HGB BLD-MCNC: 10.1 G/DL (ref 12.1–17)
HGB BLD-MCNC: 11.6 G/DL (ref 12.1–17)
HGB BLD-MCNC: 11.9 G/DL (ref 12.1–17)
HGB BLD-MCNC: 8.9 G/DL (ref 12.1–17)
HGB BLD-MCNC: 9.2 G/DL (ref 12.1–17)
HGB BLD-MCNC: 9.7 G/DL (ref 12.1–17)
HGB BLD-MCNC: 9.7 G/DL (ref 12.1–17)
HGB BLD-MCNC: 9.9 G/DL (ref 12.1–17)
HGB UR QL STRIP: NEGATIVE
HGB UR QL STRIP: NEGATIVE
HYALINE CASTS URNS QL MICRO: ABNORMAL /LPF (ref 0–5)
IMM GRANULOCYTES # BLD AUTO: 0 K/UL (ref 0–0.04)
IMM GRANULOCYTES # BLD AUTO: 0.1 K/UL (ref 0–0.04)
IMM GRANULOCYTES NFR BLD AUTO: 0 % (ref 0–0.5)
IMM GRANULOCYTES NFR BLD AUTO: 1 % (ref 0–0.5)
INR PPP: 1.1 (ref 0.9–1.1)
KETONES UR QL STRIP.AUTO: NEGATIVE MG/DL
KETONES UR QL STRIP.AUTO: NEGATIVE MG/DL
LACTATE BLD-SCNC: 1.1 MMOL/L (ref 0.4–2)
LACTATE BLD-SCNC: 3.33 MMOL/L (ref 0.4–2)
LACTATE BLD-SCNC: 5.64 MMOL/L (ref 0.4–2)
LACTATE SERPL-SCNC: 1.8 MMOL/L (ref 0.4–2)
LACTATE SERPL-SCNC: 3.3 MMOL/L (ref 0.4–2)
LACTATE SERPL-SCNC: 5.9 MMOL/L (ref 0.4–2)
LDH FLD L TO P-CCNC: 68 U/L
LEUKOCYTE ESTERASE UR QL STRIP.AUTO: NEGATIVE
LEUKOCYTE ESTERASE UR QL STRIP.AUTO: NEGATIVE
LYMPHOCYTES # BLD: 0.5 K/UL (ref 0.8–3.5)
LYMPHOCYTES # BLD: 0.6 K/UL (ref 0.8–3.5)
LYMPHOCYTES # BLD: 1 K/UL (ref 0.8–3.5)
LYMPHOCYTES NFR BLD: 18 % (ref 12–49)
LYMPHOCYTES NFR BLD: 5 % (ref 12–49)
LYMPHOCYTES NFR BLD: 6 % (ref 12–49)
LYMPHOCYTES NFR BLD: 8 % (ref 12–49)
LYMPHOCYTES NFR BLD: 9 % (ref 12–49)
LYMPHOCYTES NFR FLD: 79 %
MAGNESIUM SERPL-MCNC: 2.8 MG/DL (ref 1.6–2.4)
MCH RBC QN AUTO: 29.5 PG (ref 26–34)
MCH RBC QN AUTO: 29.7 PG (ref 26–34)
MCH RBC QN AUTO: 29.9 PG (ref 26–34)
MCH RBC QN AUTO: 30.1 PG (ref 26–34)
MCH RBC QN AUTO: 30.1 PG (ref 26–34)
MCH RBC QN AUTO: 30.3 PG (ref 26–34)
MCH RBC QN AUTO: 30.3 PG (ref 26–34)
MCH RBC QN AUTO: 30.5 PG (ref 26–34)
MCH RBC QN AUTO: 30.7 PG (ref 26–34)
MCHC RBC AUTO-ENTMCNC: 29.9 G/DL (ref 30–36.5)
MCHC RBC AUTO-ENTMCNC: 30.3 G/DL (ref 30–36.5)
MCHC RBC AUTO-ENTMCNC: 30.5 G/DL (ref 30–36.5)
MCHC RBC AUTO-ENTMCNC: 31.1 G/DL (ref 30–36.5)
MCHC RBC AUTO-ENTMCNC: 31.4 G/DL (ref 30–36.5)
MCHC RBC AUTO-ENTMCNC: 31.5 G/DL (ref 30–36.5)
MCHC RBC AUTO-ENTMCNC: 31.6 G/DL (ref 30–36.5)
MCHC RBC AUTO-ENTMCNC: 31.6 G/DL (ref 30–36.5)
MCHC RBC AUTO-ENTMCNC: 31.9 G/DL (ref 30–36.5)
MCV RBC AUTO: 95.4 FL (ref 80–99)
MCV RBC AUTO: 95.5 FL (ref 80–99)
MCV RBC AUTO: 95.9 FL (ref 80–99)
MCV RBC AUTO: 96 FL (ref 80–99)
MCV RBC AUTO: 96.3 FL (ref 80–99)
MCV RBC AUTO: 96.5 FL (ref 80–99)
MCV RBC AUTO: 98.7 FL (ref 80–99)
MCV RBC AUTO: 98.7 FL (ref 80–99)
MCV RBC AUTO: 98.8 FL (ref 80–99)
MESOTHL CELL NFR FLD: 8 %
MONOCYTES # BLD: 0.4 K/UL (ref 0–1)
MONOCYTES # BLD: 0.4 K/UL (ref 0–1)
MONOCYTES # BLD: 0.5 K/UL (ref 0–1)
MONOCYTES # BLD: 0.5 K/UL (ref 0–1)
MONOCYTES # BLD: 0.6 K/UL (ref 0–1)
MONOCYTES NFR BLD: 6 % (ref 5–13)
MONOCYTES NFR BLD: 6 % (ref 5–13)
MONOCYTES NFR BLD: 7 % (ref 5–13)
MONOCYTES NFR BLD: 7 % (ref 5–13)
MONOCYTES NFR BLD: 9 % (ref 5–13)
MONOS+MACROS NFR FLD: 9 %
MUCOUS THREADS URNS QL MICRO: ABNORMAL /LPF
NEUTROPHILS NFR FLD: 4 %
NEUTS SEG # BLD: 4.1 K/UL (ref 1.8–8)
NEUTS SEG # BLD: 4.8 K/UL (ref 1.8–8)
NEUTS SEG # BLD: 6.2 K/UL (ref 1.8–8)
NEUTS SEG # BLD: 6.7 K/UL (ref 1.8–8)
NEUTS SEG # BLD: 7.9 K/UL (ref 1.8–8)
NEUTS SEG NFR BLD: 73 % (ref 32–75)
NEUTS SEG NFR BLD: 82 % (ref 32–75)
NEUTS SEG NFR BLD: 86 % (ref 32–75)
NEUTS SEG NFR BLD: 88 % (ref 32–75)
NEUTS SEG NFR BLD: 88 % (ref 32–75)
NITRITE UR QL STRIP.AUTO: NEGATIVE
NITRITE UR QL STRIP.AUTO: NEGATIVE
NRBC # BLD: 0 K/UL (ref 0–0.01)
NRBC BLD-RTO: 0 PER 100 WBC
NUC CELL # FLD: 423 /CU MM
PCO2 BLDA: 45 MMHG (ref 35–45)
PH BLDA: 7.42 [PH] (ref 7.35–7.45)
PH UR STRIP: 5.5 [PH] (ref 5–8)
PH UR STRIP: 6 [PH] (ref 5–8)
PHOSPHATE SERPL-MCNC: 4.7 MG/DL (ref 2.6–4.7)
PLATELET # BLD AUTO: 101 K/UL (ref 150–400)
PLATELET # BLD AUTO: 108 K/UL (ref 150–400)
PLATELET # BLD AUTO: 108 K/UL (ref 150–400)
PLATELET # BLD AUTO: 115 K/UL (ref 150–400)
PLATELET # BLD AUTO: 121 K/UL (ref 150–400)
PLATELET # BLD AUTO: 84 K/UL (ref 150–400)
PLATELET # BLD AUTO: 84 K/UL (ref 150–400)
PLATELET # BLD AUTO: 93 K/UL (ref 150–400)
PLATELET # BLD AUTO: 93 K/UL (ref 150–400)
PMV BLD AUTO: 10.7 FL (ref 8.9–12.9)
PMV BLD AUTO: 10.7 FL (ref 8.9–12.9)
PMV BLD AUTO: 10.9 FL (ref 8.9–12.9)
PMV BLD AUTO: 11.1 FL (ref 8.9–12.9)
PMV BLD AUTO: 11.3 FL (ref 8.9–12.9)
PMV BLD AUTO: 11.4 FL (ref 8.9–12.9)
PMV BLD AUTO: 11.5 FL (ref 8.9–12.9)
PMV BLD AUTO: 11.5 FL (ref 8.9–12.9)
PMV BLD AUTO: 11.8 FL (ref 8.9–12.9)
PO2 BLDA: 214 MMHG (ref 80–100)
POTASSIUM SERPL-SCNC: 3.6 MMOL/L (ref 3.5–5.1)
POTASSIUM SERPL-SCNC: 3.8 MMOL/L (ref 3.5–5.1)
POTASSIUM SERPL-SCNC: 3.8 MMOL/L (ref 3.5–5.1)
POTASSIUM SERPL-SCNC: 3.9 MMOL/L (ref 3.5–5.1)
POTASSIUM SERPL-SCNC: 4 MMOL/L (ref 3.5–5.1)
POTASSIUM SERPL-SCNC: 4.6 MMOL/L (ref 3.5–5.1)
POTASSIUM SERPL-SCNC: 4.7 MMOL/L (ref 3.5–5.1)
POTASSIUM SERPL-SCNC: 4.9 MMOL/L (ref 3.5–5.1)
POTASSIUM SERPL-SCNC: 4.9 MMOL/L (ref 3.5–5.1)
POTASSIUM SERPL-SCNC: 5.2 MMOL/L (ref 3.5–5.1)
PROT FLD-MCNC: 1.1 G/DL
PROT SERPL-MCNC: 7.6 G/DL (ref 6.4–8.2)
PROT SERPL-MCNC: 7.7 G/DL (ref 6.4–8.2)
PROT SERPL-MCNC: 8.1 G/DL (ref 6.4–8.2)
PROT UR STRIP-MCNC: ABNORMAL MG/DL
PROT UR STRIP-MCNC: NEGATIVE MG/DL
PROTHROMBIN TIME: 10.9 SEC (ref 9–11.1)
Q-T INTERVAL, ECG07: 332 MS
Q-T INTERVAL, ECG07: 426 MS
QRS DURATION, ECG06: 136 MS
QRS DURATION, ECG06: 184 MS
QTC CALCULATION (BEZET), ECG08: 417 MS
QTC CALCULATION (BEZET), ECG08: 584 MS
RBC # BLD AUTO: 3.02 M/UL (ref 4.1–5.7)
RBC # BLD AUTO: 3.06 M/UL (ref 4.1–5.7)
RBC # BLD AUTO: 3.18 M/UL (ref 4.1–5.7)
RBC # BLD AUTO: 3.2 M/UL (ref 4.1–5.7)
RBC # BLD AUTO: 3.23 M/UL (ref 4.1–5.7)
RBC # BLD AUTO: 3.35 M/UL (ref 4.1–5.7)
RBC # BLD AUTO: 3.37 M/UL (ref 4.1–5.7)
RBC # BLD AUTO: 3.83 M/UL (ref 4.1–5.7)
RBC # BLD AUTO: 3.98 M/UL (ref 4.1–5.7)
RBC # FLD: >100 /CU MM
RBC #/AREA URNS HPF: ABNORMAL /HPF (ref 0–5)
RBC MORPH BLD: ABNORMAL
SAMPLES BEING HELD,HOLD: NORMAL
SAO2 % BLD: 99 % (ref 92–97)
SAO2% DEVICE SAO2% SENSOR NAME: ABNORMAL
SERVICE CMNT-IMP: ABNORMAL
SERVICE CMNT-IMP: NORMAL
SERVICE CMNT-IMP: NORMAL
SODIUM SERPL-SCNC: 136 MMOL/L (ref 136–145)
SODIUM SERPL-SCNC: 137 MMOL/L (ref 136–145)
SODIUM SERPL-SCNC: 137 MMOL/L (ref 136–145)
SODIUM SERPL-SCNC: 138 MMOL/L (ref 136–145)
SODIUM SERPL-SCNC: 138 MMOL/L (ref 136–145)
SODIUM SERPL-SCNC: 140 MMOL/L (ref 136–145)
SODIUM SERPL-SCNC: 141 MMOL/L (ref 136–145)
SODIUM SERPL-SCNC: 141 MMOL/L (ref 136–145)
SP GR UR REFRACTOMETRY: 1.02 (ref 1–1.03)
SP GR UR REFRACTOMETRY: 1.02 (ref 1–1.03)
SPECIMEN SITE: ABNORMAL
SPECIMEN SOURCE FLD: ABNORMAL
SPECIMEN SOURCE FLD: NORMAL
SPECIMEN SOURCE FLD: NORMAL
TROPONIN I SERPL-MCNC: 1.19 NG/ML
TROPONIN I SERPL-MCNC: 1.84 NG/ML
TROPONIN I SERPL-MCNC: <0.05 NG/ML
TROPONIN I SERPL-MCNC: <0.05 NG/ML
UROBILINOGEN UR QL STRIP.AUTO: 1 EU/DL (ref 0.2–1)
UROBILINOGEN UR QL STRIP.AUTO: 1 EU/DL (ref 0.2–1)
VENTRICULAR RATE, ECG03: 113 BPM
VENTRICULAR RATE, ECG03: 95 BPM
WBC # BLD AUTO: 4.3 K/UL (ref 4.1–11.1)
WBC # BLD AUTO: 4.7 K/UL (ref 4.1–11.1)
WBC # BLD AUTO: 4.8 K/UL (ref 4.1–11.1)
WBC # BLD AUTO: 5.5 K/UL (ref 4.1–11.1)
WBC # BLD AUTO: 5.7 K/UL (ref 4.1–11.1)
WBC # BLD AUTO: 5.9 K/UL (ref 4.1–11.1)
WBC # BLD AUTO: 7.2 K/UL (ref 4.1–11.1)
WBC # BLD AUTO: 7.7 K/UL (ref 4.1–11.1)
WBC # BLD AUTO: 9 K/UL (ref 4.1–11.1)
WBC URNS QL MICRO: ABNORMAL /HPF (ref 0–4)

## 2019-01-01 PROCEDURE — 3331090002 HH PPS REVENUE DEBIT

## 2019-01-01 PROCEDURE — 74011250637 HC RX REV CODE- 250/637: Performed by: FAMILY MEDICINE

## 2019-01-01 PROCEDURE — 80048 BASIC METABOLIC PNL TOTAL CA: CPT

## 2019-01-01 PROCEDURE — 74011250636 HC RX REV CODE- 250/636: Performed by: INTERNAL MEDICINE

## 2019-01-01 PROCEDURE — 74011000250 HC RX REV CODE- 250: Performed by: INTERNAL MEDICINE

## 2019-01-01 PROCEDURE — 4A023N7 MEASUREMENT OF CARDIAC SAMPLING AND PRESSURE, LEFT HEART, PERCUTANEOUS APPROACH: ICD-10-PCS | Performed by: INTERNAL MEDICINE

## 2019-01-01 PROCEDURE — C1769 GUIDE WIRE: HCPCS

## 2019-01-01 PROCEDURE — 82962 GLUCOSE BLOOD TEST: CPT

## 2019-01-01 PROCEDURE — 77010033678 HC OXYGEN DAILY

## 2019-01-01 PROCEDURE — 97116 GAIT TRAINING THERAPY: CPT

## 2019-01-01 PROCEDURE — 74011250637 HC RX REV CODE- 250/637: Performed by: EMERGENCY MEDICINE

## 2019-01-01 PROCEDURE — 0656 HSPC GENERAL INPATIENT

## 2019-01-01 PROCEDURE — 83615 LACTATE (LD) (LDH) ENZYME: CPT

## 2019-01-01 PROCEDURE — 80053 COMPREHEN METABOLIC PANEL: CPT

## 2019-01-01 PROCEDURE — 74011636637 HC RX REV CODE- 636/637: Performed by: INTERNAL MEDICINE

## 2019-01-01 PROCEDURE — 96365 THER/PROPH/DIAG IV INF INIT: CPT

## 2019-01-01 PROCEDURE — 3331090001 HH PPS REVENUE CREDIT

## 2019-01-01 PROCEDURE — 3336500001 HSPC ELECTION

## 2019-01-01 PROCEDURE — 85027 COMPLETE CBC AUTOMATED: CPT

## 2019-01-01 PROCEDURE — 94761 N-INVAS EAR/PLS OXIMETRY MLT: CPT

## 2019-01-01 PROCEDURE — 97165 OT EVAL LOW COMPLEX 30 MIN: CPT | Performed by: OCCUPATIONAL THERAPIST

## 2019-01-01 PROCEDURE — 65660000000 HC RM CCU STEPDOWN

## 2019-01-01 PROCEDURE — C1884 EMBOLIZATION PROTECT SYST: HCPCS

## 2019-01-01 PROCEDURE — 94640 AIRWAY INHALATION TREATMENT: CPT

## 2019-01-01 PROCEDURE — 83605 ASSAY OF LACTIC ACID: CPT

## 2019-01-01 PROCEDURE — G0151 HHCP-SERV OF PT,EA 15 MIN: HCPCS

## 2019-01-01 PROCEDURE — 74011636320 HC RX REV CODE- 636/320: Performed by: RADIOLOGY

## 2019-01-01 PROCEDURE — 74011000258 HC RX REV CODE- 258: Performed by: NURSE PRACTITIONER

## 2019-01-01 PROCEDURE — 74011250636 HC RX REV CODE- 250/636: Performed by: EMERGENCY MEDICINE

## 2019-01-01 PROCEDURE — C1874 STENT, COATED/COV W/DEL SYS: HCPCS

## 2019-01-01 PROCEDURE — 74011250637 HC RX REV CODE- 250/637: Performed by: INTERNAL MEDICINE

## 2019-01-01 PROCEDURE — 76450000000

## 2019-01-01 PROCEDURE — 76770 US EXAM ABDO BACK WALL COMP: CPT

## 2019-01-01 PROCEDURE — 84484 ASSAY OF TROPONIN QUANT: CPT

## 2019-01-01 PROCEDURE — 74011250636 HC RX REV CODE- 250/636: Performed by: FAMILY MEDICINE

## 2019-01-01 PROCEDURE — 71250 CT THORAX DX C-: CPT

## 2019-01-01 PROCEDURE — 51798 US URINE CAPACITY MEASURE: CPT

## 2019-01-01 PROCEDURE — 77030018846 HC SOL IRR STRL H20 ICUM -A

## 2019-01-01 PROCEDURE — 99285 EMERGENCY DEPT VISIT HI MDM: CPT

## 2019-01-01 PROCEDURE — 96374 THER/PROPH/DIAG INJ IV PUSH: CPT

## 2019-01-01 PROCEDURE — 36569 INSJ PICC 5 YR+ W/O IMAGING: CPT | Performed by: EMERGENCY MEDICINE

## 2019-01-01 PROCEDURE — 85025 COMPLETE CBC W/AUTO DIFF WBC: CPT

## 2019-01-01 PROCEDURE — 71045 X-RAY EXAM CHEST 1 VIEW: CPT

## 2019-01-01 PROCEDURE — 3331090003 HH PPS REVENUE ADJ

## 2019-01-01 PROCEDURE — 94760 N-INVAS EAR/PLS OXIMETRY 1: CPT

## 2019-01-01 PROCEDURE — C1760 CLOSURE DEV, VASC: HCPCS

## 2019-01-01 PROCEDURE — 97162 PT EVAL MOD COMPLEX 30 MIN: CPT

## 2019-01-01 PROCEDURE — 74174 CTA ABD&PLVS W/CONTRAST: CPT

## 2019-01-01 PROCEDURE — 92928 PRQ TCAT PLMT NTRAC ST 1 LES: CPT

## 2019-01-01 PROCEDURE — 93005 ELECTROCARDIOGRAM TRACING: CPT

## 2019-01-01 PROCEDURE — 77030011256 HC DRSG MEPILEX <16IN NO BORD MOLN -A

## 2019-01-01 PROCEDURE — 74011000258 HC RX REV CODE- 258: Performed by: RADIOLOGY

## 2019-01-01 PROCEDURE — P9045 ALBUMIN (HUMAN), 5%, 250 ML: HCPCS | Performed by: EMERGENCY MEDICINE

## 2019-01-01 PROCEDURE — 99222 1ST HOSP IP/OBS MODERATE 55: CPT | Performed by: INTERNAL MEDICINE

## 2019-01-01 PROCEDURE — A9558 XE133 XENON 10MCI: HCPCS

## 2019-01-01 PROCEDURE — 74011636637 HC RX REV CODE- 636/637: Performed by: FAMILY MEDICINE

## 2019-01-01 PROCEDURE — 36600 WITHDRAWAL OF ARTERIAL BLOOD: CPT

## 2019-01-01 PROCEDURE — 65270000015 HC RM PRIVATE ONCOLOGY

## 2019-01-01 PROCEDURE — B2121ZZ FLUOROSCOPY OF SINGLE CORONARY ARTERY BYPASS GRAFT USING LOW OSMOLAR CONTRAST: ICD-10-PCS | Performed by: INTERNAL MEDICINE

## 2019-01-01 PROCEDURE — 77030018786 HC NDL GD F/USND BARD -B

## 2019-01-01 PROCEDURE — 81003 URINALYSIS AUTO W/O SCOPE: CPT

## 2019-01-01 PROCEDURE — 74011636320 HC RX REV CODE- 636/320

## 2019-01-01 PROCEDURE — 36415 COLL VENOUS BLD VENIPUNCTURE: CPT

## 2019-01-01 PROCEDURE — 74011000258 HC RX REV CODE- 258: Performed by: INTERNAL MEDICINE

## 2019-01-01 PROCEDURE — 77030018836 HC SOL IRR NACL ICUM -A

## 2019-01-01 PROCEDURE — 87205 SMEAR GRAM STAIN: CPT

## 2019-01-01 PROCEDURE — 87015 SPECIMEN INFECT AGNT CONCNTJ: CPT

## 2019-01-01 PROCEDURE — 74011250636 HC RX REV CODE- 250/636

## 2019-01-01 PROCEDURE — 97535 SELF CARE MNGMENT TRAINING: CPT

## 2019-01-01 PROCEDURE — 74011000250 HC RX REV CODE- 250: Performed by: FAMILY MEDICINE

## 2019-01-01 PROCEDURE — 76937 US GUIDE VASCULAR ACCESS: CPT

## 2019-01-01 PROCEDURE — 83880 ASSAY OF NATRIURETIC PEPTIDE: CPT

## 2019-01-01 PROCEDURE — 74011250636 HC RX REV CODE- 250/636: Performed by: NURSE PRACTITIONER

## 2019-01-01 PROCEDURE — 97535 SELF CARE MNGMENT TRAINING: CPT | Performed by: OCCUPATIONAL THERAPIST

## 2019-01-01 PROCEDURE — G0300 HHS/HOSPICE OF LPN EA 15 MIN: HCPCS

## 2019-01-01 PROCEDURE — C1887 CATHETER, GUIDING: HCPCS

## 2019-01-01 PROCEDURE — 74011000250 HC RX REV CODE- 250: Performed by: NURSE PRACTITIONER

## 2019-01-01 PROCEDURE — 85610 PROTHROMBIN TIME: CPT

## 2019-01-01 PROCEDURE — 89050 BODY FLUID CELL COUNT: CPT

## 2019-01-01 PROCEDURE — 77030004549 HC CATH ANGI DX PRF MRTM -A

## 2019-01-01 PROCEDURE — 74011250637 HC RX REV CODE- 250/637: Performed by: HOSPITALIST

## 2019-01-01 PROCEDURE — 77030028837 HC SYR ANGI PWR INJ COEU -A

## 2019-01-01 PROCEDURE — 80076 HEPATIC FUNCTION PANEL: CPT

## 2019-01-01 PROCEDURE — 97530 THERAPEUTIC ACTIVITIES: CPT | Performed by: PHYSICAL THERAPIST

## 2019-01-01 PROCEDURE — 70450 CT HEAD/BRAIN W/O DYE: CPT

## 2019-01-01 PROCEDURE — 81001 URINALYSIS AUTO W/SCOPE: CPT

## 2019-01-01 PROCEDURE — 83036 HEMOGLOBIN GLYCOSYLATED A1C: CPT

## 2019-01-01 PROCEDURE — C1751 CATH, INF, PER/CENT/MIDLINE: HCPCS

## 2019-01-01 PROCEDURE — 82803 BLOOD GASES ANY COMBINATION: CPT

## 2019-01-01 PROCEDURE — 77030010545

## 2019-01-01 PROCEDURE — 71275 CT ANGIOGRAPHY CHEST: CPT

## 2019-01-01 PROCEDURE — G0299 HHS/HOSPICE OF RN EA 15 MIN: HCPCS

## 2019-01-01 PROCEDURE — 77030029684 HC NEB SM VOL KT MONA -A

## 2019-01-01 PROCEDURE — 87040 BLOOD CULTURE FOR BACTERIA: CPT

## 2019-01-01 PROCEDURE — 85379 FIBRIN DEGRADATION QUANT: CPT

## 2019-01-01 PROCEDURE — 97116 GAIT TRAINING THERAPY: CPT | Performed by: PHYSICAL THERAPIST

## 2019-01-01 PROCEDURE — B2181ZZ FLUOROSCOPY OF LEFT INTERNAL MAMMARY BYPASS GRAFT USING LOW OSMOLAR CONTRAST: ICD-10-PCS | Performed by: INTERNAL MEDICINE

## 2019-01-01 PROCEDURE — C1894 INTRO/SHEATH, NON-LASER: HCPCS

## 2019-01-01 PROCEDURE — 77030004550 HC CATH ANGI DX PRF MRTM -B

## 2019-01-01 PROCEDURE — G0155 HHCP-SVS OF CSW,EA 15 MIN: HCPCS

## 2019-01-01 PROCEDURE — C1729 CATH, DRAINAGE: HCPCS

## 2019-01-01 PROCEDURE — 74011250637 HC RX REV CODE- 250/637

## 2019-01-01 PROCEDURE — 77030011943

## 2019-01-01 PROCEDURE — 77030029065 HC DRSG HEMO QCLOT ZMED -B

## 2019-01-01 PROCEDURE — 82140 ASSAY OF AMMONIA: CPT

## 2019-01-01 PROCEDURE — 93306 TTE W/DOPPLER COMPLETE: CPT

## 2019-01-01 PROCEDURE — 77030037878 HC DRSG MEPILEX >48IN BORD MOLN -B

## 2019-01-01 PROCEDURE — 85347 COAGULATION TIME ACTIVATED: CPT

## 2019-01-01 PROCEDURE — 02HV33Z INSERTION OF INFUSION DEVICE INTO SUPERIOR VENA CAVA, PERCUTANEOUS APPROACH: ICD-10-PCS | Performed by: INTERNAL MEDICINE

## 2019-01-01 PROCEDURE — 84100 ASSAY OF PHOSPHORUS: CPT

## 2019-01-01 PROCEDURE — 77030020186 HC BOOT HL PROTCT SAGE -B

## 2019-01-01 PROCEDURE — 84157 ASSAY OF PROTEIN OTHER: CPT

## 2019-01-01 PROCEDURE — G0152 HHCP-SERV OF OT,EA 15 MIN: HCPCS

## 2019-01-01 PROCEDURE — 77030019697 HC SYR ANGI INFL MRTM -B

## 2019-01-01 PROCEDURE — 0W9B3ZZ DRAINAGE OF LEFT PLEURAL CAVITY, PERCUTANEOUS APPROACH: ICD-10-PCS | Performed by: RADIOLOGY

## 2019-01-01 PROCEDURE — 73502 X-RAY EXAM HIP UNI 2-3 VIEWS: CPT

## 2019-01-01 PROCEDURE — 96360 HYDRATION IV INFUSION INIT: CPT

## 2019-01-01 PROCEDURE — 77030018798 HC PMP KT ENTRL FED COVD -A

## 2019-01-01 PROCEDURE — 83735 ASSAY OF MAGNESIUM: CPT

## 2019-01-01 PROCEDURE — 027034Z DILATION OF CORONARY ARTERY, ONE ARTERY WITH DRUG-ELUTING INTRALUMINAL DEVICE, PERCUTANEOUS APPROACH: ICD-10-PCS | Performed by: INTERNAL MEDICINE

## 2019-01-01 PROCEDURE — 96361 HYDRATE IV INFUSION ADD-ON: CPT

## 2019-01-01 PROCEDURE — B2111ZZ FLUOROSCOPY OF MULTIPLE CORONARY ARTERIES USING LOW OSMOLAR CONTRAST: ICD-10-PCS | Performed by: INTERNAL MEDICINE

## 2019-01-01 RX ORDER — ALBUTEROL SULFATE 0.83 MG/ML
2.5 SOLUTION RESPIRATORY (INHALATION)
Status: DISCONTINUED | OUTPATIENT
Start: 2019-01-01 | End: 2019-01-01 | Stop reason: HOSPADM

## 2019-01-01 RX ORDER — ACETAMINOPHEN 500 MG
1000 TABLET ORAL ONCE
Status: COMPLETED | OUTPATIENT
Start: 2019-01-01 | End: 2019-01-01

## 2019-01-01 RX ORDER — IPRATROPIUM BROMIDE AND ALBUTEROL SULFATE 2.5; .5 MG/3ML; MG/3ML
3 SOLUTION RESPIRATORY (INHALATION)
Qty: 30 NEBULE | Refills: 0 | Status: SHIPPED | OUTPATIENT
Start: 2019-01-01

## 2019-01-01 RX ORDER — GUAIFENESIN 100 MG/5ML
81 LIQUID (ML) ORAL DAILY
Status: DISCONTINUED | OUTPATIENT
Start: 2019-01-01 | End: 2019-01-01 | Stop reason: HOSPADM

## 2019-01-01 RX ORDER — INSULIN LISPRO 100 [IU]/ML
INJECTION, SOLUTION INTRAVENOUS; SUBCUTANEOUS
Status: DISCONTINUED | OUTPATIENT
Start: 2019-01-01 | End: 2019-01-01

## 2019-01-01 RX ORDER — HEPARIN SODIUM 200 [USP'U]/100ML
500 INJECTION, SOLUTION INTRAVENOUS ONCE
Status: COMPLETED | OUTPATIENT
Start: 2019-01-01 | End: 2019-01-01

## 2019-01-01 RX ORDER — SODIUM CHLORIDE 0.9 % (FLUSH) 0.9 %
5-40 SYRINGE (ML) INJECTION AS NEEDED
Status: DISCONTINUED | OUTPATIENT
Start: 2019-01-01 | End: 2019-01-01

## 2019-01-01 RX ORDER — LIDOCAINE HYDROCHLORIDE 10 MG/ML
1-30 INJECTION, SOLUTION EPIDURAL; INFILTRATION; INTRACAUDAL; PERINEURAL
Status: DISCONTINUED | OUTPATIENT
Start: 2019-01-01 | End: 2019-01-01

## 2019-01-01 RX ORDER — LEVOFLOXACIN 5 MG/ML
750 INJECTION, SOLUTION INTRAVENOUS
Status: DISCONTINUED | OUTPATIENT
Start: 2019-01-01 | End: 2019-01-01

## 2019-01-01 RX ORDER — HEPARIN SODIUM 1000 [USP'U]/ML
4000 INJECTION, SOLUTION INTRAVENOUS; SUBCUTANEOUS ONCE
Status: DISCONTINUED | OUTPATIENT
Start: 2019-01-01 | End: 2019-01-01

## 2019-01-01 RX ORDER — GLYCOPYRROLATE 0.2 MG/ML
0.2 INJECTION INTRAMUSCULAR; INTRAVENOUS EVERY 4 HOURS
Status: DISCONTINUED | OUTPATIENT
Start: 2019-01-01 | End: 2019-01-01 | Stop reason: HOSPADM

## 2019-01-01 RX ORDER — BUMETANIDE 0.25 MG/ML
2 INJECTION INTRAMUSCULAR; INTRAVENOUS EVERY 12 HOURS
Status: DISCONTINUED | OUTPATIENT
Start: 2019-01-01 | End: 2019-01-01 | Stop reason: HOSPADM

## 2019-01-01 RX ORDER — CLOPIDOGREL BISULFATE 75 MG/1
75 TABLET ORAL DAILY
Status: DISCONTINUED | OUTPATIENT
Start: 2019-01-01 | End: 2019-01-01

## 2019-01-01 RX ORDER — LORAZEPAM 2 MG/ML
0.5 INJECTION INTRAMUSCULAR EVERY 4 HOURS
Status: DISCONTINUED | OUTPATIENT
Start: 2019-01-01 | End: 2019-01-01 | Stop reason: HOSPADM

## 2019-01-01 RX ORDER — ASPIRIN 81 MG/1
81 TABLET ORAL DAILY
Status: DISCONTINUED | OUTPATIENT
Start: 2019-01-01 | End: 2019-01-01

## 2019-01-01 RX ORDER — SCOLOPAMINE TRANSDERMAL SYSTEM 1 MG/1
1 PATCH, EXTENDED RELEASE TRANSDERMAL
Status: DISCONTINUED | OUTPATIENT
Start: 2019-01-01 | End: 2019-01-01 | Stop reason: HOSPADM

## 2019-01-01 RX ORDER — HEPARIN 100 UNIT/ML
300 SYRINGE INTRAVENOUS AS NEEDED
Status: CANCELLED | OUTPATIENT
Start: 2019-01-01

## 2019-01-01 RX ORDER — SODIUM CHLORIDE 9 MG/ML
30 INJECTION, SOLUTION INTRAVENOUS CONTINUOUS
Status: DISCONTINUED | OUTPATIENT
Start: 2019-01-01 | End: 2019-01-01

## 2019-01-01 RX ORDER — LIDOCAINE 4 G/100G
1 PATCH TOPICAL EVERY 24 HOURS
Status: DISCONTINUED | OUTPATIENT
Start: 2019-01-01 | End: 2019-01-01

## 2019-01-01 RX ORDER — OXYCODONE HCL 20 MG/ML
5 CONCENTRATE, ORAL ORAL ONCE
Status: DISCONTINUED | OUTPATIENT
Start: 2019-01-01 | End: 2019-01-01

## 2019-01-01 RX ORDER — INSULIN GLARGINE 100 [IU]/ML
10 INJECTION, SOLUTION SUBCUTANEOUS DAILY
Qty: 15 PEN | Refills: 5 | Status: SHIPPED | OUTPATIENT
Start: 2019-01-01 | End: 2019-01-01

## 2019-01-01 RX ORDER — LEVOFLOXACIN 5 MG/ML
750 INJECTION, SOLUTION INTRAVENOUS
Status: COMPLETED | OUTPATIENT
Start: 2019-01-01 | End: 2019-01-01

## 2019-01-01 RX ORDER — ACETAMINOPHEN 650 MG/1
650 SUPPOSITORY RECTAL
Qty: 10 SUPPOSITORY | Refills: 0 | Status: SHIPPED | OUTPATIENT
Start: 2019-01-01

## 2019-01-01 RX ORDER — MIDAZOLAM HYDROCHLORIDE 1 MG/ML
INJECTION, SOLUTION INTRAMUSCULAR; INTRAVENOUS
Status: COMPLETED
Start: 2019-01-01 | End: 2019-01-01

## 2019-01-01 RX ORDER — INSULIN GLARGINE 100 [IU]/ML
7 INJECTION, SOLUTION SUBCUTANEOUS DAILY
Status: DISCONTINUED | OUTPATIENT
Start: 2019-01-01 | End: 2019-01-01

## 2019-01-01 RX ORDER — GUAIFENESIN 100 MG/5ML
100 SOLUTION ORAL
Status: DISCONTINUED | OUTPATIENT
Start: 2019-01-01 | End: 2019-01-01

## 2019-01-01 RX ORDER — NITROGLYCERIN 0.4 MG/1
0.4 TABLET SUBLINGUAL
Status: DISCONTINUED | OUTPATIENT
Start: 2019-01-01 | End: 2019-01-01 | Stop reason: HOSPADM

## 2019-01-01 RX ORDER — INSULIN GLARGINE 100 [IU]/ML
10 INJECTION, SOLUTION SUBCUTANEOUS DAILY
Status: DISCONTINUED | OUTPATIENT
Start: 2019-01-01 | End: 2019-01-01 | Stop reason: HOSPADM

## 2019-01-01 RX ORDER — MORPHINE SULFATE 2 MG/ML
2 INJECTION, SOLUTION INTRAMUSCULAR; INTRAVENOUS
Status: DISCONTINUED | OUTPATIENT
Start: 2019-01-01 | End: 2019-01-01 | Stop reason: HOSPADM

## 2019-01-01 RX ORDER — LIDOCAINE HYDROCHLORIDE 10 MG/ML
INJECTION, SOLUTION EPIDURAL; INFILTRATION; INTRACAUDAL; PERINEURAL
Status: DISCONTINUED
Start: 2019-01-01 | End: 2019-01-01

## 2019-01-01 RX ORDER — FAMOTIDINE 20 MG/1
20 TABLET, FILM COATED ORAL 2 TIMES DAILY
Status: DISCONTINUED | OUTPATIENT
Start: 2019-01-01 | End: 2019-01-01 | Stop reason: HOSPADM

## 2019-01-01 RX ORDER — DOXYCYCLINE 100 MG/1
100 CAPSULE ORAL 2 TIMES DAILY
COMMUNITY
End: 2019-01-01

## 2019-01-01 RX ORDER — CLOPIDOGREL 300 MG/1
300 TABLET, FILM COATED ORAL ONCE
Status: COMPLETED | OUTPATIENT
Start: 2019-01-01 | End: 2019-01-01

## 2019-01-01 RX ORDER — IPRATROPIUM BROMIDE AND ALBUTEROL SULFATE 2.5; .5 MG/3ML; MG/3ML
3 SOLUTION RESPIRATORY (INHALATION)
Status: DISCONTINUED | OUTPATIENT
Start: 2019-01-01 | End: 2019-01-01 | Stop reason: HOSPADM

## 2019-01-01 RX ORDER — MIDAZOLAM HYDROCHLORIDE 1 MG/ML
.5-2 INJECTION, SOLUTION INTRAMUSCULAR; INTRAVENOUS
Status: DISCONTINUED | OUTPATIENT
Start: 2019-01-01 | End: 2019-01-01

## 2019-01-01 RX ORDER — OXYCODONE HYDROCHLORIDE 5 MG/1
2.5 TABLET ORAL
Status: DISCONTINUED | OUTPATIENT
Start: 2019-01-01 | End: 2019-01-01

## 2019-01-01 RX ORDER — INSULIN LISPRO 100 [IU]/ML
INJECTION, SOLUTION INTRAVENOUS; SUBCUTANEOUS
COMMUNITY
End: 2019-01-01

## 2019-01-01 RX ORDER — SODIUM CHLORIDE 0.9 % (FLUSH) 0.9 %
5-40 SYRINGE (ML) INJECTION EVERY 8 HOURS
Status: DISCONTINUED | OUTPATIENT
Start: 2019-01-01 | End: 2019-01-01 | Stop reason: HOSPADM

## 2019-01-01 RX ORDER — SODIUM CHLORIDE 0.9 % (FLUSH) 0.9 %
5-10 SYRINGE (ML) INJECTION AS NEEDED
Status: ACTIVE | OUTPATIENT
Start: 2019-01-01 | End: 2019-01-01

## 2019-01-01 RX ORDER — FINASTERIDE 5 MG/1
5 TABLET, FILM COATED ORAL
Status: DISCONTINUED | OUTPATIENT
Start: 2019-01-01 | End: 2019-01-01 | Stop reason: HOSPADM

## 2019-01-01 RX ORDER — FACIAL-BODY WIPES
10 EACH TOPICAL DAILY PRN
Status: DISCONTINUED | OUTPATIENT
Start: 2019-01-01 | End: 2019-01-01 | Stop reason: HOSPADM

## 2019-01-01 RX ORDER — ALBUTEROL SULFATE 0.83 MG/ML
2.5 SOLUTION RESPIRATORY (INHALATION) 2 TIMES DAILY
Status: DISCONTINUED | OUTPATIENT
Start: 2019-01-01 | End: 2019-01-01

## 2019-01-01 RX ORDER — FINASTERIDE 5 MG/1
5 TABLET, FILM COATED ORAL DAILY
Status: DISCONTINUED | OUTPATIENT
Start: 2019-01-01 | End: 2019-01-01

## 2019-01-01 RX ORDER — LIDOCAINE 4 G/100G
PATCH TOPICAL
Qty: 7 PATCH | Refills: 0 | Status: SHIPPED | OUTPATIENT
Start: 2019-01-01 | End: 2019-01-01

## 2019-01-01 RX ORDER — SODIUM CHLORIDE 0.9 % (FLUSH) 0.9 %
10 SYRINGE (ML) INJECTION
Status: COMPLETED | OUTPATIENT
Start: 2019-01-01 | End: 2019-01-01

## 2019-01-01 RX ORDER — OXYCODONE HYDROCHLORIDE 5 MG/1
2.5 TABLET ORAL
Status: DISCONTINUED | OUTPATIENT
Start: 2019-01-01 | End: 2019-01-01 | Stop reason: HOSPADM

## 2019-01-01 RX ORDER — LEVOFLOXACIN 500 MG/1
500 TABLET, FILM COATED ORAL
Status: DISCONTINUED | OUTPATIENT
Start: 2019-01-01 | End: 2019-01-01

## 2019-01-01 RX ORDER — INSULIN GLARGINE 100 [IU]/ML
10 INJECTION, SOLUTION SUBCUTANEOUS DAILY
COMMUNITY

## 2019-01-01 RX ORDER — INSULIN LISPRO 100 [IU]/ML
10 INJECTION, SOLUTION INTRAVENOUS; SUBCUTANEOUS ONCE
Status: COMPLETED | OUTPATIENT
Start: 2019-01-01 | End: 2019-01-01

## 2019-01-01 RX ORDER — VERAPAMIL HYDROCHLORIDE 2.5 MG/ML
2.5 INJECTION, SOLUTION INTRAVENOUS ONCE
Status: DISCONTINUED | OUTPATIENT
Start: 2019-01-01 | End: 2019-01-01

## 2019-01-01 RX ORDER — METOCLOPRAMIDE HYDROCHLORIDE 5 MG/ML
10 INJECTION INTRAMUSCULAR; INTRAVENOUS
Status: COMPLETED | OUTPATIENT
Start: 2019-01-01 | End: 2019-01-01

## 2019-01-01 RX ORDER — CLOPIDOGREL BISULFATE 75 MG/1
75 TABLET ORAL DAILY
Status: DISCONTINUED | OUTPATIENT
Start: 2019-01-01 | End: 2019-01-01 | Stop reason: HOSPADM

## 2019-01-01 RX ORDER — SODIUM CHLORIDE 0.9 % (FLUSH) 0.9 %
5-40 SYRINGE (ML) INJECTION AS NEEDED
Status: DISCONTINUED | OUTPATIENT
Start: 2019-01-01 | End: 2019-01-01 | Stop reason: HOSPADM

## 2019-01-01 RX ORDER — ONDANSETRON 2 MG/ML
4 INJECTION INTRAMUSCULAR; INTRAVENOUS
Status: COMPLETED | OUTPATIENT
Start: 2019-01-01 | End: 2019-01-01

## 2019-01-01 RX ORDER — SCOLOPAMINE TRANSDERMAL SYSTEM 1 MG/1
1 PATCH, EXTENDED RELEASE TRANSDERMAL
Qty: 30 PATCH | Refills: 0 | Status: SHIPPED | OUTPATIENT
Start: 2019-01-01

## 2019-01-01 RX ORDER — MAGNESIUM SULFATE 100 %
4 CRYSTALS MISCELLANEOUS AS NEEDED
Status: DISCONTINUED | OUTPATIENT
Start: 2019-01-01 | End: 2019-01-01 | Stop reason: HOSPADM

## 2019-01-01 RX ORDER — HEPARIN SODIUM 5000 [USP'U]/ML
5000 INJECTION, SOLUTION INTRAVENOUS; SUBCUTANEOUS EVERY 8 HOURS
Status: DISCONTINUED | OUTPATIENT
Start: 2019-01-01 | End: 2019-01-01 | Stop reason: HOSPADM

## 2019-01-01 RX ORDER — BIVALIRUDIN 250 MG/5ML
INJECTION, POWDER, LYOPHILIZED, FOR SOLUTION INTRAVENOUS
Status: DISCONTINUED
Start: 2019-01-01 | End: 2019-01-01

## 2019-01-01 RX ORDER — HEPARIN SODIUM 5000 [USP'U]/ML
5000 INJECTION, SOLUTION INTRAVENOUS; SUBCUTANEOUS EVERY 8 HOURS
Status: DISCONTINUED | OUTPATIENT
Start: 2019-01-01 | End: 2019-01-01

## 2019-01-01 RX ORDER — DEXTROSE 50 % IN WATER (D50W) INTRAVENOUS SYRINGE
25-50 AS NEEDED
Status: DISCONTINUED | OUTPATIENT
Start: 2019-01-01 | End: 2019-01-01 | Stop reason: SDUPTHER

## 2019-01-01 RX ORDER — PRAMIPEXOLE DIHYDROCHLORIDE 0.25 MG/1
0.25 TABLET ORAL 3 TIMES DAILY
Status: DISCONTINUED | OUTPATIENT
Start: 2019-01-01 | End: 2019-01-01

## 2019-01-01 RX ORDER — DEXTROSE 50 % IN WATER (D50W) INTRAVENOUS SYRINGE
12.5-25 AS NEEDED
Status: DISCONTINUED | OUTPATIENT
Start: 2019-01-01 | End: 2019-01-01

## 2019-01-01 RX ORDER — INSULIN LISPRO 100 [IU]/ML
INJECTION, SOLUTION INTRAVENOUS; SUBCUTANEOUS EVERY 6 HOURS
Status: DISCONTINUED | OUTPATIENT
Start: 2019-01-01 | End: 2019-01-01

## 2019-01-01 RX ORDER — HEPARIN SODIUM 200 [USP'U]/100ML
INJECTION, SOLUTION INTRAVENOUS
Status: COMPLETED
Start: 2019-01-01 | End: 2019-01-01

## 2019-01-01 RX ORDER — DOBUTAMINE HYDROCHLORIDE 200 MG/100ML
5 INJECTION INTRAVENOUS CONTINUOUS
Status: DISCONTINUED | OUTPATIENT
Start: 2019-01-01 | End: 2019-01-01

## 2019-01-01 RX ORDER — FUROSEMIDE 40 MG/1
40 TABLET ORAL 2 TIMES DAILY
COMMUNITY
End: 2019-01-01

## 2019-01-01 RX ORDER — SODIUM CHLORIDE 900 MG/100ML
INJECTION INTRAVENOUS
Status: DISCONTINUED
Start: 2019-01-01 | End: 2019-01-01

## 2019-01-01 RX ORDER — ALBUMIN HUMAN 50 G/1000ML
25 SOLUTION INTRAVENOUS ONCE
Status: COMPLETED | OUTPATIENT
Start: 2019-01-01 | End: 2019-01-01

## 2019-01-01 RX ORDER — GUAIFENESIN 100 MG/5ML
200 SOLUTION ORAL 3 TIMES DAILY
Status: DISCONTINUED | OUTPATIENT
Start: 2019-01-01 | End: 2019-01-01 | Stop reason: HOSPADM

## 2019-01-01 RX ORDER — BUMETANIDE 0.25 MG/ML
2 INJECTION INTRAMUSCULAR; INTRAVENOUS EVERY 12 HOURS
Qty: 1 VIAL | Refills: 0 | Status: SHIPPED
Start: 2019-01-01

## 2019-01-01 RX ORDER — LIDOCAINE 4 G/100G
1 PATCH TOPICAL EVERY 24 HOURS
Status: DISCONTINUED | OUTPATIENT
Start: 2019-01-01 | End: 2019-01-01 | Stop reason: HOSPADM

## 2019-01-01 RX ORDER — LORAZEPAM 2 MG/ML
0.5 INJECTION INTRAMUSCULAR
Status: DISCONTINUED | OUTPATIENT
Start: 2019-01-01 | End: 2019-01-01 | Stop reason: HOSPADM

## 2019-01-01 RX ORDER — BACITRACIN 500 UNIT/G
1 PACKET (EA) TOPICAL AS NEEDED
Status: CANCELLED | OUTPATIENT
Start: 2019-01-01

## 2019-01-01 RX ORDER — SORBITOL SOLUTION 70 %
30 SOLUTION, ORAL MISCELLANEOUS
Status: COMPLETED | OUTPATIENT
Start: 2019-01-01 | End: 2019-01-01

## 2019-01-01 RX ORDER — THERA TABS 400 MCG
1 TAB ORAL DAILY
Status: DISCONTINUED | OUTPATIENT
Start: 2019-01-01 | End: 2019-01-01 | Stop reason: HOSPADM

## 2019-01-01 RX ORDER — ONDANSETRON 4 MG/1
4 TABLET, ORALLY DISINTEGRATING ORAL
Status: DISCONTINUED | OUTPATIENT
Start: 2019-01-01 | End: 2019-01-01

## 2019-01-01 RX ORDER — METRONIDAZOLE 500 MG/100ML
500 INJECTION, SOLUTION INTRAVENOUS EVERY 12 HOURS
Status: DISCONTINUED | OUTPATIENT
Start: 2019-01-01 | End: 2019-01-01

## 2019-01-01 RX ORDER — ACETAMINOPHEN 650 MG/1
650 SUPPOSITORY RECTAL
Status: DISCONTINUED | OUTPATIENT
Start: 2019-01-01 | End: 2019-01-01 | Stop reason: HOSPADM

## 2019-01-01 RX ORDER — VERAPAMIL HYDROCHLORIDE 2.5 MG/ML
INJECTION, SOLUTION INTRAVENOUS
Status: DISCONTINUED
Start: 2019-01-01 | End: 2019-01-01 | Stop reason: WASHOUT

## 2019-01-01 RX ORDER — VANCOMYCIN/0.9 % SOD CHLORIDE 1.5G/250ML
1500 PLASTIC BAG, INJECTION (ML) INTRAVENOUS
Status: COMPLETED | OUTPATIENT
Start: 2019-01-01 | End: 2019-01-01

## 2019-01-01 RX ORDER — INSULIN GLARGINE 100 [IU]/ML
10 INJECTION, SOLUTION SUBCUTANEOUS DAILY
Status: DISCONTINUED | OUTPATIENT
Start: 2019-01-01 | End: 2019-01-01

## 2019-01-01 RX ORDER — FUROSEMIDE 40 MG/1
40 TABLET ORAL
Status: DISCONTINUED | OUTPATIENT
Start: 2019-01-01 | End: 2019-01-01 | Stop reason: HOSPADM

## 2019-01-01 RX ORDER — SODIUM CHLORIDE 0.9 % (FLUSH) 0.9 %
5-40 SYRINGE (ML) INJECTION EVERY 8 HOURS
Status: DISCONTINUED | OUTPATIENT
Start: 2019-01-01 | End: 2019-01-01

## 2019-01-01 RX ORDER — DOXYCYCLINE 100 MG/1
100 CAPSULE ORAL 2 TIMES DAILY
Qty: 20 CAP | Refills: 0 | Status: SHIPPED | OUTPATIENT
Start: 2019-01-01 | End: 2019-01-01 | Stop reason: DRUGHIGH

## 2019-01-01 RX ORDER — GLYCOPYRROLATE 0.2 MG/ML
0.2 INJECTION INTRAMUSCULAR; INTRAVENOUS
Status: DISCONTINUED | OUTPATIENT
Start: 2019-01-01 | End: 2019-01-01 | Stop reason: HOSPADM

## 2019-01-01 RX ORDER — MAGNESIUM SULFATE 100 %
4 CRYSTALS MISCELLANEOUS AS NEEDED
Status: DISCONTINUED | OUTPATIENT
Start: 2019-01-01 | End: 2019-01-01

## 2019-01-01 RX ORDER — NALOXONE HYDROCHLORIDE 0.4 MG/ML
0.4 INJECTION, SOLUTION INTRAMUSCULAR; INTRAVENOUS; SUBCUTANEOUS AS NEEDED
Status: DISCONTINUED | OUTPATIENT
Start: 2019-01-01 | End: 2019-01-01

## 2019-01-01 RX ORDER — ZINC SULFATE 50(220)MG
1 CAPSULE ORAL DAILY
Status: DISCONTINUED | OUTPATIENT
Start: 2019-01-01 | End: 2019-01-01 | Stop reason: HOSPADM

## 2019-01-01 RX ORDER — ACETAMINOPHEN 325 MG/1
650 TABLET ORAL
Status: DISCONTINUED | OUTPATIENT
Start: 2019-01-01 | End: 2019-01-01

## 2019-01-01 RX ORDER — MORPHINE SULFATE 2 MG/ML
2 INJECTION, SOLUTION INTRAMUSCULAR; INTRAVENOUS EVERY 4 HOURS
Status: DISCONTINUED | OUTPATIENT
Start: 2019-01-01 | End: 2019-01-01 | Stop reason: HOSPADM

## 2019-01-01 RX ORDER — FUROSEMIDE 10 MG/ML
40 INJECTION INTRAMUSCULAR; INTRAVENOUS DAILY
Status: DISCONTINUED | OUTPATIENT
Start: 2019-01-01 | End: 2019-01-01

## 2019-01-01 RX ORDER — LIDOCAINE HYDROCHLORIDE 10 MG/ML
INJECTION, SOLUTION EPIDURAL; INFILTRATION; INTRACAUDAL; PERINEURAL
Status: DISCONTINUED
Start: 2019-01-01 | End: 2019-01-01 | Stop reason: WASHOUT

## 2019-01-01 RX ORDER — ONDANSETRON 2 MG/ML
4 INJECTION INTRAMUSCULAR; INTRAVENOUS
Status: DISCONTINUED | OUTPATIENT
Start: 2019-01-01 | End: 2019-01-01 | Stop reason: HOSPADM

## 2019-01-01 RX ORDER — PRAMIPEXOLE DIHYDROCHLORIDE 0.25 MG/1
0.25 TABLET ORAL 3 TIMES DAILY
Status: DISCONTINUED | OUTPATIENT
Start: 2019-01-01 | End: 2019-01-01 | Stop reason: HOSPADM

## 2019-01-01 RX ORDER — SODIUM CHLORIDE 0.9 % (FLUSH) 0.9 %
5-10 SYRINGE (ML) INJECTION AS NEEDED
Status: DISCONTINUED | OUTPATIENT
Start: 2019-01-01 | End: 2019-01-01 | Stop reason: HOSPADM

## 2019-01-01 RX ORDER — INSULIN LISPRO 100 [IU]/ML
INJECTION, SOLUTION INTRAVENOUS; SUBCUTANEOUS EVERY 6 HOURS
Status: DISCONTINUED | OUTPATIENT
Start: 2019-01-01 | End: 2019-01-01 | Stop reason: HOSPADM

## 2019-01-01 RX ORDER — DEXTROSE 50 % IN WATER (D50W) INTRAVENOUS SYRINGE
12.5-25 AS NEEDED
Status: DISCONTINUED | OUTPATIENT
Start: 2019-01-01 | End: 2019-01-01 | Stop reason: HOSPADM

## 2019-01-01 RX ORDER — CLOPIDOGREL 300 MG/1
TABLET, FILM COATED ORAL
Status: COMPLETED
Start: 2019-01-01 | End: 2019-01-01

## 2019-01-01 RX ORDER — MIDODRINE HYDROCHLORIDE 5 MG/1
10 TABLET ORAL
Status: DISCONTINUED | OUTPATIENT
Start: 2019-01-01 | End: 2019-01-01 | Stop reason: HOSPADM

## 2019-01-01 RX ORDER — ACETAMINOPHEN 500 MG
500 TABLET ORAL
Status: DISCONTINUED | OUTPATIENT
Start: 2019-01-01 | End: 2019-01-01 | Stop reason: HOSPADM

## 2019-01-01 RX ORDER — LIDOCAINE HYDROCHLORIDE 10 MG/ML
10 INJECTION INFILTRATION; PERINEURAL
Status: COMPLETED | OUTPATIENT
Start: 2019-01-01 | End: 2019-01-01

## 2019-01-01 RX ORDER — MAGNESIUM SULFATE 100 %
4 CRYSTALS MISCELLANEOUS AS NEEDED
Status: DISCONTINUED | OUTPATIENT
Start: 2019-01-01 | End: 2019-01-01 | Stop reason: SDUPTHER

## 2019-01-01 RX ORDER — PRAMIPEXOLE DIHYDROCHLORIDE 0.25 MG/1
0.25 TABLET ORAL 3 TIMES DAILY
COMMUNITY
End: 2019-01-01

## 2019-01-01 RX ORDER — DOPAMINE HYDROCHLORIDE 320 MG/100ML
2-10 INJECTION, SOLUTION INTRAVENOUS
Status: DISCONTINUED | OUTPATIENT
Start: 2019-01-01 | End: 2019-01-01

## 2019-01-01 RX ORDER — DOXYCYCLINE HYCLATE 100 MG
100 TABLET ORAL EVERY 12 HOURS
Qty: 1 TAB | Refills: 0 | Status: SHIPPED | OUTPATIENT
Start: 2019-01-01 | End: 2019-01-01

## 2019-01-01 RX ORDER — FUROSEMIDE 10 MG/ML
40 INJECTION INTRAMUSCULAR; INTRAVENOUS 2 TIMES DAILY
Status: COMPLETED | OUTPATIENT
Start: 2019-01-01 | End: 2019-01-01

## 2019-01-01 RX ORDER — OXYCODONE HYDROCHLORIDE 5 MG/1
2.5 TABLET ORAL
Qty: 12 TAB | Refills: 0 | Status: SHIPPED | OUTPATIENT
Start: 2019-01-01 | End: 2019-01-01

## 2019-01-01 RX ORDER — ATORVASTATIN CALCIUM 40 MG/1
40 TABLET, FILM COATED ORAL DAILY
Status: DISCONTINUED | OUTPATIENT
Start: 2019-01-01 | End: 2019-01-01 | Stop reason: HOSPADM

## 2019-01-01 RX ORDER — TAMSULOSIN HYDROCHLORIDE 0.4 MG/1
0.4 CAPSULE ORAL DAILY
Status: DISCONTINUED | OUTPATIENT
Start: 2019-01-01 | End: 2019-01-01 | Stop reason: HOSPADM

## 2019-01-01 RX ORDER — ATORVASTATIN CALCIUM 40 MG/1
40 TABLET, FILM COATED ORAL DAILY
Status: DISCONTINUED | OUTPATIENT
Start: 2019-01-01 | End: 2019-01-01

## 2019-01-01 RX ORDER — FENTANYL CITRATE 50 UG/ML
INJECTION, SOLUTION INTRAMUSCULAR; INTRAVENOUS
Status: COMPLETED
Start: 2019-01-01 | End: 2019-01-01

## 2019-01-01 RX ORDER — GABAPENTIN 250 MG/5ML
750 SOLUTION ORAL 3 TIMES DAILY
Status: DISCONTINUED | OUTPATIENT
Start: 2019-01-01 | End: 2019-01-01 | Stop reason: HOSPADM

## 2019-01-01 RX ORDER — DOXYCYCLINE HYCLATE 100 MG
100 TABLET ORAL EVERY 12 HOURS
Status: DISCONTINUED | OUTPATIENT
Start: 2019-01-01 | End: 2019-01-01 | Stop reason: HOSPADM

## 2019-01-01 RX ORDER — HEPARIN SODIUM 1000 [USP'U]/ML
INJECTION, SOLUTION INTRAVENOUS; SUBCUTANEOUS
Status: DISCONTINUED
Start: 2019-01-01 | End: 2019-01-01 | Stop reason: WASHOUT

## 2019-01-01 RX ORDER — MORPHINE SULFATE 2 MG/ML
2 INJECTION, SOLUTION INTRAMUSCULAR; INTRAVENOUS
Status: DISCONTINUED | OUTPATIENT
Start: 2019-01-01 | End: 2019-01-01

## 2019-01-01 RX ORDER — MIDODRINE HYDROCHLORIDE 5 MG/1
10 TABLET ORAL
Status: DISCONTINUED | OUTPATIENT
Start: 2019-01-01 | End: 2019-01-01

## 2019-01-01 RX ORDER — OXYCODONE HCL 5 MG/5 ML
5 SOLUTION, ORAL ORAL ONCE
Status: COMPLETED | OUTPATIENT
Start: 2019-01-01 | End: 2019-01-01

## 2019-01-01 RX ORDER — FENTANYL CITRATE 50 UG/ML
25-50 INJECTION, SOLUTION INTRAMUSCULAR; INTRAVENOUS
Status: DISCONTINUED | OUTPATIENT
Start: 2019-01-01 | End: 2019-01-01

## 2019-01-01 RX ORDER — ONDANSETRON 4 MG/1
4 TABLET, FILM COATED ORAL
COMMUNITY
End: 2019-01-01

## 2019-01-01 RX ADMIN — Medication 1 CAPSULE: at 08:46

## 2019-01-01 RX ADMIN — OXYCODONE HYDROCHLORIDE 2.5 MG: 5 TABLET ORAL at 22:18

## 2019-01-01 RX ADMIN — Medication 10 ML: at 05:31

## 2019-01-01 RX ADMIN — MIDODRINE HYDROCHLORIDE 10 MG: 5 TABLET ORAL at 12:54

## 2019-01-01 RX ADMIN — VANCOMYCIN HYDROCHLORIDE 1500 MG: 10 INJECTION, POWDER, LYOPHILIZED, FOR SOLUTION INTRAVENOUS at 11:31

## 2019-01-01 RX ADMIN — ALBUTEROL SULFATE 2.5 MG: 2.5 SOLUTION RESPIRATORY (INHALATION) at 08:00

## 2019-01-01 RX ADMIN — FUROSEMIDE 40 MG: 40 TABLET ORAL at 16:14

## 2019-01-01 RX ADMIN — HEPARIN SODIUM 5000 UNITS: 5000 INJECTION INTRAVENOUS; SUBCUTANEOUS at 03:50

## 2019-01-01 RX ADMIN — Medication 10 ML: at 21:11

## 2019-01-01 RX ADMIN — HEPARIN SODIUM 5000 UNITS: 5000 INJECTION INTRAVENOUS; SUBCUTANEOUS at 05:34

## 2019-01-01 RX ADMIN — GLYCOPYRROLATE 0.2 MG: 0.2 INJECTION, SOLUTION INTRAMUSCULAR; INTRAVENOUS at 00:31

## 2019-01-01 RX ADMIN — GABAPENTIN 750 MG: 250 SOLUTION ORAL at 23:59

## 2019-01-01 RX ADMIN — MORPHINE SULFATE 2 MG: 2 INJECTION, SOLUTION INTRAMUSCULAR; INTRAVENOUS at 19:55

## 2019-01-01 RX ADMIN — GUAIFENESIN 200 MG: 200 SOLUTION ORAL at 17:28

## 2019-01-01 RX ADMIN — FINASTERIDE 5 MG: 5 TABLET, FILM COATED ORAL at 21:10

## 2019-01-01 RX ADMIN — GABAPENTIN 750 MG: 250 SOLUTION ORAL at 08:01

## 2019-01-01 RX ADMIN — MIDODRINE HYDROCHLORIDE 10 MG: 5 TABLET ORAL at 19:34

## 2019-01-01 RX ADMIN — INSULIN HUMAN 12 UNITS: 100 INJECTION, SOLUTION PARENTERAL at 09:21

## 2019-01-01 RX ADMIN — PRAMIPEXOLE DIHYDROCHLORIDE 0.25 MG: 0.25 TABLET ORAL at 09:02

## 2019-01-01 RX ADMIN — FAMOTIDINE 20 MG: 20 TABLET, FILM COATED ORAL at 09:15

## 2019-01-01 RX ADMIN — FENTANYL CITRATE 25 MCG: 50 INJECTION, SOLUTION INTRAMUSCULAR; INTRAVENOUS at 11:51

## 2019-01-01 RX ADMIN — CEFEPIME HYDROCHLORIDE 1 G: 1 INJECTION, POWDER, FOR SOLUTION INTRAMUSCULAR; INTRAVENOUS at 00:24

## 2019-01-01 RX ADMIN — INSULIN LISPRO 3 UNITS: 100 INJECTION, SOLUTION INTRAVENOUS; SUBCUTANEOUS at 19:15

## 2019-01-01 RX ADMIN — IPRATROPIUM BROMIDE AND ALBUTEROL SULFATE 3 ML: .5; 3 SOLUTION RESPIRATORY (INHALATION) at 14:52

## 2019-01-01 RX ADMIN — OXYCODONE HYDROCHLORIDE 2.5 MG: 5 TABLET ORAL at 22:49

## 2019-01-01 RX ADMIN — PRAMIPEXOLE DIHYDROCHLORIDE 0.25 MG: 0.25 TABLET ORAL at 16:49

## 2019-01-01 RX ADMIN — FAMOTIDINE 20 MG: 20 TABLET, FILM COATED ORAL at 16:49

## 2019-01-01 RX ADMIN — Medication 1 CAPSULE: at 08:57

## 2019-01-01 RX ADMIN — INSULIN HUMAN 12 UNITS: 100 INJECTION, SOLUTION PARENTERAL at 16:58

## 2019-01-01 RX ADMIN — ATORVASTATIN CALCIUM 40 MG: 40 TABLET, FILM COATED ORAL at 09:02

## 2019-01-01 RX ADMIN — HEPARIN SODIUM 5000 UNITS: 5000 INJECTION INTRAVENOUS; SUBCUTANEOUS at 12:41

## 2019-01-01 RX ADMIN — IPRATROPIUM BROMIDE AND ALBUTEROL SULFATE 3 ML: .5; 3 SOLUTION RESPIRATORY (INHALATION) at 21:03

## 2019-01-01 RX ADMIN — FUROSEMIDE 40 MG: 10 INJECTION, SOLUTION INTRAMUSCULAR; INTRAVENOUS at 16:54

## 2019-01-01 RX ADMIN — INSULIN LISPRO 10 UNITS: 100 INJECTION, SOLUTION INTRAVENOUS; SUBCUTANEOUS at 13:07

## 2019-01-01 RX ADMIN — ASPIRIN 81 MG 81 MG: 81 TABLET ORAL at 09:15

## 2019-01-01 RX ADMIN — THERA TABS 1 TABLET: TAB at 08:57

## 2019-01-01 RX ADMIN — ATORVASTATIN CALCIUM 40 MG: 40 TABLET, FILM COATED ORAL at 08:26

## 2019-01-01 RX ADMIN — FINASTERIDE 5 MG: 5 TABLET, FILM COATED ORAL at 21:34

## 2019-01-01 RX ADMIN — HEPARIN SODIUM 1000 UNITS: 200 INJECTION, SOLUTION INTRAVENOUS at 12:11

## 2019-01-01 RX ADMIN — FUROSEMIDE 40 MG: 10 INJECTION, SOLUTION INTRAMUSCULAR; INTRAVENOUS at 09:44

## 2019-01-01 RX ADMIN — ACETAMINOPHEN 650 MG: 325 TABLET ORAL at 15:01

## 2019-01-01 RX ADMIN — Medication 10 ML: at 15:06

## 2019-01-01 RX ADMIN — HEPARIN SODIUM 5000 UNITS: 5000 INJECTION INTRAVENOUS; SUBCUTANEOUS at 09:45

## 2019-01-01 RX ADMIN — PRAMIPEXOLE DIHYDROCHLORIDE 0.25 MG: 0.25 TABLET ORAL at 15:55

## 2019-01-01 RX ADMIN — INSULIN LISPRO 4 UNITS: 100 INJECTION, SOLUTION INTRAVENOUS; SUBCUTANEOUS at 13:18

## 2019-01-01 RX ADMIN — PRAMIPEXOLE DIHYDROCHLORIDE 0.25 MG: 0.25 TABLET ORAL at 21:55

## 2019-01-01 RX ADMIN — ASPIRIN 81 MG: 81 TABLET, COATED ORAL at 09:02

## 2019-01-01 RX ADMIN — GUAIFENESIN 200 MG: 200 SOLUTION ORAL at 21:10

## 2019-01-01 RX ADMIN — ACETAMINOPHEN 500 MG: 500 TABLET ORAL at 08:45

## 2019-01-01 RX ADMIN — MIDODRINE HYDROCHLORIDE 10 MG: 5 TABLET ORAL at 08:24

## 2019-01-01 RX ADMIN — LORAZEPAM 0.5 MG: 2 INJECTION INTRAMUSCULAR; INTRAVENOUS at 12:11

## 2019-01-01 RX ADMIN — MORPHINE SULFATE 2 MG: 2 INJECTION, SOLUTION INTRAMUSCULAR; INTRAVENOUS at 00:30

## 2019-01-01 RX ADMIN — VANCOMYCIN HYDROCHLORIDE 1000 MG: 1 INJECTION, POWDER, LYOPHILIZED, FOR SOLUTION INTRAVENOUS at 09:40

## 2019-01-01 RX ADMIN — OXYCODONE HYDROCHLORIDE 2.5 MG: 5 TABLET ORAL at 21:35

## 2019-01-01 RX ADMIN — HEPARIN SODIUM 1000 UNITS: 200 INJECTION, SOLUTION INTRAVENOUS at 12:10

## 2019-01-01 RX ADMIN — CLOPIDOGREL BISULFATE 75 MG: 75 TABLET ORAL at 09:02

## 2019-01-01 RX ADMIN — HEPARIN SODIUM 5000 UNITS: 5000 INJECTION INTRAVENOUS; SUBCUTANEOUS at 15:24

## 2019-01-01 RX ADMIN — ASPIRIN 81 MG 81 MG: 81 TABLET ORAL at 08:45

## 2019-01-01 RX ADMIN — PRAMIPEXOLE DIHYDROCHLORIDE 0.25 MG: 0.25 TABLET ORAL at 08:46

## 2019-01-01 RX ADMIN — INSULIN LISPRO 2 UNITS: 100 INJECTION, SOLUTION INTRAVENOUS; SUBCUTANEOUS at 00:06

## 2019-01-01 RX ADMIN — DOXYCYCLINE HYCLATE 100 MG: 100 TABLET, COATED ORAL at 21:55

## 2019-01-01 RX ADMIN — Medication 10 ML: at 22:08

## 2019-01-01 RX ADMIN — TAMSULOSIN HYDROCHLORIDE 0.4 MG: 0.4 CAPSULE ORAL at 09:43

## 2019-01-01 RX ADMIN — OXYCODONE HYDROCHLORIDE 2.5 MG: 5 TABLET ORAL at 16:28

## 2019-01-01 RX ADMIN — INSULIN LISPRO 4 UNITS: 100 INJECTION, SOLUTION INTRAVENOUS; SUBCUTANEOUS at 23:33

## 2019-01-01 RX ADMIN — DOXYCYCLINE HYCLATE 100 MG: 100 TABLET, COATED ORAL at 08:57

## 2019-01-01 RX ADMIN — Medication 1 CAPSULE: at 08:45

## 2019-01-01 RX ADMIN — INSULIN HUMAN 4 UNITS: 100 INJECTION, SOLUTION PARENTERAL at 21:33

## 2019-01-01 RX ADMIN — OXYCODONE HYDROCHLORIDE 2.5 MG: 5 TABLET ORAL at 08:44

## 2019-01-01 RX ADMIN — MIDODRINE HYDROCHLORIDE 10 MG: 5 TABLET ORAL at 12:41

## 2019-01-01 RX ADMIN — THERA TABS 1 TABLET: TAB at 09:43

## 2019-01-01 RX ADMIN — METRONIDAZOLE 500 MG: 500 INJECTION, SOLUTION INTRAVENOUS at 21:20

## 2019-01-01 RX ADMIN — PRAMIPEXOLE DIHYDROCHLORIDE 0.25 MG: 0.25 TABLET ORAL at 08:04

## 2019-01-01 RX ADMIN — IPRATROPIUM BROMIDE AND ALBUTEROL SULFATE 3 ML: .5; 3 SOLUTION RESPIRATORY (INHALATION) at 08:28

## 2019-01-01 RX ADMIN — DOXYCYCLINE HYCLATE 100 MG: 100 TABLET, COATED ORAL at 20:29

## 2019-01-01 RX ADMIN — MIDODRINE HYDROCHLORIDE 10 MG: 5 TABLET ORAL at 08:01

## 2019-01-01 RX ADMIN — SODIUM CHLORIDE 100 ML: 900 INJECTION, SOLUTION INTRAVENOUS at 13:17

## 2019-01-01 RX ADMIN — ALBUTEROL SULFATE 2.5 MG: 2.5 SOLUTION RESPIRATORY (INHALATION) at 21:33

## 2019-01-01 RX ADMIN — BIVALIRUDIN 1.75 MG/KG/HR: 250 INJECTION, POWDER, LYOPHILIZED, FOR SOLUTION INTRAVENOUS at 12:46

## 2019-01-01 RX ADMIN — INSULIN GLARGINE 10 UNITS: 100 INJECTION, SOLUTION SUBCUTANEOUS at 08:40

## 2019-01-01 RX ADMIN — ACETAMINOPHEN 500 MG: 500 TABLET ORAL at 04:49

## 2019-01-01 RX ADMIN — FINASTERIDE 5 MG: 5 TABLET, FILM COATED ORAL at 08:26

## 2019-01-01 RX ADMIN — INSULIN HUMAN 12 UNITS: 100 INJECTION, SOLUTION PARENTERAL at 12:06

## 2019-01-01 RX ADMIN — GLYCOPYRROLATE 0.2 MG: 0.2 INJECTION, SOLUTION INTRAMUSCULAR; INTRAVENOUS at 19:55

## 2019-01-01 RX ADMIN — ACETAMINOPHEN 650 MG: 650 SUPPOSITORY RECTAL at 21:28

## 2019-01-01 RX ADMIN — FAMOTIDINE 20 MG: 20 TABLET, FILM COATED ORAL at 18:17

## 2019-01-01 RX ADMIN — Medication 1 CAPSULE: at 09:15

## 2019-01-01 RX ADMIN — HEPARIN SODIUM 5000 UNITS: 5000 INJECTION INTRAVENOUS; SUBCUTANEOUS at 13:28

## 2019-01-01 RX ADMIN — TAMSULOSIN HYDROCHLORIDE 0.4 MG: 0.4 CAPSULE ORAL at 08:00

## 2019-01-01 RX ADMIN — MIDODRINE HYDROCHLORIDE 10 MG: 5 TABLET ORAL at 17:31

## 2019-01-01 RX ADMIN — TAMSULOSIN HYDROCHLORIDE 0.4 MG: 0.4 CAPSULE ORAL at 09:14

## 2019-01-01 RX ADMIN — FINASTERIDE 5 MG: 5 TABLET, FILM COATED ORAL at 21:55

## 2019-01-01 RX ADMIN — GUAIFENESIN 200 MG: 200 SOLUTION ORAL at 21:34

## 2019-01-01 RX ADMIN — ASPIRIN 81 MG 81 MG: 81 TABLET ORAL at 10:20

## 2019-01-01 RX ADMIN — INSULIN LISPRO 3 UNITS: 100 INJECTION, SOLUTION INTRAVENOUS; SUBCUTANEOUS at 18:30

## 2019-01-01 RX ADMIN — DOPAMINE HYDROCHLORIDE IN DEXTROSE 2 MCG/KG/MIN: 3.2 INJECTION, SOLUTION INTRAVENOUS at 17:41

## 2019-01-01 RX ADMIN — INSULIN LISPRO 3 UNITS: 100 INJECTION, SOLUTION INTRAVENOUS; SUBCUTANEOUS at 11:46

## 2019-01-01 RX ADMIN — INSULIN HUMAN 15 UNITS: 100 INJECTION, SOLUTION PARENTERAL at 09:45

## 2019-01-01 RX ADMIN — MIDODRINE HYDROCHLORIDE 10 MG: 5 TABLET ORAL at 17:29

## 2019-01-01 RX ADMIN — DOXYCYCLINE HYCLATE 100 MG: 100 TABLET, COATED ORAL at 21:38

## 2019-01-01 RX ADMIN — Medication 10 ML: at 22:21

## 2019-01-01 RX ADMIN — Medication 1 CAPSULE: at 08:01

## 2019-01-01 RX ADMIN — PRAMIPEXOLE DIHYDROCHLORIDE 0.25 MG: 0.25 TABLET ORAL at 22:08

## 2019-01-01 RX ADMIN — MIDODRINE HYDROCHLORIDE 10 MG: 5 TABLET ORAL at 12:05

## 2019-01-01 RX ADMIN — GUAIFENESIN 200 MG: 200 SOLUTION ORAL at 09:14

## 2019-01-01 RX ADMIN — IPRATROPIUM BROMIDE AND ALBUTEROL SULFATE 3 ML: .5; 3 SOLUTION RESPIRATORY (INHALATION) at 13:10

## 2019-01-01 RX ADMIN — CLOPIDOGREL BISULFATE 300 MG: 300 TABLET, FILM COATED ORAL at 13:06

## 2019-01-01 RX ADMIN — Medication 10 ML: at 21:39

## 2019-01-01 RX ADMIN — ATORVASTATIN CALCIUM 40 MG: 40 TABLET, FILM COATED ORAL at 09:15

## 2019-01-01 RX ADMIN — FAMOTIDINE 20 MG: 20 TABLET, FILM COATED ORAL at 16:27

## 2019-01-01 RX ADMIN — GUAIFENESIN 200 MG: 200 SOLUTION ORAL at 09:44

## 2019-01-01 RX ADMIN — GABAPENTIN 750 MG: 250 SOLUTION ORAL at 09:16

## 2019-01-01 RX ADMIN — LIDOCAINE HYDROCHLORIDE 16 ML: 10 INJECTION, SOLUTION EPIDURAL; INFILTRATION; INTRACAUDAL; PERINEURAL at 12:10

## 2019-01-01 RX ADMIN — Medication 10 ML: at 15:09

## 2019-01-01 RX ADMIN — FUROSEMIDE 40 MG: 10 INJECTION, SOLUTION INTRAMUSCULAR; INTRAVENOUS at 17:29

## 2019-01-01 RX ADMIN — DOBUTAMINE HYDROCHLORIDE 5 MCG/KG/MIN: 200 INJECTION INTRAVENOUS at 13:09

## 2019-01-01 RX ADMIN — CEFEPIME HYDROCHLORIDE 1 G: 1 INJECTION, POWDER, FOR SOLUTION INTRAMUSCULAR; INTRAVENOUS at 10:34

## 2019-01-01 RX ADMIN — FUROSEMIDE 40 MG: 40 TABLET ORAL at 09:14

## 2019-01-01 RX ADMIN — SORBITOL 30 ML: 258.2 SOLUTION ORAL at 15:05

## 2019-01-01 RX ADMIN — GUAIFENESIN 200 MG: 200 SOLUTION ORAL at 15:06

## 2019-01-01 RX ADMIN — ALBUTEROL SULFATE 2.5 MG: 2.5 SOLUTION RESPIRATORY (INHALATION) at 07:50

## 2019-01-01 RX ADMIN — TAMSULOSIN HYDROCHLORIDE 0.4 MG: 0.4 CAPSULE ORAL at 08:45

## 2019-01-01 RX ADMIN — FUROSEMIDE 40 MG: 10 INJECTION, SOLUTION INTRAMUSCULAR; INTRAVENOUS at 17:31

## 2019-01-01 RX ADMIN — METRONIDAZOLE 500 MG: 500 INJECTION, SOLUTION INTRAVENOUS at 09:04

## 2019-01-01 RX ADMIN — Medication 10 ML: at 05:20

## 2019-01-01 RX ADMIN — OXYCODONE HYDROCHLORIDE 2.5 MG: 5 TABLET ORAL at 23:39

## 2019-01-01 RX ADMIN — MIDODRINE HYDROCHLORIDE 10 MG: 5 TABLET ORAL at 13:18

## 2019-01-01 RX ADMIN — ACETAMINOPHEN 650 MG: 325 TABLET ORAL at 20:38

## 2019-01-01 RX ADMIN — Medication 10 ML: at 05:34

## 2019-01-01 RX ADMIN — GABAPENTIN 750 MG: 250 SOLUTION ORAL at 13:02

## 2019-01-01 RX ADMIN — Medication 10 ML: at 21:09

## 2019-01-01 RX ADMIN — ALBUTEROL SULFATE 2.5 MG: 2.5 SOLUTION RESPIRATORY (INHALATION) at 20:10

## 2019-01-01 RX ADMIN — ACETAMINOPHEN 500 MG: 500 TABLET ORAL at 21:10

## 2019-01-01 RX ADMIN — ALBUMIN (HUMAN) 25 G: 12.5 INJECTION, SOLUTION INTRAVENOUS at 15:54

## 2019-01-01 RX ADMIN — ACETAMINOPHEN 1000 MG: 500 TABLET ORAL at 10:13

## 2019-01-01 RX ADMIN — INSULIN HUMAN 12 UNITS: 100 INJECTION, SOLUTION PARENTERAL at 08:51

## 2019-01-01 RX ADMIN — FINASTERIDE 5 MG: 5 TABLET, FILM COATED ORAL at 22:20

## 2019-01-01 RX ADMIN — IPRATROPIUM BROMIDE AND ALBUTEROL SULFATE 3 ML: .5; 3 SOLUTION RESPIRATORY (INHALATION) at 03:50

## 2019-01-01 RX ADMIN — INSULIN LISPRO 5 UNITS: 100 INJECTION, SOLUTION INTRAVENOUS; SUBCUTANEOUS at 08:41

## 2019-01-01 RX ADMIN — GUAIFENESIN 200 MG: 200 SOLUTION ORAL at 08:32

## 2019-01-01 RX ADMIN — HEPARIN SODIUM 5000 UNITS: 5000 INJECTION INTRAVENOUS; SUBCUTANEOUS at 21:07

## 2019-01-01 RX ADMIN — Medication 10 ML: at 21:20

## 2019-01-01 RX ADMIN — CLOPIDOGREL BISULFATE 75 MG: 75 TABLET ORAL at 09:43

## 2019-01-01 RX ADMIN — IOPAMIDOL 130 ML: 755 INJECTION, SOLUTION INTRAVENOUS at 12:33

## 2019-01-01 RX ADMIN — PRAMIPEXOLE DIHYDROCHLORIDE 0.25 MG: 0.25 TABLET ORAL at 09:21

## 2019-01-01 RX ADMIN — Medication 1 CAPSULE: at 09:44

## 2019-01-01 RX ADMIN — MIDODRINE HYDROCHLORIDE 10 MG: 5 TABLET ORAL at 18:16

## 2019-01-01 RX ADMIN — GABAPENTIN 750 MG: 250 SOLUTION ORAL at 15:06

## 2019-01-01 RX ADMIN — INSULIN GLARGINE 10 UNITS: 100 INJECTION, SOLUTION SUBCUTANEOUS at 09:00

## 2019-01-01 RX ADMIN — HEPARIN SODIUM 5000 UNITS: 5000 INJECTION INTRAVENOUS; SUBCUTANEOUS at 04:18

## 2019-01-01 RX ADMIN — PRAMIPEXOLE DIHYDROCHLORIDE 0.25 MG: 0.25 TABLET ORAL at 18:57

## 2019-01-01 RX ADMIN — GLYCOPYRROLATE 0.2 MG: 0.2 INJECTION, SOLUTION INTRAMUSCULAR; INTRAVENOUS at 12:11

## 2019-01-01 RX ADMIN — GUAIFENESIN 200 MG: 200 SOLUTION ORAL at 08:01

## 2019-01-01 RX ADMIN — ALBUTEROL SULFATE 2.5 MG: 2.5 SOLUTION RESPIRATORY (INHALATION) at 07:51

## 2019-01-01 RX ADMIN — FUROSEMIDE 40 MG: 40 TABLET ORAL at 16:28

## 2019-01-01 RX ADMIN — GABAPENTIN 750 MG: 250 SOLUTION ORAL at 16:20

## 2019-01-01 RX ADMIN — INSULIN LISPRO 4 UNITS: 100 INJECTION, SOLUTION INTRAVENOUS; SUBCUTANEOUS at 18:18

## 2019-01-01 RX ADMIN — MIDODRINE HYDROCHLORIDE 10 MG: 5 TABLET ORAL at 16:17

## 2019-01-01 RX ADMIN — CEFEPIME HYDROCHLORIDE 1 G: 1 INJECTION, POWDER, FOR SOLUTION INTRAMUSCULAR; INTRAVENOUS at 09:03

## 2019-01-01 RX ADMIN — ATORVASTATIN CALCIUM 40 MG: 40 TABLET, FILM COATED ORAL at 09:43

## 2019-01-01 RX ADMIN — ATORVASTATIN CALCIUM 40 MG: 40 TABLET, FILM COATED ORAL at 08:57

## 2019-01-01 RX ADMIN — PRAMIPEXOLE DIHYDROCHLORIDE 0.25 MG: 0.25 TABLET ORAL at 08:25

## 2019-01-01 RX ADMIN — THERA TABS 1 TABLET: TAB at 09:15

## 2019-01-01 RX ADMIN — MIDODRINE HYDROCHLORIDE 10 MG: 5 TABLET ORAL at 16:49

## 2019-01-01 RX ADMIN — FINASTERIDE 5 MG: 5 TABLET, FILM COATED ORAL at 22:08

## 2019-01-01 RX ADMIN — HEPARIN SODIUM 5000 UNITS: 5000 INJECTION INTRAVENOUS; SUBCUTANEOUS at 17:50

## 2019-01-01 RX ADMIN — DOXYCYCLINE HYCLATE 100 MG: 100 TABLET, COATED ORAL at 09:43

## 2019-01-01 RX ADMIN — DOXYCYCLINE HYCLATE 100 MG: 100 TABLET, COATED ORAL at 09:15

## 2019-01-01 RX ADMIN — MIDAZOLAM HYDROCHLORIDE 1 MG: 1 INJECTION, SOLUTION INTRAMUSCULAR; INTRAVENOUS at 11:51

## 2019-01-01 RX ADMIN — CLOPIDOGREL 300 MG: 300 TABLET, FILM COATED ORAL at 13:06

## 2019-01-01 RX ADMIN — Medication 10 ML: at 13:02

## 2019-01-01 RX ADMIN — FUROSEMIDE 40 MG: 10 INJECTION, SOLUTION INTRAMUSCULAR; INTRAVENOUS at 18:17

## 2019-01-01 RX ADMIN — GUAIFENESIN 200 MG: 200 SOLUTION ORAL at 16:28

## 2019-01-01 RX ADMIN — HEPARIN SODIUM 5000 UNITS: 5000 INJECTION INTRAVENOUS; SUBCUTANEOUS at 04:15

## 2019-01-01 RX ADMIN — PRAMIPEXOLE DIHYDROCHLORIDE 0.25 MG: 0.25 TABLET ORAL at 08:57

## 2019-01-01 RX ADMIN — ONDANSETRON 4 MG: 2 INJECTION INTRAMUSCULAR; INTRAVENOUS at 12:38

## 2019-01-01 RX ADMIN — IRON SUCROSE 200 MG: 20 INJECTION, SOLUTION INTRAVENOUS at 17:28

## 2019-01-01 RX ADMIN — INSULIN LISPRO 3 UNITS: 100 INJECTION, SOLUTION INTRAVENOUS; SUBCUTANEOUS at 23:21

## 2019-01-01 RX ADMIN — THERA TABS 1 TABLET: TAB at 08:45

## 2019-01-01 RX ADMIN — CLOPIDOGREL BISULFATE 75 MG: 75 TABLET ORAL at 08:00

## 2019-01-01 RX ADMIN — DOXYCYCLINE HYCLATE 100 MG: 100 TABLET, COATED ORAL at 08:00

## 2019-01-01 RX ADMIN — PRAMIPEXOLE DIHYDROCHLORIDE 0.25 MG: 0.25 TABLET ORAL at 22:21

## 2019-01-01 RX ADMIN — Medication 10 ML: at 08:27

## 2019-01-01 RX ADMIN — HEPARIN SODIUM 5000 UNITS: 5000 INJECTION INTRAVENOUS; SUBCUTANEOUS at 22:08

## 2019-01-01 RX ADMIN — SODIUM CHLORIDE: 450 INJECTION, SOLUTION INTRAVENOUS at 09:37

## 2019-01-01 RX ADMIN — GUAIFENESIN 200 MG: 200 SOLUTION ORAL at 21:09

## 2019-01-01 RX ADMIN — LEVOFLOXACIN 750 MG: 5 INJECTION, SOLUTION INTRAVENOUS at 09:04

## 2019-01-01 RX ADMIN — METRONIDAZOLE 500 MG: 500 INJECTION, SOLUTION INTRAVENOUS at 12:19

## 2019-01-01 RX ADMIN — GUAIFENESIN 200 MG: 200 SOLUTION ORAL at 22:21

## 2019-01-01 RX ADMIN — ATORVASTATIN CALCIUM 40 MG: 40 TABLET, FILM COATED ORAL at 08:33

## 2019-01-01 RX ADMIN — INSULIN LISPRO 3 UNITS: 100 INJECTION, SOLUTION INTRAVENOUS; SUBCUTANEOUS at 00:30

## 2019-01-01 RX ADMIN — GUAIFENESIN 100 MG: 200 SOLUTION ORAL at 21:19

## 2019-01-01 RX ADMIN — IPRATROPIUM BROMIDE AND ALBUTEROL SULFATE 3 ML: .5; 3 SOLUTION RESPIRATORY (INHALATION) at 08:43

## 2019-01-01 RX ADMIN — MORPHINE SULFATE 2 MG: 2 INJECTION, SOLUTION INTRAMUSCULAR; INTRAVENOUS at 12:11

## 2019-01-01 RX ADMIN — ACETAMINOPHEN 500 MG: 500 TABLET ORAL at 22:18

## 2019-01-01 RX ADMIN — MIDODRINE HYDROCHLORIDE 10 MG: 5 TABLET ORAL at 11:47

## 2019-01-01 RX ADMIN — FAMOTIDINE 20 MG: 20 TABLET, FILM COATED ORAL at 08:57

## 2019-01-01 RX ADMIN — INSULIN LISPRO 3 UNITS: 100 INJECTION, SOLUTION INTRAVENOUS; SUBCUTANEOUS at 12:14

## 2019-01-01 RX ADMIN — OXYCODONE HYDROCHLORIDE 5 MG: 5 SOLUTION ORAL at 07:56

## 2019-01-01 RX ADMIN — FUROSEMIDE 40 MG: 10 INJECTION, SOLUTION INTRAMUSCULAR; INTRAVENOUS at 08:00

## 2019-01-01 RX ADMIN — INSULIN LISPRO 4 UNITS: 100 INJECTION, SOLUTION INTRAVENOUS; SUBCUTANEOUS at 23:52

## 2019-01-01 RX ADMIN — MIDODRINE HYDROCHLORIDE 10 MG: 5 TABLET ORAL at 08:45

## 2019-01-01 RX ADMIN — ASPIRIN 81 MG: 81 TABLET, COATED ORAL at 09:43

## 2019-01-01 RX ADMIN — FAMOTIDINE 20 MG: 20 TABLET, FILM COATED ORAL at 09:43

## 2019-01-01 RX ADMIN — OXYCODONE HYDROCHLORIDE 2.5 MG: 5 TABLET ORAL at 11:27

## 2019-01-01 RX ADMIN — GABAPENTIN 750 MG: 250 SOLUTION ORAL at 21:55

## 2019-01-01 RX ADMIN — MIDODRINE HYDROCHLORIDE 10 MG: 5 TABLET ORAL at 09:44

## 2019-01-01 RX ADMIN — GABAPENTIN 750 MG: 250 SOLUTION ORAL at 21:09

## 2019-01-01 RX ADMIN — MORPHINE SULFATE 2 MG: 2 INJECTION, SOLUTION INTRAMUSCULAR; INTRAVENOUS at 16:19

## 2019-01-01 RX ADMIN — Medication 10 ML: at 21:56

## 2019-01-01 RX ADMIN — GABAPENTIN 750 MG: 250 SOLUTION ORAL at 08:56

## 2019-01-01 RX ADMIN — FUROSEMIDE 40 MG: 10 INJECTION, SOLUTION INTRAMUSCULAR; INTRAVENOUS at 08:57

## 2019-01-01 RX ADMIN — INSULIN HUMAN 12 UNITS: 100 INJECTION, SOLUTION PARENTERAL at 12:14

## 2019-01-01 RX ADMIN — HEPARIN SODIUM 5000 UNITS: 5000 INJECTION INTRAVENOUS; SUBCUTANEOUS at 21:10

## 2019-01-01 RX ADMIN — DOXYCYCLINE HYCLATE 100 MG: 100 TABLET, COATED ORAL at 21:35

## 2019-01-01 RX ADMIN — PRAMIPEXOLE DIHYDROCHLORIDE 0.25 MG: 0.25 TABLET ORAL at 15:01

## 2019-01-01 RX ADMIN — PRAMIPEXOLE DIHYDROCHLORIDE 0.25 MG: 0.25 TABLET ORAL at 09:44

## 2019-01-01 RX ADMIN — GUAIFENESIN 200 MG: 200 SOLUTION ORAL at 22:08

## 2019-01-01 RX ADMIN — HEPARIN SODIUM 5000 UNITS: 5000 INJECTION INTRAVENOUS; SUBCUTANEOUS at 15:05

## 2019-01-01 RX ADMIN — ACETAMINOPHEN 500 MG: 500 TABLET ORAL at 12:06

## 2019-01-01 RX ADMIN — PRAMIPEXOLE DIHYDROCHLORIDE 0.25 MG: 0.25 TABLET ORAL at 21:38

## 2019-01-01 RX ADMIN — MORPHINE SULFATE 2 MG: 2 INJECTION, SOLUTION INTRAMUSCULAR; INTRAVENOUS at 11:05

## 2019-01-01 RX ADMIN — INSULIN LISPRO 2 UNITS: 100 INJECTION, SOLUTION INTRAVENOUS; SUBCUTANEOUS at 05:37

## 2019-01-01 RX ADMIN — INSULIN LISPRO 2 UNITS: 100 INJECTION, SOLUTION INTRAVENOUS; SUBCUTANEOUS at 07:46

## 2019-01-01 RX ADMIN — ACETAMINOPHEN 500 MG: 500 TABLET ORAL at 17:51

## 2019-01-01 RX ADMIN — INSULIN HUMAN 12 UNITS: 100 INJECTION, SOLUTION PARENTERAL at 08:58

## 2019-01-01 RX ADMIN — Medication 1 CAPSULE: at 08:33

## 2019-01-01 RX ADMIN — THERA TABS 1 TABLET: TAB at 08:33

## 2019-01-01 RX ADMIN — FUROSEMIDE 40 MG: 10 INJECTION, SOLUTION INTRAMUSCULAR; INTRAVENOUS at 08:32

## 2019-01-01 RX ADMIN — INSULIN HUMAN 4 UNITS: 100 INJECTION, SOLUTION PARENTERAL at 20:28

## 2019-01-01 RX ADMIN — Medication 10 ML: at 16:37

## 2019-01-01 RX ADMIN — FUROSEMIDE 40 MG: 40 TABLET ORAL at 08:45

## 2019-01-01 RX ADMIN — THERA TABS 1 TABLET: TAB at 08:01

## 2019-01-01 RX ADMIN — MIDODRINE HYDROCHLORIDE 10 MG: 5 TABLET ORAL at 09:02

## 2019-01-01 RX ADMIN — INSULIN LISPRO 4 UNITS: 100 INJECTION, SOLUTION INTRAVENOUS; SUBCUTANEOUS at 13:01

## 2019-01-01 RX ADMIN — FUROSEMIDE 40 MG: 10 INJECTION, SOLUTION INTRAMUSCULAR; INTRAVENOUS at 17:00

## 2019-01-01 RX ADMIN — FAMOTIDINE 20 MG: 20 TABLET, FILM COATED ORAL at 16:14

## 2019-01-01 RX ADMIN — GABAPENTIN 750 MG: 250 SOLUTION ORAL at 23:50

## 2019-01-01 RX ADMIN — CLOPIDOGREL BISULFATE 75 MG: 75 TABLET ORAL at 08:33

## 2019-01-01 RX ADMIN — Medication 10 ML: at 13:30

## 2019-01-01 RX ADMIN — MIDODRINE HYDROCHLORIDE 10 MG: 5 TABLET ORAL at 12:14

## 2019-01-01 RX ADMIN — INSULIN LISPRO 7 UNITS: 100 INJECTION, SOLUTION INTRAVENOUS; SUBCUTANEOUS at 09:03

## 2019-01-01 RX ADMIN — Medication 10 ML: at 14:07

## 2019-01-01 RX ADMIN — BUMETANIDE 2 MG: 0.25 INJECTION INTRAMUSCULAR; INTRAVENOUS at 09:24

## 2019-01-01 RX ADMIN — ALBUTEROL SULFATE 2.5 MG: 2.5 SOLUTION RESPIRATORY (INHALATION) at 08:44

## 2019-01-01 RX ADMIN — MIDODRINE HYDROCHLORIDE 10 MG: 5 TABLET ORAL at 08:57

## 2019-01-01 RX ADMIN — GUAIFENESIN 200 MG: 200 SOLUTION ORAL at 15:55

## 2019-01-01 RX ADMIN — GABAPENTIN 750 MG: 250 SOLUTION ORAL at 00:45

## 2019-01-01 RX ADMIN — PRAMIPEXOLE DIHYDROCHLORIDE 0.25 MG: 0.25 TABLET ORAL at 17:29

## 2019-01-01 RX ADMIN — PRAMIPEXOLE DIHYDROCHLORIDE 0.25 MG: 0.25 TABLET ORAL at 21:10

## 2019-01-01 RX ADMIN — CASTOR OIL AND BALSAM, PERU: 788; 87 OINTMENT TOPICAL at 17:50

## 2019-01-01 RX ADMIN — GABAPENTIN 750 MG: 250 SOLUTION ORAL at 23:21

## 2019-01-01 RX ADMIN — CLOPIDOGREL BISULFATE 75 MG: 75 TABLET ORAL at 15:05

## 2019-01-01 RX ADMIN — Medication 10 ML: at 16:18

## 2019-01-01 RX ADMIN — FAMOTIDINE 20 MG: 20 TABLET, FILM COATED ORAL at 17:29

## 2019-01-01 RX ADMIN — GUAIFENESIN 200 MG: 200 SOLUTION ORAL at 16:13

## 2019-01-01 RX ADMIN — CLOPIDOGREL BISULFATE 75 MG: 75 TABLET ORAL at 08:45

## 2019-01-01 RX ADMIN — FAMOTIDINE 20 MG: 20 TABLET, FILM COATED ORAL at 08:33

## 2019-01-01 RX ADMIN — ATORVASTATIN CALCIUM 40 MG: 40 TABLET, FILM COATED ORAL at 08:00

## 2019-01-01 RX ADMIN — ACETAMINOPHEN 500 MG: 500 TABLET ORAL at 02:22

## 2019-01-01 RX ADMIN — MIDAZOLAM HYDROCHLORIDE 1 MG: 1 INJECTION, SOLUTION INTRAMUSCULAR; INTRAVENOUS at 12:37

## 2019-01-01 RX ADMIN — LEVOFLOXACIN 750 MG: 5 INJECTION, SOLUTION INTRAVENOUS at 09:22

## 2019-01-01 RX ADMIN — PRAMIPEXOLE DIHYDROCHLORIDE 0.25 MG: 0.25 TABLET ORAL at 21:37

## 2019-01-01 RX ADMIN — INSULIN LISPRO 3 UNITS: 100 INJECTION, SOLUTION INTRAVENOUS; SUBCUTANEOUS at 18:26

## 2019-01-01 RX ADMIN — FAMOTIDINE 20 MG: 20 TABLET, FILM COATED ORAL at 19:34

## 2019-01-01 RX ADMIN — CEFEPIME HYDROCHLORIDE 1 G: 1 INJECTION, POWDER, FOR SOLUTION INTRAMUSCULAR; INTRAVENOUS at 16:44

## 2019-01-01 RX ADMIN — Medication 10 ML: at 00:28

## 2019-01-01 RX ADMIN — ALBUTEROL SULFATE 2.5 MG: 2.5 SOLUTION RESPIRATORY (INHALATION) at 19:04

## 2019-01-01 RX ADMIN — INSULIN LISPRO 4 UNITS: 100 INJECTION, SOLUTION INTRAVENOUS; SUBCUTANEOUS at 12:06

## 2019-01-01 RX ADMIN — HEPARIN SODIUM 5000 UNITS: 5000 INJECTION INTRAVENOUS; SUBCUTANEOUS at 16:13

## 2019-01-01 RX ADMIN — CEFEPIME HYDROCHLORIDE 1 G: 1 INJECTION, POWDER, FOR SOLUTION INTRAMUSCULAR; INTRAVENOUS at 22:38

## 2019-01-01 RX ADMIN — FINASTERIDE 5 MG: 5 TABLET, FILM COATED ORAL at 21:38

## 2019-01-01 RX ADMIN — GUAIFENESIN 200 MG: 200 SOLUTION ORAL at 08:57

## 2019-01-01 RX ADMIN — MIDODRINE HYDROCHLORIDE 10 MG: 5 TABLET ORAL at 09:15

## 2019-01-01 RX ADMIN — ASPIRIN 81 MG: 81 TABLET, COATED ORAL at 15:05

## 2019-01-01 RX ADMIN — FAMOTIDINE 20 MG: 20 TABLET, FILM COATED ORAL at 08:45

## 2019-01-01 RX ADMIN — ACETAMINOPHEN 500 MG: 500 TABLET ORAL at 09:43

## 2019-01-01 RX ADMIN — ACETAMINOPHEN 500 MG: 500 TABLET ORAL at 02:58

## 2019-01-01 RX ADMIN — Medication 10 ML: at 05:24

## 2019-01-01 RX ADMIN — FUROSEMIDE 40 MG: 10 INJECTION, SOLUTION INTRAMUSCULAR; INTRAVENOUS at 08:34

## 2019-01-01 RX ADMIN — HEPARIN SODIUM 5000 UNITS: 5000 INJECTION INTRAVENOUS; SUBCUTANEOUS at 05:24

## 2019-01-01 RX ADMIN — Medication 10 ML: at 06:00

## 2019-01-01 RX ADMIN — ONDANSETRON 4 MG: 4 TABLET, ORALLY DISINTEGRATING ORAL at 01:07

## 2019-01-01 RX ADMIN — Medication 10 ML: at 13:17

## 2019-01-01 RX ADMIN — DOXYCYCLINE HYCLATE 100 MG: 100 TABLET, COATED ORAL at 08:33

## 2019-01-01 RX ADMIN — INSULIN HUMAN 12 UNITS: 100 INJECTION, SOLUTION PARENTERAL at 08:34

## 2019-01-01 RX ADMIN — LORAZEPAM 0.5 MG: 2 INJECTION INTRAMUSCULAR; INTRAVENOUS at 16:19

## 2019-01-01 RX ADMIN — FUROSEMIDE 40 MG: 10 INJECTION, SOLUTION INTRAMUSCULAR; INTRAVENOUS at 19:22

## 2019-01-01 RX ADMIN — METOCLOPRAMIDE 10 MG: 5 INJECTION, SOLUTION INTRAMUSCULAR; INTRAVENOUS at 16:00

## 2019-01-01 RX ADMIN — FINASTERIDE 5 MG: 5 TABLET, FILM COATED ORAL at 09:02

## 2019-01-01 RX ADMIN — TAMSULOSIN HYDROCHLORIDE 0.4 MG: 0.4 CAPSULE ORAL at 08:57

## 2019-01-01 RX ADMIN — PRAMIPEXOLE DIHYDROCHLORIDE 0.25 MG: 0.25 TABLET ORAL at 16:15

## 2019-01-01 RX ADMIN — HEPARIN SODIUM 5000 UNITS: 5000 INJECTION INTRAVENOUS; SUBCUTANEOUS at 21:34

## 2019-01-01 RX ADMIN — GUAIFENESIN 200 MG: 200 SOLUTION ORAL at 21:38

## 2019-01-01 RX ADMIN — CLOPIDOGREL BISULFATE 75 MG: 75 TABLET ORAL at 09:15

## 2019-01-01 RX ADMIN — HEPARIN SODIUM 5000 UNITS: 5000 INJECTION INTRAVENOUS; SUBCUTANEOUS at 19:37

## 2019-01-01 RX ADMIN — MIDODRINE HYDROCHLORIDE 10 MG: 5 TABLET ORAL at 16:48

## 2019-01-01 RX ADMIN — HEPARIN SODIUM 5000 UNITS: 5000 INJECTION INTRAVENOUS; SUBCUTANEOUS at 14:06

## 2019-01-01 RX ADMIN — FAMOTIDINE 20 MG: 20 TABLET, FILM COATED ORAL at 17:31

## 2019-01-01 RX ADMIN — GUAIFENESIN 200 MG: 200 SOLUTION ORAL at 08:44

## 2019-01-01 RX ADMIN — IOPAMIDOL 90 ML: 755 INJECTION, SOLUTION INTRAVENOUS at 13:05

## 2019-01-01 RX ADMIN — DOXYCYCLINE HYCLATE 100 MG: 100 TABLET, COATED ORAL at 22:08

## 2019-01-01 RX ADMIN — HEPARIN SODIUM 5000 UNITS: 5000 INJECTION INTRAVENOUS; SUBCUTANEOUS at 05:27

## 2019-01-01 RX ADMIN — IOPAMIDOL 178 ML: 755 INJECTION, SOLUTION INTRAVENOUS at 13:17

## 2019-01-01 RX ADMIN — INSULIN GLARGINE 7 UNITS: 100 INJECTION, SOLUTION SUBCUTANEOUS at 09:00

## 2019-01-01 RX ADMIN — HEPARIN SODIUM 5000 UNITS: 5000 INJECTION INTRAVENOUS; SUBCUTANEOUS at 21:09

## 2019-01-01 RX ADMIN — OXYCODONE HYDROCHLORIDE 2.5 MG: 5 TABLET ORAL at 14:14

## 2019-01-01 RX ADMIN — INSULIN LISPRO 4 UNITS: 100 INJECTION, SOLUTION INTRAVENOUS; SUBCUTANEOUS at 00:55

## 2019-01-01 RX ADMIN — ATORVASTATIN CALCIUM 40 MG: 40 TABLET, FILM COATED ORAL at 08:45

## 2019-01-01 RX ADMIN — CASTOR OIL AND BALSAM, PERU: 788; 87 OINTMENT TOPICAL at 18:17

## 2019-01-01 RX ADMIN — LORAZEPAM 0.5 MG: 2 INJECTION INTRAMUSCULAR; INTRAVENOUS at 00:31

## 2019-01-01 RX ADMIN — Medication 10 ML: at 21:37

## 2019-01-01 RX ADMIN — PRAMIPEXOLE DIHYDROCHLORIDE 0.25 MG: 0.25 TABLET ORAL at 00:28

## 2019-01-01 RX ADMIN — ALBUTEROL SULFATE 2.5 MG: 2.5 SOLUTION RESPIRATORY (INHALATION) at 07:54

## 2019-01-01 RX ADMIN — Medication 10 ML: at 09:32

## 2019-01-01 RX ADMIN — CLOPIDOGREL BISULFATE 75 MG: 75 TABLET ORAL at 08:06

## 2019-01-01 RX ADMIN — ASPIRIN 81 MG: 81 TABLET, COATED ORAL at 08:33

## 2019-01-01 RX ADMIN — MIDODRINE HYDROCHLORIDE 10 MG: 5 TABLET ORAL at 16:28

## 2019-01-01 RX ADMIN — LIDOCAINE HYDROCHLORIDE 10 ML: 10 INJECTION, SOLUTION INFILTRATION; PERINEURAL at 10:17

## 2019-01-01 RX ADMIN — INSULIN HUMAN 12 UNITS: 100 INJECTION, SOLUTION PARENTERAL at 13:18

## 2019-01-01 RX ADMIN — DOXYCYCLINE HYCLATE 100 MG: 100 TABLET, COATED ORAL at 22:20

## 2019-01-01 RX ADMIN — ASPIRIN 81 MG 81 MG: 81 TABLET ORAL at 08:06

## 2019-01-01 RX ADMIN — CASTOR OIL AND BALSAM, PERU: 788; 87 OINTMENT TOPICAL at 09:08

## 2019-01-01 RX ADMIN — INSULIN GLARGINE 10 UNITS: 100 INJECTION, SOLUTION SUBCUTANEOUS at 08:45

## 2019-01-01 RX ADMIN — GABAPENTIN 750 MG: 250 SOLUTION ORAL at 15:55

## 2019-01-01 RX ADMIN — PRAMIPEXOLE DIHYDROCHLORIDE 0.25 MG: 0.25 TABLET ORAL at 08:33

## 2019-01-01 RX ADMIN — MIDODRINE HYDROCHLORIDE 10 MG: 5 TABLET ORAL at 16:14

## 2019-01-01 RX ADMIN — ACETAMINOPHEN 500 MG: 500 TABLET ORAL at 21:09

## 2019-01-01 RX ADMIN — INSULIN HUMAN 15 UNITS: 100 INJECTION, SOLUTION PARENTERAL at 13:01

## 2019-01-01 RX ADMIN — OXYCODONE HYDROCHLORIDE 2.5 MG: 5 TABLET ORAL at 16:13

## 2019-01-01 RX ADMIN — CASTOR OIL AND BALSAM, PERU: 788; 87 OINTMENT TOPICAL at 08:58

## 2019-01-01 RX ADMIN — SODIUM CHLORIDE 2262 ML: 900 INJECTION, SOLUTION INTRAVENOUS at 11:28

## 2019-01-01 RX ADMIN — MIDODRINE HYDROCHLORIDE 10 MG: 5 TABLET ORAL at 08:33

## 2019-01-01 RX ADMIN — GUAIFENESIN 200 MG: 200 SOLUTION ORAL at 21:55

## 2019-01-01 RX ADMIN — INSULIN LISPRO 3 UNITS: 100 INJECTION, SOLUTION INTRAVENOUS; SUBCUTANEOUS at 12:54

## 2019-01-01 RX ADMIN — Medication 1 CAPSULE: at 09:22

## 2019-01-01 RX ADMIN — DOXYCYCLINE HYCLATE 100 MG: 100 TABLET, COATED ORAL at 08:45

## 2019-01-01 RX ADMIN — DEXTROSE MONOHYDRATE 25 G: 25 INJECTION, SOLUTION INTRAVENOUS at 18:00

## 2019-01-01 RX ADMIN — HEPARIN SODIUM 5000 UNITS: 5000 INJECTION INTRAVENOUS; SUBCUTANEOUS at 06:05

## 2019-01-01 RX ADMIN — ACETAMINOPHEN 500 MG: 500 TABLET ORAL at 10:29

## 2019-01-01 RX ADMIN — GUAIFENESIN 200 MG: 200 SOLUTION ORAL at 16:49

## 2019-01-01 RX ADMIN — PRAMIPEXOLE DIHYDROCHLORIDE 0.25 MG: 0.25 TABLET ORAL at 21:19

## 2019-01-01 RX ADMIN — ALBUTEROL SULFATE 2.5 MG: 2.5 SOLUTION RESPIRATORY (INHALATION) at 19:31

## 2019-01-01 RX ADMIN — DOXYCYCLINE HYCLATE 100 MG: 100 TABLET, COATED ORAL at 21:10

## 2019-01-01 RX ADMIN — HEPARIN SODIUM 5000 UNITS: 5000 INJECTION INTRAVENOUS; SUBCUTANEOUS at 21:56

## 2019-01-01 RX ADMIN — FAMOTIDINE 20 MG: 20 TABLET, FILM COATED ORAL at 08:00

## 2019-01-01 RX ADMIN — PRAMIPEXOLE DIHYDROCHLORIDE 0.25 MG: 0.25 TABLET ORAL at 15:05

## 2019-01-01 RX ADMIN — GABAPENTIN 750 MG: 250 SOLUTION ORAL at 08:33

## 2019-01-01 RX ADMIN — GABAPENTIN 750 MG: 250 SOLUTION ORAL at 08:44

## 2019-01-01 RX ADMIN — MIDODRINE HYDROCHLORIDE 10 MG: 5 TABLET ORAL at 13:01

## 2019-01-01 RX ADMIN — GLYCOPYRROLATE 0.2 MG: 0.2 INJECTION, SOLUTION INTRAMUSCULAR; INTRAVENOUS at 16:20

## 2019-01-01 RX ADMIN — BIVALIRUDIN 0.5 MG/KG/HR: 250 INJECTION, POWDER, LYOPHILIZED, FOR SOLUTION INTRAVENOUS at 13:46

## 2019-01-01 RX ADMIN — INSULIN HUMAN 12 UNITS: 100 INJECTION, SOLUTION PARENTERAL at 08:01

## 2019-01-01 RX ADMIN — ALBUTEROL SULFATE 2.5 MG: 2.5 SOLUTION RESPIRATORY (INHALATION) at 21:09

## 2019-01-01 RX ADMIN — ACETAMINOPHEN 500 MG: 500 TABLET ORAL at 18:13

## 2019-01-01 RX ADMIN — ALBUTEROL SULFATE 2.5 MG: 2.5 SOLUTION RESPIRATORY (INHALATION) at 07:56

## 2019-01-01 RX ADMIN — LORAZEPAM 0.5 MG: 2 INJECTION INTRAMUSCULAR; INTRAVENOUS at 19:55

## 2019-01-01 RX ADMIN — HEPARIN SODIUM 5000 UNITS: 5000 INJECTION INTRAVENOUS; SUBCUTANEOUS at 05:20

## 2019-01-01 RX ADMIN — GABAPENTIN 750 MG: 250 SOLUTION ORAL at 17:50

## 2019-01-01 RX ADMIN — IPRATROPIUM BROMIDE AND ALBUTEROL SULFATE 3 ML: .5; 3 SOLUTION RESPIRATORY (INHALATION) at 21:07

## 2019-01-01 RX ADMIN — GABAPENTIN 750 MG: 250 SOLUTION ORAL at 16:49

## 2019-01-01 RX ADMIN — HEPARIN SODIUM 5000 UNITS: 5000 INJECTION INTRAVENOUS; SUBCUTANEOUS at 20:38

## 2019-01-01 RX ADMIN — Medication 10 ML: at 06:05

## 2019-01-01 RX ADMIN — GABAPENTIN 750 MG: 250 SOLUTION ORAL at 22:08

## 2019-01-01 RX ADMIN — INSULIN LISPRO 2 UNITS: 100 INJECTION, SOLUTION INTRAVENOUS; SUBCUTANEOUS at 18:00

## 2019-01-01 RX ADMIN — TAMSULOSIN HYDROCHLORIDE 0.4 MG: 0.4 CAPSULE ORAL at 08:33

## 2019-01-04 NOTE — PROGRESS NOTES
Nurse navigator note - cardiology Call to and reached  [id number] - He said he is doing well - and that Mrs. Ira Sidhu is at the hospital at [facility] - getting fluid drained off her hip - He has a care companion with him - Altagracia Farris - she helps them from 10am to 2:30 pm - and is there in Ms. Ghosh's absence today - his sons come in the evening. Weight today 160 - stable. His glucose has been elevated, but not above 200. Mrs. Ira Sidhu is call his endo about that. He was discharged from [facility] OT/ PT yesterday - skilled nursing still in place. Call to [facility] to ask about  to provide resources for Mrs. GALVIN - in the event she might need more nursing care or skilled facility - if she has to be out of the home - The referral has to come from provider - Attempted to reach Dr. Rikki Johnson office [phone] for the order. Plan:  pcp request to fax order for  to [address] [place] [address] [phone] - Dr. Paulette Ceballos re: the above Follow up with Mrs. [id number] on Monday about her hip and tx plan. Follow up on pending appts - for Mr. Ghosh. Aroldo Hensley, RN , [facility], [facility] 
NN CAV Herson Avalos [phone]

## 2019-01-08 NOTE — PROGRESS NOTES
Outpatient Infusion Center Progress Note 
 
0920 Pt admit to [facility] for TAVR hydration via wheelchair in stable condition. Assessment completed. No new concerns voiced. Peripheral IV accessed with positive blood return. PIV flushed and NS bolus started as ordered. NS started at 213 mL/hr for 1 hour and then rate dropped to 71 mL/hr for the next 4 hours. 1045 Patient departed [facility] with len Lyn, to Outpatient Registration for CT scans. PIV was patent with positive blood return upon leaving [facility]. Pump running at 71 mL/hr.  
1345 Patient back in [facility]. PIV still with positive blood return. Hydration running at 71 mL/hr.  
 
Visit Vitals BP (P) 93/63 (BP 1 Location: Right arm, BP Patient Position: Sitting) Pulse (P) 74 Temp 98.4 °F (36.9 °C) Resp 16 SpO2 95% Medications: 
50 mEq Sodium Bicarbonate in 0.45% NS titrated over 5 hours NS flushes 1545 Pt tolerated treatment well. PIV maintained positive blood return throughout treatment. PIV flushed and deaccessed per protocol. D/c home ambulatory in no distress. There are no other appointments scheduled at this time.

## 2019-01-09 NOTE — PATIENT INSTRUCTIONS
Learning About Staph Infection  What is a staph infection? Staphylococcus aureus (staph) is a type of bacteria that can cause infections. Staph bacteria normally live on the skin. They don't usually cause problems. They only become a problem when they cause infection. The infection has a higher chance of becoming serious in people who are weak or ill or who are being treated in the hospital. Sometimes staph bacteria can cause more serious widespread infection. In the hospital, staph infections are more likely to occur in wounds, burns, or places where there is a break in the skin or where tubes enter the body. In the community, these infections are more likely to occur among people who have cuts or wounds and who have close contact with one another. What are the symptoms? Symptoms of a staph infection depend on where the infection is. If the infection is:  · In a wound, that area of your skin may be red or tender. · On your skin, you may get a red, tender boil or abscess. You may think you have been bitten by a spider or insect. · In your urine, you may have symptoms of a urinary tract infection. These include burning when you urinate. · In your blood or more widespread, you may have a fever and feel very ill. How is a staph infection treated? The doctor will take a sample of your infected wound or a blood or urine sample. The sample is tested to see which antibiotics can kill the bacteria in it. This test may take several days. If you have a staph infection, your doctor may:  · Drain your wound. · Give you antibiotics as pills or through a needle put in your vein (IV). You may have to stay in the hospital for treatment. In the hospital, you may be kept apart from others. This is to reduce the chances of spreading the bacteria. How can you prevent a staph infection? · Practice good hygiene. ? Wash your hands often with soap and clean, running water.  You can also use an alcohol-based hand . Hand-washing is the best way to avoid spreading the bacteria. ? Keep cuts and scrapes clean. Cover them with a bandage. Avoid contact with other people's wounds or bandages. ? Don't share personal items such as towels, washcloths, razors, or clothing. ? Keep your environment clean by using a disinfectant to wipe surfaces you touch a lot. These include countertops, doorknobs, and light switches. · Your doctor may give you an ointment to put inside your nose. This is to kill staph bacteria that may cause another infection. · Be smart about using antibiotics. Antibiotics can help treat bacterial infections, but they can't cure viral infections. Always ask your doctor if antibiotics are the best treatment. · If your doctor prescribed antibiotics, take them as directed. Do not stop taking them just because you feel better. You need to take the full course of antibiotics. · If you're in the hospital, remind doctors and nurses to wash their hands before they touch you. Follow-up care is a key part of your treatment and safety. Be sure to make and go to all appointments, and call your doctor if you are having problems. It's also a good idea to know your test results and keep a list of the medicines you take. Where can you learn more? Go to http://aniket-tomas.info/. Enter Q335 in the search box to learn more about \"Learning About Staph Infection. \"  Current as of: November 18, 2017  Content Version: 11.8  © 4651-2794 [employer]. Care instructions adapted under license by [employer] (which disclaims liability or warranty for this information). If you have questions about a medical condition or this instruction, always ask your healthcare professional. [place] [address] any warranty or liability for your use of this information.

## 2019-01-09 NOTE — PROGRESS NOTES
Skin Problem   This is a recurrent problem. The current episode started 2 days ago (noticed redness around his feeding tube with local; soreness and oozing blood from  tube site). Associated symptoms comments: As above. Nothing aggravates the symptoms. Nothing relieves the symptoms. Treatments tried: given amoxicillin 250 mg qid recently it helped while he was on         Past Medical History:   Diagnosis Date    Antiplatelet or antithrombotic long-term use 12/4/2014    Anxiety disorder     Arrhythmia 2009    bradycardia    Arthritis     CAD (coronary artery disease)     s/p CABG 2002; Dr Magdiel [facility]) 1996    tongue/throat cancer s/p surgery / radiation and 1 dose of chemo    Carotid artery stenosis     s/p bilateral stents    Chest pain     Chronic pain     left leg, lower back,     Cough     Depression     Diabetes ([address]     Type II    Epigastric pain 8/17/2018    Esophageal dysmotility     s/p dilitation    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 I suspect the latter. Achalasia  is not present.         Fainting     Falls     Fatigue     GERD (gastroesophageal reflux disease)     Heart failure ([address] 10/2014     Cardiomyopathy:Pacemaker upgrade:Biv and AICD  Dr. Belita Ganser heart DrAlvaro last visit 5/11/2015    Hepatitis C Dx 1996    treated at [facility] in past; as of 4/15/15 wife states pt currently not under any treatment    Hyperlipidemia     Joint pain     Liver disease     Memory loss     Muscle pain     Muscle weakness     Myocardial infarct ([address] 2013    Heart Cath: 40% LV EF, Stented distal LAD, patent Graft to circumflex    On tube feeding diet approx 2009    still has as of 9/28/15  (no po food/liquid/meds at all); Dr Gasper Nunez Other ill-defined conditions(799.89) 1996    1 dose of chemotherapy/radiation for tongue cancer    Other ill-defined conditions(799.89)     BPH    Other ill-defined conditions(799.89)     orthostatic hypotension    Pneumonia ~ April -May 2010    SOB (shortness of breath)     Stroke [facility]) approx     left side-left finger tips numb; no imbalance or memory loss; as of  not seeing neuro MD    Suicidal thoughts     Swallowing difficulty         Past Surgical History:   Procedure Laterality Date    ABDOMEN SURGERY [address]    peg tube    CABG, ARTERY-VEIN, THREE      HX CATARACT REMOVAL      bilateral    HX CHOLECYSTECTOMY      HX COLONOSCOPY      HX HEART CATHETERIZATION      Stented distal LAD    HX MOHS PROCEDURES      bilateral    HX ORTHOPAEDIC      back surgery times two    HX OTHER SURGICAL      Radical Left Neck    HX OTHER SURGICAL      NASAL POLYPS REMOVAL    HX OTHER SURGICAL      TURP    HX PACEMAKER      HX PACEMAKER  10/28/14     Defibrillator: [facility] # [id number], serial # [id number]; Dr. Roberto Carlos Hammond [phone]; Dr Jessica Mcgovern HX UROLOGICAL      cystoscopy    NEUROLOGICAL PROCEDURE UNLISTED      cevical surgery    PA CHANGE GASTROSTOMY TUBE  2011         PA CHANGE GASTROSTOMY TUBE  2011         PA EGD INSERT GUIDE WIRE DILATOR PASSAGE ESOPHAGUS  2010         PA EGD TRANSORAL BIOPSY SINGLE/MULTIPLE  2010         STOMACH SURGERY PROCEDURE UNLISTED  2011         UPPER GI ENDOSCOPY,BIOPSY  2018         UPPER GI ENDOSCOPY,DILATN W GUIDE  2018         UPPER GI ENDOSCOPY,W/DILAT,GASTRIC OUT  2018         VASCULAR SURGERY PROCEDURE UNLIST      bilateral carotid stents         Family History   Problem Relation Age of Onset    Heart Disease Father          at age 52 from CAD    Colon Cancer Mother     Cancer Mother         colon ca    Heart Disease Brother         Social History     Socioeconomic History    Marital status:      Spouse name: Not on file    Number of children: Not on file    Years of education: Not on file    Highest education level: Not on file   Social Needs    Financial resource strain: Not on file    Food insecurity - worry: Not on file    Food insecurity - inability: Not on file    Transportation needs - medical: Not on file   [employer] needs - non-medical: Not on file   Occupational History    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 All other systems reviewed and are negative. Vitals:    01/09/19 1218   BP: 141/65   Pulse: (!) 52   Resp: 15   Temp: 97.4 °F (36.3 °C)   SpO2: 91%   Weight: 166 lb (75.3 kg)   Height: 5' 7\" (1.702 m)       Physical Exam   Skin: There is erythema (with mild induration around peg tube site- mild bllod oozing from lining of the skin at peg tube site). Nursing note and vitals reviewed. MDM    Procedures        ICD-10-CM ICD-9-CM    1. Superficial bacterial skin infection L08.9 682.9     B96.89      on abdominal wall at feeding tube site     Follow with PCP/ GI for tube care  Medications Ordered Today   Medications    doxycycline (MONODOX) 100 mg capsule     Sig: Take 1 Cap by mouth two (2) times a day. Dispense:  20 Cap     Refill:  0     No results found for any visits on 01/09/19. The patients condition was discussed with the patient and they understand. The patient is to follow up with primary care doctor. If signs and symptoms become worse the pt is to go to the ER. The patient is to take medications as prescribed.

## 2019-01-11 PROBLEM — R09.02 HYPOXIA: Status: ACTIVE | Noted: 2019-01-01

## 2019-01-11 PROBLEM — R55 SYNCOPE: Status: ACTIVE | Noted: 2019-01-01

## 2019-01-11 NOTE — PROGRESS NOTES
Bina Bhardwaj, [id number] [facility] 1/11/2019 16:51 Pharmacy Clarification of the Prior to Admission Medication Regimen Retrospective to the Admission Medication Reconciliation The patient was  not interviewed regarding clarification of the prior to admission medication regimen. The patient's wife was present in room and obtained permission from patient to discuss drug regimen with visitor(s) present. The patients wife was questioned regarding use of any other inhalers, topical products, over the counter medications, herbal medications, vitamin products or ophthalmic/nasal/otic medication use. Information Obtained From: Patient's wife, patient's medication list  
  
Recommendations/Findings: The following amendments were made to the patient's active medication list on file at [facility]:  
  
1) Additions:  
· furosemide (LASIX) 40 mg tablet 2) Removals: · Lidocaine · Prednisone · Zofran 3) Changes: 
· insulin regular (NOVOLIN R REGULAR U-100 INSULN)  (Old regimen: No dose /New regimen: 15 Units daily with breakfast, 15 U daily with lunch, 5 Units QHS) · B.infantis-B.ani-B.long-B.bifi (PROBIOTIC 4X) 10-15 mg TbEC (Old regimen: No dose /New regimen: 1 QD) 4) Pertinent Pharmacy Findings: 
· Patient's wife manages patient's medications · acetaminophen (TYLENOL) 500 mg tablet, klack's mixture Solution, fluticasone (FLONASE ALLERGY RELIEF) 50 mcg/actuation nasal spray, nitroglycerin (NITROSTAT) 0.4 mg SL tablet: Patient's wife stated these medications are used at home PRN, but have not been needed within the past \"few months. \" 
· hydrocortisone sodium succ/PF (HYDROCORTISONE SOD SUCC, PF, INJECTION): Patient's last injection was before Christmas, 12/ 2018. · doxycycline (MONODOX) 100 mg capsule: Patient started a 7 day regimen on 1/10/19. Patient has completed 1 days of therapy as of 1/11/2019. Antibiotic Indication: Irritation around G Tube PTA medication list was corrected to the following: Prior to Admission Medications Prescriptions Last Dose Informant Patient Reported? Taking? B.infantis-B.ani-B.long-B.bifi (PROBIOTIC 4X) 10-15 mg TbEC 1/10/2019 at 1100 Significant Other Yes Yes Si Cap by Per G Tube route daily. HYDROcodone-acetaminophen (NORCO)  mg tablet 2019 at 0630 Significant Other Yes Yes Si Tab by Per G Tube route every four (4) hours as needed for Pain. LANTUS SOLOSTAR U-100 INSULIN 100 unit/mL (3 mL) inpn 2019 at 0800 Significant Other No Yes Sig: INJECT 14 UNITS SUBCUTANEOUSLY ONCE DAILY  
acetaminophen (TYLENOL) 500 mg tablet Not Taking at Unknown time Significant Other Yes No  
Si-1,000 mg by Per G Tube route every six (6) hours as needed for Pain. albuterol (PROVENTIL VENTOLIN) 2.5 mg /3 mL (0.083 %) nebulizer solution 1/10/2019 at 1700 Significant Other Yes Yes Si.5 mg by Nebulization route two (2) times a day. Per wife patient can use 3 times a day if needed  
alfuzosin SR (UROXATRAL) 10 mg SR tablet 1/10/2019 at 1400 Significant Other Yes Yes Sig: 10 mg by Per G Tube route daily. aspirin delayed-release 81 mg tablet 2019 at 0815 Significant Other Yes Yes Sig: Take 81 mg by mouth daily. atorvastatin (LIPITOR) 40 mg tablet 2019 at 0815 Significant Other Yes Yes Si mg by Per G Tube route daily. clopidogrel (PLAVIX) 75 mg tab 2019 at 0815 Significant Other Yes Yes Si mg by PEG Tube route daily. doxycycline (MONODOX) 100 mg capsule   Yes Yes Si mg by Per G Tube route two (2) times a day. famotidine (PEPCID) 20 mg tablet 2019 at 0815 Significant Other Yes Yes Si mg by Per G Tube route three (3) times daily. finasteride (PROSCAR) 5 mg tablet 1/10/2019 at 2000 Significant Other Yes Yes Si mg by Per G Tube route nightly.   
fluticasone (FLONASE ALLERGY RELIEF) 50 mcg/actuation nasal spray Not Taking at Unknown time Significant Other Yes No  
 Si [place] by Both Nostrils route two (2) times daily as needed (runny nose). furosemide (LASIX) 40 mg tablet 2019 at 0800 Significant Other Yes Yes Si mg by Per G Tube route two (2) times a day.  
gabapentin (NEURONTIN) 250 mg/5 mL solution 2019 at 0800 Significant Other Yes Yes Si mg by Per G Tube route three (3) times daily. guaiFENesin (ROBITUSSIN) 100 mg/5 mL liquid 2019 at 0800 Significant Other Yes Yes Si mg by Per G Tube route three (3) times daily. hydrocortisone sodium succ/PF (HYDROCORTISONE SOD SUCC, PF, INJECTION) 12/15/2018 Significant Other Yes Yes Sig: by Injection route every three (3) months. Patient gets from orthopedist  
insulin regular (NOVOLIN R REGULAR U-100 INSULN) 100 unit/mL injection 2019 at 0800 Significant Other Yes Yes Sig: 15 Units by SubCUTAneous route daily (with breakfast). Patient takes 15 Units with breakfast, 15 Units with lunch, and 5 Units nightly  
insulin regular (NOVOLIN R, HUMULIN R) 100 unit/mL injection 1/10/2019 at 1400 Significant Other Yes Yes Sig: 15 Units by SubCUTAneous route daily (with lunch). Patient takes 15 Units with breakfast, 15 Units with lunch, and 5 Units nightly  
insulin regular (NOVOLIN R, HUMULIN R) 100 unit/mL injection 1/10/2019 at 2000 Significant Other Yes Yes Si Units by SubCUTAneous route nightly. Patient takes 15 Units with breakfast, 15 Units with lunch, and 5 Units nightly  
klack's mixture Solution Not Taking at Unknown time Significant Other Yes No  
Sig: Take 5 mL by mouth two (2) times a day. Diphenhydramine elixir 12.5mg/ml 500ml, Nystatin suspension 120ml,Tetracycline 500mg capsules  12 capsules (6g), Hydrocortisone 20mg tablet 12 tablets (240mg), Water for irrigation QS ad 1000ml 
 
   
midodrine (PROAMITINE) 10 mg tablet 2019 at 0800 Significant Other Yes Yes Sig: 10 mg by Per G Tube route three (3) times daily (with meals). multivitamin (ONE A DAY) tablet 2019 at 0800 Significant Other Yes Yes Si Tab by Per G Tube route daily. nitroglycerin (NITROSTAT) 0.4 mg SL tablet Not Taking at Unknown time Significant Other Yes No  
Si.4 mg by SubLINGual route every five (5) minutes as needed for Chest Pain. pramipexole (MIRAPEX) 0.25 mg tablet 2019 at 0800 Significant Other No Yes Sig: Take 1 Tab by mouth three (3) times daily. zinc 50 mg tab tablet 1/10/2019 at 1100 Significant Other Yes Yes Si mg by Per G Tube route daily. Facility-Administered Medications: None Thank you, Yasmin Funk PharmD. Candidate, Class of

## 2019-01-11 NOTE — ED NOTES
Pt wife called the cardiologist dr. Rojas Running office to notify he had gained four pounds in one day They had called her back to tell her to have our ED \"keep them posted\"

## 2019-01-11 NOTE — ED NOTES
Pt lungs remain congested and coarse; fluid resuscitation for elevated POC lactic acid as reported to Dr. Tess Johnson Will slowly fluid resuscitate

## 2019-01-11 NOTE — ED PROVIDER NOTES
Patient Name: Cuong Solis History of Presenting Illness Chief Complaint Patient presents with  Syncope 69% at home ; bp 60/40 pta  Back Pain  
  c/o back pain rescue gave Fentanyl 100mcg pta History Provided By: EMS 
 
HPI: Cuong Solis, 80 y.o. male with PMHx significant for hyperlipidemia, CAD, diabetes, memory loss, CHF, CVA, CA (tongue/throat), presents via EMS to the ED with cc of new onset syncopal episode with accompanying sxs of back pain. Pt's home nurse called EMS s/p syncope and measured O2 stat in the 60s. Pt is normally on 2L of O2. EMS reports en route pt had an O2 stat of 69% and EKG showed possible acute MI. Pt was placed on re-breather and c/o back pain. Social Hx: - Tobacco, - EtOH, - Illicit Drugs HPI and ROS limited due to acuity of condition. PCP: Osiel Jules DO 
 
Current Facility-Administered Medications Medication Dose Route Frequency Provider Last Rate Last Dose  sodium chloride (NS) flush 5-10 mL  5-10 mL IntraVENous PRN Tao Null MD      
 0.9% sodium chloride infusion 2,262 mL  30 mL/kg IntraVENous CONTINUOUS Tao Null  mL/hr at 01/11/19 1128 2,262 mL at 01/11/19 1128  xenon xe 692 gas 10 millicurie  10 millicurie Inhalation RAD ONCE Tao Null MD      
 technetium albumin aggregated (MAA) solution 4 millicurie  4 millicurie IntraVENous RAD ONCE FlakitaRobert MD      
 metoclopramide HCl (REGLAN) injection 10 mg  10 mg IntraVENous NOW Tao Null MD      
 
Current Outpatient Medications Medication Sig Dispense Refill  doxycycline (MONODOX) 100 mg capsule Take 1 Cap by mouth two (2) times a day. 20 Cap 0  clopidogrel (PLAVIX) 75 mg tab Take 75 mg by mouth daily.  aspirin delayed-release 81 mg tablet Take  by mouth daily.  guaiFENesin (ROBITUSSIN) 100 mg/5 mL liquid 200 mg by Per G Tube route three (3) times daily as needed for Congestion.  OXYGEN-AIR DELIVERY SYSTEMS Take 2 L/min by inhalation as needed for Other (SOB).  B.infantis-B.ani-B.long-B.bifi (PROBIOTIC 4X) 10-15 mg TbEC by PEG Tube route daily.  insulin regular (NOVOLIN R REGULAR U-100 INSULN) 100 unit/mL injection by SubCUTAneous route. 14 units at 8 AM, 14 units at 2 PM, and 5 units at 8 PM  
 
Takes additional units as well depending on BG    
 pramipexole (MIRAPEX) 0.25 mg tablet Take 1 Tab by mouth three (3) times daily. 100 Tab 11  
 famotidine (PEPCID) 20 mg tablet 20 mg by Per G Tube route three (3) times daily.  acetaminophen (TYLENOL) 500 mg tablet 500-1,000 mg by Per G Tube route every six (6) hours as needed for Pain.  atorvastatin (LIPITOR) 40 mg tablet 40 mg by Per G Tube route daily.  finasteride (PROSCAR) 5 mg tablet 5 mg by Per G Tube route nightly.  midodrine (PROAMITINE) 10 mg tablet 10 mg by Per G Tube route three (3) times daily (with meals).  HYDROcodone-acetaminophen (NORCO)  mg tablet 1 Tab by Per G Tube route every four (4) hours as needed for Pain.  zinc 50 mg tab tablet 50 mg by Per G Tube route daily.  LANTUS SOLOSTAR U-100 INSULIN 100 unit/mL (3 mL) inpn INJECT 14 UNITS SUBCUTANEOUSLY ONCE DAILY 15 Pen 5  
 nitroglycerin (NITROSTAT) 0.4 mg SL tablet 0.4 mg by SubLINGual route every five (5) minutes as needed for Chest Pain.  ondansetron hcl (ZOFRAN, AS HYDROCHLORIDE,) 8 mg tablet Take 1 Tab by mouth every eight (8) hours as needed for Nausea. 20 Tab 0  
 gabapentin (NEURONTIN) 250 mg/5 mL solution 750 mg by PEG Tube route three (3) times daily.  multivitamin (ONE A DAY) tablet 1 Tab by Per G Tube route daily.  predniSONE (STERAPRED DS) 10 mg dose pack Take 10 mg by mouth See Admin Instructions.  lidocaine (LIDODERM) 5 % Apply patch to the affected area for 12 hours a day and remove for 12 hours a day.  (Patient not taking: Reported on 12/18/2018) 1 Each 0  
  hydrocortisone sodium succ/PF (HYDROCORTISONE SOD SUCC, PF, INJECTION) by Injection route every three (3) months. Gets injection every 3 months from orthopedist    
 radha's mixture Solution Take 5 mL by mouth two (2) times a day. Diphenhydramine elixir 12.5mg/ml 500ml, Nystatin suspension 120ml,Tetracycline 500mg capsules  12 capsules (6g), Hydrocortisone 20mg tablet 12 tablets (240mg), Water for irrigation QS ad 1000ml  albuterol (PROVENTIL VENTOLIN) 2.5 mg /3 mL (0.083 %) nebulizer solution 2.5 mg by Nebulization route two (2) times a day. Per wife patient can use 3 times a day if needed  lactose-reduced food/fiber (ISOSOURCE 1.5 RAE FEEDING TUBE) 250 mL by adductor canal block route five (5) times daily.  alfuzosin SR (UROXATRAL) 10 mg SR tablet 10 mg by Per G Tube route daily.  fluticasone (FLONASE ALLERGY RELIEF) 50 mcg/actuation nasal spray 1 [place] by Both Nostrils route two (2) times a day. Past History Past Medical History: 
Past Medical History:  
Diagnosis Date  Antiplatelet or antithrombotic long-term use 12/4/2014  Anxiety disorder  Arrhythmia 2009  
 bradycardia  Arthritis  CAD (coronary artery disease) s/p CABG 2002; Dr Bill Viera  Cancer ([facility] [address] 1996  
 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   
 left leg, lower back,   
 Cough  Depression  Diabetes ([facility] [address] Type II  Epigastric pain 8/17/2018  Esophageal dysmotility s/p dilitation  Esophageal motility disorder 7/8/2013 Frequent simultaneous or failed contractions, low amplitude contractions  suggests severe myopathy or diffuse spasm. I suspect the latter. Achalasia  is not present.  Fainting [address]  Fatigue  GERD (gastroesophageal reflux disease)  Heart failure ([address] 10/2014 Cardiomyopathy:Pacemaker upgrade:Biv and AICD  Dr. Wolfe Ronnie heart DrAlvaro last visit 5/11/2015  Hepatitis C Dx 1996  
 treated at [facility] in past; as of 4/15/15 wife states pt currently not under any treatment  Hyperlipidemia  Joint pain  Liver disease  Memory loss  Muscle pain  Muscle weakness  Myocardial infarct [facility]) 2013 Heart Cath: 40% LV EF, Stented distal LAD, patent Graft to circumflex  On tube feeding diet approx 2009  
 still has as of 9/28/15  (no po food/liquid/meds at all); Dr Rafita Lopez  Other ill-defined conditions(799.89) 1996  
 1 dose of chemotherapy/radiation for tongue cancer  Other ill-defined conditions(799.89) BPH  Other ill-defined conditions(799.89) orthostatic hypotension  Pneumonia ~ April -May 2010  
 SOB (shortness of breath)  Stroke [facility]) approx 2003  
 left side-left finger tips numb; no imbalance or memory loss; as of 2015 not seeing neuro MD  
 Suicidal thoughts  Swallowing difficulty Past Surgical History: 
Past Surgical History:  
Procedure Laterality Date  ABDOMEN SURGERY PROC UNLISTED  1996  
 peg tube  CABG, ARTERY-VEIN, THREE  2000  HX CATARACT REMOVAL    
 bilateral  
 HX CHOLECYSTECTOMY  HX COLONOSCOPY    
 HX HEART CATHETERIZATION  2013 Stented distal LAD  HX MOHS PROCEDURES    
 bilateral  
 HX ORTHOPAEDIC    
 back surgery times two  HX OTHER SURGICAL Radical Left Neck  HX OTHER SURGICAL    
 NASAL POLYPS REMOVAL  
 HX OTHER SURGICAL  2010 TURP  
 HX PACEMAKER  11/08  HX PACEMAKER  10/28/14 Defibrillator: [facility] # [id number], serial # [id number]; Dr. [facility] [phone]; Dr Leo Espinoza  HX UROLOGICAL    
 cystoscopy [address] [place] [address]  
 cevical surgery  IL CHANGE GASTROSTOMY TUBE  5/2/2011  IL CHANGE GASTROSTOMY TUBE  6/29/2011  IL EGD INSERT GUIDE WIRE DILATOR PASSAGE ESOPHAGUS  9/13/2010  ID EGD TRANSORAL BIOPSY SINGLE/MULTIPLE  2010  
    
 STOMACH SURGERY PROCEDURE UNLISTED  2011  UPPER GI ENDOSCOPY,BIOPSY  2018  UPPER GI ENDOSCOPY,DILATN W GUIDE  2018  UPPER GI ENDOSCOPY,W/DILAT,GASTRIC OUT  2018  VASCULAR SURGERY PROCEDURE UNLIST    
 bilateral carotid stents Family History: 
Family History Problem Relation Age of Onset  Heart Disease Father   
      at age 52 from CAD  Colon Cancer Mother  Cancer Mother   
     colon ca  
 Heart Disease Brother Social History: 
Social History Tobacco Use  Smoking status: Never Smoker  Smokeless tobacco: Never Used Substance Use Topics  Alcohol use: No  
 Drug use: No  
 
 
Allergies: Allergies Allergen Reactions  Cleocin [Clindamycin Hcl] Rash  Demerol [Meperidine] Shortness of Breath  Paxil [Paroxetine Hcl] Unknown (comments) Pt gets shaky and loses control of legs  Amoxicillin Rash  Pcn [Penicillins] Rash Review of Systems Review of Systems Unable to perform ROS: Acuity of condition Physical Exam  
Physical Exam  
Constitutional: He is oriented to person, place, and time. He appears well-developed and well-nourished. PEG tube on epigastric region of abdomen; midline incisional scar; pacer in left upper chest  
HENT:  
Head: Normocephalic and atraumatic. Mouth/Throat: Oropharynx is clear and moist. No oropharyngeal exudate. Eyes: Conjunctivae and EOM are normal. Pupils are equal, round, and reactive to light. Right eye exhibits no discharge. Left eye exhibits no discharge. Neck: Normal range of motion. Neck supple. Cardiovascular: Intact distal pulses. Tachycardia present. Exam reveals no gallop and no friction rub. No murmur heard. Ventricular paced Pulmonary/Chest: Breath sounds normal. Accessory muscle usage present. Tachypnea noted. No respiratory distress. He has no wheezes.  He has no rales. He exhibits no tenderness. Mild increased work of breathing; coarse breath sounds bilaterally Abdominal: Soft. Bowel sounds are normal. He exhibits no distension and no mass. There is no tenderness. There is no rebound and no guarding. Musculoskeletal: Normal range of motion. He exhibits no edema. Lymphadenopathy:  
  He has no cervical adenopathy. Neurological: He is alert and oriented to person, place, and time. No cranial nerve deficit. Coordination normal.  
Skin: Skin is warm and dry. No rash noted. No erythema. Psychiatric: He has a normal mood and affect. Nursing note and vitals reviewed. Diagnostic Study Results Labs - Recent Results (from the past 12 hour(s)) EKG, 12 LEAD, INITIAL Collection Time: 01/11/19 10:48 AM  
Result Value Ref Range Ventricular Rate 113 BPM  
 Atrial Rate 108 BPM  
 QRS Duration 184 ms Q-T Interval 426 ms  
 QTC Calculation (Bezet) 584 ms Calculated R Axis -3 degrees Calculated T Axis 167 degrees Diagnosis Ventricular-paced rhythm When compared with ECG of 09-DEC-2018 09:01, 
Vent. rate has increased BY  14 BPM 
Confirmed by Janee Miranda, P.V. ([id number]) on 1/11/2019 17:05:99 AM 
  
METABOLIC PANEL, COMPREHENSIVE Collection Time: 01/11/19 10:59 AM  
Result Value Ref Range Sodium 136 136 - 145 mmol/L Potassium 5.2 (H) 3.5 - 5.1 mmol/L Chloride 96 (L) 97 - 108 mmol/L  
 CO2 35 (H) 21 - 32 mmol/L Anion gap 5 5 - 15 mmol/L Glucose 263 (H) 65 - 100 mg/dL BUN 47 (H) 6 - 20 MG/DL Creatinine 1.91 (H) 0.70 - 1.30 MG/DL  
 BUN/Creatinine ratio 25 (H) 12 - 20 GFR est AA 41 (L) >60 ml/min/1.73m2 GFR est non-AA 34 (L) >60 ml/min/1.73m2 Calcium 9.6 8.5 - 10.1 MG/DL Bilirubin, total 0.7 0.2 - 1.0 MG/DL  
 ALT (SGPT) 46 12 - 78 U/L  
 AST (SGOT) 60 (H) 15 - 37 U/L Alk. phosphatase 93 45 - 117 U/L Protein, total 7.7 6.4 - 8.2 g/dL Albumin 3.2 (L) 3.5 - 5.0 g/dL Globulin 4.5 (H) 2.0 - 4.0 g/dL A-G Ratio 0.7 (L) 1.1 - 2.2    
CBC WITH AUTOMATED DIFF Collection Time: 01/11/19 10:59 AM  
Result Value Ref Range WBC 7.2 4.1 - 11.1 K/uL  
 RBC 3.83 (L) 4.10 - 5.70 M/uL  
 HGB 11.6 (L) 12.1 - 17.0 g/dL HCT 36.9 36.6 - 50.3 % MCV 96.3 80.0 - 99.0 FL  
 MCH 30.3 26.0 - 34.0 PG  
 MCHC 31.4 30.0 - 36.5 g/dL  
 RDW 16.2 (H) 11.5 - 14.5 % PLATELET 530 (L) 573 - 400 K/uL MPV 11.4 8.9 - 12.9 FL  
 NRBC 0.0 0  WBC ABSOLUTE NRBC 0.00 0.00 - 0.01 K/uL NEUTROPHILS 86 (H) 32 - 75 % LYMPHOCYTES 8 (L) 12 - 49 % MONOCYTES 6 5 - 13 % EOSINOPHILS 0 0 - 7 % BASOPHILS 0 0 - 1 % IMMATURE GRANULOCYTES 0 0.0 - 0.5 % ABS. NEUTROPHILS 6.2 1.8 - 8.0 K/UL  
 ABS. LYMPHOCYTES 0.6 (L) 0.8 - 3.5 K/UL  
 ABS. MONOCYTES 0.4 0.0 - 1.0 K/UL  
 ABS. EOSINOPHILS 0.0 0.0 - 0.4 K/UL  
 ABS. BASOPHILS 0.0 0.0 - 0.1 K/UL  
 ABS. IMM. GRANS. 0.0 0.00 - 0.04 K/UL  
 DF AUTOMATED    
 RBC COMMENTS NORMOCYTIC, NORMOCHROMIC    
SAMPLES BEING HELD Collection Time: 01/11/19 10:59 AM  
Result Value Ref Range SAMPLES BEING HELD BLUE   
 COMMENT Add-on orders for these samples will be processed based on acceptable specimen integrity and analyte stability, which may vary by analyte. TROPONIN I Collection Time: 01/11/19 10:59 AM  
Result Value Ref Range Troponin-I, Qt. <0.05 <0.05 ng/mL D DIMER Collection Time: 01/11/19 11:00 AM  
Result Value Ref Range D-dimer 1.21 (H) 0.00 - 0.65 mg/L FEU  
POC LACTIC ACID Collection Time: 01/11/19 11:04 AM  
Result Value Ref Range Lactic Acid (POC) 3.33 (HH) 0.40 - 2.00 mmol/L Radiologic Studies -  
CT CHEST WO CONT Final Result IMPRESSION:   
1. Pulmonary vascular congestive changes, as described above. 2. Small bilateral pleural effusions. XR CHEST PORT Final Result IMPRESSION: Pulmonary edema. NM LUNG VENT/PERF    (Results Pending) CT Results  (Last 48 hours) 01/11/19 1329  CT CHEST WO CONT Final result Impression:  IMPRESSION:   
1. Pulmonary vascular congestive changes, as described above. 2. Small bilateral pleural effusions. Narrative:  INDICATION: back pain. Syncope. Low O2 sats. Noncontrast CT of the chest is performed with 5 mm collimation. Coronal and  
sagittal reformatted images were also performed. CT dose reduction was achieved through use of a standardized protocol tailored  
for this examination and automatic exposure control for dose modulation. Direct comparison is made to prior Chest:   
   
Lungs: There is pulmonary vascular prominence, bilateral interstitial edema and  
bilateral patchy airspace disease, greatest in the lung bases. No lytic or  
sclerotic osseous lesion is visualized. Lymph nodes: There is no axillary, mediastinal or hilar lymphadenopathy. Heart: The heart is of normal size and there is no pericardial effusion. Pleura: There are very small bilateral pleural effusions. Bones: Visualized osseous structures are intact. Upper abdomen: Percutaneous gastrostomy is noted. Gallbladder surgically absent. Extensive atherosclerotic calcifications are noted. The visualized abdominal  
structures are otherwise normal.  
   
  
  
 
CXR Results  (Last 48 hours) 01/11/19 1149  XR CHEST PORT Final result Impression:  IMPRESSION: Pulmonary edema. Narrative:  EXAM: Portable CXR. 1123 hours. COMPARISON: 12/4/2018 INDICATION: Low sat. Congested. FINDINGS:  
There is persistent pulmonary edema pattern. Heart size is top normal. There is  
prior CABG. ICD remains. There is no pneumothorax, midline shift or apparent  
pleural effusion. Medical Decision Making I am the first provider for this patient.  
 
I reviewed the vital signs, available nursing notes, past medical history, past surgical history, family history and social history. Vital Signs-Reviewed the patient's vital signs. Patient Vitals for the past 12 hrs: 
 Temp Pulse Resp BP SpO2  
01/11/19 1356  87 13  100 % 01/11/19 1354  84 17  97 % 01/11/19 1352  88 17  99 % 01/11/19 1351  88 19  99 % 01/11/19 1348  90 16  100 % 01/11/19 1345  85 13 137/74 99 % 01/11/19 1338    138/72   
01/11/19 1300  92 16 126/77 94 % 01/11/19 1245  100 21 125/59 97 % 01/11/19 1240  100 22  92 % 01/11/19 1230  88 15 133/73   
01/11/19 1220  96 20    
01/11/19 1210  97 16    
01/11/19 1200  95 17 130/74   
01/11/19 1054 96.2 °F (35.7 °C) (!) 111 24 122/72  Pulse Oximetry Analysis - 92% on NC Cardiac Monitor:  
Rate: 111 bpm 
Rhythm: Paced EKG interpretation: (Preliminary) 10:48 Rhythm: paced; Rate (approx.): 113; QRS interval: 184ms; Other findings: BIV. Written by LUANNE Tobias, as dictated by Katja Martines. Dennie Dolores, MD. Records Reviewed: Nursing Notes, Old Medical Records, Previous electrocardiograms, Ambulance Run Sheet and Previous Laboratory Studies Provider Notes (Medical Decision Making): Pt presents with syncope. Stable vitals with nonfocal exam. Clinical presentation most c/w vasovagal syncope. Pt is without c/o of headache, intractable vertigo/dizziness, or ataxia on exam.  Ddx: vasovagal/situational syncope, cardiogenic syncope, orthostatic hypotension, dehydration. Will get labs, EKG, orthostatics and fluids PRN. If negative, pt is safe for discharge home and outpatient f/u per [facility] Syncope Rules. Given syncope, I did advise that patient cannot drive for 6 months given [place] [employer]. Per the [facility] Syncope Rule, the patient does not have the following criteria: 
1) Signs and symptoms of ACS 2) Signs of conduction disease 3) Worrisome cardiac history 4) Valvular heart disease by history or physical 
examination 5) Family history of sudden death 6) Persistent abnormal vital signs in the ED 7) Volume depletion such as persistent dehydration, gastrointestinal bleeding, or hematocrit < 30 
8) Primary CNS (central nervous system) event The patient does not have any of the following risk factors for the [facility] Syncope Rule (SFSR):  
C - History of congestive heart failure H - Hematocrit < 30% E - Abnormal ECG 
S - Shortness of breath S - Triage systolic blood pressure < 90 
 
 
 
ED Course:  
Initial assessment performed. The patients presenting problems have been discussed, and they are in agreement with the care plan formulated and outlined with them. I have encouraged them to ask questions as they arise throughout their visit. ED Course as of Jan 11 1419 Fri Jan 11, 2019  
1115 Holding fluids due to medical records showing low EF (30-35%); pt is not currently in severe sepsis. Written by Marcelino Osborne ED Scribe as dictated by Dixie Telles. Jovanni Aguayo MD  [KW] ED Course User Index 
[KW] Tennille Dopp Consult Note: 
2:18 PM 
Griffin Aguayo MD spoke with Ozzy Angel MD, Specialty: hospitalist 
Discussed pt's hx, disposition, and available diagnostic and imaging results. Reviewed care plans. Consultant agrees with plans as outlined. Ozzy Angel MD will admit. Critical Care Time: 0 minutes Disposition: 
Admit Note: 
2:19 PM 
Pt is being admitted by Ozzy Angel MD. The results of their tests and reason(s) for their admission have been discussed with pt and/or available family. They convey agreement and understanding for the need to be admitted and for admission diagnosis. PLAN: 
1. Admit to Hospital 
 
Diagnosis Clinical Impression: No diagnosis found. Attestations: This note is prepared by Marcelino Osborne, acting as Scribe for Dixie Telles.  Jovanni Aguayo MD. 
 
The scribe's documentation has been prepared under my direction and personally reviewed by me in its entirety. I confirm that the note above accurately reflects all work, treatment, procedures, and medical decision making performed by me. Griffin Bay MD

## 2019-01-11 NOTE — ED NOTES
Pt takes po water but not to drink just to swish he cannot swallow He is now on the 2lpm nasal cannula and off the non rebreather

## 2019-01-11 NOTE — H&P
Hospitalist Admission NoteNAME: Lucas Vegas :  1935 MRN:  [id number] Date/Time:  2019 3:58 PM 
 
Patient PCP: Stephanie Kiser, DO 
______________________________________________________________________ Given the patient's current clinical presentation, I have a high level of concern for decompensation if discharged from the emergency department. Complex decision making was performed, which includes reviewing the patient's available past medical records, laboratory results, and x-ray films. My assessment of this patient's clinical condition and my plan of care is as follows. Assessment / Plan: Active Problems: 
Syncope (2019) Etiology hypoxia/aortic stenosis/multifactorail/ orthostatic hypotension CT of chest shows no pneumonia, but congestion, low risk for PE on V/Q scan 
 continue diuresis, strict I/o. Nephrology conult if renal function worse. Hypoxia (2019) Due to mucus plaque/ CHF exacerbation/will continue treatment as above Dysphagia (5/3/2010) Patient on feeding tube, continue consult nutrition, last hospitalization she had loose BM, need to feed no  Fiber feeding tube S/P CABG x 3 (2010) Continue Plavix and asa, for PVD and carotid artery stenosis and vertebral stenosis. 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 HISTORY OF PRESENT ILLNESS:    
Ike Joseph is a 80 y.o.  male  PMH significant for hyperlipidemia, CAD, diabetes, memory loss, CHF, CVA, CA (tongue/throat),who presents with above symptoms. Wife provides detail of history of todays event. He was doing ok earlier, at 9 am he cames from bathroom and sat on his rollator and called wife when he was not feeling well, wife checked his oxygen was about 70% non responsive and fell back on his rollator, this lasted few minutes, he was not cyanotic, and was not breathing difficult as far as she recalls, no cyanosis, does not recall pulse rate on oxygen monitoring device, [facility] arrived and brought him here. Wife states that he went for CT with contrast prior to  have TAVR two  days ago and received IVF prior and after CT, he gained weight, wife called cardiology office and when they called back he was seen by EMS and getting ready to be transferred here. In ER he had CT which shows congestion, and V/Q low risk for PE, hospitalist consulted to admit for acute respiratory failure with hypoxia. We were asked to admit for work up and evaluation of the above problems. Past Medical History:  
Diagnosis Date  Antiplatelet or antithrombotic long-term use 12/4/2014  Anxiety disorder  Arrhythmia 2009  
 bradycardia  Arthritis  CAD (coronary artery disease) s/p CABG 2002; Dr Dea Regalado  Cancer ([facility] 1996  
 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   
 left leg, lower back,   
 Cough  Depression  Diabetes ([facility] Type II  Epigastric pain 8/17/2018  Esophageal dysmotility s/p dilitation  Esophageal motility disorder 7/8/2013  Frequent simultaneous or failed contractions, low amplitude contractions suggests severe myopathy or diffuse spasm. I suspect the latter. Achalasia  is not present.  Fainting [address]  Fatigue  GERD (gastroesophageal reflux disease)  Heart failure ([facility] [address] 10/2014 Cardiomyopathy:Pacemaker upgrade:Biv and AICD  Dr. Merida Christy heart DrAlvaro last visit 5/11/2015  Hepatitis C Dx 1996  
 treated at [facility] in past; as of 4/15/15 wife states pt currently not under any treatment  Hyperlipidemia  Joint pain  Liver disease  Memory loss  Muscle pain  Muscle weakness  Myocardial infarct [facility]) 2013 Heart Cath: 40% LV EF, Stented distal LAD, patent Graft to circumflex  On tube feeding diet approx 2009  
 still has as of 9/28/15  (no po food/liquid/meds at all); Dr Karine Dalton  Other ill-defined conditions(799.89) 1996  
 1 dose of chemotherapy/radiation for tongue cancer  Other ill-defined conditions(799.89) BPH  Other ill-defined conditions(799.89) orthostatic hypotension  Pneumonia ~ April -May 2010  
 SOB (shortness of breath)  Stroke [facility]) approx 2003  
 left side-left finger tips numb; no imbalance or memory loss; as of 2015 not seeing neuro MD  
 Suicidal thoughts  Swallowing difficulty Past Surgical History:  
Procedure Laterality Date  ABDOMEN SURGERY PROC UNLISTED  1996  
 peg tube  CABG, ARTERY-VEIN, THREE  2000  HX CATARACT REMOVAL    
 bilateral  
 HX CHOLECYSTECTOMY  HX COLONOSCOPY    
 HX HEART CATHETERIZATION  2013 Stented distal LAD  HX MOHS PROCEDURES    
 bilateral  
 HX ORTHOPAEDIC    
 back surgery times two  HX OTHER SURGICAL Radical Left Neck  HX OTHER SURGICAL    
 NASAL POLYPS REMOVAL  
 HX OTHER SURGICAL  2010 TURP  
 HX PACEMAKER  11/08  HX PACEMAKER  10/28/14 Defibrillator: [facility] # [id number], serial # [id number]; Dr. Elliott Garcia [phone]; Dr Vikas Garcia  HX UROLOGICAL    
 cystoscopy [address] cevical surgery  [place] CHANGE GASTROSTOMY TUBE  2011  [place] CHANGE GASTROSTOMY TUBE  2011  [place] EGD INSERT GUIDE WIRE DILATOR PASSAGE ESOPHAGUS  2010  [place] EGD TRANSORAL BIOPSY SINGLE/MULTIPLE  2010  
    
 STOMACH SURGERY PROCEDURE UNLISTED  2011  UPPER GI ENDOSCOPY,BIOPSY  2018  UPPER GI ENDOSCOPY,DILATN W GUIDE  2018  UPPER GI ENDOSCOPY,W/DILAT,GASTRIC OUT  2018  VASCULAR SURGERY PROCEDURE UNLIST    
 bilateral carotid stents Social History Tobacco Use  Smoking status: Never Smoker  Smokeless tobacco: Never Used Substance Use Topics  Alcohol use: No  
  
 
Family History Problem Relation Age of Onset  Heart Disease Father   
      at age 52 from CAD  Colon Cancer Mother  Cancer Mother   
     colon ca  
 Heart Disease Brother Allergies Allergen Reactions  Cleocin [Clindamycin Hcl] Rash  Demerol [Meperidine] Shortness of Breath  Paxil [Paroxetine Hcl] Unknown (comments) Pt gets shaky and loses control of legs  Amoxicillin Rash  Pcn [Penicillins] Rash Prior to Admission medications Medication Sig Start Date End Date Taking? Authorizing Provider  
insulin regular (NOVOLIN R, HUMULIN R) 100 unit/mL injection 15 Units by SubCUTAneous route daily (with lunch). Patient takes 15 Units with breakfast, 15 Units with lunch, and 5 Units nightly   Yes Provider, Historical  
insulin regular (NOVOLIN R, HUMULIN R) 100 unit/mL injection 5 Units by SubCUTAneous route nightly. Patient takes 15 Units with breakfast, 15 Units with lunch, and 5 Units nightly   Yes Provider, Historical  
hydrocortisone sodium succ/PF (HYDROCORTISONE SOD SUCC, PF, INJECTION) by Injection route every three (3) months. Patient gets from orthopedist   Yes Provider, Historical  
furosemide (LASIX) 40 mg tablet Take 40 mg by mouth two (2) times a day.    Yes Provider, Historical  
 doxycycline (MONODOX) 100 mg capsule Take 1 Cap by mouth two (2) times a day. 1/9/19  Yes Zach Vega MD  
clopidogrel (PLAVIX) 75 mg tab Take 75 mg by mouth daily. Yes Provider, Historical  
aspirin delayed-release 81 mg tablet Take 81 mg by mouth daily. Yes Provider, Historical  
guaiFENesin (ROBITUSSIN) 100 mg/5 mL liquid 200 mg by Per G Tube route three (3) times daily. 12/5/18  Yes Provider, Historical  
insulin regular (NOVOLIN R REGULAR U-100 INSULN) 100 unit/mL injection 15 Units by SubCUTAneous route daily (with breakfast). Patient takes 15 Units with breakfast, 15 Units with lunch, and 5 Units nightly   Yes Provider, Historical  
pramipexole (MIRAPEX) 0.25 mg tablet Take 1 Tab by mouth three (3) times daily. 10/24/18  Yes Adolph Nguyen MD  
albuterol (PROVENTIL VENTOLIN) 2.5 mg /3 mL (0.083 %) nebulizer solution 2.5 mg by Nebulization route two (2) times a day. Per wife patient can use 3 times a day if needed   Yes Provider, Historical  
famotidine (PEPCID) 20 mg tablet 20 mg by Per G Tube route three (3) times daily. Yes Provider, Historical  
acetaminophen (TYLENOL) 500 mg tablet 500-1,000 mg by Per G Tube route every six (6) hours as needed for Pain. Yes Provider, Historical  
atorvastatin (LIPITOR) 40 mg tablet 40 mg by Per G Tube route daily. Yes Other, MD Booker  
finasteride (PROSCAR) 5 mg tablet 5 mg by Per G Tube route nightly. Yes Other, MD Booker  
alfuzosin SR (UROXATRAL) 10 mg SR tablet 10 mg by Per G Tube route daily. 5/24/18  Yes Provider, Historical  
midodrine (PROAMITINE) 10 mg tablet 10 mg by Per G Tube route three (3) times daily (with meals). 11/1/16  Yes Provider, Historical  
HYDROcodone-acetaminophen (NORCO)  mg tablet 1 Tab by Per G Tube route every four (4) hours as needed for Pain. Yes Other, MD Booker  
zinc 50 mg tab tablet 50 mg by Per G Tube route daily.    Yes Provider, Historical  
LANTUS SOLOSTAR U-100 INSULIN 100 unit/mL (3 mL) inpn INJECT 14 UNITS SUBCUTANEOUSLY ONCE DAILY 5/21/18  Yes Hank Barney MD  
gabapentin (NEURONTIN) 250 mg/5 mL solution 750 mg by PEG Tube route three (3) times daily. Yes Booker Eric MD  
multivitamin (ONE A DAY) tablet 1 Tab by Per G Tube route daily. Yes [place], MD Booker  
klack's mixture Solution Take 5 mL by mouth two (2) times a day. Diphenhydramine elixir 12.5mg/ml 500ml, Nystatin suspension 120ml,Tetracycline 500mg capsules  12 capsules (6g), Hydrocortisone 20mg tablet 12 tablets (240mg), Water for irrigation QS ad 1000ml Provider, Historical  
fluticasone (FLONASE ALLERGY RELIEF) 50 mcg/actuation nasal spray 1 [place] by Both Nostrils route two (2) times a day. Provider, Historical  
nitroglycerin (NITROSTAT) 0.4 mg SL tablet 0.4 mg by SubLINGual route every five (5) minutes as needed for Chest Pain. Other, MD Booker  
 
 
REVIEW OF SYSTEMS:    
I am not able to complete the review of systems because: The patient is intubated and sedated The patient has altered mental status due to his acute medical problems The patient has baseline aphasia from prior stroke(s) The patient has baseline dementia and is not reliable historian The patient is in acute medical distress and unable to provide information Total of 12 systems reviewed as follows:   
   POSITIVE= underlined text  Negative = text not underlined General:  fever, chills, sweats, generalized weakness, weight loss/gain,  
   loss of appetite Eyes:    blurred vision, eye pain, loss of vision, double vision ENT:    rhinorrhea, pharyngitis Respiratory:   cough, sputum production, SOB, KRAMER, wheezing, pleuritic pain  
Cardiology:   chest pain, palpitations, orthopnea, PND, edema, syncope Gastrointestinal:  abdominal pain , N/V, diarrhea, dysphagia, constipation, bleeding Genitourinary:  frequency, urgency, dysuria, hematuria, incontinence Muskuloskeletal :  arthralgia, myalgia, back pain Hematology:  easy bruising, nose or gum bleeding, lymphadenopathy Dermatological: rash, ulceration, pruritis, color change / jaundice Endocrine:   hot flashes or polydipsia Neurological:  headache, dizziness, confusion, focal weakness, paresthesia,syncope Speech difficulties, memory loss, gait difficulty Psychological: Feelings of anxiety, depression, agitation Objective: VITALS:   
Visit Vitals /69 (BP 1 Location: Left arm, BP Patient Position: At rest) Pulse 94 Temp 96.2 °F (35.7 °C) Resp 18 Ht 5' 7\" (1.702 m) Wt 75.4 kg (166 lb 3.6 oz) SpO2 95% BMI 26.03 kg/m² PHYSICAL EXAM: 
 
General:    Alert, cooperative, no distress, appears stated age. HEENT: Atraumatic, anicteric sclerae, pink conjunctivae No oral ulcers, mucosa moist, throat clear, dentition fair, S/P Neck:  Supple, symmetrical,  thyroid: non tender Lungs:   . No Wheezing positive for  Rhonchi and basilar rales. Chest wall:  No tenderness  No Accessory muscle use. Heart:   Regular  rhythm,  Systolic  murmur  Positive for  edema Abdomen:   Soft, non-tender. Not distended. Bowel sounds normal, redness around feeding TUBE Extremities: No cyanosis. No clubbing,   
  Skin turgor normal, Capillary refill normal, Radial dial pulse 2+ Skin:     Not pale. Not Jaundiced  No rashes Psych:  Good insight. Not depressed. Not anxious or agitated. Neurologic: EOMs intact. No facial asymmetry. No aphasia or slurred speech. Symmetrical strength, Sensation grossly intact. Alert and oriented X 4.  
 
_______________________________________________________________________ Care Plan discussed with: 
  Comments Patient x Family  x   
RN x Care Manager Consultant:     
_______________________________________________________________________ Expected  Disposition:  
Home with Family HH/PT/OT/RN   
SNF/LTC   
[facility]   
 ________________________________________________________________________ TOTAL TIME:  45 Minutes Critical Care Provided     Minutes non procedure based Comments Reviewed previous records  
>50% of visit spent in counseling and coordination of care  Discussion with patient and/or family and questions answered 
  
 
________________________________________________________________________ Signed: Ryan Olivera MD 
 
Procedures: see electronic medical records for all procedures/Xrays and details which were not copied into this note but were reviewed prior to creation of Plan. LAB DATA REVIEWED:   
Recent Results (from the past 24 hour(s)) EKG, 12 LEAD, INITIAL Collection Time: 01/11/19 10:48 AM  
Result Value Ref Range Ventricular Rate 113 BPM  
 Atrial Rate 108 BPM  
 QRS Duration 184 ms Q-T Interval 426 ms  
 QTC Calculation (Bezet) 584 ms Calculated R Axis -3 degrees Calculated T Axis 167 degrees Diagnosis Ventricular-paced rhythm When compared with ECG of 09-DEC-2018 09:01, 
Vent. rate has increased BY  14 BPM 
Confirmed by Kathy Muller, P.VAlvaro ([id number]) on 1/11/2019 91:37:54 AM 
  
METABOLIC PANEL, COMPREHENSIVE Collection Time: 01/11/19 10:59 AM  
Result Value Ref Range Sodium 136 136 - 145 mmol/L Potassium 5.2 (H) 3.5 - 5.1 mmol/L Chloride 96 (L) 97 - 108 mmol/L  
 CO2 35 (H) 21 - 32 mmol/L Anion gap 5 5 - 15 mmol/L Glucose 263 (H) 65 - 100 mg/dL BUN 47 (H) 6 - 20 MG/DL Creatinine 1.91 (H) 0.70 - 1.30 MG/DL  
 BUN/Creatinine ratio 25 (H) 12 - 20 GFR est AA 41 (L) >60 ml/min/1.73m2 GFR est non-AA 34 (L) >60 ml/min/1.73m2 Calcium 9.6 8.5 - 10.1 MG/DL Bilirubin, total 0.7 0.2 - 1.0 MG/DL  
 ALT (SGPT) 46 12 - 78 U/L  
 AST (SGOT) 60 (H) 15 - 37 U/L Alk. phosphatase 93 45 - 117 U/L Protein, total 7.7 6.4 - 8.2 g/dL Albumin 3.2 (L) 3.5 - 5.0 g/dL Globulin 4.5 (H) 2.0 - 4.0 g/dL A-G Ratio 0.7 (L) 1.1 - 2.2 CBC WITH AUTOMATED DIFF Collection Time: 01/11/19 10:59 AM  
Result Value Ref Range WBC 7.2 4.1 - 11.1 K/uL  
 RBC 3.83 (L) 4.10 - 5.70 M/uL  
 HGB 11.6 (L) 12.1 - 17.0 g/dL HCT 36.9 36.6 - 50.3 % MCV 96.3 80.0 - 99.0 FL  
 MCH 30.3 26.0 - 34.0 PG  
 MCHC 31.4 30.0 - 36.5 g/dL  
 RDW 16.2 (H) 11.5 - 14.5 % PLATELET 087 (L) 549 - 400 K/uL MPV 11.4 8.9 - 12.9 FL  
 NRBC 0.0 0  WBC ABSOLUTE NRBC 0.00 0.00 - 0.01 K/uL NEUTROPHILS 86 (H) 32 - 75 % LYMPHOCYTES 8 (L) 12 - 49 % MONOCYTES 6 5 - 13 % EOSINOPHILS 0 0 - 7 % BASOPHILS 0 0 - 1 % IMMATURE GRANULOCYTES 0 0.0 - 0.5 % ABS. NEUTROPHILS 6.2 1.8 - 8.0 K/UL  
 ABS. LYMPHOCYTES 0.6 (L) 0.8 - 3.5 K/UL  
 ABS. MONOCYTES 0.4 0.0 - 1.0 K/UL  
 ABS. EOSINOPHILS 0.0 0.0 - 0.4 K/UL  
 ABS. BASOPHILS 0.0 0.0 - 0.1 K/UL  
 ABS. IMM. GRANS. 0.0 0.00 - 0.04 K/UL  
 DF AUTOMATED    
 RBC COMMENTS NORMOCYTIC, NORMOCHROMIC    
SAMPLES BEING HELD Collection Time: 01/11/19 10:59 AM  
Result Value Ref Range SAMPLES BEING HELD BLUE   
 COMMENT Add-on orders for these samples will be processed based on acceptable specimen integrity and analyte stability, which may vary by analyte. TROPONIN I Collection Time: 01/11/19 10:59 AM  
Result Value Ref Range Troponin-I, Qt. <0.05 <0.05 ng/mL D DIMER Collection Time: 01/11/19 11:00 AM  
Result Value Ref Range D-dimer 1.21 (H) 0.00 - 0.65 mg/L FEU  
POC LACTIC ACID Collection Time: 01/11/19 11:04 AM  
Result Value Ref Range Lactic Acid (POC) 3.33 (HH) 0.40 - 2.00 mmol/L POC LACTIC ACID Collection Time: 01/11/19  3:31 PM  
Result Value Ref Range  Lactic Acid (POC) 1.10 0.40 - 2.00 mmol/L

## 2019-01-11 NOTE — PROGRESS NOTES
Pharmacy Clarification of the Prior to Admission Medication Regimen Retrospective to the Admission Medication Reconciliation The patient was  not interviewed regarding clarification of the prior to admission medication regimen. The patient's wife was present in room and obtained permission from patient to discuss drug regimen with visitor(s) present. The patients wife was questioned regarding use of any other inhalers, topical products, over the counter medications, herbal medications, vitamin products or ophthalmic/nasal/otic medication use. Information Obtained From: Patient's wife, patient's medication list  
 
Recommendations/Findings: The following amendments were made to the patient's active medication list on file at [facility]:  
 
1) Additions:  
? furosemide (LASIX) 40 mg tablet 2) Removals:  
? Lidocaine ? Prednisone ? Zofran 3) Changes: 
? insulin regular (NOVOLIN R REGULAR U-100 INSULN)  (Old regimen: No dose /New regimen: 15 Units daily with breakfast, 15 U daily with lunch, 5 Units QHS) ? B.infantis-B.ani-B.long-B.bifi (PROBIOTIC 4X) 10-15 mg TbEC (Old regimen: No dose /New regimen: 1 QD) 4) Pertinent Pharmacy Findings: 
? Patient's wife manages patient's medications 
? acetaminophen (TYLENOL) 500 mg tablet, klack's mixture Solution, fluticasone (FLONASE ALLERGY RELIEF) 50 mcg/actuation nasal spray, nitroglycerin (NITROSTAT) 0.4 mg SL tablet: Patient's wife stated these medications are used at home PRN, but have not been needed within the past \"few months. \" 
? hydrocortisone sodium succ/PF (HYDROCORTISONE SOD SUCC, PF, INJECTION): Patient's last injection was before Christmas, 12/ 2018. ? doxycycline (MONODOX) 100 mg capsule: Patient started a 7 day regimen on 1/10/19. Patient has completed 1 days of therapy as of 1/11/2019. Antibiotic Indication: Irritation around G Tube PTA medication list was corrected to the following:  
 
Prior to Admission Medications Prescriptions Last Dose Informant Patient Reported? Taking? B.infantis-B.ani-B.long-B.bifi (PROBIOTIC 4X) 10-15 mg TbEC 1/10/2019 at 1100 Significant Other Yes Yes Si Cap by Per G Tube route daily. HYDROcodone-acetaminophen (NORCO)  mg tablet 2019 at 0630 Significant Other Yes Yes Si Tab by Per G Tube route every four (4) hours as needed for Pain. LANTUS SOLOSTAR U-100 INSULIN 100 unit/mL (3 mL) inpn 2019 at 0800 Significant Other No Yes Sig: INJECT 14 UNITS SUBCUTANEOUSLY ONCE DAILY  
acetaminophen (TYLENOL) 500 mg tablet Not Taking at Unknown time Significant Other Yes No  
Si-1,000 mg by Per G Tube route every six (6) hours as needed for Pain. albuterol (PROVENTIL VENTOLIN) 2.5 mg /3 mL (0.083 %) nebulizer solution 1/10/2019 at 1700 Significant Other Yes Yes Si.5 mg by Nebulization route two (2) times a day. Per wife patient can use 3 times a day if needed  
alfuzosin SR (UROXATRAL) 10 mg SR tablet 1/10/2019 at 1400 Significant Other Yes Yes Sig: 10 mg by Per G Tube route daily. aspirin delayed-release 81 mg tablet 2019 at 0815 Significant Other Yes Yes Sig: Take 81 mg by mouth daily. atorvastatin (LIPITOR) 40 mg tablet 2019 at 0815 Significant Other Yes Yes Si mg by Per G Tube route daily. clopidogrel (PLAVIX) 75 mg tab 2019 at 0815 Significant Other Yes Yes Sig: Take 75 mg by mouth daily. doxycycline (MONODOX) 100 mg capsule 2019 at 0815 Significant Other No Yes Sig: Take 1 Cap by mouth two (2) times a day. famotidine (PEPCID) 20 mg tablet 2019 at 0815 Significant Other Yes Yes Si mg by Per G Tube route three (3) times daily. finasteride (PROSCAR) 5 mg tablet 1/10/2019 at 2000 Significant Other Yes Yes Si mg by Per G Tube route nightly.   
fluticasone (FLONASE ALLERGY RELIEF) 50 mcg/actuation nasal spray Not Taking at Unknown time Significant Other Yes No  
 Si [place] by Both Nostrils route two (2) times daily as needed (runny nose). furosemide (LASIX) 40 mg tablet 2019 at 0800 Significant Other Yes Yes Sig: Take 40 mg by mouth two (2) times a day.  
gabapentin (NEURONTIN) 250 mg/5 mL solution 2019 at 0800 Significant Other Yes Yes Si mg by PEG Tube route three (3) times daily. guaiFENesin (ROBITUSSIN) 100 mg/5 mL liquid 2019 at 0800 Significant Other Yes Yes Si mg by Per G Tube route three (3) times daily. hydrocortisone sodium succ/PF (HYDROCORTISONE SOD SUCC, PF, INJECTION) 12/15/2018 Significant Other Yes Yes Sig: by Injection route every three (3) months. Patient gets from orthopedist  
insulin regular (NOVOLIN R REGULAR U-100 INSULN) 100 unit/mL injection 2019 at 0800 Significant Other Yes Yes Sig: 15 Units by SubCUTAneous route daily (with breakfast). Patient takes 15 Units with breakfast, 15 Units with lunch, and 5 Units nightly  
insulin regular (NOVOLIN R, HUMULIN R) 100 unit/mL injection 1/10/2019 at 1400 Significant Other Yes Yes Sig: 15 Units by SubCUTAneous route daily (with lunch). Patient takes 15 Units with breakfast, 15 Units with lunch, and 5 Units nightly  
insulin regular (NOVOLIN R, HUMULIN R) 100 unit/mL injection 1/10/2019 at 2000 Significant Other Yes Yes Si Units by SubCUTAneous route nightly. Patient takes 15 Units with breakfast, 15 Units with lunch, and 5 Units nightly  
klack's mixture Solution Not Taking at Unknown time Significant Other Yes No  
Sig: Take 5 mL by mouth two (2) times a day. Diphenhydramine elixir 12.5mg/ml 500ml, Nystatin suspension 120ml,Tetracycline 500mg capsules  12 capsules (6g), Hydrocortisone 20mg tablet 12 tablets (240mg), Water for irrigation QS ad 1000ml 
 
   
midodrine (PROAMITINE) 10 mg tablet 2019 at 0800 Significant Other Yes Yes Sig: 10 mg by Per G Tube route three (3) times daily (with meals). multivitamin (ONE A DAY) tablet 2019 at 0800 Significant Other Yes Yes Si Tab by Per G Tube route daily. nitroglycerin (NITROSTAT) 0.4 mg SL tablet Not Taking at Unknown time Significant Other Yes No  
Si.4 mg by SubLINGual route every five (5) minutes as needed for Chest Pain. pramipexole (MIRAPEX) 0.25 mg tablet 2019 at 0800 Significant Other No Yes Sig: Take 1 Tab by mouth three (3) times daily. zinc 50 mg tab tablet 1/10/2019 at 1100 Significant Other Yes Yes Si mg by Per G Tube route daily. Facility-Administered Medications: None Thank you, Domo Ndiaye PharmD. Candidate, Class of

## 2019-01-12 NOTE — PROGRESS NOTES
Problem: Falls - Risk of 
Goal: *Absence of Falls Document Ankita End Fall Risk and appropriate interventions in the flowsheet. Outcome: Progressing Towards Goal 
Fall Risk Interventions: 
Mobility Interventions: Assess mobility with egress test, Bed/chair exit alarm, Communicate number of staff needed for ambulation/transfer, OT consult for ADLs, Patient to call before getting OOB, PT Consult for mobility concerns, Utilize walker, cane, or other assistive device, Utilize gait belt for transfers/ambulation Mentation Interventions: Adequate sleep, hydration, pain control, Bed/chair exit alarm, Door open when patient unattended Medication Interventions: Assess postural VS orthostatic hypotension, Bed/chair exit alarm, Evaluate medications/consider consulting pharmacy, Patient to call before getting OOB, Teach patient to arise slowly, Utilize gait belt for transfers/ambulation Elimination Interventions: Bed/chair exit alarm, Call light in reach, Elevated toilet seat, Patient to call for help with toileting needs, Toilet paper/wipes in reach, Toileting schedule/hourly rounds History of Falls Interventions: Bed/chair exit alarm, Consult care management for discharge planning, Door open when patient unattended, Evaluate medications/consider consulting pharmacy, Investigate reason for fall, Room close to nurse's station, Utilize gait belt for transfer/ambulation

## 2019-01-12 NOTE — PROGRESS NOTES
discussed with wife regarding his respiratory distress and need to avoid narcotic as he has much of his fluid and cannot cough up his sputum advised that will reevaluate and give narcotic if medically indicated.

## 2019-01-12 NOTE — PROGRESS NOTES
Initial Nutrition Assessment: 
 
INTERVENTIONS/RECOMMENDATIONS:  
· Resume home TF regimen per patient preference - Isosource 1.5 250 mL/bolus 5x/day q 3 hours + 60 mL water flushes before and after each bolus (1875kcal, 85g protein, 1555 mL water) · If unable to use Isosource 1.5 - Recommend Osmolite 1.2 250 mL bolus 6x/day + 45 mL water flushes before and after each bolus (1800 kcal, 82g protein, 1770 mL water) · If continuous regimen desired, recommend Osmolite 1.2 @ 65 mL/hr + 75 mL water flushes q 4 hours (1872 kcal, 87g protein,1729 mL water) ASSESSMENT:  
Patient medically noted for syncope and hypoxia. PMH for stroke, DM, CHF, Parkinson's, and PEG. Patient on the phone at time of attempted visit and wife not at bedside. Patient well known to this RD and nutrition team from past admissions. Likes to use home TF formula d/t issues with diarrhea when receiving [address] TF. Alternative recommendations provided above if patient/family becomes agreeable to [address] [place] [address] TF. Osmolite 1.2 has 0g fiber. Diet Order: NPO 
% Eaten:  No data found. Pertinent Medications: [x]Reviewed []Other: Atorvastatin, Plavix, Famotidine, Lasix, Humalog, Pita Q, Midodrine, MVI Pertinent Labs: [x]Reviewed []Other: [phone] Food Allergies: [x]None []Other Last BM: 1/10  [x]Active     []Hyperactive  []Hypoactive       [] Absent BS Skin:    [] Intact   [] Incision  [x] Breakdown: (? unstageable heel - flowsheet indicates right, progress note indicates left) [] Edema []Other: Anthropometrics:  
Height: 5' 7\" (170.2 cm) Weight: 76.2 kg (168 lb) IBW (%IBW):   ( ) UBW (%UBW):   (  %) Last Weight Metrics: 
Weight Loss Metrics 1/12/2019 1/9/2019 12/31/2018 12/26/2018 12/24/2018 12/20/2018 12/18/2018 Today's Wt 168 lb 166 lb 157 lb 9.6 oz 159 lb 6.4 oz 158 lb 157 lb 157 lb BMI 26.31 kg/m2 26 kg/m2 25.44 kg/m2 25.73 kg/m2 25.5 kg/m2 25.34 kg/m2 25.34 kg/m2 Some encounter information is confidential and restricted. Go to Review Flowsheets activity to see all data. BMI: Body mass index is 26.31 kg/m². This BMI is indicative of: 
 []Underweight    []Normal    [x]Overweight    [] Obesity   [] Extreme Obesity (BMI>40) Estimated Nutrition Needs (Based on):  
1838 Kcals/day(BMR (1414) x 1. 3AF) , 76 g(-91g (1.0-1.2 g/kg bw)) Protein Carbohydrate: At Least 130 g/day  Fluids:1850 mL/day (1ml/kcal) Pt expected to meet estimated nutrient needs: [x]Yes []No 
 
NUTRITION DIAGNOSES:  
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 
  Enteral/Parenteral Nutrition: Initiate enteral nutrition GOAL:  
Patient will tolerate bolus regimen without s/sx of intolerance next 2-4 days LEARNING NEEDS (Diet, Food/Nutrient-Drug Interaction):  
 [x] None Identified 
 [] Identified and Education Provided/Documented 
 [] Identified and Pt declined/was not appropriate Cultural, Moravian, OR Ethnic Dietary Needs:  
 [x] None Identified 
 [] Identified and Addressed 
 
 [x] Interdisciplinary Care Plan Reviewed/Documented  
 [x] Discharge Planning: resume home TF regimen MONITORING /EVALUATION:  
Food/Nutrient Intake Outcomes: Enteral/parenteral nutrition intake Physical Signs/Symptoms Outcomes: Weight/weight change, Electrolyte and renal profile, GI profile, Glucose profile NUTRITION RISK:  
 [x] High     to         [x] Moderate           []  Low  []  Minimal/Uncompromised PT SEEN FOR:  
 [x]  MD Consult: []Calorie Count []Diabetic Diet Education []Diet Education []Electrolyte Management []General Nutrition Management and Supplements [x]Management of Tube Feeding []TPN Recommendations [x]  RN Referral:  []MST score >=2 
   []Enteral/Parenteral Nutrition PTA []Pregnant: Gestational DM or Multigestation [x]Pressure Ulcer/Wound Care needs 
     
[]  Low BMI 
[]  ABBEY Shepherd Pager [phone] Weekend Pager [phone]

## 2019-01-12 NOTE — PROGRESS NOTES
[address] care of patient from [facility]. SBAR, MAR and KARDEX discussed at bedside. [address] Patient had an incontinent episode, chucks saturated, unmeasurable amount of urine. 1800 Bolus fed patient 250 of Osmolite 1.2, flush of 45 before and after. [place] Patient felt like he had to urinate but unable to go. Bladder scan revealed 446 cc's. Nurse attempted strait cath, resulted with 75 cc's. Met a lot of resistence with cath. Attempted a second strait cath which was unsuccessful, small amount of blood on tip of catheter. [address] Patient urinated about 100 cc's. Patient stated this is what he does all the time at home. Just urinates a little at a time every hour or so. Patient has no complaints of pain or urgency to urinate.

## 2019-01-12 NOTE — PROGRESS NOTES
Hospitalist Progress NoteNAME: Toshia Ceballos :  1935 MRN:  [id number] Admit date: 2019 Today's date: 19 PCP: DO Yvonne Brenner M.D. Cell [phone] Assessment / Plan: 
 
Syncope (2019) POA Hypotension POA with syncope, Bps Mod to severe aortic stenosis with mod regurg POA Suspect syncope related to AS, ? orthostasis Syncope in the past, last admit [facility] 2018 Complicated by subfalcine SDH and SAH, ASA and plavix held No recurrence Tele Check orthostatics Watch BP with diuresis Being evaluated for TAVR at [facility] PT/OT Acute kidney injury POA admit creat 1.91 Stage 3 chronic kidney disease POA baseline 1.1 to 1.3 Recent CTA chest, abdomen 2019 as part of outpatient TAVR evaluation ? ATN due to IV contrast or hypotension, ? Cardiorenal 
Continue diuresis Creatinine continuing to rise IV diuresis, serial labs Renal US Acute on chronic respiratory failure POA Acute on chronic systolic CHF POA LVEF 28-53% CAD s/p CABG x 3 Baseline wears 2 L NC O2 Hypoxic at home at home with episode, sats to 70% ? Real vs poor tracking of pulse ox with hypotension Now weaned to 2 Liters with diuresis CT chest shows probable pulmonary vascular congestion V/Q low prob for PE 30-35% 
 continue diuresis, strict I/o. Reported received IVF before and after the CTAs on 2019 Positive 6 to 10 lb weight gain Last echo 2018 LVEF 30 to 35% Statin ASA and plavix later in day tomorrow as long as head CT okay Recent subfalcine SDH and SAH s/p fall early 2018 Stable on serial head CTs at [facility], occurred after syncopal episode Check repeat head CT before resuming AC Hx of Tongue CA S/p radical neck resection andS/p neck XRT  
Chronic dysphagia with PEG  
NPO at home with TF  IsoSource 1.5  
    5 cans, wife gives 1 can every 3 hr if no residual before given new can Follows with Dr Blythe Epley OP for dysphasia 
  
DM type II Continue SSI, regular insulin with TF [facility] endocrinology  Dr Ness Guillen , 185, 280, 56, 181 Reduce regular insulin to 12 units Urinary retention POA Straight cath as needed Recent PEG tube site infection Seen in urgent care clinic 1/9/2018, started on doxycycline Check site in [place] 
  
H/o streptococcal bacteremia 07/2018 H/o stroke 2015  
Chronic thrombocytopenia POA stable , followed with Dr Nichole Mejia 
 
Code Status: Full code Surrogate Decision Maker: wife 
  
DVT Prophylaxis: Heparin GI Prophylaxis: not indicated 
  
Baseline: walkers with walker and Rollator  and on O2, Body mass index is 26.31 kg/m². Subjective: Chief Complaint / Reason for Physician Visit f/u syncope, LIZ, CHF \"no more episodes\". Discussed with RN events overnight. Breathing okay, weaned to 2 liters NC No further syncope, BP improved Tolerating TF 
NO CP or HA or abdominal pain Review of Systems: 
Symptom Y/N Comments  Symptom Y/N Comments Fever/Chills n   Chest Pain n   
Poor Appetite    Edema Cough n   Abdominal Pain n   
Sputum    Joint Pain SOB/KRAMER less   Headache Nausea/vomit n   Tolerating PT/OT Diarrhea n   Tolerating Diet Y  TF  
Constipation    Other Could NOT obtain due to:   
 
Objective: VITALS:  
Last 24hrs VS reviewed since prior progress note. Most recent are: 
Patient Vitals for the past 24 hrs: 
 Temp Pulse Resp BP SpO2  
01/12/19 1454 98.4 °F (36.9 °C) 89 18 116/54 97 % 01/12/19 1109 99 °F (37.2 °C) 87 18 106/63 95 % 01/12/19 0754     92 % 01/12/19 0743 98.6 °F (37 °C) 96 18 134/60 90 % 01/12/19 0237 98.6 °F (37 °C) 85 20 102/57 97 % 01/12/19 0000   20  96 % 01/11/19 2320 98.6 °F (37 °C) (!) 103 24 121/59 95 % 01/11/19 2128 97.7 °F (36.5 °C) (!) 102 22 100/70 91 % 01/11/19 2026 97.8 °F (36.6 °C) (!) 112 22 141/84 92 % 01/11/19 1951  (!) 116 24 125/76 91 % Intake/Output Summary (Last 24 hours) at 1/12/2019 1859 Last data filed at 1/12/2019 1313 Gross per 24 hour Intake 1975 ml Output 615 ml Net 1360 ml Wt Readings from Last 12 Encounters:  
01/12/19 76.2 kg (168 lb) 01/09/19 75.3 kg (166 lb) 12/31/18 71.5 kg (157 lb 9.6 oz) 12/26/18 72.3 kg (159 lb 6.4 oz) 12/24/18 71.7 kg (158 lb) 12/20/18 71.2 kg (157 lb) 12/18/18 71.2 kg (157 lb) 12/17/18 72.6 kg (160 lb) 12/14/18 74.1 kg (163 lb 6.4 oz) 12/13/18 71.8 kg (158 lb 6.4 oz) 12/09/18 71.6 kg (157 lb 13.6 oz) 12/08/18 71.6 kg (157 lb 12.8 oz) PHYSICAL EXAM: 
General: WD, WN. Alert, cooperative, no acute distress   
EENT:  PERRL. Anicteric sclerae. MMM Neck:  No meningismus, no thyromegaly Resp:  Crackles at bases bilaterally, no wheezing  No accessory muscle use CV:  Regular  Rhythm, drage 2/6 SM 1+ edema GI:  Soft, Non distended, Non tender.  +Bowel sounds, no rebound PEG site not examined Neurologic:  Alert and oriented X 3, normal speech, non focal motor exam 
Psych:   Good insight. Not anxious nor agitated Skin:  No rashes. No jaundice Reviewed most current lab test results and cultures  YES Reviewed most current radiology test results   YES Review and summation of old records today    NO Reviewed patient's current orders and MAR    YES 
PMH/SH reviewed - no change compared to H&P 
________________________________________________________________________ Care Plan discussed with: 
  Comments Patient x Family RN x Care Manager Consultant Multidiciplinary team rounds were held today with , nursing, pharmacist and clinical coordinator. Patient's plan of care was discussed; medications were reviewed and discharge planning was addressed. ________________________________________________________________________ Total NON critical care TIME:  35  Minutes Total CRITICAL CARE TIME Spent:   Minutes non procedure based Comments >50% of visit spent in counseling and coordination of care    
________________________________________________________________________ Lillian Hansen MD  
 
Procedures: see electronic medical records for all procedures/Xrays and details which were not copied into this note but were reviewed prior to creation of Plan. LABS: 
I reviewed today's most current labs and imaging studies. Pertinent labs include: 
Recent Labs  
  01/11/19 
1059 WBC 7.2 HGB 11.6* HCT 36.9 * Recent Labs  
  01/12/19 
0421 01/11/19 
1059  136  
K 4.9 5.2*  
CL 98 96* CO2 31 35* * 263* BUN 56* 47* CREA 2.15* 1.91* CA 8.8 9.6 ALB  --  3.2* TBILI  --  0.7 SGOT  --  60* ALT  --  46

## 2019-01-12 NOTE — PROGRESS NOTES
Bedside shift change report given to Sherry Stock RN (oncoming nurse) by Rina Menon RN (offgoing nurse). Report included the following information SBAR.  
 
102 - Paged Dr. Anoop Bedoya regarding patient's bladder scan, 409 mL. Patient's wife is also requesting stronger pain medication for patient's chronic back and shoulder pain.

## 2019-01-12 NOTE — PROGRESS NOTES
Bedside shift change report given to [facility] AN AFFILIATE OF [employer] NARVAEZ (oncoming nurse). Report included the following information SBAR. SHIFT SUMMARY: 
 
 
 
 
 
[facility] NURSING NOTE Admission Date 1/11/2019 Admission Diagnosis Hypoxia Syncope Consults IP CONSULT TO PALLIATIVE CARE - PROVIDER 
IP CONSULT TO PALLIATIVE CARE - PROVIDER 
IP CONSULT TO CARDIOLOGY Cardiac Monitoring [x] Yes [] No  
  
Purposeful Hourly Rounding [x] Yes   
Olayinka Score Total Score: 4 Olayinka score 3 or > [x] Bed Alarm [] Avasys [] 1:1 sitter [] Patient refused (Signed refusal form in chart) Mark Score Mark Score: 16 Mark score 14 or < [] PMT consult [x] Wound Care consult  
 []  Specialty bed  [x] Nutrition consult Influenza Vaccine Received Flu Vaccine for Current Season (usually Sept-March): Yes Oxygen needs? [] Room air Oxygen @  []1L    []2L    [x]3L   []4L    []5L   []6L via NC Chronic home O2 use? [x] Yes [] No 
Perform O2 challenge test and document in progress note using smartphrase (.Homeoxygen) Last bowel movement Last Bowel Movement Date: 01/10/19 Urinary Catheter LDAs Peripheral IV 01/11/19 Anterior; Inferior; Lower;Proximal;Right Arm (Active) Site Assessment Clean, dry, & intact 1/12/2019  7:54 AM  
Phlebitis Assessment 0 1/12/2019  7:54 AM  
Infiltration Assessment 0 1/12/2019  7:54 AM  
Dressing Status Clean, dry, & intact 1/12/2019  7:54 AM  
Dressing Type Transparent;Tape 1/12/2019  7:54 AM  
Hub Color/Line Status Pink;Flushed 1/12/2019  7:54 AM  
   
Peripheral IV 01/11/19 Anterior; Inferior; Lower;Right Arm (Active) Site Assessment Clean, dry, & intact 1/12/2019  7:54 AM  
Phlebitis Assessment 0 1/12/2019  7:54 AM  
Infiltration Assessment 0 1/12/2019  7:54 AM  
Dressing Status Clean, dry, & intact 1/12/2019  7:54 AM  
Dressing Type Transparent;Tape 1/12/2019  7:54 AM  
Hub Color/Line Status Pink;Flushed 1/12/2019  7:54 AM  
      
PEG/Gastrostomy Tube (Active) Site Assessment Pink;Dry; Intact 1/12/2019  1:13 PM  
Dressing Status Clean, dry, & intact 1/12/2019  1:13 PM  
G Port Status Clamped 1/12/2019  1:13 PM  
Action Taken Placement verified (comment) 1/12/2019  8:59 AM  
Gastric Residual (mL) 0 ml 1/12/2019  1:13 PM  
Tube Feeding/Formula Options Osmolite 1.2 1/12/2019  1:13 PM  
Tube Feeding/Verify Rate (mL/hr) 250 1/12/2019  8:59 AM  
Water Flush Volume (mL) 50 mL 1/12/2019  1:13 PM  
Intake (ml) 300 ml 1/12/2019  1:13 PM  
Medication Volume 50 ml 1/12/2019  1:13 PM  
Drainage Chamber Level (ml) 0 ml 1/11/2019  9:30 PM  
Output (ml) 0 ml 1/11/2019  9:30 PM  
            
  
Readmission Risk Assessment Tool Score High Risk 46 Total Score 3 Has Seen PCP in Last 6 Months (Yes=3, No=0)  
 11 IP Visits Last 12 Months (1-3=4, 4=9, >4=11) 5 Pt. Coverage (Medicare=5 , Medicaid, or Self-Pay=4) 33 Charlson Comorbidity Score (Age + Comorbid Conditions) Criteria that do not apply:  
 . Living with Significant Other. Assisted Living. LTAC. SNF. or  
Rehab Patient Length of Stay (>5 days = 3) Expected Length of Stay 2d 7h Actual Length of Stay 1

## 2019-01-12 NOTE — PROGRESS NOTES
TRANSFER - IN REPORT: 
 
Verbal report received from [address] (name) on Mustahpa Rowell  being received from ED (unit) for routine progression of care Report consisted of patients Situation, Background, Assessment and  
Recommendations(SBAR). Information from the following report(s) Kardex, MAR, Med Rec Status and Cardiac Rhythm ST  was reviewed with the receiving nurse. Opportunity for questions and clarification was provided. Assessment completed upon patients arrival to unit and care assumed. Primary Nurse Antony Munoz RN and Richard Cartwright RN , RN performed a dual skin assessment on this patient Impairment noted- see wound doc flow sheet Mark score is 18 I did order a wound care Consult to evaluate small non blanchable pink/purple area on left heel,peg site dark pink /slight redness ,sacrum some excoriation with pink blanchable and hyperpigmented skin ,right  elbow skin tear ,and skin tear right hand  ifficult it get a Pulse ox SP02 reading with tele box  Due to poor connection pt being cold ,finally he warmed up and sats were WN Hospitalist paged for Fingerstick sliding scale for NPO tube feed ,Notified her that Isosource is not available 0439 Pt attempting to void and is unable and asking RN to \"put catheter in \" bladder scanned  283  Retained in bladder ,Hospitalist paged for orders 0500 Straight cathed with some difficulty inserting only to get 100 ml out ,Bladder scanned again and 211ml  in bladder . Pt says he has to void but it doesn't want to come and this happens a lot at home . Urinal placed waiting to void . Bedside report given to [facility] To bladder scan and watch for voids Hospitalist paged to ok osmolite 1.2  Until wife brings Isosource feeds from home  
 
0800 I spoke with Pts wife she said pt does have a enlarged prostate and recently underwent scans of bladder at [facility] and is being followed by Dr Rosalind Josue at [facility] Bedside and Verbal shift change report given to , RN (oncoming nurse). Report included the following information Kardex. SHIFT SUMMARY: 
 
 
 
 
 
[address] NURSING NOTE Admission Date 1/11/2019 Admission Diagnosis Hypoxia Syncope Consults IP CONSULT TO PALLIATIVE CARE - PROVIDER 
IP CONSULT TO PALLIATIVE CARE - PROVIDER 
IP CONSULT TO CARDIOLOGY Cardiac Monitoring [x] Yes [] No  
  
Purposeful Hourly Rounding [x] Yes   
Olayinka Score Total Score: 3 Olayinka score 3 or > [x] Bed Alarm [] Avasys [] 1:1 sitter [] Patient refused (Signed refusal form in chart) Amrk Score Mark Score: 20 Mark score 14 or < [] PMT consult [] Wound Care consult  
 []  Specialty bed  [] Nutrition consult Influenza Vaccine Oxygen needs? [] Room air Oxygen @  []1L    []2L    [x]3L   []4L    []5L   []6L via NC Chronic home O2 use? [x] Yes [] No 
Perform O2 challenge test and document in progress note using smartphrase (.Homeoxygen) Last bowel movement Urinary Catheter LDAs Peripheral IV 01/08/19 Left Arm (Active) PEG/Gastrostomy Tube (Active) Readmission Risk Assessment Tool Score High Risk [address] Total Score 3 Has Seen PCP in Last 6 Months (Yes=3, No=0) 2 . Living with Significant Other. Assisted Living. LTAC. SNF. or  
Rehab  
 11 IP Visits Last 12 Months (1-3=4, 4=9, >4=11) 5 Pt. Coverage (Medicare=5 , Medicaid, or Self-Pay=4) 33 Charlson Comorbidity Score (Age + Comorbid Conditions) Criteria that do not apply:  
 Patient Length of Stay (>5 days = 3) Expected Length of Stay - - - Actual Length of Stay 0

## 2019-01-12 NOTE — CONSULTS
Consult/Admission NAME: Trisha Mondragon :  1935 MRN:  [id number] Date/Time:  2019 2:08 PM 
 
Patient PCP: Ani Aldrich DO 
________________________________________________________________________ Assessment:  
 
 
  Hx:  
1. Chronic systolic heart failure 2. Coronary artery disease s/p remote CABG.  Cath  w/ EF 40%, PCI to distal LAD via LIMA, severe disease in large diagonal (not amenable to PCI), patent SVG-OM, SVG-PDA, SVG-PLB.  Lexiscan  w/ EF 28%, IWMI, and with diagonal ischemia. 3. Lexiscan stress MPI  w/ EF 29% and anterior ischemia w/ IWMI 4. Ischemic cardiomyopathy 5. BiV ICD 6. Chronic kidney disease; Stg 3 
7. Moderate to severe Aortic stenosis per previous echo . In process of evaluation for TAVR . Thruston's.    
8. Diabetes 9. 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 
10. Orthostatic hypotension 11. Hyperlipidemia 12. Head/neck CA      w/ feeding tube 13. Chronic thrombocytopenia (Dr. Cramer Gall 14. S/p recurrent falls. Recurrent spells of sycnope 15. Syncope, fall and subdural hematoma 2018 16. Age related debility. 17. Echo 2018 . EF 30%  
  
Primary Cardiologist: Holland Nogueira.  
   
 
 Plan:  
 
Continue meds. [x]           High complexity decision making was performed Subjective: CHIEF COMPLAINT:  
 
HISTORY OF PRESENT ILLNESS:    
Jerrod Licona is a 80 y.o.  male who had a possible syncopal spell at home. Slumped in his rollator. Wife couldn't arouse Pt of Dr Holland Nogueira . Has been in process of evaluation for possible TAVR. Had CT scan done . We were asked to consult  evaluation of the above problems. Past Medical History:  
Diagnosis Date  Antiplatelet or antithrombotic long-term use 2014  Anxiety disorder  Arrhythmia   
 bradycardia  Arthritis  CAD (coronary artery disease) s/p CABG ; Dr Ival Boot  Cancer ([facility] 1996 tongue/throat cancer s/p surgery / radiation and 1 dose of chemo  Carotid artery stenosis s/p bilateral stents  Chest pain  Chronic pain   
 left leg, lower back,   
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 I suspect the latter. Achalasia  is not present.  Fainting [address]  Fatigue  GERD (gastroesophageal reflux disease)  Heart failure ([address] 10/2014 Cardiomyopathy:Pacemaker upgrade:Biv and AICD  Dr. Monica Mills heart DrAlvaro last visit 5/11/2015  Hepatitis C Dx 1996  
 treated at [facility] in past; as of 4/15/15 wife states pt currently not under any treatment  Hyperlipidemia  Joint pain  Liver disease  Memory loss  Muscle pain  Muscle weakness  Myocardial infarct [facility]) 2013 Heart Cath: 40% LV EF, Stented distal LAD, patent Graft to circumflex  On tube feeding diet approx 2009  
 still has as of 9/28/15  (no po food/liquid/meds at all); Dr Taye Kraft  Other ill-defined conditions(799.89) 1996  
 1 dose of chemotherapy/radiation for tongue cancer  Other ill-defined conditions(799.89) BPH  Other ill-defined conditions(799.89) orthostatic hypotension  Pneumonia ~ April -May 2010  
 SOB (shortness of breath)  Stroke [facility]) approx 2003  
 left side-left finger tips numb; no imbalance or memory loss; as of 2015 not seeing neuro MD  
 Suicidal thoughts  Swallowing difficulty Past Surgical History:  
Procedure Laterality Date  ABDOMEN SURGERY PROC UNLISTED  1996  
 peg tube  CABG, ARTERY-VEIN, THREE  2000  HX CATARACT REMOVAL    
 bilateral  
 HX CHOLECYSTECTOMY  HX COLONOSCOPY    
 HX HEART CATHETERIZATION  2013  Stented distal LAD  
  HX MOHS PROCEDURES    
 bilateral  
 HX ORTHOPAEDIC    
 back surgery times two  HX OTHER SURGICAL Radical Left Neck  HX OTHER SURGICAL    
 NASAL POLYPS REMOVAL  
 HX OTHER SURGICAL   TURP  
 HX PACEMAKER    HX PACEMAKER  10/28/14 Defibrillator: [facility] # [id number], serial # [id number]; Dr. Dominique Gaytan [phone]; Dr Pita Larios  HX UROLOGICAL    
 cystoscopy [address]  
 cevical surgery  [place] CHANGE GASTROSTOMY TUBE  2011  [place] CHANGE GASTROSTOMY TUBE  2011  [place] EGD INSERT GUIDE WIRE DILATOR PASSAGE ESOPHAGUS  2010  [place] EGD TRANSORAL BIOPSY SINGLE/MULTIPLE  2010  
    
 STOMACH SURGERY PROCEDURE UNLISTED  2011  UPPER GI ENDOSCOPY,BIOPSY  2018  UPPER GI ENDOSCOPY,DILATN W GUIDE  2018  UPPER GI ENDOSCOPY,W/DILAT,GASTRIC OUT  2018  VASCULAR SURGERY PROCEDURE UNLIST    
 bilateral carotid stents Allergies Allergen Reactions  Cleocin [Clindamycin Hcl] Rash  Demerol [Meperidine] Shortness of Breath  Paxil [Paroxetine Hcl] Unknown (comments) Pt gets shaky and loses control of legs  Amoxicillin Rash  Pcn [Penicillins] Rash Meds:  See below Social History Tobacco Use  Smoking status: Never Smoker  Smokeless tobacco: Never Used Substance Use Topics  Alcohol use: No  
  
Family History Problem Relation Age of Onset  Heart Disease Father   
      at age 52 from CAD  Colon Cancer Mother  Cancer Mother   
     colon ca  
 Heart Disease Brother REVIEW OF SYSTEMS:   
 []            Unable to obtain  ROS due to --- 
 [x]            Total of 12 systems reviewed as follows: 
 
Constitutional: negative fever, negative chills, negative weight loss Eyes:   negative visual changes ENT:   negative sore throat, tongue or lip swelling Respiratory:  negative cough, negative dyspnea Cards:  negative for chest pain, palpitations, lower extremity edema GI:   negative for nausea, vomiting, diarrhea, and abdominal pain Genitourinary: negative for frequency, dysuria Integument:  negative for rash Hematologic:  negative for easy bruising and gum/nose bleeding Musculoskel: negative for myalgias,  back pain Neurological:  negative for headaches, dizziness, vertigo, weakness Behavl/Psych: negative for feelings of anxiety, depression Pertinent Positives include : 
 
Objective:  
  
Physical Exam: 
 
Last 24hrs VS reviewed since prior progress note. Most recent are: 
 
Visit Vitals /63 (BP 1 Location: Left arm, BP Patient Position: At rest;Head of bed elevated (Comment degrees)) Comment (BP Patient Position): 70 degrees Pulse 87 Temp 99 °F (37.2 °C) Resp 18 Ht 5' 7\" (1.702 m) Wt 76.2 kg (168 lb) SpO2 95% BMI 26.31 kg/m² Intake/Output Summary (Last 24 hours) at 1/12/2019 1408 Last data filed at 1/12/2019 1313 Gross per 24 hour Intake 1975 ml Output 615 ml Net 1360 ml General Appearance: Well developed, well nourished, alert & oriented x 3,  
 no acute distress. Ears/Nose/Mouth/Throat: Pupils equal and round, Hearing grossly normal. 
Neck: Supple. JVP within normal limits. Carotids good upstrokes, with no bruit. Chest: Lungs clear to auscultation bilaterally. Cardiovascular: Regular rate and rhythm, S1S2 normal, no murmur, rubs, gallops. Abdomen: Soft, non-tender, bowel sounds are active. No organomegaly. Extremities: No edema bilaterally. Femoral pulses +2, Distal Pulses +1. Skin: Warm and dry. Neuro: CN II-XII grossly intact, Strength and sensation grossly intact. Data:  
  
Prior to Admission medications Medication Sig Start Date End Date Taking? Authorizing Provider  
insulin regular (NOVOLIN R, HUMULIN R) 100 unit/mL injection 15 Units by SubCUTAneous route daily (with lunch).  Patient takes 15 Units with breakfast, 15 Units with lunch, and 5 Units nightly   Yes Provider, Historical  
insulin regular (NOVOLIN R, HUMULIN R) 100 unit/mL injection 5 Units by SubCUTAneous route nightly. Patient takes 15 Units with breakfast, 15 Units with lunch, and 5 Units nightly   Yes Provider, Historical  
hydrocortisone sodium succ/PF (HYDROCORTISONE SOD SUCC, PF, INJECTION) by Injection route every three (3) months. Patient gets from orthopedist   Yes Provider, Historical  
furosemide (LASIX) 40 mg tablet 40 mg by Per G Tube route two (2) times a day. Yes Provider, Historical  
B.infantis-B.ani-B.long-B.bifi (PROBIOTIC 4X) 10-15 mg TbEC 1 Cap by Per G Tube route daily. Yes Provider, Historical  
doxycycline (MONODOX) 100 mg capsule 100 mg by Per G Tube route two (2) times a day. Yes Provider, Historical  
clopidogrel (PLAVIX) 75 mg tab 75 mg by PEG Tube route daily. Yes Provider, Historical  
aspirin delayed-release 81 mg tablet Take 81 mg by mouth daily. Yes Provider, Historical  
guaiFENesin (ROBITUSSIN) 100 mg/5 mL liquid 200 mg by Per G Tube route three (3) times daily. 12/5/18  Yes Provider, Historical  
insulin regular (NOVOLIN R REGULAR U-100 INSULN) 100 unit/mL injection 15 Units by SubCUTAneous route daily (with breakfast). Patient takes 15 Units with breakfast, 15 Units with lunch, and 5 Units nightly   Yes Provider, Historical  
pramipexole (MIRAPEX) 0.25 mg tablet Take 1 Tab by mouth three (3) times daily. 10/24/18  Yes Devan Arroyo MD  
albuterol (PROVENTIL VENTOLIN) 2.5 mg /3 mL (0.083 %) nebulizer solution 2.5 mg by Nebulization route two (2) times a day. Per wife patient can use 3 times a day if needed   Yes Provider, Historical  
famotidine (PEPCID) 20 mg tablet 20 mg by Per G Tube route three (3) times daily. Yes Provider, Historical  
atorvastatin (LIPITOR) 40 mg tablet 40 mg by Per G Tube route daily.    Yes Other, MD Booker  
 finasteride (PROSCAR) 5 mg tablet 5 mg by Per G Tube route nightly. Yes Other, MD Booker  
alfuzosin SR (UROXATRAL) 10 mg SR tablet 10 mg by Per G Tube route daily. 5/24/18  Yes Provider, Historical  
midodrine (PROAMITINE) 10 mg tablet 10 mg by Per G Tube route three (3) times daily (with meals). 11/1/16  Yes Provider, Historical  
HYDROcodone-acetaminophen (NORCO)  mg tablet 1 Tab by Per G Tube route every four (4) hours as needed for Pain. Yes Other, MD Booker  
zinc 50 mg tab tablet 50 mg by Per G Tube route daily. Yes Provider, Historical  
LANTUS SOLOSTAR U-100 INSULIN 100 unit/mL (3 mL) inpn INJECT 14 UNITS SUBCUTANEOUSLY ONCE DAILY 5/21/18  Yes Woody Stein MD  
gabapentin (NEURONTIN) 250 mg/5 mL solution 750 mg by Per G Tube route three (3) times daily. Yes Other, MD Booker  
multivitamin (ONE A DAY) tablet 1 Tab by Per G Tube route daily. Yes Other, MD Booker  
klack's mixture Solution Take 5 mL by mouth two (2) times a day. Diphenhydramine elixir 12.5mg/ml 500ml, Nystatin suspension 120ml,Tetracycline 500mg capsules  12 capsules (6g), Hydrocortisone 20mg tablet 12 tablets (240mg), Water for irrigation QS ad 1000ml Provider, Historical  
acetaminophen (TYLENOL) 500 mg tablet 500-1,000 mg by Per G Tube route every six (6) hours as needed for Pain. Provider, Historical  
fluticasone (FLONASE ALLERGY RELIEF) 50 mcg/actuation nasal spray 1 [place] by Both Nostrils route two (2) times daily as needed (runny nose). Provider, Historical  
nitroglycerin (NITROSTAT) 0.4 mg SL tablet 0.4 mg by SubLINGual route every five (5) minutes as needed for Chest Pain. Other, MD Booker  
 
 
Recent Results (from the past 24 hour(s)) POC LACTIC ACID Collection Time: 01/11/19  3:31 PM  
Result Value Ref Range Lactic Acid (POC) 1.10 0.40 - 2.00 mmol/L  
URINALYSIS W/ RFLX MICROSCOPIC Collection Time: 01/11/19  4:01 PM  
Result Value Ref Range Color YELLOW/STRAW Appearance CLOUDY (A) CLEAR Specific gravity 1.016 1.003 - 1.030    
 pH (UA) 5.5 5.0 - 8.0 Protein NEGATIVE  NEG mg/dL Glucose NEGATIVE  NEG mg/dL Ketone NEGATIVE  NEG mg/dL Bilirubin NEGATIVE  NEG Blood NEGATIVE  NEG Urobilinogen 1.0 0.2 - 1.0 EU/dL Nitrites NEGATIVE  NEG Leukocyte Esterase NEGATIVE  NEG    
GLUCOSE, POC Collection Time: 01/11/19  8:10 PM  
Result Value Ref Range Glucose (POC) 162 (H) 65 - 100 mg/dL Performed by Terese Sultana (EDT) GLUCOSE, POC Collection Time: 01/11/19 10:34 PM  
Result Value Ref Range Glucose (POC) 228 (H) 65 - 100 mg/dL Performed by Nayeli Morrow, POC Collection Time: 01/12/19 12:50 AM  
Result Value Ref Range Glucose (POC) 209 (H) 65 - 100 mg/dL Performed by Shravan DOVER WITH EAG Collection Time: 01/12/19  4:21 AM  
Result Value Ref Range Hemoglobin A1c 7.1 (H) 4.2 - 6.3 % Est. average glucose 157 mg/dL METABOLIC PANEL, BASIC Collection Time: 01/12/19  4:21 AM  
Result Value Ref Range Sodium 136 136 - 145 mmol/L Potassium 4.9 3.5 - 5.1 mmol/L Chloride 98 97 - 108 mmol/L  
 CO2 31 21 - 32 mmol/L Anion gap 7 5 - 15 mmol/L Glucose 154 (H) 65 - 100 mg/dL BUN 56 (H) 6 - 20 MG/DL Creatinine 2.15 (H) 0.70 - 1.30 MG/DL  
 BUN/Creatinine ratio 26 (H) 12 - 20 GFR est AA 36 (L) >60 ml/min/1.73m2 GFR est non-AA 30 (L) >60 ml/min/1.73m2 Calcium 8.8 8.5 - 10.1 MG/DL  
GLUCOSE, POC Collection Time: 01/12/19  6:45 AM  
Result Value Ref Range Glucose (POC) 185 (H) 65 - 100 mg/dL Performed by Puneet Fenton (CON) PCT   
GLUCOSE, POC Collection Time: 01/12/19 12:11 PM  
Result Value Ref Range Glucose (POC) 280 (H) 65 - 100 mg/dL Performed by Torey Cary (PCT)

## 2019-01-12 NOTE — PROGRESS NOTES
Attempted Initial Spiritual Assessment in Cardio Pulmonary Care. Unable to assess patients needs. No family present at this time. Chaplains will follow as able and/or as needed. [address]  Paging Service [phone]

## 2019-01-12 NOTE — PROGRESS NOTES
Problem: Falls - Risk of 
Goal: *Absence of Falls Document Renee Romero Fall Risk and appropriate interventions in the flowsheet. Outcome: Progressing Towards Goal 
Fall Risk Interventions: 
Mobility Interventions: Bed/chair exit alarm, Patient to call before getting OOB

## 2019-01-12 NOTE — ED NOTES
>assumed care of pt. Pt laying supine, requesting pain medicine. Extremely coarse and wheezing present throughout. O2 sats 84-88%. Prn breathing treatment administered; pt requesting something for pain, admitting provider aware. At shift change, IVF infusing, fluids d/c'ed at 1900. RT consult placed by admitting provider.

## 2019-01-13 NOTE — PROGRESS NOTES
Notified by nurse: elevated troponin 1.84. Chart reviewed. Unsure why troponin was checked, last troponin 2 days ago. As per  Nurse pt has no new symptoms of CP or SOB. Pt has significant cardiac history and acute on CKD. Cardiology already on board. Will make them aware.

## 2019-01-13 NOTE — PROGRESS NOTES
Bedside shift change report given to Lenard Bond (oncoming nurse) by Leopoldo Dubois (offgoing nurse). Report included the following information SBAR.  
 
3450 - Spoke with Dr. Geoff Masters, will do Q6 troponin checks 0750 - Skin assessed, purple unblanchable spot on R heel venelex applied. Wound care order already put in on admission. Skin tears still to RUE, foam applied. Abrasion on LLE, 5th toe abrasion. Prevalon boot placed on patient, heels floated with pillow. Sacrum red/purple, blanchable. Patient on turn team, turning Maryjane Delay.  
 
5838 - Patient's wife would like patient to be on continuous feeds, notified Dr. Geoff Masters. Physician will speak with wife today. Will continue bolus feeds. 1018 - Patient urinated 250 mL in urinal 
 
1400 - Patient had large incontinent episode of urine. 1410 - Patient's sacrum assessed with MITCH Reza. Sacrum still red/purple, blanchable. Venelex applied prophylactically. New foam placed on sacrum, will continue to turn Q2h. Patient educated on importance of turning and pressure ulcer prevention. 1807 - Patient had large, soft bowel movement. Up x2 to the bedside commode.

## 2019-01-13 NOTE — PROGRESS NOTES
Bedside shift change report given to MITCH Angel (oncoming nurse). Report included the following information SBAR. SHIFT SUMMARY: 
 
 
 
 
 
[address] NURSING NOTE Admission Date 1/11/2019 Admission Diagnosis Hypoxia Syncope Consults IP CONSULT TO PALLIATIVE CARE - PROVIDER 
IP CONSULT TO PALLIATIVE CARE - PROVIDER 
IP CONSULT TO PALLIATIVE CARE - PROVIDER 
IP CONSULT TO CARDIOLOGY Cardiac Monitoring [x] Yes [] No  
  
Purposeful Hourly Rounding [x] Yes   
Olayinka Score Total Score: 4 Olayinka score 3 or > [x] Bed Alarm [] Avasys [] 1:1 sitter [] Patient refused (Signed refusal form in chart) Mark Score Mark Score: 17 Mark score 14 or < [] PMT consult [x] Wound Care consult  
 []  Specialty bed  [x] Nutrition consult Influenza Vaccine Received Flu Vaccine for Current Season (usually Sept-March): Yes Oxygen needs? [] Room air Oxygen @  []1L    [x]2L    []3L   []4L    []5L   []6L via NC Chronic home O2 use? [x] Yes [] No 
Perform O2 challenge test and document in progress note using smartphrase (.Homeoxygen) Last bowel movement Last Bowel Movement Date: 01/10/19 Urinary Catheter LDAs Peripheral IV 01/12/19 Anterior;Left;Superior; Upper Arm (Active) Site Assessment Clean, dry, & intact 1/13/2019 12:02 PM  
Phlebitis Assessment 0 1/13/2019 12:02 PM  
Infiltration Assessment 0 1/13/2019 12:02 PM  
Dressing Status Clean, dry, & intact 1/13/2019 12:02 PM  
Dressing Type Transparent;Tape 1/13/2019 12:02 PM  
Hub Color/Line Status Blue;Flushed 1/13/2019 12:02 PM  
      
PEG/Gastrostomy Tube (Active) Site Assessment Clean, dry, & intact 1/13/2019  6:15 PM  
Dressing Status Clean, dry, & intact 1/13/2019  6:15 PM  
G Port Status Clamped 1/13/2019  6:15 PM  
Action Taken Placement verified (comment) 1/13/2019  7:24 AM  
Gastric Residual (mL) 0 ml 1/13/2019  6:15 PM  
Tube Feeding/Formula Options Osmolite 1.2 1/13/2019  6:15 PM  
 Tube Feeding/Verify Rate (mL/hr) 250 1/13/2019  6:15 PM  
Water Flush Volume (mL) 90 mL 1/13/2019  6:15 PM  
Intake (ml) 340 ml 1/13/2019  6:15 PM  
Medication Volume 55 ml 1/13/2019  3:10 PM  
Drainage Chamber Level (ml) 0 ml 1/11/2019  9:30 PM  
Output (ml) 0 ml 1/11/2019  9:30 PM  
            
  
Readmission Risk Assessment Tool Score High Risk 46 Total Score 3 Has Seen PCP in Last 6 Months (Yes=3, No=0)  
 11 IP Visits Last 12 Months (1-3=4, 4=9, >4=11) 5 Pt. Coverage (Medicare=5 , Medicaid, or Self-Pay=4) 33 Charlson Comorbidity Score (Age + Comorbid Conditions) Criteria that do not apply:  
 . Living with Significant Other. Assisted Living. LTAC. SNF. or  
Rehab Patient Length of Stay (>5 days = 3) Expected Length of Stay 2d 7h Actual Length of Stay 2

## 2019-01-13 NOTE — PROGRESS NOTES
Hospitalist Progress Note NAME: Lucas Vegas :  1935 MRN:  [id number] Assessment / Plan: 
Syncope with hypotension multifactorial including prior posterior CVA (known severe vertebrobasilar disease), severe AS with minimally elevated troponin, CAD s/p CABGx3 and acute systolic CHF with acute hypoxic respiratory failure:  Sees Dr. Daniele Concepcion. Undergoing TAVR evaluation. Pt with admission to [facility] in  with subfalcine SDH and SAH due to syncope. Baseline wears 2 LPM O2. 
- CXR with pulmonary edema - CTA chest with pulmonary vascular prominence, bilateral interstitial edema and bilateral patchy airspace disease, greatest in the lung bases. There are very small bilateral pleural effusions. - V/Q low prob for PE 
- echo pending (EF 30-35% last month) 
- cardiology consult appreciated, being evaluated for TAVR at [facility] 
- con't ASA and plavix as no e/o bleeding on CT head 
- con't lipitor 
- con't lasix - diurese as able. Strict I/Os, daily weights. - con't midodrine. No ACE/ARB or bblocker due to hypotension. 
- palliative care consulted   
Acute kidney injury in setting of CKD3: Cr slightly improved today; recent CTA chest, abdomen 2019 as part of outpatient TAVR evaluation. ?ATN due to IV contrast or hypotension vs cardiorenal. 
- renal US with no visible abnormality 
- con't lasix 
- strict I/Os, daily weights  
Recent subfalcine SDH and SAH s/p fall/syncope early 2018: CT head with no acute intracranial hemorrhage. Near complete resolution of previous small left parafalcine subdural hemorrhage. 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 Blythe Epley for dysphasia 
- NPO at home with TF IsoSource 1.5. Con't tube feeds here (5 cans, wife gives 1 can every 3 hr if no residual before given new can) - con't doxycycline (Seen in urgent care clinic 1/9/2018, started on doxycycline) Insulin dependent DM2 without complication: [facility] endocrinology Dr. Sukhdev Peraza 
- holding lantus 
- con't SSI, regular insulin with TF  Urinary retention due to BPH: sees Dr. Nataliya Woods. Straight cath prn.  
H/o streptococcal bacteremia 7/18 H/o stroke 2015  
Chronic thrombocytopenia, follows with Dr. Sim Bloodgood 
  
Code Status: Full code Surrogate Decision Maker: wife DVT Prophylaxis: Heparin  
  
Baseline: walks with walker/rollator, home O2 Subjective: Chief Complaint / Reason for Physician Visit \"I feel terrible\". C/o generalized pain and weakness. Discussed with RN events overnight. Review of Systems: 
Symptom Y/N Comments  Symptom Y/N Comments Fever/Chills n   Chest Pain n   
Poor Appetite n   Edema n   
Cough n   Abdominal Pain n   
Sputum n   Joint Pain SOB/KRAMER n   Pruritis/Rash Nausea/vomit    Tolerating PT/OT Diarrhea    Tolerating Diet Constipation    Other Could NOT obtain due to:   
 
Objective: VITALS:  
Last 24hrs VS reviewed since prior progress note. Most recent are: 
Patient Vitals for the past 24 hrs: 
 Temp Pulse Resp BP SpO2  
01/13/19 0845     98 % 01/13/19 0724 98.7 °F (37.1 °C) 79 17 114/63 100 % 01/13/19 0329 98.6 °F (37 °C) 72 18 117/59 93 % 01/12/19 2305 98.1 °F (36.7 °C) 83 18 91/59 95 % 01/12/19 1942 98.4 °F (36.9 °C) 85  126/65 95 % 01/12/19 1454 98.4 °F (36.9 °C) 89 18 116/54 97 % 01/12/19 1109 99 °F (37.2 °C) 87 18 106/63 95 % Intake/Output Summary (Last 24 hours) at 1/13/2019 1049 Last data filed at 1/13/2019 1018 Gross per 24 hour Intake 1820 ml Output 900 ml Net 920 ml PHYSICAL EXAM: 
General: WD, WN. Alert, cooperative, no acute distress   
EENT:  EOMI. Anicteric sclerae. MMM Resp:  CTA bilaterally, no wheezing or rales. Poor air movement. No accessory muscle use. CV:  Regular rhythm, no edema GI:  Soft, mildly distended, Non tender.  +Bowel sounds Neurologic:  Alert and oriented X 3, normal speech Psych:   Some insight. Flat affect. Not anxious nor agitated Skin:  No rashes. No jaundice. Reviewed most current lab test results and cultures  YES Reviewed most current radiology test results   YES Review and summation of old records today    NO Reviewed patient's current orders and MAR    YES 
PMH/SH reviewed - no change compared to H&P 
________________________________________________________________________ Care Plan discussed with: 
  Comments Patient x Family  x Wife and multiple family members at baseline RN x Care Manager Consultant Multidiciplinary team rounds were held today with , nursing, pharmacist and clinical coordinator. Patient's plan of care was discussed; medications were reviewed and discharge planning was addressed. ________________________________________________________________________ Total NON critical care TIME:  45 Minutes Total CRITICAL CARE TIME Spent:   Minutes non procedure based Comments >50% of visit spent in counseling and coordination of care x   
________________________________________________________________________ Chelle Mckeon MD  
 
Procedures: see electronic medical records for all procedures/Xrays and details which were not copied into this note but were reviewed prior to creation of Plan. LABS: 
I reviewed today's most current labs and imaging studies. Pertinent labs include: 
Recent Labs  
  01/13/19 
0351 01/11/19 
1059 WBC 5.7 7.2 HGB 9.2* 11.6* HCT 29.2* 36.9 PLT 93* 108* Recent Labs  
  01/13/19 
0351 01/12/19 
0421 01/11/19 
1059  136 136  
K 4.0 4.9 5.2*  
CL 96* 98 96* CO2 36* 31 35* * 154* 263* BUN 56* 56* 47* CREA 1.70* 2.15* 1.91* CA 8.6 8.8 9.6 ALB  --   --  3.2* TBILI  --   --  0.7 SGOT  --   --  60* ALT  --   --  46  
 
 
 Signed: Adelso Briseno MD

## 2019-01-13 NOTE — PROGRESS NOTES
Problem: Pressure Injury - Risk of 
Goal: *Prevention of pressure injury Document Mark Scale and appropriate interventions in the flowsheet. Outcome: Progressing Towards Goal 
Pressure Injury Interventions: 
Sensory Interventions: Assess changes in LOC, Assess need for specialty bed, Avoid rigorous massage over bony prominences, Check visual cues for pain, Discuss PT/OT consult with provider, Keep linens dry and wrinkle-free, Float heels, Minimize linen layers, Maintain/enhance activity level, Turn and reposition approx. every two hours (pillows and wedges if needed) Moisture Interventions: Absorbent underpads, Apply protective barrier, creams and emollients, Check for incontinence Q2 hours and as needed, Limit adult briefs, Minimize layers, Offer toileting Q_hr Activity Interventions: Assess need for specialty bed, Increase time out of bed, PT/OT evaluation Mobility Interventions: Assess need for specialty bed, Float heels, HOB 30 degrees or less, PT/OT evaluation, Turn and reposition approx. every two hours(pillow and wedges) Nutrition Interventions: Document food/fluid/supplement intake, Discuss nutritional consult with provider Friction and Shear Interventions: Apply protective barrier, creams and emollients, Feet elevated on foot rest, Foam dressings/transparent film/skin sealants, HOB 30 degrees or less, Lift sheet, Lift team/patient mobility team

## 2019-01-13 NOTE — PROGRESS NOTES
[facility]on [facility] 12 notified of elevated Troponin 1.84 , 
0645 Dr Phyllis Tijerina notified of troponin 1.84 no new orders ,Patient asymptomatic

## 2019-01-13 NOTE — PROGRESS NOTES
Famotidine Renal Dosing Adjustment Indication:  PTA Dosing is Famotidine 20mg TID Plan:  Famotidine 20mg to be continued as BID, reduction due to renal function. The usual dose for eCrCl < 50mL/min would be 20mg q24h. Estimated Creatinine Clearance: 30.8 mL/min (A) (based on SCr of 1.7 mg/dL (H)). Estimated Creatinine Clearance (using IBW):30.8 mL/min Thank you, 
Vidal Workman, [facility]

## 2019-01-13 NOTE — PROGRESS NOTES
Reason for Admission:   Syncope and hypoxia RRAT Score:    52 Resources/supports as identified by patient/family:   Spouse is home, children come by in evenings and they have a private caregiver from 10-2 Top Challenges facing patient (as identified by patient/family and CM): Finances/Medication cost?    Insured by [employer] and [employer] Transportation? Children can provide Support system or lack thereof? Spouse is home, children come by in evenings and they have a private caregiver from 10-2 Living arrangements? Lives with spouse in 1 story home with 6 steps to entrance Self-care/ADLs/Cognition? Prior to hospitalization was functioning at mod independent level Current Advanced Directive/Advance Care Plan:  None on file Plan for utilizing home health:   TBD Likelihood of readmission: High based on RRAT Transition of Care Plan:  Patient is an 80 yr old  male admitted for W/U of syncope, CHF exacerbation with a PMH significant for  CVA, CA, CAD, diabetes and memory loss. Upon interview patient verified demographics, NOK and PCP. Patient stated he has been getting around his home and able to do self care prior to hospitalization. Patient stated now he hurts all over and cannot hardly move. Patient did apparently fall back on his rollator at home. Patient lives with his spouse and they have a caregiver come during week from 10-2 to help with IADL/ADL's. Patient has 2 sons who come in the evenings. Therapy consults are pending. CM to follow therapy recommendations as patient may need home health VS rehab. Palliative Care has also been consulted per CHF with > than 21 RRAT. Cardiology consult pending. Care Management Interventions PCP Verified by CM:  Yes 
Palliative Care Criteria Met (RRAT>21 & CHF Dx)?: Yes 
 Palliative Consult Recommended?: Yes Discharge Durable Medical Equipment: No(On 2L of oxygen through linCare, RW and cane) Physical Therapy Consult: Yes Occupational Therapy Consult: Yes Speech Therapy Consult: No 
Current Support Network: Lives with Spouse Plan discussed with Pt/Family/Caregiver: Yes Freedom of Choice Offered:  Yes

## 2019-01-13 NOTE — CONSULTS
Consult/Admission NAME: Inga Garcia :  1935 MRN:  [id number] Date/Time:  2019 2:08 PM 
 
Patient PCP: Nora Britt, DO 
________________________________________________________________________ Assessment:  
 
 
  Hx:  
1. Chronic systolic heart failure 2. Coronary artery disease s/p remote CABG.   w/ EF 40%, PCI to distal LAD via LIMA, severe disease in large diagonal (not amenable to PCI), patent SVG-OM, SVG-PDA, SVG-PLB.  Lexiscan  w/ EF 28%, IWMI, and with diagonal ischemia. 3. Lexiscan stress MPI  w/ EF 29% and anterior ischemia w/ IWMI 4. Ischemic cardiomyopathy 5. BiV ICD 6. Chronic kidney disease; Stg 3 
7. Moderate to severe Aortic stenosis per previous echo . In process of evaluation for TAVR . Valencia's.    
8. Diabetes 9. 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 
10. Orthostatic hypotension 11. Hyperlipidemia 12. Head/neck CA      w/ feeding tube 13. Chronic thrombocytopenia (Dr. Sydney Brown 14. S/p recurrent falls. Recurrent spells of sycnope 15. Syncope, fall and subdural hematoma 2018 16. Age related debility. 17. Echo 2018 . EF 30%  No new sxs. Troponin elevated a little. Dr Jorge Crystal will see tomorrow.  
  
Primary Cardiologist: Jorge Crystal.  
   
 
 Plan:  
 
Continue meds. [x]           High complexity decision making was performed Subjective: CHIEF COMPLAINT:  
 
HISTORY OF PRESENT ILLNESS:    
Najma Villeda is a 80 y.o.  male who had a possible syncopal spell at home. Slumped in his rollator. Wife couldn't arouse Pt of Dr Jorge Crystal . Has been in process of evaluation for possible TAVR. Had CT scan done . We were asked to consult  evaluation of the above problems. Past Medical History:  
Diagnosis Date  Antiplatelet or antithrombotic long-term use 2014  Anxiety disorder  Arrhythmia 2009  
 bradycardia  Arthritis  CAD (coronary artery disease) s/p CABG 2002; Dr Valeria Hernandez  Cancer ([facility] 1996  
 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   
 left leg, lower back,   
 Cough  Depression  Diabetes ([facility] Type II  Epigastric pain 8/17/2018  Esophageal dysmotility s/p dilitation  Esophageal motility disorder 7/8/2013 Frequent simultaneous or failed contractions, low amplitude contractions  suggests severe myopathy or diffuse spasm. I suspect the latter. Achalasia  is not present.  Fainting [address]  Fatigue  GERD (gastroesophageal reflux disease)  Heart failure ([facility] 10/2014 Cardiomyopathy:Pacemaker upgrade:Biv and AICD  Dr. Janine Soliz heart Dr. last visit 5/11/2015  Hepatitis C Dx 1996  
 treated at [facility] in past; as of 4/15/15 wife states pt currently not under any treatment  Hyperlipidemia  Joint pain  Liver disease  Memory loss  Muscle pain  Muscle weakness  Myocardial infarct [facility]) 2013 Heart Cath: 40% LV EF, Stented distal LAD, patent Graft to circumflex  On tube feeding diet approx 2009  
 still has as of 9/28/15  (no po food/liquid/meds at all); Dr Viry Up  Other ill-defined conditions(799.89) 1996  
 1 dose of chemotherapy/radiation for tongue cancer  Other ill-defined conditions(799.89) BPH  Other ill-defined conditions(799.89) orthostatic hypotension  Pneumonia ~ April -May 2010  
 SOB (shortness of breath)  Stroke [facility]) approx 2003  
 left side-left finger tips numb; no imbalance or memory loss; as of 2015 not seeing neuro MD  
 Suicidal thoughts  Swallowing difficulty Past Surgical History:  
Procedure Laterality Date  ABDOMEN SURGERY PROC UNLISTED  1996  
 peg tube  CABG, ARTERY-VEIN, THREE  2000  HX CATARACT REMOVAL    
 bilateral  
 HX CHOLECYSTECTOMY  HX COLONOSCOPY [address] CATHETERIZATION   Stented distal LAD  HX MOHS PROCEDURES    
 bilateral  
 HX ORTHOPAEDIC    
 back surgery times two  HX OTHER SURGICAL Radical Left Neck  HX OTHER SURGICAL    
 NASAL POLYPS REMOVAL  
 HX OTHER SURGICAL  2010 TURP  
 HX PACEMAKER    HX PACEMAKER  10/28/14 Defibrillator: [facility] # [id number], serial # [id number]; Dr. Fouzia Oviedo [phone]; Dr Greg Melara  HX UROLOGICAL    
 cystoscopy [address]  
 cevical surgery  SD CHANGE GASTROSTOMY TUBE  2011  SD CHANGE GASTROSTOMY TUBE  2011  SD EGD INSERT GUIDE WIRE DILATOR PASSAGE ESOPHAGUS  2010  SD EGD TRANSORAL BIOPSY SINGLE/MULTIPLE  2010  
    
 STOMACH SURGERY PROCEDURE UNLISTED  2011  UPPER GI ENDOSCOPY,BIOPSY  2018  UPPER GI ENDOSCOPY,DILATN W GUIDE  2018  UPPER GI ENDOSCOPY,W/DILAT,GASTRIC OUT  2018  VASCULAR SURGERY PROCEDURE UNLIST    
 bilateral carotid stents Allergies Allergen Reactions  Cleocin [Clindamycin Hcl] Rash  Demerol [Meperidine] Shortness of Breath  Paxil [Paroxetine Hcl] Unknown (comments) Pt gets shaky and loses control of legs  Amoxicillin Rash  Pcn [Penicillins] Rash Meds:  See below Social History Tobacco Use  Smoking status: Never Smoker  Smokeless tobacco: Never Used Substance Use Topics  Alcohol use: No  
  
Family History Problem Relation Age of Onset  Heart Disease Father   
      at age 52 from CAD  Colon Cancer Mother  Cancer Mother   
     colon ca  
 Heart Disease Brother REVIEW OF SYSTEMS:   
 []            Unable to obtain  ROS due to --- 
 [x]            Total of 12 systems reviewed as follows: 
 
Constitutional: negative fever, negative chills, negative weight loss Eyes:   negative visual changes ENT:   negative sore throat, tongue or lip swelling Respiratory:  negative cough, negative dyspnea Cards:  negative for chest pain, palpitations, lower extremity edema GI:   negative for nausea, vomiting, diarrhea, and abdominal pain Genitourinary: negative for frequency, dysuria Integument:  negative for rash Hematologic:  negative for easy bruising and gum/nose bleeding Musculoskel: negative for myalgias,  back pain Neurological:  negative for headaches, dizziness, vertigo, weakness Behavl/Psych: negative for feelings of anxiety, depression Pertinent Positives include : 
 
Objective:  
  
Physical Exam: 
 
Last 24hrs VS reviewed since prior progress note. Most recent are: 
 
Visit Vitals /48 (BP 1 Location: Left arm, BP Patient Position: At rest) Pulse 86 Temp 98.2 °F (36.8 °C) Resp 20 Ht 5' 7\" (1.702 m) Wt 76.2 kg (168 lb) SpO2 98% BMI 26.31 kg/m² Intake/Output Summary (Last 24 hours) at 1/13/2019 1654 Last data filed at 1/13/2019 6811 Gross per 24 hour Intake 2390 ml Output 925 ml Net 1465 ml General Appearance: Well developed, well nourished, alert & oriented x 3,  
 no acute distress. Ears/Nose/Mouth/Throat: Pupils equal and round, Hearing grossly normal. 
Neck: Supple. JVP within normal limits. Carotids good upstrokes, with no bruit. Chest: Lungs clear to auscultation bilaterally. Cardiovascular: Regular rate and rhythm, S1S2 normal, no murmur, rubs, gallops. Abdomen: Soft, non-tender, bowel sounds are active. No organomegaly. Extremities: No edema bilaterally. Femoral pulses +2, Distal Pulses +1. Skin: Warm and dry. Neuro: CN II-XII grossly intact, Strength and sensation grossly intact. Data:  
  
Prior to Admission medications Medication Sig Start Date End Date Taking? Authorizing Provider  
insulin regular (NOVOLIN R, HUMULIN R) 100 unit/mL injection 15 Units by SubCUTAneous route daily (with lunch).  Patient takes 15 Units with breakfast, 15 Units with lunch, and 5 Units nightly   Yes Provider, Historical  
insulin regular (NOVOLIN R, HUMULIN R) 100 unit/mL injection 5 Units by SubCUTAneous route nightly. Patient takes 15 Units with breakfast, 15 Units with lunch, and 5 Units nightly   Yes Provider, Historical  
hydrocortisone sodium succ/PF (HYDROCORTISONE SOD SUCC, PF, INJECTION) by Injection route every three (3) months. Patient gets from orthopedist   Yes Provider, Historical  
furosemide (LASIX) 40 mg tablet 40 mg by Per G Tube route two (2) times a day. Yes Provider, Historical  
B.infantis-B.ani-B.long-B.bifi (PROBIOTIC 4X) 10-15 mg TbEC 1 Cap by Per G Tube route daily. Yes Provider, Historical  
doxycycline (MONODOX) 100 mg capsule 100 mg by Per G Tube route two (2) times a day. Yes Provider, Historical  
clopidogrel (PLAVIX) 75 mg tab 75 mg by PEG Tube route daily. Yes Provider, Historical  
aspirin delayed-release 81 mg tablet Take 81 mg by mouth daily. Yes Provider, Historical  
guaiFENesin (ROBITUSSIN) 100 mg/5 mL liquid 200 mg by Per G Tube route three (3) times daily. 12/5/18  Yes Provider, Historical  
insulin regular (NOVOLIN R REGULAR U-100 INSULN) 100 unit/mL injection 15 Units by SubCUTAneous route daily (with breakfast). Patient takes 15 Units with breakfast, 15 Units with lunch, and 5 Units nightly   Yes Provider, Historical  
pramipexole (MIRAPEX) 0.25 mg tablet Take 1 Tab by mouth three (3) times daily. 10/24/18  Yes Narendra Leblanc MD  
albuterol (PROVENTIL VENTOLIN) 2.5 mg /3 mL (0.083 %) nebulizer solution 2.5 mg by Nebulization route two (2) times a day. Per wife patient can use 3 times a day if needed   Yes Provider, Historical  
famotidine (PEPCID) 20 mg tablet 20 mg by Per G Tube route three (3) times daily. Yes Provider, Historical  
atorvastatin (LIPITOR) 40 mg tablet 40 mg by Per G Tube route daily.    Yes Hernesto, MD Booker  
 finasteride (PROSCAR) 5 mg tablet 5 mg by Per G Tube route nightly. Yes Other, MD Booker  
alfuzosin SR (UROXATRAL) 10 mg SR tablet 10 mg by Per G Tube route daily. 5/24/18  Yes Provider, Historical  
midodrine (PROAMITINE) 10 mg tablet 10 mg by Per G Tube route three (3) times daily (with meals). 11/1/16  Yes Provider, Historical  
HYDROcodone-acetaminophen (NORCO)  mg tablet 1 Tab by Per G Tube route every four (4) hours as needed for Pain. Yes Other, MD Booker  
zinc 50 mg tab tablet 50 mg by Per G Tube route daily. Yes Provider, Historical  
LANTUS SOLOSTAR U-100 INSULIN 100 unit/mL (3 mL) inpn INJECT 14 UNITS SUBCUTANEOUSLY ONCE DAILY 5/21/18  Yes Bernardo Urban MD  
gabapentin (NEURONTIN) 250 mg/5 mL solution 750 mg by Per G Tube route three (3) times daily. Yes Hernesto, MD Booker  
multivitamin (ONE A DAY) tablet 1 Tab by Per G Tube route daily. Yes Hernesto, MD Booker  
klack's mixture Solution Take 5 mL by mouth two (2) times a day. Diphenhydramine elixir 12.5mg/ml 500ml, Nystatin suspension 120ml,Tetracycline 500mg capsules  12 capsules (6g), Hydrocortisone 20mg tablet 12 tablets (240mg), Water for irrigation QS ad 1000ml Provider, Historical  
acetaminophen (TYLENOL) 500 mg tablet 500-1,000 mg by Per G Tube route every six (6) hours as needed for Pain. Provider, Historical  
fluticasone (FLONASE ALLERGY RELIEF) 50 mcg/actuation nasal spray 1 [place] by Both Nostrils route two (2) times daily as needed (runny nose). Provider, Historical  
nitroglycerin (NITROSTAT) 0.4 mg SL tablet 0.4 mg by SubLINGual route every five (5) minutes as needed for Chest Pain. Other, MD Booker  
 
 
Recent Results (from the past 24 hour(s)) GLUCOSE, POC Collection Time: 01/12/19  5:51 PM  
Result Value Ref Range Glucose (POC) 56 (L) 65 - 100 mg/dL Performed by Haylee Can (PCT) GLUCOSE, POC Collection Time: 01/12/19  6:19 PM  
Result Value Ref Range Glucose (POC) 181 (H) 65 - 100 mg/dL Performed by Shannon Arora (PCT) GLUCOSE, POC Collection Time: 01/13/19 12:01 AM  
Result Value Ref Range Glucose (POC) 297 (H) 65 - 100 mg/dL Performed by Heaven Roblero CBC WITH AUTOMATED DIFF Collection Time: 01/13/19  3:51 AM  
Result Value Ref Range WBC 5.7 4.1 - 11.1 K/uL  
 RBC 3.06 (L) 4.10 - 5.70 M/uL HGB 9.2 (L) 12.1 - 17.0 g/dL HCT 29.2 (L) 36.6 - 50.3 % MCV 95.4 80.0 - 99.0 FL  
 MCH 30.1 26.0 - 34.0 PG  
 MCHC 31.5 30.0 - 36.5 g/dL  
 RDW 16.6 (H) 11.5 - 14.5 % PLATELET 93 (L) 127 - 400 K/uL MPV 11.8 8.9 - 12.9 FL  
 NRBC 0.0 0  WBC ABSOLUTE NRBC 0.00 0.00 - 0.01 K/uL NEUTROPHILS 73 32 - 75 % LYMPHOCYTES 18 12 - 49 % MONOCYTES 9 5 - 13 % EOSINOPHILS 0 0 - 7 % BASOPHILS 0 0 - 1 % IMMATURE GRANULOCYTES 0 0.0 - 0.5 % ABS. NEUTROPHILS 4.1 1.8 - 8.0 K/UL  
 ABS. LYMPHOCYTES 1.0 0.8 - 3.5 K/UL  
 ABS. MONOCYTES 0.5 0.0 - 1.0 K/UL  
 ABS. EOSINOPHILS 0.0 0.0 - 0.4 K/UL  
 ABS. BASOPHILS 0.0 0.0 - 0.1 K/UL  
 ABS. IMM. GRANS. 0.0 0.00 - 0.04 K/UL  
 DF AUTOMATED    
TROPONIN I Collection Time: 01/13/19  3:51 AM  
Result Value Ref Range Troponin-I, Qt. 1.84 (H) <0.05 ng/mL NT-PRO BNP Collection Time: 01/13/19  3:51 AM  
Result Value Ref Range NT pro-BNP 15,807 (H) 0 - 436 PG/ML  
METABOLIC PANEL, BASIC Collection Time: 01/13/19  3:51 AM  
Result Value Ref Range Sodium 136 136 - 145 mmol/L Potassium 4.0 3.5 - 5.1 mmol/L Chloride 96 (L) 97 - 108 mmol/L  
 CO2 36 (H) 21 - 32 mmol/L Anion gap 4 (L) 5 - 15 mmol/L Glucose 134 (H) 65 - 100 mg/dL BUN 56 (H) 6 - 20 MG/DL Creatinine 1.70 (H) 0.70 - 1.30 MG/DL  
 BUN/Creatinine ratio 33 (H) 12 - 20 GFR est AA 47 (L) >60 ml/min/1.73m2 GFR est non-AA 39 (L) >60 ml/min/1.73m2 Calcium 8.6 8.5 - 10.1 MG/DL  
GLUCOSE, POC Collection Time: 01/13/19  6:12 AM  
Result Value Ref Range Glucose (POC) 108 (H) 65 - 100 mg/dL Performed by Buck Rahman TROPONIN I Collection Time: 01/13/19 11:36 AM  
Result Value Ref Range Troponin-I, Qt. 1.19 (H) <0.05 ng/mL GLUCOSE, POC Collection Time: 01/13/19 11:56 AM  
Result Value Ref Range Glucose (POC) 255 (H) 65 - 100 mg/dL Performed by Conor Durán (PCT) SAMPLES BEING HELD Collection Time: 01/13/19  4:26 PM  
Result Value Ref Range SAMPLES BEING HELD 1PST COMMENT Add-on orders for these samples will be processed based on acceptable specimen integrity and analyte stability, which may vary by analyte.

## 2019-01-14 NOTE — PROGRESS NOTES
Bedside shift change report given to Ernestina Ruiz, RN (oncoming nurse) by Laurence Daniels RN (offgoing nurse). Report included the following information SBAR.  
 
1515 Pt requested ginger ale through peg tube. Okay per Dr. Juliana Hagen.

## 2019-01-14 NOTE — PROGRESS NOTES
CM met with pt and wife at bedside to discuss discharge planning [facility]. FOC signed for [facility] and placed on chart. Referral sent to [facility] via [address] [place] [address] for resumption of HH PT/OT/RN. CM will continue to follow. CM [place], [facility] [phone]

## 2019-01-14 NOTE — CARDIO/PULMONARY
C/p Rehab note- chart reviewed. Adm after possible syncopal spell at home. Slumped in his rollator. Wife couldn't arouse. Has been in process of evaluation for TAVR. Hx includes CABG,Diabetes,AICD,Heart failure, Peripheral neuropathy,stroke, Parkinson's, Tongue cancer,Peg tube. Nonsmoker. LVEF 30-35% in December 2018. Previous CHF teaching by Cardiac Rehab in the past. Provided with new 2019 calendar to write down weight. Plan per  [facility] -will undergo a cardiac cath later this week to eval native cors and bypass grafts. Met with pt and wife-new calendar provided and wife and pt thanked me. This was a follow-up visit to answer questions and reinforce prior teaching re: CHF, S&Ss, medication management,  daily weights, when to call the doctor and balancing rest/activity. No new questions Understanding verbalized.

## 2019-01-14 NOTE — PROGRESS NOTES
Problem: Pressure Injury - Risk of 
Goal: *Prevention of pressure injury Document Mark Scale and appropriate interventions in the flowsheet. Outcome: Progressing Towards Goal 
Pressure Injury Interventions: 
Sensory Interventions: Assess changes in LOC Moisture Interventions: Apply protective barrier, creams and emollients, Check for incontinence Q2 hours and as needed Activity Interventions: Increase time out of bed, PT/OT evaluation Mobility Interventions: Float heels, PT/OT evaluation Nutrition Interventions: Document food/fluid/supplement intake Friction and Shear Interventions: Apply protective barrier, creams and emollients

## 2019-01-14 NOTE — CONSULTS
Patient seen and examined. Comfortable on 2L nc, which is what he normally uses at home. Had a Tn bump close to 2, will undergo a cardiac cath later this week to eval native cors and bypass grafts. Stable at this time.

## 2019-01-14 NOTE — PROGRESS NOTES
ADULT PROTOCOL: JET AEROSOL ASSESSMENT Patient  Ada Black     80 y.o.   male     1/14/2019  11:33 AM 
 
Breath Sounds Pre Procedure: Right Breath Sounds: Coarse Left Breath Sounds: Coarse Breath Sounds Post Procedure: Right Breath Sounds: Coarse Left Breath Sounds: Coarse Breathing pattern: Pre procedure Breathing Pattern: Regular Post procedure Breathing Pattern: Regular Heart Rate: Pre procedure Pulse: 81 
         Post procedure Pulse: 88 Resp Rate: Pre procedure Respirations: 20 
         Post procedure Respirations: 18 
 
Cough: Pre procedure Cough: Productive Oxygen: O2 Device: Nasal cannula 2LPM 
 
SpO2: Pre procedure SpO2: 99 % Post procedure SpO2: 97 % Nebulizer Therapy: Current medications Aerosolized Medications: Albuterol Smoking History: NEVER Problem List:  
Patient Active Problem List  
Diagnosis Code  Dysphagia R13.10  Abnormal cardiovascular stress test R94.39  
 S/P CABG x 3 Z95.1  Atherosclerotic cerebrovascular disease I67.2  Orthostatic hypotension I95.1  Feeding difficulties R63.3  Non-cardiac chest pain R07.89  S/P PTCA (percutaneous transluminal coronary angioplasty) Z98.61  
 CAD (coronary artery disease) I25.10  Status post implantation of automatic cardioverter/defibrillator (AICD) Z95.810  
 Aortic stenosis, mild I35.0  Esophageal motility disorder K22.4  Heart failure (HCC) I50.9  [facility] Z93.1  Antiplatelet or antithrombotic long-term use Z79.02  
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  
 Peripheral neuropathy (HCC) G62.9  
 Polyneuropathy associated with underlying disease ([facility] G63  
 History of stroke Z86.73  
 Aspiration pneumonia ([facility] J69.0  Weakness R53.1  Stroke ([facility] I63.9  Thrombotic stroke involving left middle cerebral artery ([facility] V10.909  Stenosis of both internal carotid arteries I65.23  
 Hemiplegia and hemiparesis following cerebral infarction affecting right dominant side ([facility]) A94.045  Type 2 diabetes with nephropathy ([facility] E11.21  
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  
 Tremors of nervous system R25.1  Parkinson's disease (tremor, stiffness, slow motion, unstable posture) ([facility]) G20  
 Myalgia M79.10  Pneumonia due to group B Streptococcus ([facility] J15.3  Counseling regarding goals of care Z71.89  
 Disturbance of memory R41.3  Presbycusis of both ears H91.13  
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  
 Convulsion ([facility] R56.9  Epigastric pain R10.13  
 Dizziness R42  Hx of tongue cancer Z85.810  
 SDH (subdural hematoma) ([facility] S19.3Q0Q  Syncope R55  Hypoxia R09.02 Respiratory Therapist: Tevin Bautista

## 2019-01-14 NOTE — TELEPHONE ENCOUNTER
Patient's wife, [facility], called to let you know that her  is in [facility] currently. He was admitted for a heart attack. He is in room 2218. MONAEI. ......... Thomas Yo

## 2019-01-14 NOTE — CONSULTS
Palliative Medicine Consult [place]: [phone] ([phone]) Patient Name: Stevan Kiser YOB: 1935 Date of Initial Consult: 1/14/19 Reason for Consult: end stage disease Requesting Provider: Dr. Aby Saenz Primary Care Physician: Bimal Painter DO 
 
 SUMMARY:  
Stevan Kiser is a 80 y.o. 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 Current medical issues leading to Palliative Medicine involvement include: recurrent syncope, multifactorial, undergoing ongoing evaluation by cardiology for possible TAVR (ideally waiting 90 days since SDH/SAH), now with NSTEMI, to get cardiac cath this week. Progressive debility/weakness, had 4 pound weight gain at home after getting IVF for CTA/with contrast 
 
Lives with his wife of 62 years, 2 adult sons live nearby. Viki Marie, and Paragmisophia Solis) Dog \"Smokey\" at home, retired [employer] Baseline ambulates short distances with walker/rollator. PALLIATIVE DIAGNOSES:  
1. Dyspnea with exertion 2. NSTEMI, ischemic cardiomyopathy, possible cath later this week 3. Severe AS, pending evaluation for TAVR 4. CKD 5. PEG tube dependent, chronic dysphagia (hx Tongue cancer) 6. Chronic thrombocytopenia 7. Debility, generalized deconditioning PLAN:  
1. Palliative Medicine services introduced to patient and his wife. Patient known to our team from previous admission in November 2018. 2. Patient's care goals have not changed since our last discussion in November.  
1. He is hopeful to try an procedure that cardiology thinks might help him, live longer/ better. 2. He told me last time \"I'm not looking for comfort, I'm looking for life. \" and agrees now that is still his outlook. 3. He's had further bumps in the road since we last met in November, but he's still got the same goals. 4. Patient has AMD at home (appointing his wife, then his sons as MPOA ) 3. Our team will follow peripherally. Explained that we could have more conversations about \"what next? \" if there are no more procedures being offered to him/ surgeons decide that TAVR is not going to benefit him. At this point, he remains hopeful that it can help him. 4. Initial consult note routed to primary continuity provider 5. Communicated plan of care with: Palliative IDT, bedside nurse GOALS OF CARE / TREATMENT PREFERENCES:  
 
GOALS OF CARE: 
Patient/Health Care Proxy Stated Goals: Prolong life TREATMENT PREFERENCES:  
Code Status: Full Code Advance Care Planning: 
[] [facility] Interdisciplinary Team has updated the ACP Navigator with [address] and Patient Capacity Primary Decision [facility] Agent): Lizett Barrera Relationship to patient:spouse  (NOK and appointed in document at home) Phone number:[phone] [] Named in a scanned document  
[x] Legal Next of Kin 
[] Guardian Secondary Decision Maker (First Alternate Health Care Agent):  
Relationship to patient: 
Phone number: 
[] Named in a scanned document  
[] Legal Next of Kin 
[] Guardian Medical Interventions: Full interventions Other Instructions:  
Artificially Administered Nutrition: Feeding tube long-term, if indicated Other: As far as possible, the palliative care team has discussed with patient / health care proxy about goals of care / treatment preferences for patient. HISTORY:  
 
History obtained from: chart, spouse CHIEF COMPLAINT: 
 
HPI/SUBJECTIVE: The patient is:  
[x] Verbal and participatory [] Non-participatory due to: 80year old male, admitted after syncope at home. Patient was getting to bathroom around 9am, called wife not feeling well, slumped over rollator, was out for a few minutes. Wife notes pulse ox was low on portable monitor, didn't note cyanosis or rapid breathing She had also called cardiology the day before, as she had noted ankle swelling and weight gain. Clinical Pain Assessment (nonverbal scale for severity on nonverbal patients):  
Clinical Pain Assessment Severity: 0 Duration: for how long has pt been experiencing pain (e.g., 2 days, 1 month, years) Frequency: how often pain is an issue (e.g., several times per day, once every few days, constant) FUNCTIONAL ASSESSMENT:  
 
Palliative Performance Scale (PPS): PPS: 40 PSYCHOSOCIAL/SPIRITUAL SCREENING:  
 
Palliative IDT has assessed this patient for cultural preferences / practices and a referral made as appropriate to needs (Cultural Services, Patient Advocacy, Ethics, etc.) Any spiritual / Mormon concerns: 
[] Yes /  [x] No 
 
Caregiver Burnout: 
[] Yes /  [x] No /  [] No Caregiver Present Anticipatory grief assessment:  
[x] Normal  / [] Maladaptive ESAS Anxiety: Anxiety: 0 
 
ESAS Depression: Depression: 0 REVIEW OF SYSTEMS:  
 
Positive and pertinent negative findings in ROS are noted above in HPI. The following systems were [x] reviewed / [] unable to be reviewed as noted in HPI Other findings are noted below. Systems: constitutional, ears/nose/mouth/throat, respiratory, gastrointestinal, genitourinary, musculoskeletal, integumentary, neurologic, psychiatric, endocrine. Positive findings noted below. Modified ESAS Completed by: provider Fatigue: 5 Drowsiness: 0 Depression: 0 Pain: 0 Anxiety: 0 Nausea: 0 Anorexia: 0 Dyspnea: 0 Constipation: No  
  Stool Occurrence(s): 1 PHYSICAL EXAM:  
 
From RN flowsheet: 
Wt Readings from Last 3 Encounters:  
01/14/19 76.8 kg (169 lb 5 oz) 01/09/19 75.3 kg (166 lb) 12/31/18 71.5 kg (157 lb 9.6 oz) Blood pressure 120/51, pulse 80, temperature 97.8 °F (36.6 °C), resp. rate 18, height 5' 7\" (1.702 m), weight 76.8 kg (169 lb 5 oz), SpO2 100 %. Pain Scale 1: Visual 
Pain Intensity 1: 0 Pain Onset 1: chronic Pain Location 1: Generalized Pain Orientation 1: Other (comment)(all over) Pain Description 1: Aching Pain Intervention(s) 1: Medication (see MAR) Last bowel movement, if known:  
 
Constitutional: pt is awake, but appears tired Eyes: pupils equal, anicteric ENMT: no nasal discharge, moist mucous membranes Cardiovascular: harsh TARA, reg s2, pacemaker in place upper chest 
Respiratory: breathing not labored, symmetric 
+PEG tube No edema bilateral OB92 HISTORY:  
 
Active Problems: Dysphagia (5/3/2010) S/P CABG x 3 (6/24/2010) Atherosclerotic cerebrovascular disease (6/24/2010) 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 ([facility] [address] (1/8/2016) Tremors of nervous system (6/28/2018) Syncope (1/11/2019) Hypoxia (1/11/2019) Past Medical History:  
Diagnosis Date  Antiplatelet or antithrombotic long-term use 12/4/2014  Anxiety disorder  Arrhythmia 2009  
 bradycardia  Arthritis  CAD (coronary artery disease) s/p CABG 2002; Dr Charlie Shin  Cancer ([facility] [address] 1996  
 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   
 left leg, lower back,   
 Cough  Depression  Diabetes ([facility] [address] Type II  Epigastric pain 8/17/2018  Esophageal dysmotility s/p dilitation  Esophageal motility disorder 7/8/2013 Frequent simultaneous or failed contractions, low amplitude contractions  suggests severe myopathy or diffuse spasm. I suspect the latter. Achalasia  is not present.  Fainting [address]  Fatigue  GERD (gastroesophageal reflux disease)  Heart failure ([facility] [address] 10/2014 Cardiomyopathy:Pacemaker upgrade:Biv and AICD  Dr. Han Chacon heart  last visit 5/11/2015  Hepatitis C Dx 1996  
 treated at [facility] in past; as of 4/15/15 wife states pt currently not under any treatment  Hyperlipidemia  Joint pain  Liver disease  Memory loss  Muscle pain  Muscle weakness  Myocardial infarct [facility]) 2013 Heart Cath: 40% LV EF, Stented distal LAD, patent Graft to circumflex  On tube feeding diet approx 2009  
 still has as of 9/28/15  (no po food/liquid/meds at all); Dr Cheryl Nation  Other ill-defined conditions(799.89) 1996  
 1 dose of chemotherapy/radiation for tongue cancer  Other ill-defined conditions(799.89) BPH  Other ill-defined conditions(799.89) orthostatic hypotension  Pneumonia ~ April -May 2010  
 SOB (shortness of breath)  Stroke [facility]) approx 2003  
 left side-left finger tips numb; no imbalance or memory loss; as of 2015 not seeing neuro MD  
 Suicidal thoughts  Swallowing difficulty Past Surgical History:  
Procedure Laterality Date  ABDOMEN SURGERY PROC UNLISTED  1996  
 peg tube  CABG, ARTERY-VEIN, THREE  2000  HX CATARACT REMOVAL    
 bilateral  
 HX CHOLECYSTECTOMY  HX COLONOSCOPY    
 HX HEART CATHETERIZATION  2013 Stented distal LAD  HX MOHS PROCEDURES    
 bilateral  
 HX ORTHOPAEDIC    
 back surgery times two  HX OTHER SURGICAL Radical Left Neck  HX OTHER SURGICAL    
 NASAL POLYPS REMOVAL  
 HX OTHER SURGICAL  2010 TURP  
 HX PACEMAKER  11/08  HX PACEMAKER  10/28/14 Defibrillator: [facility] # [id number], serial # [id number]; Dr. Roberto Carlos Hammond [phone]; Dr Josh Carrizales  HX UROLOGICAL    
 cystoscopy [address] [place] [address] [facility]  
 cevical surgery  [place] CHANGE GASTROSTOMY TUBE  5/2/2011  [place] CHANGE GASTROSTOMY TUBE  6/29/2011  [place] EGD INSERT GUIDE WIRE DILATOR PASSAGE ESOPHAGUS  2010  [place] EGD TRANSORAL BIOPSY SINGLE/MULTIPLE  2010  
    
 STOMACH SURGERY PROCEDURE UNLISTED  2011  UPPER GI ENDOSCOPY,BIOPSY  2018  UPPER GI ENDOSCOPY,DILATN W GUIDE  2018  UPPER GI ENDOSCOPY,W/DILAT,GASTRIC OUT  2018  VASCULAR SURGERY PROCEDURE UNLIST    
 bilateral carotid stents Family History Problem Relation Age of Onset  Heart Disease Father   
      at age 52 from CAD  Colon Cancer Mother  Cancer Mother   
     colon ca  
 Heart Disease Brother History reviewed, no pertinent family history. Social History Tobacco Use  Smoking status: Never Smoker  Smokeless tobacco: Never Used Substance Use Topics  Alcohol use: No  
 
Allergies Allergen Reactions  Cleocin [Clindamycin Hcl] Rash  Demerol [Meperidine] Shortness of Breath  Paxil [Paroxetine Hcl] Unknown (comments) Pt gets shaky and loses control of legs  Amoxicillin Rash  Pcn [Penicillins] Rash Current Facility-Administered Medications Medication Dose Route Frequency  nitroglycerin (NITROBID) 2 % ointment 1 Inch  1 Inch Topical Q6H PRN  
 ondansetron (ZOFRAN) injection 4 mg  4 mg IntraVENous Q6H PRN  
 albuterol (PROVENTIL VENTOLIN) nebulizer solution 2.5 mg  2.5 mg Nebulization BID RT  
 aspirin delayed-release tablet 81 mg  81 mg Oral DAILY  clopidogrel (PLAVIX) tablet 75 mg  75 mg Oral DAILY  heparin (porcine) injection 5,000 Units  5,000 Units SubCUTAneous Q8H  
 insulin regular (NOVOLIN R, HUMULIN R) injection 12 Units  12 Units SubCUTAneous DAILY WITH BREAKFAST  insulin regular (NOVOLIN R, HUMULIN R) injection 12 Units  12 Units SubCUTAneous DAILY WITH LUNCH  
 sodium chloride (NS) flush 5-10 mL  5-10 mL IntraVENous PRN  
 sodium chloride (NS) flush 5-40 mL  5-40 mL IntraVENous Q8H  
  sodium chloride (NS) flush 5-40 mL  5-40 mL IntraVENous PRN  
 acetaminophen (TYLENOL) tablet 500 mg  500 mg Per G Tube Q6H PRN  
 tamsulosin (FLOMAX) capsule 0.4 mg  0.4 mg Oral DAILY  atorvastatin (LIPITOR) tablet 40 mg  40 mg Per G Tube DAILY  lactobac ac& pc-s.therm-b.anim (JAVY Q/RISAQUAD)   Oral DAILY  famotidine (PEPCID) tablet 20 mg  20 mg Per G Tube BID  finasteride (PROSCAR) tablet 5 mg  5 mg Oral QHS  gabapentin (NEURONTIN) 250 mg/5 mL solution 750 mg  750 mg Oral TID  guaiFENesin (ROBITUSSIN) 100 mg/5 mL oral liquid 200 mg  200 mg Per G Tube TID  midodrine (PROAMITINE) tablet 10 mg  10 mg Per G Tube TID WITH MEALS  therapeutic multivitamin (THERAGRAN) tablet 1 Tab  1 Tab Oral DAILY  pramipexole (MIRAPEX) tablet 0.25 mg  0.25 mg Oral TID  glucose chewable tablet 16 g  4 Tab Oral PRN  
 glucagon (GLUCAGEN) injection 1 mg  1 mg IntraMUSCular PRN  
 furosemide (LASIX) injection 40 mg  40 mg IntraVENous BID  doxycycline (VIBRA-TABS) tablet 100 mg  100 mg Oral Q12H  
 zinc sulfate (ZINCATE) capsule 1 Cap  1 Cap Oral DAILY  insulin lispro (HUMALOG) injection   SubCUTAneous Q6H  
 dextrose (D50W) injection syrg 12.5-25 g  12.5-25 g IntraVENous PRN  
 
 
 
 LAB AND IMAGING FINDINGS:  
 
Lab Results Component Value Date/Time WBC 4.3 01/14/2019 03:32 AM  
 HGB 9.7 (L) 01/14/2019 03:32 AM  
 PLATELET 84 (L) 26/90/8672 03:32 AM  
 
Lab Results Component Value Date/Time Sodium 140 01/14/2019 03:32 AM  
 Potassium 3.8 01/14/2019 03:32 AM  
 Chloride 98 01/14/2019 03:32 AM  
 CO2 39 (H) 01/14/2019 03:32 AM  
 BUN 47 (H) 01/14/2019 03:32 AM  
 Creatinine 1.42 (H) 01/14/2019 03:32 AM  
 Calcium 9.1 01/14/2019 03:32 AM  
 Magnesium 2.3 11/17/2018 12:25 AM  
 Phosphorus 3.0 11/13/2018 03:24 AM  
  
Lab Results Component Value Date/Time AST (SGOT) 60 (H) 01/11/2019 10:59 AM  
 Alk.  phosphatase 93 01/11/2019 10:59 AM  
 Protein, total 7.7 01/11/2019 10:59 AM  
 Albumin 3.2 (L) 01/11/2019 10:59 AM  
 Globulin 4.5 (H) 01/11/2019 10:59 AM  
 
Lab Results Component Value Date/Time INR 1.1 12/04/2018 08:53 AM  
 Prothrombin time 10.9 12/04/2018 08:53 AM  
 aPTT 23.0 12/04/2018 08:53 AM  
  
No results found for: IRON, FE, TIBC, IBCT, PSAT, FERR Lab Results Component Value Date/Time pH 7.43 11/11/2018 01:00 PM  
 PCO2 53 (H) 11/11/2018 01:00 PM  
 PO2 84 11/11/2018 01:00 PM  
 
No components found for: [place] Lab Results Component Value Date/Time  12/04/2018 10:28 PM  
 CK - MB 2.4 08/11/2018 09:25 AM  
  
 
 
   
 
Total time: 35 
Counseling / coordination time, spent as noted above: 20 
> 50% counseling / coordination?: y 
 
Prolonged service was provided for  []30 min   []75 min in face to face time in the presence of the patient, spent as noted above. Time Start:  
Time End:  
Note: this can only be billed with 27030 (initial) or 56711 (follow up). If multiple start / stop times, list each separately.

## 2019-01-14 NOTE — WOUND CARE
Wound Care re-consult for his heels and PEG tube site: Chart reviewed and patient assessed. He is sitting in the chair but easily stands. He sat on the toilet for a BM - small results. Patient wanted to get back into the bed because he had been up all morning in the chair. Assessment: 
 The heels are completely clear of any lesions. No purple spots / DTI's and no open wound on the heels. The right elbow has a small abrasion - this was cleansed with NS and then a vaseline gauze applied and foam dressing. This should be changed daily. The PEG tube site looks very good. No excoriations. No open wounds. A new dressing was placed on the skin for patient's comfort. He is fed 5 times per day through his tube. He does not swallow x 10 years. Wound care order written for the right elbow.   
Renata Manzano RN, BSN, [employer]

## 2019-01-14 NOTE — PROGRESS NOTES
Occupational Therapy Goals Initiated 1/14/2019 1. Patient will perform grooming standing at sink with independence within 7 day(s). 2.  Patient will perform upper body dressing with supervision/set-up within 7 day(s). 3.  Patient will perform lower body dressing with supervision/set-up within 7 day(s). 4.  Patient will perform toilet transfers with modified independence within 7 day(s). 5.  Patient will perform all aspects of toileting with independence within 7 day(s). Occupational Therapy EVALUATION Patient: Oma Mendez (80 y.o. male) Date: 1/14/2019 Primary Diagnosis: Hypoxia Syncope Precautions: Falls ASSESSMENT : 
Based on the objective data described below, the patient presents with GW, decreased activity tolerance and decreased balance which is impairing his functional independence. He is functioning below his independent to mod I baseline, now performing ADLs at a CGA to min A level and is CGA for functional mobility. Patient will benefit from skilled acute intervention to address the above impairments and may need HHOT and PT at discharge. Patients rehabilitation potential is considered to be Good Factors which may influence rehabilitation potential include:  
[]             None noted []             Mental ability/status [x]             Medical condition []             Home/family situation and support systems []             Safety awareness []             Pain tolerance/management 
[]             Other: PLAN : 
Recommendations and Planned Interventions: 
[x]               Self Care Training                  []        Therapeutic Activities [x]               Functional Mobility Training    []        Cognitive Retraining 
[x]               Therapeutic Exercises           [x]        Endurance Activities [x]               Balance Training                   []        Neuromuscular Re-Education []               Visual/Perceptual Training     [x]   Home Safety Training 
[x]               Patient Education                 [x]        Family Training/Education []               Other (comment): Frequency/Duration: Patient will be followed by occupational therapy 3 times a week to address goals. Discharge Recommendations: Home Health OT and PT VS no follow up, pending progress. Further Equipment Recommendations for Discharge: TBD OBJECTIVE DATA SUMMARY:  
 
Past Medical History:  
Diagnosis Date  Antiplatelet or antithrombotic long-term use 12/4/2014  Anxiety disorder  Arrhythmia 2009  
 bradycardia  Arthritis  CAD (coronary artery disease) s/p CABG 2002; Dr La Rojas  Cancer ([facility] 1996  
 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   
 left leg, lower back,   
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 I suspect the latter. Achalasia  is not present.  Fainting [address]  Fatigue  GERD (gastroesophageal reflux disease)  Heart failure ([facility] 10/2014 Cardiomyopathy:Pacemaker upgrade:Biv and AICD  Dr. Abigail Meraz heart  last visit 5/11/2015  Hepatitis C Dx 1996  
 treated at [facility] in past; as of 4/15/15 wife states pt currently not under any treatment  Hyperlipidemia  Joint pain  Liver disease  Memory loss  Muscle pain  Muscle weakness  Myocardial infarct [facility]) 2013 Heart Cath: 40% LV EF, Stented distal LAD, patent Graft to circumflex  On tube feeding diet approx 2009  
 still has as of 9/28/15  (no po food/liquid/meds at all); Dr Shell Burks  Other ill-defined conditions(799.89) 1996  
 1 dose of chemotherapy/radiation for tongue cancer  Other ill-defined conditions(799.89) BPH  Other ill-defined conditions(799.89) orthostatic hypotension  Pneumonia ~ April -May 2010  
 SOB (shortness of breath)  Stroke [facility]) approx 2003  
 left side-left finger tips numb; no imbalance or memory loss; as of 2015 not seeing neuro MD  
 Suicidal thoughts  Swallowing difficulty Past Surgical History:  
Procedure Laterality Date  ABDOMEN SURGERY PROC UNLISTED  1996  
 peg tube  CABG, ARTERY-VEIN, THREE  2000  HX CATARACT REMOVAL    
 bilateral  
 HX CHOLECYSTECTOMY  HX COLONOSCOPY    
 HX HEART CATHETERIZATION  2013 Stented distal LAD  HX MOHS PROCEDURES    
 bilateral  
 HX ORTHOPAEDIC    
 back surgery times two  HX OTHER SURGICAL Radical Left Neck  HX OTHER SURGICAL    
 NASAL POLYPS REMOVAL  
 HX OTHER SURGICAL  2010 TURP  
 HX PACEMAKER  11/08  HX PACEMAKER  10/28/14 Defibrillator: [facility] # [id number], serial # [id number]; Dr. Prabhakar [facility] [phone]; Dr Leo Espinoza  HX UROLOGICAL    
 cystoscopy [address]  
 cevical surgery  AR CHANGE GASTROSTOMY TUBE  5/2/2011  AR CHANGE GASTROSTOMY TUBE  6/29/2011  AR EGD INSERT GUIDE WIRE DILATOR PASSAGE ESOPHAGUS  9/13/2010  AR EGD TRANSORAL BIOPSY SINGLE/MULTIPLE  9/13/2010  
    
 STOMACH SURGERY PROCEDURE UNLISTED  6/29/2011  UPPER GI ENDOSCOPY,BIOPSY  8/17/2018  UPPER GI ENDOSCOPY,DILATN W GUIDE  8/17/2018  UPPER GI ENDOSCOPY,W/DILAT,GASTRIC OUT  8/17/2018  VASCULAR SURGERY PROCEDURE UNLIST    
 bilateral carotid stents Prior Level of Function/Home Situation: independent to mod I for ADLs, is mod I for ambulation with a rollator and has PEG for feeding. Patient uses shower seat for showers. Expanded or extensive additional review of patient history:  
 
Home Situation Home Environment: Private residence # Steps to Enter: 0 One/Two Story Residence: One story Living Alone: No 
Support Systems: Child(lisa), [employer] / jord[employer] community, Friends \ neighbors Patient Expects to be Discharged to[de-identified] Private residence Current DME Used/Available at Home:  Raised toilet seat, Shower chair, Walker, rollator Tub or Shower Type: Shower [x]  Right hand dominant   []  Left hand dominantCognitive/Behavioral Status: 
Neurologic State: Alert Orientation Level: Oriented X4 Cognition: Appropriate for age attention/concentration; Follows commands Safety/Judgement: Awareness of environment; Insight into deficits Vision/Perceptual:   
Acuity: Within Defined Limits Corrective Lenses: Glasses(PRN for distance) Range of Motion: 
AROM: Generally decreased, functional 
  
Strength: 
Strength: Generally decreased, functional 
Coordination: 
Coordination: Within functional limits Fine Motor Skills-Upper: Left Intact; Right Intact Tone & Sensation: 
Tone: Normal 
Balance: 
Sitting: Intact Standing: Impaired Standing - Static: Good Standing - Dynamic : Fair Functional Mobility and Transfers for ADLs:Bed Mobility: 
  
  
  
Scooting: Independent Transfers: 
Sit to Stand: Contact guard assistance Bed to Chair: Contact guard assistance(ambulating with a RW) Toilet Transfer : Contact guard assistance Bathroom Mobility: Contact guard assistance(ambulating with a RW) ADL Assessment: 
Feeding: (has PEG) Oral Facial Hygiene/Grooming: Contact guard assistance Bathing: Minimum assistance Upper Body Dressing: Minimum assistance Lower Body Dressing: Minimum assistance Toileting: Minimum assistance Functional Measure: 
Barthel Index: 
 
Bathin Bladder: 0 Bowels: 10 
Groomin Dressin Feedin(Has PEG) Mobility: 0 Stairs: 0 Toilet Use: 5 Transfer (Bed to Chair and Back): 10 Total: 30 Barthel and G-code impairment scale: 
Percentage of impairment CH 
0% CI 
1-19% CJ 
20-39% CK 
40-59% CL 
60-79% CM 
80-99% CN 
100% Barthel Score 0-100 100 99-80 79-60 59-40 20-39 1-19 
 0 Barthel Score 0-20 20 17-19 13-16 9-12 5-8 1-4 0 The Barthel ADL Index: Guidelines 1. The index should be used as a record of what a patient does, not as a record of what a patient could do. 2. The main aim is to establish degree of independence from any help, physical or verbal, however minor and for whatever reason. 3. The need for supervision renders the patient not independent. 4. A patient's performance should be established using the best available evidence. Asking the patient, friends/relatives and nurses are the usual sources, but direct observation and common sense are also important. However direct testing is not needed. 5. Usually the patient's performance over the preceding 24-48 hours is important, but occasionally longer periods will be relevant. 6. Middle categories imply that the patient supplies over 50 per cent of the effort. 7. Use of aids to be independent is allowed. Philly Martini., Barthel, D.W. ([id number]). Functional evaluation: the Barthel Index. [address] [facility] (14)2. NOEL Quintana, Polly Lamb., Joana Osler., [place], [address] [place] [address] (1999). Measuring the change indisability after inpatient rehabilitation; comparison of the responsiveness of the Barthel Index and Functional [place] Measure. Journal of Neurology, Neurosurgery, and Psychiatry, 66(4), 597-066. Amalia Toledo, [email or web address], QIANA Gifford, & Nancy Smith MAlvaroA. (2004.) Assessment of post-stroke quality of life in cost-effectiveness studies: The usefulness of the Barthel Index and the EuroQoL-5D. [facility], 13, 378-01 ADL Intervention and task modifications: 
Initiated bathroom mobility, transfer, toileting, grooming and safety training. Pain: Pain Scale 1: Numeric (0 - 10) Pain Intensity 1: 3 Pain Location 1: Generalized Pain Description 1: Aching Pain Intervention(s) 1: Medication (see MAR) Activity Tolerance: 
Post activity: SpO2 97%, HR 88 and /46. Please refer to the flowsheet for vital signs taken during this treatment. After treatment:  
[x] Patient left in no apparent distress sitting up in chair 
[] Patient left in no apparent distress in bed 
[x] Call bell left within reach [x] Nursing notified 
[] Caregiver present [x] Bed alarm activated COMMUNICATION/EDUCATION:  
The patients plan of care was discussed with: Registered Nurse. [x] Home safety education was provided and the patient/caregiver indicated understanding. [x] Patient/family have participated as able in goal setting and plan of care. [x] Patient/family agree to work toward stated goals and plan of care. [] Patient understands intent and goals of therapy, but is neutral about his/her participation. [] Patient is unable to participate in goal setting and plan of care. This patients plan of care is appropriate for delegation to [facility]. Thank you for this referral. 
Willian Levine, OTR/L Time Calculation: 26 mins

## 2019-01-14 NOTE — PROGRESS NOTES
Problem: Mobility Impaired (Adult and Pediatric) Goal: *Acute Goals and Plan of Care (Insert Text) Physical Therapy Goals Initiated 1/14/2019 1. Patient will move from supine to sit and sit to supine , scoot up and down and roll side to side in bed with independence within 7 day(s). 2.  Patient will transfer from bed to chair and chair to bed with modified independence using the least restrictive device within 7 day(s). 3.  Patient will perform sit to stand with modified independence within 7 day(s). 4.  Patient will ambulate with modified independence for 150 feet with the least restrictive device within 7 day(s). 5.  Patient will ascend/descend 6 stairs with right sided handrail(s) with supervision/set-up within 7 day(s). physical Therapy EVALUATION Patient: Neeru Farfan (80 y.o. male) Date: 1/14/2019 Primary Diagnosis: Hypoxia Syncope Precautions:   Bed Alarm ASSESSMENT : 
Based on the objective data described below, the patient presents with decreased strength, decreased endurance, impaired balance, decreased functional mobility, and unsteady gait following admission for syncope. PTA patient lives with his wife. He reports he also has family close by to assist as needed. He ambulates mod I with RW and is independent with ADLs. He wears 2L O2 at baseline. Currently, patient received resting in bed, agreeable and cleared for therapy. Patient required CGA for bed mobility. Patient required CGA for sit <> stand with RW. Patient denies dizziness and ambulated 10 feet with CGA and RW. Patient reports feeling off and needing to sit down, walking back to the chair. O2 sats at 94% on 2L O2 with activity and mild SOB noted. Patient requesting to return to bed as his RLE began cramping so required CGA to return to supine. Patient left resting in bed with VSS and bed alarm on at the conclusion of PT evaluation.  Patient with decreased endurance this date feeling more fatigued this morning. He may benefit from PT/OT co-treatment due to reserve and endurance until it improves. [facility] with 24/7 assist at discharge. Patient will benefit from skilled intervention to address the above impairments. Patients rehabilitation potential is considered to be Good Factors which may influence rehabilitation potential include:  
[]         None noted 
[]         Mental ability/status [x]         Medical condition 
[]         Home/family situation and support systems 
[]         Safety awareness 
[]         Pain tolerance/management 
[]         Other: PLAN : 
Recommendations and Planned Interventions: 
[x]           Bed Mobility Training             []    Neuromuscular Re-Education 
[x]           Transfer Training                   []    Orthotic/Prosthetic Training 
[x]           Gait Training                         []    Modalities [x]           Therapeutic Exercises           []    Edema Management/Control 
[x]           Therapeutic Activities            [x]    Patient and Family Training/Education 
[]           Other (comment): Frequency/Duration: Patient will be followed by physical therapy  4 times a week to address goals. Discharge Recommendations: Home Health with 24/7 assist from wife/family Further Equipment Recommendations for Discharge: owns DME SUBJECTIVE:  
Patient stated I think I need to get back to bed. This cramp is too much3. OBJECTIVE DATA SUMMARY:  
HISTORY:   
Past Medical History:  
Diagnosis Date  Antiplatelet or antithrombotic long-term use 12/4/2014  Anxiety disorder  Arrhythmia 2009  
 bradycardia  Arthritis  CAD (coronary artery disease) s/p CABG 2002; Dr Serena Osler  [facility]  
 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   
 left leg, lower back,   
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 I suspect the latter. Achalasia  is not present.  Fainting [address]  Fatigue  GERD (gastroesophageal reflux disease)  Heart failure ([facility] 10/2014 Cardiomyopathy:Pacemaker upgrade:Biv and AICD  Dr. Jose J Licea heart  last visit 5/11/2015  Hepatitis C Dx 1996  
 treated at [facility] in past; as of 4/15/15 wife states pt currently not under any treatment  Hyperlipidemia  Joint pain  Liver disease  Memory loss  Muscle pain  Muscle weakness  Myocardial infarct [facility]) 2013 Heart Cath: 40% LV EF, Stented distal LAD, patent Graft to circumflex  On tube feeding diet approx 2009  
 still has as of 9/28/15  (no po food/liquid/meds at all); Dr Guicho Garnett  Other ill-defined conditions(799.89) 1996  
 1 dose of chemotherapy/radiation for tongue cancer  Other ill-defined conditions(799.89) BPH  Other ill-defined conditions(799.89) orthostatic hypotension  Pneumonia ~ April -May 2010  
 SOB (shortness of breath)  Stroke [facility]) approx 2003  
 left side-left finger tips numb; no imbalance or memory loss; as of 2015 not seeing neuro MD  
 Suicidal thoughts  Swallowing difficulty Past Surgical History:  
Procedure Laterality Date  ABDOMEN SURGERY PROC UNLISTED  1996  
 peg tube  CABG, ARTERY-VEIN, THREE  2000  HX CATARACT REMOVAL    
 bilateral  
 HX CHOLECYSTECTOMY  HX COLONOSCOPY    
 HX HEART CATHETERIZATION  2013 Stented distal LAD  HX MOHS PROCEDURES    
 bilateral  
 HX ORTHOPAEDIC    
 back surgery times two  HX OTHER SURGICAL Radical Left Neck  HX OTHER SURGICAL    
 NASAL POLYPS REMOVAL  
 HX OTHER SURGICAL  2010  TURP  
  HX PACEMAKER  11/08  HX PACEMAKER  10/28/14 Defibrillator: [facility] # [id number], serial # [id number]; Dr. Tory Velasquez [phone]; Dr Cami Virgen  HX UROLOGICAL    
 cystoscopy [address] [facility]  
 cevical surgery  CO CHANGE GASTROSTOMY TUBE  5/2/2011  CO CHANGE GASTROSTOMY TUBE  6/29/2011  CO EGD INSERT GUIDE WIRE DILATOR PASSAGE ESOPHAGUS  9/13/2010  CO EGD TRANSORAL BIOPSY SINGLE/MULTIPLE  9/13/2010  
    
 STOMACH SURGERY PROCEDURE UNLISTED  6/29/2011  UPPER GI ENDOSCOPY,BIOPSY  8/17/2018  UPPER GI ENDOSCOPY,DILATN W GUIDE  8/17/2018  UPPER GI ENDOSCOPY,W/DILAT,GASTRIC OUT  8/17/2018  VASCULAR SURGERY PROCEDURE UNLIST    
 bilateral carotid stents Prior Level of Function/Home Situation: patient lives with his wife. He reports he also has family close by to assist as needed. He ambulates mod I with RW and is independent with ADLs. He wears 2L O2 at baseline. Personal factors and/or comorbidities impacting plan of care: syncope, STEMI, dependent on 2L O2. Home Situation Home Environment: Private residence # Steps to Enter: 6 Rails to Enter: Yes Hand Rails : Right Wheelchair Ramp: No 
One/Two Story Residence: One story Living Alone: No 
Support Systems: Spouse/Significant Other/Partner, Family member(s) Patient Expects to be Discharged to[de-identified] Private residence Current DME Used/Available at Home: Raised toilet seat, Shower chair, Walker, rollator Tub or Shower Type: Shower EXAMINATION/PRESENTATION/DECISION MAKING: Critical Behavior: 
Neurologic State: Alert Orientation Level: Oriented X4 Cognition: Appropriate for age attention/concentration, Follows commands Safety/Judgement: Awareness of environment, Insight into deficits Hearing: Auditory Auditory Impairment: NoneSkin:   
Edema:  
Range Of Motion: 
AROM: Generally decreased, functional 
  
  
  
  
  
  
  
Strength:   
 Strength: Generally decreased, functional 
  
  
  
  
  
  
Tone & Sensation:  
Tone: Normal 
  
  
  
  
Sensation: Impaired(Numbness in finger tips at baseline on L ) Coordination: 
Coordination: Within functional limits Vision:  
Acuity: Within Defined Limits Corrective Lenses: Glasses(PRN for distance) Functional Mobility: 
Bed Mobility: 
Rolling: Contact guard assistance Supine to Sit: Contact guard assistance Sit to Supine: Contact guard assistance Scooting: Independent Transfers: 
Sit to Stand: Contact guard assistance Stand to Sit: Contact guard assistance Bed to Chair: Contact guard assistance Balance:  
Sitting: Intact; Without support Standing: Impaired; With support Standing - Static: Good Standing - Dynamic : FairAmbulation/Gait Training:Distance (ft): 10 Feet (ft) Assistive Device: Gait belt;Walker, rolling Ambulation - Level of Assistance: Contact guard assistance Gait Description (WDL): Exceptions to [facility] Gait Abnormalities: Decreased step clearance;Shuffling gait; Step to gait;Trunk sway increased Base of Support: Widened Speed/Gertrudis: Slow;Pace decreased (<100 feet/min) Step Length: Right shortened;Left shortened Functional Measure: 
Barthel Index: 
 
Bathin Bladder: 0 Bowels: 10 
Groomin Dressin Feedin(Has PEG) Mobility: 0 Stairs: 0 Toilet Use: 5 Transfer (Bed to Chair and Back): 10 Total: 30 The Barthel ADL Index: Guidelines 1. The index should be used as a record of what a patient does, not as a record of what a patient could do. 2. The main aim is to establish degree of independence from any help, physical or verbal, however minor and for whatever reason. 3. The need for supervision renders the patient not independent. 4. A patient's performance should be established using the best available evidence.  Asking the patient, friends/relatives and nurses are the usual sources, but direct observation and common sense are also important. However direct testing is not needed. 5. Usually the patient's performance over the preceding 24-48 hours is important, but occasionally longer periods will be relevant. 6. Middle categories imply that the patient supplies over 50 per cent of the effort. 7. Use of aids to be independent is allowed. Jessica Schwarz., Barthel, D.W. ([id number]). Functional evaluation: the Barthel Index. [address] (14)2. NOEL Aden, Jennifer Hernandez., Titus Madera., Ck, [facility] (1999). Measuring the change indisability after inpatient rehabilitation; comparison of the responsiveness of the Barthel Index and Functional [place] Measure. Journal of Neurology, Neurosurgery, and Psychiatry, 66(4), 362-037. KVNG Sahu, QIANA Gifford, & Marvin Perez MAlvaroA. (2004.) Assessment of post-stroke quality of life in cost-effectiveness studies: The usefulness of the Barthel Index and the EuroQoL-5D. [facility], 29, 132-22 Physical Therapy Evaluation Charge Determination History Examination Presentation Decision-Making MEDIUM  Complexity : 1-2 comorbidities / personal factors will impact the outcome/ POC  MEDIUM Complexity : 3 Standardized tests and measures addressing body structure, function, activity limitation and / or participation in recreation  MEDIUM Complexity : Evolving with changing characteristics  Other outcome measures Barthel   MEDIUM Based on the above components, the patient evaluation is determined to be of the following complexity level: MEDIUM Pain: 
Pain Scale 1: Numeric (0 - 10) Pain Intensity 1: 3 Pain Location 1: Generalized Pain Description 1: Aching Pain Intervention(s) 1: Medication (see MAR) Activity Tolerance:  
Fair, VSS on 2L O2. Please refer to the flowsheet for vital signs taken during this treatment. After treatment: []         Patient left in no apparent distress sitting up in chair 
[x]         Patient left in no apparent distress in bed 
[x]         Call bell left within reach [x]         Nursing notified 
[]         Caregiver present [x]         Bed alarm activated COMMUNICATION/EDUCATION:  
The patients plan of care was discussed with: Registered Nurse and , OT. [x]         Fall prevention education was provided and the patient/caregiver indicated understanding. [x]         Patient/family have participated as able in goal setting and plan of care. [x]         Patient/family agree to work toward stated goals and plan of care. []         Patient understands intent and goals of therapy, but is neutral about his/her participation. []         Patient is unable to participate in goal setting and plan of care. Thank you for this referral. 
Lucas Snyder, PT, DPT Time Calculation: 19 mins

## 2019-01-14 NOTE — HOME CARE
Please note that this patient was open to [address] /MSW services at the time of hospital admission. If services will be needed at discharge, please order to resume them. Thank you. Arpita Hernandez  [facility]

## 2019-01-14 NOTE — PROGRESS NOTES
SPEECH THERAPY SCREENING: 
SERVICES ARE NOT INDICATED AT THIS TIME [facility] therapy based on results obtained during the nursing admission assessment. The patients chart was reviewed and the patient is not appropriate for a skilled therapy evaluation at this time. Please consult speech therapy if any therapy needs arise. Thank you.  
 
Alexi Medina, SLP

## 2019-01-14 NOTE — TELEPHONE ENCOUNTER
Called to speak with patients wife, he had a low on admission but likely related to the interrupted tube feed schedule during the process of transfer from home and admission. Sugars in the 140's this AM on serum blood work. Rising on FSG with TF's. I advised her that they may need to go back to using his home doses to get his sugars controlled. I also advised that I am happy to consult to help if the primary team desired. I will try to stop by his room tomorrow just to check in on him. Landry Knutson.  [address] Endocrinology  [address] [place] [address]

## 2019-01-14 NOTE — DIABETES MGMT
DTC Consult Note Recommendations/ Comments: Please consider the followin) change regular insulin to be scheduled with TF #1, 3, 5--- 12 units at 9 am, 12 units at 3 pm, 4 units at 9 pm 
2) change correction scale to high sensitivity scale 3) pt may require resumption of lantus for basal insulin needs if fasting BG is elevated with above changes-- 
resume pt's lantus at half home dose which would be 7 units daily Msg sent to Dr. Obey Allen. Current hospital DM medication: humalog correction Q6 hrs, regular 12 units ac b/L 
 
DTC will continue to follow patient as needed. ____________________________ Consult received for:   []           Hospital Medication Recommendations [x]           Hospital Blood Glucose Management Chart reviewed and initial evaluation complete on Amna Jane. Patient is a 80 y.o. male with known Type 2 Diabetes on Lantus 14 units daily, regular 15 units at 8 am with 1st TF bolus of the day, regular 15 units at 2 pm with 3rd TF bolus of the day, regular 5 units at 8 pm with 5th TF bolus of the day at home. A1c:  
Lab Results Component Value Date/Time Hemoglobin A1c 7.1 (H) 2019 04:21 AM  
 
 
Recent Glucose Results:  
Lab Results Component Value Date/Time  (H) 2019 03:32 AM  
 GLUCPOC 288 (H) 2019 11:41 PM  
 GLUCPOC 215 (H) 2019 06:15 PM  
 GLUCPOC 255 (H) 2019 11:56 AM  
  
 
Lab Results Component Value Date/Time Creatinine 1.42 (H) 2019 03:32 AM  
 
 
Active Orders Diet DIET NPO With Tube Feedings PO intake:  
Patient Vitals for the past 72 hrs: 
 % Diet Eaten 19 0823 0 % 19 0909 0 % Thank you. Laureen Alfonso, AMORN, RN, CDE [facility] Time spent: 10 min

## 2019-01-14 NOTE — PROGRESS NOTES
Hospitalist Progress Note NAME: Leah Landau :  1935 MRN:  [id number] Assessment / Plan: 
Syncope with hypotension multifactorial including prior posterior CVA (known  
severe vertebrobasilar disease), severe AS, NTSEMI with CAD s/p CABGx3 and acute systolic CHF with acute hypoxic respiratory failure:  Sees Dr. Esthela Givens, undergoing TAVR evaluation. Pt with recent admission to [facility] in  with subfalcine SDH and SAH due to syncope. Baseline wears 2 LPM O2. 
- CXR with pulmonary edema - CTA chest with pulmonary vascular prominence, bilateral interstitial edema and bilateral patchy airspace disease, greatest bases. Very small bilateral pleural effusions. - V/Q low prob for PE 
- echo still pending (EF 30-35% last month) 
- cardiology and cardiovascular surgery consults appreciated, tentatively planning for cath on Thursday. Pt will need to wait >90 days from intracranial bleed before TAVR could be considered. - con't ASA and plavix as no e/o bleeding on CT head. Con't home lipitor. 
- con't lasix IV for now, diuresing as able. Strict I/Os, daily weights. - con't midodrine. No ACE/ARB or bblocker due to hypotension. 
- palliative care consulted   
Acute kidney injury (resolved) in setting of CKD3: recent CTA C/A/P with contrast  as part of outpatient TAVR evaluation. - renal US with no visible abnormality 
- con't lasix 
- strict I/Os, daily weights  
Recent subfalcine SDH and SAH s/p fall/syncope early 2018: CT head with no acute intracranial hemorrhage. Near complete resolution of previous small left parafalcine subdural hemorrhage. 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 Claire Mojica for dysphagia. Pt is NPO at home with TF IsoSource 1.5.   Con't tube feeds here (5 cans, wife gives 1 can every 3 hr if no residual before given new can) - con't doxycycline (Seen in urgent care clinic 1/9/2018, started on doxycycline) Insulin dependent DM2 without complication: [facility] Dr. Mary Ayers 
- holding lantus 
- con't SSI, regular insulin with TF  Urinary retention due to BPH: sees Dr. Kirti Yarbrough. Straight cath prn.  
H/o streptococcal bacteremia 7/18 H/o stroke 2015  
Chronic thrombocytopenia, follows with Dr. Caleb Moore 
  
Code Status: Full code Surrogate Decision Maker: wife DVT Prophylaxis: Heparin  
  
Baseline: walks with walker/rollator, home O2 Subjective: Chief Complaint / Reason for Physician Visit Complains of R hip pain; missed recent injection with Dr. Brian Pfeiffer. Discussed with RN events overnight. Review of Systems: 
Symptom Y/N Comments  Symptom Y/N Comments Fever/Chills n   Chest Pain n   
Poor Appetite n   Edema n   
Cough n   Abdominal Pain n   
Sputum n   Joint Pain SOB/KRAMER n   Pruritis/Rash Nausea/vomit    Tolerating PT/OT Diarrhea    Tolerating Diet Constipation    Other Could NOT obtain due to:   
 
Objective: VITALS:  
Last 24hrs VS reviewed since prior progress note. Most recent are: 
Patient Vitals for the past 24 hrs: 
 Temp Pulse Resp BP SpO2  
01/14/19 0822 97.9 °F (36.6 °C) 95 18 (!) 106/92 100 % 01/14/19 0750     99 % 01/14/19 0305 98.2 °F (36.8 °C) 72 18 109/47 100 % 01/13/19 2312 98.6 °F (37 °C) 83 20 100/83 100 % 01/13/19 2106     98 % 01/13/19 1950 98 °F (36.7 °C) 79 22 127/53 100 % 01/13/19 1431 98.2 °F (36.8 °C) 86 20 111/48 98 % 01/13/19 1126 98.4 °F (36.9 °C) 88 16 111/63 95 % Intake/Output Summary (Last 24 hours) at 1/14/2019 1766 Last data filed at 1/14/2019 2116 Gross per 24 hour Intake 1740 ml Output 1250 ml Net 490 ml PHYSICAL EXAM: 
General: WD, WN. Alert, cooperative, no acute distress   
EENT:  EOMI. Anicteric sclerae. MMM Resp:  CTA bilaterally, no wheezing or rales. No accessory muscle use CV:  Regular rhythm,  No edema GI:  Soft, mildly distended, Non tender.  +Bowel sounds Neurologic:  Alert and oriented X 3, normal speech, Psych:   Some insight. Not anxious nor agitated Skin:  No rashes. No jaundice Reviewed most current lab test results and cultures  YES Reviewed most current radiology test results   YES Review and summation of old records today    NO Reviewed patient's current orders and MAR    YES 
PMH/SH reviewed - no change compared to H&P 
________________________________________________________________________ Care Plan discussed with: 
  Comments Patient x Family  x Wife at bedside RN x Care Manager x Consultant Multidiciplinary team rounds were held today with , nursing, pharmacist and clinical coordinator. Patient's plan of care was discussed; medications were reviewed and discharge planning was addressed. ________________________________________________________________________ Total NON critical care TIME:  30 Minutes Total CRITICAL CARE TIME Spent:   Minutes non procedure based Comments >50% of visit spent in counseling and coordination of care x   
________________________________________________________________________ Jamee Chanel MD  
 
Procedures: see electronic medical records for all procedures/Xrays and details which were not copied into this note but were reviewed prior to creation of Plan. LABS: 
I reviewed today's most current labs and imaging studies. Pertinent labs include: 
Recent Labs  
  01/14/19 
0332 01/13/19 
0351 01/11/19 
1059 WBC 4.3 5.7 7.2 HGB 9.7* 9.2* 11.6* HCT 30.7* 29.2* 36.9 PLT 84* 93* 108* Recent Labs  
  01/14/19 
0332 01/13/19 
0351 01/12/19 
0421 01/11/19 
1059  136 136 136  
K 3.8 4.0 4.9 5.2*  
CL 98 96* 98 96* CO2 39* 36* 31 35* * 134* 154* 263* BUN 47* 56* 56* 47* CREA 1.42* 1.70* 2.15* 1.91* CA 9.1 8.6 8.8 9.6 ALB  --   --   --  3.2* TBILI  --   --   --  0.7 SGOT  --   --   --  60* ALT  --   --   --  46 Signed: Peter Evans MD

## 2019-01-14 NOTE — PROGRESS NOTES
Progress Note NAME: Ike Joseph :  1935 MRN:  [id number] Date/Time:  2019 2:08 PM 
 
Patient PCP: Dmitry David DO 
________________________________________________________________________ Assessment:  
 
 
  Hx:  
1. Acute on chronic systolic heart failure 2. Recurrent syncope 3. NSTEMI 4. Acute (resolved) on chronic kidney disease; Stg 3 
5. Coronary artery disease s/p remote CABG.  Cath  w/ EF 40%, PCI to distal LAD via LIMA, severe disease in large diagonal (not amenable to PCI), patent SVG-OM, SVG-PDA, SVG-PLB.  Lexiscan  w/ EF 28%, IWMI, and with diagonal ischemia. 6. Lexiscan stress MPI  w/ EF 29% and anterior ischemia w/ IWMI 7. Ischemic cardiomyopathy 8. Chronic thrombocytopenia 9. BiV ICD 10. Moderate to severe Aortic stenosis per previous echo . In process of evaluation for TAVR . Antigo's.    
11. Diabetes 12. 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 
13. Orthostatic hypotension 14. Hyperlipidemia 15. Head/neck CA      w/ feeding tube 16. Chronic thrombocytopenia (Dr. Arturo Lucasin 17. S/p recurrent falls. Recurrent spells of sycnope 18. Syncope, fall and subdural hematoma 2018 19. Age related debility. 20. Echo  . EF 30%  
   
 
 Plan: TnI normal to 1.8 BNP 16k PLT 84 
sCr 2.2 --> 1.4 (baseline) HDCT:  No acute intracranial hemorrhage. Near complete resolution of previous small left parafalcine subdural hemorrhage. Chronic infarctions in white matter 
disease as above. V/Q low prob CXR/Chest CT w/ pulmonary edema - Discussed w/ Dr. Mart Dance; preferably wanted to wait 90d out from recent small subfalcine hemorrhage (18) before setting up TAVR. - Question is whether this event was related to the luther or not; hard to tell. Walking back from the bathroom when he got very dizzy and made to a chair and then \"went ou of this world\".   Says it is extremely difficult to make in from room to room in his house without getting exhausted and short of breath Denies CP. 
- Surgical team to see pt here - Diuresis to euvolemia 
- Continue other home meds; ASA, plavix (restarted), atorva, midodrine - While here will tentatively plan for cath on Thursday to eval cors/grafts  
 
 [x]           High complexity decision making was performed Subjective:  
NAEO. We were asked to consult  evaluation of the above problems. Past Medical History:  
Diagnosis Date  Antiplatelet or antithrombotic long-term use 12/4/2014  Anxiety disorder  Arrhythmia 2009  
 bradycardia  Arthritis  CAD (coronary artery disease) s/p CABG 2002; Dr Nereida Ch  Cancer ([facility] [address] 1996  
 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   
 left leg, lower back,   
 Cough  Depression  Diabetes (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 I suspect the latter. Achalasia  is not present.  Fainting [address]  Fatigue  GERD (gastroesophageal reflux disease)  Heart failure ([facility] [address] 10/2014 Cardiomyopathy:Pacemaker upgrade:Biv and AICD  Dr. Nestor Grey heart DrAlvaro last visit 5/11/2015  Hepatitis C Dx 1996  
 treated at [facility] in past; as of 4/15/15 wife states pt currently not under any treatment  Hyperlipidemia  Joint pain  Liver disease  Memory loss  Muscle pain  Muscle weakness  Myocardial infarct [facility]) 2013 Heart Cath: 40% LV EF, Stented distal LAD, patent Graft to circumflex  On tube feeding diet approx 2009  
 still has as of 9/28/15  (no po food/liquid/meds at all); Dr Renea Purvis  Other ill-defined conditions(799.89) 1996 1 dose of chemotherapy/radiation for tongue cancer  Other ill-defined conditions(799.89) BPH  Other ill-defined conditions(799.89) orthostatic hypotension  Pneumonia ~ April -May 2010  
 SOB (shortness of breath)  Stroke [facility]) approx 2003  
 left side-left finger tips numb; no imbalance or memory loss; as of 2015 not seeing neuro MD  
 Suicidal thoughts  Swallowing difficulty Past Surgical History:  
Procedure Laterality Date  ABDOMEN SURGERY PROC UNLISTED  1996  
 peg tube  CABG, ARTERY-VEIN, THREE  2000  HX CATARACT REMOVAL    
 bilateral  
 HX CHOLECYSTECTOMY  HX COLONOSCOPY    
 HX HEART CATHETERIZATION  2013 Stented distal LAD  HX MOHS PROCEDURES    
 bilateral  
 HX ORTHOPAEDIC    
 back surgery times two  HX OTHER SURGICAL Radical Left Neck  HX OTHER SURGICAL    
 NASAL POLYPS REMOVAL  
 HX OTHER SURGICAL  2010 TURP  
 HX PACEMAKER  11/08  HX PACEMAKER  10/28/14 Defibrillator: [facility] # [id number], serial # [id number]; Dr. Germania Ivey [phone]; Dr Jen Eaton  HX UROLOGICAL    
 cystoscopy [address] [facility]  
 cevical surgery  [place] CHANGE GASTROSTOMY TUBE  5/2/2011  [place] CHANGE GASTROSTOMY TUBE  6/29/2011  [place] EGD INSERT GUIDE WIRE DILATOR PASSAGE ESOPHAGUS  9/13/2010  [place] EGD TRANSORAL BIOPSY SINGLE/MULTIPLE  9/13/2010  
    
 STOMACH SURGERY PROCEDURE UNLISTED  6/29/2011  UPPER GI ENDOSCOPY,BIOPSY  8/17/2018  UPPER GI ENDOSCOPY,DILATN W GUIDE  8/17/2018  UPPER GI ENDOSCOPY,W/DILAT,GASTRIC OUT  8/17/2018  VASCULAR SURGERY PROCEDURE UNLIST    
 bilateral carotid stents Allergies Allergen Reactions  Cleocin [Clindamycin Hcl] Rash  Demerol [Meperidine] Shortness of Breath  Paxil [Paroxetine Hcl] Unknown (comments) Pt gets shaky and loses control of legs  Amoxicillin Rash  Pcn [Penicillins] Rash Meds:  See below Social History Tobacco Use  Smoking status: Never Smoker  Smokeless tobacco: Never Used Substance Use Topics  Alcohol use: No  
  
Family History Problem Relation Age of Onset  Heart Disease Father   
      at age 52 from CAD  Colon Cancer Mother  Cancer Mother   
     colon ca  
 Heart Disease Brother REVIEW OF SYSTEMS:   
 []            Unable to obtain  ROS due to --- 
 [x]            Total of 12 systems reviewed as follows: 
 
Constitutional: negative fever, negative chills, negative weight loss Eyes:   negative visual changes ENT:   negative sore throat, tongue or lip swelling Respiratory:  negative cough, negative dyspnea Cards:  negative for chest pain, palpitations, lower extremity edema GI:   negative for nausea, vomiting, diarrhea, and abdominal pain Genitourinary: negative for frequency, dysuria Integument:  negative for rash Hematologic:  negative for easy bruising and gum/nose bleeding Musculoskel: negative for myalgias,  back pain Neurological:  negative for headaches, dizziness, vertigo, weakness Behavl/Psych: negative for feelings of anxiety, depression Pertinent Positives include : 
 
Objective:  
  
Physical Exam: 
 
Last 24hrs VS reviewed since prior progress note. Most recent are: 
 
Visit Vitals BP (!) 106/92 (BP 1 Location: Left arm, BP Patient Position: At rest) Pulse 95 Temp 97.9 °F (36.6 °C) Resp 18 Ht 5' 7\" (1.702 m) Wt 76.8 kg (169 lb 5 oz) SpO2 100% BMI 26.52 kg/m² Intake/Output Summary (Last 24 hours) at 2019 6263 Last data filed at 2019 4818 Gross per 24 hour Intake 2230 ml Output 1250 ml Net 980 ml General Appearance: Well developed, well nourished, alert & oriented x 3,  
 no acute distress. Ears/Nose/Mouth/Throat: Pupils equal and round, Hearing grossly normal. 
Neck: Supple. JVP within normal limits. Carotids good upstrokes, with no bruit. Chest: Lungs with diffusely decreased breath sounds Cardiovascular: Regular rate and rhythm, S1S2 normal, harsh TARA Abdomen: Soft, non-tender, bowel sounds are active. No organomegaly. Extremities: No edema bilaterally. Femoral pulses +2, Distal Pulses +1. Skin: Warm and dry. Neuro: CN II-XII grossly intact, Strength and sensation grossly intact. Data:  
  
Prior to Admission medications Medication Sig Start Date End Date Taking? Authorizing Provider  
insulin regular (NOVOLIN R, HUMULIN R) 100 unit/mL injection 15 Units by SubCUTAneous route daily (with lunch). Patient takes 15 Units with breakfast, 15 Units with lunch, and 5 Units nightly   Yes Provider, Historical  
insulin regular (NOVOLIN R, HUMULIN R) 100 unit/mL injection 5 Units by SubCUTAneous route nightly. Patient takes 15 Units with breakfast, 15 Units with lunch, and 5 Units nightly   Yes Provider, Historical  
hydrocortisone sodium succ/PF (HYDROCORTISONE SOD SUCC, PF, INJECTION) by Injection route every three (3) months. Patient gets from orthopedist   Yes Provider, Historical  
furosemide (LASIX) 40 mg tablet 40 mg by Per G Tube route two (2) times a day. Yes Provider, Historical  
B.infantis-B.ani-B.long-B.bifi (PROBIOTIC 4X) 10-15 mg TbEC 1 Cap by Per G Tube route daily. Yes Provider, Historical  
doxycycline (MONODOX) 100 mg capsule 100 mg by Per G Tube route two (2) times a day. Yes Provider, Historical  
clopidogrel (PLAVIX) 75 mg tab 75 mg by PEG Tube route daily. Yes Provider, Historical  
aspirin delayed-release 81 mg tablet Take 81 mg by mouth daily. Yes Provider, Historical  
guaiFENesin (ROBITUSSIN) 100 mg/5 mL liquid 200 mg by Per G Tube route three (3) times daily. 12/5/18  Yes Provider, Historical  
insulin regular (NOVOLIN R REGULAR U-100 INSULN) 100 unit/mL injection 15 Units by SubCUTAneous route daily (with breakfast).  Patient takes 15 Units with breakfast, 15 Units with lunch, and 5 Units nightly   Yes Provider, Historical  
pramipexole (MIRAPEX) 0.25 mg tablet Take 1 Tab by mouth three (3) times daily. 10/24/18  Yes Reta Sierra MD  
albuterol (PROVENTIL VENTOLIN) 2.5 mg /3 mL (0.083 %) nebulizer solution 2.5 mg by Nebulization route two (2) times a day. Per wife patient can use 3 times a day if needed   Yes Provider, Historical  
famotidine (PEPCID) 20 mg tablet 20 mg by Per G Tube route three (3) times daily. Yes Provider, Historical  
atorvastatin (LIPITOR) 40 mg tablet 40 mg by Per G Tube route daily. Yes Other, MD Booker  
finasteride (PROSCAR) 5 mg tablet 5 mg by Per G Tube route nightly. Yes Other, MD Booker  
alfuzosin SR (UROXATRAL) 10 mg SR tablet 10 mg by Per G Tube route daily. 5/24/18  Yes Provider, Historical  
midodrine (PROAMITINE) 10 mg tablet 10 mg by Per G Tube route three (3) times daily (with meals). 11/1/16  Yes Provider, Historical  
HYDROcodone-acetaminophen (NORCO)  mg tablet 1 Tab by Per G Tube route every four (4) hours as needed for Pain. Yes Other, MD Booker  
zinc 50 mg tab tablet 50 mg by Per G Tube route daily. Yes Provider, Historical  
LANTUS SOLOSTAR U-100 INSULIN 100 unit/mL (3 mL) inpn INJECT 14 UNITS SUBCUTANEOUSLY ONCE DAILY 5/21/18  Yes Mario Robles MD  
gabapentin (NEURONTIN) 250 mg/5 mL solution 750 mg by Per G Tube route three (3) times daily. Yes Other, MD Booker  
multivitamin (ONE A DAY) tablet 1 Tab by Per G Tube route daily. Yes Other, MD Booker  
klack's mixture Solution Take 5 mL by mouth two (2) times a day. Diphenhydramine elixir 12.5mg/ml 500ml, Nystatin suspension 120ml,Tetracycline 500mg capsules  12 capsules (6g), Hydrocortisone 20mg tablet 12 tablets (240mg), Water for irrigation QS ad 1000ml Provider, Historical  
acetaminophen (TYLENOL) 500 mg tablet 500-1,000 mg by Per G Tube route every six (6) hours as needed for Pain.     Provider, Historical  
 fluticasone (FLONASE ALLERGY RELIEF) 50 mcg/actuation nasal spray 1 [place] by Both Nostrils route two (2) times daily as needed (runny nose). Provider, Historical  
nitroglycerin (NITROSTAT) 0.4 mg SL tablet 0.4 mg by SubLINGual route every five (5) minutes as needed for Chest Pain. Other, MD Booker  
 
 
Recent Results (from the past 24 hour(s)) TROPONIN I Collection Time: 01/13/19 11:36 AM  
Result Value Ref Range Troponin-I, Qt. 1.19 (H) <0.05 ng/mL GLUCOSE, POC Collection Time: 01/13/19 11:56 AM  
Result Value Ref Range Glucose (POC) 255 (H) 65 - 100 mg/dL Performed by Ronaldo Gee (PCT) SAMPLES BEING HELD Collection Time: 01/13/19  4:26 PM  
Result Value Ref Range SAMPLES BEING HELD 1PST COMMENT Add-on orders for these samples will be processed based on acceptable specimen integrity and analyte stability, which may vary by analyte. GLUCOSE, POC Collection Time: 01/13/19  6:15 PM  
Result Value Ref Range Glucose (POC) 215 (H) 65 - 100 mg/dL Performed by Ronaldo Gee (PCT) GLUCOSE, POC Collection Time: 01/13/19 11:41 PM  
Result Value Ref Range Glucose (POC) 288 (H) 65 - 100 mg/dL Performed by [employer] (traveler) METABOLIC PANEL, BASIC Collection Time: 01/14/19  3:32 AM  
Result Value Ref Range Sodium 140 136 - 145 mmol/L Potassium 3.8 3.5 - 5.1 mmol/L Chloride 98 97 - 108 mmol/L  
 CO2 39 (H) 21 - 32 mmol/L Anion gap 3 (L) 5 - 15 mmol/L Glucose 142 (H) 65 - 100 mg/dL BUN 47 (H) 6 - 20 MG/DL Creatinine 1.42 (H) 0.70 - 1.30 MG/DL  
 BUN/Creatinine ratio 33 (H) 12 - 20 GFR est AA 58 (L) >60 ml/min/1.73m2 GFR est non-AA 48 (L) >60 ml/min/1.73m2 Calcium 9.1 8.5 - 10.1 MG/DL  
CBC W/O DIFF Collection Time: 01/14/19  3:32 AM  
Result Value Ref Range WBC 4.3 4.1 - 11.1 K/uL  
 RBC 3.20 (L) 4.10 - 5.70 M/uL HGB 9.7 (L) 12.1 - 17.0 g/dL HCT 30.7 (L) 36.6 - 50.3 %  MCV 95.9 80.0 - 99.0 FL  
 MCH 30.3 26.0 - 34.0 PG  
 MCHC 31.6 30.0 - 36.5 g/dL  
 RDW 16.5 (H) 11.5 - 14.5 % PLATELET 84 (L) 498 - 400 K/uL MPV 10.7 8.9 - 12.9 FL  
 NRBC 0.0 0  WBC ABSOLUTE NRBC 0.00 0.00 - 0.01 K/uL

## 2019-01-15 NOTE — PROGRESS NOTES
Progress Note NAME: Lucas Vegas :  1935 MRN:  [id number] Date/Time:  1/15/2019 2:08 PM 
 
Patient PCP: Stephanie Kiser DO 
________________________________________________________________________ Assessment:  
 
 
  Hx:  
1. Acute on chronic systolic heart failure 2. Recurrent syncope 3. NSTEMI 4. Acute (resolved) on chronic kidney disease; Stg 3 
5. Coronary artery disease s/p remote CABG.  Cath  w/ EF 40%, PCI to distal LAD via LIMA, severe disease in large diagonal (not amenable to PCI), patent SVG-OM, SVG-PDA, SVG-PLB.  Lexiscan  w/ EF 28%, IWMI, and with diagonal ischemia. 6. Lexiscan stress MPI  w/ EF 29% and anterior ischemia w/ IWMI 7. Ischemic cardiomyopathy 8. Chronic thrombocytopenia 9. BiV ICD 10. Moderate to severe Aortic stenosis per previous echo . In process of evaluation for TAVR . [place].    
11. Diabetes 12. 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 
13. Orthostatic hypotension 14. Hyperlipidemia 15. Head/neck CA      w/ feeding tube 16. Chronic thrombocytopenia (Dr. Mair Valencia 17. S/p recurrent falls. Recurrent spells of sycnope 18. Syncope, fall and subdural hematoma 2018 19. Age related debility. 20. Echo  . EF 30%  
   
 
 Plan: TnI normal to 1.8 BNP 16k PLT 93 
sCr 2.2 --> 1.2 (baseline) HDCT:  No acute intracranial hemorrhage. Near complete resolution of previous small left parafalcine subdural hemorrhage. Chronic infarctions in white matter 
disease as above. V/Q low prob CXR/Chest CT w/ pulmonary edema - Discussed w/ Dr. Charlee Meckel; preferably wanted to wait 90d out from recent small subfalcine hemorrhage (18) before setting up TAVR. - Question is whether this event was related to the luther or not; hard to tell. Walking back from the bathroom when he got very dizzy and made to a chair and then \"went ou of this world\".   Says it is extremely difficult to make in from room to room in his house without getting exhausted and short of breath Denies CP. 
- Dr. Tanja Bell has seen - Diuresis to euvolemia 
- Continue other home meds; ASA, plavix (restarted), atorva, midodrine 
- NPO/no feeds p [place] 
- Cath tomorrow (Wednesday) - Discussed w/ wife [x]           High complexity decision making was performed Subjective:  
NAEO. We were asked to consult  evaluation of the above problems. Past Medical History:  
Diagnosis Date  Antiplatelet or antithrombotic long-term use 12/4/2014  Anxiety disorder  Arrhythmia 2009  
 bradycardia  Arthritis  CAD (coronary artery disease) s/p CABG 2002; Dr Dea Regalado  Cancer ([facility] [address] 1996  
 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   
 left leg, lower back,   
 Cough  Depression  Diabetes ([facility] [address] Type II  Epigastric pain 8/17/2018  Esophageal dysmotility s/p dilitation  Esophageal motility disorder 7/8/2013 Frequent simultaneous or failed contractions, low amplitude contractions  suggests severe myopathy or diffuse spasm. I suspect the latter. Achalasia  is not present.  Fainting [address]  Fatigue  GERD (gastroesophageal reflux disease)  Heart failure ([facility] [address] 10/2014 Cardiomyopathy:Pacemaker upgrade:Biv and AICD  Dr. Ata Lee heart DrAlvaro last visit 5/11/2015  Hepatitis C Dx 1996  
 treated at [facility] in past; as of 4/15/15 wife states pt currently not under any treatment  Hyperlipidemia  Joint pain  Liver disease  Memory loss  Muscle pain  Muscle weakness  Myocardial infarct [facility]) 2013 Heart Cath: 40% LV EF, Stented distal LAD, patent Graft to circumflex  On tube feeding diet approx 2009  
 still has as of 9/28/15  (no po food/liquid/meds at all); Dr Roshni Washburn  Other ill-defined conditions(799.89) 1996 1 dose of chemotherapy/radiation for tongue cancer  Other ill-defined conditions(799.89) BPH  Other ill-defined conditions(799.89) orthostatic hypotension  Pneumonia ~ April -May 2010  
 SOB (shortness of breath)  Stroke [facility]) approx 2003  
 left side-left finger tips numb; no imbalance or memory loss; as of 2015 not seeing neuro MD  
 Suicidal thoughts  Swallowing difficulty Past Surgical History:  
Procedure Laterality Date  ABDOMEN SURGERY PROC UNLISTED  1996  
 peg tube  CABG, ARTERY-VEIN, THREE  2000  HX CATARACT REMOVAL    
 bilateral  
 HX CHOLECYSTECTOMY  HX COLONOSCOPY    
 HX HEART CATHETERIZATION  2013 Stented distal LAD  HX MOHS PROCEDURES    
 bilateral  
 HX ORTHOPAEDIC    
 back surgery times two  HX OTHER SURGICAL Radical Left Neck  HX OTHER SURGICAL    
 NASAL POLYPS REMOVAL  
 HX OTHER SURGICAL  2010 TURP  
 HX PACEMAKER  11/08  HX PACEMAKER  10/28/14 Defibrillator: [facility] # [id number], serial # [id number]; Dr. Efren Mcwilliams [phone]; Dr Loretta Castro  HX UROLOGICAL    
 cystoscopy [address] [place] [address]  
 cevical surgery  HI CHANGE GASTROSTOMY TUBE  5/2/2011  HI CHANGE GASTROSTOMY TUBE  6/29/2011  HI EGD INSERT GUIDE WIRE DILATOR PASSAGE ESOPHAGUS  9/13/2010  HI EGD TRANSORAL BIOPSY SINGLE/MULTIPLE  9/13/2010  
    
 STOMACH SURGERY PROCEDURE UNLISTED  6/29/2011  UPPER GI ENDOSCOPY,BIOPSY  8/17/2018  UPPER GI ENDOSCOPY,DILATN W GUIDE  8/17/2018  UPPER GI ENDOSCOPY,W/DILAT,GASTRIC OUT  8/17/2018  VASCULAR SURGERY PROCEDURE UNLIST    
 bilateral carotid stents Allergies Allergen Reactions  Cleocin [Clindamycin Hcl] Rash  Demerol [Meperidine] Shortness of Breath  Paxil [Paroxetine Hcl] Unknown (comments) Pt gets shaky and loses control of legs  Amoxicillin Rash  Pcn [Penicillins] Rash Meds:  See below Social History Tobacco Use  Smoking status: Never Smoker  Smokeless tobacco: Never Used Substance Use Topics  Alcohol use: No  
  
Family History Problem Relation Age of Onset  Heart Disease Father   
      at age 52 from CAD  Colon Cancer Mother  Cancer Mother   
     colon ca  
 Heart Disease Brother REVIEW OF SYSTEMS:   
 []            Unable to obtain  ROS due to --- 
 [x]            Total of 12 systems reviewed as follows: 
 
Constitutional: negative fever, negative chills, negative weight loss Eyes:   negative visual changes ENT:   negative sore throat, tongue or lip swelling Respiratory:  negative cough, negative dyspnea Cards:  negative for chest pain, palpitations, lower extremity edema GI:   negative for nausea, vomiting, diarrhea, and abdominal pain Genitourinary: negative for frequency, dysuria Integument:  negative for rash Hematologic:  negative for easy bruising and gum/nose bleeding Musculoskel: negative for myalgias,  back pain Neurological:  negative for headaches, dizziness, vertigo, weakness Behavl/Psych: negative for feelings of anxiety, depression Pertinent Positives include : 
 
Objective:  
  
Physical Exam: 
 
Last 24hrs VS reviewed since prior progress note. Most recent are: 
 
Visit Vitals /73 Pulse 88 Temp 98.3 °F (36.8 °C) Resp 20 Ht 5' 7\" (1.702 m) Wt 74 kg (163 lb 2.3 oz) SpO2 98% BMI 25.55 kg/m² Intake/Output Summary (Last 24 hours) at 1/15/2019 1057 Last data filed at 1/15/2019 6136 Gross per 24 hour Intake 1725 ml Output 1775 ml Net -50 ml General Appearance: Well developed, well nourished, alert & oriented x 3,  
 no acute distress. Ears/Nose/Mouth/Throat: Pupils equal and round, Hearing grossly normal. 
Neck: Supple. JVP within normal limits. Carotids good upstrokes, with no bruit. Chest: Lungs with diffusely decreased breath sounds Cardiovascular: Regular rate and rhythm, S1S2 normal, harsh TARA Abdomen: Soft, non-tender, bowel sounds are active. No organomegaly. Extremities: No edema bilaterally. Femoral pulses +2, Distal Pulses +1. Skin: Warm and dry. Neuro: CN II-XII grossly intact, Strength and sensation grossly intact. Data:  
  
Prior to Admission medications Medication Sig Start Date End Date Taking? Authorizing Provider  
insulin regular (NOVOLIN R, HUMULIN R) 100 unit/mL injection 15 Units by SubCUTAneous route daily (with lunch). Patient takes 15 Units with breakfast, 15 Units with lunch, and 5 Units nightly   Yes Provider, Historical  
insulin regular (NOVOLIN R, HUMULIN R) 100 unit/mL injection 5 Units by SubCUTAneous route nightly. Patient takes 15 Units with breakfast, 15 Units with lunch, and 5 Units nightly   Yes Provider, Historical  
hydrocortisone sodium succ/PF (HYDROCORTISONE SOD SUCC, PF, INJECTION) by Injection route every three (3) months. Patient gets from orthopedist   Yes Provider, Historical  
furosemide (LASIX) 40 mg tablet 40 mg by Per G Tube route two (2) times a day. Yes Provider, Historical  
B.infantis-B.ani-B.long-B.bifi (PROBIOTIC 4X) 10-15 mg TbEC 1 Cap by Per G Tube route daily. Yes Provider, Historical  
doxycycline (MONODOX) 100 mg capsule 100 mg by Per G Tube route two (2) times a day. Yes Provider, Historical  
clopidogrel (PLAVIX) 75 mg tab 75 mg by PEG Tube route daily. Yes Provider, Historical  
aspirin delayed-release 81 mg tablet Take 81 mg by mouth daily. Yes Provider, Historical  
guaiFENesin (ROBITUSSIN) 100 mg/5 mL liquid 200 mg by Per G Tube route three (3) times daily. 12/5/18  Yes Provider, Historical  
insulin regular (NOVOLIN R REGULAR U-100 INSULN) 100 unit/mL injection 15 Units by SubCUTAneous route daily (with breakfast).  Patient takes 15 Units with breakfast, 15 Units with lunch, and 5 Units nightly   Yes Provider, Historical  
 pramipexole (MIRAPEX) 0.25 mg tablet Take 1 Tab by mouth three (3) times daily. 10/24/18  Yes Desi Quinones MD  
albuterol (PROVENTIL VENTOLIN) 2.5 mg /3 mL (0.083 %) nebulizer solution 2.5 mg by Nebulization route two (2) times a day. Per wife patient can use 3 times a day if needed   Yes Provider, Historical  
famotidine (PEPCID) 20 mg tablet 20 mg by Per G Tube route three (3) times daily. Yes Provider, Historical  
atorvastatin (LIPITOR) 40 mg tablet 40 mg by Per G Tube route daily. Yes Other, MD Booker  
finasteride (PROSCAR) 5 mg tablet 5 mg by Per G Tube route nightly. Yes Other, MD Booker  
alfuzosin SR (UROXATRAL) 10 mg SR tablet 10 mg by Per G Tube route daily. 5/24/18  Yes Provider, Historical  
midodrine (PROAMITINE) 10 mg tablet 10 mg by Per G Tube route three (3) times daily (with meals). 11/1/16  Yes Provider, Historical  
HYDROcodone-acetaminophen (NORCO)  mg tablet 1 Tab by Per G Tube route every four (4) hours as needed for Pain. Yes Other, MD Booker  
zinc 50 mg tab tablet 50 mg by Per G Tube route daily. Yes Provider, Historical  
LANTUS SOLOSTAR U-100 INSULIN 100 unit/mL (3 mL) inpn INJECT 14 UNITS SUBCUTANEOUSLY ONCE DAILY 5/21/18  Yes Erasmo Kaur MD  
gabapentin (NEURONTIN) 250 mg/5 mL solution 750 mg by Per G Tube route three (3) times daily. Yes Other, MD Booker  
multivitamin (ONE A DAY) tablet 1 Tab by Per G Tube route daily. Yes Other, MD Booker  
klack's mixture Solution Take 5 mL by mouth two (2) times a day. Diphenhydramine elixir 12.5mg/ml 500ml, Nystatin suspension 120ml,Tetracycline 500mg capsules  12 capsules (6g), Hydrocortisone 20mg tablet 12 tablets (240mg), Water for irrigation QS ad 1000ml Provider, Historical  
acetaminophen (TYLENOL) 500 mg tablet 500-1,000 mg by Per G Tube route every six (6) hours as needed for Pain.     Provider, Historical  
fluticasone (FLONASE ALLERGY RELIEF) 50 mcg/actuation nasal spray 1 Spray by Both Nostrils route two (2) times daily as needed (runny nose). Provider, Historical  
nitroglycerin (NITROSTAT) 0.4 mg SL tablet 0.4 mg by SubLINGual route every five (5) minutes as needed for Chest Pain. Other, MD Booker  
 
 
Recent Results (from the past 24 hour(s)) GLUCOSE, POC Collection Time: 01/14/19 11:48 AM  
Result Value Ref Range Glucose (POC) 293 (H) 65 - 100 mg/dL Performed by [employer] (PCT) GLUCOSE, POC Collection Time: 01/14/19  6:13 PM  
Result Value Ref Range Glucose (POC) 260 (H) 65 - 100 mg/dL Performed by [employer] (PCT) GLUCOSE, POC Collection Time: 01/14/19 11:16 PM  
Result Value Ref Range Glucose (POC) 221 (H) 65 - 100 mg/dL Performed by Gerhardt Citizen METABOLIC PANEL, BASIC Collection Time: 01/15/19 12:08 AM  
Result Value Ref Range Sodium 141 136 - 145 mmol/L Potassium 3.6 3.5 - 5.1 mmol/L Chloride 98 97 - 108 mmol/L  
 CO2 39 (H) 21 - 32 mmol/L Anion gap 4 (L) 5 - 15 mmol/L Glucose 204 (H) 65 - 100 mg/dL BUN 40 (H) 6 - 20 MG/DL Creatinine 1.24 0.70 - 1.30 MG/DL  
 BUN/Creatinine ratio 32 (H) 12 - 20 GFR est AA >60 >60 ml/min/1.73m2 GFR est non-AA 56 (L) >60 ml/min/1.73m2 Calcium 8.7 8.5 - 10.1 MG/DL  
CBC W/O DIFF Collection Time: 01/15/19 12:08 AM  
Result Value Ref Range WBC 4.8 4.1 - 11.1 K/uL  
 RBC 3.23 (L) 4.10 - 5.70 M/uL HGB 9.9 (L) 12.1 - 17.0 g/dL HCT 31.0 (L) 36.6 - 50.3 % MCV 96.0 80.0 - 99.0 FL  
 MCH 30.7 26.0 - 34.0 PG  
 MCHC 31.9 30.0 - 36.5 g/dL  
 RDW 16.1 (H) 11.5 - 14.5 % PLATELET 93 (L) 580 - 400 K/uL MPV 11.3 8.9 - 12.9 FL  
 NRBC 0.0 0  WBC ABSOLUTE NRBC 0.00 0.00 - 0.01 K/uL GLUCOSE, POC Collection Time: 01/15/19  5:30 AM  
Result Value Ref Range Glucose (POC) 152 (H) 65 - 100 mg/dL  Performed by Gerhardt Citizen

## 2019-01-15 NOTE — INTERDISCIPLINARY ROUNDS
Cardiopulmonary Care Interdisciplinary Rounds were held today to discuss patient's plan of care and outcomes. The following members were present: NP/Physician, Pharmacy, Nursing and Case Management. Expected Length of Stay:  3d 19h Plan of Care: Continue current treatment plan 
Home w/ HH at [place]

## 2019-01-15 NOTE — PROGRESS NOTES
4070 bedside shift report received from [facility]. Writer assumed care of patient. 
 
0315 AYPBNP informed by PCT patient still c/o right hip/back pain and requesting xray and for MD to see patient 
 
0914 Dr. Carey Brambila paged to be made aware of above also to switch EC ASA to ASA that can be crushed 
 
1003 Dr. Carey Brambila paged again 200 Dr. Carye Brambila returned page and stated to give lido patch now and would change EC ASA to chewable ASA 
 
1040 split gauze aroung PEG changed @ this time 1915 bedside shift report given to [facility]

## 2019-01-15 NOTE — PROGRESS NOTES
Problem: Falls - Risk of 
Goal: *Absence of Falls Document [place] Sita Fall Risk and appropriate interventions in the flowsheet. Outcome: Progressing Towards Goal 
Fall Risk Interventions: 
Mobility Interventions: Communicate number of staff needed for ambulation/transfer, Patient to call before getting OOB, Strengthening exercises (ROM-active/passive), Utilize walker, cane, or other assistive device Mentation Interventions: Bed/chair exit alarm, Gait belt with transfers/ambulation Medication Interventions: Patient to call before getting OOB, Teach patient to arise slowly Elimination Interventions: Call light in reach, Bed/chair exit alarm, Patient to call for help with toileting needs, Toilet paper/wipes in reach, Toileting schedule/hourly rounds History of Falls Interventions: Bed/chair exit alarm, Door open when patient unattended, Investigate reason for fall, Room close to nurse's station Problem: Pressure Injury - Risk of 
Goal: *Prevention of pressure injury Document Mark Scale and appropriate interventions in the flowsheet. Outcome: Progressing Towards Goal 
Pressure Injury Interventions: 
Sensory Interventions: Assess changes in LOC, Assess need for specialty bed, Avoid rigorous massage over bony prominences, Chair cushion, Turn and reposition approx. every two hours (pillows and wedges if needed) Moisture Interventions: Absorbent underpads, Apply protective barrier, creams and emollients, Assess need for specialty bed, Internal/External urinary devices Activity Interventions: Assess need for specialty bed, Chair cushion, Increase time out of bed Mobility Interventions: Assess need for specialty bed, Chair cushion, Turn and reposition approx. every two hours(pillow and wedges), Float heels Nutrition Interventions: Document food/fluid/supplement intake, Offer support with meals,snacks and hydration Friction and Shear Interventions: Feet elevated on foot rest, Apply protective barrier, creams and emollients, Lift sheet, Lift team/patient mobility team, Minimize layers

## 2019-01-15 NOTE — PROGRESS NOTES
Hospitalist Progress Note NAME: Katerina Martin :  1935 MRN:  [id number] Assessment / Plan: 
Acute/chronic R hip pain with chronic diffuse pain (shoulders, hips, knees, back): missed last 2 appts with Dr. Froy Altamirano at [facility] for injections due to medical issues. Also sees Dr. Stevie Howell for his shoulders. - lidoderm patch 
- prn oxycodone, dose reduced due to hypotension. This has been discussed extensively with Pt and his wife. - recommended to Pt's wife that she call Dr. Damari Saavedra office and try to arrange f/u for next week to discuss next plan of care for his back. Syncope with hypotension multifactorial including prior posterior CVA (known  
severe vertebrobasilar disease), severe AS, NTSEMI with CAD s/p CABGx3 and acute systolic CHF with acute hypoxic respiratory failure:  Sees Dr. Ana Paula Urbina, undergoing TAVR evaluation. Pt with recent admission to [facility] in  with subfalcine SDH and SAH due to syncope. Baseline wears 2 LPM O2. 
- CXR with pulmonary edema; V/Q low prob for PE 
- CTA chest with pulmonary vascular prominence, bilateral interstitial edema and bilateral patchy airspace disease, greatest bases. Very small bilateral pleural effusions. - echo still pending (EF 30-35% last month) 
- cardiology and cardiovascular surgery consults appreciated, cardiac cath tomorrow. Pt will need to wait >90 days from intracranial bleed before TAVR could be considered. - con't ASA and plavix as no e/o bleeding on CT head. Con't home lipitor. 
- transition lasix back to po tomorrow.  Strict I/Os, daily weights. - con't midodrine. No ACE/ARB or bblocker due to hypotension. 
- palliative care consult appreciated, Pt is clear in his goals  
Acute kidney injury (resolved) in setting of CKD3: recent CTA C/A/P with contrast  as part of outpatient TAVR evaluation.  
- renal US with no visible abnormality 
- con't lasix as above 
- strict I/Os, daily weights  
 Recent subfalcine SDH and SAH s/p fall/syncope early December 2018: CT head with no acute intracranial hemorrhage. Near complete resolution of previous small left parafalcine subdural hemorrhage. 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 Jacobo for dysphagia. Pt is NPO at home with TF IsoSource 1.5.  Con't tube feeds here (5 cans, wife gives 1 can every 3 hr if no residual before given new can) - con't doxycycline (Seen in urgent care clinic 1/9/2018, started on doxycycline) Insulin dependent DM2 without complication: sees endocrinology Dr. Lee 
- holding lantus for now 
- con't SSI, regular insulin with TF  Urinary retention due to BPH: sees Dr. Felecia Armando. Straight cath prn.  
H/o streptococcal bacteremia 7/18 H/o stroke 2015  
Chronic thrombocytopenia, follows with Dr. Menon   
  
Code Status: Full code Surrogate Decision Maker: wife DVT Prophylaxis: Heparin  
  
Baseline: walks with walker/rollator, [place] Subjective: Chief Complaint / Reason for Physician Visit C/o R hip pain, but able to ambulate with PT. Discussed with RN events overnight. Review of Systems: 
Symptom Y/N Comments  Symptom Y/N Comments Fever/Chills n   Chest Pain n   
Poor Appetite n   Edema n   
Cough n   Abdominal Pain n   
Sputum n   Joint Pain SOB/KRAMER n   Pruritis/Rash Nausea/vomit    Tolerating PT/OT Diarrhea    Tolerating Diet Constipation    Other Could NOT obtain due to:   
 
Objective: VITALS:  
Last 24hrs VS reviewed since prior progress note. Most recent are: 
Patient Vitals for the past 24 hrs: 
 Temp Pulse Resp BP SpO2  
01/15/19 1107 98.4 °F (36.9 °C) 73 20 126/48 95 % 01/15/19 0814 98.3 °F (36.8 °C) 88 20 113/73 98 % 01/15/19 0316 97.5 °F (36.4 °C) 78 18 126/72 97 % 01/14/19 2249 97.9 °F (36.6 °C) 86 20 124/57 98 % 01/14/19 2010 97.9 °F (36.6 °C) 88 16 128/57 94 % 01/14/19 1456 97.8 °F (36.6 °C) 80 18 120/51 100 % Intake/Output Summary (Last 24 hours) at 1/15/2019 1119 Last data filed at 1/15/2019 8012 Gross per 24 hour Intake 1725 ml Output 2575 ml Net -850 ml PHYSICAL EXAM: 
General: WD, WN. Alert, cooperative, no acute distress   
EENT:  EOMI. Anicteric sclerae. MMM Resp:  CTA bilaterally, no wheezing or rales. No accessory muscle use CV:  Regular rhythm,  No edema GI:  Soft, mildly distended, Non tender. +Bowel sounds Neurologic:  Alert and oriented X 3, normal speech, Psych:   Limited insight. Not anxious nor agitated Skin:  No rashes. No jaundice Reviewed most current lab test results and cultures  YES Reviewed most current radiology test results   YES Review and summation of old records today    NO Reviewed patient's current orders and MAR    YES 
PMH/SH reviewed - no change compared to H&P 
________________________________________________________________________ Care Plan discussed with: 
  Comments Patient x Family  x Wife at bedside RN x Care Manager Consultant  x cards Multidiciplinary team rounds were held today with , nursing, pharmacist and clinical coordinator. Patient's plan of care was discussed; medications were reviewed and discharge planning was addressed. ________________________________________________________________________ Total NON critical care TIME:  25 Minutes Total CRITICAL CARE TIME Spent:   Minutes non procedure based Comments >50% of visit spent in counseling and coordination of care x   
________________________________________________________________________ Madi Hollingsworth MD  
 
Procedures: see electronic medical records for all procedures/Xrays and details which were not copied into this note but were reviewed prior to creation of Plan.    
 
LABS: 
 I reviewed today's most current labs and imaging studies. Pertinent labs include: 
Recent Labs  
  01/15/19 
0008 01/14/19 
0332 01/13/19 
0351 WBC 4.8 4.3 5.7 HGB 9.9* 9.7* 9.2* HCT 31.0* 30.7* 29.2*  
PLT 93* 84* 93* Recent Labs  
  01/15/19 
0008 01/14/19 
0332 01/13/19 
8833  140 136  
K 3.6 3.8 4.0  
CL 98 98 96* CO2 39* 39* 36* * 142* 134* BUN 40* 47* 56* CREA 1.24 1.42* 1.70* CA 8.7 9.1 8.6 Signed: Samara Valles MD

## 2019-01-15 NOTE — PROGRESS NOTES
1900 Received report from MITCH Stanton. Assumed patient care. Bedside shift change report given to Freddy Dyer RN (oncoming nurse) by Macie Maher RN (offgoing nurse). Report included the following information SBAR, Kardex, Intake/Output, MAR, Recent Results and Med Rec Status.

## 2019-01-15 NOTE — PROGRESS NOTES
Problem: Mobility Impaired (Adult and Pediatric) Goal: *Acute Goals and Plan of Care (Insert Text) Physical Therapy Goals Initiated 1/14/2019 1. Patient will move from supine to sit and sit to supine , scoot up and down and roll side to side in bed with independence within 7 day(s). 2.  Patient will transfer from bed to chair and chair to bed with modified independence using the least restrictive device within 7 day(s). 3.  Patient will perform sit to stand with modified independence within 7 day(s). 4.  Patient will ambulate with modified independence for 150 feet with the least restrictive device within 7 day(s). 5.  Patient will ascend/descend 6 stairs with right sided handrail(s) with supervision/set-up within 7 day(s). physical Therapy TREATMENT Patient: Kiki Hernandez (80 y.o. male) Date: 1/15/2019 Diagnosis: Hypoxia Syncope <principal problem not specified> Precautions: Bed Alarm Chart, physical therapy assessment, plan of care and goals were reviewed. ASSESSMENT: 
Patient reluctant to participate in PT today due to right hip pain, but agreeable with encouragement. BP supine 88/43. Bed mobility with CGA. Patient with good sitting balance EOB but with flexed posture. Unable to get BP reading in sitting due to dynamap not reading. Sit to stand with CGA. Patient ambulated 24' in room with rolling walker and CGA. Patient begins to decrease pace after 10' and requires several rests while returning to chair. After up in chair sats 94%, /63. Ice place on right hip in chair. Instructed patient to sit 30 minutes if able to tolerate. Patient may require SNF at discharge if mobility does not improve over next few days. Progression toward goals: 
[]    Improving appropriately and progressing toward goals [x]    Improving slowly and progressing toward goals 
[]    Not making progress toward goals and plan of care will be adjusted PLAN: 
 Patient continues to benefit from skilled intervention to address the above impairments. Continue treatment per established plan of care. Discharge Recommendations:  Home Health vs. SNF Further Equipment Recommendations for Discharge:  none SUBJECTIVE:  
Patient stated My hip hurts!  OBJECTIVE DATA SUMMARY:  
Critical Behavior: 
Neurologic State: Alert Orientation Level: Oriented X4 Cognition: Appropriate decision making, Follows commands Safety/Judgement: Awareness of environment, Insight into deficits Functional Mobility Training: 
Bed Mobility: 
Rolling: Modified independent Supine to Sit: Stand-by assistance Scooting: Modified independent Transfers: 
Sit to Stand: Contact guard assistance Stand to Sit: Contact guard assistance Bed to Chair: Contact guard assistance Balance: 
Sitting: Intact Standing: Impaired Standing - Static: Constant support; Fair 
Standing - Dynamic : FairAmbulation/Gait Training: 
Distance (ft): 24 Feet (ft) Assistive Device: Walker, rolling;Gait belt Ambulation - Level of Assistance: Contact guard assistance Gait Abnormalities: Decreased step clearance;Shuffling gait Base of Support: Widened Speed/Gertrudis: Slow Step Length: Left shortened;Right shortened Therapeutic Exercises:  
Encouraged sitting exercises while up in chair. Pain: 
Pain Scale 1: Numeric (0 - 10) Pain Intensity 1: 4 Pain Location 1: Hip Pain Orientation 1: Right Pain Description 1: Aching Pain Intervention(s) 1: Medication (see MAR) Activity Tolerance:  
poor Please refer to the flowsheet for vital signs taken during this treatment. After treatment:  
[x]    Patient left in no apparent distress sitting up in chair 
[]    Patient left in no apparent distress in bed 
[x]    Call bell left within reach [x]    Nursing notified 
[]    Caregiver present 
[]    Bed alarm activated COMMUNICATION/COLLABORATION:  
 The patients plan of care was discussed with: Registered Nurse Robert Culver, PT Time Calculation: 38 mins

## 2019-01-16 NOTE — PROGRESS NOTES
met with patient and patient's family in patient's room.  offered emotional support and encouragement to patient and patient's family. Plan of care is to follow up with patient/patient's family as needed. NAILA [facility] Div  Paging Service [phone])

## 2019-01-16 NOTE — DIABETES MGMT
DTC Consult Note Recommendations/ Comments: Please consider the followin) change regular insulin to be scheduled with TF #1, 3, 5--- 12 units at 9 am, 12 units at 3 pm, 4 units at 9 pm 
2) change correction scale to high sensitivity scale 3) pt may require resumption of lantus for basal insulin needs if fasting BG is elevated with above changes-- 
resume pt's lantus at half home dose which would be 7 units daily Current hospital DM medication: humalog correction Q6 hrs, regular 12 units ac b/L 
 
DTC will continue to follow patient as needed. ____________________________ Consult received for:   []           Hospital Medication Recommendations [x]           Hospital Blood Glucose Management Chart reviewed and initial evaluation complete on Ralf Farrell. Patient is a 80 y.o. male with known Type 2 Diabetes on Lantus 14 units daily, regular 15 units at 8 am with 1st TF bolus of the day, regular 15 units at 2 pm with 3rd TF bolus of the day, regular 5 units at 8 pm with 5th TF bolus of the day at home. A1c:  
Lab Results Component Value Date/Time Hemoglobin A1c 7.1 (H) 2019 04:21 AM  
 
 
Recent Glucose Results:  
Lab Results Component Value Date/Time GLUCPOC 165 (H) 2019 05:28 AM  
 GLUCPOC 290 (H) 01/15/2019 11:28 PM  
 GLUCPOC 203 (H) 01/15/2019 06:08 PM  
  
 
Lab Results Component Value Date/Time Creatinine 1.24 01/15/2019 12:08 AM  
 
 
Active Orders Diet DIET NPO  
  
 
PO intake:  
Patient Vitals for the past 72 hrs: 
 % Diet Eaten 19 0843 0 % 01/15/19 1749 0 % 01/15/19 1210 0 % 01/15/19 0912 0 % 19 1330 0 % 19 0823 0 % Thank you. Glen Can RD, CDE [facility] Office:  [phone] Time spent: 10 min

## 2019-01-16 NOTE — PROGRESS NOTES
Hospitalist Progress Note NAME: Ike Joseph :  1935 MRN:  [id number] Assessment / Plan: 
Acute/chronic R hip pain with chronic diffuse pain (shoulders, hips, knees, back): missed last 2 appts with Dr. Grisel Cat at [facility] for injections due to medical issues. Also sees Dr. Jazlyn Meyers for his shoulders. Doing better today. - con't lidoderm patch 
- prn oxycodone at lower dose due to chronic hypotension 
- wife asked to call Dr. Gareth Cooley office and try to arrange f/u for next week. With new stent, Pt will not be able to come off plavix for spinal injections - she is aware of this Syncope with hypotension multifactorial including prior posterior CVA (known  
severe vertebrobasilar disease), severe AS, NTSEMI with CAD s/p CABGx3 and acute systolic CHF with acute hypoxic respiratory failure: undergoing TAVR evaluation. Pt with recent admission to [facility] in  with subfalcine SDH and SAH due to syncope. Baseline wears 2 LPM O2. 
- CXR with pulmonary edema; V/Q low prob for PE 
- CTA chest with pulmonary vascular prominence, bilateral interstitial edema and bilateral patchy airspace disease, greatest bases. Very small bilateral pleural effusions. - echo with EF 30%. There was moderate diffuse hypokinesis. - cardiology appreciated, s/p cath today with Dr. Javon Chowdhury with PCI of the SVG-OM2/RPDA with RENATA placement 
- cardiovascular surgery consults appreciated. Pt will need to wait >90 days from intracranial bleed before TAVR could be considered. - con't ASA and plavix as no e/o bleeding on CT head.  Con't home lipitor. 
- con't home oral lasix.  Strict I/Os, daily weights. - con't midodrine. No ACE/ARB or bblocker due to hypotension. 
- palliative care consult appreciated Acute kidney injury (resolved) in setting of CKD3: recent CTA C/A/P with contrast  as part of outpatient TAVR evaluation. - renal US with no visible abnormality 
- con't lasix as above Recent subfalcine SDH and SAH s/p fall/syncope early December 2018: CT head with no acute intracranial hemorrhage. Near complete resolution of previous small left parafalcine subdural hemorrhage. 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 Jacobo for dysphagia. Pt is NPO at home with TF IsoSource 1.5.  Con't tube feeds here (5 cans, wife gives 1 can every 3 hr if no residual before given new can) - con't doxycycline (Seen in urgent care clinic 1/9/2018, started on doxycycline) Insulin dependent DM2 without complication: sees endocrinology Dr. Lee 
- holding lantus, dose reduced per DTC recommendations 
- con't SSI, regular insulin with TF  Urinary retention due to BPH: sees Dr. Nilesh Reynolds. Straight cath prn.  
H/o streptococcal bacteremia 7/18 H/o stroke 2015  
Chronic thrombocytopenia, follows with Dr. Menon   
  
Code Status: Full Surrogate Decision Maker: wife DVT Prophylaxis: Heparin  
  
Baseline: walks with walker/rollator, Verta Little Subjective: Chief Complaint / Reason for Physician Visit Denies severe pain at this time; ready for cath. Discussed with RN events overnight. Review of Systems: 
Symptom Y/N Comments  Symptom Y/N Comments Fever/Chills n   Chest Pain n   
Poor Appetite n   Edema n   
Cough n   Abdominal Pain n   
Sputum n   Joint Pain y   
SOB/KRAMER n   Pruritis/Rash Nausea/vomit    Tolerating PT/OT Diarrhea    Tolerating Diet Constipation    Other Could NOT obtain due to:   
 
Objective: VITALS:  
Last 24hrs VS reviewed since prior progress note. Most recent are: 
Patient Vitals for the past 24 hrs: 
 Temp Pulse Resp BP SpO2  
01/16/19 0842 97.4 °F (36.3 °C) 78 20 99/49 100 % 01/16/19 0751     98 % 01/16/19 0404 97.5 °F (36.4 °C) 84 20 99/67 100 % 01/15/19 2334 97.7 °F (36.5 °C) 73 20 122/52 99 % 01/15/19 1942 97.6 °F (36.4 °C) 72 18 127/51 100 % 01/15/19 1731  74  124/57   
01/15/19 1503 97.5 °F (36.4 °C) 70 20 126/54 98 % 01/15/19 1414  81  96/62   
01/15/19 1107 98.4 °F (36.9 °C) 73 20 126/48 95 % Intake/Output Summary (Last 24 hours) at 1/16/2019 1029 Last data filed at 1/16/2019 0940 Gross per 24 hour Intake 1930 ml Output 2150 ml Net -220 ml PHYSICAL EXAM: 
General: WD, WN. Alert, cooperative, no acute distress   
EENT:  EOMI. Anicteric sclerae. MMM Resp:  CTA bilaterally, no wheezing or rales. No accessory muscle use CV:  Regular  rhythm,  No edema GI:  Soft, mildly distended, Non tender.  +Bowel sounds Neurologic:  Alert and oriented X 3, normal speech, Psych:   Good insight. Not anxious nor agitated Skin:  Pale, No rashes. No jaundice Reviewed most current lab test results and cultures  YES Reviewed most current radiology test results   YES Review and summation of old records today    NO Reviewed patient's current orders and MAR    YES 
PMH/SH reviewed - no change compared to H&P 
________________________________________________________________________ Care Plan discussed with: 
  Comments Patient x Family  x Wife at bedside RN x Care Manager Consultant  x cardiology Multidiciplinary team rounds were held today with , nursing, pharmacist and clinical coordinator. Patient's plan of care was discussed; medications were reviewed and discharge planning was addressed. ________________________________________________________________________ Total NON critical care TIME:  25 Minutes Total CRITICAL CARE TIME Spent:   Minutes non procedure based Comments >50% of visit spent in counseling and coordination of care x   
________________________________________________________________________ Meagan Lozada MD  
 
Procedures: see electronic medical records for all procedures/Xrays and details which were not copied into this note but were reviewed prior to creation of Plan. LABS: 
I reviewed today's most current labs and imaging studies. Pertinent labs include: 
Recent Labs  
  01/15/19 
0008 01/14/19 
2951 WBC 4.8 4.3 HGB 9.9* 9.7* HCT 31.0* 30.7* PLT 93* 84* Recent Labs  
  01/15/19 
0008 01/14/19 
8348  140  
K 3.6 3.8 CL 98 98 CO2 39* 39* * 142* BUN 40* 47* CREA 1.24 1.42* CA 8.7 9.1 Signed: Unique Luis MD

## 2019-01-16 NOTE — PROGRESS NOTES
Nutrition Assessment: 
 
INTERVENTIONS/RECOMMENDATIONS:  
Continue current TF regimen ASSESSMENT:  
Chart reviewed. Currently TF are being held for cardiac cath today. Pt relying on hospital formula vs home formula. Current order is Osmolite 1.2, 250 ml 5x per day with 60 ml water flush before and after bolus. This meets ~85% and 90% of estimated kcal and protein needs respectively. Wife was wondering about adjusting home regimen so that there is more time in between bolus. At one time pt has getting 3 boluses per day (2 cans at a time) however he did not tolerate this. Could try 1.25 cans x4 per day with 75 ml water flush before and after bolus. Diet Order: NPO(Osmolite 1.2: 250 ml q 3 hrs + 60 ml water flush before and ater bolus) % Eaten:   
Patient Vitals for the past 72 hrs: 
 % Diet Eaten 19 0843 0 % 01/15/19 1749 0 % 01/15/19 1210 0 % 01/15/19 0912 0 % 19 1330 0 % 19 0823 0 % Pertinent Medications: [x]Reviewed: pepcid, humalog, novolin, lactobac MVI Pertinent Labs: [x]Reviewed: B-293, HA1C 7.1 Food Allergies: [x]NKFA  []Other Last BM:   Edema:      []RUE   []LUE   []RLE   []LLE Pressure Ulcer:      [] Stage I   [] Stage II   [] Stage III   [] Stage IV Anthropometrics: Height: 5' 7\" (170.2 cm) Weight: 77 kg (169 lb 11.2 oz) IBW (%IBW):   ( ) UBW (%UBW):   (  %) BMI: Body mass index is 26.58 kg/m². This BMI is indicative of: 
[]Underweight   []Normal   [x]Overweight   [] Obesity   [] Extreme Obesity (BMI>40) Last Weight Metrics: 
Weight Loss Metrics 2018 Today's Wt 169 lb 11.2 oz 166 lb 157 lb 9.6 oz 159 lb 6.4 oz 158 lb 157 lb 157 lb BMI 26.58 kg/m2 26 kg/m2 25.44 kg/m2 25.73 kg/m2 25.5 kg/m2 25.34 kg/m2 25.34 kg/m2 Some encounter information is confidential and restricted. Go to Review Flowsheets activity to see all data. Estimated Nutrition Needs (Based on): 1838 Kcals/day(BMR (1414) x 1. 3AF) , 76 g(-91g (1.0-1.2 g/kg bw)) Protein Carbohydrate: At Least 130 g/day  Fluids: 1850 mL/day or per primary team 
 
NUTRITION DIAGNOSES:  
Problem:  Swallowing difficulty Etiology: related to hx tongue cancer Signs/Symptoms: as evidenced by PEG dependent Previous Nutrition Dx:  [] Resolved  [x] Unresolved           [] Progressing NUTRITION INTERVENTIONS: 
  Enteral/Parenteral Nutrition: Initiate enteral nutrition GOAL:  
Patient will tolerate bolus regimen without s/sx of intolerance next 2-4 days NUTRITION MONITORING AND EVALUATION Food/Nutrient Intake Outcomes: Enteral/parenteral nutrition intake Physical Signs/Symptoms Outcomes: Weight/weight change, Electrolyte and renal profile, GI profile, Glucose profile Previous Goal Met: 
 [x] Met              [] Progressing Towards Goal              [] Not Progressing Towards Goal  
Previous Recommendations: 
 [x] Implemented          [] Not Implemented          [] Not Applicable LEARNING NEEDS (Diet, Food/Nutrient-Drug Interaction):  
 [x] None Identified 
 [] Identified and Education Provided/Documented 
 [] Identified and Pt declined/was not appropriate Cultural, Yarsanism, OR Ethnic Dietary Needs:  
 [x] None Identified 
 [] Identified and Addressed 
 
 [x] Interdisciplinary Care Plan Reviewed/Documented  
 [x] Discharge Planning: Continue home TF regimen  
 [] Participated in Interdisciplinary Rounds NUTRITION RISK:  
 [] High              [x] Moderate           []  Low  []  Minimal/Uncompromised Susy Michael RDN Pager [phone] Weekend Pager [phone]

## 2019-01-16 NOTE — PROGRESS NOTES
TRANSFER - OUT REPORT: 
 
Verbal report given to Kingsley Barron (name) on Katerina Martin  being transferred to [facility] (unit) for routine progression of care Report consisted of patients Situation, Background, Assessment and  
Recommendations(SBAR). Information from the following report(s) SBAR was reviewed with the receiving nurse. Lines:  
Peripheral IV 01/12/19 Anterior;Left;Superior; Upper Arm (Active) Site Assessment Clean, dry, & intact 1/16/2019  7:51 AM  
Phlebitis Assessment 0 1/16/2019  7:51 AM  
Infiltration Assessment 0 1/16/2019  7:51 AM  
Dressing Status Clean, dry, & intact 1/16/2019  7:51 AM  
Dressing Type Transparent;Tape 1/16/2019  7:51 AM  
Hub Color/Line Status Blue;Flushed 1/16/2019  7:51 AM  
  
 
Opportunity for questions and clarification was provided. Patient transported with: 
 O2 @ 2 liters

## 2019-01-16 NOTE — PROGRESS NOTES
Problem: Falls - Risk of 
Goal: *Absence of Falls Document Hamp Diallo Fall Risk and appropriate interventions in the flowsheet. Outcome: Progressing Towards Goal 
Fall Risk Interventions: 
Mobility Interventions: Communicate number of staff needed for ambulation/transfer, Patient to call before getting OOB, Strengthening exercises (ROM-active/passive), Utilize walker, cane, or other assistive device Mentation Interventions: Bed/chair exit alarm, Gait belt with transfers/ambulation Medication Interventions: Patient to call before getting OOB, Teach patient to arise slowly Elimination Interventions: Bed/chair exit alarm, Call light in reach, Patient to call for help with toileting needs, Toileting schedule/hourly rounds History of Falls Interventions: Bed/chair exit alarm, Evaluate medications/consider consulting pharmacy, Door open when patient unattended, Investigate reason for fall

## 2019-01-16 NOTE — PROGRESS NOTES
Following pt's progress. Pt underwent cardiac cath today with Dr Fidencio Ibanez with RENATA placement. PT/OT are following and suggest that SNF may be needed at dc if pt's mobility/endurance does not improve. [facility] is following pt and will provide PT/OT/RN at dc if pt able to go home. Claudia Mccain, MSW

## 2019-01-16 NOTE — PROGRESS NOTES
Bedside shift change report given to Toby Batres RN (oncoming nurse) by Edward Clayton RN (offgoing nurse). Report included the following information SBAR.  
 
 
2121 - Will hold PEG tube feeding this AM before cardiac cath

## 2019-01-16 NOTE — PROGRESS NOTES
Problem: Falls - Risk of 
Goal: *Absence of Falls Document Alma Alatorreford Fall Risk and appropriate interventions in the flowsheet. Outcome: Progressing Towards Goal 
Fall Risk Interventions: 
Mobility Interventions: Assess mobility with egress test, Bed/chair exit alarm, Communicate number of staff needed for ambulation/transfer, OT consult for ADLs, Patient to call before getting OOB, PT Consult for mobility concerns, Utilize walker, cane, or other assistive device Mentation Interventions: Adequate sleep, hydration, pain control, Bed/chair exit alarm, Door open when patient unattended, Evaluate medications/consider consulting pharmacy, Gait belt with transfers/ambulation Medication Interventions: Assess postural VS orthostatic hypotension, Bed/chair exit alarm, Evaluate medications/consider consulting pharmacy, Patient to call before getting OOB, Teach patient to arise slowly, Utilize gait belt for transfers/ambulation Elimination Interventions: Bed/chair exit alarm, Call light in reach, Patient to call for help with toileting needs, Toilet paper/wipes in reach, Toileting schedule/hourly rounds History of Falls Interventions: Consult care management for discharge planning, Bed/chair exit alarm, Door open when patient unattended, Evaluate medications/consider consulting pharmacy, Investigate reason for fall, Room close to nurse's station, Utilize gait belt for transfer/ambulation

## 2019-01-16 NOTE — PROCEDURES
Cardiac Cathetherization Note PreOp Diagnosis:  
 []       Chest Pain 
 []       STEMI 
 []       Unstable Angina [x]       NSTEMI 
 []       Cardiomyopathy/Heart Failure 
 []       Abnormal Stress Test 
 [x]       Valve Disease (Severe aortic stenosis) []       Pre-Operative Evaluation 
 []       Other:   
 
Findings/PostOp Diagnosis:  
1. Three vessel CAD 2. Patent LIMA-LAD w/ patents stents in the LAD 3. Severe stenosis of the mid SVG-OM2/RPDA 4. Patent SVG-RCA 5. Successful PCI of the SVG-OM2/RPDA w/ a RENATA 6. No significant REIA, RCFA, and prox RSFA/RPFA disease Recommendations: 1. Continue DAPT uninterrupted for 12 months and ASA 81mg indefinitely thereafter 2. Routine post procedure & access site care 3. Continue aggressive medical management and risk factor modification I have explained the nature of cardiac catheterization and possible percutaneous coronary intervention including risks and benefits of the procedure with the patient which include at least a 1:1000 risk for diagnostic procedure and 1/100 risk for percutaneous intervention. Risks include but are not limited to risk of heart attack, stroke, vascular trauma requiring surgical repair or transfusion, abnormal heart rhythm requiring defibrillation or pacemaker, need for intraaortic balloon pump support, renal dysfunction requiring dialysis, exacerbated gastrointestinal bleeding, allergic response to medications requiring ventilatory support, emergent cardiac surgery and even death. They also understand the need for medical compliance - particularly if stenting is required - mandating continued daily consumption of aspirin and plavix or other antiplatelet therapy. Differences between medicated stent versus bare metal stent reviewed with patient. The patient expresses an understanding and verbally consents.  They also understand plans for either radial or femoral access - with unique risks to both vascular beds including arterial occlusion, vascular trauma, hematoma and need for vascular surgery. I have answered all of their questions regarding the procedure and they are willing to proceed. Procedures: Cors, Cineflouroscopy, ultrasound-guided vascular access Indication:  As above Procedure status: []  Elective  [x] Urgent  [] Emergent Operators:  Sha Smith DO Assistants: None Access:   []  RIGHT Radial  []  LEFT Radial  [x] RCFA  []  LCFA  []  RCFV  []  LCFV Catheters: 6Fr JR4, JL4, OLAF, MP1 Closure: []  TR Band  [x]  Angioseal  []  Perclose  []  Manual Compression Tubes/Drains:  [x] No tubes or drains remain from this procedure  [] Other:  
 
Estimated Blood Loss: Minimal  
 
Specimens: None Sedation: Moderate conscious sedation with IV fentany & versed, local anesthesia with 1% lidocaine. This was performed by non-anesthesia personnel and I provided direct supervision to a trained independent observer. Time under moderate sedation: 60  Min Patient age:  80 y.o. Contrast:   220  cc  [x]  Isovue   []  Visipaque Fluoro Time:  22.1   Min Radiation Dose:   2991  mGy Complications:  [x] None  [] Other:  
 
Patient Condition at the end of the procedure:  [x] Stable  [] Other:   
 
Hemodynamics: Ao: 124/56/81 Cors:  
 
Dominance: [] Right  [] Left  [] Mixed LM: Moderate caliber vessel with patent stents LAD: Occluded LCX: Moderate caliber vessel with prior stents and 70% proximal ISR and mid tubular 50% stenosis OM1: Occluded OM2: Occluded LPLB: Moderate caliber vessel without significant stenosis. RCA: Occluded in the mid Selective Bypass Angiography LIMA to LAD:  Widely patent os, body, and anastomosis. Native LAD is moderate caliber with patent stents. 2 small diagonals with mild disease. SVG to OM2/RPDA:  Widely patent os and anastomosis. Mid 95% stenosis. Backfills OM1. OM1/2 are moderate caliber vessels without significant disease. The RPDA is a moderate caliber vessel without significant disease. SVG to RCA:  Widely patent os, body, and anastomosis. There appears to be competitive flow in the RPDA from the other SVG. The RPLB are small and have mild diffuse disease. Selective Unilateral Lower Extremity Angiography The arteriotomy is above the bifurcation. The REIA, RCFA ,and proximal RSFA/profunda are large caliber vessels without significant stenosis. LV angiography: N/A 
EF: Wall motion: MR: 
 
 
 
Indication for PCI:  ACS with significant stenosis of the SVG-OM2/RPDA. Technique: 
 
Lesion #1:  SVG-OM2/RPDA Anticoagulation:  Bivalirudin was used for anticoagulation. Guiding Catheter:  A 6Fr JR4 guiding catheter was used to engage the SVG. Guidewire: An EZ Filter guidewire was used to cross the lesion without difficulty and served as distal embolic protection. Stenting:  Next, a 3.5 x 24mm Synergy RENATA was used and deployed at 16 GLADYS for 20 seconds. Post-PCI Angiogram:  Angiogram following PCI showed good results with ASIM 3 flow without evidence of dissection, residual stenosis, or perforation. The patient tolerated the procedure well without any significant hemodynamic instability. Results: 
1. Successful PTCA and PCI of the SVG-OM2/RPDA with a RENATA. Recommendations: 1. Continue bivalirudin for an additional 2 hours. Loaded with plavix on the table. 2.  Plavix 75mg daily for at least the next 12 months. 3.  ASA 81mg daily for life. Kika Calhoun III, DO Kika Calhoun III, DO

## 2019-01-16 NOTE — PROGRESS NOTES
Progress Note NAME: Shanel Matias :  1935 MRN:  [id number] Date/Time:  2019 2:08 PM 
 
Patient PCP: Cora Luna, DO 
________________________________________________________________________ Assessment:  
 
 
  Hx:  
1. Acute on chronic systolic heart failure 2. Recurrent syncope 3. NSTEMI 4. Acute (resolved) on chronic kidney disease; Stg 3 
5. Coronary artery disease s/p remote CABG.  Cath  w/ EF 40%, PCI to distal LAD via LIMA, severe disease in large diagonal (not amenable to PCI), patent SVG-OM, SVG-PDA, SVG-PLB.  Lexiscan  w/ EF 28%, IWMI, and with diagonal ischemia. 6. Lexiscan stress MPI  w/ EF 29% and anterior ischemia w/ IWMI 7. Ischemic cardiomyopathy 8. Chronic thrombocytopenia 9. BiV ICD 10. Moderate to severe Aortic stenosis per previous echo . In process of evaluation for TAVR . Teller's.    
11. Diabetes 12. 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 
13. Orthostatic hypotension 14. Hyperlipidemia 15. Head/neck CA      w/ feeding tube 16. Chronic thrombocytopenia (Dr. Toney Amaya 17. S/p recurrent falls. Recurrent spells of sycnope 18. Syncope, fall and subdural hematoma 2018 19. Age related debility. 20. Echo  . EF 30%  
   
 
 Plan: TnI normal to 1.8 BNP 16k PLT 93 
sCr 2.2 --> 1.2 (baseline). No labs today. HDCT:  No acute intracranial hemorrhage. Near complete resolution of previous small left parafalcine subdural hemorrhage. Chronic infarctions in white matter 
disease as above. V/Q low prob CXR/Chest CT w/ pulmonary edema - Discussed w/ Dr. Jorge Monahan; preferably wanted to wait 90d out from recent small subfalcine hemorrhage (18) before setting up TAVR. - Question is whether this event was related to the luther or not; hard to tell. Walking back from the bathroom when he got very dizzy and made to a chair and then \"went ou of this world\".   Says it is extremely difficult to make in from room to room in his house without getting exhausted and short of breath. Denies CP. 
- Dr. Sawyer Anderson has seen - Diuresis as tolerated. - Continue other home meds; ASA, plavix (restarted), atorva, midodrine 
- NPO/no feeds for cath today w/ PCI as deemed appropriate. Plan RCFA approach. - Echo reviewed; suboptimal.  Likely severe AoS; mostly unchanged from multiple prior studies. [x]           High complexity decision making was performed Subjective:  
NAEO. We were asked to consult  evaluation of the above problems. Past Medical History:  
Diagnosis Date  Antiplatelet or antithrombotic long-term use 12/4/2014  Anxiety disorder  Arrhythmia 2009  
 bradycardia  Arthritis  CAD (coronary artery disease) s/p CABG 2002; Dr Kim Jefferson  Cancer ([facility] 1996  
 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   
 left leg, lower back,   
 Cough  Depression  Diabetes ([facility] Type II  Epigastric pain 8/17/2018  Esophageal dysmotility s/p dilitation  Esophageal motility disorder 7/8/2013 Frequent simultaneous or failed contractions, low amplitude contractions  suggests severe myopathy or diffuse spasm. I suspect the latter. Achalasia  is not present.  Fainting [address]  Fatigue  GERD (gastroesophageal reflux disease)  Heart failure ([facility] 10/2014 Cardiomyopathy:Pacemaker upgrade:Biv and AICD  Dr. Mansi Santos heart DrAlvaro last visit 5/11/2015  Hepatitis C Dx 1996  
 treated at [facility] in past; as of 4/15/15 wife states pt currently not under any treatment  Hyperlipidemia  Joint pain  Liver disease  Memory loss  Muscle pain  Muscle weakness  Myocardial infarct [facility]) 2013 Heart Cath: 40% LV EF, Stented distal LAD, patent Graft to circumflex  On tube feeding diet approx 2009 still has as of 9/28/15  (no po food/liquid/meds at all); Dr Yvrose Pulliam  Other ill-defined conditions(799.89) 1996  
 1 dose of chemotherapy/radiation for tongue cancer  Other ill-defined conditions(799.89) BPH  Other ill-defined conditions(799.89) orthostatic hypotension  Pneumonia ~ April -May 2010  
 SOB (shortness of breath)  Stroke [facility]) approx 2003  
 left side-left finger tips numb; no imbalance or memory loss; as of 2015 not seeing neuro MD  
 Suicidal thoughts  Swallowing difficulty Past Surgical History:  
Procedure Laterality Date  ABDOMEN SURGERY PROC UNLISTED  1996  
 peg tube  CABG, ARTERY-VEIN, THREE  2000  HX CATARACT REMOVAL    
 bilateral  
 HX CHOLECYSTECTOMY  HX COLONOSCOPY    
 HX HEART CATHETERIZATION  2013 Stented distal LAD  HX MOHS PROCEDURES    
 bilateral  
 HX ORTHOPAEDIC    
 back surgery times two  HX OTHER SURGICAL Radical Left Neck  HX OTHER SURGICAL    
 NASAL POLYPS REMOVAL  
 HX OTHER SURGICAL  2010 TURP  
 HX PACEMAKER  11/08  HX PACEMAKER  10/28/14 Defibrillator: [facility] # [id number], serial # [id number]; Dr. Burciaga Bodily [phone]; Dr [facility]  HX UROLOGICAL    
 cystoscopy [address] [facility]  
 cevical surgery  WY CHANGE GASTROSTOMY TUBE  5/2/2011  [place] CHANGE GASTROSTOMY TUBE  6/29/2011  [place] EGD INSERT GUIDE WIRE DILATOR PASSAGE ESOPHAGUS  9/13/2010  [place] EGD TRANSORAL BIOPSY SINGLE/MULTIPLE  9/13/2010  
    
 STOMACH SURGERY PROCEDURE UNLISTED  6/29/2011  UPPER GI ENDOSCOPY,BIOPSY  8/17/2018  UPPER GI ENDOSCOPY,DILATN W GUIDE  8/17/2018  UPPER GI ENDOSCOPY,W/DILAT,GASTRIC OUT  8/17/2018  VASCULAR SURGERY PROCEDURE UNLIST    
 bilateral carotid stents Allergies Allergen Reactions  Cleocin [Clindamycin Hcl] Rash  Demerol [Meperidine] Shortness of Breath  Paxil [Paroxetine Hcl] Unknown (comments) Pt gets shaky and loses control of legs  Amoxicillin Rash  Pcn [Penicillins] Rash Meds:  See below Social History Tobacco Use  Smoking status: Never Smoker  Smokeless tobacco: Never Used Substance Use Topics  Alcohol use: No  
  
Family History Problem Relation Age of Onset  Heart Disease Father   
      at age 52 from CAD  Colon Cancer Mother  Cancer Mother   
     colon ca  
 Heart Disease Brother REVIEW OF SYSTEMS:   
 []            Unable to obtain  ROS due to --- 
 [x]            Total of 12 systems reviewed as follows: 
 
Constitutional: negative fever, negative chills, negative weight loss Eyes:   negative visual changes ENT:   negative sore throat, tongue or lip swelling Respiratory:  negative cough, negative dyspnea Cards:  negative for chest pain, palpitations, lower extremity edema GI:   negative for nausea, vomiting, diarrhea, and abdominal pain Genitourinary: negative for frequency, dysuria Integument:  negative for rash Hematologic:  negative for easy bruising and gum/nose bleeding Musculoskel: negative for myalgias,  back pain Neurological:  negative for headaches, dizziness, vertigo, weakness Behavl/Psych: negative for feelings of anxiety, depression Pertinent Positives include : 
 
Objective:  
  
Physical Exam: 
 
Last 24hrs VS reviewed since prior progress note. Most recent are: 
 
Visit Vitals BP 99/67 (BP 1 Location: Right arm, BP Patient Position: At rest) Pulse 84 Temp 97.5 °F (36.4 °C) Resp 20 Ht 5' 7\" (1.702 m) Wt 77 kg (169 lb 11.2 oz) SpO2 100% BMI 26.58 kg/m² Intake/Output Summary (Last 24 hours) at 2019 0732 Last data filed at 2019 6857 Gross per 24 hour Intake 2610 ml Output 2150 ml Net 460 ml General Appearance: Well developed, well nourished, alert & oriented x 3,  
 no acute distress. Ears/Nose/Mouth/Throat: Pupils equal and round, Hearing grossly normal. 
Neck: Supple. JVP within normal limits. Carotids good upstrokes, with no bruit. Chest: Lungs with diffusely decreased breath sounds Cardiovascular: Regular rate and rhythm, S1S2 normal, harsh TARA Abdomen: Soft, non-tender, bowel sounds are active. No organomegaly. Extremities: No edema bilaterally. Femoral pulses +2, Distal Pulses +1. Skin: Warm and dry. Neuro: CN II-XII grossly intact, Strength and sensation grossly intact. Data:  
  
Prior to Admission medications Medication Sig Start Date End Date Taking? Authorizing Provider  
insulin regular (NOVOLIN R, HUMULIN R) 100 unit/mL injection 15 Units by SubCUTAneous route daily (with lunch). Patient takes 15 Units with breakfast, 15 Units with lunch, and 5 Units nightly   Yes Provider, Historical  
insulin regular (NOVOLIN R, HUMULIN R) 100 unit/mL injection 5 Units by SubCUTAneous route nightly. Patient takes 15 Units with breakfast, 15 Units with lunch, and 5 Units nightly   Yes Provider, Historical  
hydrocortisone sodium succ/PF (HYDROCORTISONE SOD SUCC, PF, INJECTION) by Injection route every three (3) months. Patient gets from orthopedist   Yes Provider, Historical  
furosemide (LASIX) 40 mg tablet 40 mg by Per G Tube route two (2) times a day. Yes Provider, Historical  
B.infantis-B.ani-B.long-B.bifi (PROBIOTIC 4X) 10-15 mg TbEC 1 Cap by Per G Tube route daily. Yes Provider, Historical  
doxycycline (MONODOX) 100 mg capsule 100 mg by Per G Tube route two (2) times a day. Yes Provider, Historical  
clopidogrel (PLAVIX) 75 mg tab 75 mg by PEG Tube route daily. Yes Provider, Historical  
aspirin delayed-release 81 mg tablet Take 81 mg by mouth daily. Yes Provider, Historical  
guaiFENesin (ROBITUSSIN) 100 mg/5 mL liquid 200 mg by Per G Tube route three (3) times daily.  12/5/18  Yes Provider, Historical  
 insulin regular (NOVOLIN R REGULAR U-100 INSULN) 100 unit/mL injection 15 Units by SubCUTAneous route daily (with breakfast). Patient takes 15 Units with breakfast, 15 Units with lunch, and 5 Units nightly   Yes Provider, Historical  
pramipexole (MIRAPEX) 0.25 mg tablet Take 1 Tab by mouth three (3) times daily. 10/24/18  Yes Corinne Barbone, MD  
albuterol (PROVENTIL VENTOLIN) 2.5 mg /3 mL (0.083 %) nebulizer solution 2.5 mg by Nebulization route two (2) times a day. Per wife patient can use 3 times a day if needed   Yes Provider, Historical  
famotidine (PEPCID) 20 mg tablet 20 mg by Per G Tube route three (3) times daily. Yes Provider, Historical  
atorvastatin (LIPITOR) 40 mg tablet 40 mg by Per G Tube route daily. Yes Other, MD Booker  
finasteride (PROSCAR) 5 mg tablet 5 mg by Per G Tube route nightly. Yes Other, MD Booker  
alfuzosin SR (UROXATRAL) 10 mg SR tablet 10 mg by Per G Tube route daily. 5/24/18  Yes Provider, Historical  
midodrine (PROAMITINE) 10 mg tablet 10 mg by Per G Tube route three (3) times daily (with meals). 11/1/16  Yes Provider, Historical  
HYDROcodone-acetaminophen (NORCO)  mg tablet 1 Tab by Per G Tube route every four (4) hours as needed for Pain. Yes Other, MD Booker  
zinc 50 mg tab tablet 50 mg by Per G Tube route daily. Yes Provider, Historical  
LANTUS SOLOSTAR U-100 INSULIN 100 unit/mL (3 mL) inpn INJECT 14 UNITS SUBCUTANEOUSLY ONCE DAILY 5/21/18  Yes Krunal Le MD  
gabapentin (NEURONTIN) 250 mg/5 mL solution 750 mg by Per G Tube route three (3) times daily. Yes Other, MD Booker  
multivitamin (ONE A DAY) tablet 1 Tab by Per G Tube route daily. Yes Other, MD Booker  
klack's mixture Solution Take 5 mL by mouth two (2) times a day. Diphenhydramine elixir 12.5mg/ml 500ml, Nystatin suspension 120ml,Tetracycline 500mg capsules  12 capsules (6g), Hydrocortisone 20mg tablet 12 tablets (240mg), Water for irrigation QS ad 1000ml      Provider, Historical  
 acetaminophen (TYLENOL) 500 mg tablet 500-1,000 mg by Per G Tube route every six (6) hours as needed for Pain. Provider, Historical  
fluticasone (FLONASE ALLERGY RELIEF) 50 mcg/actuation nasal spray 1 [place] by Both Nostrils route two (2) times daily as needed (runny nose). Provider, Historical  
nitroglycerin (NITROSTAT) 0.4 mg SL tablet 0.4 mg by SubLINGual route every five (5) minutes as needed for Chest Pain. Other, MD Booker  
 
 
Recent Results (from the past 24 hour(s)) GLUCOSE, POC Collection Time: 01/15/19 11:58 AM  
Result Value Ref Range Glucose (POC) 238 (H) 65 - 100 mg/dL Performed by Marco Bales ([facility]) GLUCOSE, POC Collection Time: 01/15/19  6:08 PM  
Result Value Ref Range Glucose (POC) 203 (H) 65 - 100 mg/dL Performed by Gera Pa GLUCOSE, POC Collection Time: 01/15/19 11:28 PM  
Result Value Ref Range Glucose (POC) 290 (H) 65 - 100 mg/dL Performed by Corbin Leblanc GLUCOSE, POC Collection Time: 01/16/19  5:28 AM  
Result Value Ref Range Glucose (POC) 165 (H) 65 - 100 mg/dL Performed by Corbin Leblanc

## 2019-01-17 NOTE — PROGRESS NOTES
Occupational Therapy Goals Initiated 1/14/2019 1. Patient will perform grooming standing at sink with independence within 7 day(s). 2.  Patient will perform upper body dressing with supervision/set-up within 7 day(s). 3.  Patient will perform lower body dressing with supervision/se t-up within 7 day(s). 4.  Patient will perform toilet transfers with modified independence within 7 day(s). 5.  Patient will perform all aspects of toileting with independence within 7 day(s). Occupational Therapy TREATMENT Patient: Kenroy Laws (80 y.o. male) Date: 1/17/2019 Diagnosis: Hypoxia Syncope <principal problem not specified> Precautions: Bed Alarm Chart, occupational therapy assessment, plan of care, and goals were reviewed. ASSESSMENT: 
Pt was cleared to be seen for therapy and all VSS and pt was supine in bed and on 2 liter of NC . Pt was min to mod assist of 1 for bed mobility and able to scoot to EOb with SBA. He was able to stand with SBA to CGA and with use of RW walked to the door of the room and back to recliner. He did not want to work on any ADLs and or walk to the bathroom for toilet transfer. Pt was moving slow but safely and should be able to bathe UB with setup and min assist for donning pull over shirt due to his shoulders area painful, pt reports. Pt is max to mod for LB dressing and recommendations for pt at discharge is rehab at [facility]. Per CM pt has refused rehab at [facility] and wants to return home. If pt has 24/7 assist and home care OT and PT at discharge , he will be able to progress and be safe Progression toward goals: 
[x]       Improving appropriately and progressing toward goals 
[]       Improving slowly and progressing toward goals 
[]       Not making progress toward goals and plan of care will be adjusted PLAN: 
Patient continues to benefit from skilled intervention to address the above impairments. Continue treatment per established plan of care. Discharge Recommendations:  Home Health OT and PT Further Equipment Recommendations for Discharge:  tbd SUBJECTIVE:  
Patient stated Do you want me to be 18 again? Linda Blancas OBJECTIVE DATA SUMMARY:  
Cognitive/Behavioral Status: 
Neurologic State: Alert Orientation Level: Oriented X4 Cognition: Follows commands Perception: Appears intact Perseveration: No perseveration noted Safety/Judgement: Awareness of environment Functional Mobility and Transfers for ADLs:Bed Mobility: 
Rolling: Modified independent Supine to Sit: Stand-by assistance Scooting: Modified independent Transfers: 
Sit to Stand: Contact guard assistance Balance: 
 impaired ADL Intervention: 
Feeding Feeding Assistance: Supervision/set-up Grooming Grooming Assistance: Supervision/set up Washing Face: Supervision/set-up Washing Hands: Supervision/set-up Brushing Teeth: Supervision/set-up Upper Body Bathing Bathing Assistance: Maximum assistance;Minimum assistance Lower Body Bathing Bathing Assistance: Maximum assistance;Minimum assistance Toileting Toileting Assistance: Minimum assistance;Contact guard assistance Bladder Hygiene: Contact guard assistance Bowel Hygiene: Minimum assistance Cognitive Retraining Safety/Judgement: Awareness of environment Pain: 
Pain Scale 1: Numeric (0 - 10) Pain Intensity 1: 2 Pain Location 1: Back; Hip Pain Orientation 1: Lower Pain Description 1: Aching Pain Intervention(s) 1: Rest;Repositioned Activity Tolerance:  
fair Please refer to the flowsheet for vital signs taken during this treatment. After treatment:  
[x] Patient left in no apparent distress sitting up in chair 
[] Patient left in no apparent distress in bed 
[x] Call bell left within reach [x] Nursing notified 
[] Caregiver present [x] Bed alarm activated COMMUNICATION/COLLABORATION:  
The patients plan of care was discussed with: Physical Therapist, Registered Nurse and wife TONE Mccann Time Calculation: 27 mins

## 2019-01-17 NOTE — PROGRESS NOTES
Nicolas Jean at [facility] informed this CM that they can accept. CM reviewed concerns with Liaison. Nicolas Jean will ask Mekhi Liang or Carl to stop by and meet with patient. CM asked if facility can order Osmolite 1.2 lo. 
They can order. If not available day of discharge Sasha has asked if hospital can send some with patient. Anticipate delivery Friday or Saturday at facility of Osmolite 1.2 lo. 
Cm discussed concerns regarding feeding times and patient's/wife request for continuous feedings. CM asked if it would interfere with Rehab and Mindy Jean provided Dietitian number for wife to discuss with them. Harika Ward, MITCHCM Ext [phone]

## 2019-01-17 NOTE — PROGRESS NOTES
Bedside shift change report given to Kenzie Mayberry RN (oncoming nurse) by Leeanna Chiang RN (offgoing nurse). Report included the following information SBAR, Kardex, Procedure Summary, Intake/Output, MAR, Accordion, Recent Results, Med Rec Status, Cardiac Rhythm Paced, Alarm Parameters , Pre Procedure Checklist, Procedure Verification and Quality Measures.

## 2019-01-17 NOTE — PROGRESS NOTES
Problem: Mobility Impaired (Adult and Pediatric) Goal: *Acute Goals and Plan of Care (Insert Text) Physical Therapy Goals Initiated 1/14/2019 1. Patient will move from supine to sit and sit to supine , scoot up and down and roll side to side in bed with independence within 7 day(s). 2.  Patient will transfer from bed to chair and chair to bed with modified independence using the least restrictive device within 7 day(s). 3.  Patient will perform sit to stand with modified independence within 7 day(s). 4.  Patient will ambulate with modified independence for 150 feet with the least restrictive device within 7 day(s). 5.  Patient will ascend/descend 6 stairs with right sided handrail(s) with supervision/set-up within 7 day(s). physical Therapy TREATMENT Patient: Miguelangel Garcia (80 y.o. male) Date: 1/17/2019 Diagnosis: Hypoxia Syncope <principal problem not specified> Precautions: Bed Alarm Chart, physical therapy assessment, plan of care and goals were reviewed. ASSESSMENT: 
Chart reviewed and pt cleared by nurse to mobilize. Pt received lying in bed reporting back/shoulder pain and verbally slightly resistant to PT tx. After some coaxing pt did agree to participate. Pt with moving better this visit, with no report of R hip pain. Improved gait quality and ambulation distance, but still limited to about 32ft, (took 1 standing rest break after approx 17ft), requesting to sit down. Verbal cues to increase B step length it improve gait efficiency. Pt verbalizing that he would if he were 25. VSS throughout treatment session. Pt demonstrating good safety awareness with all mobility. Pt should do fine to return home with Home Health to further assist pt in achieving optimal function to remain safe at home. Progression toward goals: 
[x]    Improving appropriately and progressing toward goals 
[]    Improving slowly and progressing toward goals []    Not making progress toward goals and plan of care will be adjusted PLAN: 
Patient continues to benefit from skilled intervention to address the above impairments. Continue treatment per established plan of care. Discharge Recommendations:  Home Health Further Equipment Recommendations for Discharge:  None, pt RW at home SUBJECTIVE:  
Patient stated If I were 18 I could do all of this stuff.  OBJECTIVE DATA SUMMARY:  
Critical Behavior: 
Neurologic State: Alert Orientation Level: Oriented X4 Cognition: Appropriate decision making, Appropriate for age attention/concentration, Appropriate safety awareness, Follows commands Safety/Judgement: Awareness of environment, Insight into deficits Functional Mobility Training: 
Bed Mobility: 
Rolling: Modified independent Supine to Sit: Stand-by assistance Scooting: Modified independent Transfers: 
Sit to Stand: Contact guard assistance Stand to Sit: Stand-by assistance Balance: 
Good with RW Ambulation/Gait Training: 
Distance (ft): 32 Feet (ft) Assistive Device: Walker, rolling;Gait belt Ambulation - Level of Assistance: Stand-by assistance Gait Abnormalities: Decreased step clearance Base of Support: Widened Speed/Gertrudis: Slow Step Length: Left shortened;Right shortened Pain: 
Pain Scale 1: Numeric (0 - 10) Pain Intensity 1: 2 Pain Location 1: Back; Hip Pain Orientation 1: Lower Pain Description 1: Aching Pain Intervention(s) 1: Rest;Repositioned Activity Tolerance:  
Fair Please refer to the flowsheet for vital signs taken during this treatment. After treatment:  
[x]    Patient left in no apparent distress sitting up in chair 
[]    Patient left in no apparent distress in bed 
[x]    Call bell left within reach [x]    Nursing notified 
[x]    Caregiver present [x]    Bed alarm activated COMMUNICATION/COLLABORATION:  
The patients plan of care was discussed with: Occupational Therapist and Registered Nurse Gloria Del Rosario, PT Time Calculation: 26 mins

## 2019-01-17 NOTE — PROGRESS NOTES
Problem: Falls - Risk of 
Goal: *Absence of Falls Document Caty Sheth Fall Risk and appropriate interventions in the flowsheet. Outcome: Progressing Towards Goal 
Fall Risk Interventions: 
Mobility Interventions: Assess mobility with egress test, Bed/chair exit alarm, OT consult for ADLs, Patient to call before getting OOB, PT Consult for mobility concerns, PT Consult for assist device competence, Strengthening exercises (ROM-active/passive) Mentation Interventions: Bed/chair exit alarm, Door open when patient unattended, Room close to nurse's station Medication Interventions: Assess postural VS orthostatic hypotension, Evaluate medications/consider consulting pharmacy, Patient to call before getting OOB, Teach patient to arise slowly Elimination Interventions: Call light in reach, Bed/chair exit alarm, Patient to call for help with toileting needs, Toilet paper/wipes in reach, Toileting schedule/hourly rounds History of Falls Interventions: Bed/chair exit alarm, Consult care management for discharge planning, Door open when patient unattended, Investigate reason for fall

## 2019-01-17 NOTE — PROGRESS NOTES
Attending informed Cm we can anticipate discharge 1/18/2019 if patient is medically stable. PTOT notes reflect patient's desire to discharge home with [facility]. [address] can accept. CM discussed discharge plan with patient. He expressed concern regarding his wife's ability to provide necessary care 24/7 due to her recent hip replacement and other issues. Patient asked CM to discuss discharge plan with his wife. He will consider [facility]. CM discussed with Mrs. Ghosh. She is concerned about feeding schedules \"the last time he was at [facility] they didn't feed him but twice a day\". Patient's wife has requested continuous feed if discharged to [facility]. Outcome of discussion - Wife will discuss with patient and inform this CM today of decision. If patient goes home PT has informed CM patient has necessary DME at home. CM confirmed patient has home O2. Provider is Arianna. Baseline is [facility]. Patient and wife informed CM that it was there understanding patient \"would be here a couple of more days and maybe through the weekend\". [id number] Patient has decided on going to SNF and would like a referral submitted to [facility] [place] [place] and Rehab. Wife is present. FOC signed. Copy on chart and to patient. Concerns they would like addressed at SNF prior to discharge: 
Continuous feed for discharge. Patient and wife report negligence in feeding during last vist. 
Patient cannot tolerate Jevity. Patient is presently on Osmolite 1.2 lo with this admission and tolerating well. 1455 - Cm discussed with attending. CM will submit referral to [facility] with plans to discharge on 1/18/2019 if medically stable. Referral submitted and followed with phone call to [employer] at [facility] [place] [place] and [address] [place] [address].  
 
[id number] - [facility] request submitted for 1/18/2019 for 11:00am 
 
 
Cm will continue to follow. Karin Olea RN  Ext [phone]

## 2019-01-17 NOTE — PROGRESS NOTES
Problem: Falls - Risk of 
Goal: *Absence of Falls Document Luis Enrique Stokes Fall Risk and appropriate interventions in the flowsheet. Fall Risk Interventions: 
Mobility Interventions: Assess mobility with egress test, Bed/chair exit alarm, OT consult for ADLs, Patient to call before getting OOB, PT Consult for mobility concerns, PT Consult for assist device competence, Strengthening exercises (ROM-active/passive) Mentation Interventions: Bed/chair exit alarm, Door open when patient unattended, Room close to nurse's station Medication Interventions: Assess postural VS orthostatic hypotension, Evaluate medications/consider consulting pharmacy, Patient to call before getting OOB, Teach patient to arise slowly Elimination Interventions: Call light in reach, Bed/chair exit alarm, Patient to call for help with toileting needs, Toilet paper/wipes in reach, Toileting schedule/hourly rounds History of Falls Interventions: Bed/chair exit alarm, Consult care management for discharge planning, Door open when patient unattended, Investigate reason for fall Problem: Pressure Injury - Risk of 
Goal: *Prevention of pressure injury Document Mark Scale and appropriate interventions in the flowsheet. Outcome: Progressing Towards Goal 
Pressure Injury Interventions: 
Sensory Interventions: Assess changes in LOC, Assess need for specialty bed, Avoid rigorous massage over bony prominences, Check visual cues for pain, Discuss PT/OT consult with provider, Float heels, Keep linens dry and wrinkle-free, Maintain/enhance activity level, Minimize linen layers, Pressure redistribution bed/mattress (bed type), Turn and reposition approx. every two hours (pillows and wedges if needed), Suspension boots Moisture Interventions: Absorbent underpads, Apply protective barrier, creams and emollients, Check for incontinence Q2 hours and as needed, Internal/External urinary devices, Limit adult briefs, Minimize layers, Moisture barrier Activity Interventions: Assess need for specialty bed, Increase time out of bed, Pressure redistribution bed/mattress(bed type), PT/OT evaluation Mobility Interventions: PT/OT evaluation, HOB 30 degrees or less, Float heels, Turn and reposition approx. every two hours(pillow and wedges) Nutrition Interventions: Document food/fluid/supplement intake, Discuss nutritional consult with provider Friction and Shear Interventions: Apply protective barrier, creams and emollients, Foam dressings/transparent film/skin sealants, HOB 30 degrees or less, Lift sheet, Transferring/repositioning devices

## 2019-01-17 NOTE — PROGRESS NOTES
MD paged about patient requesting to see her because Arthuro Stallion has not been fed\". Patient is very agitated and confused. Requesting that I feed him but was told numerous times that I fed him at 1801 and cannot feed him again. Patient called his wife and wife called requesting answers as well. I let her know the full story and \"she will talk to him\". MD responded back saying she will change his feedings to continuous tonight.

## 2019-01-17 NOTE — PROGRESS NOTES
Progress Note NAME: Mustapha Rowell :  1935 MRN:  [id number] Date/Time:  2019 2:08 PM 
 
Patient PCP: Aryan Ramos DO 
________________________________________________________________________ Assessment:  
 
 
  Hx:  
1. Acute on chronic systolic heart failure 2. Recurrent syncope 3. NSTEMI 4. Acute (resolved) on chronic kidney disease; Stg 3 
5. Coronary artery disease s/p remote CABG.  Cath  w/ EF 40%, PCI to distal LAD via LIMA, severe disease in large diagonal (not amenable to PCI), patent SVG-OM, SVG-PDA, SVG-PLB.  Lexiscan  w/ EF 28%, IWMI, and with diagonal ischemia. 6. Lexiscan stress MPI  w/ EF 29% and anterior ischemia w/ IWMI 7. Ischemic cardiomyopathy 8. Chronic thrombocytopenia 9. BiV ICD 10. Moderate to severe Aortic stenosis per previous echo . In process of evaluation for TAVR . [place].    
11. Diabetes 12. 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 
13. Orthostatic hypotension 14. Hyperlipidemia 15. Head/neck CA      w/ feeding tube 16. Chronic thrombocytopenia (Dr. Overton Alpers 17. S/p recurrent falls. Recurrent spells of sycnope 18. Syncope, fall and subdural hematoma 2018 19. Age related debility. 20. Echo  . EF 30%  
   
 
 Plan: TnI normal to 1.8 BNP 16k on admit  
sCr 2.2 --> 1.1 HDCT:  No acute intracranial hemorrhage. Near complete resolution of previous small left parafalcine subdural hemorrhage. Chronic infarctions in white matter 
disease as above. V/Q low prob CXR/Chest CT w/ pulmonary edema Echo reviewed; suboptimal.  Likely severe AoS; mostly unchanged from multiple prior studies. Cath  w/ 3v CAD, patent LIMA-LAD, 95% of SVG-OM2/RPDA s/p 1 RENATA, patent SVG-RCA, patent LM stents - Discussed w/ Dr. Jaye Garcia; preferably wanted to wait 90d out from recent small subfalcine hemorrhage (18) before setting up TAVR.  
- Question is whether this event was related to the valve or not; hard to tell.  Walking back from the bathroom when he got very dizzy and made to a chair and then \"went out of this world\". Says it is extremely difficult to make in from room to room in his house without getting exhausted and short of breath. Denies CP. 
- Diuresis continues orally 
- Continue other home meds; ASA, plavix (restarted), atorva, midodrine - Discharge planning - Will need appt w/ [facility] in the coming weeks to discuss potential TAVR [x]           High complexity decision making was performed Subjective:  
NAEO. Past Medical History:  
Diagnosis Date  Antiplatelet or antithrombotic long-term use 12/4/2014  Anxiety disorder  Arrhythmia 2009  
 bradycardia  Arthritis  CAD (coronary artery disease) s/p CABG 2002; Dr Hayley Conrad  Cancer ([facility] [address] 1996  
 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   
 left leg, lower back,   
 Cough  Depression  Diabetes ([facility] [address] Type II  Epigastric pain 8/17/2018  Esophageal dysmotility s/p dilitation  Esophageal motility disorder 7/8/2013 Frequent simultaneous or failed contractions, low amplitude contractions  suggests severe myopathy or diffuse spasm. I suspect the latter. Achalasia  is not present.  Fainting [address]  Fatigue  GERD (gastroesophageal reflux disease)  Heart failure ([facility] [address] 10/2014 Cardiomyopathy:Pacemaker upgrade:Biv and AICD  Dr. Mona Sheriff heart DrAlvaro last visit 5/11/2015  Hepatitis C Dx 1996  
 treated at [facility] in past; as of 4/15/15 wife states pt currently not under any treatment  Hyperlipidemia  Joint pain  Liver disease  Memory loss  Muscle pain  Muscle weakness  Myocardial infarct [facility]) 2013 Heart Cath: 40% LV EF, Stented distal LAD, patent Graft to circumflex  On tube feeding diet approx 2009 still has as of 9/28/15  (no po food/liquid/meds at all); Dr Maite Saldana  Other ill-defined conditions(799.89) 1996  
 1 dose of chemotherapy/radiation for tongue cancer  Other ill-defined conditions(799.89) BPH  Other ill-defined conditions(799.89) orthostatic hypotension  Pneumonia ~ April -May 2010  
 SOB (shortness of breath)  Stroke [facility]) approx 2003  
 left side-left finger tips numb; no imbalance or memory loss; as of 2015 not seeing neuro MD  
 Suicidal thoughts  Swallowing difficulty Past Surgical History:  
Procedure Laterality Date  ABDOMEN SURGERY PROC UNLISTED  1996  
 peg tube  CABG, ARTERY-VEIN, THREE  2000  HX CATARACT REMOVAL    
 bilateral  
 HX CHOLECYSTECTOMY  HX COLONOSCOPY    
 HX HEART CATHETERIZATION  2013 Stented distal LAD  HX MOHS PROCEDURES    
 bilateral  
 HX ORTHOPAEDIC    
 back surgery times two  HX OTHER SURGICAL Radical Left Neck  HX OTHER SURGICAL    
 NASAL POLYPS REMOVAL  
 HX OTHER SURGICAL  2010 TURP  
 HX PACEMAKER  11/08  HX PACEMAKER  10/28/14 Defibrillator: [facility] # [id number], serial # [id number]; Dr. Quintana Class [phone]; Dr Koko Brian  HX UROLOGICAL    
 cystoscopy  [facility] [place]  
 cevical surgery  MA CHANGE GASTROSTOMY TUBE  5/2/2011  MA CHANGE GASTROSTOMY TUBE  6/29/2011  MA EGD INSERT GUIDE WIRE DILATOR PASSAGE ESOPHAGUS  9/13/2010  MA EGD TRANSORAL BIOPSY SINGLE/MULTIPLE  9/13/2010  
    
 STOMACH SURGERY PROCEDURE UNLISTED  6/29/2011  UPPER GI ENDOSCOPY,BIOPSY  8/17/2018  UPPER GI ENDOSCOPY,DILATN W GUIDE  8/17/2018  UPPER GI ENDOSCOPY,W/DILAT,GASTRIC OUT  8/17/2018  VASCULAR SURGERY PROCEDURE UNLIST    
 bilateral carotid stents Allergies Allergen Reactions  Cleocin [Clindamycin Hcl] Rash  Demerol [Meperidine] Shortness of Breath  Paxil [Paroxetine Hcl] Unknown (comments) Pt gets shaky and loses control of legs  Amoxicillin Rash  Pcn [Penicillins] Rash Meds:  See below Social History Tobacco Use  Smoking status: Never Smoker  Smokeless tobacco: Never Used Substance Use Topics  Alcohol use: No  
  
Family History Problem Relation Age of Onset  Heart Disease Father   
      at age 52 from CAD  Colon Cancer Mother  Cancer Mother   
     colon ca  
 Heart Disease Brother REVIEW OF SYSTEMS:   
 []            Unable to obtain  ROS due to --- 
 [x]            Total of 12 systems reviewed as follows: 
 
Constitutional: negative fever, negative chills, negative weight loss Eyes:   negative visual changes ENT:   negative sore throat, tongue or lip swelling Respiratory:  negative cough, negative dyspnea Cards:  negative for chest pain, palpitations, lower extremity edema GI:   negative for nausea, vomiting, diarrhea, and abdominal pain Genitourinary: negative for frequency, dysuria Integument:  negative for rash Hematologic:  negative for easy bruising and gum/nose bleeding Musculoskel: negative for myalgias,  back pain Neurological:  negative for headaches, dizziness, vertigo, weakness Behavl/Psych: negative for feelings of anxiety, depression Pertinent Positives include : 
 
Objective:  
  
Physical Exam: 
 
Last 24hrs VS reviewed since prior progress note. Most recent are: 
 
Visit Vitals /61 (BP 1 Location: Left arm, BP Patient Position: At rest;Sitting) Pulse 79 Temp 97.7 °F (36.5 °C) Resp 18 Ht 5' 7\" (1.702 m) Wt 73.9 kg (162 lb 14.7 oz) SpO2 100% BMI 25.52 kg/m² Intake/Output Summary (Last 24 hours) at 2019 0391 Last data filed at 2019 0400 Gross per 24 hour Intake 300 ml Output 1350 ml Net -1050 ml General Appearance: Well developed, well nourished, alert & oriented x 3,  
 no acute distress. Ears/Nose/Mouth/Throat: Pupils equal and round, Hearing grossly normal. 
Neck: Supple. JVP within normal limits. Carotids good upstrokes, with no bruit. Chest: Lungs with diffusely decreased breath sounds Cardiovascular: Regular rate and rhythm, S1S2 normal, harsh TARA Abdomen: Soft, non-tender, bowel sounds are active. No organomegaly. Extremities: No edema bilaterally. Femoral pulses +2, Distal Pulses +1. Skin: Warm and dry. Neuro: CN II-XII grossly intact, Strength and sensation grossly intact. Post-cath site ok Data:  
  
Prior to Admission medications Medication Sig Start Date End Date Taking? Authorizing Provider  
insulin regular (NOVOLIN R, HUMULIN R) 100 unit/mL injection 15 Units by SubCUTAneous route daily (with lunch). Patient takes 15 Units with breakfast, 15 Units with lunch, and 5 Units nightly   Yes Provider, Historical  
insulin regular (NOVOLIN R, HUMULIN R) 100 unit/mL injection 5 Units by SubCUTAneous route nightly. Patient takes 15 Units with breakfast, 15 Units with lunch, and 5 Units nightly   Yes Provider, Historical  
hydrocortisone sodium succ/PF (HYDROCORTISONE SOD SUCC, PF, INJECTION) by Injection route every three (3) months. Patient gets from orthopedist   Yes Provider, Historical  
furosemide (LASIX) 40 mg tablet 40 mg by Per G Tube route two (2) times a day. Yes Provider, Historical  
B.infantis-B.ani-B.long-B.bifi (PROBIOTIC 4X) 10-15 mg TbEC 1 Cap by Per G Tube route daily. Yes Provider, Historical  
doxycycline (MONODOX) 100 mg capsule 100 mg by Per G Tube route two (2) times a day. Yes Provider, Historical  
clopidogrel (PLAVIX) 75 mg tab 75 mg by PEG Tube route daily. Yes Provider, Historical  
aspirin delayed-release 81 mg tablet Take 81 mg by mouth daily. Yes Provider, Historical  
guaiFENesin (ROBITUSSIN) 100 mg/5 mL liquid 200 mg by Per G Tube route three (3) times daily.  12/5/18  Yes Provider, Historical  
 insulin regular (NOVOLIN R REGULAR U-100 INSULN) 100 unit/mL injection 15 Units by SubCUTAneous route daily (with breakfast). Patient takes 15 Units with breakfast, 15 Units with lunch, and 5 Units nightly   Yes Provider, Historical  
pramipexole (MIRAPEX) 0.25 mg tablet Take 1 Tab by mouth three (3) times daily. 10/24/18  Yes Shemar Faust MD  
albuterol (PROVENTIL VENTOLIN) 2.5 mg /3 mL (0.083 %) nebulizer solution 2.5 mg by Nebulization route two (2) times a day. Per wife patient can use 3 times a day if needed   Yes Provider, Historical  
famotidine (PEPCID) 20 mg tablet 20 mg by Per G Tube route three (3) times daily. Yes Provider, Historical  
atorvastatin (LIPITOR) 40 mg tablet 40 mg by Per G Tube route daily. Yes Other, MD Booker  
finasteride (PROSCAR) 5 mg tablet 5 mg by Per G Tube route nightly. Yes Other, MD Booker  
alfuzosin SR (UROXATRAL) 10 mg SR tablet 10 mg by Per G Tube route daily. 5/24/18  Yes Provider, Historical  
midodrine (PROAMITINE) 10 mg tablet 10 mg by Per G Tube route three (3) times daily (with meals). 11/1/16  Yes Provider, Historical  
HYDROcodone-acetaminophen (NORCO)  mg tablet 1 Tab by Per G Tube route every four (4) hours as needed for Pain. Yes Other, MD Booker  
zinc 50 mg tab tablet 50 mg by Per G Tube route daily. Yes Provider, Historical  
LANTUS SOLOSTAR U-100 INSULIN 100 unit/mL (3 mL) inpn INJECT 14 UNITS SUBCUTANEOUSLY ONCE DAILY 5/21/18  Yes Froy Miller MD  
gabapentin (NEURONTIN) 250 mg/5 mL solution 750 mg by Per G Tube route three (3) times daily. Yes Other, MD Booker  
multivitamin (ONE A DAY) tablet 1 Tab by Per G Tube route daily. Yes Other, MD Booker  
klack's mixture Solution Take 5 mL by mouth two (2) times a day. Diphenhydramine elixir 12.5mg/ml 500ml, Nystatin suspension 120ml,Tetracycline 500mg capsules  12 capsules (6g), Hydrocortisone 20mg tablet 12 tablets (240mg), Water for irrigation QS ad 1000ml      Provider, Historical  
 acetaminophen (TYLENOL) 500 mg tablet 500-1,000 mg by Per G Tube route every six (6) hours as needed for Pain. Provider, Historical  
fluticasone (FLONASE ALLERGY RELIEF) 50 mcg/actuation nasal spray 1 [place] by Both Nostrils route two (2) times daily as needed (runny nose). Provider, Historical  
nitroglycerin (NITROSTAT) 0.4 mg SL tablet 0.4 mg by SubLINGual route every five (5) minutes as needed for Chest Pain. Other, MD Booker  
 
 
Recent Results (from the past 24 hour(s)) GLUCOSE, POC Collection Time: 01/16/19  1:35 PM  
Result Value Ref Range Glucose (POC) 139 (H) 65 - 100 mg/dL Performed by Yudi Easley GLUCOSE, POC Collection Time: 01/16/19  7:10 PM  
Result Value Ref Range Glucose (POC) 254 (H) 65 - 100 mg/dL Performed by Edilberto Frazier GLUCOSE, POC Collection Time: 01/17/19 12:02 AM  
Result Value Ref Range Glucose (POC) 246 (H) 65 - 100 mg/dL Performed by Munchkin Fun, BASIC Collection Time: 01/17/19  2:34 AM  
Result Value Ref Range Sodium 137 136 - 145 mmol/L Potassium 3.9 3.5 - 5.1 mmol/L Chloride 96 (L) 97 - 108 mmol/L  
 CO2 36 (H) 21 - 32 mmol/L Anion gap 5 5 - 15 mmol/L Glucose 150 (H) 65 - 100 mg/dL BUN 30 (H) 6 - 20 MG/DL Creatinine 1.11 0.70 - 1.30 MG/DL  
 BUN/Creatinine ratio 27 (H) 12 - 20 GFR est AA >60 >60 ml/min/1.73m2 GFR est non-AA >60 >60 ml/min/1.73m2 Calcium 8.8 8.5 - 10.1 MG/DL  
CBC W/O DIFF Collection Time: 01/17/19  2:34 AM  
Result Value Ref Range WBC 5.5 4.1 - 11.1 K/uL  
 RBC 3.37 (L) 4.10 - 5.70 M/uL  
 HGB 10.0 (L) 12.1 - 17.0 g/dL HCT 32.2 (L) 36.6 - 50.3 % MCV 95.5 80.0 - 99.0 FL  
 MCH 29.7 26.0 - 34.0 PG  
 MCHC 31.1 30.0 - 36.5 g/dL  
 RDW 16.4 (H) 11.5 - 14.5 % PLATELET 456 (L) 545 - 400 K/uL MPV 10.7 8.9 - 12.9 FL  
 NRBC 0.0 0  WBC ABSOLUTE NRBC 0.00 0.00 - 0.01 K/uL GLUCOSE, POC  Collection Time: 01/17/19  6:04 AM  
 Result Value Ref Range Glucose (POC) 134 (H) 65 - 100 mg/dL  Performed by Saturnino Gomes

## 2019-01-17 NOTE — PROGRESS NOTES
Hospitalist Progress Note NAME: Ike Joseph :  1935 MRN:  [id number] Assessment / Plan: 
Syncope with hypotension, multifactorial including prior posterior CVA (known  
severe vertebrobasilar disease), severe AS, NSTEMI with CAD s/p CABGx3 and acute systolic CHF with acute hypoxic respiratory failure/acute pulmonary edema: Pt hoping for TAVR after his 90 day waiting period from intracranial bleed. Pt with recent admission to [facility] in  with subfalcine SDH and SAH due to syncope. Baseline wears 2 LPM O2. 
- CXR  with pulmonary edema; V/Q low prob for PE 
- CTA chest  with pulmonary vascular prominence, bilateral interstitial edema and bilateral patchy airspace disease. Very small bilateral pleural effusions. - echo 1/15 with EF 30%. There was moderate diffuse hypokinesis. - cardiology appreciated, s/p cath  with Dr. Javon Chowdhury with PCI of the SVG-OM2/RPDA with RENATA placement 
- cardiovascular surgery consults appreciated. Pt will need to wait >90 days from intracranial bleed before TAVR could be considered. - con't ASA and plavix; con't home lipitor. 
- on home oral lasix (diuresed IV on admission). Strict I/Os, daily weights. - con't midodrine. No ACE/ARB or bblocker due to hypotension. 
- palliative care consult appreciated, Pt wants to remain full code Acute kidney injury (resolved) in setting of CKD3: recent CTA C/A/P with contrast  as part of outpatient TAVR evaluation. - renal US with no visible abnormality 
- con't lasix as above Recent subfalcine SDH and SAH s/p fall/syncope early 2018: CT head with no acute intracranial hemorrhage. Near complete resolution of previous small left parafalcine subdural hemorrhage. 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 Jacobo for dysphagia. 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 Lee 
- con't lantus at reduced dose per DTC recommendations 
- con't SSI, regular insulin with TF  
Acute/chronic R hip pain with chronic diffuse pain (shoulders, hips, knees, back): missed last 2 appts with Dr. Justen Hensley at [facility] for injections due to medical issues. Also sees Dr. Manning Screen for his shoulders. - con't lidoderm patch 
- prn oxycodone at lower dose due to chronic hypotension (was too hypotensive on 5mg dose, should only take 2.5mg at a time) - wife asked to call Dr. Nevin Hudson office and try to arrange f/u for next week. With new stent, Pt will not be able to come off plavix for spinal injections - she is aware of this Urinary retention due to BPH: sees Dr. Darleen Samaniego. Straight cath prn.  
H/o streptococcal bacteremia 7/18 H/o stroke 2015  
Chronic thrombocytopenia, follows with Dr. Menon   
  
Code Status: Full Surrogate Decision Maker: wife DVT Prophylaxis: Heparin  
  
Baseline: walks with walker/rollator, Ramya Soles Subjective: Chief Complaint / Reason for Physician Visit Doing a little better today. No new complaints. Discussed with RN events overnight. Review of Systems: 
Symptom Y/N Comments  Symptom Y/N Comments Fever/Chills n   Chest Pain n   
Poor Appetite n   Edema n   
Cough n   Abdominal Pain n   
Sputum n   Joint Pain SOB/KRAMER n   Pruritis/Rash Nausea/vomit    Tolerating PT/OT Diarrhea    Tolerating Diet Constipation    Other Could NOT obtain due to:   
 
Objective: VITALS:  
Last 24hrs VS reviewed since prior progress note. Most recent are: 
Patient Vitals for the past 24 hrs: 
 Temp Pulse Resp BP SpO2  
01/17/19 0756     99 % 01/17/19 0726 98.1 °F (36.7 °C) 72 19 (!) 128/39 99 % 01/17/19 0230 97.7 °F (36.5 °C) 79 18 135/61 100 % 01/16/19 2205 97.8 °F (36.6 °C) 79 18 124/50 100 % 01/16/19 1906 97.6 °F (36.4 °C) 79 18 137/66 98 % 01/16/19 1904     100 % 01/16/19 1806  80   98 % 01/16/19 1700  75 18 106/85 100 % 01/16/19 1619  76 18 129/40 97 % 01/16/19 1513 97.6 °F (36.4 °C) 74 18 113/50 99 % 01/16/19 1431  71   100 % 01/16/19 1415  73   99 % 01/16/19 1400  74 20 116/54 100 % 01/16/19 1345  72 20 123/54 100 % 01/16/19 1337 97.6 °F (36.4 °C) 76 20 101/59 100 % 01/16/19 1117 98.1 °F (36.7 °C) 73 20 116/47 100 % Intake/Output Summary (Last 24 hours) at 1/17/2019 1047 Last data filed at 1/17/2019 0400 Gross per 24 hour Intake 280 ml Output 1350 ml Net -1070 ml PHYSICAL EXAM: 
General: WD, WN. Alert, cooperative, no acute distress   
EENT:  EOMI. Anicteric sclerae. MMM Resp:  CTA bilaterally, no wheezing or rales. No accessory muscle use CV:  Regular  rhythm,  No edema GI:  Soft, mildly distended, Non tender.  +Bowel sounds Neurologic:  Alert and oriented X 3, normal speech, Psych:   Some insight. Not anxious nor agitated Skin:  No rashes. No jaundice Reviewed most current lab test results and cultures  YES Reviewed most current radiology test results   YES Review and summation of old records today    NO Reviewed patient's current orders and MAR    YES 
PMH/SH reviewed - no change compared to H&P 
________________________________________________________________________ Care Plan discussed with: 
  Comments Patient x Family  x Brother in law at bedside RN x Care Manager Consultant Multidiciplinary team rounds were held today with , nursing, pharmacist and clinical coordinator. Patient's plan of care was discussed; medications were reviewed and discharge planning was addressed. ________________________________________________________________________ Total NON critical care TIME:  25 Minutes Total CRITICAL CARE TIME Spent:   Minutes non procedure based Comments >50% of visit spent in counseling and coordination of care x   
________________________________________________________________________ Marilou Fleischer, MD  
 
Procedures: see electronic medical records for all procedures/Xrays and details which were not copied into this note but were reviewed prior to creation of Plan. LABS: 
I reviewed today's most current labs and imaging studies. Pertinent labs include: 
Recent Labs  
  01/17/19 
0234 01/15/19 
0008 WBC 5.5 4.8 HGB 10.0* 9.9*  
HCT 32.2* 31.0*  
* 93* Recent Labs  
  01/17/19 
0234 01/15/19 
0008  141  
K 3.9 3.6 CL 96* 98  
CO2 36* 39* * 204* BUN 30* 40* CREA 1.11 1.24  
CA 8.8 8.7 Signed: Marilou Fleischer, MD

## 2019-01-17 NOTE — CARDIO/PULMONARY
C/p Rehab note- chart reviewed. 
  
Adm after possible syncopal spell at home.   Slumped in his rollator.  Wife couldn't arouse. Has been in process of evaluation for TAVR. 
  
Hx includes CABG,Diabetes,AICD,Heart failure, Peripheral neuropathy,stroke, Parkinson's, Tongue cancer,Peg tube. Nonsmoker. LVEF 30-35% in December 2018. 
  
Previous CHF teaching by [facility] in the past. Provided with new 2019 calendar to write down weight. Understanding verbalized. 
  
Plan per Dr. Woo Forward - cardiac cath with stent on 1/16/19, will need appointment at valve center for TAVR. Per CM note: PT/OT are following and suggest that SNF may be needed at dc if pt's mobility/endurance does not improve. 
  
[facility] is following pt and will provide PT/OT/RN at dc if pt able to go home. 
  
[facility] will continue to monitor situation.

## 2019-01-18 NOTE — PROGRESS NOTES
Transitional Care HUG Note ED [facility] Patient: Francia Barroso MRN: [id number]  SSN: [id number] YOB: 1935  Age: 80 y.o. Sex: male Admit Date: 1/11/2019 LOS: 7 days Assessment / Plan: 
 
Risk of Readmission: Moderate 
 
-   wife and patient understanding of  HF diagnosis 
-   good support system, difficulty in understanding long term trajectory of outcomes 
-    mistrust of health system,due past experiences. need clear concise instructions and education 
-    overall easily confused/overwhelmed prompting \"we can just come back to ED\" 
 -   age related debility -    Multiple micro ischemic cerebral infarctions Will see patient at [facility] on Monday. For SUSAN for HUG 
 
18.5 - 24.9 Normal weight / Body mass index is 25.38 kg/m². Code status: Full Recommended Disposition: SNF rehab Subjective:  
 
 Patient due to be discharged today to [facility] for continued rehab. Admitted for syncope due to hypotension that appears to be multifactorial currently for VBD, Severe AS scheduled for f/u 1/31/2019 for possible TAVR procedure, NSTEMI, CAD with CABG and systolic CHF. He  Recently admitted to [facility] for a SDH and SAH due to a FALL, he had been on DAP. Workup for TAVR has been deferred for 90 day due to hemorrhage currently resolving. .Current ECHO 30%. Cath 1/16 w/ 3v CAD, patent LIMA-LAD, 95% of SVG-OM2/RPDA s/p 1 RENATA, patent SVG-RCA, patent LM stents Elena Friend Unable to determine if syncopal event related to AS primary symptom is dizziness. Patient reports 2 such episodes resulting in Falls. 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 Cardiology consult completed Number of Admissions this year  8 Heart Failure Bundle yes Advance Care Plan:  
 
Persons included in Conversation:  patient and POA Length of ACP Conversation in minutes:  1 hour Authorized Decision Maker (if patient is incapable of making informed decisions): This person is: 
wife Review of Existing Advance Directive: 
Requested that he provide it at next admission or opportunity explained rational for assessment of values. Agreed. Requests to maintain to be a FULL code for now. ROS:  
 
A comprehensive ROS was performed and was negative except for as per HPI. Objective:  
 
 
Past Medical History:  
Diagnosis Date  Antiplatelet or antithrombotic long-term use 12/4/2014  Anxiety disorder  Arrhythmia 2009  
 bradycardia  Arthritis  CAD (coronary artery disease) s/p CABG 2002; Dr Ashish Raoy  Cancer ([facility] [address] 1996  
 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   
 left leg, lower back,   
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 I suspect the latter. Achalasia  is not present.  Fainting [address]  Fatigue  GERD (gastroesophageal reflux disease)  Heart failure ([facility] [address] 10/2014 Cardiomyopathy:Pacemaker upgrade:Biv and AICD  Dr. Saundra Bales heart DrAlvaro last visit 5/11/2015  Hepatitis C Dx 1996  
 treated at [facility] in past; as of 4/15/15 wife states pt currently not under any treatment  Hyperlipidemia  Joint pain  Liver disease  Memory loss  Muscle pain  Muscle weakness  Myocardial infarct [facility]) 2013 Heart Cath: 40% LV EF, Stented distal LAD, patent Graft to circumflex  On tube feeding diet approx 2009  
 still has as of 9/28/15  (no po food/liquid/meds at all); Dr Indiana Morales  Other ill-defined conditions(799.89) 1996 1 dose of chemotherapy/radiation for tongue cancer  Other ill-defined conditions(799.89) BPH  Other ill-defined conditions(799.89) orthostatic hypotension  Pneumonia ~ April -May 2010  
 SOB (shortness of breath)  Stroke [facility]) approx 2003  
 left side-left finger tips numb; no imbalance or memory loss; as of 2015 not seeing neuro MD  
 Suicidal thoughts  Swallowing difficulty Past Surgical History:  
Procedure Laterality Date  ABDOMEN SURGERY PROC UNLISTED  1996  
 peg tube  CABG, ARTERY-VEIN, THREE  2000  HX CATARACT REMOVAL    
 bilateral  
 HX CHOLECYSTECTOMY  HX COLONOSCOPY    
 HX HEART CATHETERIZATION  2013 Stented distal LAD  HX MOHS PROCEDURES    
 bilateral  
 HX ORTHOPAEDIC    
 back surgery times two  HX OTHER SURGICAL Radical Left Neck  HX OTHER SURGICAL    
 NASAL POLYPS REMOVAL  
 HX OTHER SURGICAL  2010 TURP  
 HX PACEMAKER  11/08  HX PACEMAKER  10/28/14 Defibrillator: [facility] # [id number], serial # [id number]; Dr. Prabhakar [facility] [phone]; Dr Leo Espinoza  HX UROLOGICAL    
 cystoscopy [facility]  
 cevical surgery  MA CHANGE GASTROSTOMY TUBE  5/2/2011  MA CHANGE GASTROSTOMY TUBE  6/29/2011  MA EGD INSERT GUIDE WIRE DILATOR PASSAGE ESOPHAGUS  9/13/2010  MA EGD TRANSORAL BIOPSY SINGLE/MULTIPLE  9/13/2010  
    
 STOMACH SURGERY PROCEDURE UNLISTED  6/29/2011  UPPER GI ENDOSCOPY,BIOPSY  8/17/2018  UPPER GI ENDOSCOPY,DILATN W GUIDE  8/17/2018  UPPER GI ENDOSCOPY,W/DILAT,GASTRIC OUT  8/17/2018  VASCULAR SURGERY PROCEDURE UNLIST    
 bilateral carotid stents Social History Tobacco Use Smoking Status Never Smoker Smokeless Tobacco Never Used Current Facility-Administered Medications Medication Dose Route Frequency  insulin glargine (LANTUS) injection 10 Units  10 Units SubCUTAneous DAILY  insulin regular (NOVOLIN R, HUMULIN R) injection 12 Units  12 Units SubCUTAneous 2 times per day  insulin regular (NOVOLIN R, HUMULIN R) injection 4 Units  4 Units SubCUTAneous Daily  insulin lispro (HUMALOG) injection   SubCUTAneous Q6H  
 aspirin chewable tablet 81 mg  81 mg Per G Tube DAILY  lidocaine 4 % patch 1 Patch  1 Patch TransDERmal Q24H  
 oxyCODONE IR (ROXICODONE) tablet 2.5 mg  2.5 mg Oral Q6H PRN  
 furosemide (LASIX) tablet 40 mg  40 mg Per G Tube ACB&D  
 nitroglycerin (NITROBID) 2 % ointment 1 Inch  1 Inch Topical Q6H PRN  
 ondansetron (ZOFRAN) injection 4 mg  4 mg IntraVENous Q6H PRN  
 albuterol (PROVENTIL VENTOLIN) nebulizer solution 2.5 mg  2.5 mg Nebulization BID RT  
 clopidogrel (PLAVIX) tablet 75 mg  75 mg Oral DAILY  heparin (porcine) injection 5,000 Units  5,000 Units SubCUTAneous Q8H  
 sodium chloride (NS) flush 5-10 mL  5-10 mL IntraVENous PRN  
 sodium chloride (NS) flush 5-40 mL  5-40 mL IntraVENous Q8H  
 sodium chloride (NS) flush 5-40 mL  5-40 mL IntraVENous PRN  
 acetaminophen (TYLENOL) tablet 500 mg  500 mg Per G Tube Q6H PRN  
 tamsulosin (FLOMAX) capsule 0.4 mg  0.4 mg Oral DAILY  atorvastatin (LIPITOR) tablet 40 mg  40 mg Per G Tube DAILY  lactobac ac& pc-s.therm-b.anim (JAVY Q/RISAQUAD)   Oral DAILY  famotidine (PEPCID) tablet 20 mg  20 mg Per G Tube BID  finasteride (PROSCAR) tablet 5 mg  5 mg Oral QHS  gabapentin (NEURONTIN) 250 mg/5 mL solution 750 mg  750 mg Oral TID  guaiFENesin (ROBITUSSIN) 100 mg/5 mL oral liquid 200 mg  200 mg Per G Tube TID  midodrine (PROAMITINE) tablet 10 mg  10 mg Per G Tube TID WITH MEALS  therapeutic multivitamin (THERAGRAN) tablet 1 Tab  1 Tab Oral DAILY  pramipexole (MIRAPEX) tablet 0.25 mg  0.25 mg Oral TID  glucose chewable tablet 16 g  4 Tab Oral PRN  
 glucagon (GLUCAGEN) injection 1 mg  1 mg IntraMUSCular PRN  
 doxycycline (VIBRA-TABS) tablet 100 mg  100 mg Oral Q12H  zinc sulfate (ZINCATE) capsule 1 Cap  1 Cap Oral DAILY  dextrose (D50W) injection syrg 12.5-25 g  12.5-25 g IntraVENous PRN Prior to Admission Medications Prescriptions Last Dose Informant Patient Reported? Taking? B.infantis-B.ani-B.long-B.bifi (PROBIOTIC 4X) 10-15 mg TbEC 1/10/2019 at 1100 Significant Other Yes Yes Si Cap by Per G Tube route daily. HYDROcodone-acetaminophen (NORCO)  mg tablet 2019 at 0630 Significant Other Yes Yes Si Tab by Per G Tube route every four (4) hours as needed for Pain. LANTUS SOLOSTAR U-100 INSULIN 100 unit/mL (3 mL) inpn 2019 at 0800 Significant Other No No  
Sig: INJECT 14 UNITS SUBCUTANEOUSLY ONCE DAILY  
acetaminophen (TYLENOL) 500 mg tablet Not Taking at Unknown time Significant Other Yes No  
Si-1,000 mg by Per G Tube route every six (6) hours as needed for Pain. albuterol (PROVENTIL VENTOLIN) 2.5 mg /3 mL (0.083 %) nebulizer solution 1/10/2019 at 1700 Significant Other Yes Yes Si.5 mg by Nebulization route two (2) times a day. Per wife patient can use 3 times a day if needed  
alfuzosin SR (UROXATRAL) 10 mg SR tablet 1/10/2019 at 1400 Significant Other Yes Yes Sig: 10 mg by Per G Tube route daily. aspirin delayed-release 81 mg tablet 2019 at 0815 Significant Other Yes Yes Sig: Take 81 mg by mouth daily. atorvastatin (LIPITOR) 40 mg tablet 2019 at 0815 Significant Other Yes Yes Si mg by Per G Tube route daily. clopidogrel (PLAVIX) 75 mg tab 2019 at 0815 Significant Other Yes Yes Si mg by PEG Tube route daily. doxycycline (MONODOX) 100 mg capsule   Yes Yes Si mg by Per G Tube route two (2) times a day. famotidine (PEPCID) 20 mg tablet 2019 at 0815 Significant Other Yes Yes Si mg by Per G Tube route three (3) times daily. finasteride (PROSCAR) 5 mg tablet 1/10/2019 at 2000 Significant Other Yes Yes Si mg by Per G Tube route nightly. fluticasone (FLONASE ALLERGY RELIEF) 50 mcg/actuation nasal spray Not Taking at Unknown time Significant Other Yes No  
Si [place] by Both Nostrils route two (2) times daily as needed (runny nose). furosemide (LASIX) 40 mg tablet 2019 at 0800 Significant Other Yes Yes Si mg by Per G Tube route two (2) times a day.  
gabapentin (NEURONTIN) 250 mg/5 mL solution 2019 at 0800 Significant Other Yes Yes Si mg by Per G Tube route three (3) times daily. guaiFENesin (ROBITUSSIN) 100 mg/5 mL liquid 2019 at 0800 Significant Other Yes Yes Si mg by Per G Tube route three (3) times daily. hydrocortisone sodium succ/PF (HYDROCORTISONE SOD SUCC, PF, INJECTION) 12/15/2018 Significant Other Yes Yes Sig: by Injection route every three (3) months. Patient gets from orthopedist  
insulin regular (NOVOLIN R REGULAR U-100 INSULN) 100 unit/mL injection 2019 at 0800 Significant Other Yes Yes Sig: 15 Units by SubCUTAneous route daily (with breakfast). Patient takes 15 Units with breakfast, 15 Units with lunch, and 5 Units nightly  
insulin regular (NOVOLIN R, HUMULIN R) 100 unit/mL injection 1/10/2019 at 1400 Significant Other Yes Yes Sig: 15 Units by SubCUTAneous route daily (with lunch). Patient takes 15 Units with breakfast, 15 Units with lunch, and 5 Units nightly  
insulin regular (NOVOLIN R, HUMULIN R) 100 unit/mL injection 1/10/2019 at 2000 Significant Other Yes No  
Si Units by SubCUTAneous route nightly. Patient takes 15 Units with breakfast, 15 Units with lunch, and 5 Units nightly  
klack's mixture Solution Not Taking at Unknown time Significant Other Yes No  
Sig: Take 5 mL by mouth two (2) times a day. Diphenhydramine elixir 12.5mg/ml 500ml, Nystatin suspension 120ml,Tetracycline 500mg capsules  12 capsules (6g), Hydrocortisone 20mg tablet 12 tablets (240mg), Water for irrigation QS ad 1000ml midodrine (PROAMITINE) 10 mg tablet 2019 at 0800 Significant Other Yes Yes Sig: 10 mg by Per G Tube route three (3) times daily (with meals). multivitamin (ONE A DAY) tablet 2019 at 0800 Significant Other Yes Yes Si Tab by Per G Tube route daily. nitroglycerin (NITROSTAT) 0.4 mg SL tablet Not Taking at Unknown time Significant Other Yes No  
Si.4 mg by SubLINGual route every five (5) minutes as needed for Chest Pain. pramipexole (MIRAPEX) 0.25 mg tablet 2019 at 0800 Significant Other No Yes Sig: Take 1 Tab by mouth three (3) times daily. zinc 50 mg tab tablet 1/10/2019 at 1100 Significant Other Yes Yes Si mg by Per G Tube route daily. Facility-Administered Medications: None Patient Vitals for the past 24 hrs: 
 Temp Pulse Resp BP SpO2  
19 1114 98.2 °F (36.8 °C) 84 18 127/50 98 % 19 0753     100 % 19 0732 97.8 °F (36.6 °C) 82 17 (!) 116/30   
19 0232 98.4 °F (36.9 °C) 73 18 106/50 95 % 19 2353 98.4 °F (36.9 °C) 71 18 112/44 98 % 19 2018    116/50   
19 2000 98.8 °F (37.1 °C) 75 18 116/50 99 % 19 1516 97.7 °F (36.5 °C) 70 19 103/52 97 % Intake and Output: 
 
Intake/Output Summary (Last 24 hours) at 2019 1217 Last data filed at 2019 1114 Gross per 24 hour Intake 1815 ml Output 1176 ml Net 639 ml Physical Exam: 
Visit Vitals /50 Pulse 84 Temp 98.2 °F (36.8 °C) Resp 18 Ht 5' 7\" (1.702 m) Wt 73.5 kg (162 lb 0.6 oz) SpO2 98% BMI 25.38 kg/m² Physical Exam: 
General appearance: fatigued, cooperative, no distress, appears stated age Head: Normocephalic, without obvious abnormality, atraumatic Eyes: negative Throat: Lips, mucosa, and tongue normal. Teeth and gums normal 
Lungs: clear to auscultation bilaterally O2 on at 1L per NC Heart: regular rate and rhythm, S1, S2 normal, no murmur, click, rub or gallop Abdomen: soft, non-tender. Bowel sounds normal. No masses,  no organomegaly Extremities: extremities normal, atraumatic, no cyanosis or edema Pulses: 2+ and symmetric Skin: multiple contusion oscattered on UE Neurologic: Grossly normal 
 
 
 
Lab/Data Review:  
Recent Results (from the past 24 hour(s)) GLUCOSE, POC Collection Time: 01/17/19  4:45 PM  
Result Value Ref Range Glucose (POC) 216 (H) 65 - 100 mg/dL Performed by Wilver Hilliard GLUCOSE, POC Collection Time: 01/17/19 11:42 PM  
Result Value Ref Range Glucose (POC) 102 (H) 65 - 100 mg/dL Performed by [employer], BASIC Collection Time: 01/18/19  3:10 AM  
Result Value Ref Range Sodium 138 136 - 145 mmol/L Potassium 3.8 3.5 - 5.1 mmol/L Chloride 97 97 - 108 mmol/L  
 CO2 36 (H) 21 - 32 mmol/L Anion gap 5 5 - 15 mmol/L Glucose 108 (H) 65 - 100 mg/dL BUN 30 (H) 6 - 20 MG/DL Creatinine 1.11 0.70 - 1.30 MG/DL  
 BUN/Creatinine ratio 27 (H) 12 - 20 GFR est AA >60 >60 ml/min/1.73m2 GFR est non-AA >60 >60 ml/min/1.73m2 Calcium 8.4 (L) 8.5 - 10.1 MG/DL  
CBC W/O DIFF Collection Time: 01/18/19  3:10 AM  
Result Value Ref Range WBC 4.7 4.1 - 11.1 K/uL  
 RBC 3.18 (L) 4.10 - 5.70 M/uL HGB 9.7 (L) 12.1 - 17.0 g/dL HCT 30.7 (L) 36.6 - 50.3 % MCV 96.5 80.0 - 99.0 FL  
 MCH 30.5 26.0 - 34.0 PG  
 MCHC 31.6 30.0 - 36.5 g/dL  
 RDW 16.3 (H) 11.5 - 14.5 % PLATELET 363 (L) 865 - 400 K/uL MPV 10.9 8.9 - 12.9 FL  
 NRBC 0.0 0  WBC ABSOLUTE NRBC 0.00 0.00 - 0.01 K/uL GLUCOSE, POC Collection Time: 01/18/19  5:23 AM  
Result Value Ref Range Glucose (POC) 174 (H) 65 - 100 mg/dL Performed by Angélica Hammer, POC Collection Time: 01/18/19 11:42 AM  
Result Value Ref Range Glucose (POC) 298 (H) 65 - 100 mg/dL Performed by Wilver Zaragoza Reviewed:  
 
Nm Lung Vent/perf Result Date: 1/11/2019 IMPRESSION: Low probability for pulmonary embolism. Ct Head Wo Cont Result Date: 1/12/2019 IMPRESSION: No acute intracranial hemorrhage. Near complete resolution of previous small left parafalcine subdural hemorrhage. Chronic infarctions in white matter disease as above. Ct Chest Wo Cont Result Date: 1/11/2019 IMPRESSION: 1. Pulmonary vascular congestive changes, as described above. 2. Small bilateral pleural effusions. Us Retroperitoneum Comp Result Date: 1/13/2019 IMPRESSION: 1. No visible abnormality. Xr Chest [facility] Result Date: 1/11/2019 IMPRESSION: Pulmonary edema. . 
Assessment:  
 
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  
 
The objective of todays visit was to review the transitional care plan with the patient. We discussed the importance of transitional care as it relates to reducing the likelihood of readmission. We reviewed the goals for the first 30 days following hospital discharge. The patient and I discussed wrap around services including nurse navigation, care coordination, home health, transitional care appointments with their primary care provider and specialist team and the role of dispatch health. Patient education focused on readmission zones as described as The Red Zone: High risk for readmission, days 1-21 The Yellow Zone: Moderate risk for readmission, days 22-29 The Green Zone: Lower risk for readmission, days 30 and after 1. Extensive conversation on ACP, needs to provide copy of AMD, wife is listed as mPOA. 2. HUG Tool Education for HF given to patient .  Long discussion on  decreasing disease progression and complications include  Low diet, medications and appointment follow up, including SUSAN appointment. Patient and wife questionable comprehension understanding of disease progression. Hard copy provided and reviewed during this assessment. All questions answered. Included in this conversation was wife and patient. 3. Medication Reconciliation to be reviewed at [facility] 4. Collaborated with  RNTHANIA on this plan of care. The patient  And wife  expressed understanding of the disease process and management plan, and all questions were answered. Greater than 60 minutes were spent in patient management, greater than half of which was spent in counseling and coordination of care. 
   
Signed By: Nohemy Frances NP   
 January 18, 2019

## 2019-01-18 NOTE — PROGRESS NOTES
Daisy MONTEIRO provided 2nd Medicare letter. Wife received and signed. Harmeet Matthews NP is presently at bedside assessing patient. Copy to patient and on chart. Vernon Loera RN CM Ext [phone]

## 2019-01-18 NOTE — PROGRESS NOTES
Cm acknowledged consult for appointment at [facility] valve clinic. Appointment noted on AVS. Horacio Chi RNCM Ext [phone]

## 2019-01-18 NOTE — PROGRESS NOTES
Anticipate discharge today to [facility]. Room [address] 
RN Call report to:  [phone] [facility] - confirmed transport time 12noon Concerns Addressed for this discharge - 
Pain medication - Script to go with patient Continuous Tube Feed Orders - faxed to facility by CM (copy to accompany patient) Availability of Osmolite 1.2 at facility. CM confirmed with Pari Mealing it has been ordered. Placement at facility. Wife has requested a bed near the door. PCS. H&P and facesheet on chart. Care Management Interventions PCP Verified by CM: Yes 
Palliative Care Criteria Met (RRAT>21 & CHF Dx)?: Yes 
Palliative Consult Recommended?: Yes Mode of Transport at Discharge: BLS Transition of Care Consult (CM Consult): SNF([facility]) Partner SNF: Yes Discharge Durable Medical Equipment: No 
Physical Therapy Consult: Yes Occupational Therapy Consult: Yes Speech Therapy Consult: No 
Current Support Network: Lives with Spouse Confirm Follow Up Transport: Family Plan discussed with Pt/Family/Caregiver: Yes Freedom of Choice Offered: Yes Discharge Location Discharge Placement: Skilled nursing facility Jenni Sheikh RN CM Ext [phone]

## 2019-01-18 NOTE — PROGRESS NOTES
TRANSFER - OUT REPORT: 
 
Verbal report given to Williams(name) on Irena Monday  being transferred to [address] [place] [address](unit) for routine progression of care Report consisted of patients Situation, Background, Assessment and  
Recommendations(SBAR). Information from the following report(s) SBAR, Kardex, [facility] and Recent Results was reviewed with the receiving nurse. Lines:    
 
Opportunity for questions and clarification was provided. Patient transported with: 
 O2 @ 2 liters

## 2019-01-18 NOTE — PROGRESS NOTES
Bedside shift change report given to Mabel Kendall RN (oncoming nurse) by Krystina Lopez RN (offgoing nurse). Report included the following information SBAR, Kardex, Procedure Summary, Intake/Output, MAR, Accordion, Recent Results, Med Rec Status, Cardiac Rhythm Paced, Alarm Parameters , Pre Procedure Checklist, Procedure Verification and Quality Measures.

## 2019-01-18 NOTE — PROGRESS NOTES
Attending reviewed discharge planning with CM. Patient has voiced concerns regarding SNF and rep not coming to meet with them. CM informed attending liaison is due to  Meet with patient and CM will follow up. CM met with patient to review discharge planning. Cm has contacted Chantel Levin this morning regarding meeting with patient. He will contact patient to review challenges and concerns and address accordingly. CM informed patient and wife. Wife continues to address feeding issues at previous stay at [address] [place] [address]. CM has addressed concerns with Karmen Mayer and has been informed that they can order Osmolite 1.2 lo.   CM reviewed this information with patient and Mrs. CSMGHSNPH again this morning. Mrs. XCGRUGBTI again addressed delay in feeding at last admission to [address] [place] [address]. Patient has already been placed on continuous tube feeds and has orders to be discharged on continuous tube feeds. CM reviewed and addressed this concern with Mrs. Ghosh. CM informed patient and Mrs. Juan Hernandez that patient will be transported by medical transport and that transport time has been confirmed for 12noon today. 920am  
Carl Almaraz informed CM that he has spoken with patient and wife by phone this morning and has addressed concerns presented regarding pain management at facility and continuous feeds and feed solution. Chantel Levin informed patient's wife to be certain she had hard scripts for pain medication. CM reviewed with Carl that Karmen Moreno was made aware of need for Osmolite on 1/17/2019 and it was to be ordered. Also made Karmen Mayer aware of continuous tube feeds. 945am - Chart review with Alvaro Crawford. Most recent continuous feed order reviewed. To be provided to [facility]. Faxed to:  [phone] Attn:  Karmen Mayer Pain medication concern addressed by wife. Nursing informed CM there is a medication script printed waiting for signature that will go with patient. More than 1 hour has been spent with patient or preparing for discharge to address concerns. 1008am 
CM informed [place] [facility] of Tube Feed Order faxed to facility. [place] [facility] confirmed order was placed on 1/17/2019 for Osmolite 1.2 lo. 
 
Room Assgn  [facility]A Call report to:  [phone] Informed of transport time scheduled for 12noon. CM will continue to follow for discharge needs. Lizabeth Jeffery, RNCM Ext [phone]

## 2019-01-18 NOTE — DISCHARGE INSTRUCTIONS
[address] [facility], [address]   [phone]  [address]    [email or web address]    Patient Discharge Instructions    Smitha Conde / [id number] : 1935    Admitted 2019 Discharged: 2019       · It is important that you take the medication exactly as they are prescribed. · Keep your medication in the bottles provided by the pharmacist and keep a list of the medication names, dosages, and times to be taken in your wallet. · Do not take other medications without consulting your doctor. BRING ALL OF YOUR MEDICINES TO YOUR OFFICE VISIT with Papi Figueroa DO or my nurse practitioner, Ori Ruby. .    Follow-up with Papi Figueroa DO or my nurse practitioner, Ori Ruby in 2 weeks. Cardiac Catheterization  Discharge Instructions    Transradial Catheterization Discharge Instructions (WRIST)    Discharge instructions: Your radial artery in your wrist was used for your cardiac catheterization. This site may be slightly bruised and sore following your procedure. Expect mild tingling or the hand and tenderness at the puncture site for up to 3 days. Excess movement of the wrist used should be avoided for the next 24-48 hours. 1. No lifting over 2 pounds (approximately a ½ gallon of milk) with this arm for 24 hours. 2. Keep the site of the procedure covered with a bandage for 24 hours. 3. You may shower the day after your procedure. Do not take a tub bath or submerge the puncture site in water for 48 hours. 4. No heavy impact activity/lifting > 30 pounds for 1 week. If bleeding of the wrist occurs at home:   If the site on your wrist where you had the catheterization procedure begins to bleed, do not panic. 1. Place 1 or 2 fingers over the puncture site and hold pressure to stop the bleeding. You may be able to feel your pulse as you hold pressure. 2. Lift your fingers after 5 minutes to see if the bleeding has stopped.    3. Once the bleeding has stopped, gently wipe the wrist area clean and cover with a bandage. If the bleeding from your wrist does not stop after 15 minutes, or if there is a large amount of bleeding or spurting, call 911 immediately (do not drive yourself to the hospital). Other concerns: The site may be slightly bruised and sore following your procedure. Should any of the following occur, contact your physician immediately:   1. Any cool or coldness of the arm, discoloration over a large area, ongoing numbness or any abnormal sensations , moderate to severe pain or swelling in the arm. 2. Redness, soreness, swelling, chills or fever, or colored drainage at the procedure site within 3-7 days after your procedure. If you have any further questions or concerns regarding your procedure please call the Cardiac Cath Lab office at [phone]. During regular business hours ask to speak to Dr. Kaylan John. During non-business hours the answering service will answer. Ask to speak to the physician on call for [facility].     Transfemoral Catheterization Discharge Instructions Tomás Valadez)     Do not drive, operate any machinery, or sign any legal documents for 24 hours after your procedure. You must have someone to drive you home.  You may take a shower 24 hours after your cardiac catheterization. Be sure to get the dressing wet and then remove it; gently wash the area with warm soapy water. Pat dry and leave open to air. To help prevent infections, be sure to keep the cath site clean and dry. No lotions, creams, powders, ointments, etc. in the cath site for approximately 1 week.  Do not take a tub bath, get in a hot tub or swimming pool for approximately 5 days or until the cath site is completely healed.  No strenuous activity or heavy lifting over 10 lbs. for 7 days.  Drink plenty of fluids for 24-48 hours after your cath to flush the contrast dye from your kidneys.  No alcoholic beverages for 24 hours. You may resume your previous diet (low fat, low cholesterol) after your cath.  After your cath, some bruising or discomfort is common during the healing process. Tylenol, 1-2 tablets every 6 hours as needed, is recommended if you experience any discomfort. If you experience any signs or symptoms of infection such as fever, chills, or poorly healing incision, persistent tenderness or swelling in the groin, redness and/or warmth to the touch, numbness, significant tingling or pain at the groin site or affected extremity, rash, drainage from the cath site, or if the leg feels tight or swollen, call your physician right away.  If bleeding at the cath site occurs, take a clean gauze pad and apply direct pressure to the groin just above the puncture site. Call 911 immediately, and continue to apply direct pressure until an ambulance gets to your location.  You may return to work  2  days after your cardiac cath if no groin bleeding. Information obtained by :  I understand that if any problems occur once I am at home I am to contact my physician. I understand and acknowledge receipt of the instructions indicated above. R.N.'s Signature                                                                  Date/Time                                                                                                                                              Patient or Representative Signature                                                          Date/Time      Murray Joy III, DO             [address] [facility], [address] [facility] [place]    [phone]  [place], [address] [place]    [email or web address]

## 2019-01-18 NOTE — PROGRESS NOTES
Problem: Falls - Risk of 
Goal: *Absence of Falls Document [place] Officer Fall Risk and appropriate interventions in the flowsheet. Outcome: Progressing Towards Goal 
Fall Risk Interventions: 
Mobility Interventions: Assess mobility with egress test, Bed/chair exit alarm, OT consult for ADLs, Patient to call before getting OOB, PT Consult for mobility concerns, PT Consult for assist device competence, Strengthening exercises (ROM-active/passive) Mentation Interventions: Adequate sleep, hydration, pain control, Bed/chair exit alarm, Door open when patient unattended, Evaluate medications/consider consulting pharmacy, Room close to nurse's station, Self-releasing belt Medication Interventions: Assess postural VS orthostatic hypotension, Evaluate medications/consider consulting pharmacy, Bed/chair exit alarm, Patient to call before getting OOB, Teach patient to arise slowly Elimination Interventions: Call light in reach, Bed/chair exit alarm, Patient to call for help with toileting needs, Toileting schedule/hourly rounds History of Falls Interventions: Bed/chair exit alarm, Consult care management for discharge planning, Door open when patient unattended, Evaluate medications/consider consulting pharmacy, Room close to nurse's station Problem: Pressure Injury - Risk of 
Goal: *Prevention of pressure injury Document Mark Scale and appropriate interventions in the flowsheet. Outcome: Progressing Towards Goal 
Pressure Injury Interventions: 
Sensory Interventions: Assess changes in LOC, Discuss PT/OT consult with provider, Sit a 90-degree angle/use footstool if needed, Turn and reposition approx. every two hours (pillows and wedges if needed) Moisture Interventions: Absorbent underpads, Internal/External urinary devices, Offer toileting Q_hr, Minimize layers Activity Interventions: Assess need for specialty bed, Increase time out of bed, Pressure redistribution bed/mattress(bed type), PT/OT evaluation Mobility Interventions: Float heels, PT/OT evaluation, Turn and reposition approx. every two hours(pillow and wedges), HOB 30 degrees or less Nutrition Interventions: Document food/fluid/supplement intake Friction and Shear Interventions: Apply protective barrier, creams and emollients, HOB 30 degrees or less, Lift team/patient mobility team, Transferring/repositioning devices

## 2019-01-18 NOTE — DISCHARGE SUMMARY
Hospitalist Discharge Summary Patient ID: Willie Villanueva 
[id number] 
48 y.o. 
1935 PCP on record: Holly Dwyer DO Admit date: 1/11/2019 Discharge date and time: 1/18/2019 DISCHARGE DIAGNOSIS: 
 
Syncope with hypotension 
known  severe vertebrobasilar disease H/o severe AS, NSTEMI with CAD s/p CABGx3 
acute systolic CHF with acute hypoxic respiratory failure/acute pulmonary edema Acute kidney injury (resolved) in setting of CKD3 Recent subfalcine SDH and SAH s/p fall/syncope early December 2018 Tongue ca s/p radical neck resection and neck XRT with resultant chronic dysphagia with PEG and recent PEG tube site infection Insulin dependent DM2 without complication Acute/chronic R hip pain with chronic diffuse pain (shoulders, hips, knees, back) Urinary retention due to BPH 
H/o streptococcal bacteremia 7/18 H/o stroke 2015  
Chronic thrombocytopenia CONSULTATIONS: 
IP CONSULT TO PALLIATIVE CARE - PROVIDER 
IP CONSULT TO CARDIOLOGY Excerpted HPI from H&P of Yojana Stevens MD: Willie Villanueva is a 80 y.o.  male  PMH significant for hyperlipidemia, CAD, diabetes, memory loss, CHF, CVA, CA (tongue/throat),who presents with above symptoms. Wife provides detail of history of todays event. He was doing ok earlier, at 9 am he cames from bathroom and sat on his rollator and called wife when he was not feeling well, wife checked his oxygen was about 70% non responsive and fell back on his rollator, this lasted few minutes, he was not cyanotic, and was not breathing difficult as far as she recalls, no cyanosis, does not recall pulse rate on oxygen monitoring device, [facility] arrived and brought him here. Wife states that he went for CT with contrast prior to  have TAVR two  days ago and received IVF prior and after CT, he gained weight, wife called cardiology office and when they called back he was seen by EMS and getting ready to be transferred here. In ER he had CT which shows congestion, and V/Q low risk for PE, hospitalist consulted to admit for acute respiratory failure with hypoxia.  
  
 
______________________________________________________________________ DISCHARGE SUMMARY/HOSPITAL COURSE:  for full details see H&P, daily progress notes, labs, consult notes. Discharge Instructions:  -Please cont continuous TF as mentioned below 
-check BP prior to pain medication -appointment Dr. Marla Nino - valve clinic appointment in 2-3 weeks 
-PCP and Dr. Esthela Givens f/u  
-Pt is hemodynamically stable and ready for transition of care to next level that is SNF Hospital course as follows Syncope with hypotension POA 
multifactorial including prior posterior CVA (known  
severe vertebrobasilar disease), severe AS, NSTEMI with CAD s/p CABGx3 and acute systolic CHF with acute hypoxic respiratory failure/acute pulmonary edema:  
Pt hoping for TAVR after his 90 day waiting period from intracranial bleed. Pt with recent admission to [facility] in 12/18 with subfalcine SDH and SAH due to syncope. Baseline wears 2 LPM O2. 
- CXR 1/11 with pulmonary edema; V/Q low prob for PE 
- CTA chest 1/12 with pulmonary vascular prominence, bilateral interstitial edema and bilateral patchy airspace disease. Very small bilateral pleural effusions. - echo 1/15 with EF 30%. There was moderate diffuse hypokinesis. - cardiology appreciated, s/p cath 1/16 with Dr. Esthela Givens with PCI of the SVG-OM2/RPDA with RENATA placement 
- cardiovascular surgery consults appreciated. Pt will need to wait >90 days from intracranial bleed before TAVR could be considered. - cont ASA and plavix and home lipitor, lasix - Strict I/Os, daily weights. - cont midodrine. No ACE/ARB or bblocker due to hypotension. 
- palliative care consult appreciated, Pt wants to remain full code Acute kidney injury (resolved) in setting of CKD3: recent CTA C/A/P with contrast 1/8 as part of outpatient TAVR evaluation. - renal US with no visible abnormality 
- cont lasix as above and monitor renal function Recent subfalcine SDH and SAH s/p fall/syncope early December 2018: POA 
CT head with no acute intracranial hemorrhage. Near complete resolution of previous small left parafalcine subdural hemorrhage. 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 Tongue ca s/p radical neck resection and neck XRT with resultant chronic dysphagia with PEG and recent PEG tube site infection:  
Follows with Dr. Jacobo for dysphagia. Cont continuous TF osmolite  [employer] 1.5 x5 cans, wife gives 1 can every 3 hr if no residual before given new can) - cont doxycycline, tonight last dose Insulin dependent DM2 without complication: 
 sees endocrinology Dr. Lee 
- cont lantus at reduced dose per DTC recommendations 
- cont regular insulin with TF at reduced doses Acute/chronic R hip pain with chronic diffuse pain (shoulders, hips, knees, back):  
missed last 2 appts with Dr. Damir Gomez at [facility] for injections due to medical issues. Also sees Dr. Sarwat Sanhi for his shoulders.  
- cont lidoderm patch 
- prn oxycodone at lower dose due to chronic hypotension (was too hypotensive on 5mg dose, should only take 2.5mg at a time) 
- wife asked to call Dr. Guillen Books office and try to arrange f/u for next week.  With new stent, Pt will not be able to come off plavix for spinal injections - she is aware of this Urinary retention due to BPH:  
sees Dr. Marilee Ordoñez. Straight cath prn.  
H/o streptococcal bacteremia 7/18 H/o stroke 2015  
Chronic thrombocytopenia, follows with Dr. Menon   
  
Pt and wife has concern about medical care and discharge on this admission. I have provided them information regarding resources they can use to voice their concern. Pt's advocate has been involved. Code Status: Full Surrogate Decision Maker: wife 
 Baseline: walks with walker/rollator, home O2 
 
_______________________________________________________________________ Patient seen and examined by me on discharge day. Pertinent Findings: 
Gen:    Not in distress Chest: B/l fine basal crackles Abd:  Soft, not distended, not tender Neuro:  Alert, cn 2-12 grossly intact 
_______________________________________________________________________ DISCHARGE MEDICATIONS:  
Current Discharge Medication List  
  
START taking these medications Details  
lidocaine 4 % patch Use as needed 
Qty: 7 Patch, Refills: 0  
  
oxyCODONE IR (ROXICODONE) 5 mg immediate release tablet Take 0.5 Tabs by mouth every six (6) hours as needed. Max Daily Amount: 10 mg. 
Qty: 12 Tab, Refills: 0 Associated Diagnoses: Polyneuropathy associated with underlying disease ([facility] doxycycline (VIBRA-TABS) 100 mg tablet Take 1 Tab by mouth every twelve (12) hours for 1 dose. Qty: 1 Tab, Refills: 0 CONTINUE these medications which have CHANGED Details  
insulin glargine (LANTUS SOLOSTAR U-100 INSULIN) 100 unit/mL (3 mL) inpn 10 Units by SubCUTAneous route daily. Qty: 15 Pen, Refills: 5 Associated Diagnoses: 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 ([facility] Essential hypertension with goal blood pressure less than 140/90; Peripheral polyneuropathy  
  
!! insulin regular (NOVOLIN R REGULAR U-100 INSULN) 100 unit/mL injection 12 Units by SubCUTAneous route Daily (before lunch). Patient takes 12 Units with breakfast, 12 Units with lunch, and 4 Units nightly 
Qty: 1 Vial, Refills: 0  
  
!! insulin regular (NOVOLIN R, HUMULIN R) 100 unit/mL injection 4 Units by SubCUTAneous route nightly. Patient takes 12 Units with breakfast, 12 Units with lunch, and 4 Units nightly 
Qty: 1 Vial, Refills: 0  
  
!! insulin regular (NOVOLIN R, HUMULIN R) 100 unit/mL injection 12 Units by SubCUTAneous route Daily (before breakfast). Patient takes 12 Units with breakfast, 12 Units with lunch, and 4 Units nightly 
Qty: 1 Vial, Refills: 1 !! - Potential duplicate medications found. Please discuss with provider. CONTINUE these medications which have NOT CHANGED Details  
hydrocortisone sodium succ/PF (HYDROCORTISONE SOD SUCC, PF, INJECTION) by Injection route every three (3) months. Patient gets from orthopedist  
  
furosemide (LASIX) 40 mg tablet 40 mg by Per G Tube route two (2) times a day. B.infantis-B.ani-B.long-B.bifi (PROBIOTIC 4X) 10-15 mg TbEC 1 Cap by Per G Tube route daily. clopidogrel (PLAVIX) 75 mg tab 75 mg by PEG Tube route daily. aspirin delayed-release 81 mg tablet Take 81 mg by mouth daily. guaiFENesin (ROBITUSSIN) 100 mg/5 mL liquid 200 mg by Per G Tube route three (3) times daily. !! insulin regular (NOVOLIN R REGULAR U-100 INSULN) 100 unit/mL injection 15 Units by SubCUTAneous route daily (with breakfast). Patient takes 15 Units with breakfast, 15 Units with lunch, and 5 Units nightly  
  
pramipexole (MIRAPEX) 0.25 mg tablet Take 1 Tab by mouth three (3) times daily. Qty: 100 Tab, Refills: 11  
 Associated Diagnoses: Weakness; Tremors of nervous system; Physical deconditioning; Counseling regarding goals of care; Myalgia; Altered mental status, unspecified altered mental status type; Disturbance of memory; Benign essential tremor syndrome; Stenosis of both internal carotid arteries; Parkinson's disease (tremor, stiffness, slow motion, unstable posture) ([address] Diabetic peripheral neuropathy associated with type 2 diabetes mellitus ([address] Hemiplegia and hemiparesis following cerebral infarction affecting right dominant side ([address] Orthostatic hypotension  
  
albuterol (PROVENTIL VENTOLIN) 2.5 mg /3 mL (0.083 %) nebulizer solution 2.5 mg by Nebulization route two (2) times a day. Per wife patient can use 3 times a day if needed  
  
famotidine (PEPCID) 20 mg tablet 20 mg by Per G Tube route three (3) times daily. atorvastatin (LIPITOR) 40 mg tablet 40 mg by Per G Tube route daily. finasteride (PROSCAR) 5 mg tablet 5 mg by Per G Tube route nightly. alfuzosin SR (UROXATRAL) 10 mg SR tablet 10 mg by Per G Tube route daily. midodrine (PROAMITINE) 10 mg tablet 10 mg by Per G Tube route three (3) times daily (with meals). zinc 50 mg tab tablet 50 mg by Per G Tube route daily. gabapentin (NEURONTIN) 250 mg/5 mL solution 750 mg by Per G Tube route three (3) times daily. multivitamin (ONE A DAY) tablet 1 Tab by Per G Tube route daily. klack's mixture Solution Take 5 mL by mouth two (2) times a day. Diphenhydramine elixir 12.5mg/ml 500ml, Nystatin suspension 120ml,Tetracycline 500mg capsules  12 capsules (6g), Hydrocortisone 20mg tablet 12 tablets (240mg), Water for irrigation QS ad 1000ml 
 
   
  
acetaminophen (TYLENOL) 500 mg tablet 500-1,000 mg by Per G Tube route every six (6) hours as needed for Pain. fluticasone (FLONASE ALLERGY RELIEF) 50 mcg/actuation nasal spray 1 [place] by Both Nostrils route two (2) times daily as needed (runny nose). nitroglycerin (NITROSTAT) 0.4 mg SL tablet 0.4 mg by SubLINGual route every five (5) minutes as needed for Chest Pain. !! - Potential duplicate medications found. Please discuss with provider. STOP taking these medications  
  
 doxycycline (MONODOX) 100 mg capsule Comments:  
Reason for Stopping:   
   
 HYDROcodone-acetaminophen (NORCO)  mg tablet Comments:  
Reason for Stopping:   
   
 doxycycline (MONODOX) 100 mg capsule Comments:  
Reason for Stopping: My Recommended Diet, Activity, Wound Care, and follow-up labs are listed in the patient's Discharge Insturctions which I have personally completed and reviewed. ______________________________________________________________________ Risk of deterioration: High, Because of multiple medical co morbids, also high risk for readmission Condition at Discharge:  Stable ______________________________________________________________________ Disposition SNF/LTC 
______________________________________________________________________ Care Plan discussed with:  
Patient, Family, RN, Care Manager, Consultant 
 
______________________________________________________________________ Code Status: Full Code 
______________________________________________________________________ Follow up with: PCP : Natalia Kim DO Follow-up Information Follow up With Specialties Details Why Contact Info [id number] [address] [facility] [address] [phone] Natalia Kim DO Family Practice  PCP - Schedule appointment within 1-2 weeks of discharge from skilled facility. [address] 
[phone] [facility] Valve Clinic   On 1/31/2019 Evaluation for TAVR - January 31st at 10:00am [address] [facility] [address] [facility] 4th Floor, [address] Karma Handley DO Cardiology In 2 weeks Cardiology - hospital follow up 2 weeks [address] 
[phone] Total time in minutes spent coordinating this discharge (includes going over instructions, follow-up, prescriptions, and preparing report for sign off to her PCP) :  45 minutes Signed: 
Wes Miller MD

## 2019-01-21 NOTE — PROGRESS NOTES
- [address] [facility],[address] health and Rehab    Patient: Wess Bloch     YOB: 1935  Age: 80 y.o. Sex: male      [de-identified] Date:1/18/2019  Post discharge day 3    Subjective:     Assessment / Plan: No new concerns - stable     Risk of Readmission: Moderate     -   wife and patient understanding of  HF diagnosis  -   good support system, difficulty in understanding long term trajectory of outcomes  -    mistrust of health system,due past experiences. need clear concise instructions and education  -    overall easily confused/overwhelmed prompting \"we can just come back to ED\"   -   age related debility  -    Multiple micro ischemic cerebral infarctions        18.5 - 24.9 Normal weight / Body mass index is 25.38 kg/m².     Code status: Full  Recommended Disposition: SNF rehab          Patient transferred to [facility] for continuation for rehab after a syncopal episode led to admission for severe AS with hopes to have TAVR procedure next month. Upon presentation he is systolic CHF with acute hypoxic respiratory failure. He also has significant hx pertaining CAD and CABG s/p CABG x 3. Recent Fall that resulted in  [address] [address] [facility] with patient currently on  DAP . Since admission to [facility] patient continues to participate in therapy. continous TF. Continuous O2 at 2Lper NC. Today he endorses continued SOB, weakness \"getting stronger\" Verbalizes strong motivation. He does appear fixated on pain conrol, requesting pain medication during exam.    No new labs available for review. Pt sitting in chair during this exam wo stressors. ROS:     A comprehensive ROS was performed and was negative except for as per HPI. Objective:       Problem List:  2019-01: Syncope  2019-01: Hypoxia  2018-12: SDH (subdural hematoma) ([facility]  2018-11: Hx of tongue cancer  2018-11: Dizziness  2018-08: Epigastric pain  2018-08:  Altered mental status, unspecified  2018-08: Convulsion ([address]  2018-08: Disturbance of memory  2018-08: Presbycusis of both ears  2018-08: B12 deficiency  2018-08: Vitamin D deficiency  2018-08: Hypothyroidism due to acquired atrophy of thyroid  2018-07: Myalgia  2018-07: Pneumonia due to group B Streptococcus ([address]  2018-07: Counseling regarding goals of care  2018-07: Parkinson's disease (tremor, stiffness, slow motion,   unstable posture) ([address]  2018-06: Physical deconditioning  2018-06: Tremors of nervous system  2018-05: Type 2 diabetes with nephropathy ([address]  2018-05: Diabetic peripheral neuropathy associated with type 2   diabetes mellitus ([address]  2018-05: Benign essential tremor syndrome  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  2018-05: Wrist pain, acute, right  2018-05: Respiratory failure ([address]  2017-02: Thrombotic stroke involving left middle cerebral artery ([address]  2017-02: Stenosis of both internal carotid arteries  2017-02: Hemiplegia and hemiparesis following cerebral infarction   affecting right dominant side ([address]  2017-02: Weakness  2017-02: Stroke ([address]  2016-11: Aspiration pneumonia ([address]  2016-07: History of stroke  2016-02: Polyneuropathy associated with underlying disease ([address]  2016-01: Peripheral neuropathy ([address]  2016-01: Type 2 diabetes mellitus with diabetic neuropathy affecting   both sides of body ([address]  2014-12: PEG (percutaneous endoscopic gastrostomy) status ([address]  2014-12: Antiplatelet or antithrombotic long-term use  2014-09: Unstable angina ([address]  2014-09: Heart failure ([address]  2014-09: Chest tightness  2013-07: Esophageal motility disorder  2013-02: Chest pain, non-cardiac  2013-02: CAD (coronary artery disease)  2013-02: Status post implantation of automatic cardioverter/  defibrillator (AICD)  2013-02:  Aortic stenosis, mild  2013-01: S/P PTCA (percutaneous transluminal coronary angioplasty)  2013-01: Non-cardiac chest pain  2011-05: Feeding difficulties  2010-06: Abnormal cardiovascular stress test  2010-06: S/P CABG x 3  2010-06: Atherosclerotic cerebrovascular disease  2010-06: Orthostatic hypotension  2010-05: ARF (acute renal failure) ([address]  2010-05: Dehydration  2010-05: Chest pain  2010-05: Dysphagia      Vital signs:    BP: 124/76  Pulse: 88  Resp: 20  Temp: 97.2    Sats : 96%        Physical Exam:  General appearance: fatigued, cooperative, no distress, appears stated age  Head: Normocephalic, without obvious abnormality, atraumatic  Eyes: negative  Throat: Lips, mucosa, and tongue normal. Teeth and gums normal  Lungs: clear to auscultation bilaterally O2 on at 1L per NC  Heart: regular rate and rhythm, S1, S2 normal, no murmur, click, rub or gallop  Abdomen: soft, non-tender. Bowel sounds normal. No masses,  no organomegaly  Extremities: extremities normal, atraumatic, no cyanosis or edema  Pulses: 2+ and symmetric  Skin: multiple contusion oscattered on UE  Neurologic: Grossly normal    Medications: unable to complete med recon due to inaccessibility to EMR. Lab/Data Review:    No new labs obtained    Imaging Reviewed:     N/A      Assessment:         Plan:     1. Progressing as expected, continued high risk for falls as per self report of 'Lightheadedness\"    2. Continued safety concerns, and comprehension of advanced disease    3. Valve clinic 1/31/2019    4. Dr Maggie Lundborg,  PCP 1 -2 weeks following d/c from [facility]. 5. Dr Zamora Shelby next week, no appt made. 6. High risk for Falls    7. . Possible hospice candidate. Full code.  Significant decline the past year.          Signed By: Malka Kelly NP     January 21, 2019

## 2019-01-21 NOTE — PROGRESS NOTES
Hospital Discharge Follow-Up D/c 1/18 [facility] and rehab [phone] Valve clinic eval 1/31 - re: TAVR Dr. Kandy Yang - [facility] cardiology Date/Time:  1/21/2019 12:08 PM 
 
Patient was admitted to [place] [address] on 1/11/19 and discharged on 1/18/19  for hypotension. The physician discharge summary was available at the time of outreach. Patient was contacted within 1 business days of discharge. Discharge to [facility] and rehab -  
 lm for  - re: pt's extensive course and re: concerns for decompensation - Concerns with wife with her health suffering and toll of caretaking -  
LM for wife - request call - lm for  and [address] [place] [address] rehab - request call - 
Daily weights/ labs - and who overseeing/ physician at facility -  
TAVR appt - 1/31 - Needs to bring copy of advanced directive. Encourage pt and wife to understand disease trajectory. Top Challenges reviewed with the provider Syncope and hypotension Recent falls and head trauma Severe aortic stenosis/ pending TAVR evaluation Concerns re: pt Efren Huitron / family grasp of trajectory of illness Debility, frailty, fall risk Post ca - and required PEG tube and tube feedings. Wife struggling to maintain care needs - but unrelenting. Hospital course as follows Syncope with hypotension POA 
multifactorial including prior posterior CVA (known  
severe vertebrobasilar disease), severe AS, NSTEMI with CAD s/p CABGx3 and acute systolic CHF with acute hypoxic respiratory failure/acute pulmonary edema:  
Pt hoping for TAVR after his 90 day waiting period from intracranial bleed. Pt with recent admission to [facility] in 12/18 with subfalcine SDH and SAH due to syncope. Baseline wears 2 LPM O2. 
- CXR 1/11 with pulmonary edema; V/Q Method of communication with provider :phone/ email/ staff message Inpatient RRAT score: 57 Was this a readmission? yes Patient stated reason for the readmission: syncope/ hypotension Weight 162 lbs - at discharge - /Daily weights at facility. Nurse Navigator (NN) contacted the caregiver by telephone to perform post hospital discharge assessment. Verified name and  with caregiver as identifiers. Provided introduction to self, and explanation of the Nurse Navigator role. Reviewed discharge instructions and red flags with caregiver who verbalized understanding. Caregiver given an opportunity to ask questions and does not have any further questions or concerns at this time. The caregiver agrees to contact the PCP office for questions related to their healthcare. NN provided contact information for future reference. Disease Specific:   CHF /aortic stenosis/ debility/ pending TAVR evaluation Summary of patient's top problems: 1. Debility, frailty, fall risk / post recent GLF 
2. Aortic stenosis/  
3. CHF 4. Post throat cancer/ inability to swallow 5. Tube feedings -  
6. Patient and wife questionable comprehension understanding of disease progression. Heart Failure Note Do you have a Scale:    yes How often do you weigh:  daily Provider Notified:   yes Zone:(Pt Reported)  green EF: 30% (result) on 1/15/19 (date) Type of HF:   HFrEF  
SUMMARY: 
Left ventricle: The ventricle was moderately dilated. Systolic function was moderately to markedly reduced. Ejection fraction was estimated to be 30 %. There was moderate diffuse hypokinesis. Left atrium: The atrium was moderately dilated. Mitral valve: There was mild to moderate regurgitation. Aortic valve: The valve was trileaflet. Leaflets exhibited normal thickness and markedly reduced cuspal separation. Accurate measurements across the valve were not made, degree of AS is difficult to determine based on this study, but appears to be severe. Aorta, systemic arteries: The root exhibited mild dilatation. Cardiac Device present: ICD Heart Failure Medications: Diuretic Home Health orders at discharge: n/a - d/c to [address] [facility] Durable Medical Equipment ordered/company: n/a Durable Medical Equipment received: n/a- pt on continuous oxygen Barriers to care? depression, financial, ineffective coping, lack of knowledge about disease, stages of grief, support system, transportation, utilization of services Advance Care Planning:  
Does patient have an Advance Directive:  not on file Medication(s):  
New Medications at Discharge: Doxycycline 100 mg q 12 h, Lidocaine 4% patch, prn - Oxycodone 2.5 mg prn q6h -  
Changed Medications at Discharge: Lantus units-100 - 10 units every day, Regular Insulin u-100 12 units sq breakfast, lunch, 4 units at night -  
Discontinued Medications at Discharge: Norco, Doxycycline Medication reconciliation was performed with caregiver, who verbalizes understanding of administration of home medications. There were no barriers to obtaining medications identified at this time. Referral to Pharm D needed: no  
 
[facility] follow up appointment(s):  
Future Appointments Date Time Provider Helen Dunnei 1/31/2019 10:00 AM Herbert Steele MD [facility] KANDICE SCHED  
3/22/2019 10:50 AM Woody Stein MD RDE NILAY [address]  
4/30/2019 10:00 AM Jonathan Caraballo MD [address] Non-[facility] follow up appointment(s): Dr. Zac Farley - cardiology - prn - based on valve clinic resolution / and d/c from rehab [facility]:  n/a  
 
Goals  Improve activity tolerance and quality of life. 1/21/19 - Pt will have PT as tolerated - for strengthening Pt uses rollator walker - Recent admission for syncope - and hypotension. Pt with tube feedings every 3 hours - Monitor weight and hydration status. ttk  Maintains daily weight. [facility] 1/18/19 -  for  and nurse at facility. ttk  Supportive resources in place to maintain patient in the community (ie. Home Health, DME equipment, refer to, medication assistant plan, etc.)   
  1/21/19- Pt d/c to [facility] rehab - Pt to have evaluation at valve clinic re: TAVR - pt has to be evaluated by anesthesia for ability to cover airway if proceeding with TAVR Pt at rehab - d/t care needs and wife's caregiver stress -  
ttk  Understand CHF action plan. 5/23/18  Spoke with pt's wife who is managing pt very closely. We discussed the importance of daily wts. 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 We discussed the possibility for supportive care for the pt as well as pt's wife as she needs  I provided the telephone number to the pt's wife for [facility] ([phone]). Pt's wife needs to have hip replacement in June. We discussed coming up with a care plan for the pt and we also discussed how to assist family with care should the pt's wife need assistance as well. Pt's wife reports that pt has seen Dr. Ericka Diaz on 5/21/18 and pt's wife reports that pt will go to [address] [place] [address] for outpt rehab. Pt's wife reports that she did call [facility] this weekend as pt had steady wt gain. Pt's wife reports that she increased lasix and called and spoke with Dr. Floyd Helms on 5/21/18. Pt's wife stated appreciation for call. I provided my direct office telephone number to her. Pt's wife states that I may call next week to discuss care plan for when pt's wife needs to have surgery. Pt's wife reports that she and pt have family support (2 sons and grandchildren). AFP 
 
6/6/18  Pt's wife reports that pt has followed up with Dr. Suhas Cano and pt's wife reports that pt's platelets remain low and are at 67.   Pt's wife states that other than that, pt is doing well  Pt's wife reports that she had a stress test and Dr. Silvia Seymour is to call her in preparation for hip surgery. Pt's wife states that she is working on a plan to have granddaughter and great granddaughter take care of pt while pt's wife has surgery and pt's wife also has a friend who can recommend an aide. Pt's wife states that she will work on plan IF surgery is scheduled. Pt's wife reports that there have been not changes to pt's health status and states appreciation for call. Will continue to call   AFP 
 
6/22/18  Pt's wife reports that pt continues to go to PT at [facility]. Pt states that she will have surgery on 7/24/18 and that she is developing a plan to provide care for her  while she is in recovery. Pt's wife states that I may continue to call and that she appreciates someone checking on they. AFP 
 
7/11/18  Pt's wife reports that pt was to begin [facility] but back pain has prevented that as pt''s pain specialist is recommending that pt attend out patient therapy at [address] [place] [address]. Pt's wife states that pt will begin [facility] after back pain has improved. Pt's wife reports that pt is okay. AFP 
 
7/31/18  Spoke with pt's wife who reports that pt is having diarrhea and has some altered mental status. Pt's wife reports pt is producing very little urine. Pt's wife reports that pt is seeing Dr. Sha Roberts this afternoon as is she for a bladder infection. We discussed that we need to work on a plan to get her some help so that she can take care of herself. Will call on Thursday, 8/2/18 to review PCP appt findings. AFP 
 
8/1/18  Pt's wife reports that PCP thinks that pt may have had a stroke on 7/21 after wife described pt's hearing loss and poor memory. Pt's wife reports that pt had been able to hear out of 1 ear and did not have memory issues prior to the event on 7/21/18. Pt's wife reports that pt's urine is clear and they are waiting on lab results. Pt's wife reports that she is calling Dr. Fernando Noyola from neurology to follow up on possible stroke. Also, pt is to see Dr. Yunior Echeverria for enlarged protate. Pt's wife states that she will follow up with urologist concerning her chronic bladder infections. Pt's wife also states understanding that she needs help to provide care for her  and for herself as she cancelled her hip replacement. Will continue to follow. AFP 
 
8/16/18  Pt's wife reports that pt went to the ED for chest pain beneath his device. Pt's wife reports that pt will have EGD with Dr. Oliver Cason on 8/17/18. Pt's wife reports that pt has had pain for a while and also reports that diarrhea finally has subsided after pepto and immodium. Pt's wife reports that she continues to have bladder issues, that she needs surgery on both feet and hip. We discussed that pt's wife needs to care for herself. Pt's wife reports that tube feeding every 3 hours is wearing her out. Pt's wife reminded that she needs help in the home (she has someone cleaning the house q3 weeks). We discussed that she needs to include her 2 children and 2 grandchildren. Pt and pt's wife are going to [facility] in a couple of weeks. Will continue to work with pt's wife.   [facility]

## 2019-01-22 NOTE — PROGRESS NOTES
Nurse navigator note - cardiology Addendum - Call to home and reached pt's son - Clive Ivan - as he is home with  [id number] - as she has the 'flu'. He thinks she caught this at [facility] - So she is staying home - Clive Ivan said he thought things were going pretty well at [facility] discussed with Clive Ivan the concerns I have had re: his mom and her self care - her diligence in care for Deysi Edd - but she admitting to fatigue and caretaker stress - she has had trouble with her newly replace hip - (fluid on the joint requiring aspiration). Discussed evaluation for TAVR with accompanying evaluation for anesthesia safety - and pt's decline in health - Mr. Enoch Zavala verbalizes goals to live, not die - acknowledged - But he and Mrs. Enoch Zavala seem to have trouble identifying downturn in conditions - Son, Clive Ivan, said he thinks the stenting procedure helped - and Mr. Enoch Zavala is working on strength at rehab. Call to [address] [facility] to discuss. Pt has had visit from NP - Arsh Howard - post discharge - Fernanda Cohen RN , [facility]

## 2019-01-23 NOTE — PROGRESS NOTES
Nurse navigator note - cardiology [facility] and NN - Mr. Presley Levine is doing well - weight steady at 160 today - admission weight 162 - His wife is recovering from flu symptoms - (as of 1/22). Discussed concerns re: pt and wife's understanding of his trajectory - Appt 1/31 with valve clinic - have been in touch with them re: wife's caregiver stress noted - and support plan  - no adv directive in place despite encouragement to do so. Magdalena Fallon, RN , CHFN, CCM 
NN [facility] [phone]

## 2019-01-23 NOTE — ACP (ADVANCE CARE PLANNING)
Community Care Team documentation for patient in [facility] Initial Follow Up Patient was discharged to [address] [place] [address]. Information included in this progress note has been provided to SNF. Hospital Admission and Diagnosis: MRM 1/11 - 1/18/19 Syncope with hypotension RRAT Score: 57 Advance Care Planning:  Not on file but has Advance Directive per discussion in hospital    
 
Follow up appointments:  Cardiology Hadley Call III,  in 2 weeks; [facility] Valve Clinic with Viral Harvey MD on 1/31 at 10:99 AM for evaluation for TAVR Prior to Admission:  patient lives with his wife of 62 years, 2 adult sons live nearby Prev [facility] used: open to [facility]    
 
PCP : Ani Aldrich DO 
 
SNF Attending:  Jeremy Alicia MD 
 
Spoke with SNF team. Ensured patient arrived to SNF safely with admission packet in order. PT/OT providing skilled therapy in SNF. Currnelty modified independent ith bed mobility. Supervision with transfers. Ambulating 10 ft with rollator and contact guard assist.  Min assist with all ADLs and toileting. Barrier - anxiety. Daily weights for HF management - admission weight 162 lbs, weight on 1/22 160 lbs. No s/s HF noted. Anticipate 3 more weeks LOS. Disposition TBD. Community Care Team will follow up weekly with [facility] until discharge. Medications were not reconciled and general patient assessment was not completed during this [facility] outreach. Tian Lebron, MSN, RN, ACNS-BC, [facility] Nurse Navigator, [address] [place] [address] [phone]

## 2019-01-30 NOTE — PROGRESS NOTES
Community Care Team documentation for patient in [facility] Initial Follow Up Patient was discharged to [address] [place] [address]. Information included in this progress note has been provided to SNF. Hospital Admission and Diagnosis: MRM 1/11 - 1/18/19 Syncope with hypotension RRAT Score: 57 Advance Care Planning:  Not on file but has Advance Directive per discussion in hospital    
 
Prior to Admission:  patient lives with his wife of 62 years, 2 adult sons live nearby Prev Home Health used: open to [facility]  
 
PCP : Kaya Calles DO 
 
SNF Attending:  Nydia Tam MD 
 
Spoke with SNF team. Ensured patient arrived to SNF safely with admission packet in order. Follow up appointments from [facility]:  Cardiology Lea Pritchard III, DO in 2 weeks; [facility] Valve Clinic with Carmela Mortimer, MD on 1/31 at 10:99 AM for evaluation for TAVR. PT/OT continue. Currently modified independent with bed mobility and supervision. Min assist going up/down stairs. Ambulating 100 ft with rollator and supervision for safety. Supervision with bathing, dressing, and toileting. Daily weights for HF management - weight on 1/29 was 162.7 lbs - stable. Plan for discharge 2/14 to home with wife and [facility]. Community Care Team will follow up weekly with [facility] until discharge. Medications were not reconciled and general patient assessment was not completed during this [facility] outreach. Juaquin Lebron, MSN, RN, ACNS-BC, [facility] Nurse Navigator, [address] [place] [address] [phone]

## 2019-02-01 NOTE — PROGRESS NOTES
Nurse navigator note - cardiology Call to and reached Mrs. CARIAS - she is recovering from the flu - for over a week - His wife had a phone conference meeting on Wed - with the [address] - Ana Paula Amador had complained about leg and feet swelling - she asked Sasha about this - she thought the doctor/ nurse and admin were going to be there - therapist/ Ms. Vincent - and nurse were on the call - she wants doctor to look at his legs and feet-  
Dr. Patsy Loza -  
 
( acknowledging that it is possible that things are done that he does not remember or report ) 
NN tried to assure his wife that he is receiving care and that she needs to rest - and recover. 1 - She is worried that he is getting enough water - with his tube feedings -  
2 - He gets 60 ml flush with his medications -  
3 - he is supposed to have extra water with every 250 ml feeding - 5 oz of water. Has Dr. Patsy Loza seen Mr. FOREMAN? She has had trouble with not getting calls from the nurses -  
Mrs. Divina Luna is very involved with his care - and she is very worried about his care / his legs - and his feedings - Appt with Dr. Taryn Kirk delayed to Feb 14th - weather related - Note to CC team - for follow up/ [facility] re: visit at [facility] -  
Provided Mrs. Divina Luna with Dilshad Barrios phone # for herself. Call to [facility] SNF - 1/31/19 - notes from [address] [place] [address] team. 
Spoke with SNF team. Ensured patient arrived to SNF safely with admission packet in order.     
  
[facility]  [facility], DO in 2 weeks; [facility] with Tacho Foster MD on 1/31 at 10:99 AM for evaluation for TAVR.  
  
PT/OT continue. Currently modified independent with bed mobility and supervision. Min assist going up/down stairs. Ambulating 100 ft with rollator and supervision for safety.   Supervision with bathing, dressing, and toileting. Daily weights for HF management - weight on 1/29 was 162.7 lbs - stable. Plan for discharge 2/14 to home with wife and [facility]. 
__________________________________________________________________________ Ulisses Bhardwaj RN , CHFN, CCM 
NN CAV [facility] [phone]

## 2019-02-03 PROBLEM — J96.90 RESPIRATORY FAILURE (HCC): Status: ACTIVE | Noted: 2019-01-01

## 2019-02-03 NOTE — ACP (ADVANCE CARE PLANNING)
Advance Care Planning Note Name: Verna Cronin YOB: 1935 MRN: [id number] Admission Date: 2/3/2019  8:04 AM 
 
Date of discussion: 2/3/2019 Active Diagnoses: 
 
Hospital Problems  Date Reviewed: 1/11/2019 Codes Class Noted POA Respiratory failure ([facility] [address] ICD-10-CM: J96.90 ICD-9-CM: 518.81  2/3/2019 Unknown Ischemic crdiomyopathy Severe AS Severe vertebrobasilar disease CKD3 These active diagnoses are of sufficient risk that focused discussion on advance care planning is indicated in order to allow the patient to thoughtfully consider personal goals of care, and if situations arise that prevent the ability to personally give input, to ensure appropriate representation of their personal desires for different levels and aggressiveness of care. Persons present and participating in discussion: patient and his wife Brenda Holden [phone] 
  
Discussion: Code status addressed due to above mentioned medical problems and also his  chronic comorbidity /advance age and his MPOA wants him to be a DNR. Time Spent:  
Total time spent face-to-face in education and discussion:16 minutes. Bonnie Morin MD 
2/3/2019 
12:19 PM

## 2019-02-03 NOTE — H&P
Hospitalist Admission NoteNAME: Miguelangel Garcia :  1935 MRN:  [id number] Date/Time:  2/3/2019 11:17 AM 
 
Patient PCP: Kaya Calles, DO 
______________________________________________________________________ Given the patient's current clinical presentation, I have a high level of concern for decompensation if discharged from the emergency department. Complex decision making was performed, which includes reviewing the patient's available past medical records, laboratory results, and x-ray films. My assessment of this patient's clinical condition and my plan of care is as follows. Assessment / Plan: 
Acute on chronic hypoxic respiratory failure HCAP Lactic acidosis Acute encephalopathy with episodes of unresponsiveness 
-CXR showed increasing opacification of the left hemithorax, likely due to a left pleural 
effusion, together with atelectasis versus airspace disease. 
-elevated probnp ~9K, +3 b/l pitting edema 
-he has chronic hypotension, severe AS, and currently in acute renal failure, and lactic acidosis. He is at risk for further volume overload if given IVF, and worsening of hypotension if diuresing 
-ER physician discussed with IR, not enough fluid for thoracentesis 
-will hold off on diuresing and IVF 
-will treat for HCAP with empiric abx cefepime and levaquin -nebs prn, O2 suppl, wean as tolerated Syncope with hypotension 
known  severe vertebrobasilar disease H/o severe AS, NSTEMI with CAD s/p CABGx3 Severe AS Chronic systolic CHF, cannot completely exclude an acute component 
-concerning for AICD firing. Discussed with Dr Mindy Mcnally at bedside. Will have ICD interrogated 
-check EEG to r/o seizure 
-will start dobutamin and IV lasix as tolerated, re-evaluate daily 
-resume home meds. Hold po lasix. Strict I&O to keep fluid balanced Acute kidney injury (resolved) in setting of CKD3 
-hold nephrotoxic agents Recent subfalcine SDH and SAH s/p fall/syncope early December 2018 Tongue ca s/p radical neck resection and neck XRT with resultant chronic dysphagia with PEG and recent PEG tube site infection Insulin dependent DM2 without complication Acute/chronic R hip pain with chronic diffuse pain (shoulders, hips, knees, back) Urinary retention due to BPH 
H/o streptococcal bacteremia 7/18 H/o stroke 2015  
Chronic thrombocytopenia 
  
 
 
Code Status: Full, discussed with wife again in details. She wants him to remain a full code Surrogate Decision Maker: wife DVT Prophylaxis: heparin GI Prophylaxis: not indicated Baseline: from [facility] Subjective: CHIEF COMPLAINT: unresponsive HISTORY OF PRESENT ILLNESS:    
Mustapha Rowell is a 80 y.o.  male present to the ER for evaluation of episodes of unresponsiveness at rehab with hypoxia on baseline 2LNC. He apparently was receiving TF, was oriented and alert when he slumped over and became unresponsive. His SpO2 was noted to be ~50% on his 2LNC. EMS called, pt received duoneb with improvement of SpO2 95%. In the ER, patient noted to have an episode of unresponsiveness that lasted ~20sec witnessed by nursing staff. He initially did not respond to sternal rub, and SpO2 dropped to 56%. Pt then regained consciousness with increasing SpO2. During my encounter, pt is awake, mentation back to baseline, speech is chronically slurred. He was asking for water to keep his mouth wet. His wife had a syncopal event in the ER during his episode of unresponsiveness. I was able to discussed with her later in regards to pt's code status. Labs, images and vitals reviewed. Also discussed with Dr Kyle Boggs at bedside. We were asked to admit for work up and evaluation of the above problems. Past Medical History:  
Diagnosis Date  Antiplatelet or antithrombotic long-term use 12/4/2014  Anxiety disorder  Arrhythmia 2009 bradycardia  Arthritis  CAD (coronary artery disease) s/p CABG 2002; Dr Jessie Zavala  Cancer ([facility] [address] 1996  
 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   
 left leg, lower back,   
 Cough  Depression  Diabetes ([facility] [address] Type II  Epigastric pain 8/17/2018  Esophageal dysmotility s/p dilitation  Esophageal motility disorder 7/8/2013 Frequent simultaneous or failed contractions, low amplitude contractions  suggests severe myopathy or diffuse spasm. I suspect the latter. Achalasia  is not present.  Fainting [address]  Fatigue  GERD (gastroesophageal reflux disease)  Heart failure ([facility] [address] 10/2014 Cardiomyopathy:Pacemaker upgrade:Biv and AICD  Dr. Merlin Nanny heart DrAlvaro last visit 5/11/2015  Hepatitis C Dx 1996  
 treated at [facility] in past; as of 4/15/15 wife states pt currently not under any treatment  Hyperlipidemia  Joint pain  Liver disease  Memory loss  Muscle pain  Muscle weakness  Myocardial infarct [facility]) 2013 Heart Cath: 40% LV EF, Stented distal LAD, patent Graft to circumflex  On tube feeding diet approx 2009  
 still has as of 9/28/15  (no po food/liquid/meds at all); Dr Willy Samson  Other ill-defined conditions(799.89) 1996  
 1 dose of chemotherapy/radiation for tongue cancer  Other ill-defined conditions(799.89) BPH  Other ill-defined conditions(799.89) orthostatic hypotension  Pneumonia ~ April -May 2010  
 SOB (shortness of breath)  Stroke [facility]) approx 2003  
 left side-left finger tips numb; no imbalance or memory loss; as of 2015 not seeing neuro MD  
 Suicidal thoughts  Swallowing difficulty Past Surgical History:  
Procedure Laterality Date  ABDOMEN SURGERY PROC UNLISTED  1996  
 peg tube  CABG, ARTERY-VEIN, THREE  2000  HX CATARACT REMOVAL    
 bilateral  
  HX CHOLECYSTECTOMY  HX COLONOSCOPY    
 HX HEART CATHETERIZATION   Stented distal LAD  HX MOHS PROCEDURES    
 bilateral  
 HX ORTHOPAEDIC    
 back surgery times two  HX OTHER SURGICAL Radical Left Neck  HX OTHER SURGICAL    
 NASAL POLYPS REMOVAL  
 HX OTHER SURGICAL   TURP  
 HX PACEMAKER    HX PACEMAKER  10/28/14 Defibrillator: [facility] # [id number], serial # [id number]; Dr. Duane Kirkland [phone]; Dr Salty Dale  HX UROLOGICAL    
 cystoscopy [address]  
 cevical surgery  [place] CHANGE GASTROSTOMY TUBE  2011  [place] CHANGE GASTROSTOMY TUBE  2011  [place] EGD INSERT GUIDE WIRE DILATOR PASSAGE ESOPHAGUS  2010  [place] EGD TRANSORAL BIOPSY SINGLE/MULTIPLE  2010  
    
 STOMACH SURGERY PROCEDURE UNLISTED  2011  UPPER GI ENDOSCOPY,BIOPSY  2018  UPPER GI ENDOSCOPY,DILATN W GUIDE  2018  UPPER GI ENDOSCOPY,W/DILAT,GASTRIC OUT  2018  VASCULAR SURGERY PROCEDURE UNLIST    
 bilateral carotid stents Social History Tobacco Use  Smoking status: Never Smoker  Smokeless tobacco: Never Used Substance Use Topics  Alcohol use: No  
  
 
Family History Problem Relation Age of Onset  Heart Disease Father   
      at age 52 from CAD  Colon Cancer Mother  Cancer Mother   
     colon ca  
 Heart Disease Brother Allergies Allergen Reactions  Cleocin [Clindamycin Hcl] Rash  Demerol [Meperidine] Shortness of Breath  Paxil [Paroxetine Hcl] Unknown (comments) Pt gets shaky and loses control of legs  Amoxicillin Rash  Pcn [Penicillins] Rash Prior to Admission medications Medication Sig Start Date End Date Taking?  Authorizing Provider  
lidocaine 4 % patch Use as needed 19   Suraj Aldana MD  
insulin glargine (LANTUS SOLOSTAR U-100 INSULIN) 100 unit/mL (3 mL) inpn 10 Units by SubCUTAneous route daily. 1/18/19   Hulon Gitelman, MD  
oxyCODONE IR (ROXICODONE) 5 mg immediate release tablet Take 0.5 Tabs by mouth every six (6) hours as needed. Max Daily Amount: 10 mg. 1/18/19   Hulon Gitelman, MD  
insulin regular (NOVOLIN R REGULAR U-100 INSULN) 100 unit/mL injection 12 Units by SubCUTAneous route Daily (before lunch). Patient takes 12 Units with breakfast, 12 Units with lunch, and 4 Units nightly 1/18/19   Hulon Gitelman, MD  
insulin regular (NOVOLIN R, HUMULIN R) 100 unit/mL injection 4 Units by SubCUTAneous route nightly. Patient takes 12 Units with breakfast, 12 Units with lunch, and 4 Units nightly 1/18/19   Hulon Gitelman, MD  
insulin regular (NOVOLIN R, HUMULIN R) 100 unit/mL injection 12 Units by SubCUTAneous route Daily (before breakfast). Patient takes 12 Units with breakfast, 12 Units with lunch, and 4 Units nightly 1/18/19   Hulon Gitelman, MD  
hydrocortisone sodium succ/PF (HYDROCORTISONE SOD SUCC, PF, INJECTION) by Injection route every three (3) months. Patient gets from orthopedist    Provider, Historical  
furosemide (LASIX) 40 mg tablet 40 mg by Per G Tube route two (2) times a day. Provider, Historical  
B.infantis-B.ani-B.long-B.bifi (PROBIOTIC 4X) 10-15 mg TbEC 1 Cap by Per G Tube route daily. Provider, Historical  
clopidogrel (PLAVIX) 75 mg tab 75 mg by PEG Tube route daily. Provider, Historical  
aspirin delayed-release 81 mg tablet Take 81 mg by mouth daily. Provider, Historical  
guaiFENesin (ROBITUSSIN) 100 mg/5 mL liquid 200 mg by Per G Tube route three (3) times daily. 12/5/18   Provider, Historical  
insulin regular (NOVOLIN R REGULAR U-100 INSULN) 100 unit/mL injection 15 Units by SubCUTAneous route daily (with breakfast). Patient takes 15 Units with breakfast, 15 Units with lunch, and 5 Units nightly    Provider, Historical  
pramipexole (MIRAPEX) 0.25 mg tablet Take 1 Tab by mouth three (3) times daily.  10/24/18   Anam Lucio MD  
 klack's mixture Solution Take 5 mL by mouth two (2) times a day. Diphenhydramine elixir 12.5mg/ml 500ml, Nystatin suspension 120ml,Tetracycline 500mg capsules  12 capsules (6g), Hydrocortisone 20mg tablet 12 tablets (240mg), Water for irrigation QS ad 1000ml Provider, Historical  
albuterol (PROVENTIL VENTOLIN) 2.5 mg /3 mL (0.083 %) nebulizer solution 2.5 mg by Nebulization route two (2) times a day. Per wife patient can use 3 times a day if needed    Provider, Historical  
famotidine (PEPCID) 20 mg tablet 20 mg by Per G Tube route three (3) times daily. Provider, Historical  
acetaminophen (TYLENOL) 500 mg tablet 500-1,000 mg by Per G Tube route every six (6) hours as needed for Pain. Provider, Historical  
atorvastatin (LIPITOR) 40 mg tablet 40 mg by Per G Tube route daily. Other, MD Booker  
finasteride (PROSCAR) 5 mg tablet 5 mg by Per G Tube route nightly. Other, MD Booker  
alfuzosin SR (UROXATRAL) 10 mg SR tablet 10 mg by Per G Tube route daily. 5/24/18   Provider, Historical  
midodrine (PROAMITINE) 10 mg tablet 10 mg by Per G Tube route three (3) times daily (with meals). 11/1/16   Provider, Historical  
fluticasone (FLONASE ALLERGY RELIEF) 50 mcg/actuation nasal spray 1 [place] by Both Nostrils route two (2) times daily as needed (runny nose). Provider, Historical  
zinc 50 mg tab tablet 50 mg by Per G Tube route daily. Provider, Historical  
nitroglycerin (NITROSTAT) 0.4 mg SL tablet 0.4 mg by SubLINGual route every five (5) minutes as needed for Chest Pain. Other, MD Booker  
gabapentin (NEURONTIN) 250 mg/5 mL solution 750 mg by Per G Tube route three (3) times daily. Booker Eric MD  
multivitamin (ONE A DAY) tablet 1 Tab by Per G Tube route daily. Booker Eric MD  
 
 
REVIEW OF SYSTEMS:    
I am not able to complete the review of systems because: The patient is intubated and sedated  
xxx The patient has altered mental status due to his acute medical problems The patient has baseline aphasia from prior stroke(s) The patient has baseline dementia and is not reliable historian The patient is in acute medical distress and unable to provide information Total of 12 systems reviewed as follows:   
   POSITIVE= underlined text  Negative = text not underlined General:  fever, chills, sweats, generalized weakness, weight loss/gain,  
   loss of appetite Eyes:    blurred vision, eye pain, loss of vision, double vision ENT:    rhinorrhea, pharyngitis Respiratory:   cough, sputum production, SOB, KRAMER, wheezing, pleuritic pain  
Cardiology:   chest pain, palpitations, orthopnea, PND, edema, syncope Gastrointestinal:  abdominal pain , N/V, diarrhea, dysphagia, constipation, bleeding Genitourinary:  frequency, urgency, dysuria, hematuria, incontinence Muskuloskeletal :  arthralgia, myalgia, back pain Hematology:  easy bruising, nose or gum bleeding, lymphadenopathy Dermatological: rash, ulceration, pruritis, color change / jaundice Endocrine:   hot flashes or polydipsia Neurological:  headache, dizziness, confusion, focal weakness, paresthesia, Speech difficulties, memory loss, gait difficulty Psychological: Feelings of anxiety, depression, agitation Objective: VITALS:   
Visit Vitals /66 Pulse 93 Resp 22 PHYSICAL EXAM: 
 
General:    Male in bed, appears chronically ill HEENT: Atraumatic, anicteric sclerae, pink conjunctivae 
   edentulous, mucosa moist, throat clear, dentition fair Neck:  Supple, symmetrical,  thyroid: non tender Lungs:   Diminished bs b/l. No Wheezing or Rhonchi. No rales. Chest wall:  No tenderness  No Accessory muscle use. Heart:   Regular  rhythm,  No  murmur   + 3 b/l pitting edema Abdomen:   Soft, non-tender. Not distended. Bowel sounds normal 
Extremities: No cyanosis. No clubbing,   
  Skin turgor normal, Capillary refill normal, Radial dial pulse 2+ Skin:     Not pale. Not Jaundiced  No rashes Psych:  Poor insight. Neurologic: Speech is slurred, follow some commands, AAox2 
 
_______________________________________________________________________ Care Plan discussed with: 
  Comments Patient x Family  x wife RN x Care Manager Consultant:  eulalia Maya Falling  
_______________________________________________________________________ Expected  Disposition:  
Home with Family HH/PT/OT/RN   
SNF/LTC x  
BHUPINDER   
________________________________________________________________________ TOTAL TIME:  Minutes Critical Care Provided  65   Minutes non procedure based Comments  
 x Reviewed previous records  
>50% of visit spent in counseling and coordination of care x Discussion with patient and/or family and questions answered 
  
 
________________________________________________________________________ Signed: Kendrick Cortez MD 
 
Procedures: see electronic medical records for all procedures/Xrays and details which were not copied into this note but were reviewed prior to creation of Plan. LAB DATA REVIEWED:   
Recent Results (from the past 24 hour(s)) CBC WITH AUTOMATED DIFF Collection Time: 02/03/19  8:25 AM  
Result Value Ref Range WBC 7.7 4.1 - 11.1 K/uL  
 RBC 3.98 (L) 4.10 - 5.70 M/uL  
 HGB 11.9 (L) 12.1 - 17.0 g/dL HCT 39.3 36.6 - 50.3 % MCV 98.7 80.0 - 99.0 FL  
 MCH 29.9 26.0 - 34.0 PG  
 MCHC 30.3 30.0 - 36.5 g/dL  
 RDW 17.0 (H) 11.5 - 14.5 % PLATELET 849 (L) 046 - 400 K/uL MPV 11.5 8.9 - 12.9 FL  
 NRBC 0.0 0  WBC ABSOLUTE NRBC 0.00 0.00 - 0.01 K/uL NEUTROPHILS 88 (H) 32 - 75 % LYMPHOCYTES 6 (L) 12 - 49 % MONOCYTES 6 5 - 13 % EOSINOPHILS 0 0 - 7 % BASOPHILS 0 0 - 1 % IMMATURE GRANULOCYTES 0 0.0 - 0.5 % ABS. NEUTROPHILS 6.7 1.8 - 8.0 K/UL  
 ABS. LYMPHOCYTES 0.5 (L) 0.8 - 3.5 K/UL  
 ABS. MONOCYTES 0.5 0.0 - 1.0 K/UL  
 ABS. EOSINOPHILS 0.0 0.0 - 0.4 K/UL ABS. BASOPHILS 0.0 0.0 - 0.1 K/UL  
 ABS. IMM. GRANS. 0.0 0.00 - 0.04 K/UL  
 DF AUTOMATED    
 RBC COMMENTS ANISOCYTOSIS 1+ LACTIC ACID Collection Time: 02/03/19  8:25 AM  
Result Value Ref Range Lactic acid 5.9 (HH) 0.4 - 2.0 MMOL/L  
POC LACTIC ACID Collection Time: 02/03/19  8:26 AM  
Result Value Ref Range Lactic Acid (POC) 5.64 (HH) 0.40 - 2.00 mmol/L  
EKG, 12 LEAD, INITIAL Collection Time: 02/03/19  8:27 AM  
Result Value Ref Range Ventricular Rate 95 BPM  
 Atrial Rate 87 BPM  
 QRS Duration 136 ms  
 Q-T Interval 332 ms QTC Calculation (Bezet) 417 ms Calculated P Axis -77 degrees Calculated R Axis 157 degrees Calculated T Axis 17 degrees Diagnosis ** Poor data quality, interpretation may be adversely affected Ventricular-paced rhythm Biventricular pacemaker detected When compared with ECG of 11-JAN-2019 10:48, 
Vent. rate has decreased BY  18 BPM 
  
URINALYSIS W/ RFLX MICROSCOPIC Collection Time: 02/03/19  8:34 AM  
Result Value Ref Range Color YELLOW/STRAW Appearance CLEAR CLEAR Specific gravity 1.017 1.003 - 1.030    
 pH (UA) 6.0 5.0 - 8.0 Protein TRACE (A) NEG mg/dL Glucose NEGATIVE  NEG mg/dL Ketone NEGATIVE  NEG mg/dL Bilirubin NEGATIVE  NEG Blood NEGATIVE  NEG Urobilinogen 1.0 0.2 - 1.0 EU/dL Nitrites NEGATIVE  NEG Leukocyte Esterase NEGATIVE  NEG    
 WBC 0-4 0 - 4 /hpf  
 RBC 5-10 0 - 5 /hpf Epithelial cells FEW FEW /lpf Bacteria NEGATIVE  NEG /hpf Mucus 1+ (A) NEG /lpf Hyaline cast 10-20 0 - 5 /lpf  
AMMONIA Collection Time: 02/03/19  8:34 AM  
Result Value Ref Range Ammonia 47 (H) <32 UMOL/L  
BLOOD GAS, ARTERIAL Collection Time: 02/03/19  8:55 AM  
Result Value Ref Range pH 7.42 7.35 - 7.45    
 PCO2 45 35.0 - 45.0 mmHg PO2 214 (H) 80 - 100 mmHg O2 SAT 99 (H) 92 - 97 % BICARBONATE 28 (H) 22 - 26 mmol/L  
 BASE EXCESS 3.1 mmol/L  
 O2 METHOD NRM    
 FIO2 100 % SPONTANEOUS RATE 16.0 Sample source ARTERIAL    
 SITE LEFT RADIAL FLAVIA'S TEST YES    
NT-PRO BNP Collection Time: 02/03/19  9:06 AM  
Result Value Ref Range NT pro-BNP 9,636 (H) 0 - 450 PG/ML  
TROPONIN I Collection Time: 02/03/19  9:06 AM  
Result Value Ref Range Troponin-I, Qt. <0.05 <0.05 ng/mL METABOLIC PANEL, COMPREHENSIVE Collection Time: 02/03/19  9:06 AM  
Result Value Ref Range Sodium 137 136 - 145 mmol/L Potassium 4.9 3.5 - 5.1 mmol/L Chloride 94 (L) 97 - 108 mmol/L  
 CO2 37 (H) 21 - 32 mmol/L Anion gap 6 5 - 15 mmol/L Glucose 392 (H) 65 - 100 mg/dL BUN 49 (H) 6 - 20 MG/DL Creatinine 2.04 (H) 0.70 - 1.30 MG/DL  
 BUN/Creatinine ratio 24 (H) 12 - 20 GFR est AA 38 (L) >60 ml/min/1.73m2 GFR est non-AA 31 (L) >60 ml/min/1.73m2 Calcium 9.4 8.5 - 10.1 MG/DL Bilirubin, total 0.7 0.2 - 1.0 MG/DL  
 ALT (SGPT) 51 12 - 78 U/L  
 AST (SGOT) 67 (H) 15 - 37 U/L Alk. phosphatase 90 45 - 117 U/L Protein, total 8.1 6.4 - 8.2 g/dL Albumin 3.0 (L) 3.5 - 5.0 g/dL Globulin 5.1 (H) 2.0 - 4.0 g/dL A-G Ratio 0.6 (L) 1.1 - 2.2 PROTHROMBIN TIME + INR Collection Time: 02/03/19  9:06 AM  
Result Value Ref Range INR 1.1 0.9 - 1.1 Prothrombin time 10.9 9.0 - 11.1 sec

## 2019-02-03 NOTE — ED NOTES
Dr. Jaime Bruner, cardiologist, and Dr. Chirag Vann, hospitalist, at bedside to eval patient. Discussed giving patient the 500mL bolus that is ordered and both agreed to hold fluid at this time due to elevated pro-BNP.

## 2019-02-03 NOTE — PROGRESS NOTES
Spiritual Care Assessment/Progress Note [address] 
 
 
NAME: Myrl Schwab      MRN: [id number] AGE: 80 y.o. SEX: male Jewish Affiliation: David Squires  
Language: [place] 2/3/2019     Total Time (in minutes): 28 Spiritual Assessment begun in [facility] EMERGENCY DEPT through conversation with: 
  
    [x]Patient        [x] Family    [] Friend(s) Reason for Consult: Palliative Care, Initial/Spiritual Assessment Spiritual beliefs: (Please include comment if needed) [x] Identifies with a jordyn tradition:     
   [] Supported by a jordyn community:        
   [] Claims no spiritual orientation:       
   [] Seeking spiritual identity:            
   [] Adheres to an individual form of spirituality:       
   [] Not able to assess:                   
 
    
Identified resources for coping:  
   [x] Prayer                           
   [] Music                  [] Guided Imagery [x] Family/friends                 [] Pet visits [] Devotional reading                         [] Unknown 
   [] Other:                                      
 
 
Interventions offered during this visit: (See comments for more details) Patient Interventions: Affirmation of emotions/emotional suffering, Affirmation of jordyn, Catharsis/review of pertinent events in supportive environment, Iconic (affirming the presence of God/Higher Power), Initial/Spiritual assessment, Critical care, Prayer (actual), Prayer (assurance of) Family/Friend(s): Affirmation of jordyn, Affirmation of emotions/emotional suffering, Iconic (affirming the presence of God/Higher Power), Prayer (actual), Prayer (assurance of) Plan of Care: 
 
 [x] Support spiritual and/or cultural needs  
 [] Support AMD and/or advance care planning process    
 [] Support grieving process 
 [] Coordinate Rites and/or Rituals  
 [] Coordination with community clergy [] No spiritual needs identified at this time [] Detailed Plan of Care below (See Comments)  [] Make referral to Music Therapy 
[] Make referral to Pet Therapy    
[] Make referral to Addiction services 
[] Make referral to [facility] 
[] Make referral to Spiritual Care Partner 
[] No future visits requested       
[x] Follow up visits as needed Comments: This was an initial palliative care visit. Mr Letty Linn wife was at bedside. He noted his thankfulness that she was with him. Their son, daughter-in-law and grandson were leaving. But assured them he would be back. The family is [facility]. They are very appreciative of prayer support. Mr. Aleksandra Sawyer noted that God and his family is what he needs to get him through this hospital stay. I affirmed God love and his family's concern expressed in part through their presence. Pastoral Care continues to be available. Please page if needed. Prayers offered at  his wife's request.   
[facility], [address]

## 2019-02-03 NOTE — ED PROVIDER NOTES
EMERGENCY DEPARTMENT HISTORY AND PHYSICAL EXAM 
 
 
Date: 2/3/2019 Patient Name: Mychal Mancini History of Presenting Illness Chief Complaint Patient presents with  Shortness of Breath History Provided By: Patient HPI: Mychal Mancini, 80 y.o. male with PMHx significant for hyperlipidemia, SI, anxiety, depression, pneumonia, hepatitis C, CAD, DM, arrhythmia, HF, stroke, throat cancer, liver disease, presents via EMS to the ED for unresponsiveness episode earlier today. Pt is alert in ED and reports constant moderate R hip pain. Per nursing home staff, pt was found unconscious after his feeding tube was being checked and O2 sat was in the 70s. Of note, pt was given a breathing treatment on route by EMS and sat returned to 90s. Pt is a poor historian. Son notes that pt is on 2L at baseline and that the O2 was off. Additionally, he states that pt has had prior episodes where he stars off but that he does not stop breathing. Pt denies any other alleviating or exacerbating factors. He specifically denies any HA, CP, abnormal pain, nausea, vomiting, fevers, chills, urinary sxs or diarrhea. There are no other complaints, changes, or physical findings at this time. PCP: Yenni Vuong DO 
SHx: (-)smoker, (-)EtOH use, (-)illicit drug use No current facility-administered medications on file prior to encounter. Current Outpatient Medications on File Prior to Encounter Medication Sig Dispense Refill  lidocaine 4 % patch Use as needed 7 Patch 0  
 insulin glargine (LANTUS SOLOSTAR U-100 INSULIN) 100 unit/mL (3 mL) inpn 10 Units by SubCUTAneous route daily. 15 Pen 5  
 oxyCODONE IR (ROXICODONE) 5 mg immediate release tablet Take 0.5 Tabs by mouth every six (6) hours as needed. Max Daily Amount: 10 mg. 12 Tab 0  
 insulin regular (NOVOLIN R REGULAR U-100 INSULN) 100 unit/mL injection 12 Units by SubCUTAneous route Daily (before lunch).  Patient takes 15 Units with breakfast, 12 Units with lunch, and 4 Units nightly 1 Vial 0  
 insulin regular (NOVOLIN R, HUMULIN R) 100 unit/mL injection 4 Units by SubCUTAneous route nightly. Patient takes 12 Units with breakfast, 12 Units with lunch, and 4 Units nightly 1 Vial 0  
 insulin regular (NOVOLIN R, HUMULIN R) 100 unit/mL injection 12 Units by SubCUTAneous route Daily (before breakfast). Patient takes 12 Units with breakfast, 12 Units with lunch, and 4 Units nightly 1 Vial 1  
 hydrocortisone sodium succ/PF (HYDROCORTISONE SOD SUCC, PF, INJECTION) by Injection route every three (3) months. Patient gets from orthopedist    
 furosemide (LASIX) 40 mg tablet 40 mg by Per G Tube route two (2) times a day.  B.infantis-B.ani-B.long-B.bifi (PROBIOTIC 4X) 10-15 mg TbEC 1 Cap by Per G Tube route daily.  clopidogrel (PLAVIX) 75 mg tab 75 mg by PEG Tube route daily.  aspirin delayed-release 81 mg tablet Take 81 mg by mouth daily.  guaiFENesin (ROBITUSSIN) 100 mg/5 mL liquid 200 mg by Per G Tube route three (3) times daily.  insulin regular (NOVOLIN R REGULAR U-100 INSULN) 100 unit/mL injection 15 Units by SubCUTAneous route daily (with breakfast). Patient takes 15 Units with breakfast, 15 Units with lunch, and 5 Units nightly  pramipexole (MIRAPEX) 0.25 mg tablet Take 1 Tab by mouth three (3) times daily. 100 Tab 11  
 klack's mixture Solution Take 5 mL by mouth two (2) times a day. Diphenhydramine elixir 12.5mg/ml 500ml, Nystatin suspension 120ml,Tetracycline 500mg capsules  12 capsules (6g), Hydrocortisone 20mg tablet 12 tablets (240mg), Water for irrigation QS ad 1000ml  albuterol (PROVENTIL VENTOLIN) 2.5 mg /3 mL (0.083 %) nebulizer solution 2.5 mg by Nebulization route two (2) times a day. Per wife patient can use 3 times a day if needed  famotidine (PEPCID) 20 mg tablet 20 mg by Per G Tube route three (3) times daily.  acetaminophen (TYLENOL) 500 mg tablet 500-1,000 mg by Per G Tube route every six (6) hours as needed for Pain.  atorvastatin (LIPITOR) 40 mg tablet 40 mg by Per G Tube route daily.  finasteride (PROSCAR) 5 mg tablet 5 mg by Per G Tube route nightly.  alfuzosin SR (UROXATRAL) 10 mg SR tablet 10 mg by Per G Tube route daily.  midodrine (PROAMITINE) 10 mg tablet 10 mg by Per G Tube route three (3) times daily (with meals).  fluticasone (FLONASE ALLERGY RELIEF) 50 mcg/actuation nasal spray 1 [place] by Both Nostrils route two (2) times daily as needed (runny nose).  zinc 50 mg tab tablet 50 mg by Per G Tube route daily.  nitroglycerin (NITROSTAT) 0.4 mg SL tablet 0.4 mg by SubLINGual route every five (5) minutes as needed for Chest Pain.  gabapentin (NEURONTIN) 250 mg/5 mL solution 750 mg by Per G Tube route three (3) times daily.  multivitamin (ONE A DAY) tablet 1 Tab by Per G Tube route daily. Past History Past Medical History: 
Past Medical History:  
Diagnosis Date  Antiplatelet or antithrombotic long-term use 12/4/2014  Anxiety disorder  Arrhythmia 2009  
 bradycardia  Arthritis  CAD (coronary artery disease) s/p CABG 2002; Dr Petr Srinivasan  Cancer ([address] 1996  
 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   
 left leg, lower back,   
 Cough  Depression  Diabetes ([address] Type II  Epigastric pain 8/17/2018  Esophageal dysmotility s/p dilitation  Esophageal motility disorder 7/8/2013 Frequent simultaneous or failed contractions, low amplitude contractions  suggests severe myopathy or diffuse spasm. I suspect the latter. Achalasia  is not present.  Fainting [address]  Fatigue  GERD (gastroesophageal reflux disease)  Heart failure ([address] 10/2014 Cardiomyopathy:Pacemaker upgrade:Biv and AICD  Dr. Yue Gordon heart  last visit 5/11/2015  Hepatitis C Dx 1996  
 treated at [facility] in past; as of 4/15/15 wife states pt currently not under any treatment  Hyperlipidemia  Joint pain  Liver disease  Memory loss  Muscle pain  Muscle weakness  Myocardial infarct [facility]) 2013 Heart Cath: 40% LV EF, Stented distal LAD, patent Graft to circumflex  On tube feeding diet approx 2009  
 still has as of 9/28/15  (no po food/liquid/meds at all); Dr Constantine Parry  Other ill-defined conditions(799.89) 1996  
 1 dose of chemotherapy/radiation for tongue cancer  Other ill-defined conditions(799.89) BPH  Other ill-defined conditions(799.89) orthostatic hypotension  Pneumonia ~ April -May 2010  
 SOB (shortness of breath)  Stroke [facility]) approx 2003  
 left side-left finger tips numb; no imbalance or memory loss; as of 2015 not seeing neuro MD  
 Suicidal thoughts  Swallowing difficulty Past Surgical History: 
Past Surgical History:  
Procedure Laterality Date  ABDOMEN SURGERY PROC UNLISTED  1996  
 peg tube  CABG, ARTERY-VEIN, THREE  2000  HX CATARACT REMOVAL    
 bilateral  
 HX CHOLECYSTECTOMY  HX COLONOSCOPY    
 HX HEART CATHETERIZATION  2013 Stented distal LAD  HX MOHS PROCEDURES    
 bilateral  
 HX ORTHOPAEDIC    
 back surgery times two  HX OTHER SURGICAL Radical Left Neck  HX OTHER SURGICAL    
 NASAL POLYPS REMOVAL  
 HX OTHER SURGICAL  2010 TURP  
 HX PACEMAKER  11/08  HX PACEMAKER  10/28/14 Defibrillator: [facility] # [id number], serial # [id number]; Dr. Jacqueline Hart [phone]; Dr Matthew Naranjo  HX UROLOGICAL    
 cystoscopy [address] [place] [address]  
 [facility] NC CHANGE GASTROSTOMY TUBE  5/2/2011  NC CHANGE GASTROSTOMY TUBE  6/29/2011  NC EGD INSERT GUIDE WIRE DILATOR PASSAGE ESOPHAGUS  9/13/2010  MA EGD TRANSORAL BIOPSY SINGLE/MULTIPLE  2010  
    
 STOMACH SURGERY PROCEDURE UNLISTED  2011  UPPER GI ENDOSCOPY,BIOPSY  2018  UPPER GI ENDOSCOPY,DILATN W GUIDE  2018  UPPER GI ENDOSCOPY,W/DILAT,GASTRIC OUT  2018  VASCULAR SURGERY PROCEDURE UNLIST    
 bilateral carotid stents Family History: 
Family History Problem Relation Age of Onset  Heart Disease Father   
      at age 52 from CAD  Colon Cancer Mother  Cancer Mother   
     colon ca  
 Heart Disease Brother Social History: 
Social History Tobacco Use  Smoking status: Never Smoker  Smokeless tobacco: Never Used Substance Use Topics  Alcohol use: No  
 Drug use: No  
 
 
Allergies: Allergies Allergen Reactions  Cleocin [Clindamycin Hcl] Rash  Demerol [Meperidine] Shortness of Breath  Paxil [Paroxetine Hcl] Unknown (comments) Pt gets shaky and loses control of legs  Amoxicillin Rash  Pcn [Penicillins] Rash Review of Systems Review of Systems Unable to perform ROS: Acuity of condition Physical Exam  
Physical Exam  
Constitutional: He is oriented to person, place, and time. He appears well-developed and well-nourished. He appears distressed (moderate). HENT:  
Head: Normocephalic and atraumatic. Eyes: EOM are normal. Pupils are equal, round, and reactive to light. Neck: Normal range of motion. Neck supple. Cardiovascular: Normal rate, regular rhythm and normal heart sounds. Pulmonary/Chest: Effort normal. He has no wheezes. He has rhonchi. Abdominal: Soft. Bowel sounds are normal. There is no tenderness. Musculoskeletal: Normal range of motion. He exhibits no edema or tenderness. R hip tenderness Calf non-tender BL Neurological: He is alert and oriented to person, place, and time. No cranial nerve deficit. difficult to understand Skin: Skin is warm and dry. No rash noted. Psychiatric: He has a normal mood and affect. His behavior is normal.  
Nursing note and vitals reviewed. Diagnostic Study Results Labs - Recent Results (from the past 12 hour(s)) CBC WITH AUTOMATED DIFF Collection Time: 02/03/19  8:25 AM  
Result Value Ref Range WBC 7.7 4.1 - 11.1 K/uL  
 RBC 3.98 (L) 4.10 - 5.70 M/uL  
 HGB 11.9 (L) 12.1 - 17.0 g/dL HCT 39.3 36.6 - 50.3 % MCV 98.7 80.0 - 99.0 FL  
 MCH 29.9 26.0 - 34.0 PG  
 MCHC 30.3 30.0 - 36.5 g/dL  
 RDW 17.0 (H) 11.5 - 14.5 % PLATELET 713 (L) 464 - 400 K/uL MPV 11.5 8.9 - 12.9 FL  
 NRBC 0.0 0  WBC ABSOLUTE NRBC 0.00 0.00 - 0.01 K/uL NEUTROPHILS 88 (H) 32 - 75 % LYMPHOCYTES 6 (L) 12 - 49 % MONOCYTES 6 5 - 13 % EOSINOPHILS 0 0 - 7 % BASOPHILS 0 0 - 1 % IMMATURE GRANULOCYTES 0 0.0 - 0.5 % ABS. NEUTROPHILS 6.7 1.8 - 8.0 K/UL  
 ABS. LYMPHOCYTES 0.5 (L) 0.8 - 3.5 K/UL  
 ABS. MONOCYTES 0.5 0.0 - 1.0 K/UL  
 ABS. EOSINOPHILS 0.0 0.0 - 0.4 K/UL  
 ABS. BASOPHILS 0.0 0.0 - 0.1 K/UL  
 ABS. IMM. GRANS. 0.0 0.00 - 0.04 K/UL  
 DF AUTOMATED    
 RBC COMMENTS ANISOCYTOSIS 1+ LACTIC ACID Collection Time: 02/03/19  8:25 AM  
Result Value Ref Range Lactic acid 5.9 (HH) 0.4 - 2.0 MMOL/L  
POC LACTIC ACID Collection Time: 02/03/19  8:26 AM  
Result Value Ref Range Lactic Acid (POC) 5.64 (HH) 0.40 - 2.00 mmol/L  
EKG, 12 LEAD, INITIAL Collection Time: 02/03/19  8:27 AM  
Result Value Ref Range Ventricular Rate 95 BPM  
 Atrial Rate 87 BPM  
 QRS Duration 136 ms  
 Q-T Interval 332 ms QTC Calculation (Bezet) 417 ms Calculated P Axis -77 degrees Calculated R Axis 157 degrees Calculated T Axis 17 degrees Diagnosis ** Poor data quality, interpretation may be adversely affected Ventricular-paced rhythm Biventricular pacemaker detected When compared with ECG of 11-JAN-2019 10:48, 
Vent.  rate has decreased BY  18 BPM 
  
URINALYSIS W/ RFLX MICROSCOPIC  
 Collection Time: 02/03/19  8:34 AM  
Result Value Ref Range Color YELLOW/STRAW Appearance CLEAR CLEAR Specific gravity 1.017 1.003 - 1.030    
 pH (UA) 6.0 5.0 - 8.0 Protein TRACE (A) NEG mg/dL Glucose NEGATIVE  NEG mg/dL Ketone NEGATIVE  NEG mg/dL Bilirubin NEGATIVE  NEG Blood NEGATIVE  NEG Urobilinogen 1.0 0.2 - 1.0 EU/dL Nitrites NEGATIVE  NEG Leukocyte Esterase NEGATIVE  NEG    
 WBC 0-4 0 - 4 /hpf  
 RBC 5-10 0 - 5 /hpf Epithelial cells FEW FEW /lpf Bacteria NEGATIVE  NEG /hpf Mucus 1+ (A) NEG /lpf Hyaline cast 10-20 0 - 5 /lpf  
AMMONIA Collection Time: 02/03/19  8:34 AM  
Result Value Ref Range Ammonia 47 (H) <32 UMOL/L  
BLOOD GAS, ARTERIAL Collection Time: 02/03/19  8:55 AM  
Result Value Ref Range pH 7.42 7.35 - 7.45    
 PCO2 45 35.0 - 45.0 mmHg PO2 214 (H) 80 - 100 mmHg O2 SAT 99 (H) 92 - 97 % BICARBONATE 28 (H) 22 - 26 mmol/L  
 BASE EXCESS 3.1 mmol/L  
 O2 METHOD NRM    
 FIO2 100 % SPONTANEOUS RATE 16.0 Sample source ARTERIAL    
 SITE LEFT RADIAL FLAVIA'S TEST YES    
NT-PRO BNP Collection Time: 02/03/19  9:06 AM  
Result Value Ref Range NT pro-BNP 9,636 (H) 0 - 450 PG/ML  
TROPONIN I Collection Time: 02/03/19  9:06 AM  
Result Value Ref Range Troponin-I, Qt. <0.05 <0.05 ng/mL METABOLIC PANEL, COMPREHENSIVE Collection Time: 02/03/19  9:06 AM  
Result Value Ref Range Sodium 137 136 - 145 mmol/L Potassium 4.9 3.5 - 5.1 mmol/L Chloride 94 (L) 97 - 108 mmol/L  
 CO2 37 (H) 21 - 32 mmol/L Anion gap 6 5 - 15 mmol/L Glucose 392 (H) 65 - 100 mg/dL BUN 49 (H) 6 - 20 MG/DL Creatinine 2.04 (H) 0.70 - 1.30 MG/DL  
 BUN/Creatinine ratio 24 (H) 12 - 20 GFR est AA 38 (L) >60 ml/min/1.73m2 GFR est non-AA 31 (L) >60 ml/min/1.73m2 Calcium 9.4 8.5 - 10.1 MG/DL  Bilirubin, total 0.7 0.2 - 1.0 MG/DL  
 ALT (SGPT) 51 12 - 78 U/L  
 AST (SGOT) 67 (H) 15 - 37 U/L  
 Alk. phosphatase 90 45 - 117 U/L Protein, total 8.1 6.4 - 8.2 g/dL Albumin 3.0 (L) 3.5 - 5.0 g/dL Globulin 5.1 (H) 2.0 - 4.0 g/dL A-G Ratio 0.6 (L) 1.1 - 2.2 PROTHROMBIN TIME + INR Collection Time: 02/03/19  9:06 AM  
Result Value Ref Range INR 1.1 0.9 - 1.1 Prothrombin time 10.9 9.0 - 11.1 sec Radiologic Studies -  
XR HIP RT W OR WO PELV 2-3 VWS Final Result IMPRESSION: Bilateral hip osteoarthritis. No acute abnormality. XR CHEST PORT Final Result IMPRESSION:   
Increasing opacification of the left hemithorax, likely due to a left pleural  
effusion, together with atelectasis versus airspace disease. CXR Results  (Last 48 hours) 02/03/19 0853  XR CHEST PORT Final result Impression:  IMPRESSION:   
Increasing opacification of the left hemithorax, likely due to a left pleural  
effusion, together with atelectasis versus airspace disease. Narrative:  INDICATION:    Chest Pain EXAMINATION:  AP CHEST, PORTABLE  
   
COMPARISON: 1/11/2019 FINDINGS: Single AP portable view of the chest at 840 hours demonstrates a  
left-sided pacer device and evidence of CABG. The cardiomediastinal silhouette  
is stable. There is increasing opacification of the left hemithorax, likely due  
to pleural fluid and atelectasis versus airspace disease. There is chronic  
interstitial edema. Medical Decision Making I am the first provider for this patient. I reviewed the vital signs, available nursing notes, past medical history, past surgical history, family history and social history. Vital Signs-Reviewed the patient's vital signs. Patient Vitals for the past 12 hrs: 
 Pulse Resp BP  
02/03/19 0812 93 22 108/66 Cardiac Monitor:  
Rate: 95 bpm 
Rhythm: ventricular paced rhythm EKG interpretation: (Preliminary) 8:27 Rhythm: ventricular paced rhythm; and irregular.  Rate (approx.): 95 bpm; Axis: normal; QRS interval: normal ; ST/T wave: normal; Other findings: biventricular pacemaker detected, no significant changes. Written by Sara Garrett ED Scribe, as dictated by Myriam Blanco MD. Records Reviewed: Nursing Notes, Old Medical Records and Previous Laboratory Studies Provider Notes (Medical Decision Making): DDx: respiratory failure, COPD, CHF, arrhythmia, defibrillator fire, pleural effusion, pneumonia, anemia, electrolyte abnormality, absence seizures ED Course:  
Initial assessment performed. The patients presenting problems have been discussed, and they are in agreement with the care plan formulated and outlined with them. I have encouraged them to ask questions as they arise throughout their visit. Consult Note: 
9:26 AM 
Myriam Blanco MD spoke with Jessy Pang, Specialty: cardiology Discussed pt's hx, disposition, and available diagnostic and imaging results. Reviewed care plans. Consultant is aware that his assistance may be needed if defibrillator fired. CONSULT NOTE:  
10:16 AM 
Myriam Blanco MD spoke with Kristina Santo MD 
Specialty: Hospitalist 
Discussed pt's hx, disposition, and available diagnostic and imaging results. Reviewed care plans. Consultant will evaluate pt for admission. Written by Sara Garrett, ED Scribe, as dictated by Myriam Blanco MD. 
 
Consult Note: 
10:32 AM 
Myriam Blanco MD spoke with Evelin Villarreal MD 
Specialty: interventional hospitalist 
Discussed pt's hx, disposition, and available diagnostic and imaging results. Reviewed care plans. Consultant states that it is mild pleural effusion and appears worse because of the air space but that it does not need to be drained today, will drain tomorrow. Critical Care Time: CRITICAL CARE NOTE : 
 
10:17 AM 
IMPENDING DETERIORATION -Respiratory, Cardiovascular, CNS, Metabolic and Renal 
ASSOCIATED RISK FACTORS - Hypotension, Hypoxia, Dysrhythmia, Metabolic changes, Dehydration and CNS Decompensation MANAGEMENT- Bedside Assessment and Supervision of Care INTERPRETATION -  Xrays, CT Scan, Blood Gases, ECG and Blood Pressure INTERVENTIONS - hemodynamic mngmt and Metobolic interventions CASE REVIEW - Hospitalist, Medical Sub-Specialist, Nursing and Family TREATMENT RESPONSE -Improved PERFORMED BY - Self NOTES   : 
I have spent 85 minutes of critical care time involved in lab review, consultations with specialist, family decision- making, bedside attention and documentation. During this entire length of time I was immediately available to the patient . Johny Minor MD 
 
Disposition: 
Admit Note: 
10:17 AM 
Pt is being admitted by . The results of their tests and reason(s) for their admission have been discussed with pt and/or available family. They convey agreement and understanding for the need to be admitted and for admission diagnosis. PLAN: 
1. Admit to . Diagnosis Clinical Impression: 1. Acute respiratory failure with hypoxia ([address] 2. Pleural effusion on left 3. HCAP (healthcare-associated pneumonia) 4. LIZ (acute kidney injury) ([address] 5. Lactic acidosis 6. Right hip pain Attestations: This note is prepared by Rick Sanchez, acting as a Scribe for Johny Minor MD. 
 
Johny Minor MD: The scribe's documentation has been prepared under my direction and personally reviewed by me in its entirety. I confirm that the notes above accurately reflects all work, treatment, procedures, and medical decision making performed by me. This note will not be viewable in [address]

## 2019-02-03 NOTE — ED NOTES
Patient receives tube feeding Osmolite 1.2 Armando @ 65mL/hr continuous; Flush q 4hrs with 75 mL H2O.  NPO order placed in chart. All medications must be IV or through his PEG tube.

## 2019-02-03 NOTE — PROGRESS NOTES
Consult dictated [id number]. Syncope without evidence of arrhythmia. That being said, he is in SR but with 1AVB, unusual finding if his BIV-ICD is programmed conventionally. Will have to interrogate the device. Dehydration with LIZ atop CKD, probable pneumonia, hospitalist on board (Dr. Ant Posada). Chronic systolic CHF, cannot completely exclude an acute component, would try to keep him balanced today. Discussed dobutamine with the hospitalist, will start continuous dosing at 5 mcg. Full code noted. Signed By: Manuela Stewart MD   
 February 3, 2019

## 2019-02-03 NOTE — ED NOTES
[facility] [employer] technician called this nurse and reported there are no alerts on the defib/pacemaker. Will attach the report to chart once received via fax.

## 2019-02-03 NOTE — ED NOTES
Patient had episode of unresponsiveness that lasted approximately 20-30 sec. Patient's eyes were open but not focusing or tracking. He did not respond to sternal rub at that time. Patient's O2 sats dropped to 56% at this time, and patient was not spontaneously breathing on his own. Patient began to moan and breath spontaneously on his own and his eyes began to track.

## 2019-02-03 NOTE — ED NOTES
Patient arrived via EMS with c/o \"unresponsive episode\" at [facility] while he was receiving a tube feeding. Patient was alert and oriented this morning and while receiving tube feeding he slumped over and became unresponsive and O2 sats dropped into 50s. EMS placed patient on NRB with duo-neb and O2 sats were above 95%.

## 2019-02-03 NOTE — CONSULTS
[address] [place] [address] Name:Radha Sheriff 
MR#: [id number] : 1935 ACCOUNT #: [de-identified] DATE OF SERVICE: 2019 REFERRING PHYSICIAN:  Varghese Mishra MD 
 
REASON FOR CONSULTATION:  Syncope. HISTORY OF PRESENT ILLNESS:  This is an 80-year-old male with a complex medical and cardiac history who was brought to the emergency room for brief periods of unresponsiveness. He actually had a witnessed episode while in the emergency room. Here, while lying in bed he suddenly became unresponsive and stopped breathing according to the nurse. This episode lasted approximately 20-30 seconds. He did not respond to sternal rub. Blood pressure was not done at the time of unresponsiveness, but his oxygen saturation did drop to about 56%. He recovered by opening his eyes starting to breathe. He denies any chest, arm, neck, shoulder, or arm discomfort with this. No nausea or vomiting. In my interview at this time, he is tired, comfortable. He has had history of similar events. That, in 2019 he was admitted multiple issues including syncope associated with hypotension. His cardiac history includes an ischemic cardiomyopathy with an ejection fraction of 30-35% by echo in 2019. He has had remote coronary artery bypass grafting. He has chronic diastolic congestive heart failure and in January was being treated for an acute episode. His [employer] biventricular pacemaker defibrillator was implanted in  as part of an upgrade. I do not have the results of the emergency room device check at this time. His activity level is poor. He eats via a feeding tube. ALLERGIES: 
1. CLINDAMYCIN (RASH). 2.  DEMEROL (SHORTNESS OF BREATH). 3.  PAXIL (SHAKINESS). 4.  AMOXICILLIN (RASH). 5.  PENICILLIN (RASH). MEDICATIONS: 
1. Enteral feeding. 2.  Acetaminophen 500 mg every 6 hours as needed. 3.  Albuterol nebulizer 2 times a day. 4.  Alfuzosin 10 mg once daily. 5.  Aspirin 81 mg daily. 6.  Atorvastatin 40 mg daily. 7.  Famotidine 20 mg 3 times daily. 8.  Finasteride 5 mg daily at night. 9.  Flonase 1 spray in both nostrils twice daily as needed. 10.  Furosemide 40 mg twice daily. 11.  Gabapentin 750 mg  
12. BI solution 3 times daily. 12.  Insulin. 13.  Lidocaine patch 4% topically as needed for pain. 14.  Midodrine 10 mg 3 times daily with meals. 15.  Mirapex 0.25 mg 3 times daily. 16.  Nitroglycerin sublingual as needed. 17.  Multivitamin daily. 18.  Oxycodone 5 mg one-half tablet every 6 hours as needed for pain. 19.  Plavix 75 mg daily. 20.  Probiotic daily. 21.  Zinc supplement daily. 22.  Zofran 4 mg every 6 hours as needed. PAST MEDICAL HISTORY: 
1. Ischemic heart disease as noted above. 2.  Chronic kidney disease stage III with recent acute kidney injury in 01/2019. 
3.  Recent subdural hematoma and subarachnoid hemorrhage after a fall during syncope in 12/2018. 
4.  Insulin-dependent diabetes mellitus type 2. 
5.  Enlarged prostate with a history of urinary retention. 6.  Remote stroke in 2015. 7.  Chronic thrombocytopenia. 8.  Tongue cancer, status post radical neck resection and radiation with resultant chronic dysphagia, has a PEG tube. The PEG tube site has been infected in the past. 
9.  Known severe vertebrobasilar disease. 10.  Severe aortic valve stenosis. 11.  Chronic arthritic pain. 12.  Chronic tremors. FAMILY HISTORY:  Noncontributory. SOCIAL HISTORY:  . When he had an unresponsive episode today, his wife had a fainting episode herself. REVIEW OF SYSTEMS:  Except as noted above and below is otherwise noncontributory. The patient is not in a state we can give the complete review of systems due to weakness and poor recall. PHYSICAL EXAMINATION: 
VITAL SIGNS:  Pulse 93, blood pressure 108/66, respirations 22.   As of the time of this dictation the blood pressure is 100/51 with a pulse of 73. He has oxygen saturations 100% on supplemental oxygen. GENERAL:  Nondiaphoretic, not in acute distress, ill appearing male. Cachectic. HEENT:  No scleral icterus, mucous membranes are moist, conjunctivae pink. SKIN:  No xanthelasma. NECK:  Prior surgery noted. RESPIRATORY:  Unlabored. LUNGS:  Clear anteriorly. Posteriorly, there are decreased breath sounds bilaterally. ABDOMEN:  Has a PEG tube insertion site. Nondistended. Nontender. EXTREMITIES:  Without cyanosis or clubbing. No amputations. NEUROLOGIC:  Abnormal.  He does awaken from sleep. He can answer questions, but is oriented to self and the location. Does not know the day. Chest of the left chest Bi-V ICD site is well healed without evidence of erosion. The sternotomy incision has a scar and is well healed. LABORATORY DATA:  White blood cells 7.7, hemoglobin 11.9, hematocrit 39.3, platelets 403. Sodium 137, potassium 4.9, BUN 49, creatinine 2.0 (was 1.11 on 01/18/2019) Troponin less than 0.05. ProBNP 9636 (was 15,807 on 01/13/2019), ammonia level 47. Urinalysis is abnormal with a specific gravity of 1.029, pH of 7.5, 100 of protein, greater than 1000 glucose, trace ketones, few epithelial cells, no bacteria. The ECG shows significant motion artifact. Telemetry sinus rhythm with first degree AV block and wide QRS. He has a known Bi-V ICD. Unusual to have a prolonged NM. I wonder if there is an absence of biventricular pacing. ASSESSMENT: 
1. Recurrent syncope, multifactorial.  He has several reasons to have a low blood pressure including his cardiac disease and extensive respiratory disease. This is a concerning situation. 2.  Chronic systolic congestive heart failure. I do not think this is an acute decompensation, although I cannot fully exclude this. 3.  Acute kidney failure atop of chronic kidney disease stage III. 4. [address] ICD with unclear programming. I have to check to see if the device is working adequately. 5.  As above. RECOMMENDATIONS: 
1. Would cautiously continue to follow his volume status. As for today I would not give a diuretic unless he shows signs of worsening pulmonary edema. I think he may be dehydrated. 2.  Possible pneumonia. The hospitalist is working on this aspect of his care. 3.  We will interrogate his BiV ICD. 4.  He normally sees Dr. Aditya Latif, I will contact him to see if he can follow him for his cardiac issues. MD Clayton Haji / Neema Colbert 
D: 02/03/2019 11:18    
T: 02/03/2019 16:45 JOB #: [id number]

## 2019-02-04 NOTE — PROGRESS NOTES
TRANSFER - IN REPORT: 
 
Verbal report received from Stephanie Cervantes RN(name) on Ralf Farrell  being received from ED(unit) for routine progression of care Report consisted of patients Situation, Background, Assessment and  
Recommendations(SBAR). Information from the following report(s) SBAR, Kardex, ED Summary, Intake/Output, MAR, Recent Results and Cardiac Rhythm Paced was reviewed with the receiving nurse. Opportunity for questions and clarification was provided. Assessment will be completed upon patients arrival to unit and care assumed. 1347 - Received pt from ED with pt's wife at bedside; pt c/o some discomfort to back. Repositioned pt in bed; pt answers orientation questions with delayed responses. Pt knows name and location; unable to state year or situation. Pt mumbling and making random statements like \"I need some money. \" Pt's wife states that this is not pt's baseline. 2nd PIV infiltrated from IV abx. Notified SRN of MEWs 5. Dr. Marely Martínez made aware. 1400 - Dr. Kaylan John at the bedside; aware that pt has not voided since early this morning. Bladder scan was reported to be 0 in the ED despite pt stating that he needs to void. Will reassess. 1427 - Bladder scan 99; restarted 2nd PIV to left wrist.  
1449 - Spoke with Dr. Marely Martínez regarding whether pt may resume tube feed 1533 - Paged Dr. Kaylan John regarding pt's BP now dropping; 81/53; 76/58, & 80/57. Made Dr. Marely Martínez aware; no new orders received. 1 - Dr. Marely Martínez called back and gave order to give Albumin 25g x1 while waiting to speak to Dr. Kaylan John. Notified SRN of need for Albumin. [facility] a Rapid Response d/t paging Dr. Kaylan John x2 without response and pt's SBP steadily dropping. 1554 - Albumin started; still monitoring BP. Dr. Mack Benavides talking to pt's wife regarding pt status and evaluating plan. 1600 - Stopped Dobutamine gtt per Dr. Kaylan John.  Dr. Mack Benavides at the bedside. Called PICC team regarding order to start dopamine gtt since pt has pt only has 1-PIV at this time. Lost new #22 PIV. PICC team to come. 401 [facility] NP for palliative consult to talk with pt's wife and family regarding care decisions. 1628 - PICC team at the bedside. Aurora Del Valle NP at the bedside also to speak with pt's wife. Pharmacy notified of need for dopamin gtt d/t hypotension. 7801 - As soon as dopamine received from pharmacy; medication started.

## 2019-02-04 NOTE — PROGRESS NOTES
Pharmacy Clarification of the Prior to Admission Medication Regimen Retrospective to the Admission Medication Reconciliation Patient was transferred from [facility] to [facility], with a current med list. Pharmacy called [facility], [phone], and spoke with Vazquez Viera LPN, who was able to verify the patient's last administered doses. Information Obtained From: transfer papers Recommendations/Findings: The following amendments were made to the patient's active medication list on file at [facility]:  
 
1) Additions:  
? ondansetron hcl (ZOFRAN) 4 mg tablet 
? insulin lispro (HUMALOG U-100 INSULIN) 100 unit/mL injection 2) Removals: ? Hydrocortisone injection ? Robitussion liquid ? Klack's mixture 3) Changes: 
? insulin regular (NOVOLIN R REGULAR U-100 INSULN) 100 unit/mL injection (Old regimen: 15 units every day with breakfast /New regimen: 15 units BID w/breakfast and lunch) ? insulin regular (NOVOLIN R REGULAR U-100 INSULN) 100 unit/mL injection (Old regimen: 4 units nightly /New regimen: 5 Units nightly) 4) Pertinent Pharmacy Findings: ? [facility] did not update the outpatient pharmacy due to the patient living at a SNF 
 
 
 PTA medication list was corrected to the following:  
 
Prior to Admission Medications Prescriptions Last Dose Informant Patient Reported? Taking? B.infantis-B.ani-B.long-B.bifi (PROBIOTIC 4X) 10-15 mg TbEC 2019 at Unknown time Transfer Papers Yes Yes Si Cap by Per G Tube route daily. acetaminophen (TYLENOL) 500 mg tablet 2019 at Unknown time Transfer Papers Yes Yes Si-1,000 mg by Per G Tube route every six (6) hours as needed for Pain. albuterol (PROVENTIL VENTOLIN) 2.5 mg /3 mL (0.083 %) nebulizer solution 2019 at Unknown time Transfer Papers Yes Yes Si.5 mg by Nebulization route two (2) times a day.  Per wife patient can use 3 times a day if needed  
alfuzosin SR (UROXATRAL) 10 mg SR tablet 2019 at Unknown time Transfer Papers Yes Yes Sig: 10 mg by Per G Tube route daily. aspirin delayed-release 81 mg tablet 2019 at Unknown time Transfer Papers Yes Yes Sig: Take 81 mg by mouth daily. atorvastatin (LIPITOR) 40 mg tablet 2019 at Unknown time Transfer Papers Yes Yes Si mg by Per G Tube route daily. clopidogrel (PLAVIX) 75 mg tab 2019 at Unknown time Transfer Papers Yes Yes Si mg by PEG Tube route daily. famotidine (PEPCID) 20 mg tablet 2019 at Unknown time Transfer Papers Yes Yes Si mg by Per G Tube route three (3) times daily. finasteride (PROSCAR) 5 mg tablet 2019 at Unknown time Transfer Papers Yes Yes Si mg by Per G Tube route nightly. fluticasone (FLONASE ALLERGY RELIEF) 50 mcg/actuation nasal spray 2019 at Unknown time Transfer Papers Yes Yes Si [place] by Both Nostrils route two (2) times daily as needed (runny nose). furosemide (LASIX) 40 mg tablet 2019 at Unknown time Transfer Papers Yes Yes Si mg by Per G Tube route two (2) times a day.  
gabapentin (NEURONTIN) 250 mg/5 mL solution 2019 at Unknown time Transfer Papers Yes Yes Si mg by Per G Tube route three (3) times daily. insulin glargine (LANTUS U-100 INSULIN) 100 unit/mL injection 2019 at Unknown time Transfer Papers Yes Yes Sig: 10 Units by SubCUTAneous route daily. insulin lispro (HUMALOG U-100 INSULIN) 100 unit/mL injection 2019 at Unknown time Transfer Papers Yes Yes Sig: by SubCUTAneous route. Inject per sliding scale:  
200-250  = 2 units 251-299 = 3 units 300-350 = 4 units Notify MD if greater than 351, before meals and at bedtime for DM  
insulin regular (NOVOLIN R REGULAR U-100 INSULN) 100 unit/mL injection 2019 at Unknown time Transfer Papers Yes Yes Sig: 15 Units by SubCUTAneous route two (2) times daily (with meals). Patient takes 15 Units with breakfast, 15 Units with lunch, and 5 Units nightly insulin regular (NOVOLIN R REGULAR U-100 INSULN) 100 unit/mL injection 2019 at 2100 Transfer Papers Yes Yes Si Units by SubCUTAneous route nightly. Patient takes 15 units at breakfast, 15 units at lunch, and 4 units nightly. lidocaine 4 % patch 2019 at Unknown time Transfer Papers No Yes Sig: Use as needed  
midodrine (PROAMITINE) 10 mg tablet 2019 at Unknown time Transfer Papers Yes Yes Sig: 10 mg by Per G Tube route three (3) times daily (with meals). multivitamin (ONE A DAY) tablet 2019 at Unknown time Transfer Papers Yes Yes Si Tab by Per G Tube route daily. nitroglycerin (NITROSTAT) 0.4 mg SL tablet 2019 at Unknown time Transfer Papers Yes Yes Si.4 mg by SubLINGual route every five (5) minutes as needed for Chest Pain. ondansetron hcl (ZOFRAN) 4 mg tablet 2019 at 1547 Transfer Papers Yes Yes Si mg by Per G Tube route every six (6) hours as needed for Nausea. oxyCODONE IR (ROXICODONE) 5 mg immediate release tablet 2019 at Unknown time Transfer Papers No Yes Sig: Take 0.5 Tabs by mouth every six (6) hours as needed. Max Daily Amount: 10 mg.  
pramipexole (MIRAPEX) 0.25 mg tablet 2019 at Unknown time Transfer Papers Yes Yes Sig: Take 0.25 mg by mouth three (3) times daily. zinc 50 mg tab tablet 2019 at     Transfer Papers Yes Yes Si mg by Per G Tube route daily. Facility-Administered Medications: None Thank you, Eulogio De Guzman A. Yaneli PharmD. Candidate, Class of 2019

## 2019-02-04 NOTE — PROGRESS NOTES
Progress Note 2/4/2019 1:03 PM 
NAME: Smitha Conde MRN:  [id number] Admit Diagnosis: Respiratory failure ([facility] [address] Problem List: 1. Acute on chronic hypoxic respiratory failure 2. Pneumonia 3. Pleural effusion s/p left thoracentesis 4. Acute on chronic systolic heart failure 5. Recurrent syncope 6. Confusion 7. 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 Acute on chronic kidney disease; Stg 3 
9. Lactic acidosis 10. Coronary artery disease s/p remote CABG.  Cath '13 w/ EF 40%, PCI to distal LAD via LIMA, severe disease in large diagonal (not amenable to PCI), patent SVG-OM, SVG-PDA, SVG-PLB.  Lexiscan 11/13 w/ EF 28%, IWMI, and with diagonal ischemia. Cath 1/16 w/ 3v CAD, patent LIMA-LAD, 95% of SVG-OM2/RPDA s/p 1 RENATA, patent SVG-RCA, patent LM stents. 11. Lexiscan stress MPI 9/14 w/ EF 29% and anterior ischemia w/ IWMI 12. Ischemic cardiomyopathy 13. Chronic thrombocytopenia 14. BiV ICD 15. Severe aortic stenosis. In process of evaluation for TAVR . [place].    
16. Diabetes 17. Orthostatic hypotension 18. Hyperlipidemia 19. Head/neck CA      w/ feeding tube 20. Chronic thrombocytopenia (Dr. Dior [place] 21. S/p recurrent falls. Recurrent spells of sycnope 22. Syncope, fall and subdural hematoma 12/2018 23. Age related debility. 24. Echo 2018 . EF 30% Assessment/Plan:  
Somewhat confused Discussed w/ wife 1. Device interrogation by Dr. Greg Melara 2. Continue ASA/plavix 3. Continue atorvastatin 4. Diuresis per primary 5. Continue midodrine 10mg TID 6. Given multiple recent admissions and comorbidities, as discussed w/ Dr. Isaiah Rand, the TAVR evaluation is now on hold; possibly indefinitely. 7. Continue dobutamine for now 8. Will need to discuss getting palliative care involved with the patient and wife  [x]       High complexity decision making was performed in this patient at high risk for decompensation with multiple organ involvement. Subjective:  
 
Mustapha Rowell denies chest pain. Dyspnea remains. Doesn't notice much different after tap. Discussed with RN events overnight. Review of Systems: 
 
Symptom Y/N Comments  Symptom Y/N Comments Fever/Chills N   Chest Pain N Poor Appetite N   Edema N   
Cough N   Abdominal Pain N Sputum N   Joint Pain N   
SOB/KRAMER N   Pruritis/Rash N   
Nausea/vomit N   Tolerating PT/OT Y Diarrhea N   Tolerating Diet Y Constipation N   Other Could NOT obtain due to:   
 
Objective:  
  
Physical Exam: 
 
Last 24hrs VS reviewed since prior progress note. Most recent are: 
 
Visit Vitals BP 97/72 Pulse 99 Temp 98.5 °F (36.9 °C) Resp 25 Wt 73.5 kg (162 lb) SpO2 97% BMI 25.37 kg/m² Intake/Output Summary (Last 24 hours) at 2/4/2019 1303 Last data filed at 2/4/2019 1128 Gross per 24 hour Intake 50 ml Output 2175 ml Net -2125 ml General Appearance: Well developed, well nourished, alert & oriented x 3,  
 no acute distress. Ears/Nose/Mouth/Throat: Hearing grossly normal. 
Neck: Supple. Chest: Lungs with diffuse rhonchi 
Cardiovascular: Regular rate and rhythm, S1S2 normal, harsh systolic murmur. Abdomen: Soft, non-tender, bowel sounds are active. Extremities: No edema bilaterally. Skin: Warm and dry. []         Post-cath site without hematoma, bruit, tenderness, or thrill. Distal pulses intact. PMH/SH reviewed - no change compared to H&P Data Review Telemetry: paced EKG:  
[x]  No new EKG for review Lab Data Personally Reviewed: 
 
Recent Labs 02/04/19 
0240 02/03/19 
0825 WBC 5.9 7.7 HGB 8.9* 11.9*  
HCT 29.8* 39.3 PLT 84* 115* Recent Labs 02/03/19 
6436 INR 1.1 PTP 10.9 Recent Labs 02/04/19 
0240 02/03/19 
4149  137  
K 4.6 4.9 CL 97 94* CO2 40* 37* BUN 49* 49* CREA 1.74* 2.04* * 392* CA 9.1 9.4 Recent Labs 02/03/19 
6567 TROIQ <0.05 Lab Results Component Value Date/Time Cholesterol, total 128 12/07/2018 11:16 AM  
 HDL Cholesterol 32 (L) 12/07/2018 11:16 AM  
 LDL, calculated 72 12/07/2018 11:16 AM  
 Triglyceride 119 12/07/2018 11:16 AM  
 CHOL/HDL Ratio 3.6 02/02/2017 02:55 AM  
 
 
Recent Labs 02/03/19 
0805 SGOT 67* AP 90  
TP 8.1 ALB 3.0*  
GLOB 5.1* Recent Labs 02/03/19 
5301 PH 7.42  
PCO2 45 PO2 214* Medications Personally Reviewed: 
 
Current Facility-Administered Medications Medication Dose Route Frequency  metroNIDAZOLE (FLAGYL) IVPB premix 500 mg  500 mg IntraVENous Q12H  
 vancomycin (VANCOCIN) 1500 mg in  ml infusion  1,500 mg IntraVENous NOW Followed by  
Salvador Reed ON 2/5/2019] vancomycin (VANCOCIN) 1,000 mg in 0.9% sodium chloride (MBP/ADV) 250 mL  1,000 mg IntraVENous Q24H  cefepime (MAXIPIME) 1 g in 0.9% sodium chloride (MBP/ADV) 50 mL  1 g IntraVENous Q12H  
 albuterol-ipratropium (DUO-NEB) 2.5 MG-0.5 MG/3 ML  3 mL Nebulization Q6H RT  
 albuterol-ipratropium (DUO-NEB) 2.5 MG-0.5 MG/3 ML  3 mL Nebulization Q6H PRN  
 aspirin delayed-release tablet 81 mg  81 mg Oral DAILY  atorvastatin (LIPITOR) tablet 40 mg  40 mg Per G Tube DAILY  clopidogrel (PLAVIX) tablet 75 mg  75 mg PEG Tube DAILY  finasteride (PROSCAR) tablet 5 mg  5 mg Oral DAILY  insulin glargine (LANTUS) injection 10 Units  10 Units SubCUTAneous DAILY  midodrine (PROAMITINE) tablet 10 mg  10 mg Per G Tube TID WITH MEALS  nitroglycerin (NITROSTAT) tablet 0.4 mg  0.4 mg SubLINGual Q5MIN PRN  
 oxyCODONE IR (ROXICODONE) tablet 2.5 mg  2.5 mg Oral Q6H PRN  pramipexole (MIRAPEX) tablet 0.25 mg  0.25 mg Oral TID  insulin lispro (HUMALOG) injection   SubCUTAneous AC&HS  
 glucose chewable tablet 16 g  4 Tab Oral PRN  
 dextrose (D50W) injection syrg 12.5-25 g  12.5-25 g IntraVENous PRN  
 glucagon (GLUCAGEN) injection 1 mg  1 mg IntraMUSCular PRN  
  sodium chloride (NS) flush 5-40 mL  5-40 mL IntraVENous Q8H  
 sodium chloride (NS) flush 5-40 mL  5-40 mL IntraVENous PRN  
 acetaminophen (TYLENOL) tablet 650 mg  650 mg Oral Q4H PRN  
 naloxone (NARCAN) injection 0.4 mg  0.4 mg IntraVENous PRN  
 ondansetron (ZOFRAN ODT) tablet 4 mg  4 mg Oral Q4H PRN  
 heparin (porcine) injection 5,000 Units  5,000 Units SubCUTAneous Q8H  
 levoFLOXacin (LEVAQUIN) 750 mg in D5W IVPB  750 mg IntraVENous Q48H  
 furosemide (LASIX) injection 40 mg  40 mg IntraVENous DAILY  DOBUTamine (DOBUTREX) 500 mg/250 mL (2,000 mcg/mL) infusion  5 mcg/kg/min (Order-Specific) IntraVENous CONTINUOUS Current Outpatient Medications Medication Sig  pramipexole (MIRAPEX) 0.25 mg tablet Take 0.25 mg by mouth three (3) times daily.  insulin regular (NOVOLIN R REGULAR U-100 INSULN) 100 unit/mL injection 5 Units by SubCUTAneous route nightly. Patient takes 15 units at breakfast, 15 units at lunch, and 4 units nightly.  insulin lispro (HUMALOG U-100 INSULIN) 100 unit/mL injection by SubCUTAneous route. Inject per sliding scale:  
200-250  = 2 units 251-299 = 3 units 300-350 = 4 units Notify MD if greater than 351, before meals and at bedtime for DM  
 ondansetron hcl (ZOFRAN) 4 mg tablet 4 mg by Per G Tube route every six (6) hours as needed for Nausea.  insulin glargine (LANTUS U-100 INSULIN) 100 unit/mL injection 10 Units by SubCUTAneous route daily.  lidocaine 4 % patch Use as needed  oxyCODONE IR (ROXICODONE) 5 mg immediate release tablet Take 0.5 Tabs by mouth every six (6) hours as needed. Max Daily Amount: 10 mg.  
 furosemide (LASIX) 40 mg tablet 40 mg by Per G Tube route two (2) times a day.  B.infantis-B.ani-B.long-B.bifi (PROBIOTIC 4X) 10-15 mg TbEC 1 Cap by Per G Tube route daily.  clopidogrel (PLAVIX) 75 mg tab 75 mg by PEG Tube route daily.  aspirin delayed-release 81 mg tablet Take 81 mg by mouth daily.  insulin regular (NOVOLIN R REGULAR U-100 INSULN) 100 unit/mL injection 15 Units by SubCUTAneous route two (2) times daily (with meals). Patient takes 15 Units with breakfast, 15 Units with lunch, and 5 Units nightly  albuterol (PROVENTIL VENTOLIN) 2.5 mg /3 mL (0.083 %) nebulizer solution 2.5 mg by Nebulization route two (2) times a day. Per wife patient can use 3 times a day if needed  famotidine (PEPCID) 20 mg tablet 20 mg by Per G Tube route three (3) times daily.  acetaminophen (TYLENOL) 500 mg tablet 500-1,000 mg by Per G Tube route every six (6) hours as needed for Pain.  atorvastatin (LIPITOR) 40 mg tablet 40 mg by Per G Tube route daily.  finasteride (PROSCAR) 5 mg tablet 5 mg by Per G Tube route nightly.  alfuzosin SR (UROXATRAL) 10 mg SR tablet 10 mg by Per G Tube route daily.  midodrine (PROAMITINE) 10 mg tablet 10 mg by Per G Tube route three (3) times daily (with meals).  fluticasone (FLONASE ALLERGY RELIEF) 50 mcg/actuation nasal spray 1 [place] by Both Nostrils route two (2) times daily as needed (runny nose).  zinc 50 mg tab tablet 50 mg by Per G Tube route daily.  nitroglycerin (NITROSTAT) 0.4 mg SL tablet 0.4 mg by SubLINGual route every five (5) minutes as needed for Chest Pain.  gabapentin (NEURONTIN) 250 mg/5 mL solution 750 mg by Per G Tube route three (3) times daily.  multivitamin (ONE A DAY) tablet 1 Tab by Per G Tube route daily.   
   
 
Lugene Hancock III, DO

## 2019-02-04 NOTE — CONSULTS
Palliative Medicine Consult [place]: [phone] ([id number]) Patient Name: Brandy Saenz YOB: 1935 Date of Initial Consult: 2/4/19 Reason for Consult: goals of care Requesting Provider: Rosalie Barboza  
Primary Care Physician: Lucrecia Bryan DO 
 
 SUMMARY:  
Brandy Saenz is a 80 y.o. 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 Current medical issues leading to Palliative Medicine involvement include: ~17 ED/admissions in the past 12 months, last admission about 2 weeks ago, severe CMP, CHF, pulmonary edema, pleural effusion, renal failure with oliguria. Psychosocial: Lives with his wife of 62 years, 2 adult sons live nearby. Tenzin Velazquez, and John Langford) Dog \"Smokey\" at home, retired [employer] Baseline ambulates short distances with walker/rollator. PALLIATIVE DIAGNOSES:  
1. Acute encephalopathy: HEAD CT from this morning revealed an acute right cerebellar infarct in the distribution of PICA and old right greater than left cerebral infarcts and completely resolved subdural hematoma. 2. Acute respiratory failure with hypoxia 3. Dyspnea on exertion 4. Left pleural effusion 5. HCAP 6. LIZ 7. Right hip pain 8. Lactic acidosis 9. Severe AS, pending evaluation for TAVR 10. PEG tube dependent, chronic dysphagia (hx Tongue cancer) 11. Chronic thrombocytopenia 12. Debility, generalized deconditioning 13. Dehydration 14. Care decisions PLAN:  
1.  Prior to visit, I completed an extensive review of patient's medical records, including medical documentation, vital signs, MARs, and results of various labs and other diagnostics. I also spoke with patient's nurse, Shiloh Dietz and pulmonologist/intensivist Dr. Reta Carrasquillo and attending Saskia Ewing. 2. Met with wife and 2/3 sons: obtained recent history, discussed pt's current medical conditions, goals of care and code status. Family voiced understanding that pt is very ill, has been for a long time. We talked about the current issues; respiratory problems, dehydration, hypotension, acute renal failure. Discussed continuing current level of care; PICC is already placed and vasopressors are started, that we will continue to treat with hopes of improvement, however, pt is very sick and high risk for decompensating. Family all agree that if pt decompensates, he would not want CPR or intubation. They are also adamant that pt would not want dialysis. 3. Continue current level of care tonight, will reassess tomorrow. 4. DNR, pink sheet completed. 5. Initial consult note routed to primary continuity provider 6. Communicated plan of care with: Palliative IDT, RN, Rosalba Cotton GOALS OF CARE / TREATMENT PREFERENCES:  
 
GOALS OF CARE: 
Patient/Health Care Proxy Stated Goals: Prolong life Continue current level of care TREATMENT PREFERENCES:  
Code Status: DNR Advance Care Planning: 
[x] [facility] Interdisciplinary Team has updated the ACP Navigator with [address] and Patient Capacity Primary Decision [facility] Agent):  Eric Santos Relationship to patient:  wife Phone number:  [phone], [phone] [] Named in a scanned document  
[x] Legal Next of Kin 
[] Guardian Secondary Decision Maker (First Alternate Health Care Agent):  
Relationship to patient: 
Phone number: 
[] Named in a scanned document  
[] Legal Next of Kin 
[] Guardian Medical Interventions: Limited additional interventions Other Instructions: Other: As far as possible, the palliative care team has discussed with patient / health care proxy about goals of care / treatment preferences for patient. HISTORY:  
 
History obtained from: chart, family CHIEF COMPLAINT: unresponsiveness HPI/SUBJECTIVE: The patient is:  
[] Verbal and participatory [x] Non-participatory due to: clinical condition 2/3: BIBA for evaluation after nursing staff found pt unresponsive after his tube feeding was being checked, and, his 02 sats were in the 76s. Pt is on 2L 02 at baseline and his 02 at the rehab was off. In ED, lactic acid 5.64, ammonia 47, pBNP 9,636, glucose 392, Bun/Cr 49/2.04, AST 67. Right hip xray bilat hip osteoarthritis but no acute abnormality. CXR increasing opacification of the left hemithorax likely due to a left pleural effusion together with atelectasis vs airspace dz. While in ED pt had an episode of unresponsiveness with eyes opened, unfocused, did not respond to sternal rub, resolved spontaneously. Started on Dobutamine. 2/4: thoracentesis with 850cc yellow fluid removed. Pt was admitted to PCU for progression of care. No UO.  RRT called for hypotension not responding to dobutamine, which was d/laury and dopamine was started. Clinical Pain Assessment (nonverbal scale for severity on nonverbal patients):  
Clinical Pain Assessment Severity: 0 Activity (Movement): Lying quietly, normal position Duration: for how long has pt been experiencing pain (e.g., 2 days, 1 month, years) Frequency: how often pain is an issue (e.g., several times per day, once every few days, constant) FUNCTIONAL ASSESSMENT:  
 
Palliative Performance Scale (PPS): PPS: 20 
 
 
 PSYCHOSOCIAL/SPIRITUAL SCREENING:  
 
Palliative IDT has assessed this patient for cultural preferences / practices and a referral made as appropriate to needs (Cultural Services, Patient Advocacy, Ethics, etc.) Any spiritual / Buddhism concerns: 
[] Yes /  [x] No 
 
 Caregiver Burnout: 
[] Yes /  [x] No /  [] No Caregiver Present Anticipatory grief assessment:  
[x] Normal  / [] Maladaptive ESAS Anxiety:   unable to assess due to pt factors ESAS Depression:   unable to assess due to pt factors REVIEW OF SYSTEMS:  
 
Positive and pertinent negative findings in ROS are noted above in HPI. The following systems were [] reviewed / [x] unable to be reviewed as noted in HPI Other findings are noted below. Systems: constitutional, ears/nose/mouth/throat, respiratory, gastrointestinal, genitourinary, musculoskeletal, integumentary, neurologic, psychiatric, endocrine. Positive findings noted below. Modified ESAS Completed by: provider Pain: 0 Dyspnea: 2 PHYSICAL EXAM:  
 
From RN flowsheet: 
Wt Readings from Last 3 Encounters:  
02/03/19 162 lb (73.5 kg) 01/21/19 162 lb (73.5 kg) 01/18/19 162 lb 0.6 oz (73.5 kg) Blood pressure 108/52, pulse 85, temperature 100.2 °F (37.9 °C), resp. rate 21, weight 162 lb (73.5 kg), SpO2 99 %. Last bowel movement, if known:  
 
Constitutional: elderly, frail, lying in bed with washcloth over his forehead, lying flat in bed for hypotension Eyes: pupils equal, anicteric ENMT: no nasal discharge, moist mucous membranes, speech difficult to understand, edentulous Cardiovascular: regular rhythm, distal pulses intact Respiratory: breathing not labored, symmetric, rhonchi Gastrointestinal: soft non-tender, +bowel sounds Musculoskeletal: right hip pain, no deformity, no tenderness to palpation Skin: warm, dry Neurologic: following simple commands, moving all extremities Psychiatric: unable to assess due to pt factors HISTORY:  
 
Active Problems: 
  Respiratory failure ([facility] [address] (2/3/2019) Past Medical History:  
Diagnosis Date  Antiplatelet or antithrombotic long-term use 12/4/2014  Anxiety disorder  Arrhythmia 2009  
 bradycardia  Arthritis  CAD (coronary artery disease) s/p CABG 2002; Dr Sajan Kenney  Cancer ([facility] [address] 1996  
 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   
 left leg, lower back,   
 Cough  Depression  Diabetes ([facility] [address] Type II  Epigastric pain 8/17/2018  Esophageal dysmotility s/p dilitation  Esophageal motility disorder 7/8/2013 Frequent simultaneous or failed contractions, low amplitude contractions  suggests severe myopathy or diffuse spasm. I suspect the latter. Achalasia  is not present.  Fainting [address]  Fatigue  GERD (gastroesophageal reflux disease)  Heart failure ([facility] [address] 10/2014 Cardiomyopathy:Pacemaker upgrade:Biv and AICD  Dr. Asia Nino heart DrAlvaro last visit 5/11/2015  Hepatitis C Dx 1996  
 treated at [facility] in past; as of 4/15/15 wife states pt currently not under any treatment  Hyperlipidemia  Joint pain  Liver disease  Memory loss  Muscle pain  Muscle weakness  Myocardial infarct [facility]) 2013 Heart Cath: 40% LV EF, Stented distal LAD, patent Graft to circumflex  On tube feeding diet approx 2009  
 still has as of 9/28/15  (no po food/liquid/meds at all); Dr Chandra Saliva  Other ill-defined conditions(799.89) 1996  
 1 dose of chemotherapy/radiation for tongue cancer  Other ill-defined conditions(799.89) BPH  Other ill-defined conditions(799.89) orthostatic hypotension  Pneumonia ~ April -May 2010  
 SOB (shortness of breath)  Stroke [facility]) approx 2003  
 left side-left finger tips numb; no imbalance or memory loss; as of 2015 not seeing neuro MD  
 Suicidal thoughts  Swallowing difficulty Past Surgical History:  
Procedure Laterality Date  ABDOMEN SURGERY PROC UNLISTED  1996  
 peg tube  CABG, ARTERY-VEIN, THREE  2000  HX CATARACT REMOVAL    
 bilateral  
 HX CHOLECYSTECTOMY  HX COLONOSCOPY    
 HX HEART CATHETERIZATION   Stented distal LAD  HX MOHS PROCEDURES    
 bilateral  
 HX ORTHOPAEDIC    
 back surgery times two  HX OTHER SURGICAL Radical Left Neck  HX OTHER SURGICAL    
 NASAL POLYPS REMOVAL  
 HX OTHER SURGICAL   TURP  
 HX PACEMAKER    HX PACEMAKER  10/28/14 Defibrillator: [facility] # [id number], serial # [id number]; Dr. Shruthi Gaytan [phone]; Dr Montes See  HX UROLOGICAL    
 cystoscopy [address]  
 cevical surgery  AL CHANGE GASTROSTOMY TUBE  2011  AL CHANGE GASTROSTOMY TUBE  2011  AL EGD INSERT GUIDE WIRE DILATOR PASSAGE ESOPHAGUS  2010  AL EGD TRANSORAL BIOPSY SINGLE/MULTIPLE  2010  
    
 STOMACH SURGERY PROCEDURE UNLISTED  2011  UPPER GI ENDOSCOPY,BIOPSY  2018  UPPER GI ENDOSCOPY,DILATN W GUIDE  2018  UPPER GI ENDOSCOPY,W/DILAT,GASTRIC OUT  2018  VASCULAR SURGERY PROCEDURE UNLIST    
 bilateral carotid stents Family History Problem Relation Age of Onset  Heart Disease Father   
      at age 52 from CAD  Colon Cancer Mother  Cancer Mother   
     colon ca  
 Heart Disease Brother History reviewed, no pertinent family history. Social History Tobacco Use  Smoking status: Never Smoker  Smokeless tobacco: Never Used Substance Use Topics  Alcohol use: No  
 
Allergies Allergen Reactions  Cleocin [Clindamycin Hcl] Rash  Demerol [Meperidine] Shortness of Breath  Paxil [Paroxetine Hcl] Unknown (comments) Pt gets shaky and loses control of legs  Amoxicillin Rash  Pcn [Penicillins] Rash Current Facility-Administered Medications Medication Dose Route Frequency  metroNIDAZOLE (FLAGYL) IVPB premix 500 mg  500 mg IntraVENous Q12H  
 [START ON 2019] vancomycin (VANCOCIN) 1,000 mg in 0.9% sodium chloride (MBP/ADV) 250 mL  1,000 mg IntraVENous Q24H  cefepime (MAXIPIME) 1 g in 0.9% sodium chloride (MBP/ADV) 50 mL  1 g IntraVENous Q12H  
 DOPamine (INTROPIN) 800 mg in dextrose 5% 250 mL infusion  2-10 mcg/kg/min IntraVENous TITRATE  albuterol-ipratropium (DUO-NEB) 2.5 MG-0.5 MG/3 ML  3 mL Nebulization Q6H RT  
 albuterol-ipratropium (DUO-NEB) 2.5 MG-0.5 MG/3 ML  3 mL Nebulization Q6H PRN  
 aspirin delayed-release tablet 81 mg  81 mg Oral DAILY  atorvastatin (LIPITOR) tablet 40 mg  40 mg Per G Tube DAILY  clopidogrel (PLAVIX) tablet 75 mg  75 mg PEG Tube DAILY  finasteride (PROSCAR) tablet 5 mg  5 mg Oral DAILY  insulin glargine (LANTUS) injection 10 Units  10 Units SubCUTAneous DAILY  midodrine (PROAMITINE) tablet 10 mg  10 mg Per G Tube TID WITH MEALS  nitroglycerin (NITROSTAT) tablet 0.4 mg  0.4 mg SubLINGual Q5MIN PRN  
 oxyCODONE IR (ROXICODONE) tablet 2.5 mg  2.5 mg Oral Q6H PRN  pramipexole (MIRAPEX) tablet 0.25 mg  0.25 mg Oral TID  insulin lispro (HUMALOG) injection   SubCUTAneous AC&HS  
 glucose chewable tablet 16 g  4 Tab Oral PRN  
 dextrose (D50W) injection syrg 12.5-25 g  12.5-25 g IntraVENous PRN  
 glucagon (GLUCAGEN) injection 1 mg  1 mg IntraMUSCular PRN  
 sodium chloride (NS) flush 5-40 mL  5-40 mL IntraVENous Q8H  
 sodium chloride (NS) flush 5-40 mL  5-40 mL IntraVENous PRN  
 acetaminophen (TYLENOL) tablet 650 mg  650 mg Oral Q4H PRN  
 naloxone (NARCAN) injection 0.4 mg  0.4 mg IntraVENous PRN  
 ondansetron (ZOFRAN ODT) tablet 4 mg  4 mg Oral Q4H PRN  
 heparin (porcine) injection 5,000 Units  5,000 Units SubCUTAneous Q8H  
 levoFLOXacin (LEVAQUIN) 750 mg in D5W IVPB  750 mg IntraVENous Q48H  
 furosemide (LASIX) injection 40 mg  40 mg IntraVENous DAILY  
 
 
 
 LAB AND IMAGING FINDINGS:  
 
Lab Results Component Value Date/Time  WBC 5.9 02/04/2019 02:40 AM  
 HGB 8.9 (L) 02/04/2019 02:40 AM  
 PLATELET 84 (L) 43/35/9265 02:40 AM  
 
 Lab Results Component Value Date/Time Sodium 141 02/04/2019 02:40 AM  
 Potassium 4.6 02/04/2019 02:40 AM  
 Chloride 97 02/04/2019 02:40 AM  
 CO2 40 (H) 02/04/2019 02:40 AM  
 BUN 49 (H) 02/04/2019 02:40 AM  
 Creatinine 1.74 (H) 02/04/2019 02:40 AM  
 Calcium 9.1 02/04/2019 02:40 AM  
 Magnesium 2.3 11/17/2018 12:25 AM  
 Phosphorus 3.0 11/13/2018 03:24 AM  
  
Lab Results Component Value Date/Time AST (SGOT) 67 (H) 02/03/2019 09:06 AM  
 Alk. phosphatase 90 02/03/2019 09:06 AM  
 Protein, total 8.1 02/03/2019 09:06 AM  
 Albumin 3.0 (L) 02/03/2019 09:06 AM  
 Globulin 5.1 (H) 02/03/2019 09:06 AM  
 
Lab Results Component Value Date/Time INR 1.1 02/03/2019 09:06 AM  
 Prothrombin time 10.9 02/03/2019 09:06 AM  
 aPTT 23.0 12/04/2018 08:53 AM  
  
No results found for: IRON, FE, TIBC, IBCT, PSAT, FERR Lab Results Component Value Date/Time pH 7.42 02/03/2019 08:55 AM  
 PCO2 45 02/03/2019 08:55 AM  
 PO2 214 (H) 02/03/2019 08:55 AM  
 
No components found for: [place] Lab Results Component Value Date/Time  12/04/2018 10:28 PM  
 CK - MB 2.4 08/11/2018 09:25 AM  
  
 
 
   
 
Total time: 85  
Counseling / coordination time, spent as noted above: 65 
> 50% counseling / coordination?: y 
 
Prolonged service was provided for  []30 min   []75 min in face to face time in the presence of the patient, spent as noted above. Time Start:  
Time End:  
Note: this can only be billed with 81069 (initial) or 34621 (follow up). If multiple start / stop times, list each separately.

## 2019-02-04 NOTE — PROGRESS NOTES
Pharmacy Automatic Renal Dosing Protocol - Antimicrobials Indication for Antimicrobials: HAP Current Regimen of Each Antimicrobial: 
Cefepime 1g IV every 12hr  (Start Date 2/3; Day # ) Levofloxacin 750mg IV every 48hr (start date: 2/3, Day #2) Metronidazole 500mg IV every 12hr (start date: , Day #) Vancomycin pharmacy to dose (start date; , Day ) Previous Antimicrobial Therapy: 
 
 
Goal Level: VANCOMYCIN TROUGH GOAL RANGE Vancomycin Trough: 15 - 20 mcg/mL Date Dose & Interval Measured (mcg/mL) Extrapolated (mcg/mL) Date & time of next level: estimated  Significant Cultures:  
2/3: paired blood - NGTD - pending Radiology / Imaging results: (X-ray, CT scan or MRI):  
2/3: chest xray - IMPRESSION:  
Increasing opacification of the left hemithorax, likely due to a left pleural effusion, together with atelectasis versus airspace disease. 2/3: xray hip - IMPRESSION: Bilateral hip osteoarthritis. No acute abnormality Paralysis, amputations, malnutrition: none Labs: 
Recent Labs 19 
0240 19 
0535 19 
0825 CREA 1.74* 2.04*  --   
BUN 49* 49*  --   
WBC 5.9  --  7.7 Temp (24hrs), Av.3 °F (36.8 °C), Min:97.6 °F (36.4 °C), Max:98.9 °F (37.2 °C) Creatinine Clearance (mL/min) or Dialysis: ~30 mL/min Impression/Plan:  
· Change cefepime 1g IV every 8hr to every 12hr based on crcl 30-60mL/min · Continue levofloxacin 750mg IV every 48hr for renal function · Continue metronidazole 500mg IV every 12hr for indication · Vancomycin 1500mg IV x 1 loading dose, then 1000mg IV every 24hr to yield a trough of ~16mcg/mL. Pharmacy to dose. · Antimicrobial stop date 7 days for vanco, cefepime, metronidazole, to be determined for levofloxacin. Pharmacy will follow daily and adjust medications as appropriate for renal function and/or serum levels. Thank you, RUBIN Pacheco 
 
Recommended duration of therapy [email or web address]/[facility]/Pharmacy/Clinical%20Companion/[email or web address]. docx Renal Dosing 
[email or web address]/[facility]/Pharmacy/Clinical%20Companion/Renal%[email or web address]. pdf

## 2019-02-04 NOTE — CONSULTS
IP CONSULT TO NEUROLOGY Consult performed by: Avery Chapman MD 
Consult ordered by: Shanique Saavedra MD 
 
 
 
   
NEUROLOGY CONSULT 
NAME Ralf Farrell AGE 80 y.o. MRN [id number]  1935 REQUESTING PHYSICIAN: Shanique Saavedra MD   
 
CHIEF COMPLAINT:  stroke This is a 80 y.o. male with a medical history of cardiomyopathy and biventricular pacemaker. He also has history of carotid artery disease. He presents with episodes of syncope and shortness of breath. Head CT was abnormal. He does not complain of any dizziness, weakness or sensory loss. Has had brief syncope like episodes with rapid return to baseline. ASSESSMENT AND PLAN 1. Stroke Seems to have suffered a bicerebellar stroke MRI: has [facility] pacer. CT; shows bilateral cerebellar stroke Vascular studies: CTA: 
a. There are extensive venous collaterals in the neck and around the cervical 
spine making interpretation of the vertebral arteries technically challenging. However they are small in caliber bilaterally but grossly patent. b. Patent bilateral carotid artery stents. A1C : 7 (2018) TSH: 3.19 (2018) LDL: 72 (2018) Echocardiogram: probably severe aortic stenosis, EF 30% 
continue aspirin and plavix 2. Congestive heart failure 
cardiology following 3. Respiratory failure Pleural effusion S/p throacocentesis 4. Syncope EEG 5. Left pleural effusion S/p thoracocentesis ALLERGIES: 
Cleocin [clindamycin hcl]; Demerol [meperidine]; Paxil [paroxetine hcl]; Amoxicillin; and Pcn [penicillins] Current Facility-Administered Medications Medication Dose Route Frequency  metroNIDAZOLE (FLAGYL) IVPB premix 500 mg  500 mg IntraVENous Q12H  
 vancomycin (VANCOCIN) 1500 mg in  ml infusion  1,500 mg IntraVENous NOW Followed by  
Abeba Quan ON 2019] vancomycin (VANCOCIN) 1,000 mg in 0.9% sodium chloride (MBP/ADV) 250 mL  1,000 mg IntraVENous Q24H  cefepime (MAXIPIME) 1 g in 0.9% sodium chloride (MBP/ADV) 50 mL  1 g IntraVENous Q12H  
 albuterol-ipratropium (DUO-NEB) 2.5 MG-0.5 MG/3 ML  3 mL Nebulization Q6H RT  
 albuterol-ipratropium (DUO-NEB) 2.5 MG-0.5 MG/3 ML  3 mL Nebulization Q6H PRN  
 aspirin delayed-release tablet 81 mg  81 mg Oral DAILY  atorvastatin (LIPITOR) tablet 40 mg  40 mg Per G Tube DAILY  clopidogrel (PLAVIX) tablet 75 mg  75 mg PEG Tube DAILY  finasteride (PROSCAR) tablet 5 mg  5 mg Oral DAILY  insulin glargine (LANTUS) injection 10 Units  10 Units SubCUTAneous DAILY  midodrine (PROAMITINE) tablet 10 mg  10 mg Per G Tube TID WITH MEALS  nitroglycerin (NITROSTAT) tablet 0.4 mg  0.4 mg SubLINGual Q5MIN PRN  
 oxyCODONE IR (ROXICODONE) tablet 2.5 mg  2.5 mg Oral Q6H PRN  pramipexole (MIRAPEX) tablet 0.25 mg  0.25 mg Oral TID  insulin lispro (HUMALOG) injection   SubCUTAneous AC&HS  
 glucose chewable tablet 16 g  4 Tab Oral PRN  
 dextrose (D50W) injection syrg 12.5-25 g  12.5-25 g IntraVENous PRN  
 glucagon (GLUCAGEN) injection 1 mg  1 mg IntraMUSCular PRN  
 sodium chloride (NS) flush 5-40 mL  5-40 mL IntraVENous Q8H  
 sodium chloride (NS) flush 5-40 mL  5-40 mL IntraVENous PRN  
 acetaminophen (TYLENOL) tablet 650 mg  650 mg Oral Q4H PRN  
 naloxone (NARCAN) injection 0.4 mg  0.4 mg IntraVENous PRN  
 ondansetron (ZOFRAN ODT) tablet 4 mg  4 mg Oral Q4H PRN  
 heparin (porcine) injection 5,000 Units  5,000 Units SubCUTAneous Q8H  
 levoFLOXacin (LEVAQUIN) 750 mg in D5W IVPB  750 mg IntraVENous Q48H  
 furosemide (LASIX) injection 40 mg  40 mg IntraVENous DAILY  DOBUTamine (DOBUTREX) 500 mg/250 mL (2,000 mcg/mL) infusion  5 mcg/kg/min (Order-Specific) IntraVENous CONTINUOUS Current Outpatient Medications Medication Sig  pramipexole (MIRAPEX) 0.25 mg tablet Take 0.25 mg by mouth three (3) times daily.   
 insulin regular (NOVOLIN R REGULAR U-100 INSULN) 100 unit/mL injection 5 Units by SubCUTAneous route nightly. Patient takes 15 units at breakfast, 15 units at lunch, and 4 units nightly.  insulin lispro (HUMALOG U-100 INSULIN) 100 unit/mL injection by SubCUTAneous route. Inject per sliding scale:  
200-250  = 2 units 251-299 = 3 units 300-350 = 4 units Notify MD if greater than 351, before meals and at bedtime for DM  
 ondansetron hcl (ZOFRAN) 4 mg tablet 4 mg by Per G Tube route every six (6) hours as needed for Nausea.  insulin glargine (LANTUS U-100 INSULIN) 100 unit/mL injection 10 Units by SubCUTAneous route daily.  lidocaine 4 % patch Use as needed  oxyCODONE IR (ROXICODONE) 5 mg immediate release tablet Take 0.5 Tabs by mouth every six (6) hours as needed. Max Daily Amount: 10 mg.  
 furosemide (LASIX) 40 mg tablet 40 mg by Per G Tube route two (2) times a day.  B.infantis-B.ani-B.long-B.bifi (PROBIOTIC 4X) 10-15 mg TbEC 1 Cap by Per G Tube route daily.  clopidogrel (PLAVIX) 75 mg tab 75 mg by PEG Tube route daily.  aspirin delayed-release 81 mg tablet Take 81 mg by mouth daily.  insulin regular (NOVOLIN R REGULAR U-100 INSULN) 100 unit/mL injection 15 Units by SubCUTAneous route two (2) times daily (with meals). Patient takes 15 Units with breakfast, 15 Units with lunch, and 5 Units nightly  albuterol (PROVENTIL VENTOLIN) 2.5 mg /3 mL (0.083 %) nebulizer solution 2.5 mg by Nebulization route two (2) times a day. Per wife patient can use 3 times a day if needed  famotidine (PEPCID) 20 mg tablet 20 mg by Per G Tube route three (3) times daily.  acetaminophen (TYLENOL) 500 mg tablet 500-1,000 mg by Per G Tube route every six (6) hours as needed for Pain.  atorvastatin (LIPITOR) 40 mg tablet 40 mg by Per G Tube route daily.  finasteride (PROSCAR) 5 mg tablet 5 mg by Per G Tube route nightly.  alfuzosin SR (UROXATRAL) 10 mg SR tablet 10 mg by Per G Tube route daily.  midodrine (PROAMITINE) 10 mg tablet 10 mg by Per G Tube route three (3) times daily (with meals).  fluticasone (FLONASE ALLERGY RELIEF) 50 mcg/actuation nasal spray 1 [place] by Both Nostrils route two (2) times daily as needed (runny nose).  zinc 50 mg tab tablet 50 mg by Per G Tube route daily.  nitroglycerin (NITROSTAT) 0.4 mg SL tablet 0.4 mg by SubLINGual route every five (5) minutes as needed for Chest Pain.  gabapentin (NEURONTIN) 250 mg/5 mL solution 750 mg by Per G Tube route three (3) times daily.  multivitamin (ONE A DAY) tablet 1 Tab by Per G Tube route daily. Past Medical History:  
Diagnosis Date  Antiplatelet or antithrombotic long-term use 12/4/2014  Anxiety disorder  Arrhythmia 2009  
 bradycardia  Arthritis  CAD (coronary artery disease) s/p CABG 2002; Dr Valerio Garcia  Cancer ([facility] [address] 1996  
 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   
 left leg, lower back,   
 Cough  Depression  Diabetes ([facility] [address] Type II  Epigastric pain 8/17/2018  Esophageal dysmotility s/p dilitation  Esophageal motility disorder 7/8/2013 Frequent simultaneous or failed contractions, low amplitude contractions  suggests severe myopathy or diffuse spasm. I suspect the latter. Achalasia  is not present.  Fainting [address]  Fatigue  GERD (gastroesophageal reflux disease)  Heart failure ([facility] [address] 10/2014 Cardiomyopathy:Pacemaker upgrade:Biv and AICD  Dr. Jose J Licea heart  last visit 5/11/2015  Hepatitis C Dx 1996  
 treated at [facility] in past; as of 4/15/15 wife states pt currently not under any treatment  Hyperlipidemia  Joint pain  Liver disease  Memory loss  Muscle pain  Muscle weakness  Myocardial infarct [facility]) 2013 Heart Cath: 40% LV EF, Stented distal LAD, patent Graft to circumflex  On tube feeding diet approx   
 still has as of 9/28/15  (no po food/liquid/meds at all); Dr Cintia Thomas  Other ill-defined conditions(799.89)   
 1 dose of chemotherapy/radiation for tongue cancer  Other ill-defined conditions(799.89) BPH  Other ill-defined conditions(799.89) orthostatic hypotension  Pneumonia ~ April -May 2010  
 SOB (shortness of breath)  Stroke [facility]) approx   
 left side-left finger tips numb; no imbalance or memory loss; as of  not seeing neuro MD  
 Suicidal thoughts  Swallowing difficulty Social History Tobacco Use  Smoking status: Never Smoker  Smokeless tobacco: Never Used Substance Use Topics  Alcohol use: No  
 
 
Family History Problem Relation Age of Onset  Heart Disease Father   
      at age 52 from CAD  Colon Cancer Mother  Cancer Mother   
     colon ca  
 Heart Disease Brother Review of Systems Constitutional: Negative for fever. Eyes: Negative for blurred vision and double vision. Respiratory: Positive for shortness of breath. Cardiovascular: Positive for orthopnea. Negative for claudication. Musculoskeletal: Positive for back pain, joint pain and myalgias. Neurological: Positive for weakness. Negative for sensory change, focal weakness and headaches. Psychiatric/Behavioral: Negative for hallucinations and suicidal ideas. Visit Vitals BP 97/72 Pulse 99 Temp 98.5 °F (36.9 °C) Resp 25 Wt 162 lb (73.5 kg) SpO2 97% BMI 25.37 kg/m² General: Well developed, well nourished. Disheveled labored breathing Head: Normocephalic, atraumatic, anicteric sclera Neck Normal ROM, No thyromegally Lungs:  Ronchorus breathing no wheezes or rubs Cardiac: Regular rate with murmur. Abd: Bowel sounds were audible. No tenderness on palpation Ext: No pedal edema Skin: Supple no rash NeurologicExam: 
Mental Status: Alert and oriented to person place and time Speech: Fluent no aphasia or dysarthria. Cranial Nerves:  II - XII Intact nystagmus on looking either direction Motor:  Full and symmetric strength of upper and lower proximal and distal muscles. Normal bulk and tone. Reflexes:   Deep tendon reflexes 0+/4 and symmetric. Sensory:   Symmetric distal reduction in sensory perception affecting all modalities Gait:  deferred Tremor:   No tremor noted. Cerebellar:  Coordination intact. Neurovascular: No carotid bruits. No JVD REVIEWED IMAGING: 
 
MRI : 
No results found for this or any previous visit. CT: 
Results from Hospital Encounter encounter on 02/03/19 CT HEAD WO CONT Narrative EXAM:  CT HEAD WO CONT INDICATION:  ams/recurrent syncope, patient with prior history of stroke was 
brought by EMS to the ED for episode earlier on day of admission of 
unresponsiveness. Patient was at nursing facility and was found unconscious. TECHNIQUE: 
Thin axial images were obtained through the calvarium and saved with standard 
and bone window algorithm. Coronal and sagittal reconstructions were generated. CT dose reduction was achieved through use of a standardized protocol tailored 
for this examination and automatic exposure control for dose modulation. COMPARISON: None available. FINDINGS: 
Ventricular size and configuration are within normal limits. Complete interval resolution of left parafalcine subdural hematoma with no new 
evidence of acute intracranial hemorrhage or abnormal extra-axial fluid 
collections. There is a new well-defined area of decreased attenuation in the right inferior 
cerebellum corresponding to PICA and consistent with acute infarction. No 
significant mass effect or evidence of hydrocephalus at this time.  
Patchy areas of decreased attenuation cerebral white matter suggesting chronic 
small vessel ischemia as well as old areas of right posterior frontal/parietal 
 cortical infarction and old small bilateral parietal occipital infarcts. Structures of skull base including paranasal sinuses are unremarkable. Impression IMPRESSION: 
1. 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 2. Evidence of old right greater than left cerebral cortical infarcts. REVIEWED LABS: 
Lab Results Component Value Date/Time WBC 5.9 02/04/2019 02:40 AM  
 HCT 29.8 (L) 02/04/2019 02:40 AM  
 HGB 8.9 (L) 02/04/2019 02:40 AM  
 PLATELET 84 (L) 08/17/8666 02:40 AM  
 
Lab Results Component Value Date/Time Sodium 141 02/04/2019 02:40 AM  
 Potassium 4.6 02/04/2019 02:40 AM  
 Chloride 97 02/04/2019 02:40 AM  
 CO2 40 (H) 02/04/2019 02:40 AM  
 Glucose 170 (H) 02/04/2019 02:40 AM  
 BUN 49 (H) 02/04/2019 02:40 AM  
 Creatinine 1.74 (H) 02/04/2019 02:40 AM  
 Calcium 9.1 02/04/2019 02:40 AM  
 
Lab Results Component Value Date/Time Vitamin B12 394 12/04/2018 08:53 AM  
 Folate >20.0 08/02/2018 12:00 PM  
 
Lab Results Component Value Date/Time LDL, calculated 72 12/07/2018 11:16 AM  
 
Lab Results Component Value Date/Time Hemoglobin A1c 7.1 (H) 01/12/2019 04:21 AM  
 Hemoglobin A1c (POC) 7.0 09/12/2018 12:05 PM  
 
  
 Patient is in critical condition and is at risk for sudden deterioration. 30 minutes spent on floor examining patient and reviewing chart

## 2019-02-04 NOTE — PROGRESS NOTES
Physical Therapy Received PT eval order, chart reviewed. RN ok'd for session, but pt leaving for CT. Arrived back later for eval but pt had been taken from CT to his thoracentesis. Will follow and see for eval when pt available and able to tolerate session.  
 
[place] File, PT

## 2019-02-04 NOTE — PROGRESS NOTES
ADULT PROTOCOL: JET AEROSOL ASSESSMENT Patient  Shanel Matias     80 y.o.   male     2/4/2019  2:59 PM 
 
Breath Sounds Pre Procedure: Right Breath Sounds: Rhonchi Left Breath Sounds: Rhonchi Breath Sounds Post Procedure: Right Breath Sounds: Rhonchi Left Breath Sounds: Rhonchi Breathing pattern: Pre procedure Breathing Pattern: Regular, Dyspnea at rest 
        Post procedure Breathing Pattern: Regular, Dyspnea at rest 
 
Heart Rate: Pre procedure Pulse: 106 Post procedure Pulse: 103 Resp Rate: Pre procedure Respirations: 20 
         Post procedure Respirations: 20 
 
 
Cough: Pre procedure Cough: Congested Post procedure Cough: Congested Oxygen: O2 Device: Nasal cannula   Flow rate (L/min) 3 lpm 
   Changed: NO SpO2: Pre procedure SpO2: 98 %   with oxygen Post procedure SpO2: 100 %  with oxygen Nebulizer Therapy: Current medications Aerosolized Medications: DuoNeb Changed: NO Smoking History: never Problem List:  
Patient Active Problem List  
Diagnosis Code  Dysphagia R13.10  Abnormal cardiovascular stress test R94.39  
 S/P CABG x 3 Z95.1  Atherosclerotic cerebrovascular disease I67.2  Orthostatic hypotension I95.1  Feeding difficulties R63.3  Non-cardiac chest pain R07.89  S/P PTCA (percutaneous transluminal coronary angioplasty) Z98.61  
 CAD (coronary artery disease) I25.10  Status post implantation of automatic cardioverter/defibrillator (AICD) Z95.810  
 Aortic stenosis, mild I35.0  Esophageal motility disorder K22.4  Heart failure (HCC) I50.9  PEG (percutaneous endoscopic gastrostomy) status ([facility] [address] Z93.1  Antiplatelet or antithrombotic long-term use Z79.02  
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  
 Peripheral neuropathy (HCC) G62.9  Polyneuropathy associated with underlying disease ([facility] [address] G63  
 History of stroke Z86.73  
 Aspiration pneumonia ([address] J69.0  Weakness R53.1  Stroke ([facility] [address] I63.9  Thrombotic stroke involving left middle cerebral artery ([address] A61.704  Stenosis of both internal carotid arteries I65.23  
 Hemiplegia and hemiparesis following cerebral infarction affecting right dominant side ([facility]) V34.165  Type 2 diabetes with nephropathy ([facility] [address] E11.21  
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  
 Tremors of nervous system R25.1  Parkinson's disease (tremor, stiffness, slow motion, unstable posture) ([facility]) G20  
 Myalgia M79.10  Pneumonia due to group B Streptococcus ([facility] [address] J15.3  Counseling regarding goals of care Z71.89  
 Disturbance of memory R41.3  Presbycusis of both ears H91.13  
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  
 Convulsion ([address] R56.9  Epigastric pain R10.13  
 Dizziness R42  Hx of tongue cancer Z85.810  
 SDH (subdural hematoma) ([facility] [address] Y93.3J0O  Syncope R55  Hypoxia R09.02  
 Respiratory failure ([facility] [address] J96.90 Respiratory Therapist: Mathew Zepeda, RT

## 2019-02-04 NOTE — PROGRESS NOTES
Hospitalist Progress Note NAME: Cuong Solis :  1935 MRN:  [id number] Assessment / Plan: 
Acute on chronic hypoxic respiratory failure HCAP Suspected sepsis vs cardiogenic shock, POA Lactic acidosis Acute on chronic systolic CHF 
-CXR showed increasing opacification of the left hemithorax, likely due to a left pleural 
effusion, together with atelectasis versus airspace disease. 
-elevated probnp ~9K, +3 b/l pitting edema, POA 
-he has chronic hypotension, severe AS, and currently in acute renal failure, and lactic acidosis. He is at risk for further volume overload if given IVF, and worsening of hypotension if diuresing 
-ER physician discussed with IR, not enough fluid for thoracentesis -Yesterday there was contemplation of doing a thoracentesis, patient describes progressive continued shortness of breath, we will proceed with thoracentesis for therapeutic and diagnostic purposes 
-We will cover him for hospital associated pneumonia and aspiration and broaden his antibiotics to add vancomycin, continue cefepime, add Flagyl 
-We will try to obtain sputum specimens and will continue pulmonary toilet 
-Cardiology feels that his respiratory status may be more related to pulmonary disease and pneumonia rather than CHF, so will ask pulmonary to evaluate as well as since his condition is tenuous and may ultimately need transfer to ICU if he worsens 
-cont IV lasix for now, creat better today 
-Patient's lactic acid is improving on the dobutamine, will recheck this morning to make sure that it is back to normal 
-appreciate cards following 
-AICD to be evaluated,contributing to syncopal episodes? Acute encephalopathy with episodes of unresponsiveness Syncope with hypotension Severe vertebrobasilar disease Recent subfalcine SDH and SAH s/p fall/syncope early 2018 
-Given his several episodes of syncope and prior history of a brain bleed, we will get a baseline head CT although neurologically seems to be intact. -EEG is pending, consider neurology evaluation if needed 
-It sounds like the syncopal episodes may be more related to a cardiac issue such as an arrhythmia or his significant desaturations and hypotension that he had when he first presented, which will be evaluated further by cardiology and pulmonary  
-We will follow his neuro status closely and apparently he has been cleared to be on antiplatelet agents from his prior bleeding since he is on aspirin and Plavix already H/o severe AS, NSTEMI with CAD s/p CABGx3 Severe AS Acute on Chronic systolic CHF, EF 71% ? cardiogenic shock 
-concerning for AICD firing, ? Contributing to AMS   
- ICD to be  interrogated 
-cont dobutamine and IV lasix as tolerated, re-evaluate daily 
-resume home meds, including midodrine  
-Strict I&O to keep fluid balanced 
-await further recs from cardiology Acute kidney injury (resolved) in setting of CKD3 
-hold nephrotoxic agents 
-patient's renal function has worsened since his last admission his creatinine was 1.1 -creat 2 on admit and despite diuresis is improved today to 1.74  
-will consider nephrology evaluation and renal imaging if not improving 
- follows urine output closely and I talked to the nurse about doing post void residual.  
-cont diuresis for now Tongue ca s/p radical neck resection and neck XRT with resultant chronic dysphagia with PEG and recent PEG tube site infection Insulin dependent DM2 without complication 
-hold TFs for thoracentesis and head ct this am 
-resume home insulin once back on diet 
-follow with ssi Acute/chronic R hip pain with chronic diffuse pain (shoulders, hips, knees, back) Urinary retention due to BPH 
H/o streptococcal bacteremia 7/18 H/o stroke 2015  
Chronic thrombocytopenia 
  
  
  
Code Status: FullSurrogate Decision Maker: wife DVT Prophylaxis: heparin sq (f/u head CT) GI Prophylaxis: not indicated Baseline: from [facility] 25.0 - 29.9 Overweight / Body mass index is 25.37 kg/m². Recommended Disposition: SNF/LTC Subjective: Chief Complaint / Reason for Physician Visit 
unresponsive Patient is not a great historian but he is answering questions appropriately. He initially reported that his breathing was better but later said that it was not that he was still having difficulty with his breathing. He seems to indicate that his breathing had been doing okay at the rehab facility and then in the last day or so got worse. He does not describe any significant cough or sputum production or chest pain. He is not does not recall the events that ended up bringing him into the hospital. 
 
According to the nurse he has had no further syncopal episodes or arrhythmias is still in the ER awaiting a bed. He has been on dobutamine his blood pressures have been stable. He has been on tube feeds overnight and has been urinating with the urinal. 
 
Patient admits to urinary problems but he does not seem to indicate its acute he says is from his prostate issues. He admits to intermittent abdominal pain but denies any currently. We discussed that there is fluid in his lungs and possible pneumonia he is agreeable to attempting a thoracentesis and seems to understand procedure. He reports that he has some chronic right-sided weakness but denies that there is been any recent or current change. Patient was evaluated at 8:15 AM 
 
 
Discussed with RN events overnight. Review of Systems: 
Symptom Y/N Comments  Symptom Y/N Comments Fever/Chills    Chest Pain n   
Poor Appetite    Edema Cough y   Abdominal Pain y occ Sputum y occ  Joint Pain SOB/KRAMER y   Pruritis/Rash Nausea/vomit n   Tolerating PT/OT Diarrhea    Tolerating Diet y TF Constipation    Other Could NOT obtain due to:   
 
Objective: VITALS:  
 Last 24hrs VS reviewed since prior progress note. Most recent are: 
Patient Vitals for the past 24 hrs: 
 Temp Pulse Resp BP SpO2  
02/04/19 1130 98.5 °F (36.9 °C) 99 25 97/72 97 % 02/04/19 1100  96 24 93/70 93 % 02/04/19 1036  100 25  98 % 02/04/19 1033    117/63   
02/04/19 1015  97 20 110/56 93 % 02/04/19 1010  98 22 120/56 94 % 02/04/19 1005  96  (!) 178/97 96 % 02/04/19 1000  97 21 164/80 94 % 02/04/19 0955  98 22 160/88   
02/04/19 0950  97 20 158/87   
02/04/19 0945  99 22 160/89   
02/04/19 0853 97.6 °F (36.4 °C) 97 21  99 % 02/04/19 0830  98 19 126/69 100 % 02/04/19 0730  98 20 129/61 100 % 02/04/19 0700  100 20 127/75 100 % 02/04/19 0630  100 18 117/76 100 % 02/04/19 0600  (!) 103 24 123/67   
02/04/19 0530  98 21 124/65 100 % 02/04/19 0500  (!) 101 28 116/62 98 % 02/04/19 0442  (!) 103  107/68   
02/04/19 0430  (!) 103 23 107/68 100 % 02/04/19 0330  (!) 102 27 107/48 99 % 02/04/19 0200  93 18 106/59 98 % 02/04/19 0131  92 17  99 % 02/04/19 0130  92 17 105/82   
02/04/19 0100  92 17 121/60 100 % 02/04/19 0030  93 19 107/58 95 % 02/03/19 2351  97 19  99 % 02/03/19 2348  94 20 105/56   
02/03/19 2330  95 18 124/60 100 % 02/03/19 2300  93 17 114/63 99 % 02/03/19 2231  90 18  98 % 02/03/19 2230  91 18 101/45   
02/03/19 2201  88 16 118/59 96 % 02/03/19 2130  86 21 112/57 96 % 02/03/19 2100  86 18 118/58 100 % 02/03/19 2030  86 15 116/66 100 % 02/03/19 2000  86 15 117/65 100 % 02/03/19 1900 98.3 °F (36.8 °C) 85 18 107/79 98 % 02/03/19 1730  81 15 114/58 100 % 02/03/19 1700  83 16 125/57 100 % 02/03/19 1630 98.9 °F (37.2 °C) 84 23 102/79 94 % 02/03/19 1400  84 17 118/49 94 % 02/03/19 1330  83 19 110/47 100 % 02/03/19 1241  74  (!) 88/70   
02/03/19 1230  73 18 (!) 88/70 98 % Intake/Output Summary (Last 24 hours) at 2/4/2019 1223 Last data filed at 2/4/2019 1128 Gross per 24 hour Intake 50 ml Output 2175 ml Net -2125 ml PHYSICAL EXAM: 
Patient is awake and alert oriented x3 slightly slurred speech which appears to be his baseline. He is on nasal cannula no distress but audible rhonchi are appreciated while he is talking with occasional cough. Extraocular movements are intact sclera nonicteric conjunctiva are pink mucous membranes are dry cardiovascular regular rate hard to hear any murmurs given the diffuse rhonchi, no rubs or gallops are appreciated or murmurs. Lungs diffuse rhonchi throughout no wheezing some crackles in the bases. Abdomen slightly distended PEG tube in place soft but overall nontender extremities some pitting edema but apparently much improved from yesterday. He is able to move all extremities grossly nonfocal evaluation. Reviewed most current lab test results and cultures  YES Reviewed most current radiology test results   YES Review and summation of old records today    NO Reviewed patient's current orders and MAR    YES 
PMH/ reviewed - no change compared to H&P 
________________________________________________________________________ Care Plan discussed with: 
  Comments Patient y Family RN y   
Care Manager Consultant  y Cards/neuro Multidiciplinary team rounds were held today with , nursing, pharmacist and clinical coordinator. Patient's plan of care was discussed; medications were reviewed and discharge planning was addressed. ________________________________________________________________________ Total NON critical care TIME:  Minutes Total CRITICAL CARE TIME Spent:   45 Minutes non procedure based Comments >50% of visit spent in counseling and coordination of care    
________________________________________________________________________ Linh Khoury MD  
 
Procedures: see electronic medical records for all procedures/Xrays and details which were not copied into this note but were reviewed prior to creation of Plan. LABS: 
I reviewed today's most current labs and imaging studies. Pertinent labs include: 
Recent Labs 02/04/19 
0240 02/03/19 
0825 WBC 5.9 7.7 HGB 8.9* 11.9*  
HCT 29.8* 39.3 PLT 84* 115* Recent Labs 02/04/19 
0240 02/03/19 
5700  137  
K 4.6 4.9 CL 97 94* CO2 40* 37* * 392* BUN 49* 49* CREA 1.74* 2.04* CA 9.1 9.4 ALB  --  3.0* TBILI  --  0.7 SGOT  --  67* ALT  --  51 INR  --  1.1 Signed: Zaria Dean MD

## 2019-02-04 NOTE — CONSULTS
[facility] [place] [place] [facility], and Sleep Medicine Initial Patient Consult Name: Lucas Vegas MRN: [id number] : 1935 Hospital: [facility] Date: 2019 IMPRESSION:  
· Acute respiratory failure · Severe CMP · CHF · Pleural effusion · Cardiogenic shock · Renal failure RECOMMENDATIONS:  
· O2 
· Diuretics · Not responding to dobutamine, will start low dose dopamine · Doubt pneumonia, WBC WNL, afebrile · On empiric abx · Pt anuric · Spoke with wife and informed her that his condition is worsening, hypotensive, anuric · Asked her to consider medical rx only, not to escalate care, recommended to make pt DNR, to speak with palliative care · Dismal overall prognosis Subjective: This patient has been seen and evaluated at the request of Dr. Esvin Adam for respiratory failure and cardiogenic shock. Patient is a 80 y.o. male with severe CMP presented with CHF, pulmonary edema, pleural effusion, renal failure Placed on dobutamine, underwent thoracentesis Now pt hypotensive, anuric, on O2 Past Medical History:  
Diagnosis Date  Antiplatelet or antithrombotic long-term use 2014  Anxiety disorder  Arrhythmia   
 bradycardia  Arthritis  CAD (coronary artery disease) s/p CABG ; Dr Luther Montana  Cancer ([facility] [address]   
 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   
 left leg, lower back,   
 Cough  Depression  Diabetes ([facility] [address] Type II  Epigastric pain 2018  Esophageal dysmotility s/p dilitation  Esophageal motility disorder 2013 Frequent simultaneous or failed contractions, low amplitude contractions  suggests severe myopathy or diffuse spasm. I suspect the latter. Achalasia  is not present.  Fainting [address]  Fatigue  GERD (gastroesophageal reflux disease)  Heart failure ([facility] [address] 10/2014 Cardiomyopathy:Pacemaker upgrade:Biv and AICD  Dr. Carey Fabian heart Dr. last visit 5/11/2015  Hepatitis C Dx 1996  
 treated at [facility] in past; as of 4/15/15 wife states pt currently not under any treatment  Hyperlipidemia  Joint pain  Liver disease  Memory loss  Muscle pain  Muscle weakness  Myocardial infarct [facility]) 2013 Heart Cath: 40% LV EF, Stented distal LAD, patent Graft to circumflex  On tube feeding diet approx 2009  
 still has as of 9/28/15  (no po food/liquid/meds at all); Dr Melyssa Chandler  Other ill-defined conditions(799.89) 1996  
 1 dose of chemotherapy/radiation for tongue cancer  Other ill-defined conditions(799.89) BPH  Other ill-defined conditions(799.89) orthostatic hypotension  Pneumonia ~ April -May 2010  
 SOB (shortness of breath)  Stroke [facility]) approx 2003  
 left side-left finger tips numb; no imbalance or memory loss; as of 2015 not seeing neuro MD  
 Suicidal thoughts  Swallowing difficulty Past Surgical History:  
Procedure Laterality Date  ABDOMEN SURGERY PROC UNLISTED  1996  
 peg tube  CABG, ARTERY-VEIN, THREE  2000  HX CATARACT REMOVAL    
 bilateral  
 HX CHOLECYSTECTOMY  HX COLONOSCOPY    
 HX HEART CATHETERIZATION  2013 Stented distal LAD  HX MOHS PROCEDURES    
 bilateral  
 HX ORTHOPAEDIC    
 back surgery times two  HX OTHER SURGICAL Radical Left Neck  HX OTHER SURGICAL    
 NASAL POLYPS REMOVAL  
 HX OTHER SURGICAL  2010 TURP  
 HX PACEMAKER  11/08  HX PACEMAKER  10/28/14 Defibrillator: [facility] # [id number], serial # [id number]; Dr. Mandi Shahid [phone]; Dr Elisabeth Pavon  HX UROLOGICAL    
 cystoscopy [address] [place] [address] [facility]  
 cevical surgery  ME CHANGE GASTROSTOMY TUBE  5/2/2011  ME CHANGE GASTROSTOMY TUBE  6/29/2011  MI EGD INSERT GUIDE WIRE DILATOR PASSAGE ESOPHAGUS  9/13/2010  MI EGD TRANSORAL BIOPSY SINGLE/MULTIPLE  9/13/2010  
    
 STOMACH SURGERY PROCEDURE UNLISTED  6/29/2011  UPPER GI ENDOSCOPY,BIOPSY  8/17/2018  UPPER GI ENDOSCOPY,DILATN W GUIDE  8/17/2018  UPPER GI ENDOSCOPY,W/DILAT,GASTRIC OUT  8/17/2018  VASCULAR SURGERY PROCEDURE UNLIST    
 bilateral carotid stents Prior to Admission medications Medication Sig Start Date End Date Taking? Authorizing Provider  
pramipexole (MIRAPEX) 0.25 mg tablet Take 0.25 mg by mouth three (3) times daily. Yes Provider, Historical  
insulin regular (NOVOLIN R REGULAR U-100 INSULN) 100 unit/mL injection 5 Units by SubCUTAneous route nightly. Patient takes 15 units at breakfast, 15 units at lunch, and 4 units nightly. Yes Provider, Historical  
insulin lispro (HUMALOG U-100 INSULIN) 100 unit/mL injection by SubCUTAneous route. Inject per sliding scale:  
200-250  = 2 units 251-299 = 3 units 300-350 = 4 units Notify MD if greater than 351, before meals and at bedtime for DM   Yes Provider, Historical  
ondansetron hcl (ZOFRAN) 4 mg tablet 4 mg by Per G Tube route every six (6) hours as needed for Nausea. Yes Provider, Historical  
insulin glargine (LANTUS U-100 INSULIN) 100 unit/mL injection 10 Units by SubCUTAneous route daily. Yes Provider, Historical  
lidocaine 4 % patch Use as needed 1/18/19  Yes Kat Martinez MD  
oxyCODONE IR (ROXICODONE) 5 mg immediate release tablet Take 0.5 Tabs by mouth every six (6) hours as needed. Max Daily Amount: 10 mg. 1/18/19  Yes Kat Martinez MD  
furosemide (LASIX) 40 mg tablet 40 mg by Per G Tube route two (2) times a day. Yes Provider, Historical  
B.infantis-B.ani-B.long-B.bifi (PROBIOTIC 4X) 10-15 mg TbEC 1 Cap by Per G Tube route daily. Yes Provider, Historical  
clopidogrel (PLAVIX) 75 mg tab 75 mg by PEG Tube route daily.    Yes Provider, Historical  
 aspirin delayed-release 81 mg tablet Take 81 mg by mouth daily. Yes Provider, Historical  
insulin regular (NOVOLIN R REGULAR U-100 INSULN) 100 unit/mL injection 15 Units by SubCUTAneous route two (2) times daily (with meals). Patient takes 15 Units with breakfast, 15 Units with lunch, and 5 Units nightly   Yes Provider, Historical  
albuterol (PROVENTIL VENTOLIN) 2.5 mg /3 mL (0.083 %) nebulizer solution 2.5 mg by Nebulization route two (2) times a day. Per wife patient can use 3 times a day if needed   Yes Provider, Historical  
famotidine (PEPCID) 20 mg tablet 20 mg by Per G Tube route three (3) times daily. Yes Provider, Historical  
acetaminophen (TYLENOL) 500 mg tablet 500-1,000 mg by Per G Tube route every six (6) hours as needed for Pain. Yes Provider, Historical  
atorvastatin (LIPITOR) 40 mg tablet 40 mg by Per G Tube route daily. Yes Other, MD Booker  
finasteride (PROSCAR) 5 mg tablet 5 mg by Per G Tube route nightly. Yes Other, MD Booker  
alfuzosin SR (UROXATRAL) 10 mg SR tablet 10 mg by Per G Tube route daily. 5/24/18  Yes Provider, Historical  
midodrine (PROAMITINE) 10 mg tablet 10 mg by Per G Tube route three (3) times daily (with meals). 11/1/16  Yes Provider, Historical  
fluticasone (FLONASE ALLERGY RELIEF) 50 mcg/actuation nasal spray 1 Mccleary by Both Nostrils route two (2) times daily as needed (runny nose). Yes Provider, Historical  
zinc 50 mg tab tablet 50 mg by Per G Tube route daily. Yes Provider, Historical  
nitroglycerin (NITROSTAT) 0.4 mg SL tablet 0.4 mg by SubLINGual route every five (5) minutes as needed for Chest Pain. Yes Other, MD Booker  
gabapentin (NEURONTIN) 250 mg/5 mL solution 750 mg by Per G Tube route three (3) times daily. Yes Other, MD Booker  
multivitamin (ONE A DAY) tablet 1 Tab by Per G Tube route daily. Yes Other, MD Booker  
 
Allergies Allergen Reactions  Cleocin [Clindamycin Hcl] Rash  Demerol [Meperidine] Shortness of Breath  Paxil [Paroxetine Hcl] Unknown (comments) Pt gets shaky and loses control of legs  Amoxicillin Rash  Pcn [Penicillins] Rash Social History Tobacco Use  Smoking status: Never Smoker  Smokeless tobacco: Never Used Substance Use Topics  Alcohol use: No  
  
Family History Problem Relation Age of Onset  Heart Disease Father   
      at age 52 from CAD  Colon Cancer Mother  Cancer Mother   
     colon ca  
 Heart Disease Brother Current Facility-Administered Medications Medication Dose Route Frequency  metroNIDAZOLE (FLAGYL) IVPB premix 500 mg  500 mg IntraVENous Q12H  
 [START ON 2019] vancomycin (VANCOCIN) 1,000 mg in 0.9% sodium chloride (MBP/ADV) 250 mL  1,000 mg IntraVENous Q24H  cefepime (MAXIPIME) 1 g in 0.9% sodium chloride (MBP/ADV) 50 mL  1 g IntraVENous Q12H  
 DOPamine (INTROPIN) 800 mg in dextrose 5% 250 mL infusion  2-10 mcg/kg/min IntraVENous TITRATE  albuterol-ipratropium (DUO-NEB) 2.5 MG-0.5 MG/3 ML  3 mL Nebulization Q6H RT  
 aspirin delayed-release tablet 81 mg  81 mg Oral DAILY  atorvastatin (LIPITOR) tablet 40 mg  40 mg Per G Tube DAILY  clopidogrel (PLAVIX) tablet 75 mg  75 mg PEG Tube DAILY  finasteride (PROSCAR) tablet 5 mg  5 mg Oral DAILY  insulin glargine (LANTUS) injection 10 Units  10 Units SubCUTAneous DAILY  midodrine (PROAMITINE) tablet 10 mg  10 mg Per G Tube TID WITH MEALS  pramipexole (MIRAPEX) tablet 0.25 mg  0.25 mg Oral TID  insulin lispro (HUMALOG) injection   SubCUTAneous AC&HS  sodium chloride (NS) flush 5-40 mL  5-40 mL IntraVENous Q8H  
 heparin (porcine) injection 5,000 Units  5,000 Units SubCUTAneous Q8H  
 levoFLOXacin (LEVAQUIN) 750 mg in D5W IVPB  750 mg IntraVENous Q48H  
 furosemide (LASIX) injection 40 mg  40 mg IntraVENous DAILY Review of Systems: A comprehensive review of systems was negative except for: Respiratory: positive for dyspnea on exertion Objective: 
Vital Signs:   
Visit Vitals BP (!) 88/60 Pulse 91 Temp 100.2 °F (37.9 °C) Resp 21 Wt 73.5 kg (162 lb) SpO2 99% BMI 25.37 kg/m² O2 Device: Nasal cannula O2 Flow Rate (L/min): 3 l/min Temp (24hrs), Av.7 °F (37.1 °C), Min:97.6 °F (36.4 °C), Max:100.2 °F (37.9 °C) Intake/Output:  
Last shift:      701 - 1900 In: 1106.5 [I.V.:971.5] Out: 1250 [Urine:400] Last 3 shifts: 1901 -  07 In: -  
Out: 925 [Urine:925] Intake/Output Summary (Last 24 hours) at 2019 1635 Last data filed at 2019 9381 Gross per 24 hour Intake 1106.52 ml Output 2175 ml Net -1068.48 ml Physical Exam:  
General:  Alert, cooperative, no distress, appears stated age. Head:  Normocephalic, without obvious abnormality, atraumatic. Eyes:  Conjunctivae/corneas clear. PERRL, EOMs intact. Nose: Nares normal. Septum midline. Mucosa normal. No drainage or sinus tenderness. Throat: Lips, mucosa, and tongue normal. Teeth and gums normal.  
Neck: Supple, symmetrical, trachea midline, no adenopathy, thyroid: no enlargment/tenderness/nodules, no carotid bruit and no JVD. Back:   Symmetric, no curvature. ROM normal.  
Lungs:   Rales. Chest wall:  No tenderness or deformity. Heart:  Regular rate and rhythm, S1, S2 normal, no murmur, click, rub or gallop. Abdomen:   Soft, non-tender. Bowel sounds normal. No masses,  No organomegaly. Extremities: Extremities normal, atraumatic, no cyanosis or edema. Pulses: 2+ and symmetric all extremities. Skin: Skin color, texture, turgor normal. No rashes or lesions Lymph nodes: Cervical, supraclavicular, and axillary nodes normal.  
Neurologic: Grossly nonfocal  
 
Data review:  
 
Recent Results (from the past 24 hour(s)) GLUCOSE, POC Collection Time: 19 10:47 PM  
Result Value Ref Range Glucose (POC) 113 (H) 65 - 100 mg/dL Performed by [employer] (EDT) METABOLIC PANEL, BASIC  
 Collection Time: 02/04/19  2:40 AM  
Result Value Ref Range Sodium 141 136 - 145 mmol/L Potassium 4.6 3.5 - 5.1 mmol/L Chloride 97 97 - 108 mmol/L  
 CO2 40 (H) 21 - 32 mmol/L Anion gap 4 (L) 5 - 15 mmol/L Glucose 170 (H) 65 - 100 mg/dL BUN 49 (H) 6 - 20 MG/DL Creatinine 1.74 (H) 0.70 - 1.30 MG/DL  
 BUN/Creatinine ratio 28 (H) 12 - 20 GFR est AA 46 (L) >60 ml/min/1.73m2 GFR est non-AA 38 (L) >60 ml/min/1.73m2 Calcium 9.1 8.5 - 10.1 MG/DL  
CBC WITH AUTOMATED DIFF Collection Time: 02/04/19  2:40 AM  
Result Value Ref Range WBC 5.9 4.1 - 11.1 K/uL  
 RBC 3.02 (L) 4.10 - 5.70 M/uL HGB 8.9 (L) 12.1 - 17.0 g/dL HCT 29.8 (L) 36.6 - 50.3 % MCV 98.7 80.0 - 99.0 FL  
 MCH 29.5 26.0 - 34.0 PG  
 MCHC 29.9 (L) 30.0 - 36.5 g/dL  
 RDW 16.8 (H) 11.5 - 14.5 % PLATELET 84 (L) 402 - 400 K/uL MPV 11.1 8.9 - 12.9 FL  
 NRBC 0.0 0  WBC ABSOLUTE NRBC 0.00 0.00 - 0.01 K/uL NEUTROPHILS 82 (H) 32 - 75 % LYMPHOCYTES 9 (L) 12 - 49 % MONOCYTES 7 5 - 13 % EOSINOPHILS 1 0 - 7 % BASOPHILS 0 0 - 1 % IMMATURE GRANULOCYTES 1 (H) 0.0 - 0.5 % ABS. NEUTROPHILS 4.8 1.8 - 8.0 K/UL  
 ABS. LYMPHOCYTES 0.5 (L) 0.8 - 3.5 K/UL  
 ABS. MONOCYTES 0.4 0.0 - 1.0 K/UL  
 ABS. EOSINOPHILS 0.1 0.0 - 0.4 K/UL  
 ABS. BASOPHILS 0.0 0.0 - 0.1 K/UL  
 ABS. IMM. GRANS. 0.1 (H) 0.00 - 0.04 K/UL  
 DF AUTOMATED    
 RBC COMMENTS NORMOCYTIC, NORMOCHROMIC    
GLUCOSE, POC Collection Time: 02/04/19  8:35 AM  
Result Value Ref Range Glucose (POC) 273 (H) 65 - 100 mg/dL Performed by Maia Pierre LACTIC ACID Collection Time: 02/04/19  9:08 AM  
Result Value Ref Range Lactic acid 1.8 0.4 - 2.0 MMOL/L  
CELL COUNT, BODY FLUID Collection Time: 02/04/19 10:10 AM  
Result Value Ref Range BODY FLUID TYPE PLEURAL FLUID FLUID COLOR YELLOW    
 FLUID APPEARANCE HAZY FLUID RBC CT. >100 (H) 0 /cu mm FLUID NUCLEATED CELLS 423 /cu mm FLD NEUTROPHILS 4 (A) NRRE % FLD LYMPHS 79 (A) NRRE % FLD MONO/MACROPHAGES 9 (A) NRRE % FLUID MESOTHELIAL 8 (A) NRRE %  
LDH, BODY FLUID Collection Time: 02/04/19 10:10 AM  
Result Value Ref Range Fluid Type: PLEURAL FLUID    
 LD, body fld. 68 U/L  
PROTEIN TOTAL, FLUID Collection Time: 02/04/19 10:10 AM  
Result Value Ref Range Fluid Type: PLEURAL FLUID Protein total, body fld. 1.1 g/dL SAMPLES BEING HELD Collection Time: 02/04/19 10:11 AM  
Result Value Ref Range SAMPLES BEING HELD ANAC TUB,CUP OF FLUID COMMENT Add-on orders for these samples will be processed based on acceptable specimen integrity and analyte stability, which may vary by analyte. GLUCOSE, POC Collection Time: 02/04/19  3:09 PM  
Result Value Ref Range Glucose (POC) 147 (H) 65 - 100 mg/dL Performed by Sandra Quijano) Imaging: 
I have personally reviewed the patients radiographs and have reviewed the reports: CXR: pleural effusion decreased after thoracentesis, still pulmonary edema Yevgeniy Marquez MD

## 2019-02-04 NOTE — PROGRESS NOTES
Responded to RRT call for Mr. Ghosh. He is very appreciative of pastoral support. Provided a safe space for him to share thoughts and feelings. His wife is generally at bedside but had left to get something to eat. Pastoral Care continues to be available. Please page if needed. Aurora Hines., [address] [facility], [employer] [facility]

## 2019-02-04 NOTE — ROUTINE PROCESS
RAPID RESPONSE TEAM 
 
At 1662, while reviewing chart for high MEWS, received overhead call for Rapid Response for hypotension. Arrived within 3 minutes. Upon arrival, Dr. Heydi Christensen was already present. Albumin and NS bolus were already being ordered. Dr. Nelson Tolbert was contacted via telephone and dobutamine was stopped per his order. Dr. Nenita Sweeney came to bedside and ordered Dopamine. Patient is alert but not producing urine. He denies SOB/pain. No RRT interventions are required. BP is improving with administration of albumin. On follow-up, BP is still stable. Patient is alert and talking with family members. He will stay in his room. Code status has been addressed. Care will not be escalated. No other RRT interventions are indicated at this time. Please call back if needed. Odette Blanco

## 2019-02-04 NOTE — ROUTINE PROCESS
TRANSFER - OUT REPORT: 
 
Verbal report given to Carlie on Manjit Sin  being transferred to [facility] for routine progression of care Report consisted of patients Situation, Background, Assessment and  
Recommendations(SBAR). Information from the following report(s) SBAR, Kardex, ED Summary and MAR was reviewed with the receiving nurse. Lines:  
Peripheral IV 02/03/19 Right Hand (Active) Site Assessment Clean, dry, & intact 2/3/2019  8:39 AM  
Phlebitis Assessment 0 2/3/2019  8:39 AM  
Infiltration Assessment 0 2/3/2019  8:39 AM  
Dressing Status Clean, dry, & intact 2/3/2019  8:39 AM  
Dressing Type Transparent 2/3/2019  8:39 AM  
Hub Color/Line Status Flushed 2/3/2019  8:39 AM  
Action Taken Blood drawn 2/3/2019  8:39 AM  
   
Peripheral IV 02/03/19 Left Antecubital (Active) Site Assessment Clean, dry, & intact 2/3/2019  9:05 AM  
Phlebitis Assessment 0 2/3/2019  9:05 AM  
Infiltration Assessment 0 2/3/2019  9:05 AM  
Dressing Status Clean, dry, & intact 2/3/2019  9:05 AM  
Dressing Type Transparent;Tape 2/3/2019  9:05 AM  
Hub Color/Line Status Pink 2/3/2019  9:05 AM  
  
 
Opportunity for questions and clarification was provided. Patient transported with: 
 Monitor Registered Nurse

## 2019-02-04 NOTE — PROGRESS NOTES
Initial Nutrition Assessment: 
 
INTERVENTIONS/RECOMMENDATIONS:  
· Enteral Nutrition: Continue current TF regimen ASSESSMENT:  
Patient medically noted for respiratory failure, pneumonia, episodes of unresponsiveness, new stroke, and thoracentesis with 850 mL out. PMH for hypotension, dysphagia, PEG, DM, CABG, CHF, and throat cancer. Continuous TF currently ordered. Current TF regimen is adequate to meet 100% of estimated kcal/protein needs. Typically on a bolus regimen when admitted but recommend continuing with current regimen for now. Will monitor tolerance and progress. Palliative care consulted. Diet Order: NPO(Osmolite 1.2 @ 65 mL/hr + 75 mL water flushes Q4; 1872 kcal, 87g protein, 1729 mL water) % Eaten:  No data found. Pertinent Medications: [x]Reviewed []Other: Atorvastatin, Plavix, Lasix, Lantus, Humalog, Midodrine, Dobutamine Pertinent Labs: [x]Reviewed []Other: -[phone] Food Allergies: [x]None []Other Last BM:    [x]Active     []Hyperactive  []Hypoactive       [] Absent BS Skin:    [x] Intact   [] Incision  [] Breakdown: [] Edema []Other: Anthropometrics:  
Height:   Weight: 73.5 kg (162 lb) IBW (%IBW):   ( ) UBW (%UBW):   (  %) Last Weight Metrics: 
Weight Loss Metrics 2/3/2019 1/21/2019 1/18/2019 1/9/2019 12/31/2018 12/26/2018 12/24/2018 Today's Wt 162 lb 162 lb 162 lb 0.6 oz 166 lb 157 lb 9.6 oz 159 lb 6.4 oz 158 lb BMI 25.37 kg/m2 25.37 kg/m2 25.38 kg/m2 26 kg/m2 25.44 kg/m2 25.73 kg/m2 25.5 kg/m2 Some encounter information is confidential and restricted. Go to Review Flowsheets activity to see all data. BMI: Body mass index is 25.37 kg/m². This BMI is indicative of: 
 []Underweight    []Normal    [x]Overweight    [] Obesity   [] Extreme Obesity (BMI>40) Estimated Nutrition Needs (Based on):  
3744 Kcals/day(BMR (1394) x 1. 3AF) , 74 g(-89g (1.0-1.2 g/kg bw)) Protein Carbohydrate: At Least 130 g/day  Fluids:1800 mL/day (1ml/kcal) Pt expected to meet estimated nutrient needs: [x]Yes []No 
 
NUTRITION DIAGNOSES:  
Problem:  Swallowing difficulty Etiology: related to hx throat cancer Signs/Symptoms: as evidenced by PEG dependent NUTRITION INTERVENTIONS: 
  Enteral/Parenteral Nutrition: Other GOAL:  
Patient will tolerate TF at goal rate with residual <250 mL next 1-3 days LEARNING NEEDS (Diet, Food/Nutrient-Drug Interaction):  
 [x] None Identified 
 [] Identified and Education Provided/Documented 
 [] Identified and Pt declined/was not appropriate Cultural, Episcopalian, OR Ethnic Dietary Needs:  
 [x] None Identified 
 [] Identified and Addressed 
 
 [x] Interdisciplinary Care Plan Reviewed/Documented  
 [x] Discharge Planning: Continue current TF regimen MONITORING /EVALUATION:  
Food/Nutrient Intake Outcomes: Enteral/parenteral nutrition intake Physical Signs/Symptoms Outcomes: Weight/weight change, GI profile, Electrolyte and renal profile, Glucose profile NUTRITION RISK:  
 [x] High              [] Moderate           []  Low  []  Minimal/Uncompromised PT SEEN FOR:  
 []  MD Consult: []Calorie Count []Diabetic Diet Education []Diet Education []Electrolyte Management []General Nutrition Management and Supplements []Management of Tube Feeding []TPN Recommendations [x]  RN Referral:  []MST score >=2 [x]Enteral/Parenteral Nutrition PTA []Pregnant: Gestational DM or Multigestation 
   []Pressure Ulcer/Wound Care needs 
     
[]  Low BMI 
[]  ABBEY Rolanda Pack Pager [phone] Weekend Pager [phone]

## 2019-02-04 NOTE — ROUTINE PROCESS
0945- Pt in for thoracentesis. Pt alert and oriented. Dr Rafita aRmos in to consult pt. Consent obtained. 1015- Thoracentesis completed. 850ml drk robe fluid removed, specimens sent to lab as ordered. Pt maame procedure well. PCXR ordered. 1030- Pt to ED per this nurse. Report at bedside to primary nurse.  VSS. 02 at 3lpm.

## 2019-02-04 NOTE — ED NOTES
Bedside and verbal report given to Cheryl Echeverria RN on Adra Rojelio at shift change for routine progression of care. Report consisted of patients Situation, Background, Assessment and Recommendations(SBAR). Information from the following report(s) SBAR, Kardex, ED Summary, [facility] and Recent Results was reviewed with the receiving nurse. Opportunity for questions and clarification was provided.

## 2019-02-04 NOTE — ED NOTES
Pt complains of severe right leg pain for which 2.5 mg Roxicodone was given. RN repositioned right leg with pillow for comfort. Pt also reports intense urge to urinate. Bladder scan performed at bedside yielding no urine retained in bladder.

## 2019-02-04 NOTE — PROGRESS NOTES
PICC (Peripherally Inserted Central Catheter) line insertion  procedure note :  
 
Procedure explained to patient's wife along with risks and benefits  and patient's wife agreed to proceed. Informed consent obtained from  Patient's wife. Patient teaching completed. Timeout completed. Pre-procedure assessment done. Maximum sterile barrier precautions observed throughout procedure. Lidocaine 1%  3.0    ml sq given prior to cannulation. Cannulated brachial  vein using ultrasound guidance and modified seldinger technique. Inserted 5  Armenian double  lumen PICC to right arm using [employer] Tip Location System and  [address]. Pt has    sinus   rhythm. PICC tip location was confirmed by 3 CG tip positioning system, indicating tall P wave and no negative deflection before P wave which would indicate that the PICC tip is properly placed in the distal SVC or at the [place]. PICC tip location was  confirmed by 2 PICC nurses and 3CG printout placed on patient's chart. Blood return verified and flushed with 20 ml normal saline in each port. Sterile dressing applied with biopatch, statLock and occlusive dressing as per protocol. Curos caps applied to each port. Patient tolerated procedure well with minimal blood loss ( less than 5 ml.) Patient education material provided. PICC procedure performed by  :  Karthik Enriquez RN. PICC nurse Assisted by : Enrrique Gonzales RN  PICC nurse Reason for access : reliable access / MD order /   Hemodynamically unstable / Poor vascular access /  Vesicant IV medication infusion Complications related to insertion  : none X-Ray : not applicable Notified primary nurse  Kalani Vegas RN  that  PICC line can be used. Total Trimmed Length :  35   cm External Length : 0  cm PICC line site arm circumference:  27    cm PICC catheter occupies  17   % of vein Type of PICC: [address] [facility] Ref # :     [id number] Lot # :  [id number] Expiration Date :    2020-04-30 Odette Pinedo RN. BSN. DEBBIE,CMSRN. Clinician IV .  PICC Nurse, Vascular Access Team.

## 2019-02-04 NOTE — PROGRESS NOTES
3:59 PM Spoke to Dr. Holland Nogueira, order to stop dobutamine. Recommends pressors and transfer to ICU. Dr. Hughes Mouse paged. Dr Sam Morel to beside, recommends hospice.

## 2019-02-04 NOTE — PROGRESS NOTES
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 Lactic acidosis improved. Patient had thoracentesis with removal of 850 cc from the left this morning, lab studies pending. Discussed CT results with neurology who will see him in consultation. Patient has known severe vertebral vascular disease and recurrent episodes of syncope presumably secondary to cardiac issues.

## 2019-02-04 NOTE — ED NOTES
Assumed care of pt from Camilo Sanchez RN at bedside with verbal report consisting of Situation, Background, Assessment, and Recommendations (SBAR). Pt is A&O x 2 (person and and place, not time or situation). Pt resting comfortably on the stretcher in a position of comfort. Call bell within reach. Side rails x 2. Cardiac monitor x 3. Stretcher locked in the lowest position. Concerns and questions addressed at this time. Pt in no acute distress at this the time. Will continue to monitor.

## 2019-02-04 NOTE — PROGRESS NOTES
Occupational Therapy OT consult received, chart reviewed. Initiated OT evaluation, but realized that he is currently off the floor for thoracentesis. Will defer for now but continue to follow up for evaluation.

## 2019-02-05 NOTE — PROGRESS NOTES
Occupational Therapy Chart reviewed. Spoke to RN who reports that patientt is not appropriate for participation in OT evaluation. Per RN, morphine just administered for increased work of breathing/respiratory distress. RN also reports that family is meeting with hospice liaison this afternoon to determine plan of care. Will hold OT evaluation per RN request however continue to follow as appropriate.

## 2019-02-05 NOTE — PROGRESS NOTES
Update note: Met with patient's wife around 3 PM and updated her on the patient's condition including his acute stroke, acute renal failure and suspected cardiogenic shock. The nurse reported that he had had no urine output since he came up from the ER despite the Lasix and dobutamine. The wife, at that time, confirmed that she wanted everything done and to maintain full code despite the fact that he has had multiple admissions and decompensations and multiorgan dysfunction. Dr. Jimmy Juan also had a similar conversation with her earlier. Shortly after that the nurse, called me to report that his blood pressure had dropped and he still had not produced any urine. I ordered an albumin infusion x1 and asked her to contact cardiology as well for further recommendations. At approximately 3:45 p.m. I was called to a rapid response for ongoing hypotension. A saline bolus of 250 was ordered. the albumin had not yet been received from pharmacy. Patient's blood pressures had gotten as low as in the 60s started to improve into the 80s. Patient's mentation was stable at that time. He denied any chest pain or worsening shortness of breath. I discussed the case with pulmonary critical care who then evaluated him and recommended stopping dobutamine and starting dopamine. A PICC line was ordered. Pulmonary met with the wife and recommended supportive measures but consider DNR status and felt overall prognosis was poor. On reevaluation, patient's blood pressure had improved on the dopamine, now with a PICC line in place. Family gathered at the bedside. Patient still not producing urine. Palliative care met with the family and ultimately they opted for DNR status and to continue current measures but no escalation of care. Additional CCT since evaluation this am 35minutes

## 2019-02-05 NOTE — DISCHARGE INSTRUCTIONS
HOSPITALIST DISCHARGE INSTRUCTIONS    NAME: Kenroy Laws   :  1935   MRN:  [id number]     Date/Time:  2019 1:28 PM    ADMIT DATE: 2/3/2019   DISCHARGE DATE: 2019     Attending Physician: Rhea Wallace MD    DISCHARGE DIAGNOSIS:  Acute on chronic hypoxic respiratory failure   HCAP  Suspected sepsis vs cardiogenic shock, POA  Lactic acidosis  Acute on chronic systolic CHF  Acute encephalopathy with episodes of unresponsiveness  Syncope with hypotension  Severe vertebrobasilar disease  Recent subfalcine SDH and SAH s/p fall/syncope early 2018  H/o severe AS, NSTEMI with CAD s/p CABGx3  Severe AS  Acute on Chronic systolic CHF, EF 31%  ? 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 Pain Management: per above medications    Recommended diet: Comfort feeding    Recommended activity: Bedrest    Wound care: None    Indwelling devices:  None    Supplemental Oxygen: per hospice     Required Lab work: none    Glucose management:  None    Code status: Durable DNR sent with patient        Outside physician follow up: Follow-up Information     Follow up With Specialties Details [facility] [facility]  [phone]                 Skilled nursing facility/ SNF MD responsible for above on discharge. Information obtained by :  I understand that if any problems occur once I am at home I am to contact my physician.     I understand and acknowledge receipt of the instructions indicated above.                                                                                                                                            Physician's or R.N.'s Signature                                                                  Date/Time                                                                                                                                              Patient or Repres

## 2019-02-05 NOTE — HOSPICE
[address] [facility] Good Help to Those in Need 
[phone] Inpatient Nursing Admission Patient Name: Manjit Sin YOB: 1935 Age: 80 y.o. Date of Hospice Admission: 2/5/19 Hospice Attending Elected by Patient: David Vera MD 
Primary Care Physician: Aishwarya Hidalgo DO Admitting RN: Bettyjane Sicard RN-BSN, CCRN : CM Roe Level of Care (GIP/Routine/Respite): GIP Facility of Care: [facility] Patient Room: 1111 HOSPICE SUMMARY  
ER Visits/ Hospitalizations in past year: 16 Hospice Diagnosis: Respiratory failure ([address] [J96.90] Onset Date of Hospice Diagnosis: 2/5/19 Summary of Disease Progression Leading to Hospice Diagnosis: 
 Acute on chronic hypoxic respiratory failure HCAP Suspected sepsis vs cardiogenic shock, POA Lactic acidosis Acute on chronic systolic CHF EF 89% Hypotensives episodes CXR showed increasing opacification of the left hemithorax, likely due to a left pleural 
effusion, together with atelectasis versus airspace disease. elevated probnp ~9K, +3 b/l pitting edema, POA, he has chronic hypotension, severe AS, and currently in acute renal failure, and lactic acidosis.  He is at risk for further volume overload if given IVF, and tube feeding and worsening of hypotension if diuresing. Co-Morbidities:  
Patient Active Problem List  
Diagnosis Code  Dysphagia R13.10  Abnormal cardiovascular stress test R94.39  
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  
 CAD (coronary artery disease) I25.10  Status post implantation of automatic cardioverter/defibrillator (AICD) Z95.810  
 Aortic stenosis, mild I35.0  Esophageal motility disorder K22.4  Heart failure (HCC) I50.9  PEG (percutaneous endoscopic gastrostomy) status ([facility] Z93.1  Antiplatelet or antithrombotic long-term use Z79.02  
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  
 Peripheral neuropathy ([facility]) G62.9  
 Polyneuropathy associated with underlying disease ([address] G63  
 History of stroke Z86.73  
 Aspiration pneumonia ([facility] J69.0  Weakness R53.1  Stroke ([facility] I63.9  Thrombotic stroke involving left middle cerebral artery ([facility] U55.373  Stenosis of both internal carotid arteries I65.23  
 Hemiplegia and hemiparesis following cerebral infarction affecting right dominant side ([facility]) F86.832  Type 2 diabetes with nephropathy ([facility] E11.21  
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  
 Tremors of nervous system R25.1  Parkinson's disease (tremor, stiffness, slow motion, unstable posture) ([facility]) G20  
 Myalgia M79.10  Pneumonia due to group B Streptococcus ([facility] J15.3  Counseling regarding goals of care Z71.89  
 Disturbance of memory R41.3  Presbycusis of both ears H91.13  
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  
 Convulsion ([address] R56.9  Epigastric pain R10.13  
 Dizziness R42  Hx of tongue cancer Z85.810  
 SDH (subdural hematoma) ([facility] E27.9E2E  Syncope R55  Hypoxia R09.02  
 Respiratory failure ([address] J96.90 Diagnoses RELATED to the terminal prognosis: 
 Acute on chronic systolic CHF EF 46% Acute on chronic hypoxic respiratory failure Other Diagnoses: See above Rationale for a prognosis of life expectancy of 6 months or less if the disease follows its normal course (Disease Specific History):  
 Amna Jane is a 80 y.o. who was admitted to [facility]. The patient's principle diagnosis of PNA with CHF has resulted in Acute respiratory failure with hypoxia Functionally, the patient's Palliative Performance Scale has declined over a period of weeks and is estimated at 10. Objective information that support this patients limited prognosis includes:acute stroke, acute renal failure and suspected cardiogenic shock. The patient/family chose comfort measures with the support of Hospice. Patient meets for GIP LOC as evidenced by Respiratory failure with left Pleural effusion, respiratory problems, dehydration, hypotension, acute renal failure Prognosis estimated based on 02/05/19 clinical assessment is:  
[] Few to Many Hours [x] Hours to Days  
[] Few to Many Days  
[] Days to Weeks  
[] Few to Many Weeks  
[] Weeks to Months  
[] Few to Many Months ASSESSMENT Patient self-reports:  []  Yes    [x] No 
 
SYMPTOMS: Tachypnea, Increased secretions, Respiratory pain with inspiration. Agitation, fever. SIGNS/PHYSICAL FINDINGS: s/p Left Thoracentesis, Indwelling Mills, PEG, AICD, Minimal bowel sounds. CXR results: increase in airspace disease/interstitial prominence in the right lung. Acute CVA: Acute right cerebellar infarct in distribution of PICA. KARNOFSKY: 10 
 
FAST for all dementia:   
 
Learning Assessment: 
Patient Is patient willing/able to learn? No 
What is the highest level of education completed? Unknown Learning preference (written material, demonstration, visual)? N/A Learning barriers (ESOL, [place], poor vision)? N/A Caregiver Is caregiver willing to learn care for patient? Yes What is the highest level of education completed? Unknown Learning preference (written material, demonstration, visual)? Written Learning barriers (ESOL, [place], poor vision)? None noted CLINICAL INFORMATION Wt Readings from Last 3 Encounters:  
02/05/19 75 kg (165 lb 5.5 oz) 01/21/19 73.5 kg (162 lb)  
01/18/19 73.5 kg (162 lb 0.6 oz) Ht Readings from Last 3 Encounters:  
01/18/19 5' 7\" (1.702 m) 01/09/19 5' 7\" (1.702 m) 12/17/18 5' 6\" (1.676 m) There is no height or weight on file to calculate BMI. There were no vitals taken for this visit. LAB VALUES No results found for this visit on 02/05/19 (from the past 12 hour(s)). No results found for this visit on 02/05/19 (from the past 6 hour(s)). Lab Results Component Value Date/Time Protein, total 7.6 02/05/2019 03:19 AM  
 Albumin 3.1 (L) 02/05/2019 03:19 AM  
 
 
Currently this patient has: 
[] Supplemental O2 [x] Peripheral IV  [x] PICC    [] PORT [x] Mills Catheter [] NG Tube   [] PEG Tube [] Ostomy   
[x] AICD: Has ICD been deactivated? [x] Yes 
AICD deactivated 2/5/19 @ 1408. [] No:______ PLAN 1. Admit GIP per Dr. Jake Del Angel for Respiratory failure secondary to CHF EF 30% per last echo. 2. Scheduled Robinul for increased secretions with PRN Robinul every 4 hours. 3. Scheduled Ativan 0.5 mg every  4 hours for agitation/anxiety/\"air hunger\" with PRN Ativan 0.5 mg Q30 minutes for breakthrough anxiety/agitation/\"air hunger\" 4. Scheduled Morphine 2 mg every 4 hours for respiratory distress and inspiratory pain secondary to s/p left thoracentesis and PRN Morphine 2mg Q30 minutes for breakthrough pain. 5. Tylenol supp. 650mg Q6 hr as needed for fevers 6. De-Activate AICD-completed 2/5/19 @ 1408 pm by [employer]. 7. Stop Tube feedings secondary to slowing of gastric motility and risk for vomiting. 8. Admit to Hospice suite. Hospice Team Frequency Orders:Skilled Nurse -  Daily x 7 days /every other day x 7 days with 5 PRN visits for symptom control. MSW  1 visit for initial assessment/evaluation for family support and need for volunteer services. Lindle Serum  1 visit for initial assessment/evaluation for spiritual support. ADVANCE CARE PLANNING (Complete in ACP Flow Sheet) Code Status: DNR 
 Durable DNR: []  Yes  [x]  No 
Code Status Discussed/Confirmed:Yes Preference for Other Life Sustaining Treatment Discussed/Confirmed: Yes Hospitalization Preference: [facility] Advance Care Planning 2019 Patient's Healthcare Decision Maker is: Legal Next of Kin Primary Decision Maker Name -  
Primary Decision Maker Phone Number -  
Primary Decision Maker Relationship to Patient -  
Confirm Advance Directive None Patient Would Like to Complete Advance Directive Unable Does the patient have other document types -  Service: [] Yes  [x]  No      [] Unknown Appropriate for Pinning Ceremony:  [] Yes     [x] No 
Voodoo: Yazdanism  Home: [address] ([address] All  arrangements have been made by family POA. DISCHARGE PLANNING 1. Discharge Plan: Should patient stabilize, family willing to discuss alternate POC. 2. Patient/Family teaching: Hospice philosophy and s/s of death & dying. Purpose to stopping TF and AICD. 3. Response to patient/family teaching: Pt verbalizes understanding and appreciative of care and coordination. SOCIAL/EMOTIONAL/SPIRITUAL NEEDS Spiritual Issues Identified: Family has notified  of pts admission to Hospice. Angelica Lee of [employer] Psych/ Social/ Emotional Issues Identified: To be assessed by MSW, however, spouse very tearful and emotional about decision to proceed with Hospice. Pts sons Suzette Kasey and Gunjan Wilson and Lauren Fall III in agreement. Pts spouse Cathy was unsure about stopping the AICD and tube feeding at this time. Hospice Liaison sat with [facility] and discussed at length of rationale of stopping therapies that hinder comfort. Verbalized understanding. Caregiver Support: 
[x] Provided information on End of Life Care  
[x] Material Provided: Maricel Kenney From [employer] [facility] or [facility]  
 
CARE COORDINATION Dr. Fercho Marquez contacted, discharge to hospice order received Dr. Maddie Yung contacted, agrees to serve as attending provider for hospice and provided verbal certification of terminal illness with life expectancy of 6 months or less. Orders for hospice admission, medications and plan of treatment received. Medication reconciliation completed. MEDS: See medication list below DME: Per hospital 
Supplies: Per hospital 
IDT communication to include MD, SN, SW, CH and support team 
 
ALLERGIES AND MEDICATIONS Allergies: Allergies Allergen Reactions  Cleocin [Clindamycin Hcl] Rash  Demerol [Meperidine] Shortness of Breath  Paxil [Paroxetine Hcl] Unknown (comments) Pt gets shaky and loses control of legs  Amoxicillin Rash  Pcn [Penicillins] Rash Current Facility-Administered Medications Medication Dose Route Frequency  LORazepam (ATIVAN) injection 0.5 mg  0.5 mg IntraVENous Q30MIN PRN  
 scopolamine (TRANSDERM-SCOP) 1 mg over 3 days 1 Patch  1 Patch TransDERmal Q72H PRN  
 glycopyrrolate (ROBINUL) injection 0.2 mg  0.2 mg IntraVENous Q4H PRN  
 bisacodyl (DULCOLAX) suppository 10 mg  10 mg Rectal DAILY PRN  
 morphine injection 2 mg  2 mg IntraVENous Q30MIN PRN  
 acetaminophen (TYLENOL) suppository 650 mg  650 mg Rectal Q4H PRN  
 LORazepam (ATIVAN) injection 0.5 mg  0.5 mg IntraVENous Q4H  
 morphine injection 2 mg  2 mg IntraVENous Q4H  
 sodium chloride (NS) flush 5-40 mL  5-40 mL IntraVENous PRN  
 glycopyrrolate (ROBINUL) injection 0.2 mg  0.2 mg IntraVENous Q4H Current Outpatient Medications Medication Sig  
 acetaminophen (TYLENOL) 650 mg suppository Insert 1 Suppository into rectum every four (4) hours as needed for Fever.  albuterol-ipratropium (DUO-NEB) 2.5 mg-0.5 mg/3 ml nebu 3 mL by Nebulization route every six (6) hours as needed.  bumetanide (BUMEX) 0.25 mg/mL injection 8 mL by IntraVENous route every twelve (12) hours.   
 scopolamine (TRANSDERM-SCOP) 1 mg over 3 days pt3d 1 Patch by TransDERmal route every seventy-two (72) hours.  insulin glargine (LANTUS U-100 INSULIN) 100 unit/mL injection 10 Units by SubCUTAneous route daily. Facility-Administered Medications Ordered in Other Encounters Medication Dose Route Frequency  bumetanide (BUMEX) injection 2 mg  2 mg IntraVENous Q12H  
 morphine injection 2 mg  2 mg IntraVENous Q30MIN PRN  
 LORazepam (ATIVAN) injection 0.5 mg  0.5 mg IntraVENous Q30MIN PRN  
 glycopyrrolate (ROBINUL) injection 0.2 mg  0.2 mg IntraVENous Q4H PRN  
 scopolamine (TRANSDERM-SCOP) 1 mg over 3 days 1 Patch  1 Patch TransDERmal Q72H  acetaminophen (TYLENOL) suppository 650 mg  650 mg Rectal Q4H PRN  
 albuterol-ipratropium (DUO-NEB) 2.5 MG-0.5 MG/3 ML  3 mL Nebulization Q6H RT  
 albuterol-ipratropium (DUO-NEB) 2.5 MG-0.5 MG/3 ML  3 mL Nebulization Q6H PRN  
 nitroglycerin (NITROSTAT) tablet 0.4 mg  0.4 mg SubLINGual Q5MIN PRN

## 2019-02-05 NOTE — PROGRESS NOTES
RAPID RESPONSE TEAM 
 
Rounded on patient due to elevated MEWS of 4. Patient is DNR with no escalation of care. Discussed with primary RN Hermilo Lombardo. No acute concerns. No RRT interventions indicated at this time. Reji Swanson Rapid Response RN Yury Cervantes

## 2019-02-05 NOTE — PROGRESS NOTES
PCU SHIFT NURSING NOTE Bedside and Verbal shift change report given to Philomena Lindquist RN (oncoming nurse) by Nathalie Herrera RN (offgoing nurse). Report included the following information SBAR, Kardex, MAR and Recent Results. Shift Summary: 
2030: Contacted tele-hospitalist for something else to help break up mucus. 2249: Roxicodone administered due to patient complaining of pain with no relief from tylenol or repositioning. 2300: Tube feeding started. 0107: Patient complaining of nausea. Gastric residual checked. No gastric residual. Zofran administered. 3596: Dopamine drip turned off patients BP's have been well above goal.  
0710: Bedside and Verbal shift change report given to Rosalie Oviedo RN (oncoming nurse) by Philomena Lindquist RN (offgoing nurse). Report included the following information SBAR, Kardex, MAR and Recent Results. Admission Date 2/3/2019 Admission Diagnosis Respiratory failure ([facility] [address] Consults IP CONSULT TO CARDIOLOGY 
IP CONSULT TO HOSPITALIST 
IP CONSULT TO INTERVENTIONAL RADIOLOGY 
IP CONSULT TO PALLIATIVE CARE - PROVIDER 
IP CONSULT TO INTENSIVIST 
IP CONSULT TO NEUROLOGY Consults []PT []OT []Speech  
[]Case Management  
  
[] Palliative Cardiac Monitoring Order []Yes []No  
 
IV drips []Yes Drip:                            Dose: 
Drip:                            Dose: 
Drip:                            Dose:  
[]No  
 
GI Prophylaxis []Yes []No  
 
 
 
DVT Prophylaxis SCDs:     
     
 Osorio stockings:     
  
[] Medication []Contraindicated []None Activity Level Activity Level: Bed Rest   
 Activity Assistance: Complete care Purposeful Rounding every 1-2 hour? []Yes Vázquez Score  Total Score: 4 Bed Alarm (If score 3 or >) []Yes  
[] Refused (See signed refusal form in chart) Mark Score  Mark Score: 14 Mark Score (if score 14 or less) []PMT consult  
[]Wound Care consult []Specialty bed  
[] Nutrition consult Needs prior to discharge:  
Home O2 required:   
[]Yes []No  
 If yes, how much O2 required? Other:  
 Last Bowel Movement:    
  
Influenza Vaccine Pneumonia Vaccine Diet Active Orders Diet DIET NPO  
  
LDAs PICC Double Lumen 02/04/19 Right;Brachial (Active) Central Line Being Utilized No 2/4/2019  4:49 PM  
Criteria for Appropriate Use Hemodynamically unstable, requiring monitoring lines, vasopressors, or volume resuscitation 2/4/2019  4:49 PM  
Site Assessment Clean;Dry 2/4/2019  4:49 PM  
Phlebitis Assessment 0 2/4/2019  4:49 PM  
Infiltration Assessment 0 2/4/2019  4:49 PM  
Arm Circumference (cm) 27 cm 2/4/2019  4:49 PM  
Date of Last Dressing Change 02/04/19 2/4/2019  4:49 PM  
Dressing Status Clean, dry, & intact; New 2/4/2019  4:49 PM  
External Catheter Length (cm) 0 centimeters 2/4/2019  4:49 PM  
Dressing Type Disk with Chlorhexadine gluconate (CHG); Stabilization/securement device;Transparent 2/4/2019  4:49 PM  
Hub Color/Line Status Purple;Flushed;Patent;Capped 2/4/2019  4:49 PM  
Positive Blood Return (Site #1) Yes 2/4/2019  4:49 PM  
Hub Color/Line Status Red;Flushed;Patent;Capped 2/4/2019  4:49 PM  
Positive Blood Return (Site #2) Yes 2/4/2019  4:49 PM  
Alcohol Cap Used Yes 2/4/2019  4:49 PM  
      
Peripheral IV 02/03/19 Left Antecubital (Active) Site Assessment Clean, dry, & intact 2/4/2019  1:50 PM  
Phlebitis Assessment 0 2/4/2019  1:50 PM  
Infiltration Assessment 0 2/4/2019  1:50 PM  
Dressing Status Clean, dry, & intact 2/4/2019  1:50 PM  
Dressing Type Tape;Transparent 2/4/2019  1:50 PM  
Hub Color/Line Status Pink; Infusing 2/4/2019  1:50 PM  
   
Peripheral IV 02/04/19 Left Wrist (Active) Site Assessment Clean, dry, & intact 2/4/2019  3:09 PM  
Phlebitis Assessment 0 2/4/2019  3:09 PM  
Infiltration Assessment 0 2/4/2019  3:09 PM  
Dressing Status New 2/4/2019  3:09 PM  
Dressing Type Tape;Transparent 2/4/2019  3:09 PM  
 Hub Color/Line Status Blue;Flushed;Capped 2/4/2019  3:09 PM  
      
PEG/Gastrostomy Tube 02/04/19 (Active) Site Assessment Clean, dry, & intact 2/4/2019  3:30 PM  
Dressing Status Clean, dry, & intact 2/4/2019  3:30 PM  
G Port Status Clamped 2/4/2019  3:30 PM  
Gastric Residual (mL) 0 ml 2/4/2019  3:22 PM  
Tube Feeding/Formula Options Osmolite 1.2 2/4/2019  3:22 PM  
Tube Feeding/Verify Rate (mL/hr) 65 2/4/2019  3:22 PM  
Water Flush Volume (mL) 75 mL 2/4/2019  3:22 PM  
Medication Volume 60 ml 2/4/2019  3:22 PM  
   
Condom Catheter 02/04/19 (Active) Indications for Use Accurate measurement of urinary output 2/4/2019  3:27 PM  
Status Patent 2/4/2019  3:27 PM  
Site Condition No abnormalities 2/4/2019  3:27 PM  
Drainage Tube Clipped to Bed Yes 2/4/2019  3:27 PM  
Catheter Secured to Thigh No 2/4/2019  3:27 PM  
Tamper Seal Intact No 2/4/2019  3:27 PM  
Lack of Dependent Loop in Tubing Yes 2/4/2019  3:27 PM  
Drainage Bag Less Than Half Full Yes 2/4/2019  3:27 PM  
Sterile Solution Used for  Irrigation N/A 2/4/2019  3:27 PM  
            
Urinary Catheter Condom Catheter 02/04/19-Indications for Use: Accurate measurement of urinary output Intake & Output Date 02/03/19 1900 - 02/04/19 2096 02/04/19 0700 - 02/05/19 4462 Shift 0355-9492 24 Hour Total 7600-0843 2167-4753 24 Hour Total  
INTAKE  
I.V.(mL/kg/hr)   975.2(1.1)  975.2 DOPamine Volume (ml)   3.6  3.6 DOBUTamine Volume   271.5  271.5 Volume (cefepime (MAXIPIME) 1 g in 0.9% sodium chloride (MBP/ADV) 50 mL)   50  50 Volume (metroNIDAZOLE (FLAGYL) IVPB premix 500 mg)   100  100 Volume (vancomycin (VANCOCIN) 1500 mg in  ml infusion)   500  500 Volume (cefepime (MAXIPIME) 1 g in 0.9% sodium chloride (MBP/ADV) 50 mL)   50  50 NG/GT   135  135 Water Flush Volume (mL) (PEG/Gastrostomy Tube 02/04/19)   75  75   Medication Volume (PEG/Gastrostomy Tube 02/04/19)   60  60  
 Shift Total(mL/kg)   1110.2(15.1)  1110.2(15.1) OUTPUT Urine(mL/kg/hr) 450 925 400(0.5)  400 Urine Voided 450 925 400  400 Other   850  850 Other Output   850  850 Shift Total(mL/kg) 450(6.1) 925(12.6) [id number])  [id number]) NET -450 -925 -139.8  -139.8 Weight (kg) 73.5 73.5 73.5 73.5 73.5 Readmission Risk Assessment Tool Score High Risk 47 Total Score 3 Has Seen PCP in Last 6 Months (Yes=3, No=0) 2 . Living with Significant Other. Assisted Living. LTAC. SNF. or  
Rehab  
 11 IP Visits Last 12 Months (1-3=4, 4=9, >4=11) 5 Pt. Coverage (Medicare=5 , Medicaid, or Self-Pay=4) 33 Charlson Comorbidity Score (Age + Comorbid Conditions) Criteria that do not apply:  
 Patient Length of Stay (>5 days = 3) Expected Length of Stay 4d 9h  
Actual Length of Stay 1

## 2019-02-05 NOTE — PROGRESS NOTES
Chart reviewed. Spoke to RN who report that pt is not appropriate for participation in PT evaluation. Per RN, morphine just administered for increased work of breathing/respiratory distress. RN also reports that family is meeting with hospice liaison this afternoon to determine plan of care. Will hold PT evaluation per RN request however continue to follow as appropriate. Thank you Gilmar Eason, PT, DPT

## 2019-02-05 NOTE — CONSULTS
[facility], Critical Care, and Sleep Medicine Initial Patient Consult Name: Jeanne Juarez MRN: [id number] : 1935 Hospital: [place] Date: 2019 IMPRESSION:  
· Acute respiratory failure- sats 92 % on  6 liters. - . He is tachypneic and congested. .  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 
· Encephalopathy - recent new cva- says  He wants to eat and feed himself ( has peg) RECOMMENDATIONS:  
· O2 
· Diuretics- increase if able · Not responding to dobutamine, will start low dose dopamine. cobuta appears to have been dc  [address] · Doubt pneumonia, WBC WNL, afebrile · On empiric abx · Pt anuric-  Did have some increased outpt · Spoke with wife and informed her that his condition is worsening, hypotensive, anuric · Asked her to consider medical rx only, not to escalate care, recommended to make pt DNR, to speak with palliative care- he is dnr and wife  Has spoken to palliative care. She would be interested in talking  About hospice if hospice care could be provided here. · Dismal overall prognosis · Will start ms for comfort . Subjective: This patient has been seen and evaluated at the request of Dr. Paul Hansen for respiratory failure and cardiogenic shock. Patient is a 80 y.o. male with severe CMP presented with CHF, pulmonary edema, pleural effusion, renal failure Placed on dobutamine, underwent thoracentesis Now pt hypotensive, anuric, on O2 
 feb 5 - bp ok ,  But more tachyniec, hypoxic and congested. Wife says she does not want him to suffer Past Medical History:  
Diagnosis Date  Antiplatelet or antithrombotic long-term use 2014  Anxiety disorder  Arrhythmia 2009  
 bradycardia  Arthritis  CAD (coronary artery disease) s/p CABG 2002; Dr Luther Montana  Cancer ([facility] [address] 1996  
 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   
 left leg, lower back,   
 Cough  Depression  Diabetes ([facility] [address] Type II  Epigastric pain 8/17/2018  Esophageal dysmotility s/p dilitation  Esophageal motility disorder 7/8/2013 Frequent simultaneous or failed contractions, low amplitude contractions  suggests severe myopathy or diffuse spasm. I suspect the latter. Achalasia  is not present.  Fainting [address]  Fatigue  GERD (gastroesophageal reflux disease)  Heart failure ([facility] [address] 10/2014 Cardiomyopathy:Pacemaker upgrade:Biv and AICD  Dr. Marlena Sierra heart Dr. last visit 5/11/2015  Hepatitis C Dx 1996  
 treated at [facility] in past; as of 4/15/15 wife states pt currently not under any treatment  Hyperlipidemia  Joint pain  Liver disease  Memory loss  Muscle pain  Muscle weakness  Myocardial infarct [facility]) 2013 Heart Cath: 40% LV EF, Stented distal LAD, patent Graft to circumflex  On tube feeding diet approx 2009  
 still has as of 9/28/15  (no po food/liquid/meds at all); Dr Aleksey Huang  Other ill-defined conditions(799.89) 1996  
 1 dose of chemotherapy/radiation for tongue cancer  Other ill-defined conditions(799.89) BPH  Other ill-defined conditions(799.89) orthostatic hypotension  Pneumonia ~ April -May 2010  
 SOB (shortness of breath)  Stroke [facility]) approx 2003  
 left side-left finger tips numb; no imbalance or memory loss; as of 2015 not seeing neuro MD  
 Suicidal thoughts  Swallowing difficulty Past Surgical History:  
Procedure Laterality Date  ABDOMEN SURGERY PROC UNLISTED  1996  
 peg tube  CABG, ARTERY-VEIN, THREE  2000  HX CATARACT REMOVAL    
 bilateral  
 HX CHOLECYSTECTOMY  HX COLONOSCOPY    
 HX HEART CATHETERIZATION  2013 Stented distal LAD  HX MOHS PROCEDURES    
 bilateral  
 HX ORTHOPAEDIC    
 back surgery times two  HX OTHER SURGICAL Radical Left Neck  HX OTHER SURGICAL    
 NASAL POLYPS REMOVAL  
 HX OTHER SURGICAL  2010 TURP  
 HX PACEMAKER  11/08  HX PACEMAKER  10/28/14 Defibrillator: [facility] # [id number], serial # [id number]; Dr. Rob Siddiqui [phone]; Dr Harini Smith  HX UROLOGICAL    
 cystoscopy [address]  
 cevical surgery  VT CHANGE GASTROSTOMY TUBE  5/2/2011  VT CHANGE GASTROSTOMY TUBE  6/29/2011  VT EGD INSERT GUIDE WIRE DILATOR PASSAGE ESOPHAGUS  9/13/2010  VT EGD TRANSORAL BIOPSY SINGLE/MULTIPLE  9/13/2010  
    
 STOMACH SURGERY PROCEDURE UNLISTED  6/29/2011  UPPER GI ENDOSCOPY,BIOPSY  8/17/2018  UPPER GI ENDOSCOPY,DILATN W GUIDE  8/17/2018  UPPER GI ENDOSCOPY,W/DILAT,GASTRIC OUT  8/17/2018  VASCULAR SURGERY PROCEDURE UNLIST    
 bilateral carotid stents Prior to Admission medications Medication Sig Start Date End Date Taking? Authorizing Provider  
pramipexole (MIRAPEX) 0.25 mg tablet Take 0.25 mg by mouth three (3) times daily. Yes Provider, Historical  
insulin regular (NOVOLIN R REGULAR U-100 INSULN) 100 unit/mL injection 5 Units by SubCUTAneous route nightly. Patient takes 15 units at breakfast, 15 units at lunch, and 4 units nightly. Yes Provider, Historical  
insulin lispro (HUMALOG U-100 INSULIN) 100 unit/mL injection by SubCUTAneous route. Inject per sliding scale:  
200-250  = 2 units 251-299 = 3 units 300-350 = 4 units Notify MD if greater than 351, before meals and at bedtime for DM   Yes Provider, Historical  
ondansetron hcl (ZOFRAN) 4 mg tablet 4 mg by Per G Tube route every six (6) hours as needed for Nausea.    Yes Provider, Historical  
 insulin glargine (LANTUS U-100 INSULIN) 100 unit/mL injection 10 Units by SubCUTAneous route daily. Yes Provider, Historical  
lidocaine 4 % patch Use as needed 1/18/19  Yes Christi Grey MD  
oxyCODONE IR (ROXICODONE) 5 mg immediate release tablet Take 0.5 Tabs by mouth every six (6) hours as needed. Max Daily Amount: 10 mg. 1/18/19  Yes Christi Grey MD  
furosemide (LASIX) 40 mg tablet 40 mg by Per G Tube route two (2) times a day. Yes Provider, Historical  
B.infantis-B.ani-B.long-B.bifi (PROBIOTIC 4X) 10-15 mg TbEC 1 Cap by Per G Tube route daily. Yes Provider, Historical  
clopidogrel (PLAVIX) 75 mg tab 75 mg by PEG Tube route daily. Yes Provider, Historical  
aspirin delayed-release 81 mg tablet Take 81 mg by mouth daily. Yes Provider, Historical  
insulin regular (NOVOLIN R REGULAR U-100 INSULN) 100 unit/mL injection 15 Units by SubCUTAneous route two (2) times daily (with meals). Patient takes 15 Units with breakfast, 15 Units with lunch, and 5 Units nightly   Yes Provider, Historical  
albuterol (PROVENTIL VENTOLIN) 2.5 mg /3 mL (0.083 %) nebulizer solution 2.5 mg by Nebulization route two (2) times a day. Per wife patient can use 3 times a day if needed   Yes Provider, Historical  
famotidine (PEPCID) 20 mg tablet 20 mg by Per G Tube route three (3) times daily. Yes Provider, Historical  
acetaminophen (TYLENOL) 500 mg tablet 500-1,000 mg by Per G Tube route every six (6) hours as needed for Pain. Yes Provider, Historical  
atorvastatin (LIPITOR) 40 mg tablet 40 mg by Per G Tube route daily. Yes Other, MD Booker  
finasteride (PROSCAR) 5 mg tablet 5 mg by Per G Tube route nightly. Yes Other, MD Booker  
alfuzosin SR (UROXATRAL) 10 mg SR tablet 10 mg by Per G Tube route daily. 5/24/18  Yes Provider, Historical  
midodrine (PROAMITINE) 10 mg tablet 10 mg by Per G Tube route three (3) times daily (with meals).  11/1/16  Yes Provider, Historical  
 fluticasone (FLONASE ALLERGY RELIEF) 50 mcg/actuation nasal spray 1 [place] by Both Nostrils route two (2) times daily as needed (runny nose). Yes Provider, Historical  
zinc 50 mg tab tablet 50 mg by Per G Tube route daily. Yes Provider, Historical  
nitroglycerin (NITROSTAT) 0.4 mg SL tablet 0.4 mg by SubLINGual route every five (5) minutes as needed for Chest Pain. Yes Other, MD Booker  
gabapentin (NEURONTIN) 250 mg/5 mL solution 750 mg by Per G Tube route three (3) times daily. Yes Other, MD Booker  
multivitamin (ONE A DAY) tablet 1 Tab by Per G Tube route daily. Yes Other, MD Booker  
 
Allergies Allergen Reactions  Cleocin [Clindamycin Hcl] Rash  Demerol [Meperidine] Shortness of Breath  Paxil [Paroxetine Hcl] Unknown (comments) Pt gets shaky and loses control of legs  Amoxicillin Rash  Pcn [Penicillins] Rash Social History Tobacco Use  Smoking status: Never Smoker  Smokeless tobacco: Never Used Substance Use Topics  Alcohol use: No  
  
Family History Problem Relation Age of Onset  Heart Disease Father   
      at age 52 from CAD  Colon Cancer Mother  Cancer Mother   
     colon ca  
 Heart Disease Brother Current Facility-Administered Medications Medication Dose Route Frequency  metroNIDAZOLE (FLAGYL) IVPB premix 500 mg  500 mg IntraVENous Q12H  
 vancomycin (VANCOCIN) 1,000 mg in 0.9% sodium chloride (MBP/ADV) 250 mL  1,000 mg IntraVENous Q24H  cefepime (MAXIPIME) 1 g in 0.9% sodium chloride (MBP/ADV) 50 mL  1 g IntraVENous Q12H  
 DOPamine (INTROPIN) 800 mg in dextrose 5% 250 mL infusion  2-10 mcg/kg/min IntraVENous TITRATE  albuterol-ipratropium (DUO-NEB) 2.5 MG-0.5 MG/3 ML  3 mL Nebulization Q6H RT  
 aspirin delayed-release tablet 81 mg  81 mg Oral DAILY  atorvastatin (LIPITOR) tablet 40 mg  40 mg Per G Tube DAILY  clopidogrel (PLAVIX) tablet 75 mg  75 mg PEG Tube DAILY  finasteride (PROSCAR) tablet 5 mg  5 mg Oral DAILY  insulin glargine (LANTUS) injection 10 Units  10 Units SubCUTAneous DAILY  midodrine (PROAMITINE) tablet 10 mg  10 mg Per G Tube TID WITH MEALS  pramipexole (MIRAPEX) tablet 0.25 mg  0.25 mg Oral TID  insulin lispro (HUMALOG) injection   SubCUTAneous AC&HS  sodium chloride (NS) flush 5-40 mL  5-40 mL IntraVENous Q8H  
 heparin (porcine) injection 5,000 Units  5,000 Units SubCUTAneous Q8H  
 levoFLOXacin (LEVAQUIN) 750 mg in D5W IVPB  750 mg IntraVENous Q48H  
 furosemide (LASIX) injection 40 mg  40 mg IntraVENous DAILY Review of Systems: A comprehensive review of systems was negative except for: Respiratory: positive for dyspnea on exertion Objective: 
Vital Signs:   
Visit Vitals /59 Pulse 92 Temp 99.9 °F (37.7 °C) Resp (!) 36 Wt 75 kg (165 lb 5.5 oz) SpO2 91% BMI 25.90 kg/m² O2 Device: Nasal cannula O2 Flow Rate (L/min): 4 l/min Temp (24hrs), Av °F (37.2 °C), Min:97.6 °F (36.4 °C), Max:100.2 °F (37.9 °C) Intake/Output:  
Last shift:      No intake/output data recorded. Last 3 shifts:  1901 -  0700 In: 1611.4 [I.V.:1146.4] Out: 2275 [CSXXO:1311] Intake/Output Summary (Last 24 hours) at 2019 0831 Last data filed at 2019 0123 Gross per 24 hour Intake 1561.35 ml Output 1825 ml Net -263.65 ml Physical Exam:  
General:  Chronically ill appearing and  Confused and congested with increased wob Head:  Normocephalic, without obvious abnormality, atraumatic. Eyes:  Conjunctivae/corneas clear. PERRL, EOMs intact. Nose: Nares normal. Septum midline. Mucosa normal. No drainage or sinus tenderness. Throat: Lips, mucosa, and tongue normal. Dry Neck: Supple, symmetrical, trachea midline, no adenopathy, thyroid: no enlargment/tenderness/nodules, no carotid bruit and no JVD. Back:    not examined Lungs:   Dyspneic, congested , gurgly, rales and rhonchi Chest wall:  No tenderness or deformity. Heart:  Regular rate and rhythm, S1, S2 normal, no murmur, click, rub or gallop. Abdomen:   Soft, non-tender. Bowel sounds normal. No masses,  No organomegaly. peh Extremities: Extremities normal, atraumatic, no cyanosis or edema. Pulses: 2+ and symmetric all extremities. Skin: Thin and bruised Lymph nodes: Cervical, supraclavicular neg Neurologic:  confused Data review:  
 
Recent Results (from the past 24 hour(s)) GLUCOSE, POC Collection Time: 02/04/19  8:35 AM  
Result Value Ref Range Glucose (POC) 273 (H) 65 - 100 mg/dL Performed by Jaiden Perry LACTIC ACID Collection Time: 02/04/19  9:08 AM  
Result Value Ref Range Lactic acid 1.8 0.4 - 2.0 MMOL/L  
CELL COUNT, BODY FLUID Collection Time: 02/04/19 10:10 AM  
Result Value Ref Range BODY FLUID TYPE PLEURAL FLUID FLUID COLOR YELLOW    
 FLUID APPEARANCE HAZY FLUID RBC CT. >100 (H) 0 /cu mm FLUID NUCLEATED CELLS 423 /cu mm  
 FLD NEUTROPHILS 4 (A) NRRE % FLD LYMPHS 79 (A) NRRE % FLD MONO/MACROPHAGES 9 (A) NRRE % FLUID MESOTHELIAL 8 (A) NRRE %  
LDH, BODY FLUID Collection Time: 02/04/19 10:10 AM  
Result Value Ref Range Fluid Type: PLEURAL FLUID    
 LD, body fld. 68 U/L  
PROTEIN TOTAL, FLUID Collection Time: 02/04/19 10:10 AM  
Result Value Ref Range Fluid Type: PLEURAL FLUID Protein total, body fld. 1.1 g/dL CULTURE, BODY FLUID W GRAM STAIN Collection Time: 02/04/19 10:11 AM  
Result Value Ref Range Special Requests: NO SPECIAL REQUESTS    
 GRAM STAIN FEW WBCS SEEN    
 GRAM STAIN NO ORGANISMS SEEN Culture result: PENDING   
SAMPLES BEING HELD Collection Time: 02/04/19 10:11 AM  
Result Value Ref Range SAMPLES BEING HELD ANAC TUB,CUP OF FLUID COMMENT Add-on orders for these samples will be processed based on acceptable specimen integrity and analyte stability, which may vary by analyte.   
GLUCOSE, POC  
 Collection Time: 02/04/19  3:09 PM  
Result Value Ref Range Glucose (POC) 147 (H) 65 - 100 mg/dL Performed by Latonia Rivas GLUCOSE, POC Collection Time: 02/04/19  4:37 PM  
Result Value Ref Range Glucose (POC) 161 (H) 65 - 100 mg/dL Performed by Rosey Bello GLUCOSE, POC Collection Time: 02/04/19  8:58 PM  
Result Value Ref Range Glucose (POC) 136 (H) 65 - 100 mg/dL Performed by Whitney Chapa (PCT) CBC WITH AUTOMATED DIFF Collection Time: 02/05/19  3:19 AM  
Result Value Ref Range WBC 9.0 4.1 - 11.1 K/uL  
 RBC 3.35 (L) 4.10 - 5.70 M/uL  
 HGB 10.1 (L) 12.1 - 17.0 g/dL HCT 33.1 (L) 36.6 - 50.3 % MCV 98.8 80.0 - 99.0 FL  
 MCH 30.1 26.0 - 34.0 PG  
 MCHC 30.5 30.0 - 36.5 g/dL  
 RDW 17.0 (H) 11.5 - 14.5 % PLATELET 283 (L) 728 - 400 K/uL MPV 11.5 8.9 - 12.9 FL  
 NRBC 0.0 0  WBC ABSOLUTE NRBC 0.00 0.00 - 0.01 K/uL NEUTROPHILS 88 (H) 32 - 75 % LYMPHOCYTES 5 (L) 12 - 49 % MONOCYTES 7 5 - 13 % EOSINOPHILS 0 0 - 7 % BASOPHILS 0 0 - 1 % IMMATURE GRANULOCYTES 0 0.0 - 0.5 % ABS. NEUTROPHILS 7.9 1.8 - 8.0 K/UL  
 ABS. LYMPHOCYTES 0.5 (L) 0.8 - 3.5 K/UL  
 ABS. MONOCYTES 0.6 0.0 - 1.0 K/UL  
 ABS. EOSINOPHILS 0.0 0.0 - 0.4 K/UL  
 ABS. BASOPHILS 0.0 0.0 - 0.1 K/UL  
 ABS. IMM. GRANS. 0.0 0.00 - 0.04 K/UL  
 DF AUTOMATED    
 RBC COMMENTS ANISOCYTOSIS 
1+ METABOLIC PANEL, BASIC Collection Time: 02/05/19  3:19 AM  
Result Value Ref Range Sodium 138 136 - 145 mmol/L Potassium 4.7 3.5 - 5.1 mmol/L Chloride 98 97 - 108 mmol/L  
 CO2 36 (H) 21 - 32 mmol/L Anion gap 4 (L) 5 - 15 mmol/L Glucose 167 (H) 65 - 100 mg/dL BUN 52 (H) 6 - 20 MG/DL Creatinine 1.77 (H) 0.70 - 1.30 MG/DL  
 BUN/Creatinine ratio 29 (H) 12 - 20 GFR est AA 45 (L) >60 ml/min/1.73m2 GFR est non-AA 37 (L) >60 ml/min/1.73m2 Calcium 9.1 8.5 - 10.1 MG/DL MAGNESIUM Collection Time: 02/05/19  3:19 AM  
Result Value Ref Range Magnesium 2.8 (H) 1.6 - 2.4 mg/dL PHOSPHORUS Collection Time: 02/05/19  3:19 AM  
Result Value Ref Range Phosphorus 4.7 2.6 - 4.7 MG/DL  
HEPATIC FUNCTION PANEL Collection Time: 02/05/19  3:19 AM  
Result Value Ref Range Protein, total 7.6 6.4 - 8.2 g/dL Albumin 3.1 (L) 3.5 - 5.0 g/dL Globulin 4.5 (H) 2.0 - 4.0 g/dL A-G Ratio 0.7 (L) 1.1 - 2.2 Bilirubin, total 1.1 (H) 0.2 - 1.0 MG/DL Bilirubin, direct 0.4 (H) 0.0 - 0.2 MG/DL Alk. phosphatase 73 45 - 117 U/L  
 AST (SGOT) 68 (H) 15 - 37 U/L  
 ALT (SGPT) 38 12 - 78 U/L Imaging: 
I have personally reviewed the patients radiographs and have reviewed the reports: CXR: pleural effusion decreased after thoracentesis, still pulmonary edema Keenania MD Alexander

## 2019-02-05 NOTE — ROUTINE PROCESS
4:02 PM 
 
Transferred to bed 1111 for hospice care, family at bedside, patient comfortable and conversing with wife during a waking moment.

## 2019-02-05 NOTE — H&P
[employer] Good Help to Those in Need 
[phone] Patient Name: Amna Jane YOB: 1935 Date of Provider Hospice Visit: 02/05/19 Level of Care:   [x] General Inpatient (GIP)    [] Routine   [] Respite Current Location of Care: 
[] [facility] [] [facility] [x] [address] [] [facility] [] Hospice House [facility] IF [facility], patient referred from: 
[] [facility] [] [facility] [] [address] [] [facility] [] Home [] Other:  
 
Date of Original Hospice Admission: 2-5-19 Hospice Medical Director at time of admission: Dr. Duc Pugh Principle Hospice Diagnosis: Respiratory Failure Diagnoses RELATED to the terminal prognosis: CHF ef 30%, Pneumonia, cardiogenic shock vs sepsis, severe aortic stenosis, acute renal failure Other Diagnoses: CAD s/p CABG, NSTEMI, CKD 3 hx tongue cancer, dysphagia s/p PEG. Hx Fall with SDH, Dm-2 , chronic hip and shoulder pain d/t OA Tonye Flax Do not cut and paste chart information other than imaging findings Amna Jane is a 80y.o. year old who was admitted to [facility]. 81 yo M with hx CAD , chronic CHF ef 30%, severe aortic stenosis, ckd 3 with multiple hospitalization in past year for CAD /CHF was at SNF getting rehab and found down unresponsive and hypoxic and admitted to the hospital on 2-3-19. Treated for CHF and pneumonia aggressively . Had thoracentesis for effusions. Pt developed worsening hypotension and renal failure on iv Lasix and dobutamine. Pt with noted increased respiratory distress with RR 30's , hypoxic ,requiring morphine iv today and excess secretions with gurgling. Family has opted for comfort care and hospice. Pt has become minimally responsive. Admitted GIP hospice today. The patient's principle diagnosis has resulted in hypotension, hypoxia, respiratory distress, excess secretions Refer to LCD Functionally, the patient's Karnofsky and/or Palliative Performance Scale has declined over a period ofdays and is estimated at 10.  The patient is dependent on the following ADLs:all Objective information that support this patients limited prognosis includes:  
 
cxr 2-3-19 IMPRESSION IMPRESSION:  
Increasing opacification of the left hemithorax, likely due to a left pleural 
effusion, together with atelectasis versus airspace disease. 
  
 
02sat 81% on 4l 02 NC Echo ef 30%  1-15-19, severe AS . global hypokinesis The patient/family chose comfort measures with the support of Hospice. HOSPICE DIAGNOSES Active Symptoms: 1. Dyspnea 2. Chronic pain in shoulders and hips 3. Excess secretions PLAN 1. Admit to hospice GIP 2. aicd has been deactivated today 3. Iv morhine 2 mg q4 and prn 4. Ativan 0.5 mg q 4 and prn 
5. Robinul for secretions q4 and scopolamine patch 6. 02 NC 
 
7.  and SW to support family needs 8. Disposition: to home with hospice but appears to be imminent and will likely pass in the hospital 
9. Discussed pt's condition with his wife and son -agreement with comfort care and POC Prognosis estimated based on 02/05/19 clinical assessment is:  
[x] Hours to Days   
[] Days to Weeks   
[] Other: 
 
Communicated plan of care with: Hospice Case Manager; Hospice IDT; Care Team 
 
 GOALS OF CARE Resuscitation Status: DNR Durable DNR: [] Yes [] No 
 
Primary Decision Maker ([address]): wife Relationship to patient: 
Phone number: 
[] Named in a scanned document  
[] Legal Next of Kin 
[] Guardian Secondary Decision Maker (First Alternate Health Care Agent):  
Relationship to patient: 
Phone number: 
[] Named in a scanned document  
[] Legal Next of Kin 
[] Guardian ACP documents you current have include: 
[] Advance Directive or Living Will 
[] Durable Do Not Resuscitate 
[] Physician Orders for Scope of Treatment (POST) [] Medical Power of  
[] Other HISTORY History obtained from: chart and hospice RN 
 
CHIEF COMPLAINT:  
The patient is:  
[] Verbal 
[x] Nonverbal 
 [x] Unresponsive HPI/SUBJECTIVE:  
Respiratory distress earlier today Gurgling and excess secretions today REVIEW OF SYSTEMS The following systems were: [] reviewed  [x] unable to be reviewed Positive ROS include: 
Constitutional: fatigue, weakness, in pain, short of breath Ears/nose/mouth/throat: increased airway secretions Respiratory:shortness of breath, wheezing Gastrointestinal:poor appetite, nausea, vomiting, abdominal pain, constipation, diarrhea Musculoskeletal:pain, deformities, swelling legs Neurologic:confusion, hallucinations, weakness Psychiatric:anxiety, feeling depressed, poor sleep Endocrine:  
 
Adult Non-Verbal Pain Assessment Score: 0 Face 
[] 0   No particular expression or smile 
[] 1   Occasional grimace, tearing, frowning, wrinkled forehead 
[] 2   Frequent grimace, tearing, frowning, wrinkled forehead Activity (movement) [] 0   Lying quietly, normal position 
[] 1   Seeking attention through movement or slow, cautious movement 
[] 2   Restless, excessive activity and/or withdrawal reflexes Guarding 
[] 0   Lying quietly, no positioning of hands over areas of body 
[] 1   Splinting areas of the body, tense 
[] 2   Rigid, stiff Physiology (vital signs) 
[] 0   Stable vital signs [] 1   Change in any of the following: SBP > 20mm Hg; HR > 20/minute 
[] 2   Change in any of the following: SBP > 30mm Hg; HR > 25/minute Respiratory 
[] 0   Baseline RR/SpO2, compliant with ventilator 
[] 1   RR > 10 above baseline, or 5% drop SpO2, mild asynchrony with ventilator 
[] 2   RR > 20 above baseline, or 10% drop SpO2, asynchrony with ventilator FUNCTIONAL ASSESSMENT Palliative Performance Scale (PPS):10 
 
 PSYCHOSOCIAL/SPIRITUAL ASSESSMENT Active Problems: 
  Respiratory failure ([address] (2/3/2019) Past Medical History:  
Diagnosis Date  Antiplatelet or antithrombotic long-term use 12/4/2014  Anxiety disorder  Arrhythmia 2009  
 bradycardia  Arthritis  CAD (coronary artery disease) s/p CABG 2002; Dr Luther Montana  Cancer ([facility] [address] 1996  
 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   
 left leg, lower back,   
 Cough  Depression  Diabetes ([facility] [address] Type II  Epigastric pain 8/17/2018  Esophageal dysmotility s/p dilitation  Esophageal motility disorder 7/8/2013 Frequent simultaneous or failed contractions, low amplitude contractions  suggests severe myopathy or diffuse spasm. I suspect the latter. Achalasia  is not present.  Fainting [address]  Fatigue  GERD (gastroesophageal reflux disease)  Heart failure ([facility] [address] 10/2014 Cardiomyopathy:Pacemaker upgrade:Biv and AICD  Dr. Marlena Sierra heart Dr. last visit 5/11/2015  Hepatitis C Dx 1996  
 treated at [facility] in past; as of 4/15/15 wife states pt currently not under any treatment  Hyperlipidemia  Joint pain  Liver disease  Memory loss  Muscle pain  Muscle weakness  Myocardial infarct [facility]) 2013 Heart Cath: 40% LV EF, Stented distal LAD, patent Graft to circumflex  On tube feeding diet approx 2009  
 still has as of 9/28/15  (no po food/liquid/meds at all); Dr Aleksey uHang  Other ill-defined conditions(799.89) 1996  
 1 dose of chemotherapy/radiation for tongue cancer  Other ill-defined conditions(799.89) BPH  Other ill-defined conditions(799.89) orthostatic hypotension  Pneumonia ~ April -May 2010  
 SOB (shortness of breath)  Stroke [facility]) approx 2003  
 left side-left finger tips numb; no imbalance or memory loss; as of 2015 not seeing neuro MD  
 Suicidal thoughts  Swallowing difficulty Past Surgical History:  
Procedure Laterality Date  ABDOMEN SURGERY PROC UNLISTED  1996  
 peg tube  CABG, ARTERY-VEIN, THREE  2000  HX CATARACT REMOVAL    
 bilateral  
 HX CHOLECYSTECTOMY  HX COLONOSCOPY    
 HX HEART CATHETERIZATION   Stented distal LAD  HX MOHS PROCEDURES    
 bilateral  
 HX ORTHOPAEDIC    
 back surgery times two  HX OTHER SURGICAL Radical Left Neck  HX OTHER SURGICAL    
 NASAL POLYPS REMOVAL  
 HX OTHER SURGICAL   TURP  
 HX PACEMAKER    HX PACEMAKER  10/28/14 Defibrillator: [facility] # [id number], serial # [id number]; Dr. Kayleigh Demarco [phone]; Dr Timur Boyd  HX UROLOGICAL    
 cystoscopy [address]  
 cevical surgery  TX CHANGE GASTROSTOMY TUBE  2011  TX CHANGE GASTROSTOMY TUBE  2011  TX EGD INSERT GUIDE WIRE DILATOR PASSAGE ESOPHAGUS  2010  TX EGD TRANSORAL BIOPSY SINGLE/MULTIPLE  2010  
    
 STOMACH SURGERY PROCEDURE UNLISTED  2011  UPPER GI ENDOSCOPY,BIOPSY  2018  UPPER GI ENDOSCOPY,DILATN W GUIDE  2018  UPPER GI ENDOSCOPY,W/DILAT,GASTRIC OUT  2018  VASCULAR SURGERY PROCEDURE UNLIST    
 bilateral carotid stents Social History Tobacco Use  Smoking status: Never Smoker  Smokeless tobacco: Never Used Substance Use Topics  Alcohol use: No  
 
Family History Problem Relation Age of Onset  Heart Disease Father   
      at age 52 from CAD  Colon Cancer Mother  Cancer Mother   
     colon ca  
 Heart Disease Brother Allergies Allergen Reactions  Cleocin [Clindamycin Hcl] Rash  Demerol [Meperidine] Shortness of Breath  Paxil [Paroxetine Hcl] Unknown (comments) Pt gets shaky and loses control of legs  Amoxicillin Rash  Pcn [Penicillins] Rash Current Facility-Administered Medications Medication Dose Route Frequency  LORazepam (ATIVAN) injection 0.5 mg  0.5 mg IntraVENous Q30MIN PRN  
 scopolamine (TRANSDERM-SCOP) 1 mg over 3 days 1 Patch  1 Patch TransDERmal Q72H PRN  
  glycopyrrolate (ROBINUL) injection 0.2 mg  0.2 mg IntraVENous Q4H PRN  
 bisacodyl (DULCOLAX) suppository 10 mg  10 mg Rectal DAILY PRN  
 morphine injection 2 mg  2 mg IntraVENous Q30MIN PRN  
 acetaminophen (TYLENOL) suppository 650 mg  650 mg Rectal Q4H PRN  
 LORazepam (ATIVAN) injection 0.5 mg  0.5 mg IntraVENous Q4H  
 morphine injection 2 mg  2 mg IntraVENous Q4H  
 sodium chloride (NS) flush 5-40 mL  5-40 mL IntraVENous PRN  
 glycopyrrolate (ROBINUL) injection 0.2 mg  0.2 mg IntraVENous Q4H PHYSICAL EXAM  
 
Wt Readings from Last 3 Encounters:  
02/05/19 165 lb 5.5 oz (75 kg) 01/21/19 162 lb (73.5 kg) 01/18/19 162 lb 0.6 oz (73.5 kg) Visit Vitals /52 Pulse 99 Temp 98.4 °F (36.9 °C) Resp 16 SpO2 (!) 81% Supplemental O2  [x] Yes  [] NO Last bowel movement:  
 
Currently this patient has: 
[x] Peripheral IV [x] PICC  [] PORT [x] ICD [] Mills Catheter [] NG Tube   [x] PEG Tube   
[] Rectal Tube [] Drain 
[x] Other: condom catheter Constitutional:nonresponisve to verbal and tactile stimuli Eyes: perrl ENMT: gurgling Cardiovascular: rrr Respiratory: mild to moderate labored breathing with accessory muscle use Gastrointestinal: soft Musculoskeletal:edema of arms and legs Skin:intact Neurologic:nonresponsive Psychiatric:  
Other:  
 
 
Pertinent Lab and or Imaging Tests: 
Lab Results Component Value Date/Time Sodium 138 02/05/2019 03:19 AM  
 Potassium 4.7 02/05/2019 03:19 AM  
 Chloride 98 02/05/2019 03:19 AM  
 CO2 36 (H) 02/05/2019 03:19 AM  
 Anion gap 4 (L) 02/05/2019 03:19 AM  
 Glucose 167 (H) 02/05/2019 03:19 AM  
 BUN 52 (H) 02/05/2019 03:19 AM  
 Creatinine 1.77 (H) 02/05/2019 03:19 AM  
 BUN/Creatinine ratio 29 (H) 02/05/2019 03:19 AM  
 GFR est AA 45 (L) 02/05/2019 03:19 AM  
 GFR est non-AA 37 (L) 02/05/2019 03:19 AM  
 Calcium 9.1 02/05/2019 03:19 AM  
 
Lab Results Component Value Date/Time  Protein, total 7.6 02/05/2019 03:19 AM  
 Albumin 3.1 (L) 02/05/2019 03:19 AM  
 
   
 
Total time: 60 min Counseling / coordination time: met with family and d.w hospice RN 
> 50% counseling / coordination?: n

## 2019-02-05 NOTE — PROGRESS NOTES
Patient ID: 
Patient: Jeanne Juarez MRN: [id number] Age: 80 y.o. 
: 1935 Gender: male Device interrogation:The [facility] model [employer]ra [employer]ura [id number] CRT-D was interrogated revealing adequate battery, sensing, capture thresholds and lead impedances. RA 2.0, 0.5, 350 
RV >12, 1.0, 410k 38 
LV -, 1.125 (M2-P4), 960 Programmed to DDDR , there is 8% AP and 93% . The underlying rhythm is sinus. 11 mode switch episodes were noted, representing <<1% of the total time. These lasted a few seconds each. Not Afib. No tachycardia episodes were noted or VT/VF therapies. Impression: 
Normal device function as programmed. The AV intervals appear to have been programmed out in late 2018. This is why he is BIV pacing with a prolonged AV delay. I suspect this may have been done to increase LV filling given his preload dependent valvular disease (AS) and cardiomyopathy. Recommendations and Programming changes: No programming changes were made. Signed By: Elizabeth Burris MD   
 2019

## 2019-02-05 NOTE — PROGRESS NOTES
*CM acknowledged consult* CM has sent referral to [facility], for pt info session. CM will continue to follow pt. CM Gibson, [address]

## 2019-02-05 NOTE — DISCHARGE SUMMARY
Hospitalist Discharge Summary to hospice NAME: Stevan Kiser :  1935 MRN:  [id number] DISCHARGE DIAGNOSIS: 
Acute on chronic hypoxic respiratory failure HCAP Suspected sepsis vs cardiogenic shock, POA Lactic acidosis Acute on chronic systolic CHF Acute encephalopathy with episodes of unresponsiveness Syncope with hypotension Severe vertebrobasilar disease Recent subfalcine SDH and SAH s/p fall/syncope early 2018 H/o severe AS, NSTEMI with CAD s/p CABGx3 Severe AS Acute on Chronic systolic CHF, EF 29% ? cardiogenic shock Acute kidney injury (resolved) in setting of CKD3 Tongue ca s/p radical neck resection and neck XRT with resultant chronic dysphagia with PEG and recent PEG tube site infection 
 Insulin dependent DM2 without complication Acute/chronic R hip pain with chronic diffuse pain (shoulders, hips, knees, back) Urinary retention due to BPH 
H/o streptococcal bacteremia  H/o stroke   
Chronic thrombocytopenia CONSULTATIONS: 
Hospice Follow Up: Follow-up Information Follow up With Specialties Details Why Contact Info Bimal Painter DO [address] [phone] Procedures: see electronic medical records for all procedures/Xrays and details which were not copied into this note but were reviewed prior to creation of Plan. Please follow-up tests/labs that are still pendin. None PMH/SH reviewed - no change compared to H&P 
 
DISCHARGE SUMMARY/HOSPITAL COURSE: for full details see H&P, daily progress notes, labs, consult notes. Briefly As Per HPI:H&P per Dr Freda Kiser is a 80 y.o.  male present to the ER for evaluation of episodes of unresponsiveness at rehab with hypoxia on baseline 2LNC. He apparently was receiving TF, was oriented and alert when he slumped over and became unresponsive.   His SpO2 was noted to be ~50% on his 2LNC. EMS called, pt received duoneb with improvement of SpO2 95%. In the ER, patient noted to have an episode of unresponsiveness that lasted ~20sec witnessed by nursing staff. He initially did not respond to sternal rub, and SpO2 dropped to 56%. Pt then regained consciousness with increasing SpO2. During my encounter, pt is awake, mentation back to baseline, speech is chronically slurred. He was asking for water to keep his mouth wet. His wife had a syncopal event in the ER during his episode of unresponsiveness. I was able to discussed with her later in regards to pt's code status. Labs, images and vitals reviewed. Also discussed with Dr Mindy Mcnally at bedside. We were asked to admit for work up and evaluation of the above problems. .  
 
The patient's hospital course was complicated by: 
Acute on chronic hypoxic respiratory failure HCAP Suspected sepsis vs cardiogenic shock, POA Lactic acidosis Acute on chronic systolic CHF EF 82% Hypotensives episodes  
-CXR showed increasing opacification of the left hemithorax, likely due to a left pleural 
effusion, together with atelectasis versus airspace disease. 
-elevated probnp ~9K, +3 b/l pitting edema, POA 
-he has chronic hypotension, severe AS, and currently in acute renal failure, and lactic acidosis.  He is at risk for further volume overload if given IVF, and worsening of hypotension if diuresing 
-ER physician discussed with IR, not enough fluid for thoracentesis -s/p thoracentesis for therapeutic and diagnostic purposes on 02/04 
-pt treated for hospital associated pneumonia and aspiration with vancomycin, continue cefepime, add Flagyl 
-Cardiology feels that his respiratory status may be more related to pulmonary disease and pneumonia rather than CHF, so will ask pulmonary to evaluate as well as since his condition is tenuous and may ultimately need transfer to ICU if he worsens 
-cont IV lasix for now, creat better today -pt was received dobutamine which was switched to Dopamine because of persistent hypotension on dobutamine -appreciate cards following - Due to end stage cardiomyopathy , family decided to transition patient to inpatient hospice . Pt is critically ill and has few days to live Acute encephalopathy with episodes of unresponsiveness Syncope with hypotension Severe vertebrobasilar disease Recent subfalcine SDH and SAH s/p fall/syncope early December 2018 
-CT brain showed  
 IMPRESSION: 
1. 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 2. 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 cardiogenic shock 
-s/p lasis , dobutamine and dopamine - pt transition to hospice  
  
Acute kidney injury (resolved) in setting of CKD3 
  
Tongue ca s/p radical neck resection and neck XRT with resultant chronic dysphagia with PEG and recent PEG tube site infection 
  
Insulin dependent DM2 without complication 
  
Acute/chronic R hip pain with chronic diffuse pain (shoulders, hips, knees, back) Urinary retention due to BPH 
H/o streptococcal bacteremia 7/18 H/o stroke 2015  
Chronic thrombocytopenia 
  
Patient with complex inpatient course. Please see all notes, labs, vitals, testing and procedures for details, briefly the patient was admitted following presentation to ED with unresponsiveness . Course complicated by above problem list . Family decided on Hospice due to multiple organ organ failure, end-stage cardiomyopathy . All active medications have been stopped by hospice team , pt getting comfort care  
_______________________________________________________________________ Patient seen and examined by me on day of discharge. Pertinent findings are: 
Gen: in acute distress , tachypneic HEENT:NC/AT Chest:decreased b/l breathb sounds Cv:RRR , systolic murmur Abd:NT ND  
 Neuro:AAOx1 See Discharge Instructions for further details. _______________________________________________________________________ Medications Reviewed: 
Current Discharge Medication List  
  
START taking these medications Details  
acetaminophen (TYLENOL) 650 mg suppository Insert 1 Suppository into rectum every four (4) hours as needed for Fever. Qty: 10 Suppository, Refills: 0  
  
albuterol-ipratropium (DUO-NEB) 2.5 mg-0.5 mg/3 ml nebu 3 mL by Nebulization route every six (6) hours as needed. Qty: 30 Nebule, Refills: 0  
  
bumetanide (BUMEX) 0.25 mg/mL injection 8 mL by IntraVENous route every twelve (12) hours. Qty: 1 Vial, Refills: 0  
  
scopolamine (TRANSDERM-SCOP) 1 mg over 3 days pt3d 1 Patch by TransDERmal route every seventy-two (72) hours. Qty: 30 Patch, Refills: 0 STOP taking these medications  
  
 pramipexole (MIRAPEX) 0.25 mg tablet Comments:  
Reason for Stopping:   
   
 insulin regular (NOVOLIN R REGULAR U-100 INSULN) 100 unit/mL injection Comments:  
Reason for Stopping:   
   
 insulin lispro (HUMALOG U-100 INSULIN) 100 unit/mL injection Comments:  
Reason for Stopping:   
   
 ondansetron hcl (ZOFRAN) 4 mg tablet Comments:  
Reason for Stopping:   
   
 lidocaine 4 % patch Comments:  
Reason for Stopping:   
   
 oxyCODONE IR (ROXICODONE) 5 mg immediate release tablet Comments:  
Reason for Stopping:   
   
 furosemide (LASIX) 40 mg tablet Comments:  
Reason for Stopping: B.infantis-B.ani-B.long-B.bifi (PROBIOTIC 4X) 10-15 mg TbEC Comments:  
Reason for Stopping:   
   
 clopidogrel (PLAVIX) 75 mg tab Comments:  
Reason for Stopping:   
   
 aspirin delayed-release 81 mg tablet Comments:  
Reason for Stopping:   
   
 insulin regular (NOVOLIN R REGULAR U-100 INSULN) 100 unit/mL injection Comments:  
Reason for Stopping:   
   
 albuterol (PROVENTIL VENTOLIN) 2.5 mg /3 mL (0.083 %) nebulizer solution Comments:  
Reason for Stopping: famotidine (PEPCID) 20 mg tablet Comments:  
Reason for Stopping:   
   
 acetaminophen (TYLENOL) 500 mg tablet Comments:  
Reason for Stopping:   
   
 atorvastatin (LIPITOR) 40 mg tablet Comments:  
Reason for Stopping:   
   
 finasteride (PROSCAR) 5 mg tablet Comments:  
Reason for Stopping:   
   
 alfuzosin SR (UROXATRAL) 10 mg SR tablet Comments:  
Reason for Stopping:   
   
 midodrine (PROAMITINE) 10 mg tablet Comments:  
Reason for Stopping:   
   
 fluticasone (FLONASE ALLERGY RELIEF) 50 mcg/actuation nasal spray Comments:  
Reason for Stopping:   
   
 zinc 50 mg tab tablet Comments:  
Reason for Stopping:   
   
 nitroglycerin (NITROSTAT) 0.4 mg SL tablet Comments:  
Reason for Stopping:   
   
 gabapentin (NEURONTIN) 250 mg/5 mL solution Comments:  
Reason for Stopping:   
   
 multivitamin (ONE A DAY) tablet Comments:  
Reason for Stopping:   
   
 insulin regular (NOVOLIN R, HUMULIN R) 100 unit/mL injection Comments:  
Reason for Stopping:   
   
 insulin regular (NOVOLIN R, HUMULIN R) 100 unit/mL injection Comments:  
Reason for Stopping:   
   
  
 
_______________________________________________________________________ Risk of deterioration: High 
________________________________________________________________________ Disposition IP Hospice 
________________________________________________________________________ Care Plan discussed with:  
Patient, Family, RN, Care Manager, Consultant 
________________________________________________________________________ Code Status: DNR/DNI 
________________________________________________________________________ Total time spent in discharge (min): 32  
________________________________________________________________________ CDMP Checked: Yes Signed: Wes Damon MD 
 
This note will not be viewable in [address]

## 2019-02-05 NOTE — HOSPICE
[employer] [facility] Good Help to Those in Need 
[phone] Patient Name: Smitha Conde YOB: 1935 Age: 80 y.o. [facility] Liaison Note: In to meet with family, [facility] (spouse) [place] (son) and Mansi Patel III (son), primary RN, & CM. Chart reviewed. Hospice Liaison discussed hospice philosophy and options to provide comfort. Rebecca Ruiz NP @ bedside for a short while. Questions and concerns addressed with each person involved with info meeting. Pts wife expressed concerns involving pts AICD and not wanting to de-activate at this time. Pt has a [employer] device. Made Dr. Johnny Whitley and Rebecca Ruiz NP aware. Consents signed for hospice admission for GIP per Dr. Johnny Whitley for respiratory failure secondary to heart failure. Will need to have AICD de-activated today per Dr. Johnny Whitley. Hospice Liaison will facilitate with Amina Perdomo. Thank you for the opportunity to service Mr. [id number] and his family.    
 
 
 Maribel Perez RN-BSN, CCRN

## 2019-02-05 NOTE — PROGRESS NOTES
Progress Note 2/5/2019 1:03 PM 
NAME: Manjit Sin MRN:  [id number] Admit Diagnosis: Respiratory failure ([facility] [address] Problem List: 1. Acute on chronic hypoxic respiratory failure 2. Pneumonia? 3. Pleural effusion s/p left thoracentesis 4. Acute on chronic systolic heart failure 5. Recurrent syncope 6. Confusion 7. 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 Acute on chronic kidney disease; Stg 3 
9. Lactic acidosis 10. Coronary artery disease s/p remote CABG.  Cath '13 w/ EF 40%, PCI to distal LAD via LIMA, severe disease in large diagonal (not amenable to PCI), patent SVG-OM, SVG-PDA, SVG-PLB.  Lexiscan 11/13 w/ EF 28%, IWMI, and with diagonal ischemia. Cath 1/16 w/ 3v CAD, patent LIMA-LAD, 95% of SVG-OM2/RPDA s/p 1 RENATA, patent SVG-RCA, patent LM stents. 11. Lexiscan stress MPI 9/14 w/ EF 29% and anterior ischemia w/ IWMI 12. Ischemic cardiomyopathy 13. Chronic thrombocytopenia 14. BiV ICD 15. Severe aortic stenosis. In process of evaluation for TAVR . [place].    
16. Diabetes 17. Orthostatic hypotension 18. Hyperlipidemia 19. Head/neck CA      w/ feeding tube 20. Chronic thrombocytopenia (Dr. Randy Parker 21. S/p recurrent falls. Recurrent spells of sycnope 22. Syncope, fall and subdural hematoma 12/2018 23. Age related debility. 24. Echo 2018 . EF 30% Assessment/Plan:  
1L UOP yesterday BPs better PICC line 1. Device interrogation by Dr. Jenna Gonsalez noted 2. Continue ASA/plavix 3. Continue atorvastatin 4. Diuresis per primary; 40mg IV lasix noted 5. Continue midodrine 10mg TID 6. TAVR evaluation halted 7. DNR noted; completely agree with this 8. Ok w/ dopamine temporarily (now OFF), discussed w/ Dr. Reta Carrasquillo yesterday 9. No escalation of care noted; transition to comfort care at some point seems reasonable 10. Discussed w/ wife and granddaughters 11. Prognosis is poor [x]       High complexity decision making was performed in this patient at high risk for decompensation with multiple organ involvement. Subjective:  
 
Katerina Martin denies chest pain. Dyspnea remains and slightly worse. Discussed with RN events overnight. Review of Systems: 
 
Symptom Y/N Comments  Symptom Y/N Comments Fever/Chills N   Chest Pain N Poor Appetite N   Edema N   
Cough N   Abdominal Pain N Sputum N   Joint Pain N   
SOB/KRAMER N   Pruritis/Rash N   
Nausea/vomit N   Tolerating PT/OT Y Diarrhea N   Tolerating Diet Y Constipation N   Other Could NOT obtain due to:   
 
Objective:  
  
Physical Exam: 
 
Last 24hrs VS reviewed since prior progress note. Most recent are: 
 
Visit Vitals /63 Pulse (!) 103 Temp 99.1 °F (37.3 °C) Resp 20 Wt 75 kg (165 lb 5.5 oz) SpO2 93% BMI 25.90 kg/m² Intake/Output Summary (Last 24 hours) at 2/5/2019 6188 Last data filed at 2/5/2019 9337 Gross per 24 hour Intake 1561.35 ml Output 1825 ml Net -263.65 ml General Appearance: Well developed, well nourished, alert & oriented x 3,  
 no acute distress. Slightly confused. Ears/Nose/Mouth/Throat: Hearing grossly normal. 
Neck: Supple. Chest: Lungs with diffuse rhonchi 
Cardiovascular: Regular rate and rhythm, S1S2 normal, harsh systolic murmur. Abdomen: Soft, non-tender, bowel sounds are active. Extremities: No edema bilaterally. Skin: Warm and dry. []         Post-cath site without hematoma, bruit, tenderness, or thrill. Distal pulses intact. PMH/SH reviewed - no change compared to H&P Data Review Telemetry: paced EKG:  
[x]  No new EKG for review Lab Data Personally Reviewed: 
 
Recent Labs 02/05/19 0319 02/04/19 
0240 WBC 9.0 5.9 HGB 10.1* 8.9* HCT 33.1* 29.8*  
* 84* Recent Labs 02/03/19 
4675 INR 1.1 PTP 10.9 Recent Labs 02/05/19 
0319 02/04/19 
0240 02/03/19 
4497  141 137 K 4.7 4.6 4.9 CL 98 97 94* CO2 36* 40* 37* BUN 52* 49* 49* CREA 1.77* 1.74* 2.04* * 170* 392* CA 9.1 9.1 9.4 MG 2.8*  --   --   
 
Recent Labs 02/03/19 
2793 TROIQ <0.05 Lab Results Component Value Date/Time Cholesterol, total 128 12/07/2018 11:16 AM  
 HDL Cholesterol 32 (L) 12/07/2018 11:16 AM  
 LDL, calculated 72 12/07/2018 11:16 AM  
 Triglyceride 119 12/07/2018 11:16 AM  
 CHOL/HDL Ratio 3.6 02/02/2017 02:55 AM  
 
 
Recent Labs 02/05/19 
0319 02/03/19 
2328 SGOT 68* 67* AP 73 90  
TP 7.6 8.1 ALB 3.1* 3.0*  
GLOB 4.5* 5.1* Recent Labs 02/03/19 
8256 PH 7.42  
PCO2 45 PO2 214* Medications Personally Reviewed: 
 
Current Facility-Administered Medications Medication Dose Route Frequency  metroNIDAZOLE (FLAGYL) IVPB premix 500 mg  500 mg IntraVENous Q12H  
 vancomycin (VANCOCIN) 1,000 mg in 0.9% sodium chloride (MBP/ADV) 250 mL  1,000 mg IntraVENous Q24H  cefepime (MAXIPIME) 1 g in 0.9% sodium chloride (MBP/ADV) 50 mL  1 g IntraVENous Q12H  
 DOPamine (INTROPIN) 800 mg in dextrose 5% 250 mL infusion  2-10 mcg/kg/min IntraVENous TITRATE  guaiFENesin (ROBITUSSIN) 100 mg/5 mL oral liquid 100 mg  100 mg Per G Tube Q4H PRN  
 albuterol-ipratropium (DUO-NEB) 2.5 MG-0.5 MG/3 ML  3 mL Nebulization Q6H RT  
 albuterol-ipratropium (DUO-NEB) 2.5 MG-0.5 MG/3 ML  3 mL Nebulization Q6H PRN  
 aspirin delayed-release tablet 81 mg  81 mg Oral DAILY  atorvastatin (LIPITOR) tablet 40 mg  40 mg Per G Tube DAILY  clopidogrel (PLAVIX) tablet 75 mg  75 mg PEG Tube DAILY  finasteride (PROSCAR) tablet 5 mg  5 mg Oral DAILY  insulin glargine (LANTUS) injection 10 Units  10 Units SubCUTAneous DAILY  midodrine (PROAMITINE) tablet 10 mg  10 mg Per G Tube TID WITH MEALS  nitroglycerin (NITROSTAT) tablet 0.4 mg  0.4 mg SubLINGual Q5MIN PRN  
 oxyCODONE IR (ROXICODONE) tablet 2.5 mg  2.5 mg Oral Q6H PRN  
  pramipexole (MIRAPEX) tablet 0.25 mg  0.25 mg Oral TID  insulin lispro (HUMALOG) injection   SubCUTAneous AC&HS  
 glucose chewable tablet 16 g  4 Tab Oral PRN  
 dextrose (D50W) injection syrg 12.5-25 g  12.5-25 g IntraVENous PRN  
 glucagon (GLUCAGEN) injection 1 mg  1 mg IntraMUSCular PRN  
 sodium chloride (NS) flush 5-40 mL  5-40 mL IntraVENous Q8H  
 sodium chloride (NS) flush 5-40 mL  5-40 mL IntraVENous PRN  
 acetaminophen (TYLENOL) tablet 650 mg  650 mg Oral Q4H PRN  
 naloxone (NARCAN) injection 0.4 mg  0.4 mg IntraVENous PRN  
 ondansetron (ZOFRAN ODT) tablet 4 mg  4 mg Oral Q4H PRN  
 heparin (porcine) injection 5,000 Units  5,000 Units SubCUTAneous Q8H  
 levoFLOXacin (LEVAQUIN) 750 mg in D5W IVPB  750 mg IntraVENous Q48H  
 furosemide (LASIX) injection 40 mg  40 mg IntraVENous DAILY Kenny Houston III, DO

## 2019-02-05 NOTE — PROGRESS NOTES
Oncology End of Shift Note Bedside shift change report given to [facility], RN (incoming nurse) by Darby Hoffman (outgoing nurse) on Jeanne Mems. Report included the following information SBAR, Kardex, Intake/Output, MAR and Recent Results. Shift Summary: admit to oncology Issues for Physician to Address:   
 
Patient on Cardiac Monitoring? [] Yes 
[x] No 
 
Rhythm:   
 
 
 
Shift Events Darby Hoffman

## 2019-02-05 NOTE — HOSPICE
[facility] Help to Those in Need 
[phone] Patient Name: Mustapha Rowell YOB: 1935 Age: 80 y.o. [facility] RN Note:  Hospice consult received, reviewing chart. Will follow up with Unit Nurse and Care Manager to discuss plan of care, patient status and discharge disposition within the day. Coordinating with Palliative care at this time. Thank you for the opportunity to be of service to this patient.

## 2019-02-06 NOTE — PROGRESS NOTES
Spiritual Care Assessment/Progress Note [address] 
 
 
NAME: Roxy Varenr      MRN: [id number] AGE: 80 y.o. SEX: male Baptism Affiliation: [facility]  
Language: [place] 2/6/2019     Total Time (in minutes): 45  
 
Spiritual Assessment begun in [facility] through conversation with: 
  
    []Patient        [x] Family    [] Friend(s) Reason for Consult: Death, Hospice, Initial/Spiritual assessment, patient floor Spiritual beliefs: (Please include comment if needed) [x] Identifies with a jordyn tradition:     
   [x] Supported by a jordyn community:        
   [] Claims no spiritual orientation:       
   [] Seeking spiritual identity:            
   [] Adheres to an individual form of spirituality:       
   [] Not able to assess:                   
 
    
Identified resources for coping:  
   [x] Prayer                           
   [] Music                  [] Guided Imagery [x] Family/friends                 [] Pet visits [] Devotional reading                         [] Unknown 
   [] Other:                                         
 
 
Interventions offered during this visit: (See comments for more details) Patient Interventions: Death, Hospice Family/Friend(s): Prayer (actual), Prayer (assurance of), Guidance concerning next steps/process to be expected, Baptism beliefs/image of God discussed Plan of Care: 
 
 [x] Support spiritual and/or cultural needs  
 [] Support AMD and/or advance care planning process [x] Support grieving process 
 [] Coordinate Rites and/or Rituals  
 [] Coordination with community clergy [] No spiritual needs identified at this time 
 [] Detailed Plan of Care below (See Comments)  [] Make referral to Music Therapy 
[] Make referral to Pet Therapy    
[] Make referral to Addiction services 
[] Make referral to [facility] 
[] Make referral to Spiritual Care Partner 
[] No future visits requested [x] Follow up visits as needed Comments:  called to Oncology following the death of the patient. The wife and sons were at the bedside with the patient. The sons wives were also present in the room.  prayed with the family at bedside.  visited with the wife and talked about the grieving process and how she was feeling at this time. Family was thankful for the chaplains visit.  provided spiritual care and comfort to the family.  supported the wife in her grieving process and comforted through words of affirmation.

## 2019-02-06 NOTE — HOSPICE
[facility] [facility] to Those in Need 
[phone] Discharge/Death Nursing Note Patient Name: Natalee Young YOB: 1935 Age: 80 y.o. Date of Death: 19 Admitted Date: 2019 Time of Death: 200 Facility of Care: [id number] [facility] Level of Care: [facility] Patient Room: [address] [place] [address] Hospice Attending: No att. providers found Hospice Diagnosis: Respiratory failure ([address] [J96.90] Death Pronouncement Pronouncement of death completed by: Dr. Sarah Mcdonnell MD 
 
Agency staff (WAS NOT) present at the time of death At the time of death the patient was documented as No corneal reflex. No heart sounds on auscultation. No spontaneous breath sounds. No withdrawal from painful stimuli. 
  
[facility]  [facility] The following were notified of the patient's death: Hospice Team, Family @ bedside, Pastoral Care, MD. Medications were disposed of per facility protocol Discharge Summary Discharge Reason: Death Summary of Care Provided: 
 
[x] Post mortem care provided by [facility] staff [x] Notification of  home by Hospital staff 
[] Referrals/Community resources provided:  
[] Goals completed 
[] Durable Medical Equipment vendor notified Disciplines involved: [x] RN [] SW [x]  [] MATTHEW [] Vol [] PT [] OT [] ST [] BC 
 
[x] IDT communication/notification Attending Physician, Dr. Bret Romo, notified of death Bereaved Cathy Ghosh-spouse. Low to moderate level. [address] [place] [address] [address] [place] [address], [phone] (Home) *Preferred* 
[phone] [facility]) Advance Care Planning 2019 Patient's Healthcare Decision Maker is: -  
Primary Decision Maker Name -  
Primary Decision Maker Phone Number -  
Primary Decision Maker Relationship to Patient -  
Confirm Advance Directive None Patient Would Like to Complete Advance Directive - Does the patient have other document types -

## 2019-02-06 NOTE — PROGRESS NOTES
RN was called into room by family around 3 am, said breathing had slowed and was more shallow. Pt observed with shallow breaths but controlled, not labored. 2928: RN called into room by family, they said they thought he had stopped breathing. RN auscultated for lung sounds and felt no pulse. LUCIA called at 0315. Nursing Supervisor Marizol Gallagher notified at [phone], she notified Dr Tacho Keys paged at  chaplain Bela Shabazz coming to bedside. Hospice nurse Reta Orta notified. [facility] called at [phone], left VM with pt info since he was over 80. Iv's and PICC [facility] from pt[facility] and PCT.

## 2019-02-06 NOTE — PROGRESS NOTES
Death Note Physical Exam: No corneal reflex. No heart sounds on auscultation. No spontaneous breath sounds. No withdrawal from painful stimuli.  
 
Time of Death 3.15 pm  
 
D/c summary will be done by primary Team

## 2019-02-07 NOTE — DISCHARGE SUMMARY
Hospice Discharge Summary [employer] [employer] [facility] Those in Need Date of Admission: 2/5/2019 Date of Discharge: 2/6/2019 Oma Mendez is a 80y.o. year old who was admitted to [facility] at [facility] with a Hospice diagnosis of Respiratory failure ([facility] [address] [J96.90]. The patient's care was focused on comfort and the patient passed away on 2/6/2019. Add in Hospice Summary through Plan from most recent Progress Note 
 
  
Oma Mendez is a 80y.o. year old who was admitted to [facility]. 79 yo M with hx CAD , chronic CHF ef 30%, severe aortic stenosis, ckd 3 with multiple hospitalization in past year for CAD /CHF was at SNF getting rehab and found down unresponsive and hypoxic and admitted to the hospital on 2-3-19. Treated for CHF and pneumonia aggressively . Had thoracentesis for effusions. Pt developed worsening hypotension and renal failure on iv Lasix and dobutamine. Pt with noted increased respiratory distress with RR 30's , hypoxic ,requiring morphine iv today and excess secretions with gurgling. Family has opted for comfort care and hospice. Pt has become minimally responsive. Admitted [facility] hosp[facility] today. The patient's principle diagnosis has resulted in hypotension, hypoxia, respiratory distress, excess secretions Refer to LCD

## 2019-02-07 NOTE — DISCHARGE SUMMARY
Hospice Discharge Summary [facility] [facility] Good [facility] to Those in Need Date of Admission: 2/5/2019 Date of Discharge: 2/6/2019 Kenroy Laws is a 80y.o. year old who was admitted to [facility] at [facility] with a Hospice diagnosis of Respiratory failure ([facility] [address] [J96.90]. The patient's care was focused on comfort and the patient passed away on 2/6/2019. Add in Hospice Summary through Plan from most recent Progress Note

## 2019-02-08 LAB
BACTERIA SPEC CULT: NORMAL
BACTERIA SPEC CULT: NORMAL
GRAM STN SPEC: NORMAL
GRAM STN SPEC: NORMAL
SERVICE CMNT-IMP: NORMAL
SERVICE CMNT-IMP: NORMAL

## 2019-02-12 ENCOUNTER — PATIENT OUTREACH (OUTPATIENT)
Dept: CASE MANAGEMENT | Age: 84
End: 2019-02-12

## 2019-02-12 NOTE — PROGRESS NOTES
Nurse navigator note - cardiology  Call to and reached Mrs. Trip Lewis - to offer condolences - she was so involved in Kevin's care - she said her sons have been with her - she states that her spiritual side knows that he is in a better place and is not suffering; but her human side is very sad. Her son has suggested she come to his house for a while for a change. She had been displaying caregiver fatigue and had been sick recently, as well. NN complemented her on taking such great care of Joby Fitzpatrick - and to be proud of all she had done for and with him - and encouraged her to rest and do some self care now -    Episode closed. Patient  in Hospice care.      Indra Cooper RN , [facility], [facility]  CM [address] [facility]  [phone]

## 2019-04-18 ENCOUNTER — DOCUMENTATION ONLY (OUTPATIENT)
Dept: ENDOCRINOLOGY | Age: 84
End: 2019-04-18

## 2019-04-18 NOTE — PROGRESS NOTES
Was unaware of patients status,  Attempted to reach patients wife for condolences,  Unable to reach,    Brian Otoole.  [address] [facility]  [address] [place] [address]

## 2021-03-04 NOTE — PROGRESS NOTES
Hospitalist Progress Note NAME: Claude Bells :  1935 MRN:  [id number] Assessment / Plan: Hypotension POA 
-episode of hypotension at home which resolved, wife said he was very lethargic, SBP in the 70s - BP stable overnight, occasionally drops when he gets up 
-cardiology consult appreciated , likely due to hemodynamical changes due to AS with superimposed hypovolemia from the diarrhea last admit(? TF) and diuretics 
-Will follow, if stable overnight, then start D/C planning in AM 
-PTconsult Acute kidney injury POA 
-difficult to tell true baseline , mostly 1.2-1.3 but last admission also presented with elevated cr at 1.82. This admission 1.63--> 1.47--> 1.00  
-Very frail and easily going from LIZ to fluid overload 
       difficult manage due to h/o severe AS  
-s/p very gentle hydration at 25 cc, now off 
- lower dose lasix -Avoid nephrotoxic drugs, adjust all meds to GFR.  
- diarrhea improved Diarrhea  
-+ had constipation last week treated with laxatives. Also, h/o diarrhea with TF in the hospital. Pt/wife didn't informed me that he had diarrhea prior to dc.  
-abd was tender yesterday , much better today 
-No diarrhea overnight, improved 
-C difficile negative 
-follow c.diff. KUB per GI. Lactic acidosis POA  
-no leukocytosis or fever.  
-suspect due to hypovolemia 
-resolved Recent Group B streptococcal bacteremia POA due to Possible aspiration pneumonia POA  
-4/ BC with Grp B strep on admission, repeated BC , - NTD  
-AB: he was DC home on cefdinir + flagyl x 7 days ( to complete 14 days A Rx) --> will c/w CTX + flagyl as last admission ( last dose AB ) -cxray on admission: Improved lung aeration with persistent left lung base opacity. 
-last admission w/u for bacteremia: 
-Recent increased back pain, had CT lumbar spine with chronic appearing DDD 
-ECHO wo vegetation  
   
Chronic dysphagia 
-NPO at home with TF  [employer] 1.5 ( 5 cans) Last admission changed to 5 cans a day every 3 hr; per wife tolerated first 3 feedings ok but then had residuals Wife also considering changing to pump and continuous feeding if above will not work - Followed Dr Roseanne Burger OP for dysphasia, was considering EGD with possible attempt of dilatation to be done mid August   
-seen by Dr Roseanne Burger, help as greatly appreciated, will need to be off Plavix for 5 days.  
  
Chronic systolic HF EF 35%  
mod to severe AS POA  
Chronic orthostatic hypotension  
-no signs of fluid overload. HR/BP stable now  
-Mild elevated troponin - not ischemic   
-off IVF and still holding lasix 
-Resume in AM, 40 mg in am and 20 mg in afternoon. He is very frail / very thin euvolemic line --> will start with lasix 20 mg daily Very frail and easily going from LIZ /dehydration vs fluid overload  
-cont on home aspirin/plavix. Lipitor.  
-continue midodrine - Dr Rose Heart help was greatly appreciated: intermittent hypotension is likely due to AS. Hemodynamically he is preload dependent and thus would avoid any thing that drops preload (i.e. Nitrates) or over diuresis Following echo every couple of months. Once AS become critical he may qualify for repair. ( pt/wife is very hopeful for that) -ECHO: EF 40%, mild MR, mod to sev AS, mild TR  
   
DM type II  
-, 132, 214, 202, 234 
-cont lantus + SS - adjust as needed  
-wife asking if primary endocrinology can review BSs while pt is here and advise on insulin regiment since his TF regiment was changed. Oral candidiasis 
-oral nystatin and artificial  saliva    
 
R foot pain/swelling 
-for last 2-3 weeks per wife -x-rays and LE dopplers negative Chronic thrombocytopenia POA stable , followed with Dr Valentino Kung   
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 
   
   
Code status: Full Prophylaxis: lovenox ok with reduced dose and close watch of plt Baseline: extremely frail Recommended Disposition home with home health [place] home in AM if stable Subjective: Chief Complaint / Reason for Physician Visit: following hypotension/ LIZ \"diarrhea is getting better\" Still diffusely tender in abdomen, diarrhea is improving Mildly sore in abdomen Occasional increased residuals, KUB with no obstruction Discussed with RN events overnight. Review of Systems: 
Symptom Y/N Comments  Symptom Y/N Comments Fever/Chills n   Chest Pain n   
Poor Appetite    Edema Cough n   Abdominal Pain y less Sputum    Joint Pain SOB/KRAMER n   Pruritis/Rash Nausea/vomit n   Tolerating PT/OT Diarrhea n   Tolerating Diet y Tube feed Constipation    Other Could NOT obtain due to:   
 
Objective: VITALS:  
Last 24hrs VS reviewed since prior progress note. Most recent are: 
Patient Vitals for the past 24 hrs: 
 Temp Pulse Resp BP SpO2  
07/25/18 1911 98.2 °F (36.8 °C) 77 20 123/69 96 %  
07/25/18 1516 97.4 °F (36.3 °C) 78 20 108/72 94 %  
07/25/18 1122 97.7 °F (36.5 °C) 73 20 100/55 93 % 07/25/18 0740 98 °F (36.7 °C) 86 20 100/52 93 % 07/25/18 0402 97.3 °F (36.3 °C) 83 20 147/70 97 %  
07/25/18 0044 98 °F (36.7 °C) 72 18 119/71 93 % Intake/Output Summary (Last 24 hours) at 07/25/18 2130 Last data filed at 07/25/18 6021 Gross per 24 hour Intake             1920 ml Output                0 ml Net             1920 ml PHYSICAL EXAM: 
General: WD,thin. Alert, cooperative, no acute distress. EENT:  EOMI. Anicteric sclerae. Resp:             Few crackles at bases bilaterally, no wheezing. No accessory muscle use CV:  Regular  rhythm,  1+ edema GI:  Soft, Non distended, minimal tenderness +Bowel sounds Neurologic:  Alert and oriented X 3, normal speech, Psych:   Good insight. Not anxious nor agitated Skin:  No rashes. No jaundice Extr:                R foot swollen/ tender on lower calf Reviewed most current lab test results and cultures  YES Reviewed most current radiology test results   YES Review and summation of old records today    NO Reviewed patient's current orders and MAR    YES 
PMH/SH reviewed - no change compared to H&P 
________________________________________________________________________ Care Plan discussed with: 
  Comments Patient y Family RN y   
Care Manager Consultant Multidiciplinary team rounds were held today with , nursing, pharmacist and clinical coordinator. Patient's plan of care was discussed; medications were reviewed and discharge planning was addressed. ________________________________________________________________________ Total NON critical care TIME:  25  Minutes Total CRITICAL CARE TIME Spent:   Minutes non procedure based Comments >50% of visit spent in counseling and coordination of care    
________________________________________________________________________ Geri Card MD  
 
Procedures: see electronic medical records for all procedures/Xrays and details which were not copied into this note but were reviewed prior to creation of Plan. LABS: 
I reviewed today's most current labs and imaging studies. Pertinent labs include: 
Recent Labs  
   07/25/18 
 0419  07/24/18 
 0314  07/23/18 
 0536 WBC  5.1  4.6  3.9* HGB  11.2*  11.4*  10.9* HCT  35.7*  37.0  34.4*  
PLT  56*  63*  58* Recent Labs  
   07/25/18 
 0419  07/24/18 
 0314  07/23/18 
 0536 NA  142  143  148* K  4.1  4.0  3.9 CL  108  110*  111* CO2  30  29  33* GLU  160*  173*  184* BUN  12  17  19 CREA  0.93  1.03  1.00  
CA  8.8  9.0  8.7 MG   --    --   2.6* PHOS   --    --   2.5* Signed: Geri Card MD 
 
 
 
 
 Calcipotriene Counseling:  I discussed with the patient the risks of calcipotriene including but not limited to erythema, scaling, itching, and irritation.

## 2023-07-21 NOTE — PROGRESS NOTES
Goals  Understand CHF action plan. 5/23/18  Spoke with pt's wife who is managing pt very closely. We discussed the importance of daily wts. 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 We discussed the possibility for supportive care for the pt as well as pt's wife as she needs  I provided the telephone number to the pt's wife for [facility] ([phone]). Pt's wife needs to have hip replacement in June. We discussed coming up with a care plan for the pt and we also discussed how to assist family with care should the pt's wife need assistance as well. Pt's wife reports that pt has seen Dr. Sunita Ayoub on 5/21/18 and pt's wife reports that pt will go to [address] [place] [address] for outpt rehab. Pt's wife reports that she did call [facility] this weekend as pt had steady wt gain. Pt's wife reports that she increased lasix and called and spoke with Dr. Toshia Vail on 5/21/18. Pt's wife stated appreciation for call. I provided my direct office telephone number to her. Pt's wife states that I may call next week to discuss care plan for when pt's wife needs to have surgery. Pt's wife reports that she and pt have family support (2 sons and grandchildren). AFP 
 
6/6/18  Pt's wife reports that pt has followed up with Dr. Honore Holstein and pt's wife reports that pt's platelets remain low and are at 67. Pt's wife states that other than that, pt is doing well  Pt's wife reports that she had a stress test and Dr. Bhavna Napoles is to call her in preparation for hip surgery. Pt's wife states that she is working on a plan to have granddaughter and great granddaughter take care of pt while pt's wife has surgery and pt's wife also has a friend who can recommend an aide. Pt's wife states that she will work on plan IF surgery is scheduled. Pt's wife reports that there have been not changes to pt's health status and states appreciation for call.   Will continue to call Impression: Other secondary cataract, bilateral: H26.493. Plan: Discussed diagnosis in detail with patient. Secondary cataracts affecting vision some, but no surgery is currently recommended. The patient will monitor vision changes and contact us with any decrease in vision. Will continue to monitor. AFP 
 
6/22/18  Pt's wife reports that pt continues to go to PT at [facility]. Pt states that she will have surgery on 7/24/18 and that she is developing a plan to provide care for her  while she is in recovery. Pt's wife states that I may continue to call and that she appreciates someone checking on they. AFP 
 
7/11/18  Pt's wife reports that pt was to begin [facility] with [facility] but back pain has prevented that as pt''s pain specialist is recommending that pt attend out patient therapy at [address] [place] [address]. Pt's wife states that pt will begin [facility] with [facility] after back pain has improved. Pt's wife reports that pt is okay. AFP 
 
7/31/18  Spoke with pt's wife who reports that pt is having diarrhea and has some altered mental status. Pt's wife reports pt is producing very little urine. Pt's wife reports that pt is seeing Dr. Rodgers Found this afternoon as is she for a bladder infection. We discussed that we need to work on a plan to get her some help so that she can take care of herself. Will call on Thursday, 8/2/18 to review PCP appt findings. AFP Pt's wife reports that pt has a PCP appt today 7/31/18. Pt's wife reports that pt has not done well at home. We discussed that pt's wife needs caregiver support and pt's wife agrees and reports that their children have offered to help. Pt's wife reports that she has a bladder infection that has been recurring. Pt's wife states concern that pt is not improving and reports that initially, at first hospital admission, pt had diarrhea due to different tube feeding. Pt's wife states that she took his solution to the hospital but pt continued with diarrhea. Will continue to work with wife to develop a care plan for pt and wife. Chart forwarded to [address] [place] [address] for assistance.

## 2024-10-21 NOTE — PROGRESS NOTES
* No surgery found *  Bedside shift change report given to Sandra Dennis (oncoming nurse) by Danika Salas (offgoing nurse).  Report included the following information SBAR, Kardex, Intake/Output, MAR and Recent Results.     Zone Phone:   [phone]        Significant changes during shift:  None,tolerating food well, pain management did well with PT             Patient Information     Sid Arroyo  80 y.o.  6/28/2018  4:12 AM by Denis Ryan MD. 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    Wrist pain, acute, right 05/07/2018    Thrombotic stroke involving left middle cerebral artery ([address] 02/02/2017    Stenosis of both internal carotid arteries 02/02/2017    Hemiplegia and hemiparesis following cerebral infarction affecting right dominant side ([address] 02/02/2017    Weakness 02/01/2017    Stroke ([address] 02/01/2017    Aspiration pneumonia ([address] 11/28/2016    History of stroke 07/30/2016       Class: Present on Admission    Polyneuropathy associated with underlying disease ([address] 02/11/2016    Peripheral neuropathy ([address] 01/09/2016    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 ([address] 01/08/2016    Percutaneous endoscopic gastrostomy status ([address] 12/04/2014    Antiplatelet or antithrombotic long-term use 12/04/2014    Heart failure ([address] 09/11/2014    Esophageal motility disorder 07/02/2013    CAD (coronary artery disease) 02/12/2013    Status post implantation of automatic cardioverter/defibrillator (AICD) 02/12/2013       Class: Present on Admission    Aortic stenosis, mild 02/12/2013    S/P PTCA (percutaneous transluminal coronary angioplasty) 01/18/2013    Non-cardiac chest pain 01/04/2013    Feeding difficulties 05/02/2011    Abnormal cardiovascular stress test 06/24/2010    S/P CABG x 3 06/24/2010    Atherosclerotic cerebrovascular disease 06/24/2010    Orthostatic hypotension 06/24/2010    Dysphagia 05/03/2010           Past Medical History:   Diagnosis Date    Antiplatelet or antithrombotic long-term use 12/4/2014    Anxiety disorder      Arrhythmia 2009     bradycardia    Arthritis      CAD (coronary artery disease)       s/p CABG 2002; Dr Dina Rodriguez [facility]) 1996     tongue/throat cancer s/p surgery / radiation and 1 dose of chemo    Carotid artery stenosis       s/p bilateral stents    Chronic pain       left leg, lower back,     Depression      Diabetes ([address]       Type II    Esophageal dysmotility       s/p dilitation    Esophageal motility disorder 7/8/2013     Frequent simultaneous or failed contractions, low amplitude contractions  suggests severe myopathy or diffuse spasm. I suspect the latter. Achalasia  is not present.         GERD (gastroesophageal reflux disease)      Heart failure ([address] 10/2014      Cardiomyopathy:Pacemaker upgrade:Biv and AICD  Dr. Willian Fothergill heart DrAlvaro last visit 5/11/2015    Hepatitis C Dx 1996     treated at [facility] in past; as of 4/15/15 wife states pt currently not under any treatment    Hyperlipidemia      Hypertension      Myocardial infarct [facility]) 2013     Heart Cath: 40% LV EF, Stented distal LAD, patent Graft to circumflex    On tube feeding diet approx 2009     still has as of 9/28/15  (no po food/liquid/meds at all); Dr Michelle Grady Other ill-defined conditions(799.89) 1996     1 dose of chemotherapy/radiation for tongue cancer    Other ill-defined conditions(799.89)       BPH    Other ill-defined conditions(799.89)       orthostatic hypotension    Pneumonia ~ April -May 2010    Stroke [facility]) approx 2003     left side-left finger tips numb; no imbalance or memory loss; as of 2015 not seeing neuro MD    Suicidal thoughts              Core Measures:     CVA: No Refused  CHF:No No  PNA:No No        Activity Status:     OOB to Chair No  Ambulated this shift No   Bed Rest No     Supplemental O2: (If Applicable)     NC No  NRB No  Venti-mask No     DVT prophylaxis:     DVT prophylaxis Med- Yes  DVT prophylaxis SCD or CORTES- No      Wounds: (If Applicable)     Wounds- No     Location-none     Patient Safety:     Falls Score Total Score: 4  Safety Level_______  Bed Alarm On? Yes  Sitter?  No     Plan for upcoming shift: continue to monitor            Discharge Plan: No      Active Consults:  IP CONSULT TO NEUROLOGY  IP CONSULT TO CARDIOLOGY                  no

## (undated) DEVICE — ESOPHAGEAL/PYLORIC/COLONIC/BILIARY WIREGUIDED BALLOON DILATATION CATHETER: Brand: CRE™ PRO

## (undated) DEVICE — ENDO CARRY-ON PROCEDURE KIT INCLUDES ENZYMATIC SPONGE, GAUZE, BIOHAZARD LABEL, TRAY, LUBRICANT, DIRTY SCOPE LABEL, WATER LABEL, TRAY, DRAWSTRING PAD, AND DEFENDO 4-PIECE KIT.: Brand: ENDO CARRY-ON PROCEDURE KIT

## (undated) DEVICE — 1200 GUARD II KIT W/5MM TUBE W/O VAC TUBE: Brand: GUARDIAN

## (undated) DEVICE — SYRINGE 50ML E/T

## (undated) DEVICE — BLOCK BITE ENDOSCP AD 21 MM W/ DIL BLU LF DISP

## (undated) DEVICE — SOLUTION LACTATED RINGERS INJECTION USP

## (undated) DEVICE — CONTINU-FLO SOLUTION SET, 2 INJECTION SITES, MALE LUER LOCK ADAPTER WITH RETRACTABLE COLLAR, LARGE BORE STOPCOCK WITH ROTATING MALE LUER LOCK EXTENSION SET, 2 INJECTION SITES, MALE LUER LOCK ADAPTER WITH RETRACTABLE COLLAR: Brand: INTERLINK/CONTINU-FLO

## (undated) DEVICE — 3M™ TEGADERM™ TRANSPARENT FILM DRESSING FRAME STYLE, 1624W, 2-3/8 IN X 2-3/4 IN (6 CM X 7 CM), 100/CT 4CT/CASE: Brand: 3M™ TEGADERM™

## (undated) DEVICE — SYR ASSEMB INFL BLLN 60ML --

## (undated) DEVICE — SOLIDIFIER MEDC 1200ML -- CONVERT TO 356117

## (undated) DEVICE — KENDALL DL ECG CABLE AND LEAD WIRE SYSTEM, 3-LEAD, SINGLE PATIENT USE: Brand: KENDALL

## (undated) DEVICE — FORCEPS BX L160CM DIA8MM GRSP DISECT CUP TIP NONLOCKING ROT